# Patient Record
Sex: FEMALE | Race: WHITE | NOT HISPANIC OR LATINO | Employment: OTHER | URBAN - METROPOLITAN AREA
[De-identification: names, ages, dates, MRNs, and addresses within clinical notes are randomized per-mention and may not be internally consistent; named-entity substitution may affect disease eponyms.]

---

## 2017-02-28 DIAGNOSIS — Z12.31 ENCOUNTER FOR SCREENING MAMMOGRAM FOR MALIGNANT NEOPLASM OF BREAST: ICD-10-CM

## 2017-05-02 ENCOUNTER — APPOINTMENT (EMERGENCY)
Dept: CT IMAGING | Facility: HOSPITAL | Age: 57
End: 2017-05-02
Payer: COMMERCIAL

## 2017-05-02 ENCOUNTER — HOSPITAL ENCOUNTER (EMERGENCY)
Facility: HOSPITAL | Age: 57
Discharge: HOME | End: 2017-05-02
Attending: EMERGENCY MEDICINE | Admitting: EMERGENCY MEDICINE
Payer: COMMERCIAL

## 2017-05-02 VITALS
HEART RATE: 70 BPM | HEIGHT: 67 IN | BODY MASS INDEX: 27.79 KG/M2 | SYSTOLIC BLOOD PRESSURE: 188 MMHG | OXYGEN SATURATION: 100 % | RESPIRATION RATE: 17 BRPM | TEMPERATURE: 97.8 F | WEIGHT: 177.03 LBS | DIASTOLIC BLOOD PRESSURE: 88 MMHG

## 2017-05-02 DIAGNOSIS — F07.81 CONCUSSION SYNDROME: ICD-10-CM

## 2017-05-02 DIAGNOSIS — S16.1XXA NECK MUSCLE STRAIN, INITIAL ENCOUNTER: ICD-10-CM

## 2017-05-02 DIAGNOSIS — S09.90XA HEAD INJURY DUE TO TRAUMA, INITIAL ENCOUNTER: Primary | ICD-10-CM

## 2017-05-02 LAB
ANION GAP BLD CALC-SCNC: 18 MMOL/L (ref 4–13)
BUN BLD-MCNC: 7 MG/DL (ref 5–25)
CA-I BLD-SCNC: 1.23 MMOL/L (ref 1.12–1.32)
CHLORIDE BLD-SCNC: 103 MMOL/L (ref 100–108)
CREAT BLD-MCNC: 0.5 MG/DL (ref 0.6–1.3)
GFR SERPL CREATININE-BSD FRML MDRD: >60 ML/MIN/1.73SQ M
GLUCOSE SERPL-MCNC: 98 MG/DL (ref 65–140)
HCT VFR BLD CALC: 44 % (ref 34.8–46.1)
HGB BLDA-MCNC: 15 G/DL (ref 11.5–15.4)
PCO2 BLD: 23 MMOL/L (ref 21–32)
POTASSIUM BLD-SCNC: 3.8 MMOL/L (ref 3.5–5.3)
SODIUM BLD-SCNC: 139 MMOL/L (ref 136–145)
SPECIMEN SOURCE: ABNORMAL

## 2017-05-02 PROCEDURE — 99284 EMERGENCY DEPT VISIT MOD MDM: CPT

## 2017-05-02 PROCEDURE — 70450 CT HEAD/BRAIN W/O DYE: CPT

## 2017-05-02 PROCEDURE — 80047 BASIC METABLC PNL IONIZED CA: CPT

## 2017-05-02 PROCEDURE — 71260 CT THORAX DX C+: CPT

## 2017-05-02 PROCEDURE — 72125 CT NECK SPINE W/O DYE: CPT

## 2017-05-02 PROCEDURE — 85014 HEMATOCRIT: CPT

## 2017-05-02 PROCEDURE — 74177 CT ABD & PELVIS W/CONTRAST: CPT

## 2017-05-02 RX ORDER — CYCLOBENZAPRINE HCL 5 MG
5 TABLET ORAL 3 TIMES DAILY PRN
Qty: 21 TABLET | Refills: 0 | Status: SHIPPED | OUTPATIENT
Start: 2017-05-02 | End: 2018-05-17

## 2017-05-02 RX ORDER — ONDANSETRON 4 MG/1
4 TABLET, ORALLY DISINTEGRATING ORAL EVERY 6 HOURS PRN
Qty: 20 TABLET | Refills: 0 | Status: SHIPPED | OUTPATIENT
Start: 2017-05-02 | End: 2018-05-09

## 2017-05-02 RX ORDER — ATORVASTATIN CALCIUM 40 MG/1
40 TABLET, FILM COATED ORAL DAILY
COMMUNITY
End: 2020-07-19 | Stop reason: ALTCHOICE

## 2017-05-02 RX ADMIN — IOHEXOL 100 ML: 350 INJECTION, SOLUTION INTRAVENOUS at 19:16

## 2017-05-31 ENCOUNTER — ALLSCRIPTS OFFICE VISIT (OUTPATIENT)
Dept: OTHER | Facility: OTHER | Age: 57
End: 2017-05-31

## 2017-06-19 ENCOUNTER — ALLSCRIPTS OFFICE VISIT (OUTPATIENT)
Dept: OTHER | Facility: OTHER | Age: 57
End: 2017-06-19

## 2017-06-19 DIAGNOSIS — R94.6 ABNORMAL RESULTS OF THYROID FUNCTION STUDIES: ICD-10-CM

## 2017-06-19 DIAGNOSIS — E78.2 MIXED HYPERLIPIDEMIA: ICD-10-CM

## 2017-07-21 ENCOUNTER — ALLSCRIPTS OFFICE VISIT (OUTPATIENT)
Dept: OTHER | Facility: OTHER | Age: 57
End: 2017-07-21

## 2017-07-27 ENCOUNTER — ALLSCRIPTS OFFICE VISIT (OUTPATIENT)
Dept: OTHER | Facility: OTHER | Age: 57
End: 2017-07-27

## 2017-08-08 ENCOUNTER — GENERIC CONVERSION - ENCOUNTER (OUTPATIENT)
Dept: OTHER | Facility: OTHER | Age: 57
End: 2017-08-08

## 2017-08-15 ENCOUNTER — HOSPITAL ENCOUNTER (OUTPATIENT)
Facility: HOSPITAL | Age: 57
Setting detail: OUTPATIENT SURGERY
Discharge: HOME | End: 2017-08-15
Attending: INTERNAL MEDICINE | Admitting: INTERNAL MEDICINE
Payer: COMMERCIAL

## 2017-08-15 ENCOUNTER — GENERIC CONVERSION - ENCOUNTER (OUTPATIENT)
Dept: OTHER | Facility: OTHER | Age: 57
End: 2017-08-15

## 2017-08-15 ENCOUNTER — ANESTHESIA (OUTPATIENT)
Dept: PERIOP | Facility: HOSPITAL | Age: 57
End: 2017-08-15
Payer: COMMERCIAL

## 2017-08-15 ENCOUNTER — ANESTHESIA EVENT (OUTPATIENT)
Dept: PERIOP | Facility: HOSPITAL | Age: 57
End: 2017-08-15
Payer: COMMERCIAL

## 2017-08-15 VITALS
DIASTOLIC BLOOD PRESSURE: 69 MMHG | HEART RATE: 65 BPM | HEIGHT: 69 IN | SYSTOLIC BLOOD PRESSURE: 126 MMHG | BODY MASS INDEX: 24.82 KG/M2 | TEMPERATURE: 97.7 F | OXYGEN SATURATION: 99 % | WEIGHT: 167.55 LBS | RESPIRATION RATE: 14 BRPM

## 2017-08-15 DIAGNOSIS — Z86.010 HISTORY OF COLONIC POLYPS: ICD-10-CM

## 2017-08-15 PROCEDURE — 88305 TISSUE EXAM BY PATHOLOGIST: CPT | Performed by: INTERNAL MEDICINE

## 2017-08-15 RX ORDER — SODIUM CHLORIDE, SODIUM LACTATE, POTASSIUM CHLORIDE, CALCIUM CHLORIDE 600; 310; 30; 20 MG/100ML; MG/100ML; MG/100ML; MG/100ML
INJECTION, SOLUTION INTRAVENOUS CONTINUOUS PRN
Status: DISCONTINUED | OUTPATIENT
Start: 2017-08-15 | End: 2017-08-15 | Stop reason: SURG

## 2017-08-15 RX ORDER — THIAMINE MONONITRATE (VIT B1) 100 MG
100 TABLET ORAL DAILY
Status: ON HOLD | COMMUNITY
End: 2021-08-10 | Stop reason: ALTCHOICE

## 2017-08-15 RX ORDER — PROPOFOL 10 MG/ML
INJECTION, EMULSION INTRAVENOUS AS NEEDED
Status: DISCONTINUED | OUTPATIENT
Start: 2017-08-15 | End: 2017-08-15 | Stop reason: SURG

## 2017-08-15 RX ORDER — LIDOCAINE HYDROCHLORIDE 10 MG/ML
INJECTION, SOLUTION INFILTRATION; PERINEURAL AS NEEDED
Status: DISCONTINUED | OUTPATIENT
Start: 2017-08-15 | End: 2017-08-15 | Stop reason: SURG

## 2017-08-15 RX ADMIN — LIDOCAINE HYDROCHLORIDE 50 MG: 10 INJECTION, SOLUTION INFILTRATION; PERINEURAL at 08:58

## 2017-08-15 RX ADMIN — PROPOFOL 150 MG: 10 INJECTION, EMULSION INTRAVENOUS at 09:08

## 2017-08-15 RX ADMIN — PROPOFOL 30 MG: 10 INJECTION, EMULSION INTRAVENOUS at 09:05

## 2017-08-15 RX ADMIN — PROPOFOL 50 MG: 10 INJECTION, EMULSION INTRAVENOUS at 09:10

## 2017-08-15 RX ADMIN — SODIUM CHLORIDE, SODIUM LACTATE, POTASSIUM CHLORIDE, AND CALCIUM CHLORIDE: .6; .31; .03; .02 INJECTION, SOLUTION INTRAVENOUS at 08:55

## 2017-11-29 ENCOUNTER — ALLSCRIPTS OFFICE VISIT (OUTPATIENT)
Dept: OTHER | Facility: OTHER | Age: 57
End: 2017-11-29

## 2017-11-29 DIAGNOSIS — I89.0 LYMPHEDEMA, NOT ELSEWHERE CLASSIFIED: ICD-10-CM

## 2017-11-30 NOTE — PROGRESS NOTES
Assessment    1  Lymphedema of right arm (457 1) (I89 0)    Plan  Lymphedema of right arm    · *1 - [facility]  *  Status: Active  Requested for: 28TKH2693  Care Summary provided  : Yes    Discussion/Summary    Referral to Lymphedema therapy at [facility]  Possible 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 of new medications were reviewed with the patient/guardian today  The treatment plan was reviewed with the patient/guardian  The patient/guardian understands and agrees with the treatment plan      Chief Complaint  Patient is here for a burn on her back which is getting better but n ow has caused her lymphedema to flare up  History of Present Illness  59 yr old with h/o chronic pain syndrome related to a surgery she had on her R arm  She states she was using a machine at home that she uses for her lymphedema and over the past few days her lymphedema has worsened  She was getting concerned that the swelling would not go down  burned her back from a heating pad  It is healing  Review of Systems   Musculoskeletal: as noted in HPI  Active Problems    1  Back pain, chronic (724 5,338 29) (M54 9,G89 29)   2  Cervical cancer screening (V76 2) (Z12 4)   3  Cervicalgia (723 1) (M54 2)   4  Chronic anemia (285 9) (D64 9)   5  Chronic insomnia (780 52) (F51 04)   6  Chronic pain (338 29) (G89 29)   7  Decreased thyroid stimulating hormone (TSH) level (794 5) (R94 6)   8  Encounter for gynecological examination without abnormal finding (V72 31) (Z01 419)   9  Encounter for screening mammogram for malignant neoplasm of breast (V76 12) (Z12 31)   10  Female dyspareunia (625 0) (N94 10)   11  History of colon polyps (V12 72) (Z86 010)   12  Hyperlipidemia, mixed (272 2) (E78 2)   13  Lymphedema of right arm (457 1) (I89 0)   14  Melanoma (172 9) (C43 9)   15  Peripheral neuropathy (356 9) (G62 9)   16  Screening for breast cancer (V76 10) (Z12 31)   17  Screening for colon cancer (V76 51) (Z12 11)   18   Skin lesion of back (709 9) (L98 9)    Past Medical History  1  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)   2  History of mammogram (V15 89) (Z92 89)    The active problems and past medical history were reviewed and updated today  Surgical History  1  History of Arm Incision   2  History of Biopsy Skin   3  History Of Prior Surgery   4  History of Hysterectomy    Family History  Mother    1  Family history of Diabetes  Father    2  Family history unknown (V49 89) (Z78 9)  Sister    3  Family history of Breast cancer   4  Family history of Lung cancer   5  Family history of Pancreatic cancer    Social History     · Consumes alcohol occasionally (V49 89) (Z78 9)   · Denied: History of Drug use   · Lives with spouse   · Denied: History of Marijuana   ·    · Never a smoker   · Never smoked   · No drug use   · Unemployed (V62 0) (Z56 0)  The social history was reviewed and updated today  Current Meds   1  Atorvastatin Calcium 20 MG Oral Tablet; take one tablet by mouth one time daily; Therapy: 29VXQ9993 to (Evaluate:55Fkd6329)  Requested for: 03Pct2718; Last Rx:15Fpo7354 Ordered   2  Gabapentin 300 MG Oral Capsule; TAKE 2 CAPSULES BY MOUTH 4 TIMES DAILY; Therapy: 68Qty2204 to (Evaluate:02Jan2018)  Requested for: 50QMF1318; Last Rx:04Oct2017 Ordered   3  Melatonin CAPS; Therapy: (Recorded:19Jun2017) to Recorded   4  Misc Natural Products CREA; Therapy: (Recorded:20Jun2017) to Recorded    The medication list was reviewed and updated today  Allergies  1  Codeine Sulfate TABS   2  Phenothiazines   3  Saccharin PACK   4  aspirin   5  Demerol TABS   6  Ibuprofen CAPS   7  neomycin  8   Latex    Vitals  Vital Signs    Recorded: 08FZJ0757 02:36PM   Temperature 98 5 F   Heart Rate 82   Systolic 914   Diastolic 84   Height 5 ft 7 in   Weight 177 lb 12 8 oz   BMI Calculated 27 85   BSA Calculated 1 92   O2 Saturation 98       Physical Exam   Constitutional  General appearance: No acute distress, well appearing and well nourished  Pulmonary  Respiratory effort: No increased work of breathing or signs of respiratory distress  Musculoskeletal  Inspection/palpation of joints, bones, and muscles: Abnormal  -- R arm diffusely swollen, scar upper arm  Skin  Skin and subcutaneous tissue: Abnormal  -- Scar upper arm, 2 cm burn posterior shoulder does not appear infected  Pulse Exam  Radial: right 2+-- and-- left 2+  Health Management  Cervical cancer screening   (1) THIN PREP PAP WITH IMAGING; every 5 years; Last 05QEU4009; Next Due: 52YAA9387; Overdue  Screening for breast cancer   Digital Bilateral Screening Mammogram With CAD; every 1 year; Next Due: 62ELG0159; Overdue  Screening for colon cancer   COLONOSCOPY; every 5 years; Last 80TXO5797; Next Due: 93Fmh7553;  Active    Signatures   Electronically signed by : Shi Avitia MD; Nov 29 2017  2:58PM EST                       (Author)

## 2017-12-21 ENCOUNTER — GENERIC CONVERSION - ENCOUNTER (OUTPATIENT)
Dept: FAMILY MEDICINE CLINIC | Facility: CLINIC | Age: 57
End: 2017-12-21

## 2018-01-03 ENCOUNTER — ALLSCRIPTS OFFICE VISIT (OUTPATIENT)
Dept: OTHER | Facility: OTHER | Age: 58
End: 2018-01-03

## 2018-01-03 DIAGNOSIS — S96.091S: ICD-10-CM

## 2018-01-04 NOTE — PROGRESS NOTES
Assessment   1  Other injury of muscle and tendon of long flexor muscle of toe at ankle and foot level,     right foot, sequela (749 7) (S91 091S)    Plan   Other injury of muscle and tendon of long flexor muscle of toe at ankle and foot level,    right foot, sequela    · 1 - Jaxson BAR, Juan Ramon Horton (Orthopedic Surgery) Co-Management  *eval and treat  Injury to    right foot  Status: Active - Retrospective Authorization  Requested for: [id number]  Care Summary provided  : Yes   · * XR TOE RIGHT GREAT MIN 2 VIEW; Status:Active; Requested OOQ:[id number];    · XR FOOT 2 VIEW RIGHT; Status:Active; Requested CPS:15INA1295; Discussion/Summary      Sprain vs possible fracture of right foot or toes- compression/ice  Keep elevated  Apply ace wrap as instructed  will call you with xray report  if xray is normal, continue current treatment for next 7-10days, if no imprvmnt can follow up with ortho-referral was given so they do not have to return to this office  The patient was counseled regarding  Possible 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 of new medications were reviewed with the patient/guardian today  The treatment plan was reviewed with the patient/guardian  The patient/guardian understands and agrees with the treatment plan       Self Referrals: No      Chief Complaint   1  Ankle Swelling  c/c- right ankle/foot swelling  Pt states she fell getting out of the tub 4days ago  History of Present Illness   HPI: Four days ago, while getting out of the bathtub, pt slipped and her toes curled upward  She has had pain in the ball of her foot and stretches across the top of her foot  She did have some numbness of her foot and definite swelling  She is has limited passive mvmnt of 4/5th toes  She reports ecchymosis from foot to ankle  She took Tylenol and applied ice and rest until today  Review of Systems        Constitutional: No fever, no chills, feels well, no tiredness, no recent weight gain or loss        Musculoskeletal: pain on bottom of foot when stepping, but-- as noted in HPI  Integumentary: ecchymosis, but-- as noted in HPI  Neurological: as noted in HPI  Active Problems   1  Back pain, chronic (724 5,338 29) (M54 9,G89 29)   2  Cervicalgia (723 1) (M54 2)   3  Chronic anemia (285 9) (D64 9)   4  Chronic insomnia (780 52) (F51 04)   5  Chronic pain (338 29) (G89 29)   6  Decreased thyroid stimulating hormone (TSH) level (794 5) (R94 6)   7  Female dyspareunia (625 0) (N94 10)   8  Hyperlipidemia, mixed (272 2) (E78 2)   9  Lymphedema of right arm (457 1) (I89 0)   10  Melanoma (172 9) (C43 9)   11  Peripheral neuropathy (356 9) (G62 9)   12  Skin lesion of back (709 9) (L98 9)    Past Medical History   1  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)   2  History of mammogram (V15 89) (Z92 89)  Active Problems And Past Medical History Reviewed: The active problems and past medical history were reviewed and updated today  Family History   Mother    1  Family history of Diabetes  Father    2  Family history unknown (V49 89) (Z78 9)  Sister    3  Family history of Breast cancer   4  Family history of Lung cancer   5  Family history of Pancreatic cancer  Family History Reviewed: The family history was reviewed and updated today  Social History    · Consumes alcohol occasionally (V49 89) (Z78 9)   · Denied: History of Drug use   · Lives with spouse   · Denied: History of Marijuana   ·    · Never a smoker   · Never smoked   · No drug use   · Unemployed (V62 0) (Z56 0)  The social history was reviewed and updated today  The social history was reviewed and is unchanged  Surgical History   1  History of Arm Incision   2  History of Biopsy Skin   3  History Of Prior Surgery   4  History of Hysterectomy  Surgical History Reviewed: The surgical history was reviewed and updated today  Current Meds    1  Atorvastatin Calcium 20 MG Oral Tablet; take one tablet by mouth one time daily;      Therapy: 60VKX8737 to (Evaluate:29Msq5749)  Requested for: 27Lwu4660; Last     Rx:87Ihc7948 Ordered   2  Bromfed DM 30-2-10 MG/5ML Oral Syrup; TAKE 5 ML EVERY 4 TO 6 HOURS AS     NEEDED; Therapy: 77KZJ2978 to (Evaluate:70Lvk0494)  Requested for: 31CQG5007; Last     Rx:71Ctj4867 Ordered   3  Gabapentin 300 MG Oral Capsule; TAKE 2 CAPSULES BY MOUTH 4 TIMES DAILY; Therapy: 04Xjy4821 to (Evaluate:02Jan2018)  Requested for: 34XYJ2833; Last     Rx:04Oct2017 Ordered   4  Melatonin CAPS; Therapy: (Recorded:19Jun2017) to Recorded   5  Misc Natural Products CREA; Therapy: (Recorded:20Jun2017) to Recorded     The medication list was reviewed and updated today  Allergies   1  Codeine Sulfate TABS   2  Phenothiazines   3  Saccharin PACK   4  aspirin   5  Demerol TABS   6  Ibuprofen CAPS   7  neomycin  8  Latex    Vitals    Recorded: 42JKI1043 03:26PM   Temperature 98 6 F   Heart Rate 75   Respiration 16   Systolic 364   Diastolic 82   Height 5 ft 7 in   Weight 177 lb    BMI Calculated 27 72   BSA Calculated 1 92   O2 Saturation 99     Physical Exam        Constitutional      General appearance: No acute distress, well appearing and well nourished  Eyes      Conjunctiva and lids: No swelling, erythema or discharge  Pupils and irises: Equal, round and reactive to light  Ears, Nose, Mouth, and Throat      External inspection of ears and nose: Normal        Pulmonary      Respiratory effort: No increased work of breathing or signs of respiratory distress  Musculoskeletal      Gait and station: Abnormal  -- disruption of gait due to pain of right foot  Digits and nails: Abnormal  -- Pain with passive mvmnt of 4/5th toe  + ecchymosis of 4/5 toes and right lateral side of foot  + mild edema of right lateral foot  Skin      Skin and subcutaneous tissue: Abnormal  -- eccymosis of right lateral foot and 4/5toes           Signatures    Electronically signed by : David Robertson NP; [place]  3 2018 4:13PM EST                       (Author)     Electronically signed by : Hugo Zhao MD; [place]  3 2018  4:32PM EST                       (Co-author)

## 2018-01-12 VITALS
HEART RATE: 78 BPM | OXYGEN SATURATION: 97 % | WEIGHT: 171.13 LBS | TEMPERATURE: 98.4 F | BODY MASS INDEX: 26.86 KG/M2 | SYSTOLIC BLOOD PRESSURE: 116 MMHG | DIASTOLIC BLOOD PRESSURE: 64 MMHG | HEIGHT: 67 IN

## 2018-01-13 VITALS
SYSTOLIC BLOOD PRESSURE: 136 MMHG | WEIGHT: 173 LBS | DIASTOLIC BLOOD PRESSURE: 74 MMHG | HEIGHT: 67 IN | BODY MASS INDEX: 27.15 KG/M2

## 2018-01-14 VITALS
DIASTOLIC BLOOD PRESSURE: 82 MMHG | SYSTOLIC BLOOD PRESSURE: 134 MMHG | BODY MASS INDEX: 28.72 KG/M2 | HEIGHT: 67 IN | WEIGHT: 183 LBS

## 2018-01-14 VITALS
TEMPERATURE: 98.5 F | HEIGHT: 67 IN | DIASTOLIC BLOOD PRESSURE: 84 MMHG | WEIGHT: 177.8 LBS | HEART RATE: 82 BPM | SYSTOLIC BLOOD PRESSURE: 122 MMHG | BODY MASS INDEX: 27.91 KG/M2 | OXYGEN SATURATION: 98 %

## 2018-01-15 VITALS
WEIGHT: 171.13 LBS | HEART RATE: 72 BPM | DIASTOLIC BLOOD PRESSURE: 90 MMHG | BODY MASS INDEX: 26.86 KG/M2 | HEIGHT: 67 IN | SYSTOLIC BLOOD PRESSURE: 128 MMHG

## 2018-01-16 NOTE — MISCELLANEOUS
Message   Recorded as Task   Date: 08/03/2017 09:20 AM, Created By: Melyssa Kebede   Task Name: Miscellaneous   Assigned To: [facility] es clinical,Team   Regarding Patient: Rebekah Sheets, Status: In Progress   Yong Handler - 03 Aug 2017 9:20 AM     TASK CREATED  [facility] Call [facility] from [facility] called stating that the patient was referred to their office by   Wanted to inform [facility] that they will not be accepting this patient  Stated they will not call the patient   but [facility] would have to since [facility] is referring the patient  hung up before getting a c/b#  Bhakti Marquez - 66 Aug 2017 9:56 AM     TASK REASSIGNED: Previously Assigned To [facility] es clinical,Team    Please advise  *************   Alec Mcarthur - 03 Aug 2017 11:50 AM     TASK REPLIED TO: Previously Assigned To Alec Mcarthur  pt  was seen by me in the office and was requesting marinol rx which I do not prescribe  She is seeking a provider who prescribes marinol of CBD  I had referred her to a physician in [facility] whom I thought prescribes this - apparently her probably does not  I had explained when I saw her 4 weeks ago that prescribing marinol is not within the scope of my practice  She needs to see a physician who will be willing to prescribe this  Please reiterate this to her if you can  thank you   Bhakti Marquez - 03 Aug 2017 1:58 PM     TASK EDITED    I spoke with pt, advised above  Pt asking who she should see? I advised that I will look into it more and CB   **********   Bhakti Marquez - 03 Aug 2017 2:07 PM     TASK EDITED    I discussed with Dr Bang Willard, can send pt list of docs she can contact to see if they would be willing to prescribe marinol  Lists sent to her home  LMOM for pt to CB   ***********        Active Problems    1  Back pain, chronic (724 5,338 29) (M54 9,G89 29)   2  Cervical cancer screening (V76 2) (Z12 4)   3  Cervicalgia (723 1) (M54 2)   4  Chronic anemia (285 9) (D64 9)   5  Chronic insomnia (780 52) (F51 04)   6   Chronic pain (338 29) (G89 29)   7  Decreased thyroid stimulating hormone (TSH) level (794 5) (R94 6)   8  Encounter for gynecological examination without abnormal finding (V72 31) (Z01 419)   9  Encounter for screening mammogram for malignant neoplasm of breast (V76 12)   (Z12 31)   10  Female dyspareunia (625 0) (N94 10)   11  History of colon polyps (V12 72) (Z86 010)   12  Hyperlipidemia, mixed (272 2) (E78 2)   13  Lymphedema of right arm (457 1) (I89 0)   14  Melanoma (172 9) (C43 9)   15  Peripheral neuropathy (356 9) (G62 9)   16  Screening for breast cancer (V76 10) (Z12 31)   17  Screening for colon cancer (V76 51) (Z12 11)   18  Skin lesion of back (709 9) (L98 9)    Current Meds   1  Atorvastatin Calcium 20 MG Oral Tablet; take one tablet by mouth one time daily; Therapy: 44QJU6060 to (Evaluate:18Sxi4638)  Requested for: 27Fpw9071; Last   Rx:99Hgs3990 Ordered   2  Gabapentin 300 MG Oral Capsule; TAKE 2 CAPSULES BY MOUTH 4 TIMES DAILY; Therapy: 70Qcj8541 to (Evaluate:19Oct2017)  Requested for: 52Urb4062; Last   Rx:95Blu0453 Ordered   3  Melatonin CAPS; Therapy: (Recorded:19Jun2017) to Recorded   4  Misc Natural Products CREA; Therapy: (Recorded:20Jun2017) to Recorded    Allergies    1  Codeine Sulfate TABS   2  Phenothiazines   3  Saccharin PACK   4  aspirin   5  Demerol TABS   6  Ibuprofen CAPS   7  neomycin    8   Latex    Signatures   Electronically signed by : Evan Moran, ; Aug  8 2017  9:48AM EST                       (Author)

## 2018-01-18 NOTE — PROGRESS NOTES
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)   3  Chronic pain (338 29) (G89 29)   4  Chronic insomnia (780 52) (F51 04)   5  Hyperlipidemia, mixed (272 2) (E78 2)   6  Lymphedema of right arm (457 1) (I89 0)    Plan  Decreased thyroid stimulating hormone (TSH) level    · (1) TSH WITH FT4 REFLEX; Status:Active; Requested NUE:77ZTS2955;   Hyperlipidemia, mixed    · (1) COMPREHENSIVE METABOLIC PANEL; Status:Active; Requested XSR:23UUF8199;    · (1) LIPID PANEL FASTING W DIRECT LDL REFLEX; Status:Active; Requested  ECI:25GJY6151;   Screening for colon cancer    · 1 - Tomás BAR, Frances Jones  (Gastroenterology) Co-Management  *  Status: Active -  Retrospective By Protocol Authorization  Requested for: 88FEJ3581  Care Summary provided  : Yes    Discussion/Summary  healthy adult female cervical cancer screening is current Breast cancer screening: mammogram is current  Colorectal cancer screening: colorectal cancer screening is current  Screening lab work includes glucose and lipid profile  The immunizations are up to date  Advice and education were given regarding nutrition and alcohol use  Patient discussion: discussed with the patient  Update labs  For her chronic pain, we discussed various options for treatment  She would like to get more of the cream that helps her - she will call w/ the ingredients  Continue Gabapentin  Advised her to consider Cymbalta for chronic pain  Possible 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 of new medications were reviewed with the patient/guardian today  The treatment plan was reviewed with the patient/guardian  The patient/guardian understands and agrees with the treatment plan      Chief Complaint  Patient is here today to become established and have routine well visit      History of Present Illness  HM, Adult Female: The patient is being seen for a health maintenance evaluation  General Health:  The patient's health since the last visit is described as fair  She complains of vision problems  Vision care includes wearing glasses  Lifestyle:  She does not use tobacco  She denies alcohol use  She denies drug use  Reproductive health: the patient is postmenopausal    Screening: cancer screening reviewed and current  metabolic screening reviewed and updated  risk screening reviewed and current  HPI: 60year old F here to establish care  She has a h/o "desmoidtosis" diagnosed in 1999 was treated at [facility]  She required multiple surgeries for this on her R arm including a flap from her back to her arm  She has chronic pain in the R upper arm and lymphedema  She has a compression lymphedema device that she uses at home  She takes Gabapentin  She has seen pain management in the past  She states Dr Marisol Ford gave her a topical compounding cream that really helped her  She states she tried Amitriptyline but it gave her short term memory loss  She is allergic to NSAIDs  She has trouble sleeping due to the pain  She also has a h/o melanoma which was treated with surgery  She has a h/o HLD and is on statin medication  Review of Systems    Constitutional: No fever, no chills, feels well, no tiredness, no recent weight gain or weight loss  Eyes: No complaints of eye pain, no red eyes, no eyesight problems, no discharge, no dry eyes, no itching of eyes  ENT: no complaints of earache, no loss of hearing, no nose bleeds, no nasal discharge, no sore throat, no hoarseness  Cardiovascular: No complaints of slow heart rate, no fast heart rate, no chest pain, no palpitations, no leg claudication, no lower extremity edema  Respiratory: No complaints of shortness of breath, no wheezing, no cough, no SOB on exertion, no orthopnea, no PND  Gastrointestinal: No complaints of abdominal pain, no constipation, no nausea or vomiting, no diarrhea, no bloody stools     Genitourinary: No complaints of dysuria, no incontinence, no pelvic pain, no dysmenorrhea, no vaginal discharge or bleeding  Musculoskeletal: as noted in HPI  Integumentary: No complaints of skin rash or lesions, no itching, no skin wounds, no breast pain or lump  Neurological: No complaints of headache, no confusion, no convulsions, no numbness, no dizziness or fainting, no tingling, no limb weakness, no difficulty walking  Psychiatric: sleep disturbances  Endocrine: No complaints of proptosis, no hot flashes, no muscle weakness, no deepening of the voice, no feelings of weakness  Hematologic/Lymphatic: No complaints of swollen glands, no swollen glands in the neck, does not bleed easily, does not bruise easily  Active Problems    1  Back pain, chronic (724 5,338 29) (M54 9,G89 29)   2  Cervical cancer screening (V76 2) (Z12 4)   3  Cervicalgia (723 1) (M54 2)   4  Chronic anemia (285 9) (D64 9)   5  Chronic pain (338 29) (G89 29)   6  Decreased thyroid stimulating hormone (TSH) level (794 5) (R94 6)   7  Encounter for gynecological examination without abnormal finding (V72 31) (Z01 419)   8  Encounter for screening mammogram for malignant neoplasm of breast (V76 12)   (Z12 31)   9  Female dyspareunia (625 0) (N94 10)   10  Hyperlipidemia, mixed (272 2) (E78 2)   11  Melanoma (172 9) (C43 9)   12  Peripheral neuropathy (356 9) (G62 9)   13  Screening for breast cancer (V76 10) (Z12 39)   14  Screening for colon cancer (V76 51) (Z12 11)   15   Skin lesion of back (709 9) (L98 9)    Past Medical History    · 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)   · History of mammogram (V15 89) (Z92 89)    Surgical History    · History of Arm Incision   · History of Biopsy Skin   · History Of Prior Surgery   · History of Hysterectomy    Family History  Mother    · Family history of Diabetes  Father    · Family history unknown (V49 89) (Z73 8)  Sister    · Family history of Breast cancer   · Family history of Lung cancer   · Family history of Pancreatic cancer    Social History    · Consumes alcohol occasionally (V49 89) (Z78 9)   · Denied: History of Drug use   · Lives with spouse   · Denied: History of Marijuana   ·    · Never a smoker   · Never smoked   · No drug use   · Unemployed (V62 0) (Z56 0)    Current Meds   1  Atorvastatin Calcium 20 MG Oral Tablet; take 1 tablet by mouth once daily; Therapy: 76YKZ7192 to (Yao Valdovinos)  Requested for: 03Apr2017; Last   Rx:03Apr2017 Ordered   2  Gabapentin 300 MG Oral Capsule; TAKE 2 CAPSULES BY MOUTH 4 TIMES DAILY; Therapy: 25Jdn8460 to (Evaluate:16Tib2086)  Requested for: 35GFN2660; Last   Rx:25Bxb5964 Ordered   3  Melatonin CAPS; Therapy: (Recorded:19Jun2017) to Recorded    Allergies    1  Codeine Sulfate TABS   2  Phenothiazines   3  Saccharin PACK   4  aspirin   5  Demerol TABS   6  Ibuprofen CAPS   7  neomycin    8  Latex    Vitals   Recorded: Z4859785 03:53PM   Temperature 98 4 F   Heart Rate 78   Systolic 755   Diastolic 64   Height 5 ft 7 in   Weight 171 lb 2 08 oz   BMI Calculated 26 8   BSA Calculated 1 89   O2 Saturation 97     Physical Exam    Constitutional   General appearance: No acute distress, well appearing and well nourished  Head and Face   Head and face: Normal     Eyes   Conjunctiva and lids: No swelling, erythema or discharge  Pupils and irises: Equal, round, reactive to light  Ears, Nose, Mouth, and Throat   Otoscopic examination: Tympanic membranes translucent with normal light reflex  Canals patent without erythema  Hearing: Normal     Lips, teeth, and gums: Normal, good dentition  Oropharynx: Normal with no erythema, edema, exudate or lesions  Pulmonary   Respiratory effort: No increased work of breathing or signs of respiratory distress  Auscultation of lungs: Clear to auscultation  Cardiovascular   Auscultation of heart: Normal rate and rhythm, normal S1 and S2, no murmurs      Examination of extremities for edema and/or varicosities: Normal     Musculoskeletal   Joints, bones, and muscles: Abnormal   RUE extensive scarring from previous flap surgery  Skin   Skin and subcutaneous tissue: Abnormal   scarring RUE and R side of back  Psychiatric   Judgment and insight: Normal     Orientation to person, place, and time: Normal     Recent and remote memory: Intact  Mood and affect: Normal        Results/Data  PHQ-9 Adult Depression Screening 63KXH5872 04:38PM User, Darens     Test Name Result Flag Reference   PHQ-9 Adult Depression Score 3     Over the last two weeks, how often have you been bothered by any of the following problems? Little interest or pleasure in doing things: Not at all - 0  Feeling down, depressed, or hopeless: Not at all - 0  Trouble falling or staying asleep, or sleeping too much: More than half the days - 2  Feeling tired or having little energy: Not at all - 0  Poor appetite or over eating: Not at all - 0  Feeling bad about yourself - or that you are a failure or have let yourself or your family down: Several days - 1  Trouble concentrating on things, such as reading the newspaper or watching television: Not at all - 0  Moving or speaking so slowly that other people could have noticed  Or the opposite -  being so fidgety or restless that you have been moving around a lot more than usual: Not at all - 0  Thoughts that you would be better off dead, or of hurting yourself in some way: Not at all - 0   PHQ-9 Adult Depression Screening Negative     PHQ-9 Difficulty Level Somewhat difficult     PHQ-9 Severity Minimal Depression         Health Management  Cervical cancer screening   (1) THIN PREP PAP WITH IMAGING; every 5 years; Next Due: 55CKS7631; Overdue  Screening for breast cancer   Digital Bilateral Screening Mammogram With CAD; every 1 year; Next Due: 11ZOO0364; Overdue  Screening for colon cancer   COLONOSCOPY; every 10 years; Next Due: 65AMP3210;  Overdue    Signatures   Electronically signed by : Lucero Ramos MD; Jun 19 2017  4:49PM EST (Author)

## 2018-01-23 ENCOUNTER — GENERIC CONVERSION - ENCOUNTER (OUTPATIENT)
Dept: OTHER | Facility: OTHER | Age: 58
End: 2018-01-23

## 2018-01-23 VITALS
TEMPERATURE: 98.6 F | RESPIRATION RATE: 16 BRPM | OXYGEN SATURATION: 99 % | HEART RATE: 75 BPM | SYSTOLIC BLOOD PRESSURE: 122 MMHG | DIASTOLIC BLOOD PRESSURE: 82 MMHG | BODY MASS INDEX: 27.78 KG/M2 | WEIGHT: 177 LBS | HEIGHT: 67 IN

## 2018-02-19 DIAGNOSIS — I89.0 LYMPHEDEMA OF ARM: Primary | ICD-10-CM

## 2018-02-19 PROBLEM — F51.04 CHRONIC INSOMNIA: Status: ACTIVE | Noted: 2017-06-19

## 2018-02-26 ENCOUNTER — HOSPITAL ENCOUNTER (EMERGENCY)
Facility: HOSPITAL | Age: 58
Discharge: HOME | End: 2018-02-26
Admitting: EMERGENCY MEDICINE
Payer: COMMERCIAL

## 2018-02-26 VITALS
HEART RATE: 75 BPM | DIASTOLIC BLOOD PRESSURE: 81 MMHG | BODY MASS INDEX: 25.48 KG/M2 | RESPIRATION RATE: 18 BRPM | SYSTOLIC BLOOD PRESSURE: 171 MMHG | WEIGHT: 172 LBS | TEMPERATURE: 97.8 F | HEIGHT: 69 IN | OXYGEN SATURATION: 100 %

## 2018-02-26 DIAGNOSIS — I89.0 LYMPHEDEMA: Primary | ICD-10-CM

## 2018-02-26 DIAGNOSIS — L08.9 SKIN INFECTION: ICD-10-CM

## 2018-02-26 PROCEDURE — 99284 EMERGENCY DEPT VISIT MOD MDM: CPT

## 2018-02-26 RX ORDER — CEPHALEXIN 250 MG/1
500 CAPSULE ORAL ONCE
Status: COMPLETED | OUTPATIENT
Start: 2018-02-26 | End: 2018-02-26

## 2018-02-26 RX ORDER — CEPHALEXIN 500 MG/1
500 CAPSULE ORAL 4 TIMES DAILY
Qty: 40 CAPSULE | Refills: 0 | Status: SHIPPED | OUTPATIENT
Start: 2018-02-26 | End: 2018-03-08

## 2018-02-26 RX ADMIN — CEPHALEXIN 500 MG: 250 CAPSULE ORAL at 19:27

## 2018-02-27 NOTE — ED PROVIDER NOTES
History  Chief Complaint   Patient presents with    Edema     right arm, states it began today, states arm is going "stagnant", increased swelling and red itchy blotches      49-year-old female patient presenting here with a chief complaint of her arm going stagnant  She has a history of lymphedema  She had skin cancer removed many many years ago and has a graft over the area and had some proximal lymph nodes removed  She states that when her arm goes stagnant she is usually placed on antibiotics  She reports some redness over the triceps area which you can see in the pictures attached  She reports that she has a compression machine that isn't working as well as he used to perhaps this is somehow related  She reports the area is being itchy denies pain swelling in her forearm is slightly uncomfortable no previous history of DVTs she is relatively active  She denies any systemic symptoms or fever  Prior to Admission Medications   Prescriptions Last Dose Informant Patient Reported? Taking?    GABAPENTIN PO   Yes No   Sig: Take 725 mg by mouth 4 (four) times a day   Omega-3 Fatty Acids (FISH OIL PO)   Yes No   Sig: Take by mouth   atorvastatin (LIPITOR) 40 mg tablet   Yes No   Sig: Take 40 mg by mouth daily   cyclobenzaprine (FLEXERIL) 5 mg tablet   No No   Sig: Take 1 tablet by mouth 3 (three) times a day as needed for muscle spasms (neck pain or stiffness)   ondansetron (ZOFRAN-ODT) 4 mg disintegrating tablet   No No   Sig: Take 1 tablet by mouth every 6 (six) hours as needed for nausea or vomiting   thiamine (VITAMIN B1) 100 mg tablet   Yes No   Sig: Take 100 mg by mouth daily      Facility-Administered Medications: None       Past Medical History:   Diagnosis Date    Anemia     Cancer ([facility] [address] )     desmoitosis    Hypertension        Past Surgical History:   Procedure Laterality Date    HYSTERECTOMY      TN COLONOSCOPY FLX DX W/COLLJ SPEC WHEN PFRMD N/A 8/15/2017    Procedure: COLONOSCOPY; Surgeon: Katharina Bermudez MD;  Location: [facility]; Service: Gastroenterology       History reviewed  No pertinent family history  I have reviewed and agree with the history as documented  Social History   Substance Use Topics    Smoking status: Never Smoker    Smokeless tobacco: Never Used    Alcohol use 0 6 oz/week     1 Shots of liquor per week      Comment: daily HS         Review of Systems   Constitutional: Negative for activity change, fatigue and fever  HENT: Negative for congestion, ear pain, rhinorrhea and sore throat  Eyes: Negative  Respiratory: Negative for cough, shortness of breath and wheezing  Gastrointestinal: Negative for abdominal pain, diarrhea, nausea and vomiting  Endocrine: Negative  Genitourinary: Negative for difficulty urinating, dyspareunia, dysuria, flank pain, frequency, menstrual problem, pelvic pain, urgency, vaginal bleeding, vaginal discharge and vaginal pain  Musculoskeletal: Negative for arthralgias and myalgias  Skin: Positive for rash  Negative for color change and pallor  Neurological: Negative for dizziness, speech difficulty, weakness and headaches  Hematological: Negative for adenopathy  Psychiatric/Behavioral: Negative for confusion  Physical Exam  ED Triage Vitals [02/26/18 1753]   Temperature Pulse Respirations Blood Pressure SpO2   97 8 °F (36 6 °C) 75 18 (!) 171/81 100 %      Temp Source Heart Rate Source Patient Position - Orthostatic VS BP Location FiO2 (%)   Oral Monitor Sitting Left arm --      Pain Score       4           Orthostatic Vital Signs  Vitals:    02/26/18 1753   BP: (!) 171/81   Pulse: 75   Patient Position - Orthostatic VS: Sitting       Physical Exam   Constitutional: She is oriented to person, place, and time  She appears well-developed and well-nourished  She is cooperative  Non-toxic appearance  She does not have a sickly appearance  She does not appear ill  No distress     HENT:   Head: Normocephalic and atraumatic  Right Ear: Tympanic membrane and external ear normal    Left Ear: Tympanic membrane and external ear normal    Nose: No rhinorrhea, sinus tenderness or nasal deformity  No epistaxis  Right sinus exhibits no maxillary sinus tenderness and no frontal sinus tenderness  Left sinus exhibits no maxillary sinus tenderness and no frontal sinus tenderness  Mouth/Throat: Oropharynx is clear and moist and mucous membranes are normal  Normal dentition  Eyes: EOM are normal  Pupils are equal, round, and reactive to light  Neck: Normal range of motion  Neck supple  Cardiovascular: Normal rate, regular rhythm and normal heart sounds  No murmur heard  Pulmonary/Chest: Effort normal and breath sounds normal  No accessory muscle usage  No respiratory distress  She has no wheezes  She has no rales  She exhibits no tenderness  Abdominal: Soft  She exhibits no distension  There is no guarding  Musculoskeletal: Normal range of motion  She exhibits no edema or tenderness  Lymphadenopathy:     She has no cervical adenopathy  Neurological: She is alert and oriented to person, place, and time  She exhibits normal muscle tone  Skin: Skin is warm and dry  No rash noted  No erythema  Psychiatric: She has a normal mood and affect  Nursing note and vitals reviewed  ED Medications  Medications   cephalexin (KEFLEX) capsule 500 mg (500 mg Oral Given 2/26/18 1927)       Diagnostic Studies  Results Reviewed     None                 No orders to display              Procedures  Procedures       Phone Contacts  ED Phone Contact    ED Course  ED Course                                MDM  Number of Diagnoses or Management Options  Lymphedema: new and requires workup  Skin infection: new and requires workup  Diagnosis management comments: Will begin broad-spectrum antibiotic therapy based on what the patient is telling me has been her previous course of infection process    She believes that this is what this represents and I will take her work for it  The other differentials that this is a localized dermatitis  Patient Progress  Patient progress: stable    CritCare Time    Disposition  Final diagnoses:   Lymphedema   Skin infection     Time reflects when diagnosis was documented in both MDM as applicable and the Disposition within this note     Time User Action Codes Description Comment    2/26/2018  7:24 PM Cleve Edil Add [I89 0] Lymphedema     2/26/2018  7:24 PM Cleve Edil Add [L08 9] Skin infection       ED Disposition     ED Disposition Condition Comment    Discharge  [facility] discharge to home/self care  Condition at discharge: Good        Follow-up Information     Follow up With Specialties Details Why Ramon Roberts MD Family Medicine Schedule an appointment as soon as possible for a visit in 3 days For Recheck [address] [place] [address]  [phone]          Patient's Medications   Discharge Prescriptions    CEPHALEXIN (KEFLEX) 500 MG CAPSULE    Take 1 capsule (500 mg total) by mouth 4 (four) times a day for 10 days       Start Date: 2/26/2018 End Date: 3/8/2018       Order Dose: 500 mg       Quantity: 40 capsule    Refills: 0     No discharge procedures on file      ED Provider  Electronically Signed by           RAMIREZ Overton  02/26/18 2083

## 2018-02-27 NOTE — DISCHARGE INSTRUCTIONS
Lymphedema   WHAT YOU NEED TO KNOW:   There is no cure for lymphedema  Treatment can relieve symptoms and prevent lymphedema from getting worse  DISCHARGE INSTRUCTIONS:   Contact your healthcare provider or lymphedema specialist if:   · You have a fever or chills  · You have an open area of skin that looks red or swollen, or drains pus  · Your symptoms, such as swelling or pain, get worse  · Your arms or legs feel heavy, or you cannot move them  · Your skin becomes hard, thick, or rough  · You have a skin wound that will not heal      · Your shoes, clothes, or jewelry feel tighter  · You have questions or concerns about your condition or care  Self-care:   · Elevate  your arm or leg above the level of your heart as often as you can  This will help decrease swelling and pain  Prop your arm or leg on pillows or blankets to keep it elevated comfortably  · Wear compression socks, sleeves, or bandages  as directed  Compression devices must be fitted by a healthcare provider  Compression devices may need to be replaced every 3 to 6 months  · Exercise  can help you maintain or regain function of your arm or leg  Ask your healthcare provider what type of exercise to do and how often to do it  Start slow, take breaks, and gradually do more each day  Do not do vigorous, repeated exercises  Watch for changes in your arm or leg during exercise  Stop and rest if you have swelling, increased pain, or heaviness  Elevate your arm or leg above the level of your heart  · Change your position often  to help move lymphatic fluid through your body  Do not sit or  one position for more than 30 minutes  Do not cross your legs when you sit  These actions can cause lymphatic fluid to buildup  · Maintain a healthy weight  Ask your healthcare provider what you should weigh  Weight loss may improve your symptoms   If you need to lose weight, your healthcare provider can help you create a weight loss program   Prevent infection with proper skin care:  A skin infection can make lymphedema worse  Do the following to decrease your risk for a skin infection in your arm or leg with lymphedema:  · Wash your skin gently and dry it well  Use a mild soap to wash your skin  Gently pat your skin dry after you bathe  Apply a mild cream or lotion to moisturize your skin and prevent dryness or cracking  Keep your feet clean and dry  · Protect your skin from injury  Wear gloves when you garden and wash dishes  Cut your nails straight across to prevent injury to your fingers and toes  Use sunscreen and insect repellant to avoid burns and punctures  Wear slippers in the house  Wear shoes when you go outside  · Check your skin every day for signs of infection  Signs of infection include redness, swelling, increased heat, or pus  You may also have a fever or chills  · Care for cuts, scratches or burns  Apply antibiotic ointment to cuts and other small breaks in the skin  Apply a cold pack or cold water to a burn for 15 minutes  Then wash it with soap and water  Cover scratches, cuts, or burns with a clean, dry gauze or bandage as directed  Keep the area clean and dry  · Tell healthcare providers that you have lymphedema  Tell them not to do, IVs, blood draws, and blood pressure readings in the arm or leg with lymphedema  If there is lymphedema in both arms, ask them to take your blood pressure on your leg  Do not allow flu shots or vaccinations in your arm with lymphedema  Follow up with your healthcare provider or lymphedema specialist as directed: You will need regular visits so healthcare providers can examine the affected areas  You may also be referred to a clinic that specializes in lymphedema treatment  Write down your questions so you remember to ask them during your visits    © 2017 [employer] Information is for End User's use only and may not be sold, redistributed or otherwise used for commercial purposes  All illustrations and images included in CareNotes® are the copyrighted property of [employer]  or Juan Diego Leon  The above information is an  only  It is not intended as medical advice for individual conditions or treatments  Talk to your doctor, nurse or pharmacist before following any medical regimen to see if it is safe and effective for you

## 2018-03-01 ENCOUNTER — VBI (OUTPATIENT)
Dept: ADMINISTRATIVE | Facility: OTHER | Age: 58
End: 2018-03-01

## 2018-03-01 NOTE — TELEPHONE ENCOUNTER
Caro Quintanilla    ED Visit Information     Ed visit date: 2/26/18   Diagnosis Description: Lymphedema; Skin infection  In Network? Yes [facility]  Discharge status: Home  Discharged with meds ? yes -   Number of ED visits to date: 1  ED Severity:3     Outreach Information    Outreach successful: Yes 1  Date letter mailed:n/a  Date Finalized:3/1/18    Care Coordination    Follow up appointment with pcp: no none  Transportation issues ? no    [employer] type:  3 Day Outreach  Patient refsued the answer questions:  No  Emergent necessity warranted by diagnosis:  Yes  [facility] PCP:  Yes  Transportation:  Friend/Family Transport  Called PCP first?:  No  Feels able to call PCP for urgent problems ?:  Yes  Understands what emergencies can be handled by PCP ?:  Yes  Ever any problems getting appointment with PCP for minor emergency/urgency problems?:  No  Practice Contacted Patient ?:  No  Pt had ED follow up with pcp/staff ?:  No    Reason Patient went to ED instead of Urgent Care or PCP?:  Perceived Severity of Illness  Urgent care Education?:  KAY  3/1/18 spoke with [facility], she stated that with her Lymphedema; she was told to always go to the hospital for care  That is why she chose the ED

## 2018-03-19 ENCOUNTER — TELEPHONE (OUTPATIENT)
Dept: FAMILY MEDICINE CLINIC | Facility: CLINIC | Age: 58
End: 2018-03-19

## 2018-03-19 NOTE — TELEPHONE ENCOUNTER
Please call pt re gabapentin/pt wishes to wind down on rx  other rx prescribed by other dr seems to be working well for pt pt will explain when you call her   [phone]

## 2018-03-19 NOTE — TELEPHONE ENCOUNTER
Spoke to patient and she is currently on 600 mg QID  Advised her to go down to 600 mg TID x 2 weeks, then BID for 2 wks, then go down to 300 mg BID for 2 weeks, then 300 mg once daily for 2 wks then stop  Call if any problems tolerating the weaning doses  She states she is on Marinol now and doing better

## 2018-03-23 ENCOUNTER — APPOINTMENT (EMERGENCY)
Dept: ULTRASOUND IMAGING | Facility: HOSPITAL | Age: 58
End: 2018-03-23
Payer: COMMERCIAL

## 2018-03-23 ENCOUNTER — HOSPITAL ENCOUNTER (EMERGENCY)
Facility: HOSPITAL | Age: 58
Discharge: HOME | End: 2018-03-23
Attending: EMERGENCY MEDICINE
Payer: COMMERCIAL

## 2018-03-23 VITALS
OXYGEN SATURATION: 97 % | WEIGHT: 168 LBS | DIASTOLIC BLOOD PRESSURE: 63 MMHG | SYSTOLIC BLOOD PRESSURE: 140 MMHG | HEART RATE: 68 BPM | RESPIRATION RATE: 18 BRPM | TEMPERATURE: 98.2 F | BODY MASS INDEX: 24.81 KG/M2

## 2018-03-23 DIAGNOSIS — L03.113 CELLULITIS OF ARM, RIGHT: ICD-10-CM

## 2018-03-23 DIAGNOSIS — I89.0 LYMPHEDEMA OF RIGHT UPPER EXTREMITY: Primary | ICD-10-CM

## 2018-03-23 LAB
ALBUMIN SERPL BCP-MCNC: 3.5 G/DL (ref 3.5–5)
ALP SERPL-CCNC: 121 U/L (ref 46–116)
ALT SERPL W P-5'-P-CCNC: 28 U/L (ref 12–78)
ANION GAP SERPL CALCULATED.3IONS-SCNC: 10 MMOL/L (ref 4–13)
APTT PPP: 31 SECONDS (ref 23–35)
AST SERPL W P-5'-P-CCNC: 17 U/L (ref 5–45)
BASOPHILS # BLD AUTO: 0.06 THOUSANDS/ΜL (ref 0–0.1)
BASOPHILS NFR BLD AUTO: 1 % (ref 0–1)
BILIRUB DIRECT SERPL-MCNC: 0.21 MG/DL (ref 0–0.2)
BILIRUB SERPL-MCNC: 1.2 MG/DL (ref 0.2–1)
BUN SERPL-MCNC: 9 MG/DL (ref 5–25)
CALCIUM SERPL-MCNC: 8.8 MG/DL (ref 8.3–10.1)
CHLORIDE SERPL-SCNC: 101 MMOL/L (ref 100–108)
CO2 SERPL-SCNC: 26 MMOL/L (ref 21–32)
CREAT SERPL-MCNC: 0.56 MG/DL (ref 0.6–1.3)
EOSINOPHIL # BLD AUTO: 0.05 THOUSAND/ΜL (ref 0–0.61)
EOSINOPHIL NFR BLD AUTO: 1 % (ref 0–6)
ERYTHROCYTE [DISTWIDTH] IN BLOOD BY AUTOMATED COUNT: 12.8 % (ref 11.6–15.1)
GFR SERPL CREATININE-BSD FRML MDRD: 102 ML/MIN/1.73SQ M
GLUCOSE SERPL-MCNC: 132 MG/DL (ref 65–140)
HCT VFR BLD AUTO: 45.3 % (ref 34.8–46.1)
HGB BLD-MCNC: 14.8 G/DL (ref 11.5–15.4)
INR PPP: 1.01 (ref 0.86–1.16)
LYMPHOCYTES # BLD AUTO: 1.67 THOUSANDS/ΜL (ref 0.6–4.47)
LYMPHOCYTES NFR BLD AUTO: 18 % (ref 14–44)
MCH RBC QN AUTO: 29.6 PG (ref 26.8–34.3)
MCHC RBC AUTO-ENTMCNC: 32.7 G/DL (ref 31.4–37.4)
MCV RBC AUTO: 91 FL (ref 82–98)
MONOCYTES # BLD AUTO: 0.96 THOUSAND/ΜL (ref 0.17–1.22)
MONOCYTES NFR BLD AUTO: 11 % (ref 4–12)
NEUTROPHILS # BLD AUTO: 6.3 THOUSANDS/ΜL (ref 1.85–7.62)
NEUTS SEG NFR BLD AUTO: 69 % (ref 43–75)
NRBC BLD AUTO-RTO: 0 /100 WBCS
PLATELET # BLD AUTO: 266 THOUSANDS/UL (ref 149–390)
PMV BLD AUTO: 9.2 FL (ref 8.9–12.7)
POTASSIUM SERPL-SCNC: 4.2 MMOL/L (ref 3.5–5.3)
PROT SERPL-MCNC: 7.6 G/DL (ref 6.4–8.2)
PROTHROMBIN TIME: 13.5 SECONDS (ref 12.1–14.4)
RBC # BLD AUTO: 5 MILLION/UL (ref 3.81–5.12)
SODIUM SERPL-SCNC: 137 MMOL/L (ref 136–145)
TROPONIN I SERPL-MCNC: <0.02 NG/ML
WBC # BLD AUTO: 9.07 THOUSAND/UL (ref 4.31–10.16)

## 2018-03-23 PROCEDURE — 85025 COMPLETE CBC W/AUTO DIFF WBC: CPT | Performed by: PHYSICIAN ASSISTANT

## 2018-03-23 PROCEDURE — 93971 EXTREMITY STUDY: CPT | Performed by: SURGERY

## 2018-03-23 PROCEDURE — 85610 PROTHROMBIN TIME: CPT | Performed by: PHYSICIAN ASSISTANT

## 2018-03-23 PROCEDURE — 87040 BLOOD CULTURE FOR BACTERIA: CPT | Performed by: PHYSICIAN ASSISTANT

## 2018-03-23 PROCEDURE — 93971 EXTREMITY STUDY: CPT

## 2018-03-23 PROCEDURE — 80048 BASIC METABOLIC PNL TOTAL CA: CPT | Performed by: PHYSICIAN ASSISTANT

## 2018-03-23 PROCEDURE — 84484 ASSAY OF TROPONIN QUANT: CPT | Performed by: PHYSICIAN ASSISTANT

## 2018-03-23 PROCEDURE — 96365 THER/PROPH/DIAG IV INF INIT: CPT

## 2018-03-23 PROCEDURE — 96375 TX/PRO/DX INJ NEW DRUG ADDON: CPT

## 2018-03-23 PROCEDURE — 85730 THROMBOPLASTIN TIME PARTIAL: CPT | Performed by: PHYSICIAN ASSISTANT

## 2018-03-23 PROCEDURE — 99284 EMERGENCY DEPT VISIT MOD MDM: CPT

## 2018-03-23 PROCEDURE — 80076 HEPATIC FUNCTION PANEL: CPT | Performed by: PHYSICIAN ASSISTANT

## 2018-03-23 PROCEDURE — 36415 COLL VENOUS BLD VENIPUNCTURE: CPT | Performed by: PHYSICIAN ASSISTANT

## 2018-03-23 RX ORDER — KETOROLAC TROMETHAMINE 30 MG/ML
30 INJECTION, SOLUTION INTRAMUSCULAR; INTRAVENOUS ONCE
Status: COMPLETED | OUTPATIENT
Start: 2018-03-23 | End: 2018-03-23

## 2018-03-23 RX ORDER — CLINDAMYCIN PHOSPHATE 600 MG/50ML
600 INJECTION INTRAVENOUS ONCE
Status: COMPLETED | OUTPATIENT
Start: 2018-03-23 | End: 2018-03-23

## 2018-03-23 RX ORDER — CLINDAMYCIN HYDROCHLORIDE 300 MG/1
300 CAPSULE ORAL 3 TIMES DAILY
Qty: 30 CAPSULE | Refills: 0 | Status: SHIPPED | OUTPATIENT
Start: 2018-03-23 | End: 2018-03-26 | Stop reason: ALTCHOICE

## 2018-03-23 RX ADMIN — CLINDAMYCIN PHOSPHATE 600 MG: 12 INJECTION, SOLUTION INTRAMUSCULAR; INTRAVENOUS at 10:39

## 2018-03-23 RX ADMIN — SODIUM CHLORIDE 1000 ML: 0.9 INJECTION, SOLUTION INTRAVENOUS at 10:40

## 2018-03-23 RX ADMIN — KETOROLAC TROMETHAMINE 30 MG: 30 INJECTION, SOLUTION INTRAMUSCULAR at 11:11

## 2018-03-23 NOTE — ED NOTES
Discharge instructions and medications reviewed with pt  Pt verbalized understanding with no further questions at this time  Pt ambulatory out of department, waiting for her  for ride home       Delano Dubin, RN  03/23/18 9355

## 2018-03-23 NOTE — ED NOTES
Patient refusing IV in wrist or hand; RN attempted 2x in [facility]   Provider notified           Javid Keller RN  03/23/18 5028

## 2018-03-23 NOTE — ED PROVIDER NOTES
History  Chief Complaint   Patient presents with    Arm Swelling     patient is c/o right arm swelling for "a while" c/o redness and pain that began yesterday  hx of lymphedema     75-year-old female with past medical history significant for remote diagnosis of dermatosis of the right upper extremity requiring surgical resection and subsequent skin grafting from [id number] with subsequent development of lymphedema presents to the emergency department with chief complaint of redness and swelling to the right upper extremity  Onset of symptoms reported as 1 day ago  Location of symptoms reported as the right upper extremity  Quality is reported as redness and swelling  Severity reported as moderate  Associated symptoms:  Denies paralysis paresthesias or weaknesses to the right upper extremity  Positive for rash  Denies fevers  Denies nausea or vomiting  Denies dizziness or syncope  Denies chest pain  Denies shortness of breath  Denies hemoptysis  Modifying factors:  Nothing has been tried for treatment of symptoms  Context:  Patient reports she has had a prior similar episode in the past for which she was seen at [facility]  She reports she was diagnosed with lymphedema and cellulitis, treated with IV antibiotics and symptoms subsequently improved  She reports they symptoms are exactly similar to this previous episode  She reports she routinely sees lymphedema nurse at [facility]  She reports they recommended being seen in ED after development of redness to right upper extremity concerning for cellulitis  Medical summary: review of past visits via [facility] demonstrates patient was last seen in ED on 2/26/2018 for evaluation and treatment of cellulitis of right upper extremity  Patient reports she is right hand dominant           History provided by:  Patient and significant other   used: No        Prior to Admission Medications   Prescriptions Last Dose Informant Patient Reported? Taking? GABAPENTIN PO   Yes No   Sig: Take 725 mg by mouth 4 (four) times a day   Omega-3 Fatty Acids (FISH OIL PO)   Yes No   Sig: Take by mouth   atorvastatin (LIPITOR) 40 mg tablet   Yes No   Sig: Take 40 mg by mouth daily   cyclobenzaprine (FLEXERIL) 5 mg tablet   No No   Sig: Take 1 tablet by mouth 3 (three) times a day as needed for muscle spasms (neck pain or stiffness)   ondansetron (ZOFRAN-ODT) 4 mg disintegrating tablet   No No   Sig: Take 1 tablet by mouth every 6 (six) hours as needed for nausea or vomiting   thiamine (VITAMIN B1) 100 mg tablet   Yes No   Sig: Take 100 mg by mouth daily      Facility-Administered Medications: None       Past Medical History:   Diagnosis Date    Anemia     Cancer ([facility] [address] )     desmoitosis    Hypertension        Past Surgical History:   Procedure Laterality Date    HYSTERECTOMY      WI COLONOSCOPY FLX DX W/COLLJ SPEC WHEN PFRMD N/A 8/15/2017    Procedure: COLONOSCOPY;  Surgeon: Tiana Dia MD;  Location: [facility]; Service: Gastroenterology       History reviewed  No pertinent family history  I have reviewed and agree with the history as documented  Social History   Substance Use Topics    Smoking status: Never Smoker    Smokeless tobacco: Never Used    Alcohol use 0 6 oz/week     1 Shots of liquor per week      Comment: daily HS         Review of Systems   Constitutional: Negative for activity change, appetite change, chills, diaphoresis, fatigue and fever  HENT: Negative for congestion, dental problem, drooling, ear discharge, ear pain, facial swelling, hearing loss, mouth sores, nosebleeds, postnasal drip, rhinorrhea, sinus pain, sinus pressure, sneezing, sore throat, tinnitus, trouble swallowing and voice change  Eyes: Negative for photophobia, pain, discharge, redness and itching  Respiratory: Negative for apnea, cough, choking, chest tightness, shortness of breath, wheezing and stridor      Cardiovascular: Negative for chest pain, palpitations and leg swelling  Gastrointestinal: Negative for abdominal distention, abdominal pain, anal bleeding, blood in stool, constipation, diarrhea, nausea, rectal pain and vomiting  Endocrine: Negative for cold intolerance, heat intolerance, polydipsia, polyphagia and polyuria  Genitourinary: Negative for decreased urine volume, difficulty urinating, dysuria, flank pain, frequency, hematuria and urgency  Musculoskeletal: Positive for arthralgias and joint swelling  Negative for back pain, gait problem, myalgias, neck pain and neck stiffness  Skin: Positive for rash  Negative for color change, pallor and wound  Allergic/Immunologic: Negative for environmental allergies, food allergies and immunocompromised state  Neurological: Negative for dizziness, tremors, seizures, syncope, facial asymmetry, speech difficulty, weakness, light-headedness, numbness and headaches  Hematological: Negative for adenopathy  Does not bruise/bleed easily  Psychiatric/Behavioral: Negative for agitation, confusion, decreased concentration and hallucinations  The patient is not nervous/anxious  All other systems reviewed and are negative  Physical Exam  ED Triage Vitals [03/23/18 0834]   Temperature Pulse Respirations Blood Pressure SpO2   98 2 °F (36 8 °C) 87 18 146/80 96 %      Temp Source Heart Rate Source Patient Position - Orthostatic VS BP Location FiO2 (%)   Oral Monitor Sitting Right arm --      Pain Score       Worst Possible Pain           Orthostatic Vital Signs  Vitals:    03/23/18 0834 03/23/18 1121   BP: 146/80 140/63   Pulse: 87 68   Patient Position - Orthostatic VS: Sitting Lying       Physical Exam   Constitutional: She is oriented to person, place, and time  She appears well-developed and well-nourished  No distress     /63 (BP Location: Left arm)   Pulse 68   Temp 98 2 °F (36 8 °C) (Oral)   Resp 18   Wt 76 2 kg (168 lb)   SpO2 97%   BMI 24 81 kg/m²     Vital signs reviewed  Pulse normal   Oxygen saturation 97% on room air, normal, no intervention  HENT:   Head: Normocephalic and atraumatic  Right Ear: External ear normal    Left Ear: External ear normal    Nose: Nose normal    Mouth/Throat: Oropharynx is clear and moist  No oropharyngeal exudate  Eyes: Conjunctivae and EOM are normal  Pupils are equal, round, and reactive to light  Right eye exhibits no discharge  Left eye exhibits no discharge  No scleral icterus  Neck: Normal range of motion  Neck supple  No JVD present  No tracheal deviation present  Cardiovascular: Normal rate, regular rhythm and intact distal pulses  Pulmonary/Chest: Effort normal and breath sounds normal  No respiratory distress  She has no wheezes  She exhibits no tenderness  Abdominal: Soft  Bowel sounds are normal  She exhibits no distension and no mass  There is no tenderness  There is no rebound and no guarding  No hernia  Musculoskeletal: Normal range of motion  She exhibits edema and tenderness  She exhibits no deformity  Right upper extremity:  No gross deformity  Distal neurovascular tendon intact  Capillary refills less than 3 seconds to all fingers  Strength is 5/5 and normal   Radial pulse 2/4 normal  Full range of motion at the right wrist, and to all the IP joints of the right hand  There is edema noted to the midshaft forearm extending into the elbow into the distal upper arm  There is associated erythema in this area  All compartments are still soft and compressible and appear well perfused  There is surgical scar from prior graft to the right upper outer arm  There is no dehiscence, no drainage or open wounds  Right shoulder is normal to inspection, nontender to palpation with full range of motion  Lymphadenopathy:     She has no cervical adenopathy  Neurological: She is alert and oriented to person, place, and time  She displays normal reflexes  No cranial nerve deficit or sensory deficit   She exhibits normal muscle tone  Coordination normal    Skin: Skin is warm and dry  Capillary refill takes less than 2 seconds  No rash noted  She is not diaphoretic  There is erythema  No pallor  Psychiatric: She has a normal mood and affect  Her behavior is normal  Judgment and thought content normal    Nursing note and vitals reviewed  ED Medications  Medications   sodium chloride 0 9 % bolus 1,000 mL (0 mL Intravenous Stopped 3/23/18 1118)   clindamycin (CLEOCIN) IVPB (premix) 600 mg (0 mg Intravenous Stopped 3/23/18 1118)   ketorolac (TORADOL) injection 30 mg (30 mg Intravenous Given 3/23/18 1111)       Diagnostic Studies  Results Reviewed     Procedure Component Value Units Date/Time    Troponin I [[id number]]  (Normal) Collected:  03/23/18 1003    Lab Status:  Final result Specimen:  Blood from Arm, Left Updated:  03/23/18 1041     Troponin I <0 02 ng/mL     Narrative:         [employer] Chemistry analyzer 99% cutoff is > 0 04 ng/mL in network labs    o cTnI 99% cutoff is useful only when applied to patients in the clinical setting of myocardial ischemia  o cTnI 99% cutoff should be interpreted in the context of clinical history, ECG findings and possibly cardiac imaging to establish correct diagnosis  o cTnI 99% cutoff may be suggestive but clearly not indicative of a coronary event without the clinical setting of myocardial ischemia  Blood culture #1 [[id number]] Collected:  03/23/18 1034    Lab Status:   In process Specimen:  Blood from Arm, Left Updated:  03/23/18 1368    Basic metabolic panel [[id number]]  (Abnormal) Collected:  03/23/18 1003    Lab Status:  Final result Specimen:  Blood from Arm, Left Updated:  03/23/18 1029     Sodium 137 mmol/L      Potassium 4 2 mmol/L      Chloride 101 mmol/L      CO2 26 mmol/L      Anion Gap 10 mmol/L      BUN 9 mg/dL      Creatinine 0 56 (L) mg/dL      Glucose 132 mg/dL      Calcium 8 8 mg/dL      eGFR 102 ml/min/1 73sq m     Narrative:         National Kidney Disease Education Program recommendations are as follows:  GFR calculation is accurate only with a steady state creatinine  Chronic Kidney disease less than 60 ml/min/1 73 sq  meters  Kidney failure less than 15 ml/min/1 73 sq  meters  Hepatic function panel [[id number]]  (Abnormal) Collected:  03/23/18 1003    Lab Status:  Final result Specimen:  Blood from Arm, Left Updated:  03/23/18 1029     Total Bilirubin 1 20 (H) mg/dL      Bilirubin, Direct 0 21 (H) mg/dL      Alkaline Phosphatase 121 (H) U/L      AST 17 U/L      ALT 28 U/L      Total Protein 7 6 g/dL      Albumin 3 5 g/dL     Protime-INR [[id number]]  (Normal) Collected:  03/23/18 1003    Lab Status:  Final result Specimen:  Blood from Arm, Left Updated:  03/23/18 1023     Protime 13 5 seconds      INR 1 01    APTT [[id number]]  (Normal) Collected:  03/23/18 1003    Lab Status:  Final result Specimen:  Blood from Arm, Left Updated:  03/23/18 1023     PTT 31 seconds     Narrative: Therapeutic Heparin Range = 60-90 seconds    CBC and differential [[id number]]  (Normal) Collected:  03/23/18 1003    Lab Status:  Final result Specimen:  Blood from Arm, Left Updated:  03/23/18 1016     WBC 9 07 Thousand/uL      RBC 5 00 Million/uL      Hemoglobin 14 8 g/dL      Hematocrit 45 3 %      MCV 91 fL      MCH 29 6 pg      MCHC 32 7 g/dL      RDW 12 8 %      MPV 9 2 fL      Platelets 667 Thousands/uL      nRBC 0 /100 WBCs      Neutrophils Relative 69 %      Lymphocytes Relative 18 %      Monocytes Relative 11 %      Eosinophils Relative 1 %      Basophils Relative 1 %      Neutrophils Absolute 6 30 Thousands/µL      Lymphocytes Absolute 1 67 Thousands/µL      Monocytes Absolute 0 96 Thousand/µL      Eosinophils Absolute 0 05 Thousand/µL      Basophils Absolute 0 06 Thousands/µL     Blood culture #2 [[id number]] Collected:  03/23/18 1003    Lab Status:   In process Specimen:  Blood from Arm, Left Updated:  03/23/18 1008                 VAS upper limb venous duplex scan, unilateral/limited    (Results Pending)              Procedures  Procedures       Phone Contacts  ED Phone Contact    ED Course  ED Course as of Mar 23 1143   Fri Mar 23, 2018   1043 Patient requesting toradol for pain - listed allergy to ibuprofen and aspirin - patient reports she has taken toradol without incident or adverse reaction in the past                                   MDM  Number of Diagnoses or Management Options  Cellulitis of arm, right: new and requires workup  Lymphedema of right upper extremity: new and requires workup  Diagnosis management comments: ddx includes but is not limited to insect bite, allergic reaction, gout, pseudogout, phytodermatitis, erythema nodosum, DVT, superficial thrombophlebitis, contusion, abrasion, angioedema, PVD, PAD, lymphangitis, consider but doubt necrotizing fasciitis    Lab results reviewed  CBC demonstrates normal white blood cell count of 9 0, normal hemoglobin and normal hematocrit  INR normal at 1 0  Basic metabolic panel demonstrates a creatinine low at 0 56 otherwise unremarkable  Troponin normal at less than 0 02  Upper extremity ultrasound images reviewed  Radiology report reviewed demonstrates no acute DVT  Discussed all results with patient at bedside  Discussed diagnosis of cellulitis and lymphedema to right upper extremity  No clinical signs of compartment syndrome  Discussed IV clindamycin administered in the emergency department  Will discharge with similar prescription  Follow up with primary care physician for recheck in 1-2 days  Extensively discussed reasons to return to the emergency department including advancing redness or rash, increasing pain, fevers, repetitive nausea vomiting or development of any other worsening or worrisome symptoms  Reviewed reasons to return to ed  Patient verbalized understanding of diagnosis and agreement with discharge plan of care as well as understanding of reasons to return to ed  Amount and/or Complexity of Data Reviewed  Clinical lab tests: ordered and reviewed  Tests in the radiology section of CPT®: ordered and reviewed  Discussion of test results with the performing providers: yes  Obtain history from someone other than the patient: yes (Significant other at bedside)  Review and summarize past medical records: yes  Independent visualization of images, tracings, or specimens: yes    Patient Progress  Patient progress: stable    CritCare Time    Disposition  Final diagnoses:   Lymphedema of right upper extremity   Cellulitis of arm, right     Time reflects when diagnosis was documented in both MDM as applicable and the Disposition within this note     Time User Action Codes Description Comment    3/23/2018 11:09 AM Foreign Manus Add [I89 0] Lymphedema of right upper extremity     3/23/2018 11:09 AM Foreign Manus Add [L03 113] Cellulitis of arm, right       ED Disposition     ED Disposition Condition Comment    Discharge  [address] discharge to home/self care  Condition at discharge: Stable        Follow-up Information     Follow up With Specialties Details Why Contact Info Additional Information    Machelle Frias MD Family Medicine Call in 2 days for further evaluation of symptoms [address] [facility],[address]  [address] [facility] Emergency Department Emergency Medicine Go to If symptoms worsen [address] [facility] [address] [place] [facility], [address], [place], [facility]        Patient's Medications   Discharge Prescriptions    CLINDAMYCIN (CLEOCIN) 300 MG CAPSULE    Take 1 capsule (300 mg total) by mouth 3 (three) times a day for 10 days       Start Date: 3/23/2018 End Date: 4/2/2018       Order Dose: 300 mg       Quantity: 30 capsule    Refills: 0     No discharge procedures on file      ED Provider  Electronically Signed by           Jesus Almazan PA-C  03/23/18 0344

## 2018-03-23 NOTE — DISCHARGE INSTRUCTIONS
Cellulitis   WHAT YOU NEED TO KNOW:   Cellulitis is a skin infection caused by bacteria  Cellulitis may go away on its own or you may need treatment  Your healthcare provider may draw a [place] around the outside edges of your cellulitis  If your cellulitis spreads, your healthcare provider will see it outside of the [place]  DISCHARGE INSTRUCTIONS:   Call 911 if:   · You have sudden trouble breathing or chest pain  Return to the emergency department if:   · Your wound gets larger and more painful  · You feel a crackling under your skin when you touch it  · You have purple dots or bumps on your skin, or you see bleeding under your skin  · You have new swelling and pain in your legs  · The red, warm, swollen area gets larger  · You see red streaks coming from the infected area  Contact your healthcare provider if:   · You have a fever  · Your fever or pain does not go away or gets worse  · The area does not get smaller after 2 days of antibiotics  · Your skin is flaking or peeling off  · You have questions or concerns about your condition or care  Medicines:   · Antibiotics  help treat the bacterial infection  · NSAIDs , such as ibuprofen, help decrease swelling, pain, and fever  NSAIDs can cause stomach bleeding or kidney problems in certain people  If you take blood thinner medicine, always ask if NSAIDs are safe for you  Always read the medicine label and follow directions  Do not give these medicines to children under 10months of age without direction from your child's healthcare provider  · Acetaminophen  decreases pain and fever  It is available without a doctor's order  Ask how much to take and how often to take it  Follow directions  Read the labels of all other medicines you are using to see if they also contain acetaminophen, or ask your doctor or pharmacist  Acetaminophen can cause liver damage if not taken correctly   Do not use more than 4 grams (4,000 milligrams) total of acetaminophen in one day  · Take your medicine as directed  Contact your healthcare provider if you think your medicine is not helping or if you have 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  Tell him or her if you are allergic to any medicine  Keep a list of the medicines, vitamins, and herbs you take  Include the amounts, and when and why you take them  Bring the list or the pill bottles to follow-up visits  Carry your medicine list with you in case of an emergency  Self-care:   · Elevate the area above the level of your heart  as often as you can  This will help decrease swelling and pain  Prop the area on pillows or blankets to keep it elevated comfortably  · Clean the area daily until the wound scabs over  Gently wash the area with soap and water  Pat dry  Use dressings as directed  · Place cool or warm, wet cloths on the area as directed  Use clean cloths and clean water  Leave it on the area until the cloth is room temperature  Pat the area dry with a clean, dry cloth  The cloths may help decrease pain  Prevent cellulitis:   · Do not scratch bug bites or areas of injury  You increase your risk for cellulitis by scratching these areas  · Do not share personal items, such as towels, clothing, and razors  · Clean exercise equipment  with germ-killing  before and after you use it  · Wash your hands often  Use soap and water  Wash your hands after you use the bathroom, change a child's diapers, or sneeze  Wash your hands before you prepare or eat food  Use lotion to prevent dry, cracked skin  · Wear pressure stockings as directed  You may be told to wear the stockings if you have peripheral edema  The stockings improve blood flow and decrease swelling  · Treat athlete's foot  This can help prevent the spread of a bacterial skin infection  Follow up with your healthcare provider within 3 days, or as directed:   Your healthcare provider will check if your cellulitis is getting better  You may need different medicine  Write down your questions so you remember to ask them during your visits  © 2017 [address] [employer] [employer] Information is for End User's use only and may not be sold, redistributed or otherwise used for commercial purposes  All illustrations and images included in CareNotes® are the copyrighted property of [employer]  or Juan Diego Leon  The above information is an  only  It is not intended as medical advice for individual conditions or treatments  Talk to your doctor, nurse or pharmacist before following any medical regimen to see if it is safe and effective for you  Lymphedema   WHAT YOU NEED TO KNOW:   There is no cure for lymphedema  Treatment can relieve symptoms and prevent lymphedema from getting worse  DISCHARGE INSTRUCTIONS:   Contact your healthcare provider or lymphedema specialist if:   · You have a fever or chills  · You have an open area of skin that looks red or swollen, or drains pus  · Your symptoms, such as swelling or pain, get worse  · Your arms or legs feel heavy, or you cannot move them  · Your skin becomes hard, thick, or rough  · You have a skin wound that will not heal      · Your shoes, clothes, or jewelry feel tighter  · You have questions or concerns about your condition or care  Self-care:   · Elevate  your arm or leg above the level of your heart as often as you can  This will help decrease swelling and pain  Prop your arm or leg on pillows or blankets to keep it elevated comfortably  · Wear compression socks, sleeves, or bandages  as directed  Compression devices must be fitted by a healthcare provider  Compression devices may need to be replaced every 3 to 6 months  · Exercise  can help you maintain or regain function of your arm or leg  Ask your healthcare provider what type of exercise to do and how often to do it   Start slow, take breaks, and gradually do more each day  Do not do vigorous, repeated exercises  Watch for changes in your arm or leg during exercise  Stop and rest if you have swelling, increased pain, or heaviness  Elevate your arm or leg above the level of your heart  · Change your position often  to help move lymphatic fluid through your body  Do not sit or  one position for more than 30 minutes  Do not cross your legs when you sit  These actions can cause lymphatic fluid to buildup  · Maintain a healthy weight  Ask your healthcare provider what you should weigh  Weight loss may improve your symptoms  If you need to lose weight, your healthcare provider can help you create a weight loss program   Prevent infection with proper skin care:  A skin infection can make lymphedema worse  Do the following to decrease your risk for a skin infection in your arm or leg with lymphedema:  · Wash your skin gently and dry it well  Use a mild soap to wash your skin  Gently pat your skin dry after you bathe  Apply a mild cream or lotion to moisturize your skin and prevent dryness or cracking  Keep your feet clean and dry  · Protect your skin from injury  Wear gloves when you garden and wash dishes  Cut your nails straight across to prevent injury to your fingers and toes  Use sunscreen and insect repellant to avoid burns and punctures  Wear slippers in the house  Wear shoes when you go outside  · Check your skin every day for signs of infection  Signs of infection include redness, swelling, increased heat, or pus  You may also have a fever or chills  · Care for cuts, scratches or burns  Apply antibiotic ointment to cuts and other small breaks in the skin  Apply a cold pack or cold water to a burn for 15 minutes  Then wash it with soap and water  Cover scratches, cuts, or burns with a clean, dry gauze or bandage as directed  Keep the area clean and dry  · Tell healthcare providers that you have lymphedema    Tell them not to do, IVs, blood draws, and blood pressure readings in the arm or leg with lymphedema  If there is lymphedema in both arms, ask them to take your blood pressure on your leg  Do not allow flu shots or vaccinations in your arm with lymphedema  Follow up with your healthcare provider or lymphedema specialist as directed: You will need regular visits so healthcare providers can examine the affected areas  You may also be referred to a clinic that specializes in lymphedema treatment  Write down your questions so you remember to ask them during your visits  © 2017 [facility] [facility] Information is for End User's use only and may not be sold, redistributed or otherwise used for commercial purposes  All illustrations and images included in CareNotes® are the copyrighted property of [employer] M [employer]  or Juan Diego Leon  The above information is an  only  It is not intended as medical advice for individual conditions or treatments  Talk to your doctor, nurse or pharmacist before following any medical regimen to see if it is safe and effective for you

## 2018-03-26 ENCOUNTER — OFFICE VISIT (OUTPATIENT)
Dept: FAMILY MEDICINE CLINIC | Facility: CLINIC | Age: 58
End: 2018-03-26
Payer: COMMERCIAL

## 2018-03-26 VITALS
TEMPERATURE: 97.7 F | RESPIRATION RATE: 16 BRPM | DIASTOLIC BLOOD PRESSURE: 78 MMHG | OXYGEN SATURATION: 98 % | BODY MASS INDEX: 25.86 KG/M2 | WEIGHT: 174.6 LBS | SYSTOLIC BLOOD PRESSURE: 128 MMHG | HEART RATE: 64 BPM | HEIGHT: 69 IN

## 2018-03-26 DIAGNOSIS — Z13.1 DIABETES MELLITUS SCREENING: ICD-10-CM

## 2018-03-26 DIAGNOSIS — L03.113 RIGHT ARM CELLULITIS: Primary | ICD-10-CM

## 2018-03-26 DIAGNOSIS — Z13.220 NEED FOR LIPID SCREENING: ICD-10-CM

## 2018-03-26 DIAGNOSIS — I89.0 LYMPHEDEMA: ICD-10-CM

## 2018-03-26 DIAGNOSIS — M79.601 RIGHT ARM PAIN: ICD-10-CM

## 2018-03-26 DIAGNOSIS — Z11.59 NEED FOR HEPATITIS C SCREENING TEST: ICD-10-CM

## 2018-03-26 PROCEDURE — 99214 OFFICE O/P EST MOD 30 MIN: CPT | Performed by: FAMILY MEDICINE

## 2018-03-26 RX ORDER — DOXYCYCLINE HYCLATE 100 MG/1
100 CAPSULE ORAL EVERY 12 HOURS SCHEDULED
Qty: 20 CAPSULE | Refills: 0 | Status: SHIPPED | OUTPATIENT
Start: 2018-03-26 | End: 2018-04-05

## 2018-03-26 RX ORDER — TRAMADOL HYDROCHLORIDE 50 MG/1
50 TABLET ORAL EVERY 8 HOURS PRN
Qty: 30 TABLET | Refills: 0 | Status: SHIPPED | OUTPATIENT
Start: 2018-03-26 | End: 2018-05-09

## 2018-03-26 RX ORDER — HYDROCODONE BITARTRATE AND ACETAMINOPHEN 5; 325 MG/1; MG/1
1 TABLET ORAL EVERY 6 HOURS PRN
Refills: 0 | Status: CANCELLED | OUTPATIENT
Start: 2018-03-26

## 2018-03-26 NOTE — PROGRESS NOTES
Assessment/Plan:    No problem-specific Assessment & Plan notes found for this encounter  Diagnoses and all orders for this visit:    Right arm cellulitis  -     doxycycline hyclate (VIBRAMYCIN) 100 mg capsule; Take 1 capsule (100 mg total) by mouth every 12 (twelve) hours for 10 days    Right arm pain  after discussing risks and benefit along with 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 of medication as well as abuse and addiction potential will start tramadol at this time  I have discussed allergies with her and she states that she is not allergic to tramadol that she knows  -     traMADol (ULTRAM) 50 mg tablet; Take 1 tablet (50 mg total) by mouth every 8 (eight) hours as needed for moderate pain  -     doxycycline hyclate (VIBRAMYCIN) 100 mg capsule; Take 1 capsule (100 mg total) by mouth every 12 (twelve) hours for 10 days    Diabetes mellitus screening  -     Comprehensive metabolic panel; Future    Need for lipid screening  -     Lipid panel; Future    Need for hepatitis C screening test  -     Hepatitis C antibody; Future    Lymphedema  She was advised to stop clindamycin and started on doxycycline 100 mg po bid X 10 days  Advised to follow up as needed    Other orders  -     Cancel: HYDROcodone-acetaminophen (NORCO) 5-325 mg per tablet; Take 1 tablet by mouth every 6 (six) hours as needed for pain Max Daily Amount: 4 tablets          Subjective:      Patient ID: Florin Leon is a 59 y o  female  Patient is here due to a chronic history of Lymphedema  She was evaluated 3 days ago at [address] [place] [address] for this and was given clindamycin for infection  Per patient she stated that the swelling and redness is better  She does have excrutiating pain related to this and has been taking some Norco's that she had at home which helped her pain  She has a history of cancer and hence the nature of her right arm edema          The following portions of the patient's history were reviewed and updated as appropriate:   She  has a past medical history of Anemia; Cancer ([facility] [address] ); and Hypertension  She   Patient Active Problem List    Diagnosis Date Noted    Right arm pain 03/26/2018    Diabetes mellitus screening 03/26/2018    Need for lipid screening 03/26/2018    Need for hepatitis C screening test 03/26/2018    Right arm cellulitis 03/26/2018    Lymphedema 03/26/2018    Chronic insomnia 06/19/2017    Lymphedema of right arm 06/19/2017    Peripheral neuropathy 06/29/2015    Chronic anemia 06/08/2015    Back pain, chronic 12/22/2014    Hyperlipidemia, mixed 12/18/2014     She  has a past surgical history that includes Hysterectomy and pr colonoscopy flx dx w/collj spec when pfrmd (N/A, 8/15/2017)  Her family history is not on file  She  reports that she has never smoked  She has never used smokeless tobacco  She reports that she drinks about 0 6 oz of alcohol per week   She reports that she does not use drugs  Current Outpatient Prescriptions   Medication Sig Dispense Refill    atorvastatin (LIPITOR) 40 mg tablet Take 40 mg by mouth daily      GABAPENTIN PO Take 725 mg by mouth 4 (four) times a day      Omega-3 Fatty Acids (FISH OIL PO) Take by mouth      ondansetron (ZOFRAN-ODT) 4 mg disintegrating tablet Take 1 tablet by mouth every 6 (six) hours as needed for nausea or vomiting 20 tablet 0    cyclobenzaprine (FLEXERIL) 5 mg tablet Take 1 tablet by mouth 3 (three) times a day as needed for muscle spasms (neck pain or stiffness) 21 tablet 0    doxycycline hyclate (VIBRAMYCIN) 100 mg capsule Take 1 capsule (100 mg total) by mouth every 12 (twelve) hours for 10 days 20 capsule 0    thiamine (VITAMIN B1) 100 mg tablet Take 100 mg by mouth daily      traMADol (ULTRAM) 50 mg tablet Take 1 tablet (50 mg total) by mouth every 8 (eight) hours as needed for moderate pain 30 tablet 0     No current facility-administered medications for this visit        Current Outpatient Prescriptions on File Prior to Visit   Medication Sig    atorvastatin (LIPITOR) 40 mg tablet Take 40 mg by mouth daily    GABAPENTIN PO Take 725 mg by mouth 4 (four) times a day    Omega-3 Fatty Acids (FISH OIL PO) Take by mouth    ondansetron (ZOFRAN-ODT) 4 mg disintegrating tablet Take 1 tablet by mouth every 6 (six) hours as needed for nausea or vomiting    [DISCONTINUED] clindamycin (CLEOCIN) 300 MG capsule Take 1 capsule (300 mg total) by mouth 3 (three) times a day for 10 days    cyclobenzaprine (FLEXERIL) 5 mg tablet Take 1 tablet by mouth 3 (three) times a day as needed for muscle spasms (neck pain or stiffness)    thiamine (VITAMIN B1) 100 mg tablet Take 100 mg by mouth daily     No current facility-administered medications on file prior to visit  She is allergic to aspirin; codeine; meperidine; phenothiazines; saccharin; ibuprofen; latex; neomycin; and citrus       Review of Systems   Constitutional: Negative for activity change, appetite change, fatigue and fever  HENT: Negative for congestion and ear discharge  Respiratory: Negative for cough and shortness of breath  Cardiovascular: Negative for chest pain and palpitations  Gastrointestinal: Negative for diarrhea and nausea  Musculoskeletal: Positive for joint swelling and myalgias  Negative for arthralgias and back pain  Skin: Positive for rash  Negative for color change  Neurological: Negative for dizziness and headaches  Psychiatric/Behavioral: Negative for agitation and behavioral problems  Objective:      /78 (BP Location: Left arm, Patient Position: Sitting, Cuff Size: Standard)   Pulse 64   Temp 97 7 °F (36 5 °C) (Tympanic)   Resp 16   Ht 5' 9" (1 753 m)   Wt 79 2 kg (174 lb 9 6 oz)   SpO2 98%   BMI 25 78 kg/m²          Physical Exam   Constitutional: She is oriented to person, place, and time  She appears well-developed and well-nourished  No distress  HENT:   Head: Normocephalic and atraumatic     Nose: Nose normal    Mouth/Throat: Oropharynx is clear and moist    Eyes: Conjunctivae are normal  Pupils are equal, round, and reactive to light  Cardiovascular: Normal rate, regular rhythm and normal heart sounds  No murmur heard  Pulmonary/Chest: Effort normal and breath sounds normal  No respiratory distress  She has no wheezes  Abdominal: Soft  Bowel sounds are normal  She exhibits no distension  There is no tenderness  Neurological: She is alert and oriented to person, place, and time  Skin: Skin is warm and dry  Rash noted  She is not diaphoretic  There is erythema  Psychiatric: She has a normal mood and affect

## 2018-03-27 ENCOUNTER — APPOINTMENT (OUTPATIENT)
Dept: LAB | Facility: HOSPITAL | Age: 58
End: 2018-03-27
Payer: COMMERCIAL

## 2018-03-27 ENCOUNTER — TRANSCRIBE ORDERS (OUTPATIENT)
Dept: LAB | Facility: CLINIC | Age: 58
End: 2018-03-27

## 2018-03-27 ENCOUNTER — TELEPHONE (OUTPATIENT)
Dept: FAMILY MEDICINE CLINIC | Facility: CLINIC | Age: 58
End: 2018-03-27

## 2018-03-27 ENCOUNTER — APPOINTMENT (OUTPATIENT)
Dept: LAB | Facility: CLINIC | Age: 58
End: 2018-03-27
Payer: COMMERCIAL

## 2018-03-27 DIAGNOSIS — Z13.220 NEED FOR LIPID SCREENING: ICD-10-CM

## 2018-03-27 DIAGNOSIS — E78.2 MIXED HYPERLIPIDEMIA: ICD-10-CM

## 2018-03-27 DIAGNOSIS — D64.9 CHRONIC ANEMIA: Primary | ICD-10-CM

## 2018-03-27 DIAGNOSIS — Z13.1 DIABETES MELLITUS SCREENING: ICD-10-CM

## 2018-03-27 DIAGNOSIS — Z11.59 NEED FOR HEPATITIS C SCREENING TEST: ICD-10-CM

## 2018-03-27 DIAGNOSIS — R94.6 ABNORMAL RESULTS OF THYROID FUNCTION STUDIES: ICD-10-CM

## 2018-03-27 LAB
ALBUMIN SERPL BCP-MCNC: 3.9 G/DL (ref 3.5–5)
ALP SERPL-CCNC: 137 U/L (ref 46–116)
ALT SERPL W P-5'-P-CCNC: 43 U/L (ref 12–78)
ANION GAP SERPL CALCULATED.3IONS-SCNC: 9 MMOL/L (ref 4–13)
AST SERPL W P-5'-P-CCNC: 23 U/L (ref 5–45)
BILIRUB SERPL-MCNC: 0.6 MG/DL (ref 0.2–1)
BUN SERPL-MCNC: 5 MG/DL (ref 5–25)
CALCIUM SERPL-MCNC: 9.7 MG/DL (ref 8.3–10.1)
CHLORIDE SERPL-SCNC: 101 MMOL/L (ref 100–108)
CHOLEST SERPL-MCNC: 209 MG/DL (ref 50–200)
CO2 SERPL-SCNC: 29 MMOL/L (ref 21–32)
CREAT SERPL-MCNC: 0.59 MG/DL (ref 0.6–1.3)
GFR SERPL CREATININE-BSD FRML MDRD: 101 ML/MIN/1.73SQ M
GLUCOSE P FAST SERPL-MCNC: 110 MG/DL (ref 65–99)
HCV AB SER QL: NORMAL
HDLC SERPL-MCNC: 53 MG/DL (ref 40–60)
LDLC SERPL CALC-MCNC: 131 MG/DL (ref 0–100)
POTASSIUM SERPL-SCNC: 4.7 MMOL/L (ref 3.5–5.3)
PROT SERPL-MCNC: 8.4 G/DL (ref 6.4–8.2)
SODIUM SERPL-SCNC: 139 MMOL/L (ref 136–145)
TRIGL SERPL-MCNC: 125 MG/DL
TSH SERPL DL<=0.05 MIU/L-ACNC: 1.55 UIU/ML (ref 0.36–3.74)

## 2018-03-27 PROCEDURE — 80061 LIPID PANEL: CPT

## 2018-03-27 PROCEDURE — 86803 HEPATITIS C AB TEST: CPT

## 2018-03-27 PROCEDURE — 80053 COMPREHEN METABOLIC PANEL: CPT

## 2018-03-27 PROCEDURE — 84443 ASSAY THYROID STIM HORMONE: CPT

## 2018-03-27 PROCEDURE — 36415 COLL VENOUS BLD VENIPUNCTURE: CPT

## 2018-03-27 NOTE — TELEPHONE ENCOUNTER
Patient called this morning after she took Doxy and Tramadol  She threw up for a while and then got hives  What to do?

## 2018-03-27 NOTE — TELEPHONE ENCOUNTER
Patient notified    ----- Message from David Lynne MD sent at 3/27/2018  2:25 PM EDT -----  Hep c negative advise pt thanks

## 2018-03-28 LAB
BACTERIA BLD CULT: NORMAL
BACTERIA BLD CULT: NORMAL

## 2018-04-23 DIAGNOSIS — M79.2 NEUROPATHIC PAIN: Primary | ICD-10-CM

## 2018-04-23 RX ORDER — GABAPENTIN 300 MG/1
600 CAPSULE ORAL 4 TIMES DAILY
Qty: 720 CAPSULE | Refills: 0 | Status: SHIPPED | OUTPATIENT
Start: 2018-04-23 | End: 2018-05-09

## 2018-05-09 ENCOUNTER — TELEPHONE (OUTPATIENT)
Dept: FAMILY MEDICINE CLINIC | Facility: CLINIC | Age: 58
End: 2018-05-09

## 2018-05-09 ENCOUNTER — HOSPITAL ENCOUNTER (INPATIENT)
Facility: HOSPITAL | Age: 58
LOS: 2 days | Discharge: HOME | DRG: 603 | End: 2018-05-11
Attending: EMERGENCY MEDICINE | Admitting: INTERNAL MEDICINE
Payer: COMMERCIAL

## 2018-05-09 ENCOUNTER — OFFICE VISIT (OUTPATIENT)
Dept: FAMILY MEDICINE CLINIC | Facility: CLINIC | Age: 58
End: 2018-05-09
Payer: COMMERCIAL

## 2018-05-09 VITALS
RESPIRATION RATE: 18 BRPM | SYSTOLIC BLOOD PRESSURE: 122 MMHG | DIASTOLIC BLOOD PRESSURE: 84 MMHG | TEMPERATURE: 100.4 F | HEIGHT: 69 IN | OXYGEN SATURATION: 98 % | BODY MASS INDEX: 25.62 KG/M2 | WEIGHT: 173 LBS | HEART RATE: 84 BPM

## 2018-05-09 DIAGNOSIS — L03.113 CELLULITIS OF RIGHT UPPER EXTREMITY: Primary | ICD-10-CM

## 2018-05-09 PROBLEM — R21 RASH: Status: ACTIVE | Noted: 2018-05-09

## 2018-05-09 PROBLEM — D72.829 LEUKOCYTOSIS: Status: ACTIVE | Noted: 2018-05-09

## 2018-05-09 LAB
ALBUMIN SERPL BCP-MCNC: 3.3 G/DL (ref 3.5–5)
ALP SERPL-CCNC: 92 U/L (ref 46–116)
ALT SERPL W P-5'-P-CCNC: 25 U/L (ref 12–78)
ANION GAP SERPL CALCULATED.3IONS-SCNC: 9 MMOL/L (ref 4–13)
APTT PPP: 33 SECONDS (ref 23–35)
AST SERPL W P-5'-P-CCNC: 14 U/L (ref 5–45)
BASOPHILS # BLD AUTO: 0.05 THOUSANDS/ΜL (ref 0–0.1)
BASOPHILS NFR BLD AUTO: 0 % (ref 0–1)
BILIRUB SERPL-MCNC: 0.9 MG/DL (ref 0.2–1)
BUN SERPL-MCNC: 8 MG/DL (ref 5–25)
CALCIUM SERPL-MCNC: 9 MG/DL (ref 8.3–10.1)
CHLORIDE SERPL-SCNC: 102 MMOL/L (ref 100–108)
CO2 SERPL-SCNC: 23 MMOL/L (ref 21–32)
CREAT SERPL-MCNC: 0.81 MG/DL (ref 0.6–1.3)
EOSINOPHIL # BLD AUTO: 0 THOUSAND/ΜL (ref 0–0.61)
EOSINOPHIL NFR BLD AUTO: 0 % (ref 0–6)
ERYTHROCYTE [DISTWIDTH] IN BLOOD BY AUTOMATED COUNT: 13.2 % (ref 11.6–15.1)
GFR SERPL CREATININE-BSD FRML MDRD: 80 ML/MIN/1.73SQ M
GLUCOSE SERPL-MCNC: 133 MG/DL (ref 65–140)
HCT VFR BLD AUTO: 41 % (ref 34.8–46.1)
HGB BLD-MCNC: 13.9 G/DL (ref 11.5–15.4)
INR PPP: 1.02 (ref 0.86–1.16)
LACTATE SERPL-SCNC: 1.9 MMOL/L (ref 0.5–2)
LYMPHOCYTES # BLD AUTO: 1.17 THOUSANDS/ΜL (ref 0.6–4.47)
LYMPHOCYTES NFR BLD AUTO: 9 % (ref 14–44)
MAGNESIUM SERPL-MCNC: 1.9 MG/DL (ref 1.6–2.6)
MCH RBC QN AUTO: 30 PG (ref 26.8–34.3)
MCHC RBC AUTO-ENTMCNC: 33.9 G/DL (ref 31.4–37.4)
MCV RBC AUTO: 88 FL (ref 82–98)
MONOCYTES # BLD AUTO: 1.12 THOUSAND/ΜL (ref 0.17–1.22)
MONOCYTES NFR BLD AUTO: 9 % (ref 4–12)
NEUTROPHILS # BLD AUTO: 10.81 THOUSANDS/ΜL (ref 1.85–7.62)
NEUTS SEG NFR BLD AUTO: 82 % (ref 43–75)
NRBC BLD AUTO-RTO: 0 /100 WBCS
PLATELET # BLD AUTO: 259 THOUSANDS/UL (ref 149–390)
PMV BLD AUTO: 9.1 FL (ref 8.9–12.7)
POTASSIUM SERPL-SCNC: 3.5 MMOL/L (ref 3.5–5.3)
PROT SERPL-MCNC: 6.8 G/DL (ref 6.4–8.2)
PROTHROMBIN TIME: 13.6 SECONDS (ref 12.1–14.4)
RBC # BLD AUTO: 4.64 MILLION/UL (ref 3.81–5.12)
SODIUM SERPL-SCNC: 134 MMOL/L (ref 136–145)
WBC # BLD AUTO: 13.19 THOUSAND/UL (ref 4.31–10.16)

## 2018-05-09 PROCEDURE — 83735 ASSAY OF MAGNESIUM: CPT | Performed by: EMERGENCY MEDICINE

## 2018-05-09 PROCEDURE — 85730 THROMBOPLASTIN TIME PARTIAL: CPT | Performed by: EMERGENCY MEDICINE

## 2018-05-09 PROCEDURE — 99222 1ST HOSP IP/OBS MODERATE 55: CPT | Performed by: NURSE PRACTITIONER

## 2018-05-09 PROCEDURE — 36415 COLL VENOUS BLD VENIPUNCTURE: CPT | Performed by: EMERGENCY MEDICINE

## 2018-05-09 PROCEDURE — 96375 TX/PRO/DX INJ NEW DRUG ADDON: CPT

## 2018-05-09 PROCEDURE — 87040 BLOOD CULTURE FOR BACTERIA: CPT | Performed by: EMERGENCY MEDICINE

## 2018-05-09 PROCEDURE — 93005 ELECTROCARDIOGRAM TRACING: CPT

## 2018-05-09 PROCEDURE — 85610 PROTHROMBIN TIME: CPT | Performed by: EMERGENCY MEDICINE

## 2018-05-09 PROCEDURE — 99214 OFFICE O/P EST MOD 30 MIN: CPT | Performed by: NURSE PRACTITIONER

## 2018-05-09 PROCEDURE — 80053 COMPREHEN METABOLIC PANEL: CPT | Performed by: EMERGENCY MEDICINE

## 2018-05-09 PROCEDURE — 99284 EMERGENCY DEPT VISIT MOD MDM: CPT

## 2018-05-09 PROCEDURE — 96365 THER/PROPH/DIAG IV INF INIT: CPT

## 2018-05-09 PROCEDURE — 85025 COMPLETE CBC W/AUTO DIFF WBC: CPT | Performed by: EMERGENCY MEDICINE

## 2018-05-09 PROCEDURE — 83605 ASSAY OF LACTIC ACID: CPT | Performed by: EMERGENCY MEDICINE

## 2018-05-09 RX ORDER — HEPARIN SODIUM 5000 [USP'U]/ML
5000 INJECTION, SOLUTION INTRAVENOUS; SUBCUTANEOUS EVERY 8 HOURS SCHEDULED
Status: DISCONTINUED | OUTPATIENT
Start: 2018-05-09 | End: 2018-05-11 | Stop reason: HOSPADM

## 2018-05-09 RX ORDER — DRONABINOL 5 MG/1
15 CAPSULE ORAL
COMMUNITY

## 2018-05-09 RX ORDER — ONDANSETRON 2 MG/ML
4 INJECTION INTRAMUSCULAR; INTRAVENOUS EVERY 6 HOURS PRN
Status: DISCONTINUED | OUTPATIENT
Start: 2018-05-09 | End: 2018-05-11 | Stop reason: HOSPADM

## 2018-05-09 RX ORDER — SODIUM CHLORIDE 9 MG/ML
75 INJECTION, SOLUTION INTRAVENOUS CONTINUOUS
Status: DISCONTINUED | OUTPATIENT
Start: 2018-05-09 | End: 2018-05-11 | Stop reason: HOSPADM

## 2018-05-09 RX ORDER — THIAMINE MONONITRATE (VIT B1) 100 MG
100 TABLET ORAL DAILY
Status: DISCONTINUED | OUTPATIENT
Start: 2018-05-10 | End: 2018-05-11 | Stop reason: HOSPADM

## 2018-05-09 RX ORDER — ACETAMINOPHEN 325 MG/1
650 TABLET ORAL EVERY 6 HOURS PRN
Status: DISCONTINUED | OUTPATIENT
Start: 2018-05-09 | End: 2018-05-11 | Stop reason: HOSPADM

## 2018-05-09 RX ORDER — CHLORAL HYDRATE 500 MG
1000 CAPSULE ORAL 3 TIMES DAILY
Status: DISCONTINUED | OUTPATIENT
Start: 2018-05-09 | End: 2018-05-11 | Stop reason: HOSPADM

## 2018-05-09 RX ORDER — KETOROLAC TROMETHAMINE 30 MG/ML
15 INJECTION, SOLUTION INTRAMUSCULAR; INTRAVENOUS ONCE
Status: COMPLETED | OUTPATIENT
Start: 2018-05-09 | End: 2018-05-09

## 2018-05-09 RX ORDER — VANCOMYCIN HYDROCHLORIDE 1 G/200ML
15 INJECTION, SOLUTION INTRAVENOUS ONCE
Status: COMPLETED | OUTPATIENT
Start: 2018-05-09 | End: 2018-05-09

## 2018-05-09 RX ORDER — ATORVASTATIN CALCIUM 40 MG/1
40 TABLET, FILM COATED ORAL DAILY
Status: DISCONTINUED | OUTPATIENT
Start: 2018-05-10 | End: 2018-05-11 | Stop reason: HOSPADM

## 2018-05-09 RX ORDER — DRONABINOL 2.5 MG/1
15 CAPSULE ORAL 4 TIMES DAILY
Status: DISCONTINUED | OUTPATIENT
Start: 2018-05-09 | End: 2018-05-11 | Stop reason: HOSPADM

## 2018-05-09 RX ORDER — ONDANSETRON 2 MG/ML
4 INJECTION INTRAMUSCULAR; INTRAVENOUS ONCE
Status: COMPLETED | OUTPATIENT
Start: 2018-05-09 | End: 2018-05-09

## 2018-05-09 RX ADMIN — SODIUM CHLORIDE 75 ML/HR: 0.9 INJECTION, SOLUTION INTRAVENOUS at 23:51

## 2018-05-09 RX ADMIN — HEPARIN SODIUM 5000 UNITS: 5000 INJECTION, SOLUTION INTRAVENOUS; SUBCUTANEOUS at 23:51

## 2018-05-09 RX ADMIN — SODIUM CHLORIDE 1000 ML: 0.9 INJECTION, SOLUTION INTRAVENOUS at 19:50

## 2018-05-09 RX ADMIN — VANCOMYCIN HYDROCHLORIDE 1000 MG: 1 INJECTION, SOLUTION INTRAVENOUS at 21:25

## 2018-05-09 RX ADMIN — CEFEPIME HYDROCHLORIDE 2000 MG: 2 INJECTION, SOLUTION INTRAVENOUS at 20:08

## 2018-05-09 RX ADMIN — DRONABINOL 15 MG: 2.5 CAPSULE ORAL at 22:00

## 2018-05-09 RX ADMIN — Medication 1000 MG: at 23:51

## 2018-05-09 RX ADMIN — KETOROLAC TROMETHAMINE 15 MG: 30 INJECTION, SOLUTION INTRAMUSCULAR at 19:56

## 2018-05-09 RX ADMIN — ONDANSETRON 4 MG: 2 INJECTION INTRAMUSCULAR; INTRAVENOUS at 19:56

## 2018-05-09 NOTE — PATIENT INSTRUCTIONS
Cellulitis of right arm- Patient must go to the ER  She will need admission for IV antibiotics  This is her third infection in one month

## 2018-05-09 NOTE — TELEPHONE ENCOUNTER
Pt called again and stated she is out of town till Sunday, She stated she would like to speak to a  directly

## 2018-05-09 NOTE — PROGRESS NOTES
Assessment/Plan:    No problem-specific Assessment & Plan notes found for this encounter  Diagnoses and all orders for this visit:    Cellulitis of right upper extremity          Subjective:      Patient ID: Karis Butler is a 59 y o  female  Pt noticed her right arm was red and swollen this am when she awakened  Swelling as gotten worse throughout the day  She has chronic lymphedema of the right arm  She has had frequent cellulitis over the past month  The following portions of the patient's history were reviewed and updated as appropriate:   She  has a past medical history of Anemia; Cancer ([facility] [address] ); and Hypertension  She   Patient Active Problem List    Diagnosis Date Noted    Cellulitis of right upper extremity 05/09/2018    Right arm pain 03/26/2018    Diabetes mellitus screening 03/26/2018    Need for lipid screening 03/26/2018    Need for hepatitis C screening test 03/26/2018    Right arm cellulitis 03/26/2018    Lymphedema 03/26/2018    Chronic insomnia 06/19/2017    Lymphedema of right arm 06/19/2017    Peripheral neuropathy 06/29/2015    Chronic anemia 06/08/2015    Back pain, chronic 12/22/2014    Hyperlipidemia, mixed 12/18/2014     She  has a past surgical history that includes Hysterectomy and pr colonoscopy flx dx w/collj spec when pfrmd (N/A, 8/15/2017)  Her family history is not on file  She  reports that she has never smoked  She has never used smokeless tobacco  She reports that she drinks about 0 6 oz of alcohol per week   She reports that she does not use drugs    Current Outpatient Prescriptions   Medication Sig Dispense Refill    atorvastatin (LIPITOR) 40 mg tablet Take 40 mg by mouth daily      cyclobenzaprine (FLEXERIL) 5 mg tablet Take 1 tablet by mouth 3 (three) times a day as needed for muscle spasms (neck pain or stiffness) 21 tablet 0    Omega-3 Fatty Acids (FISH OIL PO) Take by mouth      thiamine (VITAMIN B1) 100 mg tablet Take 100 mg by mouth daily No current facility-administered medications for this visit  Current Outpatient Prescriptions on File Prior to Visit   Medication Sig    atorvastatin (LIPITOR) 40 mg tablet Take 40 mg by mouth daily    cyclobenzaprine (FLEXERIL) 5 mg tablet Take 1 tablet by mouth 3 (three) times a day as needed for muscle spasms (neck pain or stiffness)    Omega-3 Fatty Acids (FISH OIL PO) Take by mouth    thiamine (VITAMIN B1) 100 mg tablet Take 100 mg by mouth daily    [DISCONTINUED] gabapentin (NEURONTIN) 300 mg capsule Take 2 capsules (600 mg total) by mouth 4 (four) times a day    [DISCONTINUED] ondansetron (ZOFRAN-ODT) 4 mg disintegrating tablet Take 1 tablet by mouth every 6 (six) hours as needed for nausea or vomiting    [DISCONTINUED] traMADol (ULTRAM) 50 mg tablet Take 1 tablet (50 mg total) by mouth every 8 (eight) hours as needed for moderate pain     No current facility-administered medications on file prior to visit  She is allergic to aspirin; chlorpromazine; codeine; meperidine; phenothiazines; saccharin; ibuprofen; ampicillin-sulbactam sodium; levofloxacin; neomycin; other; citrus; and latex       Review of Systems   Constitutional: Positive for fever  Negative for fatigue  Eyes: Negative  Respiratory: Negative  Cardiovascular: Negative  Skin: Positive for rash  Allergic/Immunologic: Negative for immunocompromised state  Hematological: Negative for adenopathy  All other systems reviewed and are negative  Objective:      /84 (BP Location: Left arm, Patient Position: Sitting)   Pulse 84   Temp 100 4 °F (38 °C)   Resp 18   Ht 5' 9" (1 753 m)   Wt 78 5 kg (173 lb)   SpO2 98%   BMI 25 55 kg/m²          Physical Exam   Constitutional: She is oriented to person, place, and time  She appears well-developed and well-nourished  No distress  HENT:   Head: Normocephalic and atraumatic  Nose: Nose normal    Mouth/Throat: No oropharyngeal exudate     Eyes: Conjunctivae and EOM are normal  Pupils are equal, round, and reactive to light  Neck: Normal range of motion  Neck supple  Cardiovascular: Normal rate, regular rhythm and normal heart sounds  Exam reveals no gallop and no friction rub  No murmur heard  Pulmonary/Chest: Effort normal and breath sounds normal  No respiratory distress  She has no wheezes  She has no rales  Abdominal: Soft  Musculoskeletal: Normal range of motion  Lymphadenopathy:     She has no cervical adenopathy  Neurological: She is alert and oriented to person, place, and time  Skin: Skin is warm and dry  No rash noted  She is not diaphoretic  There is erythema  Right arm severe erythema and edema  Psychiatric: She has a normal mood and affect  Her behavior is normal  Judgment and thought content normal    Nursing note and vitals reviewed

## 2018-05-09 NOTE — TELEPHONE ENCOUNTER
Does she have an infection? Antibiotics will not do anything for lymphedema    Probably needs to be seen for evaluation

## 2018-05-09 NOTE — ED PROVIDER NOTES
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 as well as multiple radiation treatments, chronic lymphedema and recurrent cellulitis of the right upper extremity, presents to the ED at the instruction of her PCP for RUE cellulitis  Patient reports starting at [address] [address] [address] she noticed redness and warmth of the right forearm as well as pain and swelling  Throughout the day it has significantly worsened and now the redness/warmth is circumferential around the upper and lower right arm  She saw her PCP who advised her to go immediately to ED for IV Abx and admission  Patient reports she took a left-over antibiotic from prior cellulitis she had at home (had 2 doses today; unsure what Abx was called)  She reports since December, she has been having recurrent infections in this arm  She reports she has baseline paresthesias from prior surgery but no worsening  Denies weakness in the arm  She was told she had a fever in the office but denies feeling feverish or having chills  She has had poor appetite for 2 days and nausea/dry-heaves today  Denies headache, dizziness, URI symptoms, cough, visual disturbance, chest pain, palpitations, dyspnea, abdominal pain, vomiting, diarrhea, constipation, urinary symptoms, lower extremity pain/swelling, extremity weakness or paresthesia worse than baseline, other focal neurologic deficits  History provided by:  Patient and medical records   used: No        Prior to Admission Medications   Prescriptions Last Dose Informant Patient Reported? Taking?    Omega-3 Fatty Acids (FISH OIL PO)   Yes No   Sig: Take by mouth   atorvastatin (LIPITOR) 40 mg tablet   Yes No   Sig: Take 40 mg by mouth daily   cyclobenzaprine (FLEXERIL) 5 mg tablet   No No   Sig: Take 1 tablet by mouth 3 (three) times a day as needed for muscle spasms (neck pain or stiffness)   thiamine (VITAMIN B1) 100 mg tablet   Yes No   Sig: Take 100 mg by mouth daily      Facility-Administered Medications: None       Past Medical History:   Diagnosis Date    Anemia     Cancer ([address] )     desmoitosis    Hypertension        Past Surgical History:   Procedure Laterality Date    HYSTERECTOMY      VT COLONOSCOPY FLX DX W/COLLJ SPEC WHEN PFRMD N/A 8/15/2017    Procedure: COLONOSCOPY;  Surgeon: Cecelia Arzola MD;  Location: [facility]; Service: Gastroenterology       History reviewed  No pertinent family history  I have reviewed and agree with the history as documented  Social History   Substance Use Topics    Smoking status: Never Smoker    Smokeless tobacco: Never Used    Alcohol use 0 6 oz/week     1 Shots of liquor per week      Comment: daily HS         Review of Systems   Constitutional: Positive for appetite change  Negative for chills and fever  HENT: Negative for congestion, ear pain, rhinorrhea and sore throat  Eyes: Negative for pain and visual disturbance  Respiratory: Negative for cough, chest tightness, shortness of breath and wheezing  Cardiovascular: Negative for chest pain and palpitations  Gastrointestinal: Positive for nausea  Negative for abdominal pain, constipation, diarrhea and vomiting  Genitourinary: Negative for dysuria, flank pain, frequency and hematuria  Musculoskeletal: Negative for back pain and neck pain         + Right upper extremity swelling, pain, redness, warmth  Skin: Negative for color change, pallor and rash  Allergic/Immunologic: Negative for immunocompromised state  Neurological: Negative for dizziness, syncope, weakness, light-headedness, numbness and headaches  Hematological: Negative for adenopathy  Psychiatric/Behavioral: Negative for confusion and decreased concentration     All other systems reviewed and are negative  Physical Exam  ED Triage Vitals [05/09/18 1836]   Temperature Pulse Respirations Blood Pressure SpO2   98 4 °F (36 9 °C) 81 18 126/64 98 %      Temp Source Heart Rate Source Patient Position - Orthostatic VS BP Location FiO2 (%)   Oral Monitor Sitting Left arm --      Pain Score       Worst Possible Pain         Vitals:    05/09/18 1836   BP: 126/64   BP Location: Left arm   Pulse: 81   Resp: 18   Temp: 98 4 °F (36 9 °C)   TempSrc: Oral   SpO2: 98%   Weight: 74 8 kg (165 lb)   Height: 5' 9" (1 753 m)     Physical Exam   Constitutional: She is oriented to person, place, and time  She appears well-developed and well-nourished  No distress  HENT:   Head: Normocephalic and atraumatic  Mouth/Throat: Oropharynx is clear and moist  No oropharyngeal exudate  Eyes: Conjunctivae and EOM are normal  Pupils are equal, round, and reactive to light  Neck: Normal range of motion  Neck supple  No JVD present  Cardiovascular: Normal rate, regular rhythm, normal heart sounds and intact distal pulses  Exam reveals no gallop and no friction rub  No murmur heard  Pulmonary/Chest: Effort normal and breath sounds normal  No respiratory distress  She has no wheezes  She has no rales  She exhibits no tenderness  Abdominal: Soft  Bowel sounds are normal  She exhibits no distension  There is no tenderness  There is no rebound and no guarding  Musculoskeletal: Normal range of motion  She exhibits no edema or tenderness  Tenderness and edema of the right forearm and upper arm  No crepitus  Full range of motion of right upper extremity  RUE neurovascularly intact  Lymphadenopathy:     She has no cervical adenopathy  Neurological: She is alert and oriented to person, place, and time  No cranial nerve deficit  No gross motor or sensory deficits  Skin: Skin is warm and dry  No rash noted  She is not diaphoretic  There is erythema  No pallor     Circumferential erythema and warmth over the right forearm and right upper arm to the about 1 in below the axilla  No axillary lymphadenopathy  Psychiatric: She has a normal mood and affect  Her behavior is normal    Nursing note and vitals reviewed  ED Medications  Medications   sodium chloride 0 9 % bolus 1,000 mL (1,000 mL Intravenous New Bag 5/9/18 1950)   vancomycin (VANCOCIN) IVPB (premix) 1,000 mg (not administered)   ketorolac (TORADOL) injection 15 mg (15 mg Intravenous Given 5/9/18 1956)   ondansetron (ZOFRAN) injection 4 mg (4 mg Intravenous Given 5/9/18 1956)   cefepime (MAXIPIME) IVPB (premix) 2,000 mg (2,000 mg Intravenous New Bag 5/9/18 2008)       Diagnostic Studies  Results Reviewed     Procedure Component Value Units Date/Time    Comprehensive metabolic panel [[id number]]  (Abnormal) Collected:  05/09/18 1947    Lab Status:  Final result Specimen:  Blood from Arm, Left Updated:  05/09/18 2028     Sodium 134 (L) mmol/L      Potassium 3 5 mmol/L      Chloride 102 mmol/L      CO2 23 mmol/L      Anion Gap 9 mmol/L      BUN 8 mg/dL      Creatinine 0 81 mg/dL      Glucose 133 mg/dL      Calcium 9 0 mg/dL      AST 14 U/L      ALT 25 U/L      Alkaline Phosphatase 92 U/L      Total Protein 6 8 g/dL      Albumin 3 3 (L) g/dL      Total Bilirubin 0 90 mg/dL      eGFR 80 ml/min/1 73sq m     Narrative:         National Kidney Disease Education Program recommendations are as follows:  GFR calculation is accurate only with a steady state creatinine  Chronic Kidney disease less than 60 ml/min/1 73 sq  meters  Kidney failure less than 15 ml/min/1 73 sq  meters      Magnesium [[id number]]  (Normal) Collected:  05/09/18 1947    Lab Status:  Final result Specimen:  Blood from Arm, Left Updated:  05/09/18 2028     Magnesium 1 9 mg/dL     Lactic acid, plasma [[id number]]  (Normal) Collected:  05/09/18 1947    Lab Status:  Final result Specimen:  Blood from Arm, Left Updated:  05/09/18 2021     LACTIC ACID 1 9 mmol/L     Narrative:         Result may be elevated if tourniquet was used during collection  Protime-INR [[id number]]  (Normal) Collected:  05/09/18 1947    Lab Status:  Final result Specimen:  Blood from Arm, Left Updated:  05/09/18 2014     Protime 13 6 seconds      INR 1 02    APTT [[id number]]  (Normal) Collected:  05/09/18 1947    Lab Status:  Final result Specimen:  Blood from Arm, Left Updated:  05/09/18 2014     PTT 33 seconds     Blood culture #1 [[id number]] Collected:  05/09/18 2000    Lab Status: In process Specimen:  Blood from Arm, Right Updated:  05/09/18 2004    CBC and differential [[id number]]  (Abnormal) Collected:  05/09/18 1947    Lab Status:  Final result Specimen:  Blood from Arm, Left Updated:  05/09/18 1957     WBC 13 19 (H) Thousand/uL      RBC 4 64 Million/uL      Hemoglobin 13 9 g/dL      Hematocrit 41 0 %      MCV 88 fL      MCH 30 0 pg      MCHC 33 9 g/dL      RDW 13 2 %      MPV 9 1 fL      Platelets 531 Thousands/uL      nRBC 0 /100 WBCs      Neutrophils Relative 82 (H) %      Lymphocytes Relative 9 (L) %      Monocytes Relative 9 %      Eosinophils Relative 0 %      Basophils Relative 0 %      Neutrophils Absolute 10 81 (H) Thousands/µL      Lymphocytes Absolute 1 17 Thousands/µL      Monocytes Absolute 1 12 Thousand/µL      Eosinophils Absolute 0 00 Thousand/µL      Basophils Absolute 0 05 Thousands/µL     Blood culture #2 [[id number]] Collected:  05/09/18 1947    Lab Status:   In process Specimen:  Blood from Arm, Left Updated:  05/09/18 1954    UA w Reflex to Microscopic [[id number]]     Lab Status:  No result Specimen:  Urine                  No orders to display              Procedures  ECG 12 Lead Documentation  Date/Time: 5/9/2018 8:43 PM  Performed by: Ghislaine Leung by: Jovanni Blair     ECG reviewed by me, the ED Provider: yes    Patient location:  ED  Previous ECG:     Previous ECG:  Unavailable  Rate:     ECG rate:  86    ECG rate assessment: normal    Rhythm:     Rhythm: sinus rhythm    Ectopy:     Ectopy: PVCs PVCs:  Frequent  QRS:     QRS axis:  Normal    QRS intervals:  Normal  Conduction:     Conduction: normal    ST segments:     ST segments:  Normal  T waves:     T waves: non-specific               Phone Contacts  ED Phone Contact    ED Course  ED Course as of May 09 2046   Wed May 09, 2018   2032 SLIM paged for admission  2033 WBC: (!) 13 19 2033 LACTIC ACID: 1 9                               MDM  Number of Diagnoses or Management Options  Diagnosis management comments: 58-year-old female with history of right upper extremity cancer status post treatment, chronic lymphedema and recurrent cellulitis of the right arm, presents with significant and rapidly progressive cellulitis of the right upper extremity  Low suspicion for necrotizing fasciitis, abscess or DVT to medical records, patient had DVT study in March which was negative  She is prone to getting these infections  Will do full septic workup and start broad-spectrum antibiotics and admit  Will give Toradol for pain control as she has tolerated this in the past and has multiple other allergies to pain medication  Amount and/or Complexity of Data Reviewed  Clinical lab tests: ordered and reviewed  Tests in the medicine section of CPT®: ordered and reviewed  Independent visualization of images, tracings, or specimens: yes      CritCare Time    Disposition  Final diagnoses:   Cellulitis of right upper extremity     Time reflects when diagnosis was documented in both MDM as applicable and the Disposition within this note     Time User Action Codes Description Comment    5/9/2018  8:45 PM Blanca PRECIADO Add [L03 113] Cellulitis of right upper extremity       ED Disposition     ED Disposition Condition Comment    Admit  Case was discussed with BABAK and the patient's admission status was agreed to be Admission Status: inpatient status to the service of Dr Jose Luis Jenkins           Follow-up Information    None       Patient's Medications   Discharge Prescriptions    No medications on file     No discharge procedures on file      ED Provider  Electronically Signed by           Rosalba Ingram DO  05/09/18 7907

## 2018-05-10 LAB
ANION GAP SERPL CALCULATED.3IONS-SCNC: 8 MMOL/L (ref 4–13)
ATRIAL RATE: 86 BPM
BACTERIA UR QL AUTO: ABNORMAL /HPF
BASOPHILS # BLD AUTO: 0.04 THOUSANDS/ΜL (ref 0–0.1)
BASOPHILS NFR BLD AUTO: 0 % (ref 0–1)
BILIRUB UR QL STRIP: NEGATIVE
BUN SERPL-MCNC: 7 MG/DL (ref 5–25)
CALCIUM SERPL-MCNC: 8.3 MG/DL (ref 8.3–10.1)
CHLORIDE SERPL-SCNC: 102 MMOL/L (ref 100–108)
CLARITY UR: CLEAR
CO2 SERPL-SCNC: 23 MMOL/L (ref 21–32)
COLOR UR: YELLOW
CREAT SERPL-MCNC: 0.52 MG/DL (ref 0.6–1.3)
EOSINOPHIL # BLD AUTO: 0.07 THOUSAND/ΜL (ref 0–0.61)
EOSINOPHIL NFR BLD AUTO: 1 % (ref 0–6)
ERYTHROCYTE [DISTWIDTH] IN BLOOD BY AUTOMATED COUNT: 13.2 % (ref 11.6–15.1)
GFR SERPL CREATININE-BSD FRML MDRD: 105 ML/MIN/1.73SQ M
GLUCOSE SERPL-MCNC: 104 MG/DL (ref 65–140)
GLUCOSE UR STRIP-MCNC: NEGATIVE MG/DL
HCT VFR BLD AUTO: 37 % (ref 34.8–46.1)
HGB BLD-MCNC: 12.4 G/DL (ref 11.5–15.4)
HGB UR QL STRIP.AUTO: NEGATIVE
KETONES UR STRIP-MCNC: NEGATIVE MG/DL
LEUKOCYTE ESTERASE UR QL STRIP: ABNORMAL
LYMPHOCYTES # BLD AUTO: 1.99 THOUSANDS/ΜL (ref 0.6–4.47)
LYMPHOCYTES NFR BLD AUTO: 22 % (ref 14–44)
MCH RBC QN AUTO: 30 PG (ref 26.8–34.3)
MCHC RBC AUTO-ENTMCNC: 33.5 G/DL (ref 31.4–37.4)
MCV RBC AUTO: 89 FL (ref 82–98)
MONOCYTES # BLD AUTO: 0.97 THOUSAND/ΜL (ref 0.17–1.22)
MONOCYTES NFR BLD AUTO: 11 % (ref 4–12)
MUCOUS THREADS UR QL AUTO: ABNORMAL
NEUTROPHILS # BLD AUTO: 5.82 THOUSANDS/ΜL (ref 1.85–7.62)
NEUTS SEG NFR BLD AUTO: 65 % (ref 43–75)
NITRITE UR QL STRIP: NEGATIVE
NON-SQ EPI CELLS URNS QL MICRO: ABNORMAL /HPF
NRBC BLD AUTO-RTO: 0 /100 WBCS
P AXIS: 44 DEGREES
PH UR STRIP.AUTO: 6 [PH] (ref 4.5–8)
PLATELET # BLD AUTO: 207 THOUSANDS/UL (ref 149–390)
PMV BLD AUTO: 9.4 FL (ref 8.9–12.7)
POTASSIUM SERPL-SCNC: 3.7 MMOL/L (ref 3.5–5.3)
PR INTERVAL: 170 MS
PROT UR STRIP-MCNC: ABNORMAL MG/DL
QRS AXIS: 38 DEGREES
QRSD INTERVAL: 102 MS
QT INTERVAL: 352 MS
QTC INTERVAL: 421 MS
RBC # BLD AUTO: 4.14 MILLION/UL (ref 3.81–5.12)
RBC #/AREA URNS AUTO: ABNORMAL /HPF
SODIUM SERPL-SCNC: 133 MMOL/L (ref 136–145)
SP GR UR STRIP.AUTO: 1.02 (ref 1–1.03)
T WAVE AXIS: 30 DEGREES
UROBILINOGEN UR QL STRIP.AUTO: 0.2 E.U./DL
VENTRICULAR RATE: 86 BPM
WBC # BLD AUTO: 8.92 THOUSAND/UL (ref 4.31–10.16)
WBC #/AREA URNS AUTO: ABNORMAL /HPF

## 2018-05-10 PROCEDURE — 80048 BASIC METABOLIC PNL TOTAL CA: CPT | Performed by: NURSE PRACTITIONER

## 2018-05-10 PROCEDURE — 99232 SBSQ HOSP IP/OBS MODERATE 35: CPT | Performed by: INTERNAL MEDICINE

## 2018-05-10 PROCEDURE — 85025 COMPLETE CBC W/AUTO DIFF WBC: CPT | Performed by: NURSE PRACTITIONER

## 2018-05-10 PROCEDURE — 93010 ELECTROCARDIOGRAM REPORT: CPT | Performed by: INTERNAL MEDICINE

## 2018-05-10 PROCEDURE — 81001 URINALYSIS AUTO W/SCOPE: CPT | Performed by: EMERGENCY MEDICINE

## 2018-05-10 RX ORDER — SACCHAROMYCES BOULARDII 250 MG
250 CAPSULE ORAL 2 TIMES DAILY
Status: DISCONTINUED | OUTPATIENT
Start: 2018-05-10 | End: 2018-05-11 | Stop reason: HOSPADM

## 2018-05-10 RX ORDER — CLINDAMYCIN PHOSPHATE 600 MG/50ML
600 INJECTION INTRAVENOUS EVERY 8 HOURS
Status: DISCONTINUED | OUTPATIENT
Start: 2018-05-10 | End: 2018-05-11 | Stop reason: HOSPADM

## 2018-05-10 RX ADMIN — Medication 100 MG: at 08:53

## 2018-05-10 RX ADMIN — Medication 1000 MG: at 21:40

## 2018-05-10 RX ADMIN — Medication 1000 MG: at 08:53

## 2018-05-10 RX ADMIN — CLINDAMYCIN PHOSPHATE 600 MG: 12 INJECTION, SOLUTION INTRAMUSCULAR; INTRAVENOUS at 17:47

## 2018-05-10 RX ADMIN — HEPARIN SODIUM 5000 UNITS: 5000 INJECTION, SOLUTION INTRAVENOUS; SUBCUTANEOUS at 05:16

## 2018-05-10 RX ADMIN — Medication 250 MG: at 17:46

## 2018-05-10 RX ADMIN — HEPARIN SODIUM 5000 UNITS: 5000 INJECTION, SOLUTION INTRAVENOUS; SUBCUTANEOUS at 21:40

## 2018-05-10 RX ADMIN — Medication 1000 MG: at 16:03

## 2018-05-10 RX ADMIN — HEPARIN SODIUM 5000 UNITS: 5000 INJECTION, SOLUTION INTRAVENOUS; SUBCUTANEOUS at 13:12

## 2018-05-10 RX ADMIN — SODIUM CHLORIDE 75 ML/HR: 0.9 INJECTION, SOLUTION INTRAVENOUS at 13:16

## 2018-05-10 RX ADMIN — DRONABINOL 15 MG: 2.5 CAPSULE ORAL at 08:53

## 2018-05-10 RX ADMIN — DRONABINOL 15 MG: 2.5 CAPSULE ORAL at 17:46

## 2018-05-10 RX ADMIN — DRONABINOL 15 MG: 2.5 CAPSULE ORAL at 13:12

## 2018-05-10 RX ADMIN — ATORVASTATIN CALCIUM 40 MG: 40 TABLET, FILM COATED ORAL at 08:53

## 2018-05-10 RX ADMIN — DRONABINOL 15 MG: 2.5 CAPSULE ORAL at 21:40

## 2018-05-10 NOTE — SOCIAL WORK
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  CM reviewed discharge planning process including the following: identifying help at home, patient preference for discharge planning needs, pharmacy preference, and availability of treatment team to discuss questions or concerns patient and/or family may have regarding understanding medications and recognizing signs and symptoms once discharged  CM also encouraged patient to follow up with all recommended appointments after discharge  Patient advised of importance for patient and family to participate in managing patients medical well being  CM name and role reviewed and Discharge Checklist provided  Encouraged patient and caregiver to review prior to discharge  Pt has no needs at this time

## 2018-05-10 NOTE — ASSESSMENT & PLAN NOTE
Erythema circumferential right arm, warm to touch mild swelling  Recurrent  All pulses palpable  Does not meet SIRS criteria  Blood Cultures  Ancef IV  Swab for MRSA  Monitor WBC and Temp

## 2018-05-10 NOTE — ASSESSMENT & PLAN NOTE
WBC elevated 13 19 POA  In the setting of cellulitis  Blood Cultures  Ancef IV  Monitor WBC and fever

## 2018-05-10 NOTE — CASE MANAGEMENT
Initial Clinical Review    Admission: Date/Time/Statement: 5/9/18 @ 2046     Orders Placed This Encounter   Procedures    Inpatient Admission (expected length of stay for this patient is greater than two midnights)     Standing Status:   Standing     Number of Occurrences:   1     Order Specific Question:   Admitting Physician     Answer:   Mateus Au [[id number]]     Order Specific Question:   Level of Care     Answer:   Med Surg [[id number]]     Order Specific Question:   Estimated length of stay     Answer:   More than 2 Midnights     Order Specific Question:   Certification     Answer:   I certify that inpatient services are medically necessary for this patient for a duration of greater than two midnights  See H&P and MD Progress Notes for additional information about the patient's course of treatment  ED: Date/Time/Mode of Arrival:   ED Arrival Information     Expected Arrival Acuity Means of Arrival Escorted By Service Admission Type    - 5/9/2018 18:23 Urgent Walk-In Family Member General Medicine Urgent    Arrival Complaint    RASH          Chief Complaint:   Chief Complaint   Patient presents with    Swollen Glands     to right arm, began this AM       History of Illness: Nyla Kc is a 59 y o  female history of malignancy (desmoitosis) status post reconstructive surgery, chronic lymphedema, hypertension, hyperlipidemia who presents with chief complaint of  redness and warmth of right forearm, onset this morning  Rapidly progressed throughout the whole arm  Evaluated by her PCP and was advised to come to ED for IV antibiotics  Patient does have history of right upper extremity desmoitosis recurrent cellulitis of right upper extremity  Patient report fever, nausea, dry heaving, poor appetite x2 days  Positive for paresthesias secondary to surgery, but has not worsened  Pulses palpable      ED Vital Signs:   ED Triage Vitals [05/09/18 1836]   Temperature Pulse Respirations Blood Pressure SpO2 98 4 °F (36 9 °C) 81 18 126/64 98 %      Temp Source Heart Rate Source Patient Position - Orthostatic VS BP Location FiO2 (%)   Oral Monitor Sitting Left arm --      Pain Score       Worst Possible Pain        Wt Readings from Last 1 Encounters:   05/09/18 77 1 kg (169 lb 15 6 oz)       Vital Signs (abnormal): wnl    Abnormal Labs/Diagnostic Test Results: wbc 13 19, na  134, alb  3 3    ED Treatment:   Medication Administration from 05/09/2018 1823 to 05/09/2018 2252       Date/Time Order Dose Route Action Action by Comments     05/09/2018 2050 sodium chloride 0 9 % bolus 1,000 mL 0 mL Intravenous Stopped Omkar Roland RN      05/09/2018 1950 sodium chloride 0 9 % bolus 1,000 mL 1,000 mL Intravenous [address] Omkar Roland RN      05/09/2018 1956 ketorolac (TORADOL) injection 15 mg 15 mg Intravenous Given Omkar Roland RN      05/09/2018 1956 ondansetron (ZOFRAN) injection 4 mg 4 mg Intravenous Given Omkar Roland RN      05/09/2018 2038 cefepime (MAXIPIME) IVPB (premix) 2,000 mg 0 mg Intravenous Stopped Omkar Roland RN      05/09/2018 2008 cefepime (MAXIPIME) IVPB (premix) 2,000 mg 2,000 mg Intravenous [address] Omkar Roland RN      05/09/2018 2225 vancomycin (VANCOCIN) IVPB (premix) 1,000 mg 0 mg/kg Intravenous Stopped Omkar Roland RN      05/09/2018 2125 vancomycin (VANCOCIN) IVPB (premix) 1,000 mg 1,000 mg Intravenous [address] Omkar Roland RN      05/09/2018 2200 dronabinol (MARINOL) capsule 15 mg 15 mg Oral Given Omkar Roland RN           Past Medical/Surgical History:    Active Ambulatory Problems     Diagnosis Date Noted    Back pain, chronic 12/22/2014    Chronic anemia 06/08/2015    Chronic insomnia 06/19/2017    Lymphedema of right arm 06/19/2017    Hyperlipidemia, mixed 12/18/2014    Peripheral neuropathy 06/29/2015    Right arm pain 03/26/2018    Diabetes mellitus screening 03/26/2018    Need for lipid screening 03/26/2018    Need for hepatitis C screening test 03/26/2018    Right arm cellulitis 03/26/2018    Lymphedema 03/26/2018    Cellulitis of right upper extremity 05/09/2018     Resolved Ambulatory Problems     Diagnosis Date Noted    No Resolved Ambulatory Problems     Past Medical History:   Diagnosis Date    Anemia     Cancer ([facility] [address] )     Hyperlipidemia     Hypertension        Admitting Diagnosis: Rash [R21]  Cellulitis of right upper extremity [L03 113]    Age/Sex: 59 y o  female    Assessment/Plan:   Cellulitis of right upper extremity   Assessment & Plan     Erythema circumferential right arm, warm to touch mild swelling  Recurrent  All pulses palpable  Does not meet SIRS criteria  Blood Cultures  Ancef IV  Swab for MRSA  Monitor WBC and Temp         Leukocytosis   Assessment & Plan     WBC elevated 13 19 POA  In the setting of cellulitis  Blood Cultures  Ancef IV  Monitor WBC and fever          Right arm pain   Assessment & Plan     Chronic lymphedema  Positive for cellulitis  See treatment plan for cellulitis         Hyperlipidemia, mixed   Assessment & Plan     Continue Statin and fish oil          * Rash   Assessment & Plan     Erythema, positive to cellulitis  See plan of care above          VTE Prophylaxis: Heparin  / sequential compression device   Code Status:  Full code  POLST: POLST form is not discussed and not completed at this time  Discussion with family:  Family at bedside, plan of care discussed in full detail    Anticipated Length of Stay:  Patient will be admitted on an Inpatient basis with an anticipated length of stay of  greater than 2 midnights     Justification for Hospital Stay:  IV antibiotics, right upper extremity cellulitis    Admission Orders:  Scheduled Meds:   Current Facility-Administered Medications:  acetaminophen 650 mg Oral Q6H PRN RAMIREZ Gerard    atorvastatin 40 mg Oral Daily RAMIREZ Gerard    dronabinol 15 mg Oral 4x Daily RAMIREZ Gerard    fish oil 1,000 mg Oral TID RAMIREZ Gerard heparin (porcine) 5,000 Units Subcutaneous Q8H [address] RAMIREZ Gerard    ondansetron 4 mg Intravenous Q6H PRN RAMIREZ Gerard    sodium chloride 75 mL/hr Intravenous Continuous RAMIREZ Gerard Last Rate: 75 mL/hr (05/09/18 2351)   thiamine 100 mg Oral Daily RAMIREZ Gerard      Continuous Infusions:   sodium chloride 75 mL/hr Last Rate: 75 mL/hr (05/09/18 2351)     PRN Meds:   acetaminophen    ondansetron    Na   134, alb  3 3, wbc  13 19  EKG- NSR w/ PVC s  Tele   EKG   Up and OOB as jani   SCD  Reg diet   5/10  Na   133, BUN creat   7  0 52

## 2018-05-10 NOTE — PLAN OF CARE
Problem: Potential for Falls  Goal: Patient will remain free of falls  INTERVENTIONS:  - Assess patient frequently for physical needs  -  Identify cognitive and physical deficits and behaviors that affect risk of falls    -  [place]ills fall precautions as indicated by assessment   - Educate patient/family on patient safety including physical limitations  - Instruct patient to call for assistance with activity based on assessment  - Modify environment to reduce risk of injury  - Consider OT/PT consult to assist with strengthening/mobility   Outcome: Progressing      Problem: PAIN - ADULT  Goal: Verbalizes/displays adequate comfort level or baseline comfort level  Interventions:  - Encourage patient to monitor pain and request assistance  - Assess pain using appropriate pain scale  - Administer analgesics based on type and severity of pain and evaluate response  - Implement non-pharmacological measures as appropriate and evaluate response  - Consider cultural and social influences on pain and pain management  - Notify physician/advanced practitioner if interventions unsuccessful or patient reports new pain  Outcome: Progressing      Problem: INFECTION - ADULT  Goal: Absence or prevention of progression during hospitalization  INTERVENTIONS:  - Assess and monitor for signs and symptoms of infection  - Monitor lab/diagnostic results  - Monitor all insertion sites, i e  indwelling lines, tubes, and drains  - Monitor endotracheal (as able) and nasal secretions for changes in amount and color  - Hills appropriate cooling/warming therapies per order  - Administer medications as ordered  - Instruct and encourage patient and family to use good hand hygiene technique  - Identify and instruct in appropriate isolation precautions for identified infection/condition  Outcome: Progressing    Goal: Absence of fever/infection during neutropenic period  INTERVENTIONS:  - Monitor WBC  - Implement neutropenic guidelines  Outcome: Progressing      Problem: DISCHARGE PLANNING  Goal: Discharge to home or other facility with appropriate resources  INTERVENTIONS:  - Identify barriers to discharge w/patient and caregiver  - Arrange for needed discharge resources and transportation as appropriate  - Identify discharge learning needs (meds, wound care, etc )  - Arrange for interpretive services to assist at discharge as needed  - Refer to Case Management Department for coordinating discharge planning if the patient needs post-hospital services based on physician/advanced practitioner order or complex needs related to functional status, cognitive ability, or social support system  Outcome: Progressing      Problem: Knowledge Deficit  Goal: Patient/family/caregiver demonstrates understanding of disease process, treatment plan, medications, and discharge instructions  Complete learning assessment and assess knowledge base    Interventions:  - Provide teaching at level of understanding  - Provide teaching via preferred learning methods  Outcome: Progressing

## 2018-05-10 NOTE — H&P
H&P- Nyla Kc 1960, 59 y o  female MRN: [id number]    Unit/Bed#: ED 28 Encounter: [id number]    Primary Care Provider: Reggie Morales MD   Date and time admitted to hospital: 5/9/2018  6:38 PM        Cellulitis of right upper extremity   Assessment & Plan    Erythema circumferential right arm, warm to touch mild swelling  Recurrent  All pulses palpable  Does not meet SIRS criteria  Blood Cultures  Ancef IV  Swab for MRSA  Monitor WBC and Temp  Leukocytosis   Assessment & Plan    WBC elevated 13 19 POA  In the setting of cellulitis  Blood Cultures  Ancef IV  Monitor WBC and fever  Right arm pain   Assessment & Plan    Chronic lymphedema  Positive for cellulitis  See treatment plan for cellulitis  Hyperlipidemia, mixed   Assessment & Plan    Continue Statin and fish oil  * Rash   Assessment & Plan    Erythema, positive to cellulitis  See plan of care above  VTE Prophylaxis: Heparin  / sequential compression device   Code Status:  Full code  POLST: POLST form is not discussed and not completed at this time  Discussion with family:  Family at bedside, plan of care discussed in full detail  Anticipated Length of Stay:  Patient will be admitted on an Inpatient basis with an anticipated length of stay of  greater than 2 midnights  Justification for Hospital Stay:  IV antibiotics, right upper extremity cellulitis    Total Time for Visit, including Counseling / Coordination of Care: 45 minutes  Greater than 50% of this total time spent on direct patient counseling and coordination of care  Chief Complaint:   Swelling glands    History of Present Illness:    Nyla Kc is a 59 y o  female history of malignancy (desmoitosis) status post reconstructive surgery, chronic lymphedema, hypertension, hyperlipidemia who presents with chief complaint of  redness and warmth of right forearm, onset this morning    Rapidly progressed throughout the whole arm   Evaluated by her PCP and was advised to come to ED for IV antibiotics  Patient does have history of right upper extremity desmoitosis recurrent cellulitis of right upper extremity  Patient report fever, nausea, dry heaving, poor appetite x2 days  Positive for paresthesias secondary to surgery, but has not worsened  Pulses palpable  Review of Systems:    Review of Systems   Constitutional: Positive for fever  Gastrointestinal: Nausea: dry Heaving  Musculoskeletal: Positive for back pain  Right upper extremity pain,   Chronic lymphedema     Skin: Positive for rash  Right upper extremity erythema extend close to axilla  All other systems reviewed and are negative  Past Medical and Surgical History:     Past Medical History:   Diagnosis Date    Anemia     Cancer ([facility] [address] )     desmoitosis    Hyperlipidemia     Hypertension        Past Surgical History:   Procedure Laterality Date    HYSTERECTOMY      WI COLONOSCOPY FLX DX W/COLLJ SPEC WHEN PFRMD N/A 8/15/2017    Procedure: COLONOSCOPY;  Surgeon: Stone Devi MD;  Location: [facility]; Service: Gastroenterology       Meds/Allergies:    Prior to Admission medications    Medication Sig Start Date End Date Taking?  Authorizing Provider   atorvastatin (LIPITOR) 40 mg tablet Take 40 mg by mouth daily    Historical Provider, MD   cyclobenzaprine (FLEXERIL) 5 mg tablet Take 1 tablet by mouth 3 (three) times a day as needed for muscle spasms (neck pain or stiffness) 5/2/17   Nery Yu MD   Omega-3 Fatty Acids (FISH OIL PO) Take by mouth    Historical Provider, MD   thiamine (VITAMIN B1) 100 mg tablet Take 100 mg by mouth daily    Historical Provider, MD   gabapentin (NEURONTIN) 300 mg capsule Take 2 capsules (600 mg total) by mouth 4 (four) times a day 4/23/18 5/9/18  Lily Wagoner MD   ondansetron (ZOFRAN-ODT) 4 mg disintegrating tablet Take 1 tablet by mouth every 6 (six) hours as needed for nausea or vomiting 5/2/17 5/9/18  Aung Rodarte MD   traMADol Everlyn Servant) 50 mg tablet Take 1 tablet (50 mg total) by mouth every 8 (eight) hours as needed for moderate pain 3/26/18 5/9/18  Joss Collado MD     I have reviewed home medications with patient personally  Allergies: Allergies   Allergen Reactions    Aspirin GI Intolerance and Abdominal Pain     ABD     Chlorpromazine Anaphylaxis     PHENOTHIAZINE=ANAPHYLAXIS    Codeine Anaphylaxis     Anaphylaxis  abd pain   Meperidine Anaphylaxis, Hives and Other (See Comments)     VOMITS  Category: Allergy;     Phenothiazines Anaphylaxis and Throat Swelling     Category: Allergy;     Saccharin Anaphylaxis    Ibuprofen Hives     H    Ampicillin-Sulbactam Sodium Hives    Levofloxacin Hives    Neomycin Other (See Comments), Edema and Hives     Category: Allergy;   swelling    Other Throat Swelling and Hives     ASA=GERD  Category: Allergy;     Citrus Rash    Latex Hives and Rash     Category: Allergy;        Social History:     Marital Status: /Civil Union   Occupation: Disabled  Patient Pre-hospital Living Situation: Home  Patient Pre-hospital Level of Mobility: Independent  Patient Pre-hospital Diet Restrictions: Celiac  Substance Use History:   History   Alcohol Use    0 6 oz/week    1 Shots of liquor per week     Comment: daily HS      History   Smoking Status    Never Smoker   Smokeless Tobacco    Never Used     History   Drug Use No       Family History:    non-contributory    Physical Exam:     Vitals:   Blood Pressure: 104/59 (05/09/18 2057)  Pulse: 92 (05/09/18 2057)  Temperature: 98 4 °F (36 9 °C) (05/09/18 1836)  Temp Source: Oral (05/09/18 1836)  Respirations: 18 (05/09/18 2057)  Height: 5' 9" (175 3 cm) (05/09/18 1836)  Weight - Scale: 74 8 kg (165 lb) (05/09/18 1836)  SpO2: 96 % (05/09/18 2057)    Physical Exam   Constitutional: She is oriented to person, place, and time  She appears well-developed and well-nourished  No distress  HENT:   Head: Normocephalic and atraumatic  Neck: Normal range of motion  Neck supple  Cardiovascular: Normal rate, regular rhythm, normal heart sounds and intact distal pulses  Normal sinus with occasional PVCs  Pulmonary/Chest: Effort normal and breath sounds normal  No accessory muscle usage  No respiratory distress  Abdominal: Soft  Bowel sounds are normal  She exhibits no distension  There is no tenderness  Musculoskeletal: Normal range of motion  She exhibits edema (Mild right upper arm) and tenderness (Tenderness to right forearm, right upper arm  )  She exhibits no deformity  Arms:  Bilateral upper extremity move freely  Denies joint discomfort  Neurological: She is alert and oriented to person, place, and time  Skin: Skin is warm and dry  She is not diaphoretic  There is erythema (Right upper extremity, extend close to axilla  )  Psychiatric: She has a normal mood and affect  Her behavior is normal    Nursing note and vitals reviewed  Additional Data:     Lab Results: I have personally reviewed pertinent reports  Results from last 7 days  Lab Units 05/09/18 1947   WBC Thousand/uL 13 19*   HEMOGLOBIN g/dL 13 9   HEMATOCRIT % 41 0   PLATELETS Thousands/uL 259   NEUTROS PCT % 82*   LYMPHS PCT % 9*   MONOS PCT % 9   EOS PCT % 0       Results from last 7 days  Lab Units 05/09/18 1947   SODIUM mmol/L 134*   POTASSIUM mmol/L 3 5   CHLORIDE mmol/L 102   CO2 mmol/L 23   BUN mg/dL 8   CREATININE mg/dL 0 81   CALCIUM mg/dL 9 0   TOTAL PROTEIN g/dL 6 8   BILIRUBIN TOTAL mg/dL 0 90   ALK PHOS U/L 92   ALT U/L 25   AST U/L 14   GLUCOSE RANDOM mg/dL 133       Results from last 7 days  Lab Units 05/09/18 1947   INR  1 02               Imaging: I have personally reviewed pertinent reports  and None ordered    No orders to display       EKG, Pathology, and Other Studies Reviewed on Admission:   · EKG:  Normal sinus with occasional PVCs  Allscripts / Epic Records Reviewed:  Yes ** Please Note: This note has been constructed using a voice recognition system   **

## 2018-05-10 NOTE — PLAN OF CARE
Problem: Potential for Falls  Goal: Patient will remain free of falls  INTERVENTIONS:  - Assess patient frequently for physical needs  -  Identify cognitive and physical deficits and behaviors that affect risk of falls    -  Zeigler fall precautions as indicated by assessment   - Educate patient/family on patient safety including physical limitations  - Instruct patient to call for assistance with activity based on assessment  - Modify environment to reduce risk of injury  - Consider OT/PT consult to assist with strengthening/mobility   Outcome: Progressing      Problem: PAIN - ADULT  Goal: Verbalizes/displays adequate comfort level or baseline comfort level  Interventions:  - Encourage patient to monitor pain and request assistance  - Assess pain using appropriate pain scale  - Administer analgesics based on type and severity of pain and evaluate response  - Implement non-pharmacological measures as appropriate and evaluate response  - Consider cultural and social influences on pain and pain management  - Notify physician/advanced practitioner if interventions unsuccessful or patient reports new pain   Outcome: Progressing      Problem: INFECTION - ADULT  Goal: Absence or prevention of progression during hospitalization  INTERVENTIONS:  - Assess and monitor for signs and symptoms of infection  - Monitor lab/diagnostic results  - Monitor all insertion sites, i e  indwelling lines, tubes, and drains  - Monitor endotracheal (as able) and nasal secretions for changes in amount and color  - Zeigler appropriate cooling/warming therapies per order  - Administer medications as ordered  - Instruct and encourage patient and family to use good hand hygiene technique  - Identify and instruct in appropriate isolation precautions for identified infection/condition   Outcome: Progressing    Goal: Absence of fever/infection during neutropenic period  INTERVENTIONS:  - Monitor WBC  - Implement neutropenic guidelines   Outcome: Progressing      Problem: DISCHARGE PLANNING  Goal: Discharge to home or other facility with appropriate resources  INTERVENTIONS:  - Identify barriers to discharge w/patient and caregiver  - Arrange for needed discharge resources and transportation as appropriate  - Identify discharge learning needs (meds, wound care, etc )  - Arrange for interpretive services to assist at discharge as needed  - Refer to Case Management Department for coordinating discharge planning if the patient needs post-hospital services based on physician/advanced practitioner order or complex needs related to functional status, cognitive ability, or social support system   Outcome: Progressing      Problem: Knowledge Deficit  Goal: Patient/family/caregiver demonstrates understanding of disease process, treatment plan, medications, and discharge instructions  Complete learning assessment and assess knowledge base    Interventions:  - Provide teaching at level of understanding  - Provide teaching via preferred learning methods   Outcome: Progressing      Problem: DISCHARGE PLANNING - CARE MANAGEMENT  Goal: Discharge to post-acute care or home with appropriate resources  INTERVENTIONS:  - Conduct assessment to determine patient/family and health care team treatment goals, and need for post-acute services based on payer coverage, community resources, and patient preferences, and barriers to discharge  - Address psychosocial, clinical, and financial barriers to discharge as identified in assessment in conjunction with the patient/family and health care team  - Arrange appropriate level of post-acute services according to patients   needs and preference and payer coverage in collaboration with the physician and health care team  - Communicate with and update the patient/family, physician, and health care team regarding progress on the discharge plan  - Arrange appropriate transportation to post-acute venues   Outcome: Progressing

## 2018-05-10 NOTE — PROGRESS NOTES
[facility] Progress Note  Patient: Eileen Leon 59 y o  female   MRN: [id number]  PCP: Aline Carrera MD  Unit/Bed#: -36 Encounter: [id number]  Date Of Visit: 05/10/18    Assessment:    Principal Problem:    Rash  Active Problems:    Hyperlipidemia, mixed    Right arm pain    Cellulitis of right upper extremity    Leukocytosis      Plan:    # RT arm cellulitis  - prior hx of skin graft though many years ago  - Cont on IV clindamycin  - Advised to keep UE elevated  - F/u blood cx  - cont supportive care  - trend leukocytosis  - did not meet criteria for SIRS    # HL - cont meds    VTE Pharmacologic Prophylaxis:   Pharmacologic: Heparin  Mechanical VTE Prophylaxis in Place: Yes    Patient Centered Rounds: I have performed bedside rounds with nursing staff today  Discussions with Specialists or Other Care Team Provider:     Education and Discussions with Family / Patient:  Patient    Time Spent for Care: 30 minutes  More than 50% of total time spent on counseling and coordination of care as described above  Current Length of Stay: 1 day(s)    Current Patient Status: Inpatient   Certification Statement: The patient will continue to require additional inpatient hospital stay due to Cellulitis    Discharge Plan / Estimated Discharge Date:  DC planning 1-2 days    Code Status: Level 1 - Full Code      Subjective:   Initially had erythema extending from her wrist up all the way to her bicep, now improved  Erythema on her forearm is resolved, now only noted in her bicep region  Positive edema    Objective:     Vitals:   Temp (24hrs), Av 2 °F (36 8 °C), Min:97 8 °F (36 6 °C), Max:98 5 °F (36 9 °C)    HR:  [62-92] 62  Resp:  [18-20] 18  BP: (101-126)/(54-64) 119/55  SpO2:  [95 %-98 %] 96 %  Body mass index is 25 1 kg/m²  Input and Output Summary (last 24 hours):        Intake/Output Summary (Last 24 hours) at 05/10/18 1807  Last data filed at 05/10/18 1409   Gross per 24 hour   Intake 2630 ml   Output             2000 ml   Net              630 ml       Physical Exam:     Physical Exam   Constitutional: She is oriented to person, place, and time  She appears well-developed and well-nourished  Neck: Neck supple  Cardiovascular: Normal rate, regular rhythm, normal heart sounds and intact distal pulses  Exam reveals no gallop and no friction rub  No murmur heard  Pulmonary/Chest: Effort normal and breath sounds normal  No respiratory distress  She has no wheezes  She has no rales  She exhibits no tenderness  Abdominal: Soft  Bowel sounds are normal  She exhibits no distension and no mass  There is no tenderness  There is no rebound and no guarding  Musculoskeletal: Normal range of motion  She exhibits edema  She exhibits no tenderness or deformity  Neurological: She is alert and oriented to person, place, and time  She has normal reflexes  She displays normal reflexes  No cranial nerve deficit  She exhibits normal muscle tone  Coordination normal    Skin: Skin is warm and dry  There is erythema     Positive erythema extending from elbow to bicep region improved from presentation where had extended from the wrist all the way to the biceps; positive edema, no tenderness to palpation, no warmth       Additional Data:     Labs:      Results from last 7 days  Lab Units 05/10/18  0508   WBC Thousand/uL 8 92   HEMOGLOBIN g/dL 12 4   HEMATOCRIT % 37 0   PLATELETS Thousands/uL 207   NEUTROS PCT % 65   LYMPHS PCT % 22   MONOS PCT % 11   EOS PCT % 1       Results from last 7 days  Lab Units 05/10/18  0508 05/09/18  1947   SODIUM mmol/L 133* 134*   POTASSIUM mmol/L 3 7 3 5   CHLORIDE mmol/L 102 102   CO2 mmol/L 23 23   BUN mg/dL 7 8   CREATININE mg/dL 0 52* 0 81   CALCIUM mg/dL 8 3 9 0   TOTAL PROTEIN g/dL  --  6 8   BILIRUBIN TOTAL mg/dL  --  0 90   ALK PHOS U/L  --  92   ALT U/L  --  25   AST U/L  --  14   GLUCOSE RANDOM mg/dL 104 133       Results from last 7 days  Lab Units 05/09/18  1947   INR  1 02       * I Have Reviewed All Lab Data Listed Above  * Additional Pertinent Lab Tests Reviewed: All Labs Within Last 24 Hours Reviewed    Imaging:    Imaging Reports Reviewed Today Include:   Imaging Personally Reviewed by Myself Includes:      Recent Cultures (last 7 days):           Last 24 Hours Medication List:     Current Facility-Administered Medications:  acetaminophen 650 mg Oral Q6H PRN RAMIREZ Gerard    atorvastatin 40 mg Oral Daily RAMIREZ Gerard    clindamycin 600 mg Intravenous Q8H Henrique Guadalupe DO Last Rate: 600 mg (05/10/18 1747)   dronabinol 15 mg Oral 4x Daily RAMIREZ Gerard    fish oil 1,000 mg Oral TID RAMIREZ Gerard    heparin (porcine) 5,000 Units Subcutaneous Q8H [facility] RAMIREZ Gerard    ondansetron 4 mg Intravenous Q6H PRN RAMIREZ Gerard    saccharomyces boulardii 250 mg Oral BID Henrique Guadalupe DO    sodium chloride 75 mL/hr Intravenous Continuous RAMIREZ Gerard Last Rate: 75 mL/hr (05/10/18 1316)   thiamine 100 mg Oral Daily RAMIREZ Gerard         Today, Patient Was Seen By: Henrique Guadalupe DO    ** Please Note: This note has been constructed using a voice recognition system   **

## 2018-05-10 NOTE — PLAN OF CARE
Problem: DISCHARGE PLANNING - CARE MANAGEMENT  Goal: Discharge to post-acute care or home with appropriate resources  INTERVENTIONS:  - Conduct assessment to determine patient/family and health care team treatment goals, and need for post-acute services based on payer coverage, community resources, and patient preferences, and barriers to discharge  - Address psychosocial, clinical, and financial barriers to discharge as identified in assessment in conjunction with the patient/family and health care team  - Arrange appropriate level of post-acute services according to patients   needs and preference and payer coverage in collaboration with the physician and health care team  - Communicate with and update the patient/family, physician, and health care team regarding progress on the discharge plan  - Arrange appropriate transportation to post-acute venues  Outcome: Progressing  LOS: 1 CM met with pt at bedside  Pt reports she lives in [facility] with her  and a tenant  Pt reports it is a 2 story home that has no steps to enter  Pt denies DME and can complete ADL's independently  Pt has no hx of Rehab or HHC  Pt uses [facility]  Pt denies hx of MH or substance abuse  Pt's , Claire Joseph is POA and CM requested copy  Pt does not have AD and declined information  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  CM reviewed discharge planning process including the following: identifying help at home, patient preference for discharge planning needs, pharmacy preference, and availability of treatment team to discuss questions or concerns patient and/or family may have regarding understanding medications and recognizing signs and symptoms once discharged  CM also encouraged patient to follow up with all recommended appointments after discharge  Patient advised of importance for patient and family to participate in managing patients medical well being   CM name and role reviewed and Discharge Checklist provided  Encouraged patient and caregiver to review prior to discharge  Pt has no needs at this time

## 2018-05-11 VITALS
HEART RATE: 56 BPM | RESPIRATION RATE: 18 BRPM | SYSTOLIC BLOOD PRESSURE: 164 MMHG | TEMPERATURE: 97.5 F | DIASTOLIC BLOOD PRESSURE: 72 MMHG | HEIGHT: 69 IN | OXYGEN SATURATION: 98 % | WEIGHT: 169.97 LBS | BODY MASS INDEX: 25.18 KG/M2

## 2018-05-11 PROBLEM — D72.829 LEUKOCYTOSIS: Status: RESOLVED | Noted: 2018-05-09 | Resolved: 2018-05-11

## 2018-05-11 PROCEDURE — 99239 HOSP IP/OBS DSCHRG MGMT >30: CPT | Performed by: INTERNAL MEDICINE

## 2018-05-11 RX ORDER — CLINDAMYCIN HYDROCHLORIDE 300 MG/1
300 CAPSULE ORAL 4 TIMES DAILY
Qty: 32 CAPSULE | Refills: 0 | Status: SHIPPED | OUTPATIENT
Start: 2018-05-11 | End: 2018-05-19

## 2018-05-11 RX ORDER — SACCHAROMYCES BOULARDII 250 MG
250 CAPSULE ORAL 2 TIMES DAILY
Qty: 20 CAPSULE | Refills: 0 | Status: SHIPPED | OUTPATIENT
Start: 2018-05-11 | End: 2018-05-21

## 2018-05-11 RX ADMIN — Medication 1000 MG: at 08:17

## 2018-05-11 RX ADMIN — HEPARIN SODIUM 5000 UNITS: 5000 INJECTION, SOLUTION INTRAVENOUS; SUBCUTANEOUS at 05:19

## 2018-05-11 RX ADMIN — ATORVASTATIN CALCIUM 40 MG: 40 TABLET, FILM COATED ORAL at 08:16

## 2018-05-11 RX ADMIN — DRONABINOL 15 MG: 2.5 CAPSULE ORAL at 09:27

## 2018-05-11 RX ADMIN — DRONABINOL 15 MG: 2.5 CAPSULE ORAL at 14:51

## 2018-05-11 RX ADMIN — Medication 250 MG: at 08:17

## 2018-05-11 RX ADMIN — Medication 100 MG: at 08:17

## 2018-05-11 RX ADMIN — CLINDAMYCIN PHOSPHATE 600 MG: 12 INJECTION, SOLUTION INTRAMUSCULAR; INTRAVENOUS at 08:18

## 2018-05-11 RX ADMIN — CLINDAMYCIN PHOSPHATE 600 MG: 12 INJECTION, SOLUTION INTRAMUSCULAR; INTRAVENOUS at 00:04

## 2018-05-11 RX ADMIN — HEPARIN SODIUM 5000 UNITS: 5000 INJECTION, SOLUTION INTRAVENOUS; SUBCUTANEOUS at 14:53

## 2018-05-11 NOTE — DISCHARGE INSTRUCTIONS
Cellulitis   WHAT YOU NEED TO KNOW:   Cellulitis is a skin infection caused by bacteria  Cellulitis may go away on its own or you may need treatment  Your healthcare provider may draw a [place] around the outside edges of your cellulitis  If your cellulitis spreads, your healthcare provider will see it outside of the [place]  DISCHARGE INSTRUCTIONS:   Call 911 if:   · You have sudden trouble breathing or chest pain  Seek care immediately if:   · Your wound gets larger and more painful  · You feel a crackling under your skin when you touch it  · You have purple dots or bumps on your skin, or you see bleeding under your skin  · You have new swelling and pain in your legs  · The red, warm, swollen area gets larger  · You see red streaks coming from the infected area  Contact your healthcare provider if:   · You have a fever  · Your fever or pain does not go away or gets worse  · The area does not get smaller after 2 days of antibiotics  · Your skin is flaking or peeling off  · You have questions or concerns about your condition or care  Medicines:   · Antibiotics  help treat the bacterial infection  · NSAIDs , such as ibuprofen, help decrease swelling, pain, and fever  NSAIDs can cause stomach bleeding or kidney problems in certain people  If you take blood thinner medicine, always ask if NSAIDs are safe for you  Always read the medicine label and follow directions  Do not give these medicines to children under 10months of age without direction from your child's healthcare provider  · Acetaminophen  decreases pain and fever  It is available without a doctor's order  Ask how much to take and how often to take it  Follow directions  Read the labels of all other medicines you are using to see if they also contain acetaminophen, or ask your doctor or pharmacist  Acetaminophen can cause liver damage if not taken correctly   Do not use more than 4 grams (4,000 milligrams) total of acetaminophen in one day  · Take your medicine as directed  Contact your healthcare provider if you think your medicine is not helping or if you have 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  Tell him or her if you are allergic to any medicine  Keep a list of the medicines, vitamins, and herbs you take  Include the amounts, and when and why you take them  Bring the list or the pill bottles to follow-up visits  Carry your medicine list with you in case of an emergency  Self-care:   · Elevate the area above the level of your heart  as often as you can  This will help decrease swelling and pain  Prop the area on pillows or blankets to keep it elevated comfortably  · Clean the area daily until the wound scabs over  Gently wash the area with soap and water  Pat dry  Use dressings as directed  · Place cool or warm, wet cloths on the area as directed  Use clean cloths and clean water  Leave it on the area until the cloth is room temperature  Pat the area dry with a clean, dry cloth  The cloths may help decrease pain  Prevent cellulitis:   · Do not scratch bug bites or areas of injury  You increase your risk for cellulitis by scratching these areas  · Do not share personal items, such as towels, clothing, and razors  · Clean exercise equipment  with germ-killing  before and after you use it  · Wash your hands often  Use soap and water  Wash your hands after you use the bathroom, change a child's diapers, or sneeze  Wash your hands before you prepare or eat food  Use lotion to prevent dry, cracked skin  · Wear pressure stockings as directed  You may be told to wear the stockings if you have peripheral edema  The stockings improve blood flow and decrease swelling  · Treat athlete's foot  This can help prevent the spread of a bacterial skin infection  Follow up with your healthcare provider within 3 days, or as directed:   Your healthcare provider will check if your cellulitis is getting better  You may need different medicine  Write down your questions so you remember to ask them during your visits  © 2017 [facility] Information is for End User's use only and may not be sold, redistributed or otherwise used for commercial purposes  All illustrations and images included in CareNotes® are the copyrighted property of A D A M , Inc  or [employer] Leon  The above information is an  only  It is not intended as medical advice for individual conditions or treatments  Talk to your doctor, nurse or pharmacist before following any medical regimen to see if it is safe and effective for you  Clindamycin (By mouth)   Clindamycin ([id number]-an-EBB-sin)  Treats infections  Brand Name(s): Cleocin, Cleocin HCl, Cleocin Pediatric   There may be other brand names for this medicine  When This Medicine Should Not Be Used: This medicine is not right for everyone  Do not use it if you had an allergic reaction to clindamycin or lincomycin  How to Use This Medicine:   Capsule, Liquid  · Your doctor will tell you how much medicine to use  Do not use more than directed  · Capsule: Swallow with a full glass of water  · Oral liquid: Measure the oral liquid medicine with a marked measuring spoon, oral syringe, or medicine cup  · Take all of the medicine in your prescription to clear up your infection, even if you feel better after the first few doses  · Missed dose: Take a dose as soon as you remember  If it is almost time for your next dose, wait until then and take a regular dose  Do not take extra medicine to make up for a missed dose  · Store the medicine in a closed container at room temperature, away from heat, moisture, and direct light  Oral liquid: Do not refrigerate or freeze  Throw away any unused medicine after 14 days    Drugs and Foods to Avoid:   Ask your doctor or pharmacist before using any other medicine, including over-the-counter medicines, vitamins, and herbal products  · Some medicines can affect how clindamycin works  Tell your doctor if you are using erythromycin  Warnings While Using This Medicine:   · Tell your doctor if you are pregnant or breastfeeding, or if you have kidney disease, liver disease, allergies (including an allergy to aspirin), asthma, or stomach or bowel problems (including colitis)  · This medicine may cause severe skin reactions  · This medicine can cause diarrhea  Call your doctor if the diarrhea becomes severe, does not stop, or is bloody  Do not take any medicine to stop diarrhea until you have talked to your doctor  Diarrhea can occur 2 months or more after you stop taking this medicine  · Keep all medicine out of the reach of children  Never share your medicine with anyone  Possible Side Effects While Using This Medicine:   Call your doctor right away if you notice any of these 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:  · Allergic reaction: Itching or hives, swelling in your face or hands, swelling or tingling in your mouth or throat, chest tightness, trouble breathing  · Blistering, peeling, red skin rash  · Fever, chills, cough, sore throat, body aches  · Severe diarrhea that does not go away, stomach cramps  · Unusual bleeding, bruising, or weakness  If you notice these less serious 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, talk with your doctor:   · Mild diarrhea, nausea  If you notice other 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 that you think are caused by this medicine, tell your doctor  Call your doctor for medical advice about 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  You may report 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 to FDA at 7-187-FDA-5873  © 2017 [address] Information is for End User's use only and may not be sold, redistributed or otherwise used for commercial purposes  The above information is an  only  It is not intended as medical advice for individual conditions or treatments  Talk to your doctor, nurse or pharmacist before following any medical regimen to see if it is safe and effective for you

## 2018-05-11 NOTE — DISCHARGE SUMMARY
Discharge Summary - [facility] Internal Medicine    Patient Information: Marguerite Villalobos 59 y o  female MRN: [id number]  Unit/Bed#: -01 Encounter: [id number]    Discharging Physician / Practitioner: Geovanni Haley DO  PCP: Dacia Porter MD  Admission Date: 5/9/2018  Discharge Date: 05/11/18    Reason for Admission: RT arm swelling, erythema and edema    Discharge Diagnoses: Active Problems:    Hyperlipidemia, mixed    Right arm pain    Cellulitis of right upper extremity  Resolved Problems:    Leukocytosis    HPI Marguerite Villalobos is a 59 y o  female history of malignancy (desmoitosis) status post reconstructive surgery, chronic lymphedema, hypertension, hyperlipidemia who presents with chief complaint of  redness and warmth of right forearm, onset this morning  Rapidly progressed throughout the whole arm  Evaluated by her PCP and was advised to come to ED for IV antibiotics  Patient does have history of right upper extremity desmoitosis recurrent cellulitis of right upper extremity  Patient report fever, nausea, dry heaving, poor appetite x2 days  Positive for paresthesias secondary to surgery, but has not worsened  Pulses palpable  Consultations During Hospital Stay:  ·     Procedures Performed:   ·     Significant Findings:     · Right arm cellulitis    Incidental Findings:   ·     Test Results Pending at Discharge (will require follow up):   ·      Outpatient Tests Requested:  ·     Complications:  None    Hospital Course:     # RT arm cellulitis  - prior hx of skin graft though many years ago  - remarked quick clinical improvement will d/c on clindamycin for 8 more days  Educated on possible 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 of c-diff and advised if should develop profuse persistent diarrhea to immediately present to the ED  D/c on probiotics as well  - Advised to keep UE elevated  - net blood cx x 1 day  - Resolved leukocytosis  - did not meet criteria for SIRS     # HL - cont meds    Disp: D/c to home    Plan of care extensively discussed with patient at length  Condition at Discharge: stable     Discharge Day Visit / Exam:     Subjective:  Afebrile, non toxic, resolved RT erythema, still with some mild edema  Overall improved appearance remarkably from presentation  Vitals: Blood Pressure: 164/72 (05/11/18 1500)  Pulse: 56 (05/11/18 1500)  Temperature: 97 5 °F (36 4 °C) (05/11/18 1500)  Temp Source: Oral (05/11/18 1500)  Respirations: 18 (05/11/18 1500)  Height: 5' 9" (175 3 cm) (05/09/18 2259)  Weight - Scale: 77 1 kg (169 lb 15 6 oz) (05/09/18 2259)  SpO2: 98 % (05/11/18 1500)  Exam:   Physical Exam   Constitutional: She is oriented to person, place, and time  She appears well-developed and well-nourished  Neck: Neck supple  Cardiovascular: Normal rate, regular rhythm, normal heart sounds and intact distal pulses  Exam reveals no gallop and no friction rub  No murmur heard  Pulmonary/Chest: Effort normal and breath sounds normal  No respiratory distress  She has no wheezes  She has no rales  She exhibits no tenderness  Abdominal: Soft  Bowel sounds are normal  She exhibits no distension and no mass  There is no tenderness  There is no rebound and no guarding  Musculoskeletal: Normal range of motion  She exhibits no tenderness or deformity  RT arm resolved erythema, + edema (improved); well healed scar    Neurological: She is alert and oriented to person, place, and time  She displays normal reflexes  No cranial nerve deficit  She exhibits normal muscle tone  Coordination normal        Discharge instructions/Information to patient and family:   See after visit summary for information provided to patient and family  Provisions for Follow-Up Care:  See after visit summary for information related to follow-up care and any pertinent home health orders        Disposition:     Home    For Discharges to [facility] SNF:   · Not Applicable to this Patient - Not Applicable to this Patient    Planned Readmission: No     Discharge Statement:  I spent > 30 minutes discharging the patient  This time was spent on the day of discharge  I had direct contact with the patient on the day of discharge  Greater than 50% of the total time was spent examining patient, answering all patient questions, arranging and discussing plan of care with patient as well as directly providing post-discharge instructions  Additional time then spent on discharge activities  Discharge Medications:  See after visit summary for reconciled discharge medications provided to patient and family  ** Please Note: Dragon 360 Dictation voice to text software may have been used in the creation of this document   **

## 2018-05-11 NOTE — PLAN OF CARE
DISCHARGE PLANNING     Discharge to home or other facility with appropriate resources Progressing        DISCHARGE PLANNING - CARE MANAGEMENT     Discharge to post-acute care or home with appropriate resources Progressing        INFECTION - ADULT     Absence or prevention of progression during hospitalization Progressing     Absence of fever/infection during neutropenic period Progressing        Knowledge Deficit     Patient/family/caregiver demonstrates understanding of disease process, treatment plan, medications, and discharge instructions Progressing        PAIN - ADULT     Verbalizes/displays adequate comfort level or baseline comfort level Progressing        Potential for Falls     Patient will remain free of falls Progressing

## 2018-05-14 ENCOUNTER — TRANSITIONAL CARE MANAGEMENT (OUTPATIENT)
Dept: FAMILY MEDICINE CLINIC | Facility: CLINIC | Age: 58
End: 2018-05-14

## 2018-05-14 LAB
BACTERIA BLD CULT: NORMAL
BACTERIA BLD CULT: NORMAL

## 2018-05-17 ENCOUNTER — OFFICE VISIT (OUTPATIENT)
Dept: FAMILY MEDICINE CLINIC | Facility: CLINIC | Age: 58
End: 2018-05-17
Payer: COMMERCIAL

## 2018-05-17 VITALS
HEIGHT: 69 IN | OXYGEN SATURATION: 99 % | HEART RATE: 78 BPM | TEMPERATURE: 98 F | BODY MASS INDEX: 24.73 KG/M2 | DIASTOLIC BLOOD PRESSURE: 92 MMHG | SYSTOLIC BLOOD PRESSURE: 144 MMHG | WEIGHT: 167 LBS

## 2018-05-17 DIAGNOSIS — Z09 HOSPITAL DISCHARGE FOLLOW-UP: ICD-10-CM

## 2018-05-17 DIAGNOSIS — I89.0 LYMPHEDEMA: ICD-10-CM

## 2018-05-17 DIAGNOSIS — L03.113 CELLULITIS OF RIGHT UPPER EXTREMITY: Primary | ICD-10-CM

## 2018-05-17 PROCEDURE — 99496 TRANSJ CARE MGMT HIGH F2F 7D: CPT | Performed by: FAMILY MEDICINE

## 2018-05-17 NOTE — PROGRESS NOTES
Assessment/Plan:      Diagnoses and all orders for this visit:    Cellulitis of right upper extremity    Lymphedema    Hospital discharge follow-up       Complete abx  Form completed for power company  F/U prn    Subjective:     Patient ID: Karis Butler is a 59 y o  female  She is here with her  for hospital follow-up  She has a history of chronic lymphedema of the right upper extremity  She was recently hospitalized for cellulitis  She is currently on oral clindamycin  She has about 2 more days of antibiotics  She is feeling quite well  She has noticed significant improvement in the redness of her right upper extremity  Pain is also improving  Denies any fevers  She is awaiting approval of a lymphedema machine that she uses at home which prevents the swelling and infection from occurring  Would like a form completed for power company for having electric on so she can use her machine for lymphedema  Review of Systems   Constitutional: Negative for activity change, appetite change, chills, fatigue, fever and unexpected weight change  HENT: Negative for congestion, ear discharge, ear pain, postnasal drip, sinus pressure and sore throat  Eyes: Negative for discharge and visual disturbance  Respiratory: Negative for cough, shortness of breath and wheezing  Cardiovascular: Negative for chest pain, palpitations and leg swelling  Gastrointestinal: Negative for abdominal pain, constipation, diarrhea, nausea and vomiting  Endocrine: Negative for cold intolerance, heat intolerance, polydipsia and polyuria  Genitourinary: Negative for difficulty urinating and frequency  Musculoskeletal: Negative for arthralgias, back pain and myalgias  Right upper extremity swelling   Skin: Negative for rash  Neurological: Negative for dizziness, weakness, light-headedness, numbness and headaches  Hematological: Negative for adenopathy     Psychiatric/Behavioral: Negative for behavioral problems, confusion, dysphoric mood, sleep disturbance and suicidal ideas  The patient is not nervous/anxious  Objective:     Physical Exam   Constitutional: She is oriented to person, place, and time  She appears well-developed and well-nourished  No distress  HENT:   Head: Normocephalic and atraumatic  Eyes: Conjunctivae are normal  Pupils are equal, round, and reactive to light  Neck: Neck supple  Cardiovascular: Normal rate, regular rhythm and normal heart sounds  Exam reveals no gallop and no friction rub  No murmur heard  Pulmonary/Chest: Effort normal and breath sounds normal  No respiratory distress  She has no wheezes  She has no rales  She exhibits no tenderness  Musculoskeletal: She exhibits edema (lymphedema of RUE)  Neurological: She is alert and oriented to person, place, and time  Skin: Skin is warm and dry  No rash noted  She is not diaphoretic  There is erythema (mild erythema of R antecubital region)  Psychiatric: She has a normal mood and affect  Her behavior is normal  Judgment and thought content normal    Nursing note and vitals reviewed  Vitals:    05/17/18 1347   BP: 144/92   Pulse: 78   Temp: 98 °F (36 7 °C)   SpO2: 99%   Weight: 75 8 kg (167 lb)   Height: 5' 9" (1 753 m)       Transitional Care Management Review:  Sherine West is a 59 y o  female here for TCM follow up       During the TCM phone call patient stated:    Date and time hospital follow up call was made:  5/14/2018 11:26 AM  Hospital care reviewed:  Records reviewed  Patient was hopsitalized at:  [facility]  Date of admission:  5/9/18  Date of discharge:  5/11/18  Diagnosis:  Cellulitis  Should patient be enrolled in anticoag monitoring?:  No  Scheduled for follow up?:  Yes  I have advised the patient to call PCP with any new or worsening symptoms (please type in name along with any credentials):  tm  Living Arrangements:  Family members             Canelo Escobar MD

## 2018-07-25 ENCOUNTER — HOSPITAL ENCOUNTER (INPATIENT)
Facility: HOSPITAL | Age: 58
LOS: 2 days | Discharge: HOME | DRG: 603 | End: 2018-07-28
Attending: EMERGENCY MEDICINE | Admitting: INTERNAL MEDICINE
Payer: COMMERCIAL

## 2018-07-25 DIAGNOSIS — L03.113 CELLULITIS OF RIGHT ARM: Primary | ICD-10-CM

## 2018-07-25 DIAGNOSIS — L03.113 CELLULITIS OF RIGHT UPPER EXTREMITY: ICD-10-CM

## 2018-07-25 DIAGNOSIS — I89.0 LYMPHEDEMA OF RIGHT ARM: ICD-10-CM

## 2018-07-26 ENCOUNTER — APPOINTMENT (EMERGENCY)
Dept: RADIOLOGY | Facility: HOSPITAL | Age: 58
DRG: 603 | End: 2018-07-26
Payer: COMMERCIAL

## 2018-07-26 LAB
ALBUMIN SERPL BCP-MCNC: 3.6 G/DL (ref 3.5–5)
ALP SERPL-CCNC: 94 U/L (ref 46–116)
ALT SERPL W P-5'-P-CCNC: 28 U/L (ref 12–78)
ANION GAP SERPL CALCULATED.3IONS-SCNC: 10 MMOL/L (ref 4–13)
ANION GAP SERPL CALCULATED.3IONS-SCNC: 5 MMOL/L (ref 4–13)
APTT PPP: 30 SECONDS (ref 24–36)
AST SERPL W P-5'-P-CCNC: 22 U/L (ref 5–45)
BASOPHILS # BLD AUTO: 0.05 THOUSANDS/ΜL (ref 0–0.1)
BASOPHILS # BLD AUTO: 0.06 THOUSANDS/ΜL (ref 0–0.1)
BASOPHILS NFR BLD AUTO: 0 % (ref 0–1)
BASOPHILS NFR BLD AUTO: 1 % (ref 0–1)
BILIRUB SERPL-MCNC: 0.6 MG/DL (ref 0.2–1)
BILIRUB UR QL STRIP: NEGATIVE
BUN SERPL-MCNC: 6 MG/DL (ref 5–25)
BUN SERPL-MCNC: 7 MG/DL (ref 5–25)
CALCIUM SERPL-MCNC: 8.8 MG/DL (ref 8.3–10.1)
CALCIUM SERPL-MCNC: 8.8 MG/DL (ref 8.3–10.1)
CHLORIDE SERPL-SCNC: 103 MMOL/L (ref 100–108)
CHLORIDE SERPL-SCNC: 103 MMOL/L (ref 100–108)
CLARITY UR: CLEAR
CO2 SERPL-SCNC: 23 MMOL/L (ref 21–32)
CO2 SERPL-SCNC: 28 MMOL/L (ref 21–32)
COLOR UR: NORMAL
CREAT SERPL-MCNC: 0.54 MG/DL (ref 0.6–1.3)
CREAT SERPL-MCNC: 0.63 MG/DL (ref 0.6–1.3)
EOSINOPHIL # BLD AUTO: 0.05 THOUSAND/ΜL (ref 0–0.61)
EOSINOPHIL # BLD AUTO: 0.12 THOUSAND/ΜL (ref 0–0.61)
EOSINOPHIL NFR BLD AUTO: 0 % (ref 0–6)
EOSINOPHIL NFR BLD AUTO: 1 % (ref 0–6)
ERYTHROCYTE [DISTWIDTH] IN BLOOD BY AUTOMATED COUNT: 12.4 % (ref 11.6–15.1)
ERYTHROCYTE [DISTWIDTH] IN BLOOD BY AUTOMATED COUNT: 12.4 % (ref 11.6–15.1)
GFR SERPL CREATININE-BSD FRML MDRD: 104 ML/MIN/1.73SQ M
GFR SERPL CREATININE-BSD FRML MDRD: 98 ML/MIN/1.73SQ M
GLUCOSE SERPL-MCNC: 110 MG/DL (ref 65–140)
GLUCOSE SERPL-MCNC: 111 MG/DL (ref 65–140)
GLUCOSE UR STRIP-MCNC: NEGATIVE MG/DL
HCT VFR BLD AUTO: 42.2 % (ref 34.8–46.1)
HCT VFR BLD AUTO: 42.5 % (ref 34.8–46.1)
HGB BLD-MCNC: 14.2 G/DL (ref 11.5–15.4)
HGB BLD-MCNC: 14.3 G/DL (ref 11.5–15.4)
HGB UR QL STRIP.AUTO: NEGATIVE
IMM GRANULOCYTES # BLD AUTO: 0.03 THOUSAND/UL (ref 0–0.2)
IMM GRANULOCYTES # BLD AUTO: 0.04 THOUSAND/UL (ref 0–0.2)
IMM GRANULOCYTES NFR BLD AUTO: 0 % (ref 0–2)
IMM GRANULOCYTES NFR BLD AUTO: 0 % (ref 0–2)
INR PPP: 0.89 (ref 0.86–1.17)
KETONES UR STRIP-MCNC: NEGATIVE MG/DL
LACTATE SERPL-SCNC: 1.1 MMOL/L (ref 0.5–2)
LEUKOCYTE ESTERASE UR QL STRIP: NEGATIVE
LYMPHOCYTES # BLD AUTO: 1.05 THOUSANDS/ΜL (ref 0.6–4.47)
LYMPHOCYTES # BLD AUTO: 2.41 THOUSANDS/ΜL (ref 0.6–4.47)
LYMPHOCYTES NFR BLD AUTO: 21 % (ref 14–44)
LYMPHOCYTES NFR BLD AUTO: 8 % (ref 14–44)
MCH RBC QN AUTO: 29.6 PG (ref 26.8–34.3)
MCH RBC QN AUTO: 30 PG (ref 26.8–34.3)
MCHC RBC AUTO-ENTMCNC: 33.6 G/DL (ref 31.4–37.4)
MCHC RBC AUTO-ENTMCNC: 33.6 G/DL (ref 31.4–37.4)
MCV RBC AUTO: 88 FL (ref 82–98)
MCV RBC AUTO: 89 FL (ref 82–98)
MONOCYTES # BLD AUTO: 0.85 THOUSAND/ΜL (ref 0.17–1.22)
MONOCYTES # BLD AUTO: 0.86 THOUSAND/ΜL (ref 0.17–1.22)
MONOCYTES NFR BLD AUTO: 6 % (ref 4–12)
MONOCYTES NFR BLD AUTO: 8 % (ref 4–12)
NEUTROPHILS # BLD AUTO: 11.31 THOUSANDS/ΜL (ref 1.85–7.62)
NEUTROPHILS # BLD AUTO: 7.91 THOUSANDS/ΜL (ref 1.85–7.62)
NEUTS SEG NFR BLD AUTO: 69 % (ref 43–75)
NEUTS SEG NFR BLD AUTO: 86 % (ref 43–75)
NITRITE UR QL STRIP: NEGATIVE
NRBC BLD AUTO-RTO: 0 /100 WBCS
NRBC BLD AUTO-RTO: 0 /100 WBCS
PH UR STRIP.AUTO: 5.5 [PH] (ref 4.5–8)
PLATELET # BLD AUTO: 272 THOUSANDS/UL (ref 149–390)
PLATELET # BLD AUTO: 288 THOUSANDS/UL (ref 149–390)
PMV BLD AUTO: 8.9 FL (ref 8.9–12.7)
PMV BLD AUTO: 9.1 FL (ref 8.9–12.7)
POTASSIUM SERPL-SCNC: 3.8 MMOL/L (ref 3.5–5.3)
POTASSIUM SERPL-SCNC: 4.1 MMOL/L (ref 3.5–5.3)
PROT SERPL-MCNC: 7 G/DL (ref 6.4–8.2)
PROT UR STRIP-MCNC: NEGATIVE MG/DL
PROTHROMBIN TIME: 12 SECONDS (ref 11.8–14.2)
RBC # BLD AUTO: 4.77 MILLION/UL (ref 3.81–5.12)
RBC # BLD AUTO: 4.79 MILLION/UL (ref 3.81–5.12)
SODIUM SERPL-SCNC: 136 MMOL/L (ref 136–145)
SODIUM SERPL-SCNC: 136 MMOL/L (ref 136–145)
SP GR UR STRIP.AUTO: <=1.005 (ref 1–1.03)
UROBILINOGEN UR QL STRIP.AUTO: 0.2 E.U./DL
WBC # BLD AUTO: 11.38 THOUSAND/UL (ref 4.31–10.16)
WBC # BLD AUTO: 13.36 THOUSAND/UL (ref 4.31–10.16)

## 2018-07-26 PROCEDURE — 87040 BLOOD CULTURE FOR BACTERIA: CPT | Performed by: EMERGENCY MEDICINE

## 2018-07-26 PROCEDURE — 99223 1ST HOSP IP/OBS HIGH 75: CPT | Performed by: FAMILY MEDICINE

## 2018-07-26 PROCEDURE — 85025 COMPLETE CBC W/AUTO DIFF WBC: CPT | Performed by: PHYSICIAN ASSISTANT

## 2018-07-26 PROCEDURE — 83605 ASSAY OF LACTIC ACID: CPT | Performed by: EMERGENCY MEDICINE

## 2018-07-26 PROCEDURE — 96365 THER/PROPH/DIAG IV INF INIT: CPT

## 2018-07-26 PROCEDURE — 81003 URINALYSIS AUTO W/O SCOPE: CPT | Performed by: EMERGENCY MEDICINE

## 2018-07-26 PROCEDURE — 85730 THROMBOPLASTIN TIME PARTIAL: CPT | Performed by: EMERGENCY MEDICINE

## 2018-07-26 PROCEDURE — 96375 TX/PRO/DX INJ NEW DRUG ADDON: CPT

## 2018-07-26 PROCEDURE — 85610 PROTHROMBIN TIME: CPT | Performed by: EMERGENCY MEDICINE

## 2018-07-26 PROCEDURE — 80053 COMPREHEN METABOLIC PANEL: CPT | Performed by: EMERGENCY MEDICINE

## 2018-07-26 PROCEDURE — 80048 BASIC METABOLIC PNL TOTAL CA: CPT | Performed by: PHYSICIAN ASSISTANT

## 2018-07-26 PROCEDURE — 36415 COLL VENOUS BLD VENIPUNCTURE: CPT | Performed by: EMERGENCY MEDICINE

## 2018-07-26 PROCEDURE — 85025 COMPLETE CBC W/AUTO DIFF WBC: CPT | Performed by: EMERGENCY MEDICINE

## 2018-07-26 PROCEDURE — 71046 X-RAY EXAM CHEST 2 VIEWS: CPT

## 2018-07-26 PROCEDURE — 99284 EMERGENCY DEPT VISIT MOD MDM: CPT

## 2018-07-26 PROCEDURE — 87077 CULTURE AEROBIC IDENTIFY: CPT | Performed by: EMERGENCY MEDICINE

## 2018-07-26 RX ORDER — KETOROLAC TROMETHAMINE 30 MG/ML
15 INJECTION, SOLUTION INTRAMUSCULAR; INTRAVENOUS EVERY 6 HOURS PRN
Status: DISPENSED | OUTPATIENT
Start: 2018-07-26 | End: 2018-07-28

## 2018-07-26 RX ORDER — ACETAMINOPHEN 325 MG/1
650 TABLET ORAL EVERY 6 HOURS PRN
Status: DISCONTINUED | OUTPATIENT
Start: 2018-07-26 | End: 2018-07-28 | Stop reason: HOSPADM

## 2018-07-26 RX ORDER — CHLORAL HYDRATE 500 MG
1000 CAPSULE ORAL DAILY
Status: DISCONTINUED | OUTPATIENT
Start: 2018-07-26 | End: 2018-07-28 | Stop reason: HOSPADM

## 2018-07-26 RX ORDER — SODIUM CHLORIDE 9 MG/ML
125 INJECTION, SOLUTION INTRAVENOUS CONTINUOUS
Status: DISCONTINUED | OUTPATIENT
Start: 2018-07-26 | End: 2018-07-27

## 2018-07-26 RX ORDER — CLINDAMYCIN PHOSPHATE 600 MG/50ML
600 INJECTION INTRAVENOUS EVERY 8 HOURS
Status: DISCONTINUED | OUTPATIENT
Start: 2018-07-26 | End: 2018-07-28 | Stop reason: HOSPADM

## 2018-07-26 RX ORDER — KETOROLAC TROMETHAMINE 30 MG/ML
15 INJECTION, SOLUTION INTRAMUSCULAR; INTRAVENOUS ONCE
Status: COMPLETED | OUTPATIENT
Start: 2018-07-26 | End: 2018-07-26

## 2018-07-26 RX ORDER — ATORVASTATIN CALCIUM 40 MG/1
40 TABLET, FILM COATED ORAL DAILY
Status: DISCONTINUED | OUTPATIENT
Start: 2018-07-26 | End: 2018-07-28 | Stop reason: HOSPADM

## 2018-07-26 RX ORDER — THIAMINE MONONITRATE (VIT B1) 100 MG
100 TABLET ORAL DAILY
Status: DISCONTINUED | OUTPATIENT
Start: 2018-07-26 | End: 2018-07-28 | Stop reason: HOSPADM

## 2018-07-26 RX ORDER — CLINDAMYCIN PHOSPHATE 600 MG/50ML
600 INJECTION INTRAVENOUS ONCE
Status: COMPLETED | OUTPATIENT
Start: 2018-07-26 | End: 2018-07-26

## 2018-07-26 RX ORDER — 0.9 % SODIUM CHLORIDE 0.9 %
3 VIAL (ML) INJECTION AS NEEDED
Status: DISCONTINUED | OUTPATIENT
Start: 2018-07-26 | End: 2018-07-28 | Stop reason: HOSPADM

## 2018-07-26 RX ORDER — ONDANSETRON 2 MG/ML
4 INJECTION INTRAMUSCULAR; INTRAVENOUS EVERY 6 HOURS PRN
Status: DISCONTINUED | OUTPATIENT
Start: 2018-07-26 | End: 2018-07-28 | Stop reason: HOSPADM

## 2018-07-26 RX ORDER — DRONABINOL 2.5 MG/1
15 CAPSULE ORAL
Status: DISCONTINUED | OUTPATIENT
Start: 2018-07-26 | End: 2018-07-28 | Stop reason: HOSPADM

## 2018-07-26 RX ORDER — LANOLIN ALCOHOL/MO/W.PET/CERES
9 CREAM (GRAM) TOPICAL
Status: DISCONTINUED | OUTPATIENT
Start: 2018-07-26 | End: 2018-07-28 | Stop reason: HOSPADM

## 2018-07-26 RX ADMIN — KETOROLAC TROMETHAMINE 15 MG: 30 INJECTION, SOLUTION INTRAMUSCULAR at 01:11

## 2018-07-26 RX ADMIN — Medication 100 MG: at 09:22

## 2018-07-26 RX ADMIN — DRONABINOL 15 MG: 2.5 CAPSULE ORAL at 21:52

## 2018-07-26 RX ADMIN — CLINDAMYCIN IN 5 PERCENT DEXTROSE 600 MG: 12 INJECTION, SOLUTION INTRAVENOUS at 11:38

## 2018-07-26 RX ADMIN — CLINDAMYCIN PHOSPHATE 600 MG: 12 INJECTION, SOLUTION INTRAMUSCULAR; INTRAVENOUS at 02:44

## 2018-07-26 RX ADMIN — SODIUM CHLORIDE 125 ML/HR: 0.9 INJECTION, SOLUTION INTRAVENOUS at 14:27

## 2018-07-26 RX ADMIN — KETOROLAC TROMETHAMINE 15 MG: 30 INJECTION, SOLUTION INTRAMUSCULAR at 18:28

## 2018-07-26 RX ADMIN — ATORVASTATIN CALCIUM 40 MG: 40 TABLET, FILM COATED ORAL at 09:22

## 2018-07-26 RX ADMIN — Medication 1000 MG: at 09:22

## 2018-07-26 RX ADMIN — SODIUM CHLORIDE 125 ML/HR: 0.9 INJECTION, SOLUTION INTRAVENOUS at 23:23

## 2018-07-26 RX ADMIN — DRONABINOL 15 MG: 2.5 CAPSULE ORAL at 16:06

## 2018-07-26 RX ADMIN — MELATONIN 9 MG: at 21:18

## 2018-07-26 RX ADMIN — DRONABINOL 15 MG: 2.5 CAPSULE ORAL at 11:38

## 2018-07-26 RX ADMIN — ENOXAPARIN SODIUM 40 MG: 40 INJECTION SUBCUTANEOUS at 09:22

## 2018-07-26 RX ADMIN — DRONABINOL 15 MG: 2.5 CAPSULE ORAL at 08:19

## 2018-07-26 RX ADMIN — CLINDAMYCIN IN 5 PERCENT DEXTROSE 600 MG: 12 INJECTION, SOLUTION INTRAVENOUS at 19:42

## 2018-07-26 RX ADMIN — SODIUM CHLORIDE 125 ML/HR: 0.9 INJECTION, SOLUTION INTRAVENOUS at 05:33

## 2018-07-26 NOTE — H&P
H&P- Bravo Rehman 1960, 59 y o  female MRN: [id number]    Unit/Bed#: -01 Encounter: [id number]    Primary Care Provider: Libra Avina MD   Date and time admitted to hospital: 7/25/2018 10:58 PM        * Cellulitis of right upper extremity   Assessment & Plan    Admit  IV clindamycin              VTE Prophylaxis: Enoxaparin (Lovenox)  / sequential compression device   Code Status:  Full  POLST: There is no POLST form on file for this patient (pre-hospital)  Discussion with family:  There is no family present for discussion  Anticipated Length of Stay:  Patient will be admitted on an Inpatient basis with an anticipated length of stay of  more than 2 midnights  Justification for Hospital Stay:  IV antibiotics    Total Time for Visit, including Counseling / Coordination of Care: 30 minutes  Greater than 50% of this total time spent on direct patient counseling and coordination of care  Chief Complaint:   Right arm pain    History of Present Illness:    Bravo Rehman is a 59 y o  female who presents with complaints of right arm pain and redness that began yesterday  Patient states she is admitted month ago for the same thing and given IV antibiotics with improvement  Patient denies chest pain, shortness of breath, fever, chills, abdominal pain, nausea, vomiting  Patient has history of lymphedema secondary to multiple surgeries related to cancer resection  Review of Systems:    Review of Systems   Skin: Positive for wound  All other systems reviewed and are negative        Past Medical and Surgical History:     Past Medical History:   Diagnosis Date    Abnormal mammogram 05/15/2013    Anemia     Cancer ([facility] [address] )     desmoitosis    Desmoid tumor     Last Assessed: 6/19/2017    Hyperlipidemia     Hypertension        Past Surgical History:   Procedure Laterality Date    HYSTERECTOMY      OTHER SURGICAL HISTORY      Arm Incision: Dermoid tumor, right Arm     PARTIAL HYSTERECTOMY      LA COLONOSCOPY FLX DX W/COLLJ SPEC WHEN PFRMD N/A 8/15/2017    Procedure: COLONOSCOPY;  Surgeon: Cecelia Arzola MD;  Location: [facility]; Service: Gastroenterology    SKIN BIOPSY      SKIN CANCER EXCISION      Melanoma Excision        Meds/Allergies:    Prior to Admission medications    Medication Sig Start Date End Date Taking? Authorizing Provider   atorvastatin (LIPITOR) 40 mg tablet Take 40 mg by mouth daily    Historical Provider, MD   dronabinol (MARINOL) 5 MG capsule Take 15 mg by mouth 4 (four) times a day (before meals and at bedtime)    Historical Provider, MD   Omega-3 Fatty Acids (FISH OIL PO) Take by mouth    Historical Provider, MD   thiamine (VITAMIN B1) 100 mg tablet Take 100 mg by mouth daily    Historical Provider, MD     I have reviewed home medications with patient personally  Allergies: Allergies   Allergen Reactions    Aspirin GI Intolerance and Abdominal Pain     ABD     Chlorpromazine Anaphylaxis     PHENOTHIAZINE=ANAPHYLAXIS    Codeine Anaphylaxis     Anaphylaxis  abd pain   Meperidine Anaphylaxis, Hives and Other (See Comments)     VOMITS  Category: Allergy;     Phenothiazines Anaphylaxis and Throat Swelling     Category: Allergy;     Saccharin Anaphylaxis    Ibuprofen Hives     H    Ampicillin-Sulbactam Sodium Hives    Gluten Meal GI Intolerance    Levofloxacin Hives    Neomycin Other (See Comments), Edema and Hives     Category: Allergy;   swelling    Other Throat Swelling and Hives     ASA=GERD  Category: Allergy;     Citrus Rash    Latex Hives and Rash     Category:  Allergy;        Social History:     Marital Status: /Civil Union   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  Patient Pre-hospital Living Situation:  Lives at home  Patient Pre-hospital Level of Mobility:  Ambulatory  Patient Pre-hospital Diet Restrictions:  None  Substance Use History:   History   Alcohol Use    0 6 oz/week    1 Shots of liquor per week     Comment: daily HS , per allscripts: consumes alcohol occasionally      History   Smoking Status    Never Smoker   Smokeless Tobacco    Never Used     History   Drug Use No       Family History:    Family History   Problem Relation Age of Onset    Diabetes Mother     No Known Problems Father     Breast cancer Sister     Lung cancer Sister     Pancreatic cancer Sister        Physical Exam:     Vitals:   Blood Pressure: 140/65 (07/26/18 0524)  Pulse: 87 (07/26/18 0524)  Temperature: 98 5 °F (36 9 °C) (07/26/18 0524)  Temp Source: Oral (07/26/18 0524)  Respirations: 18 (07/26/18 0524)  Height: 5' 9" (175 3 cm) (07/26/18 0524)  Weight - Scale: 77 3 kg (170 lb 6 7 oz) (07/26/18 0524)  SpO2: 99 % (07/26/18 0524)    Physical Exam   Constitutional: She is oriented to person, place, and time  She appears well-developed and well-nourished  HENT:   Head: Normocephalic and atraumatic  Right Ear: External ear normal    Left Ear: External ear normal    Nose: Nose normal    Mouth/Throat: Oropharynx is clear and moist    Eyes: Conjunctivae and EOM are normal  Pupils are equal, round, and reactive to light  Neck: Normal range of motion  Cardiovascular: Normal rate, regular rhythm and normal heart sounds  Pulmonary/Chest: Effort normal and breath sounds normal    Abdominal: Soft  Bowel sounds are normal    Musculoskeletal: Normal range of motion  Arms:  Neurological: She is alert and oriented to person, place, and time  Skin: Skin is dry  There is erythema  Psychiatric: She has a normal mood and affect  Her behavior is normal  Thought content normal            Additional Data:     Lab Results: I have personally reviewed pertinent reports          Results from last 7 days  Lab Units 07/26/18  0520   WBC Thousand/uL 13 36*   HEMOGLOBIN g/dL 14 2   HEMATOCRIT % 42 2   PLATELETS Thousands/uL 272   NEUTROS PCT % 86*   LYMPHS PCT % 8*   MONOS PCT % 6   EOS PCT % 0       Results from last 7 days  Lab Units 07/26/18  0147   SODIUM mmol/L 136 POTASSIUM mmol/L 4 1   CHLORIDE mmol/L 103   CO2 mmol/L 28   BUN mg/dL 7   CREATININE mg/dL 0 63   CALCIUM mg/dL 8 8   TOTAL PROTEIN g/dL 7 0   BILIRUBIN TOTAL mg/dL 0 60   ALK PHOS U/L 94   ALT U/L 28   AST U/L 22   GLUCOSE RANDOM mg/dL 111       Results from last 7 days  Lab Units 07/26/18  0147   INR  0 89               Imaging: I have personally reviewed pertinent reports  XR chest 2 views    (Results Pending)       EKG, Pathology, and Other Studies Reviewed on Admission:   · EKG:     Allscripts / Epic Records Reviewed: Yes     ** Please Note: This note has been constructed using a voice recognition system   **

## 2018-07-26 NOTE — CASE MANAGEMENT
Initial Clinical Review    Admission: Date/Time/Statement: 7/26/18 @ 0405     Orders Placed This Encounter   Procedures    Inpatient Admission     Standing Status:   Standing     Number of Occurrences:   1     Order Specific Question:   Admitting Physician     Answer:   Alaina Marquez [[id number]]     Order Specific Question:   Level of Care     Answer:   Med Surg [16]     Order Specific Question:   Estimated length of stay     Answer:   More than 2 Midnights     Order Specific Question:   Certification     Answer:   I certify that inpatient services are medically necessary for this patient for a duration of greater than two midnights  See H&P and MD Progress Notes for additional information about the patient's course of treatment  ED: Date/Time/Mode of Arrival:   ED Arrival Information     Expected Arrival Acuity Means of Arrival Escorted By Service Admission Type    - 7/25/2018 22:38 Urgent Walk-In Family Member General Medicine Urgent    Arrival Complaint    ARM PROBLEM          Chief Complaint:   Chief Complaint   Patient presents with    Arm Swelling     Patient c/o right arm lymphedema with redness and rash  History of Illness:    Bravo Rehman is a 59 y o  female who presents with complaints of right arm pain and redness that began yesterday  Patient states she is admitted month ago for the same thing and given IV antibiotics with improvement  She exhibits edema  She exhibits no tenderness or deformity     Diffusely edematous right arm with multiple prior surgeries and skin grafts noted                 ED Vital Signs:   ED Triage Vitals   Temperature Pulse Respirations Blood Pressure SpO2   07/25/18 2248 07/25/18 2247 07/25/18 2247 07/25/18 2247 07/25/18 2247   97 7 °F (36 5 °C) 77 20 170/74 97 %      Temp Source Heart Rate Source Patient Position - Orthostatic VS BP Location FiO2 (%)   07/25/18 2246 07/25/18 2247 07/25/18 2247 07/25/18 2247 --   Oral Monitor Sitting Left arm       Pain Score       07/26/18 0111       Worst Possible Pain        Wt Readings from Last 1 Encounters:   07/26/18 77 3 kg (170 lb 6 7 oz)       Vital Signs (abnormal): wnl     Abnormal Labs/Diagnostic Test Results: wbc 11 38  cxr-  wnl     ED Treatment:   Medication Administration from 07/25/2018 2238 to 07/26/2018 0441       Date/Time Order Dose Route Action Action by Comments     07/26/2018 0111 ketorolac (TORADOL) injection 15 mg 15 mg Intravenous Given Miladys Kramer RN      07/26/2018 0334 clindamycin (CLEOCIN) IVPB (premix) 600 mg 0 mg Intravenous Stopped Miladys Kramer RN      07/26/2018 0244 clindamycin (CLEOCIN) IVPB (premix) 600 mg 600 mg Intravenous New Bag Miladys Kramer RN           Past Medical/Surgical History:    Active Ambulatory Problems     Diagnosis Date Noted    Back pain, chronic 12/22/2014    Chronic anemia 06/08/2015    Chronic insomnia 06/19/2017    Lymphedema of right arm 06/19/2017    Hyperlipidemia, mixed 12/18/2014    Peripheral neuropathy 06/29/2015    Right arm pain 03/26/2018    Diabetes mellitus screening 03/26/2018    Need for lipid screening 03/26/2018    Need for hepatitis C screening test 03/26/2018    Lymphedema 03/26/2018    Cellulitis of right upper extremity 05/09/2018   [facility] discharge follow-up 05/17/2018     Resolved Ambulatory Problems     Diagnosis Date Noted    Right arm cellulitis 03/26/2018    Leukocytosis 05/09/2018     Past Medical History:   Diagnosis Date    Abnormal mammogram 05/15/2013    Anemia     Cancer ([address] )     Desmoid tumor     Hyperlipidemia     Hypertension        Admitting Diagnosis: Arm swelling [M79 89]  Cellulitis of right arm [L03 113]  Lymphedema of right arm [I89 0]    Age/Sex: 59 y o  female    Assessment/Plan:   * Cellulitis of right upper extremity   Assessment & Plan     Admit  IV clindamycin           VTE Prophylaxis: Enoxaparin (Lovenox)  / sequential compression device   Code Status:  Full  POLST: There is no POLST form on file for this patient (pre-hospital)  Discussion with family:  There is no family present for discussion    Anticipated Length of Stay:  Patient will be admitted on an Inpatient basis with an anticipated length of stay of  more than 2 midnights     Justification for Hospital Stay:  IV antibiotics    Admission Orders:  Scheduled Meds:   Current Facility-Administered Medications:  acetaminophen 650 mg Oral Q6H PRN ISABEL Rush-C    atorvastatin 40 mg Oral Daily ISABEL Rush-C    clindamycin 600 mg Intravenous Q8H Abdulkadir Levy, PA-C    dronabinol 15 mg Oral 4x Daily (AC & HS) ISABEL Rush-C    enoxaparin 40 mg Subcutaneous Daily Abdulkadir Levy PA-C    fish oil 1,000 mg Oral Daily Abdulkadir Levy PA-C    ondansetron 4 mg Intravenous Q6H PRN Abdulkadir Levy PA-C    sodium chloride (PF) 3 mL Intravenous PRN Rebecca Cole MD    sodium chloride 125 mL/hr Intravenous Continuous ISABEL Rush-TAM Last Rate: 125 mL/hr (07/26/18 0533)   thiamine 100 mg Oral Daily ISABEL Rush-TAM      Continuous Infusions:   sodium chloride 125 mL/hr Last Rate: 125 mL/hr (07/26/18 0533)     PRN Meds:   acetaminophen    ondansetron    sodium chloride (PF)    Reg diet   SCD  Up w/ assist   Tele   7/26  CBC , BMP   BUN creat   6  0 54, wbc  13 36

## 2018-07-26 NOTE — ED PROVIDER NOTES
History  Chief Complaint   Patient presents with    Arm Swelling     Patient c/o right arm lymphedema with redness and rash  27-year-old female with a history of right arm lymphedema following radical dissection and multiple surgeries due to prior history of desmoid tumors presents with erythematous rash that the patient states is similar to prior episodes when she has had cellulitis  Patient has had multiple prior episodes of cellulitis in the arm due to poor circulation  She has been using a machine at home to assist with the drainage of her lymphedema, which she states she has been compliant  Patient notes chronic paresthesias in the arm since her prior surgery but states they are unchanged today  She notes pain in the arm that she has difficulty characterizing  She states that is prior to similar episodes and has responded to Toradol in the past   Patient has an ibuprofen allergy but has tolerated Toradol, which was verified from the medical record  Impression and plan:  Erythema with a broad differential   Based on patient's history and physical, likely recurrent cellulitis  Patient has extremely poor circulation in this arm and states that the erythema has been spreading rapidly over a few hours  This was confirmed by nursing who states that the erythema has spread since arrival to the emergency room  Considering the rapid spread of infection, will place patient on IV antibiotics and obtain septic evaluation              Arm Pain   Location:  R arm  Quality:  Erythema  Severity:  Mild  Onset quality:  Gradual  Timing:  Constant  Progression:  Unchanged  Chronicity:  New  Context:  Watching TV  Relieved by:  Nothing  Associated symptoms: fatigue    Associated symptoms: no abdominal pain, no chest pain, no congestion, no cough, no diarrhea, no ear pain, no fever, no headaches, no loss of consciousness, no myalgias, no nausea, no rash, no rhinorrhea, no shortness of breath, no sore throat, no vomiting and no wheezing        Prior to Admission Medications   Prescriptions Last Dose Informant Patient Reported? Taking? Omega-3 Fatty Acids (FISH OIL PO)   Yes No   Sig: Take by mouth   atorvastatin (LIPITOR) 40 mg tablet   Yes No   Sig: Take 40 mg by mouth daily   dronabinol (MARINOL) 5 MG capsule  Self Yes No   Sig: Take 15 mg by mouth 4 (four) times a day (before meals and at bedtime)   thiamine (VITAMIN B1) 100 mg tablet   Yes No   Sig: Take 100 mg by mouth daily      Facility-Administered Medications: None       Past Medical History:   Diagnosis Date    Abnormal mammogram 05/15/2013    Anemia     Cancer ([facility] [address]     desmoitosis    Desmoid tumor     Last Assessed: 6/19/2017    Hyperlipidemia     Hypertension        Past Surgical History:   Procedure Laterality Date    HYSTERECTOMY      OTHER SURGICAL HISTORY      Arm Incision: Dermoid tumor, right Arm     PARTIAL HYSTERECTOMY      IN COLONOSCOPY FLX DX W/COLLJ SPEC WHEN PFRMD N/A 8/15/2017    Procedure: COLONOSCOPY;  Surgeon: Amber Euceda MD;  Location: [facility]; Service: Gastroenterology    SKIN BIOPSY      SKIN CANCER EXCISION      Melanoma Excision        Family History   Problem Relation Age of Onset    Diabetes Mother     No Known Problems Father     Breast cancer Sister     Lung cancer Sister     Pancreatic cancer Sister      I have reviewed and agree with the history as documented  Social History   Substance Use Topics    Smoking status: Never Smoker    Smokeless tobacco: Never Used    Alcohol use 0 6 oz/week     1 Shots of liquor per week      Comment: daily HS , per allscripts: consumes alcohol occasionally         Review of Systems   Constitutional: Positive for fatigue  Negative for fever  HENT: Negative for congestion, ear pain, rhinorrhea and sore throat  Respiratory: Negative for cough, shortness of breath and wheezing  Cardiovascular: Negative for chest pain     Gastrointestinal: Negative for abdominal pain, diarrhea, nausea and vomiting  Musculoskeletal: Positive for arthralgias and joint swelling  Negative for myalgias  Skin: Negative for rash  Neurological: Negative for loss of consciousness and headaches  Physical Exam  Physical Exam   Constitutional: She appears well-developed and well-nourished  No distress  HENT:   Head: Normocephalic and atraumatic  Eyes: Pupils are equal, round, and reactive to light  Neck: Neck supple  Cardiovascular: Normal rate and regular rhythm  Pulmonary/Chest: Effort normal    Abdominal: Soft  She exhibits no distension  There is no tenderness  Musculoskeletal: She exhibits edema  She exhibits no tenderness or deformity  Diffusely edematous right arm with multiple prior surgeries and skin grafts noted  There is a rash as noted under skin findings and in the picture  Third is no significant tenderness to palpation in normal range of motion  Patient has sensory in all dermatomes but notes some dullness on the right to the left which she states is chronic in nature  Neurological: She is alert  Skin: Skin is warm and dry  She is not diaphoretic  There is erythema  See picture  Psychiatric: She has a normal mood and affect  Vitals reviewed            Vital Signs  ED Triage Vitals   Temperature Pulse Respirations Blood Pressure SpO2   07/25/18 2248 07/25/18 2247 07/25/18 2247 07/25/18 2247 07/25/18 2247   97 7 °F (36 5 °C) 77 20 170/74 97 %      Temp Source Heart Rate Source Patient Position - Orthostatic VS BP Location FiO2 (%)   07/25/18 2246 07/25/18 2247 07/25/18 2247 07/25/18 2247 --   Oral Monitor Sitting Left arm       Pain Score       07/26/18 0111       Worst Possible Pain           Vitals:    07/25/18 2247 07/26/18 0248 07/26/18 0524   BP: 170/74 139/79 140/65   Pulse: 77 87 87   Patient Position - Orthostatic VS: Sitting Lying Lying       Visual Acuity      ED Medications  Medications   sodium chloride (PF) 0 9 % injection 3 mL (not administered)   atorvastatin (LIPITOR) tablet 40 mg (not administered)   dronabinol (MARINOL) capsule 15 mg (not administered)   thiamine (VITAMIN B1) tablet 100 mg (not administered)   fish oil capsule 1,000 mg (not administered)   sodium chloride 0 9 % infusion (125 mL/hr Intravenous New Bag 7/26/18 0533)   enoxaparin (LOVENOX) subcutaneous injection 40 mg (not administered)   ondansetron (ZOFRAN) injection 4 mg (not administered)   acetaminophen (TYLENOL) tablet 650 mg (not administered)   clindamycin (CLEOCIN) IVPB (premix) 600 mg (not administered)   ketorolac (TORADOL) injection 15 mg (15 mg Intravenous Given 7/26/18 0111)   clindamycin (CLEOCIN) IVPB (premix) 600 mg (0 mg Intravenous Stopped 7/26/18 0334)       Diagnostic Studies  Results Reviewed     Procedure Component Value Units Date/Time    Comprehensive metabolic panel [[id number]] Collected:  07/26/18 0147    Lab Status:  Final result Specimen:  Blood from Arm, Left Updated:  07/26/18 0221     Sodium 136 mmol/L      Potassium 4 1 mmol/L      Chloride 103 mmol/L      CO2 28 mmol/L      Anion Gap 5 mmol/L      BUN 7 mg/dL      Creatinine 0 63 mg/dL      Glucose 111 mg/dL      Calcium 8 8 mg/dL      AST 22 U/L      ALT 28 U/L      Alkaline Phosphatase 94 U/L      Total Protein 7 0 g/dL      Albumin 3 6 g/dL      Total Bilirubin 0 60 mg/dL      eGFR 98 ml/min/1 73sq m     Narrative:         National Kidney Disease Education Program recommendations are as follows:  GFR calculation is accurate only with a steady state creatinine  Chronic Kidney disease less than 60 ml/min/1 73 sq  meters  Kidney failure less than 15 ml/min/1 73 sq  meters  Lactic Acid x2 [[id number]]  (Normal) Collected:  07/26/18 0147    Lab Status:  Final result Specimen:  Blood from Arm, Left Updated:  07/26/18 0214     LACTIC ACID 1 1 mmol/L     Narrative:         Result may be elevated if tourniquet was used during collection      Natanael Epstein [[id number]]  (Normal) Collected:  07/26/18 0147 Lab Status:  Final result Specimen:  Blood from Arm, Left Updated:  07/26/18 0207     Protime 12 0 seconds      INR 0 89    APTT [[id number]]  (Normal) Collected:  07/26/18 0147    Lab Status:  Final result Specimen:  Blood from Arm, Left Updated:  07/26/18 0207     PTT 30 seconds     UA w Reflex to Microscopic w Reflex to Culture [[id number]] Collected:  07/26/18 0148    Lab Status:  Final result Specimen:  Urine from Urine, Clean Catch Updated:  07/26/18 0157     Color, UA Light Yellow     Clarity, UA Clear     Specific Gravity, UA <=1 005     pH, UA 5 5     Leukocytes, UA Negative     Nitrite, UA Negative     Protein, UA Negative mg/dl      Glucose, UA Negative mg/dl      Ketones, UA Negative mg/dl      Urobilinogen, UA 0 2 E U /dl      Bilirubin, UA Negative     Blood, UA Negative    CBC and differential [[id number]]  (Abnormal) Collected:  07/26/18 0146    Lab Status:  Final result Specimen:  Blood from Arm, Left Updated:  07/26/18 0156     WBC 11 38 (H) Thousand/uL      RBC 4 77 Million/uL      Hemoglobin 14 3 g/dL      Hematocrit 42 5 %      MCV 89 fL      MCH 30 0 pg      MCHC 33 6 g/dL      RDW 12 4 %      MPV 9 1 fL      Platelets 306 Thousands/uL      nRBC 0 /100 WBCs      Neutrophils Relative 69 %      Immat GRANS % 0 %      Lymphocytes Relative 21 %      Monocytes Relative 8 %      Eosinophils Relative 1 %      Basophils Relative 1 %      Neutrophils Absolute 7 91 (H) Thousands/µL      Immature Grans Absolute 0 03 Thousand/uL      Lymphocytes Absolute 2 41 Thousands/µL      Monocytes Absolute 0 85 Thousand/µL      Eosinophils Absolute 0 12 Thousand/µL      Basophils Absolute 0 06 Thousands/µL     Blood culture #2 [[id number]] Collected:  07/26/18 0146    Lab Status: In process Specimen:  Blood from Arm, Left Updated:  07/26/18 0153    Blood culture #1 [[id number]] Collected:  07/26/18 0147    Lab Status:   In process Specimen:  Blood from Arm, Left Updated:  07/26/18 0153    Lactic Acid x2 [[id number]]     Lab Status:  No result Specimen:  Blood                  XR chest 2 views    (Results Pending)              Procedures  Procedures       Phone Contacts  ED Phone Contact    ED Course  ED Course as of Jul 26 0628   Thu Jul 26, 2018   [id number] Patient's right arm with rapidly spreading cellulitis, no crepitus or significant pain out of proportion on clinical exam   Considering the rapid spread of the patient's infection and her high risk with lymphedema, will admit for continued IV antibiotics and reassessment prior to discharge considering this is the patient's dominant arm  MDM  CritCare Time    Disposition  Final diagnoses:   Cellulitis of right arm   Lymphedema of right arm     Time reflects when diagnosis was documented in both MDM as applicable and the Disposition within this note     Time User Action Codes Description Comment    7/26/2018  4:14 AM Kennedy Schultz [C37 237] Cellulitis of right arm     7/26/2018  4:14 AM Kennedy Schultz [I89 0] Lymphedema of right arm       ED Disposition     None      Follow-up Information    None         Current Discharge Medication List      CONTINUE these medications which have NOT CHANGED    Details   atorvastatin (LIPITOR) 40 mg tablet Take 40 mg by mouth daily      dronabinol (MARINOL) 5 MG capsule Take 15 mg by mouth 4 (four) times a day (before meals and at bedtime)      Omega-3 Fatty Acids (FISH OIL PO) Take by mouth      thiamine (VITAMIN B1) 100 mg tablet Take 100 mg by mouth daily           No discharge procedures on file      ED Provider  Electronically Signed by           Nixon Adam MD  07/26/18 [facility]  Dolly Mariscal MD  07/26/18 3968

## 2018-07-27 PROCEDURE — 99232 SBSQ HOSP IP/OBS MODERATE 35: CPT | Performed by: FAMILY MEDICINE

## 2018-07-27 RX ADMIN — DRONABINOL 15 MG: 2.5 CAPSULE ORAL at 13:53

## 2018-07-27 RX ADMIN — ATORVASTATIN CALCIUM 40 MG: 40 TABLET, FILM COATED ORAL at 09:07

## 2018-07-27 RX ADMIN — DRONABINOL 15 MG: 2.5 CAPSULE ORAL at 09:12

## 2018-07-27 RX ADMIN — Medication 1000 MG: at 09:07

## 2018-07-27 RX ADMIN — CLINDAMYCIN IN 5 PERCENT DEXTROSE 600 MG: 12 INJECTION, SOLUTION INTRAVENOUS at 02:57

## 2018-07-27 RX ADMIN — Medication 100 MG: at 09:07

## 2018-07-27 RX ADMIN — KETOROLAC TROMETHAMINE 15 MG: 30 INJECTION, SOLUTION INTRAMUSCULAR at 22:29

## 2018-07-27 RX ADMIN — DRONABINOL 15 MG: 2.5 CAPSULE ORAL at 17:52

## 2018-07-27 RX ADMIN — KETOROLAC TROMETHAMINE 15 MG: 30 INJECTION, SOLUTION INTRAMUSCULAR at 09:27

## 2018-07-27 RX ADMIN — ENOXAPARIN SODIUM 40 MG: 40 INJECTION SUBCUTANEOUS at 09:07

## 2018-07-27 RX ADMIN — CLINDAMYCIN IN 5 PERCENT DEXTROSE 600 MG: 12 INJECTION, SOLUTION INTRAVENOUS at 19:22

## 2018-07-27 RX ADMIN — CLINDAMYCIN IN 5 PERCENT DEXTROSE 600 MG: 12 INJECTION, SOLUTION INTRAVENOUS at 12:26

## 2018-07-27 RX ADMIN — MELATONIN 9 MG: at 22:29

## 2018-07-27 RX ADMIN — DRONABINOL 15 MG: 2.5 CAPSULE ORAL at 22:30

## 2018-07-27 NOTE — PROGRESS NOTES
Progress Note - Theone Pore 1960, 59 y o  female MRN: [id number]    Unit/Bed#: -01 Encounter: [id number]    Primary Care Provider: Olinda Bejarano MD   Date and time admitted to hospital: 2018 10:58 PM        * Cellulitis of right upper extremity   Assessment & Plan    Per today evaluation erythema has been decreasing  Continue IV clindamycin#2        Hyperlipidemia, mixed   Assessment & Plan    Continue statin        Chronic anemia   Assessment & Plan    Close monitor as needed          VTE Pharmacologic Prophylaxis:   Pharmacologic: Enoxaparin (Lovenox)  Mechanical VTE Prophylaxis in Place: Yes    Patient Centered Rounds: I have performed bedside rounds with nursing staff today  Discussions with Specialists or Other Care Team Provider:     Education and Discussions with Family / Patient:  Patient    Time Spent for Care: 20 minutes  More than 50% of total time spent on counseling and coordination of care as described above  Current Length of Stay: 1 day(s)    Current Patient Status: Inpatient   Certification Statement: The patient will continue to require additional inpatient hospital stay due to Acute cellulitis on IV antibiotic    Discharge Plan: depend on clinical course    Code Status: Level 1 - Full Code      Subjective:   Patient states he is feeling better, erythema of right upper is decreasing  She denies any fever, nausea vomiting  Objective:      Vitals:   Temp (24hrs), Av 2 °F (36 8 °C), Min:98 2 °F (36 8 °C), Max:98 3 °F (36 8 °C)    HR:  [66-83] 71  Resp:  [18] 18  BP: (151-185)/(72-85) 185/85  SpO2:  [94 %-97 %] 97 %  Body mass index is 25 17 kg/m²  Input and Output Summary (last 24 hours):        Intake/Output Summary (Last 24 hours) at 18 1245  Last data filed at 18 0330   Gross per 24 hour   Intake           2212 5 ml   Output                0 ml   Net           2212 5 ml       Physical Exam:     Physical Exam   Constitutional: She is oriented to person, place, and time  No distress  Cardiovascular: Normal rate, regular rhythm, normal heart sounds and intact distal pulses  Exam reveals no gallop and no friction rub  No murmur heard  Pulmonary/Chest: No respiratory distress  She has no wheezes  She has no rales  She exhibits no tenderness  Abdominal: Soft  Bowel sounds are normal  She exhibits no distension and no mass  There is no tenderness  There is no rebound and no guarding  Neurological: She is alert and oriented to person, place, and time  She displays normal reflexes  No cranial nerve deficit  Coordination normal    Skin: No lesion noted  She is not diaphoretic  There is erythema  Additional Data:     Labs:      Results from last 7 days  Lab Units 07/26/18  0520   WBC Thousand/uL 13 36*   HEMOGLOBIN g/dL 14 2   HEMATOCRIT % 42 2   PLATELETS Thousands/uL 272   NEUTROS PCT % 86*   LYMPHS PCT % 8*   MONOS PCT % 6   EOS PCT % 0       Results from last 7 days  Lab Units 07/26/18  0520 07/26/18  0147   SODIUM mmol/L 136 136   POTASSIUM mmol/L 3 8 4 1   CHLORIDE mmol/L 103 103   CO2 mmol/L 23 28   BUN mg/dL 6 7   CREATININE mg/dL 0 54* 0 63   CALCIUM mg/dL 8 8 8 8   TOTAL PROTEIN g/dL  --  7 0   BILIRUBIN TOTAL mg/dL  --  0 60   ALK PHOS U/L  --  94   ALT U/L  --  28   AST U/L  --  22   GLUCOSE RANDOM mg/dL 110 111       Results from last 7 days  Lab Units 07/26/18  0147   INR  0 89       * I Have Reviewed All Lab Data Listed Above  * Additional Pertinent Lab Tests Reviewed: All Labs Within Last 24 Hours Reviewed    Imaging:    Imaging Reports Reviewed Today Include:   Imaging Personally Reviewed by Myself Includes:      Recent Cultures (last 7 days):       Results from last 7 days  Lab Units 07/26/18  0147 07/26/18  0146   BLOOD CULTURE  No Growth at 24 hrs  No Growth at 24 hrs         Last 24 Hours Medication List:     Current Facility-Administered Medications:  acetaminophen 650 mg Oral Q6H PRN Lucia Chang PA-C    atorvastatin 40 mg Oral Daily ISABEL Rush-C    clindamycin 600 mg Intravenous Q8H Abdulkadir Levy, PA-C Last Rate: 600 mg (07/27/18 1226)   dronabinol 15 mg Oral 4x Daily (AC & HS) Abdulkadir Levy PA-C    enoxaparin 40 mg Subcutaneous Daily Abdulkadir Levy PA-C    fish oil 1,000 mg Oral Daily ISABEL Rush-C    ketorolac 15 mg Intravenous Q6H PRN Renate Chaves MD    melatonin 9 mg Oral HS Erika Lora PA-C    ondansetron 4 mg Intravenous Q6H PRN ISABEL Rush-C    sodium chloride (PF) 3 mL Intravenous PRN Rebecca Cole MD    sodium chloride 125 mL/hr Intravenous Continuous ISABEL Rush-TAM Last Rate: 125 mL/hr (07/26/18 2323)   thiamine 100 mg Oral Daily Abdulkadir Levy PA-C         Today, Patient Was Seen By: Renate Chaves MD    ** Please Note: Dragon 360 Dictation voice to text software may have been used in the creation of this document   **

## 2018-07-28 VITALS
RESPIRATION RATE: 18 BRPM | HEART RATE: 59 BPM | HEIGHT: 69 IN | BODY MASS INDEX: 25.24 KG/M2 | OXYGEN SATURATION: 98 % | TEMPERATURE: 97.7 F | WEIGHT: 170.42 LBS | SYSTOLIC BLOOD PRESSURE: 171 MMHG | DIASTOLIC BLOOD PRESSURE: 78 MMHG

## 2018-07-28 PROCEDURE — 99238 HOSP IP/OBS DSCHRG MGMT 30/<: CPT | Performed by: FAMILY MEDICINE

## 2018-07-28 RX ORDER — CLINDAMYCIN HYDROCHLORIDE 300 MG/1
300 CAPSULE ORAL 4 TIMES DAILY
Qty: 20 CAPSULE | Refills: 0 | Status: SHIPPED | OUTPATIENT
Start: 2018-07-28 | End: 2018-08-02

## 2018-07-28 RX ORDER — KETOROLAC TROMETHAMINE 10 MG/1
10 TABLET, FILM COATED ORAL EVERY 6 HOURS PRN
Qty: 20 TABLET | Refills: 0 | Status: SHIPPED | OUTPATIENT
Start: 2018-07-28 | End: 2018-09-02 | Stop reason: ALTCHOICE

## 2018-07-28 RX ADMIN — ENOXAPARIN SODIUM 40 MG: 40 INJECTION SUBCUTANEOUS at 08:50

## 2018-07-28 RX ADMIN — KETOROLAC TROMETHAMINE 15 MG: 30 INJECTION, SOLUTION INTRAMUSCULAR at 04:47

## 2018-07-28 RX ADMIN — Medication 1000 MG: at 08:48

## 2018-07-28 RX ADMIN — Medication 100 MG: at 08:50

## 2018-07-28 RX ADMIN — ATORVASTATIN CALCIUM 40 MG: 40 TABLET, FILM COATED ORAL at 08:48

## 2018-07-28 RX ADMIN — CLINDAMYCIN IN 5 PERCENT DEXTROSE 600 MG: 12 INJECTION, SOLUTION INTRAVENOUS at 12:51

## 2018-07-28 RX ADMIN — DRONABINOL 15 MG: 2.5 CAPSULE ORAL at 12:56

## 2018-07-28 RX ADMIN — CLINDAMYCIN IN 5 PERCENT DEXTROSE 600 MG: 12 INJECTION, SOLUTION INTRAVENOUS at 03:56

## 2018-07-28 RX ADMIN — DRONABINOL 15 MG: 2.5 CAPSULE ORAL at 06:24

## 2018-07-28 NOTE — DISCHARGE SUMMARY
Discharge- Jihan Mercado 1960, 59 y o  female MRN: [id number]    Unit/Bed#: -01 Encounter: [id number]    Primary Care Provider: Carmen Pal MD   Date and time admitted to hospital: 7/25/2018 10:58 PM        * Cellulitis of right upper extremity   Assessment & Plan    Per today evaluation erythema has been decreasing  D/C IV clindamycin#3  Discharge on clindamycin p o        Hyperlipidemia, mixed   Assessment & Plan    Continue statin        Chronic anemia   Assessment & Plan    Outpatient monitor            Disposition:     Home      Consultations During Hospital Stay:    Procedures Performed:     ·     Significant Findings / Test Results:     ·     Incidental Findings:   ·     Test Results Pending at Discharge (will require follow up):   ·      Outpatient Tests Requested:  ·     Complications:      Hospital Course:     Jihan Mercado is a 59 y o  female patient who originally presented to the hospital on 7/25/2018 due to cry of per lower extremity cellulitis  Patient have past medical history lymphedema P patient was found to have significant finding of acute cellulitis reason why she was started on IV clindamycin, due the fact the patient have different type of antibiotic out allergies  Patient has been clinically improving  Patient will continue antibiotic by mouth  Condition at Discharge: stable     Discharge Day Visit / Exam:     Subjective:  Patient states she feel better, erythema has improved  Vitals: Blood Pressure: (!) 171/78 (07/28/18 0700)  Pulse: 59 (07/28/18 0700)  Temperature: 97 7 °F (36 5 °C) (07/28/18 0700)  Temp Source: Oral (07/28/18 0700)  Respirations: 18 (07/28/18 0700)  Height: 5' 9" (175 3 cm) (07/26/18 0524)  Weight - Scale: 77 3 kg (170 lb 6 7 oz) (07/26/18 0524)  SpO2: 98 % (07/27/18 2300)  Exam:   Physical Exam   Constitutional: She is oriented to person, place, and time  No distress  HENT:   Head: Normocephalic and atraumatic     Right Ear: External ear normal  Left Ear: External ear normal    Neck: No JVD present  No tracheal deviation present  No thyromegaly present  Cardiovascular: Normal rate, regular rhythm, normal heart sounds and intact distal pulses  Exam reveals no gallop and no friction rub  No murmur heard  Pulmonary/Chest: Effort normal and breath sounds normal  No respiratory distress  She has no wheezes  She has no rales  She exhibits no tenderness  Abdominal: Soft  Bowel sounds are normal  She exhibits no distension and no mass  There is no tenderness  There is no rebound and no guarding  Musculoskeletal: She exhibits no edema, tenderness or deformity  Lymphadenopathy:     She has no cervical adenopathy  Neurological: She is alert and oriented to person, place, and time  She has normal reflexes  She displays normal reflexes  No cranial nerve deficit  She exhibits normal muscle tone  Coordination normal    Skin: She is not diaphoretic  No erythema  improved       Discussion with Family:     Discharge instructions/Information to patient and family:   See after visit summary for information provided to patient and family  Provisions for Follow-Up Care:  See after visit summary for information related to follow-up care and any pertinent home health orders  Planned Readmission:      Discharge Statement:  I spent 30  minutes discharging the patient  This time was spent on the day of discharge  I had direct contact with the patient on the day of discharge  Greater than 50% of the total time was spent examining patient, answering all patient questions, arranging and discussing plan of care with patient as well as directly providing post-discharge instructions  Additional time then spent on discharge activities  Discharge Medications:  See after visit summary for reconciled discharge medications provided to patient and family        ** Please Note: This note has been constructed using a voice recognition system **

## 2018-07-28 NOTE — ASSESSMENT & PLAN NOTE
Per today evaluation erythema has been decreasing    D/C IV clindamycin#3  Discharge on clindamycin p o

## 2018-07-30 ENCOUNTER — TRANSITIONAL CARE MANAGEMENT (OUTPATIENT)
Dept: FAMILY MEDICINE CLINIC | Facility: CLINIC | Age: 58
End: 2018-07-30

## 2018-07-31 LAB — BACTERIA BLD CULT: NORMAL

## 2018-08-01 ENCOUNTER — OFFICE VISIT (OUTPATIENT)
Dept: FAMILY MEDICINE CLINIC | Facility: CLINIC | Age: 58
End: 2018-08-01
Payer: COMMERCIAL

## 2018-08-01 VITALS
WEIGHT: 161.6 LBS | HEART RATE: 86 BPM | OXYGEN SATURATION: 98 % | TEMPERATURE: 96.7 F | HEIGHT: 69 IN | BODY MASS INDEX: 23.93 KG/M2 | SYSTOLIC BLOOD PRESSURE: 152 MMHG | DIASTOLIC BLOOD PRESSURE: 98 MMHG

## 2018-08-01 DIAGNOSIS — L03.113 CELLULITIS OF RIGHT UPPER EXTREMITY: ICD-10-CM

## 2018-08-01 DIAGNOSIS — R03.0 ELEVATED BP WITHOUT DIAGNOSIS OF HYPERTENSION: ICD-10-CM

## 2018-08-01 DIAGNOSIS — I89.0 LYMPHEDEMA OF RIGHT ARM: Primary | ICD-10-CM

## 2018-08-01 DIAGNOSIS — Z09 HOSPITAL DISCHARGE FOLLOW-UP: ICD-10-CM

## 2018-08-01 PROBLEM — M79.601 RIGHT ARM PAIN: Status: RESOLVED | Noted: 2018-03-26 | Resolved: 2018-08-01

## 2018-08-01 PROBLEM — Z13.220 NEED FOR LIPID SCREENING: Status: RESOLVED | Noted: 2018-03-26 | Resolved: 2018-08-01

## 2018-08-01 PROBLEM — Z11.59 NEED FOR HEPATITIS C SCREENING TEST: Status: RESOLVED | Noted: 2018-03-26 | Resolved: 2018-08-01

## 2018-08-01 PROBLEM — Z13.1 DIABETES MELLITUS SCREENING: Status: RESOLVED | Noted: 2018-03-26 | Resolved: 2018-08-01

## 2018-08-01 PROCEDURE — 99496 TRANSJ CARE MGMT HIGH F2F 7D: CPT | Performed by: FAMILY MEDICINE

## 2018-08-01 RX ORDER — DIPHENOXYLATE HYDROCHLORIDE AND ATROPINE SULFATE 2.5; .025 MG/1; MG/1
1 TABLET ORAL DAILY
COMMUNITY

## 2018-08-01 NOTE — PROGRESS NOTES
Assessment/Plan:      Diagnoses and all orders for this visit:    Lymphedema of right arm  -     Ambulatory referral to Infectious Disease; Future    Cellulitis of right upper extremity  -     Ambulatory referral to Infectious Disease; Future    Hospital discharge follow-up    Elevated BP without diagnosis of hypertension    Other orders  -     multivitamin (THERAGRAN) TABS; Take 1 tablet by mouth daily  -     Probiotic Product (PROBIOTIC-10 PO); Take by mouth       Finish Clindamycin  Referral to I D  Monitor BP  Call if >140/90    Subjective:     Patient ID: Dylon Stewart is a 59 y o  female  Dylon Stewart is a 59 y o  female patient who presented to the hospital on 7/25/2018 due to cellulitis  Patient have past medical history of chronic lymphedema  She was found to have acute cellulitis -she was started on IV clindamycin  Patient clinically improved and was discharged home on PO abx  She is still on clindamycin  She has been having recurrent cellulitis of the right arm  She was told she may have some underlying infection and should see a specialist   Per patient, she was supposed to be seen by I D  but was never seen by I D  in the hospital     She has a machine at home she uses for lymphedema treatment  She has done lymphedema therapy in the past     Her blood pressure is elevated today  She states she drank coffee this morning and she thinks that is why it is high  No cp, sob  Review of Systems   Constitutional: Negative for activity change and fever  Respiratory: Negative for chest tightness and shortness of breath  Cardiovascular: Negative for chest pain and leg swelling  Musculoskeletal:        Chronic lymphedema R arm   Neurological: Negative for headaches  Psychiatric/Behavioral: Negative for dysphoric mood  The patient is not nervous/anxious  Objective:     Physical Exam   Constitutional: She is oriented to person, place, and time   She appears well-developed and well-nourished  No distress  HENT:   Head: Normocephalic and atraumatic  Eyes: Conjunctivae are normal  Pupils are equal, round, and reactive to light  Neck: Neck supple  Pulmonary/Chest: Effort normal  No respiratory distress  Neurological: She is alert and oriented to person, place, and time  Skin: Skin is warm and dry  No rash noted  She is not diaphoretic  Psychiatric: She has a normal mood and affect  Her behavior is normal  Judgment and thought content normal    Nursing note and vitals reviewed  Vitals:    08/01/18 0854   BP: 152/98   Pulse: 86   Temp: (!) 96 7 °F (35 9 °C)   SpO2: 98%   Weight: 73 3 kg (161 lb 9 6 oz)   Height: 5' 9" (1 753 m)       Transitional Care Management Review:  Darline Schmitt is a 59 y o  female here for TCM follow up  During the TCM phone call patient stated:    Date and time hospital follow up call was made:  7/30/2018 11:42 AM  Hospital care reviewed:  Records reviewed  Patient was hopsitalized at:  [facility]  Date of admission:  7/25/15  Date of discharge:  7/28/18  Diagnosis:  cellulitis/arm ?   Disposition:  Home  Current symptoms:  Arm pain - right side  Right side arm pain severity:  Severe  Arm pain, right side, onset:  Sudden  Post hospital issues:  None  Should patient be enrolled in anticoag monitoring?:  No  Scheduled for follow up?:  Yes  Referrals needed:  suddenpain/pain level 6  I have advised the patient to call PCP with any new or worsening symptoms (please type in name along with any credentials):  uri gross  Living Arrangements:  Family members, Spouse or Significiant other  Support System:  Spouse  Comments:  Fozia Hernandez MD 08/01/2018             Jamie Bellamy MD

## 2018-08-04 LAB
BACTERIA BLD CULT: ABNORMAL
GRAM STN SPEC: ABNORMAL

## 2018-08-14 ENCOUNTER — TELEPHONE (OUTPATIENT)
Dept: FAMILY MEDICINE CLINIC | Facility: CLINIC | Age: 58
End: 2018-08-14

## 2018-08-14 NOTE — TELEPHONE ENCOUNTER
Chelle Bermudez called in very upset because the specialist you referred her to does not return her calls and she has yet to set up an appointment  The hospital is now calling her twice a week to have this resolved  She would like you to call to get her in and it would have to be around when her  can take her because she can not drive and he is taking care of all her medical issues  His number is [phone]  She is very stressed and doesn't know if another specialist would be better since this one does not return calls or if the appointment has to be made by the referring doctor

## 2018-08-14 NOTE — TELEPHONE ENCOUNTER
Called Dr Tomás Abreu and her appt is 9/19/18 at 8:30 am  Spoke with  and told him to call them if needed a different time  Not sure what worked for them   Diana's office answered right away so told him to call now

## 2018-08-14 NOTE — TELEPHONE ENCOUNTER
Lou Sharpe can you please call the I D  Specialist and see when they can get her in and coordinate with her ?   I referred her to Dr Ortega Pae

## 2018-08-16 ENCOUNTER — TRANSCRIBE ORDERS (OUTPATIENT)
Dept: ADMINISTRATIVE | Facility: HOSPITAL | Age: 58
End: 2018-08-16

## 2018-08-16 DIAGNOSIS — E08.00 DIABETES MELLITUS DUE TO UNDERLYING CONDITION WITH HYPEROSMOLARITY WITHOUT COMA, WITHOUT LONG-TERM CURRENT USE OF INSULIN (HCC): Primary | ICD-10-CM

## 2018-08-16 DIAGNOSIS — I51.9 MYXEDEMA HEART DISEASE: ICD-10-CM

## 2018-08-16 DIAGNOSIS — E13.8 DIABETES MELLITUS OF OTHER TYPE WITH COMPLICATION, UNSPECIFIED WHETHER LONG TERM INSULIN USE: ICD-10-CM

## 2018-08-16 DIAGNOSIS — E78.5 HYPERLIPIDEMIA, UNSPECIFIED HYPERLIPIDEMIA TYPE: ICD-10-CM

## 2018-08-16 DIAGNOSIS — E03.9 MYXEDEMA HEART DISEASE: ICD-10-CM

## 2018-08-16 RX ORDER — SOD.CHLORID/POTASSIUM CHLORIDE 287-180-15
1 TABLET ORAL
Refills: 0 | Status: CANCELLED | OUTPATIENT
Start: 2018-08-16

## 2018-08-22 ENCOUNTER — APPOINTMENT (OUTPATIENT)
Dept: LAB | Facility: HOSPITAL | Age: 58
End: 2018-08-22
Payer: COMMERCIAL

## 2018-08-22 DIAGNOSIS — I51.9 MYXEDEMA HEART DISEASE: ICD-10-CM

## 2018-08-22 DIAGNOSIS — E13.8 DIABETES MELLITUS OF OTHER TYPE WITH COMPLICATION, UNSPECIFIED WHETHER LONG TERM INSULIN USE: ICD-10-CM

## 2018-08-22 DIAGNOSIS — E78.5 HYPERLIPIDEMIA, UNSPECIFIED HYPERLIPIDEMIA TYPE: ICD-10-CM

## 2018-08-22 DIAGNOSIS — E08.00 DIABETES MELLITUS DUE TO UNDERLYING CONDITION WITH HYPEROSMOLARITY WITHOUT COMA, WITHOUT LONG-TERM CURRENT USE OF INSULIN (HCC): ICD-10-CM

## 2018-08-22 DIAGNOSIS — E03.9 MYXEDEMA HEART DISEASE: ICD-10-CM

## 2018-08-22 LAB
25(OH)D3 SERPL-MCNC: 20.2 NG/ML (ref 30–100)
ALBUMIN SERPL BCP-MCNC: 3.7 G/DL (ref 3.5–5)
ALP SERPL-CCNC: 107 U/L (ref 46–116)
ALT SERPL W P-5'-P-CCNC: 41 U/L (ref 12–78)
ANION GAP SERPL CALCULATED.3IONS-SCNC: 7 MMOL/L (ref 4–13)
AST SERPL W P-5'-P-CCNC: 22 U/L (ref 5–45)
BILIRUB DIRECT SERPL-MCNC: 0.14 MG/DL (ref 0–0.2)
BILIRUB SERPL-MCNC: 0.7 MG/DL (ref 0.2–1)
BUN SERPL-MCNC: 7 MG/DL (ref 5–25)
CHLORIDE SERPL-SCNC: 104 MMOL/L (ref 100–108)
CHOLEST SERPL-MCNC: 175 MG/DL (ref 50–200)
CO2 SERPL-SCNC: 27 MMOL/L (ref 21–32)
CREAT SERPL-MCNC: 0.6 MG/DL (ref 0.6–1.3)
ERYTHROCYTE [DISTWIDTH] IN BLOOD BY AUTOMATED COUNT: 12.6 % (ref 11.6–15.1)
GFR SERPL CREATININE-BSD FRML MDRD: 100 ML/MIN/1.73SQ M
GLUCOSE P FAST SERPL-MCNC: 104 MG/DL (ref 65–99)
HCT VFR BLD AUTO: 45.9 % (ref 34.8–46.1)
HDLC SERPL-MCNC: 44 MG/DL (ref 40–60)
HGB BLD-MCNC: 15.3 G/DL (ref 11.5–15.4)
LDLC SERPL CALC-MCNC: 107 MG/DL (ref 0–100)
MCH RBC QN AUTO: 29.3 PG (ref 26.8–34.3)
MCHC RBC AUTO-ENTMCNC: 33.3 G/DL (ref 31.4–37.4)
MCV RBC AUTO: 88 FL (ref 82–98)
NONHDLC SERPL-MCNC: 131 MG/DL
PLATELET # BLD AUTO: 308 THOUSANDS/UL (ref 149–390)
PMV BLD AUTO: 9.4 FL (ref 8.9–12.7)
POTASSIUM SERPL-SCNC: 4.2 MMOL/L (ref 3.5–5.3)
PROT SERPL-MCNC: 7.5 G/DL (ref 6.4–8.2)
RBC # BLD AUTO: 5.22 MILLION/UL (ref 3.81–5.12)
SODIUM SERPL-SCNC: 138 MMOL/L (ref 136–145)
TRIGL SERPL-MCNC: 120 MG/DL
TSH SERPL DL<=0.05 MIU/L-ACNC: 0.67 UIU/ML (ref 0.36–3.74)
WBC # BLD AUTO: 5.18 THOUSAND/UL (ref 4.31–10.16)

## 2018-08-22 PROCEDURE — 80076 HEPATIC FUNCTION PANEL: CPT

## 2018-08-22 PROCEDURE — 36415 COLL VENOUS BLD VENIPUNCTURE: CPT

## 2018-08-22 PROCEDURE — 84520 ASSAY OF UREA NITROGEN: CPT

## 2018-08-22 PROCEDURE — 85027 COMPLETE CBC AUTOMATED: CPT

## 2018-08-22 PROCEDURE — 82565 ASSAY OF CREATININE: CPT

## 2018-08-22 PROCEDURE — 80061 LIPID PANEL: CPT

## 2018-08-22 PROCEDURE — 82306 VITAMIN D 25 HYDROXY: CPT

## 2018-08-22 PROCEDURE — 80051 ELECTROLYTE PANEL: CPT

## 2018-08-22 PROCEDURE — 84443 ASSAY THYROID STIM HORMONE: CPT

## 2018-08-22 PROCEDURE — 82947 ASSAY GLUCOSE BLOOD QUANT: CPT

## 2018-08-28 ENCOUNTER — OFFICE VISIT (OUTPATIENT)
Dept: VASCULAR SURGERY | Facility: CLINIC | Age: 58
End: 2018-08-28
Payer: COMMERCIAL

## 2018-08-28 VITALS
BODY MASS INDEX: 26.21 KG/M2 | WEIGHT: 167 LBS | TEMPERATURE: 97.5 F | RESPIRATION RATE: 16 BRPM | HEART RATE: 64 BPM | SYSTOLIC BLOOD PRESSURE: 118 MMHG | DIASTOLIC BLOOD PRESSURE: 64 MMHG | HEIGHT: 67 IN

## 2018-08-28 DIAGNOSIS — I89.0 LYMPHEDEMA OF RIGHT ARM: Primary | ICD-10-CM

## 2018-08-28 DIAGNOSIS — G60.9 IDIOPATHIC PERIPHERAL NEUROPATHY: ICD-10-CM

## 2018-08-28 DIAGNOSIS — L03.113 CELLULITIS OF RIGHT UPPER EXTREMITY: ICD-10-CM

## 2018-08-28 PROCEDURE — 99204 OFFICE O/P NEW MOD 45 MIN: CPT | Performed by: NURSE PRACTITIONER

## 2018-08-28 NOTE — PATIENT INSTRUCTIONS
Lymphedema   AMBULATORY CARE:   The lymphatic system  contains fluid, vessels, tissue, and organs  This system removes and carries fluid throughout the body  It also helps the body fight infection  Lymphedema is the buildup of lymph fluid in fat tissue under your skin  The buildup causes the area to swell  Lymphedema can happen any time that lymphatic vessels are blocked or damaged  Damage to lymphatic vessels may be caused by surgery, infection, injury, cancer, radiation, or scar tissue     Common signs and symptoms include the following:  Signs and symptoms may happen where lymph nodes were removed, or in the arm, leg, chest, or underarm  · Swelling or itching    · Pain, burning, or aching    · Tight, hard, or red skin    · Hair loss    · Heaviness or fullness    · Numbness or tingling    · Stiffness  Contact your healthcare provider or lymphedema specialist if:   · You have a fever or chills  · You have an open area of skin that looks red or swollen, or drains pus  · Your symptoms, such as swelling or pain, get worse  · Your arms or legs feel heavy, or you cannot move them  · Your skin becomes hard, thick, or rough  · You have a skin wound that will not heal      · Your shoes, clothes, or jewelry feel tighter  · You have questions or concerns about your condition or care  Treatment for lymphedema  can relieve symptoms and prevent lymphedema from getting worse  You may need therapeutic massage, physical therapy, or compression devices to help decrease swelling and pain  Surgery may be needed if other treatments do not work  Self-care:   · Elevate  your arm or leg above the level of your heart as often as you can  This will help decrease swelling and pain  Prop your arm or leg on pillows or blankets to keep it elevated comfortably  · Wear compression socks, sleeves, or bandages  as directed  Compression devices must be fitted by a healthcare provider   Compression devices may need to be replaced every 3 to 6 months  · Exercise  can help you maintain or regain function of your arm or leg  Ask your healthcare provider what type of exercise to do and how often to do it  Start slow, take breaks, and gradually do more each day  Do not do vigorous, repeated exercises  Watch for changes in your arm or leg during exercise  Stop and rest if you have swelling, increased pain, or heaviness  Elevate your arm or leg above the level of your heart  · Change your position often  to help move lymphatic fluid through your body  Do not sit or  one position for more than 30 minutes  Do not cross your legs when you sit  These actions can cause lymphatic fluid to buildup  · Maintain a healthy weight  Ask your healthcare provider what you should weigh  Weight loss may improve your symptoms  If you need to lose weight, your healthcare provider can help you create a weight loss program   Prevent infection with proper skin care:  A skin infection can make lymphedema worse  Do the following to decrease your risk for a skin infection in your arm or leg with lymphedema:  · Wash your skin gently and dry it well  Use a mild soap to wash your skin  Gently pat your skin dry after you bathe  Apply a mild cream or lotion to moisturize your skin and prevent dryness or cracking  Keep your feet clean and dry  · Protect your skin from injury  Wear gloves when you garden and wash dishes  Cut your nails straight across to prevent injury to your fingers and toes  Use sunscreen and insect repellant to avoid burns and punctures  Wear slippers in the house  Wear shoes when you go outside  · Check your skin every day for signs of infection  Signs of infection include redness, swelling, increased heat, or pus  You may also have a fever or chills  · Care for cuts, scratches or burns  Apply antibiotic ointment to cuts and other small breaks in the skin   Apply a cold pack or cold water to a burn for 15 minutes  Then wash it with soap and water  Cover scratches, cuts, or burns with a clean, dry gauze or bandage as directed  Keep the area clean and dry  · Tell healthcare providers that you have lymphedema  Tell them not to do, IVs, blood draws, and blood pressure readings in the arm or leg with lymphedema  If there is lymphedema in both arms, ask them to take your blood pressure on your leg  Do not allow flu shots or vaccinations in your arm with lymphedema  Follow up with your healthcare provider or lymphedema specialist as directed: You will need regular visits so healthcare providers can examine the affected areas  You may also be referred to a clinic that specializes in lymphedema treatment  Write down your questions so you remember to ask them during your visits  © 2017 [employer] Information is for End User's use only and may not be sold, redistributed or otherwise used for commercial purposes  All illustrations and images included in CareNotes® are the copyrighted property of [employer]  or [employer]  The above information is an  only  It is not intended as medical advice for individual conditions or treatments  Talk to your doctor, nurse or pharmacist before following any medical regimen to see if it is safe and effective for you

## 2018-08-28 NOTE — PROGRESS NOTES
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  We also reviewed that she is more prone to infections such as cellulitis and considering that she had been on off of the lymphedema pump in between cellulitis bouts it is likely that contributed to the infectious process  I reviewed the case with Dr Roman Brown as well  Unfortunately due to the progression of disease and severity, patient will continue with swelling to the right upper extremity and will need to manage it with conservative therapy as there are no other surgical interventions that would help at this point   -we reviewed the compression sleeve usage as well as pump usage and elevation  She will continue to trial different time periods with each conservative measure to help reduce swelling and increase functionality  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  Peripheral neuropathy  Right arm peripheral neuropathy secondary to surgical intervention  -patient does have scratch marks and small areas of healing wounds, reportedly from her cat  This may contribute to her cellulitis as she is unable to feel any of the cat scratches and generally does not notice if she has any bleeding areas per her and her   She is aware of this and will continue to monitor for any small scratches or ulcerations      Cellulitis of right upper extremity  Recent bouts of right upper extremity cellulitis  -likely secondary to chronic right upper extremity lymphedema following a desmoid resection and multiple surgeries as well as lymph removal to the right upper extremity  Patient has peripheral neuropathy to that arm and therefore states that she does notice when a CAT scratches her  This may be contributing to some of the cellulitis  We also discussed that if her lymphedema is severe and she has a flare up her skin integrity has been compromised and she is more prone to cellulitis  She will monitor for scratches and abrasions, she will continue with lymphedema management as we discussed  She is scheduled to see an ID doc as well  Diagnoses and all orders for this visit:    Lymphedema of right arm    Cellulitis of right upper extremity    Idiopathic peripheral neuropathy          Subjective:      Patient ID: Jaguar Brown is a 59 y o  female  Patient is new to our practice  She has swelling in right arm  She has been to the ER with lymphedema/cellulitis 5 times in the last 6 months in her upper extremities  Patient has a history of recurrent cellulitis and lymphedema in right arm  She does use lymphedema pump 3x daily  Patient takes Lipitor  Patient is a 57-year-old female with a past medical history of does 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 with 3 sections and multiple surgeries including lymph node removal since 1996 ongoing till about 2006  She also has history of hyperlipidemia, hypertension, chronic lymphedema and recurrent right upper extremity cellulitis over the past several weeks  Patient reports that she had a limp edema pump machine for about 10 years when it stopped working a few months ago  She has trialed 3 machine since then and in between the machines she has had several recurrent bouts of cellulitis to the right upper extremity  She was most recently diagnosed 07/26/2018 and discharged from the ER on clindamycin for her cellulitis  Patient wears compression sleeve to the right upper extremity and hand periodically during daytime hours and through the night  She is also using her lymphedema pumps 3 times a day at minimum right now for 30 minutes per session  She continues to have an increase in the right upper extremity swelling since her lymphedema sheen of 10 years has broken  She is at our office today for further evaluation and suggestions on how to manage lymphedema  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  In addition patient has peripheral neuropathy to the right upper extremity and therefore does not feel any cut scrapes or bruises that occur  She has a cat at home and has multiple scratches and abrasions to mainly her left arm but there are few small lesions to the right arm and hand  Patient also reports that she props the arm above her heart level when she is able throughout the day  She is a nonsmoker  She denies any fever, chills, chest pain, shortness of breath at this time  The following portions of the patient's history were reviewed and updated as appropriate: allergies, current medications, past family history, past medical history, past social history, past surgical history and problem list     Review of Systems   Constitutional: Negative  Negative for chills and fever  HENT: Negative  Eyes: Negative  Respiratory: Negative  Negative for shortness of breath  Cardiovascular: Negative  Negative for chest pain  Arm swelling   Gastrointestinal: Negative  Endocrine: Negative  Genitourinary: Negative  Musculoskeletal: Negative  Skin: Negative  Lymphedema to the right upper ext    Allergic/Immunologic: Negative  Neurological: Negative  Hematological: Negative  Psychiatric/Behavioral: Negative            Objective:      /64 (BP Location: Right arm, Patient Position: Sitting, Cuff Size: Standard)   Pulse 64   Temp 97 5 °F (36 4 °C)   Resp 16   Ht 5' 7" (1 702 m)   Wt 75 8 kg (167 lb)   BMI 26 16 kg/m²          Physical Exam   Constitutional: She is oriented to person, place, and time  She appears well-developed and well-nourished  HENT:   Head: Normocephalic and atraumatic  Eyes: EOM are normal  Pupils are equal, round, and reactive to light  No scleral icterus  Neck: Normal range of motion  Cardiovascular: Normal rate, regular rhythm and normal heart sounds  Pulses:       Radial pulses are 2+ on the right side, and 2+ on the left side  Pulmonary/Chest: Effort normal and breath sounds normal  No respiratory distress  Abdominal: Soft  Bowel sounds are normal    Musculoskeletal: Normal range of motion  Lymphadenopathy:   + Lymph node removal with RUE surgical intervention with RUE Lymphedema    +2 edema currently to RUE   Neurological: She is alert and oriented to person, place, and time  She has normal strength  Skin: Skin is warm and dry  Abrasion noted  B/l abrasions and scratches to UEs   Psychiatric: She has a normal mood and affect  Her speech is normal and behavior is normal  Judgment and thought content normal  Cognition and memory are normal    Nursing note and vitals reviewed          Vitals:    08/28/18 1335   BP: 118/64   BP Location: Right arm   Patient Position: Sitting   Cuff Size: Standard   Pulse: 64   Resp: 16   Temp: 97 5 °F (36 4 °C)   Weight: 75 8 kg (167 lb)   Height: 5' 7" (1 702 m)       Patient Active Problem List   Diagnosis    Back pain, chronic    Chronic anemia    Chronic insomnia    Lymphedema of right arm    Hyperlipidemia, mixed    Peripheral neuropathy    Lymphedema    Cellulitis of right upper extremity   [facility] discharge follow[facility]up       Past Surgical History:   Procedure Laterality Date    HYSTERECTOMY      OTHER SURGICAL HISTORY      Arm Incision: Dermoid tumor, right Arm     PARTIAL HYSTERECTOMY      [place] COLONOSCOPY FLX DX W/COLLJ SPEC WHEN PFRMD N/A 8/15/2017 Procedure: COLONOSCOPY;  Surgeon: Alejandro Perera MD;  Location: [facility]; Service: Gastroenterology    SKIN BIOPSY      SKIN CANCER EXCISION      Melanoma Excision        Family History   Problem Relation Age of Onset    Diabetes Mother     No Known Problems Father     Breast cancer Sister     Lung cancer Sister     Pancreatic cancer Sister        Social History     Social History    Marital status: /Civil Union     Spouse name: N/A    Number of children: N/A    Years of education: N/A     Occupational History    unemployed       Social History Main Topics    Smoking status: Never Smoker    Smokeless tobacco: Never Used    Alcohol use 0 6 oz/week     1 Shots of liquor per week      Comment: daily HS , per allscripts: consumes alcohol occasionally     Drug use: No    Sexual activity: Not on file     Other Topics Concern    Not on file     Social History Narrative    Lives with spouse            Allergies   Allergen Reactions    Aspirin GI Intolerance and Abdominal Pain     ABD     Chlorpromazine Anaphylaxis     PHENOTHIAZINE=ANAPHYLAXIS    Codeine Anaphylaxis     Anaphylaxis  abd pain   Meperidine Anaphylaxis, Hives and Other (See Comments)     VOMITS  VOMITS  Category: Allergy;     Phenothiazines Anaphylaxis and Throat Swelling     Category: Allergy;     Saccharin Anaphylaxis    Ibuprofen Hives     H    Ampicillin-Sulbactam Sodium Hives    Gluten Meal GI Intolerance    Levofloxacin Hives    Neomycin Other (See Comments), Edema and Hives     Category: Allergy;   swelling    Other Throat Swelling, Hives and Other (See Comments)     ASA=GERD  ASA=GERD  Category: Allergy;     Citrus Rash    Latex Hives and Rash     Category:  Allergy;          Current Outpatient Prescriptions:     atorvastatin (LIPITOR) 40 mg tablet, Take 40 mg by mouth daily, Disp: , Rfl:     dronabinol (MARINOL) 5 MG capsule, Take 15 mg by mouth 4 (four) times a day (before meals and at bedtime), Disp: , Rfl:     multivitamin (THERAGRAN) TABS, Take 1 tablet by mouth daily, Disp: , Rfl:     Omega-3 Fatty Acids (FISH OIL PO), Take by mouth, Disp: , Rfl:     Probiotic Product (PROBIOTIC-10 PO), Take by mouth, Disp: , Rfl:     thiamine (VITAMIN B1) 100 mg tablet, Take 100 mg by mouth daily, Disp: , Rfl:     ketorolac (TORADOL) 10 mg tablet, Take 1 tablet (10 mg total) by mouth every 6 (six) hours as needed for moderate pain (Patient not taking: Reported on 8/28/2018 ), Disp: 20 tablet, Rfl: 0

## 2018-08-28 NOTE — ASSESSMENT & PLAN NOTE
Right arm peripheral neuropathy secondary to surgical intervention  -patient does have scratch marks and small areas of healing wounds, reportedly from her cat  This may contribute to her cellulitis as she is unable to feel any of the cat scratches and generally does not notice if she has any bleeding areas per her and her   She is aware of this and will continue to monitor for any small scratches or ulcerations

## 2018-08-28 NOTE — ASSESSMENT & PLAN NOTE
Recent bouts of right upper extremity cellulitis  -likely secondary to chronic right upper extremity lymphedema following a desmoid resection and multiple surgeries as well as lymph removal to the right upper extremity  Patient has peripheral neuropathy to that arm and therefore states that she does notice when a CAT scratches her  This may be contributing to some of the cellulitis  We also discussed that if her lymphedema is severe and she has a flare up her skin integrity has been compromised and she is more prone to cellulitis  She will monitor for scratches and abrasions, she will continue with lymphedema management as we discussed  She is scheduled to see an ID doc as well

## 2018-08-28 NOTE — ASSESSMENT & PLAN NOTE
Lymphedema of the right arm for greater than 10 years  -patient reports that she had a lymphedema pump that stopped working, she has had multiple bouts of cellulitis over the past several months has been on and off of antibiotics and has trialed 3 new lymph pneumatic pumps  -patient wears compression sleeve at bedtime and pneumatic pump 3 times a day for 30 minutes each session  -we discussed the pathophysiology of lymphedema considering her desmoid tumor resection and significant arm surgery  We also reviewed that she is more prone to infections such as cellulitis and considering that she had been on off of the lymphedema pump in between cellulitis bouts it is likely that contributed to the infectious process  I reviewed the case with Dr Chris Prader as well  Unfortunately due to the progression of disease and severity, patient will continue with swelling to the right upper extremity and will need to manage it with conservative therapy as there are no other surgical interventions that would help at this point   -we reviewed the compression sleeve usage as well as pump usage and elevation  She will continue to trial different time periods with each conservative measure to help reduce swelling and increase functionality  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

## 2018-09-02 ENCOUNTER — APPOINTMENT (INPATIENT)
Dept: CT IMAGING | Facility: HOSPITAL | Age: 58
DRG: 603 | End: 2018-09-02
Payer: COMMERCIAL

## 2018-09-02 ENCOUNTER — HOSPITAL ENCOUNTER (INPATIENT)
Facility: HOSPITAL | Age: 58
LOS: 2 days | Discharge: HOME | DRG: 603 | End: 2018-09-04
Attending: EMERGENCY MEDICINE | Admitting: INTERNAL MEDICINE
Payer: COMMERCIAL

## 2018-09-02 DIAGNOSIS — L03.113 CELLULITIS OF RIGHT UPPER EXTREMITY: ICD-10-CM

## 2018-09-02 DIAGNOSIS — L03.114 CELLULITIS OF LEFT UPPER EXTREMITY: Primary | ICD-10-CM

## 2018-09-02 PROBLEM — L03.90 CELLULITIS: Status: ACTIVE | Noted: 2018-09-02

## 2018-09-02 LAB
ALBUMIN SERPL BCP-MCNC: 4 G/DL (ref 3.5–5)
ALP SERPL-CCNC: 110 U/L (ref 46–116)
ALT SERPL W P-5'-P-CCNC: 38 U/L (ref 12–78)
ANION GAP SERPL CALCULATED.3IONS-SCNC: 6 MMOL/L (ref 4–13)
AST SERPL W P-5'-P-CCNC: 19 U/L (ref 5–45)
BASOPHILS # BLD AUTO: 0.06 THOUSANDS/ΜL (ref 0–0.1)
BASOPHILS NFR BLD AUTO: 0 % (ref 0–1)
BILIRUB SERPL-MCNC: 0.7 MG/DL (ref 0.2–1)
BUN SERPL-MCNC: 5 MG/DL (ref 5–25)
CALCIUM SERPL-MCNC: 9 MG/DL (ref 8.3–10.1)
CHLORIDE SERPL-SCNC: 100 MMOL/L (ref 100–108)
CO2 SERPL-SCNC: 29 MMOL/L (ref 21–32)
CREAT SERPL-MCNC: 0.67 MG/DL (ref 0.6–1.3)
CRP SERPL QL: <3 MG/L
EOSINOPHIL # BLD AUTO: 0.06 THOUSAND/ΜL (ref 0–0.61)
EOSINOPHIL NFR BLD AUTO: 0 % (ref 0–6)
ERYTHROCYTE [DISTWIDTH] IN BLOOD BY AUTOMATED COUNT: 13 % (ref 11.6–15.1)
ERYTHROCYTE [SEDIMENTATION RATE] IN BLOOD: 4 MM/HOUR (ref 0–20)
GFR SERPL CREATININE-BSD FRML MDRD: 97 ML/MIN/1.73SQ M
GLUCOSE SERPL-MCNC: 108 MG/DL (ref 65–140)
HCT VFR BLD AUTO: 44.3 % (ref 34.8–46.1)
HGB BLD-MCNC: 14.9 G/DL (ref 11.5–15.4)
IMM GRANULOCYTES # BLD AUTO: 0.05 THOUSAND/UL (ref 0–0.2)
IMM GRANULOCYTES NFR BLD AUTO: 0 % (ref 0–2)
LYMPHOCYTES # BLD AUTO: 1.64 THOUSANDS/ΜL (ref 0.6–4.47)
LYMPHOCYTES NFR BLD AUTO: 12 % (ref 14–44)
MCH RBC QN AUTO: 30.2 PG (ref 26.8–34.3)
MCHC RBC AUTO-ENTMCNC: 33.6 G/DL (ref 31.4–37.4)
MCV RBC AUTO: 90 FL (ref 82–98)
MONOCYTES # BLD AUTO: 0.81 THOUSAND/ΜL (ref 0.17–1.22)
MONOCYTES NFR BLD AUTO: 6 % (ref 4–12)
NEUTROPHILS # BLD AUTO: 11.04 THOUSANDS/ΜL (ref 1.85–7.62)
NEUTS SEG NFR BLD AUTO: 82 % (ref 43–75)
NRBC BLD AUTO-RTO: 0 /100 WBCS
PLATELET # BLD AUTO: 162 THOUSANDS/UL (ref 149–390)
PLATELET # BLD AUTO: 287 THOUSANDS/UL (ref 149–390)
PMV BLD AUTO: 10.2 FL (ref 8.9–12.7)
PMV BLD AUTO: 9.3 FL (ref 8.9–12.7)
POTASSIUM SERPL-SCNC: 3.5 MMOL/L (ref 3.5–5.3)
PROCALCITONIN SERPL-MCNC: <0.05 NG/ML
PROT SERPL-MCNC: 7.6 G/DL (ref 6.4–8.2)
RBC # BLD AUTO: 4.93 MILLION/UL (ref 3.81–5.12)
SODIUM SERPL-SCNC: 135 MMOL/L (ref 136–145)
WBC # BLD AUTO: 13.66 THOUSAND/UL (ref 4.31–10.16)

## 2018-09-02 PROCEDURE — 96375 TX/PRO/DX INJ NEW DRUG ADDON: CPT

## 2018-09-02 PROCEDURE — 73200 CT UPPER EXTREMITY W/O DYE: CPT

## 2018-09-02 PROCEDURE — 80053 COMPREHEN METABOLIC PANEL: CPT | Performed by: EMERGENCY MEDICINE

## 2018-09-02 PROCEDURE — 96365 THER/PROPH/DIAG IV INF INIT: CPT

## 2018-09-02 PROCEDURE — 86140 C-REACTIVE PROTEIN: CPT | Performed by: EMERGENCY MEDICINE

## 2018-09-02 PROCEDURE — 85049 AUTOMATED PLATELET COUNT: CPT | Performed by: GENERAL PRACTICE

## 2018-09-02 PROCEDURE — 99284 EMERGENCY DEPT VISIT MOD MDM: CPT

## 2018-09-02 PROCEDURE — 99223 1ST HOSP IP/OBS HIGH 75: CPT | Performed by: GENERAL PRACTICE

## 2018-09-02 PROCEDURE — 87040 BLOOD CULTURE FOR BACTERIA: CPT | Performed by: EMERGENCY MEDICINE

## 2018-09-02 PROCEDURE — 84145 PROCALCITONIN (PCT): CPT | Performed by: GENERAL PRACTICE

## 2018-09-02 PROCEDURE — 85025 COMPLETE CBC W/AUTO DIFF WBC: CPT | Performed by: EMERGENCY MEDICINE

## 2018-09-02 PROCEDURE — 85652 RBC SED RATE AUTOMATED: CPT | Performed by: EMERGENCY MEDICINE

## 2018-09-02 PROCEDURE — 36415 COLL VENOUS BLD VENIPUNCTURE: CPT | Performed by: EMERGENCY MEDICINE

## 2018-09-02 RX ORDER — SACCHAROMYCES BOULARDII 250 MG
250 CAPSULE ORAL 2 TIMES DAILY
Status: DISCONTINUED | OUTPATIENT
Start: 2018-09-02 | End: 2018-09-04 | Stop reason: HOSPADM

## 2018-09-02 RX ORDER — THIAMINE MONONITRATE (VIT B1) 100 MG
100 TABLET ORAL DAILY
Status: DISCONTINUED | OUTPATIENT
Start: 2018-09-03 | End: 2018-09-04 | Stop reason: HOSPADM

## 2018-09-02 RX ORDER — CHLORAL HYDRATE 500 MG
1000 CAPSULE ORAL 2 TIMES DAILY
Status: DISCONTINUED | OUTPATIENT
Start: 2018-09-02 | End: 2018-09-04 | Stop reason: HOSPADM

## 2018-09-02 RX ORDER — DRONABINOL 2.5 MG/1
15 CAPSULE ORAL
Status: DISCONTINUED | OUTPATIENT
Start: 2018-09-02 | End: 2018-09-04 | Stop reason: HOSPADM

## 2018-09-02 RX ORDER — KETOROLAC TROMETHAMINE 30 MG/ML
15 INJECTION, SOLUTION INTRAMUSCULAR; INTRAVENOUS ONCE
Status: COMPLETED | OUTPATIENT
Start: 2018-09-02 | End: 2018-09-02

## 2018-09-02 RX ORDER — ATORVASTATIN CALCIUM 40 MG/1
40 TABLET, FILM COATED ORAL DAILY
Status: DISCONTINUED | OUTPATIENT
Start: 2018-09-03 | End: 2018-09-04 | Stop reason: HOSPADM

## 2018-09-02 RX ORDER — ACETAMINOPHEN 325 MG/1
650 TABLET ORAL EVERY 6 HOURS PRN
Status: DISCONTINUED | OUTPATIENT
Start: 2018-09-02 | End: 2018-09-04 | Stop reason: HOSPADM

## 2018-09-02 RX ORDER — CLINDAMYCIN PHOSPHATE 900 MG/50ML
900 INJECTION INTRAVENOUS ONCE
Status: COMPLETED | OUTPATIENT
Start: 2018-09-02 | End: 2018-09-02

## 2018-09-02 RX ORDER — CLINDAMYCIN PHOSPHATE 600 MG/50ML
600 INJECTION INTRAVENOUS EVERY 8 HOURS
Status: DISCONTINUED | OUTPATIENT
Start: 2018-09-02 | End: 2018-09-03

## 2018-09-02 RX ORDER — HEPARIN SODIUM 5000 [USP'U]/ML
5000 INJECTION, SOLUTION INTRAVENOUS; SUBCUTANEOUS EVERY 8 HOURS SCHEDULED
Status: DISCONTINUED | OUTPATIENT
Start: 2018-09-02 | End: 2018-09-04 | Stop reason: HOSPADM

## 2018-09-02 RX ADMIN — CLINDAMYCIN IN 5 PERCENT DEXTROSE 900 MG: 18 INJECTION, SOLUTION INTRAVENOUS at 14:16

## 2018-09-02 RX ADMIN — DRONABINOL 15 MG: 2.5 CAPSULE ORAL at 17:18

## 2018-09-02 RX ADMIN — DRONABINOL 15 MG: 2.5 CAPSULE ORAL at 22:07

## 2018-09-02 RX ADMIN — HEPARIN SODIUM 5000 UNITS: 5000 INJECTION, SOLUTION INTRAVENOUS; SUBCUTANEOUS at 17:19

## 2018-09-02 RX ADMIN — KETOROLAC TROMETHAMINE 15 MG: 30 INJECTION, SOLUTION INTRAMUSCULAR at 14:13

## 2018-09-02 RX ADMIN — Medication 250 MG: at 17:19

## 2018-09-02 RX ADMIN — HEPARIN SODIUM 5000 UNITS: 5000 INJECTION, SOLUTION INTRAVENOUS; SUBCUTANEOUS at 22:07

## 2018-09-02 RX ADMIN — Medication 1000 MG: at 17:18

## 2018-09-02 RX ADMIN — CLINDAMYCIN PHOSPHATE 600 MG: 600 INJECTION, SOLUTION INTRAVENOUS at 22:14

## 2018-09-02 NOTE — H&P
History and Physical - [facility]    Patient Information: Kp Almazan 59 y o  female MRN: [id number]  Unit/Bed#: -01 Encounter: [id number]  Admitting Physician: Lillie Rivera MD  PCP: Joel Stveenson MD  Date of Admission:  09/02/18    Assessment/Plan:    Hospital Problem List:     Principal Problem:    Cellulitis  Active Problems:    Back pain, chronic    Chronic anemia    Chronic insomnia    Hyperlipidemia, mixed      Plan for the Primary Problem(s):  Principal Problem:    Cellulitis-recurrent rue with lymphedema-h/o recurrent desmoid ca-clindamycin has multiple med allergies-ID consult with recurrences; procalcitonin; on marinol for pain; venous doppler and rue ct ordered  Active Problems:     Chronic anemia- hgb 142 and stable    Hyperlipidemia, mixed-on fish oil    VTE Prophylaxis: Heparin  / sequential compression device   Code Status: full  POLST: POLST is not applicable to this patient    Anticipated Length of Stay:  Patient will be admitted on an Inpatient basis with an anticipated length of stay of  > 2 midnights  Justification for Hospital Stay: cellulitis    Total Time for Visit, including Counseling / Coordination of Care: 30 minutes  Greater than 50% of this total time spent on direct patient counseling and coordination of care  Chief Complaint:   cellulitis    History of Present Illness:    Kp Almazan is a 59 y o  female who presents with cellulitis  Pt with known h/o desmoid tumors-she has had recurrent rue cellulitis due to lymphedema most recently 7/18  She says she just completed course of clindamycin about 2 weeks ago but says she had associated fatigue which didn't resolve but redness improved  Earlier today about 30min pta noted increasing rue redness and came to er for recurrent cellulitis     Review of Systems:    Review of Systems   Constitutional: Positive for fatigue  Negative for fever  HENT: Negative  Eyes: Negative      Respiratory: Negative  Cardiovascular: Negative  Gastrointestinal: Negative  Genitourinary: Negative  Musculoskeletal: Negative  Allergic/Immunologic: Negative  Neurological: Negative  Hematological: Negative  Past Medical and Surgical History:     Past Medical History:   Diagnosis Date    Abnormal mammogram 05/15/2013    Anemia     Cancer ([address] )     desmoitosis    Desmoid tumor     Last Assessed: 6/19/2017    Hyperlipidemia     Hypertension        Past Surgical History:   Procedure Laterality Date    HYSTERECTOMY      OTHER SURGICAL HISTORY      Arm Incision: Dermoid tumor, right Arm     PARTIAL HYSTERECTOMY      [place] COLONOSCOPY FLX DX W/COLLJ SPEC WHEN PFRMD N/A 8/15/2017    Procedure: COLONOSCOPY;  Surgeon: Franchesca Pugh MD;  Location: [facility]; Service: Gastroenterology    SKIN BIOPSY      SKIN CANCER EXCISION      Melanoma Excision        Meds/Allergies:    Prior to Admission medications    Medication Sig Start Date End Date Taking? Authorizing Provider   atorvastatin (LIPITOR) 40 mg tablet Take 40 mg by mouth daily   Yes Historical Provider, MD   dronabinol (MARINOL) 5 MG capsule Take 15 mg by mouth 4 (four) times a day (before meals and at bedtime)   Yes Historical Provider, MD   multivitamin (THERAGRAN) TABS Take 1 tablet by mouth daily   Yes Historical Provider, MD   Omega-3 Fatty Acids (FISH OIL PO) Take by mouth   Yes Historical Provider, MD   Probiotic Product (PROBIOTIC-10 PO) Take by mouth   Yes Historical Provider, MD   thiamine (VITAMIN B1) 100 mg tablet Take 100 mg by mouth daily   Yes Historical Provider, MD   ketorolac (TORADOL) 10 mg tablet Take 1 tablet (10 mg total) by mouth every 6 (six) hours as needed for moderate pain  Patient not taking: Reported on 8/28/2018 7/28/18 9/2/18  Dillon Bennett MD     I have reviewed home medications with patient personally  Allergies:    Allergies   Allergen Reactions    Aspirin GI Intolerance and Abdominal Pain ABD     Chlorpromazine Anaphylaxis     PHENOTHIAZINE=ANAPHYLAXIS    Codeine Anaphylaxis     Anaphylaxis  abd pain   Meperidine Anaphylaxis, Hives and Other (See Comments)     VOMITS  VOMITS  Category: Allergy;     Phenothiazines Anaphylaxis and Throat Swelling     Category: Allergy;     Saccharin Anaphylaxis    Ibuprofen Hives     H    Ampicillin-Sulbactam Sodium Hives    Gluten Meal GI Intolerance    Levofloxacin Hives    Neomycin Other (See Comments), Edema and Hives     Category: Allergy;   swelling    Other Throat Swelling, Hives and Other (See Comments)     ASA=GERD  ASA=GERD  Category: Allergy;     Citrus Rash    Latex Hives and Rash     Category: Allergy;        Social History:     Marital Status: /Civil Union   Occupation:   Patient Pre-hospital Living Situation:   Patient Pre-hospital Level of Mobility:   Patient Pre-hospital Diet Restrictions:   Substance Use History:   History   Alcohol Use    0 6 oz/week    1 Shots of liquor per week     Comment: daily HS , per allscripts: consumes alcohol occasionally      History   Smoking Status    Never Smoker   Smokeless Tobacco    Never Used     History   Drug Use No       Family History:    non-contributory    Physical Exam:     Vitals:   Blood Pressure: 168/74 (09/02/18 1620)  Pulse: 61 (09/02/18 1620)  Temperature: 98 °F (36 7 °C) (09/02/18 1620)  Temp Source: Oral (09/02/18 1620)  Respirations: 17 (09/02/18 1620)  Height: 5' 8" (172 7 cm) (09/02/18 1620)  Weight - Scale: 71 2 kg (157 lb) (09/02/18 1620)  SpO2: 97 % (09/02/18 1620)    Physical Exam   Constitutional: She is oriented to person, place, and time  She appears well-developed and well-nourished  HENT:   Head: Normocephalic and atraumatic  Eyes: Pupils are equal, round, and reactive to light  Cardiovascular: Normal rate and regular rhythm  Pulmonary/Chest: Effort normal and breath sounds normal    Abdominal: Soft   Bowel sounds are normal    Musculoskeletal: She exhibits edema  Rue with erythema and edema demarcated from wrist to mid upper arm  Neurological: She is alert and oriented to person, place, and time  Additional Data:     Lab Results: I have personally reviewed pertinent reports  Results from last 7 days  Lab Units 09/02/18  1408   WBC Thousand/uL 13 66*   HEMOGLOBIN g/dL 14 9   HEMATOCRIT % 44 3   PLATELETS Thousands/uL 287   NEUTROS PCT % 82*   LYMPHS PCT % 12*   MONOS PCT % 6   EOS PCT % 0       Results from last 7 days  Lab Units 09/02/18  1408   SODIUM mmol/L 135*   POTASSIUM mmol/L 3 5   CHLORIDE mmol/L 100   CO2 mmol/L 29   BUN mg/dL 5   CREATININE mg/dL 0 67   CALCIUM mg/dL 9 0   ALK PHOS U/L 110   ALT U/L 38   AST U/L 19           Imaging: I have personally reviewed pertinent reports  No results found  EKG, Pathology, and Other Studies Reviewed on Admission:   · EKG:     Allscripts Records Reviewed: Yes     ** Please Note: Dragon 360 Dictation voice to text software may have been used in the creation of this document   **

## 2018-09-02 NOTE — ED PROVIDER NOTES
History  Chief Complaint   Patient presents with    Cellulitis     pt with redness to R arm, "I have this every 4/6 weeks", - fevers  onset today      HPI   61-year-old female with history of malignancy in 1999 requiring lymph node removal to her right axilla and radiation to her right upper extremity, with subsequent lymphedema, who presents with erythema and pain to her right arm  Of note, patient has had cellulitis to right upper extremity 5 times over the last 6 months  She has been admitted to the hospital twice for this over the last couple of months, requiring IV antibiotics  The underlying cause of this recurrent cellulitis is still unknown  She saw a vascular surgeon a couple of weeks ago, but states that the underlying cause still unknown  She also has an appointment with an infectious disease specialist next week  States that she finished her last course of clindamycin approximately 4 weeks ago, with complete resolution of the rash  Today about an hour ago she states that she began to develop a burning sensation in her right arm accompanied by erythema, and warmth  She denies fevers, chills, nausea, vomiting any systemic symptoms  States that the cellulitis appears to be rapidly progressing, prompting her to come in for evaluation  Prior to Admission Medications   Prescriptions Last Dose Informant Patient Reported? Taking?    Omega-3 Fatty Acids (FISH OIL PO)  Self Yes Yes   Sig: Take by mouth   Probiotic Product (PROBIOTIC-10 PO)  Self Yes Yes   Sig: Take by mouth   atorvastatin (LIPITOR) 40 mg tablet  Self Yes Yes   Sig: Take 40 mg by mouth daily   dronabinol (MARINOL) 5 MG capsule  Self Yes Yes   Sig: Take 15 mg by mouth 4 (four) times a day (before meals and at bedtime)   multivitamin (THERAGRAN) TABS  Self Yes Yes   Sig: Take 1 tablet by mouth daily   thiamine (VITAMIN B1) 100 mg tablet  Self Yes Yes   Sig: Take 100 mg by mouth daily      Facility-Administered Medications: None       Past Medical History:   Diagnosis Date    Abnormal mammogram 05/15/2013    Anemia     Cancer ([address] )     desmoitosis    Desmoid tumor     Last Assessed: 6/19/2017    Hyperlipidemia     Hypertension        Past Surgical History:   Procedure Laterality Date    HYSTERECTOMY      OTHER SURGICAL HISTORY      Arm Incision: Dermoid tumor, right Arm     PARTIAL HYSTERECTOMY      WY COLONOSCOPY FLX DX W/COLLJ SPEC WHEN PFRMD N/A 8/15/2017    Procedure: COLONOSCOPY;  Surgeon: Kacey Montgomery MD;  Location: [facility]; Service: Gastroenterology    SKIN BIOPSY      SKIN CANCER EXCISION      Melanoma Excision        Family History   Problem Relation Age of Onset    Diabetes Mother     No Known Problems Father     Breast cancer Sister     Lung cancer Sister     Pancreatic cancer Sister      I have reviewed and agree with the history as documented  Social History   Substance Use Topics    Smoking status: Never Smoker    Smokeless tobacco: Never Used    Alcohol use 0 6 oz/week     1 Shots of liquor per week      Comment: daily HS , per allscripts: consumes alcohol occasionally         Review of Systems   Constitutional: Negative for chills and fever  HENT: Negative for congestion  Eyes: Negative for visual disturbance  Respiratory: Negative for cough and shortness of breath  Cardiovascular: Negative for chest pain and leg swelling  Gastrointestinal: Negative for abdominal pain, diarrhea, nausea and vomiting  Genitourinary: Negative for dysuria and frequency  Musculoskeletal: Negative for arthralgias, back pain, neck pain and neck stiffness  Skin: Positive for rash  Neurological: Negative for weakness, numbness and headaches  Psychiatric/Behavioral: Negative for agitation, behavioral problems and confusion  Physical Exam  Physical Exam   Constitutional: She is oriented to person, place, and time  She appears well-developed and well-nourished  No distress     HENT:   Head: Normocephalic and atraumatic  Right Ear: External ear normal    Left Ear: External ear normal    Nose: Nose normal    Mouth/Throat: Oropharynx is clear and moist    Eyes: Conjunctivae are normal    Neck: Normal range of motion  Neck supple  Cardiovascular: Normal rate, regular rhythm, normal heart sounds and intact distal pulses  Exam reveals no gallop and no friction rub  No murmur heard  Pulmonary/Chest: Effort normal and breath sounds normal  No respiratory distress  She has no wheezes  She has no rales  Abdominal: Soft  Bowel sounds are normal  She exhibits no distension  There is no tenderness  There is no guarding  Musculoskeletal: Normal range of motion  She exhibits no edema or deformity  Neurological: She is alert and oriented to person, place, and time  She exhibits normal muscle tone  Decreased sensation to light touch in the right hand, chronic and at baseline  Skin: Skin is warm and dry  Capillary refill takes less than 2 seconds  She is not diaphoretic  Warmth and erythema to areas of the right upper arm and right forearm  Patient has swelling associated with her lymphedema, but no fluctuance or specific swelling to the areas of erythema  Compartments are soft  No crepitus  No right axillary lymphadenopathy         Vital Signs  ED Triage Vitals   Temperature Pulse Respirations Blood Pressure SpO2   09/02/18 1214 09/02/18 1213 09/02/18 1213 09/02/18 1213 09/02/18 1213   98 1 °F (36 7 °C) 80 16 158/75 96 %      Temp Source Heart Rate Source Patient Position - Orthostatic VS BP Location FiO2 (%)   09/02/18 1214 09/02/18 1213 09/02/18 1213 09/02/18 1213 --   Oral Monitor Sitting Left arm       Pain Score       09/02/18 1213       5           Vitals:    09/02/18 1213 09/02/18 1436 09/02/18 1620 09/02/18 2300   BP: 158/75 147/76 168/74 140/66   Pulse: 80 75 61 86   Patient Position - Orthostatic VS: Sitting Lying Lying Lying       Visual Acuity      ED Medications  Medications   atorvastatin (LIPITOR) tablet 40 mg (not administered)   dronabinol (MARINOL) capsule 15 mg (15 mg Oral Given 9/2/18 2207)   fish oil capsule 1,000 mg (1,000 mg Oral Given 9/2/18 1718)   saccharomyces boulardii (FLORASTOR) capsule 250 mg (250 mg Oral Given 9/2/18 1719)   thiamine (VITAMIN B1) tablet 100 mg (not administered)   heparin (porcine) subcutaneous injection 5,000 Units (5,000 Units Subcutaneous Given 9/2/18 2207)   acetaminophen (TYLENOL) tablet 650 mg (not administered)   clindamycin (CLEOCIN) IVPB (premix) 600 mg (600 mg Intravenous New Bag 9/2/18 2214)   clindamycin (CLEOCIN) IVPB (premix) 900 mg (0 mg Intravenous Stopped 9/2/18 1449)   ketorolac (TORADOL) injection 15 mg (15 mg Intravenous Given 9/2/18 1413)       Diagnostic Studies  Results Reviewed     Procedure Component Value Units Date/Time    Sedimentation rate, automated [[id number]]  (Normal) Collected:  09/02/18 1408    Lab Status:  Final result Specimen:  Blood from Arm, Left Updated:  09/02/18 1520     Sed Rate 4 mm/hour     C-reactive protein [[id number]]  (Normal) Collected:  09/02/18 1408    Lab Status:  Final result Specimen:  Blood from Arm, Left Updated:  09/02/18 1442     CRP <3 0 mg/L     Comprehensive metabolic panel [[id number]]  (Abnormal) Collected:  09/02/18 1408    Lab Status:  Final result Specimen:  Blood from Arm, Left Updated:  09/02/18 1431     Sodium 135 (L) mmol/L      Potassium 3 5 mmol/L      Chloride 100 mmol/L      CO2 29 mmol/L      ANION GAP 6 mmol/L      BUN 5 mg/dL      Creatinine 0 67 mg/dL      Glucose 108 mg/dL      Calcium 9 0 mg/dL      AST 19 U/L      ALT 38 U/L      Alkaline Phosphatase 110 U/L      Total Protein 7 6 g/dL      Albumin 4 0 g/dL      Total Bilirubin 0 70 mg/dL      eGFR 97 ml/min/1 73sq m     Narrative:         National Kidney Disease Education Program recommendations are as follows:  GFR calculation is accurate only with a steady state creatinine  Chronic Kidney disease less than 60 ml/min/1 73 sq  meters  Kidney failure less than 15 ml/min/1 73 sq  meters  CBC and differential [[id number]]  (Abnormal) Collected:  09/02/18 1408    Lab Status:  Final result Specimen:  Blood from Arm, Left Updated:  09/02/18 1416     WBC 13 66 (H) Thousand/uL      RBC 4 93 Million/uL      Hemoglobin 14 9 g/dL      Hematocrit 44 3 %      MCV 90 fL      MCH 30 2 pg      MCHC 33 6 g/dL      RDW 13 0 %      MPV 9 3 fL      Platelets 132 Thousands/uL      nRBC 0 /100 WBCs      Neutrophils Relative 82 (H) %      Immat GRANS % 0 %      Lymphocytes Relative 12 (L) %      Monocytes Relative 6 %      Eosinophils Relative 0 %      Basophils Relative 0 %      Neutrophils Absolute 11 04 (H) Thousands/µL      Immature Grans Absolute 0 05 Thousand/uL      Lymphocytes Absolute 1 64 Thousands/µL      Monocytes Absolute 0 81 Thousand/µL      Eosinophils Absolute 0 06 Thousand/µL      Basophils Absolute 0 06 Thousands/µL     Blood culture #1 [[id number]] Collected:  09/02/18 1408    Lab Status: In process Specimen:  Blood from Arm, Right Updated:  09/02/18 1413    Blood culture #2 [[id number]] Collected:  09/02/18 1408    Lab Status: In process Specimen:  Blood from Hand, Left Updated:  09/02/18 1413                 VAS upper limb venous duplex scan, unilateral/limited    (Results Pending)   CT humerus right wo contrast    (Results Pending)              Procedures  Procedures       Phone Contacts  ED Phone Contact    ED Course                               MDM  Number of Diagnoses or Management Options  Cellulitis of left upper extremity: established and worsening  Diagnosis management comments: Nontoxic appearing  Afebrile and hemodynamically stable  Patient has areas of cellulitis to the right upper arm and right forearm  States that this just appeared about an hour ago  She does have swelling to the right upper extremity associated with her lymphedema, but denies worsening of this    She has no crepitus, no pain out of proportion, and I doubt necrotizing infection  States that symptoms are exactly the same as what she has had 5 other times over the last few months  No fevers, chills, or systemic symptoms at this time  Good distal pulses  Compartments are soft  Given numerous prior episodes of cellulitis requiring IV antibiotics, patient started on IV clindamycin  Blood cultures drawn  Labs with mild leukocytosis to 13  ESR and CRP are not elevated  Plan to admit to Medicine for further management  Amount and/or Complexity of Data Reviewed  Clinical lab tests: ordered and reviewed  Review and summarize past medical records: yes    Patient Progress  Patient progress: stable    CritCare Time         Disposition  Final diagnoses:   Cellulitis of left upper extremity     Time reflects when diagnosis was documented in both MDM as applicable and the Disposition within this note     Time User Action Codes Description Comment    9/2/2018  3:32 PM Kamran Machado Add [L03 114] Cellulitis of left upper extremity       ED Disposition     ED Disposition Condition Comment    Admit  Case was discussed with BABAK and the patient's admission status was agreed to be Admission Status: inpatient status to the service of Dr Keegan Wagoner   Follow-up Information    None         Current Discharge Medication List      CONTINUE these medications which have NOT CHANGED    Details   atorvastatin (LIPITOR) 40 mg tablet Take 40 mg by mouth daily      dronabinol (MARINOL) 5 MG capsule Take 15 mg by mouth 4 (four) times a day (before meals and at bedtime)      multivitamin (THERAGRAN) TABS Take 1 tablet by mouth daily      Omega-3 Fatty Acids (FISH OIL PO) Take by mouth      Probiotic Product (PROBIOTIC-10 PO) Take by mouth      thiamine (VITAMIN B1) 100 mg tablet Take 100 mg by mouth daily           No discharge procedures on file      ED Provider  Electronically Signed by           Rosalia Turner MD  09/03/18 5676

## 2018-09-03 ENCOUNTER — APPOINTMENT (INPATIENT)
Dept: ULTRASOUND IMAGING | Facility: HOSPITAL | Age: 58
DRG: 603 | End: 2018-09-03
Payer: COMMERCIAL

## 2018-09-03 LAB
ANION GAP SERPL CALCULATED.3IONS-SCNC: 8 MMOL/L (ref 4–13)
BASOPHILS # BLD AUTO: 0.05 THOUSANDS/ΜL (ref 0–0.1)
BASOPHILS NFR BLD AUTO: 1 % (ref 0–1)
BUN SERPL-MCNC: 7 MG/DL (ref 5–25)
CALCIUM SERPL-MCNC: 8.3 MG/DL (ref 8.3–10.1)
CHLORIDE SERPL-SCNC: 102 MMOL/L (ref 100–108)
CO2 SERPL-SCNC: 24 MMOL/L (ref 21–32)
CREAT SERPL-MCNC: 0.54 MG/DL (ref 0.6–1.3)
EOSINOPHIL # BLD AUTO: 0.16 THOUSAND/ΜL (ref 0–0.61)
EOSINOPHIL NFR BLD AUTO: 2 % (ref 0–6)
ERYTHROCYTE [DISTWIDTH] IN BLOOD BY AUTOMATED COUNT: 12.8 % (ref 11.6–15.1)
GFR SERPL CREATININE-BSD FRML MDRD: 104 ML/MIN/1.73SQ M
GLUCOSE SERPL-MCNC: 103 MG/DL (ref 65–140)
HCT VFR BLD AUTO: 39.4 % (ref 34.8–46.1)
HGB BLD-MCNC: 13.1 G/DL (ref 11.5–15.4)
IMM GRANULOCYTES # BLD AUTO: 0.02 THOUSAND/UL (ref 0–0.2)
IMM GRANULOCYTES NFR BLD AUTO: 0 % (ref 0–2)
LYMPHOCYTES # BLD AUTO: 2.06 THOUSANDS/ΜL (ref 0.6–4.47)
LYMPHOCYTES NFR BLD AUTO: 29 % (ref 14–44)
MCH RBC QN AUTO: 29.7 PG (ref 26.8–34.3)
MCHC RBC AUTO-ENTMCNC: 33.2 G/DL (ref 31.4–37.4)
MCV RBC AUTO: 89 FL (ref 82–98)
MONOCYTES # BLD AUTO: 0.71 THOUSAND/ΜL (ref 0.17–1.22)
MONOCYTES NFR BLD AUTO: 10 % (ref 4–12)
NEUTROPHILS # BLD AUTO: 4.12 THOUSANDS/ΜL (ref 1.85–7.62)
NEUTS SEG NFR BLD AUTO: 58 % (ref 43–75)
NRBC BLD AUTO-RTO: 0 /100 WBCS
PLATELET # BLD AUTO: 247 THOUSANDS/UL (ref 149–390)
PMV BLD AUTO: 9.9 FL (ref 8.9–12.7)
POTASSIUM SERPL-SCNC: 4 MMOL/L (ref 3.5–5.3)
RBC # BLD AUTO: 4.41 MILLION/UL (ref 3.81–5.12)
SODIUM SERPL-SCNC: 134 MMOL/L (ref 136–145)
WBC # BLD AUTO: 7.12 THOUSAND/UL (ref 4.31–10.16)

## 2018-09-03 PROCEDURE — 93971 EXTREMITY STUDY: CPT

## 2018-09-03 PROCEDURE — 99254 IP/OBS CNSLTJ NEW/EST MOD 60: CPT | Performed by: INTERNAL MEDICINE

## 2018-09-03 PROCEDURE — 93971 EXTREMITY STUDY: CPT | Performed by: SURGERY

## 2018-09-03 PROCEDURE — 80048 BASIC METABOLIC PNL TOTAL CA: CPT | Performed by: GENERAL PRACTICE

## 2018-09-03 PROCEDURE — 99233 SBSQ HOSP IP/OBS HIGH 50: CPT | Performed by: INTERNAL MEDICINE

## 2018-09-03 PROCEDURE — 85025 COMPLETE CBC W/AUTO DIFF WBC: CPT | Performed by: GENERAL PRACTICE

## 2018-09-03 RX ADMIN — DRONABINOL 15 MG: 2.5 CAPSULE ORAL at 16:41

## 2018-09-03 RX ADMIN — DRONABINOL 15 MG: 2.5 CAPSULE ORAL at 21:02

## 2018-09-03 RX ADMIN — HEPARIN SODIUM 5000 UNITS: 5000 INJECTION, SOLUTION INTRAVENOUS; SUBCUTANEOUS at 15:00

## 2018-09-03 RX ADMIN — Medication 250 MG: at 08:52

## 2018-09-03 RX ADMIN — DRONABINOL 15 MG: 2.5 CAPSULE ORAL at 06:31

## 2018-09-03 RX ADMIN — HEPARIN SODIUM 5000 UNITS: 5000 INJECTION, SOLUTION INTRAVENOUS; SUBCUTANEOUS at 21:02

## 2018-09-03 RX ADMIN — ATORVASTATIN CALCIUM 40 MG: 40 TABLET, FILM COATED ORAL at 08:52

## 2018-09-03 RX ADMIN — Medication 1000 MG: at 08:52

## 2018-09-03 RX ADMIN — CLINDAMYCIN PHOSPHATE 600 MG: 600 INJECTION, SOLUTION INTRAVENOUS at 06:38

## 2018-09-03 RX ADMIN — CEFAZOLIN SODIUM 1000 MG: 1 SOLUTION INTRAVENOUS at 12:18

## 2018-09-03 RX ADMIN — HEPARIN SODIUM 5000 UNITS: 5000 INJECTION, SOLUTION INTRAVENOUS; SUBCUTANEOUS at 06:34

## 2018-09-03 RX ADMIN — THIAMINE HCL TAB 100 MG 100 MG: 100 TAB at 08:53

## 2018-09-03 RX ADMIN — ACETAMINOPHEN 650 MG: 325 TABLET, FILM COATED ORAL at 15:05

## 2018-09-03 RX ADMIN — Medication 250 MG: at 16:41

## 2018-09-03 RX ADMIN — Medication 1000 MG: at 16:41

## 2018-09-03 RX ADMIN — DRONABINOL 15 MG: 2.5 CAPSULE ORAL at 12:18

## 2018-09-03 RX ADMIN — CEFAZOLIN SODIUM 1000 MG: 1 SOLUTION INTRAVENOUS at 20:23

## 2018-09-03 NOTE — CASE MANAGEMENT
Thank you,  [address]  Utilization Review Department  Phone: [phone]; Fax [phone]  ATTENTION: Please call with any questions or concerns to [phone]  and carefully follow the prompts so that you are directed to the right person  Send all requests for admission clinical reviews, approved or denied determinations and any other requests to fax [phone]  All voicemails are confidential      Initial Clinical Review    Admission: Date/Time/Statement: inpatient 9/2/18 @ 1533     Orders Placed This Encounter   Procedures    Inpatient Admission (expected length of stay for this patient is greater than two midnights)     Standing Status:   Standing     Number of Occurrences:   1     Order Specific Question:   Admitting Physician     Answer:   Bradley Wong [[id number]]     Order Specific Question:   Level of Care     Answer:   Med Surg [[id number]]     Order Specific Question:   Estimated length of stay     Answer:   More than 2 Midnights     Order Specific Question:   Certification     Answer:   I certify that inpatient services are medically necessary for this patient for a duration of greater than two midnights  See H&P and MD Progress Notes for additional information about the patient's course of treatment  ED Arrival:   Expected Arrival Acuity Means of Arrival Escorted By Service Admission Type    - 9/2/2018 11:59 Urgent Walk-In Spouse General Medicine Urgent    Arrival Complaint    CELLULITIS        Chief Complaint   Patient presents with    Cellulitis     pt with redness to R arm, "I have this every 4/6 weeks", - fevers  onset today        History of Illness: 60 yo fem  w/hx malignancy in 1999 requiring lymph node removal from R axilla with subsequent lymphedema presented to ED c/o increasing RUE redness with pain since 30 minutes pta  Hx desmoid tumors with recurrent RUE cellulitis due to the lymphedema  Most recent episode 7/18  Just finished clinda 2 weeks ago   Also had unresolved fatigue, but redness had improved with clinda  Lorie Barriga with erythema and edema demarcated from wrist to mid upper arm    ED Vital Signs:   ED Triage Vitals   Temperature Pulse Respirations Blood Pressure SpO2   09/02/18 1214 09/02/18 1213 09/02/18 1213 09/02/18 1213 09/02/18 1213   98 1 °F (36 7 °C) 80 16 158/75 96 %      Temp Source Heart Rate Source Patient Position - Orthostatic VS BP Location FiO2 (%)   09/02/18 1214 09/02/18 1213 09/02/18 1213 09/02/18 1213 --   Oral Monitor Sitting Left arm       Pain Score       09/02/18 1213       5        Wt Readings from Last 1 Encounters:   09/02/18 71 2 kg (157 lb)     Abnormal Labs/Diagnostic Test Results:   9/2: na 135   Wbc 13 66     bld c&s pending  CT R humerus: Distal anteromedial subcutaneous findings may represent cellulitis in the appropriate clinical scenario   No abscess  Diffuse lateral and distal posterolateral subcutaneous findings are nonspecific and more likely represent chronic post therapy changes   Cellulitis is in the differential especially distal posterolateral upper arm and proximal forearm     No abscess  4 mm noncalcified nodule right upper lobe unchanged since May 2, 2017  9/3: na 134   Creat   54      ED Treatment:   Medication Administration from 09/02/2018 1159 to 09/02/2018 1620       Date/Time Order Dose Route Action Action by Comments     09/02/2018 1416 clindamycin (CLEOCIN) IVPB (premix) 900 mg 900 mg Intravenous Trevor 37 Ender Douglas RN      09/02/2018 1413 ketorolac (TORADOL) injection 15 mg 15 mg Intravenous Given Ender Douglas RN           Past Medical/Surgical History:    Active Ambulatory Problems     Diagnosis Date Noted    Back pain, chronic 12/22/2014    Chronic anemia 06/08/2015    Chronic insomnia 06/19/2017    Lymphedema of right arm 06/19/2017    Hyperlipidemia, mixed 12/18/2014    Peripheral neuropathy 06/29/2015    Lymphedema 03/26/2018    Cellulitis of right upper extremity 05/09/2018   [facility] discharge follow-up 05/17/2018     Resolved Ambulatory Problems     Diagnosis Date Noted    Right arm pain 03/26/2018    Diabetes mellitus screening 03/26/2018    Need for lipid screening 03/26/2018    Need for hepatitis C screening test 03/26/2018    Right arm cellulitis 03/26/2018    Leukocytosis 05/09/2018     Past Medical History:   Diagnosis Date    Abnormal mammogram 05/15/2013    Anemia     Cancer ([address] )     Desmoid tumor     Hyperlipidemia     Hypertension        Admitting Diagnosis: Cellulitis [L03 90]  Cellulitis of right upper extremity [L03 113]    Age/Sex: 59 y o  female    Assessment/Plan: 58 yo fem to ED from home diagnosed with recurrent RUE cellulitis with lymphedema  Has multiple med allergies  Just completed course of clindamycin  Consult ID due to recurrence, check procalcitonin, marinol for pain, RUE CT and venous doppler  Redness started at popliteal region and rapidly progressed throughout the whole arm  Has had recurrent cellulitis every month for the past several months  Admission Orders:  Scheduled Meds:   Current Facility-Administered Medications:  acetaminophen 650 mg Oral Q6H PRN   atorvastatin 40 mg Oral Daily   cefazolin 1,000 mg Intravenous Q8H   dronabinol 15 mg Oral 4x Daily (AC & HS)   fish oil 1,000 mg Oral BID   heparin (porcine) 5,000 Units Subcutaneous Q8H [facility]   saccharomyces boulardii 250 mg Oral BID   thiamine 100 mg Oral Daily     hse diet  oob as jani  SCD's    PER ID 9/3: recurrent RUE cellulitis consistent with strep infection  DC IV clinda, start IV cefazolin 1g q8h  Serial arm exams  Frequent arm elevation with edema control  Hx of rash from unasyn  bld c&s pending

## 2018-09-03 NOTE — PROGRESS NOTES
[facility] Progress Note  Patient: Julia Rosenbaum 59 y o  female   MRN: [id number]  PCP: Radha Helton MD  Unit/Bed#: -01 Encounter: [id number]  Date Of Visit: 09/03/18    Assessment/Plan:      · Cellulitis right upper arm-recurrent  Patient gets infection of her right arm almost every 4 weeks and received antibiotics with improvement in her symptoms and then comes back to the hospital again  Patient has history of lot of surgeries on her right arm-patient with history of desmoitosis  She has an appointment with on Infectious Disease consultant on outpatient basis  Seen by Infectious Disease consultant here and antibiotics have been changed  Signs and Symptoms have improved as per patient family  She has chronic lymphedema of her right arm as well-likely risk factor for recurrent cellulitis  ? Need for suppressive antibiotics  · Hyperlipidemia/Dyslipidemia  Continue with statin  · Chronic insomnia  Continue with home medications  · Hyponatremia  Hemodynamic  Continue to monitor  · History of anemia  Patient is not really any make this time  VTE Pharmacologic Prophylaxis:   Pharmacologic: Heparin  Mechanical VTE Prophylaxis in Place: Yes    Patient Centered Rounds: I have performed bedside rounds with nursing staff today  Discussions with Specialists or Other Care Team Provider:  Yes    Education and Discussions with Family / Patient:  Yes    Time Spent for Care: 35+ minutes  More than 50% of total time spent on counseling and coordination of care as described above  Current Length of Stay: 1 day(s)    Current Patient Status: Inpatient   Certification Statement: The patient will continue to require additional inpatient hospital stay due to IV antibiotics    Discharge Plan:  Home when stable    Code Status: Level 1 - Full Code      Subjective:   Feels better with less pain/swelling and redness of her right arm  No fever or chills  No chest pain   No shortness of breath  No abdominal pain  Objective:     Vitals:   Temp (24hrs), Av 3 °F (36 8 °C), Min:98 °F (36 7 °C), Max:98 6 °F (37 °C)    HR:  [61-86] 77  Resp:  [16-18] 16  BP: (140-168)/(66-76) 145/72  SpO2:  [96 %-98 %] 96 %  Body mass index is 23 87 kg/m²  Input and Output Summary (last 24 hours): Intake/Output Summary (Last 24 hours) at 18 1015  Last data filed at 18 1449   Gross per 24 hour   Intake               55 ml   Output                0 ml   Net               55 ml           Physical Exam:     Vital signs are reviewed as above  Constitutional:  Patient lying in bed  Eyes: EOM grossly intact  Conjunctivae appear normal to me  She is wearing glasses   Patient has anicteric sclera  HENT: Oropharynx are moist  Did not notice any significant lesions on the tongue  Head normocephalic  Neck: Neck is supple  I was not able to visualize any JVD at 90°  There is no significant lymphadenopathy  I also did not notice any significant thyromegaly  Cardiac: I did not hear any rubs or gallop  Patient appears to be in sinus rhythm  Respiratory: Patient not in significant respiratory distress  Air entry in general is a  GI: Abdomen is soft  It is grossly nontender  Bowel sounds are adequate  I was not able to appreciate any hepatosplenomegaly  Neurologic:  Patient is awake and alert  Neurological examination is grossly intact  No obvious focal neurological deficit noticed  Skin: Skin is warm and dry  Has scar katy from her previous right upper arm and upper chest/muscular surgery in the past  Psychiatric: Mood and affect are pleasant  Musculoskeletal  Patient moving all extremities while in     Extremities: Patient has edema of her right arm    Additional Data:     Labs:      Results from last 7 days  Lab Units 18  0456   WBC Thousand/uL 7 12   HEMOGLOBIN g/dL 13 1   HEMATOCRIT % 39 4   PLATELETS Thousands/uL 247   NEUTROS PCT % 58   LYMPHS PCT % 29   MONOS PCT % 10   EOS PCT % 2 Results from last 7 days  Lab Units 09/03/18  0456 09/02/18  1408   SODIUM mmol/L 134* 135*   POTASSIUM mmol/L 4 0 3 5   CHLORIDE mmol/L 102 100   CO2 mmol/L 24 29   BUN mg/dL 7 5   CREATININE mg/dL 0 54* 0 67   CALCIUM mg/dL 8 3 9 0   ALK PHOS U/L  --  110   ALT U/L  --  38   AST U/L  --  19           * I Have Reviewed All Lab Data Listed Above  * Additional Pertinent Lab Tests Reviewed: All Labs Within Last 24 Hours Reviewed      Recent Cultures (last 7 days):           Last 24 Hours Medication List:     Current Facility-Administered Medications:  acetaminophen 650 mg Oral Q6H PRN Juliann Frances MD   atorvastatin 40 mg Oral Daily Stefani DELORES Frances MD   cefazolin 1,000 mg Intravenous Q8H Jeimy Santos DO   dronabinol 15 mg Oral 4x Daily (AC & HS) Juliann Frances MD   fish oil 1,000 mg Oral BID Juliann Frances MD   heparin (porcine) 5,000 Units Subcutaneous Q8H [address] MD Juan Daniel   saccharomyces boulardii 250 mg Oral BID Massimo Stark MD   thiamine 100 mg Oral Daily Massimo Stark MD        Today, Patient Was Seen By: Angel Hernandez MD    ** Please Note: Dragon 360 Dictation voice to text software may have been used in the creation of this document   **

## 2018-09-03 NOTE — CONSULTS
Consultation - Infectious Disease   Ashley Hernández 59 y o  female MRN: [id number]  Unit/Bed#: -01 Encounter: [id number]      IMPRESSION & RECOMMENDATIONS:   Impression/Recommendations:  1  Recurrent right upper extremity cellulitis  In the setting of right upper extremity lymphedema  Clinically consistent with streptococcal infection  Currently on IV clindamycin with significant improvement noted  CT humerus with no evidence of deeper infection  Patient with no fevers  Remains systemically well, nontoxic     -discontinue IV clindamycin  -start IV cefazolin 1 g q 8 hours  -serial arm exams  -anticipate transition to oral antibiotics soon if continues to improve    2  Right upper extremity lymphedema  Secondary to history of desmoid tumor resection and reconstructive surgery  -recommend follow up with outpatient lymphedema Clinic  -frequent arm elevation and edema control    3  Penicillin allergy  Patient has a reported allergy to Unasyn which had given her rash  Denies any history of anaphylaxis  Patient is agreeable to trying cephalosporin at monitoring closely for any potential reaction  4   Leukocytosis  WBC count mildly elevated at 13 8 on admission  It has already trended down  No associated fevers  Remains hemodynamically stable, nontoxic  Admission blood cultures are pending     -antibiotic plan as above  -monitor temperatures and WBC count  -follow up pending blood cultures    Antibiotics:  Clindamycin    Discussed above plan with patient in detail  Thank you for this consultation  We will follow along with you      HISTORY OF PRESENT ILLNESS:  Reason for Consult:  Upper extremity cellulitis    HPI: Ashley Hernández is a 59 y o  female with prior history of desmoid tumors of right upper extremity post resection and reconstructive surgery with subsequent development of chronic right upper extremity lymphedema who presents on 09/02 with worsening right upper extremity redness and warmth, as well as pain  The redness started at the popliteal region and rapidly progressed throughout the whole arm  Denies any impairment of motion of any joints or joint swelling  Patient reports she has had recurrent cellulitis every month for the past several months  She was recently hospitalized at the end of July id treated with IV clindamycin for right upper extremity cellulitis and clinically improved with transition to oral clindamycin as an outpatient  Now she re-presented with similar complaints  She was again started on IV clindamycin and notes significant improvement in redness and warmth today  Denies fevers, chills  Does complain of generalized fatigue  REVIEW OF SYSTEMS:  A complete system-based review of systems is otherwise negative  PAST MEDICAL HISTORY:  Past Medical History:   Diagnosis Date    Abnormal mammogram 05/15/2013    Anemia     Cancer ([address] )     desmoitosis    Desmoid tumor     Last Assessed: 6/19/2017    Hyperlipidemia     Hypertension      Past Surgical History:   Procedure Laterality Date    HYSTERECTOMY      OTHER SURGICAL HISTORY      Arm Incision: Dermoid tumor, right Arm     PARTIAL HYSTERECTOMY      AK COLONOSCOPY FLX DX W/COLLJ SPEC WHEN PFRMD N/A 8/15/2017    Procedure: COLONOSCOPY;  Surgeon: Laila Aguilera MD;  Location: [facility]; Service: Gastroenterology    SKIN BIOPSY      SKIN CANCER EXCISION      Melanoma Excision        FAMILY HISTORY:  Non-contributory    SOCIAL HISTORY:  History   Alcohol Use    0 6 oz/week    1 Shots of liquor per week     Comment: daily HS , per allscripts: consumes alcohol occasionally      History   Drug Use No     History   Smoking Status    Never Smoker   Smokeless Tobacco    Never Used       ALLERGIES:  Allergies   Allergen Reactions    Aspirin GI Intolerance and Abdominal Pain     ABD     Chlorpromazine Anaphylaxis     PHENOTHIAZINE=ANAPHYLAXIS    Codeine Anaphylaxis     Anaphylaxis  abd pain      Meperidine Anaphylaxis, Hives and Other (See Comments)     VOMITS  VOMITS  Category: Allergy;     Phenothiazines Anaphylaxis and Throat Swelling     Category: Allergy;     Saccharin Anaphylaxis    Ibuprofen Hives     H    Ampicillin-Sulbactam Sodium Hives    Gluten Meal GI Intolerance    Levofloxacin Hives    Neomycin Other (See Comments), Edema and Hives     Category: Allergy;   swelling    Other Throat Swelling, Hives and Other (See Comments)     ASA=GERD  ASA=GERD  Category: Allergy;     Citrus Rash    Latex Hives and Rash     Category: Allergy;        MEDICATIONS:  All current active medications have been reviewed  PHYSICAL EXAM:  Vitals:  HR:  [61-86] 77  Resp:  [16-18] 16  BP: (140-168)/(66-76) 145/72  SpO2:  [96 %-98 %] 96 %  Temp (24hrs), Av 3 °F (36 8 °C), Min:98 °F (36 7 °C), Max:98 6 °F (37 °C)  Current: Temperature: 98 5 °F (36 9 °C)     Physical Exam:  General:  Well-nourished, well-developed, in no acute distress  Eyes:  Conjunctive clear with no hemorrhages or effusions  Oropharynx:  No ulcers, no lesions  Neck:  Supple, no lymphadenopathy  Lungs:  Clear to auscultation bilaterally, no accessory muscle use  Cardiac:  Regular rate and rhythm, no murmurs  Abdomen:  Soft, non-tender, non-distended  Extremities:  Mild right upper extremity erythema, warmth  Minimally tender to palpation  No fluctuance or drainage  Right upper extremity lymphedema  Skin:  No rashes, no ulcers  Neurological:  Moves all four extremities spontaneously, sensation grossly intact      LABS, IMAGING, & OTHER STUDIES:  Lab Results:  I have personally reviewed pertinent labs      Results from last 7 days  Lab Units 18  0456 18  1408   SODIUM mmol/L 134* 135*   POTASSIUM mmol/L 4 0 3 5   CHLORIDE mmol/L 102 100   CO2 mmol/L 24 29   BUN mg/dL 7 5   CREATININE mg/dL 0 54* 0 67   EGFR ml/min/1 73sq m 104 97   CALCIUM mg/dL 8 3 9 0   AST U/L  --  19   ALT U/L  --  38   ALK PHOS U/L  --  110       Results from last 7 days  Lab Units 09/03/18  0456 09/02/18  1806 09/02/18  1408   WBC Thousand/uL 7 12  --  13 66*   HEMOGLOBIN g/dL 13 1  --  14 9   PLATELETS Thousands/uL 247 162 287         Imaging Studies:   I have personally reviewed pertinent imaging study reports and images in PACS  CT right humerus shows distal subcutaneous findings which likely represent cellulitis  No discrete abscess  EKG, Pathology, and Other Studies:   I have personally reviewed pertinent reports

## 2018-09-04 VITALS
SYSTOLIC BLOOD PRESSURE: 161 MMHG | TEMPERATURE: 97.8 F | RESPIRATION RATE: 18 BRPM | HEART RATE: 74 BPM | HEIGHT: 68 IN | OXYGEN SATURATION: 97 % | WEIGHT: 157 LBS | DIASTOLIC BLOOD PRESSURE: 79 MMHG | BODY MASS INDEX: 23.79 KG/M2

## 2018-09-04 PROBLEM — L03.90 CELLULITIS: Status: RESOLVED | Noted: 2018-09-02 | Resolved: 2018-09-04

## 2018-09-04 LAB
ANION GAP SERPL CALCULATED.3IONS-SCNC: 4 MMOL/L (ref 4–13)
BASOPHILS # BLD AUTO: 0.06 THOUSANDS/ΜL (ref 0–0.1)
BASOPHILS NFR BLD AUTO: 1 % (ref 0–1)
BUN SERPL-MCNC: 8 MG/DL (ref 5–25)
CALCIUM SERPL-MCNC: 8.7 MG/DL (ref 8.3–10.1)
CHLORIDE SERPL-SCNC: 108 MMOL/L (ref 100–108)
CO2 SERPL-SCNC: 27 MMOL/L (ref 21–32)
CREAT SERPL-MCNC: 0.54 MG/DL (ref 0.6–1.3)
EOSINOPHIL # BLD AUTO: 0.18 THOUSAND/ΜL (ref 0–0.61)
EOSINOPHIL NFR BLD AUTO: 4 % (ref 0–6)
ERYTHROCYTE [DISTWIDTH] IN BLOOD BY AUTOMATED COUNT: 13.1 % (ref 11.6–15.1)
GFR SERPL CREATININE-BSD FRML MDRD: 104 ML/MIN/1.73SQ M
GLUCOSE SERPL-MCNC: 103 MG/DL (ref 65–140)
HCT VFR BLD AUTO: 42.2 % (ref 34.8–46.1)
HGB BLD-MCNC: 13.9 G/DL (ref 11.5–15.4)
IMM GRANULOCYTES # BLD AUTO: 0.01 THOUSAND/UL (ref 0–0.2)
IMM GRANULOCYTES NFR BLD AUTO: 0 % (ref 0–2)
LYMPHOCYTES # BLD AUTO: 1.97 THOUSANDS/ΜL (ref 0.6–4.47)
LYMPHOCYTES NFR BLD AUTO: 44 % (ref 14–44)
MCH RBC QN AUTO: 29.8 PG (ref 26.8–34.3)
MCHC RBC AUTO-ENTMCNC: 32.9 G/DL (ref 31.4–37.4)
MCV RBC AUTO: 90 FL (ref 82–98)
MONOCYTES # BLD AUTO: 0.62 THOUSAND/ΜL (ref 0.17–1.22)
MONOCYTES NFR BLD AUTO: 14 % (ref 4–12)
NEUTROPHILS # BLD AUTO: 1.7 THOUSANDS/ΜL (ref 1.85–7.62)
NEUTS SEG NFR BLD AUTO: 37 % (ref 43–75)
NRBC BLD AUTO-RTO: 0 /100 WBCS
PLATELET # BLD AUTO: 247 THOUSANDS/UL (ref 149–390)
PMV BLD AUTO: 10.2 FL (ref 8.9–12.7)
POTASSIUM SERPL-SCNC: 4.3 MMOL/L (ref 3.5–5.3)
RBC # BLD AUTO: 4.67 MILLION/UL (ref 3.81–5.12)
SODIUM SERPL-SCNC: 139 MMOL/L (ref 136–145)
WBC # BLD AUTO: 4.54 THOUSAND/UL (ref 4.31–10.16)

## 2018-09-04 PROCEDURE — 85025 COMPLETE CBC W/AUTO DIFF WBC: CPT | Performed by: INTERNAL MEDICINE

## 2018-09-04 PROCEDURE — 80048 BASIC METABOLIC PNL TOTAL CA: CPT | Performed by: INTERNAL MEDICINE

## 2018-09-04 PROCEDURE — 99255 IP/OBS CONSLTJ NEW/EST HI 80: CPT | Performed by: INTERNAL MEDICINE

## 2018-09-04 PROCEDURE — 99238 HOSP IP/OBS DSCHRG MGMT 30/<: CPT | Performed by: FAMILY MEDICINE

## 2018-09-04 RX ORDER — CEPHALEXIN 500 MG/1
500 CAPSULE ORAL EVERY 8 HOURS SCHEDULED
Qty: 15 CAPSULE | Refills: 0 | Status: SHIPPED | OUTPATIENT
Start: 2018-09-04 | End: 2018-09-09

## 2018-09-04 RX ADMIN — DRONABINOL 15 MG: 2.5 CAPSULE ORAL at 11:39

## 2018-09-04 RX ADMIN — Medication 1000 MG: at 08:48

## 2018-09-04 RX ADMIN — Medication 250 MG: at 08:48

## 2018-09-04 RX ADMIN — HEPARIN SODIUM 5000 UNITS: 5000 INJECTION, SOLUTION INTRAVENOUS; SUBCUTANEOUS at 06:15

## 2018-09-04 RX ADMIN — ATORVASTATIN CALCIUM 40 MG: 40 TABLET, FILM COATED ORAL at 08:48

## 2018-09-04 RX ADMIN — CEFAZOLIN SODIUM 1000 MG: 1 SOLUTION INTRAVENOUS at 06:15

## 2018-09-04 RX ADMIN — CEFAZOLIN SODIUM 1000 MG: 1 SOLUTION INTRAVENOUS at 11:43

## 2018-09-04 RX ADMIN — THIAMINE HCL TAB 100 MG 100 MG: 100 TAB at 08:48

## 2018-09-04 RX ADMIN — DRONABINOL 15 MG: 2.5 CAPSULE ORAL at 06:15

## 2018-09-04 NOTE — DISCHARGE INSTRUCTIONS
Cellulitis   WHAT YOU NEED TO KNOW:   Cellulitis is a skin infection caused by bacteria  Cellulitis may go away on its own or you may need treatment  Your healthcare provider may draw a [place] around the outside edges of your cellulitis  If your cellulitis spreads, your healthcare provider will see it outside of the [place]  DISCHARGE INSTRUCTIONS:   Call 911 if:   · You have sudden trouble breathing or chest pain  Seek care immediately if:   · Your wound gets larger and more painful  · You feel a crackling under your skin when you touch it  · You have purple dots or bumps on your skin, or you see bleeding under your skin  · You have new swelling and pain in your legs  · The red, warm, swollen area gets larger  · You see red streaks coming from the infected area  Contact your healthcare provider if:   · You have a fever  · Your fever or pain does not go away or gets worse  · The area does not get smaller after 2 days of antibiotics  · Your skin is flaking or peeling off  · You have questions or concerns about your condition or care  Medicines:   · Antibiotics  help treat the bacterial infection  · NSAIDs , such as ibuprofen, help decrease swelling, pain, and fever  NSAIDs can cause stomach bleeding or kidney problems in certain people  If you take blood thinner medicine, always ask if NSAIDs are safe for you  Always read the medicine label and follow directions  Do not give these medicines to children under 10months of age without direction from your child's healthcare provider  · Acetaminophen  decreases pain and fever  It is available without a doctor's order  Ask how much to take and how often to take it  Follow directions  Read the labels of all other medicines you are using to see if they also contain acetaminophen, or ask your doctor or pharmacist  Acetaminophen can cause liver damage if not taken correctly   Do not use more than 4 grams (4,000 milligrams) total of acetaminophen in one day  · Take your medicine as directed  Contact your healthcare provider if you think your medicine is not helping or if you have 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  Tell him or her if you are allergic to any medicine  Keep a list of the medicines, vitamins, and herbs you take  Include the amounts, and when and why you take them  Bring the list or the pill bottles to follow-up visits  Carry your medicine list with you in case of an emergency  Self-care:   · Elevate the area above the level of your heart  as often as you can  This will help decrease swelling and pain  Prop the area on pillows or blankets to keep it elevated comfortably  · Clean the area daily until the wound scabs over  Gently wash the area with soap and water  Pat dry  Use dressings as directed  · Place cool or warm, wet cloths on the area as directed  Use clean cloths and clean water  Leave it on the area until the cloth is room temperature  Pat the area dry with a clean, dry cloth  The cloths may help decrease pain  Prevent cellulitis:   · Do not scratch bug bites or areas of injury  You increase your risk for cellulitis by scratching these areas  · Do not share personal items, such as towels, clothing, and razors  · Clean exercise equipment  with germ-killing  before and after you use it  · Wash your hands often  Use soap and water  Wash your hands after you use the bathroom, change a child's diapers, or sneeze  Wash your hands before you prepare or eat food  Use lotion to prevent dry, cracked skin  · Wear pressure stockings as directed  You may be told to wear the stockings if you have peripheral edema  The stockings improve blood flow and decrease swelling  · Treat athlete's foot  This can help prevent the spread of a bacterial skin infection  Follow up with your healthcare provider within 3 days, or as directed:   Your healthcare provider will check if your cellulitis is getting better  You may need different medicine  Write down your questions so you remember to ask them during your visits  © 2017 [address] [employer] Information is for End User's use only and may not be sold, redistributed or otherwise used for commercial purposes  All illustrations and images included in CareNotes® are the copyrighted property of [employer]  or Juan Diego Leon  The above information is an  only  It is not intended as medical advice for individual conditions or treatments  Talk to your doctor, nurse or pharmacist before following any medical regimen to see if it is safe and effective for you

## 2018-09-04 NOTE — ASSESSMENT & PLAN NOTE
Transition to oral Keflex  Discussed with Infectious Disease   Will need to follow up with the lymphedema clinic

## 2018-09-04 NOTE — NURSING NOTE
12:30    Patient discharged to home in the care of her neighbor  All discharge teaching completed  IV and wristbands removed  AVS printed and sent home  All belongings sent with patient  Patient escorted to elevator ambulatory by RN  Observed patient discharged safely in stable condition

## 2018-09-04 NOTE — PROGRESS NOTES
Progress Note - Infectious Disease   Ashley Hernández 59 y o  female MRN: [id number]  Unit/Bed#: -01 Encounter: [id number]      Impression/Recommendations:  1  Recurrent right upper extremity cellulitis  In the setting of right upper extremity lymphedema  Clinically consistent with streptococcal infection  Currently on IV clindamycin with significant improvement noted  CT humerus with no evidence of deeper infection  Patient with no fevers  Remains systemically well, nontoxic  Cellulitis much improved with only residual evidence of cellulitis at this point      -continue IV cefazolin 1 g q 8 hours while in hospital   On discharge, we can transition to oral Keflex 500 mg twice a day to complete 7 day course of antibiotic total through 9/8   -serial arm exams     2  Right upper extremity lymphedema  Secondary to history of desmoid tumor resection and reconstructive surgery      -recommend follow up with outpatient lymphedema Clinic  -frequent arm elevation and edema control     3  Penicillin allergy  Patient has a reported allergy to Unasyn which had given her rash  Denies any history of anaphylaxis  Patient is agreeable to trying cephalosporin at monitoring closely for any potential reaction  Patient is tolerating IV cefazolin without any difficulty      4   Leukocytosis  WBC count mildly elevated at 13 8 on admission  It has already trended down  No associated fevers  Remains hemodynamically stable, nontoxic  Admission blood cultures are negative      -antibiotic plan as above  -monitor temperatures and WBC count     Antibiotics:  Antibiotic D3  Cefazolin D2     Discussed above plan with patient in detail, and with Dr Tucker Boss  Stable from ID standpoint  Subjective:  Patient states the redness, swelling, pain is much improved  Denies fevers, chills, or sweats  Denies nausea, vomiting, or diarrhea        Objective:  Vitals:  HR:  [63-76] 74  Resp:  [17-18] 18  BP: (138-165)/(72-79) 161/79  SpO2:  [96 %-97 %] 97 %  Temp (24hrs), Av °F (36 7 °C), Min:97 8 °F (36 6 °C), Max:98 2 °F (36 8 °C)  Current: Temperature: 97 8 °F (36 6 °C)    Physical Exam:   General:  Well-nourished, well-developed, in no acute distress  Eyes:  Conjunctive clear with no hemorrhages or effusions  Oropharynx:  No ulcers, no lesions  Neck:  Supple, no lymphadenopathy  Lungs:  Clear to auscultation bilaterally, no accessory muscle use  Cardiac:  Regular rate and rhythm, no murmurs  Abdomen:  Soft, non-tender, non-distented  Extremities:  Stable right upper extremity lymphedema  Much improved erythema, warmth, tenderness  No fluctuance or drainage  Skin:  No rashes, no ulcers  Neurological:  Moves all four extremities spontaneously, sensation grossly intact    Lab Results:  I have personally reviewed pertinent labs  Results from last 7 days  Lab Units 18  0620 18  0456 18  1408   SODIUM mmol/L 139 134* 135*   POTASSIUM mmol/L 4 3 4 0 3 5   CHLORIDE mmol/L 108 102 100   CO2 mmol/L 27 24 29   BUN mg/dL 8 7 5   CREATININE mg/dL 0 54* 0 54* 0 67   EGFR ml/min/1 73sq m 104 104 97   CALCIUM mg/dL 8 7 8 3 9 0   AST U/L  --   --  19   ALT U/L  --   --  38   ALK PHOS U/L  --   --  110       Results from last 7 days  Lab Units 18  0621 18  0456 18  1806 18  1408   WBC Thousand/uL 4 54 7 12  --  13 66*   HEMOGLOBIN g/dL 13 9 13 1  --  14 9   PLATELETS Thousands/uL 247 247 162 287       Results from last 7 days  Lab Units 18  1408   BLOOD CULTURE  No Growth at 24 hrs  No Growth at 24 hrs  Imaging Studies:   I have personally reviewed pertinent imaging study reports and images in PACS  EKG, Pathology, and Other Studies:   I have personally reviewed pertinent reports

## 2018-09-04 NOTE — DISCHARGE SUMMARY
Discharge- Florin Leon 1960, 59 y o  female MRN: [id number]    Unit/Bed#: -01 Encounter: [id number]    Primary Care Provider: Milind Solares MD   Date and time admitted to hospital: 9/2/2018 12:04 PM        Hyperlipidemia, mixed   Assessment & Plan    Continue statin on discharge        Chronic anemia   Assessment & Plan    Hemoglobin is stable        Back pain, chronic   Assessment & Plan    Outpatient pain control  Pain management specialist if needed        * Cellulitis   Assessment & Plan    Transition to oral Keflex  Discussed with Infectious Disease  Will need to follow up with the lymphedema clinic            Discharging Physician / Practitioner: Linn Weber MD  PCP: Milind Solares MD  Admission Date:   Admission Orders     Ordered        09/02/18 1533  Inpatient Admission (expected length of stay for this patient is greater than two midnights)  Once             Discharge Date: 09/04/18    Resolved Problems  Date Reviewed: 9/4/2018    None          Consultations During Hospital Stay:  · Infectious Disease    Procedures Performed:     · Upper extremity Dopplers which showed no evidence of DVT    Significant Findings / Test Results:     CT scan:Distal anteromedial subcutaneous findings may represent cellulitis in the appropriate clinical scenario  No abscess      Diffuse lateral and distal posterolateral subcutaneous findings are nonspecific and more likely represent chronic post therapy changes  Cellulitis is in the differential especially distal posterolateral upper arm and proximal forearm     No abscess      4 mm noncalcified nodule right upper lobe unchanged since May 2, 2017  Based on current Fleischner Society 2017 Guidelines on incidental pulmonary nodule, no routine follow-up is needed if the patient is considered low risk for lung cancer    If the   patient is considered high risk for lung cancer, 12 month follow-up non-contrast chest CT is recommended    Incidental Findings:   · See above    Test Results Pending at Discharge (will require follow up): · None     Outpatient Tests Requested:  · None    Complications:  None    Reason for Admission:  Arm swelling and redness    Hospital Course:     Beatris Stone is a 59 y o  female patient who originally presented to the hospital on 9/2/2018 due to arm swelling and redness  History of presenting Ainsley Byers is a 59 y o  female who presents with cellulitis  Pt with known h/o desmoid tumors-she has had recurrent rue cellulitis due to lymphedema most recently 7/18  She says she just completed course of clindamycin about 2 weeks ago but says she had associated fatigue which didn't resolve but redness improved  Earlier today about 30min pta noted increasing rue redness and came to er for recurrent cellulitis     Hospital course:  Patient was admitted for the above reasons  The patient has had recurrent cellulitis  This is likely secondary to lymphedema  Patient should follow up with clinic as an outpatient  The patient was seen by infectious disease here and was put on IV antibiotics and significantly improved rapidly  Patient needs 7 days of antibiotics as an outpatient  Patient also has an follow-up appointment with Infectious Disease as an outpatient  The cultures here are negative  Please see above list of diagnoses and related plan for additional information  Condition at Discharge: good     Discharge Day Visit / Exam:     Subjective:  Patient seen examined  Feels significantly better  Swelling has greatly improved    Vitals: Blood Pressure: 161/79 (09/04/18 0727)  Pulse: 74 (09/04/18 0727)  Temperature: 97 8 °F (36 6 °C) (09/04/18 0727)  Temp Source: Oral (09/04/18 0727)  Respirations: 18 (09/04/18 0727)  Height: 5' 8" (172 7 cm) (09/02/18 1620)  Weight - Scale: 71 2 kg (157 lb) (09/02/18 1620)  SpO2: 97 % (09/04/18 0727)  Exam:   Physical Exam  (   General Appearance:    Alert, cooperative, no distress, appears stated age                               Lungs:     Clear to auscultation bilaterally, respirations unlabored       Heart:    Regular rate and rhythm, S1 and S2 normal, no murmur, rub    or gallop   Abdomen:     Soft, non-tender, bowel sounds active all four quadrants,     no masses, no organomegaly           Extremities:   Extremities normal, atraumatic, no cyanosis or edema     Discussion with Family:  Patient    Discharge instructions/Information to patient and family:   See after visit summary for information provided to patient and family  Provisions for Follow-Up Care:  See after visit summary for information related to follow-up care and any pertinent home health orders  Disposition:     Home    For Discharges to [facility]F:   · Not Applicable to this Patient - Not Applicable to this Patient    Planned Readmission:  None anticipated     Discharge Statement:  I spent 25 minutes discharging the patient  This time was spent on the day of discharge  I had direct contact with the patient on the day of discharge  Greater than 50% of the total time was spent examining patient, answering all patient questions, arranging and discussing plan of care with patient as well as directly providing post-discharge instructions  Additional time then spent on discharge activities  Discharge Medications:  See after visit summary for reconciled discharge medications provided to patient and family        ** Please Note: This note has been constructed using a voice recognition system **

## 2018-09-06 ENCOUNTER — EVALUATION (OUTPATIENT)
Dept: PHYSICAL THERAPY | Age: 58
End: 2018-09-06
Payer: COMMERCIAL

## 2018-09-06 DIAGNOSIS — I89.0 LYMPHEDEMA: Primary | ICD-10-CM

## 2018-09-06 DIAGNOSIS — I89.0 LYMPHEDEMA OF RIGHT ARM: ICD-10-CM

## 2018-09-06 PROCEDURE — G8984 CARRY CURRENT STATUS: HCPCS

## 2018-09-06 PROCEDURE — G8985 CARRY GOAL STATUS: HCPCS

## 2018-09-06 PROCEDURE — 97163 PT EVAL HIGH COMPLEX 45 MIN: CPT

## 2018-09-07 LAB
BACTERIA BLD CULT: NORMAL
BACTERIA BLD CULT: NORMAL

## 2018-09-07 NOTE — PROGRESS NOTES
PT Evaluation     Today's date: 2018  Patient name: Jihan Mercado  : 1960  MRN: [id number]  Referring provider: Concepcion Ibanez MD  Dx:   Encounter Diagnosis     ICD-10-CM    1  Lymphedema I89 0                   Assessment  Impairments: abnormal or restricted ROM, activity intolerance, impaired physical strength, lacks appropriate home exercise program and pain with function  Functional limitations: right ue significant reduction in function noted, decreased right  strength  Assessment details: The pt is a 59year old female referred to outpt PT with a hx of right ue lymphedema , chronic cellulitis and frequent hospitalizations s/p 17 surgeries and 8 radiation phases for treatment of desmoitosis   PT is recommending the pt be placed on maintenance antibiotics in addition to complex decongestive therapy intervention to reduce further cellulitis episodes  PT shall also encourage increased self mld massage and soft tissue mobilization, increased therapeutic exer, and trial kinesiotaping for further lymphatic drainage  The pt presents with decreased right ue rom and strength deficits, +2 lymphedema and c/o pain with activity in addition to hospitalizations every 4 weeks for cellulits  This pt may also benefit from a different home compression pump or reduction in mmHG with possible hypercongestion of the lymph  axilla   PT to contact Prior lymphedema therapist and possible transfer in one session to [place] office due to proximity to home  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)  Understanding of Dx/Px/POC: good   Prognosis: good    Goals  STG 1  Independent in there exer program in two weeks including self mld massage and soft tissue mobilization            2  Reduction of girth by 2 cm in two weeks      LTG 1 fit with new compression pump in 4 weeks           2 The pt shall be independent in donning and doffing compression garments      Plan  Patient would benefit from: PT eval, lymphedema eval and skilled physical therapy  Referral necessary: Yes  Planned therapy interventions: muscle pump exercises, neuromuscular re-education, therapeutic activities, therapeutic exercise, strengthening, stretching, compression and home exercise program  Other planned therapy interventions: reduce compression mmHG on home pump at this time due to overcongestion of axillary region , encourage aquatic exer  Frequency: 2x week  Duration in weeks: 8  Treatment plan discussed with: patient        Subjective Evaluation    History of Present Illness  Onset date: 2018  exacerbation cellulitis right ue  Date of surgery: 12 years ago last surgery onset of right ue lymphedema   Mechanism of injury: The pt is a 59year old female referred to outpt PT with 12 year hx of right ue lymphedema and recurrent cellulitis episodes requiring hospitalization once every 4 weeks per her report with the most recent hospitalization on  approx in the right ue  The pt was diagnosed with desmoid tumor s/p desmoitosis and has undergone 17 surgeries to her right ue and last surgery on her right thorax/rib cage in  with the pt reporting she is cancer free at this time however residual lymphedema and chronic infections  For years with a specific home compression pump the pt did not infect however with the recent Tactile solution Entre pump she continues to infect  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 requiring her to rest before again working for 1/2 - 1hour several times a day  She has had extensive lymphedema intervention in the past but reports no trial of graston technique, or kinesiotaping to date and does not regularly perfor self massage to date    Recurrent probem    Quality of life: good    Pain  Current pain ratin  At best pain ratin  At worst pain ratin  Location: right ue   Quality: sharp, throbbing and radiating  Relieving factors: change in position, heat, ice and rest  Aggravating factors: overhead activity and lifting  Progression: worsening    Social Support  Steps to enter house: no  Stairs in house: yes (15 steps  left railing )   Lives in: multiple-level home  Lives with: spouse    Employment status: working (artist  ( [employer]) self employed)  Hand dominance: right (pt now using left hand due to decreased function of right ue )  Exercise history: 30 min stretching and deep knee bends active rom exer     Treatments  Previous treatment: massage and occupational therapy  Current treatment: physical therapy  Discharged from (in last 30 days): inpatient hospitalization  Patient Goals  Patient goals for therapy: decreased edema, decreased pain, increased motion, independence with ADLs/IADLs, return to work, return to [employer] activities and increased strength  Patient goal: to have less bouts of cellulitis and less lymphedema in the right ue        Objective    Flowsheet Rows      Most Recent Value   PT/OT G-Codes   Current Score  42   Projected Score  0   Assessment Type  Evaluation   G code set  Carrying, Moving & Handling Objects   Carrying, Moving and Handling Objects Current Status ()  CK   Carrying, Moving and Handling Objects Goal Status ()  CJ          Precautions: Allergies: aspirin, chlorpromazine, codein, meperidine, phenothiazines, saccharin, ibuprofen, ampicillin -sulbatam sodium, levofloxacin, neomycin, citrus, LATEX allergy    PMH: HTN, chronic episodes of cellultis  Right ue last hospitalized two days ago for 3 days needing IV antibiotics, pt hospitalize approx every 4 weeks due to infections, peripheral neuropathy, chronic back pain, chornic insomnia and anemia, right ue lymphedema , desmoid tumor removal and desmoitosis, s/p hysterectomy, s/p melanoma excision,  S/p radiation x 8,   17 surgeries right ue and lateral chest wall 1999- 2005, onset of lymphedema 12 years ago after thoracic/rib surgery , HUSEYIN baby, measles x 5, 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, osteoporosis ,     Daily Treatment Diary     Manual  9/6/18            Assess current compression garments I eval            Mld massage and soft tissue mobilization right ue and thorax emphasis teach self massage              graston to thorax and right ue to tolerance             Trial lymph ritht  kinesiotaping pending skin test on I eval              hyperstimulation of rib  And sternal lymph nodes                 Exercise Diary  9/6/18            Corner pect stretch  5 hold 10 sec             scap squeezes 5 hold 5 sec             Add ue theraband sh ext , rows             Foam roll trial self thoracic mobilization             Wall slides flex and abd to tolerance right ue                                                                                                                                                                                                                     Modalities              Change of compression pump may be required

## 2018-09-13 ENCOUNTER — OFFICE VISIT (OUTPATIENT)
Dept: PHYSICAL THERAPY | Age: 58
End: 2018-09-13
Payer: COMMERCIAL

## 2018-09-13 DIAGNOSIS — I89.0 LYMPHEDEMA: Primary | ICD-10-CM

## 2018-09-13 PROCEDURE — 97140 MANUAL THERAPY 1/> REGIONS: CPT

## 2018-09-14 NOTE — PROGRESS NOTES
Daily Note     Today's date: 2018  Patient name: Rut Freitas  : 1960  MRN: [id number]  Referring provider: Nicole Rehman MD  Dx:   Encounter Diagnosis     ICD-10-CM    1  Lymphedema I89 0                   Subjective:  No new c/o's       Objective: See treatment diary below    Manual  18                   Assess current compression garments I eval                     Mld massage and soft tissue mobilization right ue and thorax emphasis      man                   graston to thorax and right ue to tolerance    man                   Trial lymph right  kinesiotaping pending skin test on I eval     man                   hyperstimulation of rib  And sternal lymph nodes    man                         Exercise Diary  18                   Corner pect stretch  5 hold 10 sec                      scap squeezes 5 hold 5 sec                      Add ue theraband sh ext , rows                       Foam roll trial self thoracic mobilization                       Wall slides flex and abd to tolerance right ue                                                                                                                                                                                                                                                                                                                                                                                                      Modalities                        Change of compression pump may be required     pt now has decreased to 30 mmHG                                                                            Assessment: Tolerated treatment well  Patient would benefit from continued PT  Pt now to transfer to [place] office for proximity to home  kinesiotaping trial to right forearm for increased drainage  Plan: Progress treatment as tolerated

## 2018-09-20 ENCOUNTER — EVALUATION (OUTPATIENT)
Dept: OCCUPATIONAL THERAPY | Facility: CLINIC | Age: 58
End: 2018-09-20
Payer: COMMERCIAL

## 2018-09-20 DIAGNOSIS — I89.0 LYMPHEDEMA OF RIGHT UPPER EXTREMITY: Primary | ICD-10-CM

## 2018-09-20 PROCEDURE — G8994 SUB PT/OT GOAL STATUS: HCPCS

## 2018-09-20 PROCEDURE — 97140 MANUAL THERAPY 1/> REGIONS: CPT

## 2018-09-20 PROCEDURE — 97165 OT EVAL LOW COMPLEX 30 MIN: CPT

## 2018-09-20 PROCEDURE — G8993 SUB PT/OT CURRENT STATUS: HCPCS
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
## 2018-09-25 ENCOUNTER — OFFICE VISIT (OUTPATIENT)
Dept: OCCUPATIONAL THERAPY | Facility: CLINIC | Age: 58
End: 2018-09-25
Payer: COMMERCIAL

## 2018-09-25 DIAGNOSIS — I89.0 LYMPHEDEMA OF RIGHT UPPER EXTREMITY: Primary | ICD-10-CM

## 2018-09-25 PROCEDURE — 97140 MANUAL THERAPY 1/> REGIONS: CPT

## 2018-09-25 NOTE — PROGRESS NOTES
Daily Note     Today's date: 2018  Patient name: Sherine West  : 1960  MRN: [id number]  Referring provider: Jenifer Gary MD  Dx:   Encounter Diagnosis     ICD-10-CM    1  Lymphedema of right upper extremity I89 0                   Subjective: Patient reported "I think it's doing okay", when asked about her R arm  Objective: See treatment diary below    Precautions: lymphedema, cellulitis, frequent infections     Specialty Daily Treatment Diary      Manual           RUE and Trunk 30'  30'          IASTM    15'                                                         Exercise Diary                                                                                                                                                                                                                                                                                                              Modalities                                                                  Assessment: Tolerated treatment well  Patient would benefit from continued OT  We initiated the IASTM for fibrosis in the R forearm to improve lymphatic flow in the RUE  Plan: Continue per plan of care  Progress treatment as tolerated

## 2018-09-27 ENCOUNTER — OFFICE VISIT (OUTPATIENT)
Dept: INFECTIOUS DISEASES | Facility: CLINIC | Age: 58
End: 2018-09-27
Payer: COMMERCIAL

## 2018-09-27 VITALS
SYSTOLIC BLOOD PRESSURE: 118 MMHG | HEART RATE: 68 BPM | TEMPERATURE: 97.9 F | HEIGHT: 68 IN | RESPIRATION RATE: 16 BRPM | BODY MASS INDEX: 24.55 KG/M2 | DIASTOLIC BLOOD PRESSURE: 76 MMHG | WEIGHT: 162 LBS

## 2018-09-27 DIAGNOSIS — L03.113 CELLULITIS OF RIGHT UPPER EXTREMITY: Primary | ICD-10-CM

## 2018-09-27 DIAGNOSIS — I89.0 LYMPHEDEMA OF RIGHT ARM: ICD-10-CM

## 2018-09-27 PROCEDURE — 99214 OFFICE O/P EST MOD 30 MIN: CPT | Performed by: INTERNAL MEDICINE

## 2018-09-27 RX ORDER — MELATONIN
3000 DAILY
COMMUNITY

## 2018-09-27 RX ORDER — CEPHALEXIN 500 MG/1
500 CAPSULE ORAL EVERY 6 HOURS SCHEDULED
Qty: 28 CAPSULE | Refills: 0 | Status: SHIPPED | OUTPATIENT
Start: 2018-09-27 | End: 2018-10-04

## 2018-09-27 NOTE — PROGRESS NOTES
Progress Note - Infectious Disease   Prudence Wilfredo 59 y o  female MRN: [id number]  Unit/Bed#:  Encounter: [id number]      Impression/Recommendations:  1  Recurrent right arm cellulitis, secondary to poorly control lymphedema  Patient completed a course of antibiotic recently  At present, with poorly control edema, she is at risk for another episode cellulitis  For this reason, I will go ahead and give her a treatment dose of Keflex keep at home, in the event she develops cellulitis next few days  At this point, there is no indication for prophylactic antibiotic  The emphasis should be on better control of lymphedema  No antibiotic needed at present  Rx for 7 days Keflex in the event patient develops cellulitis in the next few days  2    Right arm lymphedema  This is poorly controlled, mostly due to patient not use in arm compression a regular basis  I emphasized to her the need to control her edema to prevent recurrent infection  Recommend more aggressive use of compression stocking  Hospitalization records reviewed in detail  Discussed with the patient and  in detail regarding the above plan  Follow-up with me p r n       Antibiotics:  None    Subjective:  Patient was admitted earlier this month that [place] [id number], for recurrent right arm cellulitis  Patient did well with IV cefazolin and was transition to p o  Keflex  Patient has underlying lymphedema and recurrent cellulitis, with 5 episodes last 9 months  It appears she is not using arm compression on a regular basis, 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  Patient is concerned that her arm edema has gotten worse over last 2 days  No redness or pain yet  No fever or chills      The following portions of the patient's history were reviewed and updated as appropriate: allergies, current medications, past medical history, past social history, past surgical history and problem list     Objective:  Vitals:  Temperature: 97 9 °F (36 6 °C)    Physical Exam:     General: Awake, alert, cooperative, no distress  Neck:  Supple  No lymphadenopathy  No mass  Lungs: Expansion symmetric, no rales, no wheezing, respirations unlabored  Heart:  Regular rate and rhythm, S1 and S2 normal, no murmur  Abdomen: Soft, nondistended, non-tender, bowel sounds active all four quadrants,        no masses, no organomegaly  Extremities: Right arm 1+ edema  No erythema/warmth  No tenderness  Skin:  No rash  Neuro: Moves all extremities  Invasive Devices          No matching active lines, drains, or airways          Labs studies:   I have personally reviewed pertinent labs  Imaging Studies:   I have personally reviewed pertinent imaging study reports and images in PACS  EKG, Pathology, and Other Studies:   I have personally reviewed pertinent reports

## 2018-10-04 ENCOUNTER — OFFICE VISIT (OUTPATIENT)
Dept: OCCUPATIONAL THERAPY | Facility: CLINIC | Age: 58
End: 2018-10-04
Payer: COMMERCIAL

## 2018-10-04 DIAGNOSIS — I89.0 LYMPHEDEMA OF RIGHT UPPER EXTREMITY: Primary | ICD-10-CM

## 2018-10-04 PROCEDURE — 97760 ORTHOTIC MGMT&TRAING 1ST ENC: CPT

## 2018-10-04 PROCEDURE — 97140 MANUAL THERAPY 1/> REGIONS: CPT

## 2018-10-04 NOTE — PROGRESS NOTES
Daily Note     Today's date: 10/4/2018  Patient name: Laura Champagne  : 1960  MRN: [id number]  Referring provider: Cinthia Fisher MD  Dx:   Encounter Diagnosis     ICD-10-CM    1  Lymphedema of right upper extremity I89 0                   Subjective: Patient reported that her infectious disease physician would like for her to have compression garments on at all times  Objective: See treatment diary below    Precautions: lymphedema, cellulitis, frequent infections     Specialty Daily Treatment Diary      Manual  9/20  9/25  10/4       RUE and Trunk 30'  30'  30'        IASTM    15'  20'                                                       Exercise Diary   9/25     10/4          Orthotic fit/train    Patient instructed in [facility] wear, application, proper fit                                                                                                                                                                                                                                                                                         Modalities                                                           10/4/18:  RUE:  0 cm: 18 5 cm  4 cm: 17 cm  8 cm: 18 5 cm  12 cm: 21 3 cm  16 cm: 24 cm  20 cm: 26 8 cm  24 cm: 27 7 cm  28 cm: 26 cm  32 cm: 26 cm  36 cm: 27 cm  40 cm: 27 cm  44 cm: 27 cm  48 cm: 28 3 cm           Assessment: Tolerated treatment well  Patient demonstrated decreased swelling in her RUE based on measurements and visual/tactile observations  She also presented with a non-raised red katy on the posterolateral aspect of her R upper forearm  Skin is cool to the touch  This is likely petechia from previous episodes of edema, but therapist will continue to monitor the area  Patient would benefit from continued OT      Plan: Continue per plan of care  Progress treatment as tolerated

## 2018-10-09 ENCOUNTER — OFFICE VISIT (OUTPATIENT)
Dept: OCCUPATIONAL THERAPY | Facility: CLINIC | Age: 58
End: 2018-10-09
Payer: COMMERCIAL

## 2018-10-09 DIAGNOSIS — I89.0 LYMPHEDEMA OF RIGHT ARM: Primary | ICD-10-CM

## 2018-10-09 PROCEDURE — 97140 MANUAL THERAPY 1/> REGIONS: CPT

## 2018-10-09 NOTE — PROGRESS NOTES
Daily Note     Today's date: 10/9/2018  Patient name: Belén Barrera  : 1960  MRN: [id number]  Referring provider: Wero Hightower MD  Dx:   Encounter Diagnosis     ICD-10-CM    1  Lymphedema of right arm I89 0                   Subjective: Patient reported that the Graston technique and kinesio taping felt great last session  Her back doesn't hurt as much today  Objective: See treatment diary below       Precautions: lymphedema, cellulitis, frequent infections     Specialty Daily Treatment Diary      Manual  9/20  9/25  10/4  10/9     RUE and Trunk 30'  30'  30'  30'      IASTM    15'  20'  15'                                                     Exercise Diary   9/25     10/4          Orthotic fit/train    Patient instructed in [facility] wear, application, proper fit                                                                                                                                                                                                                                                                                         Modalities                                                           10/4/18:  RUE:  0 cm: 18 5 cm  4 cm: 17 cm  8 cm: 18 5 cm  12 cm: 21 3 cm  16 cm: 24 cm  20 cm: 26 8 cm  24 cm: 27 7 cm  28 cm: 26 cm  32 cm: 26 cm  36 cm: 27 cm  40 cm: 27 cm  44 cm: 27 cm  48 cm: 28 3 cm        Assessment: Tolerated treatment well  Patient would benefit from continued OT      Plan: Continue per plan of care  Progress treatment as tolerated

## 2018-10-16 ENCOUNTER — APPOINTMENT (OUTPATIENT)
Dept: OCCUPATIONAL THERAPY | Facility: CLINIC | Age: 58
End: 2018-10-16
Payer: COMMERCIAL

## 2018-10-18 ENCOUNTER — OFFICE VISIT (OUTPATIENT)
Dept: OCCUPATIONAL THERAPY | Facility: CLINIC | Age: 58
End: 2018-10-18
Payer: COMMERCIAL

## 2018-10-18 DIAGNOSIS — I89.0 LYMPHEDEMA OF RIGHT ARM: Primary | ICD-10-CM

## 2018-10-18 PROCEDURE — 97140 MANUAL THERAPY 1/> REGIONS: CPT

## 2018-10-18 PROCEDURE — G8991 OTHER PT/OT GOAL STATUS: HCPCS

## 2018-10-18 PROCEDURE — G8990 OTHER PT/OT CURRENT STATUS: HCPCS
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
## 2018-10-26 ENCOUNTER — OFFICE VISIT (OUTPATIENT)
Dept: OCCUPATIONAL THERAPY | Facility: CLINIC | Age: 58
End: 2018-10-26
Payer: COMMERCIAL

## 2018-10-26 DIAGNOSIS — I89.0 LYMPHEDEMA OF RIGHT ARM: Primary | ICD-10-CM

## 2018-10-26 PROCEDURE — 97140 MANUAL THERAPY 1/> REGIONS: CPT

## 2018-10-26 NOTE — PROGRESS NOTES
Daily Note     Today's date: 10/26/2018  Patient name: Octaviano Morris  : 1960  MRN: [id number]  Referring provider: Jesus Foy MD  Dx:   Encounter Diagnosis     ICD-10-CM    1  Lymphedema of right arm I89 0                   Subjective: I like the last glove I got  It fits well  I just want the fingers to be a little longer      Objective: See treatment diary below  Precautions: lymphedema, cellulitis, frequent infections     Specialty Daily Treatment Diary      Manual  10/26  9/25  10/4  10/9  10/18   MLD trunk and RUE 40'  30'  30'  30'  30'    IASTM    15'  20'  15'  8                                                   Exercise Diary   9/25     10/4  10/26        Orthotic fit/train    Patient instructed in [facility] wear, application, proper fit  Measured for Elvarex soft compression sleeve                                                                                                                                                                                                                                                                                       Modalities                                                              Assessment: Tolerated treatment well  Patient would benefit from continued OT  Good edema reduction noted this date  Patient measured for Elvarex soft class 1 arm sleeve      Plan: Continue per plan of care

## 2018-10-29 ENCOUNTER — TELEPHONE (OUTPATIENT)
Dept: INFECTIOUS DISEASES | Facility: CLINIC | Age: 58
End: 2018-10-29

## 2018-10-29 NOTE — TELEPHONE ENCOUNTER
Received a call from pt today letting us know that she had a cellulitis flare up  She has started taking the keflex that Dr Cooper Larson prescribed at her last visit  She wanted to know if she needed to schedule a f/u/ or do anything else regarding the cellulitis  As per Dr Jennifer Ayala orders: finish the abx course of keflex, and unless the cellulitis does not clear up, she does not need to schedule a f/u with us  11/5-Pt called today to let us know that the cellulitis flare up is resolving and looking much better  She asked that we call in a keflex Rx to have on hand in case she happens to have another episode  I let her know that I would contact Dr Cooepr Larson to inform him of her progress, and to possibly fill the Rx

## 2018-11-05 DIAGNOSIS — L03.113 CELLULITIS OF RIGHT UPPER EXTREMITY: Primary | ICD-10-CM

## 2018-11-05 RX ORDER — CEPHALEXIN 500 MG/1
500 CAPSULE ORAL EVERY 6 HOURS SCHEDULED
Qty: 28 CAPSULE | Refills: 0 | Status: CANCELLED | OUTPATIENT
Start: 2018-11-05 | End: 2018-11-12

## 2018-11-09 ENCOUNTER — OFFICE VISIT (OUTPATIENT)
Dept: OCCUPATIONAL THERAPY | Facility: CLINIC | Age: 58
End: 2018-11-09
Payer: COMMERCIAL

## 2018-11-09 DIAGNOSIS — I89.0 LYMPHEDEMA OF RIGHT ARM: Primary | ICD-10-CM

## 2018-11-09 PROCEDURE — 97140 MANUAL THERAPY 1/> REGIONS: CPT

## 2018-11-09 NOTE — PROGRESS NOTES
Daily Note     Today's date: 2018  Patient name: Juana Devries  : 1960  MRN: [id number]  Referring provider: Ricarda Wagoner MD  Dx:   Encounter Diagnosis     ICD-10-CM    1  Lymphedema of right arm I89 0                   Subjective:  My infection is cleared up      Objective: See treatment diary below    Precautions: lymphedema, cellulitis, frequent infections     Specialty Daily Treatment Diary      Manual  10/26  11/9  10/4  10/9  10/18   MLD trunk and RUE 40'  30'  30'  30'  30'    IASTM    10'  20'  15'  8    Kinesio tape    trunk 5'                                           Exercise Diary   9/25     10/4  10/26        Orthotic fit/train    Patient instructed in [facility] wear, application, proper fit  Measured for Elvarex soft compression sleeve                                                                                                                                                                                                                                                                                       Modalities                                                                Assessment: Tolerated treatment well  Patient would benefit from continued OT  No signs of cellulitis  Initiated KT tape to trunk to increase lymphatic flow    Patient treated by SHANEL Collado under my supervision  Plan: Continue per plan of care  Progress treatment as tolerated

## 2018-11-12 ENCOUNTER — TELEPHONE (OUTPATIENT)
Dept: INFECTIOUS DISEASES | Facility: CLINIC | Age: 58
End: 2018-11-12

## 2018-11-12 NOTE — TELEPHONE ENCOUNTER
Pt called office asking for us to get in touch with her PCP, Dr Yanely Burdick, regarding antibiotics for cellulitis flare ups  Per Dr Heri Phillip, pt needs to f/u with her PCP for management of her recurrent UE cellulitis because the problem is primarily due to noncompliance with compression sleeve  I left a voicemail for Dr Yanely Burdick -([phone]) that from Dr Liz Patrick perspective patient does not need to have antibiotics on hand  Pt was only given one course of po abx at last visit due to developing cellulitis at that time  Dr Yanely Burdick can page Dr Heri Phillip with any further questions or concerns

## 2018-11-21 ENCOUNTER — OFFICE VISIT (OUTPATIENT)
Dept: OCCUPATIONAL THERAPY | Facility: CLINIC | Age: 58
End: 2018-11-21
Payer: COMMERCIAL

## 2018-11-21 DIAGNOSIS — I89.0 LYMPHEDEMA OF RIGHT ARM: Primary | ICD-10-CM

## 2018-11-21 PROCEDURE — G8991 OTHER PT/OT GOAL STATUS: HCPCS

## 2018-11-21 PROCEDURE — G8990 OTHER PT/OT CURRENT STATUS: HCPCS

## 2018-11-21 PROCEDURE — 97140 MANUAL THERAPY 1/> REGIONS: CPT

## 2018-11-21 NOTE — PROGRESS NOTES
OT Re-Evaluation     Today's date: 2018  Patient name: Juwan Martin  : 1960  MRN: [id number]  Referring provider: Canelo Rucker MD  Dx:   Encounter Diagnosis     ICD-10-CM    1  Lymphedema of right arm I89 0                   Assessment  Assessment details: The pt is a 59year old female with a 10 hx of lymphedema in the RUE and chronic cellulitis infections  Frequent hospitalizations s/p 17 surgeries and 8 radiation phases for treatment of desmoitosis  The pt presents with stage 2 lymphedema and c/o pain with activity in addition to hospitalizations every 4 weeks for cellulits  Patient has been maintaining her arm volume using a 4 chamber compression pump 3-4 times a day  She uses a Tribute compression garment at night and occasionally during the day  This patient is a good candidate for OT services to [facility] and to educate patient in a HEP in order to maintain arm volume and prevent infections  10/18/18:  Patient demonstrates excellent reduction in RUE circumference measurements  Skin color and temp are WNL with no cellulitis infections in over 3 weeks  Patient has been compliant with elevation, compression pump and Tribute garment for home program   Skin color and temp are WNL  Patient will be measured for compression garments next tx session  Discharge to HEP once patient is fitted with new garments  18: Pt demonstrates increased congestion in the R forearm after accidentally burning herself while baking  R upper arm has maintained decongestion  Healing burn on the dorsum of the right hand  Skin color and temp are WNL  She is still compliant with elevation, compression pump and Tribute garment  She also continues to ear Elvarex compression glove and Maeve light arm sleeve prn during the day  Pain levels have remained the same  Discharge to HEP once patient is fitted with new garments  Pt measured for compression glove this date      Impairments: activity intolerance, impaired physical strength and pain with function  Other impairment: lymphedema, infections- cellulitis  Functional limitations: impaired IADLs Barriers to therapy: Financial and transportation   Understanding of Dx/Px/POC: good   Prognosis: good    Goals  STGs (4 weeks):  1  Patient will be independent in performing HEP   ONGOING  2  Patient will demonstrate a decrease of 1 cm circumference in RUE  GOAL MET  LTG (8 weeks):   1  Patient will demonstrate 2/10 pain level during work tasks  ONGOING  2  Patient will not have any further flare-ups of cellulitis in the [facility]  Patient would benefit from: skilled occupational therapy  Planned therapy interventions: manual therapy, patient education, compression, home exercise program, muscle pump exercises, orthotic fitting/training and self care  Frequency: 1x week  Duration in weeks: 6  Plan of Care beginning date: 11/21/2018  Plan of Care expiration date: 1/2/2019  Treatment plan discussed with: patient        Subjective Evaluation    History of Present Illness  Date of onset: 9/4/2018 (exacerbated cellulitis in RUE)  Date of surgery: 12 years ago last surgery onset of right ue lymphedema   Mechanism of injury: The pt is a 59year old female with 12 year hx of lymphedema in RUE and recurrent cellulitis  Since December 2017 she has been hospitalization once every 4 weeks per her report with the most recent hospitalization on 9/4  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 and has undergone 17 surgeries to her RUE including extensive skin grafting on the upper arm  She is cancer free at this time however she has residual lymphedema and chronic infections  Patient had maintained RUE decongestion for many years with a 4 chamber compression pump and class 1 Jobst sleeve  She developed a second degree burn to her right scapula in the fall of 2017 and this caused and increase in her lymphedema  Her compression pump also failed at that time  Patient under went a course of lymphedema theray and received a Tribute night time gamrnet, Jobst Elvarex garment and a new Flexitouch pump  The Flexitouch was ineffective and patient started to develop chronic cellulitis infections requiring multiple hospitalizations  Patient now has a 4 chamber pump set on lowest setting that she is using 3x/day  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 requiring her to rest before again working for 1/2 - 1hour several times a day  Patient had a course of PT lymphedema therapy, but is now transfering to a facility closer to her home  10/18/18:  Patient reports her arm is feeling good and she that she is managing her lymphedema well now  She continues to wear her Tribute arm sleeve at most times and has been using her compression pump daily    18: Pt wears Tribute nighttime garment for all functional activities at this point  She continues to wear her Elvarex glove, and Bellalight arm sleeve  She still uses her compression pump 3x/day  Pain has not increased although she accidentally burnt her hand while baking, and now R forearm has increased swelling  Pt now reports for RE and treatment  Recurrent probem    Quality of life: good    Pain  Current pain ratin  At best pain ratin  At worst pain rating: 10  Location: R UE  Quality: bruising    Relieving factors: medications (compression pump )  Aggravating factors: overhead activity and lifting  Progression: no change    Social Support  Lives with: spouse    Employment status: working (self-employed)  Hand dominance: right  Life stress: frequent hospitalizations     Treatments  Previous treatment: occupational therapy and physical therapy (compression pump, tribute nightime garmet, Jobst daytime garment)  Discharged from (in last 30 days): inpatient hospitalization  Patient Goals  Patient goals for therapy: decreased edema, decreased pain and independence with ADLs/IADLs  Patient goal: no more returning to hospital, no more infections,         Objective     General Comments     Ankle/Foot Comments   Circumference measurements (centimeters)  Date: 9/20/2018  RUE affected  +0cm     R =   18 5cm      L =  19cm  +4 cm    R =  17 5 cm      L = 16 5cm  +8cm:    R =   19 cm        L = 17cm  +12 cm  R =  21 8  cm     L = 19cm  +16 cm  R =  25 cm         L = 22 5cm  +20 cm  R =  27 5 cm      L = 24 5cm  +24 cm  R = 27 7  cm      L = 25 5cm  +28 cm  R = 27 cm          L = 25 cm  +32 cm  R =  26 5  cm     L = 25cm  +36 cm  R =  27 5  cm     L = 26 1cm  +40 cm  R = 27 3   cm     L = 27cm  +44 cm  R = 27 5  cm      L =29cm  +48 cm  R =  28 cm         L =32cm    -Stemmer's sign  Skin color and temp WNL  Well healed skin graft right lateral humerus  AROM digits, wrist, forearm and elbow is WNL  Impaired shoulder flexion and abduction related to old surgeries  10/18/18: Circumference measurements (centimeters)    MCPs     R = 18 5             Wrists    R = 17            +8cm:    R = 18            +12 cm  R = 21            +16 cm  R = 24 3            +20 cm  R = 26 8            +24 cm  R = 27 5           +28 cm  R = 26           +32 cm  R = 25 7            +36 cm  R = 26 3           +40 cm  R = 26            +44 cm  R = 25 5            +48 cm  R = 26 5              Skin color and temp are WNL  Brawny edema in forearm  No pitting in hand    11/21/18:  Circumference measurements:  MCPs     R = 18 8            Wrists    R = 18            +8cm:    R = 19 4            +12 cm  R = 21 5            +16 cm  R = 24 7            +20 cm  R = 27 2            +24 cm  R = 28           +28 cm  R = 26           +32 cm  R = 26            +36 cm  R = 26 5           +40 cm  R = 26         +44 cm  R = 26           +48 cm  R = 27 3      Circumference measurements increased 0 5 cm on average  Healing burn on dorsum of right hand    Brawny edema right forearm         Precautions: lymphedema, cellulitis, frequent infections     Specialty Daily Treatment Diary      Manual  10/26  11/9  11/21  10/9  10/18   MLD trunk and RUE 40'  30'  30'  30'  30'    IASTM    10'  10'  15'  8    Kinesio tape    trunk 5'                                           Exercise Diary   9/25     10/4  10/26  11/21      Orthotic fit/train    Patient instructed in [facility] wear, application, proper fit  Measured for Elvarex soft compression sleeve  Measured for Elvarex compression glove                                                                                                                                                                                                                                                                                     Modalities

## 2018-12-03 ENCOUNTER — OFFICE VISIT (OUTPATIENT)
Dept: OCCUPATIONAL THERAPY | Facility: CLINIC | Age: 58
End: 2018-12-03
Payer: COMMERCIAL

## 2018-12-03 DIAGNOSIS — I89.0 LYMPHEDEMA OF RIGHT ARM: Primary | ICD-10-CM

## 2018-12-03 PROCEDURE — 97140 MANUAL THERAPY 1/> REGIONS: CPT

## 2018-12-03 NOTE — PROGRESS NOTES
Daily Note     Today's date: 12/3/2018  Patient name: Jaime Alarcon  : 1960  MRN: [id number]  Referring provider: Parul De La Torre MD  Dx:   Encounter Diagnosis     ICD-10-CM    1  Lymphedema of right arm I89 0         18:  G codes put in this date  Used scores from previous session/reeval on 18  Subjective: I've been wearing the sleeve all the time      Objective: See treatment diary below   Precautions: lymphedema, cellulitis, frequent infections     Specialty Daily Treatment Diary      Manual  10/26  11/9  11/21  12/3  10/18   MLD trunk and RUE 40'  30'  30'  45'  30'    IASTM    10'  10'    8    Kinesio tape    trunk 5'                                           Exercise Diary   9/25     10/4  10/26  11/21      Orthotic fit/train    Patient instructed in [facility] wear, application, proper fit  Measured for Elvarex soft compression sleeve  Measured for Elvarex compression glove                                                                                                                                                                                                                                                                                     Modalities                                                              Assessment: Tolerated treatment well  Patient would benefit from continued OT  Re measured arm sleeve and measurements are very close to previous measurements  Will send measurements to [facility] to check insurance coverage  Tx on hold until sleeve becomes available      Plan: Potential discharge next visit

## 2018-12-31 PROCEDURE — G8985 CARRY GOAL STATUS: HCPCS

## 2018-12-31 PROCEDURE — G8984 CARRY CURRENT STATUS: HCPCS

## 2019-02-11 ENCOUNTER — OFFICE VISIT (OUTPATIENT)
Dept: OCCUPATIONAL THERAPY | Facility: CLINIC | Age: 59
End: 2019-02-11
Payer: COMMERCIAL

## 2019-02-11 DIAGNOSIS — I89.0 LYMPHEDEMA OF RIGHT ARM: Primary | ICD-10-CM

## 2019-02-11 PROCEDURE — 97140 MANUAL THERAPY 1/> REGIONS: CPT

## 2019-02-11 NOTE — PROGRESS NOTES
OT Re-Evaluation     Today's date: 2019  Patient name: Jatinder Suggs  : 1960  MRN: [id number]  Referring provider: Yaquelin Andersen MD  Dx:   Encounter Diagnosis     ICD-10-CM    1  Lymphedema of right arm I89 0                   Assessment  Assessment details: The pt is a 59year old female with a 10 hx of lymphedema in the RUE and chronic cellulitis infections  Frequent hospitalizations s/p 17 surgeries and 8 radiation phases for treatment of desmoitosis  The pt presents with stage 2 lymphedema and c/o pain with activity in addition to hospitalizations every 4 weeks for cellulits  Patient has been maintaining her arm volume using a 4 chamber compression pump 3-4 times a day  She uses a Tribute compression garment at night and occasionally during the day  This patient is a good candidate for OT services to [facility] and to educate patient in a HEP in order to maintain arm volume and prevent infections  10/18/18:  Patient demonstrates excellent reduction in RUE circumference measurements  Skin color and temp are WNL with no cellulitis infections in over 3 weeks  Patient has been compliant with elevation, compression pump and Tribute garment for home program   Skin color and temp are WNL  Patient will be measured for compression garments next tx session  Discharge to HEP once patient is fitted with new garments  18: Pt demonstrates increased congestion in the R forearm after accidentally burning herself while baking  R upper arm has maintained decongestion  Healing burn on the dorsum of the right hand  Skin color and temp are WNL  She is still compliant with elevation, compression pump and Tribute garment  She also continues to ear Elvarex compression glove and Maeve light arm sleeve prn during the day  Pain levels have remained the same  Discharge to HEP once patient is fitted with new garments  Pt measured for compression glove this date        19:  Patient had delay in getting Elvarex soft compression garments due to poor service from one vendor  The garment was ordered one week ago from a new vendor  Patient is appealing to her insurance company for coverage of the garment  This custom garment is necessary as an off the shelf circular knit garment is not firm enough to contain her lymphedema  Patient also needs a new compression pump with some coverage across the chest and upper back  The pump previously issued has failed and has not contained her lymphedema and has not prevented cellulitis infections in the year she has been using it  Her current pump is insufficient to control Ms Matias's lymphedema  Patient would benefit from OT 1x/wk to reduce lymphedema that has re accumulated in the proximal forearm  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  Impairments: activity intolerance, impaired physical strength and pain with function  Other impairment: lymphedema, infections- cellulitis  Functional limitations: impaired IADLs Barriers to therapy: Financial and transportation   Understanding of Dx/Px/POC: good   Prognosis: good    Goals  STGs (4 weeks):  1  Patient will be independent in performing HEP   ONGOING  2  Patient will demonstrate a decrease of 1 cm circumference in RUE  GOAL MET  LTG (8 weeks):   1  Patient will demonstrate 2/10 pain level during work tasks  ONGOING  2  Patient will not have any further flare-ups of cellulitis in the RUE  GOAL NOT MET    MOST RECENT FLAIR UP 12/2018    Plan  Patient would benefit from: skilled occupational therapy  Planned therapy interventions: manual therapy, patient education, compression, home exercise program, muscle pump exercises, orthotic fitting/training and self care  Frequency: 1x week  Duration in weeks: 4  Plan of Care beginning date: 2/11/2019  Plan of Care expiration date: 3/15/2019  Treatment plan discussed with: patient        Subjective Evaluation    History of Present Illness  Date of onset: 9/4/2018 (exacerbated cellulitis in RUE)  Date of surgery: 12 years ago last surgery onset of right ue lymphedema   Mechanism of injury: The pt is a 59year old female with 12 year hx of lymphedema in RUE and recurrent cellulitis  Since December 2017 she has been hospitalization once every 4 weeks per her report with the most recent hospitalization on 9/4  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 and has undergone 17 surgeries to her RUE including extensive skin grafting on the upper arm  She is cancer free at this time however she has residual lymphedema and chronic infections  Patient had maintained RUE decongestion for many years with a 4 chamber compression pump and class 1 Jobst sleeve  She developed a second degree burn to her right scapula in the fall of 2017 and this caused and increase in her lymphedema  Her compression pump also failed at that time  Patient under went a course of lymphedema theray and received a Tribute night time gamrnet, Jobst Elvarex garment and a new Flexitouch pump  The Flexitouch was ineffective and patient started to develop chronic cellulitis infections requiring multiple hospitalizations  Patient now has a 4 chamber pump set on lowest setting that she is using 3x/day  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 requiring her to rest before again working for 1/2 - 1hour several times a day  Patient had a course of PT lymphedema therapy, but is now transfering to a facility closer to her home  10/18/18:  Patient reports her arm is feeling good and she that she is managing her lymphedema well now  She continues to wear her Tribute arm sleeve at most times and has been using her compression pump daily    11/21/18: Pt wears Tribute nighttime garment for all functional activities at this point  She continues to wear her Elvarex glove, and Bellalight arm sleeve   She still uses her compression pump 3x/day  Pain has not increased although she accidentally burnt her hand while baking, and now R forearm has increased swelling  Pt now reports for RE and treatment  19:  I've been using my pump, but my arm feels tight and swollen  I ordered the Elvarex compression sleeve  Hopefully I'll get in the next two weeks  I just feel like my arm is getting swollen again            Recurrent probem    Quality of life: good    Pain  Current pain ratin  At best pain ratin  At worst pain rating: 10  Location: R UE  Quality: bruising  Relieving factors: medications (compression pump )  Aggravating factors: overhead activity and lifting  Progression: no change    Social Support  Lives with: spouse    Employment status: working (self-employed)  Hand dominance: right  Life stress: frequent hospitalizations     Treatments  Previous treatment: occupational therapy and physical therapy (compression pump, tribute nightime garmet, Jobst daytime garment)  Discharged from (in last 30 days): inpatient hospitalization  Patient Goals  Patient goals for therapy: decreased edema, decreased pain and independence with ADLs/IADLs  Patient goal: no more returning to hospital, no more infections,         Objective     General Comments: Ankle/Foot Comments   Circumference measurements (centimeters)  Date: 2018  RUE affected  +0cm     R =   18 5cm      L =  19cm  +4 cm    R =  17 5 cm      L = 16 5cm  +8cm:    R =   19 cm        L = 17cm  +12 cm  R =  21 8  cm     L = 19cm  +16 cm  R =  25 cm         L = 22 5cm  +20 cm  R =  27 5 cm      L = 24 5cm  +24 cm  R = 27 7  cm      L = 25 5cm  +28 cm  R = 27 cm          L = 25 cm  +32 cm  R =  26 5  cm     L = 25cm  +36 cm  R =  27 5  cm     L = 26 1cm  +40 cm  R = 27 3   cm     L = 27cm  +44 cm  R = 27 5  cm      L =29cm  +48 cm  R =  28 cm         L =32cm    -Stemmer's sign  Skin color and temp WNL  Well healed skin graft right lateral humerus    AROM digits, wrist, forearm and elbow is WNL  Impaired shoulder flexion and abduction related to old surgeries  10/18/18: Circumference measurements (centimeters)    MCPs     R = 18 5             Wrists    R = 17            +8cm:    R = 18            +12 cm  R = 21            +16 cm  R = 24 3            +20 cm  R = 26 8            +24 cm  R = 27 5           +28 cm  R = 26           +32 cm  R = 25 7            +36 cm  R = 26 3           +40 cm  R = 26            +44 cm  R = 25 5            +48 cm  R = 26 5              Skin color and temp are WNL  Brawny edema in forearm  No pitting in hand    11/21/18:  Circumference measurements:  MCPs     R = 18 8            Wrists    R = 18            +8cm:    R = 19 4            +12 cm  R = 21 5            +16 cm  R = 24 7            +20 cm  R = 27 2            +24 cm  R = 28           +28 cm  R = 26           +32 cm  R = 26            +36 cm  R = 26 5           +40 cm  R = 26         +44 cm  R = 26           +48 cm  R = 27 3      Circumference measurements increased 0 5 cm on average  Healing burn on dorsum of right hand  Brawny edema right forearm    2/11/19:  Circumference measurements (centimeters)    MCPs     R =18 3            wrists     R=17            +8cm:    R =18            +12 cm  R =20 3           +16 cm  R =23 3            +20 cm  R =26 7            +24 cm  R =28 3             +28 cm  R =26 5            +32 cm  R =26             +36 cm  R =27             +40 cm  R =26 5             +44 cm  R =26            +48 cm  R =27      -Stemmer's sign  Fibrosis and indurated tissues in proximal forearm                 Precautions: lymphedema, cellulitis, frequent infections     Specialty Daily Treatment Diary      Manual  10/26  11/9  11/21  2/11  10/18   MLD trunk and RUE 40'  30'  30'  30'  30'    IASTM    10'  10'  10'  8    Kinesio tape    trunk 5'                                           Exercise Diary   9/25     10/4  10/26  11/21      Orthotic fit/train    Patient instructed in Tribute garment wear, application, proper fit  Measured for Elvarex soft compression sleeve  Measured for Elvarex compression glove                                                                                                                                                                                                                                                                                     Modalities

## 2019-02-18 ENCOUNTER — OFFICE VISIT (OUTPATIENT)
Dept: OCCUPATIONAL THERAPY | Facility: CLINIC | Age: 59
End: 2019-02-18
Payer: COMMERCIAL

## 2019-02-18 DIAGNOSIS — I89.0 LYMPHEDEMA OF RIGHT ARM: Primary | ICD-10-CM

## 2019-02-18 PROCEDURE — 97140 MANUAL THERAPY 1/> REGIONS: CPT

## 2019-02-18 NOTE — PROGRESS NOTES
Daily Note     Today's date: 2019  Patient name: Linda Kimble  : 1960  MRN: [id number]  Referring provider: Francisca James MD  Dx:   Encounter Diagnosis     ICD-10-CM    1  Lymphedema of right arm I89 0                   Subjective: I want to see if I can get a new Tribute garment  This one is kind of worn      Objective: See treatment diary below   Precautions: lymphedema, cellulitis, frequent infections     Specialty Daily Treatment Diary      Manual  10/26  11/9  11/21  2/11  2/18   MLD trunk and RUE 40'  30'  30'  30'  30'    IASTM    10'  10'  10'  8    Kinesio tape    trunk 5'                                           Exercise Diary   9/25     10/4  10/26  11/21      Orthotic fit/train    Patient instructed in [facility] wear, application, proper fit  Measured for Elvarex soft compression sleeve  Measured for Elvarex compression glove                                                                                                                                                                                                                                                                                     Modalities                                                                                    Assessment: Tolerated treatment well  Patient would benefit from continued OT  Therapist will check coverage for new Tribute arm sleeve      Plan: Continue per plan of care

## 2019-02-26 ENCOUNTER — OFFICE VISIT (OUTPATIENT)
Dept: OCCUPATIONAL THERAPY | Facility: CLINIC | Age: 59
End: 2019-02-26
Payer: COMMERCIAL

## 2019-02-26 DIAGNOSIS — I89.0 LYMPHEDEMA OF RIGHT ARM: Primary | ICD-10-CM

## 2019-02-26 PROCEDURE — 97140 MANUAL THERAPY 1/> REGIONS: CPT

## 2019-02-26 PROCEDURE — 97760 ORTHOTIC MGMT&TRAING 1ST ENC: CPT

## 2019-02-27 NOTE — PROGRESS NOTES
Daily Note     Today's date: 2019  Patient name: Saranya Osullivan  : 1960  MRN: [id number]  Referring provider: Vandana Grove MD  Dx:   Encounter Diagnosis     ICD-10-CM    1  Lymphedema of right arm I89 0                   Subjective: This sleeve pretty good  It's pretty good and not too hard to put on      Objective: See treatment diary below   Precautions: lymphedema, cellulitis, frequent infections     Specialty Daily Treatment Diary      Manual     MLD trunk and RUE 30'  30'  30'  30'  30'    IASTM    10'  10'  10'  8    Kinesio tape    trunk 5'                                           Exercise Diary   9/25     10/4  10/26  11/21  2/16    Orthotic fit/train    Patient instructed in [facility] wear, application, proper fit  Measured for Elvarex soft compression sleeve  Measured for Elvarex compression glove  fit elvarex sleeve and glove                                                                                                                                                                                                                                                                                   Modalities                                                                                 Assessment: Tolerated treatment well  Patient would benefit from continued OT  Elvarex sleeve fits well, and patient able to don and doff independently  Patient voiced understanding of garment wear and care  Compression glove too long in the digits and will be returned for adjustments  Plan: Continue per plan of care

## 2019-03-11 ENCOUNTER — OFFICE VISIT (OUTPATIENT)
Dept: OCCUPATIONAL THERAPY | Facility: CLINIC | Age: 59
End: 2019-03-11
Payer: COMMERCIAL

## 2019-03-11 DIAGNOSIS — I89.0 LYMPHEDEMA OF RIGHT ARM: Primary | ICD-10-CM

## 2019-03-11 PROCEDURE — 97140 MANUAL THERAPY 1/> REGIONS: CPT

## 2019-03-11 PROCEDURE — 97760 ORTHOTIC MGMT&TRAING 1ST ENC: CPT

## 2019-03-11 PROCEDURE — 97110 THERAPEUTIC EXERCISES: CPT

## 2019-03-12 NOTE — PROGRESS NOTES
OT Re-Evaluation     Today's date: 3/11/2019  Patient name: Thomas Whitley  : 1960  MRN: [id number]  Referring provider: Lavern Merchant MD  Dx:   Encounter Diagnosis     ICD-10-CM    1  Lymphedema of right arm I89 0                   Assessment  Assessment details: The pt is a 59year old female with a 10 hx of lymphedema in the RUE and chronic cellulitis infections  Frequent hospitalizations s/p 17 surgeries and 8 radiation phases for treatment of desmoitosis  The pt presents with stage 2 lymphedema and c/o pain with activity in addition to hospitalizations every 4 weeks for cellulits  Patient has been maintaining her arm volume using a 4 chamber compression pump 3-4 times a day  She uses a Tribute compression garment at night and occasionally during the day  This patient is a good candidate for OT services to [facility] and to educate patient in a HEP in order to maintain arm volume and prevent infections  10/18/18:  Patient demonstrates excellent reduction in RUE circumference measurements  Skin color and temp are WNL with no cellulitis infections in over 3 weeks  Patient has been compliant with elevation, compression pump and Tribute garment for home program   Skin color and temp are WNL  Patient will be measured for compression garments next tx session  Discharge to HEP once patient is fitted with new garments  18: Pt demonstrates increased congestion in the R forearm after accidentally burning herself while baking  R upper arm has maintained decongestion  Healing burn on the dorsum of the right hand  Skin color and temp are WNL  She is still compliant with elevation, compression pump and Tribute garment  She also continues to ear Elvarex compression glove and Maeve light arm sleeve prn during the day  Pain levels have remained the same  Discharge to HEP once patient is fitted with new garments  Pt measured for compression glove this date        19:  Patient had delay in getting Elvarex soft compression garments due to poor service from one vendor  The garment was ordered one week ago from a new vendor  Patient is appealing to her insurance company for coverage of the garment  This custom garment is necessary as an off the shelf circular knit garment is not firm enough to contain her lymphedema  Patient also needs a new compression pump with some coverage across the chest and upper back  The pump previously issued has failed and has not contained her lymphedema and has not prevented cellulitis infections in the year she has been using it  Her current pump is insufficient to control Ms Matias's lymphedema  Patient would benefit from OT 1x/wk to reduce lymphedema that has re accumulated in the proximal forearm  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  3/11/19:  Patient has been fit with new Elvarex sleeve and gloves and both of these items fit well  Patient will gradually increase wear time so that she is wearing them during waking hours except to bathe and use compression pump  Patient measured for a new Tribute night time garment as her previous garment is 3years old and worn  Patient would still benefit from new compression pump as current pump is not effective  Some increase in edema noted in the forearm as patient has not been wearing new compression garment full time yet  Patient does have some right lateral epicondyle pain when drawing 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  She was instructed in stretches to be performed 3x/day and issued a pen  so that she does not hold pencil as tightly  Follow up appointment when Rebeca Casey becomes available    Impairments: activity intolerance, impaired physical strength and pain with function  Other impairment: lymphedema, infections- cellulitis  Functional limitations: impaired IADLs Barriers to therapy: Financial and transportation   Understanding of Dx/Px/POC: good   Prognosis: good    Goals  STGs (4 weeks):  1  Patient will be independent in performing HEP   ONGOING  2  Patient will demonstrate a decrease of 1 cm circumference in RUE  GOAL MET  LTG (8 weeks):   1  Patient will demonstrate 2/10 pain level during work tasks  ONGOING  2  Patient will not have any further flare-ups of cellulitis in the RUE  GOAL  MET  MOST RECENT FLAIR UP 12/2018    Plan  Patient would benefit from: skilled occupational therapy  Planned therapy interventions: manual therapy, patient education, compression, home exercise program, muscle pump exercises, orthotic fitting/training, self care and stretching  Frequency: 1x week  Duration in weeks: 4  Plan of Care beginning date: 3/11/2019  Plan of Care expiration date: 4/12/2019  Treatment plan discussed with: patient        Subjective Evaluation    History of Present Illness  Date of onset: 9/4/2018 (exacerbated cellulitis in RUE)  Date of surgery: 12 years ago last surgery onset of right ue lymphedema   Mechanism of injury: The pt is a 59year old female with 12 year hx of lymphedema in RUE and recurrent cellulitis  Since December 2017 she has been hospitalization once every 4 weeks per her report with the most recent hospitalization on 9/4  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 and has undergone 17 surgeries to her RUE including extensive skin grafting on the upper arm  She is cancer free at this time however she has residual lymphedema and chronic infections  Patient had maintained RUE decongestion for many years with a 4 chamber compression pump and class 1 Jobst sleeve  She developed a second degree burn to her right scapula in the fall of 2017 and this caused and increase in her lymphedema  Her compression pump also failed at that time  Patient under went a course of lymphedema theray and received a Tribute night time gamrnet, Jobst Elvarex garment and a new Flexitouch pump    The Flexitouch was ineffective and patient started to develop chronic cellulitis infections requiring multiple hospitalizations  Patient now has a 4 chamber pump set on lowest setting that she is using 3x/day  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 requiring her to rest before again working for 1/2 - 1hour several times a day  Patient had a course of PT lymphedema therapy, but is now transfering to a facility closer to her home  10/18/18:  Patient reports her arm is feeling good and she that she is managing her lymphedema well now  She continues to wear her Tribute arm sleeve at most times and has been using her compression pump daily    18: Pt wears Tribute nighttime garment for all functional activities at this point  She continues to wear her Elvarex glove, and Bellalight arm sleeve  She still uses her compression pump 3x/day  Pain has not increased although she accidentally burnt her hand while baking, and now R forearm has increased swelling  Pt now reports for RE and treatment  19:  I've been using my pump, but my arm feels tight and swollen  I ordered the Elvarex compression sleeve  Hopefully I'll get in the next two weeks  I just feel like my arm is getting swollen again      3/11/19:  Patient states her new Elvarex compression sleeve and glove fit well  She would like a new Tribute garment as the old garment is worn  Patient complains of pain in the forearm to elbow when drawing          Recurrent probem    Quality of life: good    Pain  Current pain ratin  At best pain ratin  At worst pain rating: 10  Location: R UE  Quality: bruising    Relieving factors: medications (compression pump )  Aggravating factors: overhead activity and lifting  Progression: no change    Social Support  Lives with: spouse    Employment status: working (self-employed)  Hand dominance: right  Life stress: frequent hospitalizations     Treatments  Previous treatment: occupational therapy and physical therapy (compression pump, tribute nightime garmet, Jobst daytime garment)  Discharged from (in last 30 days): inpatient hospitalization  Patient Goals  Patient goals for therapy: decreased edema, decreased pain and independence with ADLs/IADLs  Patient goal: no more returning to hospital, no more infections,         Objective     General Comments: Ankle/Foot Comments   Circumference measurements (centimeters)  Date: 9/20/2018  RUE affected  +0cm     R =   18 5cm      L =  19cm  +4 cm    R =  17 5 cm      L = 16 5cm  +8cm:    R =   19 cm        L = 17cm  +12 cm  R =  21 8  cm     L = 19cm  +16 cm  R =  25 cm         L = 22 5cm  +20 cm  R =  27 5 cm      L = 24 5cm  +24 cm  R = 27 7  cm      L = 25 5cm  +28 cm  R = 27 cm          L = 25 cm  +32 cm  R =  26 5  cm     L = 25cm  +36 cm  R =  27 5  cm     L = 26 1cm  +40 cm  R = 27 3   cm     L = 27cm  +44 cm  R = 27 5  cm      L =29cm  +48 cm  R =  28 cm         L =32cm    -Stemmer's sign  Skin color and temp WNL  Well healed skin graft right lateral humerus  AROM digits, wrist, forearm and elbow is WNL  Impaired shoulder flexion and abduction related to old surgeries  10/18/18: Circumference measurements (centimeters)    MCPs     R = 18 5             Wrists    R = 17            +8cm:    R = 18            +12 cm  R = 21            +16 cm  R = 24 3            +20 cm  R = 26 8            +24 cm  R = 27 5           +28 cm  R = 26           +32 cm  R = 25 7            +36 cm  R = 26 3           +40 cm  R = 26            +44 cm  R = 25 5            +48 cm  R = 26 5              Skin color and temp are WNL  Brawny edema in forearm    No pitting in hand    11/21/18:  Circumference measurements:  MCPs     R = 18 8            Wrists    R = 18            +8cm:    R = 19 4            +12 cm  R = 21 5            +16 cm  R = 24 7            +20 cm  R = 27 2            +24 cm  R = 28           +28 cm  R = 26           +32 cm  R = 26            +36 cm  R = 26 5           +40 cm  R = 26 +44 cm  R = 26           +48 cm  R = 27 3      Circumference measurements increased 0 5 cm on average  Healing burn on dorsum of right hand  Brawny edema right forearm    2/11/19:  Circumference measurements (centimeters)    MCPs     R =18 3            wrists     R=17            +8cm:    R =18            +12 cm  R =20 3           +16 cm  R =23 3            +20 cm  R =26 7            +24 cm  R =28 3             +28 cm  R =26 5            +32 cm  R =26             +36 cm  R =27             +40 cm  R =26 5             +44 cm  R =26            +48 cm  R =27      -Stemmer's sign  Fibrosis and indurated tissues in proximal forearm  3/11/19  Circumference measurements (centimeters)    MCPs     R = 18 5            Wrists    R = 17 5           +8cm:    R = 19            +12 cm  R = 21 2           +16 cm  R = 25 2           +20 cm  R = 28            +24 cm  R = 28            +28 cm  R = 27            +32 cm  R = 26 5             +36 cm  R = 26 5           +40 cm  R = 26            +44 cm  R = 26          +48 cm  R = 26 5        Fibrosis and indurated tissue right forearm  Skin color and temp WNL  -Stemmer's sign   + Cozen's and Mill's test right lateral epicondyle                 Precautions: lymphedema, cellulitis, frequent infections     Specialty Daily Treatment Diary      Manual  2/26   3/11  11/21  2/11  2/18   MLD trunk and RUE 30'  30'  30'  30'  30'    IASTM      10'  10'  8   [facility] tape                                               Exercise Diary   3/11     10/4  10/26  11/21  2/16    Orthotic fit/train  Elvarex glove fit    Measured for Tribute garment  Patient instructed in [facility] wear, application, proper fit  Measured for Elvarex soft compression sleeve  Measured for Elvarex compression glove  fit elvarex sleeve and glove    Wrist flexion stretch  5' x 10            Wrist extension stretch  5' x 10                                                                                                                                                                                                                                                               Modalities

## 2019-09-25 NOTE — PROGRESS NOTES
9/25/19:  Patient received her Tribute garment and she reports it fits well  She is able to self manage her lymphedema  Discharge to HEP

## 2020-03-16 ENCOUNTER — EVALUATION (OUTPATIENT)
Dept: OCCUPATIONAL THERAPY | Facility: CLINIC | Age: 60
End: 2020-03-16
Payer: COMMERCIAL

## 2020-03-16 DIAGNOSIS — I89.0 LYMPHEDEMA OF RIGHT ARM: Primary | ICD-10-CM

## 2020-03-16 PROCEDURE — 97165 OT EVAL LOW COMPLEX 30 MIN: CPT

## 2020-03-16 PROCEDURE — 97140 MANUAL THERAPY 1/> REGIONS: CPT

## 2020-03-16 NOTE — PROGRESS NOTES
OT Evaluation     Today's date: 3/16/2020  Patient name: Abigail Bhakta  : 1960  MRN: [id number]  Referring provider: Vianney Mock MD  Dx:   Encounter Diagnosis     ICD-10-CM    1  Lymphedema of right arm I89 0                   Assessment  Assessment details: This is a 62year old, right hand dominant female being seen for a flair up of RUE lymphedema due to a recent cellulitis infection  This is the first cellulitis infection the patient has had in approximately 5 months  Patient presents with brawny edema in the right forearm from wrist to elbow  Skin color and temperature are WNL  Patient has been using compression sleeve and glove during the day,  Night time Jobst Relief garment, compression pump daily and elevating and resting the arm throughout the day  This patient is a good candidate for a short course of OT to decongest the right forearm to reduce RUE lymphedema  Impairments: abnormal or restricted ROM, activity intolerance, impaired physical strength, lacks appropriate home exercise program and pain with function  Other impairment: Stage 2 lymphedema with chronic cellulitis  Functional limitations: Impaired perform of work and home management tasks due to edema and frequent infections  Limited ability to lift due to mm weakness  Symptom irritability: moderateBarriers to therapy: Chronic cellulitis infections  Understanding of Dx/Px/POC: excellent   Prognosis: good    Goals  STG/LTGs ( 4-6 weeks)  1  Patient will be independent in HEP to maintain RUE decongestion at discharge  2    Patient will demonstrate 0 5cm reduction in right forearm circumference measurements to prevent infection    Plan  Patient would benefit from: lymphedema eval and skilled occupational therapy  Planned therapy interventions: manual therapy, muscle pump exercises, patient education, therapeutic exercise, activity modification, compression, graded activity, graded exercise and home exercise program  Other planned therapy interventions: skin care, MLD  Frequency: 1x week  Duration in weeks: 6  Plan of Care beginning date: 3/16/2020  Plan of Care expiration date: 2020  Treatment plan discussed with: patient        Subjective Evaluation    History of Present Illness  Onset date: Original onset ~  Flair up due to cellulitis 3/6/20  Date of surgery: ~  Mechanism of injury: The pt is a 62year old female with 12 year hx of lymphedema in RUE and recurrent cellulitis  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 and has undergone 17 surgeries to her RUE including extensive skin grafting on the upper arm  She is cancer free at this time however she has residual lymphedema and chronic infections  Her most recent infection was 2 weeks ago  Patient had maintained RUE decongestion for many years with a compression pump, skin care, day and night time compression garments, exercise and elevation  She now presents to OT for a flair up of her RUE lymphedema related to her most recent cellulitis infection  Recurrent probem    Quality of life: good    Pain  Current pain ratin  At best pain ratin  At worst pain ratin  Location: RUE  Quality: knife-like and radiating  Exacerbated by: cellulitis or over using the RUE  Progression: no change    Social Support  Lives with: spouse    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)  Hand dominance: right    Treatments  Treatments tried: compression pump, self MLD, compression garments  Patient Goals  Patient goals for therapy: decreased edema, decreased pain, increased motion and independence with ADLs/IADLs  Patient goal: Reduce RUE edema        Objective     General Comments:       Ankle/Foot Comments   3/16/20  Circumference measurements (centimeters)  Index      R = 6              L = 6  MTPs     R = 18 5         L = 18 5  Ankles    R = 16 8         L = 16  +8cm:    R = 19            L = 18 5  +12 cm  R = 23 5         L = 18 7  +16 cm  R = 26 5 L = 21 5  +20 cm  R = 29            L = 23 5  +24 cm  R = 29            L = 24 5  +28 cm  R = 26            L = 24 5  +32 cm  R = 26            L = 24  +36 cm  R = 26 7         L = 25  +40 cm  R = 26            L = 26  +44 cm  R = 26 3         L = 27  +48 cm  R = 28            L = 29  +52 cm  R = 29 5         L = 32   -Stemmer's sing  No pitting edema  Brawny edema in forearm  No edema in hand or proximal to the elbow                 Precautions: Lymphedema; chronic cellulitis infections      Manual  3/16            MLD trunk             MLD RUE 15'                                                       Exercise Diary                                                                                                                                                                                                                                                                                      Modalities

## 2020-03-16 NOTE — LETTER
2020    MD [place] [address]    Patient: Steven Calderon   YOB: 1960   Date of Visit: 3/16/2020     Encounter Diagnosis     ICD-10-CM    1  Lymphedema of right arm I89 0        Dear Dr Mindy Mendoza: Thank you for your recent referral of Steven Calderon  Please review the attached evaluation summary from Eileen's recent visit  Please verify that you agree with the plan of care by signing the attached order  If you have any questions or concerns, please do not hesitate to call  I sincerely appreciate the opportunity to share in the care of one of your patients and hope to have another opportunity to work with you in the near future  Sincerely,    Tristan Rodriguez OT      Referring Provider:     I certify that I have read the below Plan of Care and certify the need for these services furnished under this plan of treatment while under my care  Madyson Alejandra MD  [address] [place] [address]  VIA Facsimile: [phone]        OT Evaluation     Today's date: 3/16/2020  Patient name: Steven Calderon  : 1960  MRN: [id number]  Referring provider: Chance Cason MD  Dx:   Encounter Diagnosis     ICD-10-CM    1  Lymphedema of right arm I89 0                   Assessment  Assessment details: This is a 62year old, right hand dominant female being seen for a flair up of RUE lymphedema due to a recent cellulitis infection  This is the first cellulitis infection the patient has had in approximately 5 months  Patient presents with brawny edema in the right forearm from wrist to elbow  Skin color and temperature are WNL  Patient has been using compression sleeve and glove during the day,  Night time Jobst Relief garment, compression pump daily and elevating and resting the arm throughout the day    This patient is a good candidate for a short course of OT to decongest the right forearm to reduce RUE lymphedema  Impairments: abnormal or restricted ROM, activity intolerance, impaired physical strength, lacks appropriate home exercise program and pain with function  Other impairment: Stage 2 lymphedema with chronic cellulitis  Functional limitations: Impaired perform of work and home management tasks due to edema and frequent infections  Limited ability to lift due to mm weakness  Symptom irritability: moderateBarriers to therapy: Chronic cellulitis infections  Understanding of Dx/Px/POC: excellent   Prognosis: good    Goals  STG/LTGs ( 4-6 weeks)  1  Patient will be independent in HEP to maintain RUE decongestion at discharge  2  Patient will demonstrate 0 5cm reduction in right forearm circumference measurements to prevent infection    Plan  Patient would benefit from: lymphedema eval and skilled occupational therapy  Planned therapy interventions: manual therapy, muscle pump exercises, patient education, therapeutic exercise, activity modification, compression, graded activity, graded exercise and home exercise program  Other planned therapy interventions: skin care, MLD  Frequency: 1x week  Duration in weeks: 6  Plan of Care beginning date: 3/16/2020  Plan of Care expiration date: 5/1/2020  Treatment plan discussed with: patient        Subjective Evaluation    History of Present Illness  Onset date: Original onset ~2008  Flair up due to cellulitis 3/6/20  Date of surgery: ~2008  Mechanism of injury: The pt is a 62year old female with 12 year hx of lymphedema in RUE and recurrent cellulitis  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 and has undergone 17 surgeries to her RUE including extensive skin grafting on the upper arm  She is cancer free at this time however she has residual lymphedema and chronic infections  Her most recent infection was 2 weeks ago   Patient had maintained RUE decongestion for many years with a compression pump, skin care, day and night time compression garments, exercise and elevation  She now presents to OT for a flair up of her RUE lymphedema related to her most recent cellulitis infection  Recurrent probem    Quality of life: good    Pain  Current pain ratin  At best pain ratin  At worst pain ratin  Location: RUE  Quality: knife-like and radiating  Exacerbated by: cellulitis or over using the RUE  Progression: no change    Social Support  Lives with: spouse    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)  Hand dominance: right    Treatments  Treatments tried: compression pump, self MLD, compression garments  Patient Goals  Patient goals for therapy: decreased edema, decreased pain, increased motion and independence with ADLs/IADLs  Patient goal: Reduce RUE edema        Objective     General Comments: Ankle/Foot Comments   3/16/20  Circumference measurements (centimeters)  Index      R = 6              L = 6  MTPs     R = 18 5         L = 18 5  Ankles    R = 16 8         L = 16  +8cm:    R = 19            L = 18 5  +12 cm  R = 23 5         L = 18 7  +16 cm  R = 26 5         L = 21 5  +20 cm  R = 29            L = 23 5  +24 cm  R = 29            L = 24 5  +28 cm  R = 26            L = 24 5  +32 cm  R = 26            L = 24  +36 cm  R = 26 7         L = 25  +40 cm  R = 26            L = 26  +44 cm  R = 26 3         L = 27  +48 cm  R = 28            L = 29  +52 cm  R = 29 5         L = 32   -Stemmer's sing  No pitting edema  Brawny edema in forearm  No edema in hand or proximal to the elbow                 Precautions: Lymphedema; chronic cellulitis infections      Manual  3/16            MLD trunk             MLD RUE 15'                                                       Exercise Diary Modalities

## 2020-03-17 ENCOUNTER — TRANSCRIBE ORDERS (OUTPATIENT)
Dept: PHYSICAL THERAPY | Facility: CLINIC | Age: 60
End: 2020-03-17

## 2020-03-17 ENCOUNTER — TRANSCRIBE ORDERS (OUTPATIENT)
Dept: OCCUPATIONAL THERAPY | Facility: CLINIC | Age: 60
End: 2020-03-17

## 2020-03-17 DIAGNOSIS — I89.0 LYMPHEDEMA OF RIGHT ARM: Primary | ICD-10-CM

## 2020-03-24 ENCOUNTER — OFFICE VISIT (OUTPATIENT)
Dept: OCCUPATIONAL THERAPY | Facility: CLINIC | Age: 60
End: 2020-03-24
Payer: COMMERCIAL

## 2020-04-21 ENCOUNTER — TELEPHONE (OUTPATIENT)
Dept: OCCUPATIONAL THERAPY | Facility: CLINIC | Age: 60
End: 2020-04-21

## 2020-05-10 ENCOUNTER — HOSPITAL ENCOUNTER (EMERGENCY)
Facility: HOSPITAL | Age: 60
Discharge: HOME | End: 2020-05-10
Attending: EMERGENCY MEDICINE | Admitting: EMERGENCY MEDICINE
Payer: COMMERCIAL

## 2020-05-10 ENCOUNTER — APPOINTMENT (EMERGENCY)
Dept: CT IMAGING | Facility: HOSPITAL | Age: 60
End: 2020-05-10
Payer: COMMERCIAL

## 2020-05-10 ENCOUNTER — APPOINTMENT (EMERGENCY)
Dept: RADIOLOGY | Facility: HOSPITAL | Age: 60
End: 2020-05-10
Payer: COMMERCIAL

## 2020-05-10 VITALS
HEART RATE: 90 BPM | TEMPERATURE: 97.5 F | RESPIRATION RATE: 18 BRPM | DIASTOLIC BLOOD PRESSURE: 82 MMHG | HEIGHT: 68 IN | BODY MASS INDEX: 22.99 KG/M2 | WEIGHT: 151.68 LBS | SYSTOLIC BLOOD PRESSURE: 174 MMHG | OXYGEN SATURATION: 98 %

## 2020-05-10 DIAGNOSIS — K52.9 ACUTE GASTROENTERITIS: Primary | ICD-10-CM

## 2020-05-10 DIAGNOSIS — R11.2 NAUSEA, VOMITING, AND DIARRHEA: ICD-10-CM

## 2020-05-10 DIAGNOSIS — E86.0 MILD DEHYDRATION: ICD-10-CM

## 2020-05-10 DIAGNOSIS — R19.7 NAUSEA, VOMITING, AND DIARRHEA: ICD-10-CM

## 2020-05-10 DIAGNOSIS — K76.89 LIVER CYST: ICD-10-CM

## 2020-05-10 LAB
ALBUMIN SERPL BCP-MCNC: 4.4 G/DL (ref 3.5–5)
ALP SERPL-CCNC: 144 U/L (ref 46–116)
ALT SERPL W P-5'-P-CCNC: 26 U/L (ref 12–78)
ANION GAP SERPL CALCULATED.3IONS-SCNC: 15 MMOL/L (ref 4–13)
AST SERPL W P-5'-P-CCNC: 23 U/L (ref 5–45)
BACTERIA UR QL AUTO: ABNORMAL /HPF
BASOPHILS # BLD AUTO: 0.04 THOUSANDS/ΜL (ref 0–0.1)
BASOPHILS NFR BLD AUTO: 0 % (ref 0–1)
BILIRUB DIRECT SERPL-MCNC: 0.25 MG/DL (ref 0–0.2)
BILIRUB SERPL-MCNC: 1.1 MG/DL (ref 0.2–1)
BILIRUB UR QL STRIP: ABNORMAL
BUN SERPL-MCNC: 8 MG/DL (ref 5–25)
CALCIUM SERPL-MCNC: 9.7 MG/DL (ref 8.3–10.1)
CHLORIDE SERPL-SCNC: 98 MMOL/L (ref 100–108)
CLARITY UR: CLEAR
CO2 SERPL-SCNC: 25 MMOL/L (ref 21–32)
COLOR UR: YELLOW
CREAT SERPL-MCNC: 0.76 MG/DL (ref 0.6–1.3)
EOSINOPHIL # BLD AUTO: 0 THOUSAND/ΜL (ref 0–0.61)
EOSINOPHIL NFR BLD AUTO: 0 % (ref 0–6)
ERYTHROCYTE [DISTWIDTH] IN BLOOD BY AUTOMATED COUNT: 13.2 % (ref 11.6–15.1)
GFR SERPL CREATININE-BSD FRML MDRD: 85 ML/MIN/1.73SQ M
GLUCOSE SERPL-MCNC: 139 MG/DL (ref 65–140)
GLUCOSE UR STRIP-MCNC: NEGATIVE MG/DL
HCT VFR BLD AUTO: 52.8 % (ref 34.8–46.1)
HGB BLD-MCNC: 17.8 G/DL (ref 11.5–15.4)
HGB UR QL STRIP.AUTO: ABNORMAL
IMM GRANULOCYTES # BLD AUTO: 0.03 THOUSAND/UL (ref 0–0.2)
IMM GRANULOCYTES NFR BLD AUTO: 0 % (ref 0–2)
KETONES UR STRIP-MCNC: ABNORMAL MG/DL
LACTATE SERPL-SCNC: 1.5 MMOL/L (ref 0.5–2)
LACTATE SERPL-SCNC: 2.3 MMOL/L (ref 0.5–2)
LEUKOCYTE ESTERASE UR QL STRIP: ABNORMAL
LIPASE SERPL-CCNC: 148 U/L (ref 73–393)
LYMPHOCYTES # BLD AUTO: 1.44 THOUSANDS/ΜL (ref 0.6–4.47)
LYMPHOCYTES NFR BLD AUTO: 13 % (ref 14–44)
MAGNESIUM SERPL-MCNC: 1.7 MG/DL (ref 1.6–2.6)
MCH RBC QN AUTO: 30.3 PG (ref 26.8–34.3)
MCHC RBC AUTO-ENTMCNC: 33.7 G/DL (ref 31.4–37.4)
MCV RBC AUTO: 90 FL (ref 82–98)
MONOCYTES # BLD AUTO: 1.04 THOUSAND/ΜL (ref 0.17–1.22)
MONOCYTES NFR BLD AUTO: 9 % (ref 4–12)
MUCOUS THREADS UR QL AUTO: ABNORMAL
NEUTROPHILS # BLD AUTO: 9.01 THOUSANDS/ΜL (ref 1.85–7.62)
NEUTS SEG NFR BLD AUTO: 78 % (ref 43–75)
NITRITE UR QL STRIP: NEGATIVE
NON-SQ EPI CELLS URNS QL MICRO: ABNORMAL /HPF
NRBC BLD AUTO-RTO: 0 /100 WBCS
PH UR STRIP.AUTO: 6.5 [PH]
PLATELET # BLD AUTO: 384 THOUSANDS/UL (ref 149–390)
PMV BLD AUTO: 9.8 FL (ref 8.9–12.7)
POTASSIUM SERPL-SCNC: 3.4 MMOL/L (ref 3.5–5.3)
PROT SERPL-MCNC: 8.5 G/DL (ref 6.4–8.2)
PROT UR STRIP-MCNC: ABNORMAL MG/DL
RBC # BLD AUTO: 5.88 MILLION/UL (ref 3.81–5.12)
RBC #/AREA URNS AUTO: ABNORMAL /HPF
SARS-COV-2 RNA RESP QL NAA+PROBE: NEGATIVE
SODIUM SERPL-SCNC: 138 MMOL/L (ref 136–145)
SP GR UR STRIP.AUTO: 1.02 (ref 1–1.03)
TROPONIN I SERPL-MCNC: <0.02 NG/ML
UROBILINOGEN UR QL STRIP.AUTO: 0.2 E.U./DL
WBC # BLD AUTO: 11.56 THOUSAND/UL (ref 4.31–10.16)
WBC #/AREA URNS AUTO: ABNORMAL /HPF

## 2020-05-10 PROCEDURE — 74177 CT ABD & PELVIS W/CONTRAST: CPT

## 2020-05-10 PROCEDURE — 85025 COMPLETE CBC W/AUTO DIFF WBC: CPT | Performed by: EMERGENCY MEDICINE

## 2020-05-10 PROCEDURE — 96361 HYDRATE IV INFUSION ADD-ON: CPT

## 2020-05-10 PROCEDURE — 99285 EMERGENCY DEPT VISIT HI MDM: CPT

## 2020-05-10 PROCEDURE — 96374 THER/PROPH/DIAG INJ IV PUSH: CPT

## 2020-05-10 PROCEDURE — 80076 HEPATIC FUNCTION PANEL: CPT | Performed by: EMERGENCY MEDICINE

## 2020-05-10 PROCEDURE — 93005 ELECTROCARDIOGRAM TRACING: CPT

## 2020-05-10 PROCEDURE — 83735 ASSAY OF MAGNESIUM: CPT | Performed by: EMERGENCY MEDICINE

## 2020-05-10 PROCEDURE — 36415 COLL VENOUS BLD VENIPUNCTURE: CPT | Performed by: EMERGENCY MEDICINE

## 2020-05-10 PROCEDURE — 84484 ASSAY OF TROPONIN QUANT: CPT | Performed by: EMERGENCY MEDICINE

## 2020-05-10 PROCEDURE — 80048 BASIC METABOLIC PNL TOTAL CA: CPT | Performed by: EMERGENCY MEDICINE

## 2020-05-10 PROCEDURE — 81001 URINALYSIS AUTO W/SCOPE: CPT | Performed by: EMERGENCY MEDICINE

## 2020-05-10 PROCEDURE — 99284 EMERGENCY DEPT VISIT MOD MDM: CPT | Performed by: EMERGENCY MEDICINE

## 2020-05-10 PROCEDURE — 83605 ASSAY OF LACTIC ACID: CPT | Performed by: EMERGENCY MEDICINE

## 2020-05-10 PROCEDURE — 83690 ASSAY OF LIPASE: CPT | Performed by: EMERGENCY MEDICINE

## 2020-05-10 PROCEDURE — 71045 X-RAY EXAM CHEST 1 VIEW: CPT

## 2020-05-10 PROCEDURE — 87635 SARS-COV-2 COVID-19 AMP PRB: CPT | Performed by: EMERGENCY MEDICINE

## 2020-05-10 RX ORDER — DICYCLOMINE HCL 20 MG
20 TABLET ORAL EVERY 8 HOURS PRN
Qty: 12 TABLET | Refills: 0 | Status: ON HOLD | OUTPATIENT
Start: 2020-05-10 | End: 2021-08-10 | Stop reason: ALTCHOICE

## 2020-05-10 RX ORDER — DICYCLOMINE HCL 20 MG
20 TABLET ORAL ONCE
Status: COMPLETED | OUTPATIENT
Start: 2020-05-10 | End: 2020-05-10

## 2020-05-10 RX ORDER — ONDANSETRON 4 MG/1
4 TABLET, ORALLY DISINTEGRATING ORAL EVERY 8 HOURS PRN
Qty: 12 TABLET | Refills: 0 | Status: SHIPPED | OUTPATIENT
Start: 2020-05-10 | End: 2021-04-01

## 2020-05-10 RX ORDER — ONDANSETRON 4 MG/1
4 TABLET, ORALLY DISINTEGRATING ORAL ONCE
Status: COMPLETED | OUTPATIENT
Start: 2020-05-10 | End: 2020-05-10

## 2020-05-10 RX ORDER — POTASSIUM CHLORIDE 20 MEQ/1
40 TABLET, EXTENDED RELEASE ORAL ONCE
Status: COMPLETED | OUTPATIENT
Start: 2020-05-10 | End: 2020-05-10

## 2020-05-10 RX ORDER — ONDANSETRON 2 MG/ML
4 INJECTION INTRAMUSCULAR; INTRAVENOUS ONCE
Status: COMPLETED | OUTPATIENT
Start: 2020-05-10 | End: 2020-05-10

## 2020-05-10 RX ADMIN — SODIUM CHLORIDE 1000 ML: 0.9 INJECTION, SOLUTION INTRAVENOUS at 18:52

## 2020-05-10 RX ADMIN — ONDANSETRON 4 MG: 4 TABLET, ORALLY DISINTEGRATING ORAL at 21:18

## 2020-05-10 RX ADMIN — DICYCLOMINE HYDROCHLORIDE 20 MG: 20 TABLET ORAL at 18:52

## 2020-05-10 RX ADMIN — IOHEXOL 100 ML: 350 INJECTION, SOLUTION INTRAVENOUS at 20:09

## 2020-05-10 RX ADMIN — POTASSIUM CHLORIDE 40 MEQ: 1500 TABLET, EXTENDED RELEASE ORAL at 20:27

## 2020-05-10 RX ADMIN — ONDANSETRON 4 MG: 2 INJECTION INTRAMUSCULAR; INTRAVENOUS at 18:52

## 2020-05-11 LAB
ATRIAL RATE: 88 BPM
P AXIS: 73 DEGREES
PR INTERVAL: 148 MS
QRS AXIS: 60 DEGREES
QRSD INTERVAL: 102 MS
QT INTERVAL: 356 MS
QTC INTERVAL: 430 MS
T WAVE AXIS: 71 DEGREES
VENTRICULAR RATE: 88 BPM

## 2020-05-11 PROCEDURE — 93010 ELECTROCARDIOGRAM REPORT: CPT | Performed by: INTERNAL MEDICINE

## 2020-07-19 ENCOUNTER — HOSPITAL ENCOUNTER (INPATIENT)
Facility: HOSPITAL | Age: 60
LOS: 3 days | Discharge: HOME | DRG: 872 | End: 2020-07-22
Attending: EMERGENCY MEDICINE | Admitting: STUDENT IN AN ORGANIZED HEALTH CARE EDUCATION/TRAINING PROGRAM
Payer: COMMERCIAL

## 2020-07-19 DIAGNOSIS — R50.9 FEVER: Primary | ICD-10-CM

## 2020-07-19 DIAGNOSIS — L03.113 CELLULITIS OF RIGHT UPPER EXTREMITY: ICD-10-CM

## 2020-07-19 DIAGNOSIS — I89.0 LYMPHEDEMA: ICD-10-CM

## 2020-07-19 LAB
ALBUMIN SERPL BCP-MCNC: 3.6 G/DL (ref 3.5–5)
ALP SERPL-CCNC: 97 U/L (ref 46–116)
ALT SERPL W P-5'-P-CCNC: 21 U/L (ref 12–78)
ANION GAP SERPL CALCULATED.3IONS-SCNC: 12 MMOL/L (ref 4–13)
APTT PPP: 27 SECONDS (ref 23–37)
AST SERPL W P-5'-P-CCNC: 18 U/L (ref 5–45)
BASOPHILS # BLD AUTO: 0.03 THOUSANDS/ΜL (ref 0–0.1)
BASOPHILS NFR BLD AUTO: 0 % (ref 0–1)
BILIRUB SERPL-MCNC: 0.6 MG/DL (ref 0.2–1)
BUN SERPL-MCNC: 7 MG/DL (ref 5–25)
CALCIUM SERPL-MCNC: 8.6 MG/DL (ref 8.3–10.1)
CHLORIDE SERPL-SCNC: 102 MMOL/L (ref 100–108)
CO2 SERPL-SCNC: 24 MMOL/L (ref 21–32)
CREAT SERPL-MCNC: 0.81 MG/DL (ref 0.6–1.3)
CRP SERPL QL: 7.1 MG/L
EOSINOPHIL # BLD AUTO: 0.03 THOUSAND/ΜL (ref 0–0.61)
EOSINOPHIL NFR BLD AUTO: 0 % (ref 0–6)
ERYTHROCYTE [DISTWIDTH] IN BLOOD BY AUTOMATED COUNT: 12.8 % (ref 11.6–15.1)
ERYTHROCYTE [SEDIMENTATION RATE] IN BLOOD: 7 MM/HOUR (ref 2–25)
GFR SERPL CREATININE-BSD FRML MDRD: 79 ML/MIN/1.73SQ M
GLUCOSE SERPL-MCNC: 156 MG/DL (ref 65–140)
HCT VFR BLD AUTO: 43.9 % (ref 34.8–46.1)
HGB BLD-MCNC: 14.7 G/DL (ref 11.5–15.4)
IMM GRANULOCYTES # BLD AUTO: 0.08 THOUSAND/UL (ref 0–0.2)
IMM GRANULOCYTES NFR BLD AUTO: 1 % (ref 0–2)
INR PPP: 1.02 (ref 0.84–1.19)
LACTATE SERPL-SCNC: 1.7 MMOL/L (ref 0.5–2)
LYMPHOCYTES # BLD AUTO: 0.93 THOUSANDS/ΜL (ref 0.6–4.47)
LYMPHOCYTES NFR BLD AUTO: 5 % (ref 14–44)
MCH RBC QN AUTO: 30.4 PG (ref 26.8–34.3)
MCHC RBC AUTO-ENTMCNC: 33.5 G/DL (ref 31.4–37.4)
MCV RBC AUTO: 91 FL (ref 82–98)
MONOCYTES # BLD AUTO: 0.86 THOUSAND/ΜL (ref 0.17–1.22)
MONOCYTES NFR BLD AUTO: 5 % (ref 4–12)
NEUTROPHILS # BLD AUTO: 15.25 THOUSANDS/ΜL (ref 1.85–7.62)
NEUTS SEG NFR BLD AUTO: 89 % (ref 43–75)
NRBC BLD AUTO-RTO: 0 /100 WBCS
PLATELET # BLD AUTO: 276 THOUSANDS/UL (ref 149–390)
PMV BLD AUTO: 9.3 FL (ref 8.9–12.7)
POTASSIUM SERPL-SCNC: 3.5 MMOL/L (ref 3.5–5.3)
PROT SERPL-MCNC: 7 G/DL (ref 6.4–8.2)
PROTHROMBIN TIME: 12.9 SECONDS (ref 11.6–14.5)
RBC # BLD AUTO: 4.84 MILLION/UL (ref 3.81–5.12)
SODIUM SERPL-SCNC: 138 MMOL/L (ref 136–145)
WBC # BLD AUTO: 17.18 THOUSAND/UL (ref 4.31–10.16)

## 2020-07-19 PROCEDURE — 85025 COMPLETE CBC W/AUTO DIFF WBC: CPT | Performed by: PHYSICIAN ASSISTANT

## 2020-07-19 PROCEDURE — 36415 COLL VENOUS BLD VENIPUNCTURE: CPT | Performed by: PHYSICIAN ASSISTANT

## 2020-07-19 PROCEDURE — 85730 THROMBOPLASTIN TIME PARTIAL: CPT | Performed by: PHYSICIAN ASSISTANT

## 2020-07-19 PROCEDURE — 85652 RBC SED RATE AUTOMATED: CPT | Performed by: PHYSICIAN ASSISTANT

## 2020-07-19 PROCEDURE — 86140 C-REACTIVE PROTEIN: CPT | Performed by: PHYSICIAN ASSISTANT

## 2020-07-19 PROCEDURE — 96365 THER/PROPH/DIAG IV INF INIT: CPT

## 2020-07-19 PROCEDURE — 85610 PROTHROMBIN TIME: CPT | Performed by: PHYSICIAN ASSISTANT

## 2020-07-19 PROCEDURE — 87040 BLOOD CULTURE FOR BACTERIA: CPT | Performed by: PHYSICIAN ASSISTANT

## 2020-07-19 PROCEDURE — 80053 COMPREHEN METABOLIC PANEL: CPT | Performed by: PHYSICIAN ASSISTANT

## 2020-07-19 PROCEDURE — 84145 PROCALCITONIN (PCT): CPT | Performed by: PHYSICIAN ASSISTANT

## 2020-07-19 PROCEDURE — 99284 EMERGENCY DEPT VISIT MOD MDM: CPT | Performed by: PHYSICIAN ASSISTANT

## 2020-07-19 PROCEDURE — 83605 ASSAY OF LACTIC ACID: CPT | Performed by: PHYSICIAN ASSISTANT

## 2020-07-19 PROCEDURE — 99285 EMERGENCY DEPT VISIT HI MDM: CPT

## 2020-07-19 RX ORDER — ACETAMINOPHEN 325 MG/1
650 TABLET ORAL ONCE
Status: COMPLETED | OUTPATIENT
Start: 2020-07-19 | End: 2020-07-19

## 2020-07-19 RX ADMIN — ACETAMINOPHEN 650 MG: 325 TABLET, FILM COATED ORAL at 23:59

## 2020-07-19 RX ADMIN — CEFEPIME HYDROCHLORIDE 2000 MG: 2 INJECTION, POWDER, FOR SOLUTION INTRAVENOUS at 23:07

## 2020-07-19 RX ADMIN — SODIUM CHLORIDE 1000 ML: 0.9 INJECTION, SOLUTION INTRAVENOUS at 23:07

## 2020-07-20 ENCOUNTER — APPOINTMENT (INPATIENT)
Dept: VASCULAR ULTRASOUND | Facility: HOSPITAL | Age: 60
DRG: 872 | End: 2020-07-20
Payer: COMMERCIAL

## 2020-07-20 PROBLEM — A41.9 SEPSIS (HCC): Status: ACTIVE | Noted: 2020-07-20

## 2020-07-20 LAB
ANION GAP SERPL CALCULATED.3IONS-SCNC: 10 MMOL/L (ref 4–13)
BASOPHILS # BLD AUTO: 0.06 THOUSANDS/ΜL (ref 0–0.1)
BASOPHILS NFR BLD AUTO: 0 % (ref 0–1)
BUN SERPL-MCNC: 9 MG/DL (ref 5–25)
CALCIUM SERPL-MCNC: 8 MG/DL (ref 8.3–10.1)
CHLORIDE SERPL-SCNC: 105 MMOL/L (ref 100–108)
CO2 SERPL-SCNC: 22 MMOL/L (ref 21–32)
CREAT SERPL-MCNC: 0.68 MG/DL (ref 0.6–1.3)
EOSINOPHIL # BLD AUTO: 0.01 THOUSAND/ΜL (ref 0–0.61)
EOSINOPHIL NFR BLD AUTO: 0 % (ref 0–6)
ERYTHROCYTE [DISTWIDTH] IN BLOOD BY AUTOMATED COUNT: 12.9 % (ref 11.6–15.1)
GFR SERPL CREATININE-BSD FRML MDRD: 95 ML/MIN/1.73SQ M
GLUCOSE SERPL-MCNC: 119 MG/DL (ref 65–140)
HCT VFR BLD AUTO: 37.5 % (ref 34.8–46.1)
HGB BLD-MCNC: 12.8 G/DL (ref 11.5–15.4)
IMM GRANULOCYTES # BLD AUTO: 0.09 THOUSAND/UL (ref 0–0.2)
IMM GRANULOCYTES NFR BLD AUTO: 1 % (ref 0–2)
LYMPHOCYTES # BLD AUTO: 1.65 THOUSANDS/ΜL (ref 0.6–4.47)
LYMPHOCYTES NFR BLD AUTO: 9 % (ref 14–44)
MAGNESIUM SERPL-MCNC: 1.6 MG/DL (ref 1.6–2.6)
MCH RBC QN AUTO: 31 PG (ref 26.8–34.3)
MCHC RBC AUTO-ENTMCNC: 34.1 G/DL (ref 31.4–37.4)
MCV RBC AUTO: 91 FL (ref 82–98)
MONOCYTES # BLD AUTO: 1.01 THOUSAND/ΜL (ref 0.17–1.22)
MONOCYTES NFR BLD AUTO: 5 % (ref 4–12)
NEUTROPHILS # BLD AUTO: 16.57 THOUSANDS/ΜL (ref 1.85–7.62)
NEUTS SEG NFR BLD AUTO: 85 % (ref 43–75)
NRBC BLD AUTO-RTO: 0 /100 WBCS
PLATELET # BLD AUTO: 242 THOUSANDS/UL (ref 149–390)
PMV BLD AUTO: 9.5 FL (ref 8.9–12.7)
POTASSIUM SERPL-SCNC: 3.5 MMOL/L (ref 3.5–5.3)
PROCALCITONIN SERPL-MCNC: <0.05 NG/ML
RBC # BLD AUTO: 4.13 MILLION/UL (ref 3.81–5.12)
SODIUM SERPL-SCNC: 137 MMOL/L (ref 136–145)
WBC # BLD AUTO: 19.39 THOUSAND/UL (ref 4.31–10.16)

## 2020-07-20 PROCEDURE — 99223 1ST HOSP IP/OBS HIGH 75: CPT | Performed by: PHYSICIAN ASSISTANT

## 2020-07-20 PROCEDURE — 93971 EXTREMITY STUDY: CPT

## 2020-07-20 PROCEDURE — 80048 BASIC METABOLIC PNL TOTAL CA: CPT | Performed by: PHYSICIAN ASSISTANT

## 2020-07-20 PROCEDURE — 83735 ASSAY OF MAGNESIUM: CPT | Performed by: PHYSICIAN ASSISTANT

## 2020-07-20 PROCEDURE — 85025 COMPLETE CBC W/AUTO DIFF WBC: CPT | Performed by: PHYSICIAN ASSISTANT

## 2020-07-20 PROCEDURE — 93971 EXTREMITY STUDY: CPT | Performed by: SURGERY

## 2020-07-20 PROCEDURE — 36415 COLL VENOUS BLD VENIPUNCTURE: CPT | Performed by: PHYSICIAN ASSISTANT

## 2020-07-20 RX ORDER — LANOLIN ALCOHOL/MO/W.PET/CERES
6 CREAM (GRAM) TOPICAL
Status: DISCONTINUED | OUTPATIENT
Start: 2020-07-20 | End: 2020-07-22 | Stop reason: HOSPADM

## 2020-07-20 RX ORDER — MAGNESIUM SULFATE HEPTAHYDRATE 40 MG/ML
2 INJECTION, SOLUTION INTRAVENOUS ONCE
Status: COMPLETED | OUTPATIENT
Start: 2020-07-20 | End: 2020-07-20

## 2020-07-20 RX ORDER — CEFAZOLIN SODIUM 1 G/50ML
1000 SOLUTION INTRAVENOUS EVERY 8 HOURS
Status: DISCONTINUED | OUTPATIENT
Start: 2020-07-20 | End: 2020-07-22 | Stop reason: HOSPADM

## 2020-07-20 RX ORDER — BISACODYL 10 MG
10 SUPPOSITORY, RECTAL RECTAL DAILY PRN
Status: DISCONTINUED | OUTPATIENT
Start: 2020-07-20 | End: 2020-07-22 | Stop reason: HOSPADM

## 2020-07-20 RX ORDER — DRONABINOL 5 MG/1
15 CAPSULE ORAL
Status: DISCONTINUED | OUTPATIENT
Start: 2020-07-20 | End: 2020-07-20 | Stop reason: CLARIF

## 2020-07-20 RX ORDER — THIAMINE MONONITRATE (VIT B1) 100 MG
100 TABLET ORAL DAILY
Status: DISCONTINUED | OUTPATIENT
Start: 2020-07-20 | End: 2020-07-22 | Stop reason: HOSPADM

## 2020-07-20 RX ORDER — CHLORAL HYDRATE 500 MG
1000 CAPSULE ORAL DAILY
Status: DISCONTINUED | OUTPATIENT
Start: 2020-07-20 | End: 2020-07-22 | Stop reason: HOSPADM

## 2020-07-20 RX ORDER — SACCHAROMYCES BOULARDII 250 MG
250 CAPSULE ORAL 2 TIMES DAILY
Status: DISCONTINUED | OUTPATIENT
Start: 2020-07-20 | End: 2020-07-22 | Stop reason: HOSPADM

## 2020-07-20 RX ORDER — DRONABINOL 5 MG/1
15 CAPSULE ORAL
Status: DISCONTINUED | OUTPATIENT
Start: 2020-07-20 | End: 2020-07-20

## 2020-07-20 RX ORDER — ONDANSETRON 2 MG/ML
4 INJECTION INTRAMUSCULAR; INTRAVENOUS EVERY 6 HOURS PRN
Status: DISCONTINUED | OUTPATIENT
Start: 2020-07-20 | End: 2020-07-22 | Stop reason: HOSPADM

## 2020-07-20 RX ORDER — DRONABINOL 2.5 MG/1
15 CAPSULE ORAL
Status: DISCONTINUED | OUTPATIENT
Start: 2020-07-20 | End: 2020-07-22 | Stop reason: HOSPADM

## 2020-07-20 RX ORDER — MELATONIN
2000 DAILY
Status: DISCONTINUED | OUTPATIENT
Start: 2020-07-20 | End: 2020-07-22 | Stop reason: HOSPADM

## 2020-07-20 RX ADMIN — DRONABINOL 15 MG: 2.5 CAPSULE ORAL at 22:11

## 2020-07-20 RX ADMIN — DRONABINOL 15 MG: 2.5 CAPSULE ORAL at 11:56

## 2020-07-20 RX ADMIN — ENOXAPARIN SODIUM 40 MG: 40 INJECTION SUBCUTANEOUS at 09:01

## 2020-07-20 RX ADMIN — CEFAZOLIN SODIUM 1000 MG: 1 SOLUTION INTRAVENOUS at 13:41

## 2020-07-20 RX ADMIN — Medication 250 MG: at 09:01

## 2020-07-20 RX ADMIN — Medication 250 MG: at 17:02

## 2020-07-20 RX ADMIN — DRONABINOL 15 MG: 2.5 CAPSULE ORAL at 18:30

## 2020-07-20 RX ADMIN — MAGNESIUM SULFATE IN WATER 2 G: 40 INJECTION, SOLUTION INTRAVENOUS at 11:56

## 2020-07-20 RX ADMIN — Medication 1 TABLET: at 09:00

## 2020-07-20 RX ADMIN — DRONABINOL 15 MG: 2.5 CAPSULE ORAL at 06:00

## 2020-07-20 RX ADMIN — VANCOMYCIN HYDROCHLORIDE 1500 MG: 1 INJECTION, POWDER, LYOPHILIZED, FOR SOLUTION INTRAVENOUS at 00:00

## 2020-07-20 RX ADMIN — CEFAZOLIN SODIUM 1000 MG: 1 SOLUTION INTRAVENOUS at 05:30

## 2020-07-20 RX ADMIN — MELATONIN 2000 UNITS: at 09:01

## 2020-07-20 RX ADMIN — OMEGA-3 FATTY ACIDS CAP 1000 MG 1000 MG: 1000 CAP at 09:01

## 2020-07-20 RX ADMIN — CEFAZOLIN SODIUM 1000 MG: 1 SOLUTION INTRAVENOUS at 21:37

## 2020-07-20 RX ADMIN — THIAMINE HCL TAB 100 MG 100 MG: 100 TAB at 09:01

## 2020-07-20 NOTE — UTILIZATION REVIEW
Initial Clinical Review    Admission: Date/Time/Statement: Admission Orders (From admission, onward)     Ordered        07/19/20 2341  Inpatient Admission  Once                   Orders Placed This Encounter   Procedures    Inpatient Admission     Standing Status:   Standing     Number of Occurrences:   1     Order Specific Question:   Admitting Physician     Answer:   Noemy Tse [[id number]]     Order Specific Question:   Level of Care     Answer:   Med Surg [[id number]]     Order Specific Question:   Estimated length of stay     Answer:   More than 2 Midnights     Order Specific Question:   Certification     Answer:   I certify that inpatient services are medically necessary for this patient for a duration of greater than two midnights  See H&P and MD Progress Notes for additional information about the patient's course of treatment  ED Arrival Information     Expected Arrival Acuity Means of Arrival Escorted By Service Admission Type    - 7/19/2020 22:06 Urgent Walk-In Spouse Hospitalist Urgent    Arrival Complaint    Fever, Arm Swelling        Chief Complaint   Patient presents with    Fever - 9 weeks to 74 years     pt states "my lyphedema is acting up again" pt presents with redness and swelling to right arm  pt states to have checked temperature at home and temp was "102"      Assessment/Plan: 62year old female to the ED from home with complaints of right arm swelling, fever which started on the day of her arrival   Temp of 102 at home  Admitted to inpatient for sepsis, cellulitis  H/O cancer with resultant skin graft to right upper extremity and has recurrent lymphedema to that arm  WBCs elevated  She reports achy RUE for a couple days  There is circumferential erythema to RUE  MOnitor CBC, fever         ED Triage Vitals   Temperature Pulse Respirations Blood Pressure SpO2   07/19/20 2215 07/19/20 2215 07/19/20 2215 07/19/20 2215 07/19/20 2215   98 2 °F (36 8 °C) 98 18 102/55 96 %      Temp Source Heart Rate Source Patient Position - Orthostatic VS BP Location FiO2 (%)   07/19/20 2215 07/19/20 2215 07/19/20 2215 07/19/20 2215 --   Oral Monitor Sitting Left arm       Pain Score       07/20/20 0225       No Pain        Wt Readings from Last 1 Encounters:   07/20/20 68 9 kg (151 lb 14 4 oz)     Additional Vital Signs:  Date/Time  Temp  Pulse  Resp  BP  SpO2  O2 Device  Patient Position - Orthostatic VS   07/20/20 0600    83  18  178/84Abnormal   97 %  None (Room air)  Lying   07/19/20 2309  99 4 °F (37 4 °C)               07/19/20 2241  100 °F (37 8 °C)               07/19/20 2215  98 2 °F (36 8 °C)                 Pertinent Labs/Diagnostic Test Results:         Results from last 7 days   Lab Units 07/20/20  0532 07/19/20  2302   WBC Thousand/uL 19 39* 17 18*   HEMOGLOBIN g/dL 12 8 14 7   HEMATOCRIT % 37 5 43 9   PLATELETS Thousands/uL 242 276   NEUTROS ABS Thousands/µL 16 57* 15 25*         Results from last 7 days   Lab Units 07/20/20  0532 07/19/20  2302   SODIUM mmol/L 137 138   POTASSIUM mmol/L 3 5 3 5   CHLORIDE mmol/L 105 102   CO2 mmol/L 22 24   ANION GAP mmol/L 10 12   BUN mg/dL 9 7   CREATININE mg/dL 0 68 0 81   EGFR ml/min/1 73sq m 95 79   CALCIUM mg/dL 8 0* 8 6   MAGNESIUM mg/dL 1 6  --      Results from last 7 days   Lab Units 07/19/20  2302   AST U/L 18   ALT U/L 21   ALK PHOS U/L 97   TOTAL PROTEIN g/dL 7 0   ALBUMIN g/dL 3 6   TOTAL BILIRUBIN mg/dL 0 60         Results from last 7 days   Lab Units 07/20/20  0532 07/19/20  2302   GLUCOSE RANDOM mg/dL 119 156*       Results from last 7 days   Lab Units 07/19/20  2302   PROTIME seconds 12 9   INR  1 02   PTT seconds 27         Results from last 7 days   Lab Units 07/19/20  2302   PROCALCITONIN ng/ml <0 05     Results from last 7 days   Lab Units 07/19/20  2302   LACTIC ACID mmol/L 1 7       Results from last 7 days   Lab Units 07/19/20  2302   CRP mg/L 7 1*   SED RATE mm/hour 7       Results from last 7 days   Lab Units 07/19/20  2302 07/19/20  2259   BLOOD CULTURE  Received in Microbiology Lab  Culture in Progress  Received in Microbiology Lab  Culture in Progress         ED Treatment:   Medication Administration from 07/19/2020 2205 to 07/20/2020 1300       Date/Time Order Dose Route Action     07/19/2020 2307 sodium chloride 0 9 % bolus 1,000 mL 1,000 mL Intravenous New Bag     07/20/2020 0000 vancomycin (VANCOCIN) 1,500 mg in sodium chloride 0 9 % 250 mL IVPB 1,500 mg Intravenous New Bag     07/19/2020 2307 cefepime (MAXIPIME) 2,000 mg in dextrose 5 % 50 mL IVPB 2,000 mg Intravenous New Bag     07/19/2020 2359 acetaminophen (TYLENOL) tablet 650 mg 650 mg Oral Given     07/20/2020 0901 cholecalciferol (VITAMIN D3) tablet 2,000 Units 2,000 Units Oral Given     07/20/2020 0900 multivitamin-minerals (CENTRUM) tablet 1 tablet 1 tablet Oral Given     07/20/2020 0901 fish oil capsule 1,000 mg 1,000 mg Oral Given     07/20/2020 0901 saccharomyces boulardii (FLORASTOR) capsule 250 mg 250 mg Oral Given     07/20/2020 0901 thiamine (VITAMIN B1) tablet 100 mg 100 mg Oral Given     07/20/2020 0901 enoxaparin (LOVENOX) subcutaneous injection 40 mg 40 mg Subcutaneous Given     07/20/2020 0530 ceFAZolin (ANCEF) IVPB (premix) 1,000 mg 50 mL 1,000 mg Intravenous New Bag     07/20/2020 1156 dronabinol (MARINOL) capsule 15 mg 15 mg Oral Given     07/20/2020 0600 dronabinol (MARINOL) capsule 15 mg 15 mg Oral Given     07/20/2020 1156 magnesium sulfate 2 g/50 mL IVPB (premix) 2 g 2 g Intravenous New Bag        Past Medical History:   Diagnosis Date    Abnormal mammogram 05/15/2013    Anemia     Cancer ([facility] [address] )     desmoitosis    Desmoid tumor     Last Assessed: 6/19/2017    Hyperlipidemia     Hypertension        Admitting Diagnosis: Fever [R50 9]  Age/Sex: 62 y o  female  Admission Orders:  UP and OOB  Elevate affected lumb    Scheduled Medications:    Medications:  cefazolin 1,000 mg Intravenous Q8H   cholecalciferol 2,000 Units Oral Daily   dronabinol 15 mg Oral 4x Daily (AC & HS)   enoxaparin 40 mg Subcutaneous Daily   fish oil 1,000 mg Oral Daily   magnesium sulfate 2 g Intravenous Once   multivitamin-minerals 1 tablet Oral Daily   saccharomyces boulardii 250 mg Oral BID   thiamine 100 mg Oral Daily     Continuous IV Infusions:     PRN Meds:    bisacodyl 10 mg Rectal Daily PRN   melatonin 6 mg Oral HS PRN   ondansetron 4 mg Intravenous Q6H PRN       None    Network Utilization Review Department  [email or web address]  ATTENTION: Please call with any questions or concerns to [phone] and carefully listen to the prompts so that you are directed to the right person  All voicemails are confidential   [facility] all requests for admission clinical reviews, approved or denied determinations and any other requests to dedicated fax number below belonging to the campus where the patient is receiving treatment   List of dedicated fax numbers for the Facilities:  [address] DENIALS (Administrative/Medical Necessity) [phone]   [address] (Maternity/NICU/Pediatrics) [phone]   [place] [phone]   Ximena Hager [phone]   [place] Form [phone]   Olinda Regalado [phone]   1205 [facility] [address] [place] [address] [phone]   [facility]  [phone]   2205 [facility] [address]  24[address] [facility] [address] [facility] [phone]

## 2020-07-20 NOTE — ED PROVIDER NOTES
History  Chief Complaint   Patient presents with    Fever - 9 weeks to 74 years     pt states "my lyphedema is acting up again" pt presents with redness and swelling to right arm  pt states to have checked temperature at home and temp was "102"      This is a 69-year-old female patient who get lymphedema in her right upper extremity secondary to a skin graft that was placed several years ago  This patient gets cellulitis of this right arm often  She does have cephalosporins at home to fight infection  Today at 0500 hours she developed 102 fever and a red right upper arm just like her previous lymphedema  Nothing makes it better or worse  She did have a good bout of chills with this  Attempt to contact vascular to rule out a DVT however it is near 1100 hours when the hours of operation  Stop  However this is very similar to her previous bouts of cellulitis  The only concern she has not normally it occurs every 6 months and now she has a reoccurrence in 1 month  No headache blurred vision double vision cough congestion sore throat nausea vomiting diarrhea abdominal pain no chest pain or shortness of breath  No urinary symptoms  Differential diagnosis includes not limited to no edemas with secondary cellulitis versus DVT which less likely          Prior to Admission Medications   Prescriptions Last Dose Informant Patient Reported? Taking?    Omega-3 Fatty Acids (FISH OIL PO)  Self Yes No   Sig: Take by mouth   Probiotic Product (PROBIOTIC-10 PO)  Self Yes No   Sig: Take by mouth   atorvastatin (LIPITOR) 40 mg tablet  Self Yes No   Sig: Take 40 mg by mouth daily   cholecalciferol (VITAMIN D3) 1,000 units tablet   Yes No   Sig: Take 2,000 Units by mouth daily   dicyclomine (BENTYL) 20 mg tablet   No No   Sig: Take 1 tablet (20 mg total) by mouth every 8 (eight) hours as needed (abdominal cramping/pain or diarrhea)   dronabinol (MARINOL) 5 MG capsule  Self Yes No   Sig: Take 15 mg by mouth 4 (four) times a day (before meals and at bedtime)   multivitamin (THERAGRAN) TABS  Self Yes No   Sig: Take 1 tablet by mouth daily   ondansetron (ZOFRAN-ODT) 4 mg disintegrating tablet   No No   Sig: Take 1 tablet (4 mg total) by mouth every 8 (eight) hours as needed for nausea or vomiting   thiamine (VITAMIN B1) 100 mg tablet  Self Yes No   Sig: Take 100 mg by mouth daily      Facility-Administered Medications: None       Past Medical History:   Diagnosis Date    Abnormal mammogram 05/15/2013    Anemia     Cancer ([address] )     desmoitosis    Desmoid tumor     Last Assessed: 6/19/2017    Hyperlipidemia     Hypertension        Past Surgical History:   Procedure Laterality Date    HYSTERECTOMY      OTHER SURGICAL HISTORY      Arm Incision: Dermoid tumor, right Arm     PARTIAL HYSTERECTOMY      [place] COLONOSCOPY FLX DX W/COLLJ SPEC WHEN PFRMD N/A 8/15/2017    Procedure: COLONOSCOPY;  Surgeon: Paulette Horvath MD;  Location: [facility]; Service: Gastroenterology    SKIN BIOPSY      SKIN CANCER EXCISION      Melanoma Excision        Family History   Problem Relation Age of Onset    Diabetes Mother     No Known Problems Father     Breast cancer Sister     Lung cancer Sister     Pancreatic cancer Sister      I have reviewed and agree with the history as documented  E-Cigarette/Vaping    E-Cigarette Use Never User      E-Cigarette/Vaping Substances    Nicotine No     THC No     CBD No     Flavoring No      Social History     Tobacco Use    Smoking status: Never Smoker    Smokeless tobacco: Never Used   Substance Use Topics    Alcohol use: Yes     Alcohol/week: 1 0 standard drinks     Types: 1 Shots of liquor per week     Comment: daily HS , per allscripts: consumes alcohol occasionally     Drug use: No       Review of Systems   Constitutional: Positive for chills and fever  Negative for diaphoresis and fatigue  HENT: Negative for congestion, ear pain, nosebleeds and sore throat      Eyes: Negative for photophobia, pain, discharge and visual disturbance  Respiratory: Negative for cough, choking, chest tightness, shortness of breath and wheezing  Cardiovascular: Negative for chest pain and palpitations  Gastrointestinal: Negative for abdominal distention, abdominal pain, diarrhea and vomiting  Genitourinary: Negative for dysuria, flank pain and frequency  Musculoskeletal: Negative for back pain, gait problem and joint swelling  Skin: Negative for color change and rash  Left arm pain   Neurological: Negative for dizziness, syncope and headaches  Psychiatric/Behavioral: Negative for behavioral problems and confusion  The patient is not nervous/anxious  All other systems reviewed and are negative  Physical Exam  Physical Exam   Constitutional: She appears well-developed and well-nourished  HENT:   Head: Normocephalic and atraumatic  Right Ear: External ear normal    Left Ear: External ear normal    Nose: Nose normal    Mouth/Throat: Oropharynx is clear and moist    Eyes: Pupils are equal, round, and reactive to light  Conjunctivae are normal    Neck: Normal range of motion  Neck supple  Cardiovascular: Normal rate and regular rhythm  Pulmonary/Chest: Effort normal and breath sounds normal    Abdominal: Soft  Bowel sounds are normal  There is no tenderness  Musculoskeletal: Normal range of motion  Arms:  Neurological: She is alert  Skin: Skin is warm  Psychiatric: She has a normal mood and affect  Her behavior is normal    Nursing note and vitals reviewed        Vital Signs  ED Triage Vitals [07/19/20 2215]   Temperature Pulse Respirations Blood Pressure SpO2   98 2 °F (36 8 °C) 98 18 102/55 96 %      Temp Source Heart Rate Source Patient Position - Orthostatic VS BP Location FiO2 (%)   Oral Monitor Sitting Left arm --      Pain Score       --           Vitals:    07/19/20 2215   BP: 102/55   Pulse: 98   Patient Position - Orthostatic VS: Sitting         Visual Acuity      ED Medications  Medications   sodium chloride 0 9 % bolus 1,000 mL (1,000 mL Intravenous New Bag 7/19/20 2307)   vancomycin (VANCOCIN) 1,500 mg in sodium chloride 0 9 % 250 mL IVPB (has no administration in time range)   acetaminophen (TYLENOL) tablet 650 mg (has no administration in time range)   cefepime (MAXIPIME) 2,000 mg in dextrose 5 % 50 mL IVPB (2,000 mg Intravenous New Bag 7/19/20 2307)       Diagnostic Studies  Results Reviewed     Procedure Component Value Units Date/Time    Lactic acid [[id number]]  (Normal) Collected:  07/19/20 2302    Lab Status:  Final result Specimen:  Blood from Arm, Left Updated:  07/19/20 2338     LACTIC ACID 1 7 mmol/L     Narrative:       Result may be elevated if tourniquet was used during collection      Comprehensive metabolic panel [[id number]]  (Abnormal) Collected:  07/19/20 2302    Lab Status:  Final result Specimen:  Blood from Arm, Left Updated:  07/19/20 2335     Sodium 138 mmol/L      Potassium 3 5 mmol/L      Chloride 102 mmol/L      CO2 24 mmol/L      ANION GAP 12 mmol/L      BUN 7 mg/dL      Creatinine 0 81 mg/dL      Glucose 156 mg/dL      Calcium 8 6 mg/dL      AST 18 U/L      ALT 21 U/L      Alkaline Phosphatase 97 U/L      Total Protein 7 0 g/dL      Albumin 3 6 g/dL      Total Bilirubin 0 60 mg/dL      eGFR 79 ml/min/1 73sq m     Narrative:       [place] guidelines for Chronic Kidney Disease (CKD):     Stage 1 with normal or high GFR (GFR > 90 mL/min/1 73 square meters)    Stage 2 Mild CKD (GFR = 60-89 mL/min/1 73 square meters)    Stage 3A Moderate CKD (GFR = 45-59 mL/min/1 73 square meters)    Stage 3B Moderate CKD (GFR = 30-44 mL/min/1 73 square meters)    Stage 4 Severe CKD (GFR = 15-29 mL/min/1 73 square meters)    Stage 5 End Stage CKD (GFR <15 mL/min/1 73 square meters)  Note: GFR calculation is accurate only with a steady state creatinine    Protime-INR [[id number]]  (Normal) Collected:  07/19/20 2302    Lab Status:  Final result Specimen:  Blood from Arm, Left Updated:  07/19/20 2331     Protime 12 9 seconds      INR 1 02    APTT [[id number]]  (Normal) Collected:  07/19/20 2302    Lab Status:  Final result Specimen:  Blood from Arm, Left Updated:  07/19/20 2331     PTT 27 seconds     CBC and differential [[id number]]  (Abnormal) Collected:  07/19/20 2302    Lab Status:  Final result Specimen:  Blood from Arm, Left Updated:  07/19/20 2311     WBC 17 18 Thousand/uL      RBC 4 84 Million/uL      Hemoglobin 14 7 g/dL      Hematocrit 43 9 %      MCV 91 fL      MCH 30 4 pg      MCHC 33 5 g/dL      RDW 12 8 %      MPV 9 3 fL      Platelets 515 Thousands/uL      nRBC 0 /100 WBCs      Neutrophils Relative 89 %      Immat GRANS % 1 %      Lymphocytes Relative 5 %      Monocytes Relative 5 %      Eosinophils Relative 0 %      Basophils Relative 0 %      Neutrophils Absolute 15 25 Thousands/µL      Immature Grans Absolute 0 08 Thousand/uL      Lymphocytes Absolute 0 93 Thousands/µL      Monocytes Absolute 0 86 Thousand/µL      Eosinophils Absolute 0 03 Thousand/µL      Basophils Absolute 0 03 Thousands/µL     Sedimentation rate, automated [[id number]] Collected:  07/19/20 2302    Lab Status: In process Specimen:  Blood from Arm, Left Updated:  07/19/20 2308    C-reactive protein [[id number]] Collected:  07/19/20 2302    Lab Status: In process Specimen:  Blood from Arm, Left Updated:  07/19/20 2308    Procalcitonin [[id number]] Collected:  07/19/20 2302    Lab Status: In process Specimen:  Blood from Arm, Left Updated:  07/19/20 2308    Blood culture #2 [[id number]] Collected:  07/19/20 2302    Lab Status: In process Specimen:  Blood from Arm, Left Updated:  07/19/20 2308    Blood culture #1 [[id number]] Collected:  07/19/20 2259    Lab Status:   In process Specimen:  Blood from Arm, Left Updated:  07/19/20 2302                 VAS upper limb venous duplex scan, unilateral/limited    (Results Pending)              Procedures  Procedures         ED Course  ED Course as of Jul 19 2341   Warren Dobbs Jul 19, 2020   2253 Attempted to contact vascular for upper extremity DVT however they stop coming in 6 this patient came back at 0  I attempted to page throughout  with no response          US AUDIT      Most Recent Value   Initial Alcohol Screen: US AUDIT-C    1  How often do you have a drink containing alcohol? 1 Filed at: 07/19/2020 2216   2  How many drinks containing alcohol do you have on a typical day you are drinking? 1 Filed at: 07/19/2020 2216   3a  Male UNDER 65: How often do you have five or more drinks on one occasion? 0 Filed at: 07/19/2020 2216   3b  FEMALE Any Age, or MALE 65+: How often do you have 4 or more drinks on one occassion? 0 Filed at: 07/19/2020 2216   Audit-C Score  2 Filed at: 07/19/2020 2216                  ODALIS/DAST-10      Most Recent Value   How many times in the past year have you    Used an illegal drug or used a prescription medication for non-medical reasons? Never Filed at: 07/19/2020 2217              Initial Sepsis Screening     Row Name 07/19/20 7879                Is the patient's history suggestive of a new or worsening infection? (!) Yes (Proceed)  -DD        Suspected source of infection  soft tissue  -DD        Are two or more of the following signs & symptoms of infection both present and new to the patient? No  -DD        Indicate SIRS criteria          If the answer is yes to both questions, suspicion of sepsis is present          If severe sepsis is present AND tissue hypoperfusion perists in the hour after fluid resuscitation or lactate > 4, the patient meets criteria for SEPTIC SHOCK          Are any of the following organ dysfunction criteria present within 6 hours of suspected infection and SIRS criteria that are NOT considered to be chronic conditions?           Organ dysfunction          Date of presentation of severe sepsis          Time of presentation of severe sepsis          Tissue hypoperfusion persists in the hour after crystalloid fluid administration, evidenced, by either:          Was hypotension present within one hour of the conclusion of crystalloid fluid administration?         Date of presentation of septic shock          Time of presentation of septic shock            User Key  (r) = Recorded By, (t) = Taken By, (c) = Cosigned By    Initials Name Provider Type    MADIHA Sharpe PA-C Physician Assistant                        MDM      Disposition  Final diagnoses:   Fever   Cellulitis of right upper extremity   Lymphedema     Time reflects when diagnosis was documented in both MDM as applicable and the Disposition within this note     Time User Action Codes Description Comment    7/19/2020 11:41 PM [place] Manual [R50 9] Fever     7/19/2020 11:41 PM Bevely Angles Add [L03 113] Cellulitis of right upper extremity     7/19/2020 11:41 PM [place], [address] [place] Add [I89 0] Lymphedema       ED Disposition     ED Disposition Condition Date/Time Comment    Admit Stable Sun Jul 19, 2020 11:41 PM Case was discussed with Dc Savage and the patient's admission status was agreed to be Admission Status: inpatient status to the service of Dr Lisa Reid   Follow-up Information    None         Patient's Medications   Discharge Prescriptions    No medications on file     No discharge procedures on file      PDMP Review     None          ED Provider  Electronically Signed by           Dahiana Sharpe PA-C  07/19/20 3599

## 2020-07-20 NOTE — ASSESSMENT & PLAN NOTE
· As evidenced by tachycardia and leukocytosis present on admission  · WBC 17 18  Temp 98 2   LA 1 7  · Likely 2/2 #2  · Received 1L NS in ED  · See AP below

## 2020-07-20 NOTE — SEPSIS NOTE
Sepsis Note   Beatris Stone 62 y o  female MRN: [id number]  Unit/Bed#: ED 13 Encounter: [id number]      qSOFA     Row Name 07/19/20 2215                Altered mental status GCS < 15          Respiratory Rate > / =86  0        Systolic BP < / =117  0        Q Sofa Score  0            Initial Sepsis Screening     Row Name 07/19/20 5290                Is the patient's history suggestive of a new or worsening infection? (!) Yes (Proceed)  -DD        Suspected source of infection  soft tissue  -DD        Are two or more of the following signs & symptoms of infection both present and new to the patient? No  -DD        Indicate SIRS criteria          If the answer is yes to both questions, suspicion of sepsis is present          If severe sepsis is present AND tissue hypoperfusion perists in the hour after fluid resuscitation or lactate > 4, the patient meets criteria for SEPTIC SHOCK          Are any of the following organ dysfunction criteria present within 6 hours of suspected infection and SIRS criteria that are NOT considered to be chronic conditions?         Organ dysfunction          Date of presentation of severe sepsis          Time of presentation of severe sepsis          Tissue hypoperfusion persists in the hour after crystalloid fluid administration, evidenced, by either:          Was hypotension present within one hour of the conclusion of crystalloid fluid administration?           Date of presentation of septic shock          Time of presentation of septic shock            User Key  (r) = Recorded By, (t) = Taken By, (c) = Cosigned By    [address] Name Provider Type    MADIHA Mora PA-C Physician Assistant

## 2020-07-20 NOTE — PLAN OF CARE
Problem: PAIN - ADULT  Goal: Verbalizes/displays adequate comfort level or baseline comfort level  Description  Interventions:  - Encourage patient to monitor pain and request assistance  - Assess pain using appropriate pain scale  - Administer analgesics based on type and severity of pain and evaluate response  - Implement non-pharmacological measures as appropriate and evaluate response  - Consider cultural and social influences on pain and pain management  - Notify physician/advanced practitioner if interventions unsuccessful or patient reports new pain  Outcome: Progressing     Problem: INFECTION - ADULT  Goal: Absence or prevention of progression during hospitalization  Description  INTERVENTIONS:  - Assess and monitor for signs and symptoms of infection  - Monitor lab/diagnostic results  - Monitor all insertion sites, i e  indwelling lines, tubes, and drains  - Monitor endotracheal if appropriate and nasal secretions for changes in amount and color  - [place] appropriate cooling/warming therapies per order  - Administer medications as ordered  - Instruct and encourage patient and family to use good hand hygiene technique  - Identify and instruct in appropriate isolation precautions for identified infection/condition  Outcome: Progressing  Goal: Absence of fever/infection during neutropenic period  Description  INTERVENTIONS:  - Monitor WBC    Outcome: Progressing     Problem: SAFETY ADULT  Goal: Patient will remain free of falls  Description  INTERVENTIONS:  - Assess patient frequently for physical needs  -  Identify cognitive and physical deficits and behaviors that affect risk of falls    -  [place] fall precautions as indicated by assessment   - Educate patient/family on patient safety including physical limitations  - Instruct patient to call for assistance with activity based on assessment  - Modify environment to reduce risk of injury  - Consider OT/PT consult to assist with strengthening/mobility  Outcome: Progressing  Goal: Maintain or return to baseline ADL function  Description  INTERVENTIONS:  -  Assess patient's ability to carry out ADLs; assess patient's baseline for ADL function and identify physical deficits which impact ability to perform ADLs (bathing, care of mouth/teeth, toileting, grooming, dressing, etc )  - Assess/evaluate cause of self-care deficits   - Assess range of motion  - Assess patient's mobility; develop plan if impaired  - Assess patient's need for assistive devices and provide as appropriate  - Encourage maximum independence but intervene and supervise when necessary  - Involve family in performance of ADLs  - Assess for home care needs following discharge   - Consider OT consult to assist with ADL evaluation and planning for discharge  - Provide patient education as appropriate  Outcome: Progressing  Goal: Maintain or return mobility status to optimal level  Description  INTERVENTIONS:  - Assess patient's baseline mobility status (ambulation, transfers, stairs, etc )    - Identify cognitive and physical deficits and behaviors that affect mobility  - Identify mobility aids required to assist with transfers and/or ambulation (gait belt, sit-to-stand, lift, walker, cane, etc )  - [place] fall precautions as indicated by assessment  - Record patient progress and toleration of activity level on Mobility SBAR; progress patient to next Phase/Stage  - Instruct patient to call for assistance with activity based on assessment  - Consider rehabilitation consult to assist with strengthening/weightbearing, etc   Outcome: Progressing     Problem: DISCHARGE PLANNING  Goal: Discharge to home or other facility with appropriate resources  Description  INTERVENTIONS:  - Identify barriers to discharge w/patient and caregiver  - Arrange for needed discharge resources and transportation as appropriate  - Identify discharge learning needs (meds, wound care, etc )  - Arrange for interpretive services to assist at discharge as needed  - Refer to Case Management Department for coordinating discharge planning if the patient needs post-hospital services based on physician/advanced practitioner order or complex needs related to functional status, cognitive ability, or social support system  Outcome: Progressing     Problem: Knowledge Deficit  Goal: Patient/family/caregiver demonstrates understanding of disease process, treatment plan, medications, and discharge instructions  Description  Complete learning assessment and assess knowledge base    Interventions:  - Provide teaching at level of understanding  - Provide teaching via preferred learning methods  Outcome: Progressing

## 2020-07-20 NOTE — H&P
H&P- Kasey Falcon 1960, 62 y o  female MRN: [id number]    Unit/Bed#: ED 13 Encounter: [id number]    Primary Care Provider: Nilsa Muir MD   Date and time admitted to hospital: 7/19/2020 10:42 PM        * Sepsis [facility])  Assessment & Plan  · As evidenced by tachycardia and leukocytosis present on admission  · WBC 17 18  Temp 98 2  LA 1 7  · Likely 2/2 #2  · Received 1L NS in ED  · See AP below    Cellulitis of right upper extremity  Assessment & Plan  · RUE erythema and edema x 1 day  (+) fever/chills at home  States RUE has been "achy" for a couple days  Has history of cancer with extensive surgery to RUE and Rt chest wall with resultant lymphedema  · Circumferential erythema noted to RUE  · Leukocytosis as above  · Received Cefepime and Vancomycin in ED  Will downgrade to Ancef  · Elevate RUE  · CBC in am    Chronic insomnia  Assessment & Plan  · Melatonin at HS      VTE Prophylaxis: Enoxaparin (Lovenox)  / sequential compression device   Code Status: Level 1 Full Code  POLST: POLST form is not discussed and not completed at this time  Discussion with family:  at bedside    Anticipated Length of Stay:  Patient will be admitted on an Inpatient basis with an anticipated length of stay of  Greater than 2 midnights  Justification for Hospital Stay: See AP above    Total Time for Visit, including Counseling / Coordination of Care: 45 minutes  Greater than 50% of this total time spent on direct patient counseling and coordination of care  Chief Complaint:   Rt arm redness and swelling    History of Present Illness:    Kasey Falcon is a 62 y o  female who presents with RUE erythema and edema x 1 day  (+) fever/chills at home  States RUE has been "achy" for a couple days  Has history of cancer with extensive surgery to RUE and Rt chest wall with resultant lymphedema  (+) fever/chills    Review of Systems:    Review of Systems   Constitutional: Positive for chills and fever   Negative for activity change and appetite change  HENT: Negative for congestion, ear pain, sinus pressure and sore throat  Eyes: Negative for visual disturbance  Respiratory: Negative for cough, shortness of breath and wheezing  Cardiovascular: Negative for chest pain, palpitations and leg swelling  Gastrointestinal: Positive for nausea  Negative for abdominal pain, constipation, diarrhea and vomiting  Genitourinary: Negative for difficulty urinating, dysuria, frequency and urgency  Musculoskeletal: Positive for myalgias (RUE)  Negative for neck pain and neck stiffness  Skin: Positive for color change (erythema RUE)  Neurological: Negative for dizziness, syncope, light-headedness and headaches  All other systems reviewed and are negative  Past Medical and Surgical History:     Past Medical History:   Diagnosis Date    Abnormal mammogram 05/15/2013    Anemia     Cancer ([facility] [address] )     desmoitosis    Desmoid tumor     Last Assessed: 6/19/2017    Hyperlipidemia     Hypertension        Past Surgical History:   Procedure Laterality Date    HYSTERECTOMY      OTHER SURGICAL HISTORY      Arm Incision: Dermoid tumor, right Arm     PARTIAL HYSTERECTOMY      TN COLONOSCOPY FLX DX W/COLLJ SPEC WHEN PFRMD N/A 8/15/2017    Procedure: COLONOSCOPY;  Surgeon: Terri Evans MD;  Location: [facility]; Service: Gastroenterology    SKIN BIOPSY      SKIN CANCER EXCISION      Melanoma Excision        Meds/Allergies:    Prior to Admission medications    Medication Sig Start Date End Date Taking?  Authorizing Provider   cholecalciferol (VITAMIN D3) 1,000 units tablet Take 2,000 Units by mouth daily   Yes Historical Provider, MD   dronabinol (MARINOL) 5 MG capsule Take 15 mg by mouth 4 (four) times a day (before meals and at bedtime)   Yes Historical Provider, MD   multivitamin (THERAGRAN) TABS Take 1 tablet by mouth daily   Yes Historical Provider, MD   Omega-3 Fatty Acids (FISH OIL PO) Take by mouth   Yes Historical Provider, MD   ondansetron (ZOFRAN-ODT) 4 mg disintegrating tablet Take 1 tablet (4 mg total) by mouth every 8 (eight) hours as needed for nausea or vomiting 5/10/20  Yes Margie Pacsal DO   Probiotic Product (PROBIOTIC-10 PO) Take by mouth   Yes Historical Provider, MD   thiamine (VITAMIN B1) 100 mg tablet Take 100 mg by mouth daily   Yes Historical Provider, MD   dicyclomine (BENTYL) 20 mg tablet Take 1 tablet (20 mg total) by mouth every 8 (eight) hours as needed (abdominal cramping/pain or diarrhea) 5/10/20   Gretel Corral DO     I have reviewed home medications with patient personally  Allergies: Allergies   Allergen Reactions    Aspirin GI Intolerance and Abdominal Pain     ABD     Chlorpromazine Anaphylaxis     PHENOTHIAZINE=ANAPHYLAXIS    Codeine Anaphylaxis     Anaphylaxis  abd pain   Meperidine Anaphylaxis, Hives and Other (See Comments)     VOMITS  VOMITS  Category: Allergy;     Phenothiazines Anaphylaxis and Throat Swelling     Category: Allergy;     Saccharin Anaphylaxis    Ibuprofen Hives     H    Ampicillin-Sulbactam Sodium Hives    Gluten Meal GI Intolerance    Levofloxacin Hives    Neomycin Other (See Comments), Edema and Hives     Category: Allergy;   swelling    Other Throat Swelling, Hives and Other (See Comments)     ASA=GERD  ASA=GERD  Category: Allergy;     Citrus Rash    Latex Hives and Rash     Category:  Allergy;        Social History:     Marital Status: /Civil Union   Patient Pre-hospital Living Situation: Lives w/   Patient Pre-hospital Level of Mobility: Ambulatory  Patient Pre-hospital Diet Restrictions: Gluten free  Substance Use History:   Social History     Substance and Sexual Activity   Alcohol Use Not Currently    Alcohol/week: 1 0 standard drinks    Types: 1 Shots of liquor per week     Social History     Tobacco Use   Smoking Status Never Smoker   Smokeless Tobacco Never Used     Social History     Substance and Sexual Activity   Drug Use No       Family History:    Family History   Problem Relation Age of Onset    Diabetes Mother     No Known Problems Father     Breast cancer Sister     Lung cancer Sister     Pancreatic cancer Sister        Physical Exam:     Vitals:   Blood Pressure: 102/55 (07/19/20 2215)  Pulse: 98 (07/19/20 2215)  Temperature: 99 4 °F (37 4 °C) (07/19/20 2309)  Temp Source: Oral (07/19/20 2309)  Respirations: 18 (07/19/20 2215)  Weight - Scale: 68 9 kg (151 lb 14 oz) (07/20/20 0056)  SpO2: 96 % (07/19/20 2215)    Physical Exam   Constitutional: She is oriented to person, place, and time  She appears well-developed and well-nourished  No distress  HENT:   Head: Normocephalic and atraumatic  Eyes: EOM are normal    Neck: Normal range of motion  Neck supple  Cardiovascular: Normal rate, regular rhythm, normal heart sounds and intact distal pulses  Exam reveals no gallop and no friction rub  No murmur heard  Pulmonary/Chest: Effort normal and breath sounds normal  No respiratory distress  She has no wheezes  She has no rales  Abdominal: Soft  Bowel sounds are normal  There is no tenderness  There is no rebound and no guarding  Musculoskeletal: Normal range of motion  She exhibits edema (RUE)  She exhibits no tenderness  Neurological: She is alert and oriented to person, place, and time  Skin: Skin is warm and dry  Psychiatric: She has a normal mood and affect  Her behavior is normal  Thought content normal        Additional Data:     Lab Results: I have personally reviewed pertinent reports        Results from last 7 days   Lab Units 07/19/20  2302   WBC Thousand/uL 17 18*   HEMOGLOBIN g/dL 14 7   HEMATOCRIT % 43 9   PLATELETS Thousands/uL 276   NEUTROS PCT % 89*   LYMPHS PCT % 5*   MONOS PCT % 5   EOS PCT % 0     Results from last 7 days   Lab Units 07/19/20  2302   SODIUM mmol/L 138   POTASSIUM mmol/L 3 5   CHLORIDE mmol/L 102   CO2 mmol/L 24   BUN mg/dL 7   CREATININE mg/dL 0 81   ANION GAP mmol/L 12 CALCIUM mg/dL 8 6   ALBUMIN g/dL 3 6   TOTAL BILIRUBIN mg/dL 0 60   ALK PHOS U/L 97   ALT U/L 21   AST U/L 18   GLUCOSE RANDOM mg/dL 156*     Results from last 7 days   Lab Units 07/19/20  2302   INR  1 02             Results from last 7 days   Lab Units 07/19/20  2302   LACTIC ACID mmol/L 1 7       Imaging: NA    VAS upper limb venous duplex scan, unilateral/limited    (Results Pending)       EKG, Pathology, and Other Studies Reviewed on Admission:   · EKG: NA    Allscripts / Epic Records Reviewed: Yes     ** Please Note: This note has been constructed using a voice recognition system   **

## 2020-07-20 NOTE — ASSESSMENT & PLAN NOTE
· RUE erythema and edema x 1 day  (+) fever/chills at home  States RUE has been "achy" for a couple days  Has history of cancer with extensive surgery to RUE and Rt chest wall with resultant lymphedema  · Circumferential erythema noted to RUE  · Leukocytosis as above  · Received Cefepime and Vancomycin in ED   Will downgrade to Ancef  · Elevate RUE  · CBC in am

## 2020-07-20 NOTE — UTILIZATION REVIEW
Notification of Inpatient Admission/Inpatient Authorization Request   This is a Notification of Inpatient Admission for [address]  Be advised that this patient was admitted to our facility under Inpatient Status  Contact Amanda Steele at [phone] for additional admission information  [facility] DEPT DEDICATED Tobin Carmona [phone]  Patient Name:   Mary Serrano   YOB: 1960       [address]   [address]:   [address]   Tax ID: [id number]  NPI: [id number] Attending Provider/NPI:  Phone:  Address: Vicente Small [[id number]]  [phone]  Same as TAM/Y[address] of Service Code: 24     Place of Service Name:  [address] [place] [address]   Start Date: 7/19/20 2341 Discharge Date & Time: No discharge date for patient encounter  Type of Admission: Inpatient Status Discharge Disposition   (if discharged): Home/Self Care   Patient Diagnoses: Fever [R50 9]     Orders: Admission Orders (From admission, onward)     Ordered        07/19/20 2341  Inpatient Admission  Once                    Assigned Utilization Review Contact: Amanda Steele  Utilization   Network Utilization Review Department  Phone: [phone]; Fax [phone]  Email: [email or web address]   ATTENTION PAYERS: Please call the assigned Utilization  directly with any questions or concerns ALL voicemails in the department are confidential  Send all requests for admission clinical reviews, approved or denied determinations and any other requests to dedicated fax number belonging to the campus where the patient is receiving treatment

## 2020-07-20 NOTE — ED NOTES
1  CC cellulitis right arm  2  Medications/drip  3  Abnormal labs/vitals/assessment [id number] A+[id number]  4  Medications/drips  5  Last time narcotics given   6  IV/drains/ etc  20G LAC  7  Isolation status  8  Skin RIGHT UPPER ARM REDNESS  9  Ambulation status SELF  10   Phone number [phone]  11 trauma y/n Benji Dawkins RN  07/20/20 1717

## 2020-07-20 NOTE — PLAN OF CARE
Problem: PAIN - ADULT  Goal: Verbalizes/displays adequate comfort level or baseline comfort level  Description  Interventions:  - Encourage patient to monitor pain and request assistance  - Assess pain using appropriate pain scale  - Administer analgesics based on type and severity of pain and evaluate response  - Implement non-pharmacological measures as appropriate and evaluate response  - Consider cultural and social influences on pain and pain management  - Notify physician/advanced practitioner if interventions unsuccessful or patient reports new pain  Outcome: Progressing     Problem: INFECTION - ADULT  Goal: Absence or prevention of progression during hospitalization  Description  INTERVENTIONS:  - Assess and monitor for signs and symptoms of infection  - Monitor lab/diagnostic results  - Monitor all insertion sites, i e  indwelling lines, tubes, and drains  - Monitor endotracheal if appropriate and nasal secretions for changes in amount and color  - [place] appropriate cooling/warming therapies per order  - Administer medications as ordered  - Instruct and encourage patient and family to use good hand hygiene technique  - Identify and instruct in appropriate isolation precautions for identified infection/condition  Outcome: Progressing  Goal: Absence of fever/infection during neutropenic period  Description  INTERVENTIONS:  - Monitor WBC    Outcome: Progressing     Problem: SAFETY ADULT  Goal: Patient will remain free of falls  Description  INTERVENTIONS:  - Assess patient frequently for physical needs  -  Identify cognitive and physical deficits and behaviors that affect risk of falls    -  [place] fall precautions as indicated by assessment   - Educate patient/family on patient safety including physical limitations  - Instruct patient to call for assistance with activity based on assessment  - Modify environment to reduce risk of injury  - Consider OT/PT consult to assist with strengthening/mobility  Outcome: Progressing  Goal: Maintain or return to baseline ADL function  Description  INTERVENTIONS:  -  Assess patient's ability to carry out ADLs; assess patient's baseline for ADL function and identify physical deficits which impact ability to perform ADLs (bathing, care of mouth/teeth, toileting, grooming, dressing, etc )  - Assess/evaluate cause of self-care deficits   - Assess range of motion  - Assess patient's mobility; develop plan if impaired  - Assess patient's need for assistive devices and provide as appropriate  - Encourage maximum independence but intervene and supervise when necessary  - Involve family in performance of ADLs  - Assess for home care needs following discharge   - Consider OT consult to assist with ADL evaluation and planning for discharge  - Provide patient education as appropriate  Outcome: Progressing  Goal: Maintain or return mobility status to optimal level  Description  INTERVENTIONS:  - Assess patient's baseline mobility status (ambulation, transfers, stairs, etc )    - Identify cognitive and physical deficits and behaviors that affect mobility  - Identify mobility aids required to assist with transfers and/or ambulation (gait belt, sit-to-stand, lift, walker, cane, etc )  - [place] fall precautions as indicated by assessment  - Record patient progress and toleration of activity level on Mobility SBAR; progress patient to next Phase/Stage  - Instruct patient to call for assistance with activity based on assessment  - Consider rehabilitation consult to assist with strengthening/weightbearing, etc   Outcome: Progressing     Problem: DISCHARGE PLANNING  Goal: Discharge to home or other facility with appropriate resources  Description  INTERVENTIONS:  - Identify barriers to discharge w/patient and caregiver  - Arrange for needed discharge resources and transportation as appropriate  - Identify discharge learning needs (meds, wound care, etc )  - Arrange for interpretive services to assist at discharge as needed  - Refer to Case Management Department for coordinating discharge planning if the patient needs post-hospital services based on physician/advanced practitioner order or complex needs related to functional status, cognitive ability, or social support system  Outcome: Progressing     Problem: Knowledge Deficit  Goal: Patient/family/caregiver demonstrates understanding of disease process, treatment plan, medications, and discharge instructions  Description  Complete learning assessment and assess knowledge base    Interventions:  - Provide teaching at level of understanding  - Provide teaching via preferred learning methods  Outcome: Progressing

## 2020-07-21 LAB
ALBUMIN SERPL BCP-MCNC: 2.8 G/DL (ref 3.5–5)
ALP SERPL-CCNC: 82 U/L (ref 46–116)
ALT SERPL W P-5'-P-CCNC: 30 U/L (ref 12–78)
ANION GAP SERPL CALCULATED.3IONS-SCNC: 7 MMOL/L (ref 4–13)
AST SERPL W P-5'-P-CCNC: 22 U/L (ref 5–45)
BASOPHILS # BLD AUTO: 0.05 THOUSANDS/ΜL (ref 0–0.1)
BASOPHILS NFR BLD AUTO: 1 % (ref 0–1)
BILIRUB SERPL-MCNC: 0.8 MG/DL (ref 0.2–1)
BUN SERPL-MCNC: 5 MG/DL (ref 5–25)
CALCIUM SERPL-MCNC: 8.3 MG/DL (ref 8.3–10.1)
CHLORIDE SERPL-SCNC: 106 MMOL/L (ref 100–108)
CO2 SERPL-SCNC: 26 MMOL/L (ref 21–32)
CREAT SERPL-MCNC: 0.53 MG/DL (ref 0.6–1.3)
EOSINOPHIL # BLD AUTO: 0.19 THOUSAND/ΜL (ref 0–0.61)
EOSINOPHIL NFR BLD AUTO: 2 % (ref 0–6)
ERYTHROCYTE [DISTWIDTH] IN BLOOD BY AUTOMATED COUNT: 13 % (ref 11.6–15.1)
GFR SERPL CREATININE-BSD FRML MDRD: 103 ML/MIN/1.73SQ M
GLUCOSE SERPL-MCNC: 95 MG/DL (ref 65–140)
HCT VFR BLD AUTO: 36.1 % (ref 34.8–46.1)
HGB BLD-MCNC: 12.1 G/DL (ref 11.5–15.4)
IMM GRANULOCYTES # BLD AUTO: 0.04 THOUSAND/UL (ref 0–0.2)
IMM GRANULOCYTES NFR BLD AUTO: 0 % (ref 0–2)
LYMPHOCYTES # BLD AUTO: 2.2 THOUSANDS/ΜL (ref 0.6–4.47)
LYMPHOCYTES NFR BLD AUTO: 24 % (ref 14–44)
MAGNESIUM SERPL-MCNC: 2.1 MG/DL (ref 1.6–2.6)
MCH RBC QN AUTO: 30.5 PG (ref 26.8–34.3)
MCHC RBC AUTO-ENTMCNC: 33.5 G/DL (ref 31.4–37.4)
MCV RBC AUTO: 91 FL (ref 82–98)
MONOCYTES # BLD AUTO: 0.72 THOUSAND/ΜL (ref 0.17–1.22)
MONOCYTES NFR BLD AUTO: 8 % (ref 4–12)
NEUTROPHILS # BLD AUTO: 6.08 THOUSANDS/ΜL (ref 1.85–7.62)
NEUTS SEG NFR BLD AUTO: 65 % (ref 43–75)
NRBC BLD AUTO-RTO: 0 /100 WBCS
PLATELET # BLD AUTO: 240 THOUSANDS/UL (ref 149–390)
PMV BLD AUTO: 9.4 FL (ref 8.9–12.7)
POTASSIUM SERPL-SCNC: 3.9 MMOL/L (ref 3.5–5.3)
PROT SERPL-MCNC: 6.2 G/DL (ref 6.4–8.2)
RBC # BLD AUTO: 3.97 MILLION/UL (ref 3.81–5.12)
SODIUM SERPL-SCNC: 139 MMOL/L (ref 136–145)
WBC # BLD AUTO: 9.28 THOUSAND/UL (ref 4.31–10.16)

## 2020-07-21 PROCEDURE — 85025 COMPLETE CBC W/AUTO DIFF WBC: CPT | Performed by: STUDENT IN AN ORGANIZED HEALTH CARE EDUCATION/TRAINING PROGRAM

## 2020-07-21 PROCEDURE — 80053 COMPREHEN METABOLIC PANEL: CPT | Performed by: STUDENT IN AN ORGANIZED HEALTH CARE EDUCATION/TRAINING PROGRAM

## 2020-07-21 PROCEDURE — 83735 ASSAY OF MAGNESIUM: CPT | Performed by: STUDENT IN AN ORGANIZED HEALTH CARE EDUCATION/TRAINING PROGRAM

## 2020-07-21 PROCEDURE — 99232 SBSQ HOSP IP/OBS MODERATE 35: CPT | Performed by: FAMILY MEDICINE

## 2020-07-21 RX ORDER — ACETAMINOPHEN 325 MG/1
650 TABLET ORAL EVERY 6 HOURS PRN
Status: DISCONTINUED | OUTPATIENT
Start: 2020-07-21 | End: 2020-07-22 | Stop reason: HOSPADM

## 2020-07-21 RX ADMIN — Medication 250 MG: at 08:47

## 2020-07-21 RX ADMIN — DRONABINOL 15 MG: 2.5 CAPSULE ORAL at 06:05

## 2020-07-21 RX ADMIN — Medication 250 MG: at 17:48

## 2020-07-21 RX ADMIN — DRONABINOL 15 MG: 2.5 CAPSULE ORAL at 11:30

## 2020-07-21 RX ADMIN — CEFAZOLIN SODIUM 1000 MG: 1 SOLUTION INTRAVENOUS at 22:07

## 2020-07-21 RX ADMIN — DRONABINOL 15 MG: 2.5 CAPSULE ORAL at 16:30

## 2020-07-21 RX ADMIN — THIAMINE HCL TAB 100 MG 100 MG: 100 TAB at 08:47

## 2020-07-21 RX ADMIN — OMEGA-3 FATTY ACIDS CAP 1000 MG 1000 MG: 1000 CAP at 08:48

## 2020-07-21 RX ADMIN — CEFAZOLIN SODIUM 1000 MG: 1 SOLUTION INTRAVENOUS at 15:00

## 2020-07-21 RX ADMIN — ACETAMINOPHEN 650 MG: 325 TABLET, FILM COATED ORAL at 08:49

## 2020-07-21 RX ADMIN — CEFAZOLIN SODIUM 1000 MG: 1 SOLUTION INTRAVENOUS at 05:18

## 2020-07-21 RX ADMIN — MELATONIN 2000 UNITS: at 08:47

## 2020-07-21 RX ADMIN — ENOXAPARIN SODIUM 40 MG: 40 INJECTION SUBCUTANEOUS at 08:48

## 2020-07-21 RX ADMIN — DRONABINOL 15 MG: 2.5 CAPSULE ORAL at 22:06

## 2020-07-21 RX ADMIN — Medication 1 TABLET: at 08:48

## 2020-07-21 NOTE — PROGRESS NOTES
Progress Note - Jaime Alarcon 1960, 62 y o  female MRN: [id number]    Unit/Bed#: -01 Encounter: [id number]    Primary Care Provider: Lacie Murphy MD   Date and time admitted to hospital: 7/19/2020 10:42 PM        Cellulitis of right upper extremity  Assessment & Plan  · RUE erythema and edema x 1 day  (+) fever/chills at home  States RUE has been "achy" for a couple days  Has history of cancer with extensive surgery to RUE and Rt chest wall with resultant lymphedema  · Circumferential erythema noted to RUE  · No DVT noted on venous duplex  · Received Cefepime and Vancomycin in ED  Will downgrade to Ancef due to significant clinical improvement  · Elevate RUE  · CBC showed significant reduction in leukocytosis  · Will likely be discharged in 24 hours on oral antibiotics    Chronic insomnia  Assessment & Plan  · Melatonin at HS    * Sepsis ([facility] [address] )  Assessment & Plan  · As evidenced by tachycardia and leukocytosis present on admission  · WBC 17 18  Temp 98 2  LA 1 7  · Likely 2/2 #2  · Received 1L NS in ED  · Currently resolved  · Blood cultures negative for 24 hours      VTE Pharmacologic Prophylaxis:   Pharmacologic: Enoxaparin (Lovenox)  Mechanical VTE Prophylaxis in Place: Yes    Patient Centered Rounds: I have performed bedside rounds with nursing staff today  Discussions with Specialists or Other Care Team Provider:  Nursing    Education and Discussions with Family / Patient:  Patient    Time Spent for Care: 20 minutes  More than 50% of total time spent on counseling and coordination of care as described above  Current Length of Stay: 2 day(s)    Current Patient Status: Inpatient   Certification Statement: The patient will continue to require additional inpatient hospital stay due to Cellulitis with IV antibiotics    Discharge Plan:  24 hours    Code Status: Level 1 - Full Code      Subjective:   Patient was seen and examined    She reports feeling better, but her right forearm still swollen tender  Objective:     Vitals:   Temp (24hrs), Av 3 °F (36 8 °C), Min:97 9 °F (36 6 °C), Max:98 8 °F (37 1 °C)    Temp:  [97 9 °F (36 6 °C)-98 8 °F (37 1 °C)] 98 3 °F (36 8 °C)  HR:  [75-87] 83  Resp:  [16-18] 18  BP: (148-194)/(75-91) 167/83  SpO2:  [95 %-98 %] 98 %  Body mass index is 23 1 kg/m²  Input and Output Summary (last 24 hours):     No intake or output data in the 24 hours ending 20 1449    Physical Exam:     Physical Exam   Constitutional: She appears well-developed and well-nourished  HENT:   Head: Normocephalic and atraumatic  Neck: Normal range of motion  Cardiovascular: Normal rate and regular rhythm  Pulmonary/Chest: Effort normal and breath sounds normal    Abdominal: Soft  She exhibits no distension  Musculoskeletal: She exhibits edema (Right forearm) and tenderness  Neurological: She is alert  She has normal strength  No cranial nerve deficit  Skin: Skin is warm  No erythema  Psychiatric: She has a normal mood and affect  Her behavior is normal        Additional Data:     Labs:    Results from last 7 days   Lab Units 20  0511   WBC Thousand/uL 9 28   HEMOGLOBIN g/dL 12 1   HEMATOCRIT % 36 1   PLATELETS Thousands/uL 240   NEUTROS PCT % 65   LYMPHS PCT % 24   MONOS PCT % 8   EOS PCT % 2     Results from last 7 days   Lab Units 20  0511   SODIUM mmol/L 139   POTASSIUM mmol/L 3 9   CHLORIDE mmol/L 106   CO2 mmol/L 26   BUN mg/dL 5   CREATININE mg/dL 0 53*   ANION GAP mmol/L 7   CALCIUM mg/dL 8 3   ALBUMIN g/dL 2 8*   TOTAL BILIRUBIN mg/dL 0 80   ALK PHOS U/L 82   ALT U/L 30   AST U/L 22   GLUCOSE RANDOM mg/dL 95     Results from last 7 days   Lab Units 20  2302   INR  1 02             Results from last 7 days   Lab Units 20  2302   LACTIC ACID mmol/L 1 7   PROCALCITONIN ng/ml <0 05           * I Have Reviewed All Lab Data Listed Above  * Additional Pertinent Lab Tests Reviewed:  All Labs Within Last 24 Hours Reviewed    Imaging:    Venous duplex of right upper extremity  Recent Cultures (last 7 days):     Results from last 7 days   Lab Units 07/19/20  2302 07/19/20  2259   BLOOD CULTURE  No Growth at 24 hrs  No Growth at 24 hrs  Last 24 Hours Medication List:     Current Facility-Administered Medications:  acetaminophen 650 mg Oral Q6H PRN Uma Buenrostro MD    bisacodyl 10 mg Rectal Daily PRN Sea Trejo PA-C    cefazolin 1,000 mg Intravenous Q8H Sea Trejo, [place] Last Rate: 1,000 mg (07/21/20 0518)   cholecalciferol 2,000 Units Oral Daily Sea Trejo PA-C    dronabinol 15 mg Oral 4x Daily (AC & HS) Mercedes Vásquez MD    enoxaparin 40 mg Subcutaneous Daily Sea Trejo PA-C    fish oil 1,000 mg Oral Daily Sea Trejo PA-C    melatonin 6 mg Oral HS PRN Sea Trejo PA-C    multivitamin-minerals 1 tablet Oral Daily Sea Trejo PA-C    ondansetron 4 mg Intravenous Q6H PRN Sea Trejo PA-C    saccharomyces boulardii 250 mg Oral BID Vesta Cannon PA-C    thiamine 100 mg Oral Daily Sea Trejo PA-C         Today, Patient Was Seen By: Uma Buenrostro MD    ** Please Note: Dictation voice to text software may have been used in the creation of this document   **

## 2020-07-21 NOTE — ASSESSMENT & PLAN NOTE
· As evidenced by tachycardia and leukocytosis present on admission  · WBC 17 18  Temp 98 2   LA 1 7  · Likely 2/2 #2  · Received 1L NS in ED  · Currently resolved  · Blood cultures negative for 24 hours

## 2020-07-21 NOTE — SOCIAL WORK
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  CM reviewed discharge planning process including the following: identifying help at home, patient preference for discharge planning needs, pharmacy preference, and availability of treatment team to discuss questions or concerns patient and/or family may have regarding understanding medications and recognizing signs and symptoms once discharged  CM also encouraged patient to follow up with all recommended appointments after discharge  Patient advised of importance for patient and family to participate in managing patients medical well being  Pt has no needs at this time and was ipta  Pt's  will transport her home  CM Dept to follow pt through dc 
Detail Level: Generalized
Detail Level: Zone
Detail Level: Detailed

## 2020-07-21 NOTE — PROGRESS NOTES
Pt requesting information regarding lymphedema clinic as outpt to follow up with post discharge  Notified [facility] who will place referrals for outpt follow up

## 2020-07-21 NOTE — ASSESSMENT & PLAN NOTE
· RUE erythema and edema x 1 day  (+) fever/chills at home  States RUE has been "achy" for a couple days  Has history of cancer with extensive surgery to RUE and Rt chest wall with resultant lymphedema  · Circumferential erythema noted to RUE  · No DVT noted on venous duplex  · Received Cefepime and Vancomycin in ED   Will downgrade to Ancef due to significant clinical improvement  · Elevate RUE  · CBC showed significant reduction in leukocytosis  · Will likely be discharged in 24 hours on oral antibiotics

## 2020-07-21 NOTE — PLAN OF CARE
Problem: PAIN - ADULT  Goal: Verbalizes/displays adequate comfort level or baseline comfort level  Description  Interventions:  - Encourage patient to monitor pain and request assistance  - Assess pain using appropriate pain scale  - Administer analgesics based on type and severity of pain and evaluate response  - Implement non-pharmacological measures as appropriate and evaluate response  - Consider cultural and social influences on pain and pain management  - Notify physician/advanced practitioner if interventions unsuccessful or patient reports new pain  Outcome: Progressing     Problem: INFECTION - ADULT  Goal: Absence or prevention of progression during hospitalization  Description  INTERVENTIONS:  - Assess and monitor for signs and symptoms of infection  - Monitor lab/diagnostic results  - Monitor all insertion sites, i e  indwelling lines, tubes, and drains  - Monitor endotracheal if appropriate and nasal secretions for changes in amount and color  - [place] appropriate cooling/warming therapies per order  - Administer medications as ordered  - Instruct and encourage patient and family to use good hand hygiene technique  - Identify and instruct in appropriate isolation precautions for identified infection/condition  Outcome: Progressing  Goal: Absence of fever/infection during neutropenic period  Description  INTERVENTIONS:  - Monitor WBC    Outcome: Progressing     Problem: SAFETY ADULT  Goal: Patient will remain free of falls  Description  INTERVENTIONS:  - Assess patient frequently for physical needs  -  Identify cognitive and physical deficits and behaviors that affect risk of falls    -  [place] fall precautions as indicated by assessment   - Educate patient/family on patient safety including physical limitations  - Instruct patient to call for assistance with activity based on assessment  - Modify environment to reduce risk of injury  - Consider OT/PT consult to assist with strengthening/mobility  Outcome: Progressing  Goal: Maintain or return to baseline ADL function  Description  INTERVENTIONS:  -  Assess patient's ability to carry out ADLs; assess patient's baseline for ADL function and identify physical deficits which impact ability to perform ADLs (bathing, care of mouth/teeth, toileting, grooming, dressing, etc )  - Assess/evaluate cause of self-care deficits   - Assess range of motion  - Assess patient's mobility; develop plan if impaired  - Assess patient's need for assistive devices and provide as appropriate  - Encourage maximum independence but intervene and supervise when necessary  - Involve family in performance of ADLs  - Assess for home care needs following discharge   - Consider OT consult to assist with ADL evaluation and planning for discharge  - Provide patient education as appropriate  Outcome: Progressing  Goal: Maintain or return mobility status to optimal level  Description  INTERVENTIONS:  - Assess patient's baseline mobility status (ambulation, transfers, stairs, etc )    - Identify cognitive and physical deficits and behaviors that affect mobility  - Identify mobility aids required to assist with transfers and/or ambulation (gait belt, sit-to-stand, lift, walker, cane, etc )  - [place] fall precautions as indicated by assessment  - Record patient progress and toleration of activity level on Mobility SBAR; progress patient to next Phase/Stage  - Instruct patient to call for assistance with activity based on assessment  - Consider rehabilitation consult to assist with strengthening/weightbearing, etc   Outcome: Progressing     Problem: DISCHARGE PLANNING  Goal: Discharge to home or other facility with appropriate resources  Description  INTERVENTIONS:  - Identify barriers to discharge w/patient and caregiver  - Arrange for needed discharge resources and transportation as appropriate  - Identify discharge learning needs (meds, wound care, etc )  - Arrange for interpretive services to assist at discharge as needed  - Refer to Case Management Department for coordinating discharge planning if the patient needs post-hospital services based on physician/advanced practitioner order or complex needs related to functional status, cognitive ability, or social support system  Outcome: Progressing     Problem: Knowledge Deficit  Goal: Patient/family/caregiver demonstrates understanding of disease process, treatment plan, medications, and discharge instructions  Description  Complete learning assessment and assess knowledge base  Interventions:  - Provide teaching at level of understanding  - Provide teaching via preferred learning methods  Outcome: Progressing     Problem: Potential for Falls  Goal: Patient will remain free of falls  Description  INTERVENTIONS:  - Assess patient frequently for physical needs  -  Identify cognitive and physical deficits and behaviors that affect risk of falls    -  [employer] fall precautions as indicated by assessment   - Educate patient/family on patient safety including physical limitations  - Instruct patient to call for assistance with activity based on assessment  - Modify environment to reduce risk of injury  - Consider OT/PT consult to assist with strengthening/mobility  Outcome: Progressing

## 2020-07-22 ENCOUNTER — APPOINTMENT (INPATIENT)
Dept: CT IMAGING | Facility: HOSPITAL | Age: 60
DRG: 872 | End: 2020-07-22
Payer: COMMERCIAL

## 2020-07-22 VITALS
WEIGHT: 151.9 LBS | SYSTOLIC BLOOD PRESSURE: 149 MMHG | HEIGHT: 68 IN | TEMPERATURE: 97.5 F | HEART RATE: 58 BPM | DIASTOLIC BLOOD PRESSURE: 99 MMHG | OXYGEN SATURATION: 97 % | RESPIRATION RATE: 17 BRPM | BODY MASS INDEX: 23.02 KG/M2

## 2020-07-22 LAB
ALBUMIN SERPL BCP-MCNC: 2.9 G/DL (ref 3.5–5)
ALP SERPL-CCNC: 84 U/L (ref 46–116)
ALT SERPL W P-5'-P-CCNC: 24 U/L (ref 12–78)
ANION GAP SERPL CALCULATED.3IONS-SCNC: 9 MMOL/L (ref 4–13)
AST SERPL W P-5'-P-CCNC: 16 U/L (ref 5–45)
BASOPHILS # BLD AUTO: 0.05 THOUSANDS/ΜL (ref 0–0.1)
BASOPHILS NFR BLD AUTO: 1 % (ref 0–1)
BILIRUB SERPL-MCNC: 0.5 MG/DL (ref 0.2–1)
BUN SERPL-MCNC: 6 MG/DL (ref 5–25)
CALCIUM SERPL-MCNC: 8.8 MG/DL (ref 8.3–10.1)
CHLORIDE SERPL-SCNC: 106 MMOL/L (ref 100–108)
CO2 SERPL-SCNC: 24 MMOL/L (ref 21–32)
CREAT SERPL-MCNC: 0.54 MG/DL (ref 0.6–1.3)
EOSINOPHIL # BLD AUTO: 0.15 THOUSAND/ΜL (ref 0–0.61)
EOSINOPHIL NFR BLD AUTO: 3 % (ref 0–6)
ERYTHROCYTE [DISTWIDTH] IN BLOOD BY AUTOMATED COUNT: 12.9 % (ref 11.6–15.1)
GFR SERPL CREATININE-BSD FRML MDRD: 102 ML/MIN/1.73SQ M
GLUCOSE SERPL-MCNC: 94 MG/DL (ref 65–140)
HCT VFR BLD AUTO: 38.3 % (ref 34.8–46.1)
HGB BLD-MCNC: 13.1 G/DL (ref 11.5–15.4)
IMM GRANULOCYTES # BLD AUTO: 0.01 THOUSAND/UL (ref 0–0.2)
IMM GRANULOCYTES NFR BLD AUTO: 0 % (ref 0–2)
LYMPHOCYTES # BLD AUTO: 1.95 THOUSANDS/ΜL (ref 0.6–4.47)
LYMPHOCYTES NFR BLD AUTO: 36 % (ref 14–44)
MAGNESIUM SERPL-MCNC: 2 MG/DL (ref 1.6–2.6)
MCH RBC QN AUTO: 30.9 PG (ref 26.8–34.3)
MCHC RBC AUTO-ENTMCNC: 34.2 G/DL (ref 31.4–37.4)
MCV RBC AUTO: 90 FL (ref 82–98)
MONOCYTES # BLD AUTO: 0.61 THOUSAND/ΜL (ref 0.17–1.22)
MONOCYTES NFR BLD AUTO: 11 % (ref 4–12)
NEUTROPHILS # BLD AUTO: 2.73 THOUSANDS/ΜL (ref 1.85–7.62)
NEUTS SEG NFR BLD AUTO: 49 % (ref 43–75)
NRBC BLD AUTO-RTO: 0 /100 WBCS
PLATELET # BLD AUTO: 251 THOUSANDS/UL (ref 149–390)
PMV BLD AUTO: 9.6 FL (ref 8.9–12.7)
POTASSIUM SERPL-SCNC: 4.1 MMOL/L (ref 3.5–5.3)
PROT SERPL-MCNC: 6.4 G/DL (ref 6.4–8.2)
RBC # BLD AUTO: 4.24 MILLION/UL (ref 3.81–5.12)
SODIUM SERPL-SCNC: 139 MMOL/L (ref 136–145)
WBC # BLD AUTO: 5.5 THOUSAND/UL (ref 4.31–10.16)

## 2020-07-22 PROCEDURE — 73200 CT UPPER EXTREMITY W/O DYE: CPT

## 2020-07-22 PROCEDURE — 85025 COMPLETE CBC W/AUTO DIFF WBC: CPT | Performed by: STUDENT IN AN ORGANIZED HEALTH CARE EDUCATION/TRAINING PROGRAM

## 2020-07-22 PROCEDURE — 83735 ASSAY OF MAGNESIUM: CPT | Performed by: STUDENT IN AN ORGANIZED HEALTH CARE EDUCATION/TRAINING PROGRAM

## 2020-07-22 PROCEDURE — 80053 COMPREHEN METABOLIC PANEL: CPT | Performed by: STUDENT IN AN ORGANIZED HEALTH CARE EDUCATION/TRAINING PROGRAM

## 2020-07-22 PROCEDURE — 99239 HOSP IP/OBS DSCHRG MGMT >30: CPT | Performed by: FAMILY MEDICINE

## 2020-07-22 RX ORDER — CEPHALEXIN 500 MG/1
500 CAPSULE ORAL EVERY 12 HOURS SCHEDULED
Qty: 12 CAPSULE | Refills: 0 | Status: SHIPPED | OUTPATIENT
Start: 2020-07-22 | End: 2020-07-28

## 2020-07-22 RX ADMIN — DRONABINOL 15 MG: 2.5 CAPSULE ORAL at 12:27

## 2020-07-22 RX ADMIN — Medication 250 MG: at 08:50

## 2020-07-22 RX ADMIN — CEFAZOLIN SODIUM 1000 MG: 1 SOLUTION INTRAVENOUS at 06:23

## 2020-07-22 RX ADMIN — MELATONIN 2000 UNITS: at 08:49

## 2020-07-22 RX ADMIN — Medication 1 TABLET: at 08:50

## 2020-07-22 RX ADMIN — CEFAZOLIN SODIUM 1000 MG: 1 SOLUTION INTRAVENOUS at 14:35

## 2020-07-22 RX ADMIN — OMEGA-3 FATTY ACIDS CAP 1000 MG 1000 MG: 1000 CAP at 08:49

## 2020-07-22 RX ADMIN — THIAMINE HCL TAB 100 MG 100 MG: 100 TAB at 08:50

## 2020-07-22 RX ADMIN — DRONABINOL 15 MG: 2.5 CAPSULE ORAL at 06:23

## 2020-07-22 RX ADMIN — ENOXAPARIN SODIUM 40 MG: 40 INJECTION SUBCUTANEOUS at 08:50

## 2020-07-22 RX ADMIN — ACETAMINOPHEN 650 MG: 325 TABLET, FILM COATED ORAL at 04:22

## 2020-07-22 NOTE — PLAN OF CARE
Problem: PAIN - ADULT  Goal: Verbalizes/displays adequate comfort level or baseline comfort level  Description  Interventions:  - Encourage patient to monitor pain and request assistance  - Assess pain using appropriate pain scale  - Administer analgesics based on type and severity of pain and evaluate response  - Implement non-pharmacological measures as appropriate and evaluate response  - Consider cultural and social influences on pain and pain management  - Notify physician/advanced practitioner if interventions unsuccessful or patient reports new pain  Outcome: Completed     Problem: INFECTION - ADULT  Goal: Absence or prevention of progression during hospitalization  Description  INTERVENTIONS:  - Assess and monitor for signs and symptoms of infection  - Monitor lab/diagnostic results  - Monitor all insertion sites, i e  indwelling lines, tubes, and drains  - Monitor endotracheal if appropriate and nasal secretions for changes in amount and color  - [place] appropriate cooling/warming therapies per order  - Administer medications as ordered  - Instruct and encourage patient and family to use good hand hygiene technique  - Identify and instruct in appropriate isolation precautions for identified infection/condition  Outcome: Completed  Goal: Absence of fever/infection during neutropenic period  Description  INTERVENTIONS:  - Monitor WBC    Outcome: Completed     Problem: SAFETY ADULT  Goal: Patient will remain free of falls  Description  INTERVENTIONS:  - Assess patient frequently for physical needs  -  Identify cognitive and physical deficits and behaviors that affect risk of falls    -  [place] fall precautions as indicated by assessment   - Educate patient/family on patient safety including physical limitations  - Instruct patient to call for assistance with activity based on assessment  - Modify environment to reduce risk of injury  - Consider OT/PT consult to assist with strengthening/mobility  Outcome: Completed  Goal: Maintain or return to baseline ADL function  Description  INTERVENTIONS:  -  Assess patient's ability to carry out ADLs; assess patient's baseline for ADL function and identify physical deficits which impact ability to perform ADLs (bathing, care of mouth/teeth, toileting, grooming, dressing, etc )  - Assess/evaluate cause of self-care deficits   - Assess range of motion  - Assess patient's mobility; develop plan if impaired  - Assess patient's need for assistive devices and provide as appropriate  - Encourage maximum independence but intervene and supervise when necessary  - Involve family in performance of ADLs  - Assess for home care needs following discharge   - Consider OT consult to assist with ADL evaluation and planning for discharge  - Provide patient education as appropriate  Outcome: Completed  Goal: Maintain or return mobility status to optimal level  Description  INTERVENTIONS:  - Assess patient's baseline mobility status (ambulation, transfers, stairs, etc )    - Identify cognitive and physical deficits and behaviors that affect mobility  - Identify mobility aids required to assist with transfers and/or ambulation (gait belt, sit-to-stand, lift, walker, cane, etc )  - [place] fall precautions as indicated by assessment  - Record patient progress and toleration of activity level on Mobility SBAR; progress patient to next Phase/Stage  - Instruct patient to call for assistance with activity based on assessment  - Consider rehabilitation consult to assist with strengthening/weightbearing, etc   Outcome: Completed     Problem: DISCHARGE PLANNING  Goal: Discharge to home or other facility with appropriate resources  Description  INTERVENTIONS:  - Identify barriers to discharge w/patient and caregiver  - Arrange for needed discharge resources and transportation as appropriate  - Identify discharge learning needs (meds, wound care, etc )  - Arrange for interpretive services to assist at discharge as needed  - Refer to Case Management Department for coordinating discharge planning if the patient needs post-hospital services based on physician/advanced practitioner order or complex needs related to functional status, cognitive ability, or social support system  Outcome: Completed     Problem: Knowledge Deficit  Goal: Patient/family/caregiver demonstrates understanding of disease process, treatment plan, medications, and discharge instructions  Description  Complete learning assessment and assess knowledge base  Interventions:  - Provide teaching at level of understanding  - Provide teaching via preferred learning methods  Outcome: Completed     Problem: Potential for Falls  Goal: Patient will remain free of falls  Description  INTERVENTIONS:  - Assess patient frequently for physical needs  -  Identify cognitive and physical deficits and behaviors that affect risk of falls    -  [place] fall precautions as indicated by assessment   - Educate patient/family on patient safety including physical limitations  - Instruct patient to call for assistance with activity based on assessment  - Modify environment to reduce risk of injury  - Consider OT/PT consult to assist with strengthening/mobility  Outcome: Completed

## 2020-07-23 NOTE — ASSESSMENT & PLAN NOTE
Patient with history of neuropathy  Currently complains of burning sensation in her right upper extremity  Continue dronabinol  Patient does not tolerate gabapentin

## 2020-07-23 NOTE — ASSESSMENT & PLAN NOTE
· On presentation, RUE erythema and edema x 1 day  (+) fever/chills at home  States RUE has been "achy" for a couple days  Has history of cancer with extensive surgery to RUE and Rt chest wall with resultant lymphedema  · Circumferential erythema noted to RUE  · No DVT noted on venous duplex  · Today, provided with CT of the right upper extremity due to patient complaining of more swelling and burning in the arm  · CT of the arm was not remarkable of any abscess and redemonstrated swelling  · Received Cefepime and Vancomycin in ED  Later was downgraded to Ancef due to significant clinical improvement  · Elevate RUE  · Provided with a referral for lymphedema therapy on the outpatient basis  · Patient will continue with Keflex upon discharge

## 2020-07-23 NOTE — UTILIZATION REVIEW
Notification of Discharge  This is a Notification of Discharge from our facility [address]  Please be advised that this patient has been discharge from our facility  Below you will find the admission and discharge date and time including the patients disposition  PRESENTATION DATE: 7/19/2020 10:42 PM  OBS ADMISSION DATE:   IP ADMISSION DATE: 7/19/20 2341   DISCHARGE DATE: 7/22/2020  3:51 PM  DISPOSITION: Home/Self Care Home/Self Care   Admission Orders listed below:  Admission Orders (From admission, onward)     Ordered        07/19/20 2341  Inpatient Admission  Once                   Please contact the UR Department if additional information is required to close this patient's authorization/case  [address] Utilization Review Department  Main: [phone] x carefully listen to the prompts  All voicemails are confidential   [email or web address]  Send all requests for admission clinical reviews, approved or denied determinations and any other requests to dedicated fax number below belonging to the campus where the patient is receiving treatment   List of dedicated fax numbers:  [address] DENIALS (Administrative/Medical Necessity) [phone]   [address] (Maternity/NICU/Pediatrics) [phone]   Marissa Kern [phone]   Beck Dai [phone]   Jb Dawn [phone]   [facility] [address] [phone]   [facility]  [phone]   [address] [facility]  [address] [address] [address] [facility] [phone]

## 2020-07-23 NOTE — ASSESSMENT & PLAN NOTE
Chronic condition  Currently complicated with cellulitis  Provided with referral to lymphedema clinic

## 2020-07-23 NOTE — DISCHARGE SUMMARY
Discharge- Merry Farah 1960, 62 y o  female MRN: [id number]    Unit/Bed#: -01 Encounter: [id number]    Primary Care Provider: Gilford Caras, MD   Date and time admitted to hospital: 7/19/2020 10:42 PM        Cellulitis of right upper extremity  Assessment & Plan  · On presentation, RUE erythema and edema x 1 day  (+) fever/chills at home  States RUE has been "achy" for a couple days  Has history of cancer with extensive surgery to RUE and Rt chest wall with resultant lymphedema  · Circumferential erythema noted to RUE  · No DVT noted on venous duplex  · Today, provided with CT of the right upper extremity due to patient complaining of more swelling and burning in the arm  · CT of the arm was not remarkable of any abscess and redemonstrated swelling  · Received Cefepime and Vancomycin in ED  Later was downgraded to Ancef due to significant clinical improvement  · Elevate RUE  · Provided with a referral for lymphedema therapy on the outpatient basis  · Patient will continue with Keflex upon discharge  Peripheral neuropathy  Assessment & Plan  Patient with history of neuropathy  Currently complains of burning sensation in her right upper extremity  Continue dronabinol  Patient does not tolerate gabapentin  Lymphedema of right arm  Assessment & Plan  Chronic condition  Currently complicated with cellulitis  Provided with referral to lymphedema clinic  Chronic insomnia  Assessment & Plan  · Melatonin at HS    * Sepsis ([address] )  Assessment & Plan  · As evidenced by tachycardia and leukocytosis present on admission  · WBC 17 18  Temp 98 2   LA 1 7  · Likely 2/2 #2  · Received 1L NS in ED  · Currently resolved  · Blood cultures negative        Discharge Summary - [facility]    Patient Information: Merry Farah 62 y o  female MRN: [id number]  Unit/Bed#: -01 Encounter: [id number]    Discharging Physician / Practitioner: Krupa Odonnell MD  PCP: Gilford Caras, MD  Admission Date: 7/19/2020  Discharge Date: 07/22/20    Reason for Admission:  Right arm cellulitis    Discharge Diagnoses:     Principal Problem:    Sepsis ([address] )  Active Problems:    Chronic insomnia    Lymphedema of right arm    Peripheral neuropathy    Cellulitis of right upper extremity  Resolved Problems:    * No resolved hospital problems  *      Consultations During Hospital Stay:  · None    Procedures Performed:   · None      Significant Findings / Test Results:     · None    Incidental Findings:   CT right arm:Swelling around the elbow and proximal forearm, greatest at the olecranon  No encapsulated collections or bony destruction        Test Results Pending at Discharge (will require follow up): · None     Outpatient Tests Requested:  · None    Complications:  None    Hospital Course:     Carly Adler is a 62 y o  female patient with a history of right upper extremity skin graft and chronic lymphedema who originally presented to the hospital on 7/19/2020 due to right upper extremity erythema and edema with fever and chills at home  DVT has been ruled out by negative venous duplex of the right arm  CT scan was also obtained to rule out abscess or bone destruction  Patient has received IV antibiotics for cellulitis and improved clinically with the resolution of sepsis and leukocytosis  Patient still has swelling of the right upper extremity and will need to follow up with the lymphedema treatment center for which she has received a referral   Will provide with Keflex at home to complete a course of antibiotics  Condition at Discharge: stable     Discharge Day Visit / Exam:     Subjective:  Patient was seen and examined this morning  She is reporting that it feels to her today like her right forearm  is more swollen and she reports burning sensation on it  Patient does history of neuropathy and is on dronabinol for that  Unfortunately, she did not tolerate Neurontin      Vitals: Blood Pressure: 149/99 (07/22/20 1228)  Pulse: 58 (07/22/20 0716)  Temperature: 97 5 °F (36 4 °C) (07/22/20 0716)  Temp Source: Oral (07/20/20 1500)  Respirations: 17 (07/22/20 0712)  Height: 5' 8" (172 7 cm) (07/20/20 0471)  Weight - Scale: 68 9 kg (151 lb 14 4 oz) (07/20/20 0923)  SpO2: 97 % (07/22/20 0716)  Exam:   Physical Exam   Constitutional: She appears well-developed and well-nourished  Neck: Normal range of motion  Cardiovascular: Normal rate and regular rhythm  Pulmonary/Chest: Effort normal and breath sounds normal    Abdominal: Soft  She exhibits no distension  Musculoskeletal: She exhibits edema  Right forearm edema without erythema   Neurological: She is alert  She has normal strength  No cranial nerve deficit  Skin: Skin is warm  No erythema  Psychiatric: She has a normal mood and affect  Her behavior is normal        Discussion with Family:  Patient's  at the bedside    Discharge instructions/Information to patient and family:   See after visit summary for information provided to patient and family  Provisions for Follow-Up Care:  See after visit summary for information related to follow-up care and any pertinent home health orders  Disposition:     Home    For Discharges to [facility]:   · Not Applicable to this Patient - Not Applicable to this Patient    Planned Readmission: no     Discharge Statement:  I spent 40 minutes discharging the patient  This time was spent on the day of discharge  I had direct contact with the patient on the day of discharge  Greater than 50% of the total time was spent examining patient, answering all patient questions, arranging and discussing plan of care with patient as well as directly providing post-discharge instructions  Additional time then spent on discharge activities  Discharge Medications:  See after visit summary for reconciled discharge medications provided to patient and family        ** Please Note: This note has been constructed using a voice recognition system **

## 2020-07-23 NOTE — ASSESSMENT & PLAN NOTE
· As evidenced by tachycardia and leukocytosis present on admission  · WBC 17 18  Temp 98 2   LA 1 7  · Likely 2/2 #2  · Received 1L NS in ED  · Currently resolved  · Blood cultures negative

## 2020-07-25 LAB
BACTERIA BLD CULT: NORMAL
BACTERIA BLD CULT: NORMAL

## 2020-07-28 NOTE — PROGRESS NOTES
7/28/20:  Patient requested discharge to [facility] after IE due to concerns about Covid virus  She is independent in [facility] and no further treatments indicated    DC to [facility]

## 2021-01-28 ENCOUNTER — HOSPITAL ENCOUNTER (EMERGENCY)
Facility: HOSPITAL | Age: 61
Discharge: HOME | End: 2021-01-28
Attending: EMERGENCY MEDICINE | Admitting: EMERGENCY MEDICINE
Payer: COMMERCIAL

## 2021-01-28 ENCOUNTER — APPOINTMENT (EMERGENCY)
Dept: RADIOLOGY | Facility: HOSPITAL | Age: 61
End: 2021-01-28
Payer: COMMERCIAL

## 2021-01-28 VITALS
DIASTOLIC BLOOD PRESSURE: 75 MMHG | WEIGHT: 151.9 LBS | TEMPERATURE: 98.1 F | BODY MASS INDEX: 23.1 KG/M2 | OXYGEN SATURATION: 98 % | HEART RATE: 87 BPM | RESPIRATION RATE: 18 BRPM | SYSTOLIC BLOOD PRESSURE: 136 MMHG

## 2021-01-28 DIAGNOSIS — S62.109A WRIST FRACTURE: Primary | ICD-10-CM

## 2021-01-28 PROCEDURE — 99283 EMERGENCY DEPT VISIT LOW MDM: CPT

## 2021-01-28 PROCEDURE — 96372 THER/PROPH/DIAG INJ SC/IM: CPT

## 2021-01-28 PROCEDURE — 73110 X-RAY EXAM OF WRIST: CPT

## 2021-01-28 PROCEDURE — 29125 APPL SHORT ARM SPLINT STATIC: CPT | Performed by: EMERGENCY MEDICINE

## 2021-01-28 PROCEDURE — 99284 EMERGENCY DEPT VISIT MOD MDM: CPT | Performed by: EMERGENCY MEDICINE

## 2021-01-28 RX ORDER — HYDROMORPHONE HCL/PF 1 MG/ML
0.5 SYRINGE (ML) INJECTION ONCE
Status: COMPLETED | OUTPATIENT
Start: 2021-01-28 | End: 2021-01-28

## 2021-01-28 RX ORDER — HYDROCODONE BITARTRATE AND ACETAMINOPHEN 5; 325 MG/1; MG/1
1 TABLET ORAL EVERY 6 HOURS PRN
Qty: 12 TABLET | Refills: 0 | Status: SHIPPED | OUTPATIENT
Start: 2021-01-28 | End: 2021-04-01

## 2021-01-28 RX ADMIN — HYDROMORPHONE HYDROCHLORIDE 0.5 MG: 1 INJECTION, SOLUTION INTRAMUSCULAR; INTRAVENOUS; SUBCUTANEOUS at 10:55

## 2021-01-28 NOTE — DISCHARGE INSTRUCTIONS
Wear splint until you can follow up with an orthopedist for recheck   Take norco for severe pain do not drive while taking this

## 2021-02-01 ENCOUNTER — TELEPHONE (OUTPATIENT)
Dept: OBGYN CLINIC | Facility: MEDICAL CENTER | Age: 61
End: 2021-02-01

## 2021-02-04 ENCOUNTER — TELEPHONE (OUTPATIENT)
Dept: OBGYN CLINIC | Facility: MEDICAL CENTER | Age: 61
End: 2021-02-04

## 2021-02-04 ENCOUNTER — APPOINTMENT (OUTPATIENT)
Dept: RADIOLOGY | Facility: CLINIC | Age: 61
End: 2021-02-04
Payer: COMMERCIAL

## 2021-02-04 ENCOUNTER — TELEPHONE (OUTPATIENT)
Dept: OBGYN CLINIC | Facility: HOSPITAL | Age: 61
End: 2021-02-04

## 2021-02-04 ENCOUNTER — OFFICE VISIT (OUTPATIENT)
Dept: OBGYN CLINIC | Facility: CLINIC | Age: 61
End: 2021-02-04
Payer: COMMERCIAL

## 2021-02-04 VITALS
DIASTOLIC BLOOD PRESSURE: 87 MMHG | WEIGHT: 151.8 LBS | SYSTOLIC BLOOD PRESSURE: 149 MMHG | BODY MASS INDEX: 23.01 KG/M2 | HEIGHT: 68 IN | HEART RATE: 66 BPM

## 2021-02-04 DIAGNOSIS — M25.532 LEFT WRIST PAIN: ICD-10-CM

## 2021-02-04 DIAGNOSIS — S52.572A OTHER CLOSED INTRA-ARTICULAR FRACTURE OF DISTAL END OF LEFT RADIUS, INITIAL ENCOUNTER: Primary | ICD-10-CM

## 2021-02-04 PROCEDURE — 99203 OFFICE O/P NEW LOW 30 MIN: CPT | Performed by: ORTHOPAEDIC SURGERY

## 2021-02-04 PROCEDURE — 73110 X-RAY EXAM OF WRIST: CPT

## 2021-02-04 RX ORDER — IBUPROFEN 600 MG/1
TABLET ORAL
Qty: 30 TABLET | Refills: 0 | Status: SHIPPED | OUTPATIENT
Start: 2021-02-04 | End: 2021-04-01

## 2021-02-04 RX ORDER — NALOXONE HYDROCHLORIDE 4 MG/.1ML
SPRAY NASAL
Qty: 1 EACH | Refills: 1 | Status: SHIPPED | OUTPATIENT
Start: 2021-02-04 | End: 2021-04-01

## 2021-02-04 RX ORDER — ACETAMINOPHEN 500 MG
TABLET ORAL
Qty: 30 TABLET | Refills: 0 | Status: SHIPPED | OUTPATIENT
Start: 2021-02-04 | End: 2022-01-22 | Stop reason: ALTCHOICE

## 2021-02-04 RX ORDER — OXYCODONE HYDROCHLORIDE AND ACETAMINOPHEN 5; 325 MG/1; MG/1
1 TABLET ORAL EVERY 4 HOURS PRN
Qty: 30 TABLET | Refills: 0 | Status: SHIPPED | OUTPATIENT
Start: 2021-02-04 | End: 2021-04-01

## 2021-02-04 NOTE — PROGRESS NOTES
CHIEF COMPLAINT:  Chief Complaint   Patient presents with    Left Wrist - Pain       SUBJECTIVE:  Pamela Crowell is a 56y o  year old RHD female who presents for evaluation of a left intra-articular distal radius fracture resulting from fall on outstretched hand which occurred on 1/28/2021  Patient reports that she slipped on ice outside of her home  She was evaluated her local [address] [place] [address] ED where x-rays were taken and read as significant for nondisplaced intra-articular left distal radius fracture  She was placed in a volar splint referred to Orthopedics  On today's presentation she complains of pain in the right wrist, swelling, and bruising  She denies any numbness or tingling  Patient's past medical history includes chronic lymphedema and extensive treatment to the right upper extremity, resulting in significant dependent on left upper extremity  The patient denies any cardiac or pulmonary issues  Denies diabetes  Denies any history of MI, gastric ulcers, kidney or liver issues  Denies blood thinners  Is sensitive to aspirins  Anaphylactic reactions to codeine  PAST MEDICAL HISTORY:  Past Medical History:   Diagnosis Date    Abnormal mammogram 05/15/2013    Anemia     Cancer ([facility] [address] )     desmoitosis    Desmoid tumor     Last Assessed: 6/19/2017    Hyperlipidemia     Hypertension        PAST SURGICAL HISTORY:  Past Surgical History:   Procedure Laterality Date    HYSTERECTOMY      OTHER SURGICAL HISTORY      Arm Incision: Dermoid tumor, right Arm     PARTIAL HYSTERECTOMY      FL COLONOSCOPY FLX DX W/COLLJ SPEC WHEN PFRMD N/A 8/15/2017    Procedure: COLONOSCOPY;  Surgeon: Da Smith MD;  Location: [facility];   Service: Gastroenterology    SKIN BIOPSY      SKIN CANCER EXCISION      Melanoma Excision        FAMILY HISTORY:  Family History   Problem Relation Age of Onset    Diabetes Mother     No Known Problems Father     Breast cancer Sister     Lung cancer Sister    Yue Acharya Pancreatic cancer Sister        SOCIAL HISTORY:  Social History     Tobacco Use    Smoking status: Never Smoker    Smokeless tobacco: Never Used   Substance Use Topics    Alcohol use: Not Currently     Alcohol/week: 1 0 standard drinks     Types: 1 Shots of liquor per week    Drug use: No       MEDICATIONS:    Current Outpatient Medications:     cholecalciferol (VITAMIN D3) 1,000 units tablet, Take 2,000 Units by mouth daily, Disp: , Rfl:     dicyclomine (BENTYL) 20 mg tablet, Take 1 tablet (20 mg total) by mouth every 8 (eight) hours as needed (abdominal cramping/pain or diarrhea), Disp: 12 tablet, Rfl: 0    dronabinol (MARINOL) 5 MG capsule, Take 15 mg by mouth 4 (four) times a day (before meals and at bedtime), Disp: , Rfl:     HYDROcodone-acetaminophen (NORCO) 5-325 mg per tablet, Take 1 tablet by mouth every 6 (six) hours as needed for painMax Daily Amount: 4 tablets, Disp: 12 tablet, Rfl: 0    multivitamin (THERAGRAN) TABS, Take 1 tablet by mouth daily, Disp: , Rfl:     Omega-3 Fatty Acids (FISH OIL PO), Take by mouth, Disp: , Rfl:     ondansetron (ZOFRAN-ODT) 4 mg disintegrating tablet, Take 1 tablet (4 mg total) by mouth every 8 (eight) hours as needed for nausea or vomiting, Disp: 12 tablet, Rfl: 0    Probiotic Product (PROBIOTIC-10 PO), Take by mouth, Disp: , Rfl:     thiamine (VITAMIN B1) 100 mg tablet, Take 100 mg by mouth daily, Disp: , Rfl:     acetaminophen (TYLENOL) 500 mg tablet, Take one tablet the day of surgery, then take one tablet for breakfast lunch and dinner after surgery for 5 days, Disp: 30 tablet, Rfl: 0    ibuprofen (MOTRIN) 600 mg tablet, Take one tablet the day of surgery, then take one tablet for breakfast lunch and dinner after surgery for 5 days, Disp: 30 tablet, Rfl: 0    naloxone (NARCAN) 4 mg/0 1 mL nasal spray, Administer 1 spray into a nostril   If no response after 2-3 minutes, give another dose in the other nostril using a new spray , Disp: 1 each, Rfl: 1   oxyCODONE-acetaminophen (PERCOCET) 5-325 mg per tablet, Take 1 tablet by mouth every 4 (four) hours as needed for moderate pain To be taken after surgeryMax Daily Amount: 6 tablets, Disp: 30 tablet, Rfl: 0    ALLERGIES:  Allergies   Allergen Reactions    Aspirin GI Intolerance and Abdominal Pain     ABD     Chlorpromazine Anaphylaxis     PHENOTHIAZINE=ANAPHYLAXIS    Codeine Anaphylaxis     Anaphylaxis  abd pain   Meperidine Anaphylaxis, Hives and Other (See Comments)     VOMITS  VOMITS  Category: Allergy;     Phenothiazines Anaphylaxis and Throat Swelling     Category: Allergy;     Saccharin Anaphylaxis    Ibuprofen Hives     H    Ampicillin-Sulbactam Sodium Hives    Gluten Meal GI Intolerance    Levofloxacin Hives    Neomycin Other (See Comments), Edema and Hives     Category: Allergy;   swelling    Other Throat Swelling, Hives and Other (See Comments)     ASA=GERD  ASA=GERD  Category: Allergy;     Citrus Rash    Latex Hives and Rash     Category: Allergy;        REVIEW OF SYSTEMS:  Review of Systems   Constitutional: Negative for chills, fever and unexpected weight change  HENT: Negative for hearing loss, nosebleeds and sore throat  Eyes: Negative for pain, redness and visual disturbance  Respiratory: Negative for cough, shortness of breath and wheezing  Cardiovascular: Negative for chest pain, palpitations and leg swelling  Gastrointestinal: Negative for abdominal pain, nausea and vomiting  Endocrine: Negative for polydipsia and polyuria  Genitourinary: Negative for dysuria and hematuria  Musculoskeletal:        As noted in HPI   Skin: Negative for rash and wound  Neurological: Negative for dizziness, numbness and headaches  Psychiatric/Behavioral: Negative for decreased concentration and suicidal ideas  The patient is not nervous/anxious          VITALS:  Vitals:    02/04/21 1252   BP: 149/87   Pulse: 66       LABS:  HgA1c: No results found for: HGBA1C  BMP:   Lab Results Component Value Date    GLUCOSE 98 05/02/2017    CALCIUM 8 8 07/22/2020    K 4 1 07/22/2020    CO2 24 07/22/2020     07/22/2020    BUN 6 07/22/2020    CREATININE 0 54 (L) 07/22/2020       _____________________________________________________  PHYSICAL EXAMINATION:  General: well developed and well nourished, alert, oriented times 3 and appears comfortable  Psychiatric: Normal  HEENT: Trachea Midline, No torticollis  Pulmonary: No audible wheezing or strider  Cardiovascular: No discernable arrhythmia   Skin: No masses, erythema, lacerations, fluctation, ulcerations  Neurovascular: Sensation Intact to the Median, Ulnar, Radial Nerve, Motor Intact to the Median, Ulnar, Radial Nerve and Pulses Intact    MUSCULOSKELETAL EXAMINATION:  Left wrist -   No obvious anatomical deformity  Skin is warm and dry to touch with no signs of erythema, ecchymosis, infection  Exquisite tenderness to palpation over distal radius at radiocarpal joint and at DRUJ  Significant symptom exacerbation with wrist flexion, extension, pronation, and supination  Range of motion deferred due to known presence of fracture  FDS, FDP, extensor mechanisms intact  2+ distal radial pulse with brisk capillary refill to the fingers  Radial, median, and ulnar motor and sensory distributions intact  Sensation light touch intact distally  ___________________________________________________  STUDIES REVIEWED:  Attending Physician has personally reviewed pertinent imaging in PACS, impression is as follows:    Review of radiographic series taken 1/28/2021 of the left wrist shows obliquely oriented nondisplaced intra-articular fracture of the ulnar aspect of the distal radius      PROCEDURES PERFORMED:  Procedures  No Procedures performed today    _____________________________________________________  ASSESSMENT/PLAN:  Diagnoses and all orders for this visit:    Other closed intra-articular fracture of distal end of left radius, initial encounter  -     XR wrist 3+ vw left; Future  -     Ambulatory referral to PT/OT hand therapy; Future  -     Case request operating room: OPEN REDUCTION W/ INTERNAL FIXATION (ORIF) RADIUS / ULNA (WRIST) left; Standing  -     ibuprofen (MOTRIN) 600 mg tablet; Take one tablet the day of surgery, then take one tablet for breakfast lunch and dinner after surgery for 5 days  -     oxyCODONE-acetaminophen (PERCOCET) 5-325 mg per tablet; Take 1 tablet by mouth every 4 (four) hours as needed for moderate pain To be taken after surgeryMax Daily Amount: 6 tablets  -     acetaminophen (TYLENOL) 500 mg tablet; Take one tablet the day of surgery, then take one tablet for breakfast lunch and dinner after surgery for 5 days  -     naloxone (NARCAN) 4 mg/0 1 mL nasal spray; Administer 1 spray into a nostril  If no response after 2-3 minutes, give another dose in the other nostril using a new spray  -     Case request operating room: OPEN REDUCTION W/ INTERNAL FIXATION (ORIF) RADIUS / ULNA (WRIST) left    Other orders  -     Diet NPO; Sips with meds; Standing  -     Height and weight upon arrival; Standing  -     Nursing Communication [address] [place] [address] Interventions Implemented; Standing  -     Nursing Communication [facility] bath, have staff wash entire body (neck down) per pre-op bathing protocol  Routine, evening prior to, and day of surgery ; Standing  -     Nursing Communication Swab both nares with Povidone-Iodine solution, EXCLUDE if patient has shellfish/Iodine allergy  Routine, day of surgery, on call to OR; Standing  -     Void on call to OR; Standing  -     Insert peripheral IV; Standing  -     clindamycin (CLEOCIN) 900 mg in sodium chloride 0 9 % 50 mL IVPB    · Discussed both surgical and non-surgical management with patient and her   · Patient elects to move forward with surgical ORIF of left distal radius    ORIF of left distal radius was discussed at length today including the risks and benefits   Pt understands and wants to proceed  *Surgery- ORIF left distal radius   * detailed consent was signed in the office   * anesthesia- Patient cannot do IV in the uninvolved upper extremity  Regional w/ sedation   * antibiotics- ordered   * OT order was placed   * Post op medication was sent to the office on file    Surgery medication instructions: You will stop eating and drinking at midnight the night before your surgery, but you may continue to take your normal medications with a small sip of water  In the morning on the day of your surgery, we would like you to take the following medication:   Tylenol 500mg one tablet by mouth    After surgery, we would like you to take Tylenol 500 mg one tablet by mouth every 6 hours  (at breakfast, lunch and dinner) for 5-7 days after your surgery  Please take this medication EVERYDAY after surgery for 5-7 days, and not just as needed  Taking this medications after surgery will limit your need for prescription pain medication  We will also prescribe a narcotic pain medication for a limited time after surgery that you can take as needed for moderate or severe pain  The narcotic pain medication may also have Tylenol in it  Please limit Tylenol usage to under 3,000mg a day  Follow Up:  No follow-ups on file  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    To Do Next Visit:  Re-evaluation of current issue    Operative Discussions:  Distal Radius Fracture Fixation: Both operative and non operative management of the injury was explained to the patient  The decision was made to undergo surgical fixation  The surgical procedure was explained with a verbal understanding expressed by the patient  Postoperatively, the patient is to begin with occupational therapy to have a removable splint applied  This splint may be removed for showering, bathing, dining, sedative activities (eg watching tv, reading etc ) and daily therapy exercises   Radiographs are typically taken at intervals throughout the fracture healing ensure maintenance of reduction and alignment  If the fracture loses its alignment, revision surgery may be required  Medical conditions such as diabetes, osteoporosis, vitamin D deficiency, and a history of or exposure to smoking may delay or prevent fracture healing  The risks and benefits of the procedure were explained to the patient, which include, but are not limited to: Bleeding, infection, recurrence, pain, scar, malunion, nonunion, damage to tendons, damage to nerves, and damage to blood vessels, and complications related to anesthesia, failure to give desire result, need for more surgery  These risks, along with alternative conservative treatment options, and postoperative protocols were voiced back and understood by the patient  All questions were answered to the patient's satisfaction  The patient agrees to comply with a standard postoperative protocol, and is willing to proceed  Education was provided via written and auditory forms  There were no barriers to learning  Written handouts regarding wound care, incision and scar care, and general preoperative information was provided to the patient  Prior to surgery, the patient may be requested to stop all anti-inflammatory medications  Prophylactic aspirin, Plavix, and Coumadin may be allowed to be continued  Medications including vitamin E , ginkgo, and fish oil are requested to be stopped approximately one week prior to surgery  Hypertensive medications and beta blockers, if taken, should be continued      Scribe Attestation    I,:  Kaylynn Newton am acting as a scribe while in the presence of the attending physician :       I,:  Tomeka Fuller MD personally performed the services described in this documentation    as scribed in my presence :

## 2021-02-04 NOTE — TELEPHONE ENCOUNTER
Velasquez Ritter , stating that the pain killers ibuprofen and oxycodone were sent to the pharmacy, but she can't take those medications  Please prescribe a pain medication      Angel's callback #[phone]

## 2021-02-04 NOTE — TELEPHONE ENCOUNTER
Patient sees Dr Tiana Alex  Patient's father calling in checking on prescriptions, stated they have been electronically sent to [facility]   He will call [facility] to see if ready

## 2021-02-04 NOTE — H&P (VIEW-ONLY)
CHIEF COMPLAINT:  Chief Complaint   Patient presents with    Left Wrist - Pain       SUBJECTIVE:  Tavon Saleh is a 56y o  year old RHD female who presents for evaluation of a left intra-articular distal radius fracture resulting from fall on outstretched hand which occurred on 1/28/2021  Patient reports that she slipped on ice outside of her home  She was evaluated her local [facility] ED where x-rays were taken and read as significant for nondisplaced intra-articular left distal radius fracture  She was placed in a volar splint referred to Orthopedics  On today's presentation she complains of pain in the right wrist, swelling, and bruising  She denies any numbness or tingling  Patient's past medical history includes chronic lymphedema and extensive treatment to the right upper extremity, resulting in significant dependent on left upper extremity  The patient denies any cardiac or pulmonary issues  Denies diabetes  Denies any history of MI, gastric ulcers, kidney or liver issues  Denies blood thinners  Is sensitive to aspirins  Anaphylactic reactions to codeine  PAST MEDICAL HISTORY:  Past Medical History:   Diagnosis Date    Abnormal mammogram 05/15/2013    Anemia     Cancer ([address] )     desmoitosis    Desmoid tumor     Last Assessed: 6/19/2017    Hyperlipidemia     Hypertension        PAST SURGICAL HISTORY:  Past Surgical History:   Procedure Laterality Date    HYSTERECTOMY      OTHER SURGICAL HISTORY      Arm Incision: Dermoid tumor, right Arm     PARTIAL HYSTERECTOMY      CT COLONOSCOPY FLX DX W/COLLJ SPEC WHEN PFRMD N/A 8/15/2017    Procedure: COLONOSCOPY;  Surgeon: Braden Resendiz MD;  Location: [facility];   Service: Gastroenterology    SKIN BIOPSY      SKIN CANCER EXCISION      Melanoma Excision        FAMILY HISTORY:  Family History   Problem Relation Age of Onset    Diabetes Mother     No Known Problems Father     Breast cancer Sister     Lung cancer Sister    Chanel Boudreaux Pancreatic cancer Sister        SOCIAL HISTORY:  Social History     Tobacco Use    Smoking status: Never Smoker    Smokeless tobacco: Never Used   Substance Use Topics    Alcohol use: Not Currently     Alcohol/week: 1 0 standard drinks     Types: 1 Shots of liquor per week    Drug use: No       MEDICATIONS:    Current Outpatient Medications:     cholecalciferol (VITAMIN D3) 1,000 units tablet, Take 2,000 Units by mouth daily, Disp: , Rfl:     dicyclomine (BENTYL) 20 mg tablet, Take 1 tablet (20 mg total) by mouth every 8 (eight) hours as needed (abdominal cramping/pain or diarrhea), Disp: 12 tablet, Rfl: 0    dronabinol (MARINOL) 5 MG capsule, Take 15 mg by mouth 4 (four) times a day (before meals and at bedtime), Disp: , Rfl:     HYDROcodone-acetaminophen (NORCO) 5-325 mg per tablet, Take 1 tablet by mouth every 6 (six) hours as needed for painMax Daily Amount: 4 tablets, Disp: 12 tablet, Rfl: 0    multivitamin (THERAGRAN) TABS, Take 1 tablet by mouth daily, Disp: , Rfl:     Omega-3 Fatty Acids (FISH OIL PO), Take by mouth, Disp: , Rfl:     ondansetron (ZOFRAN-ODT) 4 mg disintegrating tablet, Take 1 tablet (4 mg total) by mouth every 8 (eight) hours as needed for nausea or vomiting, Disp: 12 tablet, Rfl: 0    Probiotic Product (PROBIOTIC-10 PO), Take by mouth, Disp: , Rfl:     thiamine (VITAMIN B1) 100 mg tablet, Take 100 mg by mouth daily, Disp: , Rfl:     acetaminophen (TYLENOL) 500 mg tablet, Take one tablet the day of surgery, then take one tablet for breakfast lunch and dinner after surgery for 5 days, Disp: 30 tablet, Rfl: 0    ibuprofen (MOTRIN) 600 mg tablet, Take one tablet the day of surgery, then take one tablet for breakfast lunch and dinner after surgery for 5 days, Disp: 30 tablet, Rfl: 0    naloxone (NARCAN) 4 mg/0 1 mL nasal spray, Administer 1 spray into a nostril   If no response after 2-3 minutes, give another dose in the other nostril using a new spray , Disp: 1 each, Rfl: 1   oxyCODONE-acetaminophen (PERCOCET) 5-325 mg per tablet, Take 1 tablet by mouth every 4 (four) hours as needed for moderate pain To be taken after surgeryMax Daily Amount: 6 tablets, Disp: 30 tablet, Rfl: 0    ALLERGIES:  Allergies   Allergen Reactions    Aspirin GI Intolerance and Abdominal Pain     ABD     Chlorpromazine Anaphylaxis     PHENOTHIAZINE=ANAPHYLAXIS    Codeine Anaphylaxis     Anaphylaxis  abd pain   Meperidine Anaphylaxis, Hives and Other (See Comments)     VOMITS  VOMITS  Category: Allergy;     Phenothiazines Anaphylaxis and Throat Swelling     Category: Allergy;     Saccharin Anaphylaxis    Ibuprofen Hives     H    Ampicillin-Sulbactam Sodium Hives    Gluten Meal GI Intolerance    Levofloxacin Hives    Neomycin Other (See Comments), Edema and Hives     Category: Allergy;   swelling    Other Throat Swelling, Hives and Other (See Comments)     ASA=GERD  ASA=GERD  Category: Allergy;     Citrus Rash    Latex Hives and Rash     Category: Allergy;        REVIEW OF SYSTEMS:  Review of Systems   Constitutional: Negative for chills, fever and unexpected weight change  HENT: Negative for hearing loss, nosebleeds and sore throat  Eyes: Negative for pain, redness and visual disturbance  Respiratory: Negative for cough, shortness of breath and wheezing  Cardiovascular: Negative for chest pain, palpitations and leg swelling  Gastrointestinal: Negative for abdominal pain, nausea and vomiting  Endocrine: Negative for polydipsia and polyuria  Genitourinary: Negative for dysuria and hematuria  Musculoskeletal:        As noted in HPI   Skin: Negative for rash and wound  Neurological: Negative for dizziness, numbness and headaches  Psychiatric/Behavioral: Negative for decreased concentration and suicidal ideas  The patient is not nervous/anxious          VITALS:  Vitals:    02/04/21 1252   BP: 149/87   Pulse: 66       LABS:  HgA1c: No results found for: HGBA1C  BMP:   Lab Results Component Value Date    GLUCOSE 98 05/02/2017    CALCIUM 8 8 07/22/2020    K 4 1 07/22/2020    CO2 24 07/22/2020     07/22/2020    BUN 6 07/22/2020    CREATININE 0 54 (L) 07/22/2020       _____________________________________________________  PHYSICAL EXAMINATION:  General: well developed and well nourished, alert, oriented times 3 and appears comfortable  Psychiatric: Normal  HEENT: Trachea Midline, No torticollis  Pulmonary: No audible wheezing or strider  Cardiovascular: No discernable arrhythmia   Skin: No masses, erythema, lacerations, fluctation, ulcerations  Neurovascular: Sensation Intact to the Median, Ulnar, Radial Nerve, Motor Intact to the Median, Ulnar, Radial Nerve and Pulses Intact    MUSCULOSKELETAL EXAMINATION:  Left wrist -   No obvious anatomical deformity  Skin is warm and dry to touch with no signs of erythema, ecchymosis, infection  Exquisite tenderness to palpation over distal radius at radiocarpal joint and at DRUJ  Significant symptom exacerbation with wrist flexion, extension, pronation, and supination  Range of motion deferred due to known presence of fracture  FDS, FDP, extensor mechanisms intact  2+ distal radial pulse with brisk capillary refill to the fingers  Radial, median, and ulnar motor and sensory distributions intact  Sensation light touch intact distally  ___________________________________________________  STUDIES REVIEWED:  Attending Physician has personally reviewed pertinent imaging in PACS, impression is as follows:    Review of radiographic series taken 1/28/2021 of the left wrist shows obliquely oriented nondisplaced intra-articular fracture of the ulnar aspect of the distal radius      PROCEDURES PERFORMED:  Procedures  No Procedures performed today    _____________________________________________________  ASSESSMENT/PLAN:  Diagnoses and all orders for this visit:    Other closed intra-articular fracture of distal end of left radius, initial encounter  -     XR wrist 3+ vw left; Future  -     Ambulatory referral to PT/OT hand therapy; Future  -     Case request operating room: OPEN REDUCTION W/ INTERNAL FIXATION (ORIF) RADIUS / ULNA (WRIST) left; Standing  -     ibuprofen (MOTRIN) 600 mg tablet; Take one tablet the day of surgery, then take one tablet for breakfast lunch and dinner after surgery for 5 days  -     oxyCODONE-acetaminophen (PERCOCET) 5-325 mg per tablet; Take 1 tablet by mouth every 4 (four) hours as needed for moderate pain To be taken after surgeryMax Daily Amount: 6 tablets  -     acetaminophen (TYLENOL) 500 mg tablet; Take one tablet the day of surgery, then take one tablet for breakfast lunch and dinner after surgery for 5 days  -     naloxone (NARCAN) 4 mg/0 1 mL nasal spray; Administer 1 spray into a nostril  If no response after 2-3 minutes, give another dose in the other nostril using a new spray  -     Case request operating room: OPEN REDUCTION W/ INTERNAL FIXATION (ORIF) RADIUS / ULNA (WRIST) left    Other orders  -     Diet NPO; Sips with meds; Standing  -     Height and weight upon arrival; Standing  -     Nursing Communication [address] [place] [address] Interventions Implemented; Standing  -     Nursing Communication [facility] bath, have staff wash entire body (neck down) per pre-op bathing protocol  Routine, evening prior to, and day of surgery ; Standing  -     Nursing Communication Swab both nares with Povidone-Iodine solution, EXCLUDE if patient has shellfish/Iodine allergy  Routine, day of surgery, on call to OR; Standing  -     Void on call to OR; Standing  -     Insert peripheral IV; Standing  -     clindamycin (CLEOCIN) 900 mg in sodium chloride 0 9 % 50 mL IVPB    · Discussed both surgical and non-surgical management with patient and her   · Patient elects to move forward with surgical ORIF of left distal radius    ORIF of left distal radius was discussed at length today including the risks and benefits   Pt understands and wants to proceed  *Surgery- ORIF left distal radius   * detailed consent was signed in the office   * anesthesia- Patient cannot do IV in the uninvolved upper extremity  Regional w/ sedation   * antibiotics- ordered   * OT order was placed   * Post op medication was sent to the office on file    Surgery medication instructions: You will stop eating and drinking at midnight the night before your surgery, but you may continue to take your normal medications with a small sip of water  In the morning on the day of your surgery, we would like you to take the following medication:   Tylenol 500mg one tablet by mouth    After surgery, we would like you to take Tylenol 500 mg one tablet by mouth every 6 hours  (at breakfast, lunch and dinner) for 5-7 days after your surgery  Please take this medication EVERYDAY after surgery for 5-7 days, and not just as needed  Taking this medications after surgery will limit your need for prescription pain medication  We will also prescribe a narcotic pain medication for a limited time after surgery that you can take as needed for moderate or severe pain  The narcotic pain medication may also have Tylenol in it  Please limit Tylenol usage to under 3,000mg a day  Follow Up:  No follow-ups on file  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    To Do Next Visit:  Re-evaluation of current issue    Operative Discussions:  Distal Radius Fracture Fixation: Both operative and non operative management of the injury was explained to the patient  The decision was made to undergo surgical fixation  The surgical procedure was explained with a verbal understanding expressed by the patient  Postoperatively, the patient is to begin with occupational therapy to have a removable splint applied  This splint may be removed for showering, bathing, dining, sedative activities (eg watching tv, reading etc ) and daily therapy exercises   Radiographs are typically taken at intervals throughout the fracture healing ensure maintenance of reduction and alignment  If the fracture loses its alignment, revision surgery may be required  Medical conditions such as diabetes, osteoporosis, vitamin D deficiency, and a history of or exposure to smoking may delay or prevent fracture healing  The risks and benefits of the procedure were explained to the patient, which include, but are not limited to: Bleeding, infection, recurrence, pain, scar, malunion, nonunion, damage to tendons, damage to nerves, and damage to blood vessels, and complications related to anesthesia, failure to give desire result, need for more surgery  These risks, along with alternative conservative treatment options, and postoperative protocols were voiced back and understood by the patient  All questions were answered to the patient's satisfaction  The patient agrees to comply with a standard postoperative protocol, and is willing to proceed  Education was provided via written and auditory forms  There were no barriers to learning  Written handouts regarding wound care, incision and scar care, and general preoperative information was provided to the patient  Prior to surgery, the patient may be requested to stop all anti-inflammatory medications  Prophylactic aspirin, Plavix, and Coumadin may be allowed to be continued  Medications including vitamin E , ginkgo, and fish oil are requested to be stopped approximately one week prior to surgery  Hypertensive medications and beta blockers, if taken, should be continued      Scribe Attestation    I,:  Teresa Crane am acting as a scribe while in the presence of the attending physician :       I,:  Burgess Karson MD personally performed the services described in this documentation    as scribed in my presence :

## 2021-02-05 ENCOUNTER — ANESTHESIA EVENT (OUTPATIENT)
Dept: PERIOP | Facility: HOSPITAL | Age: 61
End: 2021-02-05
Payer: COMMERCIAL

## 2021-02-05 NOTE — TELEPHONE ENCOUNTER
Could we please ask what pain medication she has taken in the past that has helped  She has many allergies to pain medications that we would normally prescribe  Thank you  Skin normal color for race, warm, dry and intact. No evidence of rash.

## 2021-02-05 NOTE — PRE-PROCEDURE INSTRUCTIONS
Pre-Surgery Instructions:   Medication Instructions    acetaminophen (TYLENOL) 500 mg tablet Pre/Post Op    cholecalciferol (VITAMIN D3) 1,000 units tablet Hold for one week prior to surgery    dicyclomine (BENTYL) 20 mg tablet Take as directed    dronabinol (MARINOL) 5 MG capsule Take as needed    HYDROcodone-acetaminophen (NORCO) 5-325 mg per tablet Post Op    multivitamin (THERAGRAN) TABS Hold for one week prior to surgery    naloxone (NARCAN) 4 mg/0 1 mL nasal spray Use as needed    Omega-3 Fatty Acids (FISH OIL PO) Hold for one week prior to surgery    ondansetron (ZOFRAN-ODT) 4 mg disintegrating tablet Take as needed    Probiotic Product (PROBIOTIC-10 PO) Take as directed      My Surgical Experience    The following information was developed to assist you to prepare for your operation  What do I need to do before coming to the hospital?   Arrange for a responsible person to drive you to and from the hospital    Arrange care for your children at home  Children are not allowed in the recovery areas of the hospital   Plan to wear clothing that is easy to put on and take off  If you are having shoulder surgery, wear a shirt that buttons or zippers in the front  Bathing  o Shower the evening before and the morning of your surgery with an antibacterial soap  Please refer to the Pre Op Showering Instructions for Surgery Patients Sheet   o Remove nail polish and all body piercing jewelry  o Do not shave any body part for at least 24 hours before surgery-this includes face, arms, legs and upper body  Food  o Nothing to eat or drink after midnight the night before your surgery   This includes candy and chewing gum  o Exception: If your surgery is after 12:00pm (noon), you may have clear liquids such as 7-Up®, ginger ale, apple or cranberry juice, Jell-O®, water, or clear broth until 8:00 am  o Do not drink milk or juice with pulp on the morning before surgery  o Do not drink alcohol 24 hours before surgery  Medicine  o Follow instructions you received from your surgeon about which medicines you may take on the day of surgery  o If instructed to take medicine on the morning of surgery, take pills with just a small sip of water  Call your prescribing doctor for specific infroamtion on what to do if you take insulin    What should I bring to the hospital?    Bring:  Koleen Quarry or a walker, if you have them, for foot or knee surgery   A list of the daily medicines, vitamins, minerals, herbals and nutritional supplements you take  Include the dosages of medicines and the time you take them each day   Glasses, dentures or hearing aids   Minimal clothing; you will be wearing hospital sleepwear   Photo ID; required to verify your identity   If you have a Living Will or Power of , bring a copy of the documents   If you have an ostomy, bring an extra pouch and any supplies you use    Do not bring   Medicines or inhalers   Money, valuables or jewelry    What other information should I know about the day of surgery?  Notify your surgeons if you develop a cold, sore throat, cough, fever, rash or any other illness   Report to the Ambulatory Surgical/Same Day Surgery Unit   You will be instructed to stop at Registration only if you have not been pre-registered   Inform your  fi they do not stay that they will be asked by the staff to leave a phone number where they can be reached   Be available to be reached before surgery  In the event the operating room schedule changes, you may be asked to come in earlier or later than expected    *It is important to tell your doctor and others involved in your health care if you are taking or have been taking any non-prescription drugs, vitamins, minerals, herbals or other nutritional supplements   Any of these may interact with some food or medicines and cause a reaction

## 2021-02-05 NOTE — TELEPHONE ENCOUNTER
Spoke to patient  She stated she can take the Nolene Gathers like what was ordered for her on 1/28 in the ER  Stated she cannot take oxycodone, codeine of any kind,  or NSAIDs  Advised her to stay away from NSAIDs then and ice 20 mins on 20 mins off and elevate for swelling  Please advise

## 2021-02-05 NOTE — TELEPHONE ENCOUNTER
We will prescribed medication at the time of surgery for her  Thank you  In the mean time, ice, elevation, tylenol

## 2021-02-05 NOTE — TELEPHONE ENCOUNTER
Spoke to patient  Advised above  1000 mg tylenol q8h no more than 3000 mg in 24 hrs  Verbalized understanding

## 2021-02-08 ENCOUNTER — OFFICE VISIT (OUTPATIENT)
Dept: OBGYN CLINIC | Facility: CLINIC | Age: 61
End: 2021-02-08
Payer: COMMERCIAL

## 2021-02-08 ENCOUNTER — APPOINTMENT (OUTPATIENT)
Dept: RADIOLOGY | Facility: CLINIC | Age: 61
End: 2021-02-08
Payer: COMMERCIAL

## 2021-02-08 VITALS — BODY MASS INDEX: 23.34 KG/M2 | WEIGHT: 154 LBS | HEIGHT: 68 IN

## 2021-02-08 DIAGNOSIS — M54.50 LUMBAR PAIN: ICD-10-CM

## 2021-02-08 DIAGNOSIS — M25.559 HIP PAIN: Primary | ICD-10-CM

## 2021-02-08 DIAGNOSIS — M76.31 ILIOTIBIAL BAND SYNDROME OF RIGHT SIDE: ICD-10-CM

## 2021-02-08 DIAGNOSIS — M25.559 HIP PAIN: ICD-10-CM

## 2021-02-08 DIAGNOSIS — G57.01 PIRIFORMIS SYNDROME OF RIGHT SIDE: ICD-10-CM

## 2021-02-08 PROCEDURE — 1036F TOBACCO NON-USER: CPT | Performed by: ORTHOPAEDIC SURGERY

## 2021-02-08 PROCEDURE — 99203 OFFICE O/P NEW LOW 30 MIN: CPT | Performed by: ORTHOPAEDIC SURGERY

## 2021-02-08 PROCEDURE — 73502 X-RAY EXAM HIP UNI 2-3 VIEWS: CPT

## 2021-02-08 PROCEDURE — 72100 X-RAY EXAM L-S SPINE 2/3 VWS: CPT

## 2021-02-08 NOTE — PROGRESS NOTES
Patient Name:  Jovanni Canseco  MRN:  [id number]    [address] [place] [address] [place]     1  Hip pain    2  Lumbar pain  -     XR hip/pelv 2-3 vws right if performed; Future; Expected date: 02/08/2021        55 yo female Right sided IT band syndrome, piriformis syndrome  Reviewed xrays with patient and  present upon exam  Referral to PT for lumbar spine and core strengthening, ROM in addition to Right hip strengthening, ROM and modalities as needed for above diagnoses  Patient currently going to PT for Right upper extremity lymphedema treatment and will incorporate above with treatment plans  Continue OTC tylenol as needed for pain  Will follow up in approximately 6 weeks for follow up  Chief Complaint     Low back pain     History of the Present Illness     Jovanni Canseco is a 56 y o  female with low back pain  Patient reports mechanical fall approximately 1 5 weeks ago onto buttocks and left side  She was able to ambulate after fall  Patient sustained Left intra-articular distal radius fracture and is scheduled for surgical intervention with Dr To Sandhu tomorrow 02/09/2021  She admits to right-sided low back pain with ambulation of stairs and hip flexion  She has been taking tylenol for pain relief  Denies numbness or tingling to Right lower extremity  Review of Systems     Review of Systems   Constitutional: Negative for chills and fever  HENT: Negative for congestion  Respiratory: Negative for cough, chest tightness and shortness of breath  Cardiovascular: Negative for chest pain and palpitations  Gastrointestinal: Negative for abdominal pain  Endocrine: Negative for cold intolerance and heat intolerance  Musculoskeletal: Negative for joint swelling, myalgias and neck pain  Neurological: Negative for syncope  Psychiatric/Behavioral: Negative for confusion         Physical Exam     Ht 5' 8" (1 727 m)   Wt 69 9 kg (154 lb)   BMI 23 42 kg/m²     There is tenderness present over Right SI joint, piriformis; minimal tenderness over ischial tuberosity  There is minimal right-sided paraspinal tenderness  There is no pain with forward flexion to 85 degrees and 20 extension to degrees  Sensation intact to light touch L2 through S1 dermatomes bilaterally  5/5 strength bilateral iliopsoas, quadriceps, tibialis anterior, extensor hallucis longus, and gastrocsoleus  Negative clonus bilaterally  Negative Babinski bilaterally  Straight leg raise test is equivocal   The patient is well perfused distally  Right hip: Negative Log roll and Stinchfield tests  Pain Right-side SI joint/piriformis with FADDIR, no pain with ADIN  Hip flexion 110 with pain noted  Tenderness to palpation over IT band  Eyes:  Anicteric sclerae  Neck:  Supple  Lungs:  Normal respiratory effort  Cardiovascular:  Capillary refill is less than 2 seconds  Skin:  Intact without erythema  Neurologic:  Sensation grossly intact to light touch  Psychiatric:  Mood and affect are appropriate  Data Review     I have personally reviewed pertinent films in PACS, and my interpretation follows:    Xrays taken today 02/08/2021 of Right hip/pelvis demonstrates no acute fracture or dislocation  Xrays of lumbar spine demonstrate mild degenerative changes without acute fracture  Past Medical History:   Diagnosis Date    Abnormal mammogram 05/15/2013    Anemia     Cancer ([facility] [address] )     desmoitosis    Desmoid tumor     Last Assessed: 6/19/2017    Hyperlipidemia     Hypertension        Past Surgical History:   Procedure Laterality Date    HYSTERECTOMY      OTHER SURGICAL HISTORY      Arm Incision: Dermoid tumor, right Arm     PARTIAL HYSTERECTOMY      CT COLONOSCOPY FLX DX W/COLLJ SPEC WHEN PFRMD N/A 8/15/2017    Procedure: COLONOSCOPY;  Surgeon: Sonia Paredes MD;  Location: [facility];   Service: Gastroenterology    SKIN BIOPSY      SKIN CANCER EXCISION      Melanoma Excision        Allergies   Allergen Reactions    Aspirin GI Intolerance and Abdominal Pain     ABD     Chlorpromazine Anaphylaxis     PHENOTHIAZINE=ANAPHYLAXIS    Codeine Anaphylaxis     Anaphylaxis  abd pain   Meperidine Anaphylaxis, Hives and Other (See Comments)     VOMITS  VOMITS  Category: Allergy;     Phenothiazines Anaphylaxis and Throat Swelling     Category: Allergy;     Saccharin Anaphylaxis    Ibuprofen Hives     H    Ampicillin-Sulbactam Sodium Hives    Gluten Meal GI Intolerance    Levofloxacin Hives    Neomycin Other (See Comments), Edema and Hives     Category: Allergy;   swelling    Other Throat Swelling, Hives and Other (See Comments)     ASA=GERD  ASA=GERD  Category: Allergy;     Citrus Rash    Latex Hives and Rash     Category: Allergy;        Current Outpatient Medications on File Prior to Visit   Medication Sig Dispense Refill    acetaminophen (TYLENOL) 500 mg tablet Take one tablet the day of surgery, then take one tablet for breakfast lunch and dinner after surgery for 5 days 30 tablet 0    cholecalciferol (VITAMIN D3) 1,000 units tablet Take 2,000 Units by mouth daily      dicyclomine (BENTYL) 20 mg tablet Take 1 tablet (20 mg total) by mouth every 8 (eight) hours as needed (abdominal cramping/pain or diarrhea) 12 tablet 0    dronabinol (MARINOL) 5 MG capsule Take 15 mg by mouth 4 (four) times a day (before meals and at bedtime)      HYDROcodone-acetaminophen (NORCO) 5-325 mg per tablet Take 1 tablet by mouth every 6 (six) hours as needed for painMax Daily Amount: 4 tablets 12 tablet 0    ibuprofen (MOTRIN) 600 mg tablet Take one tablet the day of surgery, then take one tablet for breakfast lunch and dinner after surgery for 5 days 30 tablet 0    multivitamin (THERAGRAN) TABS Take 1 tablet by mouth daily      naloxone (NARCAN) 4 mg/0 1 mL nasal spray Administer 1 spray into a nostril  If no response after 2-3 minutes, give another dose in the other nostril using a new spray   1 each 1    Omega-3 Fatty Acids (FISH OIL PO) Take by mouth      ondansetron (ZOFRAN-ODT) 4 mg disintegrating tablet Take 1 tablet (4 mg total) by mouth every 8 (eight) hours as needed for nausea or vomiting 12 tablet 0    oxyCODONE-acetaminophen (PERCOCET) 5-325 mg per tablet Take 1 tablet by mouth every 4 (four) hours as needed for moderate pain To be taken after surgeryMax Daily Amount: 6 tablets 30 tablet 0    Probiotic Product (PROBIOTIC-10 PO) Take by mouth      thiamine (VITAMIN B1) 100 mg tablet Take 100 mg by mouth daily       No current facility-administered medications on file prior to visit          Social History     Tobacco Use    Smoking status: Never Smoker    Smokeless tobacco: Never Used   Substance Use Topics    Alcohol use: Not Currently     Alcohol/week: 1 0 standard drinks     Types: 1 Shots of liquor per week    Drug use: No       Family History   Problem Relation Age of Onset    Diabetes Mother     No Known Problems Father     Breast cancer Sister     Lung cancer Sister     Pancreatic cancer Sister              Procedures Performed     Procedures  None      Tiara Sellers PA-C

## 2021-02-09 ENCOUNTER — ANESTHESIA (OUTPATIENT)
Dept: PERIOP | Facility: HOSPITAL | Age: 61
End: 2021-02-09
Payer: COMMERCIAL

## 2021-02-09 ENCOUNTER — HOSPITAL ENCOUNTER (OUTPATIENT)
Facility: HOSPITAL | Age: 61
Setting detail: OUTPATIENT SURGERY
Discharge: HOME | End: 2021-02-09
Attending: ORTHOPAEDIC SURGERY | Admitting: ORTHOPAEDIC SURGERY
Payer: COMMERCIAL

## 2021-02-09 ENCOUNTER — APPOINTMENT (OUTPATIENT)
Dept: RADIOLOGY | Facility: HOSPITAL | Age: 61
End: 2021-02-09
Payer: COMMERCIAL

## 2021-02-09 VITALS
BODY MASS INDEX: 24.22 KG/M2 | OXYGEN SATURATION: 99 % | DIASTOLIC BLOOD PRESSURE: 89 MMHG | WEIGHT: 159.83 LBS | HEIGHT: 68 IN | TEMPERATURE: 97.3 F | HEART RATE: 63 BPM | RESPIRATION RATE: 24 BRPM | SYSTOLIC BLOOD PRESSURE: 169 MMHG

## 2021-02-09 VITALS — HEART RATE: 58 BPM

## 2021-02-09 DIAGNOSIS — S52.572A OTHER CLOSED INTRA-ARTICULAR FRACTURE OF DISTAL END OF LEFT RADIUS, INITIAL ENCOUNTER: Primary | ICD-10-CM

## 2021-02-09 PROCEDURE — 73100 X-RAY EXAM OF WRIST: CPT

## 2021-02-09 PROCEDURE — C1713 ANCHOR/SCREW BN/BN,TIS/BN: HCPCS | Performed by: ORTHOPAEDIC SURGERY

## 2021-02-09 PROCEDURE — 25609 OPTX DST RD XART FX/EP SEP3+: CPT | Performed by: ORTHOPAEDIC SURGERY

## 2021-02-09 PROCEDURE — 25609 OPTX DST RD XART FX/EP SEP3+: CPT | Performed by: PHYSICIAN ASSISTANT

## 2021-02-09 PROCEDURE — 93005 ELECTROCARDIOGRAM TRACING: CPT

## 2021-02-09 DEVICE — 2.3MM X 20MM LOCKING CORTICAL PEG
Type: IMPLANTABLE DEVICE | Site: WRIST | Status: FUNCTIONAL
Brand: ACUMED

## 2021-02-09 DEVICE — 2.7 X 12MM L LOW PROFILE HEXALOBE SCREW
Type: IMPLANTABLE DEVICE | Site: WRIST | Status: FUNCTIONAL
Brand: ACUMED

## 2021-02-09 DEVICE — 2.7 X 12MM NL LOW PROFILE HEXALOBE SCREW
Type: IMPLANTABLE DEVICE | Site: WRIST | Status: FUNCTIONAL
Brand: ACUMED

## 2021-02-09 DEVICE — 2.3MM X 20MM LOCKING CORTICAL SCREW
Type: IMPLANTABLE DEVICE | Site: WRIST | Status: FUNCTIONAL
Brand: ACUMED

## 2021-02-09 DEVICE — ACU-LOC® 2 VDR PLT, NARROW, L
Type: IMPLANTABLE DEVICE | Site: WRIST | Status: FUNCTIONAL
Brand: ACUMED

## 2021-02-09 DEVICE — 2.7 X 14MM L LOW PROFILE HEXALOBE SCREW
Type: IMPLANTABLE DEVICE | Site: WRIST | Status: FUNCTIONAL
Brand: ACUMED

## 2021-02-09 RX ORDER — ACETAMINOPHEN 325 MG/1
650 TABLET ORAL EVERY 6 HOURS PRN
Status: CANCELLED | OUTPATIENT
Start: 2021-02-09

## 2021-02-09 RX ORDER — ROPIVACAINE HYDROCHLORIDE 5 MG/ML
INJECTION, SOLUTION EPIDURAL; INFILTRATION; PERINEURAL
Status: COMPLETED | OUTPATIENT
Start: 2021-02-09 | End: 2021-02-09

## 2021-02-09 RX ORDER — HYDROCODONE BITARTRATE AND ACETAMINOPHEN 5; 325 MG/1; MG/1
1 TABLET ORAL EVERY 6 HOURS PRN
Qty: 20 TABLET | Refills: 0 | Status: SHIPPED | OUTPATIENT
Start: 2021-02-09 | End: 2021-02-19

## 2021-02-09 RX ORDER — FENTANYL CITRATE 50 UG/ML
INJECTION, SOLUTION INTRAMUSCULAR; INTRAVENOUS
Status: COMPLETED | OUTPATIENT
Start: 2021-02-09 | End: 2021-02-09

## 2021-02-09 RX ORDER — LIDOCAINE HYDROCHLORIDE 10 MG/ML
0.5 INJECTION, SOLUTION EPIDURAL; INFILTRATION; INTRACAUDAL; PERINEURAL ONCE AS NEEDED
Status: DISCONTINUED | OUTPATIENT
Start: 2021-02-09 | End: 2021-02-09 | Stop reason: HOSPADM

## 2021-02-09 RX ORDER — PROPOFOL 10 MG/ML
INJECTION, EMULSION INTRAVENOUS AS NEEDED
Status: DISCONTINUED | OUTPATIENT
Start: 2021-02-09 | End: 2021-02-09

## 2021-02-09 RX ORDER — ONDANSETRON 2 MG/ML
4 INJECTION INTRAMUSCULAR; INTRAVENOUS EVERY 6 HOURS PRN
Status: CANCELLED | OUTPATIENT
Start: 2021-02-09

## 2021-02-09 RX ORDER — ONDANSETRON 4 MG/1
4 TABLET, ORALLY DISINTEGRATING ORAL EVERY 6 HOURS PRN
Qty: 20 TABLET | Refills: 0 | Status: ON HOLD | OUTPATIENT
Start: 2021-02-09 | End: 2021-08-10 | Stop reason: ALTCHOICE

## 2021-02-09 RX ORDER — LIDOCAINE HYDROCHLORIDE 10 MG/ML
INJECTION, SOLUTION EPIDURAL; INFILTRATION; INTRACAUDAL; PERINEURAL AS NEEDED
Status: DISCONTINUED | OUTPATIENT
Start: 2021-02-09 | End: 2021-02-09

## 2021-02-09 RX ORDER — CLINDAMYCIN PHOSPHATE 900 MG/50ML
900 INJECTION INTRAVENOUS ONCE
Status: COMPLETED | OUTPATIENT
Start: 2021-02-09 | End: 2021-02-09

## 2021-02-09 RX ORDER — PROPOFOL 10 MG/ML
INJECTION, EMULSION INTRAVENOUS CONTINUOUS PRN
Status: DISCONTINUED | OUTPATIENT
Start: 2021-02-09 | End: 2021-02-09

## 2021-02-09 RX ORDER — SODIUM CHLORIDE, SODIUM LACTATE, POTASSIUM CHLORIDE, CALCIUM CHLORIDE 600; 310; 30; 20 MG/100ML; MG/100ML; MG/100ML; MG/100ML
125 INJECTION, SOLUTION INTRAVENOUS CONTINUOUS
Status: DISCONTINUED | OUTPATIENT
Start: 2021-02-09 | End: 2021-02-09 | Stop reason: HOSPADM

## 2021-02-09 RX ORDER — FENTANYL CITRATE/PF 50 MCG/ML
50 SYRINGE (ML) INJECTION
Status: DISCONTINUED | OUTPATIENT
Start: 2021-02-09 | End: 2021-02-09 | Stop reason: HOSPADM

## 2021-02-09 RX ORDER — MAGNESIUM HYDROXIDE 1200 MG/15ML
LIQUID ORAL AS NEEDED
Status: DISCONTINUED | OUTPATIENT
Start: 2021-02-09 | End: 2021-02-09 | Stop reason: HOSPADM

## 2021-02-09 RX ORDER — MIDAZOLAM HYDROCHLORIDE 2 MG/2ML
INJECTION, SOLUTION INTRAMUSCULAR; INTRAVENOUS
Status: COMPLETED | OUTPATIENT
Start: 2021-02-09 | End: 2021-02-09

## 2021-02-09 RX ORDER — ONDANSETRON 2 MG/ML
4 INJECTION INTRAMUSCULAR; INTRAVENOUS ONCE AS NEEDED
Status: DISCONTINUED | OUTPATIENT
Start: 2021-02-09 | End: 2021-02-09 | Stop reason: HOSPADM

## 2021-02-09 RX ADMIN — CLINDAMYCIN IN 5 PERCENT DEXTROSE 900 MG: 18 INJECTION, SOLUTION INTRAVENOUS at 12:46

## 2021-02-09 RX ADMIN — PROPOFOL 100 MCG/KG/MIN: 10 INJECTION, EMULSION INTRAVENOUS at 12:56

## 2021-02-09 RX ADMIN — LIDOCAINE HYDROCHLORIDE 50 MG: 10 INJECTION, SOLUTION EPIDURAL; INFILTRATION; INTRACAUDAL; PERINEURAL at 12:56

## 2021-02-09 RX ADMIN — FENTANYL CITRATE 100 MCG: 50 INJECTION, SOLUTION INTRAMUSCULAR; INTRAVENOUS at 12:50

## 2021-02-09 RX ADMIN — ROPIVACAINE HYDROCHLORIDE 30 ML: 5 INJECTION, SOLUTION EPIDURAL; INFILTRATION; PERINEURAL at 12:50

## 2021-02-09 RX ADMIN — MIDAZOLAM HYDROCHLORIDE 2 MG: 1 INJECTION, SOLUTION INTRAMUSCULAR; INTRAVENOUS at 12:50

## 2021-02-09 RX ADMIN — SODIUM CHLORIDE, SODIUM LACTATE, POTASSIUM CHLORIDE, AND CALCIUM CHLORIDE 125 ML/HR: .6; .31; .03; .02 INJECTION, SOLUTION INTRAVENOUS at 12:47

## 2021-02-09 RX ADMIN — PROPOFOL 50 MG: 10 INJECTION, EMULSION INTRAVENOUS at 12:56

## 2021-02-09 NOTE — DISCHARGE INSTRUCTIONS
Continue Home Medications Per Routine    Post Operative Instructions    You have had surgery on your arm today, please read and follow the information below:  · Elevate your hand above your elbow during the next 24-48 hours to help with swelling  · Place your hand and arm over your head with motion at your shoulder three times a day  · Do not apply any cream/ointment/oil to your incisions including antibiotics  · Do not soak your hands in standing water (dishwater, tubs, Jacuzzi's, pools, etc ) until given permission (typically 2-3 weeks after injury)    Call the office at [phone]  if you notice any:  · Increased numbness or tingling of your hand or fingers that is not relieved with elevation  · Increasing pain that is not controlled with medication  · Difficulty chewing, breathing, swallowing  · Chest pains or shortness of breath  · Fever over 101 4 degrees  Bandage: Your therapist will remove your bandage at your first therapy appointment  Motion: Move fingers into a fist 5 times a day, DO NOT move any splinted fingers  Weight bearing status: The operated extremity should be non-weight bearing until further notice  Ice: Ice for 10 minutes every hour as needed for swelling x 24 hours  Sling: Sling for comfort for 2-3 days, or until pain block wears off  Pain medication:      After surgery, we would like you to take Ibuprofen 600mg one tablet by mouth every 6 hours with food (at breakfast, lunch and dinner)  AND Tylenol 500 mg one tablet by mouth every 6 hours  (at breakfast, lunch and dinner) for 5-7 days after your surgery  Please take these medication EVERYDAY after surgery for 5-7 days, and not just as needed  You can take these medications at the same time  Taking these medications after surgery will limit your need for prescription pain medication        If the pain becomes severe, and the pain medication is not alleviating symptoms, The greatest source of pain after surgery is usually a tight dressing due to increased swelling after surgery  If the pain becomes severe after surgery, and the patient medication is NOT alleviating the symptoms, the patient should do the following: The patient should  loosen the top dressing (usually coban or an ace bandage) and loosen/cut the rolled gauze beneath  The 4x4 gauze that is directly covering the incision should remain in place  The splint (if the patient has one) should remain in place  The ace bandage/coban can then be replaced on top in a less constrictive manor  If this does not help relieve the pain/numbness in a few hours, the patient should call our office (number listed below)  and we can have them seen in the office for further evaluation  Follow-up Appointment: 7-10 days with Dr Herminia Urias  Occupational Therapy: 2/12/2021  AFTER THE FIRST THERAPY APPOINTMENT, the patient may remove the splint/dressing for showering and clean the incision with soap and water  Keep incision dry after washing  Do not expose the incision to dirty water (oceans, pools, hot tubs, etc)     If you need help scheduling Therapy, you can call [phone]        Please call the office at [phone] if you have any questions or concerns regarding your post-operative care

## 2021-02-09 NOTE — ANESTHESIA PREPROCEDURE EVALUATION
Procedure:  OPEN REDUCTION W/ INTERNAL FIXATION (ORIF) RADIUS / ULNA (WRIST) left (Left Wrist)    Relevant Problems   CARDIO   (+) Hyperlipidemia, mixed      HEMATOLOGY   (+) Chronic anemia (Resolved)   (-) Anemia      MUSCULOSKELETAL   (+) Back pain, chronic      NEURO/PSYCH   (+) Back pain, chronic      Other   (+) Lymphedema of right arm   (+) Peripheral neuropathy      Left wrist fx  history of HUSEYIN-related cancer and desmoid tumor/desmoitosis, s/p hysterectomy, chronic lymphedema  Physical Exam    Airway    Mallampati score: I  TM Distance: >3 FB  Neck ROM: full     Dental   Comment: Denies loose teeth,     Cardiovascular  Cardiovascular exam normal    Pulmonary  Pulmonary exam normal     Other Findings  Portions of exam deferred due to low yield and/or unknown COVID status      Anesthesia Plan  ASA Score- 3     Anesthesia Type- IV sedation with anesthesia and regional with ASA Monitors  Additional Monitors:   Airway Plan:           Plan Factors-Exercise tolerance (METS): >4 METS  Chart reviewed  Existing labs reviewed  Patient summary reviewed  Patient is not a current smoker  Induction- intravenous  Postoperative Plan-     Informed Consent- Anesthetic plan and risks discussed with patient  I personally reviewed this patient with the CRNA  Discussed and agreed on the Anesthesia Plan with the [facility] Console

## 2021-02-09 NOTE — ANESTHESIA POSTPROCEDURE EVALUATION
Post-Op Assessment Note    CV Status:  Stable  Pain Score: 0    Pain management: adequate     Mental Status:  Alert and awake   Hydration Status:  Euvolemic   PONV Controlled:  Controlled   Airway Patency:  Patent      Post Op Vitals Reviewed: Yes      Staff: CRNA         No complications documented      /71 (02/09/21 1342)    Temp (!) 97 3 °F (36 3 °C) (02/09/21 1342)    Pulse 66 (02/09/21 1342)   Resp 20 (02/09/21 1342)    SpO2 94 % (02/09/21 1342)

## 2021-02-09 NOTE — INTERVAL H&P NOTE
H&P reviewed  After examining the patient I find no changes in the patients condition since the H&P had been written      Vitals:    02/09/21 1213   BP: (!) 181/87   Pulse: 77   Resp: 18   Temp: 99 2 °F (37 3 °C)   SpO2: 96%

## 2021-02-09 NOTE — OP NOTE
OPERATIVE REPORT    PATIENT NAME: Dawson Chavez    MEDICAL RECORD NO:  [id number]    PROCEDURE DATE:  21    :  1960    SURGEON:  OSIRIS Santos , Ph D     Ashtyn Danielson:  Melva Oliveira PA-C    No qualified resident was available to assist for this surgery  A Physician Assistant was used to aid in reduction and retraction while the orthopedic hardware was placed  PREOPERATIVE DIAGNOSIS:    left distal radius 3+ part intra-articular fracture    POSTOPERATIVE DIAGNOSIS:   left distal radius 3+ part intra-articular fracture    PROCEDURE PERFORMED:    Open reduction internal fixation of left distal radius    ANESTHESIA:  Regional and conscious sedation    COMPLICATIONS: none    TOURNIQUET TIME:  23 minutes at 250 mmHg     EQUIPMENT USED:  narrow Aculoc plating system     DISPOSITION: Patient was sent to the PACU in stable condition  INDICATIONS: Patient is a 56 y o  female who suffered a left distal radius intra-articular fracture as determined by imaging studies  Surgical intervention was offered and the risks and benefits of open reduction and internal fixation were explained  Consent was obtained for surgical intervention  PROCEDURE:  The patient was identified in the preoperative screening area  Consent was signed and verified after identifying the correct operative site  The patient was taken back to the operating room after receiving axillary block in the pre-op holding area  Regional and conscious sedation was provided  The patients left upper extremity was prepped and draped in normal sterile fashion with chlorhexidine solution  The arm was then elevated and exsanguinated with an Esmarch and tourniquet placed about the brachium was insufflated to 250 mmHg  A volar longitudinal incision was made over the FCR tendon approximately 6-8cm in length ending just proximal to the wrist flexion crease    Subcutaneous tissue was dissected sharply and superficial vessels were cauterized or preserved where possible  The superficial and deep portions of the FCR sheath were incised and the FCR tendon was reflected ulnarly along with the deeper FPL tendon  The pronator quadratus was identified and released off its radial attachment  The fracture site was identified and was irrigated and cleaned of debris  The fracture was then provisionally reduced and held with K-wires  The articular surface was reconstructed first and secured with provisionally placed K-wires  The reconstructed articular surface was then reduced to the proximal shaft and secured with an 0 62 K-wire placed from the radial styloid into the proximal shaft  Once provisional reduction was achieved, the narrow Aculoc plate was placed over the fracture site and provisionally stabilized with 0 054 K-wires  Intra-operative fluoroscopic imaging demonstrated good alignment of the fracture fragments and good positioning of the plate  The distal portion of the plate was then secured first  The distal row was filled with 2 4 mm locking pegs and screws of appropriate length  The two styloid holes of the plate were filled with 2 4 mm locking pegs of appropriate length  The temporary K-wires were then removed  Final manipulation of the fracture was then performed prior to securing the plate to the proximal fragment  The plate was secured to the proximal fragment using one 2 7 mm bicortical non-locking screw placed in compression  Two 2 7 mm bicortical locking screws were then placed to lock the final construct  Screws of appropriate length were placed  Fracture stabilization was assessed and found to be stable and secure  Final intra-operative fluoroscopic images were obtained demonstrating good reduction of the fracture and articular surface and good placement of the hardware  The wound was irrigated with copious amounts of saline solution    The pronator quadratus was then repaired back to the radial margin with 3-0 Ethibond sutures placed in a figure-8 fashion  Good soft tissue interposition between the plate and flexor tendons was achieved  The tendons were riding over soft tissue and not in direct contact with the plate  The Tourniquet was released at approximately 23 minutes with good capillary refill and color in the digits  Hemostasis was achieved  The skin was then closed with 4-0 nylon suture in a horizontal mattress fashion  The wound was dressed in a sterile dressing and the wrist was immobilized in a volar wrist splint  The patient tolerated the procedure well, was awakened in the operating room, and sent to the PACU in stable condition  As no first assistant was provided by the hospital, it was elected to use a physician's assistant to stabilize the extremity and aid in instrumentation       I was present for the entire procedure     Patient Disposition:  PACU      SIGNATURE: Bonifacio Smiley MD/PhD  DATE: 02/09/21  TIME:  12:45 PM

## 2021-02-09 NOTE — ANESTHESIA PROCEDURE NOTES
Peripheral Block    Patient location during procedure: holding area  Start time: 2/9/2021 12:42 PM  Reason for block: at surgeon's request and post-op pain management  Staffing  Anesthesiologist: Uriel Peace MD  Performed: anesthesiologist   Preanesthetic Checklist  Completed: patient identified, surgical consent, pre-op evaluation, timeout performed, IV checked, risks and benefits discussed and monitors and equipment checked  Peripheral Block  Patient position: supine  Prep: ChloraPrep  Patient monitoring: continuous pulse ox and frequent blood pressure checks  Block type: supraclavicular  Laterality: left  Injection technique: single-shot  Procedures: ultrasound guided, Ultrasound guidance required for the procedure to increase accuracy and safety of medication placement and decrease risk of complications    Ultrasound permanent image savedropivacaine (NAROPIN) 0 5 % perineural infiltration, 30 mL  midazolam (VERSED) 2 mg/2 mL IV, 2 mg  fentaNYL 50 mcg/mL IV, 100 mcg  Needle  Needle type: Stimuplex   Needle gauge: 22 G  Needle length: 10 cm  Needle localization: anatomical landmarks and ultrasound guidance  Assessment  Injection assessment: incremental injection and local visualized surrounding nerve on ultrasound  Paresthesia pain: none  Heart rate change: no  Slow fractionated injection: yes  Post-procedure:  site cleaned  patient tolerated the procedure well with no immediate complications

## 2021-02-10 LAB
ATRIAL RATE: 63 BPM
P AXIS: 62 DEGREES
PR INTERVAL: 166 MS
QRS AXIS: 38 DEGREES
QRSD INTERVAL: 98 MS
QT INTERVAL: 398 MS
QTC INTERVAL: 407 MS
T WAVE AXIS: 57 DEGREES
VENTRICULAR RATE: 63 BPM

## 2021-02-10 PROCEDURE — 93010 ELECTROCARDIOGRAM REPORT: CPT | Performed by: INTERNAL MEDICINE

## 2021-02-12 ENCOUNTER — EVALUATION (OUTPATIENT)
Dept: OCCUPATIONAL THERAPY | Facility: CLINIC | Age: 61
End: 2021-02-12
Payer: COMMERCIAL

## 2021-02-12 DIAGNOSIS — S52.572D OTHER CLOSED INTRA-ARTICULAR FRACTURE OF DISTAL END OF LEFT RADIUS WITH ROUTINE HEALING, SUBSEQUENT ENCOUNTER: ICD-10-CM

## 2021-02-12 PROCEDURE — 97165 OT EVAL LOW COMPLEX 30 MIN: CPT

## 2021-02-12 PROCEDURE — 97760 ORTHOTIC MGMT&TRAING 1ST ENC: CPT

## 2021-02-12 PROCEDURE — 97110 THERAPEUTIC EXERCISES: CPT

## 2021-02-12 NOTE — PROGRESS NOTES
OT Evaluation     Today's date: 2021  Patient name: Carly Garner  : 1960  MRN: [id number]  Referring provider: Olinda Noble MD  Dx:   Encounter Diagnosis     ICD-10-CM    1  Other closed intra-articular fracture of distal end of left radius with routine healing, subsequent encounter  S52 572D Ambulatory referral to PT/OT hand therapy                  Assessment  Assessment details: This is a 56year old, right hand dominant female being seen in therapy following an ORIF of a left distal radius fracture on 21  Patient now presents with healing incision, mild edema, impaired AROM in the left digits, wrist, forearm, pain  She is a good candidate for OT services to abolish pain and restore ROM and strength for independence in daily tasks  Impairments: abnormal coordination, abnormal or restricted ROM, activity intolerance, impaired physical strength, lacks appropriate home exercise program, pain with function and weight-bearing intolerance  Other impairment: Healing wound  Functional limitations: Using the left hand as a min assist for self care and light ADLs  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  Symptom irritability: lowUnderstanding of Dx/Px/POC: excellent  Goals  STGs ( 4 weeks)  1  Patient will be independent in HEP of wound care, splint use, ROM of the LUE  2  Patient will demonstrate full wound healing  3  Patient will demonstrate AROM of the digits WNL  4  Patient will demonstrate AROM of forearm supinaiton to 45 degrees  5  Patient will demonstrate AROM wrist e/f to 45/45  6  Patient will report average pain level of 4/10 during ADLs  LTGs ( 12 weeks)  1  Patient will be independent in HEP to maintain LUE ROM and strength at discharge  2  Patient will report a pain level of 2/10 during ADL and IADL tasks  3  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  4    Patient will demonstrate 5/5 left wrist and forearm strength to do home management tasks  5  Patient 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  and 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    Plan  Patient would benefit from: custom splinting, OT eval and skilled occupational therapy  Planned modality interventions: thermotherapy: hydrocollator packs and cryotherapy  Planned therapy interventions: activity modification, compression, dressing changes, home exercise program, fine motor coordination training, therapeutic exercise, therapeutic activities, strengthening, Campos taping, manual therapy, joint mobilization, orthotic fitting/training, patient education and self care  Other planned therapy interventions: Wound care, edema mgt  Frequency: 2x week  Duration in weeks: 12  Plan of Care beginning date: 2021  Plan of Care expiration date: 2021  Treatment plan discussed with: patient        Subjective Evaluation    History of Present Illness  Date of onset: 2021  Date of surgery: 2021  Mechanism of injury: surgery and trauma  Mechanism of injury: Patient reports she fell at home and injured her left wrist  She went to the ER and was diagnosed with a left distal radius fx and put in a wrist splint and referred to Orthopedics  She had an ORIF on 21 and now presents for OT evaluation and treatment  Patient has a history of RUE lymphedema related to cancer surgeries  Quality of life: good    Pain  Current pain ratin  At best pain ratin  At worst pain ratin  Location: Left wrist  Quality: throbbing and sharp  Relieving factors: ice, medications and change in position  Exacerbated by: grasping    Progression: improved    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)  Hand dominance: right    Treatments  No previous or current treatments  Patient Goals  Patient goals for therapy: decreased edema, decreased pain, increased motion, increased strength and independence with ADLs/IADLs  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        Objective     Observations     Left Wrist/Hand   Positive for edema and incision  Additional Observation Details  2/12/21:  Removed post op dressing   Minimal blood drainage from incision  Dressed with adaptic and lynnette    Active Range of Motion     Left Elbow   Flexion: WFL  Extension: WFL  Forearm supination: 0 degrees   Forearm pronation: 60 degrees     Left Wrist   Wrist flexion: 25 degrees   Wrist extension: 40 degrees   Radial deviation: 15 degrees   Ulnar deviation: 25 degrees     Left Thumb   Flexion     CMC: 0 degrees    MP: 50 degrees    DIP: 20 degrees  Palmar Abduction     CMC: 45 degrees  Radial abduction    CMC: 25 degrees  Opposition: 2/12/21:  PAGE = 70    Opposition to base of small finger    Left Digits    Flexion   Index     MCP: 50    PIP: 105    DIP: 65  Middle     MCP: 60    PIP: 90    DIP: 70  Ring     MCP: 70    PIP: 90    DIP: 70  Little     MCP: 75    PIP: 80    DIP: 85    Additional Active Range of Motion Details  2/12/21:  PAGE index = 220; middle = 220; ring = 230; small = 240      Flowsheet Rows      Most Recent Value   PT/OT G-Codes   Current Score  28   Projected Score  59             Precautions:  Wound; ORIF L distal radius fx 2/9/21       Manuals 2/12       Dressing chg Adaptic, lynnette       Edema mass                Ortho fit/train        Neuro Re-Ed                                                                 Ther Ex 2/12       TGE x10       AROM thumb x10       A/AAROM wrist AROM x 10       A/AAROM FA AROM x 10                                       Ther Activity                        HEP 2/12        AROM, wound care, splint               Modalities

## 2021-02-18 ENCOUNTER — APPOINTMENT (OUTPATIENT)
Dept: RADIOLOGY | Facility: CLINIC | Age: 61
End: 2021-02-18
Payer: COMMERCIAL

## 2021-02-18 ENCOUNTER — OFFICE VISIT (OUTPATIENT)
Dept: OBGYN CLINIC | Facility: CLINIC | Age: 61
End: 2021-02-18

## 2021-02-18 VITALS
BODY MASS INDEX: 24.14 KG/M2 | WEIGHT: 159.3 LBS | HEART RATE: 71 BPM | DIASTOLIC BLOOD PRESSURE: 77 MMHG | HEIGHT: 68 IN | SYSTOLIC BLOOD PRESSURE: 131 MMHG

## 2021-02-18 DIAGNOSIS — Z48.89 AFTERCARE FOLLOWING SURGERY: ICD-10-CM

## 2021-02-18 DIAGNOSIS — Z48.89 AFTERCARE FOLLOWING SURGERY: Primary | ICD-10-CM

## 2021-02-18 PROCEDURE — 73110 X-RAY EXAM OF WRIST: CPT

## 2021-02-18 PROCEDURE — 99024 POSTOP FOLLOW-UP VISIT: CPT | Performed by: ORTHOPAEDIC SURGERY

## 2021-02-18 RX ORDER — CEPHALEXIN 500 MG/1
TABLET ORAL
COMMUNITY
Start: 2021-02-16 | End: 2022-01-22 | Stop reason: ALTCHOICE

## 2021-02-18 NOTE — PROGRESS NOTES
SUBJECTIVE:  David Lee is a 56y o  year old female who presents for follow up after surgery,  Left distal radius ORIF performed on  2/9/2021  Today patient has  Minimal pain and discomfort  Patient states that she has been working on her range of motion at home on her own as well as with physical therapy  She denies any numbness or tingling  VITALS:  Vitals:    02/18/21 0819   BP: 131/77   Pulse: 71       PHYSICAL EXAMINATION:  General: well developed and well nourished, alert, oriented times 3 and appears comfortable  Psychiatric: Normal    MUSCULOSKELETAL EXAMINATION:   left wrist  Incision: Clean, dry, with sutures intact  Range of Motion:  Normal postoperative range of motion with decreased wrist flexion and extension  Neurovascular status: Neuro intact, good cap refill      STUDIES REVIEWED:  Images obtained today of the Left wrist 3 views demonstrate Fracture in stable alignment  Hardware in stable alignment no evidence of loosening  PROCEDURES PERFORMED:  Procedures  No Procedures performed today      ASSESSMENT/PLAN:    S/P  Left distal radius ORIF  Done today: Sutures out  -  Patient was advised to continue to work with PT / OT on her range of motion  - she can start to come out of the splint when at home to work on range of motion  She is to wear the splint while out and about in public   - she will follow-up in 6 weeks for re-evaluation     FOLLOW UP:  Return in about 6 weeks (around 4/1/2021)  Work/school status:    No restrictions      TO DO AT NEXT VISIT:  Re-evaluation of current issue      Scribe Attestation    I,:  Anh Amaral PA-C am acting as a scribe while in the presence of the attending physician :       I,:  Bo Roberts MD personally performed the services described in this documentation    as scribed in my presence :           Portions of the record may have been created with voice recognition software    Occasional wrong word or "sound a like" substitutions may have occurred due to the inherent limitations of voice recognition software  Read the chart carefully and recognize, using context, where substitutions have occurred

## 2021-02-22 ENCOUNTER — OFFICE VISIT (OUTPATIENT)
Dept: OCCUPATIONAL THERAPY | Facility: CLINIC | Age: 61
End: 2021-02-22
Payer: COMMERCIAL

## 2021-02-22 DIAGNOSIS — S52.572D OTHER CLOSED INTRA-ARTICULAR FRACTURE OF DISTAL END OF LEFT RADIUS WITH ROUTINE HEALING, SUBSEQUENT ENCOUNTER: Primary | ICD-10-CM

## 2021-02-22 PROCEDURE — 97110 THERAPEUTIC EXERCISES: CPT

## 2021-02-22 PROCEDURE — 97530 THERAPEUTIC ACTIVITIES: CPT

## 2021-02-22 PROCEDURE — 97140 MANUAL THERAPY 1/> REGIONS: CPT

## 2021-02-22 NOTE — PROGRESS NOTES
Daily Note     Today's date: 2021  Patient name: Uriel Tse  : 1960  MRN: [id number]  Referring provider: Ten Song MD  Dx:   Encounter Diagnosis     ICD-10-CM    1  Other closed intra-articular fracture of distal end of left radius with routine healing, subsequent encounter  S53 131G                   Subjective: It hurts a little  My fifteen pound cat flopped on it this morning  Objective: See treatment diary below      Assessment: Tolerated treatment well  Patient would benefit from continued OT  Patient completed all exercises with minimal complaint of pain and good ROM  Patient reported to be completing stretching at home multiple times a day  She has ful digit ROM  Patients ROM increased from IE on 21  Wrist flexion: 45 degrees  (+ 25 degrees)   Wrist extension: 63 degrees (+23 degrees)  Radial deviation: 23 degrees (+ 8 degrees)  Ulnar deviation: 45 degrees ( + 20 degrees)     Plan: Continue per plan of care  Next appt in 2 weeks             Precautions:  Wound; ORIF L distal radius fx 21       Manuals       Dressing chg Adaptic, lynnette       Edema mass  5'      Scar Massage  5'      Ortho fit/train        Neuro Re-Ed                                 Ther Ex       TGE x10 x10      AROM thumb x10 x10      A/AAROM wrist AROM x 10 x10      A/AAROM FA AROM x 10 x10      Wrist Stabilization  YFB x 20                              Ther Activity        Wrist maze  x5      Buttons  x30      Key pegs  1 set      Translation  x30      Cones  P/s x 20              HEP        AROM, wound care, splint Praying hands               Modalities        MHP  5'

## 2021-03-08 ENCOUNTER — APPOINTMENT (OUTPATIENT)
Dept: OCCUPATIONAL THERAPY | Facility: CLINIC | Age: 61
End: 2021-03-08
Payer: COMMERCIAL

## 2021-03-09 ENCOUNTER — OFFICE VISIT (OUTPATIENT)
Dept: OCCUPATIONAL THERAPY | Facility: CLINIC | Age: 61
End: 2021-03-09
Payer: COMMERCIAL

## 2021-03-09 DIAGNOSIS — S52.572D OTHER CLOSED INTRA-ARTICULAR FRACTURE OF DISTAL END OF LEFT RADIUS WITH ROUTINE HEALING, SUBSEQUENT ENCOUNTER: Primary | ICD-10-CM

## 2021-03-09 PROCEDURE — 97110 THERAPEUTIC EXERCISES: CPT

## 2021-03-09 PROCEDURE — 97140 MANUAL THERAPY 1/> REGIONS: CPT

## 2021-03-09 NOTE — PROGRESS NOTES
OT Re-Evaluation     Today's date: 3/9/2021  Patient name: Alexandria Crowell  : 1960  MRN: [id number]  Referring provider: Cal Davila MD  Dx:   Encounter Diagnosis     ICD-10-CM    1  Other closed intra-articular fracture of distal end of left radius with routine healing, subsequent encounter  S52 572D                   Assessment  Assessment details: This is a 56year old, right hand dominant female being seen in therapy following an ORIF of a left distal radius fracture on 21  Patient now presents with healing incision, mild edema, impaired AROM in the left digits, wrist, forearm, pain  She is a good candidate for OT services to abolish pain and restore ROM and strength for independence in daily tasks    3/9/21:  Patient has been seen 3 times in therapy  Good improvement in ROM of the wrist and forearm  Digit ROM is WNL  Pain has decreased, but not resolved  Initial strength measurements taken this date  Patient reports increased use of the left hand for daily tasks  Continue OT 1x/wk to increase LUE ROM and strength and to abolish pain  Impairments: abnormal or restricted ROM, activity intolerance, impaired physical strength, lacks appropriate home exercise program, pain with function and weight-bearing intolerance  Other impairment: Edema; adherent scar  Functional limitations: Using the left hand as a min assist for self care and light ADLs  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  Symptom irritability: lowUnderstanding of Dx/Px/POC: excellent  Goals  STGs ( 4 weeks)  1  Patient will be independent in HEP of wound care, splint use, ROM of the LUE  MET  2  Patient will demonstrate full wound healing  MET  3  Patient will demonstrate AROM of the digits WNL  MET  4  Patient will demonstrate AROM of forearm supinaiton to 45 degrees  MET  5  Patient will demonstrate AROM wrist e/f to 45/45  MET  6  Patient will report average pain level of 4/10 during ADLs  MET  LTGs ( 12 weeks)  1    Patient will be independent in HEP to maintain LUE ROM and strength at discharge  ON GOING  2  Patient will report a pain level of 2/10 during ADL and IADL tasks  NOT MET  3  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   NOT MET  4  Patient will demonstrate 5/5 left wrist and forearm strength to do home management tasks  NOT MET  5  Patient 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  and 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   NOT MET    Plan  Plan details: 3/9/21:  Continue OT 1x/wk to increase wrist and forearm ROM and strenght  Patient would benefit from: OT eval and skilled occupational therapy  Planned modality interventions: thermotherapy: hydrocollator packs and cryotherapy  Planned therapy interventions: activity modification, compression, home exercise program, fine motor coordination training, therapeutic exercise, therapeutic activities, strengthening, Campos taping, manual therapy, joint mobilization, orthotic fitting/training, patient education and self care  Other planned therapy interventions: Scar and edema mgt  Frequency: 1x week  Duration in weeks: 12  Plan of Care beginning date: 2/12/2021  Plan of Care expiration date: 5/14/2021  Treatment plan discussed with: patient        Subjective Evaluation    History of Present Illness  Date of onset: 1/29/2021  Date of surgery: 2/9/2021  Mechanism of injury: surgery and trauma  Mechanism of injury: Patient reports she fell at home and injured her left wrist  She went to the ER and was diagnosed with a left distal radius fx and put in a wrist splint and referred to Orthopedics  She had an ORIF on 2/9/21 and now presents for OT evaluation and treatment  Patient has a history of RUE lymphedema related to cancer surgeries    3/9/21:  Patient reports she is using her left hand for all self care  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    Doing light housework and cooking  Quality of life: good    Pain  Current pain rating: 3  At best pain rating: 3  At worst pain ratin  Location: Left wrist  Quality: throbbing and sharp  Relieving factors: ice, medications, change in position and heat  Exacerbated by: grasping  Progression: improved    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)  Hand dominance: right    Treatments  Current treatment: occupational therapy  Patient Goals  Patient goals for therapy: decreased edema, decreased pain, increased motion, increased strength and independence with ADLs/IADLs  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        Objective     Observations     Left Wrist/Hand   Positive for adhesive scar and edema  Additional Observation Details  21:  Removed post op dressing   Minimal blood drainage from incision  Dressed with adaptic and lynnette    3/9/21:  Scar well healed, but adhered  Some volar wrist edema noted    Active Range of Motion     Left Elbow   Flexion: WFL  Extension: WFL  Forearm supination: 60 degrees   Forearm pronation: 80 degrees     Left Wrist   Wrist flexion: 65 degrees   Wrist extension: 60 degrees   Radial deviation: 25 degrees   Ulnar deviation: 45 degrees     Left Thumb   Opposition: 21:  PAGE = 70    Opposition to base of small finger    3/9/21:  WNL    Additional Active Range of Motion Details  3/9/21:  Supination improved 60 degrees  3/9/21:  Wrist flexion improved 40 degrees; extension improved 20; RD improved 10; UD improved 20  21:  PAGE index = 220; middle = 220; ring = 230; small = 240    3/9/21:  WNL    Strength/Myotome Testing     Left Wrist/Hand   Wrist extension: 4+  Wrist flexion: 4+  Radial deviation: 5  Ulnar deviation: 5     (2nd hand position)     Trial 1: 40    Thumb Strength  Key/Lateral Pinch     Trial 1: 12  Tip/Two-Point Pinch     Trial 1: 7  Palmar/Three-Point Pinch     Trial 1: 8    Right Wrist/Hand      (2nd hand position)     Trial 1: 40    Thumb Strength   Key/Lateral Pinch     Trial 1: 15  Tip/Two-Point Pinch     Trial 1: 11  Palmar/Three-Point Pinch     Trial 1: 14    Swelling     Left Wrist/Hand   Circumference wrist: 17 7 cm    Right Wrist/Hand   Circumference wrist: 16 7 cm             Precautions:  Wound; ORIF L distal radius fx 2/9/21       Manuals 2/12 2/22 3/9     Dressing chg Adaptic, lynnette       Edema mass  5' 5'     Scar Massage  5' 5'     Ortho fit/train        Neuro Re-Ed                                 Ther Ex 2/12 2/22 3/9     TGE x10 x10 dc     AROM thumb x10 x10 dc     A/AAROM wrist AROM x 10 x10 5'     A/AAROM FA AROM x 10 x10 5'     Wrist Stabilization  YFB x 20      Wrist PREs e/f, dt   2# x 20 ea     Forearm curls   2# x 20      HG   35# x 20     Ther Activity  2/22 3/9     Wrist maze  x5 x10     Buttons  x30      Key pegs  1 set      Translation  x30      Cones  P/s x 20 2 sets             HEP 2/12 2/22 3/9      AROM, wound care, splint Praying hands  Wrist curls with soup can             Modalities  2/22 3/9     MHP  5' 5'

## 2021-03-10 DIAGNOSIS — Z23 ENCOUNTER FOR IMMUNIZATION: ICD-10-CM

## 2021-03-16 ENCOUNTER — IMMUNIZATIONS (OUTPATIENT)
Dept: FAMILY MEDICINE CLINIC | Facility: HOSPITAL | Age: 61
End: 2021-03-16

## 2021-03-16 DIAGNOSIS — Z23 ENCOUNTER FOR IMMUNIZATION: Primary | ICD-10-CM

## 2021-03-16 PROCEDURE — 0001A SARS-COV-2 / COVID-19 MRNA VACCINE (PFIZER-BIONTECH) 30 MCG: CPT

## 2021-03-16 PROCEDURE — 91300 SARS-COV-2 / COVID-19 MRNA VACCINE (PFIZER-BIONTECH) 30 MCG: CPT

## 2021-03-23 ENCOUNTER — OFFICE VISIT (OUTPATIENT)
Dept: OCCUPATIONAL THERAPY | Facility: CLINIC | Age: 61
End: 2021-03-23
Payer: COMMERCIAL

## 2021-03-23 DIAGNOSIS — S52.572D OTHER CLOSED INTRA-ARTICULAR FRACTURE OF DISTAL END OF LEFT RADIUS WITH ROUTINE HEALING, SUBSEQUENT ENCOUNTER: Primary | ICD-10-CM

## 2021-03-23 PROCEDURE — 97140 MANUAL THERAPY 1/> REGIONS: CPT

## 2021-03-23 PROCEDURE — 97110 THERAPEUTIC EXERCISES: CPT

## 2021-03-23 PROCEDURE — 97530 THERAPEUTIC ACTIVITIES: CPT

## 2021-03-23 NOTE — PROGRESS NOTES
Daily Note     Today's date: 3/23/2021  Patient name: Miriam Obando  : 1960  MRN: [id number]  Referring provider: Ginny Christy MD  Dx:   Encounter Diagnosis     ICD-10-CM    1  Other closed intra-articular fracture of distal end of left radius with routine healing, subsequent encounter  S55 [id number]                   Subjective: I bought a massager for my hand and I think that's helping me      Objective: See treatment diary below  AROM wrist ext/flex = 70/70 ( improved 10/5 degrees )  Supination = 90 ( improved 30 )   L = 45 lbs    Assessment: Tolerated treatment well  Patient exhibited good technique with therapeutic exercises  AROM is WNL and strength is functional limits    Issued medium theraputty for HEP      Plan: therapy on hold pending MD appt     Precautions:  Wound; ORIF L distal radius fx 21       Manuals 2/12 2/22 3/9 3/23    Dressing chg Adaptic, lynnette       Edema mass  5' 5' 5'    Scar Massage  5' 5' 5'    Ortho fit/train        Neuro Re-Ed                                 Ther Ex 2/12 2/22 3/9 3/23    x10TGE x10 x10 dc     AROM thumb x10 x10 dc     A/AAROM wrist AROM x 10 x10 5' 5'    A/AAROM FA AROM x 10 x10 5' 5'    Wrist Stabilization  YFB x 20      Wrist PREs e/f, dt   2# x 20 ea 3# x 20    Forearm curls   2# x 20  3# x 20    HG   35# x 20 35# x 30    Ther Activity  2/22 3/9 3/23    Wrist maze  x5 x10 x10    Buttons  x30      Key pegs  1 set      Translation  x30      Cones  P/s x 20 2 sets 2 sets            HEP 2/12 2/22 3/9 3/23     AROM, wound care, splint Praying hands  Wrist curls with soup can Theraputty; passive wrist flex stretch            Modalities  2/22 3/9 3/23    MHP  5' 5' 5'

## 2021-04-01 ENCOUNTER — OFFICE VISIT (OUTPATIENT)
Dept: OBGYN CLINIC | Facility: CLINIC | Age: 61
End: 2021-04-01

## 2021-04-01 VITALS
HEIGHT: 68 IN | WEIGHT: 159.8 LBS | HEART RATE: 73 BPM | SYSTOLIC BLOOD PRESSURE: 145 MMHG | DIASTOLIC BLOOD PRESSURE: 84 MMHG | BODY MASS INDEX: 24.22 KG/M2

## 2021-04-01 DIAGNOSIS — S52.572A OTHER CLOSED INTRA-ARTICULAR FRACTURE OF DISTAL END OF LEFT RADIUS, INITIAL ENCOUNTER: Primary | ICD-10-CM

## 2021-04-01 DIAGNOSIS — Z48.89 AFTERCARE FOLLOWING SURGERY: ICD-10-CM

## 2021-04-01 PROCEDURE — 3008F BODY MASS INDEX DOCD: CPT | Performed by: ORTHOPAEDIC SURGERY

## 2021-04-01 PROCEDURE — 99024 POSTOP FOLLOW-UP VISIT: CPT | Performed by: ORTHOPAEDIC SURGERY

## 2021-04-01 NOTE — PROGRESS NOTES
[facility] SUBJECTIVE:  Octaviano Morris is a 56y o  year old female who presents for follow up after surgery, ORIF left radius and ulna performed on  2/9/2021  Today patient stated that she has been doing well since her last visit  Patient stated that she feels intermittent pain and tenderness about the incision site  Patient denied any new traumas about the left wrist        VITALS:  Vitals:    04/01/21 1520   BP: 145/84   Pulse: 73       PHYSICAL EXAMINATION:  General: well developed and well nourished, alert, oriented times 3 and appears comfortable  Psychiatric: Normal    MUSCULOSKELETAL EXAMINATION:  Left Wrist:   Incision: clean, dry and healed  Range of Motion: Full  Neurovascular status: Neuro intact, good cap refill   DPC 0         STUDIES REVIEWED:  No studies reviewed  PROCEDURES PERFORMED:  Procedures  No Procedures performed today      ASSESSMENT/PLAN:    56 y o  female S/P ORIF left radius and ulna performed on 2/09/2021  Done today: re-evaluation of left wrist   -I discussed with the patient that she may perform a scar tissue massage    -I will see her back in office on an as needed basis if symptoms worsen or fail to improve  FOLLOW UP:  Return if symptoms worsen or fail to improve  Work/school status:  No restrictions       TO DO AT NEXT VISIT:   PRN       Scribe Attestation    I,:  Jose F Taylor am acting as a scribe while in the presence of the attending physician :       I,:  Mekhi Morataya MD personally performed the services described in this documentation    as scribed in my presence :           Portions of the record may have been created with voice recognition software  Occasional wrong word or "sound a like" substitutions may have occurred due to the inherent limitations of voice recognition software  Read the chart carefully and recognize, using context, where substitutions have occurred

## 2021-04-06 ENCOUNTER — IMMUNIZATIONS (OUTPATIENT)
Dept: FAMILY MEDICINE CLINIC | Facility: HOSPITAL | Age: 61
End: 2021-04-06

## 2021-04-06 DIAGNOSIS — Z23 ENCOUNTER FOR IMMUNIZATION: Primary | ICD-10-CM

## 2021-04-06 PROCEDURE — 0002A SARS-COV-2 / COVID-19 MRNA VACCINE (PFIZER-BIONTECH) 30 MCG: CPT

## 2021-04-06 PROCEDURE — 91300 SARS-COV-2 / COVID-19 MRNA VACCINE (PFIZER-BIONTECH) 30 MCG: CPT

## 2021-04-12 ENCOUNTER — APPOINTMENT (OUTPATIENT)
Dept: LAB | Facility: HOSPITAL | Age: 61
End: 2021-04-12
Payer: COMMERCIAL

## 2021-04-12 ENCOUNTER — TRANSCRIBE ORDERS (OUTPATIENT)
Dept: ADMINISTRATIVE | Facility: HOSPITAL | Age: 61
End: 2021-04-12

## 2021-04-12 DIAGNOSIS — L03.113 CELLULITIS OF RIGHT UPPER ARM: Primary | ICD-10-CM

## 2021-04-12 DIAGNOSIS — L03.113 CELLULITIS OF RIGHT UPPER ARM: ICD-10-CM

## 2021-04-12 LAB
ALBUMIN SERPL BCP-MCNC: 3.6 G/DL (ref 3.5–5)
ALP SERPL-CCNC: 110 U/L (ref 46–116)
ALT SERPL W P-5'-P-CCNC: 45 U/L (ref 12–78)
ANION GAP SERPL CALCULATED.3IONS-SCNC: 9 MMOL/L (ref 4–13)
AST SERPL W P-5'-P-CCNC: 20 U/L (ref 5–45)
BASOPHILS # BLD AUTO: 0.04 THOUSANDS/ΜL (ref 0–0.1)
BASOPHILS NFR BLD AUTO: 1 % (ref 0–1)
BILIRUB SERPL-MCNC: 0.69 MG/DL (ref 0.2–1)
BUN SERPL-MCNC: 6 MG/DL (ref 5–25)
CALCIUM SERPL-MCNC: 8.8 MG/DL (ref 8.3–10.1)
CHLORIDE SERPL-SCNC: 102 MMOL/L (ref 100–108)
CO2 SERPL-SCNC: 25 MMOL/L (ref 21–32)
CREAT SERPL-MCNC: 0.6 MG/DL (ref 0.6–1.3)
EOSINOPHIL # BLD AUTO: 0.09 THOUSAND/ΜL (ref 0–0.61)
EOSINOPHIL NFR BLD AUTO: 2 % (ref 0–6)
ERYTHROCYTE [DISTWIDTH] IN BLOOD BY AUTOMATED COUNT: 12.7 % (ref 11.6–15.1)
GFR SERPL CREATININE-BSD FRML MDRD: 98 ML/MIN/1.73SQ M
GLUCOSE P FAST SERPL-MCNC: 100 MG/DL (ref 65–99)
HCT VFR BLD AUTO: 43.1 % (ref 34.8–46.1)
HGB BLD-MCNC: 14.4 G/DL (ref 11.5–15.4)
IMM GRANULOCYTES # BLD AUTO: 0.01 THOUSAND/UL (ref 0–0.2)
IMM GRANULOCYTES NFR BLD AUTO: 0 % (ref 0–2)
LYMPHOCYTES # BLD AUTO: 1.89 THOUSANDS/ΜL (ref 0.6–4.47)
LYMPHOCYTES NFR BLD AUTO: 41 % (ref 14–44)
MCH RBC QN AUTO: 30.1 PG (ref 26.8–34.3)
MCHC RBC AUTO-ENTMCNC: 33.4 G/DL (ref 31.4–37.4)
MCV RBC AUTO: 90 FL (ref 82–98)
MONOCYTES # BLD AUTO: 0.55 THOUSAND/ΜL (ref 0.17–1.22)
MONOCYTES NFR BLD AUTO: 12 % (ref 4–12)
NEUTROPHILS # BLD AUTO: 1.99 THOUSANDS/ΜL (ref 1.85–7.62)
NEUTS SEG NFR BLD AUTO: 44 % (ref 43–75)
NRBC BLD AUTO-RTO: 0 /100 WBCS
PLATELET # BLD AUTO: 325 THOUSANDS/UL (ref 149–390)
PMV BLD AUTO: 8.9 FL (ref 8.9–12.7)
POTASSIUM SERPL-SCNC: 4.2 MMOL/L (ref 3.5–5.3)
PROT SERPL-MCNC: 7.3 G/DL (ref 6.4–8.2)
RBC # BLD AUTO: 4.78 MILLION/UL (ref 3.81–5.12)
SODIUM SERPL-SCNC: 136 MMOL/L (ref 136–145)
WBC # BLD AUTO: 4.57 THOUSAND/UL (ref 4.31–10.16)

## 2021-04-12 PROCEDURE — 85025 COMPLETE CBC W/AUTO DIFF WBC: CPT

## 2021-04-12 PROCEDURE — 36415 COLL VENOUS BLD VENIPUNCTURE: CPT

## 2021-04-12 PROCEDURE — 80053 COMPREHEN METABOLIC PANEL: CPT

## 2021-06-07 ENCOUNTER — TELEPHONE (OUTPATIENT)
Dept: OBGYN CLINIC | Facility: HOSPITAL | Age: 61
End: 2021-06-07

## 2021-06-07 NOTE — TELEPHONE ENCOUNTER
Patient states she is still having lack of sensitivity on pointer top, middle and ring all numb   She would like to know if this is normal

## 2021-06-10 NOTE — TELEPHONE ENCOUNTER
Patient's  called back to schedule the patient  Dr Mario Ziegler soonest appointment is July 6th  They'd like to know if they can wait that long without the patient being evaluated      Call back # [phone]

## 2021-06-10 NOTE — TELEPHONE ENCOUNTER
Yes, this would be ok  If they would like to follow up sooner, it would need to be with a different provider  Thank you

## 2021-06-11 NOTE — TELEPHONE ENCOUNTER
I spoke to  and provided above information  They will stay with 7/6 appt and will call with any worsening of symptoms to be seen by another provider

## 2021-07-06 ENCOUNTER — OFFICE VISIT (OUTPATIENT)
Dept: OBGYN CLINIC | Facility: CLINIC | Age: 61
End: 2021-07-06
Payer: COMMERCIAL

## 2021-07-06 VITALS
HEIGHT: 68 IN | SYSTOLIC BLOOD PRESSURE: 154 MMHG | WEIGHT: 155 LBS | HEART RATE: 72 BPM | DIASTOLIC BLOOD PRESSURE: 82 MMHG | BODY MASS INDEX: 23.49 KG/M2

## 2021-07-06 DIAGNOSIS — S52.572D OTHER CLOSED INTRA-ARTICULAR FRACTURE OF DISTAL END OF LEFT RADIUS WITH ROUTINE HEALING, SUBSEQUENT ENCOUNTER: Primary | ICD-10-CM

## 2021-07-06 DIAGNOSIS — R20.0 NUMBNESS AND TINGLING OF LEFT UPPER EXTREMITY: ICD-10-CM

## 2021-07-06 DIAGNOSIS — R20.2 NUMBNESS AND TINGLING OF LEFT UPPER EXTREMITY: ICD-10-CM

## 2021-07-06 PROCEDURE — 3008F BODY MASS INDEX DOCD: CPT | Performed by: ORTHOPAEDIC SURGERY

## 2021-07-06 PROCEDURE — 99213 OFFICE O/P EST LOW 20 MIN: CPT | Performed by: ORTHOPAEDIC SURGERY

## 2021-07-06 PROCEDURE — 1036F TOBACCO NON-USER: CPT | Performed by: ORTHOPAEDIC SURGERY

## 2021-07-06 NOTE — PROGRESS NOTES
CHIEF COMPLAINT:  Chief Complaint   Patient presents with    Left Hand - Follow-up, Pain       SUBJECTIVE:  Jaguar Brown is a 56y o  year old female who presents 5 months s/p ORIF left distal radius performed on 02/09/2021  Patient states she has been doing well since her last visit  She is continues to work on her ROM at home and notes improvement  Patient does state that since the surgery she has had numbness and tingling in the tips of the left small finger and thumb and numbness and tingling in the entire left index, long, ring fingers  She states this is not wake her up at night  Denies any new injury or trauma  Patient offers no additional complaints at this time  PAST MEDICAL HISTORY:  Past Medical History:   Diagnosis Date    Abnormal mammogram 05/15/2013    Anemia     Cancer ([facility] [address] )     desmoitosis    Desmoid tumor     Last Assessed: 6/19/2017    Hyperlipidemia     Hypertension        PAST SURGICAL HISTORY:  Past Surgical History:   Procedure Laterality Date    HYSTERECTOMY      OTHER SURGICAL HISTORY      Arm Incision: Dermoid tumor, right Arm     PARTIAL HYSTERECTOMY      [place] COLONOSCOPY FLX DX W/COLLJ SPEC WHEN PFRMD N/A 8/15/2017    Procedure: COLONOSCOPY;  Surgeon: Ashley Gonzáles MD;  Location: [facility];   Service: Gastroenterology    [place] OPEN RX DISTAL RADIUS FX, INTRA-ARTICULAR, 3+ FRAG Left 2/9/2021    Procedure: OPEN REDUCTION W/ INTERNAL FIXATION (ORIF) RADIUS / Teresa [place] (WRIST) left;  Surgeon: Rip Fulton MD;  Location: [facility];  Service: Orthopedics    SKIN BIOPSY      SKIN CANCER EXCISION      Melanoma Excision        FAMILY HISTORY:  Family History   Problem Relation Age of Onset    Diabetes Mother     No Known Problems Father     Breast cancer Sister     Lung cancer Sister     Pancreatic cancer Sister        SOCIAL HISTORY:  Social History     Tobacco Use    Smoking status: Never Smoker    Smokeless tobacco: Never Used   Vaping Use    Vaping Use: Never used Substance Use Topics    Alcohol use: Not Currently     Alcohol/week: 1 0 standard drinks     Types: 1 Shots of liquor per week    Drug use: Yes     Comment: social       MEDICATIONS:    Current Outpatient Medications:     acetaminophen (TYLENOL) 500 mg tablet, Take one tablet the day of surgery, then take one tablet for breakfast lunch and dinner after surgery for 5 days, Disp: 30 tablet, Rfl: 0    Cephalexin 500 MG tablet, , Disp: , Rfl:     cholecalciferol (VITAMIN D3) 1,000 units tablet, Take 2,000 Units by mouth daily, Disp: , Rfl:     dicyclomine (BENTYL) 20 mg tablet, Take 1 tablet (20 mg total) by mouth every 8 (eight) hours as needed (abdominal cramping/pain or diarrhea), Disp: 12 tablet, Rfl: 0    dronabinol (MARINOL) 5 MG capsule, Take 15 mg by mouth 4 (four) times a day (before meals and at bedtime), Disp: , Rfl:     multivitamin (THERAGRAN) TABS, Take 1 tablet by mouth daily, Disp: , Rfl:     Omega-3 Fatty Acids (FISH OIL PO), Take by mouth, Disp: , Rfl:     ondansetron (ZOFRAN-ODT) 4 mg disintegrating tablet, Take 1 tablet (4 mg total) by mouth every 6 (six) hours as needed for nausea or vomiting, Disp: 20 tablet, Rfl: 0    Probiotic Product (PROBIOTIC-10 PO), Take by mouth, Disp: , Rfl:     thiamine (VITAMIN B1) 100 mg tablet, Take 100 mg by mouth daily, Disp: , Rfl:     ALLERGIES:  Allergies   Allergen Reactions    Aspirin GI Intolerance and Abdominal Pain     ABD     Chlorpromazine Anaphylaxis     PHENOTHIAZINE=ANAPHYLAXIS    Codeine Anaphylaxis     Anaphylaxis  abd pain   Meperidine Anaphylaxis, Hives and Other (See Comments)     VOMITS  VOMITS  Category: Allergy;     Phenothiazines Anaphylaxis and Throat Swelling     Category:  Allergy;     Saccharin - Food Allergy Anaphylaxis    Ibuprofen Hives     H    Ampicillin-Sulbactam Sodium Hives    Gluten Meal - Food Allergy GI Intolerance    Levofloxacin Hives    Neomycin Other (See Comments), Edema and Hives     Category: Allergy;   swelling    Other Throat Swelling, Hives and Other (See Comments)     ASA=GERD  ASA=GERD  Category: Allergy;     Citrus - Food Allergy Rash    Latex Hives and Rash     Category: Allergy;        REVIEW OF SYSTEMS:  Review of Systems   Constitutional: Negative for appetite change and unexpected weight change  HENT: Negative for congestion and trouble swallowing  Eyes: Negative for visual disturbance  Respiratory: Negative for cough and shortness of breath  Cardiovascular: Negative for chest pain and palpitations  Gastrointestinal: Negative for nausea and vomiting  Endocrine: Negative for cold intolerance and heat intolerance  Musculoskeletal: Negative for gait problem and myalgias  Skin: Negative for rash  VITALS:  Vitals:    07/06/21 0804   BP: 154/82   Pulse: 72       LABS:  HgA1c: No results found for: HGBA1C  BMP:   Lab Results   Component Value Date    GLUCOSE 98 05/02/2017    CALCIUM 8 8 04/12/2021    K 4 2 04/12/2021    CO2 25 04/12/2021     04/12/2021    BUN 6 04/12/2021    CREATININE 0 60 04/12/2021       _____________________________________________________  PHYSICAL EXAMINATION:  General: well developed and well nourished, alert, oriented times 3 and appears comfortable  Psychiatric: Normal  HEENT: Trachea Midline, No torticollis  Pulmonary: No audible wheezing or strider  Cardiovascular: No discernable arrhythmia   Skin: No masses, erythema, lacerations, fluctation, ulcerations  Neurovascular: Motor Intact to the Median, Ulnar, Radial Nerve and Pulses Intact    MUSCULOSKELETAL EXAMINATION:  Left wrist  Well-healed incision  No erythema or ecchymosis  Nontender throughout  ROM full and painless in all planes  Strength WNL  Brisk capillary refill    Left Carpal Tunnel Exam:    Negative thenar atrophy  Negative phalen's test  Negative carpal tunnel compression  Negative tinels over median nerve at the wrist   Opposition strength 5/5    Abduction strength 5/5       2 point discrimination is 4 mm throughout       ___________________________________________________  STUDIES REVIEWED:  No studies reviewed  PROCEDURES PERFORMED:  No Procedures performed today    _____________________________________________________  ASSESSMENT/PLAN:    5 months s/p left distal radius ORIF, numbness and tingling LUE  · Keep working on left wrist ROM to get stiffness  · EMG of the LUE to evaluate for carpal tunnel syndrome  · OTC Tylenol/NSAIDs as needed for pain  · Contact the office with any further questions or concerns prior to next follow-up  · Follow-up after EMG to discuss results  Follow Up:  Return for After EMG      To Do Next Visit:  Discuss EMG results    Scribe Attestation    I,:  Martín Mas am acting as a scribe while in the presence of the attending physician :       I,:  Nessa Mercado MD personally performed the services described in this documentation    as scribed in my presence :

## 2021-07-13 ENCOUNTER — APPOINTMENT (OUTPATIENT)
Dept: LAB | Facility: HOSPITAL | Age: 61
End: 2021-07-13
Payer: COMMERCIAL

## 2021-07-13 DIAGNOSIS — E78.5 HYPERLIPIDEMIA, UNSPECIFIED HYPERLIPIDEMIA TYPE: ICD-10-CM

## 2021-07-13 DIAGNOSIS — E03.9 MYXEDEMA HEART DISEASE: ICD-10-CM

## 2021-07-13 DIAGNOSIS — E11.9 DIABETES MELLITUS, STABLE (HCC): ICD-10-CM

## 2021-07-13 DIAGNOSIS — I51.9 MYXEDEMA HEART DISEASE: ICD-10-CM

## 2021-07-13 DIAGNOSIS — R53.83 FATIGUE, UNSPECIFIED TYPE: ICD-10-CM

## 2021-07-13 LAB
ALBUMIN SERPL BCP-MCNC: 3.6 G/DL (ref 3.5–5)
ALP SERPL-CCNC: 101 U/L (ref 46–116)
ALT SERPL W P-5'-P-CCNC: 23 U/L (ref 12–78)
ANION GAP SERPL CALCULATED.3IONS-SCNC: 6 MMOL/L (ref 4–13)
AST SERPL W P-5'-P-CCNC: 17 U/L (ref 5–45)
BILIRUB SERPL-MCNC: 0.61 MG/DL (ref 0.2–1)
BUN SERPL-MCNC: 11 MG/DL (ref 5–25)
CALCIUM SERPL-MCNC: 8.9 MG/DL (ref 8.3–10.1)
CHLORIDE SERPL-SCNC: 103 MMOL/L (ref 100–108)
CHOLEST SERPL-MCNC: 304 MG/DL (ref 50–200)
CO2 SERPL-SCNC: 30 MMOL/L (ref 21–32)
CREAT SERPL-MCNC: 0.64 MG/DL (ref 0.6–1.3)
ERYTHROCYTE [DISTWIDTH] IN BLOOD BY AUTOMATED COUNT: 11.9 % (ref 11.6–15.1)
EST. AVERAGE GLUCOSE BLD GHB EST-MCNC: 105 MG/DL
GFR SERPL CREATININE-BSD FRML MDRD: 96 ML/MIN/1.73SQ M
GLUCOSE P FAST SERPL-MCNC: 82 MG/DL (ref 65–99)
HBA1C MFR BLD: 5.3 %
HCT VFR BLD AUTO: 45.6 % (ref 34.8–46.1)
HDLC SERPL-MCNC: 42 MG/DL
HGB BLD-MCNC: 15.2 G/DL (ref 11.5–15.4)
LDLC SERPL CALC-MCNC: 198 MG/DL (ref 0–100)
MCH RBC QN AUTO: 30.6 PG (ref 26.8–34.3)
MCHC RBC AUTO-ENTMCNC: 33.3 G/DL (ref 31.4–37.4)
MCV RBC AUTO: 92 FL (ref 82–98)
NONHDLC SERPL-MCNC: 262 MG/DL
PLATELET # BLD AUTO: 276 THOUSANDS/UL (ref 149–390)
PMV BLD AUTO: 9.1 FL (ref 8.9–12.7)
POTASSIUM SERPL-SCNC: 4.4 MMOL/L (ref 3.5–5.3)
PROT SERPL-MCNC: 7.3 G/DL (ref 6.4–8.2)
RBC # BLD AUTO: 4.97 MILLION/UL (ref 3.81–5.12)
SODIUM SERPL-SCNC: 139 MMOL/L (ref 136–145)
TRIGL SERPL-MCNC: 322 MG/DL
TSH SERPL DL<=0.05 MIU/L-ACNC: 0.68 UIU/ML (ref 0.36–3.74)
WBC # BLD AUTO: 5.09 THOUSAND/UL (ref 4.31–10.16)

## 2021-07-13 PROCEDURE — 80061 LIPID PANEL: CPT

## 2021-07-13 PROCEDURE — 80053 COMPREHEN METABOLIC PANEL: CPT

## 2021-07-13 PROCEDURE — 83036 HEMOGLOBIN GLYCOSYLATED A1C: CPT

## 2021-07-13 PROCEDURE — 85027 COMPLETE CBC AUTOMATED: CPT

## 2021-07-13 PROCEDURE — 36415 COLL VENOUS BLD VENIPUNCTURE: CPT

## 2021-07-13 PROCEDURE — 84443 ASSAY THYROID STIM HORMONE: CPT

## 2021-07-13 PROCEDURE — 3044F HG A1C LEVEL LT 7.0%: CPT | Performed by: ORTHOPAEDIC SURGERY

## 2021-07-19 ENCOUNTER — PROCEDURE VISIT (OUTPATIENT)
Dept: NEUROLOGY | Facility: CLINIC | Age: 61
End: 2021-07-19
Payer: COMMERCIAL

## 2021-07-19 DIAGNOSIS — S52.572D OTHER CLOSED INTRA-ARTICULAR FRACTURE OF DISTAL END OF LEFT RADIUS WITH ROUTINE HEALING, SUBSEQUENT ENCOUNTER: ICD-10-CM

## 2021-07-19 DIAGNOSIS — R20.2 NUMBNESS AND TINGLING OF LEFT UPPER EXTREMITY: ICD-10-CM

## 2021-07-19 DIAGNOSIS — R20.0 NUMBNESS AND TINGLING OF LEFT UPPER EXTREMITY: ICD-10-CM

## 2021-07-19 PROCEDURE — 95886 MUSC TEST DONE W/N TEST COMP: CPT | Performed by: PHYSICAL MEDICINE & REHABILITATION

## 2021-07-19 PROCEDURE — 95909 NRV CNDJ TST 5-6 STUDIES: CPT | Performed by: PHYSICAL MEDICINE & REHABILITATION

## 2021-07-19 NOTE — PROGRESS NOTES
EMG 1 Limb     Date/Time 7/19/2021 1:49 PM     Performed by  Pat Walden MD     Authorized by Estela Lawson MD      Universal Protocol   Consent: Verbal consent obtained  Risks and benefits: risks, benefits and alternatives were discussed  Consent given by: patient  Patient understanding: patient states understanding of the procedure being performed  Patient consent: the patient's understanding of the procedure matches consent given               EMG LEFT UPPER EXTREMITY  57-year-old right-handed female status post ORIF left distal radius performed on 02/09/21 presents with tingling and numbness in the tips of her small finger, index, long and ring fingers for the last one month  Denies any radicular symptoms  Patient is being evaluated for a focal neuropathy  On physical examination, motor strength is 5/5 throughout  Sensations are diminished to light touch and pinprick in the median nerve distribution  Motor and sensory conduction studies were performed on the left median and ulnar nerves  The  median motor terminal latency was prolonged at 4 4 milliseconds with normal compound motor action potential amplitude and normal conduction velocity across the wrist   The ulnar compound motor action potential was normal     The   median and ulnar F waves were normal     The median   sensory peak latency was prolonged at 5 1 milliseconds with a low sensory action potential amplitude of 9 microvolt  The radial and ulnar sensory action potential was normal       Concentric needle EMG was performed on various distal and proximal muscles of the left upper extremity including APB, FDI, pronator teres, deltoid, biceps, triceps and low cervical paraspinal region  There was no evidence of active denervation any of the muscles tested  Moderate decreased recruitment of giant motor units was noted in the abductor pollicis brevis    The compound motor unit action potentials were of normal configuration with interference patterns being full or full for effort in the remaining muscles tested  IMPRESSION:  There is electrophysiologic evidence of a:    1  Moderate median nerve compression neuropathy at the wrist with demyelinative changes, consistent with a diagnosis of carpal tunnel syndrome  2   There is no evidence of a cervical radiculopathy or ulnar neuropathy  Clinical correlation is recommended      OSIRIS Fisher

## 2021-08-05 ENCOUNTER — OFFICE VISIT (OUTPATIENT)
Dept: OBGYN CLINIC | Facility: CLINIC | Age: 61
End: 2021-08-05
Payer: COMMERCIAL

## 2021-08-05 VITALS
WEIGHT: 159.8 LBS | DIASTOLIC BLOOD PRESSURE: 76 MMHG | HEIGHT: 68 IN | HEART RATE: 66 BPM | BODY MASS INDEX: 24.22 KG/M2 | SYSTOLIC BLOOD PRESSURE: 151 MMHG

## 2021-08-05 DIAGNOSIS — G56.02 CARPAL TUNNEL SYNDROME ON LEFT: ICD-10-CM

## 2021-08-05 DIAGNOSIS — S52.572A OTHER CLOSED INTRA-ARTICULAR FRACTURE OF DISTAL END OF LEFT RADIUS, INITIAL ENCOUNTER: Primary | ICD-10-CM

## 2021-08-05 PROCEDURE — 1036F TOBACCO NON-USER: CPT | Performed by: ORTHOPAEDIC SURGERY

## 2021-08-05 PROCEDURE — 3008F BODY MASS INDEX DOCD: CPT | Performed by: ORTHOPAEDIC SURGERY

## 2021-08-05 PROCEDURE — 99214 OFFICE O/P EST MOD 30 MIN: CPT | Performed by: ORTHOPAEDIC SURGERY

## 2021-08-05 RX ORDER — ROSUVASTATIN CALCIUM 20 MG/1
20 TABLET, COATED ORAL DAILY
COMMUNITY
Start: 2021-07-19 | End: 2022-01-22 | Stop reason: ALTCHOICE

## 2021-08-05 RX ORDER — LIDOCAINE HYDROCHLORIDE AND EPINEPHRINE 10; 10 MG/ML; UG/ML
10 INJECTION, SOLUTION INFILTRATION; PERINEURAL ONCE
Status: CANCELLED | OUTPATIENT
Start: 2021-08-05 | End: 2021-08-05

## 2021-08-05 RX ORDER — ACETAMINOPHEN 500 MG
TABLET ORAL
Qty: 30 TABLET | Refills: 0 | Status: SHIPPED | OUTPATIENT
Start: 2021-08-05 | End: 2022-01-22 | Stop reason: ALTCHOICE

## 2021-08-05 NOTE — PROGRESS NOTES
CHIEF COMPLAINT:  Chief Complaint   Patient presents with    Left Wrist - Follow-up       SUBJECTIVE:  Magen Collado is a 56y o  year old female who presents for follow-up regarding left carpal tunnel  Patient obtained an EMG of the left upper extremity which demonstrates moderate median nerve compression consistent with carpal tunnel  Patient previously had a left distal radius ORIF on 02/09/2021  She states since after the surgery she has had numbness and tingling into the tips of the thumb, index and long finger  The patient denies any cardiac or pulmonary issues  Denies diabetes  Denies any history of MI, gastric ulcers, kidney or liver issues  Denies blood thinners  PAST MEDICAL HISTORY:  Past Medical History:   Diagnosis Date    Abnormal mammogram 05/15/2013    Anemia     Cancer ([facility] [address] )     desmoitosis    Desmoid tumor     Last Assessed: 6/19/2017    Hyperlipidemia     Hypertension        PAST SURGICAL HISTORY:  Past Surgical History:   Procedure Laterality Date    HYSTERECTOMY      OTHER SURGICAL HISTORY      Arm Incision: Dermoid tumor, right Arm     PARTIAL HYSTERECTOMY      [place] COLONOSCOPY FLX DX W/COLLJ SPEC WHEN PFRMD N/A 8/15/2017    Procedure: COLONOSCOPY;  Surgeon: Hadley Lovett MD;  Location: [facility];   Service: Gastroenterology    [place] OPEN RX DISTAL RADIUS FX, INTRA-ARTICULAR, 3+ FRAG Left 2/9/2021    Procedure: OPEN REDUCTION W/ INTERNAL FIXATION (ORIF) RADIUS / Juan Bill (WRIST) left;  Surgeon: Hodan Thorpe MD;  Location: [facility] OR;  Service: Orthopedics    SKIN BIOPSY      SKIN CANCER EXCISION      Melanoma Excision        FAMILY HISTORY:  Family History   Problem Relation Age of Onset    Diabetes Mother     No Known Problems Father     Breast cancer Sister     Lung cancer Sister     Pancreatic cancer Sister        SOCIAL HISTORY:  Social History     Tobacco Use    Smoking status: Never Smoker    Smokeless tobacco: Never Used   Vaping Use    Vaping Use: Never used   Substance Use Topics    Alcohol use: Not Currently     Alcohol/week: 1 0 standard drinks     Types: 1 Shots of liquor per week    Drug use: Yes     Comment: social       MEDICATIONS:    Current Outpatient Medications:     acetaminophen (TYLENOL) 500 mg tablet, Take one tablet the day of surgery, then take one tablet for breakfast lunch and dinner after surgery for 5 days, Disp: 30 tablet, Rfl: 0    Cephalexin 500 MG tablet, , Disp: , Rfl:     cholecalciferol (VITAMIN D3) 1,000 units tablet, Take 2,000 Units by mouth daily, Disp: , Rfl:     dicyclomine (BENTYL) 20 mg tablet, Take 1 tablet (20 mg total) by mouth every 8 (eight) hours as needed (abdominal cramping/pain or diarrhea), Disp: 12 tablet, Rfl: 0    dronabinol (MARINOL) 5 MG capsule, Take 15 mg by mouth 4 (four) times a day (before meals and at bedtime), Disp: , Rfl:     multivitamin (THERAGRAN) TABS, Take 1 tablet by mouth daily, Disp: , Rfl:     Omega-3 Fatty Acids (FISH OIL PO), Take by mouth, Disp: , Rfl:     ondansetron (ZOFRAN-ODT) 4 mg disintegrating tablet, Take 1 tablet (4 mg total) by mouth every 6 (six) hours as needed for nausea or vomiting, Disp: 20 tablet, Rfl: 0    Probiotic Product (PROBIOTIC-10 PO), Take by mouth, Disp: , Rfl:     rosuvastatin (CRESTOR) 20 MG tablet, Take 20 mg by mouth daily, Disp: , Rfl:     thiamine (VITAMIN B1) 100 mg tablet, Take 100 mg by mouth daily, Disp: , Rfl:     acetaminophen (TYLENOL) 500 mg tablet, Take 1 500mg tablet in the morning prior to surgery, then 1 tablet every 6 hours for 5-7 days  , Disp: 30 tablet, Rfl: 0    ALLERGIES:  Allergies   Allergen Reactions    Aspirin GI Intolerance and Abdominal Pain     ABD     Chlorpromazine Anaphylaxis     PHENOTHIAZINE=ANAPHYLAXIS    Codeine Anaphylaxis     Anaphylaxis  abd pain   Meperidine Anaphylaxis, Hives and Other (See Comments)     VOMITS  VOMITS  Category:  Allergy;     Phenothiazines Anaphylaxis and Throat Swelling     Category: Allergy;     Saccharin - Food Allergy Anaphylaxis    Ibuprofen Hives     H    Ampicillin-Sulbactam Sodium Hives    Gluten Meal - Food Allergy GI Intolerance    Levofloxacin Hives    Neomycin Other (See Comments), Edema and Hives     Category: Allergy;   swelling    Other Throat Swelling, Hives and Other (See Comments)     ASA=GERD  ASA=GERD  Category: Allergy;     Citrus - Food Allergy Rash    Latex Hives and Rash     Category:  Allergy;        REVIEW OF SYSTEMS:  Review of Systems  ROS:   General: no fever, no chills  HEENT:  No loss of hearing or eyesight problems  Eyes:  No red eyes  Respiratory:  No coughing, shortness of breath or wheezing  Cardiovascular:  No chest pain, no palpitations  GI:  Abdomen soft nontender, denies nausea  Endocrine:  No muscle weakness, no frequent urination, no excessive thirst  Urinary:  No dysuria, no incontinence  Musculoskeletal: see HPI and PE  SKIN:  No skin rash, no dry skin  Neurological:  No headaches, no confusion  Psychiatric:  No suicide thoughts, no anxiety, no depression  Review of all other systems is negative    VITALS:  Vitals:    08/05/21 1458   BP: 151/76   Pulse: 66       LABS:  HgA1c:   Lab Results   Component Value Date    HGBA1C 5 3 07/13/2021     BMP:   Lab Results   Component Value Date    GLUCOSE 98 05/02/2017    CALCIUM 8 9 07/13/2021    K 4 4 07/13/2021    CO2 30 07/13/2021     07/13/2021    BUN 11 07/13/2021    CREATININE 0 64 07/13/2021       _____________________________________________________  PHYSICAL EXAMINATION:  General: well developed and well nourished, alert, oriented times 3 and appears comfortable  Psychiatric: Normal  HEENT: Trachea Midline, No torticollis  Pulmonary: No audible wheezing or strider  Cardiovascular: No discernable arrhythmia   Skin: No masses, erythema, lacerations, fluctation, ulcerations  Neurovascular: Sensation Intact to the Median, Ulnar, Radial Nerve, Motor Intact to the Median, Ulnar, Radial Nerve and Pulses Intact    MUSCULOSKELETAL EXAMINATION:  Left Carpal Tunnel Exam:     Negative thenar atrophy  Negative phalen's test  Negative carpal tunnel compression  Negative tinels over median nerve at the wrist   Opposition strength 5/5  Abduction strength 5/5        2 point discrimination is 4 mm throughout       ___________________________________________________  STUDIES REVIEWED:  EMG left upper extremity demonstrated moderate carpal tunnel      PROCEDURES PERFORMED:  Procedures  No Procedures performed today    _____________________________________________________  ASSESSMENT/PLAN:    Left carpal tunnel syndrome  - conservative and operative treatment were discussed with the patient  She would like to proceed with surgical intervention  Detail consent was obtained today   Surgery: left endoscopic carpal tunnel release   Anesthesia:  Local   Antibiotics:  None   Medical clearance: not needed   Hand therapy: ordered   Consent: obtained   Post op pain medication sent to pharmacy today      Surgery medication instructions: You will stop eating and drinking at midnight the night before your surgery, but you may continue to take your normal medications with a small sip of water  In the morning on the day of your surgery, we would like you to take the following medication:   Tylenol 500mg one tablet by mouth    After surgery, we would like you to take Tylenol 500 mg one tablet by mouth every 6 hours  (at breakfast, lunch and dinner) for 5-7 days after your surgery  Please take this medication EVERYDAY after surgery for 5-7 days, and not just as needed  Taking this medications after surgery will limit your need for prescription pain medication  We will also prescribe a narcotic pain medication for a limited time after surgery that you can take as needed for moderate or severe pain  The narcotic pain medication may also have Tylenol in it  Please limit Tylenol usage to under 3,000mg a day  Diagnoses and all orders for this visit:    Other closed intra-articular fracture of distal end of left radius, initial encounter    Carpal tunnel syndrome on left  -     Case request operating room: RELEASE LEFT CARPAL TUNNEL ENDOSCOPIC; Standing  -     Case request operating room: RELEASE LEFT CARPAL TUNNEL ENDOSCOPIC  -     Ambulatory referral to PT/OT hand therapy; Future  -     acetaminophen (TYLENOL) 500 mg tablet; Take 1 500mg tablet in the morning prior to surgery, then 1 tablet every 6 hours for 5-7 days  Other orders  -     rosuvastatin (CRESTOR) 20 MG tablet; Take 20 mg by mouth daily  -     Diet NPO; Sips with meds; Standing  -     Void on call to OR; Standing  -     Insert peripheral IV; Standing  -     sodium bicarbonate 8 4 % injection 50 mEq  -     lidocaine-epinephrine (XYLOCAINE/EPINEPHRINE) 1 %-1:100,000 injection 10 mL        Follow Up:  Return for post op  Work/school status:  No restrictions    To Do Next Visit:  Re-evaluation of current issue and Sutures out    General Discussions:  Carpal Tunnel Syndrome: The anatomy and physiology of carpal tunnel syndrome was discussed with the patient today  Increase pressure localized under the transverse carpal ligament can cause pain, numbness, tingling, or dysesthesias within the median nerve distribution as well as feelings of fatigue, clumsiness, or awkwardness  These symptoms typically occur at night and worse in the morning upon waking  Eventually, untreated carpal tunnel syndrome can result in weakness and permanent loss of muscle within the thenar compartment of the hand  Treatment options were discussed with the patient  Conservative treatment includes nocturnal resting splints to keep the nerve in a neutral position, ergonomic changes within the work or home environment, activity modification, and tendon gliding exercises  Vitamin B6 one tablet daily over the counter may helpful to reduce symptoms     Steroid injections within the carpal canal can help a majority of patients, however this is often self-limited in a majority of patients  Surgical intervention to divide the transverse carpal ligament typically results in a long-lasting relief of the patient's complaints, with the recurrence rate of less than 1%  Operative Discussions:  Endoscopic Carpal Tunnel Release: The anatomy and physiology of carpal tunnel syndrome was discussed with the patient today  Increase pressure localized under the transverse carpal ligament can cause pain, numbness, tingling, or dysesthesias within the median nerve distribution as well as feelings of fatigue, clumsiness, or awkwardness  These symptoms typically occur at night and worse in the morning upon waking  Eventually, untreated carpal tunnel syndrome can result in weakness and permanent loss of muscle within the thenar compartment of the hand  Treatment options were discussed with the patient  Conservative treatment includes nocturnal resting splints to keep the nerve in a neutral position, ergonomic changes within the work or home environment, activity modification, and tendon gliding exercises  Vitamin B6 one tablet daily over the counter may helpful to reduce symptoms  Steroid injections within the carpal canal can help a majority of patients, however this is often self-limited in a majority of patients  Surgical intervention to divide the transverse carpal ligament typically results in a long-lasting relief of the patient's complaints, with the recurrence rate of less than 1%  The patient has elected to undergo an endoscopic carpal tunnel release  The single incision technique was discussed with the patient, which results in approximately a two-week recovery time less wound complications    In the postoperative period, light activities are allowed immediately, driving is allowed when narcotic medication has stopped, and the bandages may be removed and incision may get wet after 2 days  Heavy activities (lifting more than approximately 10 pounds) will be allowed after follow up appointment in 1-2 weeks  While night symptoms (waking from sleep, pain, and discomfort in the hands) generally improves rapidly, the numbness and tingling as well as the strength will slowly improve over weeks to months depending on the chronicity and severity of the carpal tunnel syndrome  Pillar pain and scar discomfort were discussed with the patient which are self-limiting conditions  The risks of bleeding and infection from the surgery are less than 1%  Risk of recurrence is approximately 0 5%  The risks of nerve injury or nerve damage or damage to the blood vessels is approximately 1 in 1200  The patient has an understanding of the above mentioned discussion  The risks and benefits of the procedure were explained to the patient, which include, but are not limited to: Bleeding, infection, recurrence, pain, scar, damage to tendons, damage to nerves, and damage to blood vessels, failure to give desired results and complications related to anesthesia  These risks, along with alternative conservative treatment options, and postoperative protocols were voiced back and understood by the patient  All questions were answered to the patient's satisfaction  The patient agrees to comply with a standard postoperative protocol, and is willing to proceed  Education was provided via written and auditory forms  There were no barriers to learning  Written handouts regarding wound care, incision and scar care, and general preoperative information was provided to the patient  Prior to surgery, the patient may be requested to stop all anti-inflammatory medications  Prophylactic aspirin, Plavix, and Coumadin may be allowed to be continued    Medications including vitamin E , ginkgo, &fish oil are requested to be stopped about one week  Scribe Attestation    I,:  Lucia Ladd PA-C am acting as a scribe while in the presence of the attending physician :       I,:  Artist MD Zane personally performed the services described in this documentation    as scribed in my presence :           Portions of the record may have been created with voice recognition software  Occasional wrong word or "sound a like" substitutions may have occurred due to the inherent limitations of voice recognition software  Read the chart carefully and recognize, using context, where substitutions have occurred

## 2021-08-05 NOTE — H&P
CHIEF COMPLAINT:  Chief Complaint   Patient presents with    Left Wrist - Follow-up       SUBJECTIVE:  Ana Dorantes is a 56y o  year old female who presents for follow-up regarding left carpal tunnel  Patient obtained an EMG of the left upper extremity which demonstrates moderate median nerve compression consistent with carpal tunnel  Patient previously had a left distal radius ORIF on 02/09/2021  She states since after the surgery she has had numbness and tingling into the tips of the thumb, index and long finger  The patient denies any cardiac or pulmonary issues  Denies diabetes  Denies any history of MI, gastric ulcers, kidney or liver issues  Denies blood thinners  PAST MEDICAL HISTORY:  Past Medical History:   Diagnosis Date    Abnormal mammogram 05/15/2013    Anemia     Cancer ([address] )     desmoitosis    Desmoid tumor     Last Assessed: 6/19/2017    Hyperlipidemia     Hypertension        PAST SURGICAL HISTORY:  Past Surgical History:   Procedure Laterality Date    HYSTERECTOMY      OTHER SURGICAL HISTORY      Arm Incision: Dermoid tumor, right Arm     PARTIAL HYSTERECTOMY      WI COLONOSCOPY FLX DX W/COLLJ SPEC WHEN PFRMD N/A 8/15/2017    Procedure: COLONOSCOPY;  Surgeon: Marcellus Qureshi MD;  Location: [facility];   Service: Gastroenterology    WI OPEN RX DISTAL RADIUS FX, INTRA-ARTICULAR, 3+ FRAG Left 2/9/2021    Procedure: OPEN REDUCTION W/ INTERNAL FIXATION (ORIF) RADIUS / Milly Philip (WRIST) left;  Surgeon: Cathleen Whitten MD;  Location: [facility];  Service: Orthopedics    SKIN BIOPSY      SKIN CANCER EXCISION      Melanoma Excision        FAMILY HISTORY:  Family History   Problem Relation Age of Onset    Diabetes Mother     No Known Problems Father     Breast cancer Sister     Lung cancer Sister     Pancreatic cancer Sister        SOCIAL HISTORY:  Social History     Tobacco Use    Smoking status: Never Smoker    Smokeless tobacco: Never Used   Vaping Use    Vaping Use: Never used   Substance Use Topics    Alcohol use: Not Currently     Alcohol/week: 1 0 standard drinks     Types: 1 Shots of liquor per week    Drug use: Yes     Comment: social       MEDICATIONS:    Current Outpatient Medications:     acetaminophen (TYLENOL) 500 mg tablet, Take one tablet the day of surgery, then take one tablet for breakfast lunch and dinner after surgery for 5 days, Disp: 30 tablet, Rfl: 0    Cephalexin 500 MG tablet, , Disp: , Rfl:     cholecalciferol (VITAMIN D3) 1,000 units tablet, Take 2,000 Units by mouth daily, Disp: , Rfl:     dicyclomine (BENTYL) 20 mg tablet, Take 1 tablet (20 mg total) by mouth every 8 (eight) hours as needed (abdominal cramping/pain or diarrhea), Disp: 12 tablet, Rfl: 0    dronabinol (MARINOL) 5 MG capsule, Take 15 mg by mouth 4 (four) times a day (before meals and at bedtime), Disp: , Rfl:     multivitamin (THERAGRAN) TABS, Take 1 tablet by mouth daily, Disp: , Rfl:     Omega-3 Fatty Acids (FISH OIL PO), Take by mouth, Disp: , Rfl:     ondansetron (ZOFRAN-ODT) 4 mg disintegrating tablet, Take 1 tablet (4 mg total) by mouth every 6 (six) hours as needed for nausea or vomiting, Disp: 20 tablet, Rfl: 0    Probiotic Product (PROBIOTIC-10 PO), Take by mouth, Disp: , Rfl:     rosuvastatin (CRESTOR) 20 MG tablet, Take 20 mg by mouth daily, Disp: , Rfl:     thiamine (VITAMIN B1) 100 mg tablet, Take 100 mg by mouth daily, Disp: , Rfl:     acetaminophen (TYLENOL) 500 mg tablet, Take 1 500mg tablet in the morning prior to surgery, then 1 tablet every 6 hours for 5-7 days  , Disp: 30 tablet, Rfl: 0    ALLERGIES:  Allergies   Allergen Reactions    Aspirin GI Intolerance and Abdominal Pain     ABD     Chlorpromazine Anaphylaxis     PHENOTHIAZINE=ANAPHYLAXIS    Codeine Anaphylaxis     Anaphylaxis  abd pain   Meperidine Anaphylaxis, Hives and Other (See Comments)     VOMITS  VOMITS  Category:  Allergy;     Phenothiazines Anaphylaxis and Throat Swelling     Category: Allergy;     Saccharin - Food Allergy Anaphylaxis    Ibuprofen Hives     H    Ampicillin-Sulbactam Sodium Hives    Gluten Meal - Food Allergy GI Intolerance    Levofloxacin Hives    Neomycin Other (See Comments), Edema and Hives     Category: Allergy;   swelling    Other Throat Swelling, Hives and Other (See Comments)     ASA=GERD  ASA=GERD  Category: Allergy;     Citrus - Food Allergy Rash    Latex Hives and Rash     Category:  Allergy;        REVIEW OF SYSTEMS:  Review of Systems  ROS:   General: no fever, no chills  HEENT:  No loss of hearing or eyesight problems  Eyes:  No red eyes  Respiratory:  No coughing, shortness of breath or wheezing  Cardiovascular:  No chest pain, no palpitations  GI:  Abdomen soft nontender, denies nausea  Endocrine:  No muscle weakness, no frequent urination, no excessive thirst  Urinary:  No dysuria, no incontinence  Musculoskeletal: see HPI and PE  SKIN:  No skin rash, no dry skin  Neurological:  No headaches, no confusion  Psychiatric:  No suicide thoughts, no anxiety, no depression  Review of all other systems is negative    VITALS:  Vitals:    08/05/21 1458   BP: 151/76   Pulse: 66       LABS:  HgA1c:   Lab Results   Component Value Date    HGBA1C 5 3 07/13/2021     BMP:   Lab Results   Component Value Date    GLUCOSE 98 05/02/2017    CALCIUM 8 9 07/13/2021    K 4 4 07/13/2021    CO2 30 07/13/2021     07/13/2021    BUN 11 07/13/2021    CREATININE 0 64 07/13/2021       _____________________________________________________  PHYSICAL EXAMINATION:  General: well developed and well nourished, alert, oriented times 3 and appears comfortable  Psychiatric: Normal  HEENT: Trachea Midline, No torticollis  Pulmonary: No audible wheezing or strider  Cardiovascular: No discernable arrhythmia   Skin: No masses, erythema, lacerations, fluctation, ulcerations  Neurovascular: Sensation Intact to the Median, Ulnar, Radial Nerve, Motor Intact to the Median, Ulnar, Radial Nerve and Pulses Intact    MUSCULOSKELETAL EXAMINATION:  Left Carpal Tunnel Exam:     Negative thenar atrophy  Negative phalen's test  Negative carpal tunnel compression  Negative tinels over median nerve at the wrist   Opposition strength 5/5  Abduction strength 5/5        2 point discrimination is 4 mm throughout       ___________________________________________________  STUDIES REVIEWED:  EMG left upper extremity demonstrated moderate carpal tunnel      PROCEDURES PERFORMED:  Procedures  No Procedures performed today    _____________________________________________________  ASSESSMENT/PLAN:    Left carpal tunnel syndrome  - conservative and operative treatment were discussed with the patient  She would like to proceed with surgical intervention  Detail consent was obtained today   Surgery: left endoscopic carpal tunnel release   Anesthesia:  Local   Antibiotics:  None   Medical clearance: not needed   Hand therapy: ordered   Consent: obtained   Post op pain medication sent to pharmacy today      Surgery medication instructions: You will stop eating and drinking at midnight the night before your surgery, but you may continue to take your normal medications with a small sip of water  In the morning on the day of your surgery, we would like you to take the following medication:   Tylenol 500mg one tablet by mouth    After surgery, we would like you to take Tylenol 500 mg one tablet by mouth every 6 hours  (at breakfast, lunch and dinner) for 5-7 days after your surgery  Please take this medication EVERYDAY after surgery for 5-7 days, and not just as needed  Taking this medications after surgery will limit your need for prescription pain medication  We will also prescribe a narcotic pain medication for a limited time after surgery that you can take as needed for moderate or severe pain  The narcotic pain medication may also have Tylenol in it  Please limit Tylenol usage to under 3,000mg a day  Diagnoses and all orders for this visit:    Other closed intra-articular fracture of distal end of left radius, initial encounter    Carpal tunnel syndrome on left  -     Case request operating room: RELEASE LEFT CARPAL TUNNEL ENDOSCOPIC; Standing  -     Case request operating room: RELEASE LEFT CARPAL TUNNEL ENDOSCOPIC  -     Ambulatory referral to PT/OT hand therapy; Future  -     acetaminophen (TYLENOL) 500 mg tablet; Take 1 500mg tablet in the morning prior to surgery, then 1 tablet every 6 hours for 5-7 days  Other orders  -     rosuvastatin (CRESTOR) 20 MG tablet; Take 20 mg by mouth daily  -     Diet NPO; Sips with meds; Standing  -     Void on call to OR; Standing  -     Insert peripheral IV; Standing  -     sodium bicarbonate 8 4 % injection 50 mEq  -     lidocaine-epinephrine (XYLOCAINE/EPINEPHRINE) 1 %-1:100,000 injection 10 mL        Follow Up:  Return for post op  Work/school status:  No restrictions    To Do Next Visit:  Re-evaluation of current issue and Sutures out    General Discussions:  Carpal Tunnel Syndrome: The anatomy and physiology of carpal tunnel syndrome was discussed with the patient today  Increase pressure localized under the transverse carpal ligament can cause pain, numbness, tingling, or dysesthesias within the median nerve distribution as well as feelings of fatigue, clumsiness, or awkwardness  These symptoms typically occur at night and worse in the morning upon waking  Eventually, untreated carpal tunnel syndrome can result in weakness and permanent loss of muscle within the thenar compartment of the hand  Treatment options were discussed with the patient  Conservative treatment includes nocturnal resting splints to keep the nerve in a neutral position, ergonomic changes within the work or home environment, activity modification, and tendon gliding exercises  Vitamin B6 one tablet daily over the counter may helpful to reduce symptoms     Steroid injections within the carpal canal can help a majority of patients, however this is often self-limited in a majority of patients  Surgical intervention to divide the transverse carpal ligament typically results in a long-lasting relief of the patient's complaints, with the recurrence rate of less than 1%  Operative Discussions:  Endoscopic Carpal Tunnel Release: The anatomy and physiology of carpal tunnel syndrome was discussed with the patient today  Increase pressure localized under the transverse carpal ligament can cause pain, numbness, tingling, or dysesthesias within the median nerve distribution as well as feelings of fatigue, clumsiness, or awkwardness  These symptoms typically occur at night and worse in the morning upon waking  Eventually, untreated carpal tunnel syndrome can result in weakness and permanent loss of muscle within the thenar compartment of the hand  Treatment options were discussed with the patient  Conservative treatment includes nocturnal resting splints to keep the nerve in a neutral position, ergonomic changes within the work or home environment, activity modification, and tendon gliding exercises  Vitamin B6 one tablet daily over the counter may helpful to reduce symptoms  Steroid injections within the carpal canal can help a majority of patients, however this is often self-limited in a majority of patients  Surgical intervention to divide the transverse carpal ligament typically results in a long-lasting relief of the patient's complaints, with the recurrence rate of less than 1%  The patient has elected to undergo an endoscopic carpal tunnel release  The single incision technique was discussed with the patient, which results in approximately a two-week recovery time less wound complications    In the postoperative period, light activities are allowed immediately, driving is allowed when narcotic medication has stopped, and the bandages may be removed and incision may get wet after 2 days  Heavy activities (lifting more than approximately 10 pounds) will be allowed after follow up appointment in 1-2 weeks  While night symptoms (waking from sleep, pain, and discomfort in the hands) generally improves rapidly, the numbness and tingling as well as the strength will slowly improve over weeks to months depending on the chronicity and severity of the carpal tunnel syndrome  Pillar pain and scar discomfort were discussed with the patient which are self-limiting conditions  The risks of bleeding and infection from the surgery are less than 1%  Risk of recurrence is approximately 0 5%  The risks of nerve injury or nerve damage or damage to the blood vessels is approximately 1 in 1200  The patient has an understanding of the above mentioned discussion  The risks and benefits of the procedure were explained to the patient, which include, but are not limited to: Bleeding, infection, recurrence, pain, scar, damage to tendons, damage to nerves, and damage to blood vessels, failure to give desired results and complications related to anesthesia  These risks, along with alternative conservative treatment options, and postoperative protocols were voiced back and understood by the patient  All questions were answered to the patient's satisfaction  The patient agrees to comply with a standard postoperative protocol, and is willing to proceed  Education was provided via written and auditory forms  There were no barriers to learning  Written handouts regarding wound care, incision and scar care, and general preoperative information was provided to the patient  Prior to surgery, the patient may be requested to stop all anti-inflammatory medications  Prophylactic aspirin, Plavix, and Coumadin may be allowed to be continued    Medications including vitamin E , ginkgo, &fish oil are requested to be stopped about one week  Scribe Attestation    I,:  Shelly Adler PA-C am acting as a scribe while in the presence of the attending physician :       I,:  Henrik Pearson MD personally performed the services described in this documentation    as scribed in my presence :           Portions of the record may have been created with voice recognition software  Occasional wrong word or "sound a like" substitutions may have occurred due to the inherent limitations of voice recognition software  Read the chart carefully and recognize, using context, where substitutions have occurred

## 2021-08-05 NOTE — H&P (VIEW-ONLY)
CHIEF COMPLAINT:  Chief Complaint   Patient presents with    Left Wrist - Follow-up       SUBJECTIVE:  Rut Freitas is a 56y o  year old female who presents for follow-up regarding left carpal tunnel  Patient obtained an EMG of the left upper extremity which demonstrates moderate median nerve compression consistent with carpal tunnel  Patient previously had a left distal radius ORIF on 02/09/2021  She states since after the surgery she has had numbness and tingling into the tips of the thumb, index and long finger  The patient denies any cardiac or pulmonary issues  Denies diabetes  Denies any history of MI, gastric ulcers, kidney or liver issues  Denies blood thinners  PAST MEDICAL HISTORY:  Past Medical History:   Diagnosis Date    Abnormal mammogram 05/15/2013    Anemia     Cancer ([facility] [address] )     desmoitosis    Desmoid tumor     Last Assessed: 6/19/2017    Hyperlipidemia     Hypertension        PAST SURGICAL HISTORY:  Past Surgical History:   Procedure Laterality Date    HYSTERECTOMY      OTHER SURGICAL HISTORY      Arm Incision: Dermoid tumor, right Arm     PARTIAL HYSTERECTOMY      AR COLONOSCOPY FLX DX W/COLLJ SPEC WHEN PFRMD N/A 8/15/2017    Procedure: COLONOSCOPY;  Surgeon: Markos Elias MD;  Location: [facility];   Service: Gastroenterology    AR OPEN RX DISTAL RADIUS FX, INTRA-ARTICULAR, 3+ FRAG Left 2/9/2021    Procedure: OPEN REDUCTION W/ INTERNAL FIXATION (ORIF) RADIUS / Ambika Dill (WRIST) left;  Surgeon: Elijah Nyhan, MD;  Location: [facility] OR;  Service: Orthopedics    SKIN BIOPSY      SKIN CANCER EXCISION      Melanoma Excision        FAMILY HISTORY:  Family History   Problem Relation Age of Onset    Diabetes Mother     No Known Problems Father     Breast cancer Sister     Lung cancer Sister     Pancreatic cancer Sister        SOCIAL HISTORY:  Social History     Tobacco Use    Smoking status: Never Smoker    Smokeless tobacco: Never Used   Vaping Use    Vaping Use: Never used   Substance Use Topics    Alcohol use: Not Currently     Alcohol/week: 1 0 standard drinks     Types: 1 Shots of liquor per week    Drug use: Yes     Comment: social       MEDICATIONS:    Current Outpatient Medications:     acetaminophen (TYLENOL) 500 mg tablet, Take one tablet the day of surgery, then take one tablet for breakfast lunch and dinner after surgery for 5 days, Disp: 30 tablet, Rfl: 0    Cephalexin 500 MG tablet, , Disp: , Rfl:     cholecalciferol (VITAMIN D3) 1,000 units tablet, Take 2,000 Units by mouth daily, Disp: , Rfl:     dicyclomine (BENTYL) 20 mg tablet, Take 1 tablet (20 mg total) by mouth every 8 (eight) hours as needed (abdominal cramping/pain or diarrhea), Disp: 12 tablet, Rfl: 0    dronabinol (MARINOL) 5 MG capsule, Take 15 mg by mouth 4 (four) times a day (before meals and at bedtime), Disp: , Rfl:     multivitamin (THERAGRAN) TABS, Take 1 tablet by mouth daily, Disp: , Rfl:     Omega-3 Fatty Acids (FISH OIL PO), Take by mouth, Disp: , Rfl:     ondansetron (ZOFRAN-ODT) 4 mg disintegrating tablet, Take 1 tablet (4 mg total) by mouth every 6 (six) hours as needed for nausea or vomiting, Disp: 20 tablet, Rfl: 0    Probiotic Product (PROBIOTIC-10 PO), Take by mouth, Disp: , Rfl:     rosuvastatin (CRESTOR) 20 MG tablet, Take 20 mg by mouth daily, Disp: , Rfl:     thiamine (VITAMIN B1) 100 mg tablet, Take 100 mg by mouth daily, Disp: , Rfl:     acetaminophen (TYLENOL) 500 mg tablet, Take 1 500mg tablet in the morning prior to surgery, then 1 tablet every 6 hours for 5-7 days  , Disp: 30 tablet, Rfl: 0    ALLERGIES:  Allergies   Allergen Reactions    Aspirin GI Intolerance and Abdominal Pain     ABD     Chlorpromazine Anaphylaxis     PHENOTHIAZINE=ANAPHYLAXIS    Codeine Anaphylaxis     Anaphylaxis  abd pain   Meperidine Anaphylaxis, Hives and Other (See Comments)     VOMITS  VOMITS  Category:  Allergy;     Phenothiazines Anaphylaxis and Throat Swelling     Category: Allergy;     Saccharin - Food Allergy Anaphylaxis    Ibuprofen Hives     H    Ampicillin-Sulbactam Sodium Hives    Gluten Meal - Food Allergy GI Intolerance    Levofloxacin Hives    Neomycin Other (See Comments), Edema and Hives     Category: Allergy;   swelling    Other Throat Swelling, Hives and Other (See Comments)     ASA=GERD  ASA=GERD  Category: Allergy;     Citrus - Food Allergy Rash    Latex Hives and Rash     Category:  Allergy;        REVIEW OF SYSTEMS:  Review of Systems  ROS:   General: no fever, no chills  HEENT:  No loss of hearing or eyesight problems  Eyes:  No red eyes  Respiratory:  No coughing, shortness of breath or wheezing  Cardiovascular:  No chest pain, no palpitations  GI:  Abdomen soft nontender, denies nausea  Endocrine:  No muscle weakness, no frequent urination, no excessive thirst  Urinary:  No dysuria, no incontinence  Musculoskeletal: see HPI and PE  SKIN:  No skin rash, no dry skin  Neurological:  No headaches, no confusion  Psychiatric:  No suicide thoughts, no anxiety, no depression  Review of all other systems is negative    VITALS:  Vitals:    08/05/21 1458   BP: 151/76   Pulse: 66       LABS:  HgA1c:   Lab Results   Component Value Date    HGBA1C 5 3 07/13/2021     BMP:   Lab Results   Component Value Date    GLUCOSE 98 05/02/2017    CALCIUM 8 9 07/13/2021    K 4 4 07/13/2021    CO2 30 07/13/2021     07/13/2021    BUN 11 07/13/2021    CREATININE 0 64 07/13/2021       _____________________________________________________  PHYSICAL EXAMINATION:  General: well developed and well nourished, alert, oriented times 3 and appears comfortable  Psychiatric: Normal  HEENT: Trachea Midline, No torticollis  Pulmonary: No audible wheezing or strider  Cardiovascular: No discernable arrhythmia   Skin: No masses, erythema, lacerations, fluctation, ulcerations  Neurovascular: Sensation Intact to the Median, Ulnar, Radial Nerve, Motor Intact to the Median, Ulnar, Radial Nerve and Pulses Intact    MUSCULOSKELETAL EXAMINATION:  Left Carpal Tunnel Exam:     Negative thenar atrophy  Negative phalen's test  Negative carpal tunnel compression  Negative tinels over median nerve at the wrist   Opposition strength 5/5  Abduction strength 5/5        2 point discrimination is 4 mm throughout       ___________________________________________________  STUDIES REVIEWED:  EMG left upper extremity demonstrated moderate carpal tunnel      PROCEDURES PERFORMED:  Procedures  No Procedures performed today    _____________________________________________________  ASSESSMENT/PLAN:    Left carpal tunnel syndrome  - conservative and operative treatment were discussed with the patient  She would like to proceed with surgical intervention  Detail consent was obtained today   Surgery: left endoscopic carpal tunnel release   Anesthesia:  Local   Antibiotics:  None   Medical clearance: not needed   Hand therapy: ordered   Consent: obtained   Post op pain medication sent to pharmacy today      Surgery medication instructions: You will stop eating and drinking at midnight the night before your surgery, but you may continue to take your normal medications with a small sip of water  In the morning on the day of your surgery, we would like you to take the following medication:   Tylenol 500mg one tablet by mouth    After surgery, we would like you to take Tylenol 500 mg one tablet by mouth every 6 hours  (at breakfast, lunch and dinner) for 5-7 days after your surgery  Please take this medication EVERYDAY after surgery for 5-7 days, and not just as needed  Taking this medications after surgery will limit your need for prescription pain medication  We will also prescribe a narcotic pain medication for a limited time after surgery that you can take as needed for moderate or severe pain  The narcotic pain medication may also have Tylenol in it  Please limit Tylenol usage to under 3,000mg a day  Diagnoses and all orders for this visit:    Other closed intra-articular fracture of distal end of left radius, initial encounter    Carpal tunnel syndrome on left  -     Case request operating room: RELEASE LEFT CARPAL TUNNEL ENDOSCOPIC; Standing  -     Case request operating room: RELEASE LEFT CARPAL TUNNEL ENDOSCOPIC  -     Ambulatory referral to PT/OT hand therapy; Future  -     acetaminophen (TYLENOL) 500 mg tablet; Take 1 500mg tablet in the morning prior to surgery, then 1 tablet every 6 hours for 5-7 days  Other orders  -     rosuvastatin (CRESTOR) 20 MG tablet; Take 20 mg by mouth daily  -     Diet NPO; Sips with meds; Standing  -     Void on call to OR; Standing  -     Insert peripheral IV; Standing  -     sodium bicarbonate 8 4 % injection 50 mEq  -     lidocaine-epinephrine (XYLOCAINE/EPINEPHRINE) 1 %-1:100,000 injection 10 mL        Follow Up:  Return for post op  Work/school status:  No restrictions    To Do Next Visit:  Re-evaluation of current issue and Sutures out    General Discussions:  Carpal Tunnel Syndrome: The anatomy and physiology of carpal tunnel syndrome was discussed with the patient today  Increase pressure localized under the transverse carpal ligament can cause pain, numbness, tingling, or dysesthesias within the median nerve distribution as well as feelings of fatigue, clumsiness, or awkwardness  These symptoms typically occur at night and worse in the morning upon waking  Eventually, untreated carpal tunnel syndrome can result in weakness and permanent loss of muscle within the thenar compartment of the hand  Treatment options were discussed with the patient  Conservative treatment includes nocturnal resting splints to keep the nerve in a neutral position, ergonomic changes within the work or home environment, activity modification, and tendon gliding exercises  Vitamin B6 one tablet daily over the counter may helpful to reduce symptoms     Steroid injections within the carpal canal can help a majority of patients, however this is often self-limited in a majority of patients  Surgical intervention to divide the transverse carpal ligament typically results in a long-lasting relief of the patient's complaints, with the recurrence rate of less than 1%  Operative Discussions:  Endoscopic Carpal Tunnel Release: The anatomy and physiology of carpal tunnel syndrome was discussed with the patient today  Increase pressure localized under the transverse carpal ligament can cause pain, numbness, tingling, or dysesthesias within the median nerve distribution as well as feelings of fatigue, clumsiness, or awkwardness  These symptoms typically occur at night and worse in the morning upon waking  Eventually, untreated carpal tunnel syndrome can result in weakness and permanent loss of muscle within the thenar compartment of the hand  Treatment options were discussed with the patient  Conservative treatment includes nocturnal resting splints to keep the nerve in a neutral position, ergonomic changes within the work or home environment, activity modification, and tendon gliding exercises  Vitamin B6 one tablet daily over the counter may helpful to reduce symptoms  Steroid injections within the carpal canal can help a majority of patients, however this is often self-limited in a majority of patients  Surgical intervention to divide the transverse carpal ligament typically results in a long-lasting relief of the patient's complaints, with the recurrence rate of less than 1%  The patient has elected to undergo an endoscopic carpal tunnel release  The single incision technique was discussed with the patient, which results in approximately a two-week recovery time less wound complications    In the postoperative period, light activities are allowed immediately, driving is allowed when narcotic medication has stopped, and the bandages may be removed and incision may get wet after 2 days  Heavy activities (lifting more than approximately 10 pounds) will be allowed after follow up appointment in 1-2 weeks  While night symptoms (waking from sleep, pain, and discomfort in the hands) generally improves rapidly, the numbness and tingling as well as the strength will slowly improve over weeks to months depending on the chronicity and severity of the carpal tunnel syndrome  Pillar pain and scar discomfort were discussed with the patient which are self-limiting conditions  The risks of bleeding and infection from the surgery are less than 1%  Risk of recurrence is approximately 0 5%  The risks of nerve injury or nerve damage or damage to the blood vessels is approximately 1 in 1200  The patient has an understanding of the above mentioned discussion  The risks and benefits of the procedure were explained to the patient, which include, but are not limited to: Bleeding, infection, recurrence, pain, scar, damage to tendons, damage to nerves, and damage to blood vessels, failure to give desired results and complications related to anesthesia  These risks, along with alternative conservative treatment options, and postoperative protocols were voiced back and understood by the patient  All questions were answered to the patient's satisfaction  The patient agrees to comply with a standard postoperative protocol, and is willing to proceed  Education was provided via written and auditory forms  There were no barriers to learning  Written handouts regarding wound care, incision and scar care, and general preoperative information was provided to the patient  Prior to surgery, the patient may be requested to stop all anti-inflammatory medications  Prophylactic aspirin, Plavix, and Coumadin may be allowed to be continued    Medications including vitamin E , ginkgo, &fish oil are requested to be stopped about one week  Scribe Attestation    I,:  Dwight Kirby PA-C am acting as a scribe while in the presence of the attending physician :       I,:  Cathleen Whitten MD personally performed the services described in this documentation    as scribed in my presence :           Portions of the record may have been created with voice recognition software  Occasional wrong word or "sound a like" substitutions may have occurred due to the inherent limitations of voice recognition software  Read the chart carefully and recognize, using context, where substitutions have occurred

## 2021-08-10 ENCOUNTER — HOSPITAL ENCOUNTER (OUTPATIENT)
Facility: HOSPITAL | Age: 61
Setting detail: OUTPATIENT SURGERY
Discharge: HOME | End: 2021-08-10
Attending: ORTHOPAEDIC SURGERY | Admitting: ORTHOPAEDIC SURGERY
Payer: COMMERCIAL

## 2021-08-10 VITALS
RESPIRATION RATE: 16 BRPM | HEART RATE: 81 BPM | TEMPERATURE: 98.2 F | OXYGEN SATURATION: 100 % | DIASTOLIC BLOOD PRESSURE: 80 MMHG | BODY MASS INDEX: 24.02 KG/M2 | SYSTOLIC BLOOD PRESSURE: 132 MMHG | WEIGHT: 158.51 LBS | HEIGHT: 68 IN

## 2021-08-10 PROCEDURE — 29848 WRIST ENDOSCOPY/SURGERY: CPT | Performed by: ORTHOPAEDIC SURGERY

## 2021-08-10 RX ORDER — ONDANSETRON 2 MG/ML
4 INJECTION INTRAMUSCULAR; INTRAVENOUS EVERY 6 HOURS PRN
Status: CANCELLED | OUTPATIENT
Start: 2021-08-10

## 2021-08-10 RX ORDER — TRAMADOL HYDROCHLORIDE 50 MG/1
50 TABLET ORAL EVERY 6 HOURS PRN
Status: CANCELLED | OUTPATIENT
Start: 2021-08-10

## 2021-08-10 RX ORDER — MAGNESIUM HYDROXIDE 1200 MG/15ML
LIQUID ORAL AS NEEDED
Status: DISCONTINUED | OUTPATIENT
Start: 2021-08-10 | End: 2021-08-10 | Stop reason: HOSPADM

## 2021-08-10 RX ORDER — ACETAMINOPHEN 325 MG/1
650 TABLET ORAL EVERY 6 HOURS PRN
Status: CANCELLED | OUTPATIENT
Start: 2021-08-10

## 2021-08-10 RX ORDER — LIDOCAINE HYDROCHLORIDE AND EPINEPHRINE 10; 10 MG/ML; UG/ML
10 INJECTION, SOLUTION INFILTRATION; PERINEURAL ONCE
Status: COMPLETED | OUTPATIENT
Start: 2021-08-10 | End: 2021-08-10

## 2021-08-10 RX ADMIN — SODIUM BICARBONATE 50 MEQ: 84 INJECTION, SOLUTION INTRAVENOUS at 11:52

## 2021-08-10 RX ADMIN — LIDOCAINE HYDROCHLORIDE,EPINEPHRINE BITARTRATE 9 ML: 10; .01 INJECTION, SOLUTION INFILTRATION; PERINEURAL at 11:35

## 2021-08-10 NOTE — INTERVAL H&P NOTE
H&P reviewed  After examining the patient I find no changes in the patients condition since the H&P had been written      Vitals:    08/10/21 1028   BP: 136/72   Pulse: 70   Resp: 20   Temp: (!) 97 2 °F (36 2 °C)   SpO2: 98%

## 2021-08-10 NOTE — DISCHARGE INSTRUCTIONS
Post Operative Instructions    You have had surgery on your arm today, please read and follow the information below:  · Elevate your hand above your elbow during the next 24-48 hours to help with swelling  · Place your hand and arm over your head with motion at your shoulder three times a day  · Do not apply any cream/ointment/oil to your incisions including antibiotics  · Do not soak your hands in standing water (dishwater, tubs, Jacuzzi's, pools, etc ) until given permission (typically 2-3 weeks after injury)    Call the office at [phone]  if you notice any:  · Increased numbness or tingling of your hand or fingers that is not relieved with elevation  · Increasing pain that is not controlled with medication  · Difficulty chewing, breathing, swallowing  · Chest pains or shortness of breath  · Fever over 101 4 degrees  Bandage: Your therapist will remove your bandage at your first therapy appointment  Motion: Move fingers into a fist 5 times a day, DO NOT move any splinted fingers  Weight bearing status: Avoid heavy lifting (>5 pounds) with the extremity that was operated on until follow up appointment  Normal activities of daily living are OK  Ice: Ice for 10 minutes every hour as needed for swelling x 24 hours  Sling: No sling necessary  Pain medication:       After surgery, we would like you to take Ibuprofen 600mg one tablet by mouth every 6 hours with food (at breakfast, lunch and dinner)  AND Tylenol 500 mg one tablet by mouth every 6 hours  (at breakfast, lunch and dinner) for 5-7 days after your surgery  Please take these medication EVERYDAY after surgery for 5-7 days, and not just as needed  You can take these medications at the same time  Taking these medications after surgery will limit your need for prescription pain medication        If the pain becomes severe, and the pain medication is not alleviating symptoms, The greatest source of pain after surgery is usually a tight dressing due to increased swelling after surgery  If the pain becomes severe after surgery, and the patient medication is NOT alleviating the symptoms, the patient should do the following: The patient should  loosen the top dressing (usually coban or an ace bandage) and loosen/cut the rolled gauze beneath  The 4x4 gauze that is directly covering the incision should remain in place  The splint (if the patient has one) should remain in place  The ace bandage/coban can then be replaced on top in a less constrictive manor  If this does not help relieve the pain/numbness in a few hours, the patient should call our office (number listed below)  and we can have them seen in the office for further evaluation  Follow-up Appointment: 7-10 days with Dr Haydee Dandy  Occupational Therapy: 8/12/2021  [address] [place] [address], the patient may remove the splint/dressing for showering and clean the incision with soap and water  Keep incision dry after washing  Do not expose the incision to dirty water (oceans, pools, hot tubs, etc)     If you need help scheduling Therapy, you can call [phone]      Please call the office at [phone] if you have any questions or concerns regarding your post-operative care

## 2021-08-10 NOTE — OP NOTE
OPERATIVE REPORT    PATIENT NAME: Rosenda Olmstead    MEDICAL RECORD NO:  [id number]    PROCEDURE DATE:  08/10/21    :  1960    SURGEON:  Georgeana Goodpasture D Sherlon Morgans, M D , Ph D     Vielka Haskins: None    No qualified resident was available to assist for this surgery  A Physician Assistant was used to hold the endoscope during the release of the transverse carpal ligament  PREOPERATIVE DIAGNOSIS:    left carpal tunnel syndrome      POSTOPERATIVE DIAGNOSIS:   left carpal tunnel syndrome     PROCEDURE PERFORMED:   left endoscopic carpal tunnel release     ANESTHESIA:  local    COMPLICATIONS:  None    TOURNIQUET TIME: 3 minutes at 250 mmHg on the left    DISPOSITION: Patient was sent to the PACU in stable condition  INDICATION:  The patient is an 56 y o  female with clinical and/or electrodiagnostic evidence of left carpal tunnel syndrome  The patient had failed non-operative management and opted for carpal tunnel release  After informing the patient of the risks and benefits of endoscopic carpal tunnel release, consent was obtained for surgical intervention  PROCEDURE: The patient was identified in the preoperative screening area  The consent form was signed and verified after identifying the correct operative site  The patient was taken to the operating room and underwent local   The left upper extremity was then prepped and draped in normal sterile fashion with Chlorhexidine solution  The left arm was then elevated, exsanguinated with an Esmarch and the tourniquet placed about the brachium was insufflated to 250 mmHg  The Esmarch was removed  A transverse incision, approximately 1 cm in length, was made just proximal to the distal wrist crease and just ulnar to the median nerve  The subcutaneous tissue was dissected bluntly down to the level of the forearm fascia  A transverse split in the fascia was created by gently piercing the fascia with the tips of tenotomy scissors    The proximal portion of the fascia was released longitudinally into the forearm using tenotomy scissors  The distal fascia was left intact for insertion of dilators  The carpal canal was then dilated using sequentially increasing sized dilators  Once the canal was adequately dilated, the scope and canula tray were then introduced into the carpal canal  The transverse carpal ligament was covered with a thin layer of synovial tissue on its undersurface  The synovial layer was debrided to expose the transverse fibers and the distal edge of the ligament  Once clear visualization of the transverse carpal ligament was obtained, the knife blade was introduced into the canula tray and the ligament was then released from distal to proximal maintaining complete visualization throughout the procedure  Complete release was verified with the endoscopic camera in place clearly visualizing the  cut edges of the transverse carpal ligament with the overlying volar fatty tissue and palmaris brevis muscle fibers protruding through  The transverse carpal ligament was moderately thickended  The scope and cannula were then removed and the wound was irrigated with copious amounts of saline solution  The tourniquet was released at 3 minutes with good capillary refill and color in the digits  The small incision was then repaired using #4-0 nylon sutures placed in an interrupted horizontal mattress fashion  The patient tolerated the procedure well  The incision was dressed in a sterile soft bandage  There were no complications during the case  The patient was sent to the PACU in stable condition        I was present for the entire procedure     Patient Disposition:  PACU      SIGNATURE: Rip Fulton MD/PhD  DATE: 08/10/21  TIME:  11:45 AM

## 2021-08-12 ENCOUNTER — EVALUATION (OUTPATIENT)
Dept: OCCUPATIONAL THERAPY | Facility: CLINIC | Age: 61
End: 2021-08-12
Payer: COMMERCIAL

## 2021-08-12 DIAGNOSIS — G56.02 CARPAL TUNNEL SYNDROME ON LEFT: ICD-10-CM

## 2021-08-12 PROCEDURE — 97165 OT EVAL LOW COMPLEX 30 MIN: CPT

## 2021-08-12 NOTE — PROGRESS NOTES
OT Evaluation     Today's date: 2021  Patient name: Natalee Porras  : 1960  MRN: [id number]  Referring provider: Yanira Kim  Dx:   Encounter Diagnosis     ICD-10-CM    1  Carpal tunnel syndrome on left  G56 02 Ambulatory referral to PT/OT hand therapy       Start Time:   Stop Time: 1730  Total time in clinic (min): 45 minutes    Assessment  Assessment details: Natalee Porras presents in post-op bandages with sutures intact and no s/s of infection  Her L CTR was on 8/10/21  Her post-op dressing was removed and changed with no drainage present  Edema and ecchymosis present  Sensation in L thumb and LIF were intact, but LMF showed diminished sensation as the patient could only feel deep pressure  Wrist ROM is WNL except for extension, which is 55 degrees  Patient is able to make a fist and noted pain on the dorsal aspect of her hand  She was educated on the s/s of infection and post-op precautions and she verbalized understanding to all  Educated on HEP for ROM, sensation, wound care, and dressing changes  Patient was instructed to return in 2 weeks to reassess sensation and ROM  She was instructed to call the office with any questions or concerns  Impairments: abnormal or restricted ROM, activity intolerance, impaired physical strength, lacks appropriate home exercise program and safety issue  Other impairment: Peripheral neuropathy  Functional limitations: Pain when making a fistUnderstanding of Dx/Px/POC: good   Prognosis: good    Goals  STGs/LTGs (2 visits)  1  Patient will verbalize understanding of HEP that focuses on ROM, MNG, dressing changes, and wound care  2  Patient will report a maximum pain level of 2/10 when completing work tasks  3  Patient will demonstrate full wound healing and resolution of edema  4  Patient will demonstrate an increase of LMF sensation at discharge  5  Patient will demonstrate wrist extension WFL to complete ADLs and IADLs      Plan  Plan details: 21: Patient was instructed to return in 2 weeks  Patient would benefit from: OT eval and skilled occupational therapy  Planned modality interventions: cryotherapy and thermotherapy: hydrocollator packs  Planned therapy interventions: dressing changes, functional ROM exercises, fine motor coordination training, graded exercise, graded activity, ADL retraining, home exercise program, therapeutic exercise, therapeutic activities, strengthening, orthotic fitting/training, neuromuscular re-education, manual therapy and patient education  Frequency: follow-up in 2 weeks  Duration in visits: 2  Plan of Care beginning date: 2021  Plan of Care expiration date: 2021  Treatment plan discussed with: patient        Subjective Evaluation    History of Present Illness  Date of surgery: 8/10/2021  Mechanism of injury: surgery  Mechanism of injury: Dylon Stewart is a 56 y o  female who presented s/p L CTR that occurred on 8/10/21  Patient reported that her L thumb, index, and middle finger have been numb since her left distal radius ORIF in 2021  She later had an EMG that showed signs of median nerve compression  She does not wear a brace at night on her L wrist, but she wears one on her R wrist  Since her surgery, she has been taking Tylenol to relieve her pain  Patient now presents for OT eval and treatment     Quality of life: excellent    Pain  Current pain ratin  At best pain ratin  At worst pain rating: 10  Location: L CTR  Quality: pressure, tight and dull ache  Relieving factors: medications (tylenol)  Aggravating factors: lifting (making fist)  Progression: improved    Social Support  Lives with: spouse    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)  Hand dominance: right    Treatments  Current treatment: occupational therapy  Patient Goals  Patient goals for therapy: decreased edema, decreased pain, increased strength and independence with ADLs/IADLs  Patient goal: decrease numbness        Objective     Observations     Left Wrist/Hand   Positive for edema and incision  Negative for drainage  Additional Observation Details  8/12/21: Patient presented in post-op dressing  Edema and ecchymosis present  No drainage or s/s of infection present  Dressing change  Neurological Testing     Sensation     Wrist/Hand   Left   Diminished: static two point discrimination    Comments   Left static two point discrimination: LMF= deep pressure sensation    Active Range of Motion     Left Wrist   Wrist flexion: 60 degrees   Wrist extension: 56 degrees   Radial deviation: 22 degrees   Ulnar deviation: 35 degrees     Left Thumb   Opposition: 8/12/21: WNL  Kapandji Scale = 10/10    Additional Active Range of Motion Details  8/12/21: WNL but patient reported pain when making fist      Swelling     Left Wrist/Hand   Circumference MCP: 18 8 cm  Circumference wrist: 17 3 cm    Right Wrist/Hand   Circumference MCP: 18 1 cm  Circumference wrist: 17 2 cm    Additional Swelling Details  8/12/21: Patient has lymphedema in his arm  Precautions: Peripheral Neuropathy      Date 8/12            Visit 1            Manuals             Dressing Change Gauze, 3'                                                   Neuro Re-Ed             MNG                                                                                           Ther Ex             TGE                                                                                                        Ther Activity                                       HEP TGE, MNG, wrist ROM, dressing change, wound care                                      Modalities             MHP                              This patient was treated by SHANEL Juarez, under the direct supervision of [employer], JUAN JOSER/L, CHT

## 2021-08-19 ENCOUNTER — OFFICE VISIT (OUTPATIENT)
Dept: OBGYN CLINIC | Facility: CLINIC | Age: 61
End: 2021-08-19

## 2021-08-19 VITALS
HEIGHT: 68 IN | WEIGHT: 161.3 LBS | BODY MASS INDEX: 24.44 KG/M2 | HEART RATE: 66 BPM | DIASTOLIC BLOOD PRESSURE: 76 MMHG | SYSTOLIC BLOOD PRESSURE: 125 MMHG

## 2021-08-19 DIAGNOSIS — Z98.890 S/P CARPAL TUNNEL RELEASE: Primary | ICD-10-CM

## 2021-08-19 PROCEDURE — 99024 POSTOP FOLLOW-UP VISIT: CPT | Performed by: ORTHOPAEDIC SURGERY

## 2021-08-19 PROCEDURE — 3008F BODY MASS INDEX DOCD: CPT | Performed by: ORTHOPAEDIC SURGERY

## 2021-08-19 NOTE — PROGRESS NOTES
SUBJECTIVE:  Karis Butler is a 56y o  year old female who presents for follow up after surgery, Yan Marker performed on  8/10/2021  Today patient has improved numbness and tingling into her digits  She still notes some numbness and tingling into the long and ring finger  She states she still has some nighttime tenderness in the palmar aspect of her hand  VITALS:  Vitals:    08/19/21 1542   BP: 125/76   Pulse: 66       PHYSICAL EXAMINATION:  General: well developed and well nourished, alert, oriented times 3 and appears comfortable  Psychiatric: Normal    MUSCULOSKELETAL EXAMINATION:  Left wrist  Incision: Clean, dry, with sutures intact  Range of Motion: left wrist normal postoperative range of motion  Neurovascular status: Neuro intact, good cap refill      STUDIES REVIEWED:  No studies reviewed  PROCEDURES PERFORMED:  Procedures  No Procedures performed today      ASSESSMENT/PLAN:  S/P left ECTR   Sutures were removed today without complications   Pt was advised that they may have increased pain in the palm of the hand due to scar tissue formation and this discomfort will increase before it subsides   Pt was advised to call the office if they have any questions or concerns   Patient was advised to use heat massage over the area        FOLLOW UP:  Return if symptoms worsen or fail to improve        TO DO AT NEXT VISIT:  Re-evaluation of current issue      Scribe Attestation    I,:  Pina Camilo PA-C am acting as a scribe while in the presence of the attending physician :       I,:  Karli Moreno MD personally performed the services described in this documentation    as scribed in my presence :

## 2021-08-20 ENCOUNTER — IMMUNIZATIONS (OUTPATIENT)
Dept: FAMILY MEDICINE CLINIC | Facility: HOSPITAL | Age: 61
End: 2021-08-20

## 2021-08-20 DIAGNOSIS — Z23 ENCOUNTER FOR IMMUNIZATION: Primary | ICD-10-CM

## 2021-08-20 PROCEDURE — 91300 SARS-COV-2 / COVID-19 MRNA VACCINE (PFIZER-BIONTECH) 30 MCG: CPT

## 2021-08-20 PROCEDURE — 0001A SARS-COV-2 / COVID-19 MRNA VACCINE (PFIZER-BIONTECH) 30 MCG: CPT

## 2021-08-30 ENCOUNTER — OFFICE VISIT (OUTPATIENT)
Dept: OCCUPATIONAL THERAPY | Facility: CLINIC | Age: 61
End: 2021-08-30
Payer: COMMERCIAL

## 2021-08-30 DIAGNOSIS — G56.02 CARPAL TUNNEL SYNDROME ON LEFT: Primary | ICD-10-CM

## 2021-08-30 PROCEDURE — 97110 THERAPEUTIC EXERCISES: CPT

## 2021-08-30 PROCEDURE — 97140 MANUAL THERAPY 1/> REGIONS: CPT

## 2021-08-30 PROCEDURE — 97035 APP MDLTY 1+ULTRASOUND EA 15: CPT

## 2021-08-30 NOTE — PROGRESS NOTES
Daily Note     Today's date: 2021  Patient name: Herber Salomon  : 1960  MRN: [id number]  Referring provider: Zac Morrow PA-C  Dx:   Encounter Diagnosis     ICD-10-CM    1  Carpal tunnel syndrome on left  G56 02                   Subjective: The past couple of days, my hand has finally started feeling better  It's about a 6/10 now  Objective: See treatment diary below      Assessment: Tolerated treatment well  Patient exhibited good technique with therapeutic exercises  AROM is WNL  Dense scar tissue at wrist   Issued elastogel to wear over the scar to decrease scar density      Plan: Continue per plan of care        Precautions: Peripheral Neuropathy      Date            Visit 1 2           Manuals             Dressing Change Gauze, 3' elastogel, tg           Scar massage  13'                                     Neuro Re-Ed             MNG  x10                                                                                         Ther Ex             TGE  x10           AROM wrist, forearm  x10 ea                                                                                         Ther Activity                                       HEP TGE, MNG, wrist ROM, dressing change, wound care elastogel for scar mgt; wear during the day                                     Modalities             MHP  5'           US 3 3MHz, 50%, 0 8w/cm2  8'

## 2021-09-20 ENCOUNTER — OFFICE VISIT (OUTPATIENT)
Dept: OCCUPATIONAL THERAPY | Facility: CLINIC | Age: 61
End: 2021-09-20
Payer: COMMERCIAL

## 2021-09-20 DIAGNOSIS — G56.02 CARPAL TUNNEL SYNDROME ON LEFT: Primary | ICD-10-CM

## 2021-09-20 PROCEDURE — 97140 MANUAL THERAPY 1/> REGIONS: CPT

## 2021-09-20 PROCEDURE — 97110 THERAPEUTIC EXERCISES: CPT

## 2021-09-20 NOTE — PROGRESS NOTES
OT Re-Evaluation     Today's date: 2021  Patient name: Jacey Diaz  : 1960  MRN: [id number]  Referring provider: Essie Henao PA-C  Dx:   Encounter Diagnosis     ICD-10-CM    1  Carpal tunnel syndrome on left  G56 02                   Assessment  Assessment details: Jacey Diaz presents in post-op bandages with sutures intact and no s/s of infection  Her L CTR was on 8/10/21  Her post-op dressing was removed and changed with no drainage present  Edema and ecchymosis present  Sensation in L thumb and LIF were intact, but LMF showed diminished sensation as the patient could only feel deep pressure  Wrist ROM is WNL except for extension, which is 55 degrees  Patient is able to make a fist and noted pain on the dorsal aspect of her hand  She was educated on the s/s of infection and post-op precautions and she verbalized understanding to all  Educated on HEP for ROM, sensation, wound care, and dressing changes  Patient was instructed to return in 2 weeks to reassess sensation and ROM  She was instructed to call the office with any questions or concerns  21:  Patient has been seen 3 times in therapy this period  She now demonstrates AROM WNL  Sensation in the median and ulnar nerve has improved, but patient is still reporting decreased sensation as well as a "zinging" pain in the ring and small fingers when stretching into positions that stretch the ulnar nerve  Due to surgery and lymphedema in the RUE, patient relies on the LUE for most tasks  Her left hand  and pinch strength is impaired when compared to age and gender norms and the right hand  Localized swelling noted nears scars on the volar left wrist   Continue OT 1x/wk x 4 weeks to increase left hand strength and decrease pain and edema    Impairments: activity intolerance, impaired physical strength, lacks appropriate home exercise program and safety issue  Other impairment: Peripheral neuropathy; edema in wrist  Functional limitations: Pain when making a fistBarriers to therapy: RUE lymphedema  Understanding of Dx/Px/POC: good   Prognosis: good    Goals  STGs (2 visits)  1  Patient will verbalize understanding of HEP that focuses on ROM, MNG, dressing changes, and wound care  MET  2  Patient will report a maximum pain level of 2/10 when completing work tasks  NOT MET  3  Patient will demonstrate full wound healing and resolution of edema  MET for wound only  4  Patient will demonstrate an increase of LMF sensation  MET  5  Patient will demonstrate wrist extension WFL to complete ADLs and IADLs  MET  LTGs ( 8 weeks)  Patient will demonstrate independence in HEP for LUE strength  NEW GOAL  Patient 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 hand  and pinch strength equal to or greater than the right hand  NEW GOAL    Plan  Plan details: 8/12/21: Patient was instructed to return in 2 weeks  9/20/21:  Continue OT x 4 weeks  Patient would benefit from: OT eval and skilled occupational therapy  Planned modality interventions: cryotherapy and thermotherapy: hydrocollator packs  Planned therapy interventions: dressing changes, functional ROM exercises, fine motor coordination training, graded exercise, graded activity, ADL retraining, home exercise program, therapeutic exercise, therapeutic activities, strengthening, orthotic fitting/training, neuromuscular re-education, manual therapy and patient education  Frequency: 1x week  Plan of Care beginning date: 9/20/2021  Plan of Care expiration date: 10/22/2021  Treatment plan discussed with: patient        Subjective Evaluation    History of Present Illness  Date of surgery: 8/10/2021  Mechanism of injury: surgery  Mechanism of injury: Bravo Rehman is a 56 y o  female who presented s/p L CTR that occurred on 8/10/21  Patient reported that her L thumb, index, and middle finger have been numb since her left distal radius ORIF in February 2021  She later had an EMG that showed signs of median nerve compression  She does not wear a brace at night on her L wrist, but she wears one on her R wrist  Since her surgery, she has been taking Tylenol to relieve her pain  Patient now presents for OT eval and treatment  21:  Patient reports she now has full sensation in the index and thumb, but the middle and ring fingers are still partially numb          Recurrent probem    Quality of life: excellent    Pain  Current pain ratin  At best pain rating: 3  At worst pain ratin  Location: L CTR  Quality: pressure, dull ache and sharp  Relieving factors: medications (tylenol)  Aggravating factors: lifting (making fist)  Progression: improved    Social Support  Lives with: spouse    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)  Hand dominance: right    Treatments  Current treatment: occupational therapy  Patient Goals  Patient goals for therapy: decreased edema, decreased pain, increased strength and independence with ADLs/IADLs  Patient goal: decrease numbness        Objective     Observations     Left Wrist/Hand   Positive for edema  Negative for drainage and incision  Additional Observation Details  21: Patient presented in post-op dressing  Edema and ecchymosis present  No drainage or s/s of infection present  Dressing change  21:  Scar fully healed  Localized edema in mid volar wrist    Neurological Testing     Sensation     Wrist/Hand   Left   Diminished: static two point discrimination    Comments   Left static two point discrimination: Now 8mm for all digits    Active Range of Motion     Left Elbow   Normal active range of motion    Left Wrist   Wrist flexion: 75 degrees WFL  Wrist extension: 75 degrees WFL  Radial deviation: 30 degrees WFL  Ulnar deviation: 50 degrees WFL      Left Thumb   Opposition: 21: WNL   Kapandji Scale = 10/10    Additional Active Range of Motion Details  21: WNL but patient reported pain when making fist      Strength/Myotome Testing     Left Wrist/Hand   Wrist extension: 5  Wrist flexion: 5  Radial deviation: 5  Ulnar deviation: 5     (2nd hand position)     Trial 1: 35    Thumb Strength  Key/Lateral Pinch     Trial 1: 12  Tip/Two-Point Pinch     Trial 1: 8  Palmar/Three-Point Pinch     Trial 1: 9    Right Wrist/Hand      (2nd hand position)     Trial 1: 35    Thumb Strength   Key/Lateral Pinch     Trial 1: 16  Tip/Two-Point Pinch     Trial 1: 11 5  Palmar/Three-Point Pinch     Trial 1: 14 5    Additional Strength Details  9/20/21:  Initial measurements    Tests     Left Elbow   Negative elbow flexion and Tinel's sign (cubital tunnel)  Left Wrist/Hand   Positive Tinel's sign (medial nerve)  Additional Tests Details  9/20/21:  + Tinel's Guyon's canal    Swelling     Left Wrist/Hand   Circumference MCP: 18 5 cm  Circumference wrist: 17 3 cm    Right Wrist/Hand   Circumference MCP: 18 1 cm  Circumference wrist: 17 2 cm    Additional Swelling Details  8/12/21: Patient has lymphedema in this arm               Precautions: Peripheral Neuropathy      Date 8/12 8/30 9/20          Visit 1 2 3          Manuals             Dressing Change Gauze, 3' elastogel, tg           Scar massage  13' 10'          Edema mgt   elastomer mold; mass 5'                       Neuro Re-Ed             MNG  x10 x10          CHIKI   x10                                                                           Ther Ex             TGE  x10 x10          AROM wrist, forearm  x10 ea           CP horizontal bars   R, G 2 sets          PW    R x 20                                                               Ther Activity                                       HEP TGE, MNG, wrist ROM, dressing change, wound care elastogel for scar mgt; wear during the day elastomer volar wrist night; CHIKI;                                     Modalities             MHP  5' 5'          US 3 3MHz, 50%, 0 8w/cm2  8'

## 2021-10-11 ENCOUNTER — OFFICE VISIT (OUTPATIENT)
Dept: OCCUPATIONAL THERAPY | Facility: CLINIC | Age: 61
End: 2021-10-11
Payer: COMMERCIAL

## 2021-10-11 DIAGNOSIS — G56.02 CARPAL TUNNEL SYNDROME ON LEFT: Primary | ICD-10-CM

## 2021-10-11 PROCEDURE — 97110 THERAPEUTIC EXERCISES: CPT

## 2021-10-11 PROCEDURE — 97140 MANUAL THERAPY 1/> REGIONS: CPT

## 2021-11-01 ENCOUNTER — APPOINTMENT (OUTPATIENT)
Dept: OCCUPATIONAL THERAPY | Facility: CLINIC | Age: 61
End: 2021-11-01
Payer: COMMERCIAL

## 2021-11-09 ENCOUNTER — OFFICE VISIT (OUTPATIENT)
Dept: OCCUPATIONAL THERAPY | Facility: CLINIC | Age: 61
End: 2021-11-09
Payer: COMMERCIAL

## 2021-11-09 DIAGNOSIS — G56.02 CARPAL TUNNEL SYNDROME ON LEFT: Primary | ICD-10-CM

## 2021-11-09 PROCEDURE — 97110 THERAPEUTIC EXERCISES: CPT

## 2021-12-22 ENCOUNTER — HOSPITAL ENCOUNTER (OUTPATIENT)
Dept: RADIOLOGY | Facility: HOSPITAL | Age: 61
Discharge: HOME | End: 2021-12-22
Payer: COMMERCIAL

## 2021-12-22 DIAGNOSIS — R52 PAIN: ICD-10-CM

## 2021-12-22 PROCEDURE — 72040 X-RAY EXAM NECK SPINE 2-3 VW: CPT

## 2021-12-22 PROCEDURE — 70360 X-RAY EXAM OF NECK: CPT

## 2021-12-22 PROCEDURE — 73030 X-RAY EXAM OF SHOULDER: CPT

## 2022-01-22 PROBLEM — Z09 HOSPITAL DISCHARGE FOLLOW-UP: Status: RESOLVED | Noted: 2018-05-17 | Resolved: 2022-01-22

## 2022-01-22 PROBLEM — Z48.89 AFTERCARE FOLLOWING SURGERY: Status: RESOLVED | Noted: 2021-02-18 | Resolved: 2022-01-22

## 2022-01-23 NOTE — PATIENT INSTRUCTIONS
Breast Self Exam for Women   AMBULATORY CARE:   A breast self-exam (BSE)  is a way to check your breasts for lumps and other changes  Regular BSEs can help you know how your breasts normally look and feel  Most breast lumps or changes are not cancer, but you should always have them checked by a healthcare provider  Why you should do a BSE:  Breast cancer is the most common type of cancer in women  Even if you have mammograms, you may still want to do a BSE regularly  If you know how your breasts normally feel and look, it may help you know when to contact your healthcare provider  Mammograms can miss some cancers  You may find a lump during a BSE that did not show up on a mammogram   When you should do a BSE:  If you have periods, you may want to do your BSE 1 week after your period ends  This is the time when your breasts may be the least swollen, lumpy, or tender  You can do regular BSEs even if you are breastfeeding or have breast implants  Call your doctor if:   · You find any lumps or changes in your breasts  · You have breast pain or fluid coming from your nipples  · You have questions or concerns about your condition or care  How to do a BSE:       · Look at your breasts in a mirror  Look at the size and shape of each breast and nipple  Check for swelling, lumps, dimpling, scaly skin, or other skin changes  Look for nipple changes, such as a nipple that is painful or beginning to pull inward  Gently squeeze both nipples and check to see if fluid (that is not breast milk) comes out of them  If you find any of these or other breast changes, contact your healthcare provider  Check your breasts while you sit or  the following 3 positions:    ? Hang your arms down at your sides  ? Raise your hands and join them behind your head  ? Put firm pressure with your hands on your hips  Bend slightly forward while you look at your breasts in the mirror  · Lie down and feel your breasts    When you lie down, your breast tissue spreads out evenly over your chest  This makes it easier for you to feel for lumps and anything that may not be normal for your breasts  Do a BSE on one breast at a time  ? Place a small pillow or towel under your left shoulder  Put your left arm behind your head  ? Use the 3 middle fingers of your right hand  Use your fingertip pads, on the top of your fingers  Your fingertip pad is the most sensitive part of your finger  ? Use small circles to feel your breast tissue  Use your fingertip pads to make dime-sized, overlapping circles on your breast and armpits  Use light, medium, and firm pressure  First, press lightly  Second, press with medium pressure to feel a little deeper into the breast  Last, use firm pressure to feel deep within your breast     ? Examine your entire breast area  Examine the breast area from above the breast to below the breast where you feel only ribs  Make small circles with your fingertips, starting in the middle of your armpit  Make circles going up and down the breast area  Continue toward your breast and all the way across it  Examine the area from your armpit all the way over to the middle of your chest (breastbone)  Stop at the middle of your chest     ? Move the pillow or towel to your right shoulder, and put your right arm behind your head  Use the 3 fingertip pads of your left hand, and repeat the above steps to do a BSE on your right breast     What else you can do to check for breast problems or cancer:  Talk to your healthcare provider about mammograms  A mammogram is an x-ray of your breasts to screen for breast cancer or other problems  Your provider can tell you the benefits and risks of mammograms  The first mammogram is usually at age 39 or 48  Your provider may recommend you start at 36 or younger if your risk for breast cancer is high  Mammograms usually continue every 1 to 2 years until age 76         Follow up with your doctor as directed:  Write down your questions so you remember to ask them during your visits  © Copyright [employer] 2021 Information is for End User's use only and may not be sold, redistributed or otherwise used for commercial purposes  All illustrations and images included in CareNotes® are the copyrighted property of [employer]  or Peri Camacho  The above information is an  only  It is not intended as medical advice for individual conditions or treatments  Talk to your doctor, nurse or pharmacist before following any medical regimen to see if it is safe and effective for you  Wellness Visit for Adults   AMBULATORY CARE:   A wellness visit  is when you see your healthcare provider to get screened for health problems  Your healthcare provider will also give you advice on how to stay healthy  Write down your questions so you remember to ask them  Ask your healthcare provider how often you should have a wellness visit  What happens at a wellness visit:  Your healthcare provider will ask about your health, and your family history of health problems  This includes high blood pressure, heart disease, and cancer  He or she will ask if you have symptoms that concern you, if you smoke, and about your mood  You may also be asked about your intake of medicines, supplements, food, and alcohol  Any of the following may be done:  · Your weight  will be checked  Your height may also be checked so your body mass index (BMI) can be calculated  Your BMI shows if you are at a healthy weight  · Your blood pressure  and heart rate will be checked  Your temperature may also be checked  · Blood and urine tests  may be done  Blood tests may be done to check your cholesterol levels  Abnormal cholesterol levels increase your risk for heart disease and stroke  You may also need a blood or urine test to check for diabetes if you are at increased risk   Urine tests may be done to look for signs of an infection or kidney disease  · A physical exam  includes checking your heartbeat and lungs with a stethoscope  Your healthcare provider may also check your skin to look for sun damage  · Screening tests  may be recommended  A screening test is done to check for diseases that may not cause symptoms  The screening tests you may need depend on your age, gender, family history, and lifestyle habits  For example, colorectal screening may be recommended if you are 48years old or older  Screening tests you need if you are a woman:   · A Pap smear  is used to screen for cervical cancer  Pap smears are usually done every 3 to 5 years depending on your age  You may need them more often if you have had abnormal Pap smear test results in the past  Ask your healthcare provider how often you should have a Pap smear  · A mammogram  is an x-ray of your breasts to screen for breast cancer  Experts recommend mammograms every 2 years starting at age 48 years  You may need a mammogram at age 52 years or younger if you have an increased risk for breast cancer  Talk to your healthcare provider about when you should start having mammograms and how often you need them  Vaccines you may need:   · Get an influenza vaccine  every year  The influenza vaccine protects you from the flu  Several types of viruses cause the flu  The viruses change over time, so new vaccines are made each year  · Get a tetanus-diphtheria (Td) booster vaccine  every 10 years  This vaccine protects you against tetanus and diphtheria  Tetanus is a severe infection that may cause painful muscle spasms and lockjaw  Diphtheria is a severe bacterial infection that causes a thick covering in the back of your mouth and throat  · Get a human papillomavirus (HPV) vaccine  if you are female and aged 23 to 32 or male 23 to 24 and never received it  This vaccine protects you from HPV infection  HPV is the most common infection spread by sexual contact   HPV may also cause vaginal, penile, and anal cancers  · Get a pneumococcal vaccine  if you are aged 72 years or older  The pneumococcal vaccine is an injection given to protect you from pneumococcal disease  Pneumococcal disease is an infection caused by pneumococcal bacteria  The infection may cause pneumonia, meningitis, or an ear infection  · Get a shingles vaccine  if you are 60 or older, even if you have had shingles before  The shingles vaccine is an injection to protect you from the varicella-zoster virus  This is the same virus that causes chickenpox  Shingles is a painful rash that develops in people who had chickenpox or have been exposed to the virus  How to eat healthy:  My Plate is a model for planning healthy meals  It shows the types and amounts of foods that should go on your plate  Fruits and vegetables make up about half of your plate, and grains and protein make up the other half  A serving of dairy is included on the side of your plate  The amount of calories and serving sizes you need depends on your age, gender, weight, and height  Examples of healthy foods are listed below:  · Eat a variety of vegetables  such as dark green, red, and orange vegetables  You can also include canned vegetables low in sodium (salt) and frozen vegetables without added butter or sauces  · Eat a variety of fresh fruits , canned fruit in 100% juice, frozen fruit, and dried fruit  · Include whole grains  At least half of the grains you eat should be whole grains  Examples include whole-wheat bread, wheat pasta, brown rice, and whole-grain cereals such as oatmeal     · Eat a variety of protein foods such as seafood (fish and shellfish), lean meat, and poultry without skin (turkey and chicken)  Examples of lean meats include pork leg, shoulder, or tenderloin, and beef round, sirloin, tenderloin, and extra lean ground beef   Other protein foods include eggs and egg substitutes, beans, peas, soy products, nuts, and seeds  · Choose low-fat dairy products such as skim or 1% milk or low-fat yogurt, cheese, and cottage cheese  · Limit unhealthy fats  such as butter, hard margarine, and shortening  Exercise:  Exercise at least 30 minutes per day on most days of the week  Some examples of exercise include walking, biking, dancing, and swimming  You can also fit in more physical activity by taking the stairs instead of the elevator or parking farther away from stores  Include muscle strengthening activities 2 days each week  Regular exercise provides many health benefits  It helps you manage your weight, and decreases your risk for type 2 diabetes, heart disease, stroke, and high blood pressure  Exercise can also help improve your mood  Ask your healthcare provider about the best exercise plan for you  General health and safety guidelines:   · Do not smoke  Nicotine and other chemicals in cigarettes and cigars can cause lung damage  Ask your healthcare provider for information if you currently smoke and need help to quit  E-cigarettes or smokeless tobacco still contain nicotine  Talk to your healthcare provider before you use these products  · Limit alcohol  A drink of alcohol is 12 ounces of beer, 5 ounces of wine, or 1½ ounces of liquor  · Lose weight, if needed  Being overweight increases your risk of certain health conditions  These include heart disease, high blood pressure, type 2 diabetes, and certain types of cancer  · Protect your skin  Do not sunbathe or use tanning beds  Use sunscreen with a SPF 15 or higher  Apply sunscreen at least 15 minutes before you go outside  Reapply sunscreen every 2 hours  Wear protective clothing, hats, and sunglasses when you are outside  · Drive safely  Always wear your seatbelt  Make sure everyone in your car wears a seatbelt  A seatbelt can save your life if you are in an accident  Do not use your cell phone when you are driving   This could distract you and cause an accident  Pull over if you need to make a call or send a text message  · Practice safe sex  Use latex condoms if are sexually active and have more than one partner  Your healthcare provider may recommend screening tests for sexually transmitted infections (STIs)  · Wear helmets, lifejackets, and protective gear  Always wear a helmet when you ride a bike or motorcycle, go skiing, or play sports that could cause a head injury  Wear protective equipment when you play sports  Wear a lifejacket when you are on a boat or doing water sports  © Copyright [employer] 2021 Information is for End User's use only and may not be sold, redistributed or otherwise used for commercial purposes  All illustrations and images included in CareNotes® are the copyrighted property of [employer] M [employer] Inc  or Peri Camacho  The above information is an  only  It is not intended as medical advice for individual conditions or treatments  Talk to your doctor, nurse or pharmacist before following any medical regimen to see if it is safe and effective for you  Kegel Exercises for Women   AMBULATORY CARE:   Kegel exercises  help strengthen your pelvic muscles  Pelvic muscles hold your pelvic organs, such as your bladder and uterus, in place  Kegel exercises help prevent or control problems with urine incontinence (leakage)  Incontinence may be caused by pregnancy, childbirth, or menopause  Contact your healthcare provider if:   · You cannot feel your muscles tighten or relax  · You continue to leak urine  · You have questions or concerns about your condition or care  Use the correct muscles:  Pelvic muscles are the muscles you use to control urine flow  To target these muscles, stop and start the flow of urine several times  This will help you become familiar with how it feels to tighten and relax these muscles  How to do Kegel exercises:   · Empty your bladder   You may lie down, stand up, or sit down to do these exercises  When you first try to do these exercises, it may be easier if you lie down  Tighten or squeeze your pelvic muscles slowly  It may feel like you are trying to hold back urine or gas  Hold this position for 3 seconds  Relax for 3 seconds  Repeat this cycle 10 times  · Do 10 sets of Kegel exercises, at least 3 times a day  Do not hold your breath when you do Kegel exercises  Keep your stomach, back, and leg muscles relaxed  · As your muscles get stronger, you will be able to hold the squeeze longer  Your healthcare provider may ask that you increase your pelvic muscle squeeze to 10 seconds  After you squeeze for 10 seconds, relax for 10 seconds  What else you should know:   · Once you know how to do Kegel exercises, use different positions  You can do these exercises while you lie on the floor, sit at your desk or watch TV, and while you stand  · You may notice improved bladder control within about 6 weeks  · Tighten your pelvic muscles before you sneeze, cough, or lift to prevent urine leakage  Follow up with your doctor as directed:  Write down your questions so you remember to ask them during your visits  © Copyright [employer] 2021 Information is for End User's use only and may not be sold, redistributed or otherwise used for commercial purposes  All illustrations and images included in CareNotes® are the copyrighted property of A [employer] M [employer] Inc  or [employer]   The above information is an  only  It is not intended as medical advice for individual conditions or treatments  Talk to your doctor, nurse or pharmacist before following any medical regimen to see if it is safe and effective for you  For vaginal dryness: You may use:     Coconut oil (organic, pure, unscented) as needed for moisture or lubrication   ( Do not use if allergic)       Replens moisture restore external comfort gel daily ( use as directed on the box) Replens long lasting vaginal moisturizer  ( use as directed on the box)       For Vaginal Lubrication:        You may use:     Coconut oil (organic, pure, unscented) as needed for lubrication during intercourse  (Do not use if allergic)               Replens silky smooth lubricant, premium silicone based lubricant for intercourse  ( use as directed, a small amount will provide an enhanced natural feeling)     Any premium over the counter vaginal lubricant water or silicone based  Silicone based will have more staying power  For Vulvar hygiene:     No soaps or feminine wash to the vulva with the exception of Dove or Dove Sensitive Skin bar soap if necessary  Only perfume-free, dye-free laundry detergent, use a second rinse cycle  Avoid fabric softeners/dryer sheets  No lotion to the area  No Douching   Coconut oil as a lubricant (if not using condoms) or another scent-free premium lubricant  Loose fitting cotton underwear and loose fitting outer clothing   Partner to avoid the same products as well  Over the counter probiotic taken orally may help to restore vaginal cj  Menopause   WHAT YOU NEED TO KNOW:   What is menopause? Menopause is a normal stage in a woman's life when her monthly periods stop  A woman who has not had a period for a full year after the age of 36 is considered to be in menopause  Menopause usually occurs between ages 52 to 48  Perimenopause is a stage before menopause that may cause signs and symptoms similar to menopause  Perimenopause may start about 4 years before menopause  What causes menopause? Menopause starts when the ovaries stop making the female hormones estrogen and progesterone  After menopause, a woman is no longer able to become pregnant   Any of the following may trigger menopause or early menopause:  · Older age    · Surgery, including a hysterectomy or oophorectomy    · Family history of early menopause    · Smoking    · Chemotherapy or pelvic radiation    · Chromosome abnormalities, including Cortes syndrome and Fragile X syndrome    What are the signs and symptoms of menopause? The signs and symptoms of menopause can be different from woman to woman:  · Irregular menstrual cycles with heavy vaginal bleeding followed by decreased bleeding until it stops    · Hot flashes (feeling warm, flushed, and sweaty)     · Vaginal changes such as increased dryness     · Mood changes such as anxiety, depression, or decreased desire to have sex    · Trouble sleeping, joint pain, headaches    · Brittle nails, hair on chin or chest where it is normally absent    · Decrease in breast size and change in skin texture    What do I need to know about menopause? · You can still get pregnant while you have periods  Continue to use birth control if you do not want to get pregnant  You may need to use birth control until it has been 1 year since your periods stopped  · Hormone replacement therapy (HRT) can be used to treat symptoms of menopause  HRT is medicine that replaces your low hormone levels  HRT contains estrogen and sometimes progestin  HRT has benefits and risks  HRT decreases your risk for bone fractures by helping to prevent osteoporosis  HRT also protects you from colon cancer  HRT may increase your risk for breast cancer, blood clots, heart disease, and stroke  Ask your healthcare provider if HRT is right for you  How can I care for myself? · Manage hot flashes  Hot flashes are brief periods of feeling very warm, flushed, and sweaty  Hot flashes can last from a few seconds to several minutes  They may happen many times during the day, and are common at night  Layer your clothing so that you can easily remove some clothing and cool yourself during a hot flash  Cold drinks may also be helpful  · Reduce vaginal dryness  by using over-the-counter vaginal creams  Vaginal dryness may cause you to have pain or discomfort during sex   Only use creams that are made for vaginal use  Do  not  use petroleum jelly  You may need an estrogen cream to put in and around your vagina  Estrogen cream may help decrease vaginal dryness and lower your risk of vaginal infections  · Continue to use birth control  during perimenopause if you do not want to get pregnant  You may need to use birth control until it has been 1 year since your periods stopped  Ask your healthcare provider when you can stop using birth control to prevent pregnancy  How can I live a healthy lifestyle during and after menopause? After menopause, your risk for heart disease and bone loss increases  Ask about these and other ways to stay healthy:  · Exercise regularly  Exercise helps you maintain a healthy weight  Exercise can also help to control your blood pressure and cholesterol levels  Include weight-bearing exercise for strong bones  Weight bearing exercise is recommended for at least 30 minutes, 3 times a week  Ask your healthcare provider about the best exercise plan for you  · Eat a variety of healthy foods  Include fruits, vegetables, whole grains (whole-wheat bread, pasta, and cereals), low-fat dairy, and lean protein foods (beans, poultry, and fish)  Limit foods high in sodium (salt)  Ask your healthcare provider for more information about a meal plan that is right for you  · Do not smoke  If you smoke, it is never too late to quit  You are more likely to have a heart attack, lung disease, blood clots, and cancer if you smoke  Ask your healthcare provider for information if you need help quitting  · Take supplements as directed  You may need extra calcium and vitamin D to help prevent osteoporosis  · Limit alcohol and caffeine  Alcohol and caffeine may worsen your symptoms  When should I contact my healthcare provider? · You have vaginal bleeding after menopause  · You have questions or concerns about your condition or care      CARE AGREEMENT:   You have the right to help plan your care  Learn about your health condition and how it may be treated  Discuss treatment options with your healthcare providers to decide what care you want to receive  You always have the right to refuse treatment  The above information is an  only  It is not intended as medical advice for individual conditions or treatments  Talk to your doctor, nurse or pharmacist before following any medical regimen to see if it is safe and effective for you  © Copyright [employer] 2021 Information is for End User's use only and may not be sold, redistributed or otherwise used for commercial purposes  All illustrations and images included in CareNotes® are the copyrighted property of [employer] M [employer]  or Peri Foss       Vaginal Atrophy   AMBULATORY CARE:   Vaginal atrophy  is a condition that causes thinning, drying, and inflammation of vaginal tissue  This condition is caused by decreased levels of estrogen (a female sex hormone)  Vaginal atrophy can increase your risk for vaginal and urinary tract infections  Vaginal atrophy can worsen over time if not treated  Common signs and symptoms include the following:   · Vaginal dryness, itching, and burning    · Vaginal discharge    · Pain or discomfort during sex    · Light bleeding after sex    · Burning during urination    · Frequent, sudden, strong urges to urinate    · Urinary incontinence (loss of control of your bladder)    Contact your healthcare provider if:   · You have a foul-smelling odor coming from your vagina  · You have a thick, cheese-like discharge from your vagina  · You have itching, swelling, or redness in your vagina  · You have pain or burning when you urinate  · Your urine smells bad  · Your symptoms do not improve, or they get worse  · You have questions or concerns about your condition or care  Treatment:   · Over-the counter vaginal moisturizers  can help reduce dryness   Your healthcare provider may recommend that you use a vaginal moisturizer several times each week and during sex  Only use creams that are made for vaginal use  Do  not  use petroleum jelly  Lubricants can be used during sex to decrease pain and discomfort  · Estrogen  may help decrease dryness  It may also lower your risk of vaginal infections if you are going through menopause  It can also help to relieve urinary symptoms  Estrogen may be prescribed in the form of a cream, tablet, or ring  These medicines can be applied or inserted into the vagina  Estrogen can also be prescribed in the form of a pill  Follow up with your doctor as directed:  Write down your questions so you remember to ask them during your visits  © Copyright [employer] 2021 Information is for End User's use only and may not be sold, redistributed or otherwise used for commercial purposes  All illustrations and images included in CareNotes® are the copyrighted property of [employer]  or Peri Foss   The above information is an  only  It is not intended as medical advice for individual conditions or treatments  Talk to your doctor, nurse or pharmacist before following any medical regimen to see if it is safe and effective for you

## 2022-01-23 NOTE — PROGRESS NOTES
Diagnoses and all orders for this visit:    Encounter for gynecological examination without abnormal finding  -     Liquid-based pap, screening    Encounter for screening mammogram for malignant neoplasm of breast  -     Mammo screening bilateral w cad; Future    Vaginal atrophy  -     estradiol (ESTRACE) 0 1 mg/g vaginal cream; Insert 0 5 g into the vagina 2 (two) times a week Apply a pea sized amount to external vaginal opening and urethra area twice weekly      Health Maintenance:    Last PAP: , neg per patient   Next PAP Due:vaginal Pap collected today , if neg will D/C based on hx of Hyst in [address] [place] [address], pt reports she has gone yearly for Mammos no documentation in care everywhere, pt reports no abnormal's   Next Mammogram:order given     Last Colonoscopy:08/15/2017, pt sts she went last month for Colonoscopy, no records in care everywhere  Reports Neg results     Last DEXA: Not on file, advised 72    Pleasant 59 y o  , female , ,   postmenopausal x 23 years  surgically induced menopause, here for annual exam  GYN hx includes: No personal  hx of breast, cervical, ovarian or colon CA  Sister with BC   Denies history of abnormal pap smears  She reports no new changes in her health  Hx of Dermoid tumor  Denies any breast concerns  Denies postmenopausal bleeding or issues with vasomotor symptoms  Denies vaginal issues  Denies pelvic pain, reports dyspareunia   Denies symptoms of pelvic organ prolapse, urinary, or fecal incontinence  Is  sexually active  Monogamous relationship  Denies STI concerns   declines STD testing  No intimate partner violence        Past Medical History:   Diagnosis Date    Abnormal mammogram 05/15/2013    Anemia     Cancer ([facility] [address] )     desmoitosis    Desmoid tumor     Last Assessed: 2017    Hyperlipidemia     Hypertension      Past Surgical History:   Procedure Laterality Date    FRACTURE SURGERY      HYSTERECTOMY      OTHER SURGICAL HISTORY      Arm Incision: Dermoid tumor, right Arm     PARTIAL HYSTERECTOMY      FL COLONOSCOPY FLX DX W/COLLJ SPEC WHEN PFRMD N/A 8/15/2017    Procedure: COLONOSCOPY;  Surgeon: Laila Aguilera MD;  [facility]; Service: Gastroenterology    FL OPEN RX DISTAL RADIUS FX, INTRA-ARTICULAR, 3+ FRAG Left 2/9/2021    Procedure: OPEN REDUCTION W/ INTERNAL FIXATION (ORIF) RADIUS / ULNA (WRIST) left;  Surgeon: Gumaro Zayas MD;  [facility];  Service: Orthopedics    FL WRIST [place] LIG Left 8/10/2021    Procedure: RELEASE LEFT CARPAL TUNNEL ENDOSCOPIC;  Surgeon: Gumaro Zayas MD;  [facility];  Service: Orthopedics    SKIN BIOPSY      SKIN CANCER EXCISION      Melanoma Excision     TONSILLECTOMY        Family History   Problem Relation Age of Onset    Diabetes Mother     No Known Problems Father     Breast cancer Sister     Lung cancer Sister     Pancreatic cancer Sister      Social History     Tobacco Use    Smoking status: Never Smoker    Smokeless tobacco: Never Used   Vaping Use    Vaping Use: Never used   Substance Use Topics    Alcohol use:  Yes     Alcohol/week: 1 0 standard drink     Types: 1 Shots of liquor per week     Comment: One shot of liquor occassionally at night    Drug use: Yes     Types: Marijuana     Comment: THC Medical marijuana       Current Outpatient Medications:     cholecalciferol (VITAMIN D3) 1,000 units tablet, Take 2,000 Units by mouth daily, Disp: , Rfl:     cyclobenzaprine (FLEXERIL) 5 mg tablet, , Disp: , Rfl:     dronabinol (MARINOL) 5 MG capsule, Take 15 mg by mouth 4 (four) times a day (before meals and at bedtime), Disp: , Rfl:     meloxicam (MOBIC) 7 5 mg tablet, , Disp: , Rfl:     multivitamin (THERAGRAN) TABS, Take 1 tablet by mouth daily, Disp: , Rfl:     Omega-3 Fatty Acids (FISH OIL PO), Take by mouth, Disp: , Rfl:     Probiotic Product (PROBIOTIC-10 PO), Take by mouth, Disp: , Rfl:     [START ON 1/27/2022] estradiol (ESTRACE) 0 1 mg/g vaginal cream, Insert 0 5 g into the vagina 2 (two) times a week Apply a pea sized amount to external vaginal opening and urethra area twice weekly, Disp: 90 g, Rfl: 0  Patient Active Problem List    Diagnosis Date Noted    Desmoid tumor 01/26/2022    Sepsis ([facility] [address] ) 07/20/2020    Cellulitis of right upper extremity 05/09/2018    Lymphedema 03/26/2018    Chronic insomnia 06/19/2017    Lymphedema of right arm 06/19/2017    Peripheral neuropathy 06/29/2015    Back pain, chronic 12/22/2014    Hyperlipidemia, mixed 12/18/2014       Allergies   Allergen Reactions    Aspirin GI Intolerance and Abdominal Pain     ABD     Chlorpromazine Anaphylaxis     PHENOTHIAZINE=ANAPHYLAXIS    Codeine Anaphylaxis     Anaphylaxis  abd pain   Meperidine Anaphylaxis, Hives and Other (See Comments)     VOMITS  VOMITS  Category: Allergy;     Phenothiazines Anaphylaxis and Throat Swelling     Category: Allergy;     Saccharin - Food Allergy Anaphylaxis    Ibuprofen Hives     H    Ampicillin-Sulbactam Sodium Hives    Gluten Meal - Food Allergy GI Intolerance    Levofloxacin Hives    Neomycin Other (See Comments), Edema and Hives     Category: Allergy;   swelling    Other Throat Swelling, Hives and Other (See Comments)     ASA=GERD  ASA=GERD  Category: Allergy;     Citrus - Food Allergy Rash    Latex Hives and Rash     Category: Allergy;        OB History   No obstetric history on file  Vitals:    01/26/22 1447   BP: 120/78   BP Location: Left arm   Patient Position: Sitting   Cuff Size: Standard   Weight: 68 9 kg (152 lb)   Height: 5' 8" (1 727 m)     Body mass index is 23 11 kg/m²  Review of Systems     Constitutional: Negative for chills, fatigue, fever, headaches, visual disturbances, and unexpected weight change  Respiratory: Negative for cough, & shortness of breath  Cardiovascular: Negative for chest pain        Gastrointestinal: Negative for Abd pain, nausea & vomiting, constipation and diarrhea  Genitourinary: Negative for difficulty urinating, dysuria, hematuria, , unusual vaginal bleeding or discharge  Skin: Negative skin changes        Physical Exam     Constitutional: Alert & Oriented x3, well-developed and well-nourished  No distress  HENT: Atraumatic, Normocephalic, Conjunctivae clear  Neck: Normal range of motion  Neck supple  No thyromegaly, mass, nodules or tenderness  Pulmonary: Effort normal  Lungs clear to ascultation bilateral  Cardiac: RRR, no murmur   Abdominal: Soft  No tenderness or masses  Musculoskeletal: Normal ROM  Skin: Warm & Dry  Psychological: Normal mood, thought content, behavior & judgement     Breasts:   Right: tissue soft without masses, tenderness, skin changes or nipple discharge  No areas of erythema or pain  No subclavicular, axillary, pectoral adenopathy  Left:  tissue soft without masses, tenderness, skin changes or nipple discharge  No areas of erythema or pain  No subclavicular, axillary, pectoral adenopathy    Pelvic exam was performed with patient supine, lithotomy position  Exam is consistent with  atrophic changes  Vulva/ Vestibule: Right: Negative rash, tenderness, lesion or injury                               Left: Negative rash, tenderness, lesion or injury   Urethral meatus: Negative for  tenderness, inflammation or discharge  protuberant with small caruncle    Uterus:surgically absent  Cervix:surgically absent   Right adnexa: surgically absent   Left Adnexal: surgically absent   Vagina: Consistent with  atrophic changes  No erythema, tenderness, masses, or foreign body in the vagina  No signs of injury around the vagina  No unusual vaginal discharge   Perineum without lesions, signs of injury, erythema or swelling  Inguinal Canal:        Right: No inguinal adenopathy or hernia present  Left: No inguinal adenopathy or hernia present  Advised to call with any Post Menopausal bleeding     Calcium/ Vit D dietary requirements discussed,   Weight bearing exercises minium of 150 mins/weekly advised  Kegel exercises advised   Reviewed perineal hygiene and vaginal dryness post menopause  SBE and yearly mammography advised  ASCCP guidelines reviewed  Will discontinue PAP's according to recommendations  Condoms encouraged with all sexual activity to prevent STI's  Health maintenance encouraged with PMD, remain current with Colonoscopy, Dexa scan, and recommended vaccines  Advised to call with any issues, all concerns & questions addressed  Discussed vaginal estrogen replacement as tx of vaginal dryness and vaginal atrophy   Pt would like to try tx, order placed, benefits & 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 reviewed with pt as well as instructions for use     F/U annually or Biannual if Medicare

## 2022-01-26 ENCOUNTER — OFFICE VISIT (OUTPATIENT)
Dept: OBGYN CLINIC | Facility: CLINIC | Age: 62
End: 2022-01-26
Payer: COMMERCIAL

## 2022-01-26 VITALS
DIASTOLIC BLOOD PRESSURE: 78 MMHG | WEIGHT: 152 LBS | HEIGHT: 68 IN | BODY MASS INDEX: 23.04 KG/M2 | SYSTOLIC BLOOD PRESSURE: 120 MMHG

## 2022-01-26 DIAGNOSIS — Z12.31 ENCOUNTER FOR SCREENING MAMMOGRAM FOR MALIGNANT NEOPLASM OF BREAST: ICD-10-CM

## 2022-01-26 DIAGNOSIS — Z01.419 ENCOUNTER FOR GYNECOLOGICAL EXAMINATION WITHOUT ABNORMAL FINDING: Primary | ICD-10-CM

## 2022-01-26 DIAGNOSIS — N95.2 VAGINAL ATROPHY: ICD-10-CM

## 2022-01-26 PROBLEM — D48.119 DESMOID TUMOR: Status: ACTIVE | Noted: 2022-01-26

## 2022-01-26 PROBLEM — D48.1 DESMOID TUMOR: Status: ACTIVE | Noted: 2022-01-26

## 2022-01-26 PROCEDURE — G0145 SCR C/V CYTO,THINLAYER,RESCR: HCPCS | Performed by: OBSTETRICS & GYNECOLOGY

## 2022-01-26 PROCEDURE — 99386 PREV VISIT NEW AGE 40-64: CPT | Performed by: OBSTETRICS & GYNECOLOGY

## 2022-01-26 PROCEDURE — G0476 HPV COMBO ASSAY CA SCREEN: HCPCS | Performed by: OBSTETRICS & GYNECOLOGY

## 2022-01-26 RX ORDER — CYCLOBENZAPRINE HCL 5 MG
TABLET ORAL
COMMUNITY
Start: 2021-12-23 | End: 2022-06-15 | Stop reason: ALTCHOICE

## 2022-01-26 RX ORDER — MELOXICAM 7.5 MG/1
TABLET ORAL
COMMUNITY
Start: 2021-12-23 | End: 2022-06-15 | Stop reason: ALTCHOICE

## 2022-01-26 RX ORDER — ESTRADIOL 0.1 MG/G
0.5 CREAM VAGINAL 2 TIMES WEEKLY
Qty: 90 G | Refills: 0 | Status: SHIPPED | OUTPATIENT
Start: 2022-01-27 | End: 2022-03-29 | Stop reason: SDUPTHER

## 2022-01-27 LAB
HPV HR 12 DNA CVX QL NAA+PROBE: NEGATIVE
HPV16 DNA CVX QL NAA+PROBE: NEGATIVE
HPV18 DNA CVX QL NAA+PROBE: NEGATIVE

## 2022-02-01 LAB
LAB AP GYN PRIMARY INTERPRETATION: NORMAL
Lab: NORMAL

## 2022-02-18 ENCOUNTER — TELEPHONE (OUTPATIENT)
Dept: OBGYN CLINIC | Facility: OTHER | Age: 62
End: 2022-02-18

## 2022-02-18 NOTE — TELEPHONE ENCOUNTER
Hello!!    I have a patient that had surgery with Dr Garlan Pallas back in 2021  She said her wrists just are not any better    Patient is  Carly Vitor    : [birth year]-  MRN : [id number]  C/b # [id number]  Reason for Appointment : Follow up  Requested Doctor & Location : She did request to see Dawson Santana if possible    Thank you!     Ginny Wilkerson

## 2022-02-21 ENCOUNTER — APPOINTMENT (EMERGENCY)
Dept: RADIOLOGY | Facility: HOSPITAL | Age: 62
End: 2022-02-21
Payer: COMMERCIAL

## 2022-02-21 ENCOUNTER — HOSPITAL ENCOUNTER (EMERGENCY)
Facility: HOSPITAL | Age: 62
Discharge: HOME | End: 2022-02-21
Attending: EMERGENCY MEDICINE | Admitting: EMERGENCY MEDICINE
Payer: COMMERCIAL

## 2022-02-21 VITALS
HEIGHT: 68 IN | HEART RATE: 93 BPM | SYSTOLIC BLOOD PRESSURE: 136 MMHG | OXYGEN SATURATION: 99 % | DIASTOLIC BLOOD PRESSURE: 99 MMHG | RESPIRATION RATE: 18 BRPM | BODY MASS INDEX: 23.04 KG/M2 | TEMPERATURE: 98.6 F | WEIGHT: 152 LBS

## 2022-02-21 DIAGNOSIS — M25.532 ACUTE PAIN OF LEFT WRIST: ICD-10-CM
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
## 2022-02-21 DIAGNOSIS — S60.512A ABRASION OF PALM OF HAND, LEFT, INITIAL ENCOUNTER: ICD-10-CM

## 2022-02-21 PROCEDURE — 99284 EMERGENCY DEPT VISIT MOD MDM: CPT | Performed by: PHYSICIAN ASSISTANT

## 2022-02-21 PROCEDURE — 73030 X-RAY EXAM OF SHOULDER: CPT

## 2022-02-21 PROCEDURE — 73110 X-RAY EXAM OF WRIST: CPT

## 2022-02-21 PROCEDURE — 73564 X-RAY EXAM KNEE 4 OR MORE: CPT

## 2022-02-21 PROCEDURE — 99283 EMERGENCY DEPT VISIT LOW MDM: CPT

## 2022-02-21 PROCEDURE — 73130 X-RAY EXAM OF HAND: CPT

## 2022-02-21 RX ORDER — ACETAMINOPHEN 325 MG/1
975 TABLET ORAL ONCE
Status: COMPLETED | OUTPATIENT
Start: 2022-02-21 | End: 2022-02-21

## 2022-02-21 RX ADMIN — ACETAMINOPHEN 975 MG: 325 TABLET, FILM COATED ORAL at 15:06

## 2022-02-21 NOTE — ED PROVIDER NOTES
History  Chief Complaint   Patient presents with    Fall     pt states she fell and injured her left shoulder and both hands  59yo female with a history of a prior left carpal tunnel surgery presenting for evaluation after a fall about 30 minutes ago  Patient was walking on the sidewalk and tripped falling her bilateral hands and knees  There was no head strike or loss of consciousness  She currently complains of left wrist pain and left shoulder pain with shooting pains up her arm if she moves the wrist a certain way  Patient is worried because she has a prior history of carpal tunnel surgery  She also underwent ORIF of the left distal radius about a year ago  No new paresthesias  History provided by:  Patient and medical records   used: No    Fall  Mechanism of injury: fall    Injury location:  Hand  Hand injury location:  L hand and R hand  Incident location:  Outdoors  Time since incident:  30 minutes  Arrived directly from scene: yes    Fall:     Fall occurred:  Walking    Height of fall:  Ground level    Impact surface:  [place]    Point of impact:  Hands and knees  Suspicion of alcohol use: no    Suspicion of drug use: no    Tetanus status:  Up to date  Associated symptoms: no abdominal pain, no back pain, no difficulty breathing, no headaches, no loss of consciousness, no nausea, no neck pain, no seizures and no vomiting    Risk factors: no anticoagulation therapy        Prior to Admission Medications   Prescriptions Last Dose Informant Patient Reported? Taking?    Omega-3 Fatty Acids (FISH OIL PO)  Self Yes No   Sig: Take by mouth   Probiotic Product (PROBIOTIC-10 PO)  Self Yes No   Sig: Take by mouth   cholecalciferol (VITAMIN D3) 1,000 units tablet  Self Yes No   Sig: Take 2,000 Units by mouth daily   cyclobenzaprine (FLEXERIL) 5 mg tablet   Yes No   dronabinol (MARINOL) 5 MG capsule  Self Yes No   Sig: Take 15 mg by mouth 4 (four) times a day (before meals and at bedtime) estradiol (ESTRACE) 0 1 mg/g vaginal cream   No No   Sig: Insert 0 5 g into the vagina 2 (two) times a week Apply a pea sized amount to external vaginal opening and urethra area twice weekly   meloxicam (MOBIC) 7 5 mg tablet   Yes No   multivitamin (THERAGRAN) TABS  Self Yes No   Sig: Take 1 tablet by mouth daily      Facility-Administered Medications: None       Past Medical History:   Diagnosis Date    Abnormal mammogram 05/15/2013    Anemia     Cancer ([facility] [address] )     desmoitosis    Desmoid tumor     Last Assessed: 6/19/2017    Hyperlipidemia     Hypertension        Past Surgical History:   Procedure Laterality Date    FRACTURE SURGERY      HYSTERECTOMY      OTHER SURGICAL HISTORY      Arm Incision: Dermoid tumor, right Arm     PARTIAL HYSTERECTOMY      GA COLONOSCOPY FLX DX W/COLLJ SPEC WHEN PFRMD N/A 8/15/2017    Procedure: COLONOSCOPY;  Surgeon: Deysi Pompa MD;  [facility]; Service: Gastroenterology    GA OPEN RX DISTAL RADIUS FX, INTRA-ARTICULAR, 3+ FRAG Left 2/9/2021    Procedure: OPEN REDUCTION W/ INTERNAL FIXATION (ORIF) RADIUS / Everlene Esthela (WRIST) left;  Surgeon: Noemy Maddox MD;  [facility];  Service: Orthopedics    GA WRIST [place] LIG Left 8/10/2021    Procedure: RELEASE LEFT CARPAL TUNNEL ENDOSCOPIC;  Surgeon: Noemy Maddox MD;  [facility];  Service: Orthopedics    SKIN BIOPSY      SKIN CANCER EXCISION      Melanoma Excision     TONSILLECTOMY         Family History   Problem Relation Age of Onset    Diabetes Mother     No Known Problems Father     Breast cancer Sister     Lung cancer Sister     Pancreatic cancer Sister      I have reviewed and agree with the history as documented      E-Cigarette/Vaping    E-Cigarette Use Never User      E-Cigarette/Vaping Substances    Nicotine No     THC No     CBD No     Flavoring No     Other No     Unknown No      Social History     Tobacco Use    Smoking status: Never Smoker    Smokeless tobacco: Never Used   Vaping Use    Vaping Use: Never used   Substance Use Topics    Alcohol use: Yes     Alcohol/week: 1 0 standard drink     Types: 1 Shots of liquor per week     Comment: One shot of liquor occassionally at night    Drug use: Yes     Types: Marijuana     Comment: THC Medical marijuana       Review of Systems   Constitutional: Negative for chills and fever  Eyes: Negative for discharge and redness  Gastrointestinal: Negative for abdominal pain, nausea and vomiting  Musculoskeletal: Positive for arthralgias  Negative for back pain and neck pain  Skin: Positive for wound  Neurological: Negative for seizures, loss of consciousness and headaches  Psychiatric/Behavioral: Negative for confusion  The patient is not nervous/anxious  All other systems reviewed and are negative  Physical Exam  Physical Exam  Vitals and nursing note reviewed  Constitutional:       General: She is not in acute distress  Appearance: Normal appearance  She is not toxic-appearing  HENT:      Head: Normocephalic and atraumatic  Right Ear: External ear normal       Left Ear: External ear normal    Eyes:      General: No scleral icterus  Right eye: No discharge  Left eye: No discharge  Conjunctiva/sclera: Conjunctivae normal    Cardiovascular:      Rate and Rhythm: Normal rate  Pulmonary:      Effort: Pulmonary effort is normal  No respiratory distress  Breath sounds: No stridor  Musculoskeletal:         General: No deformity  Normal range of motion  Left shoulder: Tenderness present  No swelling, deformity or effusion  Right wrist: Tenderness present  No swelling, deformity, lacerations or snuff box tenderness  Normal range of motion  Normal pulse  Left wrist: Tenderness present  No swelling, deformity, lacerations or snuff box tenderness  Normal range of motion  Normal pulse  Right hand: No swelling or deformity        Left hand: No swelling or deformity  Cervical back: Normal range of motion  Skin:     General: Skin is warm and dry  Neurological:      General: No focal deficit present  Mental Status: She is alert  Mental status is at baseline     Psychiatric:         Mood and Affect: Mood normal          Behavior: Behavior normal          Vital Signs  ED Triage Vitals   Temperature Pulse Respirations Blood Pressure SpO2   02/21/22 1440 02/21/22 1440 02/21/22 1507 02/21/22 1440 02/21/22 1440   98 6 °F (37 °C) 93 18 136/99 99 %      Temp Source Heart Rate Source Patient Position - Orthostatic VS BP Location FiO2 (%)   02/21/22 1440 02/21/22 1440 02/21/22 1440 02/21/22 1440 --   Oral Monitor Sitting Left arm       Pain Score       02/21/22 1506       8           Vitals:    02/21/22 1440   BP: 136/99   Pulse: 93   Patient Position - Orthostatic VS: Sitting         Visual Acuity      ED Medications  Medications   acetaminophen (TYLENOL) tablet 975 mg (975 mg Oral Given 2/21/22 1506)       Diagnostic Studies  Results Reviewed     None                 XR hand 3+ views RIGHT   ED Interpretation by Skip Branham PA-C (02/21 1649)   No acute fracture      XR wrist 3+ views RIGHT   ED Interpretation by Skip Branham PA-C (02/21 1649)   No acute fracture      XR hand 3+ views LEFT   ED Interpretation by Skip Branham PA-C (02/21 1539)   No fracture      XR wrist 3+ views LEFT   ED Interpretation by Skip Branham PA-C (02/21 1539)   No fracture, intact hardware      XR shoulder 2+ views LEFT   ED Interpretation by Skip Branham PA-C (02/21 1539)   No fracture      XR knee 4+ views Right injury    (Results Pending)   XR knee 4+ views left injury    (Results Pending)              Procedures  Procedures         ED Course                   MDM  Number of Diagnoses or Management Options  Abrasion of palm of hand, left, initial encounter: new and requires workup  Acute pain of left wrist: new and requires workup  Fall on same level from tripping: new and requires workup  Diagnosis management comments: 59yo female presenting after a mechanical fall 30 minutes ago  Tripped on a sidewalk and fell on her hands and knees  C/o left wrist pain, right wrist pain, left shoulder pain, bilateral knee pain  Main complaint is left wrist pain and she has a prior ORIF of the left radius as well as a prior carpal tunnel surgery  No deformities on exam  Extremities are neurovascularly intact  X-rays of left hand/wrist, left shoulder, right hand/wrist, left knee, and right knee obtained  No fracture seen per my interpretation  Advised RICE and Tylenol for pain  Advised f/u with orthopedics if symptoms persist  Patient discharged in stable condition  Amount and/or Complexity of Data Reviewed  Tests in the radiology section of CPT®: ordered and reviewed  Independent visualization of images, tracings, or specimens: yes    Risk of Complications, Morbidity, and/or Mortality  Presenting problems: low  Diagnostic procedures: low  Management options: low    Patient Progress  Patient progress: stable      Disposition  Final diagnoses:   Fall on same level from tripping   Acute pain of left wrist   Abrasion of palm of hand, left, initial encounter     Time reflects when diagnosis was documented in both MDM as applicable and the Disposition within this note     Time User Action Codes Description Comment    2/21/2022  4:52  Navita, [address] [place] [address] Add [W01  0XXA] Fall on same level from tripping     2/21/2022  4:52  Navita [facility] [M25 532] Acute pain of left wrist     2/21/2022  4:52  Navita Spade Add [S60 512A] Abrasion of palm of hand, left, initial encounter       ED Disposition     ED Disposition Condition Date/Time Comment    Discharge Stable Mon Feb 21, 2022  4:52 PM Florin Leon discharge to home/self care              Follow-up Information     Follow up With Specialties Details Why Contact Info Additional [phone] [facility] [place] [facility]  As needed [address] [facility]  [address] [facility], [address], [place], [place], [address]    [facility] Emergency Medicine  If symptoms worsen [facility] Emergency [facility], [address] [place], [place], [address]          Discharge Medication List as of 2/21/2022  4:53 PM      CONTINUE these medications which have NOT CHANGED    Details   cholecalciferol (VITAMIN D3) 1,000 units tablet Take 2,000 Units by mouth daily, Historical Med      cyclobenzaprine (FLEXERIL) 5 mg tablet Starting Thu 12/23/2021, Historical Med      dronabinol (MARINOL) 5 MG capsule Take 15 mg by mouth 4 (four) times a day (before meals and at bedtime), Historical Med      estradiol (ESTRACE) 0 1 mg/g vaginal cream Insert 0 5 g into the vagina 2 (two) times a week Apply a pea sized amount to external vaginal opening and urethra area twice weekly, Starting Thu 1/27/2022, Until Sat 2/26/2022, Normal      meloxicam (MOBIC) 7 5 mg tablet Starting Thu 12/23/2021, Historical Med      multivitamin (THERAGRAN) TABS Take 1 tablet by mouth daily, Historical Med      Omega-3 Fatty Acids (FISH OIL PO) Take by mouth, Historical Med      Probiotic Product (PROBIOTIC-10 PO) Take by mouth, Historical Med             No discharge procedures on file      PDMP Review       Value Time User    PDMP Reviewed  Yes 2/4/2021  2:31 PM Mike Klein MD          ED Provider  Electronically Signed by           Sourav Salomon PA-C  02/21/22 1392

## 2022-02-21 NOTE — DISCHARGE INSTRUCTIONS
Clayton fernández [place]  Take Tylenol 650mg every 6 hours as needed for pain  Apply ice to affected area      Please follow-up with orthopedics if symptoms persist

## 2022-03-23 ENCOUNTER — OFFICE VISIT (OUTPATIENT)
Dept: OBGYN CLINIC | Facility: CLINIC | Age: 62
End: 2022-03-23
Payer: COMMERCIAL

## 2022-03-23 VITALS
DIASTOLIC BLOOD PRESSURE: 78 MMHG | HEIGHT: 68 IN | HEART RATE: 67 BPM | WEIGHT: 152 LBS | SYSTOLIC BLOOD PRESSURE: 161 MMHG | BODY MASS INDEX: 23.04 KG/M2

## 2022-03-23 DIAGNOSIS — Z98.890 S/P ORIF (OPEN REDUCTION INTERNAL FIXATION) FRACTURE: ICD-10-CM

## 2022-03-23 DIAGNOSIS — M77.12 LATERAL EPICONDYLITIS OF LEFT ELBOW: ICD-10-CM

## 2022-03-23 DIAGNOSIS — M18.12 ARTHRITIS OF CARPOMETACARPAL (CMC) JOINT OF LEFT THUMB: ICD-10-CM

## 2022-03-23 DIAGNOSIS — G56.02 CARPAL TUNNEL SYNDROME ON LEFT: Primary | ICD-10-CM

## 2022-03-23 DIAGNOSIS — R20.0 NUMBNESS AND TINGLING OF LEFT UPPER EXTREMITY: ICD-10-CM

## 2022-03-23 DIAGNOSIS — R20.2 NUMBNESS AND TINGLING OF LEFT UPPER EXTREMITY: ICD-10-CM

## 2022-03-23 DIAGNOSIS — Z98.890 S/P CARPAL TUNNEL RELEASE: ICD-10-CM

## 2022-03-23 DIAGNOSIS — Z87.81 S/P ORIF (OPEN REDUCTION INTERNAL FIXATION) FRACTURE: ICD-10-CM

## 2022-03-23 PROCEDURE — 99214 OFFICE O/P EST MOD 30 MIN: CPT | Performed by: SURGERY

## 2022-03-23 PROCEDURE — 20600 DRAIN/INJ JOINT/BURSA W/O US: CPT | Performed by: SURGERY

## 2022-03-23 RX ORDER — BUPIVACAINE HYDROCHLORIDE 2.5 MG/ML
0.5 INJECTION, SOLUTION INFILTRATION; PERINEURAL
Status: COMPLETED | OUTPATIENT
Start: 2022-03-23 | End: 2022-03-23

## 2022-03-23 RX ORDER — LIDOCAINE HYDROCHLORIDE 10 MG/ML
1 INJECTION, SOLUTION INFILTRATION; PERINEURAL
Status: COMPLETED | OUTPATIENT
Start: 2022-03-23 | End: 2022-03-23

## 2022-03-23 RX ORDER — TRIAMCINOLONE ACETONIDE 40 MG/ML
20 INJECTION, SUSPENSION INTRA-ARTICULAR; INTRAMUSCULAR
Status: COMPLETED | OUTPATIENT
Start: 2022-03-23 | End: 2022-03-23

## 2022-03-23 RX ADMIN — LIDOCAINE HYDROCHLORIDE 1 ML: 10 INJECTION, SOLUTION INFILTRATION; PERINEURAL at 15:17

## 2022-03-23 RX ADMIN — TRIAMCINOLONE ACETONIDE 20 MG: 40 INJECTION, SUSPENSION INTRA-ARTICULAR; INTRAMUSCULAR at 15:17

## 2022-03-23 RX ADMIN — BUPIVACAINE HYDROCHLORIDE 0.5 ML: 2.5 INJECTION, SOLUTION INFILTRATION; PERINEURAL at 15:17

## 2022-03-23 NOTE — PROGRESS NOTES
Arian PETERS  Attending, Orthopaedic Surgery  Hand, Wrist, and Elbow Surgery  [facility]      ORTHOPAEDIC HAND, WRIST, AND ELBOW OFFICE  VISIT       ASSESSMENT/PLAN:    57-year-old LHD female with persistent numbness in median nerve distribution following left carpal tunnel release, left lateral epicondylitis, left thumb CMC joint arthritis    Reviewed today's physical exam findings with patient at time of visit  She is being provided a referral to PT/OT for lateral epicondylitis of the left elbow  She is also being provided a cock-up wrist splint for nocturnal use to help with active rest of the common extensor tendon of the left upper extremity  She is being provided a referral for MSK ultrasound for further evaluation of the median nerve at the level of the carpal tunnel  Her thumb pain is consistent with primary osteoarthritis of the left thumb CMC joint although only mild degenerative findings are seen on x-rays of left hand  She was offered, and excepted, Kenalog-Marcaine-lidocaine injection to the left thumb CMC joint for relief of pain and inflammation  Patient tolerated treatment well  Lastly, she was provided a Comfort Cool splint to be worn on the left upper extremity with activity for comfort  She will be seen for follow-up after her MSK ultrasound is complete, and she has had the opportunity complete a few sessions of physical therapy  As the patient cannot take oral anti-inflammatories, she was suggested to take Voltaren gel OTC and try on a small patch of skin to see if she has reaction  If she does not, she can continue to use it for relief of the thumb and elbow  If she does have a reaction, she can discontinue use  Patient expresses understanding is in agreement with this treatment plan  The patient verbalized understanding of exam findings and treatment plan   We engaged in the shared decision-making process and treatment options were discussed at length with the patient  Surgical and conservative management discussed today along with risks and benefits  Diagnoses and all orders for this visit:    Numbness and tingling of left upper extremity  Comments:  Persistent following CTR 8/10/2021    S/P carpal tunnel release  Comments:  Xi Lana 8/10/2021    S/P ORIF (open reduction internal fixation) fracture  Comments:  Xi Schwartz 2/9/2021    Lateral epicondylitis of left elbow    Arthritis of carpometacarpal (CMC) joint of left thumb  -     Small joint arthrocentesis: L thumb CMC      Follow Up:  Return in 6 weeks (on 5/4/2022) for Re-evaluation, Diagnostic Test Review  To Do Next Visit:  Re-evaluation of current issue      General Discussions:  Lateral Epicondylitis: The anatomy and physiology of lateral epicondylitis was discussed with the patient today  Typically, degenerative changes in the extensor carpi radialis brevis muscle occur over time  These degenerative changes appear as tears of the tendon on MRI  This creates pain over the lateral epicondyle (outside part of elbow)  This pain typically is made worse with palm down lifting activities as well as anything that involves strength and stability of the wrist   The pain may radiate from the wrist up to the elbow  At times, the shoulder may be weak as well which can predispose or cause continuation of the problem  Conservative treatment usually cures a majority of patients; however, this may take up to 6-9 months  Conservative treatment options typically include activity modification, therapy for strengthening of the shoulder and elbow, tennis elbow straps, and possible corticosteroid injections  Corticosteroid injections are very effective at relieving pain but do not alter the natural history of this process  Rather, steroid injections decrease the pain temporarily to allow for therapy to take place without discomfort   It was discussed that therapy to prevent recurrence is a "life long" process and that if patient relies on the steroid injection alone without performing therapeutic exercises the risk of recurrence is likely  Typical home regimen includes heat and stretching and resisted wrist extension exercises discussed in the office  Surgery is required in fewer than 10% of patients     ____________________________________________________________________________________________________________________________________________      CHIEF COMPLAINT:  Chief Complaint   Patient presents with    Left Wrist - Pain    Right Wrist - Pain       SUBJECTIVE:  Caro Quintanilla is a 59y o  year old LHD female who presents for evaluation of persistent numbness and tingling in the median distribution of left hand following carpal tunnel release surgery performed 8/10/2021  Patient states that she never experienced relief of her neurological symptoms in the left upper extremity following her carpal tunnel surgery  Since that time, she also reports having developed lateral left elbow pain that radiates down the ulnar aspect of the forearm and into the hand, and basilar thumb pain that is most prevalent with heavy gripping motions  She states that she did complete a course of physical therapy following ORIF procedure on 2/9/2021  She states that while she is naturally right-handed, cancer and subsequent lymphedema affecting the right upper extremity has compromised functionality of her left arm and hand, making her more dependent on her left  Pain/symptom timing:  Worse during the day when active  Pain/symptom context:  Worse with activites and work  Pain/symptom modifying factors:  Rest makes better, activities make worse  Pain/symptom associated signs/symptoms: none    Prior treatment   · NSAIDsNo, patient has poor reaction to oral NSAIDs    Has not tried topical NSAID medication   · Injections No   · Bracing/Orthotics No    Physical Therapy Yes     I have personally reviewed all the relevant PMH, PSH, SH, FH, Medications and allergies      PAST MEDICAL HISTORY:  Past Medical History:   Diagnosis Date    Abnormal mammogram 05/15/2013    Anemia     Cancer ([facility] [address] )     desmoitosis    Desmoid tumor     Last Assessed: 6/19/2017    Hyperlipidemia     Hypertension        PAST SURGICAL HISTORY:  Past Surgical History:   Procedure Laterality Date    FRACTURE SURGERY      HYSTERECTOMY      OTHER SURGICAL HISTORY      Arm Incision: Dermoid tumor, right Arm     PARTIAL HYSTERECTOMY      OK COLONOSCOPY FLX DX W/COLLJ SPEC WHEN PFRMD N/A 8/15/2017    Procedure: COLONOSCOPY;  Surgeon: Jaziel Harvey MD;  Location: [facility]; Service: Gastroenterology    OK OPEN RX DISTAL RADIUS FX, INTRA-ARTICULAR, 3+ FRAG Left 2/9/2021    Procedure: OPEN REDUCTION W/ INTERNAL FIXATION (ORIF) RADIUS / ULNA (WRIST) left;  Surgeon: Rojas Swan MD;  [facility]: [facility];  Service: Orthopedics    OK WRIST Lily Endow LIG Left 8/10/2021    Procedure: RELEASE LEFT CARPAL TUNNEL ENDOSCOPIC;  Surgeon: Rojas Swan MD;  [facility]: [facility];  Service: Orthopedics    SKIN BIOPSY      SKIN CANCER EXCISION      Melanoma Excision     TONSILLECTOMY         FAMILY HISTORY:  Family History   Problem Relation Age of Onset    Diabetes Mother     No Known Problems Father     Breast cancer Sister     Lung cancer Sister     Pancreatic cancer Sister        SOCIAL HISTORY:  Social History     Tobacco Use    Smoking status: Never Smoker    Smokeless tobacco: Never Used   Vaping Use    Vaping Use: Never used   Substance Use Topics    Alcohol use:  Yes     Alcohol/week: 1 0 standard drink     Types: 1 Shots of liquor per week     Comment: One shot of liquor occassionally at night    Drug use: Yes     Types: Marijuana     Comment: THC Medical marijuana       MEDICATIONS:    Current Outpatient Medications:     cholecalciferol (VITAMIN D3) 1,000 units tablet, Take 2,000 Units by mouth daily, Disp: , Rfl:     cyclobenzaprine (FLEXERIL) 5 mg tablet, , Disp: , Rfl:     dronabinol (MARINOL) 5 MG capsule, Take 15 mg by mouth 4 (four) times a day (before meals and at bedtime), Disp: , Rfl:     meloxicam (MOBIC) 7 5 mg tablet, , Disp: , Rfl:     multivitamin (THERAGRAN) TABS, Take 1 tablet by mouth daily, Disp: , Rfl:     Omega-3 Fatty Acids (FISH OIL PO), Take by mouth, Disp: , Rfl:     Probiotic Product (PROBIOTIC-10 PO), Take by mouth, Disp: , Rfl:     estradiol (ESTRACE) 0 1 mg/g vaginal cream, Insert 0 5 g into the vagina 2 (two) times a week Apply a pea sized amount to external vaginal opening and urethra area twice weekly, Disp: 90 g, Rfl: 0    ALLERGIES:  Allergies   Allergen Reactions    Aspirin GI Intolerance and Abdominal Pain     ABD     Chlorpromazine Anaphylaxis     PHENOTHIAZINE=ANAPHYLAXIS    Codeine Anaphylaxis     Anaphylaxis  abd pain   Meperidine Anaphylaxis, Hives and Other (See Comments)     VOMITS  VOMITS  Category: Allergy;     Phenothiazines Anaphylaxis and Throat Swelling     Category: Allergy;     Saccharin - Food Allergy Anaphylaxis    Ibuprofen Hives     H    Ampicillin-Sulbactam Sodium Hives    Gluten Meal - Food Allergy GI Intolerance    Levofloxacin Hives    Neomycin Other (See Comments), Edema and Hives     Category: Allergy;   swelling    Other Throat Swelling, Hives and Other (See Comments)     ASA=GERD  ASA=GERD  Category: Allergy;     Citrus - Food Allergy Rash    Latex Hives and Rash     Category: Allergy;            REVIEW OF SYSTEMS:  Review of Systems   Constitutional: Negative for chills, fever and unexpected weight change  HENT: Negative for hearing loss, nosebleeds and sore throat  Eyes: Negative for pain, redness and visual disturbance  Respiratory: Negative for cough, shortness of breath and wheezing  Cardiovascular: Negative for chest pain, palpitations and leg swelling  Gastrointestinal: Negative for abdominal pain, nausea and vomiting     Endocrine: Negative for polydipsia and polyuria  Genitourinary: Negative for dysuria and hematuria  Musculoskeletal:        As noted in HPI   Skin: Negative for rash and wound  Neurological: Negative for dizziness, numbness and headaches  Psychiatric/Behavioral: Negative for decreased concentration and suicidal ideas  The patient is not nervous/anxious  VITALS:  Vitals:    03/23/22 1437   BP: 161/78   Pulse: 67       LABS:  HgA1c:   Lab Results   Component Value Date    HGBA1C 5 3 07/13/2021     BMP:   Lab Results   Component Value Date    GLUCOSE 98 05/02/2017    CALCIUM 8 9 07/13/2021    K 4 4 07/13/2021    CO2 30 07/13/2021     07/13/2021    BUN 11 07/13/2021    CREATININE 0 64 07/13/2021       _____________________________________________________  PHYSICAL EXAMINATION:  General: well developed and well nourished, alert, oriented times 3 and appears comfortable  Psychiatric: Normal  HEENT: Normocephalic, Atraumatic Trachea Midline, No torticollis  Pulmonary: No audible wheezing or respiratory distress   Abdomen/GI: Non tender, non distended   Cardiovascular: No pitting edema, 2+ radial pulse   Skin: No masses, erythema, lacerations, fluctation, ulcerations  Neurovascular: Sensation intact to the Ulnar Nerve, Sensation Intact to the Radial Nerve, Decreased Sensation to  the Median Nerve, Motor Intact to the Median, Ulnar, Radial Nerve and Pulses Intact  Musculoskeletal: Normal, except as noted in detailed exam and in HPI        MUSCULOSKELETAL EXAMINATION:  Left upper extremity -   Patient presents with no obvious anatomical deformity   Skin is warm dry to touch with no signs of erythema, ecchymosis, infection   No soft tissue swelling or effusion noted   TTP over thumb CMC joint with positive CMC grind, TTP over common extensor tendon at lateral epicondylar origin  Demonstrates good active wrist flexion, extension, pronation, supination, ulnar deviation, and radial deviation  5/5 wrist extension with elbow flexed and extended, Pain recreation at lateral epicondyle with resisted wrist extension  5/5 finger abduction and adduction  5/5 closed fist   Diminished sensation in the median nerve distribution  2+ distal radial pulse with brisk capillary refill to the fingers  ___________________________________________________  STUDIES REVIEWED:  No new imaging reviewed this visit    PROCEDURES PERFORMED:  Small joint arthrocentesis: L thumb CMC  [place] Protocol:  Consent: Verbal consent obtained  Risks and benefits: risks, benefits and alternatives were discussed  Consent given by: patient  Timeout called at: 3/23/2022 3:15 PM   Patient understanding: patient states understanding of the procedure being performed  Site marked: the operative site was marked  Radiology Images displayed and confirmed   If images not available, report reviewed: imaging studies available  Patient identity confirmed: verbally with patient    Supporting Documentation  Indications: pain and joint swelling   Procedure Details  Location: thumb - L thumb CMC  Needle size: 25 G  Ultrasound guidance: no  Approach: radial  Medications administered: 0 5 mL bupivacaine 0 25 %; 1 mL lidocaine 1 %; 20 mg triamcinolone acetonide 40 mg/mL    Patient tolerance: patient tolerated the procedure well with no immediate complications  Dressing:  Sterile dressing applied          _____________________________________________________      Scribe Attestation    I,:  Marilu Castellano am acting as a scribe while in the presence of the attending physician :       I,:  Gabriela Banerjee MD personally performed the services described in this documentation    as scribed in my presence :

## 2022-03-29 ENCOUNTER — OFFICE VISIT (OUTPATIENT)
Dept: OBGYN CLINIC | Facility: CLINIC | Age: 62
End: 2022-03-29
Payer: COMMERCIAL

## 2022-03-29 ENCOUNTER — HOSPITAL ENCOUNTER (OUTPATIENT)
Dept: MAMMOGRAPHY | Facility: CLINIC | Age: 62
Discharge: HOME | End: 2022-03-29
Payer: COMMERCIAL

## 2022-03-29 VITALS
SYSTOLIC BLOOD PRESSURE: 126 MMHG | HEIGHT: 68 IN | BODY MASS INDEX: 22.43 KG/M2 | WEIGHT: 148 LBS | DIASTOLIC BLOOD PRESSURE: 70 MMHG

## 2022-03-29 VITALS — BODY MASS INDEX: 23.04 KG/M2 | HEIGHT: 68 IN | WEIGHT: 152 LBS

## 2022-03-29 DIAGNOSIS — Z12.31 ENCOUNTER FOR SCREENING MAMMOGRAM FOR MALIGNANT NEOPLASM OF BREAST: ICD-10-CM

## 2022-03-29 DIAGNOSIS — N95.2 VAGINAL ATROPHY: ICD-10-CM

## 2022-03-29 PROCEDURE — 77067 SCR MAMMO BI INCL CAD: CPT

## 2022-03-29 PROCEDURE — 77063 BREAST TOMOSYNTHESIS BI: CPT

## 2022-03-29 PROCEDURE — 99213 OFFICE O/P EST LOW 20 MIN: CPT | Performed by: OBSTETRICS & GYNECOLOGY

## 2022-03-29 RX ORDER — ESTRADIOL 0.1 MG/G
0.5 CREAM VAGINAL 2 TIMES WEEKLY
Qty: 42.5 G | Refills: 3 | Status: SHIPPED | OUTPATIENT
Start: 2022-03-31 | End: 2022-06-15 | Stop reason: ALTCHOICE

## 2022-03-29 NOTE — PATIENT INSTRUCTIONS
Vaginal Atrophy   AMBULATORY CARE:   Vaginal atrophy  is a condition that causes thinning, drying, and inflammation of vaginal tissue  This condition is caused by decreased levels of estrogen (a female sex hormone)  Vaginal atrophy can increase your risk for vaginal and urinary tract infections  Vaginal atrophy can worsen over time if not treated  Common signs and symptoms include the following:   · Vaginal dryness, itching, and burning    · Vaginal discharge    · Pain or discomfort during sex    · Light bleeding after sex    · Burning during urination    · Frequent, sudden, strong urges to urinate    · Urinary incontinence (loss of control of your bladder)    Contact your healthcare provider if:   · You have a foul-smelling odor coming from your vagina  · You have a thick, cheese-like discharge from your vagina  · You have itching, swelling, or redness in your vagina  · You have pain or burning when you urinate  · Your urine smells bad  · Your symptoms do not improve, or they get worse  · You have questions or concerns about your condition or care  Treatment:   · Over-the counter vaginal moisturizers  can help reduce dryness  Your healthcare provider may recommend that you use a vaginal moisturizer several times each week and during sex  Only use creams that are made for vaginal use  Do  not  use petroleum jelly  Lubricants can be used during sex to decrease pain and discomfort  · Estrogen  may help decrease dryness  It may also lower your risk of vaginal infections if you are going through menopause  It can also help to relieve urinary symptoms  Estrogen may be prescribed in the form of a cream, tablet, or ring  These medicines can be applied or inserted into the vagina  Estrogen can also be prescribed in the form of a pill  Follow up with your doctor as directed:  Write down your questions so you remember to ask them during your visits     © Copyright [employer] 2022 Information is for End User's use only and may not be sold, redistributed or otherwise used for commercial purposes  All illustrations and images included in CareNotes® are the copyrighted property of A D A M , Inc  or Peri Camacho  The above information is an  only  It is not intended as medical advice for individual conditions or treatments  Talk to your doctor, nurse or pharmacist before following any medical regimen to see if it is safe and effective for you

## 2022-03-29 NOTE — PROGRESS NOTES
Assessment/Plan:    No problem-specific Assessment & Plan notes found for this encounter  Diagnoses and all orders for this visit:    Vaginal atrophy  -     estradiol (ESTRACE) 0 1 mg/g vaginal cream; Insert 0 5 g into the vagina 2 (two) times a week Apply a pea sized amount to external vaginal opening and urethra area twice weekly          Subjective:      Patient ID: Annie Paul is a 59 y o  female  23-year-old female presents for 2 month follow-up after using Estrace to vulvar tissues for dyspareunia patient reports that she ran out of her medication in approximately 2 weeks and has not used since  Patient reports using daily  Patient reports significant improvement while using  Patient has not had intercourse since starting medication  Patient was instructed at her last exam that Estrace was to be used 2 times a week, and no more than 3 times weekly  Patient would like a refill on the medication and understands of directions more clearly  Patient declines vaginal exam at this time and will follow-up as needed  Patient denies pelvic pain postmenopausal bleeding, vaginal discharge,  vaginal odor   No other complaints      The following portions of the patient's history were reviewed and updated as appropriate: allergies, current medications, past family history, past medical history, past social history, past surgical history and problem list     Review of Systems   Constitutional: Negative for chills, fatigue and fever  Eyes: Negative for visual disturbance  Respiratory: Negative for cough and shortness of breath  Cardiovascular: Negative for chest pain  Gastrointestinal: Negative for abdominal pain  Genitourinary: Negative for vaginal bleeding, vaginal discharge and vaginal pain           Objective:      /70 (BP Location: Left arm, Patient Position: Sitting, Cuff Size: Large)   Ht 5' 8" (1 727 m)   Wt 67 1 kg (148 lb)   BMI 22 50 kg/m²          Physical Exam  Vitals and nursing note reviewed  Constitutional:       Appearance: Normal appearance  She is normal weight  HENT:      Head: Normocephalic and atraumatic  Eyes:      Conjunctiva/sclera: Conjunctivae normal    Cardiovascular:      Rate and Rhythm: Normal rate  Pulmonary:      Effort: Pulmonary effort is normal    Musculoskeletal:         General: Normal range of motion  Cervical back: Normal range of motion  Skin:     General: Skin is warm and dry  Neurological:      Mental Status: She is alert  Psychiatric:         Mood and Affect: Mood normal          Behavior: Behavior normal          Thought Content:  Thought content normal          Judgment: Judgment normal        follow-up p r n  and for annual exam

## 2022-04-04 ENCOUNTER — HOSPITAL ENCOUNTER (OUTPATIENT)
Dept: ULTRASOUND IMAGING | Facility: CLINIC | Age: 62
Discharge: HOME | End: 2022-04-04
Payer: COMMERCIAL

## 2022-04-04 DIAGNOSIS — R92.8 ABNORMAL MAMMOGRAM: ICD-10-CM

## 2022-04-04 PROCEDURE — 76642 ULTRASOUND BREAST LIMITED: CPT

## 2022-04-04 NOTE — PROGRESS NOTES
Met with patient, patients  and Dr Phyllis Cuello regarding recommendation for;    _____ RIGHT ___X___LEFT      __X___Ultrasound guided  ______Stereotactic axilla biopsy  __X___Verbalized understanding        Blood thinners:  No: __X___ Yes: ______ What:                 Biopsy teaching sheet given:  Yes: ___X___ No: ________    Pt given contact information and adv to call with any questions/needs

## 2022-04-07 ENCOUNTER — APPOINTMENT (OUTPATIENT)
Dept: LAB | Facility: HOSPITAL | Age: 62
End: 2022-04-07
Payer: COMMERCIAL

## 2022-04-07 DIAGNOSIS — E78.5 HYPERLIPIDEMIA, UNSPECIFIED HYPERLIPIDEMIA TYPE: ICD-10-CM

## 2022-04-07 DIAGNOSIS — I51.9 MYXEDEMA HEART DISEASE: ICD-10-CM

## 2022-04-07 DIAGNOSIS — R53.83 FATIGUE, UNSPECIFIED TYPE: ICD-10-CM

## 2022-04-07 DIAGNOSIS — E13.9 DIABETES MELLITUS OF OTHER TYPE WITHOUT COMPLICATION, UNSPECIFIED WHETHER LONG TERM INSULIN USE (HCC): ICD-10-CM

## 2022-04-07 DIAGNOSIS — E03.9 MYXEDEMA HEART DISEASE: ICD-10-CM

## 2022-04-07 LAB
ALBUMIN SERPL BCP-MCNC: 4 G/DL (ref 3.5–5)
ALP SERPL-CCNC: 128 U/L (ref 46–116)
ALT SERPL W P-5'-P-CCNC: 24 U/L (ref 12–78)
ANION GAP SERPL CALCULATED.3IONS-SCNC: 7 MMOL/L (ref 4–13)
AST SERPL W P-5'-P-CCNC: 19 U/L (ref 5–45)
BILIRUB SERPL-MCNC: 0.91 MG/DL (ref 0.2–1)
BUN SERPL-MCNC: 9 MG/DL (ref 5–25)
CALCIUM SERPL-MCNC: 9.3 MG/DL (ref 8.3–10.1)
CHLORIDE SERPL-SCNC: 101 MMOL/L (ref 100–108)
CHOLEST SERPL-MCNC: 316 MG/DL
CO2 SERPL-SCNC: 28 MMOL/L (ref 21–32)
CREAT SERPL-MCNC: 0.62 MG/DL (ref 0.6–1.3)
ERYTHROCYTE [DISTWIDTH] IN BLOOD BY AUTOMATED COUNT: 12.9 % (ref 11.6–15.1)
GFR SERPL CREATININE-BSD FRML MDRD: 96 ML/MIN/1.73SQ M
GLUCOSE P FAST SERPL-MCNC: 87 MG/DL (ref 65–99)
HCT VFR BLD AUTO: 50.4 % (ref 34.8–46.1)
HDLC SERPL-MCNC: 59 MG/DL
HGB BLD-MCNC: 17 G/DL (ref 11.5–15.4)
LDLC SERPL CALC-MCNC: 229 MG/DL (ref 0–100)
MCH RBC QN AUTO: 31.3 PG (ref 26.8–34.3)
MCHC RBC AUTO-ENTMCNC: 33.7 G/DL (ref 31.4–37.4)
MCV RBC AUTO: 93 FL (ref 82–98)
NONHDLC SERPL-MCNC: 257 MG/DL
PLATELET # BLD AUTO: 308 THOUSANDS/UL (ref 149–390)
PMV BLD AUTO: 9.1 FL (ref 8.9–12.7)
POTASSIUM SERPL-SCNC: 4.1 MMOL/L (ref 3.5–5.3)
PROT SERPL-MCNC: 7.8 G/DL (ref 6.4–8.2)
RBC # BLD AUTO: 5.43 MILLION/UL (ref 3.81–5.12)
SODIUM SERPL-SCNC: 136 MMOL/L (ref 136–145)
TRIGL SERPL-MCNC: 138 MG/DL
TSH SERPL DL<=0.05 MIU/L-ACNC: 1.03 UIU/ML (ref 0.45–4.5)
WBC # BLD AUTO: 4.14 THOUSAND/UL (ref 4.31–10.16)

## 2022-04-07 PROCEDURE — 85027 COMPLETE CBC AUTOMATED: CPT

## 2022-04-07 PROCEDURE — 36415 COLL VENOUS BLD VENIPUNCTURE: CPT

## 2022-04-07 PROCEDURE — 80053 COMPREHEN METABOLIC PANEL: CPT

## 2022-04-07 PROCEDURE — 84443 ASSAY THYROID STIM HORMONE: CPT

## 2022-04-07 PROCEDURE — 80061 LIPID PANEL: CPT

## 2022-04-14 ENCOUNTER — HOSPITAL ENCOUNTER (OUTPATIENT)
Dept: MAMMOGRAPHY | Facility: CLINIC | Age: 62
Discharge: HOME | End: 2022-04-14

## 2022-04-14 ENCOUNTER — HOSPITAL ENCOUNTER (OUTPATIENT)
Dept: ULTRASOUND IMAGING | Facility: CLINIC | Age: 62
Discharge: HOME | End: 2022-04-14
Payer: COMMERCIAL

## 2022-04-14 VITALS — DIASTOLIC BLOOD PRESSURE: 84 MMHG | SYSTOLIC BLOOD PRESSURE: 140 MMHG | HEART RATE: 62 BPM

## 2022-04-14 DIAGNOSIS — R92.8 ABNORMAL MAMMOGRAM: ICD-10-CM

## 2022-04-14 PROCEDURE — 38505 NEEDLE BIOPSY LYMPH NODES: CPT

## 2022-04-14 PROCEDURE — 88342 IMHCHEM/IMCYTCHM 1ST ANTB: CPT | Performed by: PATHOLOGY

## 2022-04-14 PROCEDURE — 88361 TUMOR IMMUNOHISTOCHEM/COMPUT: CPT | Performed by: PATHOLOGY

## 2022-04-14 PROCEDURE — 88184 FLOWCYTOMETRY/ TC 1 MARKER: CPT | Performed by: OBSTETRICS & GYNECOLOGY

## 2022-04-14 PROCEDURE — 88305 TISSUE EXAM BY PATHOLOGIST: CPT | Performed by: PATHOLOGY

## 2022-04-14 PROCEDURE — 88341 IMHCHEM/IMCYTCHM EA ADD ANTB: CPT | Performed by: PATHOLOGY

## 2022-04-14 PROCEDURE — 88185 FLOWCYTOMETRY/TC ADD-ON: CPT

## 2022-04-14 PROCEDURE — 76942 ECHO GUIDE FOR BIOPSY: CPT

## 2022-04-14 RX ORDER — LIDOCAINE HYDROCHLORIDE 10 MG/ML
5 INJECTION, SOLUTION EPIDURAL; INFILTRATION; INTRACAUDAL; PERINEURAL ONCE
Status: CANCELLED | OUTPATIENT
Start: 2022-04-14

## 2022-04-14 RX ORDER — LIDOCAINE HYDROCHLORIDE 10 MG/ML
5 INJECTION, SOLUTION EPIDURAL; INFILTRATION; INTRACAUDAL; PERINEURAL ONCE
Status: COMPLETED | OUTPATIENT
Start: 2022-04-14 | End: 2022-04-14

## 2022-04-14 RX ADMIN — LIDOCAINE HYDROCHLORIDE 5 ML: 10 INJECTION, SOLUTION EPIDURAL; INFILTRATION; INTRACAUDAL; PERINEURAL at 10:25

## 2022-04-14 NOTE — PROGRESS NOTES
Ice pack given:    __x___yes _____no    Discharge instructions signed by patient:    __x___yes _____no    Discharge instructions given to patient:    __x___yes _____no    Discharged via:    __x___ambulatory    _____wheelchair    _____stretcher    Stable on discharge:    __x___yes ____no  Patient would like results over the phone  Ok to leave a message

## 2022-04-14 NOTE — DISCHARGE INSTR - OTHER ORDERS
POST LARGE CORE BREAST BIOPSY PATIENT INFORMATION      Place an ice pack inside your bra over the top of the dressing every hour for 20 minutes (20 minutes on, 60 minutes off)  Do this until bedtime  Do not shower or bathe until the following morning  After bathing, you may remove the bandaid  You may bathe your breast carefully with the steri-strips in place  Be careful not to loosen them  The steri-strips will fall off in 3-5 days  You may have mild discomfort, and you may have some bruising where the needle entered the skin  This should clear within 5-7 days  If you need medicine for discomfort, take acetaminophen products such as Tylenol  You may also take Advil or Motrin products  DO NOT use Aspirin for the first 24 hours  Do not participate in strenuous activities such as-tennis, aerobics, weight lifting, etc  for 24 hours  Refrain from swimming/soaking for 72 hours  Wearing a bra for sleeping may be more comfortable for the first 24-48 hours after your biopsy  Watch for continued bleeding, pain or fever over 101  If any of these symptoms occur, please contact our breast nurse navigator at the location where your biopsy was performed  During normal business hours (7:30am - 4:00pm) please call the nurse navigator at the site     where your procedure was performed:       [address]: [phone] or      [facility] [address]: [phone] or [phone]     [address]: [address]: [address]: [phone]        After 4pm - please call your physician or go to the nearest Emergency Department location  The final results of your biopsy are usually available within one week

## 2022-04-14 NOTE — PROGRESS NOTES
Ice pack given:    ___x__yes _____no    Discharge instructions signed by patient:    __x___yes _____no    Discharge instructions given to patient:    __x___yes _____no    Discharged via:    __x___ambulatory    _____wheelchair    _____stretcher    Stable on discharge:    __x___yes ____no  Patient would like results over the phone  Ok to leave a message

## 2022-04-14 NOTE — PROGRESS NOTES
Procedure type:    ___x__ultrasound guided _____stereotactic    Breast:    ___x__Left _____Right    Location: Axilla    Needle: 12 gauge scott    # of passes: 4 ( 2 for flow cytometry, 2 for surgical specimen)    Clip: Hydromark butterfly     Performed by: Dr Maia Yoo held for 5 minutes by: Hayes Pinzon Strips:    __x__yes _____no    Band aid:    __x___yes_____no    Tape and guaze:    _____yes __x___no    Tolerated procedure:    __x___yes _____no

## 2022-04-15 LAB — SCAN RESULT: NORMAL

## 2022-04-15 NOTE — PROGRESS NOTES
Post procedure call completed    Bleeding: _____yes __X___no (pt denies)    Pain: _____yes ___X___no (pt denies)    Redness/Swelling: ______yes ___X___no (pt denies)    Band aid removed: _____yes __X___no (discussed removing when she showers)    Steri-Strips intact: ___X___yes _____no (discussed with patient to remove steri strips on Tuesday if they have not come off on their own)    Pt inquired about getting COVID booster, adv to wait until we have results of biopsy before getting any boosters, adv will call when results available, adv to call with any questions or concerns, has name/# for contact

## 2022-04-18 ENCOUNTER — TELEPHONE (OUTPATIENT)
Dept: HEMATOLOGY ONCOLOGY | Facility: CLINIC | Age: 62
End: 2022-04-18

## 2022-04-18 ENCOUNTER — TELEPHONE (OUTPATIENT)
Dept: MAMMOGRAPHY | Facility: CLINIC | Age: 62
End: 2022-04-18

## 2022-04-18 NOTE — TELEPHONE ENCOUNTER
Call placed to patient's  after pt given biopsy results from radiologist, Dr Nilsa Cox, questions answered, adv next step is to set patient up with surgeon, options discussed, pt's  would like to have appt made with Dr Joan Harley, adv patient that  would call him back by tomorrow with appt, states understanding, pt  with no questions at this time, has name/# for any further needs

## 2022-04-18 NOTE — TELEPHONE ENCOUNTER
[facility] Referral    Diagnosis: Metastatic breast cancer    Is this diagnosis cancer (Y/N): Yes    Biopsy Date: 04/14/22    Does the patient have another biopsy pending: No  If so, when:    Preferred provider: Dr Radha Clarke    Preferred location: [place]    Any requests for dates/times:  Wed 4/20 afternoon (per Rossana at Dr Lacie Fabian office, if you can't get on the schedule, please contact her and she will get a time for you to make this happen)    Any additional information: Please call pt  Laci Rico at [phone] to schedule this appt    Please advise when appointment is made

## 2022-04-18 NOTE — TELEPHONE ENCOUNTER
Soft Intake Form   Patient Details   Raven Cost     1960     Reason For Appointment   Who is Calling? Spouse   If not patient, Name? Bao Hackett   DID YOU CONFIRM INSURANCE WITH PATIENT? Yes    Who is the Referring Doctor? [facility]   What is the diagnosis? Metastatic breast cancer   Has this diagnosis been confirmed by a biopsy/surgery? If yes, what is the date it was done? Yes, 4/14/22    Biopsy done at [place] [id number]? If not, where was it done? yes   Was imaging done, and was it done at [facility]? If not, where was it done? Yes, [facility]   Have you been seen by another Oncologist?  If so, who and where (name of facility, city and state) no   For 2nd Opinions Only: Are you currently undergoing treatment, or are you scheduled to start treatment? If yes, name of facility, city and state n/a    For "History Of" only: Have you completed treatment? n/a   Have you had Genetic Testing done in the past?  If so, advise to bring test results to their visit no   Record Gathering Information   Did you advise to have records faxed to [phone]? no   Did you advise to have disks sent to the proper address with imaging? ("History of" Patients)  5 years of imaging for breast patients-Mammos, US etc no   Scheduling Information   Did you send new patient paperwork? Email or mail? Yes, E-Mail   What is the best call back number?    (If the [facility] is calling, please use their number) [phone]   Miscellaneous Information    Schedule appointment 4/20/22 at 2:00 pm Dr Oxana Virgen

## 2022-04-19 ENCOUNTER — DOCUMENTATION (OUTPATIENT)
Dept: HEMATOLOGY ONCOLOGY | Facility: CLINIC | Age: 62
End: 2022-04-19

## 2022-04-19 PROBLEM — C50.912 CARCINOMA OF LEFT BREAST METASTATIC TO AXILLARY LYMPH NODE (HCC): Status: ACTIVE | Noted: 2022-04-19

## 2022-04-19 PROBLEM — C77.3 CARCINOMA OF LEFT BREAST METASTATIC TO AXILLARY LYMPH NODE (HCC): Status: ACTIVE | Noted: 2022-04-19

## 2022-04-20 ENCOUNTER — TELEPHONE (OUTPATIENT)
Dept: GENETICS | Facility: CLINIC | Age: 62
End: 2022-04-20

## 2022-04-20 ENCOUNTER — CONSULT (OUTPATIENT)
Dept: SURGICAL ONCOLOGY | Facility: CLINIC | Age: 62
End: 2022-04-20
Payer: COMMERCIAL

## 2022-04-20 ENCOUNTER — APPOINTMENT (OUTPATIENT)
Dept: LAB | Facility: HOSPITAL | Age: 62
End: 2022-04-20
Payer: COMMERCIAL

## 2022-04-20 VITALS
OXYGEN SATURATION: 97 % | HEART RATE: 66 BPM | TEMPERATURE: 98.2 F | HEIGHT: 68 IN | SYSTOLIC BLOOD PRESSURE: 142 MMHG | RESPIRATION RATE: 12 BRPM | WEIGHT: 149 LBS | DIASTOLIC BLOOD PRESSURE: 90 MMHG | BODY MASS INDEX: 22.58 KG/M2

## 2022-04-20 DIAGNOSIS — C77.3 CARCINOMA OF LEFT BREAST METASTATIC TO AXILLARY LYMPH NODE (HCC): Primary | ICD-10-CM

## 2022-04-20 DIAGNOSIS — C77.3 SECONDARY MALIGNANT NEOPLASM OF BRACHIAL LYMPH NODE (HCC): ICD-10-CM

## 2022-04-20 DIAGNOSIS — C50.912 MALIGNANT NEOPLASM OF LEFT FEMALE BREAST, UNSPECIFIED ESTROGEN RECEPTOR STATUS, UNSPECIFIED SITE OF BREAST (HCC): ICD-10-CM

## 2022-04-20 DIAGNOSIS — Z80.0 FAMILY HISTORY OF MALIGNANT NEOPLASM OF GASTROINTESTINAL TRACT: ICD-10-CM

## 2022-04-20 DIAGNOSIS — C50.912 CARCINOMA OF LEFT BREAST METASTATIC TO AXILLARY LYMPH NODE (HCC): Primary | ICD-10-CM

## 2022-04-20 PROCEDURE — 36415 COLL VENOUS BLD VENIPUNCTURE: CPT

## 2022-04-20 PROCEDURE — 99245 OFF/OP CONSLTJ NEW/EST HI 55: CPT | Performed by: SURGERY

## 2022-04-20 NOTE — TELEPHONE ENCOUNTER
Intake received  Insurance education outreach to follow    05/12/22  Per RTE pt h as an active [employer] plan that has an effective date 12/01/21  plan has in & out of network benefits  Deduct $250 met  Out of pocket $2250 met $349 37

## 2022-04-20 NOTE — PROGRESS NOTES
Surgical Oncology Consult Note       [facility]  [facility] Shea Evans [address] [address]    Viry Bello  1960  [id number]      Chief Complaint   Patient presents with    Consult     metastatic breast cancer-left breast         Assessment/Plan    1  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 ([address] )  -     MRI breast bilateral w and wo contrast w cad; Future; Expected date: 04/20/2022  -     NM bone scan whole body; Future; Expected date: 04/20/2022  -     CT chest abdomen pelvis w wo contrast; Future; Expected date: 04/20/2022         Oncology History   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 ([facility] [address] [facility] )   4/14/2022 Initial Diagnosis    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 ([facility] [address] )     4/14/2022 Biopsy    Left axillary Lymph node ultrasound guided biopsy  Metastatic carcinoma, compatible with breast origin  ER >95  DC 95   HER2 1+    On ultrasound lymph node is 1 8 cm  There is cortical thickening without evident fatty hilum  In review of screening mammogram, demonstrates no suspicious mammographic findings identified in either breast  Bilateral MRI is recommended  Flow cytometry - there is no evidence of a B-cell or T-cell non-Hodgkin lymphoma  This is a 68-year-old female underwent mammogram   This demonstrated enlarged left axillary lymph node this was biopsied and mammogram biopsies consistent with metastatic breast cancer with ERPR positive HER2 negative  Mammogram did not demonstrate primary lesion in the breast   She was recommended to have a bilateral breast MRI however she wanted to see us prior to MRI  She has personal history of right upper extremity distal desmoid tumor surgery plus radiation  She has a right upper extremity lymphedema and she has compression socks and lymphedema pump  She is with her  to discuss further management          Review of Systems   Constitutional: Negative for chills and fever  HENT: Negative for ear pain and sore throat  Eyes: Negative for pain and visual disturbance  Respiratory: Negative for cough and shortness of breath  Cardiovascular: Negative for chest pain and palpitations  Gastrointestinal: Negative for abdominal pain and vomiting  Genitourinary: Negative for dysuria and hematuria  Musculoskeletal: Negative for arthralgias and back pain  Skin: Negative for color change and rash  Neurological: Negative for seizures and syncope  All other systems reviewed and are negative  Past Medical History:      Patient Active Problem List   Diagnosis    Back pain, chronic    Chronic insomnia    Lymphedema of right arm    Hyperlipidemia, mixed    Peripheral neuropathy    Lymphedema    Cellulitis of right upper extremity    Sepsis ([facility] [address] )    Desmoid tumor    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 (HCC)        Past Medical History:   Diagnosis Date    Abnormal mammogram 05/15/2013    Anemia     Cancer ([facility] [address] )     desmoitosis    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 ([facility] [address] ) 4/19/2022    Desmoid tumor     Last Assessed: 6/19/2017    Hyperlipidemia     Hypertension         Past Surgical History:   Procedure Laterality Date    FRACTURE SURGERY      HYSTERECTOMY      OTHER SURGICAL HISTORY      Arm Incision: Dermoid tumor, right Arm     PARTIAL HYSTERECTOMY      UT COLONOSCOPY FLX DX W/COLLJ SPEC WHEN PFRMD N/A 8/15/2017    Procedure: COLONOSCOPY;  Surgeon: Mitali Gayle MD;  Location: [facility];   Service: Gastroenterology    UT OPEN RX DISTAL RADIUS FX, INTRA-ARTICULAR, 3+ FRAG Left 2/9/2021    Procedure: OPEN REDUCTION W/ INTERNAL FIXATION (ORIF) RADIUS / Jonel Scott (WRIST) left;  Surgeon: Dale Reyes MD;  Location: [facility];  Service: Orthopedics    UT WRIST Felizardo Kaycee LIG Left 8/10/2021    Procedure: RELEASE LEFT CARPAL TUNNEL ENDOSCOPIC;  Surgeon: Shelly Bae MD;  Location: [facility];  Service: Orthopedics    SKIN BIOPSY      SKIN CANCER EXCISION      Melanoma Excision     TONSILLECTOMY      US GUIDED BREAST LYMPH NODE BIOPSY LEFT Left 4/14/2022        Family History   Problem Relation Age of Onset    Diabetes Mother     No Known Problems Father     No Known Problems Sister     Lung cancer Sister     Pancreatic cancer Sister     No Known Problems Maternal Grandmother     No Known Problems Maternal Grandfather     No Known Problems Paternal Grandmother     No Known Problems Paternal Grandfather         Social History     Socioeconomic History    Marital status: /Civil Union     Spouse name: Not on file    Number of children: Not on file    Years of education: Not on file    Highest education level: Not on file   Occupational History    Occupation: unemployed    Tobacco Use    Smoking status: Never Smoker    Smokeless tobacco: Never Used   Vaping Use    Vaping Use: Never used   Substance and Sexual Activity    Alcohol use:  Yes     Alcohol/week: 1 0 standard drink     Types: 1 Shots of liquor per week     Comment: One shot of liquor occassionally at night    Drug use: Yes     Types: Marijuana     Comment: THC Medical marijuana    Sexual activity: Yes     Partners: Male   Other Topics Concern    Not on file   Social History Narrative    Lives with spouse      Social Determinants of Health     Financial Resource Strain: Not on file   Food Insecurity: Not on file   Transportation Needs: Not on file   Physical Activity: Not on file   Stress: Not on file   Social Connections: Not on file   Intimate Partner Violence: Not on file   Housing Stability: Not on file        Current Outpatient Medications:     cholecalciferol (VITAMIN D3) 1,000 units tablet, Take 2,000 Units by mouth daily, Disp: , Rfl:     dronabinol (MARINOL) 5 MG capsule, Take 15 mg by mouth 4 (four) times a day (before meals and at bedtime), Disp: , Rfl:     Ergocalciferol (VITAMIN D2 PO), Take by mouth, Disp: , Rfl:     estradiol (ESTRACE) 0 1 mg/g vaginal cream, Insert 0 5 g into the vagina 2 (two) times a week Apply a pea sized amount to external vaginal opening and urethra area twice weekly, Disp: 42 5 g, Rfl: 3    multivitamin (THERAGRAN) TABS, Take 1 tablet by mouth daily, Disp: , Rfl:     Omega-3 Fatty Acids (FISH OIL PO), Take by mouth, Disp: , Rfl:     Probiotic Product (PROBIOTIC-10 PO), Take by mouth, Disp: , Rfl:     cyclobenzaprine (FLEXERIL) 5 mg tablet, , Disp: , Rfl:     meloxicam (MOBIC) 7 5 mg tablet, , Disp: , Rfl:      Allergies   Allergen Reactions    Aspirin GI Intolerance and Abdominal Pain     ABD     Chlorpromazine Anaphylaxis     PHENOTHIAZINE=ANAPHYLAXIS    Codeine Anaphylaxis     Anaphylaxis  abd pain   Meperidine Anaphylaxis, Hives and Other (See Comments)     VOMITS  VOMITS  Category: Allergy;     Phenothiazines Anaphylaxis and Throat Swelling     Category: Allergy;     Saccharin - Food Allergy Anaphylaxis    Ibuprofen Hives     H    Ampicillin-Sulbactam Sodium Hives    Gluten Meal - Food Allergy GI Intolerance    Levofloxacin Hives    Neomycin Other (See Comments), Edema and Hives     Category: Allergy;   swelling    Other Throat Swelling, Hives and Other (See Comments)     ASA=GERD  ASA=GERD  Category: Allergy;     Citrus - Food Allergy Rash    Latex Hives and Rash     Category: Allergy; Physical Exam:     Vitals:    04/20/22 1351   BP: 142/90   Pulse: 66   Resp: 12   Temp: 98 2 °F (36 8 °C)   SpO2: 97%     Physical Exam  Constitutional:       Appearance: Normal appearance  HENT:      Head: Normocephalic and atraumatic  Nose: Nose normal       Mouth/Throat:      Mouth: Mucous membranes are moist    Eyes:      Pupils: Pupils are equal, round, and reactive to light  Cardiovascular:      Rate and Rhythm: Normal rate  Pulses: Normal pulses  Heart sounds: Normal heart sounds  Pulmonary:      Effort: Pulmonary effort is normal       Breath sounds: Normal breath sounds  Chest:          Comments: Bilateral breast examination no palpable mass or masses  Left axillary palpable lymph node persist   She  Abdominal:      General: Bowel sounds are normal       Palpations: Abdomen is soft  Musculoskeletal:         General: Normal range of motion  Cervical back: Normal range of motion and neck supple  Skin:     General: Skin is warm  Neurological:      General: No focal deficit present  Mental Status: She is alert and oriented to person, place, and time  Psychiatric:         Mood and Affect: Mood normal          Behavior: Behavior normal          Thought Content: Thought content normal          Judgment: Judgment normal          Results:     Lymph node, left axillary, ultrasound-guided biopsy (2 passes):  -  Metastatic carcinoma, compatible with breast origin  Estrogen Receptor                      >95%                 positive  Internal control:Not Present                      Staining Intensity:Strong  External control:positive              Latrice Score*: 8     Progesterone Receptor               95%                   positive  Internal control:Not Present                      Staining Intensity: Strong  External control: positive                          Latrice Score*: 8        HER2 by IHC                  1+                      negative    Discussion/Summary:   We did discuss in detail about recent diagnosis of metastatic carcinoma and the left axillary lymph node and need for MRI to delineate any missed breast cancer  She will come to see us after MRI of the breast cancer  We will also order bone scan as well as CT chest abdomen pelvis  Advance Care Planning/Advance Directives: Maryanne Dale MD discussed the disease status, and treatment plans with Liz Rock today 04/20/22 and will follow-up with the patient

## 2022-04-21 ENCOUNTER — PATIENT OUTREACH (OUTPATIENT)
Dept: HEMATOLOGY ONCOLOGY | Facility: CLINIC | Age: 62
End: 2022-04-21

## 2022-04-21 ENCOUNTER — HOSPITAL ENCOUNTER (OUTPATIENT)
Dept: RADIOLOGY | Facility: HOSPITAL | Age: 62
Discharge: HOME | End: 2022-04-21
Attending: SURGERY
Payer: COMMERCIAL

## 2022-04-21 DIAGNOSIS — Z87.81 S/P ORIF (OPEN REDUCTION INTERNAL FIXATION) FRACTURE: ICD-10-CM

## 2022-04-21 DIAGNOSIS — Z98.890 S/P CARPAL TUNNEL RELEASE: ICD-10-CM

## 2022-04-21 DIAGNOSIS — R20.2 NUMBNESS AND TINGLING OF LEFT UPPER EXTREMITY: ICD-10-CM

## 2022-04-21 DIAGNOSIS — Z98.890 S/P ORIF (OPEN REDUCTION INTERNAL FIXATION) FRACTURE: ICD-10-CM

## 2022-04-21 DIAGNOSIS — R20.0 NUMBNESS AND TINGLING OF LEFT UPPER EXTREMITY: ICD-10-CM

## 2022-04-21 PROCEDURE — 76882 US LMTD JT/FCL EVL NVASC XTR: CPT

## 2022-04-21 NOTE — PROGRESS NOTES
Breast Oncology Nurse Navigator    Called patient for initial outreach from nurse navigator  Introduced myself and my role  Going for a second opinion tomorrow at [facility]  Knows of all her diagnostic studies coming up, along with times and locations  Does not drive  States her  drives her, but he is a   She may need help coming up with rides  Did not set up [facility], as she said her  will take her to the appointments in the next week  Said she may need help if she requires chemo or radiation therapy  Patient states has had desmoid tumors for the last 20 years  States she does not have questions about her cancer diagnosis at this time  Patient is pleased with upcoming diagnostics and feels better having these tests performed  Had blood drawn yesterday for genetic testing  Patient states her  is her support  Patient is caring for her neighbor with pancreatic cancer now  Made patient aware of Cancer Support Community and some of the programs offered  Patient has my contact information and knows she can reach out with questions

## 2022-04-22 ENCOUNTER — HOSPITAL ENCOUNTER (OUTPATIENT)
Dept: CT IMAGING | Facility: CLINIC | Age: 62
Discharge: HOME | End: 2022-04-22
Payer: COMMERCIAL

## 2022-04-22 ENCOUNTER — APPOINTMENT (OUTPATIENT)
Dept: LAB | Facility: HOSPITAL | Age: 62
End: 2022-04-22
Payer: COMMERCIAL

## 2022-04-22 DIAGNOSIS — C77.3 CARCINOMA OF LEFT BREAST METASTATIC TO AXILLARY LYMPH NODE (HCC): ICD-10-CM

## 2022-04-22 DIAGNOSIS — D75.1 POLYCYTHEMIA: ICD-10-CM

## 2022-04-22 DIAGNOSIS — C50.912 CARCINOMA OF LEFT BREAST METASTATIC TO AXILLARY LYMPH NODE (HCC): ICD-10-CM

## 2022-04-22 LAB
BASOPHILS # BLD AUTO: 0.06 THOUSANDS/ΜL (ref 0–0.1)
BASOPHILS NFR BLD AUTO: 1 % (ref 0–1)
EOSINOPHIL # BLD AUTO: 0.1 THOUSAND/ΜL (ref 0–0.61)
EOSINOPHIL NFR BLD AUTO: 2 % (ref 0–6)
ERYTHROCYTE [DISTWIDTH] IN BLOOD BY AUTOMATED COUNT: 12.3 % (ref 11.6–15.1)
HCT VFR BLD AUTO: 46.4 % (ref 34.8–46.1)
HGB BLD-MCNC: 15.9 G/DL (ref 11.5–15.4)
IMM GRANULOCYTES # BLD AUTO: 0.02 THOUSAND/UL (ref 0–0.2)
IMM GRANULOCYTES NFR BLD AUTO: 0 % (ref 0–2)
LYMPHOCYTES # BLD AUTO: 2.08 THOUSANDS/ΜL (ref 0.6–4.47)
LYMPHOCYTES NFR BLD AUTO: 32 % (ref 14–44)
MCH RBC QN AUTO: 31.3 PG (ref 26.8–34.3)
MCHC RBC AUTO-ENTMCNC: 34.3 G/DL (ref 31.4–37.4)
MCV RBC AUTO: 91 FL (ref 82–98)
MONOCYTES # BLD AUTO: 0.62 THOUSAND/ΜL (ref 0.17–1.22)
MONOCYTES NFR BLD AUTO: 10 % (ref 4–12)
NEUTROPHILS # BLD AUTO: 3.6 THOUSANDS/ΜL (ref 1.85–7.62)
NEUTS SEG NFR BLD AUTO: 55 % (ref 43–75)
NRBC BLD AUTO-RTO: 0 /100 WBCS
PLATELET # BLD AUTO: 355 THOUSANDS/UL (ref 149–390)
PMV BLD AUTO: 9.5 FL (ref 8.9–12.7)
RBC # BLD AUTO: 5.08 MILLION/UL (ref 3.81–5.12)
WBC # BLD AUTO: 6.48 THOUSAND/UL (ref 4.31–10.16)

## 2022-04-22 PROCEDURE — 85025 COMPLETE CBC W/AUTO DIFF WBC: CPT

## 2022-04-22 PROCEDURE — 36415 COLL VENOUS BLD VENIPUNCTURE: CPT

## 2022-04-22 PROCEDURE — 71260 CT THORAX DX C+: CPT

## 2022-04-22 PROCEDURE — 74177 CT ABD & PELVIS W/CONTRAST: CPT

## 2022-04-22 PROCEDURE — 82668 ASSAY OF ERYTHROPOIETIN: CPT

## 2022-04-22 PROCEDURE — G1004 CDSM NDSC: HCPCS

## 2022-04-22 RX ADMIN — IOHEXOL 100 ML: 350 INJECTION, SOLUTION INTRAVENOUS at 11:36

## 2022-04-23 LAB — EPO SERPL-ACNC: 4.5 MIU/ML (ref 2.6–18.5)

## 2022-04-24 ENCOUNTER — HOSPITAL ENCOUNTER (OUTPATIENT)
Dept: NUCLEAR MEDICINE | Facility: HOSPITAL | Age: 62
Discharge: HOME | End: 2022-04-24
Attending: SURGERY
Payer: COMMERCIAL

## 2022-04-24 DIAGNOSIS — C50.912 CARCINOMA OF LEFT BREAST METASTATIC TO AXILLARY LYMPH NODE (HCC): ICD-10-CM

## 2022-04-24 DIAGNOSIS — C77.3 CARCINOMA OF LEFT BREAST METASTATIC TO AXILLARY LYMPH NODE (HCC): ICD-10-CM

## 2022-04-24 LAB — MISCELLANEOUS LAB TEST RESULT: NORMAL

## 2022-04-24 PROCEDURE — A9503 TC99M MEDRONATE: HCPCS

## 2022-04-24 PROCEDURE — G1004 CDSM NDSC: HCPCS

## 2022-04-24 PROCEDURE — 78306 BONE IMAGING WHOLE BODY: CPT

## 2022-04-27 ENCOUNTER — HOSPITAL ENCOUNTER (OUTPATIENT)
Dept: MRI IMAGING | Facility: HOSPITAL | Age: 62
Discharge: HOME | End: 2022-04-27
Attending: SURGERY
Payer: COMMERCIAL

## 2022-04-27 VITALS — BODY MASS INDEX: 22.73 KG/M2 | WEIGHT: 150 LBS | HEIGHT: 68 IN

## 2022-04-27 DIAGNOSIS — C77.3 CARCINOMA OF LEFT BREAST METASTATIC TO AXILLARY LYMPH NODE (HCC): ICD-10-CM

## 2022-04-27 DIAGNOSIS — C50.912 CARCINOMA OF LEFT BREAST METASTATIC TO AXILLARY LYMPH NODE (HCC): Primary | ICD-10-CM

## 2022-04-27 DIAGNOSIS — C77.3 CARCINOMA OF LEFT BREAST METASTATIC TO AXILLARY LYMPH NODE (HCC): Primary | ICD-10-CM

## 2022-04-27 DIAGNOSIS — C50.912 CARCINOMA OF LEFT BREAST METASTATIC TO AXILLARY LYMPH NODE (HCC): ICD-10-CM

## 2022-04-27 PROCEDURE — A9585 GADOBUTROL INJECTION: HCPCS | Performed by: SURGERY

## 2022-04-27 PROCEDURE — C8908 MRI W/O FOL W/CONT, BREAST,: HCPCS

## 2022-04-27 PROCEDURE — G1004 CDSM NDSC: HCPCS

## 2022-04-27 RX ADMIN — GADOBUTROL 6 ML: 604.72 INJECTION INTRAVENOUS at 08:51

## 2022-04-28 ENCOUNTER — TELEPHONE (OUTPATIENT)
Dept: SURGICAL ONCOLOGY | Facility: CLINIC | Age: 62
End: 2022-04-28

## 2022-04-28 NOTE — PROGRESS NOTES
Spoke to patient via telephone regarding recommendation for;      ___x__ RIGHT __x____LEFT      _____Ultrasound guided  _____Stereotactic  Breast biopsy___x__MRI Guided Biopsy      __x___Verbalized understanding  Blood thinners:  _____yes __x___no    Date stopped: ____n/a_______    Biopsy teaching sheet reviewed with patient verbalizing understanding and questions/concerns addressed at this time:  ____x___yes ______no          Med Hx: HTN; Hyperlipidemia; Chronic Back Pain; Peripheral Neuropathy; RUE Lymphedema;  Hx of Right Arm Desmoid Tumor; Dx w/Metastatic Left Breast Carcinoma 4/14/22

## 2022-04-28 NOTE — TELEPHONE ENCOUNTER
Called patients  at request of patient to reschedule follow up with Dr Riddhi Joy after mri biopsy  Patients  stated he can't schedule it right now due to the fact he was driving down to [place] for a weekend getaway at the moment  He stated he will call me back to reschedule and if he forgets to call him back  Patient appreciative of phone call and if I don't hear back from him by Monday I will call him back to reschedule Eileen's appointment

## 2022-05-02 ENCOUNTER — EVALUATION (OUTPATIENT)
Dept: OCCUPATIONAL THERAPY | Facility: CLINIC | Age: 62
End: 2022-05-02
Payer: COMMERCIAL

## 2022-05-02 DIAGNOSIS — I89.0 LYMPHEDEMA OF RIGHT UPPER EXTREMITY: ICD-10-CM

## 2022-05-02 DIAGNOSIS — M77.12 LATERAL EPICONDYLITIS OF LEFT ELBOW: Primary | ICD-10-CM

## 2022-05-02 PROCEDURE — 97140 MANUAL THERAPY 1/> REGIONS: CPT

## 2022-05-02 PROCEDURE — 97110 THERAPEUTIC EXERCISES: CPT

## 2022-05-02 PROCEDURE — 97165 OT EVAL LOW COMPLEX 30 MIN: CPT

## 2022-05-02 NOTE — LETTER
May 4, 2022    [place] [id number]    Patient: Viry Bello   YOB: 1960   Date of Visit: 2022     Encounter Diagnosis     ICD-10-CM    1  Lateral epicondylitis of left elbow  M77 12 OT Plan of Care Cert/Re-cert   2  Lymphedema of right upper extremity  I89 0 OT Plan of Care Cert/Re-cert       Dear Dr Connie Novak: Thank you for your recent referral of Viry Bello  Please review the attached evaluation summary from Eileen's recent visit  Please verify that you agree with the plan of care by signing the attached order  If you have any questions or concerns, please do not hesitate to call  I sincerely appreciate the opportunity to share in the care of one of your patients and hope to have another opportunity to work with you in the near future  Sincerely,    Gerardo Gómez, OT      Referring Provider:     I certify that I have read the below Plan of Care and certify the need for these services furnished under this plan of treatment while under my care  [place] [id number]  Via Fax: [phone]        OT Evaluation     Today's date: 2022  Patient name: Viry Bello  : 1960  MRN: [id number]  Referring provider: Rodo Arndt  Dx:   Encounter Diagnosis     ICD-10-CM    1  Lateral epicondylitis of left elbow  M77 12    2  Lymphedema of right upper extremity  I89 0                   Assessment  Assessment details: Viry Bello is a 59 y o , Right HD female referred to hand therapy for left lateral epicondylitis  Onset of injury 6 months ago due to fall and repetitive activities  Patient presents 20 with impaired ROM, strength, and sensation of the left hand, wrist, elbow  Deficits also noted in pain and functional use of the left UE  Patient also has RUE stage 2 lymphedema of long duration   Her old compression garments no longer fit and patient needs to be measured and fit for new night time and daytime garments  Patient is a good candidate for OT services to abolish pain and edema and restore ROM, strength, coordination, and sensation for a return to independence in daily tasks      Impairments: abnormal or restricted ROM, activity intolerance, impaired physical strength, lacks appropriate home exercise program, pain with function and weight-bearing intolerance  Other impairment: Stage 2 lymphedema RUE; metastatic carcinoma left breast  Functional limitations: Pain during self care, home meanagement and art activities  Symptom irritability: moderateBarriers to therapy: Metastatic left breast carcinoma; RUE lymphedema secondary to treatment for desmoid tumor right arm   Understanding of Dx/Px/POC: excellent   Prognosis: good    Goals  STGs (4 weeks)  Patient will be independent in HEP of ROM, stretching, edema management  Patient will report an average pain level of 4/10 in the LUE  Patient will demonstrate pain free AROM in the LUE  Patient will be measured for new daytime and night time garments RUE for treatment of secondary lymphedema related to cancer treatments  LTGs (12 weeks)  Patient will demonstrate independence in a HEP to maintain ROM, strength, and function at discharge  Patient will report an average pain level of 1-2/10 to be independent in daily tasks  Patient will demonstrate AROM of the wrist and forearm WFL to be independent in self care tasks  Patient will demonstrate 5/5 muscle strength in the wrist and forearm to be MI for meal prep  Patient 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 hand strength WFL to be MI for IADL tasks  Patient will be fit for RUE night time and daytime compression garments      Plan  Patient would benefit from: skilled occupational therapy and custom splinting  Planned modality interventions: ultrasound, thermotherapy: hydrocollator packs and cryotherapy  Planned therapy interventions: activity modification, compression, fine motor coordination training, graded activity, graded exercise, home exercise program, therapeutic exercise, therapeutic activities, stretching, strengthening, patient education, orthotic fitting/training, neuromuscular re-education and manual therapy  Other planned therapy interventions: IASTM; cupping; kinesio tape; measure and fit for custom compression garments RUE  Frequency: 1x week  Duration in weeks: 12  Plan of Care beginning date: 2022  Plan of Care expiration date: 2022  Treatment plan discussed with: patient        Subjective Evaluation    History of Present Illness  Mechanism of injury: Patient reports onset of left elbow after a fall several months ago  She had a cortisone shot, but with no lasting effect  Wore a wrist splint for several weeks, but it caused pain in the left thumb, so she stopped wearing it one week ago  Has a left trigger thumb  She is waiting for results of MRI and ultrasound on the left elbow  She reports she may be a candidate for elbow and thumb surgery  Patient recently diagnosed with carcinoma of the left breast metastatic to the axillary lymph nodes  She has a script for new compression sleeve for RUE due to lymphedema in the RUE from desmoid tumor over 10 years ago  Patient now presents for OT evaluation and treatment of left lateral epicondylitis and to be measured for a new compression sleeve and glove and night time garment for the right upper extremity          Recurrent probem    Quality of life: good    Pain  Current pain rating: 3 (Left thumb 6-7/10 at all times)  At best pain rating: 3  At worst pain ratin  Location: Left lateral epicondyle and left thumb A1 pulley  Quality: dull ache and sharp  Relieving factors: medications  Exacerbated by: Opening containers; work tasks    Progression: no change    Social Support  Lives in: multiple-level home  Lives with: spouse    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   Hand dominance: ambidextrous      Diagnostic Tests  MRI studies: abnormal (Carcinoma left breast with metastasis to axillary lymph nodes)  Treatments  Previous treatment: occupational therapy (daytime and night time compression sleeves for right arm lymphedema; left wrist brace)  Patient Goals  Patient goals for therapy: decreased edema, decreased pain, increased strength and independence with ADLs/IADLs  Patient goal: Be able to do art work without pain        Objective     Observations     Additional Observation Details  5/2/22: Patient has lost weight and Elvarex arm sleeve and glove measured in 2021, are now too loose  Patient recently diagnosed with metastatic left breast carcinoma and has some weight loss associated with her new cancer diagnosis  Re measured Jobst Elvarex arm sleeve, glove, and Jobst Relax night time garment  Active Range of Motion     Left Elbow   Normal active range of motion    Strength/Myotome Testing     Left Elbow   Flexion: 5  Extension: 5  Forearm supination: 5  Forearm pronation: 5    Left Wrist/Hand   Wrist extension: 4  Wrist flexion: 5  Radial deviation: 4  Ulnar deviation: 5     (2nd hand position)     Trial 1: 45    Right Wrist/Hand      (2nd hand position)     Trial 1: 44    Tests     Left Elbow   Positive handsJayda luu's and Mill's  Negative Cozen's  Left Wrist/Hand   Positive resisted middle finger                Precautions: Metastatic carcinoma left breast; RUE stage 2 lymphedema       Date 5/2       Visit 1       Manuals        IASTM L ext mm mass        Cross tissue massage 5' L ext mm mass       Cupping        Kinesio tape ECRL/B inhibit, w/ ext mm mass correction 3'               Compression garment measure/fit Measured for elvarex soft class 2 sleeve and glove and Jobst relax night time sleeve 15'       Neuro Re-Ed                                                                 Ther Ex        Composite ext stretch        Passive wrist ext stretch 10" x 10       Wrist ext strength Ecc 1# x 10 Supination strength        EDC strength Ecc PRB x 10                               Ther Activity                        HEP Cross tissue massage; Wrist ext stretch; ecc EDC and wrist ext ex                       Modalities

## 2022-05-02 NOTE — PROGRESS NOTES
OT Evaluation     Today's date: 2022  Patient name: David Lee  : 1960  MRN: [id number]  Referring provider: Nicole Hsu  Dx:   Encounter Diagnosis     ICD-10-CM    1  Lateral epicondylitis of left elbow  M77 12    2  Lymphedema of right upper extremity  I89 0                   Assessment  Assessment details: David Lee is a 59 y o , Right HD female referred to hand therapy for left lateral epicondylitis  Onset of injury 6 months ago due to fall and repetitive activities  Patient presents 22 with impaired ROM, strength, and sensation of the left hand, wrist, elbow  Deficits also noted in pain and functional use of the left UE  Patient also has RUE stage 2 lymphedema of long duration  Her old compression garments no longer fit and patient needs to be measured and fit for new night time and daytime garments  Patient is a good candidate for OT services to abolish pain and edema and restore ROM, strength, coordination, and sensation for a return to independence in daily tasks      Impairments: abnormal or restricted ROM, activity intolerance, impaired physical strength, lacks appropriate home exercise program, pain with function and weight-bearing intolerance  Other impairment: Stage 2 lymphedema RUE; metastatic carcinoma left breast  Functional limitations: Pain during self care, home meanagement and art activities  Symptom irritability: moderateBarriers to therapy: Metastatic left breast carcinoma; RUE lymphedema secondary to treatment for desmoid tumor right arm   Understanding of Dx/Px/POC: excellent   Prognosis: good    Goals  STGs (4 weeks)  Patient will be independent in HEP of ROM, stretching, edema management  Patient will report an average pain level of 4/10 in the LUE  Patient will demonstrate pain free AROM in the LUE  Patient will be measured for new daytime and night time garments RUE for treatment of secondary lymphedema related to cancer treatments  LTGs (12 weeks)  Patient will demonstrate independence in a HEP to maintain ROM, strength, and function at discharge  Patient will report an average pain level of 1-2/10 to be independent in daily tasks  Patient will demonstrate AROM of the wrist and forearm WFL to be independent in self care tasks  Patient will demonstrate 5/5 muscle strength in the wrist and forearm to be MI for meal prep  Patient 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 hand strength WFL to be MI for IADL tasks  Patient will be fit for RUE night time and daytime compression garments      Plan  Patient would benefit from: skilled occupational therapy and custom splinting  Planned modality interventions: ultrasound, thermotherapy: hydrocollator packs and cryotherapy  Planned therapy interventions: activity modification, compression, fine motor coordination training, graded activity, graded exercise, home exercise program, therapeutic exercise, therapeutic activities, stretching, strengthening, patient education, orthotic fitting/training, neuromuscular re-education and manual therapy  Other planned therapy interventions: IASTM; cupping; kinesio tape; measure and fit for custom compression garments RUE  Frequency: 1x week  Duration in weeks: 12  Plan of Care beginning date: 5/2/2022  Plan of Care expiration date: 8/5/2022  Treatment plan discussed with: patient        Subjective Evaluation    History of Present Illness  Mechanism of injury: Patient reports onset of left elbow after a fall several months ago  She had a cortisone shot, but with no lasting effect  Wore a wrist splint for several weeks, but it caused pain in the left thumb, so she stopped wearing it one week ago  Has a left trigger thumb  She is waiting for results of MRI and ultrasound on the left elbow  She reports she may be a candidate for elbow and thumb surgery  Patient recently diagnosed with carcinoma of the left breast metastatic to the axillary lymph nodes   She has a script for new compression sleeve for RUE due to lymphedema in the RUE from desmoid tumor over 10 years ago  Patient now presents for OT evaluation and treatment of left lateral epicondylitis and to be measured for a new compression sleeve and glove and night time garment for the right upper extremity          Recurrent probem    Quality of life: good    Pain  Current pain rating: 3 (Left thumb 6-7/10 at all times)  At best pain rating: 3  At worst pain ratin  Location: Left lateral epicondyle and left thumb A1 pulley  Quality: dull ache and sharp  Relieving factors: medications  Exacerbated by: Opening containers; work tasks  Progression: no change    Social Support  Lives in: multiple-level home  Lives with: spouse    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   Hand dominance: ambidextrous      Diagnostic Tests  MRI studies: abnormal (Carcinoma left breast with metastasis to axillary lymph nodes)  Treatments  Previous treatment: occupational therapy (daytime and night time compression sleeves for right arm lymphedema; left wrist brace)  Patient Goals  Patient goals for therapy: decreased edema, decreased pain, increased strength and independence with ADLs/IADLs  Patient goal: Be able to do art work without pain        Objective     Observations     Additional Observation Details  22: Patient has lost weight and Elvarex arm sleeve and glove measured in , are now too loose  Patient recently diagnosed with metastatic left breast carcinoma and has some weight loss associated with her new cancer diagnosis  Re measured Jobst Elvarex arm sleeve, glove, and Jobst Relax night time garment      Active Range of Motion     Left Elbow   Normal active range of motion    Strength/Myotome Testing     Left Elbow   Flexion: 5  Extension: 5  Forearm supination: 5  Forearm pronation: 5    Left Wrist/Hand   Wrist extension: 4  Wrist flexion: 5  Radial deviation: 4  Ulnar deviation: 5     (2nd hand position)     Trial 1: 45    Right Wrist/Hand      (2nd hand position) Trial 1: 44    Tests     Left Elbow   Positive handsJayda luu's and Mill's  Negative Cozen's  Left Wrist/Hand   Positive resisted middle finger                Precautions: Metastatic carcinoma left breast; RUE stage 2 lymphedema       Date 5/2       Visit 1       Manuals        IASTM L ext mm mass        Cross tissue massage 5' L ext mm mass       Cupping        Kinesio tape ECRL/B inhibit, w/ ext mm mass correction 3'               Compression garment measure/fit Measured for elvarex soft class 2 sleeve and glove and Jobst relax night time sleeve 15'       Neuro Re-Ed                                                                 Ther Ex        Composite ext stretch        Passive wrist ext stretch 10" x 10       Wrist ext strength Ecc 1# x 10        Supination strength        EDC strength Ecc PRB x 10                               Ther Activity                        HEP Cross tissue massage; Wrist ext stretch; ecc EDC and wrist ext ex                       Modalities

## 2022-05-04 ENCOUNTER — OFFICE VISIT (OUTPATIENT)
Dept: OBGYN CLINIC | Facility: CLINIC | Age: 62
End: 2022-05-04
Payer: COMMERCIAL

## 2022-05-04 VITALS
WEIGHT: 150 LBS | DIASTOLIC BLOOD PRESSURE: 81 MMHG | HEIGHT: 68 IN | SYSTOLIC BLOOD PRESSURE: 133 MMHG | BODY MASS INDEX: 22.73 KG/M2 | HEART RATE: 65 BPM

## 2022-05-04 DIAGNOSIS — M77.12 LATERAL EPICONDYLITIS OF LEFT ELBOW: ICD-10-CM

## 2022-05-04 DIAGNOSIS — M18.12 ARTHRITIS OF CARPOMETACARPAL (CMC) JOINT OF LEFT THUMB: ICD-10-CM

## 2022-05-04 DIAGNOSIS — R20.2 NUMBNESS AND TINGLING OF LEFT UPPER EXTREMITY: ICD-10-CM

## 2022-05-04 DIAGNOSIS — M65.312 TRIGGER THUMB OF LEFT HAND: Primary | ICD-10-CM

## 2022-05-04 DIAGNOSIS — R20.0 NUMBNESS AND TINGLING OF LEFT UPPER EXTREMITY: ICD-10-CM

## 2022-05-04 DIAGNOSIS — Z98.890 S/P CARPAL TUNNEL RELEASE: ICD-10-CM

## 2022-05-04 PROCEDURE — 20550 NJX 1 TENDON SHEATH/LIGAMENT: CPT | Performed by: SURGERY

## 2022-05-04 PROCEDURE — 99214 OFFICE O/P EST MOD 30 MIN: CPT | Performed by: SURGERY

## 2022-05-04 RX ORDER — DEXAMETHASONE SODIUM PHOSPHATE 100 MG/10ML
40 INJECTION INTRAMUSCULAR; INTRAVENOUS
Status: COMPLETED | OUTPATIENT
Start: 2022-05-04 | End: 2022-05-04

## 2022-05-04 RX ORDER — LIDOCAINE HYDROCHLORIDE 10 MG/ML
1 INJECTION, SOLUTION INFILTRATION; PERINEURAL
Status: COMPLETED | OUTPATIENT
Start: 2022-05-04 | End: 2022-05-04

## 2022-05-04 RX ADMIN — DEXAMETHASONE SODIUM PHOSPHATE 40 MG: 100 INJECTION INTRAMUSCULAR; INTRAVENOUS at 17:01

## 2022-05-04 RX ADMIN — LIDOCAINE HYDROCHLORIDE 1 ML: 10 INJECTION, SOLUTION INFILTRATION; PERINEURAL at 17:01

## 2022-05-04 NOTE — PROGRESS NOTES
ASSESSMENT/PLAN:     77-year-old female with left thumb CMC arthritis, left trigger thumb, left lateral epicondylitis, and left hand numbness tingling status post tunnel release  Patient discussed regards to her thumb CMC arthritis continuing to use her Comfort Cool brace as well as trying Voltaren gel over-the-counter  Patient was advised to do a patch test to make sure she is able take this with her allergies  In regards to her new condition of a left trigger thumb I did offer her corticosteroid injection  Patient elected to proceed injection was tolerated well  We did discuss which she continues have symptoms refractory to injection that surgery may be indicated  I would like her to continue with hand therapy for her lateral epicondylitis as this seems to be improving her symptoms  In regards to her residual numbness and tingling status post carpal tunnel release, I did review her ultrasound  She does have findings consistent with enlargement of the median nerve and increased vascularity this could be residual from her surgery  It may also be due to incomplete release  It is hard to determine what is the exact cause is we do not have been ultrasound from prior to her carpal tunnel release  We did discuss revision surgery may be indicated in the future and this could possibly be done at the same time should she need trigger thumb release in the future  I would like her to continue to wear her wrist cock-up brace at night  She does not need to use the rigid thumb spica brace at night  Patient was in good understanding of this  All od her questions were answered in the office  I will see her back in 6 weeks for re-evaluation  The patient verbalized understanding of exam findings and treatment plan  We engaged in the shared decision-making process and treatment options were discussed at length with the patient   Surgical and conservative management discussed today along with risks and benefits  Diagnoses and all orders for this visit:    Trigger thumb of left hand    Numbness and tingling of left upper extremity    S/P carpal tunnel release    Lateral epicondylitis of left elbow    Arthritis of carpometacarpal (CMC) joint of left thumb    Other orders  -     Hand/upper extremity injection: L thumb A1          Follow Up:  Return in about 6 weeks (around 6/15/2022)  To Do Next Visit:  Re-evaluation of current issue    ____________________________________________________________________________________________________________________________________________      CHIEF COMPLAINT:  Chief Complaint   Patient presents with    Left Wrist - Follow-up       SUBJECTIVE:  Triny Farmer is a 59y o  year old RHD female who presents  The office today for follow-up evaluation of her left thumb CMC arthritis, left lateral epicondylitis, and left hand numbness and tingling status post left carpal tunnel release with Dr Hank Lewis 14 months ago  Patient received corticosteroid injection to the left thumb CMC joint at the last visit  She states this did provide her with some relief for a few weeks  She has worn a Comfort Cool brace but does not think this provided her with significant relief  Today she notes pain at the left thumb at the A1 pulley  As well as clicking  She notes she has had improvement with hand therapy with her lateral epicondylitis  She is status post carpal tunnel release but continues to have numbness and tingling in the median nerve distribution  She was able to obtain ultrasound prior to today's visit  She did not  Voltaren gel but would like to try this  She denies any new injuries  I have personally reviewed all the relevant PMH, PSH, SH, FH, Medications and allergies       PAST MEDICAL HISTORY:  Past Medical History:   Diagnosis Date    Abnormal mammogram 05/15/2013    Anemia     Cancer ([address] )     desmoitosis    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 ([facility] [address] 4/19/2022    Desmoid tumor     Last Assessed: 6/19/2017    Hyperlipidemia     Hypertension        PAST SURGICAL HISTORY:  Past Surgical History:   Procedure Laterality Date    FRACTURE SURGERY      HYSTERECTOMY      OTHER SURGICAL HISTORY      Arm Incision: Dermoid tumor, right Arm     PARTIAL HYSTERECTOMY      SD COLONOSCOPY FLX DX W/COLLJ SPEC WHEN PFRMD N/A 8/15/2017    Procedure: COLONOSCOPY;  Surgeon: Willie Copeland MD;  Location: [facility]; Service: Gastroenterology    SD OPEN RX DISTAL RADIUS FX, INTRA-ARTICULAR, 3+ FRAG Left 2/9/2021    Procedure: OPEN REDUCTION W/ INTERNAL FIXATION (ORIF) RADIUS / ULNA (WRIST) left;  Surgeon: Guillermo Valverde MD;  Location: [facility];  Service: Orthopedics    SD WRIST Gordon Alejandra LIG Left 8/10/2021    Procedure: RELEASE LEFT CARPAL TUNNEL ENDOSCOPIC;  Surgeon: Guillermo Valverde MD;  Location: [facility];  Service: Orthopedics    SKIN BIOPSY      SKIN CANCER EXCISION      Melanoma Excision     TONSILLECTOMY      US GUIDED BREAST LYMPH NODE BIOPSY LEFT Left 4/14/2022       FAMILY HISTORY:  Family History   Problem Relation Age of Onset    Diabetes Mother     No Known Problems Father     No Known Problems Sister     Lung cancer Sister     Pancreatic cancer Sister     No Known Problems Maternal Grandmother     No Known Problems Maternal Grandfather     No Known Problems Paternal Grandmother     No Known Problems Paternal Grandfather        SOCIAL HISTORY:  Social History     Tobacco Use    Smoking status: Never Smoker    Smokeless tobacco: Never Used   Vaping Use    Vaping Use: Never used   Substance Use Topics    Alcohol use:  Yes     Alcohol/week: 1 0 standard drink     Types: 1 Shots of liquor per week     Comment: One shot of liquor occassionally at night    Drug use: Yes     Types: Marijuana     Comment: THC Medical marijuana       MEDICATIONS:    Current Outpatient Medications:     cholecalciferol (VITAMIN D3) 1,000 units tablet, Take 2,000 Units by mouth daily, Disp: , Rfl:     dronabinol (MARINOL) 5 MG capsule, Take 15 mg by mouth 4 (four) times a day (before meals and at bedtime), Disp: , Rfl:     Ergocalciferol (VITAMIN D2 PO), Take by mouth, Disp: , Rfl:     multivitamin (THERAGRAN) TABS, Take 1 tablet by mouth daily, Disp: , Rfl:     Omega-3 Fatty Acids (FISH OIL PO), Take by mouth, Disp: , Rfl:     Probiotic Product (PROBIOTIC-10 PO), Take by mouth, Disp: , Rfl:     cyclobenzaprine (FLEXERIL) 5 mg tablet, , Disp: , Rfl:     estradiol (ESTRACE) 0 1 mg/g vaginal cream, Insert 0 5 g into the vagina 2 (two) times a week Apply a pea sized amount to external vaginal opening and urethra area twice weekly, Disp: 42 5 g, Rfl: 3    meloxicam (MOBIC) 7 5 mg tablet, , Disp: , Rfl:     ALLERGIES:  Allergies   Allergen Reactions    Aspirin GI Intolerance and Abdominal Pain     ABD     Chlorpromazine Anaphylaxis     PHENOTHIAZINE=ANAPHYLAXIS    Codeine Anaphylaxis     Anaphylaxis  abd pain   Meperidine Anaphylaxis, Hives and Other (See Comments)     VOMITS  VOMITS  Category: Allergy;     Phenothiazines Anaphylaxis and Throat Swelling     Category: Allergy;     Saccharin - Food Allergy Anaphylaxis    Ibuprofen Hives     H    Ampicillin-Sulbactam Sodium Hives    Gluten Meal - Food Allergy GI Intolerance    Levofloxacin Hives    Neomycin Other (See Comments), Edema and Hives     Category: Allergy;   swelling    Other Throat Swelling, Hives and Other (See Comments)     ASA=GERD  ASA=GERD  Category: Allergy;     Citrus - Food Allergy Rash    Latex Hives and Rash     Category: Allergy;        REVIEW OF SYSTEMS:  Review of Systems   Constitutional: Negative for chills, fever and unexpected weight change  HENT: Negative for hearing loss, nosebleeds and sore throat  Eyes: Negative for pain, redness and visual disturbance  Respiratory: Negative for cough, shortness of breath and wheezing      Cardiovascular: Negative for chest pain, palpitations and leg swelling  Gastrointestinal: Negative for abdominal pain, nausea and vomiting  Endocrine: Negative for polydipsia and polyuria  Genitourinary: Negative for dyspareunia and hematuria  Musculoskeletal: Positive for arthralgias and myalgias  Negative for joint swelling  Skin: Negative for rash and wound  Neurological: Positive for numbness  Negative for dizziness and headaches  Psychiatric/Behavioral: Negative for decreased concentration and suicidal ideas  The patient is not nervous/anxious  VITALS:  Vitals:    05/04/22 1628   BP: 133/81   Pulse: 65       LABS:  HgA1c:   Lab Results   Component Value Date    HGBA1C 5 3 07/13/2021     BMP:   Lab Results   Component Value Date    GLUCOSE 98 05/02/2017    CALCIUM 9 3 04/07/2022    K 4 1 04/07/2022    CO2 28 04/07/2022     04/07/2022    BUN 9 04/07/2022    CREATININE 0 62 04/07/2022       _____________________________________________________  PHYSICAL EXAMINATION:  General: well developed and well nourished, alert, oriented times 3 and appears comfortable  Psychiatric: Normal  HEENT: Normocephalic, Atraumatic Trachea Midline, No torticollis  Pulmonary: No audible wheezing or respiratory distress   Cardiovascular: No pitting edema, 2+ radial pulse   Abdominal/GI: abdomen non tender, non distended   Skin: No masses, erythema, lacerations, fluctation, ulcerations  Neurovascular: Sensation Intact to the Median, Ulnar, Radial Nerve, Motor Intact to the Median, Ulnar, Radial Nerve and Pulses Intact  Musculoskeletal: Normal, except as noted in detailed exam and in HPI  MUSCULOSKELETAL EXAMINATION:  Left upper extremity: skin intact  No erythema or ecchymosis noted  No swelling noted  Minimally tender at the lateral epicondyle  Mild pain with resisted wrist extension with elbow fully extended  Mildly tender at the thumb CMC joint  Tender to palpate A1 pulley of the thumb    Crepitation noted with IP flexion extension at the A1 pulley  Brisk capillary refill noted    ___________________________________________________  STUDIES REVIEWED:  No new images to review     PROCEDURES PERFORMED:  Hand/upper extremity injection: L thumb A1  Universal Protocol:  Consent: Verbal consent obtained    Consent given by: patient  Patient identity confirmed: verbally with patient    Supporting Documentation  Indications: pain   Procedure Details  Condition:trigger finger Location: thumb - L thumb A1   Preparation: Patient was prepped and draped in the usual sterile fashion  Needle size: 25 G  Ultrasound guidance: no  Medications administered: 1 mL lidocaine 1 %; 40 mg dexamethasone 100 mg/10 mL    Patient tolerance: patient tolerated the procedure well with no immediate complications  Dressing:  Sterile dressing applied              _____________________________________________________      Scribe Attestation    I,:  Fuad Cramer am acting as a scribe while in the presence of the attending physician :       I,:  Deidre Burris MD personally performed the services described in this documentation    as scribed in my presence :

## 2022-05-10 ENCOUNTER — OFFICE VISIT (OUTPATIENT)
Dept: OCCUPATIONAL THERAPY | Facility: CLINIC | Age: 62
End: 2022-05-10
Payer: COMMERCIAL

## 2022-05-10 DIAGNOSIS — M77.12 LATERAL EPICONDYLITIS OF LEFT ELBOW: Primary | ICD-10-CM

## 2022-05-10 PROCEDURE — 97140 MANUAL THERAPY 1/> REGIONS: CPT

## 2022-05-10 PROCEDURE — 97035 APP MDLTY 1+ULTRASOUND EA 15: CPT

## 2022-05-10 PROCEDURE — 97110 THERAPEUTIC EXERCISES: CPT

## 2022-05-10 NOTE — PROGRESS NOTES
Daily Note     Today's date: 5/10/2022  Patient name: Surendra Smallwood  : 1960  MRN: [id number]  Referring provider: Scott Dupree PA-C  Dx:   Encounter Diagnosis     ICD-10-CM    1  Lateral epicondylitis of left elbow  M77 12        Start Time: 1400          Subjective: "The exercises are helping, the elbow doesn't hurt as much"  Objective: See treatment diary below      Assessment: Tolerated treatment well  Pt exhibited G technique during therapeutic exercises this session  Pt reported decrease in pain during stretching, stating it was more like an ache  Patient would benefit from continued OT      Plan: Continue per plan of care        Precautions: Metastatic carcinoma left breast; RUE stage 2 lymphedema       Date 5/2 5/10      Visit 1 2      Manuals        IASTM L ext mm mass  5'      Cross tissue massage 5' L ext mm mass 5' L ext mm mass      Cupping        Kinesio tape ECRL/B inhibit, w/ ext mm mass correction 3' ECRL/B inhibit, w/ext mm mass correction 3'              Compression garment measure/fit Measured for elvarex soft class 2 sleeve and glove and Jobst relax night time sleeve 15'       Neuro Re-Ed                                                                 Ther Ex        Composite ext stretch  10 x 10      Passive wrist ext stretch 10" x 10 10"x10      Wrist ext strength Ecc 1# x 10   Ecc 1# x 10      Supination strength        EDC strength Ecc PRB x 10 Ecc PRB x 10      DT  Ecc 1# x 10                      Ther Activity                        HEP Cross tissue massage; Wrist ext stretch; ecc EDC and wrist ext ex                       Modalities        Heat  5

## 2022-05-18 ENCOUNTER — DOCUMENTATION (OUTPATIENT)
Dept: HEMATOLOGY ONCOLOGY | Facility: CLINIC | Age: 62
End: 2022-05-18

## 2022-05-18 NOTE — PROGRESS NOTES
Contacted patient to f/u pos Surgical Oncology Consult  A Voicemail was left with my contact information for patient to return my call

## 2022-05-20 ENCOUNTER — HOSPITAL ENCOUNTER (OUTPATIENT)
Dept: RADIOLOGY | Facility: HOSPITAL | Age: 62
Discharge: HOME | End: 2022-05-20
Attending: SURGERY
Payer: COMMERCIAL

## 2022-05-20 DIAGNOSIS — C77.3 CARCINOMA OF LEFT BREAST METASTATIC TO AXILLARY LYMPH NODE (HCC): ICD-10-CM

## 2022-05-20 DIAGNOSIS — C50.912 CARCINOMA OF LEFT BREAST METASTATIC TO AXILLARY LYMPH NODE (HCC): ICD-10-CM

## 2022-05-20 PROCEDURE — 88342 IMHCHEM/IMCYTCHM 1ST ANTB: CPT | Performed by: PATHOLOGY

## 2022-05-20 PROCEDURE — 88341 IMHCHEM/IMCYTCHM EA ADD ANTB: CPT | Performed by: PATHOLOGY

## 2022-05-20 PROCEDURE — 19086 BX BREAST ADD LESION MR IMAG: CPT

## 2022-05-20 PROCEDURE — 88305 TISSUE EXAM BY PATHOLOGIST: CPT | Performed by: PATHOLOGY

## 2022-05-20 PROCEDURE — 19085 BX BREAST 1ST LESION MR IMAG: CPT

## 2022-05-20 PROCEDURE — A4648 IMPLANTABLE TISSUE MARKER: HCPCS

## 2022-05-20 PROCEDURE — 88361 TUMOR IMMUNOHISTOCHEM/COMPUT: CPT | Performed by: PATHOLOGY

## 2022-05-20 PROCEDURE — A9585 GADOBUTROL INJECTION: HCPCS | Performed by: SURGERY

## 2022-05-20 RX ORDER — LIDOCAINE HYDROCHLORIDE AND EPINEPHRINE BITARTRATE 20; .01 MG/ML; MG/ML
20 INJECTION, SOLUTION SUBCUTANEOUS ONCE
Status: COMPLETED | OUTPATIENT
Start: 2022-05-20 | End: 2022-05-20

## 2022-05-20 RX ORDER — LIDOCAINE HYDROCHLORIDE 10 MG/ML
5 INJECTION, SOLUTION EPIDURAL; INFILTRATION; INTRACAUDAL; PERINEURAL ONCE
Status: COMPLETED | OUTPATIENT
Start: 2022-05-20 | End: 2022-05-20

## 2022-05-20 RX ADMIN — LIDOCAINE HYDROCHLORIDE,EPINEPHRINE BITARTRATE 20 ML: 20; .01 INJECTION, SOLUTION INFILTRATION; PERINEURAL at 09:14

## 2022-05-20 RX ADMIN — LIDOCAINE HYDROCHLORIDE 5 ML: 10 INJECTION, SOLUTION EPIDURAL; INFILTRATION; INTRACAUDAL; PERINEURAL at 09:13

## 2022-05-20 RX ADMIN — GADOBUTROL 8 ML: 604.72 INJECTION INTRAVENOUS at 08:37

## 2022-05-20 RX ADMIN — LIDOCAINE HYDROCHLORIDE 5 ML: 10 INJECTION, SOLUTION EPIDURAL; INFILTRATION; INTRACAUDAL; PERINEURAL at 09:12

## 2022-05-20 RX ADMIN — LIDOCAINE HYDROCHLORIDE,EPINEPHRINE BITARTRATE 20 ML: 20; .01 INJECTION, SOLUTION INFILTRATION; PERINEURAL at 09:12

## 2022-05-20 NOTE — NURSING NOTE
Patient tolerated bilateral breast biopsy well, left hematoma noted and patient was made aware by Dr Leobardo Donahue  No complaints verbalized by patient  After holding pressure to bilateral sites, steri-strips applied and band aide  IV removed from Left forearm site  Post procedure discharge instructions and ice packs given to patient with verbal communication of understanding  Patient left MRI suite ambulating with no complaints

## 2022-05-23 NOTE — PROGRESS NOTES
Post procedure call completed 5/23/22 at 1500    Bleeding: _____yes __X___no    Pain: _____yes ___X___no    Redness/Swelling: ______yes ___X___no    Pt with no questions at this time, adv Dr Theresa Humphrey team will call when results available, adv to call with any questions or concerns, has name/# for contact

## 2022-05-24 ENCOUNTER — OFFICE VISIT (OUTPATIENT)
Dept: OCCUPATIONAL THERAPY | Facility: CLINIC | Age: 62
End: 2022-05-24
Payer: COMMERCIAL

## 2022-05-24 DIAGNOSIS — M77.12 LATERAL EPICONDYLITIS OF LEFT ELBOW: Primary | ICD-10-CM

## 2022-05-24 DIAGNOSIS — I89.0 LYMPHEDEMA OF RIGHT UPPER EXTREMITY: ICD-10-CM

## 2022-05-24 PROCEDURE — 97140 MANUAL THERAPY 1/> REGIONS: CPT

## 2022-05-24 PROCEDURE — 97110 THERAPEUTIC EXERCISES: CPT

## 2022-05-24 NOTE — PROGRESS NOTES
OT Re-Evaluation     Today's date: 2022  Patient name: Viry Bello  : 1960  MRN: [id number]  Referring provider: Marene Goodell, PA-C  Dx:   Encounter Diagnosis     ICD-10-CM    1  Lateral epicondylitis of left elbow  M77 12    2  Lymphedema of right upper extremity  I89 0                   Assessment  Assessment details: Viry Bello is a 59 y o , Right HD female referred to hand therapy for left lateral epicondylitis  Onset of injury 6 months ago due to fall and repetitive activities  Patient presents 22 with impaired ROM, strength, and sensation of the left hand, wrist, elbow  Deficits also noted in pain and functional use of the left UE  Patient also has RUE stage 2 lymphedema of long duration  Her old compression garments no longer fit and patient needs to be measured and fit for new night time and daytime garments  Patient is a good candidate for OT services to abolish pain and edema and restore ROM, strength, coordination, and sensation for a return to independence in daily tasks  22: Pain in the left elbow is resolving  Left thumb pain has improved and the thumb is triggering less often, but fibrosis still noted at the A1 pulley and the MP and CMC joints of the thumb are still painful during tasks  Compression garments are on order   Continue OT 1x every 1-2 weeks to abolish pain    Impairments: abnormal or restricted ROM, activity intolerance, impaired physical strength, lacks appropriate home exercise program, pain with function and weight-bearing intolerance  Other impairment: Stage 2 lymphedema RUE; metastatic carcinoma left breast  Functional limitations: Pain during self care, home meanagement and art activities  Symptom irritability: moderateBarriers to therapy: Metastatic left breast carcinoma; RUE lymphedema secondary to treatment for desmoid tumor right arm   Understanding of Dx/Px/POC: excellent   Prognosis: good    Goals  STGs (4 weeks)  Patient will be independent in HEP of ROM, stretching, edema management  MET  Patient will report an average pain level of 4/10 in the LUE  MET  Patient will demonstrate pain free AROM in the LUE  NOT MET  Patient will be measured for new daytime and night time garments RUE for treatment of secondary lymphedema related to cancer treatments  MET  LTGs (12 weeks)  Patient will demonstrate independence in a HEP to maintain ROM, strength, and function at discharge  ON GOING  Patient will report an average pain level of 1-2/10 to be independent in daily tasks  NOT MET  Patient will demonstrate AROM of the wrist and forearm WFL to be independent in self care tasks  MET  Patient will demonstrate 5/5 muscle strength in the wrist and forearm to be MI for meal prep  MET  Patient 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 hand strength WFL to be MI for IADL tasks  NOT MET  Patient will be fit for RUE night time and daytime compression garments  NOT MET      Plan  Plan details: 5/24/22: OT 1x every 1-2 weeks to meet OT goals  Patient would benefit from: skilled occupational therapy and custom splinting  Planned modality interventions: ultrasound, thermotherapy: hydrocollator packs and cryotherapy  Planned therapy interventions: activity modification, compression, fine motor coordination training, graded activity, graded exercise, home exercise program, therapeutic exercise, therapeutic activities, stretching, strengthening, patient education, orthotic fitting/training, neuromuscular re-education and manual therapy  Other planned therapy interventions: IASTM; cupping; kinesio tape; measure and fit for custom compression garments RUE  Frequency: 1x week  Duration in weeks: 12  Plan of Care beginning date: 5/2/2022  Plan of Care expiration date: 8/5/2022  Treatment plan discussed with: patient        Subjective Evaluation    History of Present Illness  Mechanism of injury: Patient reports onset of left elbow after a fall several months ago   She had a cortisone shot, but with no lasting effect  Wore a wrist splint for several weeks, but it caused pain in the left thumb, so she stopped wearing it one week ago  Has a left trigger thumb  She is waiting for results of MRI and ultrasound on the left elbow  She reports she may be a candidate for elbow and thumb surgery  Patient recently diagnosed with carcinoma of the left breast metastatic to the axillary lymph nodes  She has a script for new compression sleeve for RUE due to lymphedema in the RUE from desmoid tumor over 10 years ago  Patient now presents for OT evaluation and treatment of left lateral epicondylitis and to be measured for a new compression sleeve and glove and night time garment for the right upper extremity    22: I've been losing more weight and my old sleeves are slipping down my upper arm  My elbow feels better, but my left thumb hurts          Recurrent probem    Quality of life: good    Pain  Current pain ratin (Left thumb 6-7/10 at all times)  At best pain ratin  At worst pain ratin  Location: Left lateral epicondyle and left thumb A1 pulley  Quality: dull ache and sharp  Relieving factors: medications  Exacerbated by: Opening containers; work tasks    Progression: improved    Social Support  Lives in: multiple-level home  Lives with: spouse    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   Hand dominance: ambidextrous      Diagnostic Tests  MRI studies: abnormal (Carcinoma left breast with metastasis to axillary lymph nodes)  Treatments  Previous treatment: occupational therapy (daytime and night time compression sleeves for right arm lymphedema; left wrist brace)  Current treatment: occupational therapy  Patient Goals  Patient goals for therapy: decreased edema, decreased pain, increased strength and independence with ADLs/IADLs  Patient goal: Be able to do art work without pain        Objective     Observations     Additional Observation Details  22: Patient has lost weight and Elvarex arm sleeve and glove measured in , are now too loose  Patient recently diagnosed with metastatic left breast carcinoma and has some weight loss associated with her new cancer diagnosis  Re measured Jobst Elvarex arm sleeve, glove, and Jobst Relax night time garment  Active Range of Motion     Left Elbow   Normal active range of motion    Strength/Myotome Testing     Left Elbow   Flexion: 5  Extension: 5  Forearm supination: 5  Forearm pronation: 5    Left Wrist/Hand   Wrist extension: 5  Wrist flexion: 5  Radial deviation: 5  Ulnar deviation: 5     (2nd hand position)     Trial 1: 50    Right Wrist/Hand      (2nd hand position)     Trial 1: 44    Tests     Left Elbow   Negative Cozen's, handshake, Maudsley's and Mill's  Left Wrist/Hand   Negative resisted middle finger                Precautions: Metastatic carcinoma left breast; RUE stage 2 lymphedema       Date 5/2 5/10 5/24     Visit 1 2 3     Manuals        IASTM L ext mm mass, A1 pulley thumb  5' 10'     Cross tissue massage 5' L ext mm mass 5' L ext mm mass 5'     Cupping        Kinesio tape ECRL/B inhibit, w/ ext mm mass correction 3' ECRL/B inhibit, w/ext mm mass correction 3'              Compression garment measure/fit Measured for elvarex soft class 2 sleeve and glove and Jobst relax night time sleeve 15'       Neuro Re-Ed                                                                 Ther Ex        Composite ext stretch  10 x 10 10" x 10     Passive wrist ext stretch 10" x 10 10"x10 10" x 10     Wrist ext strength Ecc 1# x 10   Ecc 1# x 10 1# x 20 ecc     Supination strength        EDC strength Ecc PRB x 10 Ecc PRB x 10 PRB x 20 ecc     DT  Ecc 1# x 10 1# x 20 ecc     Thumb ext stretch   10" x 10             Ther Activity                        HEP Cross tissue massage; Wrist ext stretch; ecc EDC and wrist ext ex                       Modalities        Heat  5

## 2022-06-01 ENCOUNTER — OFFICE VISIT (OUTPATIENT)
Dept: LAB | Facility: HOSPITAL | Age: 62
End: 2022-06-01
Payer: COMMERCIAL

## 2022-06-01 ENCOUNTER — APPOINTMENT (OUTPATIENT)
Dept: LAB | Facility: HOSPITAL | Age: 62
End: 2022-06-01
Payer: COMMERCIAL

## 2022-06-01 ENCOUNTER — OFFICE VISIT (OUTPATIENT)
Dept: SURGICAL ONCOLOGY | Facility: CLINIC | Age: 62
End: 2022-06-01
Payer: COMMERCIAL

## 2022-06-01 VITALS
RESPIRATION RATE: 16 BRPM | DIASTOLIC BLOOD PRESSURE: 80 MMHG | HEART RATE: 72 BPM | WEIGHT: 145 LBS | SYSTOLIC BLOOD PRESSURE: 122 MMHG | OXYGEN SATURATION: 99 % | HEIGHT: 68 IN | TEMPERATURE: 98.1 F | BODY MASS INDEX: 21.98 KG/M2

## 2022-06-01 DIAGNOSIS — C50.312 MALIGNANT NEOPLASM OF LOWER-INNER QUADRANT OF LEFT BREAST IN FEMALE, ESTROGEN RECEPTOR POSITIVE (HCC): ICD-10-CM

## 2022-06-01 DIAGNOSIS — Z01.818 PRE-OP EXAMINATION: ICD-10-CM

## 2022-06-01 DIAGNOSIS — C50.912 CARCINOMA OF LEFT BREAST METASTATIC TO AXILLARY LYMPH NODE (HCC): ICD-10-CM

## 2022-06-01 DIAGNOSIS — Z17.0 MALIGNANT NEOPLASM OF LOWER-INNER QUADRANT OF LEFT BREAST IN FEMALE, ESTROGEN RECEPTOR POSITIVE (HCC): Primary | ICD-10-CM

## 2022-06-01 DIAGNOSIS — C77.3 CARCINOMA OF LEFT BREAST METASTATIC TO AXILLARY LYMPH NODE (HCC): ICD-10-CM

## 2022-06-01 DIAGNOSIS — Z17.0 MALIGNANT NEOPLASM OF LOWER-INNER QUADRANT OF LEFT BREAST IN FEMALE, ESTROGEN RECEPTOR POSITIVE (HCC): ICD-10-CM

## 2022-06-01 DIAGNOSIS — C50.312 MALIGNANT NEOPLASM OF LOWER-INNER QUADRANT OF LEFT BREAST IN FEMALE, ESTROGEN RECEPTOR POSITIVE (HCC): Primary | ICD-10-CM

## 2022-06-01 DIAGNOSIS — Z01.810 PRE-OPERATIVE CARDIOVASCULAR EXAMINATION: ICD-10-CM

## 2022-06-01 LAB
ALBUMIN SERPL BCP-MCNC: 4 G/DL (ref 3.5–5)
ALP SERPL-CCNC: 119 U/L (ref 46–116)
ALT SERPL W P-5'-P-CCNC: 16 U/L (ref 12–78)
ANION GAP SERPL CALCULATED.3IONS-SCNC: 8 MMOL/L (ref 4–13)
AST SERPL W P-5'-P-CCNC: 17 U/L (ref 5–45)
BASOPHILS # BLD AUTO: 0.06 THOUSANDS/ΜL (ref 0–0.1)
BASOPHILS NFR BLD AUTO: 1 % (ref 0–1)
BILIRUB SERPL-MCNC: 0.62 MG/DL (ref 0.2–1)
BUN SERPL-MCNC: 8 MG/DL (ref 5–25)
CALCIUM SERPL-MCNC: 9.8 MG/DL (ref 8.3–10.1)
CHLORIDE SERPL-SCNC: 100 MMOL/L (ref 100–108)
CO2 SERPL-SCNC: 28 MMOL/L (ref 21–32)
CREAT SERPL-MCNC: 0.55 MG/DL (ref 0.6–1.3)
EOSINOPHIL # BLD AUTO: 0.1 THOUSAND/ΜL (ref 0–0.61)
EOSINOPHIL NFR BLD AUTO: 1 % (ref 0–6)
ERYTHROCYTE [DISTWIDTH] IN BLOOD BY AUTOMATED COUNT: 12.3 % (ref 11.6–15.1)
GFR SERPL CREATININE-BSD FRML MDRD: 100 ML/MIN/1.73SQ M
GLUCOSE SERPL-MCNC: 100 MG/DL (ref 65–140)
HCT VFR BLD AUTO: 45.1 % (ref 34.8–46.1)
HGB BLD-MCNC: 15.3 G/DL (ref 11.5–15.4)
IMM GRANULOCYTES # BLD AUTO: 0.02 THOUSAND/UL (ref 0–0.2)
IMM GRANULOCYTES NFR BLD AUTO: 0 % (ref 0–2)
LYMPHOCYTES # BLD AUTO: 1.93 THOUSANDS/ΜL (ref 0.6–4.47)
LYMPHOCYTES NFR BLD AUTO: 28 % (ref 14–44)
MCH RBC QN AUTO: 31.4 PG (ref 26.8–34.3)
MCHC RBC AUTO-ENTMCNC: 33.9 G/DL (ref 31.4–37.4)
MCV RBC AUTO: 92 FL (ref 82–98)
MONOCYTES # BLD AUTO: 0.68 THOUSAND/ΜL (ref 0.17–1.22)
MONOCYTES NFR BLD AUTO: 10 % (ref 4–12)
NEUTROPHILS # BLD AUTO: 4.12 THOUSANDS/ΜL (ref 1.85–7.62)
NEUTS SEG NFR BLD AUTO: 60 % (ref 43–75)
NRBC BLD AUTO-RTO: 0 /100 WBCS
PLATELET # BLD AUTO: 391 THOUSANDS/UL (ref 149–390)
PMV BLD AUTO: 9 FL (ref 8.9–12.7)
POTASSIUM SERPL-SCNC: 3.7 MMOL/L (ref 3.5–5.3)
PROT SERPL-MCNC: 7.6 G/DL (ref 6.4–8.2)
RBC # BLD AUTO: 4.88 MILLION/UL (ref 3.81–5.12)
SODIUM SERPL-SCNC: 136 MMOL/L (ref 136–145)
WBC # BLD AUTO: 6.91 THOUSAND/UL (ref 4.31–10.16)

## 2022-06-01 PROCEDURE — 93005 ELECTROCARDIOGRAM TRACING: CPT

## 2022-06-01 PROCEDURE — 80053 COMPREHEN METABOLIC PANEL: CPT

## 2022-06-01 PROCEDURE — 36415 COLL VENOUS BLD VENIPUNCTURE: CPT

## 2022-06-01 PROCEDURE — 85025 COMPLETE CBC W/AUTO DIFF WBC: CPT

## 2022-06-01 PROCEDURE — 99215 OFFICE O/P EST HI 40 MIN: CPT | Performed by: SURGERY

## 2022-06-01 RX ORDER — ACETAMINOPHEN AND CODEINE PHOSPHATE 300; 30 MG/1; MG/1
1 TABLET ORAL EVERY 6 HOURS PRN
Qty: 30 TABLET | Refills: 0 | Status: CANCELLED | OUTPATIENT
Start: 2022-06-01

## 2022-06-01 RX ORDER — HYDROMORPHONE HYDROCHLORIDE 2 MG/1
2 TABLET ORAL EVERY 6 HOURS PRN
Qty: 20 TABLET | Refills: 0 | Status: SHIPPED | OUTPATIENT
Start: 2022-06-01 | End: 2022-06-15 | Stop reason: ALTCHOICE

## 2022-06-01 NOTE — PROGRESS NOTES
Surgical Oncology Follow Up  [facility]  [facility] [place] [address]    Liz Rock  1960  [id number]      No chief complaint on file  Assessment & Plan:   She is here for follow-up after bilateral breast MRI guided biopsy  Beath known left axillary metastatic lymph node with palpable left axillary lymphadenopathy left breast biopsy at 11:00 a m  consistent with invasive ductal carcinoma ERPR positive HER2 negative  These pathology cul findings were discussed in detail  Surgical consent was obtained for left breast conservation surgery and left axillary dissection given palpable lymphadenopathy  Consent was obtained after explaining the benefit procedure alternatives possible complications such as bleeding infection injury to the surrounding structures particularly long thoracic and thoracodorsal nerves injury and its consequences  We also discussed the risk of lymphedema with axillary dissection  She already has right upper extremity lymphedema  She and her  had several questions and I answered all of them to their satisfaction  Cancer History:     Oncology History   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 ([facility] [address] )   4/14/2022 Initial Diagnosis    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 ([facility] [address] )     4/14/2022 Biopsy    Left axillary Lymph node ultrasound guided biopsy  Metastatic carcinoma, compatible with breast origin  ER >95  [place] 95   HER2 1+    On ultrasound lymph node is 1 8 cm  There is cortical thickening without evident fatty hilum  In review of screening mammogram, demonstrates no suspicious mammographic findings identified in either breast  Bilateral MRI is recommended  Flow cytometry - there is no evidence of a B-cell or T-cell non-Hodgkin lymphoma         4/20/2022 Genomic Testing    A total of 36 genes were evaluated, including: ANGEL LUIS, BRCA1, BRCA2, CDH1, CHEK2, PALB2, PTEN, STK11, TP53  Negative result  No pathogenic sequence variants or deletions/duplications identified     5/20/2022 Biopsy    MRI guided biopsy  A  Left breast   11 o'clock   Invasive mammary carcinoma of no special type (ductal)  Grade 1  ER 95  IA 95  HER2 0    B  Right breast   Retroareolar  Benign fibrocystic changes without atypia (discrete 0 3 cm focus of sclerosing and tubular adenosis with usual ductal hyperplasia, columnar cell change and hyperplasia, apocrine metaplasia, cystic dilatation)  - Microcalcifications: Present associated with non-neoplastic breast tissue    - Negative for malignancy, in-situ carcinoma and atypical hyperplasia  Malignant pathology appears unifocal  Biopsy proven carcinoma measured 1 cm on MRI  Biopsy proved metastatic disease to left axillary lymph node  Malignant neoplasm of lower-inner quadrant of left breast in female, estrogen receptor positive ([facility] [address] )   5/20/2022 Initial Diagnosis    Malignant neoplasm of lower-inner quadrant of left breast in female, estrogen receptor positive ([facility] [address] )     5/20/2022 Biopsy    MRI guided biopsy  A  Left breast   11 o'clock   Invasive mammary carcinoma of no special type (ductal)  Grade 1  ER 95  IA 95  HER2 0    B  Right breast   Retroareolar  Benign fibrocystic changes without atypia (discrete 0 3 cm focus of sclerosing and tubular adenosis with usual ductal hyperplasia, columnar cell change and hyperplasia, apocrine metaplasia, cystic dilatation)  - Microcalcifications: Present associated with non-neoplastic breast tissue    - Negative for malignancy, in-situ carcinoma and atypical hyperplasia  Malignant pathology appears unifocal  Biopsy proven carcinoma measured 1 cm on MRI  Biopsy proved metastatic disease to left axillary lymph node  Interval History:   She is here follow-up after left breast and right breast MRI guided biopsy      Review of Systems:   Review of Systems   Constitutional: Negative for chills and fever  HENT: Negative for ear pain and sore throat  Eyes: Negative for pain and visual disturbance  Respiratory: Negative for cough and shortness of breath  Cardiovascular: Negative for chest pain and palpitations  Gastrointestinal: Negative for abdominal pain and vomiting  Genitourinary: Negative for dysuria and hematuria  Musculoskeletal: Negative for arthralgias and back pain  Skin: Negative for color change and rash  Neurological: Negative for seizures and syncope  All other systems reviewed and are negative  Past Medical History     Patient Active Problem List   Diagnosis    Back pain, chronic    Chronic insomnia    Lymphedema of right arm    Hyperlipidemia, mixed    Peripheral neuropathy    Lymphedema    Cellulitis of right upper extremity    Sepsis ([facility] [address] )    Desmoid tumor    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 (HCC)    Malignant neoplasm of lower-inner quadrant of left breast in female, estrogen receptor positive ([facility] [address] )     Past Medical History:   Diagnosis Date    Abnormal mammogram 05/15/2013    Anemia     Cancer ([facility] [address] )     desmoitosis    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 ([facility] [address] ) 4/19/2022    Desmoid tumor     Last Assessed: 6/19/2017    Hyperlipidemia     Hypertension      Past Surgical History:   Procedure Laterality Date    FRACTURE SURGERY      HYSTERECTOMY      MRI BREAST BIOPSY LEFT (ALL INCLUSIVE) Left 5/20/2022    MRI BREAST BIOPSY RIGHT (ALL INCLUSIVE) Right 5/20/2022    OTHER SURGICAL HISTORY      Arm Incision: Dermoid tumor, right Arm     PARTIAL HYSTERECTOMY      NE COLONOSCOPY FLX DX W/COLLJ SPEC WHEN PFRMD N/A 8/15/2017    Procedure: COLONOSCOPY;  Surgeon: Steve Chilel MD;  Location: [facility];   Service: Gastroenterology    NE OPEN RX DISTAL RADIUS FX, INTRA-ARTICULAR, 3+ FRAG Left 2/9/2021    Procedure: OPEN REDUCTION W/ INTERNAL FIXATION (ORIF) RADIUS / ULNA (WRIST) left; Surgeon: Abdirizak Boogie MD;  Location: [facility];  Service: Orthopedics    NE WRIST Shirley Mckoyk LIG Left 8/10/2021    Procedure: RELEASE LEFT CARPAL TUNNEL ENDOSCOPIC;  Surgeon: Abdirizak Boogie MD;  Location: [facility];  Service: Orthopedics    SKIN BIOPSY      SKIN CANCER EXCISION      Melanoma Excision     TONSILLECTOMY      US GUIDED BREAST LYMPH NODE BIOPSY LEFT Left 4/14/2022     Family History   Problem Relation Age of Onset    Diabetes Mother     No Known Problems Father     No Known Problems Sister     Lung cancer Sister     Pancreatic cancer Sister     No Known Problems Maternal Grandmother     No Known Problems Maternal Grandfather     No Known Problems Paternal Grandmother     No Known Problems Paternal Grandfather      Social History     Socioeconomic History    Marital status: /Civil Union     Spouse name: Not on file    Number of children: Not on file    Years of education: Not on file    Highest education level: Not on file   Occupational History    Occupation: unemployed    Tobacco Use    Smoking status: Never Smoker    Smokeless tobacco: Never Used   Vaping Use    Vaping Use: Never used   Substance and Sexual Activity    Alcohol use:  Yes     Alcohol/week: 1 0 standard drink     Types: 1 Shots of liquor per week     Comment: One shot of liquor occassionally at night    Drug use: Yes     Types: Marijuana     Comment: THC Medical marijuana    Sexual activity: Yes     Partners: Male   Other Topics Concern    Not on file   Social History Narrative    Lives with spouse      Social Determinants of Health     Financial Resource Strain: Not on file   Food Insecurity: Not on file   Transportation Needs: Not on file   Physical Activity: Not on file   Stress: Not on file   Social Connections: Not on file   Intimate Partner Violence: Not on file   Housing Stability: Not on file       Current Outpatient Medications:     cholecalciferol (VITAMIN D3) 1,000 units tablet, Take 2,000 Units by mouth daily, Disp: , Rfl:     cyclobenzaprine (FLEXERIL) 5 mg tablet, , Disp: , Rfl:     dronabinol (MARINOL) 5 MG capsule, Take 15 mg by mouth 4 (four) times a day (before meals and at bedtime), Disp: , Rfl:     Ergocalciferol (VITAMIN D2 PO), Take by mouth, Disp: , Rfl:     estradiol (ESTRACE) 0 1 mg/g vaginal cream, Insert 0 5 g into the vagina 2 (two) times a week Apply a pea sized amount to external vaginal opening and urethra area twice weekly, Disp: 42 5 g, Rfl: 3    meloxicam (MOBIC) 7 5 mg tablet, , Disp: , Rfl:     multivitamin (THERAGRAN) TABS, Take 1 tablet by mouth daily, Disp: , Rfl:     Omega-3 Fatty Acids (FISH OIL PO), Take by mouth, Disp: , Rfl:     Probiotic Product (PROBIOTIC-10 PO), Take by mouth, Disp: , Rfl:   Allergies   Allergen Reactions    Aspirin GI Intolerance and Abdominal Pain     ABD     Chlorpromazine Anaphylaxis     PHENOTHIAZINE=ANAPHYLAXIS    Codeine Anaphylaxis     Anaphylaxis  abd pain   Meperidine Anaphylaxis, Hives and Other (See Comments)     VOMITS  VOMITS  Category: Allergy;     Phenothiazines Anaphylaxis and Throat Swelling     Category: Allergy;     Saccharin - Food Allergy Anaphylaxis    Ibuprofen Hives     H    Ampicillin-Sulbactam Sodium Hives    Gluten Meal - Food Allergy GI Intolerance    Levofloxacin Hives    Neomycin Other (See Comments), Edema and Hives     Category: Allergy;   swelling    Other Throat Swelling, Hives and Other (See Comments)     ASA=GERD  ASA=GERD  Category: Allergy;     Citrus - Food Allergy Rash    Latex Hives and Rash     Category: Allergy; Physical Exam:     Vitals:    06/01/22 1531   BP: 122/80   Pulse: 72   Resp: 16   Temp: 98 1 °F (36 7 °C)   SpO2: 99%     Physical Exam  Constitutional:       Appearance: Normal appearance  HENT:      Head: Normocephalic and atraumatic        Nose: Nose normal       Mouth/Throat:      Mouth: Mucous membranes are moist    Eyes:      Pupils: Pupils are equal, round, and reactive to light  Cardiovascular:      Rate and Rhythm: Normal rate  Pulses: Normal pulses  Heart sounds: Normal heart sounds  Pulmonary:      Effort: Pulmonary effort is normal       Breath sounds: Normal breath sounds  Chest:      Comments: Left breast with difficult bruise from recent biopsy  Left axillary palpable multiple lymph nodes  Right breast examination is unremarkable no palpable mass or masses the  Abdominal:      General: Bowel sounds are normal       Palpations: Abdomen is soft  Musculoskeletal:         General: Normal range of motion  Cervical back: Normal range of motion and neck supple  Skin:     General: Skin is warm  Neurological:      General: No focal deficit present  Mental Status: She is alert and oriented to person, place, and time  Psychiatric:         Mood and Affect: Mood normal          Behavior: Behavior normal          Thought Content: Thought content normal          Judgment: Judgment normal            Results & Discussion:   I did review pathology cul findings  We will plan to place left breast and left axillary ANITA placement followed by left breast conservation surgery and left axillary level 1 and level 2 dissection  she understands and  agrees   All patient questions were answered  Advance Care Planning/Advance Directives: Nayeli Tenorio MD discussed the disease status with Lucinda Metzger  today 06/01/22  treatment plans and follow-up with the patient

## 2022-06-01 NOTE — H&P (VIEW-ONLY)
Surgical Oncology Follow Up  [facility]  [facility]ICAL [facility] [place] [address]    Merry Farah  1960  [id number]      No chief complaint on file  Assessment & Plan:   She is here for follow-up after bilateral breast MRI guided biopsy  Beath known left axillary metastatic lymph node with palpable left axillary lymphadenopathy left breast biopsy at 11:00 a m  consistent with invasive ductal carcinoma ERPR positive HER2 negative  These pathology cul findings were discussed in detail  Surgical consent was obtained for left breast conservation surgery and left axillary dissection given palpable lymphadenopathy  Consent was obtained after explaining the benefit procedure alternatives possible complications such as bleeding infection injury to the surrounding structures particularly long thoracic and thoracodorsal nerves injury and its consequences  We also discussed the risk of lymphedema with axillary dissection  She already has right upper extremity lymphedema  She and her  had several questions and I answered all of them to their satisfaction  Cancer History:     Oncology History   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 ([facility] [address] )   4/14/2022 Initial Diagnosis    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 ([facility] [address] )     4/14/2022 Biopsy    Left axillary Lymph node ultrasound guided biopsy  Metastatic carcinoma, compatible with breast origin  ER >95  NC 95   HER2 1+    On ultrasound lymph node is 1 8 cm  There is cortical thickening without evident fatty hilum  In review of screening mammogram, demonstrates no suspicious mammographic findings identified in either breast  Bilateral MRI is recommended  Flow cytometry - there is no evidence of a B-cell or T-cell non-Hodgkin lymphoma         4/20/2022 Genomic Testing    A total of 36 genes were evaluated, including: ANGEL LUIS, BRCA1, BRCA2, CDH1, CHEK2, PALB2, PTEN, STK11, TP53  Negative result  No pathogenic sequence variants or deletions/duplications identified     5/20/2022 Biopsy    MRI guided biopsy  A  Left breast   11 o'clock   Invasive mammary carcinoma of no special type (ductal)  Grade 1  ER 95  MS 95  HER2 0    B  Right breast   Retroareolar  Benign fibrocystic changes without atypia (discrete 0 3 cm focus of sclerosing and tubular adenosis with usual ductal hyperplasia, columnar cell change and hyperplasia, apocrine metaplasia, cystic dilatation)  - Microcalcifications: Present associated with non-neoplastic breast tissue    - Negative for malignancy, in-situ carcinoma and atypical hyperplasia  Malignant pathology appears unifocal  Biopsy proven carcinoma measured 1 cm on MRI  Biopsy proved metastatic disease to left axillary lymph node  Malignant neoplasm of lower-inner quadrant of left breast in female, estrogen receptor positive ([facility] [address] )   5/20/2022 Initial Diagnosis    Malignant neoplasm of lower-inner quadrant of left breast in female, estrogen receptor positive ([facility] [address] )     5/20/2022 Biopsy    MRI guided biopsy  A  Left breast   11 o'clock   Invasive mammary carcinoma of no special type (ductal)  Grade 1  ER 95  MS 95  HER2 0    B  Right breast   Retroareolar  Benign fibrocystic changes without atypia (discrete 0 3 cm focus of sclerosing and tubular adenosis with usual ductal hyperplasia, columnar cell change and hyperplasia, apocrine metaplasia, cystic dilatation)  - Microcalcifications: Present associated with non-neoplastic breast tissue    - Negative for malignancy, in-situ carcinoma and atypical hyperplasia  Malignant pathology appears unifocal  Biopsy proven carcinoma measured 1 cm on MRI  Biopsy proved metastatic disease to left axillary lymph node  Interval History:   She is here follow-up after left breast and right breast MRI guided biopsy      Review of Systems:   Review of Systems   Constitutional: Negative for chills and fever  HENT: Negative for ear pain and sore throat  Eyes: Negative for pain and visual disturbance  Respiratory: Negative for cough and shortness of breath  Cardiovascular: Negative for chest pain and palpitations  Gastrointestinal: Negative for abdominal pain and vomiting  Genitourinary: Negative for dysuria and hematuria  Musculoskeletal: Negative for arthralgias and back pain  Skin: Negative for color change and rash  Neurological: Negative for seizures and syncope  All other systems reviewed and are negative  Past Medical History     Patient Active Problem List   Diagnosis    Back pain, chronic    Chronic insomnia    Lymphedema of right arm    Hyperlipidemia, mixed    Peripheral neuropathy    Lymphedema    Cellulitis of right upper extremity    Sepsis ([facility] [address] )    Desmoid tumor    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 (HCC)    Malignant neoplasm of lower-inner quadrant of left breast in female, estrogen receptor positive ([facility] [address] )     Past Medical History:   Diagnosis Date    Abnormal mammogram 05/15/2013    Anemia     Cancer ([facility] [address] )     desmoitosis    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 ([facility] [address] ) 4/19/2022    Desmoid tumor     Last Assessed: 6/19/2017    Hyperlipidemia     Hypertension      Past Surgical History:   Procedure Laterality Date    FRACTURE SURGERY      HYSTERECTOMY      MRI BREAST BIOPSY LEFT (ALL INCLUSIVE) Left 5/20/2022    MRI BREAST BIOPSY RIGHT (ALL INCLUSIVE) Right 5/20/2022    OTHER SURGICAL HISTORY      Arm Incision: Dermoid tumor, right Arm     PARTIAL HYSTERECTOMY      OK COLONOSCOPY FLX DX W/COLLJ SPEC WHEN PFRMD N/A 8/15/2017    Procedure: COLONOSCOPY;  Surgeon: Rene Mortensen MD;  Location: [facility];   Service: Gastroenterology    OK OPEN RX DISTAL RADIUS FX, INTRA-ARTICULAR, 3+ FRAG Left 2/9/2021    Procedure: OPEN REDUCTION W/ INTERNAL FIXATION (ORIF) RADIUS / ULNA (WRIST) left; Surgeon: Pedro Luis Viveros MD;  [facility]: [facility];  Service: Orthopedics    TX WRIST Shree Tiffanie LIG Left 8/10/2021    Procedure: RELEASE LEFT CARPAL TUNNEL ENDOSCOPIC;  Surgeon: Pedro Luis Viveros MD;  [facility]: [facility];  Service: Orthopedics    SKIN BIOPSY      SKIN CANCER EXCISION      Melanoma Excision     TONSILLECTOMY      US GUIDED BREAST LYMPH NODE BIOPSY LEFT Left 4/14/2022     Family History   Problem Relation Age of Onset    Diabetes Mother     No Known Problems Father     No Known Problems Sister     Lung cancer Sister     Pancreatic cancer Sister     No Known Problems Maternal Grandmother     No Known Problems Maternal Grandfather     No Known Problems Paternal Grandmother     No Known Problems Paternal Grandfather      Social History     Socioeconomic History    Marital status: /Civil Union     Spouse name: Not on file    Number of children: Not on file    Years of education: Not on file    Highest education level: Not on file   Occupational History    Occupation: unemployed    Tobacco Use    Smoking status: Never Smoker    Smokeless tobacco: Never Used   Vaping Use    Vaping Use: Never used   Substance and Sexual Activity    Alcohol use:  Yes     Alcohol/week: 1 0 standard drink     Types: 1 Shots of liquor per week     Comment: One shot of liquor occassionally at night    Drug use: Yes     Types: Marijuana     Comment: THC Medical marijuana    Sexual activity: Yes     Partners: Male   Other Topics Concern    Not on file   Social History Narrative    Lives with spouse      Social Determinants of Health     Financial Resource Strain: Not on file   Food Insecurity: Not on file   Transportation Needs: Not on file   Physical Activity: Not on file   Stress: Not on file   Social Connections: Not on file   Intimate Partner Violence: Not on file   Housing Stability: Not on file       Current Outpatient Medications:     cholecalciferol (VITAMIN D3) 1,000 units tablet, Take 2,000 Units by mouth daily, Disp: , Rfl:     cyclobenzaprine (FLEXERIL) 5 mg tablet, , Disp: , Rfl:     dronabinol (MARINOL) 5 MG capsule, Take 15 mg by mouth 4 (four) times a day (before meals and at bedtime), Disp: , Rfl:     Ergocalciferol (VITAMIN D2 PO), Take by mouth, Disp: , Rfl:     estradiol (ESTRACE) 0 1 mg/g vaginal cream, Insert 0 5 g into the vagina 2 (two) times a week Apply a pea sized amount to external vaginal opening and urethra area twice weekly, Disp: 42 5 g, Rfl: 3    meloxicam (MOBIC) 7 5 mg tablet, , Disp: , Rfl:     multivitamin (THERAGRAN) TABS, Take 1 tablet by mouth daily, Disp: , Rfl:     Omega-3 Fatty Acids (FISH OIL PO), Take by mouth, Disp: , Rfl:     Probiotic Product (PROBIOTIC-10 PO), Take by mouth, Disp: , Rfl:   Allergies   Allergen Reactions    Aspirin GI Intolerance and Abdominal Pain     ABD     Chlorpromazine Anaphylaxis     PHENOTHIAZINE=ANAPHYLAXIS    Codeine Anaphylaxis     Anaphylaxis  abd pain   Meperidine Anaphylaxis, Hives and Other (See Comments)     VOMITS  VOMITS  Category: Allergy;     Phenothiazines Anaphylaxis and Throat Swelling     Category: Allergy;     Saccharin - Food Allergy Anaphylaxis    Ibuprofen Hives     H    Ampicillin-Sulbactam Sodium Hives    Gluten Meal - Food Allergy GI Intolerance    Levofloxacin Hives    Neomycin Other (See Comments), Edema and Hives     Category: Allergy;   swelling    Other Throat Swelling, Hives and Other (See Comments)     ASA=GERD  ASA=GERD  Category: Allergy;     Citrus - Food Allergy Rash    Latex Hives and Rash     Category: Allergy; Physical Exam:     Vitals:    06/01/22 1531   BP: 122/80   Pulse: 72   Resp: 16   Temp: 98 1 °F (36 7 °C)   SpO2: 99%     Physical Exam  Constitutional:       Appearance: Normal appearance  HENT:      Head: Normocephalic and atraumatic        Nose: Nose normal       Mouth/Throat:      Mouth: Mucous membranes are moist    Eyes:      Pupils: Pupils are equal, round, and reactive to light  Cardiovascular:      Rate and Rhythm: Normal rate  Pulses: Normal pulses  Heart sounds: Normal heart sounds  Pulmonary:      Effort: Pulmonary effort is normal       Breath sounds: Normal breath sounds  Chest:      Comments: Left breast with difficult bruise from recent biopsy  Left axillary palpable multiple lymph nodes  Right breast examination is unremarkable no palpable mass or masses the  Abdominal:      General: Bowel sounds are normal       Palpations: Abdomen is soft  Musculoskeletal:         General: Normal range of motion  Cervical back: Normal range of motion and neck supple  Skin:     General: Skin is warm  Neurological:      General: No focal deficit present  Mental Status: She is alert and oriented to person, place, and time  Psychiatric:         Mood and Affect: Mood normal          Behavior: Behavior normal          Thought Content: Thought content normal          Judgment: Judgment normal            Results & Discussion:   I did review pathology cul findings  We will plan to place left breast and left axillary ANITA placement followed by left breast conservation surgery and left axillary level 1 and level 2 dissection  she understands and  agrees   All patient questions were answered  Advance Care Planning/Advance Directives: Zhang Ramirez MD discussed the disease status with Octaviano Morris  today 06/01/22  treatment plans and follow-up with the patient

## 2022-06-02 NOTE — PROGRESS NOTES
Message rec'd from Dr Harsha Tracy's office regarding recommendation for;    __ RIGHT __x____LEFT      __X___SAVI  placement  Procedure explained to patient by Dr Shannon Wild, additional questions answered at that time    __X___Verbalized understanding        Blood thinners:  _____yes __X___no    Date stopped: ___N/A____    All teaching points discussed during office visit, pt with no questions at that time, pt adv to arrive at [facility] for 0830 insertion    Pt given name/# for any further questions/needs

## 2022-06-03 ENCOUNTER — PATIENT OUTREACH (OUTPATIENT)
Dept: CASE MANAGEMENT | Facility: HOSPITAL | Age: 62
End: 2022-06-03

## 2022-06-03 ENCOUNTER — HOME HEALTH ADMISSION (OUTPATIENT)
Dept: HOME HEALTH SERVICES | Facility: HOME HEALTHCARE | Age: 62
End: 2022-06-03
Payer: COMMERCIAL

## 2022-06-03 DIAGNOSIS — Z17.0 MALIGNANT NEOPLASM OF LOWER-INNER QUADRANT OF LEFT BREAST IN FEMALE, ESTROGEN RECEPTOR POSITIVE (HCC): Primary | ICD-10-CM

## 2022-06-03 DIAGNOSIS — Z78.9 NEED FOR FOLLOW-UP BY SOCIAL WORKER: Primary | ICD-10-CM

## 2022-06-03 DIAGNOSIS — C50.312 MALIGNANT NEOPLASM OF LOWER-INNER QUADRANT OF LEFT BREAST IN FEMALE, ESTROGEN RECEPTOR POSITIVE (HCC): Primary | ICD-10-CM

## 2022-06-03 LAB
ATRIAL RATE: 57 BPM
P AXIS: 54 DEGREES
PR INTERVAL: 160 MS
QRS AXIS: 24 DEGREES
QRSD INTERVAL: 104 MS
QT INTERVAL: 408 MS
QTC INTERVAL: 397 MS
T WAVE AXIS: 38 DEGREES
VENTRICULAR RATE: 57 BPM

## 2022-06-03 PROCEDURE — 93010 ELECTROCARDIOGRAM REPORT: CPT | Performed by: INTERNAL MEDICINE

## 2022-06-03 NOTE — PROGRESS NOTES
Pt will have lumpectomy with Dr Colten Montejo at [facility] on 6/21, [facility] has accepted for [facility] 6/22 pending pt's discharge  Inpt team to forward AVS at discharge, no other needs at this time

## 2022-06-06 ENCOUNTER — OFFICE VISIT (OUTPATIENT)
Dept: OCCUPATIONAL THERAPY | Facility: CLINIC | Age: 62
End: 2022-06-06
Payer: COMMERCIAL

## 2022-06-06 DIAGNOSIS — I89.0 LYMPHEDEMA OF RIGHT UPPER EXTREMITY: Primary | ICD-10-CM

## 2022-06-06 DIAGNOSIS — M77.12 LATERAL EPICONDYLITIS OF LEFT ELBOW: ICD-10-CM

## 2022-06-06 PROCEDURE — 97110 THERAPEUTIC EXERCISES: CPT

## 2022-06-06 PROCEDURE — 97140 MANUAL THERAPY 1/> REGIONS: CPT

## 2022-06-06 NOTE — PROGRESS NOTES
Daily Note     Today's date: 2022  Patient name: David Lee  : 1960  MRN: [id number]  Referring provider: Katarina Farmer PA-C  Dx:   Encounter Diagnosis     ICD-10-CM    1  Lymphedema of right upper extremity  I89 0    2  Lateral epicondylitis of left elbow  M77 12                   Subjective: My left arm feels good  The elbow doesn't hurt  The insurance company turned me down for the Compression sleeve      Objective: See treatment diary below      Assessment: Tolerated treatment well  Patient exhibited good technique with therapeutic exercises  No pain with Cozen's or Mill's test left arm  No pain with palpation L elbow or with resisted strengthening tasks  [employer] is not covering compression garment, so [employer] is appealing for coverage of the sleeve  Plan: Therapy discharged on the LUE as pain has resolved   Tx on hold for RUE until compression garment becomes available     Precautions: Metastatic carcinoma left breast; RUE stage 2 lymphedema       Date 5/2 5/10 5/24 6/6    Visit 1 2 3 4    Manuals        IASTM L ext mm mass, A1 pulley thumb  5' 10' 10'    Cross tissue massage 5' L ext mm mass 5' L ext mm mass 5'     Cupping        Kinesio tape ECRL/B inhibit, w/ ext mm mass correction 3' ECRL/B inhibit, w/ext mm mass correction 3'      MLD RUE    20'    Compression garment measure/fit Measured for elvarex soft class 2 sleeve and glove and Jobst relax night time sleeve 15'       Neuro Re-Ed                                                                 Ther Ex        Composite ext stretch  10 x 10 10" x 10 5" x 10    Passive wrist ext stretch 10" x 10 10"x10 10" x 10 5" x 10    Wrist ext strength Ecc 1# x 10   Ecc 1# x 10 1# x 20 ecc 2# x 30 isotonic    Supination strength    2# x 30 isotonic    EDC strength Ecc PRB x 10 Ecc PRB x 10 PRB x 20 ecc PRB x 30 isotonic    DT  Ecc 1# x 10 1# x 20 ecc 2# x 30 isotonic    Thumb ext stretch   10" x 10 5" x 10 Ther Activity                        HEP Cross tissue massage; Wrist ext stretch; ecc EDC and wrist ext ex   Left wrist strengthening                    Modalities        Heat  5

## 2022-06-08 ENCOUNTER — TELEPHONE (OUTPATIENT)
Dept: OBGYN CLINIC | Facility: HOSPITAL | Age: 62
End: 2022-06-08

## 2022-06-08 NOTE — TELEPHONE ENCOUNTER
Patients  is calling to reschedule an appointment with Dr Nick Bojorquez  Patient was scheduled for 6/23 but his wife is having surgery        Please call [phone] to reschedule

## 2022-06-13 ENCOUNTER — HOSPITAL ENCOUNTER (OUTPATIENT)
Dept: RADIOLOGY | Facility: HOSPITAL | Age: 62
Discharge: HOME | End: 2022-06-13
Payer: COMMERCIAL

## 2022-06-13 DIAGNOSIS — Z17.0 MALIGNANT NEOPLASM OF LOWER-INNER QUADRANT OF LEFT BREAST IN FEMALE, ESTROGEN RECEPTOR POSITIVE (HCC): ICD-10-CM

## 2022-06-13 DIAGNOSIS — C50.312 MALIGNANT NEOPLASM OF LOWER-INNER QUADRANT OF LEFT BREAST IN FEMALE, ESTROGEN RECEPTOR POSITIVE (HCC): ICD-10-CM

## 2022-06-13 PROCEDURE — 71046 X-RAY EXAM CHEST 2 VIEWS: CPT

## 2022-06-15 ENCOUNTER — ANESTHESIA EVENT (OUTPATIENT)
Dept: PERIOP | Facility: HOSPITAL | Age: 62
DRG: 580 | End: 2022-06-15
Payer: COMMERCIAL

## 2022-06-15 NOTE — PRE-PROCEDURE INSTRUCTIONS
Pre-Surgery Instructions:   Medication Instructions    cholecalciferol (VITAMIN D3) 1,000 units tablet Hold day of surgery   diphenhydrAMINE (BENADRYL) 50 MG tablet Take night before surgery    dronabinol (MARINOL) 5 MG capsule Take night before surgery  May take am of sx per SOC    NON FORMULARY delete    Probiotic Product (PROBIOTIC-10 PO) Hold day of surgery  You will receive a phone call from hospital for arrival time  Please call surgeons office if any changes in your condition  Wear easy on/off clothing; consider type of surgery;  Valuables, jewelry, piercing's please keep at home  **COVID-19  education/surgical guidelines  Updated covid    Visitation policy  Please: No contact lenses or eye make up, artificial eyelashes    Please secure transportation     Follow pre surgery showering or cleaning instructions as  Reviewed by nurse or surgeons office      Questions answered and concerns addressed

## 2022-06-16 ENCOUNTER — HOSPITAL ENCOUNTER (OUTPATIENT)
Dept: MAMMOGRAPHY | Facility: CLINIC | Age: 62
Discharge: HOME | End: 2022-06-16
Payer: COMMERCIAL

## 2022-06-16 ENCOUNTER — HOSPITAL ENCOUNTER (OUTPATIENT)
Dept: MAMMOGRAPHY | Facility: CLINIC | Age: 62
Discharge: HOME | End: 2022-06-16

## 2022-06-16 ENCOUNTER — HOSPITAL ENCOUNTER (OUTPATIENT)
Dept: ULTRASOUND IMAGING | Facility: CLINIC | Age: 62
Discharge: HOME | End: 2022-06-16
Admitting: SURGERY
Payer: COMMERCIAL

## 2022-06-16 VITALS — HEART RATE: 52 BPM | SYSTOLIC BLOOD PRESSURE: 137 MMHG | DIASTOLIC BLOOD PRESSURE: 74 MMHG

## 2022-06-16 VITALS — BODY MASS INDEX: 21.98 KG/M2 | HEIGHT: 68 IN | WEIGHT: 145 LBS

## 2022-06-16 DIAGNOSIS — R92.8 ABNORMAL MAMMOGRAM: ICD-10-CM

## 2022-06-16 DIAGNOSIS — R92.8 ABNORMAL FINDINGS ON DIAGNOSTIC IMAGING OF BREAST: ICD-10-CM

## 2022-06-16 PROCEDURE — 19285 PERQ DEV BREAST 1ST US IMAG: CPT

## 2022-06-16 PROCEDURE — 19286 PERQ DEV BREAST ADD US IMAG: CPT

## 2022-06-16 RX ORDER — LIDOCAINE HYDROCHLORIDE 10 MG/ML
5 INJECTION, SOLUTION EPIDURAL; INFILTRATION; INTRACAUDAL; PERINEURAL ONCE
Status: DISCONTINUED | OUTPATIENT
Start: 2022-06-16 | End: 2022-06-20 | Stop reason: HOSPADM

## 2022-06-16 RX ORDER — LIDOCAINE HYDROCHLORIDE 10 MG/ML
5 INJECTION, SOLUTION EPIDURAL; INFILTRATION; INTRACAUDAL; PERINEURAL ONCE
Status: COMPLETED | OUTPATIENT
Start: 2022-06-16 | End: 2022-06-16

## 2022-06-16 RX ADMIN — LIDOCAINE HYDROCHLORIDE 5 ML: 10 INJECTION, SOLUTION EPIDURAL; INFILTRATION; INTRACAUDAL; PERINEURAL at 09:28

## 2022-06-16 RX ADMIN — LIDOCAINE HYDROCHLORIDE 5 ML: 10 INJECTION, SOLUTION EPIDURAL; INFILTRATION; INTRACAUDAL; PERINEURAL at 14:07

## 2022-06-16 NOTE — PROGRESS NOTES
Procedure type:    __x___ultrasound guided _____stereotactic    Breast:    __x___Left _____Right    Location: Axilla     Needle: 16 gauge     # of passes: N/A    Clip: ANITA     Performed by: Dr Eric Galindo held for 5 minutes by: Linnette Pinzon Strips:    _____yes __x___no    Band aid:    __x___yes_____no    Tape and guaze:    _____yes __x___no    Tolerated procedure:    ___x__yes _____no

## 2022-06-16 NOTE — PROGRESS NOTES
Procedure type:    __x___ultrasound guided _____stereotactic    Breast:    __x___Left _____Right    Location: 11 o'clock 4 cmfn     Needle: 16 gauge    # of passes:  N/A    Clip: ANITA      Performed by: Dr Eric Galindo held for 5 minutes by: Linnette Pinzon Strips:    _____yes ___x__no    Band aid:    __x___yes_____no    Tape and guaze:    _____yes __x___no    Tolerated procedure:    ___x__yes _____no

## 2022-06-16 NOTE — PROGRESS NOTES
Ice pack given:    ___x__yes _____no    Discharge instructions signed by patient:    __x___yes _____no    Discharge instructions given to patient:    ___x__yes _____no    Discharged via:    __x___ambulatory    _____wheelchair    _____stretcher    Stable on discharge:    __x___yes ____no  Patient is scheduled for surgery with Dr Severiano Byes June 21/2022  Ok to leave a message

## 2022-06-16 NOTE — DISCHARGE INSTR - OTHER ORDERS
POST ANITA  PATIENT INFORMATION      Place an ice pack inside your bra over the top of the dressing every hour for 20 minutes (20 minutes on, 60 minutes off)  Do this until bedtime  Tonight you may remove the bandaid  If steri-strips were used, tomorrow, you may bathe your breast carefully with the steri-strips in place  Be careful not to loosen them  The steri-strips will fall off in 3-5 days  You may have mild discomfort, and you may have some bruising where the needle entered the skin  This should clear within 5-7 days  If you need medicine for discomfort, take acetaminophen products such as Tylenol  You may also take Advil or Motrin products  DO NOT use Aspirin for the first 24 hours  Watch for continued bleeding, pain or fever over 101  If any of these symptoms occur, please contact our breast nurse navigator at the location where your biopsy was performed  During normal business hours (7:30am - 4:00pm) please call the nurse navigator at the site     where your procedure was performed:       Critical access hospital Road: [phone] or      [address] [place] [address],[facility] Floor: [phone] or [phone]     [address]: [address] [facility]: [address] [facility] [address]: [phone]        After 4pm - please call your physician or go to the nearest Emergency Department location

## 2022-06-20 NOTE — PROGRESS NOTES
Post procedure call completed    Bleeding: _____yes __X___no (pt denies)    Pain: _____yes ___X___no (pt denies, used ice)    Redness/Swelling: ______yes ___X___no (pt denies)    Band aid removed: __X___yes _____no    Pt with no questions at this time, adv to call with any questions or concerns, has name/# for contact

## 2022-06-21 ENCOUNTER — APPOINTMENT (OUTPATIENT)
Dept: RADIOLOGY | Facility: HOSPITAL | Age: 62
DRG: 580 | End: 2022-06-21
Payer: COMMERCIAL

## 2022-06-21 ENCOUNTER — HOSPITAL ENCOUNTER (INPATIENT)
Facility: HOSPITAL | Age: 62
LOS: 1 days | Discharge: HOME HEALTH | DRG: 580 | End: 2022-06-23
Attending: SURGERY | Admitting: SURGERY
Payer: COMMERCIAL

## 2022-06-21 ENCOUNTER — ANESTHESIA (OUTPATIENT)
Dept: PERIOP | Facility: HOSPITAL | Age: 62
DRG: 580 | End: 2022-06-21
Payer: COMMERCIAL

## 2022-06-21 DIAGNOSIS — C50.912 CARCINOMA OF LEFT BREAST METASTATIC TO AXILLARY LYMPH NODE (HCC): ICD-10-CM

## 2022-06-21 DIAGNOSIS — C77.3 CARCINOMA OF LEFT BREAST METASTATIC TO AXILLARY LYMPH NODE (HCC): ICD-10-CM

## 2022-06-21 DIAGNOSIS — Z17.0 MALIGNANT NEOPLASM OF LOWER-INNER QUADRANT OF LEFT BREAST IN FEMALE, ESTROGEN RECEPTOR POSITIVE (HCC): ICD-10-CM

## 2022-06-21 DIAGNOSIS — I10 HTN, GOAL BELOW 140/90: Primary | ICD-10-CM

## 2022-06-21 DIAGNOSIS — N63.0 BREAST LUMP OR MASS: ICD-10-CM

## 2022-06-21 DIAGNOSIS — C50.312 MALIGNANT NEOPLASM OF LOWER-INNER QUADRANT OF LEFT BREAST IN FEMALE, ESTROGEN RECEPTOR POSITIVE (HCC): ICD-10-CM

## 2022-06-21 PROBLEM — C50.919 BREAST CANCER (HCC): Status: ACTIVE | Noted: 2022-04-24

## 2022-06-21 PROBLEM — R11.2 PONV (POSTOPERATIVE NAUSEA AND VOMITING): Status: ACTIVE | Noted: 2022-06-21

## 2022-06-21 PROBLEM — Z98.890 PONV (POSTOPERATIVE NAUSEA AND VOMITING): Status: ACTIVE | Noted: 2022-06-21

## 2022-06-21 PROCEDURE — 88363 XM ARCHIVE TISSUE MOLEC ANAL: CPT | Performed by: SPECIALIST

## 2022-06-21 PROCEDURE — 0HBU0ZZ EXCISION OF LEFT BREAST, OPEN APPROACH: ICD-10-PCS | Performed by: SURGERY

## 2022-06-21 PROCEDURE — 88307 TISSUE EXAM BY PATHOLOGIST: CPT | Performed by: SPECIALIST

## 2022-06-21 PROCEDURE — 88341 IMHCHEM/IMCYTCHM EA ADD ANTB: CPT | Performed by: SPECIALIST

## 2022-06-21 PROCEDURE — 38525 BIOPSY/REMOVAL LYMPH NODES: CPT | Performed by: SURGERY

## 2022-06-21 PROCEDURE — 76098 X-RAY EXAM SURGICAL SPECIMEN: CPT | Performed by: SURGERY

## 2022-06-21 PROCEDURE — 07B60ZX EXCISION OF LEFT AXILLARY LYMPHATIC, OPEN APPROACH, DIAGNOSTIC: ICD-10-PCS | Performed by: SURGERY

## 2022-06-21 PROCEDURE — 19301 PARTIAL MASTECTOMY: CPT | Performed by: PHYSICIAN ASSISTANT

## 2022-06-21 PROCEDURE — 19301 PARTIAL MASTECTOMY: CPT | Performed by: SURGERY

## 2022-06-21 PROCEDURE — 88342 IMHCHEM/IMCYTCHM 1ST ANTB: CPT | Performed by: SPECIALIST

## 2022-06-21 RX ORDER — HYDRALAZINE HYDROCHLORIDE 20 MG/ML
5 INJECTION INTRAMUSCULAR; INTRAVENOUS ONCE
Status: COMPLETED | OUTPATIENT
Start: 2022-06-21 | End: 2022-06-21

## 2022-06-21 RX ORDER — DEXAMETHASONE SODIUM PHOSPHATE 10 MG/ML
INJECTION, SOLUTION INTRAMUSCULAR; INTRAVENOUS AS NEEDED
Status: DISCONTINUED | OUTPATIENT
Start: 2022-06-21 | End: 2022-06-21

## 2022-06-21 RX ORDER — DRONABINOL 2.5 MG/1
15 CAPSULE ORAL 4 TIMES DAILY PRN
Status: DISCONTINUED | OUTPATIENT
Start: 2022-06-21 | End: 2022-06-23 | Stop reason: HOSPADM

## 2022-06-21 RX ORDER — ONDANSETRON 2 MG/ML
4 INJECTION INTRAMUSCULAR; INTRAVENOUS ONCE AS NEEDED
Status: DISCONTINUED | OUTPATIENT
Start: 2022-06-21 | End: 2022-06-21 | Stop reason: HOSPADM

## 2022-06-21 RX ORDER — SODIUM CHLORIDE, SODIUM LACTATE, POTASSIUM CHLORIDE, CALCIUM CHLORIDE 600; 310; 30; 20 MG/100ML; MG/100ML; MG/100ML; MG/100ML
125 INJECTION, SOLUTION INTRAVENOUS CONTINUOUS
Status: DISCONTINUED | OUTPATIENT
Start: 2022-06-21 | End: 2022-06-22

## 2022-06-21 RX ORDER — TRAMADOL HYDROCHLORIDE 50 MG/1
50 TABLET ORAL EVERY 6 HOURS PRN
Status: DISCONTINUED | OUTPATIENT
Start: 2022-06-21 | End: 2022-06-23 | Stop reason: HOSPADM

## 2022-06-21 RX ORDER — DIPHENHYDRAMINE HYDROCHLORIDE 50 MG/ML
25 INJECTION INTRAMUSCULAR; INTRAVENOUS EVERY 6 HOURS PRN
Status: DISCONTINUED | OUTPATIENT
Start: 2022-06-21 | End: 2022-06-23 | Stop reason: HOSPADM

## 2022-06-21 RX ORDER — FENTANYL CITRATE 50 UG/ML
INJECTION, SOLUTION INTRAMUSCULAR; INTRAVENOUS AS NEEDED
Status: DISCONTINUED | OUTPATIENT
Start: 2022-06-21 | End: 2022-06-21

## 2022-06-21 RX ORDER — MIDAZOLAM HYDROCHLORIDE 2 MG/2ML
INJECTION, SOLUTION INTRAMUSCULAR; INTRAVENOUS AS NEEDED
Status: DISCONTINUED | OUTPATIENT
Start: 2022-06-21 | End: 2022-06-21

## 2022-06-21 RX ORDER — DOCUSATE SODIUM 100 MG/1
100 CAPSULE, LIQUID FILLED ORAL 2 TIMES DAILY
Status: DISCONTINUED | OUTPATIENT
Start: 2022-06-21 | End: 2022-06-23 | Stop reason: HOSPADM

## 2022-06-21 RX ORDER — ACETAMINOPHEN 325 MG/1
975 TABLET ORAL EVERY 8 HOURS SCHEDULED
Status: DISCONTINUED | OUTPATIENT
Start: 2022-06-21 | End: 2022-06-23 | Stop reason: HOSPADM

## 2022-06-21 RX ORDER — ONDANSETRON 2 MG/ML
INJECTION INTRAMUSCULAR; INTRAVENOUS AS NEEDED
Status: DISCONTINUED | OUTPATIENT
Start: 2022-06-21 | End: 2022-06-21

## 2022-06-21 RX ORDER — FENTANYL CITRATE/PF 50 MCG/ML
25 SYRINGE (ML) INJECTION
Status: DISCONTINUED | OUTPATIENT
Start: 2022-06-21 | End: 2022-06-21 | Stop reason: HOSPADM

## 2022-06-21 RX ORDER — PROPOFOL 10 MG/ML
INJECTION, EMULSION INTRAVENOUS AS NEEDED
Status: DISCONTINUED | OUTPATIENT
Start: 2022-06-21 | End: 2022-06-21

## 2022-06-21 RX ORDER — CLINDAMYCIN PHOSPHATE 900 MG/50ML
900 INJECTION INTRAVENOUS ONCE
Status: COMPLETED | OUTPATIENT
Start: 2022-06-21 | End: 2022-06-21

## 2022-06-21 RX ORDER — LIDOCAINE HYDROCHLORIDE 10 MG/ML
INJECTION, SOLUTION EPIDURAL; INFILTRATION; INTRACAUDAL; PERINEURAL AS NEEDED
Status: DISCONTINUED | OUTPATIENT
Start: 2022-06-21 | End: 2022-06-21

## 2022-06-21 RX ORDER — ONDANSETRON 2 MG/ML
4 INJECTION INTRAMUSCULAR; INTRAVENOUS EVERY 6 HOURS PRN
Status: DISCONTINUED | OUTPATIENT
Start: 2022-06-21 | End: 2022-06-23 | Stop reason: HOSPADM

## 2022-06-21 RX ORDER — ACETAMINOPHEN 325 MG/1
975 TABLET ORAL EVERY 6 HOURS PRN
Status: DISCONTINUED | OUTPATIENT
Start: 2022-06-21 | End: 2022-06-21

## 2022-06-21 RX ORDER — LABETALOL HYDROCHLORIDE 5 MG/ML
5 INJECTION, SOLUTION INTRAVENOUS
Status: DISCONTINUED | OUTPATIENT
Start: 2022-06-21 | End: 2022-06-21 | Stop reason: HOSPADM

## 2022-06-21 RX ORDER — METOCLOPRAMIDE HYDROCHLORIDE 5 MG/ML
10 INJECTION INTRAMUSCULAR; INTRAVENOUS ONCE AS NEEDED
Status: COMPLETED | OUTPATIENT
Start: 2022-06-21 | End: 2022-06-21

## 2022-06-21 RX ORDER — MAGNESIUM HYDROXIDE 1200 MG/15ML
LIQUID ORAL AS NEEDED
Status: DISCONTINUED | OUTPATIENT
Start: 2022-06-21 | End: 2022-06-21 | Stop reason: HOSPADM

## 2022-06-21 RX ADMIN — SODIUM CHLORIDE, SODIUM LACTATE, POTASSIUM CHLORIDE, AND CALCIUM CHLORIDE: .6; .31; .03; .02 INJECTION, SOLUTION INTRAVENOUS at 07:27

## 2022-06-21 RX ADMIN — CLINDAMYCIN PHOSPHATE 900 MG: 900 INJECTION, SOLUTION INTRAVENOUS at 07:53

## 2022-06-21 RX ADMIN — METOCLOPRAMIDE HYDROCHLORIDE 10 MG: 5 INJECTION INTRAMUSCULAR; INTRAVENOUS at 11:36

## 2022-06-21 RX ADMIN — DRONABINOL 15 MG: 2.5 CAPSULE ORAL at 14:51

## 2022-06-21 RX ADMIN — LABETALOL HYDROCHLORIDE 5 MG: 5 INJECTION, SOLUTION INTRAVENOUS at 10:56

## 2022-06-21 RX ADMIN — FENTANYL CITRATE 50 MCG: 50 INJECTION, SOLUTION INTRAMUSCULAR; INTRAVENOUS at 09:06

## 2022-06-21 RX ADMIN — DRONABINOL 15 MG: 2.5 CAPSULE ORAL at 17:42

## 2022-06-21 RX ADMIN — FENTANYL CITRATE 50 MCG: 50 INJECTION, SOLUTION INTRAMUSCULAR; INTRAVENOUS at 08:17

## 2022-06-21 RX ADMIN — LABETALOL HYDROCHLORIDE 5 MG: 5 INJECTION, SOLUTION INTRAVENOUS at 11:15

## 2022-06-21 RX ADMIN — ONDANSETRON 4 MG: 2 INJECTION INTRAMUSCULAR; INTRAVENOUS at 10:08

## 2022-06-21 RX ADMIN — PROPOFOL 50 MG: 10 INJECTION, EMULSION INTRAVENOUS at 08:23

## 2022-06-21 RX ADMIN — FENTANYL CITRATE 25 MCG: 50 INJECTION INTRAMUSCULAR; INTRAVENOUS at 11:17

## 2022-06-21 RX ADMIN — DEXAMETHASONE SODIUM PHOSPHATE 10 MG: 10 INJECTION, SOLUTION INTRAMUSCULAR; INTRAVENOUS at 08:09

## 2022-06-21 RX ADMIN — PROPOFOL 200 MG: 10 INJECTION, EMULSION INTRAVENOUS at 08:07

## 2022-06-21 RX ADMIN — DOCUSATE SODIUM 100 MG: 100 CAPSULE, LIQUID FILLED ORAL at 17:42

## 2022-06-21 RX ADMIN — DRONABINOL 15 MG: 2.5 CAPSULE ORAL at 21:15

## 2022-06-21 RX ADMIN — FENTANYL CITRATE 25 MCG: 50 INJECTION INTRAMUSCULAR; INTRAVENOUS at 10:43

## 2022-06-21 RX ADMIN — FENTANYL CITRATE 50 MCG: 50 INJECTION, SOLUTION INTRAMUSCULAR; INTRAVENOUS at 10:09

## 2022-06-21 RX ADMIN — MIDAZOLAM HYDROCHLORIDE 2 MG: 1 INJECTION, SOLUTION INTRAMUSCULAR; INTRAVENOUS at 08:02

## 2022-06-21 RX ADMIN — ONDANSETRON 4 MG: 2 INJECTION INTRAMUSCULAR; INTRAVENOUS at 08:09

## 2022-06-21 RX ADMIN — FENTANYL CITRATE 25 MCG: 50 INJECTION INTRAMUSCULAR; INTRAVENOUS at 10:50

## 2022-06-21 RX ADMIN — LABETALOL HYDROCHLORIDE 5 MG: 5 INJECTION, SOLUTION INTRAVENOUS at 10:40

## 2022-06-21 RX ADMIN — FENTANYL CITRATE 25 MCG: 50 INJECTION INTRAMUSCULAR; INTRAVENOUS at 11:33

## 2022-06-21 RX ADMIN — SODIUM CHLORIDE, SODIUM LACTATE, POTASSIUM CHLORIDE, AND CALCIUM CHLORIDE: .6; .31; .03; .02 INJECTION, SOLUTION INTRAVENOUS at 10:27

## 2022-06-21 RX ADMIN — SODIUM CHLORIDE, SODIUM LACTATE, POTASSIUM CHLORIDE, AND CALCIUM CHLORIDE 125 ML/HR: .6; .31; .03; .02 INJECTION, SOLUTION INTRAVENOUS at 22:13

## 2022-06-21 RX ADMIN — HYDRALAZINE HYDROCHLORIDE 5 MG: 20 INJECTION INTRAMUSCULAR; INTRAVENOUS at 11:29

## 2022-06-21 RX ADMIN — LIDOCAINE HYDROCHLORIDE 50 MG: 10 INJECTION, SOLUTION EPIDURAL; INFILTRATION; INTRACAUDAL; PERINEURAL at 08:05

## 2022-06-21 NOTE — CASE MANAGEMENT
Case Management Progress Note    Patient name Belén Barrera  Location OR POOL/OR POOL MRN [id number]  : 1960 Date 2022       LOS (days): 0  Geometric Mean LOS (GMLOS) (days):   Days to GMLOS:        OBJECTIVE:        Current admission status: Outpatient Surgery  Preferred Pharmacy:   [facility]pharmacy [facility][id number] - [address] [facility] [address]  Phone: [phone] Fax: Chace Jhaveri [phone] R R [phone] R [phone])  [facility]  Phone: [phone] Fax: [phone]    Primary Care Provider: Juju Miramontes MD    Primary Insurance: Dave Win  Secondary Insurance:     PROGRESS NOTE:  Patient admitted for Lumpectomy w/ Dr Mary Kay Saleh  [facility] has already been arranged via the OP Oncology SW for SN for SHANELL drain care  [facility] scheduled for   CM placed [facility] contact information on AVS and will continue to follow in the event any needs arise at [facility]

## 2022-06-21 NOTE — OP NOTE
OPERATIVE REPORT  PATIENT NAME: Mamie Bueno    :  1960  MRN: [id number]  Pt Location: [facility] OR ROOM 02    SURGERY DATE: 2022    Surgeon(s) and Role:     * Amanda Nugent MD - Primary     * Alyson Bright - Assisting    Preop Diagnosis:  Malignant neoplasm of lower-inner quadrant of left breast in female, estrogen receptor positive ([facility] [address] ) [C50 312, Z17 0]    Post-Op Diagnosis Codes:     * Malignant neoplasm of lower-inner quadrant of left breast in female, estrogen receptor positive ([facility] [address] ) [C50 312, Z17 0]    Procedure(s) (LRB):  ANITA  DIRECTED LUMPECTOMY (Left)  AXILLARY DISSECTION LEVEL I AND LEVEL II LYMPH NODES (Left)    Specimen(s):  ID Type Source Tests Collected by Time Destination   1 : Left breast ANITA  directed lumpectomy, marked per margin per protocol  Tissue Breast, NOS TISSUE EXAM P O  Moise Ibarra MD 2022 [id number]    2 : Additional anterior margin, inked most distal margin [facility]) Tissue Breast, NOS TISSUE EXAM P O  Moise Ibarra MD 2022 [id number]    3 : Additional inferior margin, inked most distal margin (blue) Tissue Breast, NOS TISSUE EXAM P O  Moise Ibarra MD 2022 0114    4 : Additional lateral margin, inked most distal margin (red) Tissue Breast, NOS TISSUE EXAM P O  Moise Ibarra MD 2022 1269    5 : Additional medial margin, inked most distal margin (yellow) Tissue Breast, NOS TISSUE EXAM P O  Moise Ibarra MD 2022 9848    6 : Additional posterior margin, inked most distal margin (black) Tissue Breast, NOS TISSUE EXAM P O  Moise Ibarra MD 2022 4823    7 :  Additional superior margin, inked most distal margin (orange) Tissue Breast, NOS TISSUE [address] [facility] [address] [facility], MD 2022 7052    8 : Level I and Level II Axillary contents Tissue Axillary TISSUE EXAM P O  Moise Ibarra MD 2022 1361        Estimated Blood Loss:   Minimal    Drains:  * No LDAs found *    Anesthesia Type: General    Operative Indications:  Malignant neoplasm of lower-inner quadrant of left breast in female, estrogen receptor positive ([facility] [address] ) [C50 312, Z17 0]  Metastatic axillary lymph node  Operative Findings:  Biopsy clips and the ANITA clips are in the specimens    Complications:   None    Procedure and Technique:  I previously reviewed the post biopsy images  Lumpectomy  Mary Serrano was brought to the operation room and placed under general anesthesia  Attention was paid to ensure appropriate padding and correct positioning  The ANITA  detector was then used to locate the position of the ANITA deflector and the skin was marked in this area  The  left breast was prepped and draped in a sterile fashion  I initiated a time-out, identifying the patient, the correct side and the above procedure  All parties agreed with the time out  The planned incision was then injected with 0 25% Marcaine for local anesthesia  The incision was sharply incised  The ANITA dectector was used to guide the dissection  Superior, inferior, medial and lateral planes were developed around the ANITA deflector and the specimen truncated with electrocautery beyond the deflector  The specimen was then inked for orientation purposes  A specimen radiograph was taken in two dimensions  6 Additional margins were removed to optimize complete removal of the tumor  The additional margins were inked on the most distal area  All specimens were sent to pathology for permanent analysis  Superficial bleeding controlled with electrocautery and the wound was extensively irrigated  The wound was closed with two layers, an inner layer of 3-0 vicryl and a subcuticular layer of 4-0 monocryl  Steri-strips were applied  The counts were correct x 2  Left  axillary dissection (level I and II)  She was presented with a palpable left axillary lymph node and biopsy-proven axillary lymph node metastatic disease    Initial studies did  Not demonstrate primary breast tumor  However subsequent breast MRI demonstrated breast cancer and this was biopsied and confirmed   Left axillary skin incision was made at the skin crease  electrocautery was used to elevate medial, lateral and superior flaps  The deltopectoral fascia was indentified and divided just inferior the axillary vein  Dissection through the superificial adipose tissue was performed with electrocautery and the superficial vein entering the axilla was ligated and divided  Further dissection at this level identified both the thoracodorsal neurovascular bundle and the long thoracic nerve  These structures were carefully preserved  The axillary contents were withdrawn from level II and dissection continued along the medial axillary wall  The axillary contents (I and II) were retracted inferiorly and  from their lateral attachments and ultimately the inferior attachments  Small vessels and lymphatics were controled with clips, ligated or if small, electrocautery  The axillary contents were sent for permanent analysis  Axillary content was obtained radiograph and consistent with biopsy and ANITA clips in the specimen  Wound was profusely irrigated and aspirated hemostasis was rechecked and achieved  19  Slotted drain was brought in and placed in the axillary fossa and secured to the skin with nylon  Wound was closed in layered fashion the skin was closed with 3-0 Monocryl  Prineo dressing in place     I was present for the entire procedure and A physician assistant was required during the procedure for retraction tissue handling,dissection and suturing    Patient Disposition:  PACU       SIGNATURE: James Pradhan MD  DATE: June 21, 2022  TIME: 9:53 AM

## 2022-06-21 NOTE — ANESTHESIA PREPROCEDURE EVALUATION
Procedure:  ANITA  DIRECTED LUMPECTOMY (Left Breast)  AXILLARY DISSECTION LEVEL I AND LEVEL II LYMPH NODES (Left Axilla)    Relevant Problems   ANESTHESIA   (+) PONV (postoperative nausea and vomiting)      CARDIO   (+) HTN, goal below 140/90   (+) Hyperlipidemia, mixed      GYN   (+) Malignant neoplasm of lower-inner quadrant of left breast in female, estrogen receptor positive (HCC)      Nervous and Auditory   (+) Peripheral neuropathy      Immune and Lymphatic   (+) 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 (HCC)      Chronic lymphedema of right arm    Physical Exam    Airway    Mallampati score: II  TM Distance: >3 FB  Neck ROM: full     Dental   Comment: Denies loose teeth,     Cardiovascular  Cardiovascular exam normal    Pulmonary  Pulmonary exam normal     Other Findings  Portions of exam deferred due to low yield and/or unknown COVID status      Anesthesia Plan  ASA Score- 3     Anesthesia Type- general with ASA Monitors  Additional Monitors:   Airway Plan: LMA  Plan Factors-Exercise tolerance (METS): >4 METS  Chart reviewed  Existing labs reviewed  Patient summary reviewed  Patient is not a current smoker  Induction- intravenous  Postoperative Plan-     Informed Consent- Anesthetic plan and risks discussed with patient  I personally reviewed this patient with the CRNA  Discussed and agreed on the Anesthesia Plan with the CRNA  Kimber Dugan

## 2022-06-21 NOTE — PROGRESS NOTES
Dr John Birmingham contacted about pt not having any pain medications ordered  No response  His office nurse was contacted for help  Cell phone number was given, called Dr John Birmingham with no answer

## 2022-06-21 NOTE — ANESTHESIA POSTPROCEDURE EVALUATION
Post-Op Assessment Note    CV Status:  Stable  Pain Score: 0    Pain management: adequate     Mental Status:  Alert and awake   Hydration Status:  Stable   PONV Controlled:  None   Airway Patency:  Patent and adequate      Post Op Vitals Reviewed: Yes      Staff: Anesthesiologist, CRNA         No complications documented      BP  218/117   Temp (!) 97 2 °F (36 2 °C) (06/21/22 1027)    Pulse  68   Resp   16   SpO2   100%

## 2022-06-21 NOTE — PROGRESS NOTES
When pt asked if she had any medications in room, her and her  both responded that he brought her dronabinol because "we are always late with it"  Pt asked to refrain from taking medication and asked  to take it home with him  Educated pt that staff will provide pain medication for pt  Dr Anil Richard notified that pt is in pain and needs something ordered  Will continue to monitor

## 2022-06-21 NOTE — INTERVAL H&P NOTE
H&P reviewed  After examining the patient I find no changes in the patients condition since the H&P had been written      Vitals:    06/21/22 0712   BP: (!) 199/94   Pulse: 69   Resp: 17   Temp: 97 9 °F (36 6 °C)   SpO2: 99%

## 2022-06-22 PROCEDURE — 99253 IP/OBS CNSLTJ NEW/EST LOW 45: CPT | Performed by: NURSE PRACTITIONER

## 2022-06-22 PROCEDURE — 99024 POSTOP FOLLOW-UP VISIT: CPT | Performed by: PHYSICIAN ASSISTANT

## 2022-06-22 RX ORDER — HYDRALAZINE HYDROCHLORIDE 20 MG/ML
5 INJECTION INTRAMUSCULAR; INTRAVENOUS ONCE
Status: COMPLETED | OUTPATIENT
Start: 2022-06-22 | End: 2022-06-22

## 2022-06-22 RX ORDER — HYDRALAZINE HYDROCHLORIDE 20 MG/ML
10 INJECTION INTRAMUSCULAR; INTRAVENOUS ONCE
Status: COMPLETED | OUTPATIENT
Start: 2022-06-22 | End: 2022-06-22

## 2022-06-22 RX ORDER — CLONIDINE HYDROCHLORIDE 0.1 MG/1
0.1 TABLET ORAL ONCE
Status: COMPLETED | OUTPATIENT
Start: 2022-06-22 | End: 2022-06-22

## 2022-06-22 RX ORDER — AMLODIPINE BESYLATE 5 MG/1
5 TABLET ORAL DAILY
Status: DISCONTINUED | OUTPATIENT
Start: 2022-06-22 | End: 2022-06-23

## 2022-06-22 RX ORDER — HYDRALAZINE HYDROCHLORIDE 20 MG/ML
10 INJECTION INTRAMUSCULAR; INTRAVENOUS EVERY 6 HOURS PRN
Status: DISCONTINUED | OUTPATIENT
Start: 2022-06-22 | End: 2022-06-23 | Stop reason: HOSPADM

## 2022-06-22 RX ADMIN — HYDRALAZINE HYDROCHLORIDE 5 MG: 20 INJECTION INTRAMUSCULAR; INTRAVENOUS at 06:44

## 2022-06-22 RX ADMIN — TRAMADOL HYDROCHLORIDE 50 MG: 50 TABLET, COATED ORAL at 09:06

## 2022-06-22 RX ADMIN — DOCUSATE SODIUM 100 MG: 100 CAPSULE, LIQUID FILLED ORAL at 16:56

## 2022-06-22 RX ADMIN — SODIUM CHLORIDE, SODIUM LACTATE, POTASSIUM CHLORIDE, AND CALCIUM CHLORIDE 125 ML/HR: .6; .31; .03; .02 INJECTION, SOLUTION INTRAVENOUS at 06:09

## 2022-06-22 RX ADMIN — AMLODIPINE BESYLATE 5 MG: 5 TABLET ORAL at 09:06

## 2022-06-22 RX ADMIN — ACETAMINOPHEN 975 MG: 325 TABLET, FILM COATED ORAL at 06:11

## 2022-06-22 RX ADMIN — HYDRALAZINE HYDROCHLORIDE 10 MG: 20 INJECTION INTRAMUSCULAR; INTRAVENOUS at 09:05

## 2022-06-22 RX ADMIN — ACETAMINOPHEN 975 MG: 325 TABLET, FILM COATED ORAL at 13:56

## 2022-06-22 RX ADMIN — DRONABINOL 15 MG: 2.5 CAPSULE ORAL at 13:52

## 2022-06-22 RX ADMIN — CLONIDINE HYDROCHLORIDE 0.1 MG: 0.1 TABLET ORAL at 16:56

## 2022-06-22 RX ADMIN — DOCUSATE SODIUM 100 MG: 100 CAPSULE, LIQUID FILLED ORAL at 09:06

## 2022-06-22 RX ADMIN — DRONABINOL 15 MG: 2.5 CAPSULE ORAL at 19:37

## 2022-06-22 NOTE — ASSESSMENT & PLAN NOTE
· Patient follows with Dr Anil Richard outpatient; was found to have invasive ductal carcinoma ERPR positive HER2 negative  · Patient presented to the hospital for left breast ANITA  directed lumpectomy, left axillary dissection level I and level II lymph nodes  · Surg onc is primary

## 2022-06-22 NOTE — PROGRESS NOTES
Progress Note -Surgery PA  Caro Quintanilla 59 y o  female MRN: [id number]  Unit/Bed#: -01 Encounter: [id number]      Assessment   59y F POD #1, s/p L breast lumpectomy and L axillary dissection with removal level I & II lymph nodes 2/2 malignant neoplasm of L breast  - Incisions c/d/i, no erythema or fluctuance, L axillary SHANELL drain s/s, mild incisional tenderness  - afebrile, elevated BP 170s/90s  SLIM consulted for monitoring and medication recommendations  BP from ankle due to recent L axillary surgery and RUE Lymphedema  Plan   -- Continue to monitor BP  Patient asymptomatic and feels well  Pain controlled  -- Continue with prn Tramadol and patients home medication regimen with Marinol  -- encourage OOB  -- Diet as tolerated  -- Plan for monitoring overnight and tentative discharge tomorrow if remains stable  -- Monitor SHANELL drain output  Patient comfortable with drain care teaching and has HHc ordered per surg Onc office for discharge   ______________________________________________________________________  Subjective:  Patient feeling well  Pain controlled  No nausea or vomiting, tolerating diet  No CP or SPB  No HA, lightheadedness or palpitations    Objective:    Vitals:  BP (!) 171/94   Pulse 81   Temp 97 9 °F (36 6 °C)   Resp 18   Ht 5' 8" (1 727 m)   Wt 65 3 kg (143 lb 15 4 oz)   SpO2 97%   BMI 21 89 kg/m²     I/Os:  I/O last 3 completed shifts:   In: 2140 [P O :540; I V :1600]  Out: 325 [Urine:300; Drains:25]    I/O this shift:  In: 240 [P O :240]  Out: -     Invasive Devices  Report    Peripheral Intravenous Line  Duration           Peripheral IV 06/21/22 Left;Ventral (anterior) Forearm 1 day          Drain  Duration           Closed/Suction Drain Left Breast Bulb 19 Fr  1 day                Medications:  Current Facility-Administered Medications   Medication Dose Route Frequency    acetaminophen (TYLENOL) tablet 975 mg  975 mg Oral Q8H [address]    amLODIPine (NORVASC) tablet 5 mg  5 mg Oral Daily    diphenhydrAMINE (BENADRYL) injection 25 mg  25 mg Intravenous Q6H PRN    docusate sodium (COLACE) capsule 100 mg  100 mg Oral BID    dronabinol (MARINOL) capsule 15 mg  15 mg Oral 4x Daily PRN    ondansetron (ZOFRAN) injection 4 mg  4 mg Intravenous Q6H PRN    traMADol (ULTRAM) tablet 50 mg  50 mg Oral Q6H PRN                 Lab Results and Cultures:   CBC with diff:   Lab Results   Component Value Date    WBC 6 91 06/01/2022    HGB 15 3 06/01/2022    HCT 45 1 06/01/2022    MCV 92 06/01/2022     (H) 06/01/2022    MCH 31 4 06/01/2022    MCHC 33 9 06/01/2022    RDW 12 3 06/01/2022    MPV 9 0 06/01/2022    NRBC 0 06/01/2022       BMP/CMP:  Lab Results   Component Value Date    K 3 7 06/01/2022     06/01/2022    CO2 28 06/01/2022    CO2 23 05/02/2017    BUN 8 06/01/2022    CREATININE 0 55 (L) 06/01/2022    GLUCOSE 98 05/02/2017    CALCIUM 9 8 06/01/2022    AST 17 06/01/2022    ALT 16 06/01/2022    ALKPHOS 119 (H) 06/01/2022    EGFR 100 06/01/2022    EGFR >60 0 05/02/2017       Lipid Panel:   No results found for: CHOL    Coags:   Lab Results   Component Value Date    PTT 27 07/19/2020    INR 1 02 07/19/2020        Urinalysis:   Lab Results   Component Value Date    COLORU Yellow 05/10/2020    CLARITYU Clear 05/10/2020    SPECGRAV 1 020 05/10/2020    PHUR 6 5 05/10/2020    PHUR 5 5 07/26/2018    LEUKOCYTESUR Trace (A) 05/10/2020    NITRITE Negative 05/10/2020    GLUCOSEU Negative 05/10/2020    KETONESU 40 (2+) (A) 05/10/2020    BILIRUBINUR Small (A) 05/10/2020    BLOODU Trace-lysed (A) 05/10/2020        Urine Culture: No results found for: URINECX   Wound Culure: No results found for: WOUNDCULT  Blood Culture:   Lab Results   Component Value Date    BLOODCX No Growth After 5 Days   07/19/2020         Physical Exam:  General Appearance:    Alert and orientated x 3, cooperative, no distress, appears stated age   Lungs:     Clear to auscultation bilaterally, respirations unlabored, no wheezes    Heart: Regular rate and rhythm, S1 and S2 normal, no murmur   Abdomen:    Normoactive BS, soft, non tender, non rigid, no masses, no palpated organomegaly     Extremities:  Extremities normal, no calf tenderness, no cyanosis or edema   Pulses:   2+ and symmetric all extremities   Skin:   Skin color, texture, turgor normal, no rashes*  Left breast lumpectomy site at RUQ c/d/i, no erythema or fluctuance, dressing intact, mild appropriate tenderness; L axilla soft, c/d/i, no fluctuance or erythema, mild tenderness, SHANELL drain site clean with no drainage; SHANELL S/s  Neurologic:   CNII-XII intact, normal strength, affect appropriate       Imaging:  Mammo follow up left (no charge)    Result Date: 6/16/2022  Impression:   1  Technically successful ultrasound-guided TIFFANY  reflector placement for the biopsied left axillary lymph node  2  Technically successful ultrasound-guided TIFFANY  reflector placement for the stoplight clip from the MRI biopsy in the left breast  3  See comments above regarding location of the hematoma, biopsy clip benign mass enhancement  RECOMMENDATION: -Continued surgical management  Workstation ID: [id number]    Mammo post biopsy left    Result Date: 6/16/2022  Impression:   1  Technically successful ultrasound-guided TIFFANY  reflector placement for the biopsied left axillary lymph node  2  Technically successful ultrasound-guided TIFFANY  reflector placement for the stoplight clip from the MRI biopsy in the left breast  3  See comments above regarding location of the hematoma, biopsy clip benign mass enhancement  RECOMMENDATION: -Continued surgical management  Workstation ID: [id number]    US breast tiffany  left    Result Date: 6/16/2022  Impression:   1  Technically successful ultrasound-guided TIFFANY  reflector placement for the biopsied left axillary lymph node   2  Technically successful ultrasound-guided TIFFANY  reflector placement for the stoplight clip from the MRI biopsy in the left breast  3  See comments above regarding location of the hematoma, biopsy clip benign mass enhancement  RECOMMENDATION: -Continued surgical management  Workstation ID: [id number]    US breast tiffany  needle loc left ea add    Result Date: 6/16/2022  Impression:   1  Technically successful ultrasound-guided TIFFANY  reflector placement for the biopsied left axillary lymph node  2  Technically successful ultrasound-guided TIFFANY  reflector placement for the stoplight clip from the MRI biopsy in the left breast  3  See comments above regarding location of the hematoma, biopsy clip benign mass enhancement  RECOMMENDATION: -Continued surgical management  Workstation ID: [id number]    Mammo tiffany  breast specimen (No Charge)    Result Date: 6/21/2022  Impression:   3 specimen radiograph images were obtained  A TIFFANY  reflector, butterfly clip and surgical clips project within 1 of the specimens  A TIFFANY  reflector and stoplight clip project within the other 2 specimens  Specimen radiograph was not discussed with the surgeon while they were in the operating room        Kalani Amato PA-C   6/22/2022

## 2022-06-22 NOTE — NURSING NOTE
Patient blood pressure this morning was 207/103 x2  Dr Owen Stuart notified and he ordered a SLIM consult  SLIM notified

## 2022-06-22 NOTE — CONSULTS
[address] 1960, 59 y o  female MRN: [id number]  Unit/Bed#: -01 Encounter: [id number]  Primary Care Provider: Lacie Murphy MD   Date and time admitted to hospital: 6/21/2022  6:39 AM    Inpatient consult to Internal Medicine  Consult performed by: RAMIREZ Ramos  Consult ordered by: Kevin Martínez MD        HTN, goal below 140/90  Assessment & Plan  · Chronic; patient with hypertensive urgency initially  · Trending down, 171-221/  · Continue home medication regimen, amlodipine 5 mg daily  · 10 mg hydralazine IV every 6 hours as needed for systolic blood pressure greater than 344, diastolic blood pressure greater than 100  · Monitor closely    * 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 ([facility] [address] )  Assessment & Plan  · Patient follows with Dr Roverto Kirk outpatient; was found to have invasive ductal carcinoma ERPR positive HER2 negative  · Patient presented to the hospital for left breast ANITA  directed lumpectomy, left axillary dissection level I and level II lymph nodes  · Surg onc is primary  Malignant neoplasm of lower-inner quadrant of left breast in female, estrogen receptor positive ([facility] [address] )  Assessment & Plan  · See plan as noted above      VTE Prophylaxis: VTE Score: 6 High Risk (Score >/= 5) - Pharmacological DVT Prophylaxis Contraindicated  Sequential Compression Devices Ordered  Recommendations for Discharge:  · Per Primary Team    Counseling / Coordination of Care Time: 60 minutes Greater than 50% of total time spent on patient counseling and coordination of care  Collaboration of Care: Were Recommendations Directly Discussed with Primary Treatment Team? Yes    History of Present Illness:  Jaime Alarcon is a 59 y o  female who is originally admitted to the surg/onc service due to left breast metastatic axillary lymph node  We are consulted for hypertension management      Review of Systems:  Review of Systems   Constitutional: Negative for activity change, chills, diaphoresis and fever  HENT: Negative for congestion, rhinorrhea, sinus pressure and sinus pain  Eyes: Negative for photophobia, pain, discharge and redness  Respiratory: Negative for apnea, shortness of breath, wheezing and stridor  Cardiovascular: Negative for chest pain, palpitations and leg swelling  Gastrointestinal: Negative for abdominal distention, diarrhea, nausea and vomiting  Endocrine: Negative for cold intolerance, heat intolerance, polydipsia and polyphagia  Genitourinary: Negative for difficulty urinating, dysuria, frequency and urgency  Musculoskeletal: Negative for arthralgias, joint swelling, myalgias and neck pain  Skin: Negative for color change, pallor, rash and wound  Allergic/Immunologic: Negative for environmental allergies, food allergies and immunocompromised state  Neurological: Negative for dizziness, weakness, light-headedness and headaches  Hematological: Negative for adenopathy  Does not bruise/bleed easily  Psychiatric/Behavioral: Negative for agitation and hallucinations  The patient is not nervous/anxious and is not hyperactive          Past Medical and Surgical History:   Past Medical History:   Diagnosis Date    Abnormal mammogram 05/15/2013    Anemia     Cancer ([facility] [address] )     desmoitosis    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 ([facility] [address] ) 04/19/2022    Chronic pain disorder     worse right side of body    Desmoid tumor     Last Assessed: 6/19/2017    History of transfusion     no reactions    Hyperlipidemia     Hypertension     PONV (postoperative nausea and vomiting)        Past Surgical History:   Procedure Laterality Date    BREAST LUMPECTOMY Left 6/21/2022    Procedure: ANITA  DIRECTED LUMPECTOMY;  Surgeon: Ashia Bob MD;  [facility];  Service: Surgical Oncology    FRACTURE SURGERY      HYSTERECTOMY      LYMPH NODE DISSECTION Left 6/21/2022    Procedure: AXILLARY DISSECTION LEVEL I AND LEVEL II LYMPH NODES;  Surgeon: Ivy Hollins MD;  [facility];  Service: Surgical Oncology    MRI BREAST BIOPSY LEFT (ALL INCLUSIVE) Left 5/20/2022    MRI BREAST BIOPSY RIGHT (ALL INCLUSIVE) Right 5/20/2022    OTHER SURGICAL HISTORY      Arm Incision: Dermoid tumor, right Arm     PARTIAL HYSTERECTOMY      VT COLONOSCOPY FLX DX W/COLLJ SPEC WHEN PFRMD N/A 8/15/2017    Procedure: COLONOSCOPY;  Surgeon: Kishor Moore MD;  [facility]: [facility]; Service: Gastroenterology    VT OPEN RX DISTAL RADIUS FX, INTRA-ARTICULAR, 3+ FRAG Left 2/9/2021    Procedure: OPEN REDUCTION W/ INTERNAL FIXATION (ORIF) RADIUS / Shelbie Bis (WRIST) left;  Surgeon: Honorio Sullivan MD;  [facility];  Service: Orthopedics    VT WRIST Curtis Narrow LIG Left 8/10/2021    Procedure: RELEASE LEFT CARPAL TUNNEL ENDOSCOPIC;  Surgeon: Honorio Sullivan MD;  [facility];  Service: Orthopedics    SKIN BIOPSY      SKIN CANCER EXCISION      Melanoma Excision     TONSILLECTOMY      US BREAST NEEDLE LOC LEFT Left 6/16/2022    US BREAST NEEDLE LOC RIGHT EACH ADDITIONAL Right 6/16/2022    US GUIDED BREAST LYMPH NODE BIOPSY LEFT Left 4/14/2022       Meds/Allergies:  all medications and allergies reviewed    Allergies: Allergies   Allergen Reactions    Chlorpromazine Anaphylaxis     PHENOTHIAZINE=ANAPHYLAXIS    Codeine Anaphylaxis     Anaphylaxis  abd pain   Meperidine Anaphylaxis, Hives and Vomiting    Phenothiazines Anaphylaxis and Throat Swelling     Category: Allergy;     Saccharin - Food Allergy Anaphylaxis    Ampicillin-Sulbactam Sodium Hives    Aspirin GI Intolerance and Abdominal Pain     Severe GERD    Gluten Meal - Food Allergy GI Intolerance    Ibuprofen Hives     H    Levofloxacin Hives    Neomycin Other (See Comments), Edema and Hives     Category:  Allergy;   swelling    Citrus - Food Allergy Rash    Latex Hives and Rash     Category: Allergy;        Social History:  Marital Status: /Civil Union  Substance Use History:   Social History     Substance and Sexual Activity   Alcohol Use Yes    Alcohol/week: 1 0 standard drink    Types: 1 Shots of liquor per week    Comment: One shot of liquor occassionally at night     Social History     Tobacco Use   Smoking Status Never Smoker   Smokeless Tobacco Never Used     Social History     Substance and Sexual Activity   Drug Use Yes    Frequency: 28 0 times per week    Types: Marijuana    Comment: THC Medical marijuana       Family History:  Family History   Problem Relation Age of Onset    Diabetes Mother     No Known Problems Father     No Known Problems Sister     Lung cancer Sister     Pancreatic cancer Sister     No Known Problems Maternal Grandmother     No Known Problems Maternal Grandfather     No Known Problems Paternal Grandmother     No Known Problems Paternal Grandfather        Physical Exam:   Vitals:   Blood Pressure: 170/94 (06/22/22 1515)  Pulse: 70 (06/22/22 1515)  Temperature: 98 2 °F (36 8 °C) (06/22/22 1515)  Temp Source: Oral (06/22/22 1515)  Respirations: 18 (06/22/22 1515)  Height: 5' 8" (172 7 cm) (06/21/22 9124)  Weight - Scale: 65 3 kg (143 lb 15 4 oz) (06/21/22 0712)  SpO2: 96 % (06/22/22 1515)    Physical Exam  Vitals and nursing note reviewed  Constitutional:       General: She is not in acute distress  Appearance: She is normal weight  She is ill-appearing  Cardiovascular:      Rate and Rhythm: Normal rate  Pulses: Normal pulses  Heart sounds: Normal heart sounds  Pulmonary:      Effort: Pulmonary effort is normal       Breath sounds: Normal breath sounds  Abdominal:      General: Bowel sounds are normal       Palpations: Abdomen is soft  Musculoskeletal:         General: Normal range of motion  Skin:     General: Skin is warm and dry        Capillary Refill: Capillary refill takes less than 2 seconds  Neurological:      Mental Status: She is alert and oriented to person, place, and time  Psychiatric:         Mood and Affect: Mood normal          Additional Data:   Lab Results:                    Lab Results   Component Value Date/Time    HGBA1C 5 3 07/13/2021 08:55 AM               Imaging: No pertinent imaging reviewed  Mammo tiffany  breast specimen (No Charge)   Final Result by Jojo Aldana MD (06/21 2290)     3 specimen radiograph images were obtained  A TIFFANY  reflector, butterfly clip and surgical clips project within 1    of the specimens  A TIFFANY  reflector and stoplight clip project within the other 2    specimens  Specimen radiograph was not discussed with the surgeon while they were in    the operating room  EKG, Pathology, and Other Studies Reviewed on Admission:   · EKG: NSR  HR 60     ** Please Note: This note may have been constructed using a voice recognition system   **

## 2022-06-22 NOTE — ASSESSMENT & PLAN NOTE
· Chronic; patient with hypertensive urgency initially  · Trending down, 171-221/  · Continue home medication regimen, amlodipine 5 mg daily  · 10 mg hydralazine IV every 6 hours as needed for systolic blood pressure greater than 770, diastolic blood pressure greater than 100  · Monitor closely

## 2022-06-23 VITALS
TEMPERATURE: 98.1 F | HEIGHT: 68 IN | BODY MASS INDEX: 21.82 KG/M2 | HEART RATE: 72 BPM | RESPIRATION RATE: 18 BRPM | DIASTOLIC BLOOD PRESSURE: 80 MMHG | SYSTOLIC BLOOD PRESSURE: 140 MMHG | OXYGEN SATURATION: 97 % | WEIGHT: 143.96 LBS

## 2022-06-23 PROBLEM — R03.0 ELEVATED BP WITHOUT DIAGNOSIS OF HYPERTENSION: Status: ACTIVE | Noted: 2022-06-23

## 2022-06-23 PROBLEM — R03.0 ELEVATED BP WITHOUT DIAGNOSIS OF HYPERTENSION: Status: RESOLVED | Noted: 2022-06-23 | Resolved: 2022-06-23

## 2022-06-23 PROCEDURE — 99232 SBSQ HOSP IP/OBS MODERATE 35: CPT | Performed by: INTERNAL MEDICINE

## 2022-06-23 PROCEDURE — NC001 PR NO CHARGE: Performed by: PHYSICIAN ASSISTANT

## 2022-06-23 PROCEDURE — 99024 POSTOP FOLLOW-UP VISIT: CPT | Performed by: PHYSICIAN ASSISTANT

## 2022-06-23 RX ORDER — DOCUSATE SODIUM 100 MG/1
100 CAPSULE, LIQUID FILLED ORAL 2 TIMES DAILY PRN
Qty: 20 CAPSULE | Refills: 0 | Status: SHIPPED | OUTPATIENT
Start: 2022-06-23 | End: 2022-07-03

## 2022-06-23 RX ORDER — TRAMADOL HYDROCHLORIDE 50 MG/1
50 TABLET ORAL EVERY 6 HOURS PRN
Qty: 12 TABLET | Refills: 0 | Status: SHIPPED | OUTPATIENT
Start: 2022-06-23 | End: 2022-06-27

## 2022-06-23 RX ADMIN — DRONABINOL 15 MG: 2.5 CAPSULE ORAL at 06:34

## 2022-06-23 NOTE — ASSESSMENT & PLAN NOTE
Likely reactive due to post op state, pain  Pt w/o prior hx of HTN and not on chronic antihtn meds  BP now well controlled  Advised at home bp monitoring at home and to keep a log of her readings   She is to contact her PCP if she has sbp > 160  No need to d/c on antihtn at this time  Stable from medical standpoint for discharge

## 2022-06-23 NOTE — DISCHARGE INSTRUCTIONS
Follow up with Dr Shelly Ruth at your previously scheduled appointment on 7/6/22  Follow up with you Family doctor regarding elevated blood pressure while in the hospital   Check your blood pressure daily at home

## 2022-06-23 NOTE — PROGRESS NOTES
Progress Note -Surgery PA  Mamie Bueno 59 y o  female MRN: [id number]  Unit/Bed#: -[place] Encounter: [id number]      Assessment   59y F POD #2, s/p L breast lumpectomy and L axillary dissection with removal level I & II lymph nodes 2/2 malignant neoplasm of L breast  Elevated BP, improved  - Incisions c/d/i, no erythema or fluctuance, L axillary SHANELL drain s/s 55cc this morning  - afebrile, VSS  - BP improved, 140/80 this morning  Plan   Patient stable for discharge  Discussed with internal medicine and patient  No need for addition of medication for elevated BP on discharge  Likely situational and now resolved  Patient recommended to check BP at home and follow up in the next week with her PCP  She will follow up at previously scheduled appointment with Dr Monserrat Ruiz  She was instructed to contact the office in the interim with any questions or concerns  Continue with drain care and daily emptying and recording output  Reviewed with patient  ______________________________________________________________________  Subjective:   Patient feeling well  No headache, light headedness, palpitations  No SOB or CP  Expected pain at incision sites, well controlled on medications  Denies fevers or chills  Tolerating diet  Objective:    Vitals:  /80   Pulse 72   Temp 98 1 °F (36 7 °C)   Resp 18   Ht 5' 8" (1 727 m)   Wt 65 3 kg (143 lb 15 4 oz)   SpO2 97%   BMI 21 89 kg/m²     I/Os:  I/O last 3 completed shifts: In: 720 [P O :720]  Out: 25 [Drains:25]    No intake/output data recorded      Invasive Devices  Report    Drain  Duration           Closed/Suction Drain Left Breast Bulb 19 Fr  2 days                Medications:  Current Facility-Administered Medications   Medication Dose Route Frequency    acetaminophen (TYLENOL) tablet 975 mg  975 mg Oral Q8H [address]    diphenhydrAMINE (BENADRYL) injection 25 mg  25 mg Intravenous Q6H PRN    docusate sodium (COLACE) capsule 100 mg  100 mg Oral BID    dronabinol (MARINOL) capsule 15 mg  15 mg Oral 4x Daily PRN    hydrALAZINE (APRESOLINE) injection 10 mg  10 mg Intravenous Q6H PRN    ondansetron (ZOFRAN) injection 4 mg  4 mg Intravenous Q6H PRN    traMADol (ULTRAM) tablet 50 mg  50 mg Oral Q6H PRN                 Lab Results and Cultures:   CBC with diff:   Lab Results   Component Value Date    WBC 6 91 06/01/2022    HGB 15 3 06/01/2022    HCT 45 1 06/01/2022    MCV 92 06/01/2022     (H) 06/01/2022    MCH 31 4 06/01/2022    MCHC 33 9 06/01/2022    RDW 12 3 06/01/2022    MPV 9 0 06/01/2022    NRBC 0 06/01/2022       BMP/CMP:  Lab Results   Component Value Date    K 3 7 06/01/2022     06/01/2022    CO2 28 06/01/2022    CO2 23 05/02/2017    BUN 8 06/01/2022    CREATININE 0 55 (L) 06/01/2022    GLUCOSE 98 05/02/2017    CALCIUM 9 8 06/01/2022    AST 17 06/01/2022    ALT 16 06/01/2022    ALKPHOS 119 (H) 06/01/2022    EGFR 100 06/01/2022    EGFR >60 0 05/02/2017       Lipid Panel:   No results found for: CHOL    Coags:   Lab Results   Component Value Date    PTT 27 07/19/2020    INR 1 02 07/19/2020        Urinalysis:   Lab Results   Component Value Date    COLORU Yellow 05/10/2020    CLARITYU Clear 05/10/2020    SPECGRAV 1 020 05/10/2020    PHUR 6 5 05/10/2020    PHUR 5 5 07/26/2018    LEUKOCYTESUR Trace (A) 05/10/2020    NITRITE Negative 05/10/2020    GLUCOSEU Negative 05/10/2020    KETONESU 40 (2+) (A) 05/10/2020    BILIRUBINUR Small (A) 05/10/2020    BLOODU Trace-lysed (A) 05/10/2020        Urine Culture: No results found for: URINECX   Wound Culure: No results found for: WOUNDCULT  Blood Culture:   Lab Results   Component Value Date    BLOODCX No Growth After 5 Days   07/19/2020         Physical Exam:  General Appearance:    Alert and orientated x 3, cooperative, no distress, appears stated age   Lungs:     Clear to auscultation bilaterally, respirations unlabored, no wheezes    Heart:    Regular rate and rhythm, S1 and S2 normal, no murmur   Abdomen: Normoactive BS, soft, non tender, non rigid, no masses, no palpated organomegaly   Extremities:  Extremities normal, no calf tenderness, no cyanosis or edema   Pulses:   2+ and symmetric all extremities   Skin:   Skin color, texture, turgor normal, no rashes  Wound/Dressing:  L breast RUQ incision C/d/i, no drainage, no erythema, no fluctuance; L axilla incision c/d/i, soft, no fluctuance, no erythema, SHANELL drain s/s   Neurologic:   CNII-XII intact, normal strength, affect appropriate       Imaging:  Mammo follow up left (no charge)    Result Date: 6/16/2022  Impression:   1  Technically successful ultrasound-guided TIFFANY  reflector placement for the biopsied left axillary lymph node  2  Technically successful ultrasound-guided TIFFANY  reflector placement for the stoplight clip from the MRI biopsy in the left breast  3  See comments above regarding location of the hematoma, biopsy clip benign mass enhancement  RECOMMENDATION: -Continued surgical management  Workstation ID: [id number]    Mammo post biopsy left    Result Date: 6/16/2022  Impression:   1  Technically successful ultrasound-guided TIFFANY  reflector placement for the biopsied left axillary lymph node  2  Technically successful ultrasound-guided TIFFANY  reflector placement for the stoplight clip from the MRI biopsy in the left breast  3  See comments above regarding location of the hematoma, biopsy clip benign mass enhancement  RECOMMENDATION: -Continued surgical management  Workstation ID: [id number]    US breast tiffany  left    Result Date: 6/16/2022  Impression:   1  Technically successful ultrasound-guided TIFFANY  reflector placement for the biopsied left axillary lymph node  2  Technically successful ultrasound-guided TIFFANY  reflector placement for the stoplight clip from the MRI biopsy in the left breast  3  See comments above regarding location of the hematoma, biopsy clip benign mass enhancement   RECOMMENDATION: -Continued surgical management  Workstation ID: [id number]    US breast tiffany  needle loc left ea add    Result Date: 6/16/2022  Impression:   1  Technically successful ultrasound-guided TIFFANY  reflector placement for the biopsied left axillary lymph node  2  Technically successful ultrasound-guided TIFFANY  reflector placement for the stoplight clip from the MRI biopsy in the left breast  3  See comments above regarding location of the hematoma, biopsy clip benign mass enhancement  RECOMMENDATION: -Continued surgical management  Workstation ID: [id number]    Mammo tiffany  breast specimen (No Charge)    Result Date: 6/21/2022  Impression:   3 specimen radiograph images were obtained  A TIFFANY  reflector, butterfly clip and surgical clips project within 1 of the specimens  A TIFFANY  reflector and stoplight clip project within the other 2 specimens  Specimen radiograph was not discussed with the surgeon while they were in the operating room        Kalani Amato PA-C   6/23/2022

## 2022-06-23 NOTE — ASSESSMENT & PLAN NOTE
· Hx of invasive ductal carcinoma ERPR positive HER2 negative s/p lumpectomy, left axillary dissection level I and level II lymph nodes pod 1  · Care per surg oncology

## 2022-06-23 NOTE — DISCHARGE SUMMARY
Discharge Summary - Ricarda Rodriguez 59 y o  female MRN: [id number]    Unit/Bed#: -01 Encounter: [id number]    Admission Date: 6/21/2022   Discharge Date: 06/23/22    Admitting Diagnosis:   Malignant neoplasm of lower-inner quadrant of left breast in female, estrogen receptor positive ([facility] ) [C50 [id number], Z17 [id number]]    Principle/ Secondary Diagnosis:  Past Medical History:   Diagnosis Date    Abnormal mammogram 05/15/2013    Anemia     Cancer ([facility] )     desmoitosis    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 ([facility] ) 04/19/2022    Chronic pain disorder     worse right side of body    Desmoid tumor     Last Assessed: 6/19/2017    History of transfusion     no reactions    Hyperlipidemia     Hypertension     PONV (postoperative nausea and vomiting)      Past Surgical History:   Procedure Laterality Date    BREAST LUMPECTOMY Left 6/21/2022    Procedure: ANITA  DIRECTED LUMPECTOMY;  Surgeon: Emily Fontenot MD;  [facility];  Service: Surgical Oncology    FRACTURE SURGERY      HYSTERECTOMY      LYMPH NODE DISSECTION Left 6/21/2022    Procedure: AXILLARY DISSECTION LEVEL I AND LEVEL II LYMPH NODES;  Surgeon: Emily Fontenot MD;  [facility];  Service: Surgical Oncology    MRI BREAST BIOPSY LEFT (ALL INCLUSIVE) Left 5/20/2022    MRI BREAST BIOPSY RIGHT (ALL INCLUSIVE) Right 5/20/2022    OTHER SURGICAL HISTORY      Arm Incision: Dermoid tumor, right Arm     PARTIAL HYSTERECTOMY      GA COLONOSCOPY FLX DX W/COLLJ SPEC WHEN PFRMD N/A 8/15/2017    Procedure: COLONOSCOPY;  Surgeon: Gwyn Mackay MD;  [facility];   Service: Gastroenterology    GA OPEN RX DISTAL RADIUS FX, INTRA-ARTICULAR, 3+ FRAG Left 2/9/2021    Procedure: OPEN REDUCTION W/ INTERNAL FIXATION (ORIF) RADIUS / Yumiko [place] (WRIST) left;  Surgeon: Lorie Massey MD;  [facility];  Service: Orthopedics    GA WRIST Jeri Carls LIG Left 8/10/2021    Procedure: RELEASE LEFT CARPAL TUNNEL ENDOSCOPIC;  Surgeon: Pedro Luis Viveros MD;  [facility];  Service: Orthopedics    SKIN BIOPSY      SKIN CANCER EXCISION      Melanoma Excision     TONSILLECTOMY      US BREAST NEEDLE LOC LEFT Left 6/16/2022    US BREAST NEEDLE LOC RIGHT EACH ADDITIONAL Right 6/16/2022    US GUIDED BREAST LYMPH NODE BIOPSY LEFT Left 4/14/2022     Discharge Diagnoses: Active Problems:    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 ([address] )      Procedures Performed:  ANITA  DIRECTED LUMPECTOMY (Left Breast)  AXILLARY DISSECTION LEVEL I AND LEVEL II LYMPH NODES (Left Axilla)  Procedure(s):  ANITA  DIRECTED LUMPECTOMY  AXILLARY DISSECTION LEVEL I AND LEVEL II LYMPH NODES    Consultants:   Internal Medicine    History of Present Illness:  Per H&P complete by Dr Tyesha Farrell on 6/21/22: "She is here for follow-up after bilateral breast MRI guided biopsy  [place] known left axillary metastatic lymph node with palpable left axillary lymphadenopathy left breast biopsy at 11:00 a m  consistent with invasive ductal carcinoma ERPR positive HER2 negative  These pathology cul findings were discussed in detail  Surgical consent was obtained for left breast conservation surgery and left axillary dissection given palpable lymphadenopathy  Consent was obtained after explaining the benefit procedure alternatives possible complications such as bleeding infection injury to the surrounding structures particularly long thoracic and thoracodorsal nerves injury and its consequences  We also discussed the risk of lymphedema with axillary dissection  She already has right upper extremity lymphedema  She and her  had several questions and I answered all of them to their satisfaction  H&P reviewed   After examining the patient I find no changes in the patients condition since the H&P had been written "    Hospital Course: Ashley Hernández is a 59 y o  female admitted for lumpectomy and axillary lymph node disection on 6/21/22 with Dr Maddie Barker  She tolerated procedure well there were no complications  Please see full dictated operative report by Dr Maddie Barker for further details  SHANELL drain was placed intraoperatively in L axilla  The patient was admitted for pain control and monitoring of vitals  On postop day 1 patient had elevated blood pressures and Internal Medicine was consulted for management  Patient persisted to have elevated blood pressure throughout the day  She was treated with RI and blood pressure medications  She remained asymptomatic  Surgically she was doing well with pain well controlled, afebrile, vital signs otherwise stable, and tolerating diet with no nausea or vomiting  She was kept over night for further monitoring  On postop day 2 patient's blood pressure was well controlled and she was deemed appropriate for discharge  She was discharged home without additional medications for blood pressure  She was provided a prescription for pain medication  She was instructed to follow her blood pressures and follow-up closely with her primary care physician  She will follow up as scheduled in the surgical oncology office  Discharge instructions were discussed with the patient all questions were answered  The patient was seen and examined on the day of discharge:   /80   Pulse 72   Temp 98 1 °F (36 7 °C)   Resp 18   Ht 5' 8" (1 727 m)   Wt 65 3 kg (143 lb 15 4 oz)   SpO2 97%   BMI 21 89 kg/m²   Please see progress note from 6/23/2022    Condition at Discharge: stable     Discharge instructions/Information to patient and family:   See after visit summary for information provided to patient and family  Provisions for Follow-Up Care:  See after visit summary for information related to follow-up care and any pertinent home health orders  Disposition: See After Visit Summary for discharge disposition information      Planned Readmission: No    Discharge Statement   I spent 30 minutes discharging the patient  This time was spent on the day of discharge  I had direct contact with the patient on the day of discharge  Additional documentation is required if more than 30 minutes were spent on discharge  Discharge Medications:  See after visit summary for reconciled discharge medications provided to patient and family        Hugo Martinez PA-C  6/23/2022

## 2022-06-23 NOTE — UTILIZATION REVIEW
Initial Clinical Review    Elective  surgical procedure  OP [id number], [id number], [id number], [id number]  Age/Sex: 59 y o  female  Surgery Date: 6/21  Procedure: ANITA  DIRECTED LUMPECTOMY (Left)  AXILLARY DISSECTION LEVEL I AND LEVEL II LYMPH NODES   Anesthesia: general   Operative Findings: Biopsy clips and the ANITA clips are in the specimens    POD#1 Progress Note: POD #1, s/p L breast lumpectomy and L axillary dissection with removal level I & II lymph nodes 2/2 malignant neoplasm of L breast  elevated BP 170s/90s  SLIM consulted for monitoring and medication   Cont pain control , enc OOB , diet as jani   Monitor SHANELL drain output   6/22 IM Consult   HTN goal below 140/90, cont home amlodipine , add iv hydralazine q6hr and monitor  Admission Orders: Date/Time/Statement:     OP NC 6/21 @ 1030 converted to IP on 6/22 1743 for HTN control requiring IV antihypertensives  Admission Orders (From admission, onward)     Ordered        06/22/22 1743  Inpatient Admission  Once                      Orders Placed This Encounter   Procedures    Inpatient Admission     Standing Status:   Standing     Number of Occurrences:   1     Order Specific Question:   Level of Care     Answer:   Med Surg [16]     Order Specific Question:   Estimated length of stay     Answer:   More than 2 Midnights     Order Specific Question:   Certification     Answer:   I certify that inpatient services are medically necessary for this patient for a duration of greater than two midnights  See H&P and MD Progress Notes for additional information about the patient's course of treatment       Vital Signs: /78   Pulse 72   Temp 98 4 °F (36 9 °C)   Resp 18   Ht 5' 8" (1 727 m)   Wt 65 3 kg (143 lb 15 4 oz)   SpO2 96%   BMI 21 89 kg/m²     06/23/22 07:31:34 98 1 °F (36 7 °C) 72 18 140/80 100 97 % -- --   06/22/22 22:23:44 98 4 °F (36 9 °C) 72 -- 138/78 98 96 % -- --   06/22/22 2030 -- -- -- -- -- -- -- None (Room air)   06/22/22 18:07:34 -- 75 -- 148/83 105 96 % -- --   06/22/22 15:15:11 98 2 °F (36 8 °C) 70 18 170/94 119 96 % -- --   06/22/22 15:14:31 -- 81 -- 170/94 119 96 % -- --   06/22/22 11:52:27 -- 81 18 171/94 Abnormal  120 97 % -- --   06/22/22 0900 -- -- -- -- -- -- -- None (Room air)   06/22/22 0800 -- 79 -- 187/101 Abnormal  130 98 % -- --   06/22/22 07:57:49 -- 75 -- 187/101 Abnormal  130 95 % -- --   06/22/22 07:37:47 97 9 °F (36 6 °C) 100 20 221/165 Abnormal  184 95 % -- --   06/22/22 06:18:45 -- 77 -- 207/103 Abnormal  138 95 % -- --   06/21/22 22:20:53 -- 80 -- 167/90 116 95 % -- --   06/21/22 22:19:44 -- 83 -- 173/95 Abnormal  121 94 % -- --   06/21/22 22:11:28 98 1 °F (36 7 °C) 91 21 181/79 Abnormal  113 96 % -- --   06/21/22 14:37:55 97 3 °F (36 3 °C) Abnormal  76 18 186/91 Abnormal  123 97 % -- --   06/21/22 12:26:09 97 5 °F (36 4 °C) 69 18 186/91 Abnormal  123 96 % -- --   06/21/22 1200 97 1 °F (36 2 °C) Abnormal  65 15 181/83 Abnormal  124 96 % -- None (Room air)   06/21/22 1145 -- 63 18 178/85 Abnormal  122 94 % -- None (Room air)   06/21/22 1137 -- 65 -- 162/89 122 -- -- --   06/21/22 1130 -- 60 17 194/92 Abnormal  132 94 % -- None (Room air)   06/21/22 1115 -- 66 18 208/101 Abnormal  145 97 % -- None (Room air)   06/21/22 1100 -- 65 12 209/109 Abnormal  150 97 % -- None (Room air)   06/21/22 1055 -- 65 -- 212/117 Abnormal  155 -- -- --   06/21/22 1045 -- 63 11 Abnormal  196/113 Abnormal  145 99 % -- None (Room air)   06/21/22 1030 -- 71 11 Abnormal  221/116 Abnormal  160 100 % -- None (Room air)   06/21/22 1027 97 2 °F (36 2 °C) Abnormal  66 12 232/120 Abnormal  167 97 % 6 L/min Simple mask       Pertinent Labs/Diagnostic Test Results:   Mammo tiffany  breast specimen (No Charge)   Final Result by Shiva Holman MD (06/21 1666)     3 specimen radiograph images were obtained  A TIFFANY  reflector, butterfly clip and surgical clips project within 1    of the specimens       A TIFFANY  reflector and stoplight clip project within the other 2    specimens  Specimen radiograph was not discussed with the surgeon while they were in    the operating room  Diet: reg   Mobility: amb   DVT Prophylaxis: SCD    Medications/Pain Control:   Scheduled Medications:  acetaminophen, 975 mg, Oral, Q8H [address]  amLODIPine, 5 mg, Oral, Daily  docusate sodium, 100 mg, Oral, BID      Continuous IV Infusions:     PRN Meds:  Hydralazine 5 mg x1  6/21 and 6/22  Hydralazine 10 mg x1 6/22  diphenhydrAMINE, 25 mg, Intravenous, Q6H PRN  dronabinol, 15 mg, Oral, 4x Daily PRN  hydrALAZINE, 10 mg, Intravenous, Q6H PRN  ondansetron, 4 mg, Intravenous, Q6H PRN  traMADol, 50 mg, Oral, Q6H PRN        Network Utilization Review Department  ATTENTION: Please call with any questions or concerns to [phone] and carefully listen to the prompts so that you are directed to the right person  All voicemails are confidential   [facility] all requests for admission clinical reviews, approved or denied determinations and any other requests to dedicated fax number below belonging to the campus where the patient is receiving treatment   List of dedicated fax numbers for the Facilities:  [address] DENIALS (Administrative/Medical Necessity) [phone]   [address] (Maternity/NICU/Pediatrics) [phone]   [address] [place] [address]  [phone]   [address] [place] [address] [id number] [place] [address] [address] [place] [address] [facility] [facility] [phone]

## 2022-06-23 NOTE — UTILIZATION REVIEW
Inpatient Admission Authorization Request   NOTIFICATION OF INPATIENT ADMISSION/INPATIENT AUTHORIZATION REQUEST   SERVICING FACILITY:   [address] [place] [address]  Tax ID: [id number]  NPI: [id number]  Place of Service: Inpatient [facility]  Place of Service Code: 24     ATTENDING PROVIDER:  Attending Name and NPI#: Romulo Blumma [[id number]]  Address: [address] [place] [address]  Phone: [phone]     UTILIZATION REVIEW CONTACT:  Sotero Jeans, Utilization   Network Utilization Review Department  Phone: [phone]  Fax [phone]  Email: [email or web address]     PHYSICIAN ADVISORY SERVICES:  FOR IOSD-LT-ZGJE REVIEW - MEDICAL NECESSITY DENIAL  Phone: [phone]  Fax: [phone]  Email: [email or web address]     TYPE OF REQUEST:  Inpatient Status     ADMISSION INFORMATION:  ADMISSION DATE/TIME: 6/22/22  5:43 PM  PATIENT DIAGNOSIS CODE/DESCRIPTION:  Malignant neoplasm of lower-inner quadrant of left breast in female, estrogen receptor positive ([facility] ) [C50 [id number], Z17 0]  DISCHARGE DATE/TIME: No discharge date for patient encounter  IMPORTANT INFORMATION:  Please contact Sotero Jeans directly with any questions or concerns regarding this request  Department voicemails are confidential     Send requests for admission clinical reviews, concurrent reviews, approvals, and administrative denials due to lack of clinical to fax [phone]

## 2022-06-23 NOTE — NURSING NOTE
Patient's IV blew on day shift today  Day shift RN attempted IV insertion with no success  Patient is a b/l upper limb alert with difficult veins  Considered ultrasound guided IV insertion, but patient refused further IV insertion attempts for tonight  Patient is on no IV medications and is expected to be d/helen tomorrow  SLIM aware that patient has no IV access

## 2022-06-24 ENCOUNTER — HOME CARE VISIT (OUTPATIENT)
Dept: HOME HEALTH SERVICES | Facility: HOME HEALTHCARE | Age: 62
End: 2022-06-24

## 2022-06-24 ENCOUNTER — HOME CARE VISIT (OUTPATIENT)
Dept: HOME HEALTH SERVICES | Facility: HOME HEALTHCARE | Age: 62
End: 2022-06-24
Payer: COMMERCIAL

## 2022-06-24 VITALS
OXYGEN SATURATION: 98 % | HEART RATE: 78 BPM | RESPIRATION RATE: 14 BRPM | BODY MASS INDEX: 21.67 KG/M2 | SYSTOLIC BLOOD PRESSURE: 152 MMHG | TEMPERATURE: 97.7 F | WEIGHT: 143 LBS | DIASTOLIC BLOOD PRESSURE: 86 MMHG | HEIGHT: 68 IN

## 2022-06-24 PROCEDURE — 400013 VN SOC

## 2022-06-24 PROCEDURE — G0299 HHS/HOSPICE OF RN EA 15 MIN: HCPCS

## 2022-06-24 NOTE — UTILIZATION REVIEW
Notification of Discharge   This is a Notification of Discharge from our facility [address]  Please be advised that this patient has been discharge from our facility  Below you will find the admission and discharge date and time including the patients disposition  UTILIZATION REVIEW CONTACT:  Shelly Perez  Utilization   Network Utilization Review Department  Phone: [phone] x carefully listen to the prompts  All voicemails are confidential   Email: [email or web address]     PHYSICIAN ADVISORY SERVICES:  FOR EVIF-HV-RTYD REVIEW - MEDICAL NECESSITY DENIAL  Phone: [phone]  Fax: [phone]  Email: [email or web address]     PRESENTATION DATE: 6/21/2022  6:39 AM  OBERVATION ADMISSION DATE:   INPATIENT ADMISSION DATE: 6/22/22  5:43 PM   DISCHARGE DATE: 6/23/2022 12:35 PM  DISPOSITION: Home with [place] with [address] INFORMATION:  Send all requests for admission clinical reviews, approved or denied determinations and any other requests to dedicated fax number below belonging to the campus where the patient is receiving treatment   List of dedicated fax numbers:  [address] DENIALS (Administrative/Medical Necessity) [phone]   [address] (Maternity/NICU/Pediatrics) [phone]   [facility] [phone]   [address] [facility] [phone]   [address] [place] [address] [phone]   [address] [place] [address],[address] [phone]   [facility]  [phone]   [address] [place] [address], [facility]  [address] [facility] [address] [facility] [phone]

## 2022-06-27 LAB — SCAN RESULT: NORMAL

## 2022-06-28 ENCOUNTER — HOME CARE VISIT (OUTPATIENT)
Dept: HOME HEALTH SERVICES | Facility: HOME HEALTHCARE | Age: 62
End: 2022-06-28
Payer: COMMERCIAL

## 2022-06-28 VITALS
TEMPERATURE: 97.8 F | RESPIRATION RATE: 16 BRPM | OXYGEN SATURATION: 100 % | SYSTOLIC BLOOD PRESSURE: 128 MMHG | DIASTOLIC BLOOD PRESSURE: 82 MMHG | HEART RATE: 60 BPM

## 2022-06-28 PROCEDURE — G0299 HHS/HOSPICE OF RN EA 15 MIN: HCPCS

## 2022-06-30 ENCOUNTER — TELEPHONE (OUTPATIENT)
Dept: GASTROENTEROLOGY | Facility: CLINIC | Age: 62
End: 2022-06-30

## 2022-07-01 ENCOUNTER — HOME CARE VISIT (OUTPATIENT)
Dept: HOME HEALTH SERVICES | Facility: HOME HEALTHCARE | Age: 62
End: 2022-07-01
Payer: COMMERCIAL

## 2022-07-01 VITALS
SYSTOLIC BLOOD PRESSURE: 138 MMHG | HEART RATE: 76 BPM | OXYGEN SATURATION: 99 % | DIASTOLIC BLOOD PRESSURE: 80 MMHG | RESPIRATION RATE: 16 BRPM | TEMPERATURE: 98 F

## 2022-07-01 PROBLEM — C50.919 BREAST CANCER (HCC): Status: RESOLVED | Noted: 2022-04-24 | Resolved: 2022-07-01

## 2022-07-01 PROCEDURE — G0299 HHS/HOSPICE OF RN EA 15 MIN: HCPCS

## 2022-07-05 ENCOUNTER — HOME CARE VISIT (OUTPATIENT)
Dept: HOME HEALTH SERVICES | Facility: HOME HEALTHCARE | Age: 62
End: 2022-07-05
Payer: COMMERCIAL

## 2022-07-05 VITALS
SYSTOLIC BLOOD PRESSURE: 120 MMHG | TEMPERATURE: 97.8 F | OXYGEN SATURATION: 9 % | HEART RATE: 72 BPM | RESPIRATION RATE: 16 BRPM | DIASTOLIC BLOOD PRESSURE: 72 MMHG

## 2022-07-05 PROCEDURE — G0299 HHS/HOSPICE OF RN EA 15 MIN: HCPCS

## 2022-07-06 ENCOUNTER — OFFICE VISIT (OUTPATIENT)
Dept: SURGICAL ONCOLOGY | Facility: CLINIC | Age: 62
End: 2022-07-06

## 2022-07-06 VITALS
OXYGEN SATURATION: 98 % | TEMPERATURE: 98.3 F | SYSTOLIC BLOOD PRESSURE: 112 MMHG | RESPIRATION RATE: 16 BRPM | HEART RATE: 64 BPM | DIASTOLIC BLOOD PRESSURE: 70 MMHG

## 2022-07-06 DIAGNOSIS — C50.312 MALIGNANT NEOPLASM OF LOWER-INNER QUADRANT OF LEFT BREAST IN FEMALE, ESTROGEN RECEPTOR POSITIVE (HCC): Primary | ICD-10-CM

## 2022-07-06 DIAGNOSIS — Z17.0 MALIGNANT NEOPLASM OF LOWER-INNER QUADRANT OF LEFT BREAST IN FEMALE, ESTROGEN RECEPTOR POSITIVE (HCC): ICD-10-CM

## 2022-07-06 DIAGNOSIS — Z17.0 MALIGNANT NEOPLASM OF LOWER-INNER QUADRANT OF LEFT BREAST IN FEMALE, ESTROGEN RECEPTOR POSITIVE (HCC): Primary | ICD-10-CM

## 2022-07-06 DIAGNOSIS — C50.912 CARCINOMA OF LEFT BREAST METASTATIC TO AXILLARY LYMPH NODE (HCC): ICD-10-CM

## 2022-07-06 DIAGNOSIS — Z71.89 OTHER SPECIFIED COUNSELING: Primary | ICD-10-CM

## 2022-07-06 DIAGNOSIS — C77.3 CARCINOMA OF LEFT BREAST METASTATIC TO AXILLARY LYMPH NODE (HCC): ICD-10-CM

## 2022-07-06 DIAGNOSIS — C50.312 MALIGNANT NEOPLASM OF LOWER-INNER QUADRANT OF LEFT BREAST IN FEMALE, ESTROGEN RECEPTOR POSITIVE (HCC): ICD-10-CM

## 2022-07-06 DIAGNOSIS — Z98.890 STATUS POST LEFT BREAST LUMPECTOMY: ICD-10-CM

## 2022-07-06 PROCEDURE — 99024 POSTOP FOLLOW-UP VISIT: CPT | Performed by: SURGERY

## 2022-07-06 RX ORDER — HYDROMORPHONE HYDROCHLORIDE 2 MG/1
2 TABLET ORAL EVERY 6 HOURS PRN
Qty: 20 TABLET | Refills: 0 | Status: SHIPPED | OUTPATIENT
Start: 2022-07-06

## 2022-07-06 NOTE — PROGRESS NOTES
Surgical Oncology Follow Up  [facility]  [facility] [place] [address]    Mamie Myles  1960  [id number]      No chief complaint on file  Assessment & Plan:   Follow-up with left breast cancer left breast lumpectomy and left axillary dissection  Both incisions are healing well no evidence of surgical site infection  Drain continues to drain at least 60 cc of serous output  We do not have pathology results  We will bring her back in 2-3 weeks time and the pathology is available  She was told to call us with any questions or concern in the interim she understand and agree to  Cancer History:     Oncology History   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 ([facility] )   4/14/2022 Initial Diagnosis    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 ([facility] )     4/14/2022 Biopsy    Left axillary Lymph node ultrasound guided biopsy  Metastatic carcinoma, compatible with breast origin  ER >95  NH 95   HER2 1+    On ultrasound lymph node is 1 8 cm  There is cortical thickening without evident fatty hilum  In review of screening mammogram, demonstrates no suspicious mammographic findings identified in either breast  Bilateral MRI is recommended  Flow cytometry - there is no evidence of a B-cell or T-cell non-Hodgkin lymphoma  4/20/2022 Genomic Testing    A total of 36 genes were evaluated, including: ANGEL LUIS, BRCA1, BRCA2, CDH1, CHEK2, PALB2, PTEN, STK11, TP53  Negative result  No pathogenic sequence variants or deletions/duplications identified     5/20/2022 Biopsy    MRI guided biopsy  A  Left breast   11 o'clock   Invasive mammary carcinoma of no special type (ductal)  Grade 1  ER 95  NH 95  HER2 0    B   Right breast   Retroareolar  Benign fibrocystic changes without atypia (discrete 0 3 cm focus of sclerosing and tubular adenosis with usual ductal hyperplasia, columnar cell change and hyperplasia, apocrine metaplasia, cystic dilatation)  - Microcalcifications: Present associated with non-neoplastic breast tissue    - Negative for malignancy, in-situ carcinoma and atypical hyperplasia  Malignant pathology appears unifocal  Biopsy proven carcinoma measured 1 cm on MRI  Biopsy proved metastatic disease to left axillary lymph node  6/9/2022 Genomic Testing    MammaPrint  Low risk  Luminal type A  MammaPrint index: +0 [phone]     Malignant neoplasm of lower-inner quadrant of left breast in female, estrogen receptor positive ([facility] )   5/20/2022 Initial Diagnosis    Malignant neoplasm of lower-inner quadrant of left breast in female, estrogen receptor positive ([facility] )     5/20/2022 Biopsy    MRI guided biopsy  A  Left breast   11 o'clock   Invasive mammary carcinoma of no special type (ductal)  Grade 1  ER 95  CA 95  HER2 0    B  Right breast   Retroareolar  Benign fibrocystic changes without atypia (discrete 0 3 cm focus of sclerosing and tubular adenosis with usual ductal hyperplasia, columnar cell change and hyperplasia, apocrine metaplasia, cystic dilatation)  - Microcalcifications: Present associated with non-neoplastic breast tissue    - Negative for malignancy, in-situ carcinoma and atypical hyperplasia  Malignant pathology appears unifocal  Biopsy proven carcinoma measured 1 cm on MRI  Biopsy proved metastatic disease to left axillary lymph node  6/9/2022 Genomic Testing    MammaPrint  Low risk  Luminal type A  MammaPrint index: +0 [phone]           Interval History:   Follow-up after left breast surgery  Review of Systems:   Review of Systems   Constitutional: Negative for chills and fever  HENT: Negative for ear pain and sore throat  Eyes: Negative for pain and visual disturbance  Respiratory: Negative for cough and shortness of breath  Cardiovascular: Negative for chest pain and palpitations  Gastrointestinal: Negative for abdominal pain and vomiting     Genitourinary: Negative for dysuria and hematuria  Musculoskeletal: Negative for arthralgias and back pain  Skin: Negative for color change and rash  Neurological: Negative for seizures and syncope  All other systems reviewed and are negative        Past Medical History     Patient Active Problem List   Diagnosis    Back pain, chronic    Chronic insomnia    Lymphedema of right arm    Hyperlipidemia, mixed    Peripheral neuropathy    Lymphedema    Cellulitis of right upper extremity    Sepsis ([facility] )    Desmoid tumor    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 (HCC)    Malignant neoplasm of lower-inner quadrant of left breast in female, estrogen receptor positive ([facility] )    HTN, goal below 140/90    PONV (postoperative nausea and vomiting)     Past Medical History:   Diagnosis Date    Abnormal mammogram 05/15/2013    Anemia     Cancer ([facility] )     desmoitosis    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 04/19/2022    Chronic pain disorder     worse right side of body    Desmoid tumor     Last Assessed: 6/19/2017    History of transfusion     no reactions    Hyperlipidemia     Hypertension     PONV (postoperative nausea and vomiting)      Past Surgical History:   Procedure Laterality Date    BREAST LUMPECTOMY Left 6/21/2022    Procedure: ANITA  DIRECTED LUMPECTOMY;  Surgeon: Ginny Colindres MD;  [facility];  Service: Surgical Oncology    FRACTURE SURGERY      HYSTERECTOMY      LYMPH NODE DISSECTION Left 6/21/2022    Procedure: AXILLARY DISSECTION LEVEL I AND LEVEL II LYMPH NODES;  Surgeon: Ginny Colindres MD;  [facility];  Service: Surgical Oncology    MRI BREAST BIOPSY LEFT (ALL INCLUSIVE) Left 5/20/2022    MRI BREAST BIOPSY RIGHT (ALL INCLUSIVE) Right 5/20/2022    OTHER SURGICAL HISTORY      Arm Incision: Dermoid tumor, right Arm     PARTIAL HYSTERECTOMY      MN COLONOSCOPY FLX DX W/COLLJ SPEC WHEN PFRMD N/A 8/15/2017 Procedure: COLONOSCOPY;  Surgeon: Kristi Sol MD;  Location: [facility]; Service: Gastroenterology    NM OPEN RX DISTAL RADIUS FX, INTRA-ARTICULAR, 3+ FRAG Left 2/9/2021    Procedure: OPEN REDUCTION W/ INTERNAL FIXATION (ORIF) RADIUS / ULNA (WRIST) left;  Surgeon: Thomas Chapa MD;  [facility];  Service: Orthopedics    NM WRIST Deborra Levo LIG Left 8/10/2021    Procedure: RELEASE LEFT CARPAL TUNNEL ENDOSCOPIC;  Surgeon: Thomas Chapa MD;  [facility];  Service: Orthopedics    SKIN BIOPSY      SKIN CANCER EXCISION      Melanoma Excision     TONSILLECTOMY      US BREAST NEEDLE LOC LEFT Left 6/16/2022    US BREAST NEEDLE LOC RIGHT EACH ADDITIONAL Right 6/16/2022    US GUIDED BREAST LYMPH NODE BIOPSY LEFT Left 4/14/2022     Family History   Problem Relation Age of Onset    Diabetes Mother     No Known Problems Father     No Known Problems Sister     Lung cancer Sister     Pancreatic cancer Sister     No Known Problems Maternal Grandmother     No Known Problems Maternal Grandfather     No Known Problems Paternal Grandmother     No Known Problems Paternal Grandfather      Social History     Socioeconomic History    Marital status: /Civil Union     Spouse name: Not on file    Number of children: Not on file    Years of education: Not on file    Highest education level: Not on file   Occupational History    Occupation: unemployed    Tobacco Use    Smoking status: Never Smoker    Smokeless tobacco: Never Used   Vaping Use    Vaping Use: Never used   Substance and Sexual Activity    Alcohol use:  Yes     Alcohol/week: 1 0 standard drink     Types: 1 Shots of liquor per week     Comment: One shot of liquor occassionally at night    Drug use: Yes     Frequency: 28 0 times per week     Types: Marijuana     Comment: THC Medical marijuana    Sexual activity: Yes     Partners: Male   Other Topics Concern    Not on file   Social History Narrative    Lives with spouse      Social Determinants of Health     Financial Resource Strain: Not on file   Food Insecurity: Not on file   Transportation Needs: Not on file   Physical Activity: Not on file   Stress: Not on file   Social Connections: Not on file   Intimate Partner Violence: Not on file   Housing Stability: Not on file       Current Outpatient Medications:     cholecalciferol (VITAMIN D3) 1,000 units tablet, Take 3,000 Units by mouth daily, Disp: , Rfl:     diphenhydrAMINE (BENADRYL) 50 MG tablet, Take 50 mg by mouth daily at bedtime as needed for itching, Disp: , Rfl:     docusate sodium (COLACE) 100 mg capsule, Take 1 capsule (100 mg total) by mouth 2 (two) times a day as needed for constipation for up to 10 days, Disp: 20 capsule, Rfl: 0    dronabinol (MARINOL) 5 MG capsule, Take 15 mg by mouth 4 (four) times a day (before meals and at bedtime), Disp: , Rfl:     HYDROmorphone (DILAUDID) 2 mg tablet, Take 1 tablet (2 mg total) by mouth every 6 (six) hours as needed for severe pain Not on Discharge list   Patient is taking for severe pain PRN every 6 hours Max Daily Amount: 8 mg, Disp: 20 tablet, Rfl: 0    multivitamin (THERAGRAN) TABS, Take 1 tablet by mouth daily, Disp: , Rfl:     Omega-3 Fatty Acids (FISH OIL PO), Take by mouth, Disp: , Rfl:     Probiotic Product (PROBIOTIC-10 PO), Take by mouth, Disp: , Rfl:   Allergies   Allergen Reactions    Chlorpromazine Anaphylaxis     PHENOTHIAZINE=ANAPHYLAXIS    Codeine Anaphylaxis     Anaphylaxis  abd pain   Meperidine Anaphylaxis, Hives and Vomiting    Phenothiazines Anaphylaxis and Throat Swelling     Category: Allergy;     Saccharin - Food Allergy Anaphylaxis    Ampicillin-Sulbactam Sodium Hives    Aspirin GI Intolerance and Abdominal Pain     Severe GERD    Gluten Meal - Food Allergy GI Intolerance    Ibuprofen Hives     H    Levofloxacin Hives    Neomycin Other (See Comments), Edema and Hives     Category:  Allergy;   swelling    Citrus - Food Allergy Rash    Latex Hives and Rash     Category: Allergy; Physical Exam:     Vitals:    07/06/22 1546   BP: 112/70   Pulse: 64   Resp: 16   Temp: 98 3 °F (36 8 °C)   SpO2: 98%     Physical Exam  Constitutional:       Appearance: Normal appearance  HENT:      Head: Normocephalic and atraumatic  Nose: Nose normal       Mouth/Throat:      Mouth: Mucous membranes are moist    Eyes:      Pupils: Pupils are equal, round, and reactive to light  Cardiovascular:      Rate and Rhythm: Normal rate  Pulses: Normal pulses  Heart sounds: Normal heart sounds  Pulmonary:      Effort: Pulmonary effort is normal       Breath sounds: Normal breath sounds  Chest:       Abdominal:      General: Bowel sounds are normal       Palpations: Abdomen is soft  Musculoskeletal:         General: Normal range of motion  Cervical back: Normal range of motion and neck supple  Skin:     General: Skin is warm  Neurological:      General: No focal deficit present  Mental Status: She is alert and oriented to person, place, and time  Psychiatric:         Mood and Affect: Mood normal          Behavior: Behavior normal          Thought Content: Thought content normal          Judgment: Judgment normal            Results & Discussion:   I did review  drain output and further care  The drain is too early to come out  She was told to call us with any questions or concern in the interim she understand agree to  she understands and  agrees   All patient questions were answered  Advance Care Planning/Advance Directives: Jaylen Dominguez MD discussed the disease status with Carly erickson 07/06/22  treatment plans and follow-up with the patient

## 2022-07-08 ENCOUNTER — TELEPHONE (OUTPATIENT)
Dept: HEMATOLOGY ONCOLOGY | Facility: CLINIC | Age: 62
End: 2022-07-08

## 2022-07-08 ENCOUNTER — HOME CARE VISIT (OUTPATIENT)
Dept: HOME HEALTH SERVICES | Facility: HOME HEALTHCARE | Age: 62
End: 2022-07-08
Payer: COMMERCIAL

## 2022-07-08 ENCOUNTER — OFFICE VISIT (OUTPATIENT)
Dept: SURGICAL ONCOLOGY | Facility: CLINIC | Age: 62
End: 2022-07-08

## 2022-07-08 VITALS
SYSTOLIC BLOOD PRESSURE: 128 MMHG | WEIGHT: 147 LBS | OXYGEN SATURATION: 97 % | HEIGHT: 68 IN | RESPIRATION RATE: 16 BRPM | HEART RATE: 71 BPM | DIASTOLIC BLOOD PRESSURE: 88 MMHG | TEMPERATURE: 98.5 F | BODY MASS INDEX: 22.28 KG/M2

## 2022-07-08 VITALS
RESPIRATION RATE: 16 BRPM | SYSTOLIC BLOOD PRESSURE: 128 MMHG | TEMPERATURE: 98 F | HEART RATE: 68 BPM | OXYGEN SATURATION: 97 % | DIASTOLIC BLOOD PRESSURE: 82 MMHG

## 2022-07-08 DIAGNOSIS — Z48.03 CHANGE OR REMOVAL OF DRAINS: ICD-10-CM

## 2022-07-08 DIAGNOSIS — Z17.0 MALIGNANT NEOPLASM OF LOWER-INNER QUADRANT OF LEFT BREAST IN FEMALE, ESTROGEN RECEPTOR POSITIVE (HCC): Primary | ICD-10-CM

## 2022-07-08 DIAGNOSIS — Z98.890 STATUS POST LEFT BREAST LUMPECTOMY: ICD-10-CM

## 2022-07-08 DIAGNOSIS — C50.312 MALIGNANT NEOPLASM OF LOWER-INNER QUADRANT OF LEFT BREAST IN FEMALE, ESTROGEN RECEPTOR POSITIVE (HCC): Primary | ICD-10-CM

## 2022-07-08 PROCEDURE — 99024 POSTOP FOLLOW-UP VISIT: CPT | Performed by: SURGERY

## 2022-07-08 PROCEDURE — G0299 HHS/HOSPICE OF RN EA 15 MIN: HCPCS

## 2022-07-08 NOTE — TELEPHONE ENCOUNTER
CALL TRANSFER   Reason for patient call? Patient is returning call from Dr Ellis Davila   Patient's primary physician? Ellis Davila    RN call was transferred to and time it was transferred? [place] 8:32AM 7/8   Informed patient that the message will be forwarded to the team and someone will get back to them as soon as possible    Did you relay this information to the patient?   yes

## 2022-07-08 NOTE — PROGRESS NOTES
Surgical Oncology Follow Up  [facility] [place]  [facility] SURGICAL [facility] [address] [id number]    Lesia Rodriguez  1960  [id number]      Chief Complaint   Patient presents with    Follow-up        Assessment & Plan:   She was brought in today since the pathology results available as well as to remove the drain  Last 24-48 hours approximately had drain output is 30 cc and clear yellow  Left axillary lymph drain was removed  Pathology was discussed in detail  Copy of the report will be given to the patient  We will refer her to Medical and Radiation oncologist     Cancer History:     Oncology History   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 ([facility] [address] )   4/14/2022 Initial Diagnosis    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 ([facility] [address] )     4/14/2022 Biopsy    Left axillary Lymph node ultrasound guided biopsy  Metastatic carcinoma, compatible with breast origin  ER >95  [place] 95   HER2 1+    On ultrasound lymph node is 1 8 cm  There is cortical thickening without evident fatty hilum  In review of screening mammogram, demonstrates no suspicious mammographic findings identified in either breast  Bilateral MRI is recommended  Flow cytometry - there is no evidence of a B-cell or T-cell non-Hodgkin lymphoma  4/20/2022 Genomic Testing    A total of 36 genes were evaluated, including: ANGEL LUIS, BRCA1, BRCA2, CDH1, CHEK2, PALB2, PTEN, STK11, TP53  Negative result  No pathogenic sequence variants or deletions/duplications identified     5/20/2022 Biopsy    MRI guided biopsy  A  Left breast   11 o'clock   Invasive mammary carcinoma of no special type (ductal)  Grade 1  ER 95  [place] 95  HER2 0    B  Right breast   Retroareolar  Benign fibrocystic changes without atypia (discrete 0 3 cm focus of sclerosing and tubular adenosis with usual ductal hyperplasia, columnar cell change and hyperplasia, apocrine metaplasia, cystic dilatation)     - Microcalcifications: Present associated with non-neoplastic breast tissue    - Negative for malignancy, in-situ carcinoma and atypical hyperplasia  Malignant pathology appears unifocal  Biopsy proven carcinoma measured 1 cm on MRI  Biopsy proved metastatic disease to left axillary lymph node  6/9/2022 Genomic Testing    MammaPrint  Low risk  Luminal type A  MammaPrint index: [phone]     Malignant neoplasm of lower-inner quadrant of left breast in female, estrogen receptor positive ([facility] [address] )   5/20/2022 Initial Diagnosis    Malignant neoplasm of lower-inner quadrant of left breast in female, estrogen receptor positive ([facility] [address] )     5/20/2022 Biopsy    MRI guided biopsy  A  Left breast   11 o'clock   Invasive mammary carcinoma of no special type (ductal)  Grade 1  ER 95  SD 95  HER2 0    B  Right breast   Retroareolar  Benign fibrocystic changes without atypia (discrete 0 3 cm focus of sclerosing and tubular adenosis with usual ductal hyperplasia, columnar cell change and hyperplasia, apocrine metaplasia, cystic dilatation)  - Microcalcifications: Present associated with non-neoplastic breast tissue    - Negative for malignancy, in-situ carcinoma and atypical hyperplasia  Malignant pathology appears unifocal  Biopsy proven carcinoma measured 1 cm on MRI  Biopsy proved metastatic disease to left axillary lymph node  6/9/2022 Genomic Testing    MammaPrint  Low risk  Luminal type A  MammaPrint index: [phone]           Interval History:   Follow-up with left breast cancer cancer  Review of Systems:   Review of Systems   Constitutional: Negative for chills and fever  HENT: Negative for ear pain and sore throat  Eyes: Negative for pain and visual disturbance  Respiratory: Negative for cough and shortness of breath  Cardiovascular: Negative for chest pain and palpitations  Gastrointestinal: Negative for abdominal pain and vomiting  Genitourinary: Negative for dysuria and hematuria  Musculoskeletal: Negative for arthralgias and back pain  Skin: Negative for color change and rash  Neurological: Negative for seizures and syncope  All other systems reviewed and are negative  Past Medical History     Patient Active Problem List   Diagnosis    Back pain, chronic    Chronic insomnia    Lymphedema of right arm    Hyperlipidemia, mixed    Peripheral neuropathy    Lymphedema    Cellulitis of right upper extremity    Sepsis ([facility] [address] [facility]    Desmoid tumor    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 ([facility] [address] [facility]    Malignant neoplasm of lower-inner quadrant of left breast in female, estrogen receptor positive ([facility] [address] )    HTN, goal below 140/90    PONV (postoperative nausea and vomiting)     Past Medical History:   Diagnosis Date    Abnormal mammogram 05/15/2013    Anemia     Cancer ([facility] [address] )     desmoitosis    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 ([facility] [address] ) 04/19/2022    Chronic pain disorder     worse right side of body    Desmoid tumor     Last Assessed: 6/19/2017    History of transfusion     no reactions    Hyperlipidemia     Hypertension     Ovarian cancer ([facility] [address] ) 1999?   Hysterectomy done    PONV (postoperative nausea and vomiting)      Past Surgical History:   Procedure Laterality Date    BREAST LUMPECTOMY Left 6/21/2022    Procedure: ANITA  DIRECTED LUMPECTOMY;  Surgeon: Karlie Simon MD;  [facility];  Service: Surgical Oncology    FRACTURE SURGERY      HYSTERECTOMY      LYMPH NODE DISSECTION Left 6/21/2022    Procedure: AXILLARY DISSECTION LEVEL I AND LEVEL II LYMPH NODES;  Surgeon: Karlie Simon MD;  [facility];  Service: Surgical Oncology    MRI BREAST BIOPSY LEFT (ALL INCLUSIVE) Left 5/20/2022    MRI BREAST BIOPSY RIGHT (ALL INCLUSIVE) Right 5/20/2022    OTHER SURGICAL HISTORY      Arm Incision: Dermoid tumor, right Arm     PARTIAL HYSTERECTOMY      CA COLONOSCOPY FLX DX W/COLLJ SPEC WHEN PFRMD N/A 8/15/2017    Procedure: COLONOSCOPY;  Surgeon: Gwyn Mackay MD;  [facility];   Service: Gastroenterology    CT OPEN RX DISTAL RADIUS FX, INTRA-ARTICULAR, 3+ FRAG Left 2/9/2021    Procedure: OPEN REDUCTION W/ INTERNAL FIXATION (ORIF) RADIUS / ULNA (WRIST) left;  Surgeon: Lorie Massey MD;  [facility];  Service: Orthopedics    CT WRIST Jeri Carls LIG Left 8/10/2021    Procedure: RELEASE LEFT CARPAL TUNNEL ENDOSCOPIC;  Surgeon: Lorie Massey MD;  [facility];  Service: Orthopedics    SKIN BIOPSY      SKIN CANCER EXCISION      Melanoma Excision     TONSILLECTOMY      US BREAST NEEDLE LOC LEFT Left 6/16/2022    US BREAST NEEDLE LOC RIGHT EACH ADDITIONAL Right 6/16/2022    US GUIDED BREAST LYMPH NODE BIOPSY LEFT Left 4/14/2022     Family History   Problem Relation Age of Onset    Diabetes Mother     No Known Problems Father     Breast cancer Sister     Cancer Sister     Lung cancer Sister     Pancreatic cancer Sister     No Known Problems Maternal Grandmother     No Known Problems Maternal Grandfather     No Known Problems Paternal Grandmother     No Known Problems Paternal Grandfather      Social History     Socioeconomic History    Marital status: /Civil Union     Spouse name: Not on file    Number of children: Not on file    Years of education: Not on file    Highest education level: Not on file   Occupational History    Occupation: unemployed    Tobacco Use    Smoking status: Never Smoker    Smokeless tobacco: Never Used   Vaping Use    Vaping Use: Never used   Substance and Sexual Activity    Alcohol use: Not Currently     Alcohol/week: 5 0 standard drinks     Types: 5 Shots of liquor per week     Comment: Used mostly as a painkiller when needed before bedtime    Drug use: Yes     Frequency: 28 0 times per week     Types: Marijuana     Comment: THC Medical marijuana    Sexual activity: Not Currently     Partners: Male Comment: Hysterectomy years ago   Other Topics Concern    Not on file   Social History Narrative    Lives with spouse      Social Determinants of Health     Financial Resource Strain: Not on file   Food Insecurity: Not on file   Transportation Needs: Not on file   Physical Activity: Not on file   Stress: Not on file   Social Connections: Not on file   Intimate Partner Violence: Not on file   Housing Stability: Not on file       Current Outpatient Medications:     cholecalciferol (VITAMIN D3) 1,000 units tablet, Take 3,000 Units by mouth daily, Disp: , Rfl:     diphenhydrAMINE (BENADRYL) 50 MG tablet, Take 50 mg by mouth daily at bedtime as needed for itching, Disp: , Rfl:     dronabinol (MARINOL) 5 MG capsule, Take 15 mg by mouth 4 (four) times a day (before meals and at bedtime), Disp: , Rfl:     HYDROmorphone (DILAUDID) 2 mg tablet, Take 1 tablet (2 mg total) by mouth every 6 (six) hours as needed for severe pain Not on Discharge list   Patient is taking for severe pain PRN every 6 hours Max Daily Amount: 8 mg, Disp: 20 tablet, Rfl: 0    multivitamin (THERAGRAN) TABS, Take 1 tablet by mouth daily, Disp: , Rfl:     Omega-3 Fatty Acids (FISH OIL PO), Take by mouth, Disp: , Rfl:     Probiotic Product (PROBIOTIC-10 PO), Take by mouth, Disp: , Rfl:     docusate sodium (COLACE) 100 mg capsule, Take 1 capsule (100 mg total) by mouth 2 (two) times a day as needed for constipation for up to 10 days, Disp: 20 capsule, Rfl: 0  Allergies   Allergen Reactions    Chlorpromazine Anaphylaxis     PHENOTHIAZINE=ANAPHYLAXIS    Codeine Anaphylaxis     Anaphylaxis  abd pain   Meperidine Anaphylaxis, Hives and Vomiting    Phenothiazines Anaphylaxis and Throat Swelling     Category:  Allergy;     Saccharin - Food Allergy Anaphylaxis    Ampicillin-Sulbactam Sodium Hives    Aspirin GI Intolerance and Abdominal Pain     Severe GERD    Gluten Meal - Food Allergy GI Intolerance    Ibuprofen Hives     H    Levofloxacin Hives    Neomycin Other (See Comments), Edema and Hives     Category: Allergy;   swelling    Citrus - Food Allergy Rash    Latex Hives and Rash     Category: Allergy; Physical Exam:     Vitals:    07/08/22 1307   BP: 128/88   Pulse: 71   Resp: 16   Temp: 98 5 °F (36 9 °C)   SpO2: 97%     Physical Exam  Constitutional:       Appearance: Normal appearance  HENT:      Head: Normocephalic and atraumatic  Nose: Nose normal       Mouth/Throat:      Mouth: Mucous membranes are moist    Eyes:      Pupils: Pupils are equal, round, and reactive to light  Cardiovascular:      Rate and Rhythm: Normal rate  Pulses: Normal pulses  Heart sounds: Normal heart sounds  Pulmonary:      Effort: Pulmonary effort is normal       Breath sounds: Normal breath sounds  Chest:          Comments: Left axillary and left breast incisions are healing well  Abdominal:      General: Bowel sounds are normal       Palpations: Abdomen is soft  Musculoskeletal:         General: Normal range of motion  Cervical back: Normal range of motion and neck supple  Skin:     General: Skin is warm  Neurological:      General: No focal deficit present  Mental Status: She is alert and oriented to person, place, and time  Psychiatric:         Mood and Affect: Mood normal          Behavior: Behavior normal          Thought Content: Thought content normal          Judgment: Judgment normal            Results & Discussion:   I did review pathology results and copy of the report was given to the patient  We will follow her as previously scheduled  We refer her to Medical and Radiation Oncology she understands and  agrees   All patient questions were answered  Advance Care Planning/Advance Directives: Kiley Irizarry MD discussed the disease status with Cliff erickson 07/08/22  treatment plans and follow-up with the patient

## 2022-07-11 ENCOUNTER — TELEPHONE (OUTPATIENT)
Dept: HEMATOLOGY ONCOLOGY | Facility: CLINIC | Age: 62
End: 2022-07-11

## 2022-07-11 ENCOUNTER — PATIENT OUTREACH (OUTPATIENT)
Dept: HEMATOLOGY ONCOLOGY | Facility: CLINIC | Age: 62
End: 2022-07-11

## 2022-07-11 NOTE — PROGRESS NOTES
Patient was contacted to schedule Medical Oncology Consult  Patient is scheduled for 8- @ 3:20pm with Dr Gilda Kessler

## 2022-07-11 NOTE — TELEPHONE ENCOUNTER
Patients spouse called looking to schedule for Radiation Oncology  Gave the phone number for that department at his preferred location and he states he will call to schedule

## 2022-07-19 NOTE — PROGRESS NOTES
7/19/22: DC OT services  Elbow pain has resolved and patient paid out  Of pocket for compression garments

## 2022-07-23 ENCOUNTER — HOSPITAL ENCOUNTER (EMERGENCY)
Facility: HOSPITAL | Age: 62
Discharge: HOME | End: 2022-07-23
Payer: COMMERCIAL

## 2022-07-23 VITALS
RESPIRATION RATE: 18 BRPM | OXYGEN SATURATION: 100 % | DIASTOLIC BLOOD PRESSURE: 77 MMHG | SYSTOLIC BLOOD PRESSURE: 131 MMHG | TEMPERATURE: 98.2 F | HEART RATE: 77 BPM

## 2022-07-23 DIAGNOSIS — U07.1 COVID-19: Primary | ICD-10-CM

## 2022-07-23 LAB
FLUAV RNA RESP QL NAA+PROBE: NEGATIVE
FLUBV RNA RESP QL NAA+PROBE: NEGATIVE
RSV RNA RESP QL NAA+PROBE: NEGATIVE
SARS-COV-2 RNA RESP QL NAA+PROBE: POSITIVE

## 2022-07-23 PROCEDURE — 99284 EMERGENCY DEPT VISIT MOD MDM: CPT

## 2022-07-23 PROCEDURE — 99283 EMERGENCY DEPT VISIT LOW MDM: CPT

## 2022-07-23 PROCEDURE — 0241U HB NFCT DS VIR RESP RNA 4 TRGT: CPT | Performed by: EMERGENCY MEDICINE

## 2022-07-23 NOTE — ED PROVIDER NOTES
History  Chief Complaint   Patient presents with    Flu Symptoms     Pt reports she tested positive for covid yesterday and "I need the anti-virus infusion"  Pt reports  fever, chills, diarrhea, and cough  Patient is a 59year old female presenting to the ED with a complaint of COVID positive test yesterday  Reports yesterday having a sore throat and cough  Reports taking an at home test yesterday that was positive  Reports she has had diarrhea since last night along with hot and cold flashes  Reports she is here for the monoclonal antibody infusion  Denies fevers, rash, headache, weakness, dizziness, visual changes, abdominal pain, vomiting, constipation, chest pain, shortness of breath or difficulty breathing  Denies difficulty swallowing, speaking or change in voice  Does not offer any other concerns or complaints  Reports she had a recent lymph node biopsy and was told she has cancer and is to start chemo in about 3 weeks  History provided by:  Patient   used: No    Flu Symptoms  Presenting symptoms: cough, diarrhea and sore throat    Presenting symptoms: no fatigue, no fever, no headaches, no myalgias, no nausea, no rhinorrhea, no shortness of breath and no vomiting    Severity:  Mild  Duration:  1 day  Progression:  Unchanged  Relieved by:  None tried  Worsened by:  Nothing  Ineffective treatments:  None tried  Associated symptoms: no chills, no decreased appetite, no decrease in physical activity, no ear pain, no mental status change, no congestion, no neck stiffness and no syncope        Prior to Admission Medications   Prescriptions Last Dose Informant Patient Reported? Taking?    HYDROmorphone (DILAUDID) 2 mg tablet  Self No No   Sig: Take 1 tablet (2 mg total) by mouth every 6 (six) hours as needed for severe pain Not on Discharge list   Patient is taking for severe pain PRN every 6 hours Max Daily Amount: 8 mg   Omega-3 Fatty Acids (FISH OIL PO)  Self Yes No   Sig: Take by mouth   Probiotic Product (PROBIOTIC-10 PO)  Self Yes No   Sig: Take by mouth   cholecalciferol (VITAMIN D3) 1,000 units tablet  Self Yes No   Sig: Take 3,000 Units by mouth daily   diphenhydrAMINE (BENADRYL) 50 MG tablet  Self Yes No   Sig: Take 50 mg by mouth daily at bedtime as needed for itching   docusate sodium (COLACE) 100 mg capsule   No No   Sig: Take 1 capsule (100 mg total) by mouth 2 (two) times a day as needed for constipation for up to 10 days   dronabinol (MARINOL) 5 MG capsule  Self Yes No   Sig: Take 15 mg by mouth 4 (four) times a day (before meals and at bedtime)   multivitamin (THERAGRAN) TABS  Self Yes No   Sig: Take 1 tablet by mouth daily      Facility-Administered Medications: None       Past Medical History:   Diagnosis Date    Abnormal mammogram 05/15/2013    Anemia     Cancer ([facility] [address] )     desmoitosis    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 ([facility] [address] ) 04/19/2022    Chronic pain disorder     worse right side of body    Desmoid tumor     Last Assessed: 6/19/2017    History of transfusion     no reactions    Hyperlipidemia     Hypertension     Ovarian cancer ([facility] [address] ) 1999?   Hysterectomy done    PONV (postoperative nausea and vomiting)        Past Surgical History:   Procedure Laterality Date    BREAST LUMPECTOMY Left 6/21/2022    Procedure: ANITA  DIRECTED LUMPECTOMY;  Surgeon: Linette Joe MD;  [facility];  Service: Surgical Oncology    FRACTURE SURGERY      HYSTERECTOMY      LYMPH NODE DISSECTION Left 6/21/2022    Procedure: AXILLARY DISSECTION LEVEL I AND LEVEL II LYMPH NODES;  Surgeon: Linette Joe MD;  [facility];  Service: Surgical Oncology    MRI BREAST BIOPSY LEFT (ALL INCLUSIVE) Left 5/20/2022    MRI BREAST BIOPSY RIGHT (ALL INCLUSIVE) Right 5/20/2022    OTHER SURGICAL HISTORY      Arm Incision: Dermoid tumor, right Arm     PARTIAL HYSTERECTOMY      NC COLONOSCOPY FLX DX W/COLLJ SPEC WHEN PFRMD N/A 8/15/2017    Procedure: COLONOSCOPY;  Surgeon: Angel Davidson MD;  [facility]; Service: Gastroenterology    MN OPEN RX DISTAL RADIUS FX, INTRA-ARTICULAR, 3+ FRAG Left 2/9/2021    Procedure: OPEN REDUCTION W/ INTERNAL FIXATION (ORIF) RADIUS / ULNA (WRIST) left;  Surgeon: Samantha Torrez MD;  [facility];  Service: Orthopedics    MN WRIST Sherif Soles LIG Left 8/10/2021    Procedure: RELEASE LEFT CARPAL TUNNEL ENDOSCOPIC;  Surgeon: Samantha Torrez MD;  [facility];  Service: Orthopedics    SKIN BIOPSY      SKIN CANCER EXCISION      Melanoma Excision     TONSILLECTOMY      US BREAST NEEDLE LOC LEFT Left 6/16/2022    US BREAST NEEDLE LOC RIGHT EACH ADDITIONAL Right 6/16/2022    US GUIDED BREAST LYMPH NODE BIOPSY LEFT Left 4/14/2022       Family History   Problem Relation Age of Onset    Diabetes Mother     No Known Problems Father     Breast cancer Sister     Cancer Sister     Lung cancer Sister     Pancreatic cancer Sister     No Known Problems Maternal Grandmother     No Known Problems Maternal Grandfather     No Known Problems Paternal Grandmother     No Known Problems Paternal Grandfather      I have reviewed and agree with the history as documented      E-Cigarette/Vaping    E-Cigarette Use Never User      E-Cigarette/Vaping Substances    Nicotine No     THC No     CBD No     Flavoring No     Other No     Unknown No      Social History     Tobacco Use    Smoking status: Never Smoker    Smokeless tobacco: Never Used   Vaping Use    Vaping Use: Never used   Substance Use Topics    Alcohol use: Not Currently     Alcohol/week: 5 0 standard drinks     Types: 5 Shots of liquor per week     Comment: Used mostly as a painkiller when needed before bedtime    Drug use: Yes     Frequency: 28 0 times per week     Types: Marijuana     Comment: THC Medical marijuana       Review of Systems   Constitutional: Negative for chills, decreased appetite, fatigue and fever    HENT: Positive for sore throat  Negative for congestion, drooling, ear pain, mouth sores, rhinorrhea, trouble swallowing and voice change  Eyes: Negative for pain and visual disturbance  Respiratory: Positive for cough  Negative for chest tightness and shortness of breath  Cardiovascular: Negative for chest pain and palpitations  Gastrointestinal: Positive for diarrhea  Negative for abdominal pain, constipation, nausea and vomiting  Genitourinary: Negative for dysuria, flank pain, hematuria and urgency  Musculoskeletal: Negative for arthralgias, back pain, myalgias, neck pain and neck stiffness  Skin: Negative for color change and rash  Neurological: Negative for dizziness, seizures, syncope, weakness, light-headedness and headaches  All other systems reviewed and are negative  Physical Exam  Physical Exam  Vitals and nursing note reviewed  Constitutional:       Appearance: Normal appearance  HENT:      Head: Normocephalic and atraumatic  Right Ear: External ear normal       Left Ear: External ear normal       Nose: Nose normal       Mouth/Throat:      Mouth: Mucous membranes are moist    Eyes:      General: No scleral icterus  Right eye: No discharge  Left eye: No discharge  Conjunctiva/sclera: Conjunctivae normal    Cardiovascular:      Rate and Rhythm: Normal rate  Pulmonary:      Effort: Pulmonary effort is normal  No respiratory distress  Breath sounds: Normal breath sounds  No stridor  No decreased breath sounds, wheezing, rhonchi or rales  Abdominal:      General: Bowel sounds are normal       Tenderness: There is no abdominal tenderness  Musculoskeletal:         General: No swelling, deformity or signs of injury  Normal range of motion  Cervical back: Normal range of motion and neck supple  No rigidity  Skin:     General: Skin is warm and dry  Capillary Refill: Capillary refill takes less than 2 seconds        Coloration: Skin is not jaundiced  Findings: No erythema or rash  Neurological:      General: No focal deficit present  Mental Status: She is alert and oriented to person, place, and time  Mental status is at baseline  Cranial Nerves: No cranial nerve deficit  Gait: Gait normal    Psychiatric:         Mood and Affect: Mood normal          Behavior: Behavior normal          Thought Content: Thought content normal          Judgment: Judgment normal          Vital Signs  ED Triage Vitals [07/23/22 1027]   Temperature Pulse Respirations Blood Pressure SpO2   98 2 °F (36 8 °C) 76 18 169/81 98 %      Temp Source Heart Rate Source Patient Position - Orthostatic VS BP Location FiO2 (%)   Temporal Monitor Sitting Left arm --      Pain Score       --           Vitals:    07/23/22 1027   BP: 169/81   Pulse: 76   Patient Position - Orthostatic VS: Sitting         Visual Acuity      ED Medications  Medications - No data to display    Diagnostic Studies  Results Reviewed     Procedure Component Value Units Date/Time    FLU/RSV/COVID - if FLU/RSV clinically relevant [[id number]]     Lab Status: No result Specimen: Nares from Nose                  No orders to display              Procedures  Procedures         ED Course                 MDM  Number of Diagnoses or Management Options  COVID-19: new and requires workup  Diagnosis management comments: This is a 59year old female with a complaint of COVID 19 infection  Reports having a sore throat and cough yesterday  Reports she did an at home test that was positive yesterday  Reports she has been having diarrhea, hot and cold sweats since last night  Reports she was recently diagnosed with cancer and would like the monoclonal antibody infusion  Denies fevers, rash, headache, weakness, dizziness, visual changes, abdominal pain, vomiting, constipation, chest pain, shortness of breath or difficulty breathing      Differential diagnosis to include but is not limited to: COVID/FLU/RSV, pneumonia    Initial ED Plan: COVID/FLU/RSV swab  -patient defers a work up including labs and CXR  Reports she would only like a referral for the monoclonal antibody infusion  ED results: COVID positive    Final ED assessment: Patient is stable and well appearing  Discussed follow up with PCP  Referral for monoclonal antibodies placed  Strict return precautions were discussed including but not limited to worsening cough, shortness of breath, chest pain, abdominal pain, fevers uncontrolled by tylenol/motrin  Patient verbalized understanding and is agreeable with the plan for discharge  Amount and/or Complexity of Data Reviewed  Clinical lab tests: ordered and reviewed        Disposition  Final diagnoses:   None     ED Disposition     None      Follow-up Information    None         Patient's Medications   Discharge Prescriptions    No medications on file       No discharge procedures on file      PDMP Review       Value Time User    PDMP Reviewed  Yes 6/1/2022  4:08 Les Villagomez MD          ED Provider  Electronically Signed by           Jolanta Wray PA-C  07/23/22 0224

## 2022-07-23 NOTE — DISCHARGE INSTRUCTIONS
Follow up with PCP  Follow up at [facility] for COVID monoclonal antibodies  Tylenol/motrin as needed for pain and fevers  Return to the ED with new or worsening symptoms including but not limited to worsening cough, shortness of breath, chest pain, fevers uncontrolled by tylenol/motrin

## 2022-07-25 ENCOUNTER — TELEPHONE (OUTPATIENT)
Dept: SURGICAL ONCOLOGY | Facility: CLINIC | Age: 62
End: 2022-07-25

## 2022-07-25 NOTE — TELEPHONE ENCOUNTER
called to inform that patient has covid and was in the hospital  He was calling to reschedule appt for today   Rescheduled appt and marked appt for today as no show since its less then 24 hours

## 2022-07-26 ENCOUNTER — TELEPHONE (OUTPATIENT)
Dept: FAMILY MEDICINE CLINIC | Facility: CLINIC | Age: 62
End: 2022-07-26

## 2022-07-26 NOTE — TELEPHONE ENCOUNTER
----- Message from Martínez Bennett LPN sent at 4/69/7324  7:36 PM EDT -----    ----- Message -----  From: Massimo Lindsay PA-C  Sent: 7/23/2022  12:32 PM EDT  To: Covid Follow-Up Team    Monoclonal antibody infusion

## 2022-08-10 ENCOUNTER — CONSULT (OUTPATIENT)
Dept: HEMATOLOGY ONCOLOGY | Facility: CLINIC | Age: 62
End: 2022-08-10
Payer: COMMERCIAL

## 2022-08-10 VITALS
BODY MASS INDEX: 21.37 KG/M2 | WEIGHT: 141 LBS | HEART RATE: 82 BPM | TEMPERATURE: 97.8 F | RESPIRATION RATE: 18 BRPM | HEIGHT: 68 IN | DIASTOLIC BLOOD PRESSURE: 78 MMHG | OXYGEN SATURATION: 98 % | SYSTOLIC BLOOD PRESSURE: 120 MMHG

## 2022-08-10 DIAGNOSIS — C50.312 MALIGNANT NEOPLASM OF LOWER-INNER QUADRANT OF LEFT BREAST IN FEMALE, ESTROGEN RECEPTOR POSITIVE (HCC): ICD-10-CM

## 2022-08-10 DIAGNOSIS — Z79.811 AROMATASE INHIBITOR USE: ICD-10-CM

## 2022-08-10 DIAGNOSIS — C50.912 CARCINOMA OF LEFT BREAST METASTATIC TO AXILLARY LYMPH NODE (HCC): Primary | ICD-10-CM

## 2022-08-10 DIAGNOSIS — C77.3 CARCINOMA OF LEFT BREAST METASTATIC TO AXILLARY LYMPH NODE (HCC): Primary | ICD-10-CM

## 2022-08-10 DIAGNOSIS — Z17.0 MALIGNANT NEOPLASM OF LOWER-INNER QUADRANT OF LEFT BREAST IN FEMALE, ESTROGEN RECEPTOR POSITIVE (HCC): ICD-10-CM

## 2022-08-10 PROCEDURE — 99244 OFF/OP CNSLTJ NEW/EST MOD 40: CPT | Performed by: INTERNAL MEDICINE

## 2022-08-10 RX ORDER — LETROZOLE 2.5 MG/1
2.5 TABLET, FILM COATED ORAL DAILY
Qty: 90 TABLET | Refills: 3 | Status: SHIPPED | OUTPATIENT
Start: 2022-08-10

## 2022-08-11 PROBLEM — Z79.811 USE OF LETROZOLE (FEMARA): Status: ACTIVE | Noted: 2022-08-11

## 2022-08-12 ENCOUNTER — OFFICE VISIT (OUTPATIENT)
Dept: SURGICAL ONCOLOGY | Facility: CLINIC | Age: 62
End: 2022-08-12

## 2022-08-12 ENCOUNTER — TELEPHONE (OUTPATIENT)
Dept: NUTRITION | Facility: CLINIC | Age: 62
End: 2022-08-12

## 2022-08-12 ENCOUNTER — CLINICAL SUPPORT (OUTPATIENT)
Dept: RADIATION ONCOLOGY | Facility: CLINIC | Age: 62
End: 2022-08-12
Attending: RADIOLOGY
Payer: COMMERCIAL

## 2022-08-12 VITALS
HEART RATE: 61 BPM | SYSTOLIC BLOOD PRESSURE: 136 MMHG | TEMPERATURE: 97.6 F | WEIGHT: 139 LBS | OXYGEN SATURATION: 99 % | DIASTOLIC BLOOD PRESSURE: 82 MMHG | RESPIRATION RATE: 16 BRPM | BODY MASS INDEX: 21.13 KG/M2

## 2022-08-12 VITALS
HEART RATE: 59 BPM | SYSTOLIC BLOOD PRESSURE: 120 MMHG | RESPIRATION RATE: 16 BRPM | OXYGEN SATURATION: 98 % | HEIGHT: 68 IN | TEMPERATURE: 97.6 F | DIASTOLIC BLOOD PRESSURE: 76 MMHG | WEIGHT: 141 LBS | BODY MASS INDEX: 21.37 KG/M2

## 2022-08-12 DIAGNOSIS — Z17.0 MALIGNANT NEOPLASM OF LOWER-INNER QUADRANT OF LEFT BREAST IN FEMALE, ESTROGEN RECEPTOR POSITIVE (HCC): ICD-10-CM

## 2022-08-12 DIAGNOSIS — C50.312 MALIGNANT NEOPLASM OF LOWER-INNER QUADRANT OF LEFT BREAST IN FEMALE, ESTROGEN RECEPTOR POSITIVE (HCC): Primary | ICD-10-CM

## 2022-08-12 DIAGNOSIS — C77.3 CARCINOMA OF LEFT BREAST METASTATIC TO AXILLARY LYMPH NODE (HCC): Primary | ICD-10-CM

## 2022-08-12 DIAGNOSIS — Z79.811 USE OF LETROZOLE (FEMARA): ICD-10-CM

## 2022-08-12 DIAGNOSIS — Z17.0 MALIGNANT NEOPLASM OF LOWER-INNER QUADRANT OF LEFT BREAST IN FEMALE, ESTROGEN RECEPTOR POSITIVE (HCC): Primary | ICD-10-CM

## 2022-08-12 DIAGNOSIS — C50.912 CARCINOMA OF LEFT BREAST METASTATIC TO AXILLARY LYMPH NODE (HCC): Primary | ICD-10-CM

## 2022-08-12 DIAGNOSIS — C50.912 CARCINOMA OF LEFT BREAST METASTATIC TO AXILLARY LYMPH NODE (HCC): ICD-10-CM

## 2022-08-12 DIAGNOSIS — C50.312 MALIGNANT NEOPLASM OF LOWER-INNER QUADRANT OF LEFT BREAST IN FEMALE, ESTROGEN RECEPTOR POSITIVE (HCC): ICD-10-CM

## 2022-08-12 DIAGNOSIS — C77.3 CARCINOMA OF LEFT BREAST METASTATIC TO AXILLARY LYMPH NODE (HCC): ICD-10-CM

## 2022-08-12 PROCEDURE — 99244 OFF/OP CNSLTJ NEW/EST MOD 40: CPT | Performed by: RADIOLOGY

## 2022-08-12 PROCEDURE — 99211 OFF/OP EST MAY X REQ PHY/QHP: CPT | Performed by: RADIOLOGY

## 2022-08-12 PROCEDURE — 99024 POSTOP FOLLOW-UP VISIT: CPT | Performed by: SURGERY

## 2022-08-12 NOTE — PROGRESS NOTES
Consultation - Radiation Oncology      AHR:[id number] : 1960  Encounter: [id number]  Patient Information: Mary Serrano      CHIEF COMPLAINT  Chief Complaint   Patient presents with    Breast Cancer    Consult     Cancer Staging  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 [facility])  Staging form: Breast, AJCC 8th Edition  - Pathologic stage from 2022: Stage IA (pT1c, pN1, cM0, G1, ER+, LA+, HER2-) - Signed by Avani Bruce MD on 2022  Stage prefix: Initial diagnosis  Nuclear grade: G1  Multigene prognostic tests performed: None  Mitotic count score: Score 1  Histologic grading system: 3 grade system           History of Present Illness   Mary Serrano is a 59y o  year old female with past medical history significant for in utero HUSYEIN exposure status post hysterectomy and 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 and once at [facility]  She suffers from chronic right upper extremity lymphedema  She was recently diagnosed  with stage IIA, grade 1 invasive ductal carcinoma of the left breast status post lumpectomy and axillary lymph node dissection  She presents today for consideration for adjuvant radiation  Briefly, the patient was found with a new mildly prominent ymph node seen in the left axilla on routine bilateral screening mammogram from 22  Mammogram was otherwise unremarkable  The patient noted no breast related symptoms at that time including palpable nodules, suspicious skin changes, axillary lymphadenopathy, or nipple discharge      22 US left breast confirmed a 1 8 cm lymph node in the left axilla in the posterior depth with cortical thickening and no evident hepatic hilum  Biopsy was recommended  FINDINGS:      22 US guided biopsy left breast lymph node  Pathology demonstrated metastatic carcinoma, compatible with breast origin    Tumor cells were ER/LA positive and HER2/zehra negative      4/20/22 Dr Maia Zhang consultation performed  She underwent staging imaging and genetic testing      4/22/22 CT C/A/P w contrast  Previously biopsied abnormal left axillary lymph node again identified  No other abnormal soft tissue mass identified   No acute inflammatory process identified      4/24/22 Bone Scan  Probable degenerative change in the lower thoracic spine, however continued follow-up is recommended  Otherwise no scintigraphic evidence of osseous metastasis      4/27/22 MRI B/L breasts showed the biopsy proven metastatic left axillary lymph node measures 1 9cm   The biopsy clip does project within the lymph node   There was a 1 4cm lymph node at the posterior aspect of the metastatic lymph node which was mildly prominent and not definitely seen on review of the prior ultrasound from 04/04/2022  There was a 10 mm non-mass enhancement seen in the upper inner quadrant of the left breast in the middle depth   This demonstrates progressive enhancement and was suspicious   MRI guided core needle biopsy is recommended  There were no abnormally enlarged internal mammary lymph nodes      There was a 4 mm round mass seen in the retroareolar region of the right breast that was suspicious   Given the small size, this is unlikely to be found with ultrasound   Therefore MRI guided core needle biopsy is indicated   There are no abnormally enlarged axillary or internal mammary lymph nodes       5/20/22 MRI biopsy bilateral breasts  Left breast upper inner quadrant, 11 o'clock pathology revealed grade 1 invasive mammary carcinoma no special type  :  Tumor cells were ER/NC positive and HER2/zehra negative  Right breast biopsy was benign  6/9/22 Mammaprint returned low risk, Luminal type A     6/21/22  ANITA  DIRECTED LUMPECTOMY (Left Breast) AXILLARY DISSECTION LEVEL I AND LEVEL II LYMPH NODES (Left Axilla) under the care of Dr Juliet Dixon    Pathology confirmed 1 3cm grade 1 invasive ductal carcinoma associated with grade 2 DCIS  There was no EIC  Lymphovascular invasion was identified  All margins were negative by at least 2 mm  3/17 axillary lymph nodes demonstrated metastatic disease  The largest tumor deposit measured 1 8 cm associated with about 2 mm of extranodal extension  2 axillary lymph nodes demonstrated macro metastasis and 1 axillary lymph node demonstrated micro metastasis  Tumor cells were ER/GA positive and HER2 negative  Pathologic stage IIA (T1cN1a M0)       7/23/22 ED for Covid-19     8/10/22 Dr Davenport [facility]  Aromatase inhibitor therapy for 5 years to reduce the risk of breast cancer recurrence       Currently, the patient offers no new complaints  She has chronic lymphedema and pain in her right arm since 1999, but denies pain otherwise  She denies new palpable nodules, suspicious skin changes, or nipple discharge of her breasts bilaterally  She denies any left breast pain or tenderness  She denies any left upper extremity edema or shoulder restriction she denies any vaginal discharge or bleeding  She states that her lymphedema therapists is aware that she will be undergoing radiation after axillary lymph node dissection        Historical Information   Oncology History   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 ([address] )   4/14/2022 Initial Diagnosis    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 ([address] )     4/14/2022 Biopsy    Left axillary Lymph node ultrasound guided biopsy  Metastatic carcinoma, compatible with breast origin  ER >95  GA 95   HER2 1+    On ultrasound lymph node is 1 8 cm  There is cortical thickening without evident fatty hilum  In review of screening mammogram, demonstrates no suspicious mammographic findings identified in either breast  Bilateral MRI is recommended  Flow cytometry - there is no evidence of a B-cell or T-cell non-Hodgkin lymphoma         4/20/2022 Genomic Testing    A total of 36 genes were evaluated, including: ANGEL LUIS, BRCA1, BRCA2, CDH1, CHEK2, PALB2, PTEN, STK11, TP53  Negative result  No pathogenic sequence variants or deletions/duplications identified     5/20/2022 Biopsy    MRI guided biopsy  A  Left breast   11 o'clock   Invasive mammary carcinoma of no special type (ductal)  Grade 1  ER 95  GA 95  HER2 0    B  Right breast   Retroareolar  Benign fibrocystic changes without atypia (discrete 0 3 cm focus of sclerosing and tubular adenosis with usual ductal hyperplasia, columnar cell change and hyperplasia, apocrine metaplasia, cystic dilatation)  - Microcalcifications: Present associated with non-neoplastic breast tissue    - Negative for malignancy, in-situ carcinoma and atypical hyperplasia  Malignant pathology appears unifocal  Biopsy proven carcinoma measured 1 cm on MRI  Biopsy proved metastatic disease to left axillary lymph node  6/9/2022 Genomic Testing    MammaPrint  Low risk  Luminal type A  MammaPrint index: [phone]     6/21/2022 Surgery    Left breast tiffany  directed lumpectomy with axillary dissection  A  Invasive 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  Grade 1  1 3 cm  Lymphovascular invasion is identified  Margins negative    H   Axillary, Level I and Level II axillary contents  Metastatic carcinoma involving 3/17 lymph nodes  1 8 cm   Extranodal extension is identified 1mm  Foci of carcinoma identified in extranodal lymphatic channels    Anatomic stage: Stage IIA  Prognostic stage: Stage IA      6/21/2022 -  Cancer Staged    Staging form: Breast, AJCC 8th Edition  - Pathologic stage from 6/21/2022: Stage IA (pT1c, pN1, cM0, G1, ER+, GA+, HER2-) - Signed by Gaudencio Bermudez MD on 8/1/2022  Stage prefix: Initial diagnosis  Nuclear grade: G1  Multigene prognostic tests performed: None  Mitotic count score: Score 1  Histologic grading system: 3 grade system       Malignant neoplasm of lower-inner quadrant of left breast in female, estrogen receptor positive ([facility] [address] ) 5/20/2022 Initial Diagnosis    Malignant neoplasm of lower-inner quadrant of left breast in female, estrogen receptor positive ([address] )     5/20/2022 Biopsy    MRI guided biopsy  A  Left breast   11 o'clock   Invasive mammary carcinoma of no special type (ductal)  Grade 1  ER 95  VT 95  HER2 0    B  Right breast   Retroareolar  Benign fibrocystic changes without atypia (discrete 0 3 cm focus of sclerosing and tubular adenosis with usual ductal hyperplasia, columnar cell change and hyperplasia, apocrine metaplasia, cystic dilatation)  - Microcalcifications: Present associated with non-neoplastic breast tissue    - Negative for malignancy, in-situ carcinoma and atypical hyperplasia  Malignant pathology appears unifocal  Biopsy proven carcinoma measured 1 cm on MRI  Biopsy proved metastatic disease to left axillary lymph node  6/9/2022 Genomic Testing    MammaPrint  Low risk  Luminal type A  MammaPrint index: +[phone]     6/21/2022 Surgery    Left breast tiffany  directed lumpectomy with axillary dissection  A  Invasive 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  Grade 1  1 3 cm  Lymphovascular invasion is identified  Margins negative    H  Axillary, Level I and Level II axillary contents  Metastatic carcinoma involving 3/17 lymph nodes  1 8 cm   Extranodal extension is identified 1mm  Foci of carcinoma identified in extranodal lymphatic channels    Anatomic stage: Stage IIA  Prognostic stage: Stage IA            Past Medical History:   Diagnosis Date    Abnormal mammogram 05/15/2013    Anemia     Cancer ([facility] [address] )     desmoitosis    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 ([facility] [address] ) 04/19/2022    Chronic pain disorder     worse right side of body    Desmoid tumor     Last Assessed: 6/19/2017    History of transfusion     no reactions    Hyperlipidemia     Hypertension     Ovarian cancer ([address] ) 1999?   Hysterectomy done    PONV (postoperative nausea and vomiting)      Past Surgical History:   Procedure Laterality Date    BREAST LUMPECTOMY Left 6/21/2022    Procedure: ANITA  DIRECTED LUMPECTOMY;  Surgeon: Lia Reid MD;  Location: [facility];  Service: Surgical Oncology    FRACTURE SURGERY      HYSTERECTOMY      LYMPH NODE DISSECTION Left 6/21/2022    Procedure: AXILLARY DISSECTION LEVEL I AND LEVEL II LYMPH NODES;  Surgeon: Lia Reid MD;  [facility];  Service: Surgical Oncology    MRI BREAST BIOPSY LEFT (ALL INCLUSIVE) Left 5/20/2022    MRI BREAST BIOPSY RIGHT (ALL INCLUSIVE) Right 5/20/2022    OTHER SURGICAL HISTORY      Arm Incision: Dermoid tumor, right Arm     PARTIAL HYSTERECTOMY      SC COLONOSCOPY FLX DX W/COLLJ SPEC WHEN PFRMD N/A 8/15/2017    Procedure: COLONOSCOPY;  Surgeon: Jaziel Harvey MD;  Location: [facility];   Service: Gastroenterology    SC OPEN RX DISTAL RADIUS FX, INTRA-ARTICULAR, 3+ FRAG Left 2/9/2021    Procedure: OPEN REDUCTION W/ INTERNAL FIXATION (ORIF) RADIUS / ULNA (WRIST) left;  Surgeon: Rojas Swan MD;  [facility];  Service: Orthopedics    SC WRIST Lily Endow LIG Left 8/10/2021    Procedure: RELEASE LEFT CARPAL TUNNEL ENDOSCOPIC;  Surgeon: Rojas Swan MD;  [facility];  Service: Orthopedics    SKIN BIOPSY      SKIN CANCER EXCISION      Melanoma Excision     TONSILLECTOMY      US BREAST NEEDLE LOC LEFT Left 6/16/2022    US BREAST NEEDLE LOC RIGHT EACH ADDITIONAL Right 6/16/2022    US GUIDED BREAST LYMPH NODE BIOPSY LEFT Left 4/14/2022       Family History   Problem Relation Age of Onset    Diabetes Mother     No Known Problems Father     Breast cancer Sister     Cancer Sister     Lung cancer Sister     Pancreatic cancer Sister     No Known Problems Maternal Grandmother     No Known Problems Maternal Grandfather     No Known Problems Paternal Grandmother     No Known Problems Paternal Grandfather        Social History   Social History Substance and Sexual Activity   Alcohol Use Not Currently    Alcohol/week: 5 0 standard drinks    Types: 5 Shots of liquor per week    Comment: Used mostly as a painkiller when needed before bedtime     Social History     Substance and Sexual Activity   Drug Use Yes    Frequency: 28 0 times per week    Types: Marijuana    Comment: THC Medical marijuana     Social History     Tobacco Use   Smoking Status Never Smoker   Smokeless Tobacco Never Used       Meds/Allergies     Current Outpatient Medications:     cholecalciferol (VITAMIN D3) 1,000 units tablet, Take 3,000 Units by mouth daily, Disp: , Rfl:     diphenhydrAMINE (BENADRYL) 50 MG tablet, Take 50 mg by mouth daily at bedtime as needed for itching, Disp: , Rfl:     dronabinol (MARINOL) 5 MG capsule, Take 15 mg by mouth 4 (four) times a day (before meals and at bedtime), Disp: , Rfl:     letrozole (FEMARA) 2 5 mg tablet, Take 1 tablet (2 5 mg total) by mouth daily, Disp: 90 tablet, Rfl: 3    multivitamin (THERAGRAN) TABS, Take 1 tablet by mouth daily, Disp: , Rfl:     Omega-3 Fatty Acids (FISH OIL PO), Take by mouth, Disp: , Rfl:     Probiotic Product (PROBIOTIC-10 PO), Take by mouth, Disp: , Rfl:     docusate sodium (COLACE) 100 mg capsule, Take 1 capsule (100 mg total) by mouth 2 (two) times a day as needed for constipation for up to 10 days, Disp: 20 capsule, Rfl: 0    HYDROmorphone (DILAUDID) 2 mg tablet, Take 1 tablet (2 mg total) by mouth every 6 (six) hours as needed for severe pain Not on Discharge list   Patient is taking for severe pain PRN every 6 hours Max Daily Amount: 8 mg (Patient not taking: Reported on 8/12/2022), Disp: 20 tablet, Rfl: 0  Allergies   Allergen Reactions    Chlorpromazine Anaphylaxis     PHENOTHIAZINE=ANAPHYLAXIS    Codeine Anaphylaxis     Anaphylaxis  abd pain   Meperidine Anaphylaxis, Hives and Vomiting    Phenothiazines Anaphylaxis and Throat Swelling     Category:  Allergy;     Saccharin - Food Allergy Anaphylaxis    Ampicillin-Sulbactam Sodium Hives    Aspirin GI Intolerance and Abdominal Pain     Severe GERD    Gluten Meal - Food Allergy GI Intolerance    Ibuprofen Hives     H    Levofloxacin Hives    Neomycin Other (See Comments), Edema and Hives     Category: Allergy;   swelling    Citrus - Food Allergy Rash    Latex Hives and Rash     Category: Allergy; Review of Systems    Constitutional: Positive for fatigue (since covid infection 3 weeks ago)  HENT: Negative  Eyes:        Wears glasses  Macular degeneration  Optic migraines   Respiratory: Negative  Cardiovascular: Negative  Gastrointestinal: Positive for constipation and diarrhea  Genitourinary: Negative  Musculoskeletal: Positive for neck pain  Lymphedema right arm since    Skin: Positive for rash (resolving rash right arm)  Allergic/Immunologic: Positive for environmental allergies and food allergies  Neurological: Negative  Hematological: Negative  Psychiatric/Behavioral: Positive for sleep disturbance          OB/GYN History:  The patient underwent menarche at 3 years  Menopause Status Pre, Terrie, Unknown and No Answer  No LMP recorded  Patient has had a hysterectomy  Menopause at 32 years  Menopause Reason hysterectomy  Hormone replacement therapy: no      0   Para 0    Nursing: not applicable  Birth control pills: yes  Years used 4      OBJECTIVE:   /82   Pulse 61   Temp 97 6 °F (36 4 °C)   Resp 16   Wt 63 kg (139 lb)   SpO2 99%   BMI 21 13 kg/m²   Performance Status: Karnofsky: 80     Physical Exam  Vitals and nursing note reviewed  Constitutional:       General: She is not in acute distress  Appearance: She is well-developed  Eyes:      General: No scleral icterus  Cardiovascular:      Rate and Rhythm: Normal rate and regular rhythm  Heart sounds: No murmur heard  Pulmonary:      Breath sounds: No wheezing, rhonchi or rales     Chest:   Breasts:      Right: No axillary adenopathy or supraclavicular adenopathy  Left: No axillary adenopathy or supraclavicular adenopathy  Comments: Breast examination demonstrates the right breast is slightly larger than the left  There is a well-healed semi circular scar at the 12 o'clock position of the left breast and a 2nd well-healed left axillary scar  There are no palpable nodules or suspicious skin changes of the breasts bilaterally  Abdominal:      General: There is no distension  Palpations: Abdomen is soft  Tenderness: There is no abdominal tenderness  Musculoskeletal:      Right lower leg: No edema  Left lower leg: No edema  Comments: The patient's right arm is in a compression sleeve with a gauntlet  Her fingertips are noted to be well perfused, with swollen appearing  There is no left upper extremity edema  There is full range of motion of the upper extremities bilaterally although the right is difficult to raise fully and maintain  Lymphadenopathy:      Cervical: No cervical adenopathy  Upper Body:      Right upper body: No supraclavicular or axillary adenopathy  Left upper body: No supraclavicular or axillary adenopathy  Neurological:      Mental Status: She is alert and oriented to person, place, and time        Gait: Gait normal          RESULTS  · Pathology:Collected 4/14/2022 10:29    · Status: Edited Result - FINAL    · Visible to patient: Yes (not seen)    · Dx: Abnormal mammogram    · 0 Result Notes    Component    Case Report   Surgical Pathology Report                         Case: [id number]                                    Authorizing Provider:  Robin Mandel,   Collected:           04/14/2022 [address] [facility]                                      CRNP                                                                          Ordering Location:     [address] [place] [address] Breast Received:            04/14/2022 [facility] Pathologist:           Gary Moreau MD                                                          Specimen:    Axillary lymph node, US BX LT AXILLARY LN 2 PASSES 12G [place]                             Addendum   Test Description                        Result               Prognostic Interpretation     Estrogen Receptor                      >95%                 positive  Internal control:Not Present                      Staining Intensity:Strong  External control:positive              Latrice Score*: 8     Progesterone Receptor               95%                   positive  Internal control:Not Present                      Staining Intensity: Strong  External control: positive                          Latrice Score*: 8        HER2 by IHC                  1+                      negative     Appropriate positive and negative controls were reviewed  These immunohistochemical tests were performed at [facility] in [facility] and interpreted by Dr Srini Reyes  An electronic copy of this report will be kept on file in the Medical Records Department at [facility]      * The Latrice score is a semi quantitative system that takes into consideration the pcerroportion of positive cells (scored on a scale of 0-5) and staining intensity (scored on a scale of (0-3)  The proportion and intensity are summed to produce total scores of 0 or 2 through 8  A score of 0-2 is regarded as negative while 3-8 as positive  Addendum electronically signed by Gary Moreau MD on 4/20/2022 at [phone]   Final Diagnosis   A  Lymph node, left axillary, ultrasound-guided biopsy (2 passes):  -  Metastatic carcinoma, compatible with breast origin (see note)        Electronically signed by Gary Moreau MD on 4/18/2022 at 0912        · Collected 4/14/2022 10:29    · Status: Edited Result - FINAL    · Visible to patient: Yes (not seen)    · Dx: Abnormal mammogram    · 0 Result Notes    Component    Case Report   Surgical Pathology Report                         Case: [id number]                                    Authorizing Provider:  Lennie Bird,   Collected:           04/14/2022 [place] Location:     [address] [place] [address] Breast Received:            04/14/2022 [facility]                                                              Pathologist:           Rosalva Oliver MD                                                          Specimen:    Axillary lymph node, US BX LT AXILLARY LN 2 PASSES 12G [place]                             Addendum   Test Description                        Result               Prognostic Interpretation     Estrogen Receptor                      >95%                 positive  Internal control:Not Present                      Staining Intensity:Strong  External control:positive              Latrice Score*: 8     Progesterone Receptor               95%                   positive  Internal control:Not Present                      Staining Intensity: Strong  External control: positive                          Latrice Score*: 8        HER2 by IHC                  1+                      negative     Appropriate positive and negative controls were reviewed  These immunohistochemical tests were performed at [facility] in [place], [place] and interpreted by Dr Bobby Cano  An electronic copy of this report will be kept on file in the Medical Records Department at [facility]      * The Latrice score is a semi quantitative system that takes into consideration the pcerroportion of positive cells (scored on a scale of 0-5) and staining intensity (scored on a scale of (0-3)   The proportion and intensity are summed to produce total scores of 0 or 2 through 8  A score of 0-2 is regarded as negative while 3-8 as positive  Addendum electronically signed by Leeroy Watts MD on 4/20/2022 at [phone]   Final Diagnosis   A  Lymph node, left axillary, ultrasound-guided biopsy (2 passes):  -  Metastatic carcinoma, compatible with breast origin (see note)  Electronically signed by Leeroy Watts MD on 4/18/2022 at 0912        · Collected 5/20/2022 09:27    · Status: Edited Result - FINAL    · Visible to patient: Yes (not seen)    · Dx: Carcinoma of left breast metastatic t      · 0 Result Notes    Component    Case Report   Surgical Pathology Report                         Case: [id number]                                    Authorizing Provider:  Ivy Hollins,  Collected:           05/20/2022 [address]                                                                            Ordering Location:     [address] [place] [address]      Received:            05/20/2022 [facility]                                                                  Pathologist:           Jadon Francis MD                                                          Specimens:   A) - Breast, Left                                                                                   B) - Breast, Right                                                                         Addendum 2   At the request of Dr John Birmingham, unstained slides from paraffin BLOCK A2 containing the patient's cancer cells were sent to [facility]  for Breast Cancer Test Suite testing  Upon completion of testing, the [facility]  Laboratory report will be directly sent to the requesting physician as well as posted in the Media Tab of the patient's [facility] EMR by the [facility] Pathology Department  Please note:  The [employer] analysis and report is performed independently of [employer], and neither the Hospital nor the Pathology Department screen, review or comment upon [address] report  Because the role of testing in cancer diagnosis and management is subject to evolving development, usage and interpretation, we strongly urge that the test limitations described in the [facility]  Lab report be carefully read, appropriately shared with the patient, and critically considered when reaching treatment decisions  This pathology material was removed from archive for purpose of molecular testing  It was reviewed and approved by Dr Burgess Love  Addendum electronically signed by José Miguel Velasco MD on 6/9/2022 at 46   Addendum   Performed on invasive carcinoma block A2:  Test Description                        Result               Prognostic Interpretation     Estrogen Receptor/ER                90-95%                           Positive  Primary Antibody: SP1  Internal control:positive               Staining Intensity:Strong  External control:positive              Latrice Score*: 7     Progesterone Receptor/PgR       90-95%                           Positive  Primary Antibody:1E2  Internal control:positive               Staining Intensity: Strong  External control: positive             Latrice Score*: 7        HER2 by IHC                               0                                     Negative  Primary Antibody:4B5  HER2 by FISH                            Not Indicated     A positive control for each antibody has been reviewed and accepted  Fixative: Formalin  Duration of Fixation (hours): 11 hours Meets specified requirements of latest ASCO/CAP guidelines  Cold Ischemia Time (minutes): Not stated  Sample Adequacy: Adequate     These immunohistochemical tests were performed at [facility] in [facility] and interpreted by Dr Myriam Wiseman  An electronic copy of this report will be kept on file in the Medical Records Department at [facility] and will be scanned into the PMR       * The Latrice score is a semi quantitative system that takes into consideration the proportion of positive cells (scored on a scale of 0-5) and staining intensity (scored on a scale of (0-3)  The proportion and intensity are summed to produce total scores of 0 or 2 through 8  A score of 0-2 is regarded as negative while 3-8 as positive  Addendum electronically signed by Nidia Georges MD on 5/25/2022 at 1616   Final Diagnosis   A  Breast, Left, upper inner quadrant, 11 o'clock, MRI guided needle biopsy:  - Invasive mammary carcinoma of no special type (ductal)  -- [place] histologic grade 1 of 3 (total score: 4 of 9)        * Glandular (acinar) Tubular Differentiation 10-75%, score 2         * Nuclear Pleomorphism 1 of 3, score 1        * Mitotic Rate < 3/mm2, (</= 7 mitoses/10HPF), score 1     -- Confirmed by tumor cell immunophenotype:        * Positive: GATA3, E-cadherin, P120 (membranous), ER  * Negative: p63, SMMHC, S100     -- Invasive carcinoma involves 6 of 7 submitted core biopsies and 2 fragments, max  Dimension= 10 mm     -- Estrogen, Progesterone & HER2 receptor studies pending, to be described in an addendum   - Ductal carcinoma in-situ (DCIS): Present; minor component (approximately 5% of total tumor)  -- Architectural Patterns: Solid  -- Nuclear grade: I- II (low to intermediate)  -- Necrosis: Not identified  - Lymphovascular invasion: Focally cannot exclude  -- D2-40 immunostain performed  - Microcalcifications: Present, few associated with DCIS  - Best representative tumor block: A2     -- Sufficient tumor present for        Agendia Mammaprint/Blueprint (1 cm2 of invasive tumor in aggregate): No         MI Profile/Foundation One (at least 5 x 5 mm of tumor): Yes       B  Breast, Right, retroareolar, MRI guided needle biopsy:  - Benign fibrocystic changes without atypia (discrete 0 3 cm focus of sclerosing and tubular adenosis     with usual ductal hyperplasia, columnar cell change and hyperplasia, apocrine metaplasia,     cystic dilatation)  See Note  - Microcalcifications: Present associated with non-neoplastic breast tissue    - Negative for malignancy, in-situ carcinoma and atypical hyperplasia  -- Confirmed by positive SMMHC, p63, E-cadherin, p120 (membranous) immunostains  Electronically signed by Hima Rogel MD on 5/24/2022 at 0629   Note    Intradepartmental consultation (DD) agrees with the diagnoses for specimens A & B  [facility] message sent to Dr Hayden Pepe on 5/24/22 @ 0935 hours  A copy of the final report is also sent  In specimen B, the histology is favored to explain the radiologic findings  Radiologic correlation is required to ensure adequate representative sampling of the mass  Additional Information    All reported additional testing was performed with appropriately reactive controls  These tests were developed and their performance characteristics determined by [facility] or appropriate performing facility, though some tests may be performed on tissues which have not been validated for performance characteristics (such as staining performed on alcohol exposed cell blocks and decalcified tissues)  Results should be interpreted with caution and in the context of the patients clinical condition  These tests may not be cleared or approved by the [place]  Food and Drug Administration, though the FDA has determined that such clearance or approval is not necessary  These tests are used for clinical purposes and they should not be regarded as investigational or for research  This laboratory has been approved by CLIA 88, designated as a high-complexity laboratory and is qualified to perform these tests  Interpretation performed at [facility], [address] [place] [id number]     Synoptic Checklist     INVASIVE CARCINOMA OF THE BREAST: Biopsy  Protocol posted: 11/10/2021  INVASIVE CARCINOMA OF THE BREAST: BIOPSY - A  SPECIMEN   Procedure  Needle biopsy    Specimen Laterality  Left    TUMOR   Tumor Site  Upper inner quadrant      Clock position      11 o'clock    Histologic Type  Invasive carcinoma of no special type (ductal)    Histologic Grade ([place] Histologic Score)     Glandular (Acinar) / Tubular Differentiation  Score 2    Nuclear Pleomorphism  Score 1    Mitotic Rate  Score 1    Overall Grade  Grade 1 (scores of 3, 4 or 5)    Tumor Size  Greatest dimension of largest invasive focus (Millimeters): 10 mm   Ductal Carcinoma In Situ (DCIS)  Present    Architectural Patterns  Solid    Nuclear Grade  Grade I (low)    Necrosis  Not identified    Lymphovascular Invasion  Cannot be determined: Cannot exclude  Microcalcifications  Present in DCIS    Breast Biomarker Studies (pending)  ER/DE/HER2 pending              · Collected 6/21/2022 08:39    · Status: Edited Result - FINAL    · Visible to patient: Yes (not seen)    · Dx: Malignant neoplasm of lower-inner faye       · 0 Result Notes    Component    Case Report   Surgical Pathology Report                         Case: [id number]                                    Authorizing Provider:  Angela Cuencapool,  Collected:           06/21/2022 Bill BAR                                                                            Ordering Location:     [address] [place] [address] Received:            06/21/2022 [address] [place] [address]                                     Operating Room                                                                Pathologist:           Carol Plata MD                                                      Specimens:   A) - Breast, NOS, Left breast ANITA  directed lumpectomy, marked per margin per                  protocol                                                                                             B) - Breast, NOS, Additional anterior margin, inked most distal margin Georges Nath C) - Breast, NOS, Additional inferior margin, inked most distal margin (blue)                        D) - Breast, NOS, Additional lateral margin, inked most distal margin (red)                          E) - Breast, NOS, Additional medial margin, inked most distal margin (yellow)                        F) - Breast, NOS, Additional posterior margin, inked most distal margin (black)                      G) - Breast, NOS, Additional superior margin, inked most distal margin (orange)                      H) - Axillary, Level I and Level II Axillary contents                                     Addendum   At the request of Dr Shalini Blandon unstained slides from paraffin BLOCK A6 containing the patient's cancer cells were sent to [address] [place] [address] for KI-67 testing  Upon completion of testing, the [address] [place] [address] Laboratory report will be directly sent to the requesting physician as well as posted in the Media Tab of the patient's [employer] EMR by the [facility]  Please note: The [facility]'s analysis and report is performed independently of [facility], and neither the Hospital nor the Pathology Department screen, review or comment upon [facility]'s report  Because the role of testing in cancer diagnosis and management is subject to evolving development, usage and interpretation, we strongly urge that the test limitations described in the [address] [place] [address] Lab report be carefully read, appropriately shared with the patient, and critically considered when reaching treatment decisions  This pathology material was removed from archive for purpose of molecular testing  It was reviewed and approved by Dr Zaheer Roe  Addendum electronically signed by Bharti Gu MD on 8/10/2022 at [phone]   Final Diagnosis   A   Breast, Left breast ANITA  directed lumpectomy, marked per margin per protocol :  - Invasive 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, Grade 1   - Largest measurable focus is 13 mm in maximum length (slide measurement)  - Lymphovascular invasion is identified (D2-40 is performed)  - Ductal carcinoma in situ (DCIS), nuclear grade I-II  - Inked surfaces with no tumor seen  - Healing biopsy site  - Skin, no tumor seen  B  Breast, Additional anterior margin, inked most distal margin Bryce Summers): - Benign breast tissue, negative for carcinoma including at inked designated surgical resection margin  C  Breast, Additional inferior margin, inked most distal margin (blue): - Benign breast tissue, negative for carcinoma including at inked designated surgical resection margin  D  Breast, Additional lateral margin, inked most distal margin (red):  - Benign breast tissue, negative for carcinoma including at inked designated surgical resection margin  E  Breast, Additional medial margin, inked most distal margin (yellow): - Benign breast tissue, negative for carcinoma including at inked designated surgical resection margin  F  Breast, Additional posterior margin, inked most distal margin (black):  - Benign breast tissue, negative for carcinoma including at inked designated surgical resection margin  G  Breast, Additional superior margin, inked most distal margin (orange): - Benign breast tissue, negative for carcinoma including at inked designated surgical resection margin  H  Axillary, Level I and Level II Axillary contents:  -  Metastatic carcinoma involving three of seventeen lymph nodes (3/17)  -  The largest metastatic deposit measures 18 mm in greatest dimension   -  Extranodal extension is identified (1 mm)  -  Foci of carcinoma identified in extranodal lymphatic channels  -  CK-AE1/AE3 immunohistochemical stain with adequate control support the diagnosis  - Intradepartmental consultation concurs with the diagnosis (CK)     Electronically signed by Bharti Gu MD on 7/7/2022 at 1132   Microscopic Description Immunohistochemical stains performed with adequate controls demonstrates that the invasive carcinoma is:  Positive: E- cadherin and P120 Catenin (membranous staining)  Negative: Calponin and P63   Note    1  Ancillary Studies:  Performed on original biopsy material, [id number], and reported to be:     - ER:  90-95% / Strong     - MD:  90-95% / Strong     - HER2 (IHC):  0+ / Negative     - Best representative tumor block:  A6      2  Pathologic Stage Classification (pTNM, AJCC 8th Edition):       8th ed, AJCC Anatomic Stage:  at least Stage IIA - pT1c, pN1a, cM0, G1     3   8th ed  AJCC Pathologic Prognostic Stage (use AJCC update):  IA    Additional Information    All reported additional testing was performed with appropriately reactive controls  These tests were developed and their performance characteristics determined by [facility] or appropriate performing facility, though some tests may be performed on tissues which have not been validated for performance characteristics (such as staining performed on alcohol exposed cell blocks and decalcified tissues)  Results should be interpreted with caution and in the context of the patients clinical condition  These tests may not be cleared or approved by the [place]  Food and Drug Administration, though the FDA has determined that such clearance or approval is not necessary  These tests are used for clinical purposes and they should not be regarded as investigational or for research  This laboratory has been approved by IA 88, designated as a high-complexity laboratory and is qualified to perform these tests    Interpretation performed at [facility], [address] [facility], [facility], [address] [facility]   Synoptic Checklist     INVASIVE CARCINOMA OF THE BREAST: Resection  8th Edition - Protocol posted: 12/17/2021  INVASIVE CARCINOMA OF THE BREAST: COMPLETE EXCISION - A, B, C, D, E, F, G, H  SPECIMEN   Procedure  Excision (less than total mastectomy) Specimen Laterality  Left    TUMOR   Histologic Type  Invasive carcinoma of no special type (ductal)    Histologic Grade ([place] Histologic Score)     Glandular (Acinar) / Tubular Differentiation  Score 2    Nuclear Pleomorphism  Score 1    Mitotic Rate  Score 1    Overall Grade  Grade 1 (scores of 3, 4 or 5)    Tumor Size  Greatest dimension of largest invasive focus (Millimeters): 13 mm   Ductal Carcinoma In Situ (DCIS)  Present      Negative for extensive intraductal component (EIC)    Architectural Patterns  Cribriform      Solid    Nuclear Grade  Grade II (intermediate)    Necrosis  Not identified    Lymphovascular Invasion  Present    Treatment Effect in the Breast  No known presurgical therapy    MARGINS   Margin Status for Invasive Carcinoma  All margins negative for invasive carcinoma    Distance from Invasive Carcinoma to Closest Margin  Greater than: 2 mm   Margin Status for DCIS  All margins negative for DCIS    Distance from DCIS to Closest Margin  Greater than: 3 mm   REGIONAL LYMPH NODES   Regional Lymph Node Status  Tumor present in regional lymph node(s)    Number of Lymph Nodes with Macrometastases  2    Number of Lymph Nodes with Micrometastases  1    Size of Largest [place] Metastatic Deposit  18 mm   Extranodal Extension  Present, 2 mm or less    Total Number of Lymph Nodes Examined (sentinel and non-sentinel)  17    PATHOLOGIC STAGE CLASSIFICATION (pTNM, AJCC 8th Edition)   Reporting of pT, pN, and (when applicable) pM categories is based on information available to the pathologist at the time the report is issued  As per the AJCC (Chapter 1, 8th Ed ) it is the managing physicians responsibility to establish the final pathologic stage based upon all pertinent information, including but potentially not limited to this pathology report     pT Category  pT1c    pN Category  pN1a    SPECIAL STUDIES        Estrogen Receptor (ER) Status  Positive (greater than 10% of cells demonstrate nuclear positivity)    Percentage of Cells with Nuclear Positivity  90-95 %        Progesterone Receptor (PgR) Status  Positive    Percentage of Cells with Nuclear Positivity  90-95 %        HER2 (by immunohistochemistry)  Negative (Score 0)    Testing Performed on Case Number  [id number]      ASSESSMENT  1  Malignant neoplasm of lower-inner quadrant of left breast in female, estrogen receptor positive ([facility] [address] )  Ambulatory referral to Radiation Oncology     Cancer Staging  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 [facility])  Staging form: Breast, AJCC 8th Edition  - Pathologic stage from 6/21/2022: Stage IA (pT1c, pN1, cM0, G1, ER+, MD+, HER2-) - Signed by Elsy Mcclellan MD on 8/1/2022  Stage prefix: Initial diagnosis  Nuclear grade: G1  Multigene prognostic tests performed: None  Mitotic count score: Score 1  Histologic grading system: 3 grade system        PLAN/DISCUSSION  Carly Adler is a 59y o  year old female with past medical history significant for 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 who was recently diagnosed with stage IIA ([id number]) grade 1 invasive ductal carcinoma of the left breast status post lumpectomy and axillary lymph node dissection achieving negative margins  Per pathology was significant for lymphovascular invasion and 3/17 lymph nodes positive for metastatic carcinoma with less 1-2 mm of JACKIE associated with a 1 8 cm macro metastasis  Tumor cells were ER/MD positive and HER2 negative  MammaPrint has returned as low risk and she is scheduled for endocrine therapy  I offered the patient adjuvant radiation to the left breast and draining lymph node to a total dose of 50Gy with boost to lumpectomy cavity to 60Gy  I feel that this would offer the patient benefit in terms of local regional control and potential cure    This is within the guidelines provided by the NCCN    The patient does have chronic lymphedema of the right upper extremity and is already at risk for lymphedema of the left upper extremity since she has undergone axillary lymph node dissection  We discussed other options of treatment including tangent radiation alone or endocrine therapy alone given her significant risk of lymphedema of the left arm  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, but she carries worrisome clinical features and is relatively young  Her KPS is low related to chronic lymphedema of her dominant arm, but overall she is vibrant at today's visit and has good performance status  I discussed this case with my colleagues to discuss options and it was felt that she would benefit from radiation to the breast and draining lymph nodes  The rationale and potential benefits, as well as the risks and acute and late 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 and potential toxicities of radiation were discussed with the patient and her  at length  Side effects discussed included, but were not limited to: Fatigue, skin erythema, desquamation, hyperpigmentation, fibrosis, asymmetry of the breasts, lymphedema, rib fracture, pulmonary fibrosis, radiation pneumonitis, increased risk of cardiac disease, and lymphedema as described above  The patient and her  were given the opportunity to ask questions, which were answered at length  They wish to proceed with the recommended treatment plan  She is well-healed  We will schedule her for CT simulation with deep inspiration breath hold evaluation next week  We will plan to begin her radiation soon thereafter  Of note, we have patient's brachytherapy records from [facility], but could not acquire [facility] records as it was more than 20 years ago and this was past time they retain records  Raeann Nieves MD  8/12/2022,10:55 AM      Portions of the record may have been created with voice recognition software    Occasional wrong word or "sound a like" substitutions may have occurred due to the inherent limitations of voice recognition software  Read the chart carefully and recognize, using context, where substitutions have occurred

## 2022-08-12 NOTE — PROGRESS NOTES
Surgical Oncology Follow Up  [facility]  [facility] [address]  [facility] [id number]    Steven Townsend  1960  [id number]      No chief complaint on file  Assessment & Plan:   Is here for follow-up with left breast cancer status post left breast conservation surgery and axillary dissection  She is doing well well-healed surgical site  She is has an appointment with Radiation oncologist today for adjuvant radiation  She is been seen medical oncologist and letrozole has been recommended  I will see her in 6 months time  She was told to call us with any questions or concern in the interim she understand and agreed to do    Cancer History:     Oncology History Overview Note   Was diagnosed with breast cancer, ER/[place] positive HER2 negative, metastatic to left axillary lymph node  She presented with enlarged left axillary lymph node on screening mammogram  She underwent biopsy then left breast lumpectomy with left axillary lymph node dissection on 6/21/22  She does have a history of right upper extremity distal desmoid tumor surgery plus radiation  She has right upper extremity lymphedema and has compression sleeve and lymphedema pump  3/29/22 B/L screening mammogram  FINDINGS:   LEFT  A) LYMPH NODE: There is a l mildly prominent ymph node seen in the left axilla in the posterior depth on the MLO view  This was not seen on prior mammograms, possibly excluded from the images because of its posterior location  However, I would recommend further evaluation with ultrasound for more definitive evaluation  Right  There are no suspicious masses, grouped microcalcifications or areas of unexplained architectural distortion  The skin and nipple areolar complex are unremarkable      RECOMMENDATION:       - Ultrasound at the current time for the left breast        - Routine screening mammogram in 1 year for the right breast     4/4/22 US left breast  FINDINGS:   LEFT  A) LYMPH NODE: There is an 18 mm x 7 mm x 18 mm lymph node seen in the left axilla in the posterior depth  There is cortical thickening without evident fatty hilum  Ultrasound-guided sampling is recommended  RECOMMENDATION:       - Ultrasound-guided breast biopsy for the left breast     4/14/22 US guided biopsy left breast lymph node    4/20/22 Dr Ellis Davila  MRI breast bilateral w and wo contrast w cad    NM bone scan whole body     CT chest abdomen pelvis w wo contrast  Ordered genetic testing    4/22/22 [facility]  Referred for thrombocytopenia - however, her most recent CBC and all prior CBCs without platelet abnormality  mild polycythemia which is new and was noted on the most recent labs only  WBC at that time was 4 1, flagged as abnormal (no diff reported)   suspect that the elevated hemoglobin is likely secondary and not a primary clonal disorder  recommend obtaining an epo level   discussed with her the recent axillary LN biopsy findings   briefly explained the sequencing of care for non-metastatic breast cancer, This would change if metastatic disease is found on upcoming imaging  4/22/22 CT C/A/P w contrast  Previously biopsied abnormal left axillary lymph node again identified  No other abnormal soft tissue mass identified  No acute inflammatory process identified  4/24/22 Bone Scan  Probable degenerative change in the lower thoracic spine, however continued follow-up is recommended  Otherwise no scintigraphic evidence of osseous metastasis  4/27/22 MRI B/L breasts w wo contrast w cad  FINDINGS:   LEFT  A) LYMPH NODE:  The biopsy proven metastatic left axillary lymph node measures 19 x 8 mm in the axial plane  The biopsy clip does project within the lymph node  There is a 14 x 7 mm lymph node at the posterior aspect of the metastatic lymph node which is mildly prominent  This is not definitely seen on review of the prior ultrasound from 04/04/2022    B) NON-MASS ENHANCEMENT: There is a 10 mm non-mass enhancement seen in the upper inner quadrant of the left breast in the middle depth  (series 14047, image 123, series 501 image 123 and series 02988 images one, 5, 8, 9)  This demonstrates progressive enhancement  This is suspicious  MRI guided core needle biopsy is recommended  There are no abnormally enlarged internal mammary lymph nodes  RIGHT  C) MASS: There is a 4 mm round mass seen in the retroareolar region of the right breast in the middle depth  (series 60972, image 167, series 501, image 167 and series 20,000 image 3, 4, 7)  This is suspicious  Given the small size, this is unlikely to be found with ultrasound  Therefore MRI guided core needle biopsy is indicated  There are no abnormally enlarged axillary or internal mammary lymph nodes  Additional findings: There is a 32 mm bilobed simple cyst in the anterior liver (series 3, image 33)  There are a few additional subcentimeter T2 bright lesions in the liver  No suspicious enhancing lesions were seen on CT abdomen 04/22/2022  RECOMMENDATION:       - MRI-guided breast biopsy for both breasts (1 site in each breast)  5/20/22 MRI biopsy B/L breasts    6/1/22 Dr Leonarda Bobby  left breast conservation surgery and left axillary dissection given palpable lymphadenopathy    6/21/22  ANITA  DIRECTED LUMPECTOMY (Left Breast) AXILLARY DISSECTION LEVEL I AND LEVEL II LYMPH NODES (Left Axilla)    7/6/22 Dr Roverto Kirk  Both incisions are healing well no evidence of surgical site infection  Follow up 2-3 weeks for path results    7/8/22 Dr Roverto Kirk  brought in today since the pathology results available as well as to remove the drain     refer her to Medical and Radiation oncologist    7/23/22 ED for Covid-19      8/10/22 Dr Dallas Naylor      8/12/22 Dr Roverto Kirk      Upcoming:     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 ([facility] [address] )   4/14/2022 Initial Diagnosis    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 ([facility] [address] )     4/14/2022 Biopsy    Left axillary Lymph node ultrasound guided biopsy  Metastatic carcinoma, compatible with breast origin  ER >95  NE 95   HER2 1+    On ultrasound lymph node is 1 8 cm  There is cortical thickening without evident fatty hilum  In review of screening mammogram, demonstrates no suspicious mammographic findings identified in either breast  Bilateral MRI is recommended  Flow cytometry - there is no evidence of a B-cell or T-cell non-Hodgkin lymphoma  4/20/2022 Genomic Testing    A total of 36 genes were evaluated, including: ANGEL LUIS, BRCA1, BRCA2, CDH1, CHEK2, PALB2, PTEN, STK11, TP53  Negative result  No pathogenic sequence variants or deletions/duplications identified     5/20/2022 Biopsy    MRI guided biopsy  A  Left breast   11 o'clock   Invasive mammary carcinoma of no special type (ductal)  Grade 1  ER 95  NE 95  HER2 0    B  Right breast   Retroareolar  Benign fibrocystic changes without atypia (discrete 0 3 cm focus of sclerosing and tubular adenosis with usual ductal hyperplasia, columnar cell change and hyperplasia, apocrine metaplasia, cystic dilatation)  - Microcalcifications: Present associated with non-neoplastic breast tissue    - Negative for malignancy, in-situ carcinoma and atypical hyperplasia  Malignant pathology appears unifocal  Biopsy proven carcinoma measured 1 cm on MRI  Biopsy proved metastatic disease to left axillary lymph node  6/9/2022 Genomic Testing    MammaPrint  Low risk  Luminal type A  MammaPrint index: +0 [phone]     6/21/2022 Surgery    Left breast tiffany  directed lumpectomy with axillary dissection  A  Invasive 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  Grade 1  1 3 cm  Lymphovascular invasion is identified  Margins negative    H   Axillary, Level I and Level II axillary contents  Metastatic carcinoma involving 3/17 lymph nodes  1 8 cm   Extranodal extension is identified 1mm  Foci of carcinoma identified in extranodal lymphatic channels    Anatomic stage: Stage IIA  Prognostic stage: Stage IA      6/21/2022 -  Cancer Staged    Staging form: Breast, AJCC 8th Edition  - Pathologic stage from 6/21/2022: Stage IA (pT1c, pN1, cM0, G1, ER+, GA+, HER2-) - Signed by Radha Bermudez MD on 8/1/2022  Stage prefix: Initial diagnosis  Nuclear grade: G1  Multigene prognostic tests performed: None  Mitotic count score: Score 1  Histologic grading system: 3 grade system       Malignant neoplasm of lower-inner quadrant of left breast in female, estrogen receptor positive ([facility] [address] )   5/20/2022 Initial Diagnosis    Malignant neoplasm of lower-inner quadrant of left breast in female, estrogen receptor positive ([facility] [address] )     5/20/2022 Biopsy    MRI guided biopsy  A  Left breast   11 o'clock   Invasive mammary carcinoma of no special type (ductal)  Grade 1  ER 95  GA 95  HER2 0    B  Right breast   Retroareolar  Benign fibrocystic changes without atypia (discrete 0 3 cm focus of sclerosing and tubular adenosis with usual ductal hyperplasia, columnar cell change and hyperplasia, apocrine metaplasia, cystic dilatation)  - Microcalcifications: Present associated with non-neoplastic breast tissue    - Negative for malignancy, in-situ carcinoma and atypical hyperplasia  Malignant pathology appears unifocal  Biopsy proven carcinoma measured 1 cm on MRI  Biopsy proved metastatic disease to left axillary lymph node  6/9/2022 Genomic Testing    MammaPrint  Low risk  Luminal type A  MammaPrint index: +0 [phone]     6/21/2022 Surgery    Left breast tiffany  directed lumpectomy with axillary dissection  A  Invasive 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  Grade 1  1 3 cm  Lymphovascular invasion is identified  Margins negative    H   Axillary, Level I and Level II axillary contents  Metastatic carcinoma involving 3/17 lymph nodes  1 8 cm   Extranodal extension is identified 1mm  Foci of carcinoma identified in extranodal lymphatic channels    Anatomic stage: Stage IIA  Prognostic stage: Stage IA            Interval History:   Follow-up with left breast cancer  Review of Systems:   Review of Systems   Constitutional: Negative for chills and fever  HENT: Negative for ear pain and sore throat  Eyes: Negative for pain and visual disturbance  Respiratory: Negative for cough and shortness of breath  Cardiovascular: Negative for chest pain and palpitations  Gastrointestinal: Negative for abdominal pain and vomiting  Genitourinary: Negative for dysuria and hematuria  Musculoskeletal: Negative for arthralgias and back pain  Skin: Negative for color change and rash  Neurological: Negative for seizures and syncope  All other systems reviewed and are negative  Past Medical History     Patient Active Problem List   Diagnosis    Back pain, chronic    Chronic insomnia    Lymphedema of right arm    Hyperlipidemia, mixed    Peripheral neuropathy    Lymphedema    Cellulitis of right upper extremity    Sepsis ([address] )    Desmoid tumor    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 ([address]    Malignant neoplasm of lower-inner quadrant of left breast in female, estrogen receptor positive ([address] )    HTN, goal below 140/90    PONV (postoperative nausea and vomiting)    Use of letrozole (Femara)     Past Medical History:   Diagnosis Date    Abnormal mammogram 05/15/2013    Anemia     Cancer ([address] )     desmoitosis    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 ([address] ) 04/19/2022    Chronic pain disorder     worse right side of body    Desmoid tumor     Last Assessed: 6/19/2017    History of transfusion     no reactions    Hyperlipidemia     Hypertension     Ovarian cancer ([address] ) 1999?   Hysterectomy done    PONV (postoperative nausea and vomiting)      Past Surgical History:   Procedure Laterality Date    BREAST LUMPECTOMY Left 6/21/2022 Procedure: ANITA  DIRECTED LUMPECTOMY;  Surgeon: Flores Barragan MD;  Location: [facility];  Service: Surgical Oncology    FRACTURE SURGERY      HYSTERECTOMY      LYMPH NODE DISSECTION Left 6/21/2022    Procedure: AXILLARY DISSECTION LEVEL I AND LEVEL II LYMPH NODES;  Surgeon: Flores Barragan MD;  [facility];  Service: Surgical Oncology    MRI BREAST BIOPSY LEFT (ALL INCLUSIVE) Left 5/20/2022    MRI BREAST BIOPSY RIGHT (ALL INCLUSIVE) Right 5/20/2022    OTHER SURGICAL HISTORY      Arm Incision: Dermoid tumor, right Arm     PARTIAL HYSTERECTOMY      VT COLONOSCOPY FLX DX W/COLLJ SPEC WHEN PFRMD N/A 8/15/2017    Procedure: COLONOSCOPY;  Surgeon: Terri Evans MD;  Location: [facility];   Service: Gastroenterology    VT OPEN RX DISTAL RADIUS FX, INTRA-ARTICULAR, 3+ FRAG Left 2/9/2021    Procedure: OPEN REDUCTION W/ INTERNAL FIXATION (ORIF) RADIUS / ULNA (WRIST) left;  Surgeon: Mitul Archer MD;  [facility]: [facility];  Service: Orthopedics    VT WRIST Rosiland Courtney LIG Left 8/10/2021    Procedure: RELEASE LEFT CARPAL TUNNEL ENDOSCOPIC;  Surgeon: Mitul Archer MD;  [facility];  Service: Orthopedics    SKIN BIOPSY      SKIN CANCER EXCISION      Melanoma Excision     TONSILLECTOMY      US BREAST NEEDLE LOC LEFT Left 6/16/2022    US BREAST NEEDLE LOC RIGHT EACH ADDITIONAL Right 6/16/2022    US GUIDED BREAST LYMPH NODE BIOPSY LEFT Left 4/14/2022     Family History   Problem Relation Age of Onset    Diabetes Mother     No Known Problems Father     Breast cancer Sister     Cancer Sister     Lung cancer Sister     Pancreatic cancer Sister     No Known Problems Maternal Grandmother     No Known Problems Maternal Grandfather     No Known Problems Paternal Grandmother     No Known Problems Paternal Grandfather      Social History     Socioeconomic History    Marital status: /Civil Union     Spouse name: Not on file    Number of children: Not on file    Years of education: Not on file    Highest education level: Not on file   Occupational History    Occupation: unemployed    Tobacco Use    Smoking status: Never Smoker    Smokeless tobacco: Never Used   Vaping Use    Vaping Use: Never used   Substance and Sexual Activity    Alcohol use: Not Currently     Alcohol/week: 5 0 standard drinks     Types: 5 Shots of liquor per week     Comment: Used mostly as a painkiller when needed before bedtime    Drug use: Yes     Frequency: 28 0 times per week     Types: Marijuana     Comment: THC Medical marijuana    Sexual activity: Not Currently     Partners: Male     Comment: Hysterectomy years ago   Other Topics Concern    Not on file   Social History Narrative    Lives with spouse      Social Determinants of Health     Financial Resource Strain: Not on file   Food Insecurity: Not on file   Transportation Needs: Not on file   Physical Activity: Not on file   Stress: Not on file   Social Connections: Not on file   Intimate Partner Violence: Not on file   Housing Stability: Not on file       Current Outpatient Medications:     cholecalciferol (VITAMIN D3) 1,000 units tablet, Take 3,000 Units by mouth daily, Disp: , Rfl:     diphenhydrAMINE (BENADRYL) 50 MG tablet, Take 50 mg by mouth daily at bedtime as needed for itching, Disp: , Rfl:     dronabinol (MARINOL) 5 MG capsule, Take 15 mg by mouth 4 (four) times a day (before meals and at bedtime), Disp: , Rfl:     HYDROmorphone (DILAUDID) 2 mg tablet, Take 1 tablet (2 mg total) by mouth every 6 (six) hours as needed for severe pain Not on Discharge list   Patient is taking for severe pain PRN every 6 hours Max Daily Amount: 8 mg, Disp: 20 tablet, Rfl: 0    letrozole (FEMARA) 2 5 mg tablet, Take 1 tablet (2 5 mg total) by mouth daily, Disp: 90 tablet, Rfl: 3    multivitamin (THERAGRAN) TABS, Take 1 tablet by mouth daily, Disp: , Rfl:     Omega-3 Fatty Acids (FISH OIL PO), Take by mouth, Disp: , Rfl:    Probiotic Product (PROBIOTIC-10 PO), Take by mouth, Disp: , Rfl:     docusate sodium (COLACE) 100 mg capsule, Take 1 capsule (100 mg total) by mouth 2 (two) times a day as needed for constipation for up to 10 days, Disp: 20 capsule, Rfl: 0  Allergies   Allergen Reactions    Chlorpromazine Anaphylaxis     PHENOTHIAZINE=ANAPHYLAXIS    Codeine Anaphylaxis     Anaphylaxis  abd pain   Meperidine Anaphylaxis, Hives and Vomiting    Phenothiazines Anaphylaxis and Throat Swelling     Category: Allergy;     Saccharin - Food Allergy Anaphylaxis    Ampicillin-Sulbactam Sodium Hives    Aspirin GI Intolerance and Abdominal Pain     Severe GERD    Gluten Meal - Food Allergy GI Intolerance    Ibuprofen Hives     H    Levofloxacin Hives    Neomycin Other (See Comments), Edema and Hives     Category: Allergy;   swelling    Citrus - Food Allergy Rash    Latex Hives and Rash     Category: Allergy; Physical Exam:     Vitals:    08/12/22 0831   BP: 120/76   Pulse: 59   Resp: 16   Temp: 97 6 °F (36 4 °C)   SpO2: 98%     Physical Exam  Constitutional:       Appearance: Normal appearance  HENT:      Head: Normocephalic and atraumatic  Nose: Nose normal       Mouth/Throat:      Mouth: Mucous membranes are moist    Eyes:      Pupils: Pupils are equal, round, and reactive to light  Cardiovascular:      Rate and Rhythm: Normal rate  Pulses: Normal pulses  Heart sounds: Normal heart sounds  Pulmonary:      Effort: Pulmonary effort is normal       Breath sounds: Normal breath sounds  Chest:          Comments: Well-healed left breast and left axillary incision rest of the breast examination unremarkable  No suspicious mass or masses  Abdominal:      General: Bowel sounds are normal       Palpations: Abdomen is soft  Musculoskeletal:         General: Normal range of motion  Cervical back: Normal range of motion and neck supple  Skin:     General: Skin is warm     Neurological:      General: No focal deficit present  Mental Status: She is alert and oriented to person, place, and time  Psychiatric:         Mood and Affect: Mood normal          Behavior: Behavior normal          Thought Content: Thought content normal          Judgment: Judgment normal            Results & Discussion:   I did discuss further adjuvant treatment and follow-up  I will see her in 6 months time  she understands and  agrees   All patient questions were answered  Advance Care Planning/Advance Directives: Tu Taylor MD discussed the disease status with Trupti March  today 08/12/22  treatment plans and follow-up with the patient

## 2022-08-12 NOTE — PROGRESS NOTES
Kasey Falcon 1960 is a 59 y o  female was diagnosed with breast cancer, ER/AK positive HER2 negative, metastatic to left axillary lymph node  She presented with enlarged left axillary lymph node on screening mammogram  She underwent biopsy then left breast lumpectomy with left axillary lymph node dissection on 6/21/22  She does have a history of right upper extremity distal desmoid tumor surgery plus radiation  She has right upper extremity lymphedema and has compression sleeve and lymphedema pump  3/29/22 B/L screening mammogram  FINDINGS:   LEFT  A) LYMPH NODE: There is a l mildly prominent ymph node seen in the left axilla in the posterior depth on the MLO view  This was not seen on prior mammograms, possibly excluded from the images because of its posterior location  However, I would recommend further evaluation with ultrasound for more definitive evaluation  Right  There are no suspicious masses, grouped microcalcifications or areas of unexplained architectural distortion  The skin and nipple areolar complex are unremarkable  RECOMMENDATION:       - Ultrasound at the current time for the left breast        - Routine screening mammogram in 1 year for the right breast     4/4/22 US left breast  FINDINGS:   LEFT  A) LYMPH NODE: There is an 18 mm x 7 mm x 18 mm lymph node seen in the left axilla in the posterior depth  There is cortical thickening without evident fatty hilum  Ultrasound-guided sampling is recommended     RECOMMENDATION:       - Ultrasound-guided breast biopsy for the left breast     4/14/22 US guided biopsy left breast lymph node    4/20/22 Dr Jewels Mendez  MRI breast bilateral w and wo contrast w cad    NM bone scan whole body     CT chest abdomen pelvis w wo contrast  Ordered genetic testing    4/22/22 [facility]  Referred for thrombocytopenia - however, her most recent CBC and all prior CBCs without platelet abnormality  mild polycythemia which is new and was noted on the most recent labs only  WBC at that time was 4 1, flagged as abnormal (no diff reported)   suspect that the elevated hemoglobin is likely secondary and not a primary clonal disorder  recommend obtaining an epo level   discussed with her the recent axillary LN biopsy findings   briefly explained the sequencing of care for non-metastatic breast cancer, This would change if metastatic disease is found on upcoming imaging  4/22/22 CT C/A/P w contrast  Previously biopsied abnormal left axillary lymph node again identified  No other abnormal soft tissue mass identified  No acute inflammatory process identified  4/24/22 Bone Scan  Probable degenerative change in the lower thoracic spine, however continued follow-up is recommended  Otherwise no scintigraphic evidence of osseous metastasis  4/27/22 MRI B/L breasts w wo contrast w cad  FINDINGS:   LEFT  A) LYMPH NODE:  The biopsy proven metastatic left axillary lymph node measures 19 x 8 mm in the axial plane  The biopsy clip does project within the lymph node  There is a 14 x 7 mm lymph node at the posterior aspect of the metastatic lymph node which is mildly prominent  This is not definitely seen on review of the prior ultrasound from 04/04/2022  B) NON-MASS ENHANCEMENT: There is a 10 mm non-mass enhancement seen in the upper inner quadrant of the left breast in the middle depth  (series 13643, image 123, series 501 image 123 and series 10553 images one, 5, 8, 9)  This demonstrates progressive enhancement  This is suspicious  MRI guided core needle biopsy is recommended  There are no abnormally enlarged internal mammary lymph nodes  RIGHT  C) MASS: There is a 4 mm round mass seen in the retroareolar region of the right breast in the middle depth  (series 72059, image 167, series 501, image 167 and series 20,000 image 3, 4, 7)  This is suspicious  Given the small size, this is unlikely to be found with ultrasound    Therefore MRI guided core needle biopsy is indicated  There are no abnormally enlarged axillary or internal mammary lymph nodes  Additional findings: There is a 32 mm bilobed simple cyst in the anterior liver (series 3, image 33)  There are a few additional subcentimeter T2 bright lesions in the liver  No suspicious enhancing lesions were seen on CT abdomen 04/22/2022  RECOMMENDATION:       - MRI-guided breast biopsy for both breasts (1 site in each breast)  5/20/22 MRI biopsy B/L breasts    6/1/22 Dr Jeannette Booker  left breast conservation surgery and left axillary dissection given palpable lymphadenopathy    6/21/22  ANITA  DIRECTED LUMPECTOMY (Left Breast) AXILLARY DISSECTION LEVEL I AND LEVEL II LYMPH NODES (Left Axilla)    7/6/22 Dr Ezequiel Henning  Both incisions are healing well no evidence of surgical site infection  Follow up 2-3 weeks for path results    7/8/22 Dr Ezequiel Henning  brought in today since the pathology results available as well as to remove the drain  refer her to Medical and Radiation oncologist    7/23/22 ED for Covid-19      8/10/22 Dr Nelly Pabon  Aromatase inhibitor therapy for 5 years to reduce the risk of breast cancer recurrence  8/12/22 Dr Ezequiel Henning  Follow up 6 months  Well healed    Upcoming:      Oncology History   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 ([address] )   4/14/2022 Initial Diagnosis    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 ([address] )     4/14/2022 Biopsy    Left axillary Lymph node ultrasound guided biopsy  Metastatic carcinoma, compatible with breast origin  ER >95  SD 95   HER2 1+    On ultrasound lymph node is 1 8 cm  There is cortical thickening without evident fatty hilum  In review of screening mammogram, demonstrates no suspicious mammographic findings identified in either breast  Bilateral MRI is recommended  Flow cytometry - there is no evidence of a B-cell or T-cell non-Hodgkin lymphoma         4/20/2022 Genomic Testing    A total of 36 genes were evaluated, including: ANGEL LUIS, BRCA1, BRCA2, CDH1, CHEK2, PALB2, PTEN, STK11, TP53  Negative result  No pathogenic sequence variants or deletions/duplications identified     5/20/2022 Biopsy    MRI guided biopsy  A  Left breast   11 o'clock   Invasive mammary carcinoma of no special type (ductal)  Grade 1  ER 95  PA 95  HER2 0    B  Right breast   Retroareolar  Benign fibrocystic changes without atypia (discrete 0 3 cm focus of sclerosing and tubular adenosis with usual ductal hyperplasia, columnar cell change and hyperplasia, apocrine metaplasia, cystic dilatation)  - Microcalcifications: Present associated with non-neoplastic breast tissue    - Negative for malignancy, in-situ carcinoma and atypical hyperplasia  Malignant pathology appears unifocal  Biopsy proven carcinoma measured 1 cm on MRI  Biopsy proved metastatic disease to left axillary lymph node  6/9/2022 Genomic Testing    MammaPrint  Low risk  Luminal type A  MammaPrint index: [phone]     6/21/2022 Surgery    Left breast tiffany  directed lumpectomy with axillary dissection  A  Invasive 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  Grade 1  1 3 cm  Lymphovascular invasion is identified  Margins negative    H   Axillary, Level I and Level II axillary contents  Metastatic carcinoma involving 3/17 lymph nodes  1 8 cm   Extranodal extension is identified 1mm  Foci of carcinoma identified in extranodal lymphatic channels    Anatomic stage: Stage IIA  Prognostic stage: Stage IA      6/21/2022 -  Cancer Staged    Staging form: Breast, AJCC 8th Edition  - Pathologic stage from 6/21/2022: Stage IA (pT1c, pN1, cM0, G1, ER+, PA+, HER2-) - Signed by Radha Bermudez MD on 8/1/2022  Stage prefix: Initial diagnosis  Nuclear grade: G1  Multigene prognostic tests performed: None  Mitotic count score: Score 1  Histologic grading system: 3 grade system       Malignant neoplasm of lower-inner quadrant of left breast in female, estrogen receptor positive ([facility] [address] )   5/20/2022 Initial Diagnosis    Malignant neoplasm of lower-inner quadrant of left breast in female, estrogen receptor positive ([facility] [address] )     5/20/2022 Biopsy    MRI guided biopsy  A  Left breast   11 o'clock   Invasive mammary carcinoma of no special type (ductal)  Grade 1  ER 95  TX 95  HER2 0    B  Right breast   Retroareolar  Benign fibrocystic changes without atypia (discrete 0 3 cm focus of sclerosing and tubular adenosis with usual ductal hyperplasia, columnar cell change and hyperplasia, apocrine metaplasia, cystic dilatation)  - Microcalcifications: Present associated with non-neoplastic breast tissue    - Negative for malignancy, in-situ carcinoma and atypical hyperplasia  Malignant pathology appears unifocal  Biopsy proven carcinoma measured 1 cm on MRI  Biopsy proved metastatic disease to left axillary lymph node  6/9/2022 Genomic Testing    MammaPrint  Low risk  Luminal type A  MammaPrint index: [phone]     6/21/2022 Surgery    Left breast tiffany  directed lumpectomy with axillary dissection  A  Invasive 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  Grade 1  1 3 cm  Lymphovascular invasion is identified  Margins negative    H  Axillary, Level I and Level II axillary contents  Metastatic carcinoma involving 3/17 lymph nodes  1 8 cm   Extranodal extension is identified 1mm  Foci of carcinoma identified in extranodal lymphatic channels    Anatomic stage: Stage IIA  Prognostic stage: Stage IA          Review of Systems:  Review of Systems   Constitutional: Positive for fatigue (since covid infection 3 weeks ago)  HENT: Negative  Eyes:        Wears glasses  Macular degeneration  Optic migraines   Respiratory: Negative  Cardiovascular: Negative  Gastrointestinal: Positive for constipation and diarrhea  Genitourinary: Negative  Musculoskeletal: Positive for neck pain          Lymphedema right arm since 1999   Skin: Positive for rash (resolving rash right arm)  Allergic/Immunologic: Positive for environmental allergies and food allergies  Neurological: Negative  Hematological: Negative  Psychiatric/Behavioral: Positive for sleep disturbance  Clinical Trial: no    OB/GYN History:  The patient underwent menarche at 3 years  Menopause Status Pre, Terrie, Unknown and No Answer  No LMP recorded  Patient has had a hysterectomy  Menopause at 32} years  Menopause Reason hysterectomy  Hormone replacement therapy: no      0   Para 0     Nursing: not applicable  Birth control pills: yes    Years used 4    Pregnancy test needed:  no    ONCOTYPE/MAMMOPRINT results yes    PFT N/A    Prior Radiation yes [facility]     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, 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, 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    MST yes    Implantable Devices (Port, Pacemaker, pain stimulator) no    Hip Replacement no    Covid Vaccine Status yes + 1 booster     [unfilled]  Health Maintenance   Topic Date Due    Pneumococcal Vaccine: Pediatrics (0 to 5 Years) and At-Risk Patients (6 to 59 Years) (1 - PCV) Never done    HIV Screening  Never done    Osteoporosis Screening  Never done    Depression Screening  2019    COVID-19 Vaccine (4 - Booster for Pfizer series) 2021    DTaP,Tdap,and Td Vaccines (2 - Td or Tdap) 2022    PT PLAN OF CARE  2022    OT PLAN OF CARE  2022    Colorectal Cancer Screening  08/15/2022    Influenza Vaccine (1) 2022    Annual Physical  2023    Breast Cancer Screening: Mammogram  2023    BMI: Adult  2023    Cervical Cancer Screening  2027    Hepatitis C Screening  Completed    HIB Vaccine  Aged Out    Hepatitis B Vaccine  Aged Out    IPV Vaccine  Aged Out    Hepatitis A Vaccine  Aged Out    Meningococcal ACWY Vaccine  Aged Out    HPV Vaccine  Aged Out     Past Medical History:   Diagnosis Date    Abnormal mammogram 05/15/2013    Anemia     Cancer ([facility] [address] )     desmoitosis    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 ([facility] [address] ) 04/19/2022    Chronic pain disorder     worse right side of body    Desmoid tumor     Last Assessed: 6/19/2017    History of transfusion     no reactions    Hyperlipidemia     Hypertension     Ovarian cancer ([facility] [address] ) 1999? Hysterectomy done    PONV (postoperative nausea and vomiting)      Past Surgical History:   Procedure Laterality Date    BREAST LUMPECTOMY Left 6/21/2022    Procedure: ANITA  DIRECTED LUMPECTOMY;  Surgeon: Florencio Ford MD;  [facility];  Service: Surgical Oncology    FRACTURE SURGERY      HYSTERECTOMY      LYMPH NODE DISSECTION Left 6/21/2022    Procedure: AXILLARY DISSECTION LEVEL I AND LEVEL II LYMPH NODES;  Surgeon: Florencio Ford MD;  [facility];  Service: Surgical Oncology    MRI BREAST BIOPSY LEFT (ALL INCLUSIVE) Left 5/20/2022    MRI BREAST BIOPSY RIGHT (ALL INCLUSIVE) Right 5/20/2022    OTHER SURGICAL HISTORY      Arm Incision: Dermoid tumor, right Arm     PARTIAL HYSTERECTOMY      CO COLONOSCOPY FLX DX W/COLLJ SPEC WHEN PFRMD N/A 8/15/2017    Procedure: COLONOSCOPY;  Surgeon: Vidhya Christianson MD;  Location: [facility];   Service: Gastroenterology    CO OPEN RX DISTAL RADIUS FX, INTRA-ARTICULAR, 3+ FRAG Left 2/9/2021    Procedure: OPEN REDUCTION W/ INTERNAL FIXATION (ORIF) RADIUS / Lorren Ship (WRIST) left;  Surgeon: Ariana Zuluaga MD;  [facility];  Service: Orthopedics    CO WRIST Kelsea Vasquez LIG Left 8/10/2021    Procedure: RELEASE LEFT CARPAL TUNNEL ENDOSCOPIC;  Surgeon: Ariana Zuluaga MD;  [facility];  Service: Orthopedics    SKIN BIOPSY      SKIN CANCER EXCISION      Melanoma Excision     TONSILLECTOMY      US BREAST NEEDLE LOC LEFT Left 6/16/2022    US BREAST NEEDLE LOC RIGHT EACH ADDITIONAL Right 6/16/2022    US GUIDED BREAST LYMPH NODE BIOPSY LEFT Left 4/14/2022     Family History   Problem Relation Age of Onset    Diabetes Mother     No Known Problems Father     Breast cancer Sister     Cancer Sister     Lung cancer Sister     Pancreatic cancer Sister     No Known Problems Maternal Grandmother     No Known Problems Maternal Grandfather     No Known Problems Paternal Grandmother     No Known Problems Paternal Grandfather      Social History     Tobacco Use    Smoking status: Never Smoker    Smokeless tobacco: Never Used   Vaping Use    Vaping Use: Never used   Substance Use Topics    Alcohol use: Not Currently     Alcohol/week: 5 0 standard drinks     Types: 5 Shots of liquor per week     Comment: Used mostly as a painkiller when needed before bedtime    Drug use: Yes     Frequency: 28 0 times per week     Types: Marijuana     Comment: THC Medical marijuana        Current Outpatient Medications:     cholecalciferol (VITAMIN D3) 1,000 units tablet, Take 3,000 Units by mouth daily, Disp: , Rfl:     diphenhydrAMINE (BENADRYL) 50 MG tablet, Take 50 mg by mouth daily at bedtime as needed for itching, Disp: , Rfl:     docusate sodium (COLACE) 100 mg capsule, Take 1 capsule (100 mg total) by mouth 2 (two) times a day as needed for constipation for up to 10 days, Disp: 20 capsule, Rfl: 0    dronabinol (MARINOL) 5 MG capsule, Take 15 mg by mouth 4 (four) times a day (before meals and at bedtime), Disp: , Rfl:     HYDROmorphone (DILAUDID) 2 mg tablet, Take 1 tablet (2 mg total) by mouth every 6 (six) hours as needed for severe pain Not on Discharge list   Patient is taking for severe pain PRN every 6 hours Max Daily Amount: 8 mg, Disp: 20 tablet, Rfl: 0    letrozole (FEMARA) 2 5 mg tablet, Take 1 tablet (2 5 mg total) by mouth daily, Disp: 90 tablet, Rfl: 3    multivitamin (THERAGRAN) TABS, Take 1 tablet by mouth daily, Disp: , Rfl:     Omega-3 Fatty Acids (FISH OIL PO), Take by mouth, Disp: , Rfl:     Probiotic Product (PROBIOTIC-10 PO), Take by mouth, Disp: , Rfl:   Allergies   Allergen Reactions    Chlorpromazine Anaphylaxis PHENOTHIAZINE=ANAPHYLAXIS    Codeine Anaphylaxis     Anaphylaxis  abd pain   Meperidine Anaphylaxis, Hives and Vomiting    Phenothiazines Anaphylaxis and Throat Swelling     Category: Allergy;     Saccharin - Food Allergy Anaphylaxis    Ampicillin-Sulbactam Sodium Hives    Aspirin GI Intolerance and Abdominal Pain     Severe GERD    Gluten Meal - Food Allergy GI Intolerance    Ibuprofen Hives     H    Levofloxacin Hives    Neomycin Other (See Comments), Edema and Hives     Category: Allergy;   swelling    Citrus - Food Allergy Rash    Latex Hives and Rash     Category: Allergy; There were no vitals filed for this visit

## 2022-08-12 NOTE — TELEPHONE ENCOUNTER
Ramonita Mort to discuss her nutrition after receiving notification by Fermin RN on 8/12/22 that pt is appropriate for oncology nutrition care (reason for referral: Malnutrition Screening Tool (MST) Triggers: scored a 4 indicating >/=34# (>/=15 4 kg) recent wt loss and is not eating poorly due to a decreased appetite  (Date of MST: 8/12/22))  Discussed oncology nutrition services available (options for in-person and phone consultation) and the benefits of meeting for a consultation  Jessie Burris took RD contact info and will reach out with any nutrition questions/concerns and/or if she decides that she would like to schedule an oncology nutrition consultation

## 2022-08-15 PROCEDURE — 77263 THER RADIOLOGY TX PLNG CPLX: CPT | Performed by: RADIOLOGY

## 2022-08-16 ENCOUNTER — PATIENT OUTREACH (OUTPATIENT)
Dept: HEMATOLOGY ONCOLOGY | Facility: CLINIC | Age: 62
End: 2022-08-16

## 2022-08-16 NOTE — PROGRESS NOTES
I reached out to patient to check in and see how everything is going  Patient stated everythig is going good  Things are going as planned  Patient is going for [employer]  She states she is aware of the process   Patient ha no questions or concerns at this time  She has my contact  Information and encouraged to call with any questions or concerns that may arise

## 2022-08-17 ENCOUNTER — APPOINTMENT (OUTPATIENT)
Dept: RADIATION ONCOLOGY | Facility: CLINIC | Age: 62
End: 2022-08-17
Attending: RADIOLOGY
Payer: COMMERCIAL

## 2022-08-17 PROCEDURE — 77334 RADIATION TREATMENT AID(S): CPT | Performed by: RADIOLOGY

## 2022-08-17 PROCEDURE — 77290 THER RAD SIMULAJ FIELD CPLX: CPT | Performed by: RADIOLOGY

## 2022-08-22 ENCOUNTER — TELEPHONE (OUTPATIENT)
Dept: HEMATOLOGY ONCOLOGY | Facility: CLINIC | Age: 62
End: 2022-08-22

## 2022-08-22 ENCOUNTER — TELEPHONE (OUTPATIENT)
Dept: GYNECOLOGIC ONCOLOGY | Facility: CLINIC | Age: 62
End: 2022-08-22

## 2022-08-22 ENCOUNTER — TELEPHONE (OUTPATIENT)
Dept: NUTRITION | Facility: CLINIC | Age: 62
End: 2022-08-22

## 2022-08-22 NOTE — TELEPHONE ENCOUNTER
Received call back from [facility] and nutrition education provided:    Outpatient Oncology Nutrition Education  Type of Consult:  Nutrition Education  Care Location: Telephone Call    Reason for referral: Fermin RN on 8/12/22 (reason for referral: Malnutrition Screening Tool (MST) Triggers: scored a 4 indicating >/=34# (>/=15 4 kg) recent wt loss and is not eating poorly due to a decreased appetite  (Date of MST: 8/12/22))  Oncology Diagnosis & Treatments: Diagnosed with left breast cancer, ER positive, metastatic to lymph node 4/2022  S/p left breast lumpectomy 6/21/2022  RT planned to begin 8/26/22  Planned to begin Letrozole post RT  Oncology History   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 ([facility] [address] )   4/14/2022 Initial Diagnosis    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 ([facility] [address] )     4/14/2022 Biopsy    Left axillary Lymph node ultrasound guided biopsy  Metastatic carcinoma, compatible with breast origin  ER >95  AK 95   HER2 1+    On ultrasound lymph node is 1 8 cm  There is cortical thickening without evident fatty hilum  In review of screening mammogram, demonstrates no suspicious mammographic findings identified in either breast  Bilateral MRI is recommended  Flow cytometry - there is no evidence of a B-cell or T-cell non-Hodgkin lymphoma  4/20/2022 Genomic Testing    A total of 36 genes were evaluated, including: ANGEL LUIS, BRCA1, BRCA2, CDH1, CHEK2, PALB2, PTEN, STK11, TP53  Negative result  No pathogenic sequence variants or deletions/duplications identified     5/20/2022 Biopsy    MRI guided biopsy  A  Left breast   11 o'clock   Invasive mammary carcinoma of no special type (ductal)  Grade 1  ER 95  AK 95  HER2 0    B  Right breast   Retroareolar  Benign fibrocystic changes without atypia (discrete 0 3 cm focus of sclerosing and tubular adenosis with usual ductal hyperplasia, columnar cell change and hyperplasia, apocrine metaplasia, cystic dilatation)     - Microcalcifications: Present associated with non-neoplastic breast tissue    - Negative for malignancy, in-situ carcinoma and atypical hyperplasia  Malignant pathology appears unifocal  Biopsy proven carcinoma measured 1 cm on MRI  Biopsy proved metastatic disease to left axillary lymph node  6/9/2022 Genomic Testing    MammaPrint  Low risk  Luminal type A  MammaPrint index: +0 [phone]     6/21/2022 Surgery    Left breast tiffany  directed lumpectomy with axillary dissection  A  Invasive 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  Grade 1  1 3 cm  Lymphovascular invasion is identified  Margins negative    H  Axillary, Level I and Level II axillary contents  Metastatic carcinoma involving 3/17 lymph nodes  1 8 cm   Extranodal extension is identified 1mm  Foci of carcinoma identified in extranodal lymphatic channels    Anatomic stage: Stage IIA  Prognostic stage: Stage IA      6/21/2022 -  Cancer Staged    Staging form: Breast, AJCC 8th Edition  - Pathologic stage from 6/21/2022: Stage IA (pT1c, pN1, cM0, G1, ER+, IA+, HER2-) - Signed by Josselyn Maravilla MD on 8/1/2022  Stage prefix: Initial diagnosis  Nuclear grade: G1  Multigene prognostic tests performed: None  Mitotic count score: Score 1  Histologic grading system: 3 grade system       Malignant neoplasm of lower-inner quadrant of left breast in female, estrogen receptor positive ([address] )   5/20/2022 Initial Diagnosis    Malignant neoplasm of lower-inner quadrant of left breast in female, estrogen receptor positive ([facility] [address] )     5/20/2022 Biopsy    MRI guided biopsy  A  Left breast   11 o'clock   Invasive mammary carcinoma of no special type (ductal)  Grade 1  ER 95  IA 95  HER2 0    B  Right breast   Retroareolar  Benign fibrocystic changes without atypia (discrete 0 3 cm focus of sclerosing and tubular adenosis with usual ductal hyperplasia, columnar cell change and hyperplasia, apocrine metaplasia, cystic dilatation)     - Microcalcifications: Present associated with non-neoplastic breast tissue    - Negative for malignancy, in-situ carcinoma and atypical hyperplasia  Malignant pathology appears unifocal  Biopsy proven carcinoma measured 1 cm on MRI  Biopsy proved metastatic disease to left axillary lymph node  6/9/2022 Genomic Testing    MammaPrint  Low risk  Luminal type A  MammaPrint index: +0 [phone]     6/21/2022 Surgery    Left breast tiffany  directed lumpectomy with axillary dissection  A  Invasive 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  Grade 1  1 3 cm  Lymphovascular invasion is identified  Margins negative    H  Axillary, Level I and Level II axillary contents  Metastatic carcinoma involving 3/17 lymph nodes  1 8 cm   Extranodal extension is identified 1mm  Foci of carcinoma identified in extranodal lymphatic channels    Anatomic stage: Stage IIA  Prognostic stage: Stage IA        Past Medical & Surgical Hx:   Patient Active Problem List   Diagnosis    Back pain, chronic    Chronic insomnia    Lymphedema of right arm    Hyperlipidemia, mixed    Peripheral neuropathy    Lymphedema    Cellulitis of right upper extremity    Sepsis (HCC)    Desmoid tumor    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 (HCC)    Malignant neoplasm of lower-inner quadrant of left breast in female, estrogen receptor positive ([address] )    HTN, goal below 140/90    PONV (postoperative nausea and vomiting)    Use of letrozole (Femara)     Past Medical History:   Diagnosis Date    Abnormal mammogram 05/15/2013    Anemia     Cancer ([address] )     desmoitosis    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 ([address] ) 04/19/2022    Chronic pain disorder     worse right side of body    Desmoid tumor     Last Assessed: 6/19/2017    History of transfusion     no reactions    Hyperlipidemia     Hypertension     Ovarian cancer ([address] ) 1999?   Hysterectomy done    PONV (postoperative nausea and vomiting)      Past Surgical History:   Procedure Laterality Date    BREAST LUMPECTOMY Left 6/21/2022    Procedure: ANITA  DIRECTED LUMPECTOMY;  Surgeon: Dunia Francois MD;  [facility];  Service: Surgical Oncology    FRACTURE SURGERY      HYSTERECTOMY      LYMPH NODE DISSECTION Left 6/21/2022    Procedure: AXILLARY DISSECTION LEVEL I AND LEVEL II LYMPH NODES;  Surgeon: Dunia Francois MD;  [facility];  Service: Surgical Oncology    MRI BREAST BIOPSY LEFT (ALL INCLUSIVE) Left 5/20/2022    MRI BREAST BIOPSY RIGHT (ALL INCLUSIVE) Right 5/20/2022    OTHER SURGICAL HISTORY      Arm Incision: Dermoid tumor, right Arm     PARTIAL HYSTERECTOMY      [place] COLONOSCOPY FLX DX W/COLLJ SPEC WHEN PFRMD N/A 8/15/2017    Procedure: COLONOSCOPY;  Surgeon: Greg Talley MD;  Location: [facility];   Service: Gastroenterology    NJ OPEN RX DISTAL RADIUS FX, INTRA-ARTICULAR, 3+ FRAG Left 2/9/2021    Procedure: OPEN REDUCTION W/ INTERNAL FIXATION (ORIF) RADIUS / ULNA (WRIST) left;  Surgeon: Johnathon Ace MD;  [facility];  Service: Orthopedics    NJ WRIST Seferino Archana LIG Left 8/10/2021    Procedure: RELEASE LEFT CARPAL TUNNEL ENDOSCOPIC;  Surgeon: Johnathon Ace MD;  [facility];  Service: Orthopedics    SKIN BIOPSY      SKIN CANCER EXCISION      Melanoma Excision     TONSILLECTOMY      US BREAST NEEDLE LOC LEFT Left 6/16/2022    US BREAST NEEDLE LOC RIGHT EACH ADDITIONAL Right 6/16/2022    US GUIDED BREAST LYMPH NODE BIOPSY LEFT Left 4/14/2022       Review of Medications: current MMJ use     Current Outpatient Medications:     cholecalciferol (VITAMIN D3) 1,000 units tablet, Take 3,000 Units by mouth daily, Disp: , Rfl:     diphenhydrAMINE (BENADRYL) 50 MG tablet, Take 50 mg by mouth daily at bedtime as needed for itching, Disp: , Rfl:     docusate sodium (COLACE) 100 mg capsule, Take 1 capsule (100 mg total) by mouth 2 (two) times a day as needed for constipation for up to 10 days, Disp: 20 capsule, Rfl: 0    dronabinol (MARINOL) 5 MG capsule, Take 15 mg by mouth 4 (four) times a day (before meals and at bedtime), Disp: , Rfl:     HYDROmorphone (DILAUDID) 2 mg tablet, Take 1 tablet (2 mg total) by mouth every 6 (six) hours as needed for severe pain Not on Discharge list   Patient is taking for severe pain PRN every 6 hours Max Daily Amount: 8 mg (Patient not taking: Reported on 8/12/2022), Disp: 20 tablet, Rfl: 0    letrozole (FEMARA) 2 5 mg tablet, Take 1 tablet (2 5 mg total) by mouth daily, Disp: 90 tablet, Rfl: 3    multivitamin (THERAGRAN) TABS, Take 1 tablet by mouth daily, Disp: , Rfl:     Omega-3 Fatty Acids (FISH OIL PO), Take by mouth, Disp: , Rfl:     Probiotic Product (PROBIOTIC-10 PO), Take by mouth, Disp: , Rfl:     Most Recent Lab Results:   Lab Results   Component Value Date    WBC 6 91 06/01/2022    TRIG 138 04/07/2022    HDL 59 04/07/2022    LDLCALC 229 (H) 04/07/2022    ALT 16 06/01/2022    AST 17 06/01/2022    K 3 7 06/01/2022    K 4 1 04/07/2022    BUN 8 06/01/2022    BUN 9 04/07/2022    CREATININE 0 55 (L) 06/01/2022    CREATININE 0 62 04/07/2022    GLUCOSE 98 05/02/2017    HGBA1C 5 3 07/13/2021    CALCIUM 9 8 06/01/2022    MG 2 0 07/22/2020       Anthropometric Measurements:   Ht Readings from Last 1 Encounters:   08/12/22 5' 8" (1 727 m)     -Weight History:    Wt Readings from Last 15 Encounters:   08/12/22 63 kg (139 lb)   08/12/22 64 kg (141 lb)   08/10/22 64 kg (141 lb)   07/08/22 66 7 kg (147 lb)   06/24/22 64 9 kg (143 lb)   06/21/22 65 3 kg (143 lb 15 4 oz)   06/16/22 65 8 kg (145 lb)   06/01/22 65 8 kg (145 lb)   05/04/22 68 kg (150 lb)   04/27/22 68 kg (150 lb)   04/27/22 68 kg (150 lb)   04/20/22 67 6 kg (149 lb)   03/29/22 68 9 kg (152 lb)   03/29/22 67 1 kg (148 lb)   03/23/22 68 9 kg (152 lb)     Oncology Nutrition-Anthropometrics    Flowsheet Row Telephone from 8/22/2022 in [facility] On[facility] Dietitian Britt   Patient age (years): 59 years   Patient (female) height (in): 76 in   Current Weight to be used for anthropometric calculations (lbs) 139 lbs   Current Weight to be used for anthropometric calculations (kg) 63 2 kg   BMI: 21 1   IBW female: 140 lbs   IBW (kg) female: 63 6 kg   IBW % (female) 99 3 %   Adjusted BW (female): 139 8 lbs   Adjusted BW kg (female): 63 5 kg   % weight change after 1 month: -5 4 %   Weight change after 1 month (lbs) -8 lbs   % weight change after 3 months: -7 3 %   Weight change after 3 months (lbs) -11 lbs          Diet restrictions: vegetarian (does eat fish), lactose-free, gluten free   Diet Recall:   Breakfast: 4 peanut butter crackers, coffee  Lunch: mashed potatoes with corn OR salad OR veggie burger  Dinner: fish, vegetable, salad OR V8 soup   Snack: guac and chips   Beverages: water (8oz x2-3), V8 (6oz x1), 1 5oz of liquor (5x/week)     Oncology Nutrition-Estimated Needs    Flowsheet Row Telephone from 2022 in [address] [place] [address] Dietitian Britt   Weight type used Actual weight   Weight in kilograms (kg) used for estimated needs 63 2 kg   Energy needs formula:  35-40 kcal/kg   Energy needs based on 35 kcal/k kcal   Energy needs based on 40 kcal/k kcal   Protein needs formula: 1 5-2 g/kg   Protein needs based on 1 5 g/kg 95 g   Protein needs based on 2 g/kg 126 g   Fluid needs formula: 30-35 mL/kg   Fluid needs based on 30 mL/kg 1896 mL   Fluid needs in ounces 64 oz   Fluid needs based on 35 mL/kg 2212 mL   Fluid needs in ounces 75 oz          Discussion/Summary:  Eileen was assessed today for nutrition education regarding wt loss  Osiel Bermudez explains that she has been losing weight over the past few months and is looking for suggestions to help prevent further weight loss  Reviewed 24hr diet recall with reveals inadequate calorie, protein, and fluid intake   Reviewed the importance of wt management throughout the tx process and the role of a high kcal/ high protein diet in managing wt and overall health  Discussed ways to increase calorie and protein intake, suggestions include: eating smaller/more frequent meals, including high calorie foods in diet ( peanut butter, avocado, olive oil), including high protein foods in diet ( fish, beans/legumes, nuts/nut butters, quinoa), eating when feeling most hungry, and keeping non perishable foods nearby to snack on  Reviewed choosing liquids with calories: Fairlife milk (higher protein/lactose-free milk), 100% fruit juice, diluted juice, bone broth (higher protein broth), creamy soups, sports drinks (Gatorade, Poweraide, Pedialyte, etc ), [place] ice, popsicles, milkshakes, smoothies, oral nutrition supplements (Orgain ), gelatin/Jello, etc     Discussed the potential interactions of high dose antioxidants with cancer tx and recommended exercising caution when taking these supplements  Discussed reading supplement labels for supplements with <100% of the daily value for all nutrients  Eileen verbalized understanding  All questions and concerns addressed during todays visit  Roderick Boyd has RD contact information      Materials Provided: [employer] Eating Hints book, Nutrition for Breast Cancer Survivors, Nutrition During Cancer Treatment, Protein sources handout, Orgain Samples, Recipes for Lactose Free Shakes -all mailed to pts home     Follow Up Plan: follow up 8/29/22 after radiation treatment   Recommend Referral to Other Providers: none at this time

## 2022-08-22 NOTE — TELEPHONE ENCOUNTER
Call out to patient  Reviewed with Dr Wade Lucio that patient to start letrozole after RT  Reviewed with patient that patient would take letrozole after RT  Also that Christina Rodriguez from Nutrition did call an hour ago and left a VM  Patient appreciative of call

## 2022-08-22 NOTE — TELEPHONE ENCOUNTER
Pt called in regarding her medication- pt was sent back over to [facility] for Dr Brenda Epperson office   Pt states that she would also like a call back from the [facility]

## 2022-08-22 NOTE — TELEPHONE ENCOUNTER
Ramonita Arita to discuss her nutrition after receiving notification from [address] that pt is requesting call from RD  No answer, LVM with reason for call and RD contact info

## 2022-08-26 ENCOUNTER — APPOINTMENT (OUTPATIENT)
Dept: RADIATION ONCOLOGY | Facility: CLINIC | Age: 62
End: 2022-08-26
Attending: RADIOLOGY
Payer: COMMERCIAL

## 2022-08-26 DIAGNOSIS — C50.912 CARCINOMA OF LEFT BREAST METASTATIC TO AXILLARY LYMPH NODE (HCC): Primary | ICD-10-CM

## 2022-08-26 DIAGNOSIS — C77.3 CARCINOMA OF LEFT BREAST METASTATIC TO AXILLARY LYMPH NODE (HCC): Primary | ICD-10-CM

## 2022-08-26 PROCEDURE — 77280 THER RAD SIMULAJ FIELD SMPL: CPT | Performed by: RADIOLOGY

## 2022-08-26 PROCEDURE — 77334 RADIATION TREATMENT AID(S): CPT | Performed by: RADIOLOGY

## 2022-08-26 PROCEDURE — 77300 RADIATION THERAPY DOSE PLAN: CPT | Performed by: RADIOLOGY

## 2022-08-26 PROCEDURE — 77295 3-D RADIOTHERAPY PLAN: CPT | Performed by: RADIOLOGY

## 2022-08-29 ENCOUNTER — APPOINTMENT (OUTPATIENT)
Dept: RADIATION ONCOLOGY | Facility: CLINIC | Age: 62
End: 2022-08-29
Attending: RADIOLOGY
Payer: COMMERCIAL

## 2022-08-29 ENCOUNTER — NUTRITION (OUTPATIENT)
Dept: NUTRITION | Facility: CLINIC | Age: 62
End: 2022-08-29

## 2022-08-29 DIAGNOSIS — Z71.3 NUTRITIONAL COUNSELING: Primary | ICD-10-CM

## 2022-08-29 PROCEDURE — G6017 INTRAFRACTION TRACK MOTION: HCPCS | Performed by: RADIOLOGY

## 2022-08-29 PROCEDURE — 77412 RADIATION TX DELIVERY LVL 3: CPT | Performed by: RADIOLOGY

## 2022-08-29 PROCEDURE — 77387 GUIDANCE FOR RADJ TX DLVR: CPT | Performed by: RADIOLOGY

## 2022-08-29 PROCEDURE — 77427 RADIATION TX MANAGEMENT X5: CPT | Performed by: RADIOLOGY

## 2022-08-29 NOTE — PROGRESS NOTES
Outpatient Oncology Nutrition Consultation   Type of Consult: Follow Up  Care Location: Office Visit    Reason for referral: Fermin RN on 8/12/22 (reason for referral: Malnutrition Screening Tool (MST) Triggers: scored a 4 indicating >/=34# (>/=15 4 kg) recent wt loss and is not eating poorly due to a decreased appetite  (Date of MST: 8/12/22))  Nutrition Assessment:   Oncology Diagnosis & Treatments: Diagnosed with left breast cancer, ER positive, metastatic to lymph node 4/2022  · S/p left breast lumpectomy 6/21/2022  · RT start 8/29/22   · Planned to begin Letrozole post RT  Oncology History   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 ([facility] [place] [address] )   4/14/2022 Initial Diagnosis    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 ([facility] [place] [address] )     4/14/2022 Biopsy    Left axillary Lymph node ultrasound guided biopsy  Metastatic carcinoma, compatible with breast origin  ER >95  MT 95   HER2 1+    On ultrasound lymph node is 1 8 cm  There is cortical thickening without evident fatty hilum  In review of screening mammogram, demonstrates no suspicious mammographic findings identified in either breast  Bilateral MRI is recommended  Flow cytometry - there is no evidence of a B-cell or T-cell non-Hodgkin lymphoma  4/20/2022 Genomic Testing    A total of 36 genes were evaluated, including: ANGEL LUIS, BRCA1, BRCA2, CDH1, CHEK2, PALB2, PTEN, STK11, TP53  Negative result  No pathogenic sequence variants or deletions/duplications identified     5/20/2022 Biopsy    MRI guided biopsy  A  Left breast   11 o'clock   Invasive mammary carcinoma of no special type (ductal)  Grade 1  ER 95  MT 95  HER2 0    B  Right breast   Retroareolar  Benign fibrocystic changes without atypia (discrete 0 3 cm focus of sclerosing and tubular adenosis with usual ductal hyperplasia, columnar cell change and hyperplasia, apocrine metaplasia, cystic dilatation)     - Microcalcifications: Present associated with non-neoplastic breast tissue    - Negative for malignancy, in-situ carcinoma and atypical hyperplasia  Malignant pathology appears unifocal  Biopsy proven carcinoma measured 1 cm on MRI  Biopsy proved metastatic disease to left axillary lymph node  6/9/2022 Genomic Testing    MammaPrint  Low risk  Luminal type A  MammaPrint index: +[phone]     6/21/2022 Surgery    Left breast tiffany  directed lumpectomy with axillary dissection  A  Invasive 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  Grade 1  1 3 cm  Lymphovascular invasion is identified  Margins negative    H  Axillary, Level I and Level II axillary contents  Metastatic carcinoma involving 3/17 lymph nodes  1 8 cm   Extranodal extension is identified 1mm  Foci of carcinoma identified in extranodal lymphatic channels    Anatomic stage: Stage IIA  Prognostic stage: Stage IA      6/21/2022 -  Cancer Staged    Staging form: Breast, AJCC 8th Edition  - Pathologic stage from 6/21/2022: Stage IA (pT1c, pN1, cM0, G1, ER+, IL+, HER2-) - Signed by Haim Webb MD on 8/1/2022  Stage prefix: Initial diagnosis  Nuclear grade: G1  Multigene prognostic tests performed: None  Mitotic count score: Score 1  Histologic grading system: 3 grade system       Malignant neoplasm of lower-inner quadrant of left breast in female, estrogen receptor positive ([facility] [address] )   5/20/2022 Initial Diagnosis    Malignant neoplasm of lower-inner quadrant of left breast in female, estrogen receptor positive ([facility] [address] )     5/20/2022 Biopsy    MRI guided biopsy  A  Left breast   11 o'clock   Invasive mammary carcinoma of no special type (ductal)  Grade 1  ER 95  IL 95  HER2 0    B  Right breast   Retroareolar  Benign fibrocystic changes without atypia (discrete 0 3 cm focus of sclerosing and tubular adenosis with usual ductal hyperplasia, columnar cell change and hyperplasia, apocrine metaplasia, cystic dilatation)  - Microcalcifications: Present associated with non-neoplastic breast tissue     - Negative for malignancy, in-situ carcinoma and atypical hyperplasia  Malignant pathology appears unifocal  Biopsy proven carcinoma measured 1 cm on MRI  Biopsy proved metastatic disease to left axillary lymph node  6/9/2022 Genomic Testing    MammaPrint  Low risk  Luminal type A  MammaPrint index: +0 [phone]     6/21/2022 Surgery    Left breast tiffany  directed lumpectomy with axillary dissection  A  Invasive 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  Grade 1  1 3 cm  Lymphovascular invasion is identified  Margins negative    H  Axillary, Level I and Level II axillary contents  Metastatic carcinoma involving 3/17 lymph nodes  1 8 cm   Extranodal extension is identified 1mm  Foci of carcinoma identified in extranodal lymphatic channels    Anatomic stage: Stage IIA  Prognostic stage: Stage IA        Past Medical & Surgical Hx:   Patient Active Problem List   Diagnosis    Back pain, chronic    Chronic insomnia    Lymphedema of right arm    Hyperlipidemia, mixed    Peripheral neuropathy    Lymphedema    Cellulitis of right upper extremity    Sepsis (HCC)    Desmoid tumor    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 (HCC)    Malignant neoplasm of lower-inner quadrant of left breast in female, estrogen receptor positive ([address] )    HTN, goal below 140/90    PONV (postoperative nausea and vomiting)    Use of letrozole (Femara)     Past Medical History:   Diagnosis Date    Abnormal mammogram 05/15/2013    Anemia     Cancer ([address] )     desmoitosis    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 ([address] ) 04/19/2022    Chronic pain disorder     worse right side of body    Desmoid tumor     Last Assessed: 6/19/2017    History of transfusion     no reactions    Hyperlipidemia     Hypertension     Ovarian cancer ([address] ) 1999?   Hysterectomy done    PONV (postoperative nausea and vomiting)      Past Surgical History:   Procedure Laterality Date    BREAST LUMPECTOMY Left 6/21/2022    Procedure: ANITA  DIRECTED LUMPECTOMY;  Surgeon: Elan Lyons MD;  [facility];  Service: Surgical Oncology    FRACTURE SURGERY      HYSTERECTOMY      LYMPH NODE DISSECTION Left 6/21/2022    Procedure: AXILLARY DISSECTION LEVEL I AND LEVEL II LYMPH NODES;  Surgeon: Elan Lyons MD;  [facility];  Service: Surgical Oncology    MRI BREAST BIOPSY LEFT (ALL INCLUSIVE) Left 5/20/2022    MRI BREAST BIOPSY RIGHT (ALL INCLUSIVE) Right 5/20/2022    OTHER SURGICAL HISTORY      Arm Incision: Dermoid tumor, right Arm     PARTIAL HYSTERECTOMY      NE COLONOSCOPY FLX DX W/COLLJ SPEC WHEN PFRMD N/A 8/15/2017    Procedure: COLONOSCOPY;  Surgeon: Paulette Horvath MD;  [facility]: [facility];   Service: Gastroenterology    NE OPEN RX DISTAL RADIUS FX, INTRA-ARTICULAR, 3+ FRAG Left 2/9/2021    Procedure: OPEN REDUCTION W/ INTERNAL FIXATION (ORIF) RADIUS / ULNA (WRIST) left;  Surgeon: Nahid Florian MD;  [facility];  Service: Orthopedics    NE WRIST Hurtis [place]na LIG Left 8/10/2021    Procedure: RELEASE LEFT CARPAL TUNNEL ENDOSCOPIC;  Surgeon: Nahid Florian MD;  [facility];  Service: Orthopedics    SKIN BIOPSY      SKIN CANCER EXCISION      Melanoma Excision     TONSILLECTOMY      US BREAST NEEDLE LOC LEFT Left 6/16/2022    US BREAST NEEDLE LOC RIGHT EACH ADDITIONAL Right 6/16/2022    US GUIDED BREAST LYMPH NODE BIOPSY LEFT Left 4/14/2022       Review of Medications:   Vitamins, Supplements and Herbals: Med List Reviewed & pt is only taking: fish oil, probiotic, vitamin D     Current Outpatient Medications:     cholecalciferol (VITAMIN D3) 1,000 units tablet, Take 3,000 Units by mouth daily, Disp: , Rfl:     diphenhydrAMINE (BENADRYL) 50 MG tablet, Take 50 mg by mouth daily at bedtime as needed for itching, Disp: , Rfl:     docusate sodium (COLACE) 100 mg capsule, Take 1 capsule (100 mg total) by mouth 2 (two) times a day as needed for constipation for up to 10 days, Disp: 20 capsule, Rfl: 0    dronabinol (MARINOL) 5 MG capsule, Take 15 mg by mouth 4 (four) times a day (before meals and at bedtime), Disp: , Rfl:     HYDROmorphone (DILAUDID) 2 mg tablet, Take 1 tablet (2 mg total) by mouth every 6 (six) hours as needed for severe pain Not on Discharge list   Patient is taking for severe pain PRN every 6 hours Max Daily Amount: 8 mg (Patient not taking: Reported on 8/12/2022), Disp: 20 tablet, Rfl: 0    letrozole (FEMARA) 2 5 mg tablet, Take 1 tablet (2 5 mg total) by mouth daily, Disp: 90 tablet, Rfl: 3    multivitamin (THERAGRAN) TABS, Take 1 tablet by mouth daily, Disp: , Rfl:     Omega-3 Fatty Acids (FISH OIL PO), Take by mouth, Disp: , Rfl:     Probiotic Product (PROBIOTIC-10 PO), Take by mouth, Disp: , Rfl:     Most Recent Lab Results:   Lab Results   Component Value Date    WBC 6 91 06/01/2022    NEUTROABS 4 12 06/01/2022    CHOLESTEROL 316 (H) 04/07/2022    TRIG 138 04/07/2022    HDL 59 04/07/2022    LDLCALC 229 (H) 04/07/2022    ALT 16 06/01/2022    AST 17 06/01/2022    ALB 4 0 06/01/2022    SODIUM 136 06/01/2022    SODIUM 136 04/07/2022    K 3 7 06/01/2022    K 4 1 04/07/2022     06/01/2022    CHLORIDE 103 05/02/2017    BUN 8 06/01/2022    BUN 9 04/07/2022    CREATININE 0 55 (L) 06/01/2022    CREATININE 0 62 04/07/2022    EGFR 100 06/01/2022    GLUCOSE 98 05/02/2017    GLUF 87 04/07/2022    GLUF 82 07/13/2021    GLUC 100 06/01/2022    HGBA1C 5 3 07/13/2021    CALCIUM 9 8 06/01/2022    MG 2 0 07/22/2020       Anthropometric Measurements:   Height: 68"  Ht Readings from Last 1 Encounters:   08/12/22 5' 8" (1 727 m)     Wt Readings from Last 20 Encounters:   08/12/22 63 kg (139 lb)   08/12/22 64 kg (141 lb)   08/10/22 64 kg (141 lb)   07/08/22 66 7 kg (147 lb)   06/24/22 64 9 kg (143 lb)   06/21/22 65 3 kg (143 lb 15 4 oz)   06/16/22 65 8 kg (145 lb)   06/01/22 65 8 kg (145 lb)   05/04/22 68 kg (150 lb) 04/27/22 68 kg (150 lb)   04/27/22 68 kg (150 lb)   04/20/22 67 6 kg (149 lb)   03/29/22 68 9 kg (152 lb)   03/29/22 67 1 kg (148 lb)   03/23/22 68 9 kg (152 lb)   02/21/22 68 9 kg (152 lb)   01/26/22 68 9 kg (152 lb)   08/19/21 73 2 kg (161 lb 4 8 oz)   08/10/21 71 9 kg (158 lb 8 2 oz)   08/05/21 72 5 kg (159 lb 12 8 oz)       Weight History:    Usual Weight: 150#   Varian: (8/29/22) 138 5#   Home Scale: (8/29/22) 135#    Oncology Nutrition-Anthropometrics    Flowsheet Row Nutrition from 8/29/2022 in [address] [place] [address] Telephone from 8/22/2022 in [place] [address] Oncology Dietitian [place]   Patient age (years): 59 years 59 years   Patient (female) height (in): 76 in 76 in   Current Weight to be used for anthropometric calculations (lbs) 138 5 lbs 139 lbs   Current Weight to be used for anthropometric calculations (kg) 63 kg 63 2 kg   BMI: 21 1 21 1   IBW female: 140 lbs 140 lbs   IBW (kg) female: 63 6 kg 63 6 kg   IBW % (female) 98 9 % 99 3 %   Adjusted BW (female): 139 6 lbs 139 8 lbs   Adjusted BW kg (female): 63 5 kg 63 5 kg   % weight change after 1 month: -5 8 % -5 4 %   Weight change after 1 month (lbs) -8 5 lbs -8 lbs   % weight change after 3 months: -7 7 % -7 3 %   Weight change after 3 months (lbs) -11 5 lbs -11 lbs   % weight change after 6 months: -8 9 % --   Weight change after 6 months (lbs) -13 5 lbs --   % weight change after 1 year: -14 1 % --          Nutrition-Focused Physical Findings: none observed    Food/Nutrition-Related History & Client/Social History:    Current Nutrition Impact Symptoms:  [] Nausea  [] Reduced Appetite  [] Acid Reflux    [] Vomiting  [x] Unintended Wt Loss  [] Malabsorption    [] Diarrhea  [] Unintended Wt Gain  [] Dumping Syndrome    [] Constipation  [] Thick Mucous/Secretions  [] Abdominal Pain    [] Dysgeusia (Altered Taste)  [] Xerostomia (Dry Mouth)  [] Gas    [] Dysosmia (Altered Smell)  [] Thrush  [] Difficulty Chewing    [] Oral Mucositis (Sore Mouth)  [] Fatigue  [] Hyperglycemia    [] Odynophagia  [] Esophagitis  [] Other:    [] Dysphagia  [] Early Satiety  [] No Problems Eating      Food Allergies & Intolerances: Pt reports Celiac disease     Current Diet: Lactose-Free, Gluten free, Vegetarian (does eat fish)  Current Nutrition Intake: Unchanged from usual  Appetite: Fair   Nutrition Route: PO  Oral Care: brushes BID  Activity level: ADL, ambulates independently  25 Hr Diet Recall:   Breakfast: peanut butter crackers   Lunch: veggie burger OR salad OR tomatoes with hummus   Dinner: fish and veggies     Beverages: 1/2 gatorade + 1/2 water (16 oz x8)  Supplements:    Has Orgain protein powder samples       Oncology Nutrition-Estimated Needs    Flowsheet Row Nutrition from 2022 in [address] [place] [address] Telephone from 2022 in [facility] Oncology Dietitian [place]   Weight type used Actual weight Actual weight   Weight in kilograms (kg) used for estimated needs 63 kg 63 2 kg   Energy needs formula:  35-40 kcal/kg 35-40 kcal/kg   Energy needs based on 35 kcal/k kcal 2211 kcal   Energy needs based on 40 kcal/k kcal 2527 kcal   Protein needs formula: 1 5-2 g/kg 1 5-2 g/kg   Protein needs based on 1 5 g/kg 94 g 95 g   Protein needs based on 2 g/kg 126 g 126 g   Fluid needs formula: 30-35 mL/kg 30-35 mL/kg   Fluid needs based on 30 mL/kg 27 mL 1896 mL   Fluid needs in ounces 1 oz 64 oz   Fluid needs based on 35 mL/kg 32 mL 2212 mL   Fluid needs in ounces 1 oz 75 oz           Discussion & Intervention:   Nicholas Dorsey was evaluated today for an RD follow up regarding wt loss  Nicholas Dorsey is currently undergoing tx for breast cancer  She explains today that she is doing okay, it is her first day of RT today  Her appetite remains unchanged over the past week  She did receive Orgain protein powder samples that were sent in the mail by this RD, but she has not tried them yet   Reviewed making homemade smoothie using this protein powder  Reviewed 24 hour recall, which revealed an suboptimal po intake, and discussed ways to increase kcal, protein, and fluid intakes and optimize nutrient intake  Also reviewed the importance of wt management throughout the tx process and the role of a high kcal/ high protein diet in managing wt and overall health  Based on today's assessment, discussion included: how to modify foods for anticipated nutrition impact symptoms pt may experience during CA tx, a high calorie, high protein diet & food choices to include at all meals & snacks, adequate hydration & fluid choices, sipping on calorie containing beverages (examples include: adding whole milk or cream to coffee, oral nutrition supplements, juice, electrolyte replacement beverages, milk, etc ), eating smaller more frequent meals every 2-3 hours (5-6 small meals/day), utilizing oral nutrition supplements and mindful eating concepts  Moving forward, Allison Camacho was encouraged to increase kcal, protein, and fluid intakes  Materials Provided: not applicable  All questions and concerns addressed during todays visit  Allison Camacho has RD contact information  Nutrition Diagnosis:    Inadequate Energy Intake related to physiological causes, disease state and treatment related issues as evidenced by food recall, wt loss and discussion with pt and/or family   Increased Nutrient Needs (kcal & pro) related to increased demand for nutrients and disease state as evidenced by cancer dx and pt undergoing tx for cancer    Monitoring & Evaluation:   Goals:  · weight maintenance/stabilization  · adequate nutrition impact symptom management  · pt to meet >/=75% estimated nutrition needs daily    · Progress Towards Goals: Progressing    Nutrition Rx & Recommendations:  · Diet: High Calorie, High Protein (for high calorie foods see pages 52-53, and for high protein foods see pages 49-51 in your Eating Hints book)  · Include a variety of foods (as tolerated/allowed by diet)  · Incorporate physical activity as able/allowed  · Stay hydrated by sipping fluids of choice/tolerance throughout the day  · Alter food choices and eating patterns to accommodate changing needs  · Refer to Eating Hints book for other meal/snack ideas and symptom management  · Weigh yourself regularly  If you notice weight loss, make an effort to increase your daily food/calorie intake  If you continue to notice loss after these efforts, reach out to your dietitian to establish a plan to stabilize weight  Ways to increase calorie and protein intake:   Eat when feeling most hungry  Choose foods that are easy to eat: oatmeal, soup, cereal  Keep non perishable snacks nearby  5-6 small meals/snacks daily  Protein at all meals/snacks: beans/legumes, fish, nuts/nut butters, quinoa, oatmeal, seeds   Add high calorie foods to meals: olive oil, avocado, butter, peanut butter   Choose liquids with calories: Fairlife milk (higher protein/lactose-free milk), 100% fruit juice, diluted juice, bone broth (higher protein broth), sports drinks (Gatorade, Poweraide, Pedialyte, etc ), [place] ice, popsicles, milkshakes, smoothies, oral nutrition supplements (Orgain ), gelatin/Jello, etc   Use oral nutrition supplements to make homemade smoothies or milkshakes  Choose oral nutrition supplements with >300 calories per serving          Follow Up Plan: 9/12/22 after radiation treatment    Recommend Referral to Other Providers: none at this time

## 2022-08-29 NOTE — PATIENT INSTRUCTIONS
Nutrition Rx & Recommendations:  Diet: High Calorie, High Protein (for high calorie foods see pages 52-53, and for high protein foods see pages 49-51 in your Eating Hints book)  Include a variety of foods (as tolerated/allowed by diet)  Incorporate physical activity as able/allowed  Stay hydrated by sipping fluids of choice/tolerance throughout the day  Alter food choices and eating patterns to accommodate changing needs  Refer to Eating Hints book for other meal/snack ideas and symptom management  Weigh yourself regularly  If you notice weight loss, make an effort to increase your daily food/calorie intake  If you continue to notice loss after these efforts, reach out to your dietitian to establish a plan to stabilize weight  Ways to increase calorie and protein intake:   Eat when feeling most hungry  Choose foods that are easy to eat: oatmeal, soup, cereal  Keep non perishable snacks nearby  5-6 small meals/snacks daily  Protein at all meals/snacks: beans/legumes, fish, nuts/nut butters, quinoa, oatmeal, seeds   Add high calorie foods to meals: olive oil, avocado, butter, peanut butter   Choose liquids with calories: Fairlife milk (higher protein/lactose-free milk), 100% fruit juice, diluted juice, bone broth (higher protein broth), sports drinks (Gatorade, Poweraide, Pedialyte, etc ), [place] ice, popsicles, milkshakes, smoothies, oral nutrition supplements (Orgain ), gelatin/Jello, etc   Use oral nutrition supplements to make homemade smoothies or milkshakes  Choose oral nutrition supplements with >300 calories per serving          Follow Up Plan: 9/12/22 after radiation treatment    Recommend Referral to Other Providers: none at this time

## 2022-08-30 ENCOUNTER — APPOINTMENT (OUTPATIENT)
Dept: RADIATION ONCOLOGY | Facility: CLINIC | Age: 62
End: 2022-08-30
Attending: RADIOLOGY
Payer: COMMERCIAL

## 2022-08-30 PROCEDURE — 77412 RADIATION TX DELIVERY LVL 3: CPT | Performed by: RADIOLOGY

## 2022-08-30 PROCEDURE — 77387 GUIDANCE FOR RADJ TX DLVR: CPT | Performed by: RADIOLOGY

## 2022-08-31 ENCOUNTER — APPOINTMENT (OUTPATIENT)
Dept: RADIATION ONCOLOGY | Facility: CLINIC | Age: 62
End: 2022-08-31
Attending: RADIOLOGY
Payer: COMMERCIAL

## 2022-08-31 PROCEDURE — 77331 SPECIAL RADIATION DOSIMETRY: CPT | Performed by: RADIOLOGY

## 2022-08-31 PROCEDURE — 77387 GUIDANCE FOR RADJ TX DLVR: CPT | Performed by: RADIOLOGY

## 2022-08-31 PROCEDURE — 77412 RADIATION TX DELIVERY LVL 3: CPT | Performed by: RADIOLOGY

## 2022-09-01 ENCOUNTER — APPOINTMENT (OUTPATIENT)
Dept: LAB | Facility: HOSPITAL | Age: 62
End: 2022-09-01
Payer: COMMERCIAL

## 2022-09-01 ENCOUNTER — APPOINTMENT (OUTPATIENT)
Dept: RADIATION ONCOLOGY | Facility: CLINIC | Age: 62
End: 2022-09-01
Attending: RADIOLOGY
Payer: COMMERCIAL

## 2022-09-01 LAB
BASOPHILS # BLD AUTO: 0.03 THOUSANDS/ΜL (ref 0–0.1)
BASOPHILS NFR BLD AUTO: 0 % (ref 0–1)
EOSINOPHIL # BLD AUTO: 0.06 THOUSAND/ΜL (ref 0–0.61)
EOSINOPHIL NFR BLD AUTO: 1 % (ref 0–6)
ERYTHROCYTE [DISTWIDTH] IN BLOOD BY AUTOMATED COUNT: 13.1 % (ref 11.6–15.1)
HCT VFR BLD AUTO: 44.1 % (ref 34.8–46.1)
HGB BLD-MCNC: 14.9 G/DL (ref 11.5–15.4)
IMM GRANULOCYTES # BLD AUTO: 0.02 THOUSAND/UL (ref 0–0.2)
IMM GRANULOCYTES NFR BLD AUTO: 0 % (ref 0–2)
LYMPHOCYTES # BLD AUTO: 1.39 THOUSANDS/ΜL (ref 0.6–4.47)
LYMPHOCYTES NFR BLD AUTO: 19 % (ref 14–44)
MCH RBC QN AUTO: 31.1 PG (ref 26.8–34.3)
MCHC RBC AUTO-ENTMCNC: 33.8 G/DL (ref 31.4–37.4)
MCV RBC AUTO: 92 FL (ref 82–98)
MONOCYTES # BLD AUTO: 0.71 THOUSAND/ΜL (ref 0.17–1.22)
MONOCYTES NFR BLD AUTO: 10 % (ref 4–12)
NEUTROPHILS # BLD AUTO: 4.94 THOUSANDS/ΜL (ref 1.85–7.62)
NEUTS SEG NFR BLD AUTO: 70 % (ref 43–75)
NRBC BLD AUTO-RTO: 0 /100 WBCS
PLATELET # BLD AUTO: 337 THOUSANDS/UL (ref 149–390)
PMV BLD AUTO: 8.9 FL (ref 8.9–12.7)
RBC # BLD AUTO: 4.79 MILLION/UL (ref 3.81–5.12)
WBC # BLD AUTO: 7.15 THOUSAND/UL (ref 4.31–10.16)

## 2022-09-01 PROCEDURE — 85025 COMPLETE CBC W/AUTO DIFF WBC: CPT

## 2022-09-01 PROCEDURE — 77387 GUIDANCE FOR RADJ TX DLVR: CPT | Performed by: STUDENT IN AN ORGANIZED HEALTH CARE EDUCATION/TRAINING PROGRAM

## 2022-09-01 PROCEDURE — 36415 COLL VENOUS BLD VENIPUNCTURE: CPT

## 2022-09-01 PROCEDURE — 77412 RADIATION TX DELIVERY LVL 3: CPT | Performed by: STUDENT IN AN ORGANIZED HEALTH CARE EDUCATION/TRAINING PROGRAM

## 2022-09-02 ENCOUNTER — APPOINTMENT (OUTPATIENT)
Dept: RADIATION ONCOLOGY | Facility: CLINIC | Age: 62
End: 2022-09-02
Attending: RADIOLOGY
Payer: COMMERCIAL

## 2022-09-02 PROCEDURE — 77336 RADIATION PHYSICS CONSULT: CPT | Performed by: RADIOLOGY

## 2022-09-02 PROCEDURE — 77412 RADIATION TX DELIVERY LVL 3: CPT | Performed by: RADIOLOGY

## 2022-09-02 PROCEDURE — 77387 GUIDANCE FOR RADJ TX DLVR: CPT | Performed by: RADIOLOGY

## 2022-09-06 ENCOUNTER — APPOINTMENT (OUTPATIENT)
Dept: RADIATION ONCOLOGY | Facility: CLINIC | Age: 62
End: 2022-09-06
Attending: RADIOLOGY
Payer: COMMERCIAL

## 2022-09-06 PROCEDURE — 77387 GUIDANCE FOR RADJ TX DLVR: CPT | Performed by: RADIOLOGY

## 2022-09-06 PROCEDURE — 77412 RADIATION TX DELIVERY LVL 3: CPT | Performed by: RADIOLOGY

## 2022-09-06 PROCEDURE — G6017 INTRAFRACTION TRACK MOTION: HCPCS | Performed by: RADIOLOGY

## 2022-09-06 PROCEDURE — 77427 RADIATION TX MANAGEMENT X5: CPT | Performed by: RADIOLOGY

## 2022-09-07 ENCOUNTER — APPOINTMENT (OUTPATIENT)
Dept: RADIATION ONCOLOGY | Facility: CLINIC | Age: 62
End: 2022-09-07
Attending: RADIOLOGY
Payer: COMMERCIAL

## 2022-09-07 PROCEDURE — 77412 RADIATION TX DELIVERY LVL 3: CPT | Performed by: RADIOLOGY

## 2022-09-07 PROCEDURE — 77387 GUIDANCE FOR RADJ TX DLVR: CPT | Performed by: RADIOLOGY

## 2022-09-08 ENCOUNTER — APPOINTMENT (OUTPATIENT)
Dept: RADIATION ONCOLOGY | Facility: CLINIC | Age: 62
End: 2022-09-08
Attending: RADIOLOGY
Payer: COMMERCIAL

## 2022-09-08 PROCEDURE — 77387 GUIDANCE FOR RADJ TX DLVR: CPT | Performed by: RADIOLOGY

## 2022-09-08 PROCEDURE — 77412 RADIATION TX DELIVERY LVL 3: CPT | Performed by: RADIOLOGY

## 2022-09-09 ENCOUNTER — APPOINTMENT (OUTPATIENT)
Dept: RADIATION ONCOLOGY | Facility: CLINIC | Age: 62
End: 2022-09-09
Attending: RADIOLOGY
Payer: COMMERCIAL

## 2022-09-09 PROCEDURE — 77412 RADIATION TX DELIVERY LVL 3: CPT | Performed by: RADIOLOGY

## 2022-09-09 PROCEDURE — 77387 GUIDANCE FOR RADJ TX DLVR: CPT | Performed by: RADIOLOGY

## 2022-09-12 ENCOUNTER — NUTRITION (OUTPATIENT)
Dept: NUTRITION | Facility: CLINIC | Age: 62
End: 2022-09-12

## 2022-09-12 ENCOUNTER — APPOINTMENT (OUTPATIENT)
Dept: RADIATION ONCOLOGY | Facility: CLINIC | Age: 62
End: 2022-09-12
Attending: RADIOLOGY
Payer: COMMERCIAL

## 2022-09-12 VITALS — WEIGHT: 140 LBS | BODY MASS INDEX: 21.29 KG/M2

## 2022-09-12 DIAGNOSIS — Z71.3 NUTRITIONAL COUNSELING: Primary | ICD-10-CM

## 2022-09-12 PROCEDURE — 77412 RADIATION TX DELIVERY LVL 3: CPT | Performed by: RADIOLOGY

## 2022-09-12 PROCEDURE — G6017 INTRAFRACTION TRACK MOTION: HCPCS | Performed by: RADIOLOGY

## 2022-09-12 PROCEDURE — 77387 GUIDANCE FOR RADJ TX DLVR: CPT | Performed by: RADIOLOGY

## 2022-09-12 PROCEDURE — 77336 RADIATION PHYSICS CONSULT: CPT | Performed by: RADIOLOGY

## 2022-09-12 NOTE — PROGRESS NOTES
Outpatient Oncology Nutrition Consultation   Type of Consult: Follow Up  Care Location: Office Visit    Reason for referral: Fermin RN on 8/12/22 (reason for referral: Malnutrition Screening Tool (MST) Triggers: scored a 4 indicating >/=34# (>/=15 4 kg) recent wt loss and is not eating poorly due to a decreased appetite  (Date of MST: 8/12/22))  Nutrition Assessment:   Oncology Diagnosis & Treatments: Diagnosed with left breast cancer, ER positive, metastatic to lymph node 4/2022  · S/p left breast lumpectomy 6/21/2022  · RT start 8/29/22   · Planned to begin Letrozole post RT  Oncology History   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 ([facility] [address] )   4/14/2022 Initial Diagnosis    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 ([facility] [address] )     4/14/2022 Biopsy    Left axillary Lymph node ultrasound guided biopsy  Metastatic carcinoma, compatible with breast origin  ER >95  MA 95   HER2 1+    On ultrasound lymph node is 1 8 cm  There is cortical thickening without evident fatty hilum  In review of screening mammogram, demonstrates no suspicious mammographic findings identified in either breast  Bilateral MRI is recommended  Flow cytometry - there is no evidence of a B-cell or T-cell non-Hodgkin lymphoma  4/20/2022 Genomic Testing    A total of 36 genes were evaluated, including: ANGEL LUIS, BRCA1, BRCA2, CDH1, CHEK2, PALB2, PTEN, STK11, TP53  Negative result  No pathogenic sequence variants or deletions/duplications identified     5/20/2022 Biopsy    MRI guided biopsy  A  Left breast   11 o'clock   Invasive mammary carcinoma of no special type (ductal)  Grade 1  ER 95  MA 95  HER2 0    B  Right breast   Retroareolar  Benign fibrocystic changes without atypia (discrete 0 3 cm focus of sclerosing and tubular adenosis with usual ductal hyperplasia, columnar cell change and hyperplasia, apocrine metaplasia, cystic dilatation)     - Microcalcifications: Present associated with non-neoplastic breast tissue    - Negative for malignancy, in-situ carcinoma and atypical hyperplasia  Malignant pathology appears unifocal  Biopsy proven carcinoma measured 1 cm on MRI  Biopsy proved metastatic disease to left axillary lymph node  6/9/2022 Genomic Testing    MammaPrint  Low risk  Luminal type A  MammaPrint index: +[phone]     6/21/2022 Surgery    Left breast tiffany  directed lumpectomy with axillary dissection  A  Invasive 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  Grade 1  1 3 cm  Lymphovascular invasion is identified  Margins negative    H  Axillary, Level I and Level II axillary contents  Metastatic carcinoma involving 3/17 lymph nodes  1 8 cm   Extranodal extension is identified 1mm  Foci of carcinoma identified in extranodal lymphatic channels    Anatomic stage: Stage IIA  Prognostic stage: Stage IA      6/21/2022 -  Cancer Staged    Staging form: Breast, AJCC 8th Edition  - Pathologic stage from 6/21/2022: Stage IA (pT1c, pN1, cM0, G1, ER+, LA+, HER2-) - Signed by Amanda Gilliland MD on 8/1/2022  Stage prefix: Initial diagnosis  Nuclear grade: G1  Multigene prognostic tests performed: None  Mitotic count score: Score 1  Histologic grading system: 3 grade system       Malignant neoplasm of lower-inner quadrant of left breast in female, estrogen receptor positive ([facility] [address] )   5/20/2022 Initial Diagnosis    Malignant neoplasm of lower-inner quadrant of left breast in female, estrogen receptor positive ([facility] [address] )     5/20/2022 Biopsy    MRI guided biopsy  A  Left breast   11 o'clock   Invasive mammary carcinoma of no special type (ductal)  Grade 1  ER 95  LA 95  HER2 0    B  Right breast   Retroareolar  Benign fibrocystic changes without atypia (discrete 0 3 cm focus of sclerosing and tubular adenosis with usual ductal hyperplasia, columnar cell change and hyperplasia, apocrine metaplasia, cystic dilatation)  - Microcalcifications: Present associated with non-neoplastic breast tissue     - Negative for malignancy, in-situ carcinoma and atypical hyperplasia  Malignant pathology appears unifocal  Biopsy proven carcinoma measured 1 cm on MRI  Biopsy proved metastatic disease to left axillary lymph node  6/9/2022 Genomic Testing    MammaPrint  Low risk  Luminal type A  MammaPrint index: +0 [phone]     6/21/2022 Surgery    Left breast tiffany  directed lumpectomy with axillary dissection  A  Invasive 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  Grade 1  1 3 cm  Lymphovascular invasion is identified  Margins negative    H  Axillary, Level I and Level II axillary contents  Metastatic carcinoma involving 3/17 lymph nodes  1 8 cm   Extranodal extension is identified 1mm  Foci of carcinoma identified in extranodal lymphatic channels    Anatomic stage: Stage IIA  Prognostic stage: Stage IA        Past Medical & Surgical Hx:   Patient Active Problem List   Diagnosis    Back pain, chronic    Chronic insomnia    Lymphedema of right arm    Hyperlipidemia, mixed    Peripheral neuropathy    Lymphedema    Cellulitis of right upper extremity    Sepsis (HCC)    Desmoid tumor    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 (HCC)    Malignant neoplasm of lower-inner quadrant of left breast in female, estrogen receptor positive ([address] )    HTN, goal below 140/90    PONV (postoperative nausea and vomiting)    Use of letrozole (Femara)     Past Medical History:   Diagnosis Date    Abnormal mammogram 05/15/2013    Anemia     Cancer ([address] )     desmoitosis    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 ([address] ) 04/19/2022    Chronic pain disorder     worse right side of body    Desmoid tumor     Last Assessed: 6/19/2017    History of transfusion     no reactions    Hyperlipidemia     Hypertension     Ovarian cancer ([address] ) 1999?   Hysterectomy done    PONV (postoperative nausea and vomiting)      Past Surgical History:   Procedure Laterality Date    BREAST LUMPECTOMY Left 6/21/2022    Procedure: ANITA  DIRECTED LUMPECTOMY;  Surgeon: Arcelia Arroyo MD;  [facility];  Service: Surgical Oncology    FRACTURE SURGERY      HYSTERECTOMY      LYMPH NODE DISSECTION Left 6/21/2022    Procedure: AXILLARY DISSECTION LEVEL I AND LEVEL II LYMPH NODES;  Surgeon: Arcelia Arroyo MD;  [facility];  Service: Surgical Oncology    MRI BREAST BIOPSY LEFT (ALL INCLUSIVE) Left 5/20/2022    MRI BREAST BIOPSY RIGHT (ALL INCLUSIVE) Right 5/20/2022    OTHER SURGICAL HISTORY      Arm Incision: Dermoid tumor, right Arm     PARTIAL HYSTERECTOMY      TX COLONOSCOPY FLX DX W/COLLJ SPEC WHEN PFRMD N/A 8/15/2017    Procedure: COLONOSCOPY;  Surgeon: Caitlin Pandey MD;  Location: [facility];   Service: Gastroenterology    TX OPEN RX DISTAL RADIUS FX, INTRA-ARTICULAR, 3+ FRAG Left 2/9/2021    Procedure: OPEN REDUCTION W/ INTERNAL FIXATION (ORIF) RADIUS / ULNA (WRIST) left;  Surgeon: Haley Lyons MD;  [facility];  Service: Orthopedics    TX WRIST Sheryn Busman LIG Left 8/10/2021    Procedure: RELEASE LEFT CARPAL TUNNEL ENDOSCOPIC;  Surgeon: Haley Lyons MD;  [facility];  Service: Orthopedics    SKIN BIOPSY      SKIN CANCER EXCISION      Melanoma Excision     TONSILLECTOMY      US BREAST NEEDLE LOC LEFT Left 6/16/2022    US BREAST NEEDLE LOC RIGHT EACH ADDITIONAL Right 6/16/2022    US GUIDED BREAST LYMPH NODE BIOPSY LEFT Left 4/14/2022       Review of Medications:   Vitamins, Supplements and Herbals: Med List Reviewed & pt is only taking: fish oil, probiotic, vitamin D     Current Outpatient Medications:     cholecalciferol (VITAMIN D3) 1,000 units tablet, Take 3,000 Units by mouth daily, Disp: , Rfl:     diphenhydrAMINE (BENADRYL) 50 MG tablet, Take 50 mg by mouth daily at bedtime as needed for itching, Disp: , Rfl:     docusate sodium (COLACE) 100 mg capsule, Take 1 capsule (100 mg total) by mouth 2 (two) times a day as needed for constipation for up to 10 days, Disp: 20 capsule, Rfl: 0    dronabinol (MARINOL) 5 MG capsule, Take 15 mg by mouth 4 (four) times a day (before meals and at bedtime), Disp: , Rfl:     HYDROmorphone (DILAUDID) 2 mg tablet, Take 1 tablet (2 mg total) by mouth every 6 (six) hours as needed for severe pain Not on Discharge list   Patient is taking for severe pain PRN every 6 hours Max Daily Amount: 8 mg (Patient not taking: Reported on 8/12/2022), Disp: 20 tablet, Rfl: 0    letrozole (FEMARA) 2 5 mg tablet, Take 1 tablet (2 5 mg total) by mouth daily, Disp: 90 tablet, Rfl: 3    multivitamin (THERAGRAN) TABS, Take 1 tablet by mouth daily, Disp: , Rfl:     Omega-3 Fatty Acids (FISH OIL PO), Take by mouth, Disp: , Rfl:     Probiotic Product (PROBIOTIC-10 PO), Take by mouth, Disp: , Rfl:     Most Recent Lab Results:   Lab Results   Component Value Date    WBC 7 15 09/01/2022    NEUTROABS 4 94 09/01/2022    CHOLESTEROL 316 (H) 04/07/2022    TRIG 138 04/07/2022    HDL 59 04/07/2022    LDLCALC 229 (H) 04/07/2022    ALT 16 06/01/2022    AST 17 06/01/2022    ALB 4 0 06/01/2022    SODIUM 136 06/01/2022    SODIUM 136 04/07/2022    K 3 7 06/01/2022    K 4 1 04/07/2022     06/01/2022    CHLORIDE 103 05/02/2017    BUN 8 06/01/2022    BUN 9 04/07/2022    CREATININE 0 55 (L) 06/01/2022    CREATININE 0 62 04/07/2022    EGFR 100 06/01/2022    GLUCOSE 98 05/02/2017    GLUF 87 04/07/2022    GLUF 82 07/13/2021    GLUC 100 06/01/2022    HGBA1C 5 3 07/13/2021    CALCIUM 9 8 06/01/2022    MG 2 0 07/22/2020       Anthropometric Measurements:   Height: 68"  Ht Readings from Last 1 Encounters:   08/12/22 5' 8" (1 727 m)     Wt Readings from Last 20 Encounters:   09/12/22 63 5 kg (140 lb)   08/12/22 63 kg (139 lb)   08/12/22 64 kg (141 lb)   08/10/22 64 kg (141 lb)   07/08/22 66 7 kg (147 lb)   06/24/22 64 9 kg (143 lb)   06/21/22 65 3 kg (143 lb 15 4 oz)   06/16/22 65 8 kg (145 lb)   06/01/22 65 8 kg (145 lb) 05/04/22 68 kg (150 lb)   04/27/22 68 kg (150 lb)   04/27/22 68 kg (150 lb)   04/20/22 67 6 kg (149 lb)   03/29/22 68 9 kg (152 lb)   03/29/22 67 1 kg (148 lb)   03/23/22 68 9 kg (152 lb)   02/21/22 68 9 kg (152 lb)   01/26/22 68 9 kg (152 lb)   08/19/21 73 2 kg (161 lb 4 8 oz)   08/10/21 71 9 kg (158 lb 8 2 oz)       Weight History:    Usual Weight: 150#   Varian: (8/29/22) 138 5#, (9/6/22) 138#   Home Scale: (8/29/22) 135#    Oncology Nutrition-Anthropometrics    Flowsheet [facility] Nutrition from 9/12/2022 in [address] [place] [address] Nutrition from 8/29/2022 in [place] [address] Oncology Dietitian Britt   Patient [age] (years): [age] years   Patient (female) height (in): 76 in 76 in   Current Weight to be used for anthropometric calculations (lbs) 140 lbs 138 5 lbs   Current Weight to be used for anthropometric calculations (kg) 63 6 kg 63 kg   BMI: 21 3 21 1   IBW female: 140 lbs 140 lbs   IBW (kg) female: 63 6 kg 63 6 kg   IBW % (female) 100 % 98 9 %   Adjusted BW (female): 140 lbs 139 6 lbs   Adjusted BW kg (female): 63 6 kg 63 5 kg   % weight change after 1 week: 1 4 % --   Weight change after 1 week (lbs) 2 lbs --   % weight change after 1 month: 0 7 % -5 8 %   Weight change after 1 month (lbs) 1 lbs -8 5 lbs   % weight change after 3 months: -3 4 % -7 7 %   Weight change after 3 months (lbs) -5 lbs -11 5 lbs   % weight change after 6 months: -7 9 % -8 9 %   Weight change after 6 months (lbs) -12 lbs -13 5 lbs   % weight change after 1 year: -- -14 1 %          Nutrition-Focused Physical Findings: none observed    Food/Nutrition-Related History & Client/Social History:    Current Nutrition Impact Symptoms:  [] Nausea  [x] Reduced Appetite -fluctuating  [] Acid Reflux    [] Vomiting  [x] Unintended Wt Loss  [] Malabsorption    [] Diarrhea  [] Unintended Wt Gain  [] Dumping Syndrome    [] Constipation  [] Thick Mucous/Secretions  [] Abdominal Pain    [] Dysgeusia (Altered Taste)  [x] Xerostomia (Dry Mouth)  [] Gas    [] Dysosmia (Altered Smell)  [] Thrush  [] Difficulty Chewing    [] Oral Mucositis (Sore Mouth)  [] Fatigue  [] Hyperglycemia    [] Odynophagia  [] Esophagitis  [] Other:    [] Dysphagia  [] Early Satiety  [] No Problems Eating      Food Allergies & Intolerances: Pt reports Celiac disease     Current Diet: Lactose-Free, Gluten free, Vegetarian (does eat fish)  Current Nutrition Intake: Increased since last visit  Appetite: Fair   Nutrition Route: PO  Oral Care: brushes BID  Activity level: ADL, ambulates independently       25 Hr Diet Recall:   Breakfast: peanut butter crackers   Lunch: veggie burger OR salad OR tomatoes with hummus   Snack: fruit   Dinner: fish and veggies     Beverages: 1/2 gatorade + 1/2 water (16 oz x8)  Supplements:    Has Orgain protein powder samples ; has not tried yet       Oncology Nutrition-Estimated Needs    Flowsheet [facility] Nutrition from 2022 in [address] [place] [address] Nutrition from 2022 in [place] [address] Oncology Dietitian Britt   Weight type used Actual weight Actual weight   Weight in kilograms (kg) used for estimated needs 63 6 kg --   Weight in kilograms (kg) used for estimated needs -- 63 kg   Energy needs formula:  30-35 kcal/kg 35-40 kcal/kg   Energy needs based on 30 kcal/k kcal --   Energy needs based on 35 kcal/k kcal --   Energy needs based on 35 kcal/kg: -- 2203 kcal   Energy needs based on 40 kcal/kg: -- 2518 kcal   Protein needs formula: 1 2-1 5 g/kg 1 5-2 g/kg   Protein needs based on 1 2 g/k g --   Protein needs based on 1 5 g/kg 95 g --   Protein needs based on 1 5 g/kg -- 94 g   Protein needs based on 2 g/kg -- 126 g   Fluid needs formula: 30-35 mL/kg 30-35 mL/kg   Fluid needs based on 30 mL/kg 1908 mL 27 mL   Fluid needs in ounces 64 oz 1 oz   Fluid needs based on 35 mL/kg 2226 mL 32 mL   Fluid needs in ounces 75 oz 1 oz           Discussion & Intervention:   Roderick Boyd was evaluated today for an RD follow up regarding wt loss  Irma Crews is currently undergoing tx for breast cancer  She explains today that she is doing okay, she reports some improvement in her appetite this week  Her weight is stable x1 month  Reviewed 24 hour recall, which revealed an suboptimal po intake, and discussed ways to increase kcal, protein, and fluid intakes and optimize nutrient intake  Based on today's assessment, discussion included: how to modify foods for anticipated nutrition impact symptoms pt may experience during CA tx, a high kcal/protein diet & food choices to include at all meals & snacks (Examples of high kcal foods: cheese, full-fat dairy products, nut butter, plant-based fats, coconut oil/milk, avocado, butter, cream soup, etc  Examples of high protein foods: eggs, chicken, fish, beans/legumes, nuts/nut butters, bone broth, etc ) , utilizing oral nutrition supplements and mindful eating concepts  Moving forward, Irma Crews will continue current nutrition plan of care  Materials Provided: not applicable  All questions and concerns addressed during todays visit  Irma Crews has RD contact information  Nutrition Diagnosis:    Inadequate Energy Intake related to physiological causes, disease state and treatment related issues as evidenced by food recall, wt loss and discussion with pt and/or family   Increased Nutrient Needs (kcal & pro) related to increased demand for nutrients and disease state as evidenced by cancer dx and pt undergoing tx for cancer    Monitoring & Evaluation:   Goals:  · weight maintenance/stabilization  · adequate nutrition impact symptom management  · pt to meet >/=75% estimated nutrition needs daily    · Progress Towards Goals: Progressing    Nutrition Rx & Recommendations:  · Diet: High Calorie, High Protein (for high calorie foods see pages 52-53, and for high protein foods see pages 49-51 in your Eating Hints book)  · Include a variety of foods (as tolerated/allowed by diet)   · Incorporate physical activity as able/allowed  · Stay hydrated by sipping fluids of choice/tolerance throughout the day  · Alter food choices and eating patterns to accommodate changing needs  · Refer to Eating Hints book for other meal/snack ideas and symptom management  · Weigh yourself regularly  If you notice weight loss, make an effort to increase your daily food/calorie intake  If you continue to notice loss after these efforts, reach out to your dietitian to establish a plan to stabilize weight  Ways to increase calorie and protein intake:   Eat when feeling most hungry  Choose foods that are easy to eat: oatmeal, soup, cereal  Keep non perishable snacks nearby  5-6 small meals/snacks daily  Protein at all meals/snacks: beans/legumes, fish, nuts/nut butters, quinoa, oatmeal, seeds   Add high calorie foods to meals: olive oil, avocado, butter, peanut butter   Choose liquids with calories: Fairlife milk (higher protein/lactose-free milk), 100% fruit juice, diluted juice, bone broth (higher protein broth), sports drinks (Gatorade, Poweraide, Pedialyte, etc ), [place] ice, popsicles, milkshakes, smoothies, oral nutrition supplements (Orgain ), gelatin/Jello, etc   Use oral nutrition supplements to make homemade smoothies or milkshakes  Choose oral nutrition supplements with >300 calories per serving          Follow Up Plan: 9/27/22 after radiation treatment   Recommend Referral to Other Providers: none at this time

## 2022-09-12 NOTE — PATIENT INSTRUCTIONS
Nutrition Rx & Recommendations:  Diet: High Calorie, High Protein (for high calorie foods see pages 52-53, and for high protein foods see pages 49-51 in your Eating Hints book)  Include a variety of foods (as tolerated/allowed by diet)  Incorporate physical activity as able/allowed  Stay hydrated by sipping fluids of choice/tolerance throughout the day  Alter food choices and eating patterns to accommodate changing needs  Refer to Eating Hints book for other meal/snack ideas and symptom management  Weigh yourself regularly  If you notice weight loss, make an effort to increase your daily food/calorie intake  If you continue to notice loss after these efforts, reach out to your dietitian to establish a plan to stabilize weight  Ways to increase calorie and protein intake:   Eat when feeling most hungry  Choose foods that are easy to eat: oatmeal, soup, cereal  Keep non perishable snacks nearby  5-6 small meals/snacks daily  Protein at all meals/snacks: beans/legumes, fish, nuts/nut butters, quinoa, oatmeal, seeds   Add high calorie foods to meals: olive oil, avocado, butter, peanut butter   Choose liquids with calories: Fairlife milk (higher protein/lactose-free milk), 100% fruit juice, diluted juice, bone broth (higher protein broth), sports drinks (Gatorade, Poweraide, Pedialyte, etc ), [place] ice, popsicles, milkshakes, smoothies, oral nutrition supplements (Orgain ), gelatin/Jello, etc   Use oral nutrition supplements to make homemade smoothies or milkshakes  Choose oral nutrition supplements with >300 calories per serving          Follow Up Plan: 9/27/22 after radiation treatment   Recommend Referral to Other Providers: none at this time

## 2022-09-13 ENCOUNTER — APPOINTMENT (OUTPATIENT)
Dept: RADIATION ONCOLOGY | Facility: CLINIC | Age: 62
End: 2022-09-13
Attending: RADIOLOGY
Payer: COMMERCIAL

## 2022-09-13 PROCEDURE — G6017 INTRAFRACTION TRACK MOTION: HCPCS | Performed by: RADIOLOGY

## 2022-09-13 PROCEDURE — 77412 RADIATION TX DELIVERY LVL 3: CPT | Performed by: RADIOLOGY

## 2022-09-13 PROCEDURE — 77427 RADIATION TX MANAGEMENT X5: CPT | Performed by: RADIOLOGY

## 2022-09-13 PROCEDURE — 77387 GUIDANCE FOR RADJ TX DLVR: CPT | Performed by: RADIOLOGY

## 2022-09-14 ENCOUNTER — APPOINTMENT (OUTPATIENT)
Dept: RADIATION ONCOLOGY | Facility: CLINIC | Age: 62
End: 2022-09-14
Attending: RADIOLOGY
Payer: COMMERCIAL

## 2022-09-14 PROCEDURE — 77412 RADIATION TX DELIVERY LVL 3: CPT | Performed by: RADIOLOGY

## 2022-09-14 PROCEDURE — 77387 GUIDANCE FOR RADJ TX DLVR: CPT | Performed by: RADIOLOGY

## 2022-09-15 ENCOUNTER — APPOINTMENT (OUTPATIENT)
Dept: RADIATION ONCOLOGY | Facility: CLINIC | Age: 62
End: 2022-09-15
Attending: RADIOLOGY
Payer: COMMERCIAL

## 2022-09-15 PROCEDURE — 77387 GUIDANCE FOR RADJ TX DLVR: CPT | Performed by: RADIOLOGY

## 2022-09-15 PROCEDURE — 77412 RADIATION TX DELIVERY LVL 3: CPT | Performed by: RADIOLOGY

## 2022-09-16 ENCOUNTER — APPOINTMENT (OUTPATIENT)
Dept: RADIATION ONCOLOGY | Facility: CLINIC | Age: 62
End: 2022-09-16
Attending: RADIOLOGY
Payer: COMMERCIAL

## 2022-09-16 PROCEDURE — 77387 GUIDANCE FOR RADJ TX DLVR: CPT | Performed by: RADIOLOGY

## 2022-09-16 PROCEDURE — 77412 RADIATION TX DELIVERY LVL 3: CPT | Performed by: RADIOLOGY

## 2022-09-19 ENCOUNTER — APPOINTMENT (OUTPATIENT)
Dept: RADIATION ONCOLOGY | Facility: CLINIC | Age: 62
End: 2022-09-19
Attending: RADIOLOGY
Payer: COMMERCIAL

## 2022-09-19 PROCEDURE — 77412 RADIATION TX DELIVERY LVL 3: CPT | Performed by: RADIOLOGY

## 2022-09-19 PROCEDURE — 77387 GUIDANCE FOR RADJ TX DLVR: CPT | Performed by: RADIOLOGY

## 2022-09-19 PROCEDURE — 77336 RADIATION PHYSICS CONSULT: CPT | Performed by: RADIOLOGY

## 2022-09-20 ENCOUNTER — APPOINTMENT (OUTPATIENT)
Dept: RADIATION ONCOLOGY | Facility: CLINIC | Age: 62
End: 2022-09-20
Attending: RADIOLOGY
Payer: COMMERCIAL

## 2022-09-20 PROCEDURE — G6017 INTRAFRACTION TRACK MOTION: HCPCS | Performed by: RADIOLOGY

## 2022-09-20 PROCEDURE — 77387 GUIDANCE FOR RADJ TX DLVR: CPT | Performed by: RADIOLOGY

## 2022-09-20 PROCEDURE — 77412 RADIATION TX DELIVERY LVL 3: CPT | Performed by: RADIOLOGY

## 2022-09-20 PROCEDURE — 77427 RADIATION TX MANAGEMENT X5: CPT | Performed by: RADIOLOGY

## 2022-09-21 ENCOUNTER — APPOINTMENT (OUTPATIENT)
Dept: RADIATION ONCOLOGY | Facility: CLINIC | Age: 62
End: 2022-09-21
Attending: RADIOLOGY
Payer: COMMERCIAL

## 2022-09-21 DIAGNOSIS — C50.312 MALIGNANT NEOPLASM OF LOWER-INNER QUADRANT OF LEFT BREAST IN FEMALE, ESTROGEN RECEPTOR POSITIVE (HCC): Primary | ICD-10-CM

## 2022-09-21 DIAGNOSIS — Z17.0 MALIGNANT NEOPLASM OF LOWER-INNER QUADRANT OF LEFT BREAST IN FEMALE, ESTROGEN RECEPTOR POSITIVE (HCC): Primary | ICD-10-CM

## 2022-09-21 PROCEDURE — 77387 GUIDANCE FOR RADJ TX DLVR: CPT | Performed by: RADIOLOGY

## 2022-09-21 PROCEDURE — G6017 INTRAFRACTION TRACK MOTION: HCPCS | Performed by: RADIOLOGY

## 2022-09-21 PROCEDURE — 77412 RADIATION TX DELIVERY LVL 3: CPT | Performed by: RADIOLOGY

## 2022-09-21 RX ORDER — TRIAMCINOLONE ACETONIDE 1 MG/G
CREAM TOPICAL 2 TIMES DAILY PRN
Qty: 80 G | Refills: 0 | Status: SHIPPED | OUTPATIENT
Start: 2022-09-21 | End: 2022-10-10 | Stop reason: SDUPTHER

## 2022-09-22 ENCOUNTER — APPOINTMENT (OUTPATIENT)
Dept: RADIATION ONCOLOGY | Facility: CLINIC | Age: 62
End: 2022-09-22
Attending: RADIOLOGY
Payer: COMMERCIAL

## 2022-09-22 PROCEDURE — 77387 GUIDANCE FOR RADJ TX DLVR: CPT | Performed by: RADIOLOGY

## 2022-09-22 PROCEDURE — 77412 RADIATION TX DELIVERY LVL 3: CPT | Performed by: RADIOLOGY

## 2022-09-23 ENCOUNTER — APPOINTMENT (OUTPATIENT)
Dept: RADIATION ONCOLOGY | Facility: CLINIC | Age: 62
End: 2022-09-23
Attending: RADIOLOGY
Payer: COMMERCIAL

## 2022-09-23 PROCEDURE — 77387 GUIDANCE FOR RADJ TX DLVR: CPT | Performed by: RADIOLOGY

## 2022-09-23 PROCEDURE — 77412 RADIATION TX DELIVERY LVL 3: CPT | Performed by: RADIOLOGY

## 2022-09-26 ENCOUNTER — APPOINTMENT (OUTPATIENT)
Dept: RADIATION ONCOLOGY | Facility: CLINIC | Age: 62
End: 2022-09-26
Attending: RADIOLOGY
Payer: COMMERCIAL

## 2022-09-26 PROCEDURE — 77387 GUIDANCE FOR RADJ TX DLVR: CPT | Performed by: RADIOLOGY

## 2022-09-26 PROCEDURE — 77412 RADIATION TX DELIVERY LVL 3: CPT | Performed by: RADIOLOGY

## 2022-09-26 PROCEDURE — 77336 RADIATION PHYSICS CONSULT: CPT | Performed by: RADIOLOGY

## 2022-09-26 PROCEDURE — G6017 INTRAFRACTION TRACK MOTION: HCPCS | Performed by: RADIOLOGY

## 2022-09-27 ENCOUNTER — NUTRITION (OUTPATIENT)
Dept: NUTRITION | Facility: CLINIC | Age: 62
End: 2022-09-27

## 2022-09-27 ENCOUNTER — APPOINTMENT (OUTPATIENT)
Dept: RADIATION ONCOLOGY | Facility: CLINIC | Age: 62
End: 2022-09-27
Attending: RADIOLOGY
Payer: COMMERCIAL

## 2022-09-27 DIAGNOSIS — Z71.3 NUTRITIONAL COUNSELING: Primary | ICD-10-CM

## 2022-09-27 PROCEDURE — 77387 GUIDANCE FOR RADJ TX DLVR: CPT | Performed by: RADIOLOGY

## 2022-09-27 PROCEDURE — 77412 RADIATION TX DELIVERY LVL 3: CPT | Performed by: RADIOLOGY

## 2022-09-27 PROCEDURE — 77427 RADIATION TX MANAGEMENT X5: CPT | Performed by: RADIOLOGY

## 2022-09-27 NOTE — PROGRESS NOTES
Outpatient Oncology Nutrition Consultation   Type of Consult: Follow Up  Care Location: Office Visit    Reason for referral: Fermin RN on 8/12/22 (reason for referral: Malnutrition Screening Tool (MST) Triggers: scored a 4 indicating >/=34# (>/=15 4 kg) recent wt loss and is not eating poorly due to a decreased appetite  (Date of MST: 8/12/22))  Nutrition Assessment:   Oncology Diagnosis & Treatments: Diagnosed with left breast cancer, ER positive, metastatic to lymph node 4/2022  · S/p left breast lumpectomy 6/21/2022  · RT start 8/29/22, EOT planned for 10/10/22  · Planned to begin Letrozole post RT  Oncology History   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 ([facility] [address] )   4/14/2022 Initial Diagnosis    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 ([facility] [address] )     4/14/2022 Biopsy    Left axillary Lymph node ultrasound guided biopsy  Metastatic carcinoma, compatible with breast origin  ER >95  MN 95   HER2 1+    On ultrasound lymph node is 1 8 cm  There is cortical thickening without evident fatty hilum  In review of screening mammogram, demonstrates no suspicious mammographic findings identified in either breast  Bilateral MRI is recommended  Flow cytometry - there is no evidence of a B-cell or T-cell non-Hodgkin lymphoma  4/20/2022 Genomic Testing    A total of 36 genes were evaluated, including: ANGEL LUIS, BRCA1, BRCA2, CDH1, CHEK2, PALB2, PTEN, STK11, TP53  Negative result  No pathogenic sequence variants or deletions/duplications identified     5/20/2022 Biopsy    MRI guided biopsy  A  Left breast   11 o'clock   Invasive mammary carcinoma of no special type (ductal)  Grade 1  ER 95  MN 95  HER2 0    B  Right breast   Retroareolar  Benign fibrocystic changes without atypia (discrete 0 3 cm focus of sclerosing and tubular adenosis with usual ductal hyperplasia, columnar cell change and hyperplasia, apocrine metaplasia, cystic dilatation)     - Microcalcifications: Present associated with non-neoplastic breast tissue    - Negative for malignancy, in-situ carcinoma and atypical hyperplasia  Malignant pathology appears unifocal  Biopsy proven carcinoma measured 1 cm on MRI  Biopsy proved metastatic disease to left axillary lymph node  6/9/2022 Genomic Testing    MammaPrint  Low risk  Luminal type A  MammaPrint index: +0 [phone]     6/21/2022 Surgery    Left breast tiffany  directed lumpectomy with axillary dissection  A  Invasive 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  Grade 1  1 3 cm  Lymphovascular invasion is identified  Margins negative    H  Axillary, Level I and Level II axillary contents  Metastatic carcinoma involving 3/17 lymph nodes  1 8 cm   Extranodal extension is identified 1mm  Foci of carcinoma identified in extranodal lymphatic channels    Anatomic stage: Stage IIA  Prognostic stage: Stage IA      6/21/2022 -  Cancer Staged    Staging form: Breast, AJCC 8th Edition  - Pathologic stage from 6/21/2022: Stage IA (pT1c, pN1, cM0, G1, ER+, MO+, HER2-) - Signed by Fabricio Francis MD on 8/1/2022  Stage prefix: Initial diagnosis  Nuclear grade: G1  Multigene prognostic tests performed: None  Mitotic count score: Score 1  Histologic grading system: 3 grade system       Malignant neoplasm of lower-inner quadrant of left breast in female, estrogen receptor positive ([facility] [address] )   5/20/2022 Initial Diagnosis    Malignant neoplasm of lower-inner quadrant of left breast in female, estrogen receptor positive ([facility] [address] )     5/20/2022 Biopsy    MRI guided biopsy  A  Left breast   11 o'clock   Invasive mammary carcinoma of no special type (ductal)  Grade 1  ER 95  MO 95  HER2 0    B  Right breast   Retroareolar  Benign fibrocystic changes without atypia (discrete 0 3 cm focus of sclerosing and tubular adenosis with usual ductal hyperplasia, columnar cell change and hyperplasia, apocrine metaplasia, cystic dilatation)     - Microcalcifications: Present associated with non-neoplastic breast tissue    - Negative for malignancy, in-situ carcinoma and atypical hyperplasia  Malignant pathology appears unifocal  Biopsy proven carcinoma measured 1 cm on MRI  Biopsy proved metastatic disease to left axillary lymph node  6/9/2022 Genomic Testing    MammaPrint  Low risk  Luminal type A  MammaPrint index: +0 [phone]     6/21/2022 Surgery    Left breast tiffany  directed lumpectomy with axillary dissection  A  Invasive 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  Grade 1  1 3 cm  Lymphovascular invasion is identified  Margins negative    H  Axillary, Level I and Level II axillary contents  Metastatic carcinoma involving 3/17 lymph nodes  1 8 cm   Extranodal extension is identified 1mm  Foci of carcinoma identified in extranodal lymphatic channels    Anatomic stage: Stage IIA  Prognostic stage: Stage IA        Past Medical & Surgical Hx:   Patient Active Problem List   Diagnosis    Back pain, chronic    Chronic insomnia    Lymphedema of right arm    Hyperlipidemia, mixed    Peripheral neuropathy    Lymphedema    Cellulitis of right upper extremity    Sepsis (HCC)    Desmoid tumor    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 (HCC)    Malignant neoplasm of lower-inner quadrant of left breast in female, estrogen receptor positive ([address] )    HTN, goal below 140/90    PONV (postoperative nausea and vomiting)    Use of letrozole (Femara)     Past Medical History:   Diagnosis Date    Abnormal mammogram 05/15/2013    Anemia     Cancer ([address] )     desmoitosis    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 ([address] ) 04/19/2022    Chronic pain disorder     worse right side of body    Desmoid tumor     Last Assessed: 6/19/2017    History of transfusion     no reactions    Hyperlipidemia     Hypertension     Ovarian cancer ([address] ) 1999?   Hysterectomy done    PONV (postoperative nausea and vomiting)      Past Surgical History: Procedure Laterality Date    BREAST LUMPECTOMY Left 6/21/2022    Procedure: ANITA  DIRECTED LUMPECTOMY;  Surgeon: Jignesh Payne MD;  [facility];  Service: Surgical Oncology    FRACTURE SURGERY      HYSTERECTOMY      LYMPH NODE DISSECTION Left 6/21/2022    Procedure: AXILLARY DISSECTION LEVEL I AND LEVEL II LYMPH NODES;  Surgeon: Jignesh Payne MD;  [facility];  Service: Surgical Oncology    MRI BREAST BIOPSY LEFT (ALL INCLUSIVE) Left 5/20/2022    MRI BREAST BIOPSY RIGHT (ALL INCLUSIVE) Right 5/20/2022    OTHER SURGICAL HISTORY      Arm Incision: Dermoid tumor, right Arm     PARTIAL HYSTERECTOMY      MO COLONOSCOPY FLX DX W/COLLJ SPEC WHEN PFRMD N/A 8/15/2017    Procedure: COLONOSCOPY;  Surgeon: Christiano Cabral MD;  Location: [facility];   Service: Gastroenterology    MO OPEN RX DISTAL RADIUS FX, INTRA-ARTICULAR, 3+ FRAG Left 2/9/2021    Procedure: OPEN REDUCTION W/ INTERNAL FIXATION (ORIF) RADIUS / ULNA (WRIST) left;  Surgeon: Charmaine Ortega MD;  [facility];  Service: Orthopedics    MO WRIST Lisa Flair LIG Left 8/10/2021    Procedure: RELEASE LEFT CARPAL TUNNEL ENDOSCOPIC;  Surgeon: Charmaine Ortega MD;  [facility];  Service: Orthopedics    SKIN BIOPSY      SKIN CANCER EXCISION      Melanoma Excision     TONSILLECTOMY      US BREAST NEEDLE LOC LEFT Left 6/16/2022    US BREAST NEEDLE LOC RIGHT EACH ADDITIONAL Right 6/16/2022    US GUIDED BREAST LYMPH NODE BIOPSY LEFT Left 4/14/2022       Review of Medications:   Vitamins, Supplements and Herbals: Med List Reviewed & pt is only taking: fish oil, probiotic, vitamin D     Current Outpatient Medications:     cholecalciferol (VITAMIN D3) 1,000 units tablet, Take 3,000 Units by mouth daily, Disp: , Rfl:     diphenhydrAMINE (BENADRYL) 50 MG tablet, Take 50 mg by mouth daily at bedtime as needed for itching, Disp: , Rfl:     docusate sodium (COLACE) 100 mg capsule, Take 1 capsule (100 mg total) by mouth 2 (two) times a day as needed for constipation for up to 10 days, Disp: 20 capsule, Rfl: 0    dronabinol (MARINOL) 5 MG capsule, Take 15 mg by mouth 4 (four) times a day (before meals and at bedtime), Disp: , Rfl:     HYDROmorphone (DILAUDID) 2 mg tablet, Take 1 tablet (2 mg total) by mouth every 6 (six) hours as needed for severe pain Not on Discharge list   Patient is taking for severe pain PRN every 6 hours Max Daily Amount: 8 mg (Patient not taking: Reported on 8/12/2022), Disp: 20 tablet, Rfl: 0    letrozole (FEMARA) 2 5 mg tablet, Take 1 tablet (2 5 mg total) by mouth daily, Disp: 90 tablet, Rfl: 3    multivitamin (THERAGRAN) TABS, Take 1 tablet by mouth daily, Disp: , Rfl:     Omega-3 Fatty Acids (FISH OIL PO), Take by mouth, Disp: , Rfl:     Probiotic Product (PROBIOTIC-10 PO), Take by mouth, Disp: , Rfl:     triamcinolone (KENALOG) 0 1 % cream, Apply topically 2 (two) times a day as needed for irritation or rash, Disp: 80 g, Rfl: 0    Most Recent Lab Results:   Lab Results   Component Value Date    WBC 7 15 09/01/2022    NEUTROABS 4 94 09/01/2022    CHOLESTEROL 316 (H) 04/07/2022    TRIG 138 04/07/2022    HDL 59 04/07/2022    LDLCALC 229 (H) 04/07/2022    ALT 16 06/01/2022    AST 17 06/01/2022    ALB 4 0 06/01/2022    SODIUM 136 06/01/2022    SODIUM 136 04/07/2022    K 3 7 06/01/2022    K 4 1 04/07/2022     06/01/2022    CHLORIDE 103 05/02/2017    BUN 8 06/01/2022    BUN 9 04/07/2022    CREATININE 0 55 (L) 06/01/2022    CREATININE 0 62 04/07/2022    EGFR 100 06/01/2022    GLUCOSE 98 05/02/2017    GLUF 87 04/07/2022    GLUF 82 07/13/2021    GLUC 100 06/01/2022    HGBA1C 5 3 07/13/2021    CALCIUM 9 8 06/01/2022    MG 2 0 07/22/2020       Anthropometric Measurements:   Height: 68"  Ht Readings from Last 1 Encounters:   08/12/22 5' 8" (1 727 m)     Wt Readings from Last 20 Encounters:   09/12/22 63 5 kg (140 lb)   08/12/22 63 kg (139 lb)   08/12/22 64 kg (141 lb) 08/10/22 64 kg (141 lb)   07/08/22 66 7 kg (147 lb)   06/24/22 64 9 kg (143 lb)   06/21/22 65 3 kg (143 lb 15 4 oz)   06/16/22 65 8 kg (145 lb)   06/01/22 65 8 kg (145 lb)   05/04/22 68 kg (150 lb)   04/27/22 68 kg (150 lb)   04/27/22 68 kg (150 lb)   04/20/22 67 6 kg (149 lb)   03/29/22 68 9 kg (152 lb)   03/29/22 67 1 kg (148 lb)   03/23/22 68 9 kg (152 lb)   02/21/22 68 9 kg (152 lb)   01/26/22 68 9 kg (152 lb)   08/19/21 73 2 kg (161 lb 4 8 oz)   08/10/21 71 9 kg (158 lb 8 2 oz)       Weight History:    Usual Weight: 150#   Varian: (8/29/22) 138 5#, (9/6/22) 138#, (9/12/22) 138#, (9/19/22) 138 5#, (9/26/22) 138 5#    Home Scale: (8/29/22) 135#    Oncology Nutrition-Anthropometrics    Flowsheet [facility] Nutrition from 9/27/2022 in [address] [place] [address] Nutrition from 9/12/2022 in [place] [address] Oncology Dietitian [place]   Patient age (years): [age]   Patient (female) height (in): 76 in 76 in   Current Weight to be used for anthropometric calculations (lbs) 138 5 lbs 140 lbs   Current Weight to be used for anthropometric calculations (kg) 63 kg 63 6 kg   BMI: 21 1 21 3   IBW female: 140 lbs 140 lbs   IBW (kg) female: 63 6 kg 63 6 kg   IBW % (female) 98 9 % 100 %   Adjusted BW (female): 139 6 lbs 140 lbs   Adjusted BW kg (female): 63 5 kg 63 6 kg   % weight change after 1 week: 0 % 1 4 %   Weight change after 1 week (lbs) 0 lbs 2 lbs   % weight change after 1 month: 0 % 0 7 %   Weight change after 1 month (lbs) 0 lbs 1 lbs   % weight change after 3 months: -3 1 % -3 4 %   Weight change after 3 months (lbs) -4 5 lbs -5 lbs   % weight change after 6 months: -8 9 % -7 9 %   Weight change after 6 months (lbs) -13 5 lbs -12 lbs          Nutrition-Focused Physical Findings: none observed    Food/Nutrition-Related History & Client/Social History:    Current Nutrition Impact Symptoms:  [] Nausea  [x] Reduced Appetite -improving  [] Acid Reflux    [] Vomiting  [x] Unintended Wt Loss  [] Malabsorption    [] Diarrhea  [] Unintended Wt Gain  [] Dumping Syndrome    [] Constipation  [] Thick Mucous/Secretions  [] Abdominal Pain    [] Dysgeusia (Altered Taste)  [x] Xerostomia (Dry Mouth)  [] Gas    [] Dysosmia (Altered Smell)  [] Thrush  [] Difficulty Chewing    [] Oral Mucositis (Sore Mouth)  [] Fatigue  [] Hyperglycemia    [] Odynophagia  [] Esophagitis  [] Other:    [] Dysphagia  [] Early Satiety  [] No Problems Eating      Food Allergies & Intolerances: Pt reports Celiac disease     Current Diet: Lactose-Free, Gluten free, Vegetarian (does eat fish)  Current Nutrition Intake: Increased since last visit  Appetite: Fair   Nutrition Route: PO  Oral Care: brushes BID  Activity level: ADL, ambulates independently  25 Hr Diet Recall:   Breakfast: peanut butter crackers   Lunch: veggie burger OR veggies with humus   Snack: fruit   Dinner: fish and sauteed or steamed vegetables    Beverages: 1/2 gatorade + 1/2 water (16 oz x6-8)  Supplements:    None      Oncology Nutrition-Estimated Needs    Flowsheet Row Nutrition from 2022 in [facility] Dietitian [place] Nutrition from 2022 in [facility] Oncology Dietitian [place]   Weight type used Actual weight Actual weight   Weight in kilograms (kg) used for estimated needs 63 kg 63 6 kg   Energy needs formula:  30-35 kcal/kg 30-35 kcal/kg   Energy needs based on 30 kcal/k kcal 1909 kcal   Energy needs based on 35 kcal/k kcal 2227 kcal   Protein needs formula: 1 2-1 5 g/kg 1 2-1 5 g/kg   Protein needs based on 1 2 g/k g 76 g   Protein needs based on 1 5 g/kg 94 g 95 g   Fluid needs formula: 30-35 mL/kg 30-35 mL/kg   Fluid needs based on 30 mL/kg 1896 mL 1908 mL   Fluid needs in ounces 64 oz 64 oz   Fluid needs based on 35 mL/kg 2212 mL 2226 mL   Fluid needs in ounces 75 oz 75 oz           Discussion & Intervention:   Ann Coon was evaluated today for an RD follow up regarding wt loss    Ann Coon is currently undergoing tx for breast cancer  She explains today that she is doing okay at this time  She explains that her appetite and weight have stabilized  Encouraged her to continue adequate intakes and to monitor weight  Based on today's assessment, discussion included: a high kcal/protein diet & food choices to include at all meals & snacks (Examples of high kcal foods: cheese, full-fat dairy products, nut butter, plant-based fats, coconut oil/milk, avocado, butter, cream soup, etc  Examples of high protein foods: eggs, chicken, fish, beans/legumes, nuts/nut butters, bone broth, etc ) , adequate hydration & fluid choices and mindful eating concepts  Moving forward, José Antonio Hay will continue current nutrition plan of care  She wishes to follow up prn moving forward  Materials Provided: not applicable  All questions and concerns addressed during todays visit  José Antonio Hay has RD contact information  Nutrition Diagnosis:    Increased Nutrient Needs (kcal & pro) related to increased demand for nutrients and disease state as evidenced by cancer dx and pt undergoing tx for cancer  Monitoring & Evaluation:   Goals:  · weight maintenance/stabilization  · adequate nutrition impact symptom management  · pt to meet >/=75% estimated nutrition needs daily    · Progress Towards Goals: Progressing    Nutrition Rx & Recommendations:  · Diet: High Calorie, High Protein (for high calorie foods see pages 52-53, and for high protein foods see pages 49-51 in your Eating Hints book)  · Include a variety of foods (as tolerated/allowed by diet)  · Incorporate physical activity as able/allowed  · Stay hydrated by sipping fluids of choice/tolerance throughout the day  · Alter food choices and eating patterns to accommodate changing needs  · Refer to Eating Hints book for other meal/snack ideas and symptom management  · Weigh yourself regularly  If you notice weight loss, make an effort to increase your daily food/calorie intake   If you continue to notice loss after these efforts, reach out to your dietitian to establish a plan to stabilize weight  Ways to increase calorie and protein intake:   Eat when feeling most hungry     Choose foods that are easy to eat: oatmeal, soup, cereal  Keep non perishable snacks nearby  5-6 small meals/snacks daily  Protein at all meals/snacks: beans/legumes, fish, nuts/nut butters, quinoa, oatmeal, seeds   Add high calorie foods to meals: olive oil, avocado, butter, peanut butter   Choose liquids with calories: Fairlife milk (higher protein/lactose-free milk), 100% fruit juice, diluted juice, bone broth (higher protein broth), sports drinks (Gatorade, Poweraide, Pedialyte, etc ), [place] ice, popsicles, milkshakes, smoothies, oral nutrition supplements (Orgain ), gelatin/Jello, etc         Follow Up Plan: PRN per patient request   Recommend Referral to Other Providers: none at this time

## 2022-09-27 NOTE — PATIENT INSTRUCTIONS
Nutrition Rx & Recommendations:  Diet: High Calorie, High Protein (for high calorie foods see pages 52-53, and for high protein foods see pages 49-51 in your Eating Hints book)  Include a variety of foods (as tolerated/allowed by diet)  Incorporate physical activity as able/allowed  Stay hydrated by sipping fluids of choice/tolerance throughout the day  Alter food choices and eating patterns to accommodate changing needs  Refer to Eating Hints book for other meal/snack ideas and symptom management  Weigh yourself regularly  If you notice weight loss, make an effort to increase your daily food/calorie intake  If you continue to notice loss after these efforts, reach out to your dietitian to establish a plan to stabilize weight  Ways to increase calorie and protein intake:   Eat when feeling most hungry     Choose foods that are easy to eat: oatmeal, soup, cereal  Keep non perishable snacks nearby  5-6 small meals/snacks daily  Protein at all meals/snacks: beans/legumes, fish, nuts/nut butters, quinoa, oatmeal, seeds   Add high calorie foods to meals: olive oil, avocado, butter, peanut butter   Choose liquids with calories: Fairlife milk (higher protein/lactose-free milk), 100% fruit juice, diluted juice, bone broth (higher protein broth), sports drinks (Gatorade, Poweraide, Pedialyte, etc ), [place] ice, popsicles, milkshakes, smoothies, oral nutrition supplements (Orgain ), gelatin/Jello, etc         Follow Up Plan: PRN per patient request   Recommend Referral to Other Providers: none at this time

## 2022-09-28 ENCOUNTER — APPOINTMENT (OUTPATIENT)
Dept: RADIATION ONCOLOGY | Facility: CLINIC | Age: 62
End: 2022-09-28
Attending: RADIOLOGY
Payer: COMMERCIAL

## 2022-09-28 PROCEDURE — 77387 GUIDANCE FOR RADJ TX DLVR: CPT | Performed by: RADIOLOGY

## 2022-09-28 PROCEDURE — 77412 RADIATION TX DELIVERY LVL 3: CPT | Performed by: RADIOLOGY

## 2022-09-29 ENCOUNTER — APPOINTMENT (OUTPATIENT)
Dept: RADIATION ONCOLOGY | Facility: CLINIC | Age: 62
End: 2022-09-29
Attending: RADIOLOGY
Payer: COMMERCIAL

## 2022-09-29 PROCEDURE — G6017 INTRAFRACTION TRACK MOTION: HCPCS | Performed by: RADIOLOGY

## 2022-09-29 PROCEDURE — 77412 RADIATION TX DELIVERY LVL 3: CPT | Performed by: RADIOLOGY

## 2022-09-29 PROCEDURE — 77387 GUIDANCE FOR RADJ TX DLVR: CPT | Performed by: RADIOLOGY

## 2022-09-30 ENCOUNTER — APPOINTMENT (OUTPATIENT)
Dept: RADIATION ONCOLOGY | Facility: CLINIC | Age: 62
End: 2022-09-30
Attending: RADIOLOGY
Payer: COMMERCIAL

## 2022-09-30 PROCEDURE — G6017 INTRAFRACTION TRACK MOTION: HCPCS | Performed by: RADIOLOGY

## 2022-09-30 PROCEDURE — 77387 GUIDANCE FOR RADJ TX DLVR: CPT | Performed by: RADIOLOGY

## 2022-09-30 PROCEDURE — 77412 RADIATION TX DELIVERY LVL 3: CPT | Performed by: RADIOLOGY

## 2022-10-03 ENCOUNTER — APPOINTMENT (OUTPATIENT)
Dept: RADIATION ONCOLOGY | Facility: CLINIC | Age: 62
End: 2022-10-03
Attending: RADIOLOGY

## 2022-10-04 ENCOUNTER — APPOINTMENT (OUTPATIENT)
Dept: RADIATION ONCOLOGY | Facility: CLINIC | Age: 62
End: 2022-10-04
Attending: RADIOLOGY

## 2022-10-05 ENCOUNTER — APPOINTMENT (OUTPATIENT)
Dept: RADIATION ONCOLOGY | Facility: CLINIC | Age: 62
End: 2022-10-05

## 2022-10-05 ENCOUNTER — APPOINTMENT (OUTPATIENT)
Dept: RADIATION ONCOLOGY | Facility: CLINIC | Age: 62
End: 2022-10-05
Attending: RADIOLOGY

## 2022-10-06 ENCOUNTER — APPOINTMENT (OUTPATIENT)
Dept: RADIATION ONCOLOGY | Facility: CLINIC | Age: 62
End: 2022-10-06
Attending: RADIOLOGY

## 2022-10-06 ENCOUNTER — APPOINTMENT (OUTPATIENT)
Dept: RADIATION ONCOLOGY | Facility: CLINIC | Age: 62
End: 2022-10-06

## 2022-10-07 ENCOUNTER — APPOINTMENT (OUTPATIENT)
Dept: RADIATION ONCOLOGY | Facility: CLINIC | Age: 62
End: 2022-10-07
Attending: RADIOLOGY

## 2022-10-10 ENCOUNTER — APPOINTMENT (OUTPATIENT)
Dept: RADIATION ONCOLOGY | Facility: CLINIC | Age: 62
End: 2022-10-10
Attending: RADIOLOGY

## 2022-10-10 DIAGNOSIS — C50.312 MALIGNANT NEOPLASM OF LOWER-INNER QUADRANT OF LEFT BREAST IN FEMALE, ESTROGEN RECEPTOR POSITIVE (HCC): ICD-10-CM

## 2022-10-10 DIAGNOSIS — Z17.0 MALIGNANT NEOPLASM OF LOWER-INNER QUADRANT OF LEFT BREAST IN FEMALE, ESTROGEN RECEPTOR POSITIVE (HCC): ICD-10-CM

## 2022-10-10 RX ORDER — TRIAMCINOLONE ACETONIDE 1 MG/G
CREAM TOPICAL 2 TIMES DAILY PRN
Qty: 80 G | Refills: 0 | Status: SHIPPED | OUTPATIENT
Start: 2022-10-10

## 2022-10-11 ENCOUNTER — TELEPHONE (OUTPATIENT)
Dept: HEMATOLOGY ONCOLOGY | Facility: CLINIC | Age: 62
End: 2022-10-11

## 2022-10-11 NOTE — TELEPHONE ENCOUNTER
APPOINTMENT REQUEST REVIEW   Patient Details    NAME Merry LAND 1960   Reason For Appointment      Who is Calling to schedule? Spouse- Caridad Wallis     Who is the referring doctor? hanh     Which doctor would they like to schedule with? Dr Tabitha Vera     Which location are you requesting? [place]     Reason they are requesting appointment? Patient just completed radiation treatments 10/10/22   I will send this information to  ______’s team to have them review  Someone from his/her team will return a call to you within 24 hours to discuss next steps with you and if you are in need of an appointment, they will schedule this for you at that time  Did you relay this information to the patient?  Yes Cell [phone]

## 2022-10-13 ENCOUNTER — OFFICE VISIT (OUTPATIENT)
Dept: HEMATOLOGY ONCOLOGY | Facility: CLINIC | Age: 62
End: 2022-10-13
Payer: COMMERCIAL

## 2022-10-13 VITALS
BODY MASS INDEX: 21.22 KG/M2 | DIASTOLIC BLOOD PRESSURE: 68 MMHG | TEMPERATURE: 98 F | HEIGHT: 68 IN | HEART RATE: 68 BPM | WEIGHT: 140 LBS | SYSTOLIC BLOOD PRESSURE: 120 MMHG | RESPIRATION RATE: 16 BRPM | OXYGEN SATURATION: 98 %

## 2022-10-13 DIAGNOSIS — C50.912 CARCINOMA OF LEFT BREAST METASTATIC TO AXILLARY LYMPH NODE (HCC): Primary | ICD-10-CM

## 2022-10-13 DIAGNOSIS — C77.3 CARCINOMA OF LEFT BREAST METASTATIC TO AXILLARY LYMPH NODE (HCC): Primary | ICD-10-CM

## 2022-10-13 PROCEDURE — 99214 OFFICE O/P EST MOD 30 MIN: CPT | Performed by: INTERNAL MEDICINE

## 2022-10-13 NOTE — PROGRESS NOTES
Hematology/Oncology Outpatient Office Note    Date of Service: 10/13/2022    [facility] HEMATOLOGY ONCOLOGY SPECIALISTS       Reason for Consultation:   No chief complaint on file  Cancer Stage at diagnosis: 1A    Referral Physician: No ref  provider found    Primary Care Physician:  Mohsen Corral MD     Nickname: Rowena Sotelo    Spouse: Robert Thompson ECO    Today's ECO    Goals and Barriers:  Current Goal: Minimize effects of disease burden, extend life  Barriers to accomplishing this: None    Patient's Capacity to Self Care:  Patient is able to self care    Code Status: not addressed today    Advanced directives: not addressed today    ASSESSMENT & PLAN      Diagnosis ICD-10-CM Associated Orders   1  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 [facility])  C50 912     C77 3          This is a 59 y o  c PMHx notable for Desmoid tumors, RUE grade 3 lymphedema, HLP, HTN, ovarian cancer s/p hysterectomy, being seen in consultation for early localized hormone positive breast cancer s/p resection    L breast cancer, ER+, MS+, HER2-  Clinical stage 1A, pathologic (prognostic) Stage 1A s/p lumpectomy  As for adjuvant endocrine therapy, after baseline DEXA is assessed, we are offering the patient Aromatase inhibitor therapy for 5 years to reduce the risk of breast cancer recurrence  Side effects of AI therapy were reviewed in detail, which include but are not limited to: peripheral edema, night sweats, hot flashes, Osteoporosis, risk of VTE or portal vein thrombus, approximately 1% chance of endometrial cancer, mood disturbances, weight loss, and joint discomfort   We also discussed the possible risk of QTc prolongation when combined with certain antidepressants   4 4-9% risk osteoporotic fractures, 33-57% arthralgia, 0 8% VTE, hot flashes 56%, vaginal dryness (13%), CV events (6%) (OR went below 1), cataracts 5% (OR went below 1)        Updates from the    [place] consensus statement redefined greater than 20% as the new threshold for substratifying Luminal subtypes [66] based on the work of Melvin menjivar , which highlighted the relevance of this cut-off to predict survival outcomes within the ER+ cohort  Although this is somewhat of an unrealistic conventional cut-off, the authors also highlighted a 20% cut off to be clinically relevant for recurrence and survival (hazard ratio (HR): 7 14, 95% confidence interval (CI): 3 87-13 16)  Furthermore, Maximo et al  outlined the prognostic significance of Ki-67 expression levels greater than or equal to 25% for predicting mortality in their review of over 64,000 breast cancer patients (HR: 2 05, 95% CI: 1 66-2 53)   Ki-67 as a Prognostic Biomarker in Invasive Breast Cancer (nih gov)   Her Ki-67 is 3-5%  Discussion of decision making  • Oncology history updated, accordingly, during this visit  • Goals of care/patient communication  o I discussed with the patient the clinical course leading up to their cancer diagnosis  I reviewed relevant office notes, imaging reports and pathology result as well   o I told the patient that this is a case of curable disease and what this means  We discussed that the goal of anti-cancer therapy is to provide best quality of life, extend overall survival, and progression free survival as shown in clinical trials     o I explained the risks/benefits of the proposed cancer therapy: Letrozole and after discussion including understanding risks of possible life-threatening complications and therapy-related malignancy development, the pt has agreed to this plan  • TNM/Staging At Diagnosis  Cancer Staging  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 [facility])  Staging form: Breast, AJCC 8th Edition  - Pathologic stage from 6/21/2022: Stage IA (pT1c, pN1, cM0, G1, ER+, MN+, HER2-) - Signed by Radha Bermudez MD on 8/1/2022  Stage prefix: Initial diagnosis  Nuclear grade: G1  Multigene prognostic tests performed: None  Mitotic count score: Score 1  Histologic grading system: 3 grade system  • Disease Features/Tumor Markers/Genetics  o Tumor Marker: n/a  o Notable Path Features: 4/14/2022 L axillary LN: >95% ER, 95% ID, HER-2 +1  5/20/2022: L breast core need bx showin gIDC grade 1, present DCIS, ER/ID 90-95%+, HER2 negative (0)  R breast biopsy benign  6/21/2022: grade 1, L lumpectomy, DCIS nuclear grade 2 present, +LVI, all margins negative, 3/17 LN +, [id number]  i-67 3-5%  • Treatment: Letrozole following RT  • Other Supportive care:   • Treatment Team Members  o Surgeon: Dr Arreola Rounds: Dr Méndez Her  4/22/2022 CT CAP w/c: Previously biopsied abnormal left axillary lymph node again identified  No other abnormal soft tissue mass identified  No acute inflammatory process identified  4/24/2022 NM Bone Scan: Probable degenerative change in the lower thoracic spine    MammaPrint  Low risk  No role for chemotherapy    Discussion of decision making    I personally reviewed the following lab results, the image studies, pathology, other specialty/physicians consult notes and recommendations, and outside medical records from [facility]  I had a lengthy discussion with the patient and shared the work-up findings  We discussed the diagnosis and management plan as below  I spent 31 minutes reviewing the records (labs, clinician notes, outside records, medical history, ordering medicine/tests/procedures, interpreting the imaging/labs previously done) and coordination of care as well as direct time with the patient today, of which greater than 50% of the time was spent in counseling and coordination of care with the patient/family  · Plan/Labs  · F/u Rad Onc, just completed RT  · Baseline DEXA scan prior to AI initiation and f/u with PCP if osteoporosis found  · Plan to initiate AI (Letrozole) now   Plan for minimum 5 years of therapy   · Recommended she cont Vitamin D and Calcium 1000mg daily  · F/u Surg-Onc, screening mammograms as per below    Survivorship planning  · Every 6 month H&P for first 5 years, then annual thereafter  · B/l Mammograms yearly  · Distress counseling on an ongoing basis as needed        Follow Up: 4 months with [facility]IOLETTA [facility]    All questions were answered to the patient's satisfaction during this encounter  The patient knows the contact information for our office and knows to reach out for any relevant concerns related to this encounter  They are to call for any temperature 100 4 or higher, new symptoms including but not restricted to shaking chills, decreased appetite, nausea, vomiting, diarrhea, increased fatigue, shortness of breath or chest pain, confusion, and not feeling the strength to come to the clinic  For all other listed problems and medical diagnosis in their chart - they are managed by PCP and/or other specialists, which the patient acknowledges  Thank you very much for your consultation and making us a part of this patient's care  We are continuing to follow closely with you  Please do not hesitate to reach out to me with any additional questions or concerns  Alfredo Butts MD  Hematology & Medical Oncology Staff Physician             Disclaimer: This document was prepared using [employer] Fluency Direct technology  If a word or phrase is confusing, or does not make sense, this is likely due to recognition error which was not discovered during this clinician's review  If you believe an error has occurred, please contact me through [address] [facility] line for lucía? cation  ONCOLOGY HISTORY OF PRESENT ILLNESS        Oncology History   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 ([facility] [address] )   4/14/2022 Initial Diagnosis    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 ([facility] [address] )     4/14/2022 Biopsy    Left axillary Lymph node ultrasound guided biopsy  Metastatic carcinoma, compatible with breast origin     ER >95  MI 95   HER2 1+    On ultrasound lymph node is 1 8 cm  There is cortical thickening without evident fatty hilum  In review of screening mammogram, demonstrates no suspicious mammographic findings identified in either breast  Bilateral MRI is recommended  Flow cytometry - there is no evidence of a B-cell or T-cell non-Hodgkin lymphoma  4/20/2022 Genomic Testing    A total of 36 genes were evaluated, including: ANGEL LUIS, BRCA1, BRCA2, CDH1, CHEK2, PALB2, PTEN, STK11, TP53  Negative result  No pathogenic sequence variants or deletions/duplications identified     5/20/2022 Biopsy    MRI guided biopsy  A  Left breast   11 o'clock   Invasive mammary carcinoma of no special type (ductal)  Grade 1  ER 95  WA 95  HER2 0    B  Right breast   Retroareolar  Benign fibrocystic changes without atypia (discrete 0 3 cm focus of sclerosing and tubular adenosis with usual ductal hyperplasia, columnar cell change and hyperplasia, apocrine metaplasia, cystic dilatation)  - Microcalcifications: Present associated with non-neoplastic breast tissue    - Negative for malignancy, in-situ carcinoma and atypical hyperplasia  Malignant pathology appears unifocal  Biopsy proven carcinoma measured 1 cm on MRI  Biopsy proved metastatic disease to left axillary lymph node  6/9/2022 Genomic Testing    MammaPrint  Low risk  Luminal type A  MammaPrint index: +0 [phone]     6/21/2022 Surgery    Left breast tiffany  directed lumpectomy with axillary dissection  A  Invasive 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  Grade 1  1 3 cm  Lymphovascular invasion is identified  Margins negative    H   Axillary, Level I and Level II axillary contents  Metastatic carcinoma involving 3/17 lymph nodes  1 8 cm   Extranodal extension is identified 1mm  Foci of carcinoma identified in extranodal lymphatic channels    Anatomic stage: Stage IIA  Prognostic stage: Stage IA      6/21/2022 -  Cancer Staged    Staging form: Breast, AJCC 8th Edition  - Pathologic stage from 6/21/2022: Stage IA (pT1c, pN1, cM0, G1, ER+, NC+, HER2-) - Signed by Cherry Navarro MD on 8/1/2022  Stage prefix: Initial diagnosis  Nuclear grade: G1  Multigene prognostic tests performed: None  Mitotic count score: Score 1  Histologic grading system: 3 grade system       Malignant neoplasm of lower-inner quadrant of left breast in female, estrogen receptor positive ([facility] [address] )   5/20/2022 Initial Diagnosis    Malignant neoplasm of lower-inner quadrant of left breast in female, estrogen receptor positive ([facility] [address] )     5/20/2022 Biopsy    MRI guided biopsy  A  Left breast   11 o'clock   Invasive mammary carcinoma of no special type (ductal)  Grade 1  ER 95  NC 95  HER2 0    B  Right breast   Retroareolar  Benign fibrocystic changes without atypia (discrete 0 3 cm focus of sclerosing and tubular adenosis with usual ductal hyperplasia, columnar cell change and hyperplasia, apocrine metaplasia, cystic dilatation)  - Microcalcifications: Present associated with non-neoplastic breast tissue    - Negative for malignancy, in-situ carcinoma and atypical hyperplasia  Malignant pathology appears unifocal  Biopsy proven carcinoma measured 1 cm on MRI  Biopsy proved metastatic disease to left axillary lymph node  6/9/2022 Genomic Testing    MammaPrint  Low risk  Luminal type A  MammaPrint index: +0 [phone]     6/21/2022 Surgery    Left breast tiffany  directed lumpectomy with axillary dissection  A  Invasive 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  Grade 1  1 3 cm  Lymphovascular invasion is identified  Margins negative    H   Axillary, Level I and Level II axillary contents  Metastatic carcinoma involving 3/17 lymph nodes  1 8 cm   Extranodal extension is identified 1mm  Foci of carcinoma identified in extranodal lymphatic channels    Anatomic stage: Stage IIA  Prognostic stage: Stage IA        10/13/2022: Letrozole initiated    SUBJECTIVE  (INTERVAL HISTORY)      Clotting History None   Bleeding History None   Cancer History Desmoidosis, ovarian cancer s/p hysterectomy   Family Cancer History Sister (pancreatic), Sister (lung, smoker)   H/O Blood/Plt Transfusion None   Tobacco/etoh/drug abuse Denies nicotine use, rec drug use or etoh abuse   Hx COVID19 Infection and Vaccine Status COVID -19 7/2022   Cancer Screening history C-scope 2022   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         I have reviewed the relevant past medical, surgical, social and family history  I have also reviewed allergies and medications for this patient  No acute issues, doing well, she finished her RT recently    Review of Systems   [employer]    Baseline weight: 135-140 lbs    She denies loss in weight or appetite, F/C, N/V, SOB, CP, LH, HA, falls, gen weakness, unilateral weakness, diplopia falls, hematuria, dysuria, hematochezia, melena  She uses THC pills for diffuse pain in her body as well as nausea  A 10-point review of system was performed, pertinent positive and negative were detailed as above  Otherwise, the 10-point review of system was negative  Past Medical History:   Diagnosis Date   • Abnormal mammogram 05/15/2013   • Anemia    • Cancer [facility])     desmoitosis   • 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 ([facility] [address] ) 04/19/2022   • Chronic pain disorder     worse right side of body   • Desmoid tumor     Last Assessed: 6/19/2017   • History of transfusion     no reactions   • Hyperlipidemia    • Hypertension    • Ovarian cancer ([facility] [address] ) 1999?   Hysterectomy done   • PONV (postoperative nausea and vomiting)        Past Surgical History:   Procedure Laterality Date   • BREAST LUMPECTOMY Left 6/21/2022    Procedure: ANITA  DIRECTED LUMPECTOMY;  Surgeon: Ashwini Dominguez MD;  Location: [facility] OR;  Service: Surgical Oncology   • FRACTURE SURGERY     • HYSTERECTOMY     • LYMPH NODE DISSECTION Left 6/21/2022    Procedure: AXILLARY DISSECTION LEVEL I AND LEVEL II LYMPH NODES; Surgeon: Louis Lambert MD;  Location: [facility];  Service: Surgical Oncology   • MRI BREAST BIOPSY LEFT (ALL INCLUSIVE) Left 5/20/2022   • MRI BREAST BIOPSY RIGHT (ALL INCLUSIVE) Right 5/20/2022   • OTHER SURGICAL HISTORY      Arm Incision: Dermoid tumor, right Arm    • PARTIAL HYSTERECTOMY     • ND COLONOSCOPY FLX DX W/COLLJ SPEC WHEN PFRMD N/A 8/15/2017    Procedure: COLONOSCOPY;  Surgeon: Angel Davidson MD;  Location: [facility];   Service: Gastroenterology   • ND OPEN RX DISTAL RADIUS FX, INTRA-ARTICULAR, 3+ FRAG Left 2/9/2021    Procedure: OPEN REDUCTION W/ INTERNAL FIXATION (ORIF) RADIUS / ULNA (WRIST) left;  Surgeon: Samantha Torrez MD;  Location: [facility];  Service: Orthopedics   • ND WRIST Sherif Soles LIG Left 8/10/2021    Procedure: RELEASE LEFT CARPAL TUNNEL ENDOSCOPIC;  Surgeon: Samantha Torrez MD;  Location: [facility];  Service: Orthopedics   • SKIN BIOPSY     • SKIN CANCER EXCISION      Melanoma Excision    • TONSILLECTOMY     • US BREAST NEEDLE LOC LEFT Left 6/16/2022   • US BREAST NEEDLE LOC RIGHT EACH ADDITIONAL Right 6/16/2022   • US GUIDED BREAST LYMPH NODE BIOPSY LEFT Left 4/14/2022       Family History   Problem Relation Age of Onset   • Diabetes Mother    • No Known Problems Father    • Breast cancer Sister    • Cancer Sister    • Lung cancer Sister    • Pancreatic cancer Sister    • No Known Problems Maternal Grandmother    • No Known Problems Maternal Grandfather    • No Known Problems Paternal Grandmother    • No Known Problems Paternal Grandfather        Social History     Socioeconomic History   • Marital status: /Civil Union     Spouse name: Not on file   • Number of children: Not on file   • Years of education: Not on file   • Highest education level: Not on file   Occupational History   • Occupation: unemployed    Tobacco Use   • Smoking status: Never Smoker   • Smokeless tobacco: Never Used   Vaping Use   • Vaping Use: Never used   Substance and Sexual Activity   • Alcohol use: Not Currently     Alcohol/week: 5 0 standard drinks     Types: 5 Shots of liquor per week     Comment: Used mostly as a painkiller when needed before bedtime   • Drug use: Yes     Frequency: 28 0 times per week     Types: Marijuana     Comment: THC Medical marijuana   • Sexual activity: Not Currently     Partners: Male     Comment: Hysterectomy years ago   Other Topics Concern   • Not on file   Social History Narrative    Lives with spouse      Social Determinants of Health     Financial Resource Strain: Not on file   Food Insecurity: Not on file   Transportation Needs: Not on file   Physical Activity: Not on file   Stress: Not on file   Social Connections: Not on file   Intimate Partner Violence: Not on file   Housing Stability: Not on file       Allergies   Allergen Reactions   • Chlorpromazine Anaphylaxis     PHENOTHIAZINE=ANAPHYLAXIS   • Codeine Anaphylaxis     Anaphylaxis  abd pain  • Meperidine Anaphylaxis, Hives and Vomiting   • Phenothiazines Anaphylaxis and Throat Swelling     Category: Allergy;    • Saccharin - Food Allergy Anaphylaxis   • Ampicillin-Sulbactam Sodium Hives   • Aspirin GI Intolerance and Abdominal Pain     Severe GERD   • Gluten Meal - Food Allergy GI Intolerance   • Ibuprofen Hives     H   • Levofloxacin Hives   • Neomycin Other (See Comments), Edema and Hives     Category: Allergy;   swelling   • Citrus - Food Allergy Rash   • Latex Hives and Rash     Category:  Allergy;        Current Outpatient Medications   Medication Sig Dispense Refill   • cholecalciferol (VITAMIN D3) 1,000 units tablet Take 3,000 Units by mouth daily     • diphenhydrAMINE (BENADRYL) 50 MG tablet Take 50 mg by mouth daily at bedtime as needed for itching     • docusate sodium (COLACE) 100 mg capsule Take 1 capsule (100 mg total) by mouth 2 (two) times a day as needed for constipation for up to 10 days 20 capsule 0   • dronabinol (MARINOL) 5 MG capsule Take 15 mg by mouth 4 (four) times a day (before meals and at bedtime)     • HYDROmorphone (DILAUDID) 2 mg tablet Take 1 tablet (2 mg total) by mouth every 6 (six) hours as needed for severe pain Not on Discharge list   Patient is taking for severe pain PRN every 6 hours Max Daily Amount: 8 mg (Patient not taking: Reported on 8/12/2022) 20 tablet 0   • letrozole (FEMARA) 2 5 mg tablet Take 1 tablet (2 5 mg total) by mouth daily 90 tablet 3   • multivitamin (THERAGRAN) TABS Take 1 tablet by mouth daily     • Omega-3 Fatty Acids (FISH OIL PO) Take by mouth     • Probiotic Product (PROBIOTIC-10 PO) Take by mouth     • triamcinolone (KENALOG) 0 1 % cream Apply topically 2 (two) times a day as needed for irritation or rash 80 g 0     No current facility-administered medications for this visit  (Not in a hospital admission)      Objective:     24 Hour Vitals Assessment:     Vitals:    10/13/22 1050   BP: 120/68   Pulse: 68   Resp: 16   Temp: 98 °F (36 7 °C)   SpO2: 98%       PHYSICIAN EXAM:    General: Appearance: alert, cooperative, no distress  HEENT: Normocephalic, atraumatic  No scleral icterus  conjunctivae clear  EOMI  Chest: No tenderness to palpation  No open wound noted  Lungs: Clear to auscultation bilaterally, Respirations unlabored  Cardiac: Regular rate and rhythm, +S1and S2  Abdomen: Soft, non-tender, non-distended  Bowel sounds are normal    Extremities:  No edema, cyanosis, clubbing  Skin: Skin color, turgor are normal  RUE wrapping noted  Lymphatics: no palpable supra-cervical, axillary, or inguinal adenopathy  Neurologic: Awake, Alert, and oriented, no gross focal deficits noted b/l  DATA REVIEW:    Pathology Result:    Final Diagnosis   Date Value Ref Range Status   06/21/2022   Final    A   Breast, Left breast ANITA  directed lumpectomy, marked per margin per protocol :  - Invasive 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, Grade 1   - Largest measurable focus is 13 mm in maximum length (slide measurement)  - Lymphovascular invasion is identified (D2-40 is performed)  - Ductal carcinoma in situ (DCIS), nuclear grade I-II  - Inked surfaces with no tumor seen  - Healing biopsy site  - Skin, no tumor seen  B  Breast, Additional anterior margin, inked most distal margin Maureen Fernando): - Benign breast tissue, negative for carcinoma including at inked designated surgical resection margin  C  Breast, Additional inferior margin, inked most distal margin (blue): - Benign breast tissue, negative for carcinoma including at inked designated surgical resection margin  D  Breast, Additional lateral margin, inked most distal margin (red):  - Benign breast tissue, negative for carcinoma including at inked designated surgical resection margin  E  Breast, Additional medial margin, inked most distal margin (yellow): - Benign breast tissue, negative for carcinoma including at inked designated surgical resection margin  F  Breast, Additional posterior margin, inked most distal margin (black):  - Benign breast tissue, negative for carcinoma including at inked designated surgical resection margin  G  Breast, Additional superior margin, inked most distal margin (orange): - Benign breast tissue, negative for carcinoma including at inked designated surgical resection margin  H  Axillary, Level I and Level II Axillary contents:  -  Metastatic carcinoma involving three of seventeen lymph nodes (3/17)  -  The largest metastatic deposit measures 18 mm in greatest dimension   -  Extranodal extension is identified (1 mm)  -  Foci of carcinoma identified in extranodal lymphatic channels  -  CK-AE1/AE3 immunohistochemical stain with adequate control support the diagnosis  - Intradepartmental consultation concurs with the diagnosis (CK)      05/20/2022   Final    A  Breast, Left, upper inner quadrant, 11 o'clock, MRI guided needle biopsy:  - Invasive mammary carcinoma of no special type (ductal)      -- [place] histologic grade 1 of 3 (total score: 4 of 9)        * Glandular (acinar) Tubular Differentiation 10-75%, score 2         * Nuclear Pleomorphism 1 of 3, score 1        * Mitotic Rate < 3/mm2, (</= 7 mitoses/10HPF), score 1     -- Confirmed by tumor cell immunophenotype:        * Positive: GATA3, E-cadherin, P120 (membranous), ER  * Negative: p63, SMMHC, S100     -- Invasive carcinoma involves 6 of 7 submitted core biopsies and 2 fragments, max  Dimension= 10 mm     -- Estrogen, Progesterone & HER2 receptor studies pending, to be described in an addendum   - Ductal carcinoma in-situ (DCIS): Present; minor component (approximately 5% of total tumor)  -- Architectural Patterns: Solid  -- Nuclear grade: I- II (low to intermediate)  -- Necrosis: Not identified  - Lymphovascular invasion: Focally cannot exclude  -- D2-40 immunostain performed  - Microcalcifications: Present, few associated with DCIS  - Best representative tumor block: A2     -- Sufficient tumor present for        Agendia Mammaprint/Blueprint (1 cm2 of invasive tumor in aggregate): No         MI Profile/Foundation One (at least 5 x 5 mm of tumor): Yes  B  Breast, Right, retroareolar, MRI guided needle biopsy:  - Benign fibrocystic changes without atypia (discrete 0 3 cm focus of sclerosing and tubular adenosis     with usual ductal hyperplasia, columnar cell change and hyperplasia, apocrine metaplasia,     cystic dilatation)  See Note  - Microcalcifications: Present associated with non-neoplastic breast tissue    - Negative for malignancy, in-situ carcinoma and atypical hyperplasia  -- Confirmed by positive SMMHC, p63, E-cadherin, p120 (membranous) immunostains  05/20/2022   Final    A  Breast, Left, upper inner quadrant, 11 o'clock, MRI guided needle biopsy:  - Invasive mammary carcinoma of no special type (ductal)      -- [place] histologic grade 1 of 3 (total score: 4 of 9)        * Glandular (acinar) Tubular Differentiation 10-75%, score 2         * Nuclear Pleomorphism 1 of 3, score 1        * Mitotic Rate < 3/mm2, (</= 7 mitoses/10HPF), score 1     -- Confirmed by tumor cell immunophenotype:        * Positive: GATA3, E-cadherin, P120 (membranous), ER  * Negative: p63, SMMHC, S100     -- Invasive carcinoma involves 6 of 7 submitted core biopsies and 2 fragments, max  Dimension= 10 mm     -- Estrogen, Progesterone & HER2 receptor studies pending, to be described in an addendum   - Ductal carcinoma in-situ (DCIS): Present; minor component (approximately 5% of total tumor)  -- Architectural Patterns: Solid  -- Nuclear grade: I- II (low to intermediate)  -- Necrosis: Not identified  - Lymphovascular invasion: Focally cannot exclude  -- D2-40 immunostain performed  - Microcalcifications: Present, few associated with DCIS  - Best representative tumor block: A2     -- Sufficient tumor present for        Agendia Mammaprint/Blueprint (1 cm2 of invasive tumor in aggregate): No         MI Profile/Foundation One (at least 5 x 5 mm of tumor): Yes  B  Breast, Right, retroareolar, MRI guided needle biopsy:  - Benign fibrocystic changes without atypia (discrete 0 3 cm focus of sclerosing and tubular adenosis     with usual ductal hyperplasia, columnar cell change and hyperplasia, apocrine metaplasia,     cystic dilatation)  See Note  - Microcalcifications: Present associated with non-neoplastic breast tissue    - Negative for malignancy, in-situ carcinoma and atypical hyperplasia  -- Confirmed by positive SMMHC, p63, E-cadherin, p120 (membranous) immunostains  04/14/2022   Final    A  Lymph node, left axillary, ultrasound-guided biopsy (2 passes):  -  Metastatic carcinoma, compatible with breast origin (see note)  08/15/2017   Final    A  Colon, sigmoid polyp at 30 cm, biopsy:  -  Tubular adenoma, negative for high-grade dysplasia      B   Colon, rectosigmoid polyp, biopsy:  -  Hyperplastic polyp  Image Results:   Image result are reviewed and documented in Hematology/Oncology history    Mammo tiffany  breast specimen (No Charge)  Narrative: SPECIMEN RADIOGRAPH  Impression:   3 specimen radiograph images were obtained  A TIFFANY  reflector, butterfly clip and surgical clips project within 1   of the specimens  A TIFFANY  reflector and stoplight clip project within the other 2   specimens  Specimen radiograph was not discussed with the surgeon while they were in   the operating room  LABS:  Lab data are reviewed and documented in HemOnc history  Lab Results   Component Value Date    HGB 14 9 09/01/2022    HCT 44 1 09/01/2022    MCV 92 09/01/2022     09/01/2022    WBC 7 15 09/01/2022    NRBC 0 09/01/2022     Lab Results   Component Value Date    K 3 7 06/01/2022     06/01/2022    CO2 28 06/01/2022    BUN 8 06/01/2022    CREATININE 0 55 (L) 06/01/2022    GLUCOSE 98 05/02/2017    GLUF 87 04/07/2022    CALCIUM 9 8 06/01/2022    AST 17 06/01/2022    ALT 16 06/01/2022    ALKPHOS 119 (H) 06/01/2022    EGFR 100 06/01/2022       No results found for: IRON, TIBC, FERRITIN    No results found for: [id number]    No results for input(s): WBC, CREAT in the last 72 hours      Invalid input(s):  PLT    By:  Ruel Crigler, 10/13/2022, 11:12 AM

## 2022-10-18 ENCOUNTER — HOSPITAL ENCOUNTER (OUTPATIENT)
Dept: BONE DENSITY | Facility: CLINIC | Age: 62
Discharge: HOME | End: 2022-10-18

## 2022-10-18 DIAGNOSIS — Z79.811 AROMATASE INHIBITOR USE: ICD-10-CM

## 2022-10-18 DIAGNOSIS — C50.912 CARCINOMA OF LEFT BREAST METASTATIC TO AXILLARY LYMPH NODE (HCC): ICD-10-CM

## 2022-10-18 DIAGNOSIS — C77.3 CARCINOMA OF LEFT BREAST METASTATIC TO AXILLARY LYMPH NODE (HCC): ICD-10-CM

## 2022-10-19 ENCOUNTER — HOSPITAL ENCOUNTER (OUTPATIENT)
Dept: BONE DENSITY | Facility: CLINIC | Age: 62
Discharge: HOME | End: 2022-10-19
Payer: COMMERCIAL

## 2022-10-19 PROCEDURE — 77080 DXA BONE DENSITY AXIAL: CPT

## 2022-10-21 ENCOUNTER — PATIENT OUTREACH (OUTPATIENT)
Dept: HEMATOLOGY ONCOLOGY | Facility: CLINIC | Age: 62
End: 2022-10-21

## 2022-10-21 NOTE — PROGRESS NOTES
Called to check in with pt post radiation  Pt was taking Cefalexin and requested Dr Meenakshi Hart to re order as she has taken her last pill  Message routed to radiation team for refill  Pt requested [facility] to take her to her f/u appt with radiation and surg onc  Message sent to [facility] to set her up for transportation  My contact number was given to pt ans encouraged to call with any questions

## 2022-11-01 ENCOUNTER — EVALUATION (OUTPATIENT)
Dept: OCCUPATIONAL THERAPY | Facility: CLINIC | Age: 62
End: 2022-11-01

## 2022-11-01 DIAGNOSIS — I89.0 LYMPHEDEMA OF RIGHT UPPER EXTREMITY: Primary | ICD-10-CM

## 2022-11-01 NOTE — PROGRESS NOTES
OT Evaluation     Today's date: 2022  Patient name: Zoey Almonte  : 1960  MRN: [id number]  Referring provider: Ted Garcia  Dx:   Encounter Diagnosis     ICD-10-CM    1  Lymphedema of right upper extremity  I89 0                   Assessment  Assessment details: This is a 57year old female with long standing stage 2 lymphedema in the RUE  In the past year she has been treated for left breast cancer and she hs lost a large amount of weight  Her old compression garments for daytime and night time use are now too large  New measurements for daytime and night time garments were taken this date and be sent to [employer] vendor to be ordered  Patient will be seen 1-2 more times for fitting and then DC to [facility] once garments fit well  Other impairment: Stage 2 lymphedema  Barriers to therapy: None  Understanding of Dx/Px/POC: excellent  Goals  STG/LTGs ( 1-3 visits)  1  Patient will be measured and fit with new daytime and night time compression sleeves and gloves  2  Patient will demonstrate independence in garment donning, doffing, care    Plan  Patient would benefit from: skilled occupational therapy  Planned therapy interventions: compression  Other planned therapy interventions: Measure and fit Jobst Elvarex sleeve and glove for daytime and Jobst relax garment for night time  Duration in visits: 3  Plan of Care beginning date: 2022  Plan of Care expiration date: 2022  Treatment plan discussed with: patient        Subjective Evaluation    History of Present Illness  Mechanism of injury: Patient has a long history of RUE lymphedema secondary to desmoid cancer treatments  She recently completed treatments for left brest cancer  Patient has lost a significant amount of weight in the past year and as a result her old compression garments no longer fit well   She now presents to be re measured for garments          Recurrent probem    Quality of life: good    Pain  No pain reported    Treatments  Previous treatment: occupational therapy  Patient Goals  Patient goal: Get new compression garments        Objective     Swelling   Right shoulder swelling details: 11/1/22: Refer to Jobst garment measurement forms in media section of EPIC             Precautions: Lymphedema       Date 11/1/22       Visit 1       Manuals        Measure Jobst Elvarex and Relax compression garments 30' RUE                               Neuro Re-Ed                                                                 Ther Ex                                                                        Ther Activity                        Gait Training                        Modalities

## 2022-11-07 DIAGNOSIS — I89.0 LYMPHEDEMA OF RIGHT ARM: Primary | ICD-10-CM

## 2022-11-07 RX ORDER — CEPHALEXIN 500 MG/1
500 CAPSULE ORAL EVERY 12 HOURS SCHEDULED
Qty: 20 CAPSULE | Refills: 0 | Status: SHIPPED | OUTPATIENT
Start: 2022-11-07 | End: 2022-11-17

## 2022-11-07 NOTE — TELEPHONE ENCOUNTER
Eileen need refill of cephalexin 500 mg BID x 10 days that she keeps on hand for lymphedema flare ups  Dr Isabela Cornelius aware and will refill   Eileen aware Rx sent to [facility]

## 2022-11-16 ENCOUNTER — CLINICAL SUPPORT (OUTPATIENT)
Dept: RADIATION ONCOLOGY | Facility: CLINIC | Age: 62
End: 2022-11-16
Attending: RADIOLOGY

## 2022-11-16 ENCOUNTER — RADIATION ONCOLOGY FOLLOW-UP (OUTPATIENT)
Dept: RADIATION ONCOLOGY | Facility: CLINIC | Age: 62
End: 2022-11-16
Attending: RADIOLOGY

## 2022-11-16 VITALS
HEART RATE: 72 BPM | WEIGHT: 140 LBS | BODY MASS INDEX: 21.29 KG/M2 | OXYGEN SATURATION: 97 % | RESPIRATION RATE: 16 BRPM | TEMPERATURE: 98.3 F | DIASTOLIC BLOOD PRESSURE: 84 MMHG | SYSTOLIC BLOOD PRESSURE: 140 MMHG

## 2022-11-16 DIAGNOSIS — C77.3 CARCINOMA OF LEFT BREAST METASTATIC TO AXILLARY LYMPH NODE (HCC): Primary | ICD-10-CM

## 2022-11-16 DIAGNOSIS — C50.912 CARCINOMA OF LEFT BREAST METASTATIC TO AXILLARY LYMPH NODE (HCC): Primary | ICD-10-CM

## 2022-11-16 NOTE — PROGRESS NOTES
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 [facility])  Staging form: Breast, AJCC 8th Edition  - Pathologic stage from 6/21/2022: Stage IA (pT1c, pN1, cM0, G1, ER+, NC+, HER2-) - Signed by Bethany Denton MD on 8/1/2022  Stage prefix: Initial diagnosis  Nuclear grade: G1  Multigene prognostic tests performed: None  Mitotic count score: Score 1  Histologic grading system: 3 grade system      Florin Leon is a 57 y o   female with past medical history significant for 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 more recently diagnosed with stage IIA ([id number]) grade 1 invasive ductal carcinoma of the left breast status post lumpectomy and axillary lymph node dissection achieving negative margins  Pathology was significant for lymphovascular invasion and 3/17 lymph nodes positive for metastatic carcinoma with less 1-2 mm of JACKIE associated with a 1 8 cm macro metastasis  Tumor cells were ER/NC positive and HER2 negative  She completed adjuvant radiation on 10/10/22  The patient is currently maintained on letrozole  She presents today for follow up   Kelsi Yoo  10/13/22 Dr Bharathi Marquez  offering the patient Aromatase inhibitor therapy for 5 years to reduce the risk of breast cancer recurrence  Letrozole following RT     The patient experienced cellulitis and lymphedema flare of her right arm post treatment  She is completing a course of Keflex tomorrow with significant reduction of her symptoms  She is following lymphedema clinic on 11/1/22  She denies pain and erythema has resolved  She denies pain or tenderness of the breasts bilaterally  The patient denies palpable nodules, suspicious skin changes, or nipple discharge the breasts bilaterally  She denies left upper extremity edema    She tolerates the letrozole well       Upcomin/10/23 Dr Fouzia Garcia  2/10/23 Medical Oncology     Historical Information   Oncology History   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 ([facility] [address] )   2022 Initial Diagnosis    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 ([facility] [address] )     2022 Biopsy    Left axillary Lymph node ultrasound guided biopsy  Metastatic carcinoma, compatible with breast origin  ER >95  WA 95   HER2 1+    On ultrasound lymph node is 1 8 cm  There is cortical thickening without evident fatty hilum  In review of screening mammogram, demonstrates no suspicious mammographic findings identified in either breast  Bilateral MRI is recommended  Flow cytometry - there is no evidence of a B-cell or T-cell non-Hodgkin lymphoma  2022 Genomic Testing    A total of 36 genes were evaluated, including: ANGEL LUIS, BRCA1, BRCA2, CDH1, CHEK2, PALB2, PTEN, STK11, TP53  Negative result  No pathogenic sequence variants or deletions/duplications identified     2022 Biopsy    MRI guided biopsy  A  Left breast   11 o'clock   Invasive mammary carcinoma of no special type (ductal)  Grade 1  ER 95  WA 95  HER2 0    B  Right breast   Retroareolar  Benign fibrocystic changes without atypia (discrete 0 3 cm focus of sclerosing and tubular adenosis with usual ductal hyperplasia, columnar cell change and hyperplasia, apocrine metaplasia, cystic dilatation)  - Microcalcifications: Present associated with non-neoplastic breast tissue    - Negative for malignancy, in-situ carcinoma and atypical hyperplasia  Malignant pathology appears unifocal  Biopsy proven carcinoma measured 1 cm on MRI  Biopsy proved metastatic disease to left axillary lymph node  2022 Genomic Testing    MammaPrint  Low risk  Luminal type A  MammaPrint index: [phone]     2022 Surgery    Left breast tiffany  directed lumpectomy with axillary dissection  A   Invasive 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  Grade 1  1 3 cm  Lymphovascular invasion is identified  Margins negative    H  Axillary, Level I and Level II axillary contents  Metastatic carcinoma involving 3/17 lymph nodes  1 8 cm   Extranodal extension is identified 1mm  Foci of carcinoma identified in extranodal lymphatic channels    Anatomic stage: Stage IIA  Prognostic stage: Stage IA      6/21/2022 -  Cancer Staged    Staging form: Breast, AJCC 8th Edition  - Pathologic stage from 6/21/2022: Stage IA (pT1c, pN1, cM0, G1, ER+, VT+, HER2-) - Signed by Ryan Winchester MD on 8/1/2022  Stage prefix: Initial diagnosis  Nuclear grade: G1  Multigene prognostic tests performed: None  Mitotic count score: Score 1  Histologic grading system: 3 grade system       8/29/2022 - 10/10/2022 Radiation    Treatments:  Course: C1  Plan ID Energy Fractions Dose per Fraction (cGy) Total Dose Delivered (cGy) Elapsed Days   BH L Breast 6X 25 / 25 200 5,000 35   BH L [id number] 12E 5 / 5 200 1,000 6   BH L SClav 10X/6X 24 / 24 200 4,800 32    Treatment Dates:  8/29/2022 - 10/10/2022  Malignant neoplasm of lower-inner quadrant of left breast in female, estrogen receptor positive ([facility] [address] )   5/20/2022 Initial Diagnosis    Malignant neoplasm of lower-inner quadrant of left breast in female, estrogen receptor positive ([facility] [address] )     5/20/2022 Biopsy    MRI guided biopsy  A  Left breast   11 o'clock   Invasive mammary carcinoma of no special type (ductal)  Grade 1  ER 95  VT 95  HER2 0    B  Right breast   Retroareolar  Benign fibrocystic changes without atypia (discrete 0 3 cm focus of sclerosing and tubular adenosis with usual ductal hyperplasia, columnar cell change and hyperplasia, apocrine metaplasia, cystic dilatation)  - Microcalcifications: Present associated with non-neoplastic breast tissue    - Negative for malignancy, in-situ carcinoma and atypical hyperplasia  Malignant pathology appears unifocal  Biopsy proven carcinoma measured 1 cm on MRI   Biopsy proved metastatic disease to left axillary lymph node  6/9/2022 Genomic Testing    MammaPrint  Low risk  Luminal type A  MammaPrint index: [phone]     6/21/2022 Surgery    Left breast tiffany  directed lumpectomy with axillary dissection  A  Invasive 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  Grade 1  1 3 cm  Lymphovascular invasion is identified  Margins negative    H  Axillary, Level I and Level II axillary contents  Metastatic carcinoma involving 3/17 lymph nodes  1 8 cm   Extranodal extension is identified 1mm  Foci of carcinoma identified in extranodal lymphatic channels    Anatomic stage: Stage IIA  Prognostic stage: Stage IA      8/29/2022 - 10/10/2022 Radiation    Treatments:  Course: C1  Plan ID Energy Fractions Dose per Fraction (cGy) Total Dose Delivered (cGy) Elapsed Days   BH L Breast 6X 25 / 25 200 5,000 35   BH L [id number] 12E 5 / 5 200 1,000 6   BH L SClav 10X/6X 24 / 24 200 4,800 32    Treatment Dates:  8/29/2022 - 10/10/2022  Past Medical History:   Diagnosis Date   • Abnormal mammogram 05/15/2013   • Anemia    • Cancer [facility])     desmoitosis   • 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 ([facility] [address] ) 04/19/2022   • Chronic pain disorder     worse right side of body   • Desmoid tumor     Last Assessed: 6/19/2017   • History of transfusion     no reactions   • Hyperlipidemia    • Hypertension    • Ovarian cancer ([facility] [address] ) 1999?   Hysterectomy done   • PONV (postoperative nausea and vomiting)      Past Surgical History:   Procedure Laterality Date   • BREAST LUMPECTOMY Left 6/21/2022    Procedure: TIFFANY  DIRECTED LUMPECTOMY;  Surgeon: Nehemias Cool MD;  Location: [facility];  Service: Surgical Oncology   • FRACTURE SURGERY     • HYSTERECTOMY     • LYMPH NODE DISSECTION Left 6/21/2022    Procedure: AXILLARY DISSECTION LEVEL I AND LEVEL II LYMPH NODES;  Surgeon: Nehemias Cool MD;  [facility]: [facility];  Service: Surgical Oncology   • MRI BREAST BIOPSY LEFT (ALL INCLUSIVE) Left 5/20/2022   • MRI BREAST BIOPSY RIGHT (ALL INCLUSIVE) Right 5/20/2022   • OTHER SURGICAL HISTORY      Arm Incision: Dermoid tumor, right Arm    • PARTIAL HYSTERECTOMY     • KS COLONOSCOPY FLX DX W/COLLJ SPEC WHEN PFRMD N/A 8/15/2017    Procedure: COLONOSCOPY;  Surgeon: Terri Evans MD;  Location: [facility];   Service: Gastroenterology   • KS OPEN RX DISTAL RADIUS FX, INTRA-ARTICULAR, 3+ FRAG Left 2/9/2021    Procedure: OPEN REDUCTION W/ INTERNAL FIXATION (ORIF) RADIUS / Ilana Salvia (WRIST) left;  Surgeon: Mitul Archer MD;  Location: [facility];  Service: Orthopedics   • KS WRIST Rosiland Courtney LIG Left 8/10/2021    Procedure: RELEASE LEFT CARPAL TUNNEL ENDOSCOPIC;  Surgeon: Mitul Archer MD;  Location: [facility];  Service: Orthopedics   • SKIN BIOPSY     • SKIN CANCER EXCISION      Melanoma Excision    • TONSILLECTOMY     • US BREAST NEEDLE LOC LEFT Left 6/16/2022   • US BREAST NEEDLE LOC RIGHT EACH ADDITIONAL Right 6/16/2022   • US GUIDED BREAST LYMPH NODE BIOPSY LEFT Left 4/14/2022       Social History   Social History     Substance and Sexual Activity   Alcohol Use Not Currently   • Alcohol/week: 5 0 standard drinks   • Types: 5 Shots of liquor per week    Comment: Used mostly as a painkiller when needed before bedtime     Social History     Substance and Sexual Activity   Drug Use Yes   • Frequency: 28 0 times per week   • Types: Marijuana    Comment: THC Medical marijuana     Social History     Tobacco Use   Smoking Status Never   Smokeless Tobacco Never         Meds/Allergies     Current Outpatient Medications:   •  cephalexin (KEFLEX) 500 mg capsule, Take 1 capsule (500 mg total) by mouth every 12 (twelve) hours for 10 days, Disp: 20 capsule, Rfl: 0  •  cholecalciferol (VITAMIN D3) 1,000 units tablet, Take 3,000 Units by mouth daily, Disp: , Rfl:   •  diphenhydrAMINE (BENADRYL) 50 MG tablet, Take 50 mg by mouth daily at bedtime as needed for itching, Disp: , Rfl:   •  dronabinol (MARINOL) 5 MG capsule, Take 15 mg by mouth 4 (four) times a day (before meals and at bedtime), Disp: , Rfl:   •  letrozole (FEMARA) 2 5 mg tablet, Take 1 tablet (2 5 mg total) by mouth daily, Disp: 90 tablet, Rfl: 3  •  multivitamin (THERAGRAN) TABS, Take 1 tablet by mouth daily, Disp: , Rfl:   •  Omega-3 Fatty Acids (FISH OIL PO), Take by mouth, Disp: , Rfl:   •  Probiotic Product (PROBIOTIC-10 PO), Take by mouth, Disp: , Rfl:   •  triamcinolone (KENALOG) 0 1 % cream, Apply topically 2 (two) times a day as needed for irritation or rash, Disp: 80 g, Rfl: 0  •  docusate sodium (COLACE) 100 mg capsule, Take 1 capsule (100 mg total) by mouth 2 (two) times a day as needed for constipation for up to 10 days, Disp: 20 capsule, Rfl: 0  •  HYDROmorphone (DILAUDID) 2 mg tablet, Take 1 tablet (2 mg total) by mouth every 6 (six) hours as needed for severe pain Not on Discharge list   Patient is taking for severe pain PRN every 6 hours Max Daily Amount: 8 mg (Patient not taking: Reported on 8/12/2022), Disp: 20 tablet, Rfl: 0  Allergies   Allergen Reactions   • Chlorpromazine Anaphylaxis     PHENOTHIAZINE=ANAPHYLAXIS   • Codeine Anaphylaxis     Anaphylaxis  abd pain  • Meperidine Anaphylaxis, Hives and Vomiting   • Phenothiazines Anaphylaxis and Throat Swelling     Category: Allergy;    • Saccharin - Food Allergy Anaphylaxis   • Ampicillin-Sulbactam Sodium Hives   • Aspirin GI Intolerance and Abdominal Pain     Severe GERD   • Gluten Meal - Food Allergy GI Intolerance   • Ibuprofen Hives     H   • Levofloxacin Hives   • Neomycin Other (See Comments), Edema and Hives     Category: Allergy;   swelling   • Citrus - Food Allergy Rash   • Latex Hives and Rash     Category: Allergy; Review of Systems   Constitutional: Positive for fatigue  HENT: Negative  Eyes:        Wears glasses  Macular degeneration  Optic migraines   Respiratory: Negative  Cardiovascular: Negative      Gastrointestinal: Negative  Genitourinary: Negative  Musculoskeletal: Positive for neck pain  Lymphedema right arm since 1999   Skin: Negative  No skin changes left breast   Allergic/Immunologic: Positive for environmental allergies and food allergies  Neurological: Negative  Hematological: Negative  Psychiatric/Behavioral: Positive for sleep disturbance       OBJECTIVE:   /84   Pulse 72   Temp 98 3 °F (36 8 °C)   Resp 16   Wt 63 5 kg (140 lb)   SpO2 97%   BMI 21 29 kg/m²   Karnofsky: 80 - Normal activity with effort; some signs or symptoms of disease    Physical Exam  Vitals and nursing note reviewed  Constitutional:       General: She is not in acute distress  Appearance: She is well-developed and well-nourished  Eyes:      General: No scleral icterus  Cardiovascular:      Rate and Rhythm: Normal rate and regular rhythm  Pulmonary:      Breath sounds: No wheezing, rhonchi or rales  Chest:      Comments: Breast examination demonstrates breasts are symmetric in size and contour  There is a well-healed 12:00 lumpectomy scar in right breast with a 2nd well healed right axillary scar  There is diffuse mild to moderate hyperpigmentation and mild diffuse fibrosis of the left breast   There are no palpable nodules or suspicious skin changes  Abdominal:      General: There is no distension  Palpations: Abdomen is soft  Tenderness: There is no abdominal tenderness  Musculoskeletal:         General: No edema  Right lower leg: No edema  Left lower leg: No edema  Comments: 1+ non-pitting lymphedema right arm in compression sleeve and gauntlet  No erythema or pain  No upper extremity edema left arm  Full range of motion upper extremities bilaterally  Lymphadenopathy:      Cervical: No cervical adenopathy  Upper Body:      Right upper body: No supraclavicular or axillary adenopathy  Left upper body: No supraclavicular or axillary adenopathy  Neurological:      Mental Status: She is alert and oriented to person, place, and time  Gait: Gait normal          RESULTS    Imaging Studies:DXA bone density spine hip and pelvis    Result Date: 10/20/2022  Narrative: DXA SCAN CLINICAL HISTORY: 59 years postmenopausal female   OTHER RISK FACTORS:  Femara therapy, Prior fracture as a result of minor injury  PHARMACOLOGIC THERAPY FOR OSTEOPOROSIS:  None  TECHNIQUE: Bone densitometry was performed using a [employer] W  bone densitometer  Regions of interest appear properly placed  COMPARISON: There are no prior DXA studies performed on this unit for comparison  RESULTS: LUMBAR SPINE Level: L1-L4 : BMD:  0 839  gm/cm2 T-score: -1 9 LEFT  TOTAL HIP: BMD:  0 786  gm/cm2 T-score:  -1 3 LEFT  FEMORAL NECK: BMD:  0 724  gm/cm2 T score: -1 1     Impression: 1  Low bone mass (osteopenia)  2   The 10 year risk of hip fracture is 0 9% with the 10 year risk of major osteoporotic fracture being 12% as calculated by the [facility]/WHO fracture risk assessment tool (FRAX)  3   The current NOF guidelines recommend treating patients with a T-score of -2 5 or less in the lumbar spine or hips, or in post-menopausal women and men over the age of 48 with low bone mass (osteopenia) and a FRAX 10 year risk score of >3% for hip fracture and/or >20% for major osteoporotic fracture  4   The NOF recommends follow-up DXA in 1-2 years after initiating therapy for osteoporosis and every 2 years thereafter  More frequent evaluation is appropriate for patients with conditions associated with rapid bone loss, such as glucocorticoid therapy  The interval between DXA screenings may be longer for individuals without major risk factors and initial T-score in the normal or upper low bone mass range  The FRAX algorithm has certain limitations: -FRAX has not been validated in patients currently or previously treated with pharmacotherapy for osteoporosis    In such patients, clinical judgment must be exercised in interpreting FRAX scores  -Prior hip, vertebral and humeral fragility fractures appear to confer greater risk of subsequent fracture than fractures at other sites (this is especially true for individuals with severe vertebral fractures), but quantification of this incremental risk is not possible with FRAX  -FRAX underestimates fracture risk in patients with history of multiple fragility fractures  -FRAX may underestimate fracture risk in patients with history of frequent falls  -It is not appropriate to use FRAX to monitor treatment response  WHO CLASSIFICATION: Normal (a T-score of -1 0 or higher) Low bone mineral density (a T-score of less than -1 0 but higher than -2 5) Osteoporosis (a T-score of -2 5 or less) Severe osteoporosis (a T-score of -2 5 or less with a fragility fracture) Workstation performed: [id number]           Assessment/Plan:  Mamie Bueno is a 57 y o  female with past medical history significant for 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 diagnosed with stage IIA ([id number]) grade 1 invasive ductal carcinoma of the left breast status post lumpectomy and axillary lymph node dissection achieving negative margins  Pathology was significant for lymphovascular invasion and 3/17 lymph nodes positive for metastatic carcinoma with less 1-2 mm of JACKIE associated with a 1 8 cm macro metastasis  Tumor cells were ER/IA positive and HER2 negative  She completed adjuvant radiation on 10/10/22  The patient is currently maintained on letrozole  The patient is recovering well from radiation  I recommended sun protection of the irradiated skin  I recommended daily emollient use and light to moderate breast massage to promote continued tissue healing  She will return for follow-up in 6 months  We will see her sooner should the need arise    She will see Dr Monserrat Ruiz and Dr Herbie Garcia in the interim as well  Clinton Trent MD  11/16/2022,4:35 PM    Portions of the record may have been created with voice recognition software   Occasional wrong word or "sound a like" substitutions may have occurred due to the inherent limitations of voice recognition software   Read the chart carefully and recognize, using context, where substitutions have occurred

## 2022-11-16 NOTE — PROGRESS NOTES
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 in  resulting in chronic right upper extremity edema more recently diagnosed with stage IIA ([id number]) grade 1 invasive ductal carcinoma of the left breast status post lumpectomy and axillary lymph node dissection achieving negative margins  Pathology was significant for lymphovascular invasion and 3/17 lymph nodes positive for metastatic carcinoma with less 1-2 mm of JACKIE  associated with a 1 8 cm macro metastasis  Tumor cells were ER/WA positive and HER2 negative  She recently completed adjuvant radiation on 10/10/22  She presents today for follow up  She experienced G2+ skin toxicity with brisk erythema, but no desquamation managed with emollients and topical steroids  She suffered cellulitis treated with antibiotics with improvement  She suffered no other significant 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 due to radiation  She will follow-up with lymphedema clinic      10/13/22 Dr Gilda Kessler  offering the patient Aromatase inhibitor therapy for 5 years to reduce the risk of breast cancer recurrence  Letrozole following RT    22 OT- lymphedema clinic    Upcomin/10/23 Dr Ellis Davila  2/10/23 Medical Oncology      Follow up visit     Oncology History   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 ([address] )   2022 Initial Diagnosis    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 ([address] )     2022 Biopsy    Left axillary Lymph node ultrasound guided biopsy  Metastatic carcinoma, compatible with breast origin  ER >95  WA 95   HER2 1+    On ultrasound lymph node is 1 8 cm  There is cortical thickening without evident fatty hilum  In review of screening mammogram, demonstrates no suspicious mammographic findings identified in either breast  Bilateral MRI is recommended   Flow cytometry - there is no evidence of a B-cell or T-cell non-Hodgkin lymphoma  4/20/2022 Genomic Testing    A total of 36 genes were evaluated, including: ANGEL LUIS, BRCA1, BRCA2, CDH1, CHEK2, PALB2, PTEN, STK11, TP53  Negative result  No pathogenic sequence variants or deletions/duplications identified     5/20/2022 Biopsy    MRI guided biopsy  A  Left breast   11 o'clock   Invasive mammary carcinoma of no special type (ductal)  Grade 1  ER 95  VA 95  HER2 0    B  Right breast   Retroareolar  Benign fibrocystic changes without atypia (discrete 0 3 cm focus of sclerosing and tubular adenosis with usual ductal hyperplasia, columnar cell change and hyperplasia, apocrine metaplasia, cystic dilatation)  - Microcalcifications: Present associated with non-neoplastic breast tissue    - Negative for malignancy, in-situ carcinoma and atypical hyperplasia  Malignant pathology appears unifocal  Biopsy proven carcinoma measured 1 cm on MRI  Biopsy proved metastatic disease to left axillary lymph node  6/9/2022 Genomic Testing    MammaPrint  Low risk  Luminal type A  MammaPrint index: [phone]     6/21/2022 Surgery    Left breast tiffany  directed lumpectomy with axillary dissection  A  Invasive 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  Grade 1  1 3 cm  Lymphovascular invasion is identified  Margins negative    H   Axillary, Level I and Level II axillary contents  Metastatic carcinoma involving 3/17 lymph nodes  1 8 cm   Extranodal extension is identified 1mm  Foci of carcinoma identified in extranodal lymphatic channels    Anatomic stage: Stage IIA  Prognostic stage: Stage IA      6/21/2022 -  Cancer Staged    Staging form: Breast, AJCC 8th Edition  - Pathologic stage from 6/21/2022: Stage IA (pT1c, pN1, cM0, G1, ER+, VA+, HER2-) - Signed by Jose Christianson MD on 8/1/2022  Stage prefix: Initial diagnosis  Nuclear grade: G1  Multigene prognostic tests performed: None  Mitotic count score: Score 1  Histologic grading system: 3 grade system       8/29/2022 - 10/10/2022 Radiation    Treatments:  Course: C1  Plan ID Energy Fractions Dose per Fraction (cGy) Total Dose Delivered (cGy) Elapsed Days   BH L Breast 6X 25 / 25 200 5,000 35   BH L [id number] 12E 5 / 5 200 1,000 6   BH L SClav 10X/6X 24 / 24 200 4,800 32    Treatment Dates:  8/29/2022 - 10/10/2022  Malignant neoplasm of lower-inner quadrant of left breast in female, estrogen receptor positive ([facility] )   5/20/2022 Initial Diagnosis    Malignant neoplasm of lower-inner quadrant of left breast in female, estrogen receptor positive ([facility] [address] )     5/20/2022 Biopsy    MRI guided biopsy  A  Left breast   11 o'clock   Invasive mammary carcinoma of no special type (ductal)  Grade 1  ER 95  MO 95  HER2 0    B  Right breast   Retroareolar  Benign fibrocystic changes without atypia (discrete 0 3 cm focus of sclerosing and tubular adenosis with usual ductal hyperplasia, columnar cell change and hyperplasia, apocrine metaplasia, cystic dilatation)  - Microcalcifications: Present associated with non-neoplastic breast tissue    - Negative for malignancy, in-situ carcinoma and atypical hyperplasia  Malignant pathology appears unifocal  Biopsy proven carcinoma measured 1 cm on MRI  Biopsy proved metastatic disease to left axillary lymph node  6/9/2022 Genomic Testing    MammaPrint  Low risk  Luminal type A  MammaPrint index: +[phone]     6/21/2022 Surgery    Left breast tiffany  directed lumpectomy with axillary dissection  A  Invasive 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  Grade 1  1 3 cm  Lymphovascular invasion is identified  Margins negative    H   Axillary, Level I and Level II axillary contents  Metastatic carcinoma involving 3/17 lymph nodes  1 8 cm   Extranodal extension is identified 1mm  Foci of carcinoma identified in extranodal lymphatic channels    Anatomic stage: Stage IIA  Prognostic stage: Stage IA      8/29/2022 - 10/10/2022 Radiation    Treatments:  Course: C1  Plan ID Energy Fractions Dose per Fraction (cGy) Total Dose Delivered (cGy) Elapsed Days   BH L Breast 6X 25 / 25 200 5,000 35   BH L [id number] 12E 5 / 5 200 1,000 6   BH L SClav 10X/6X 24 / 24 200 4,800 32    Treatment Dates:  8/29/2022 - 10/10/2022  Review of Systems:  Review of Systems   Constitutional: Positive for fatigue  HENT: Negative  Eyes:        Wears glasses  Macular degeneration  Optic migraines   Respiratory: Negative  Cardiovascular: Negative  Gastrointestinal: Negative  Genitourinary: Negative  Musculoskeletal: Positive for neck pain  Lymphedema right arm since 1999   Skin: Negative  No skin changes left breast   Allergic/Immunologic: Positive for environmental allergies and food allergies  Neurological: Negative  Hematological: Negative  Psychiatric/Behavioral: Positive for sleep disturbance         Clinical Trial: no    Covid Vaccine Status vaccinated + 1 booster    Health Maintenance   Topic Date Due   • Hepatitis B Vaccine (1 of 3 - 3-dose series) Never done   • Pneumococcal Vaccine: Pediatrics (0 to 5 Years) and At-Risk Patients (6 to 59 Years) (1 - PCV) Never done   • HIV Screening  Never done   • COVID-19 Vaccine (4 - Booster for Pfizer series) 11/12/2021   • DTaP,Tdap,and Td Vaccines (2 - Td or Tdap) 03/12/2022   • PT PLAN OF CARE  06/03/2022   • Colorectal Cancer Screening  08/15/2022   • Influenza Vaccine (1) 09/01/2022   • Annual Physical  01/26/2023   • Breast Cancer Screening: Mammogram  03/29/2023   • Depression Screening  08/12/2023   • BMI: Adult  10/13/2023   • Cervical Cancer Screening  01/26/2027   • Hepatitis C Screening  Completed   • Osteoporosis Screening  Completed   • HIB Vaccine  Aged Out   • IPV Vaccine  Aged Out   • Hepatitis A Vaccine  Aged Out   • Meningococcal ACWY Vaccine  Aged Out   • HPV Vaccine  Aged Out     Patient Active Problem List   Diagnosis   • Back pain, chronic   • Chronic insomnia   • Lymphedema of right arm   • Hyperlipidemia, mixed • Peripheral neuropathy   • Lymphedema   • Cellulitis of right upper extremity   • Sepsis ([facility] )   • Desmoid tumor   • 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 (HCC)   • Malignant neoplasm of lower-inner quadrant of left breast in female, estrogen receptor positive ([facility] [address] )   • HTN, goal below 140/90   • PONV (postoperative nausea and vomiting)   • Use of letrozole (Femara)     Past Medical History:   Diagnosis Date   • Abnormal mammogram 05/15/2013   • Anemia    • Cancer ([facility] )     desmoitosis   • 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 ([facility] ) 04/19/2022   • Chronic pain disorder     worse right side of body   • Desmoid tumor     Last Assessed: 6/19/2017   • History of transfusion     no reactions   • Hyperlipidemia    • Hypertension    • Ovarian cancer ([facility] ) 1999? Hysterectomy done   • PONV (postoperative nausea and vomiting)      Past Surgical History:   Procedure Laterality Date   • BREAST LUMPECTOMY Left 6/21/2022    Procedure: ANITA  DIRECTED LUMPECTOMY;  Surgeon: Nick Dunham MD;  Location: [facility];  Service: Surgical Oncology   • FRACTURE SURGERY     • HYSTERECTOMY     • LYMPH NODE DISSECTION Left 6/21/2022    Procedure: AXILLARY DISSECTION LEVEL I AND LEVEL II LYMPH NODES;  Surgeon: Nick Dunham MD;  Location: [facility];  Service: Surgical Oncology   • MRI BREAST BIOPSY LEFT (ALL INCLUSIVE) Left 5/20/2022   • MRI BREAST BIOPSY RIGHT (ALL INCLUSIVE) Right 5/20/2022   • OTHER SURGICAL HISTORY      Arm Incision: Dermoid tumor, right Arm    • PARTIAL HYSTERECTOMY     • WY COLONOSCOPY FLX DX W/COLLJ SPEC WHEN PFRMD N/A 8/15/2017    Procedure: COLONOSCOPY;  Surgeon: Franchesca Pugh MD;  Location: [facility];   Service: Gastroenterology   • WY OPEN RX DISTAL RADIUS FX, INTRA-ARTICULAR, 3+ FRAG Left 2/9/2021    Procedure: OPEN REDUCTION W/ INTERNAL FIXATION (ORIF) RADIUS / Rosmery Louise (WRIST) left;  Surgeon: Nedra Harry MD;  Location: [facility]; Service: Orthopedics   • SC WRIST Lisette Small LIG Left 8/10/2021    Procedure: RELEASE LEFT CARPAL TUNNEL ENDOSCOPIC;  Surgeon: Carley Tyson MD;  Location: [facility];  Service: Orthopedics   • SKIN BIOPSY     • SKIN CANCER EXCISION      Melanoma Excision    • TONSILLECTOMY     • US BREAST NEEDLE LOC LEFT Left 6/16/2022   • US BREAST NEEDLE LOC RIGHT EACH ADDITIONAL Right 6/16/2022   • US GUIDED BREAST LYMPH NODE BIOPSY LEFT Left 4/14/2022     Family History   Problem Relation Age of Onset   • Diabetes Mother    • No Known Problems Father    • Breast cancer Sister    • Cancer Sister    • Lung cancer Sister    • Pancreatic cancer Sister    • No Known Problems Maternal Grandmother    • No Known Problems Maternal Grandfather    • No Known Problems Paternal Grandmother    • No Known Problems Paternal Grandfather      Social History     Socioeconomic History   • Marital status: /Civil Union     Spouse name: Not on file   • Number of children: Not on file   • Years of education: Not on file   • Highest education level: Not on file   Occupational History   • Occupation: unemployed    Tobacco Use   • Smoking status: Never   • Smokeless tobacco: Never   Vaping Use   • Vaping Use: Never used   Substance and Sexual Activity   • Alcohol use: Not Currently     Alcohol/week: 5 0 standard drinks     Types: 5 Shots of liquor per week     Comment: Used mostly as a painkiller when needed before bedtime   • Drug use: Yes     Frequency: 28 0 times per week     Types: Marijuana     Comment: THC Medical marijuana   • Sexual activity: Not Currently     Partners: Male     Comment: Hysterectomy years ago   Other Topics Concern   • Not on file   Social History Narrative    Lives with spouse      Social Determinants of Health     Financial Resource Strain: Not on file   Food Insecurity: Not on file   Transportation Needs: Not on file   Physical Activity: Not on file   Stress: Not on file   Social Connections: Not on file Intimate Partner Violence: Not on file   Housing Stability: Not on file       Current Outpatient Medications:   •  cephalexin (KEFLEX) 500 mg capsule, Take 1 capsule (500 mg total) by mouth every 12 (twelve) hours for 10 days, Disp: 20 capsule, Rfl: 0  •  cholecalciferol (VITAMIN D3) 1,000 units tablet, Take 3,000 Units by mouth daily, Disp: , Rfl:   •  diphenhydrAMINE (BENADRYL) 50 MG tablet, Take 50 mg by mouth daily at bedtime as needed for itching, Disp: , Rfl:   •  docusate sodium (COLACE) 100 mg capsule, Take 1 capsule (100 mg total) by mouth 2 (two) times a day as needed for constipation for up to 10 days, Disp: 20 capsule, Rfl: 0  •  dronabinol (MARINOL) 5 MG capsule, Take 15 mg by mouth 4 (four) times a day (before meals and at bedtime), Disp: , Rfl:   •  HYDROmorphone (DILAUDID) 2 mg tablet, Take 1 tablet (2 mg total) by mouth every 6 (six) hours as needed for severe pain Not on Discharge list   Patient is taking for severe pain PRN every 6 hours Max Daily Amount: 8 mg (Patient not taking: Reported on 8/12/2022), Disp: 20 tablet, Rfl: 0  •  letrozole (FEMARA) 2 5 mg tablet, Take 1 tablet (2 5 mg total) by mouth daily, Disp: 90 tablet, Rfl: 3  •  multivitamin (THERAGRAN) TABS, Take 1 tablet by mouth daily, Disp: , Rfl:   •  Omega-3 Fatty Acids (FISH OIL PO), Take by mouth, Disp: , Rfl:   •  Probiotic Product (PROBIOTIC-10 PO), Take by mouth, Disp: , Rfl:   •  triamcinolone (KENALOG) 0 1 % cream, Apply topically 2 (two) times a day as needed for irritation or rash, Disp: 80 g, Rfl: 0  Allergies   Allergen Reactions   • Chlorpromazine Anaphylaxis     PHENOTHIAZINE=ANAPHYLAXIS   • Codeine Anaphylaxis     Anaphylaxis  abd pain  • Meperidine Anaphylaxis, Hives and Vomiting   • Phenothiazines Anaphylaxis and Throat Swelling     Category:  Allergy;    • Saccharin - Food Allergy Anaphylaxis   • Ampicillin-Sulbactam Sodium Hives   • Aspirin GI Intolerance and Abdominal Pain     Severe GERD   • Gluten Meal - Food Allergy GI Intolerance   • Ibuprofen Hives     H   • Levofloxacin Hives   • Neomycin Other (See Comments), Edema and Hives     Category: Allergy;   swelling   • Citrus - Food Allergy Rash   • Latex Hives and Rash     Category: Allergy; There were no vitals filed for this visit

## 2023-02-08 NOTE — PROGRESS NOTES
Hematology/Oncology Outpatient Office Note    Date of Service: 2/10/2023    [facility]       Reason for Consultation:   Chief Complaint   Patient presents with   • Follow-up     6 month follow-up     Cancer Stage at diagnosis: 1A    Referral Physician: No ref  provider found    Primary Care Physician:  Meli Vital MD     Nickname: Roxanna Zaidi    Spouse: Sotero Mckinney ECO    Today's ECO     Goals and Barriers:  Current Goal: Minimize effects of disease burden, extend life  Barriers to accomplishing this: None    Patient's Capacity to Self Care:  Patient is able to self care    Code Status: not addressed today    Advanced directives: not addressed today    ASSESSMENT & PLAN      Diagnosis ICD-10-CM Associated Orders   1  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 (HCC)  C50 912 CBC and differential    C77 3 Comprehensive metabolic panel     CBC and differential     Comprehensive metabolic panel      2  Malignant neoplasm of lower-inner quadrant of left breast in female, estrogen receptor positive (HCC)  C50 312 CBC and differential    Z17 0 Comprehensive metabolic panel     CBC and differential     Comprehensive metabolic panel      3  Aromatase inhibitor use  Z79 811 CBC and differential     Comprehensive metabolic panel     CBC and differential     Comprehensive metabolic panel        This is a 57 y o  c PMHx notable for Desmoid tumors, RUE grade 3 lymphedema, HLP, HTN, ovarian cancer s/p hysterectomy, being seen in consultation for early localized hormone positive breast cancer s/p resection    L breast cancer, ER+, SC+, HER2-  Clinical stage 1A, pathologic (prognostic) Stage 1A s/p lumpectomy  As for adjuvant endocrine therapy, after baseline DEXA is assessed, we are offering the patient Aromatase inhibitor therapy for 5 years to reduce the risk of breast cancer recurrence       Side effects of AI therapy were reviewed in detail, which include but are not limited to: peripheral edema, night sweats, hot flashes, Osteoporosis, risk of VTE or portal vein thrombus, approximately 1% chance of endometrial cancer, mood disturbances, weight loss, and joint discomfort  We also discussed the possible risk of QTc prolongation when combined with certain antidepressants   4 4-9% risk osteoporotic fractures, 33-57% arthralgia, 0 8% VTE, hot flashes 56%, vaginal dryness (13%), CV events (6%) (OR went below 1), cataracts 5% (OR went below 1)    Updates from the 2013 [address] [place] [address] consensus statement redefined greater than 20% as the new threshold for substratifying Luminal subtypes [66] based on the work of Melvin menjivar , which highlighted the relevance of this cut-off to predict survival outcomes within the ER+ cohort  Although this is somewhat of an unrealistic conventional cut-off, the authors also highlighted a 20% cut off to be clinically relevant for recurrence and survival (hazard ratio (HR): 7 14, 95% confidence interval (CI): 3 87-13 16)  Furthermore, Maximo et al  outlined the prognostic significance of Ki-67 expression levels greater than or equal to 25% for predicting mortality in their review of over 64,000 breast cancer patients (HR: 2 05, 95% CI: 1 66-2 53)   Ki-67 as a Prognostic Biomarker in Invasive Breast Cancer (nih gov)   Her Ki-67 is 3-5%  Discussion of decision making  • Oncology history updated, accordingly, during this visit  • Goals of care/patient communication  o I discussed with the patient the clinical course leading up to their cancer diagnosis  I reviewed relevant office notes, imaging reports and pathology result as well   o I told the patient that this is a case of curable disease and what this means  We discussed that the goal of anti-cancer therapy is to provide best quality of life, extend overall survival, and progression free survival as shown in clinical trials     o I explained the risks/benefits of the proposed cancer therapy: Letrozole and after discussion including understanding risks of possible life-threatening complications and therapy-related malignancy development, the pt has agreed to this plan  • TNM/Staging At Diagnosis  Cancer Staging  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 [facility])  Staging form: Breast, AJCC 8th Edition  - Pathologic stage from 6/21/2022: Stage IA (pT1c, pN1, cM0, G1, ER+, MI+, HER2-) - Signed by Madyson Johnson MD on 8/1/2022  Stage prefix: Initial diagnosis  Nuclear grade: G1  Multigene prognostic tests performed: None  Mitotic count score: Score 1  Histologic grading system: 3 grade system  • Disease Features/Tumor Markers/Genetics  o Tumor Marker: n/a  o Notable Path Features: 4/14/2022 L axillary LN: >95% ER, 95% MI, HER-2 +1  5/20/2022: L breast core need bx showin gIDC grade 1, present DCIS, ER/MI 90-95%+, HER2 negative (0)  R breast biopsy benign  6/21/2022: grade 1, L lumpectomy, DCIS nuclear grade 2 present, +LVI, all margins negative, 3/17 LN +, [id number]  i-67 3-5%  • Treatment: Letrozole following RT  • Other Supportive care:   • Treatment Team Members  o Surgeon: Dr Indra Manning: Dr Chase Lomeli  4/22/2022 CT CAP w/c: Previously biopsied abnormal left axillary lymph node again identified  No other abnormal soft tissue mass identified  No acute inflammatory process identified    4/24/2022 NM Bone Scan: Probable degenerative change in the lower thoracic spine    MammaPrint  Low risk  No role for chemotherapy  10/19/2022 DEXA Scan: Lumbar spine T score -1 9  Left total hip T score -1 3  Left femoral neck T score -1 1  Low bone mass (osteopenia)    Discussion of decision making    I personally reviewed the following lab results, the image studies, pathology, other specialty/physicians consult notes and recommendations, and outside medical records from [facility]   I had a lengthy discussion with the patient and shared the work-up findings  We discussed the diagnosis and management plan as below  I spent 20 minutes reviewing the records (labs, clinician notes, outside records, medical history, ordering medicine/tests/procedures, interpreting the imaging/labs previously done) and coordination of care as well as direct time with the patient today, of which greater than 50% of the time was spent in counseling and coordination of care with the patient/family  · Plan/Labs  · Patient began Letrozole 2 5mg once daily 10/20/2022  Goal is for 5 years - end date 10/20/2027  Tolerating well without severe side effect  · CBC-D, CMP Q6m  She will have labs done soon for updated labs  · Osteopenia seen --> discussed in detail with patient  She takes Calcium 600mg BID with Vitamin D 3000 units  Patient to continue this along with weight bearing exercises as tolerated  Next DXA scan 10/2024  · Continue F/U with rad onc  · Continue F/U with surg onc - was seen today  · F/u Surg-Onc, screening mammograms as per below  Next mammogram is 4/2023  · Encouraged to cut back alcohol use as regular alcohol use can increase risk for recurrence of breast cancer  Encouraged her to continue melatonin for sleep  Survivorship planning  · Every 6 month H&P for first 5 years, then annual thereafter  · B/l Mammograms yearly  · Distress counseling on an ongoing basis as needed    Follow Up: 6 months with labs prior    All questions were answered to the patient's satisfaction during this encounter  The patient knows the contact information for our office and knows to reach out for any relevant concerns related to this encounter  They are to call for any temperature 100 4 or higher, new symptoms including but not restricted to shaking chills, decreased appetite, nausea, vomiting, diarrhea, increased fatigue, shortness of breath or chest pain, confusion, and not feeling the strength to come to the clinic   For all other listed problems and medical diagnosis in their chart - they are managed by PCP and/or other specialists, which the patient acknowledges  Thank you very much for your consultation and making us a part of this patient's care  We are continuing to follow closely with you  Please do not hesitate to reach out to me with any additional questions or concerns  Meryle Bran, PA-C   Hematology & Medical Oncology Staff          Disclaimer: This document was prepared using [employer] Direct technology  If a word or phrase is confusing, or does not make sense, this is likely due to recognition error which was not discovered during this clinician's review  If you believe an error has occurred, please contact me through [address] [place] line for lucía? cation  ONCOLOGY HISTORY OF PRESENT ILLNESS        Oncology History   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 ([facility] [address] )   4/14/2022 Initial Diagnosis    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 ([facility] [address] )     4/14/2022 Biopsy    Left axillary Lymph node ultrasound guided biopsy  Metastatic carcinoma, compatible with breast origin  ER >95  CO 95   HER2 1+    On ultrasound lymph node is 1 8 cm  There is cortical thickening without evident fatty hilum  In review of screening mammogram, demonstrates no suspicious mammographic findings identified in either breast  Bilateral MRI is recommended  Flow cytometry - there is no evidence of a B-cell or T-cell non-Hodgkin lymphoma  4/20/2022 Genomic Testing    A total of 36 genes were evaluated, including: ANGEL LUIS, BRCA1, BRCA2, CDH1, CHEK2, PALB2, PTEN, STK11, TP53  Negative result  No pathogenic sequence variants or deletions/duplications identified     5/20/2022 Biopsy    MRI guided biopsy  A  Left breast   11 o'clock   Invasive mammary carcinoma of no special type (ductal)  Grade 1  ER 95  CO 95  HER2 0    B   Right breast   Retroareolar  Benign fibrocystic changes without atypia (discrete 0 3 cm focus of sclerosing and tubular adenosis with usual ductal hyperplasia, columnar cell change and hyperplasia, apocrine metaplasia, cystic dilatation)  - Microcalcifications: Present associated with non-neoplastic breast tissue    - Negative for malignancy, in-situ carcinoma and atypical hyperplasia  Malignant pathology appears unifocal  Biopsy proven carcinoma measured 1 cm on MRI  Biopsy proved metastatic disease to left axillary lymph node  6/9/2022 Genomic Testing    MammaPrint  Low risk  Luminal type A  MammaPrint index: +[phone]     6/21/2022 Surgery    Left breast tiffany  directed lumpectomy with axillary dissection  A  Invasive 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  Grade 1  1 3 cm  Lymphovascular invasion is identified  Margins negative    H  Axillary, Level I and Level II axillary contents  Metastatic carcinoma involving 3/17 lymph nodes  1 8 cm   Extranodal extension is identified 1mm  Foci of carcinoma identified in extranodal lymphatic channels    Anatomic stage: Stage IIA  Prognostic stage: Stage IA      6/21/2022 -  Cancer Staged    Staging form: Breast, AJCC 8th Edition  - Pathologic stage from 6/21/2022: Stage IA (pT1c, pN1, cM0, G1, ER+, NM+, HER2-) - Signed by Garth Cam MD on 8/1/2022  Stage prefix: Initial diagnosis  Nuclear grade: G1  Multigene prognostic tests performed: None  Mitotic count score: Score 1  Histologic grading system: 3 grade system       8/29/2022 - 10/10/2022 Radiation    Treatments:  Course: C1  Plan ID Energy Fractions Dose per Fraction (cGy) Total Dose Delivered (cGy) Elapsed Days   BH L Breast 6X 25 / 25 200 5,000 35   BH L [id number] 12E 5 / 5 200 1,000 6   BH L SClav 10X/6X 24 / 24 200 4,800 32    Treatment Dates:  8/29/2022 - 10/10/2022        Malignant neoplasm of lower-inner quadrant of left breast in female, estrogen receptor positive ([facility] [address] )   5/20/2022 Initial Diagnosis    Malignant neoplasm of lower-inner quadrant of left breast in female, estrogen receptor positive ([facility] [address] )     5/20/2022 Biopsy    MRI guided biopsy  A  Left breast   11 o'clock   Invasive mammary carcinoma of no special type (ductal)  Grade 1  ER 95  AL 95  HER2 0    B  Right breast   Retroareolar  Benign fibrocystic changes without atypia (discrete 0 3 cm focus of sclerosing and tubular adenosis with usual ductal hyperplasia, columnar cell change and hyperplasia, apocrine metaplasia, cystic dilatation)  - Microcalcifications: Present associated with non-neoplastic breast tissue    - Negative for malignancy, in-situ carcinoma and atypical hyperplasia  Malignant pathology appears unifocal  Biopsy proven carcinoma measured 1 cm on MRI  Biopsy proved metastatic disease to left axillary lymph node  6/9/2022 Genomic Testing    MammaPrint  Low risk  Luminal type A  MammaPrint index: +0 [phone]     6/21/2022 Surgery    Left breast tiffany  directed lumpectomy with axillary dissection  A  Invasive 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  Grade 1  1 3 cm  Lymphovascular invasion is identified  Margins negative    H  Axillary, Level I and Level II axillary contents  Metastatic carcinoma involving 3/17 lymph nodes  1 8 cm   Extranodal extension is identified 1mm  Foci of carcinoma identified in extranodal lymphatic channels    Anatomic stage: Stage IIA  Prognostic stage: Stage IA      8/29/2022 - 10/10/2022 Radiation    Treatments:  Course: C1  Plan ID Energy Fractions Dose per Fraction (cGy) Total Dose Delivered (cGy) Elapsed Days   BH L Breast 6X 25 / 25 200 5,000 35   BH L [id number] 12E 5 / 5 200 1,000 6   BH L SClav 10X/6X 24 / 24 200 4,800 32    Treatment Dates:  8/29/2022 - 10/10/2022         10/13/2022: Letrozole initiated (ordered, but started after DXA scan 10/19/2022)      SUBJECTIVE  (INTERVAL HISTORY)      Clotting History None   Bleeding History None   Cancer History Desmoidosis, ovarian cancer s/p hysterectomy   Family Cancer History Sister (pancreatic), Sister (lung, smoker) H/O Blood/Plt Transfusion None   Tobacco/etoh/drug abuse Denies nicotine use, rec drug use or etoh abuse   Hx COVID19 Infection and Vaccine Status COVID -19 7/2022   Cancer Screening history C-scope 2022   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     I have reviewed the relevant past medical, surgical, social and family history  I have also reviewed allergies and medications for this patient  No acute issues, doing well, she finished her RT recently    Review of Systems   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    Baseline weight: 135-140 lbs    Interval History 2/10/2023:  Patient offers no complaints  She is doing well on letrozole other than mild hot flashes that are tolerable  She has no new joint pain, bone pain  No new headache, chest pain, shortness of breath, abdominal pain, nausea/vomit/diarrhea  She does drink alcohol nightly to help with sleeping  She drinks 3 ounces of rum  Using melatonin as well  She uses THC pills for diffuse pain in her body as well as nausea  A 10-point review of system was performed, pertinent positive and negative were detailed as above  Otherwise, the 10-point review of system was negative  Past Medical History:   Diagnosis Date   • Abnormal mammogram 05/15/2013   • Anemia    • Cancer [facility])     desmoitosis   • 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 ([facility] [address] ) 04/19/2022   • Chronic pain disorder     worse right side of body   • Desmoid tumor     Last Assessed: 6/19/2017   • History of transfusion     no reactions   • Hyperlipidemia    • Hypertension    • Ovarian cancer ([facility] [address] ) 1999?   Hysterectomy done   • PONV (postoperative nausea and vomiting)        Past Surgical History:   Procedure Laterality Date   • BREAST LUMPECTOMY Left 6/21/2022    Procedure: ANITA  DIRECTED LUMPECTOMY;  Surgeon: Vasu Cuba MD;  Location: [facility];  Service: Surgical Oncology   • FRACTURE SURGERY     • HYSTERECTOMY     • LYMPH NODE DISSECTION Left 6/21/2022    Procedure: AXILLARY DISSECTION LEVEL I AND LEVEL II LYMPH NODES;  Surgeon: Sandy Bailey MD;  Location: [facility];  Service: Surgical Oncology   • MRI BREAST BIOPSY LEFT (ALL INCLUSIVE) Left 5/20/2022   • MRI BREAST BIOPSY RIGHT (ALL INCLUSIVE) Right 5/20/2022   • OTHER SURGICAL HISTORY      Arm Incision: Dermoid tumor, right Arm    • PARTIAL HYSTERECTOMY     • ID COLONOSCOPY FLX DX W/COLLJ SPEC WHEN PFRMD N/A 8/15/2017    Procedure: COLONOSCOPY;  Surgeon: Alberto Marie MD;  Location: [facility];   Service: Gastroenterology   • ID [address] WRST SURG W/RLS TRANSVRS CARPL LIGM Left 8/10/2021    Procedure: RELEASE LEFT CARPAL TUNNEL ENDOSCOPIC;  Surgeon: Elaine Roblero MD;  Location: [facility];  Service: Orthopedics   • ID OPTX DSTL RADL I-ARTIC FX/EPIPHYSL SEP 3 FRAG Left 2/9/2021    Procedure: OPEN REDUCTION W/ INTERNAL FIXATION (ORIF) RADIUS / ULNA (WRIST) left;  Surgeon: Elaine Roblero MD;  Location: [facility];  Service: Orthopedics   • SKIN BIOPSY     • SKIN CANCER EXCISION      Melanoma Excision    • TONSILLECTOMY     • US BREAST NEEDLE LOC LEFT Left 6/16/2022   • US BREAST NEEDLE LOC RIGHT EACH ADDITIONAL Right 6/16/2022   • US GUIDED BREAST LYMPH NODE BIOPSY LEFT Left 4/14/2022       Family History   Problem Relation Age of Onset   • Diabetes Mother    • No Known Problems Father    • Breast cancer Sister    • Cancer Sister    • Lung cancer Sister    • Pancreatic cancer Sister    • No Known Problems Maternal Grandmother    • No Known Problems Maternal Grandfather    • No Known Problems Paternal Grandmother    • No Known Problems Paternal Grandfather        Social History     Socioeconomic History   • Marital status: /Civil Union     Spouse name: Not on file   • Number of children: Not on file   • Years of education: Not on file   • Highest education level: Not on file   Occupational History   • Occupation: unemployed    Tobacco Use   • Smoking status: Never   • Smokeless tobacco: Never   Vaping Use   • Vaping Use: Never used   Substance and Sexual Activity   • Alcohol use: Not Currently     Alcohol/week: 5 0 standard drinks     Types: 5 Shots of liquor per week     Comment: Used mostly as a painkiller when needed before bedtime   • Drug use: Yes     Frequency: 28 0 times per week     Types: Marijuana     Comment: THC Medical marijuana   • Sexual activity: Not Currently     Partners: Male     Comment: Hysterectomy years ago   Other Topics Concern   • Not on file   Social History Narrative    Lives with spouse      Social Determinants of Health     Financial Resource Strain: Not on file   Food Insecurity: Not on file   Transportation Needs: Not on file   Physical Activity: Not on file   Stress: Not on file   Social Connections: Not on file   Intimate Partner Violence: Not on file   Housing Stability: Not on file       Allergies   Allergen Reactions   • Chlorpromazine Anaphylaxis     PHENOTHIAZINE=ANAPHYLAXIS   • Codeine Anaphylaxis     Anaphylaxis  abd pain  • Meperidine Anaphylaxis, Hives and Vomiting   • Phenothiazines Anaphylaxis and Throat Swelling     Category: Allergy;    • Saccharin - Food Allergy Anaphylaxis   • Ampicillin-Sulbactam Sodium Hives   • Aspirin GI Intolerance and Abdominal Pain     Severe GERD   • Gluten Meal - Food Allergy GI Intolerance   • Ibuprofen Hives     H   • Levofloxacin Hives   • Chocolate - Food Allergy GI Intolerance   • Lactose - Food Allergy GI Intolerance   • Neomycin Other (See Comments), Edema and Hives     Category: Allergy;   swelling   • Citrus - Food Allergy Rash   • Latex Hives and Rash     Category:  Allergy;        Current Outpatient Medications   Medication Sig Dispense Refill   • cholecalciferol (VITAMIN D3) 1,000 units tablet Take 3,000 Units by mouth daily     • diphenhydrAMINE (BENADRYL) 50 MG tablet Take 50 mg by mouth daily at bedtime as needed for itching     • dronabinol (MARINOL) 5 MG capsule Take 15 mg by mouth 4 (four) times a day (before meals and at bedtime)     • letrozole (FEMARA) 2 5 mg tablet Take 1 tablet (2 5 mg total) by mouth daily 90 tablet 3   • multivitamin (THERAGRAN) TABS Take 1 tablet by mouth daily     • Omega-3 Fatty Acids (FISH OIL PO) Take by mouth     • Probiotic Product (PROBIOTIC-10 PO) Take by mouth     • triamcinolone (KENALOG) 0 1 % cream Apply topically 2 (two) times a day as needed for irritation or rash 80 g 0   • docusate sodium (COLACE) 100 mg capsule Take 1 capsule (100 mg total) by mouth 2 (two) times a day as needed for constipation for up to 10 days 20 capsule 0   • HYDROmorphone (DILAUDID) 2 mg tablet Take 1 tablet (2 mg total) by mouth every 6 (six) hours as needed for severe pain Not on Discharge list   Patient is taking for severe pain PRN every 6 hours Max Daily Amount: 8 mg (Patient not taking: Reported on 8/12/2022) 20 tablet 0     No current facility-administered medications for this visit  (Not in a hospital admission)      Objective:     24 Hour Vitals Assessment:     Vitals:    02/10/23 1402   BP: 148/84   Pulse: 89   Resp: 18   Temp: 98 °F (36 7 °C)   SpO2: 98%       PHYSICIAN EXAM:    General: Appearance: alert, cooperative, no distress  HEENT: Normocephalic, atraumatic  No scleral icterus  conjunctivae clear  EOMI  Chest: No tenderness to palpation  No open wound noted  Lungs: Clear to auscultation bilaterally, Respirations unlabored  Cardiac: Regular rate and rhythm, +S1and S2  Abdomen: Soft, non-tender, non-distended  Bowel sounds are normal    Extremities:  No edema, cyanosis, clubbing  Skin: Skin color, turgor are normal  RUE wrapping noted  Lymphatics: no palpable supra-cervical, axillary, or inguinal adenopathy  Neurologic: Awake, Alert, and oriented, no gross focal deficits noted b/l  DATA REVIEW:    Pathology Result:    Final Diagnosis   Date Value Ref Range Status   06/21/2022   Final    A   Breast, Left breast ANITA  directed lumpectomy, marked per margin per protocol :  - Invasive 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, Grade 1   - Largest measurable focus is 13 mm in maximum length (slide measurement)  - Lymphovascular invasion is identified (D2-40 is performed)  - Ductal carcinoma in situ (DCIS), nuclear grade I-II  - Inked surfaces with no tumor seen  - Healing biopsy site  - Skin, no tumor seen  B  Breast, Additional anterior margin, inked most distal margin [place]): - Benign breast tissue, negative for carcinoma including at inked designated surgical resection margin  C  Breast, Additional inferior margin, inked most distal margin (blue): - Benign breast tissue, negative for carcinoma including at inked designated surgical resection margin  D  Breast, Additional lateral margin, inked most distal margin (red):  - Benign breast tissue, negative for carcinoma including at inked designated surgical resection margin  E  Breast, Additional medial margin, inked most distal margin (yellow): - Benign breast tissue, negative for carcinoma including at inked designated surgical resection margin  F  Breast, Additional posterior margin, inked most distal margin (black):  - Benign breast tissue, negative for carcinoma including at inked designated surgical resection margin  G  Breast, Additional superior margin, inked most distal margin (orange): - Benign breast tissue, negative for carcinoma including at inked designated surgical resection margin  H  Axillary, Level I and Level II Axillary contents:  -  Metastatic carcinoma involving three of seventeen lymph nodes (3/17)  -  The largest metastatic deposit measures 18 mm in greatest dimension   -  Extranodal extension is identified (1 mm)  -  Foci of carcinoma identified in extranodal lymphatic channels  -  CK-AE1/AE3 immunohistochemical stain with adequate control support the diagnosis  - Intradepartmental consultation concurs with the diagnosis (CK)      05/20/2022   Final    A   Breast, Left, upper inner quadrant, 11 o'clock, MRI guided needle biopsy:  - Invasive mammary carcinoma of no special type (ductal)  -- Penelope histologic grade 1 of 3 (total score: 4 of 9)        * Glandular (acinar) Tubular Differentiation 10-75%, score 2         * Nuclear Pleomorphism 1 of 3, score 1        * Mitotic Rate < 3/mm2, (</= 7 mitoses/10HPF), score 1     -- Confirmed by tumor cell immunophenotype:        * Positive: GATA3, E-cadherin, P120 (membranous), ER  * Negative: p63, SMMHC, S100     -- Invasive carcinoma involves 6 of 7 submitted core biopsies and 2 fragments, max  Dimension= 10 mm     -- Estrogen, Progesterone & HER2 receptor studies pending, to be described in an addendum   - Ductal carcinoma in-situ (DCIS): Present; minor component (approximately 5% of total tumor)  -- Architectural Patterns: Solid  -- Nuclear grade: I- II (low to intermediate)  -- Necrosis: Not identified  - Lymphovascular invasion: Focally cannot exclude  -- D2-40 immunostain performed  - Microcalcifications: Present, few associated with DCIS  - Best representative tumor block: A2     -- Sufficient tumor present for        Agendia Mammaprint/Blueprint (1 cm2 of invasive tumor in aggregate): No         MI Profile/Foundation One (at least 5 x 5 mm of tumor): Yes  B  Breast, Right, retroareolar, MRI guided needle biopsy:  - Benign fibrocystic changes without atypia (discrete 0 3 cm focus of sclerosing and tubular adenosis     with usual ductal hyperplasia, columnar cell change and hyperplasia, apocrine metaplasia,     cystic dilatation)  See Note  - Microcalcifications: Present associated with non-neoplastic breast tissue    - Negative for malignancy, in-situ carcinoma and atypical hyperplasia  -- Confirmed by positive SMMHC, p63, E-cadherin, p120 (membranous) immunostains  05/20/2022   Final    A   Breast, Left, upper inner quadrant, 11 o'clock, MRI guided needle biopsy:  - Invasive mammary carcinoma of no special type (ductal)  -- Penelope histologic grade 1 of 3 (total score: 4 of 9)        * Glandular (acinar) Tubular Differentiation 10-75%, score 2         * Nuclear Pleomorphism 1 of 3, score 1        * Mitotic Rate < 3/mm2, (</= 7 mitoses/10HPF), score 1     -- Confirmed by tumor cell immunophenotype:        * Positive: GATA3, E-cadherin, P120 (membranous), ER  * Negative: p63, SMMHC, S100     -- Invasive carcinoma involves 6 of 7 submitted core biopsies and 2 fragments, max  Dimension= 10 mm     -- Estrogen, Progesterone & HER2 receptor studies pending, to be described in an addendum   - Ductal carcinoma in-situ (DCIS): Present; minor component (approximately 5% of total tumor)  -- Architectural Patterns: Solid  -- Nuclear grade: I- II (low to intermediate)  -- Necrosis: Not identified  - Lymphovascular invasion: Focally cannot exclude  -- D2-40 immunostain performed  - Microcalcifications: Present, few associated with DCIS  - Best representative tumor block: A2     -- Sufficient tumor present for        Agendia Mammaprint/Blueprint (1 cm2 of invasive tumor in aggregate): No         MI Profile/Foundation One (at least 5 x 5 mm of tumor): Yes  B  Breast, Right, retroareolar, MRI guided needle biopsy:  - Benign fibrocystic changes without atypia (discrete 0 3 cm focus of sclerosing and tubular adenosis     with usual ductal hyperplasia, columnar cell change and hyperplasia, apocrine metaplasia,     cystic dilatation)  See Note  - Microcalcifications: Present associated with non-neoplastic breast tissue    - Negative for malignancy, in-situ carcinoma and atypical hyperplasia  -- Confirmed by positive SMMHC, p63, E-cadherin, p120 (membranous) immunostains  04/14/2022   Final    A  Lymph node, left axillary, ultrasound-guided biopsy (2 passes):  -  Metastatic carcinoma, compatible with breast origin (see note)  08/15/2017   Final    A  Colon, sigmoid polyp at 30 cm, biopsy:  -  Tubular adenoma, negative for high-grade dysplasia  B   Colon, rectosigmoid polyp, biopsy:  -  Hyperplastic polyp  Image Results:   Image result are reviewed and documented in Hematology/Oncology history    DXA bone density spine hip and pelvis  Narrative: DXA SCAN    CLINICAL HISTORY: 59 years postmenopausal female   OTHER RISK FACTORS:  Femara therapy, Prior fracture as a result of minor injury  PHARMACOLOGIC THERAPY FOR OSTEOPOROSIS:  None  TECHNIQUE: Bone densitometry was performed using a [employer] W  bone densitometer  Regions of interest appear properly placed  COMPARISON: There are no prior DXA studies performed on this unit for comparison  RESULTS:     LUMBAR SPINE  Level: L1-L4 :   BMD:  0 839  gm/cm2   T-score: -1 9     LEFT  TOTAL HIP:   BMD:  0 786  gm/cm2   T-score:  -1 3    LEFT  FEMORAL NECK:   BMD:  0 724  gm/cm2   T score: -1 1   Impression: 1  Low bone mass (osteopenia)  2   The 10 year risk of hip fracture is 0 9% with the 10 year risk of major osteoporotic fracture being 12% as calculated by the [facility]/WHO fracture risk assessment tool (FRAX)  3   The current NOF guidelines recommend treating patients with a T-score of -2 5 or less in the lumbar spine or hips, or in post-menopausal women and men over the age of 48 with low bone mass (osteopenia) and a FRAX 10 year risk score of >3% for hip   fracture and/or >20% for major osteoporotic fracture  4   The NOF recommends follow-up DXA in 1-2 years after initiating therapy for osteoporosis and every 2 years thereafter  More frequent evaluation is appropriate for patients with conditions associated with rapid bone loss, such as glucocorticoid   therapy  The interval between DXA screenings may be longer for individuals without major risk factors and initial T-score in the normal or upper low bone mass range      The FRAX algorithm has certain limitations:  -FRAX has not been validated in patients currently or previously treated with pharmacotherapy for osteoporosis  In such patients, clinical judgment must be exercised in interpreting FRAX scores  -Prior hip, vertebral and humeral fragility fractures appear to confer greater risk of subsequent fracture than fractures at other sites (this is especially true for individuals with severe vertebral fractures), but quantification of this incremental   risk is not possible with FRAX  -FRAX underestimates fracture risk in patients with history of multiple fragility fractures  -FRAX may underestimate fracture risk in patients with history of frequent falls   -It is not appropriate to use FRAX to monitor treatment response  WHO CLASSIFICATION:  Normal (a T-score of -1 0 or higher)  Low bone mineral density (a T-score of less than -1 0 but higher than -2 5)  Osteoporosis (a T-score of -2 5 or less)  Severe osteoporosis (a T-score of -2 5 or less with a fragility fracture)    Workstation performed: [id number]      LABS:  Lab data are reviewed and documented in HemOn history  Lab Results   Component Value Date    HGB 14 9 09/01/2022    HCT 44 1 09/01/2022    MCV 92 09/01/2022     09/01/2022    WBC 7 15 09/01/2022    NRBC 0 09/01/2022     Lab Results   Component Value Date    K 3 7 06/01/2022     06/01/2022    CO2 28 06/01/2022    BUN 8 06/01/2022    CREATININE 0 55 (L) 06/01/2022    GLUCOSE 98 05/02/2017    GLUF 87 04/07/2022    CALCIUM 9 8 06/01/2022    AST 17 06/01/2022    ALT 16 06/01/2022    ALKPHOS 119 (H) 06/01/2022    EGFR 100 06/01/2022       No results found for: IRON, TIBC, FERRITIN    No results found for: [id number]    No results for input(s): WBC, CREAT in the last 72 hours      Invalid input(s):  PLT    By:  Jo Ann Estrada, 2/10/2023, 2:21 PM

## 2023-02-09 PROBLEM — Z98.890 HISTORY OF LUMPECTOMY OF LEFT BREAST: Status: ACTIVE | Noted: 2023-02-09

## 2023-02-10 ENCOUNTER — OFFICE VISIT (OUTPATIENT)
Dept: HEMATOLOGY ONCOLOGY | Facility: CLINIC | Age: 63
End: 2023-02-10

## 2023-02-10 ENCOUNTER — OFFICE VISIT (OUTPATIENT)
Dept: SURGICAL ONCOLOGY | Facility: CLINIC | Age: 63
End: 2023-02-10

## 2023-02-10 VITALS
RESPIRATION RATE: 18 BRPM | HEIGHT: 68 IN | WEIGHT: 144 LBS | TEMPERATURE: 98 F | BODY MASS INDEX: 21.82 KG/M2 | SYSTOLIC BLOOD PRESSURE: 148 MMHG | DIASTOLIC BLOOD PRESSURE: 84 MMHG | OXYGEN SATURATION: 98 % | HEART RATE: 89 BPM

## 2023-02-10 VITALS
SYSTOLIC BLOOD PRESSURE: 148 MMHG | RESPIRATION RATE: 18 BRPM | TEMPERATURE: 98 F | OXYGEN SATURATION: 98 % | HEIGHT: 68 IN | WEIGHT: 144 LBS | BODY MASS INDEX: 21.82 KG/M2 | HEART RATE: 89 BPM | DIASTOLIC BLOOD PRESSURE: 84 MMHG

## 2023-02-10 DIAGNOSIS — C50.912 CARCINOMA OF LEFT BREAST METASTATIC TO AXILLARY LYMPH NODE (HCC): Primary | ICD-10-CM

## 2023-02-10 DIAGNOSIS — Z79.811 USE OF LETROZOLE (FEMARA): ICD-10-CM

## 2023-02-10 DIAGNOSIS — Z17.0 MALIGNANT NEOPLASM OF LOWER-INNER QUADRANT OF LEFT BREAST IN FEMALE, ESTROGEN RECEPTOR POSITIVE (HCC): Primary | ICD-10-CM

## 2023-02-10 DIAGNOSIS — Z17.0 MALIGNANT NEOPLASM OF LOWER-INNER QUADRANT OF LEFT BREAST IN FEMALE, ESTROGEN RECEPTOR POSITIVE (HCC): ICD-10-CM

## 2023-02-10 DIAGNOSIS — C50.912 CARCINOMA OF LEFT BREAST METASTATIC TO AXILLARY LYMPH NODE (HCC): ICD-10-CM

## 2023-02-10 DIAGNOSIS — Z98.890 HISTORY OF LUMPECTOMY OF LEFT BREAST: ICD-10-CM

## 2023-02-10 DIAGNOSIS — C77.3 CARCINOMA OF LEFT BREAST METASTATIC TO AXILLARY LYMPH NODE (HCC): ICD-10-CM

## 2023-02-10 DIAGNOSIS — C50.312 MALIGNANT NEOPLASM OF LOWER-INNER QUADRANT OF LEFT BREAST IN FEMALE, ESTROGEN RECEPTOR POSITIVE (HCC): Primary | ICD-10-CM

## 2023-02-10 DIAGNOSIS — Z79.811 AROMATASE INHIBITOR USE: ICD-10-CM

## 2023-02-10 DIAGNOSIS — C77.3 CARCINOMA OF LEFT BREAST METASTATIC TO AXILLARY LYMPH NODE (HCC): Primary | ICD-10-CM

## 2023-02-10 DIAGNOSIS — C50.312 MALIGNANT NEOPLASM OF LOWER-INNER QUADRANT OF LEFT BREAST IN FEMALE, ESTROGEN RECEPTOR POSITIVE (HCC): ICD-10-CM

## 2023-02-10 NOTE — PROGRESS NOTES
Surgical Oncology Follow Up  [facility], [place]  [facility] [address]  1960  [id number]      Chief Complaint   Patient presents with   • Follow-up     6 mo f/u rad onc finished 10/10/22  6/21/22 Left lumpectomy ax dissection - Left IDC G 1  1 3 cm LVI identified  3/17 LN  largest metastatic deposit measures 1 8 cm  extranodal extension identified 1mm  foci of carcinoma identified in extranodal lymphatic channels  anatomic stage: IIA prognostic stage IA   on bx ER 95 MI 95 HER2 0 letrozole w/ albandar rad onc fan (8/12)  fam hx of pancreatic ca in sis/lung ca in other sis mammaprint low risk          Assessment & Plan:   She is here for follow-up with a left breast cancer she is overall doing well she has her right known upper extremity lymphedema  She is due for her mammogram we will order for end of March May early April  Today's visit no evidence of local recurrence or regional adenopathy  However I am concerned she may be developing left upper extremity lymphedema given her 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 and left upper extremity had multiple surgeries including carpal tunnel and orthopedic surgeries with hardware we will refer her to PT OT for lymphedema prevention and drain teaching  We will follow her after her next mammogram    Cancer History:     Oncology History   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 ([address] )   4/14/2022 Initial Diagnosis    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 ([address] )     4/14/2022 Biopsy    Left axillary Lymph node ultrasound guided biopsy  Metastatic carcinoma, compatible with breast origin  ER >95  MI 95   HER2 1+    On ultrasound lymph node is 1 8 cm  There is cortical thickening without evident fatty hilum       In review of screening mammogram, demonstrates no suspicious mammographic findings identified in either breast  Bilateral MRI is recommended  Flow cytometry - there is no evidence of a B-cell or T-cell non-Hodgkin lymphoma  4/20/2022 Genomic Testing    A total of 36 genes were evaluated, including: ANGEL LUIS, BRCA1, BRCA2, CDH1, CHEK2, PALB2, PTEN, STK11, TP53  Negative result  No pathogenic sequence variants or deletions/duplications identified     5/20/2022 Biopsy    MRI guided biopsy  A  Left breast   11 o'clock   Invasive mammary carcinoma of no special type (ductal)  Grade 1  ER 95  MT 95  HER2 0    B  Right breast   Retroareolar  Benign fibrocystic changes without atypia (discrete 0 3 cm focus of sclerosing and tubular adenosis with usual ductal hyperplasia, columnar cell change and hyperplasia, apocrine metaplasia, cystic dilatation)  - Microcalcifications: Present associated with non-neoplastic breast tissue    - Negative for malignancy, in-situ carcinoma and atypical hyperplasia  Malignant pathology appears unifocal  Biopsy proven carcinoma measured 1 cm on MRI  Biopsy proved metastatic disease to left axillary lymph node  6/9/2022 Genomic Testing    MammaPrint  Low risk  Luminal type A  MammaPrint index: [phone]     6/21/2022 Surgery    Left breast tiffany  directed lumpectomy with axillary dissection  A  Invasive 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  Grade 1  1 3 cm  Lymphovascular invasion is identified  Margins negative    H   Axillary, Level I and Level II axillary contents  Metastatic carcinoma involving 3/17 lymph nodes  1 8 cm   Extranodal extension is identified 1mm  Foci of carcinoma identified in extranodal lymphatic channels    Anatomic stage: Stage IIA  Prognostic stage: Stage IA      6/21/2022 -  Cancer Staged    Staging form: Breast, AJCC 8th Edition  - Pathologic stage from 6/21/2022: Stage IA (pT1c, pN1, cM0, G1, ER+, MT+, HER2-) - Signed by Yaneth Sherwood MD on 8/1/2022  Stage prefix: Initial diagnosis  Nuclear grade: G1  Multigene prognostic tests performed: None  Mitotic count score: Score 1  Histologic grading system: 3 grade system       8/29/2022 - 10/10/2022 Radiation    Treatments:  Course: C1  Plan ID Energy Fractions Dose per Fraction (cGy) Total Dose Delivered (cGy) Elapsed Days   BH L Breast 6X 25 / 25 200 5,000 35   BH L [id number] 12E 5 / 5 200 1,000 6   BH L SClav 10X/6X 24 / 24 200 4,800 32    Treatment Dates:  8/29/2022 - 10/10/2022  Malignant neoplasm of lower-inner quadrant of left breast in female, estrogen receptor positive ([address] )   5/20/2022 Initial Diagnosis    Malignant neoplasm of lower-inner quadrant of left breast in female, estrogen receptor positive ([address] )     5/20/2022 Biopsy    MRI guided biopsy  A  Left breast   11 o'clock   Invasive mammary carcinoma of no special type (ductal)  Grade 1  ER 95  IN 95  HER2 0    B  Right breast   Retroareolar  Benign fibrocystic changes without atypia (discrete 0 3 cm focus of sclerosing and tubular adenosis with usual ductal hyperplasia, columnar cell change and hyperplasia, apocrine metaplasia, cystic dilatation)  - Microcalcifications: Present associated with non-neoplastic breast tissue    - Negative for malignancy, in-situ carcinoma and atypical hyperplasia  Malignant pathology appears unifocal  Biopsy proven carcinoma measured 1 cm on MRI  Biopsy proved metastatic disease to left axillary lymph node  6/9/2022 Genomic Testing    MammaPrint  Low risk  Luminal type A  MammaPrint index: [phone]     6/21/2022 Surgery    Left breast tiffany  directed lumpectomy with axillary dissection  A  Invasive 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  Grade 1  1 3 cm  Lymphovascular invasion is identified  Margins negative    H   Axillary, Level I and Level II axillary contents  Metastatic carcinoma involving 3/17 lymph nodes  1 8 cm   Extranodal extension is identified 1mm  Foci of carcinoma identified in extranodal lymphatic channels    Anatomic stage: Stage IIA  Prognostic stage: Stage IA      8/29/2022 - 10/10/2022 Radiation    Treatments:  Course: C1  Plan ID Energy Fractions Dose per Fraction (cGy) Total Dose Delivered (cGy) Elapsed Days   BH L Breast 6X 25 / 25 200 5,000 35   BH L [id number] 12E 5 / 5 200 1,000 6   BH L SClav 10X/6X 24 / 24 200 4,800 32    Treatment Dates:  8/29/2022 - 10/10/2022  Interval History:   Follow-up with left breast cancer    Review of Systems:   Review of Systems   Constitutional: Negative for chills and fever  HENT: Negative for ear pain and sore throat  Eyes: Negative for pain and visual disturbance  Respiratory: Negative for cough and shortness of breath  Cardiovascular: Negative for chest pain and palpitations  Gastrointestinal: Negative for abdominal pain and vomiting  Genitourinary: Negative for dysuria and hematuria  Musculoskeletal: Negative for arthralgias and back pain  Skin: Negative for color change and rash  Neurological: Negative for seizures and syncope  All other systems reviewed and are negative        Past Medical History     Patient Active Problem List   Diagnosis   • Back pain, chronic   • Chronic insomnia   • Lymphedema of right arm   • Hyperlipidemia, mixed   • Peripheral neuropathy   • Lymphedema   • Cellulitis of right upper extremity   • Sepsis (HCC)   • Desmoid tumor   • 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 (HCC)   • Malignant neoplasm of lower-inner quadrant of left breast in female, estrogen receptor positive ([address] )   • HTN, goal below 140/90   • PONV (postoperative nausea and vomiting)   • Use of letrozole (Femara)   • History of lumpectomy of left breast     Past Medical History:   Diagnosis Date   • Abnormal mammogram 05/15/2013   • Anemia    • Cancer ([address] )     desmoitosis   • 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 ([address] ) 04/19/2022   • Chronic pain disorder     worse right side of body   • Desmoid tumor     Last Assessed: 6/19/2017   • History of transfusion     no reactions   • Hyperlipidemia    • Hypertension    • Ovarian cancer ([facility] ) 1999? Hysterectomy done   • PONV (postoperative nausea and vomiting)      Past Surgical History:   Procedure Laterality Date   • BREAST LUMPECTOMY Left 6/21/2022    Procedure: ANITA  DIRECTED LUMPECTOMY;  Surgeon: Vasu Cuba MD;  Location: [facility];  Service: Surgical Oncology   • FRACTURE SURGERY     • HYSTERECTOMY     • LYMPH NODE DISSECTION Left 6/21/2022    Procedure: AXILLARY DISSECTION LEVEL I AND LEVEL II LYMPH NODES;  Surgeon: Vasu Cuba MD;  [facility];  Service: Surgical Oncology   • MRI BREAST BIOPSY LEFT (ALL INCLUSIVE) Left 5/20/2022   • MRI BREAST BIOPSY RIGHT (ALL INCLUSIVE) Right 5/20/2022   • OTHER SURGICAL HISTORY      Arm Incision: Dermoid tumor, right Arm    • PARTIAL HYSTERECTOMY     • GA COLONOSCOPY FLX DX W/COLLJ SPEC WHEN PFRMD N/A 8/15/2017    Procedure: COLONOSCOPY;  Surgeon: Jaun Maravilla MD;  Location: [facility];   Service: Gastroenterology   • GA [address] WRST SURG W/RLS TRANSVRS CARPL LIGM Left 8/10/2021    Procedure: RELEASE LEFT CARPAL TUNNEL ENDOSCOPIC;  Surgeon: Moshe Wong MD;  Location: [facility];  Service: Orthopedics   • GA OPTX DSTL RADL I-ARTIC FX/EPIPHYSL SEP 3 FRAG Left 2/9/2021    Procedure: OPEN REDUCTION W/ INTERNAL FIXATION (ORIF) RADIUS / ULNA (WRIST) left;  Surgeon: Moshe Wong MD;  [facility]: [facility];  Service: Orthopedics   • SKIN BIOPSY     • SKIN CANCER EXCISION      Melanoma Excision    • TONSILLECTOMY     • US BREAST NEEDLE LOC LEFT Left 6/16/2022   • US BREAST NEEDLE LOC RIGHT EACH ADDITIONAL Right 6/16/2022   • US GUIDED BREAST LYMPH NODE BIOPSY LEFT Left 4/14/2022     Family History   Problem Relation Age of Onset   • Diabetes Mother    • No Known Problems Father    • Breast cancer Sister    • Cancer Sister    • Lung cancer Sister    • Pancreatic cancer Sister    • No Known Problems Maternal Grandmother • No Known Problems Maternal Grandfather    • No Known Problems Paternal Grandmother    • No Known Problems Paternal Grandfather      Social History     Socioeconomic History   • Marital status: /Civil Union     Spouse name: Not on file   • Number of children: Not on file   • Years of education: Not on file   • Highest education level: Not on file   Occupational History   • Occupation: unemployed    Tobacco Use   • Smoking status: Never   • Smokeless tobacco: Never   Vaping Use   • Vaping Use: Never used   Substance and Sexual Activity   • Alcohol use: Not Currently     Alcohol/week: 5 0 standard drinks     Types: 5 Shots of liquor per week     Comment: Used mostly as a painkiller when needed before bedtime   • Drug use: Yes     Frequency: 28 0 times per week     Types: Marijuana     Comment: THC Medical marijuana   • Sexual activity: Not Currently     Partners: Male     Comment: Hysterectomy years ago   Other Topics Concern   • Not on file   Social History Narrative    Lives with spouse      Social Determinants of Health     Financial Resource Strain: Not on file   Food Insecurity: Not on file   Transportation Needs: Not on file   Physical Activity: Not on file   Stress: Not on file   Social Connections: Not on file   Intimate Partner Violence: Not on file   Housing Stability: Not on file       Current Outpatient Medications:   •  cholecalciferol (VITAMIN D3) 1,000 units tablet, Take 3,000 Units by mouth daily, Disp: , Rfl:   •  diphenhydrAMINE (BENADRYL) 50 MG tablet, Take 50 mg by mouth daily at bedtime as needed for itching, Disp: , Rfl:   •  dronabinol (MARINOL) 5 MG capsule, Take 15 mg by mouth 4 (four) times a day (before meals and at bedtime), Disp: , Rfl:   •  letrozole (FEMARA) 2 5 mg tablet, Take 1 tablet (2 5 mg total) by mouth daily, Disp: 90 tablet, Rfl: 3  •  multivitamin (THERAGRAN) TABS, Take 1 tablet by mouth daily, Disp: , Rfl:   •  Omega-3 Fatty Acids (FISH OIL PO), Take by mouth, Disp: , Rfl:   •  Probiotic Product (PROBIOTIC-10 PO), Take by mouth, Disp: , Rfl:   •  triamcinolone (KENALOG) 0 1 % cream, Apply topically 2 (two) times a day as needed for irritation or rash, Disp: 80 g, Rfl: 0  •  docusate sodium (COLACE) 100 mg capsule, Take 1 capsule (100 mg total) by mouth 2 (two) times a day as needed for constipation for up to 10 days, Disp: 20 capsule, Rfl: 0  •  HYDROmorphone (DILAUDID) 2 mg tablet, Take 1 tablet (2 mg total) by mouth every 6 (six) hours as needed for severe pain Not on Discharge list   Patient is taking for severe pain PRN every 6 hours Max Daily Amount: 8 mg (Patient not taking: Reported on 8/12/2022), Disp: 20 tablet, Rfl: 0  Allergies   Allergen Reactions   • Chlorpromazine Anaphylaxis     PHENOTHIAZINE=ANAPHYLAXIS   • Codeine Anaphylaxis     Anaphylaxis  abd pain  • Meperidine Anaphylaxis, Hives and Vomiting   • Phenothiazines Anaphylaxis and Throat Swelling     Category: Allergy;    • Saccharin - Food Allergy Anaphylaxis   • Ampicillin-Sulbactam Sodium Hives   • Aspirin GI Intolerance and Abdominal Pain     Severe GERD   • Gluten Meal - Food Allergy GI Intolerance   • Ibuprofen Hives     H   • Levofloxacin Hives   • Chocolate - Food Allergy GI Intolerance   • Lactose - Food Allergy GI Intolerance   • Neomycin Other (See Comments), Edema and Hives     Category: Allergy;   swelling   • Citrus - Food Allergy Rash   • Latex Hives and Rash     Category: Allergy; Physical Exam:     Vitals:    02/10/23 1305   BP: 148/84   Pulse: 89   Resp: 18   Temp: 98 °F (36 7 °C)   SpO2: 98%     Physical Exam  Constitutional:       Appearance: Normal appearance  HENT:      Head: Normocephalic and atraumatic  Nose: Nose normal       Mouth/Throat:      Mouth: Mucous membranes are moist    Eyes:      Pupils: Pupils are equal, round, and reactive to light  Cardiovascular:      Rate and Rhythm: Normal rate  Pulses: Normal pulses  Heart sounds: Normal heart sounds  Pulmonary:      Effort: Pulmonary effort is normal       Breath sounds: Normal breath sounds  Chest:      Comments: Bilateral breast examination no palpable mass masses nipple discharge nipple retraction or skin changes  Bilateral axillary and supraclavicular examination no palpable adenopathy  Left breast and left axillary well-healed surgical site  Abdominal:      General: Bowel sounds are normal       Palpations: Abdomen is soft  Musculoskeletal:         General: Normal range of motion  Cervical back: Normal range of motion and neck supple  Skin:     General: Skin is warm  Neurological:      General: No focal deficit present  Mental Status: She is alert and oriented to person, place, and time  Psychiatric:         Mood and Affect: Mood normal          Behavior: Behavior normal          Thought Content: Thought content normal          Judgment: Judgment normal            Results & Discussion:   I did review her medications including her endocrine therapy  She denies of any major shortness of breath leg swelling or vaginal spotting  She will continue follow-up with medical oncologist as well I did discussed in detail nature of breast cancer and recurrence and prevention and need for diagnostic mammogram at least for next 5 years  she understands and  agrees   All patient questions were answered  Advance Care Planning/Advance Directives: Elizabeth Ventura MD discussed the disease status with Heber Butts  today 02/10/23  treatment plans and follow-up with the patient

## 2023-02-20 ENCOUNTER — EVALUATION (OUTPATIENT)
Dept: OCCUPATIONAL THERAPY | Facility: CLINIC | Age: 63
End: 2023-02-20

## 2023-02-20 DIAGNOSIS — C77.3 CARCINOMA OF LEFT BREAST METASTATIC TO AXILLARY LYMPH NODE (HCC): ICD-10-CM

## 2023-02-20 DIAGNOSIS — Z17.0 MALIGNANT NEOPLASM OF LOWER-INNER QUADRANT OF LEFT BREAST IN FEMALE, ESTROGEN RECEPTOR POSITIVE (HCC): ICD-10-CM

## 2023-02-20 DIAGNOSIS — C50.912 CARCINOMA OF LEFT BREAST METASTATIC TO AXILLARY LYMPH NODE (HCC): ICD-10-CM

## 2023-02-20 DIAGNOSIS — C50.312 MALIGNANT NEOPLASM OF LOWER-INNER QUADRANT OF LEFT BREAST IN FEMALE, ESTROGEN RECEPTOR POSITIVE (HCC): ICD-10-CM

## 2023-02-20 NOTE — PROGRESS NOTES
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 [facility])  C50 912 Ambulatory Referral to PT/OT Lymphedema Therapy    C77 3       2  Malignant neoplasm of lower-inner quadrant of left breast in female, estrogen receptor positive ([facility] [address] )  C50 312 Ambulatory Referral to PT/OT Lymphedema Therapy    Z17 0                      Assessment  Assessment details: Linda Kimble is a 57 y o  female being seen for LUE stage 2 lymphedema  The onset of the lymphedema was approximately 3 weeks ago  Patient presents this date with negative Stemmer's sign, +2 pitting edema and the following trophic changes: rash from latex tape in left antecubital fossa  The patient has previously treated this condition with elevation, old 20-30mm Hg compression sleeve compression garments, compression pump, and MLD from a massage therapist   Linda Kimble would benefit from a course of OT lymphedema therapy to decongest the LUE and be educated in a HEP to maintain decongestion at discharge  Impairments: activity intolerance, lacks appropriate home exercise program and pain with function  Other impairment: Stage 2 lymphedema RUE and LUE  Functional limitations: 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 and is affecting CT region of the left hand  Symptom irritability: moderateBarriers to therapy: Lymphedema RUE  Hx breast and desmoid cancer  Hx cellulitis RUE   High copay  Understanding of Dx/Px/POC: excellent  Goals  STGs ( 4 weeks)  Patient will be independent in HEP of self MLD, elevation, exercise, skin care, and compression bandaging  Patient will demonstrate 0 5 cm reduction in LUE circumference measurements  Patient will report an average pain level of 3/10  LTGs ( 12 weeks)  Patient will be independent in HEP to maintain LUE decongestion at discharge   Patient will demonstrate maximum decongestion of the LUE to fit into clothing and prevent infection  Patient will report an average pain level of 1-2/10 to be independent in daily tasks    Plan  Patient would benefit from: skilled occupational therapy  Planned therapy interventions: activity modification, compression, graded activity, graded exercise, home exercise program, therapeutic exercise, stretching, strengthening, patient education, muscle pump exercises, massage and manual therapy  Other planned therapy interventions: Kinesio tape; measure and fit for compression garments  Frequency: 2x week  Duration in weeks: 12  Plan of Care beginning date: 2023  Plan of Care expiration date: 2023  Treatment plan discussed with: patient        Subjective Evaluation    History of Present Illness  Date of onset: 2023  Mechanism of injury: surgery  Mechanism of injury: Patient has a history of left breast cancer stage l-llA  22 Left lumpectomy ax dissection  Radiation completed 10/10/22  Patient began to notice edema in the LUE 23  She had a massage therapist perform a massage one week ago and that did reduce the edema  She has a history of lymphedema in the RUE due to treatments from desmoid cancer over 20 years ago  She also has a compression pump for the RUE that she has used on the left recently for edema  Patient has been wearing an old, 20-30 mmHg arm sleeve on the LUE to control the lymphedema  Patient now presents for OT evaluation and treatment  Recurrent probem    Quality of life: good    Pain  Current pain rating: 3  At best pain rating: 3  At worst pain ratin  Location: left forearm, wrist, hand  Quality: tight and throbbing  Alleviating factors: elevation, MLD, compression  Exacerbated by: grasping    Progression: improved    Social Support  Lives in: multiple-level home  Lives with: spouse    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)  Hand dominance: ambidextrous    Treatments  Previous treatment: massage (compression pump and arm sleeve)  Patient Goals  Patient goals for therapy: decreased edema, decreased pain and independence with ADLs/IADLs          Objective     Postural Observations  Seated posture: good  Standing posture: good        Observations   Left Shoulder   Positive for edema  Right Shoulder  Positive for atrophy and edema  Additional Observation Details  2/20/23:   Circumference measurements (centimeters)  P1 Index  R = 5 8           L = 6  MCPs      R = 18             L = 18 5  Wrists     R = 16             L = 17  +8cm:     R = 18 8          L = 17  +12 cm   R = 22 7          L = 19  +16 cm   R = 25 3          L = 22 3  +20 cm   R = 28             L = 25 7  +24 cm   R = 27 5          L = 27  +28 cm   R = 25 5          L = 26  +32 cm   R = 24 5          L = 25 8  +36 cm   R = 24             L = 26 5  +40 cm   R = 25             L = 26 5  +44 cm   R = 25             L = 28 5  +48 cm   R = 26             L = 31  Left arm +2 pitting edema forearm  Negative Stemmer's sign  No wounds  Rash in antecubital fossa from using tape with latex  RUE atrophy in upper arm, but edema in hand and forearm    Neurological Testing     Sensation     Shoulder   Left Shoulder   Intact: light touch    Right Shoulder   Intact: light touch    Active Range of Motion   Left Shoulder   Normal active range of motion    Strength/Myotome Testing     Left Shoulder   Normal muscle strength             Precautions: Right desmoid cancer with RUE lymphedema and frequent cellulitis infections; L breast cancer with lumpectomy and axillary node dissection         Date 2/20/23       Visit 1       Manuals        MLD trunk 5'       MLD LUE 10'       Kinesio tape for lymphatic flow 5' LUE               Neuro Re-Ed                                                                 Ther Ex Ther Activity                        Gait Training                        Modalities

## 2023-02-23 ENCOUNTER — OFFICE VISIT (OUTPATIENT)
Dept: OCCUPATIONAL THERAPY | Facility: CLINIC | Age: 63
End: 2023-02-23

## 2023-02-23 DIAGNOSIS — C77.3 CARCINOMA OF LEFT BREAST METASTATIC TO AXILLARY LYMPH NODE (HCC): Primary | ICD-10-CM

## 2023-02-23 DIAGNOSIS — C50.312 MALIGNANT NEOPLASM OF LOWER-INNER QUADRANT OF LEFT BREAST IN FEMALE, ESTROGEN RECEPTOR POSITIVE (HCC): ICD-10-CM

## 2023-02-23 DIAGNOSIS — C50.912 CARCINOMA OF LEFT BREAST METASTATIC TO AXILLARY LYMPH NODE (HCC): Primary | ICD-10-CM

## 2023-02-23 DIAGNOSIS — Z17.0 MALIGNANT NEOPLASM OF LOWER-INNER QUADRANT OF LEFT BREAST IN FEMALE, ESTROGEN RECEPTOR POSITIVE (HCC): ICD-10-CM

## 2023-02-23 NOTE — PROGRESS NOTES
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 (HCC)  C50 912     C77 3       2  Malignant neoplasm of lower-inner quadrant of left breast in female, [facility]  C50 312     Z17 0                      Subjective: My left arm doesn't feel as tight      Objective: See treatment diary below      Assessment: Tolerated treatment well  Patient demonstrated good reduction iin LUE volume  Some pitting edema still noted in left forearm      Plan: Continue per plan of care  Precautions: Right desmoid cancer with RUE lymphedema and frequent cellulitis infections; L breast cancer with lumpectomy and axillary node dissection         Date 23      Visit 1 2      Manuals        MLD trunk 5' 15      MLD LUE 10' 35      Kinesio tape for lymphatic flow 5' LUE 5' LUE              Neuro Re-Ed                                                                 Ther Ex                                                                        Ther Activity                        Gait Training                        Modalities

## 2023-02-28 ENCOUNTER — APPOINTMENT (OUTPATIENT)
Dept: OCCUPATIONAL THERAPY | Facility: CLINIC | Age: 63
End: 2023-02-28

## 2023-02-28 ENCOUNTER — OFFICE VISIT (OUTPATIENT)
Dept: OCCUPATIONAL THERAPY | Facility: CLINIC | Age: 63
End: 2023-02-28

## 2023-02-28 DIAGNOSIS — Z17.0 MALIGNANT NEOPLASM OF LOWER-INNER QUADRANT OF LEFT BREAST IN FEMALE, ESTROGEN RECEPTOR POSITIVE (HCC): ICD-10-CM

## 2023-02-28 DIAGNOSIS — C77.3 CARCINOMA OF LEFT BREAST METASTATIC TO AXILLARY LYMPH NODE (HCC): Primary | ICD-10-CM

## 2023-02-28 DIAGNOSIS — C50.312 MALIGNANT NEOPLASM OF LOWER-INNER QUADRANT OF LEFT BREAST IN FEMALE, ESTROGEN RECEPTOR POSITIVE (HCC): ICD-10-CM

## 2023-02-28 DIAGNOSIS — C50.912 CARCINOMA OF LEFT BREAST METASTATIC TO AXILLARY LYMPH NODE (HCC): Primary | ICD-10-CM

## 2023-02-28 NOTE — PROGRESS NOTES
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 [address] )  C50 312     Z17 0                      Subjective: I can tell my swelling is gong down in the left arm      Objective: See treatment diary below      Assessment: Tolerated treatment well  Patient demonstrates resolution of pitting edema in left hand  Tense edema still noted in leeft forearm      Plan: Continue per plan of care  Precautions: Right desmoid cancer with RUE lymphedema and frequent cellulitis infections; L breast cancer with lumpectomy and axillary node dissection         Date 23     Visit 1 2 3     Manuals        MLD trunk 5' 15 20     MLD LUE 10' 35 30     Kinesio tape for lymphatic flow 5' LUE 5' LUE 5'             Neuro Re-Ed                                                                 Ther Ex                                                                        Ther Activity                        Gait Training                        Modalities

## 2023-03-06 ENCOUNTER — OFFICE VISIT (OUTPATIENT)
Dept: OCCUPATIONAL THERAPY | Facility: CLINIC | Age: 63
End: 2023-03-06

## 2023-03-06 DIAGNOSIS — C77.3 CARCINOMA OF LEFT BREAST METASTATIC TO AXILLARY LYMPH NODE (HCC): Primary | ICD-10-CM

## 2023-03-06 DIAGNOSIS — C50.312 MALIGNANT NEOPLASM OF LOWER-INNER QUADRANT OF LEFT BREAST IN FEMALE, ESTROGEN RECEPTOR POSITIVE (HCC): ICD-10-CM

## 2023-03-06 DIAGNOSIS — Z17.0 MALIGNANT NEOPLASM OF LOWER-INNER QUADRANT OF LEFT BREAST IN FEMALE, ESTROGEN RECEPTOR POSITIVE (HCC): ICD-10-CM

## 2023-03-06 DIAGNOSIS — C50.912 CARCINOMA OF LEFT BREAST METASTATIC TO AXILLARY LYMPH NODE (HCC): Primary | ICD-10-CM

## 2023-03-06 NOTE — PROGRESS NOTES
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  C50 312     Z17 0                      Subjective: My left hand swelled over the weekend  I started wearing a compression glove I got previously for the right hand and that brought it down  Objective: See treatment diary below      Assessment: Tolerated treatment well  Patient exhibited good technique with therapeutic exercises  Redness noted on right forearm that appears to indicate cellulitis  No redness in the left arm      Plan: Continue per plan of care  Precautions: Right desmoid cancer with RUE lymphedema and frequent cellulitis infections; L breast cancer with lumpectomy and axillary node dissection         Date 2/20/23 2/23/23 2/28/23 3/6/23    Visit 1 2 3 4    Manuals        MLD trunk 5' 15 20 20'    MLD LUE 10' 35 30 35'    Kinesio tape for lymphatic flow 5' LUE 5' LUE 5'             Neuro Re-Ed                                                                 Ther Ex                                                                        Ther Activity                        Gait Training                        Modalities

## 2023-03-07 DIAGNOSIS — C77.3 CARCINOMA OF LEFT BREAST METASTATIC TO AXILLARY LYMPH NODE (HCC): Primary | ICD-10-CM

## 2023-03-07 DIAGNOSIS — Z17.0 MALIGNANT NEOPLASM OF LOWER-INNER QUADRANT OF LEFT BREAST IN FEMALE, ESTROGEN RECEPTOR POSITIVE (HCC): ICD-10-CM

## 2023-03-07 DIAGNOSIS — D48.1 DESMOID TUMOR: ICD-10-CM

## 2023-03-07 DIAGNOSIS — C50.312 MALIGNANT NEOPLASM OF LOWER-INNER QUADRANT OF LEFT BREAST IN FEMALE, ESTROGEN RECEPTOR POSITIVE (HCC): ICD-10-CM

## 2023-03-07 DIAGNOSIS — C50.912 CARCINOMA OF LEFT BREAST METASTATIC TO AXILLARY LYMPH NODE (HCC): Primary | ICD-10-CM

## 2023-03-07 DIAGNOSIS — I89.0 LYMPHEDEMA: ICD-10-CM

## 2023-03-07 DIAGNOSIS — Z98.890 HISTORY OF LUMPECTOMY OF LEFT BREAST: ICD-10-CM

## 2023-03-09 ENCOUNTER — OFFICE VISIT (OUTPATIENT)
Dept: OCCUPATIONAL THERAPY | Facility: CLINIC | Age: 63
End: 2023-03-09

## 2023-03-09 DIAGNOSIS — C77.3 CARCINOMA OF LEFT BREAST METASTATIC TO AXILLARY LYMPH NODE (HCC): Primary | ICD-10-CM

## 2023-03-09 DIAGNOSIS — C50.912 CARCINOMA OF LEFT BREAST METASTATIC TO AXILLARY LYMPH NODE (HCC): Primary | ICD-10-CM

## 2023-03-09 DIAGNOSIS — Z17.0 MALIGNANT NEOPLASM OF LOWER-INNER QUADRANT OF LEFT BREAST IN FEMALE, ESTROGEN RECEPTOR POSITIVE (HCC): ICD-10-CM

## 2023-03-09 DIAGNOSIS — C50.312 MALIGNANT NEOPLASM OF LOWER-INNER QUADRANT OF LEFT BREAST IN FEMALE, ESTROGEN RECEPTOR POSITIVE (HCC): ICD-10-CM

## 2023-03-09 NOTE — PROGRESS NOTES
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 (HCC)  C50 912     C77 3       2  Malignant neoplasm of lower-inner quadrant of left breast in female, [facility]  C50 312     Z17 0                      Subjective: My arm isn't hurting so much now      Objective: See treatment diary below      Assessment: Tolerated treatment well  Patient would benefit from continued OT  Patient given prescription for BUE arm sleeves  She will make an appointment at [facility] to be measured and fit for sleeves  RUE skin redness from previous session resolved without incidence on 3/6/23  Plan: Continue per plan of care  Precautions: Right desmoid cancer with RUE lymphedema and frequent cellulitis infections; L breast cancer with lumpectomy and axillary node dissection         Date 2/20/23 2/23/23 2/28/23 3/6/23 3/9/23   Visit 1 2 3 4 5   Manuals        MLD trunk 5' 15 20 20' 15'   MLD LUE 10' 35 30 35' 25'   Kinesio tape for lymphatic flow 5' LUE 5' LUE 5'             Neuro Re-Ed                                                                 Ther Ex                                                                        Ther Activity                        Gait Training                        Modalities

## 2023-03-13 ENCOUNTER — OFFICE VISIT (OUTPATIENT)
Dept: OCCUPATIONAL THERAPY | Facility: CLINIC | Age: 63
End: 2023-03-13

## 2023-03-13 DIAGNOSIS — Z17.0 MALIGNANT NEOPLASM OF LOWER-INNER QUADRANT OF LEFT BREAST IN FEMALE, ESTROGEN RECEPTOR POSITIVE (HCC): ICD-10-CM

## 2023-03-13 DIAGNOSIS — C77.3 CARCINOMA OF LEFT BREAST METASTATIC TO AXILLARY LYMPH NODE (HCC): Primary | ICD-10-CM

## 2023-03-13 DIAGNOSIS — C50.312 MALIGNANT NEOPLASM OF LOWER-INNER QUADRANT OF LEFT BREAST IN FEMALE, ESTROGEN RECEPTOR POSITIVE (HCC): ICD-10-CM

## 2023-03-13 DIAGNOSIS — C50.912 CARCINOMA OF LEFT BREAST METASTATIC TO AXILLARY LYMPH NODE (HCC): Primary | ICD-10-CM

## 2023-03-13 NOTE — PROGRESS NOTES
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 (HCC)  C50 912     C77 3       2  Malignant neoplasm of lower-inner quadrant of left breast in female, [facility]  C50 312     Z17 0                      Subjective: I've been using the pump on the left arm 2x/day for an hour per session  I think that helps      Objective: See treatment diary below      Assessment: Tolerated treatment well  Patient would benefit from continued OT  Decreased edema LUE this date      Plan: Progress note during next visit  Precautions: Right desmoid cancer with RUE lymphedema and frequent cellulitis infections; L breast cancer with lumpectomy and axillary node dissection         Date 3/13/23 2/23/23 2/28/23 3/6/23 3/9/23   Visit 6 2 3 4 5   Manuals        MLD trunk 15' 15 20 20' 15'   MLD LUE 30' 35 30 35' 25'   Kinesio tape for lymphatic flow  5' LUE 5'             Neuro Re-Ed                                                                 Ther Ex                                                                        Ther Activity                        Gait Training                        Modalities

## 2023-03-21 ENCOUNTER — APPOINTMENT (OUTPATIENT)
Dept: OCCUPATIONAL THERAPY | Facility: CLINIC | Age: 63
End: 2023-03-21

## 2023-03-23 ENCOUNTER — APPOINTMENT (OUTPATIENT)
Dept: OCCUPATIONAL THERAPY | Facility: CLINIC | Age: 63
End: 2023-03-23

## 2023-04-25 ENCOUNTER — HOSPITAL ENCOUNTER (OUTPATIENT)
Dept: MAMMOGRAPHY | Facility: CLINIC | Age: 63
Discharge: HOME | End: 2023-04-25

## 2023-04-25 VITALS — BODY MASS INDEX: 21.22 KG/M2 | WEIGHT: 140 LBS | HEIGHT: 68 IN

## 2023-04-25 DIAGNOSIS — C50.912 CARCINOMA OF LEFT BREAST METASTATIC TO AXILLARY LYMPH NODE (HCC): ICD-10-CM

## 2023-04-25 DIAGNOSIS — C50.312 MALIGNANT NEOPLASM OF LOWER-INNER QUADRANT OF LEFT BREAST IN FEMALE, ESTROGEN RECEPTOR POSITIVE (HCC): ICD-10-CM

## 2023-04-25 DIAGNOSIS — C77.3 CARCINOMA OF LEFT BREAST METASTATIC TO AXILLARY LYMPH NODE (HCC): ICD-10-CM

## 2023-04-25 DIAGNOSIS — Z17.0 MALIGNANT NEOPLASM OF LOWER-INNER QUADRANT OF LEFT BREAST IN FEMALE, ESTROGEN RECEPTOR POSITIVE (HCC): ICD-10-CM

## 2023-05-01 ENCOUNTER — OFFICE VISIT (OUTPATIENT)
Dept: SURGICAL ONCOLOGY | Facility: CLINIC | Age: 63
End: 2023-05-01

## 2023-05-01 VITALS
RESPIRATION RATE: 15 BRPM | WEIGHT: 147 LBS | SYSTOLIC BLOOD PRESSURE: 140 MMHG | OXYGEN SATURATION: 98 % | DIASTOLIC BLOOD PRESSURE: 80 MMHG | HEIGHT: 68 IN | HEART RATE: 75 BPM | BODY MASS INDEX: 22.28 KG/M2 | TEMPERATURE: 97.7 F

## 2023-05-01 DIAGNOSIS — Z98.890 HISTORY OF LUMPECTOMY OF LEFT BREAST: ICD-10-CM

## 2023-05-01 DIAGNOSIS — Z17.0 MALIGNANT NEOPLASM OF LOWER-INNER QUADRANT OF LEFT BREAST IN FEMALE, ESTROGEN RECEPTOR POSITIVE (HCC): Primary | ICD-10-CM

## 2023-05-01 DIAGNOSIS — C50.312 MALIGNANT NEOPLASM OF LOWER-INNER QUADRANT OF LEFT BREAST IN FEMALE, ESTROGEN RECEPTOR POSITIVE (HCC): Primary | ICD-10-CM

## 2023-05-01 DIAGNOSIS — C50.912 CARCINOMA OF LEFT BREAST METASTATIC TO AXILLARY LYMPH NODE (HCC): ICD-10-CM

## 2023-05-01 DIAGNOSIS — Z79.811 USE OF LETROZOLE (FEMARA): ICD-10-CM

## 2023-05-01 DIAGNOSIS — C77.3 CARCINOMA OF LEFT BREAST METASTATIC TO AXILLARY LYMPH NODE (HCC): ICD-10-CM

## 2023-05-01 NOTE — PROGRESS NOTES
Surgical Oncology Follow Up  [facility]  [facility] [place]  [address]  [facility] [id number]    Kenyatta Garcia  1960  [id number]      Chief Complaint   Patient presents with    Follow-up        Assessment & Plan:   She is here for follow-up with left breast cancer s/p breast left breast lumpectomy with axillary dissection followed by radiation  She had mammogram and films were reviewed no worrisome features  I will see her in 6 months time  Cancer History:     Oncology History   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 ([facility] [address] )   4/14/2022 Initial Diagnosis    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 ([facility] [address] )     4/14/2022 Biopsy    Left axillary Lymph node ultrasound guided biopsy  Metastatic carcinoma, compatible with breast origin  ER >95  IA 95   HER2 1+    On ultrasound lymph node is 1 8 cm  There is cortical thickening without evident fatty hilum  In review of screening mammogram, demonstrates no suspicious mammographic findings identified in either breast  Bilateral MRI is recommended  Flow cytometry - there is no evidence of a B-cell or T-cell non-Hodgkin lymphoma  4/20/2022 Genomic Testing    A total of 36 genes were evaluated, including: ANGEL LUIS, BRCA1, BRCA2, CDH1, CHEK2, PALB2, PTEN, STK11, TP53  Negative result  No pathogenic sequence variants or deletions/duplications identified     5/20/2022 Biopsy    MRI guided biopsy  A  Left breast   11 o'clock   Invasive mammary carcinoma of no special type (ductal)  Grade 1  ER 95  IA 95  HER2 0    B  Right breast   Retroareolar  Benign fibrocystic changes without atypia (discrete 0 3 cm focus of sclerosing and tubular adenosis with usual ductal hyperplasia, columnar cell change and hyperplasia, apocrine metaplasia, cystic dilatation)     - Microcalcifications: Present associated with non-neoplastic breast tissue    - Negative for malignancy, in-situ carcinoma and atypical hyperplasia  Malignant pathology appears unifocal  Biopsy proven carcinoma measured 1 cm on MRI  Biopsy proved metastatic disease to left axillary lymph node  6/9/2022 Genomic Testing    MammaPrint  Low risk  Luminal type A  MammaPrint index: [phone]     6/21/2022 Surgery    Left breast tiffany  directed lumpectomy with axillary dissection  A  Invasive 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  Grade 1  1 3 cm  Lymphovascular invasion is identified  Margins negative    H  Axillary, Level I and Level II axillary contents  Metastatic carcinoma involving 3/17 lymph nodes  1 8 cm   Extranodal extension is identified 1mm  Foci of carcinoma identified in extranodal lymphatic channels    Anatomic stage: Stage IIA  Prognostic stage: Stage IA      6/21/2022 -  Cancer Staged    Staging form: Breast, AJCC 8th Edition  - Pathologic stage from 6/21/2022: Stage IA (pT1c, pN1, cM0, G1, ER+, ND+, HER2-) - Signed by Parish Delarosa MD on 8/1/2022  Stage prefix: Initial diagnosis  Nuclear grade: G1  Multigene prognostic tests performed: None  Mitotic count score: Score 1  Histologic grading system: 3 grade system       8/29/2022 - 10/10/2022 Radiation    Treatments:  Course: C1  Plan ID Energy Fractions Dose per Fraction (cGy) Total Dose Delivered (cGy) Elapsed Days   BH L Breast 6X 25 / 25 200 5,000 35   BH L [id number] 12E 5 / 5 200 1,000 6   BH L SClav 10X/6X 24 / 24 200 4,800 32    Treatment Dates:  8/29/2022 - 10/10/2022  Malignant neoplasm of lower-inner quadrant of left breast in female, estrogen receptor positive ([facility] [address] )   5/20/2022 Initial Diagnosis    Malignant neoplasm of lower-inner quadrant of left breast in female, estrogen receptor positive ([address] )     5/20/2022 Biopsy    MRI guided biopsy  A  Left breast   11 o'clock   Invasive mammary carcinoma of no special type (ductal)  Grade 1  ER 95  ND 95  HER2 0    B   Right breast   Retroareolar  Benign fibrocystic changes without atypia (discrete 0 3 cm focus of sclerosing and tubular adenosis with usual ductal hyperplasia, columnar cell change and hyperplasia, apocrine metaplasia, cystic dilatation)  - Microcalcifications: Present associated with non-neoplastic breast tissue    - Negative for malignancy, in-situ carcinoma and atypical hyperplasia  Malignant pathology appears unifocal  Biopsy proven carcinoma measured 1 cm on MRI  Biopsy proved metastatic disease to left axillary lymph node  6/9/2022 Genomic Testing    MammaPrint  Low risk  Luminal type A  MammaPrint index: +0 [phone]     6/21/2022 Surgery    Left breast tiffany  directed lumpectomy with axillary dissection  A  Invasive 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  Grade 1  1 3 cm  Lymphovascular invasion is identified  Margins negative    H  Axillary, Level I and Level II axillary contents  Metastatic carcinoma involving 3/17 lymph nodes  1 8 cm   Extranodal extension is identified 1mm  Foci of carcinoma identified in extranodal lymphatic channels    Anatomic stage: Stage IIA  Prognostic stage: Stage IA      8/29/2022 - 10/10/2022 Radiation    Treatments:  Course: C1  Plan ID Energy Fractions Dose per Fraction (cGy) Total Dose Delivered (cGy) Elapsed Days   BH L Breast 6X 25 / 25 200 5,000 35   BH L [id number] 12E 5 / 5 200 1,000 6   BH L SClav 10X/6X 24 / 24 200 4,800 32    Treatment Dates:  8/29/2022 - 10/10/2022  Interval History:   Follow-up with left breast cancer    Review of Systems:   Review of Systems   Constitutional: Negative for chills and fever  HENT: Negative for ear pain and sore throat  Eyes: Negative for pain and visual disturbance  Respiratory: Negative for cough and shortness of breath  Cardiovascular: Negative for chest pain and palpitations  Gastrointestinal: Negative for abdominal pain and vomiting  Endocrine: Negative for cold intolerance, heat intolerance, polydipsia, polyphagia and polyuria     Genitourinary: Negative for dysuria and hematuria  Musculoskeletal: Negative for arthralgias and back pain  Skin: Negative for color change and rash  Neurological: Negative for dizziness, tremors, seizures, syncope, facial asymmetry, weakness, light-headedness, numbness and headaches  Hematological: Negative for adenopathy  Does not bruise/bleed easily  All other systems reviewed and are negative  Past Medical History     Patient Active Problem List   Diagnosis    Back pain, chronic    Chronic insomnia    Lymphedema of right arm    Hyperlipidemia, mixed    Peripheral neuropathy    Lymphedema    Cellulitis of right upper extremity    Sepsis ([facility] [address] )    Desmoid tumor    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 (HCC)    Malignant neoplasm of lower-inner quadrant of left breast in female, estrogen receptor positive ([facility] [address] )    HTN, goal below 140/90    PONV (postoperative nausea and vomiting)    Use of letrozole (Femara)    History of lumpectomy of left breast     Past Medical History:   Diagnosis Date    Abnormal mammogram 05/15/2013    Anemia     Cancer ([facility] [address] )     desmoitosis    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 ([facility] [address] ) 04/19/2022    Chronic pain disorder     worse right side of body    Desmoid tumor     Last Assessed: 6/19/2017    History of transfusion     no reactions    Hyperlipidemia     Hypertension     Ovarian cancer ([facility] [address] ) 1999?   Hysterectomy done    PONV (postoperative nausea and vomiting)      Past Surgical History:   Procedure Laterality Date    BREAST LUMPECTOMY Left 6/21/2022    Procedure: ANITA  DIRECTED LUMPECTOMY;  Surgeon: Lily Weaver MD;  [facility];  Service: Surgical Oncology    FRACTURE SURGERY      HYSTERECTOMY      LYMPH NODE DISSECTION Left 6/21/2022    Procedure: AXILLARY DISSECTION LEVEL I AND LEVEL II LYMPH NODES;  Surgeon: Lily Weaver MD;  [facility];  Service: Surgical Oncology    MRI BREAST BIOPSY LEFT (ALL INCLUSIVE) Left 5/20/2022    MRI BREAST BIOPSY RIGHT (ALL INCLUSIVE) Right 5/20/2022    OTHER SURGICAL HISTORY      Arm Incision: Dermoid tumor, right Arm     PARTIAL HYSTERECTOMY      IN COLONOSCOPY FLX DX W/COLLJ SPEC WHEN PFRMD N/A 8/15/2017    Procedure: COLONOSCOPY;  Surgeon: Federico Quezada MD;  Location: [facility];   Service: Gastroenterology    IN [address] WRST SURG W/RLS TRANSVRS CARPL LIGM Left 8/10/2021    Procedure: RELEASE LEFT CARPAL TUNNEL ENDOSCOPIC;  Surgeon: Noble Ramires MD;  [facility];  Service: Orthopedics    IN OPTX DSTL RADL I-ARTIC FX/EPIPHYSL SEP 3 FRAG Left 2/9/2021    Procedure: OPEN REDUCTION W/ INTERNAL FIXATION (ORIF) RADIUS / ULNA (WRIST) left;  Surgeon: Noble Ramires MD;  [facility];  Service: Orthopedics    SKIN BIOPSY      SKIN CANCER EXCISION      Melanoma Excision     TONSILLECTOMY      US BREAST NEEDLE LOC LEFT Left 6/16/2022    US BREAST NEEDLE LOC RIGHT EACH ADDITIONAL Right 6/16/2022    US GUIDED BREAST LYMPH NODE BIOPSY LEFT Left 4/14/2022     Family History   Problem Relation Age of Onset    Diabetes Mother     No Known Problems Father     Cancer Sister     Lung cancer Sister     Pancreatic cancer Sister     No Known Problems Maternal Grandmother     No Known Problems Maternal Grandfather     No Known Problems Paternal Grandmother     No Known Problems Paternal Grandfather     Breast cancer Neg Hx      Social History     Socioeconomic History    Marital status: /Civil Union     Spouse name: Not on file    Number of children: Not on file    Years of education: Not on file    Highest education level: Not on file   Occupational History    Occupation: unemployed    Tobacco Use    Smoking status: Never    Smokeless tobacco: Never   Vaping Use    Vaping Use: Never used   Substance and Sexual Activity    Alcohol use: Not Currently     Alcohol/week: 5 0 standard drinks     Types: 5 Shots of liquor per week     Comment: Used mostly as a painkiller when needed before bedtime    Drug use: Yes     Frequency: 28 0 times per week     Types: Marijuana     Comment: THC Medical marijuana    Sexual activity: Not Currently     Partners: Male     Comment: Hysterectomy years ago   Other Topics Concern    Not on file   Social History Narrative    Lives with spouse      Social Determinants of Health     Financial Resource Strain: Not on file   Food Insecurity: Not on file   Transportation Needs: Not on file   Physical Activity: Not on file   Stress: Not on file   Social Connections: Not on file   Intimate Partner Violence: Not on file   Housing Stability: Not on file       Current Outpatient Medications:     cholecalciferol (VITAMIN D3) 1,000 units tablet, Take 3,000 Units by mouth daily, Disp: , Rfl:     diphenhydrAMINE (BENADRYL) 50 MG tablet, Take 50 mg by mouth daily at bedtime as needed for itching, Disp: , Rfl:     dronabinol (MARINOL) 5 MG capsule, Take 15 mg by mouth 4 (four) times a day (before meals and at bedtime), Disp: , Rfl:     letrozole (FEMARA) 2 5 mg tablet, Take 1 tablet (2 5 mg total) by mouth daily, Disp: 90 tablet, Rfl: 3    multivitamin (THERAGRAN) TABS, Take 1 tablet by mouth daily, Disp: , Rfl:     Omega-3 Fatty Acids (FISH OIL PO), Take by mouth, Disp: , Rfl:     Probiotic Product (PROBIOTIC-10 PO), Take by mouth, Disp: , Rfl:     triamcinolone (KENALOG) 0 1 % cream, Apply topically 2 (two) times a day as needed for irritation or rash, Disp: 80 g, Rfl: 0    docusate sodium (COLACE) 100 mg capsule, Take 1 capsule (100 mg total) by mouth 2 (two) times a day as needed for constipation for up to 10 days, Disp: 20 capsule, Rfl: 0    HYDROmorphone (DILAUDID) 2 mg tablet, Take 1 tablet (2 mg total) by mouth every 6 (six) hours as needed for severe pain Not on Discharge list   Patient is taking for severe pain PRN every 6 hours Max Daily Amount: 8 mg (Patient not taking: Reported on 8/12/2022), Disp: 20 tablet, Rfl: 0  Allergies   Allergen Reactions    Chlorpromazine Anaphylaxis     PHENOTHIAZINE=ANAPHYLAXIS    Codeine Anaphylaxis     Anaphylaxis  abd pain   Meperidine Anaphylaxis, Hives and Vomiting    Phenothiazines Anaphylaxis and Throat Swelling     Category: Allergy;     Saccharin - Food Allergy Anaphylaxis    Ampicillin-Sulbactam Sodium Hives    Aspirin GI Intolerance and Abdominal Pain     Severe GERD    Gluten Meal - Food Allergy GI Intolerance    Ibuprofen Hives     H    Levofloxacin Hives    Chocolate - Food Allergy GI Intolerance    Lactose - Food Allergy GI Intolerance    Neomycin Other (See Comments), Edema and Hives     Category: Allergy;   swelling    Citrus - Food Allergy Rash    Latex Hives and Rash     Category: Allergy; Physical Exam:     Vitals:    05/01/23 1009   BP: 140/80   Pulse: 75   Resp: 15   Temp: 97 7 °F (36 5 °C)   SpO2: 98%     Physical Exam  Constitutional:       Appearance: Normal appearance  HENT:      Head: Normocephalic and atraumatic  Nose: Nose normal       Mouth/Throat:      Mouth: Mucous membranes are moist    Eyes:      Pupils: Pupils are equal, round, and reactive to light  Cardiovascular:      Rate and Rhythm: Normal rate and regular rhythm  Pulses: Normal pulses  Heart sounds: Normal heart sounds  Pulmonary:      Effort: Pulmonary effort is normal       Breath sounds: Normal breath sounds  Chest:          Comments: Left breast and left axillary well-healed incisions  Left breast no palpable mass masses nipple discharge nipple retraction or skin changes on the surgical scars  Left axillary and supraclavicular examination no palpable adenopathy  Left breast edema from radiation is resolving  Right breast no palpable mass masses no nipple discharge no nipple retraction right axillary and supraclavicular examination no palpable adenopathy    Right upper extremity known lymphedema persist   Abdominal:      General: Bowel sounds are normal       Palpations: Abdomen is soft  Musculoskeletal:         General: Swelling present  No tenderness or deformity  Normal range of motion  Cervical back: Normal range of motion and neck supple  Right lower leg: No edema  Left lower leg: No edema  Skin:     General: Skin is warm  Coloration: Skin is not jaundiced  Neurological:      General: No focal deficit present  Mental Status: She is alert and oriented to person, place, and time  Psychiatric:         Mood and Affect: Mood normal          Behavior: Behavior normal          Thought Content: Thought content normal          Judgment: Judgment normal            Results & Discussion:    mammogram:  Narrative & Impression   DIAGNOSIS: 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 ([facility] [address] ); Malignant neoplasm of lower-inner quadrant of left breast in female, estrogen receptor positive ([facility] [address] )      TECHNIQUE:  Digital diagnostic mammography was performed  Computer Aided Detection (CAD) analyzed all applicable images      COMPARISONS: Prior breast imaging dated: 06/16/2022, 03/29/2022, 03/25/2016, 05/15/2013, 05/15/2013, 05/14/2012, 05/16/2011, 05/14/2010, 01/08/2009, and 01/10/2008     RELEVANT HISTORY:   Family Breast Cancer History: History of breast cancer in Neg Hx  Family Medical History: No known relevant family medical history  Personal History: Hormone history includes estrogen replacement therapy  Surgical history includes lumpectomy and hysterectomy   Medical history includes breast cancer and ovarian cancer      RISK ASSESSMENT:   [facility] risk assessment reporting was suppressed due to the patient's history and/or demographic factors      TISSUE DENSITY:   The breasts are heterogeneously dense, which may obscure small masses       INDICATION: Starla Dasilva is a 57 y o  female presenting for history of left breast cancer      FINDINGS:   Bilateral  There are no suspicious masses, grouped microcalcifications or areas of unexplained architectural distortion  The skin and nipple areolar complex are unremarkable  There are postsurgical/posttreatment changes of the left breast and left axilla  There are scattered calcifications present  There is a biopsy marker clip in the retroareolar right breast         IMPRESSION:   Postsurgical/posttreatment changes of the left breast   No evidence of malignancy  I did review the mammogram personally films with the patient as well  I agree with the report I did discussed in detail nature of breast cancer risk of recurrence local as well as distant disease  She today's visit no palpable mass or masses in either on either breast   Well-healed left breast and left axillary incisions no palpable lymphadenopathy  See her in 6 months  She was told to call us with any questions or concerns in the interim she understand and agree to she understands and  agrees   All patient questions were answered  Advance Care Planning/Advance Directives: Sally Beard MD discussed the disease status with Harris Jacobo  today 05/01/23  treatment plans and follow-up with the patient

## 2023-05-26 ENCOUNTER — CLINICAL SUPPORT (OUTPATIENT)
Dept: RADIATION ONCOLOGY | Facility: CLINIC | Age: 63
End: 2023-05-26
Attending: RADIOLOGY

## 2023-05-26 ENCOUNTER — RADIATION ONCOLOGY FOLLOW-UP (OUTPATIENT)
Dept: RADIATION ONCOLOGY | Facility: CLINIC | Age: 63
End: 2023-05-26
Attending: RADIOLOGY

## 2023-05-26 VITALS
HEART RATE: 87 BPM | SYSTOLIC BLOOD PRESSURE: 150 MMHG | OXYGEN SATURATION: 98 % | WEIGHT: 147 LBS | TEMPERATURE: 97.5 F | BODY MASS INDEX: 22.35 KG/M2 | RESPIRATION RATE: 16 BRPM | DIASTOLIC BLOOD PRESSURE: 80 MMHG

## 2023-05-26 DIAGNOSIS — C77.3 CARCINOMA OF LEFT BREAST METASTATIC TO AXILLARY LYMPH NODE (HCC): Primary | ICD-10-CM

## 2023-05-26 DIAGNOSIS — C50.912 CARCINOMA OF LEFT BREAST METASTATIC TO AXILLARY LYMPH NODE (HCC): Primary | ICD-10-CM

## 2023-05-26 NOTE — PROGRESS NOTES
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 more recently diagnosed with stage IIA ([id number]) grade 1 invasive ductal carcinoma of the left breast status post lumpectomy and axillary lymph node dissection achieving negative margins  Pathology was significant for lymphovascular invasion and 3/17 lymph nodes positive for metastatic carcinoma with less 1-2 mm of JACKIE associated with a 1 8 cm macro metastasis  Tumor cells were ER/VT positive and HER2 negative  She completed adjuvant radiation on 10/10/22  The patient is currently maintained on letrozole  She was last seen 11/16/22 and presents today for follow up       2/10/23 Med Fredo Veloz  Patient began Letrozole 2 5mg once daily 10/20/2022  Goal is for 5 years - end date 10/20/2027  Tolerating well without severe side effect   - CBC-D, CMP Q6m  She will have labs done soon for updated labs  Osteopenia seen --> discussed in detail with patient  She takes Calcium 600mg BID with Vitamin D 3000 units  Patient to continue this along with weight bearing exercises as tolerated  Next DXA scan 10/2024  Continue F/U with rad onc  Continue F/U with surg onc - was seen today  F/u Surg-Onc, screening mammograms as per below  Next mammogram is 4/2023  Encouraged to cut back alcohol use as regular alcohol use can increase risk for recurrence of breast cancer  Encouraged her to continue melatonin for sleep  4/18/23 ID, Dr Curtis Jimenez    Recurrent right forearm cellulitis, secondary to underlying lymphedema  she has approximately 3 episodes of recurrences per year  it does not appear the patient has active cellulitis   I am not convinced that the 3 episodes of flareups this calendar year were true cellulitis, versus post cellulitic phlebitic syndrome  Plan:  Complete current Keflex course    Patient to follow-up with me with next cellulitis flare  Consider PCN suppression if patient truly has increasing cellulitis recurrences  23 OT   Patient developed cellulitis infection in the RUE  She has prescriptions to be fit for LUE compression garments and is independent in managing LUE edema  DC to HEP at this time    23 B/L diagnostic mammogram  Bilateral  There are no suspicious masses, grouped microcalcifications or areas of unexplained architectural distortion  The skin and nipple areolar complex are unremarkable  There are postsurgical/posttreatment changes of the left breast and left axilla  There are scattered calcifications present  There is a biopsy marker clip in the retroareolar right breast   RECOMMENDATION:       - Diagnostic mammogram in 1 year for both breasts  23 Dr Rochelle Toledo  mammogram and films were reviewed no worrisome features  Follow up 6 months      Upcomin23 Med Zelphia Sacks  23 Dr Rochelle Toledo  24 Mammogram      Follow up visit     Oncology History   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 ([address] )   2022 Initial Diagnosis    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 ([address] )     2022 Biopsy    Left axillary Lymph node ultrasound guided biopsy  Metastatic carcinoma, compatible with breast origin  ER >95  VA 95   HER2 1+    On ultrasound lymph node is 1 8 cm  There is cortical thickening without evident fatty hilum  In review of screening mammogram, demonstrates no suspicious mammographic findings identified in either breast  Bilateral MRI is recommended  Flow cytometry - there is no evidence of a B-cell or T-cell non-Hodgkin lymphoma  2022 Genomic Testing    A total of 36 genes were evaluated, including: ANGEL LUIS, BRCA1, BRCA2, CDH1, CHEK2, PALB2, PTEN, STK11, TP53  Negative result  No pathogenic sequence variants or deletions/duplications identified     2022 Biopsy    MRI guided biopsy  A   Left breast   11 o'clock Invasive mammary carcinoma of no special type (ductal)  Grade 1  ER 95  ME 95  HER2 0    B  Right breast   Retroareolar  Benign fibrocystic changes without atypia (discrete 0 3 cm focus of sclerosing and tubular adenosis with usual ductal hyperplasia, columnar cell change and hyperplasia, apocrine metaplasia, cystic dilatation)  - Microcalcifications: Present associated with non-neoplastic breast tissue    - Negative for malignancy, in-situ carcinoma and atypical hyperplasia  Malignant pathology appears unifocal  Biopsy proven carcinoma measured 1 cm on MRI  Biopsy proved metastatic disease to left axillary lymph node  6/9/2022 Genomic Testing    MammaPrint  Low risk  Luminal type A  MammaPrint index: [phone]     6/21/2022 Surgery    Left breast tiffany  directed lumpectomy with axillary dissection  A  Invasive 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  Grade 1  1 3 cm  Lymphovascular invasion is identified  Margins negative    H  Axillary, Level I and Level II axillary contents  Metastatic carcinoma involving 3/17 lymph nodes  1 8 cm   Extranodal extension is identified 1mm  Foci of carcinoma identified in extranodal lymphatic channels    Anatomic stage: Stage IIA  Prognostic stage: Stage IA      6/21/2022 -  Cancer Staged    Staging form: Breast, AJCC 8th Edition  - Pathologic stage from 6/21/2022: Stage IA (pT1c, pN1, cM0, G1, ER+, ME+, HER2-) - Signed by Una Christy MD on 8/1/2022  Stage prefix: Initial diagnosis  Nuclear grade: G1  Multigene prognostic tests performed: None  Mitotic count score: Score 1  Histologic grading system: 3 grade system       8/29/2022 - 10/10/2022 Radiation    Treatments:  Course: C1  Plan ID Energy Fractions Dose per Fraction (cGy) Total Dose Delivered (cGy) Elapsed Days   BH L Breast 6X 25 / 25 200 5,000 35   BH L [id number] 12E 5 / 5 200 1,000 6   BH L SClav 10X/6X 24 / 24 200 4,800 32    Treatment Dates:  8/29/2022 - 10/10/2022        Malignant neoplasm of lower-inner quadrant of left breast in female, estrogen receptor positive ([facility] [facility] )   5/20/2022 Initial Diagnosis    Malignant neoplasm of lower-inner quadrant of left breast in female, estrogen receptor positive ([facility] )     5/20/2022 Biopsy    MRI guided biopsy  A  Left breast   11 o'clock   Invasive mammary carcinoma of no special type (ductal)  Grade 1  ER 95  NV 95  HER2 0    B  Right breast   Retroareolar  Benign fibrocystic changes without atypia (discrete 0 3 cm focus of sclerosing and tubular adenosis with usual ductal hyperplasia, columnar cell change and hyperplasia, apocrine metaplasia, cystic dilatation)  - Microcalcifications: Present associated with non-neoplastic breast tissue    - Negative for malignancy, in-situ carcinoma and atypical hyperplasia  Malignant pathology appears unifocal  Biopsy proven carcinoma measured 1 cm on MRI  Biopsy proved metastatic disease to left axillary lymph node  6/9/2022 Genomic Testing    MammaPrint  Low risk  Luminal type A  MammaPrint index: +0 [phone]     6/21/2022 Surgery    Left breast tiffany  directed lumpectomy with axillary dissection  A  Invasive 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  Grade 1  1 3 cm  Lymphovascular invasion is identified  Margins negative    H  Axillary, Level I and Level II axillary contents  Metastatic carcinoma involving 3/17 lymph nodes  1 8 cm   Extranodal extension is identified 1mm  Foci of carcinoma identified in extranodal lymphatic channels    Anatomic stage: Stage IIA  Prognostic stage: Stage IA      8/29/2022 - 10/10/2022 Radiation    Treatments:  Course: C1  Plan ID Energy Fractions Dose per Fraction (cGy) Total Dose Delivered (cGy) Elapsed Days   BH L Breast 6X 25 / 25 200 5,000 35   BH L [id number] 12E 5 / 5 200 1,000 6   BH L SClav 10X/6X 24 / 24 200 4,800 32    Treatment Dates:  8/29/2022 - 10/10/2022  Review of Systems:  Review of Systems   Constitutional: Negative      HENT: Negative  Eyes:        Wears glasses  Macular degeneration  Optic migraines   Respiratory: Negative  Cardiovascular: Negative  Gastrointestinal: Negative  Genitourinary: Negative  Musculoskeletal: Positive for back pain and neck pain  Lymphedema right arm since 1999  Intermittent pain left breast   Skin: Negative  No skin changes left breast   Allergic/Immunologic: Positive for environmental allergies and food allergies  Neurological: Negative  Hematological: Negative  Psychiatric/Behavioral: Negative          Clinical Trial: no    Health Maintenance   Topic Date Due   • Pneumococcal Vaccine: Pediatrics (0 to 5 Years) and At-Risk Patients (6 to 59 Years) (1 - PCV) Never done   • HIV Screening  Never done   • PT PLAN OF CARE  06/03/2022   • Colorectal Cancer Screening  08/15/2022   • Annual Physical  01/26/2023   • COVID-19 Vaccine (5 - Booster for Pfizer series) 01/26/2023   • OT PLAN OF CARE  03/22/2023   • Depression Screening  11/16/2023   • Influenza Vaccine (Season Ended) 09/01/2023   • Breast Cancer Screening: Mammogram  04/25/2024   • BMI: Adult  05/01/2024   • Cervical Cancer Screening  01/26/2027   • DTaP,Tdap,and Td Vaccines (3 - Td or Tdap) 05/02/2033   • Hepatitis C Screening  Completed   • Osteoporosis Screening  Completed   • HIB Vaccine  Aged Out   • IPV Vaccine  Aged Out   • Hepatitis A Vaccine  Aged Out   • Meningococcal ACWY Vaccine  Aged Out   • HPV Vaccine  Aged Out     Patient Active Problem List   Diagnosis   • Back pain, chronic   • Chronic insomnia   • Lymphedema of right arm   • Hyperlipidemia, mixed   • Peripheral neuropathy   • Lymphedema   • Cellulitis of right upper extremity   • Sepsis ([address] )   • Desmoid tumor   • 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 ([address] )   • Malignant neoplasm of lower-inner quadrant of left breast in female, estrogen receptor positive ([address] )   • HTN, goal below 140/90   • PONV (postoperative nausea and vomiting)   • Use of letrozole (Femara)   • History of lumpectomy of left breast     Past Medical History:   Diagnosis Date   • Abnormal mammogram 05/15/2013   • Anemia    • Cancer ([facility] )     desmoitosis   • 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 ([facility] ) 04/19/2022   • Chronic pain disorder     worse right side of body   • Desmoid tumor     Last Assessed: 6/19/2017   • History of transfusion     no reactions   • Hyperlipidemia    • Hypertension    • Ovarian cancer ([facility] ) 1999? Hysterectomy done   • PONV (postoperative nausea and vomiting)      Past Surgical History:   Procedure Laterality Date   • BREAST LUMPECTOMY Left 6/21/2022    Procedure: ANITA  DIRECTED LUMPECTOMY;  Surgeon: Para Osgood, MD;  Location: [facility];  Service: Surgical Oncology   • FRACTURE SURGERY     • HYSTERECTOMY     • LYMPH NODE DISSECTION Left 6/21/2022    Procedure: AXILLARY DISSECTION LEVEL I AND LEVEL II LYMPH NODES;  Surgeon: Para Osgood, MD;  [facility]: [facility];  Service: Surgical Oncology   • MRI BREAST BIOPSY LEFT (ALL INCLUSIVE) Left 5/20/2022   • MRI BREAST BIOPSY RIGHT (ALL INCLUSIVE) Right 5/20/2022   • OTHER SURGICAL HISTORY      Arm Incision: Dermoid tumor, right Arm    • PARTIAL HYSTERECTOMY     • [place] COLONOSCOPY FLX DX W/COLLJ SPEC WHEN PFRMD N/A 8/15/2017    Procedure: COLONOSCOPY;  Surgeon: Uma Wei MD;  Location: [facility];   Service: Gastroenterology   • [place] [address] WRST SURG W/RLS TRANSVRS CARPL LIGM Left 8/10/2021    Procedure: RELEASE LEFT CARPAL TUNNEL ENDOSCOPIC;  Surgeon: Wolf Herrera MD;  Location: [facility];  Service: Orthopedics   • [place] OPTX DSTL RADL I-ARTIC FX/EPIPHYSL SEP 3 FRAG Left 2/9/2021    Procedure: OPEN REDUCTION W/ INTERNAL FIXATION (ORIF) RADIUS / [place] (WRIST) left;  Surgeon: Wolf Herrera MD;  Location: [facility];  Service: Orthopedics   • SKIN BIOPSY     • SKIN CANCER EXCISION      Melanoma Excision    • TONSILLECTOMY     • US BREAST NEEDLE LOC LEFT Left 6/16/2022   • US BREAST NEEDLE LOC RIGHT EACH ADDITIONAL Right 6/16/2022   • US GUIDED BREAST LYMPH NODE BIOPSY LEFT Left 4/14/2022     Family History   Problem Relation Age of Onset   • Diabetes Mother    • No Known Problems Father    • Cancer Sister    • Lung cancer Sister    • Pancreatic cancer Sister    • No Known Problems Maternal Grandmother    • No Known Problems Maternal Grandfather    • No Known Problems Paternal Grandmother    • No Known Problems Paternal Grandfather    • Breast cancer Neg Hx      Social History     Socioeconomic History   • Marital status: /Civil Union     Spouse name: Not on file   • Number of children: Not on file   • Years of education: Not on file   • Highest education level: Not on file   Occupational History   • Occupation: unemployed    Tobacco Use   • Smoking status: Never   • Smokeless tobacco: Never   Vaping Use   • Vaping Use: Never used   Substance and Sexual Activity   • Alcohol use: Not Currently     Alcohol/week: 5 0 standard drinks of alcohol     Types: 5 Shots of liquor per week     Comment: Used mostly as a painkiller when needed before bedtime   • Drug use: Yes     Frequency: 28 0 times per week     Types: Marijuana     Comment: THC Medical marijuana   • Sexual activity: Not Currently     Partners: Male     Comment: Hysterectomy years ago   Other Topics Concern   • Not on file   Social History Narrative    Lives with spouse      Social Determinants of Health     Financial Resource Strain: Not on file   Food Insecurity: Not on file   Transportation Needs: Not on file   Physical Activity: Not on file   Stress: Not on file   Social Connections: Not on file   Intimate Partner Violence: Not on file   Housing Stability: Not on file       Current Outpatient Medications:   •  cholecalciferol (VITAMIN D3) 1,000 units tablet, Take 3,000 Units by mouth daily, Disp: , Rfl:   •  diphenhydrAMINE (BENADRYL) 50 MG tablet, Take 50 mg by mouth daily at bedtime as needed for itching, Disp: , Rfl:   •  docusate sodium (COLACE) 100 mg capsule, Take 1 capsule (100 mg total) by mouth 2 (two) times a day as needed for constipation for up to 10 days, Disp: 20 capsule, Rfl: 0  •  dronabinol (MARINOL) 5 MG capsule, Take 15 mg by mouth 4 (four) times a day (before meals and at bedtime), Disp: , Rfl:   •  HYDROmorphone (DILAUDID) 2 mg tablet, Take 1 tablet (2 mg total) by mouth every 6 (six) hours as needed for severe pain Not on Discharge list   Patient is taking for severe pain PRN every 6 hours Max Daily Amount: 8 mg (Patient not taking: Reported on 8/12/2022), Disp: 20 tablet, Rfl: 0  •  letrozole (FEMARA) 2 5 mg tablet, Take 1 tablet (2 5 mg total) by mouth daily, Disp: 90 tablet, Rfl: 3  •  multivitamin (THERAGRAN) TABS, Take 1 tablet by mouth daily, Disp: , Rfl:   •  Omega-3 Fatty Acids (FISH OIL PO), Take by mouth, Disp: , Rfl:   •  Probiotic Product (PROBIOTIC-10 PO), Take by mouth, Disp: , Rfl:   •  triamcinolone (KENALOG) 0 1 % cream, Apply topically 2 (two) times a day as needed for irritation or rash, Disp: 80 g, Rfl: 0  Allergies   Allergen Reactions   • Chlorpromazine Anaphylaxis     PHENOTHIAZINE=ANAPHYLAXIS   • Codeine Anaphylaxis     Anaphylaxis  abd pain  • Meperidine Anaphylaxis, Hives and Vomiting   • Phenothiazines Anaphylaxis and Throat Swelling     Category: Allergy;    • Saccharin - Food Allergy Anaphylaxis   • Ampicillin-Sulbactam Sodium Hives   • Aspirin GI Intolerance and Abdominal Pain     Severe GERD   • Gluten Meal - Food Allergy GI Intolerance   • Ibuprofen Hives     H   • Levofloxacin Hives   • Chocolate - Food Allergy GI Intolerance   • Lactose - Food Allergy GI Intolerance   • Neomycin Other (See Comments), Edema and Hives     Category: Allergy;   swelling   • Citrus - Food Allergy Rash   • Latex Hives and Rash     Category: Allergy; There were no vitals filed for this visit

## 2023-05-30 NOTE — PROGRESS NOTES
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)  Staging form: Breast, AJCC 8th Edition  - Pathologic stage from 6/21/2022: Stage IA (pT1c, pN1, cM0, G1, ER+, WA+, HER2-) - Signed by Suleman Bowers MD on 8/1/2022  Stage prefix: Initial diagnosis  Nuclear grade: G1  Multigene prognostic tests performed: None  Mitotic count score: Score 1  Histologic grading system: 3 grade system      Harvey Davis is a 57 y o  woman with past medical history significant for 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 more recently diagnosed with stage IIA ([id number]) grade 1 invasive ductal carcinoma of the left breast status post lumpectomy and axillary lymph node dissection achieving negative margins  Pathology was significant for lymphovascular invasion and 3/17 lymph nodes positive for metastatic carcinoma with less 1-2 mm of JACKIE associated with a 1 8 cm macro metastasis  Tumor cells were ER/WA positive and HER2 negative   She completed adjuvant radiation on 10/10/22  The patient is currently maintained on letrozole   She was last seen 11/16/22 and presents today for follow up         2/10/23 Med Onc, Yoo follow-up  She was recommended to continue AI for 5 years with end date October 2027  Osteopenia seen --> discussed in detail with patient  She takes Calcium 600mg BID with Vitamin D 3000 units  Patient to continue this along with weight bearing exercises as tolerated   Next DXA scan 10/2024       4/18/23 ID, Dr Bobby Cassidy  Recurrent right forearm cellulitis, secondary to underlying lymphedema  she has approximately 3 episodes of recurrences per year  it does not appear the patient has active cellulitis   I am not convinced that the 3 episodes of flareups this calendar year were true cellulitis, versus post cellulitic phlebitic syndrome    Plan:  Complete current Keflex course  Patient to follow-up with me with next cellulitis flare  Consider PCN suppression if patient truly has increasing cellulitis recurrences  23 OT  Patient developed cellulitis infection in the RUE  She has prescriptions to be fit for LUE compression garments and is independent in managing LUE edema  DC to HEP at this time     23 B/L diagnostic mammogram was benign (BIRADS-2)  Diagnostic mammogram in 1 year for both breasts      23 Dr Jeff Llanos  mammogram and films were reviewed no worrisome features  Follow up 6 months    The patient has occasional, sharp, short pains of the left breast   She denies significant tenderness or pain  She denies new upper extremity edema  She suffers chronic lymphedema of the right upper extremity and follows with lymphedema specialist   She denies left upper extremity edema  She denies new palpable nodules, suspicious skin changes, or nipple discharge of the breasts bilaterally            Upcomin23 Med Edman Burkitt  23 Dr Jeff Llanos  24 Mammogram        Historical Information   Oncology History   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 ([address] )   2022 Initial Diagnosis    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 ([address] )     2022 Biopsy    Left axillary Lymph node ultrasound guided biopsy  Metastatic carcinoma, compatible with breast origin  ER >95  KS 95   HER2 1+    On ultrasound lymph node is 1 8 cm  There is cortical thickening without evident fatty hilum  In review of screening mammogram, demonstrates no suspicious mammographic findings identified in either breast  Bilateral MRI is recommended  Flow cytometry - there is no evidence of a B-cell or T-cell non-Hodgkin lymphoma  2022 Genomic Testing    A total of 36 genes were evaluated, including: ANGEL LUIS, BRCA1, BRCA2, CDH1, CHEK2, PALB2, PTEN, STK11, TP53  Negative result  No pathogenic sequence variants or deletions/duplications identified     5/20/2022 Biopsy    MRI guided biopsy  A  Left breast   11 o'clock   Invasive mammary carcinoma of no special type (ductal)  Grade 1  ER 95  MD 95  HER2 0    B  Right breast   Retroareolar  Benign fibrocystic changes without atypia (discrete 0 3 cm focus of sclerosing and tubular adenosis with usual ductal hyperplasia, columnar cell change and hyperplasia, apocrine metaplasia, cystic dilatation)  - Microcalcifications: Present associated with non-neoplastic breast tissue    - Negative for malignancy, in-situ carcinoma and atypical hyperplasia  Malignant pathology appears unifocal  Biopsy proven carcinoma measured 1 cm on MRI  Biopsy proved metastatic disease to left axillary lymph node  6/9/2022 Genomic Testing    MammaPrint  Low risk  Luminal type A  MammaPrint index: [phone]     6/21/2022 Surgery    Left breast tiffany  directed lumpectomy with axillary dissection  A  Invasive 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  Grade 1  1 3 cm  Lymphovascular invasion is identified  Margins negative    H   Axillary, Level I and Level II axillary contents  Metastatic carcinoma involving 3/17 lymph nodes  1 8 cm   Extranodal extension is identified 1mm  Foci of carcinoma identified in extranodal lymphatic channels    Anatomic stage: Stage IIA  Prognostic stage: Stage IA      6/21/2022 -  Cancer Staged    Staging form: Breast, AJCC 8th Edition  - Pathologic stage from 6/21/2022: Stage IA (pT1c, pN1, cM0, G1, ER+, MD+, HER2-) - Signed by Maribel Monet MD on 8/1/2022  Stage prefix: Initial diagnosis  Nuclear grade: G1  Multigene prognostic tests performed: None  Mitotic count score: Score 1  Histologic grading system: 3 grade system       8/29/2022 - 10/10/2022 Radiation    Treatments:  Course: C1  Plan ID Energy Fractions Dose per Fraction (cGy) Total Dose Delivered (cGy) Elapsed Days   BH L Breast 6X 25 / 25 200 5,000 35 BH L [id number] 12E 5 / 5 200 1,000 6   BH L SClav 10X/6X 24 / 24 200 4,800 32    Treatment Dates:  8/29/2022 - 10/10/2022  Malignant neoplasm of lower-inner quadrant of left breast in female, estrogen receptor positive ([facility] [address] )   5/20/2022 Initial Diagnosis    Malignant neoplasm of lower-inner quadrant of left breast in female, estrogen receptor positive ([facility] [address] )     5/20/2022 Biopsy    MRI guided biopsy  A  Left breast   11 o'clock   Invasive mammary carcinoma of no special type (ductal)  Grade 1  ER 95  AZ 95  HER2 0    B  Right breast   Retroareolar  Benign fibrocystic changes without atypia (discrete 0 3 cm focus of sclerosing and tubular adenosis with usual ductal hyperplasia, columnar cell change and hyperplasia, apocrine metaplasia, cystic dilatation)  - Microcalcifications: Present associated with non-neoplastic breast tissue    - Negative for malignancy, in-situ carcinoma and atypical hyperplasia  Malignant pathology appears unifocal  Biopsy proven carcinoma measured 1 cm on MRI  Biopsy proved metastatic disease to left axillary lymph node  6/9/2022 Genomic Testing    MammaPrint  Low risk  Luminal type A  MammaPrint index: [phone]     6/21/2022 Surgery    Left breast tiffany  directed lumpectomy with axillary dissection  A  Invasive 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  Grade 1  1 3 cm  Lymphovascular invasion is identified  Margins negative    H   Axillary, Level I and Level II axillary contents  Metastatic carcinoma involving 3/17 lymph nodes  1 8 cm   Extranodal extension is identified 1mm  Foci of carcinoma identified in extranodal lymphatic channels    Anatomic stage: Stage IIA  Prognostic stage: Stage IA      8/29/2022 - 10/10/2022 Radiation    Treatments:  Course: C1  Plan ID Energy Fractions Dose per Fraction (cGy) Total Dose Delivered (cGy) Elapsed Days   BH L Breast 6X 25 / 25 200 5,000 35   BH L [id number] 12E 5 / 5 200 1,000 6   BH L SClav 10X/6X 24 / 24 200 4,800 32    Treatment Dates:  8/29/2022 - 10/10/2022  Past Medical History:   Diagnosis Date   • Abnormal mammogram 05/15/2013   • Anemia    • Cancer [facility])     desmoitosis   • 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 Hysterectomy done   • PONV (postoperative nausea and vomiting)      Past Surgical History:   Procedure Laterality Date   • BREAST LUMPECTOMY Left 6/21/2022    Procedure: ANITA  DIRECTED LUMPECTOMY;  Surgeon: Natasha Mary MD;  Location: [facility];  Service: Surgical Oncology   • FRACTURE SURGERY     • HYSTERECTOMY     • LYMPH NODE DISSECTION Left 6/21/2022    Procedure: AXILLARY DISSECTION LEVEL I AND LEVEL II LYMPH NODES;  Surgeon: Natasha Mary MD;  Location: [facility];  Service: Surgical Oncology   • MRI BREAST BIOPSY LEFT (ALL INCLUSIVE) Left 5/20/2022   • MRI BREAST BIOPSY RIGHT (ALL INCLUSIVE) Right 5/20/2022   • OTHER SURGICAL HISTORY      Arm Incision: Dermoid tumor, right Arm    • PARTIAL HYSTERECTOMY     • MT COLONOSCOPY FLX DX W/COLLJ SPEC WHEN PFRMD N/A 8/15/2017    Procedure: COLONOSCOPY;  Surgeon: Isabela Gillis MD;  Location: [facility];   Service: Gastroenterology   • MT [address] [facility] Fl[address] WRST SURG W/RLS TRANSVRS CARPL LIGM Left 8/10/2021    Procedure: RELEASE LEFT CARPAL TUNNEL ENDOSCOPIC;  Surgeon: Taran Oscar MD;  Location: [facility];  Service: Orthopedics   • MT OPTX DSTL RADL I-ARTIC FX/EPIPHYSL SEP 3 FRAG Left 2/9/2021    Procedure: OPEN REDUCTION W/ INTERNAL FIXATION (ORIF) RADIUS / Bridget Do (WRIST) left;  Surgeon: Taran Oscar MD;  Location: [facility];  Service: Orthopedics   • SKIN BIOPSY     • SKIN CANCER EXCISION      Melanoma Excision    • TONSILLECTOMY     • US BREAST NEEDLE LOC LEFT Left 6/16/2022   • US BREAST NEEDLE LOC RIGHT EACH ADDITIONAL Right 6/16/2022   • US GUIDED BREAST LYMPH NODE BIOPSY LEFT Left 4/14/2022       Social History   Social History     Substance and Sexual Activity   Alcohol Use Not Currently   • Alcohol/week: 5 0 standard drinks of alcohol   • Types: 5 Shots of liquor per week    Comment: Used mostly as a painkiller when needed before bedtime     Social History     Substance and Sexual Activity   Drug Use Yes   • Frequency: 28 0 times per week   • Types: Marijuana    Comment: THC Medical marijuana     Social History     Tobacco Use   Smoking Status Never   Smokeless Tobacco Never         Meds/Allergies     Current Outpatient Medications:   •  cholecalciferol (VITAMIN D3) 1,000 units tablet, Take 3,000 Units by mouth daily, Disp: , Rfl:   •  diphenhydrAMINE (BENADRYL) 50 MG tablet, Take 50 mg by mouth daily at bedtime as needed for itching, Disp: , Rfl:   •  dronabinol (MARINOL) 5 MG capsule, Take 15 mg by mouth 4 (four) times a day (before meals and at bedtime), Disp: , Rfl:   •  letrozole (FEMARA) 2 5 mg tablet, Take 1 tablet (2 5 mg total) by mouth daily, Disp: 90 tablet, Rfl: 3  •  multivitamin (THERAGRAN) TABS, Take 1 tablet by mouth daily, Disp: , Rfl:   •  Omega-3 Fatty Acids (FISH OIL PO), Take by mouth, Disp: , Rfl:   •  Probiotic Product (PROBIOTIC-10 PO), Take by mouth, Disp: , Rfl:   •  triamcinolone (KENALOG) 0 1 % cream, Apply topically 2 (two) times a day as needed for irritation or rash, Disp: 80 g, Rfl: 0  •  docusate sodium (COLACE) 100 mg capsule, Take 1 capsule (100 mg total) by mouth 2 (two) times a day as needed for constipation for up to 10 days, Disp: 20 capsule, Rfl: 0  •  HYDROmorphone (DILAUDID) 2 mg tablet, Take 1 tablet (2 mg total) by mouth every 6 (six) hours as needed for severe pain Not on Discharge list   Patient is taking for severe pain PRN every 6 hours Max Daily Amount: 8 mg (Patient not taking: Reported on 8/12/2022), Disp: 20 tablet, Rfl: 0  Allergies Allergen Reactions   • Chlorpromazine Anaphylaxis     PHENOTHIAZINE=ANAPHYLAXIS   • Codeine Anaphylaxis     Anaphylaxis  abd pain  • Meperidine Anaphylaxis, Hives and Vomiting   • Phenothiazines Anaphylaxis and Throat Swelling     Category: Allergy;    • Saccharin - Food Allergy Anaphylaxis   • Ampicillin-Sulbactam Sodium Hives   • Aspirin GI Intolerance and Abdominal Pain     Severe GERD   • Gluten Meal - Food Allergy GI Intolerance   • Ibuprofen Hives     H   • Levofloxacin Hives   • Chocolate - Food Allergy GI Intolerance   • Lactose - Food Allergy GI Intolerance   • Neomycin Other (See Comments), Edema and Hives     Category: Allergy;   swelling   • Citrus - Food Allergy Rash   • Latex Hives and Rash     Category: Allergy; Review of Systems   Constitutional: Negative  HENT: Negative  Eyes:        Wears glasses  Macular degeneration  Optic migraines   Respiratory: Negative  Cardiovascular: Negative  Gastrointestinal: Negative  Genitourinary: Negative  Musculoskeletal: Positive for back pain and neck pain  Lymphedema right arm since 1999  Intermittent pain left breast   Skin: Negative  No skin changes left breast   Allergic/Immunologic: Positive for environmental allergies and food allergies  Neurological: Negative  Hematological: Negative  Psychiatric/Behavioral: Negative  OBJECTIVE:   /80   Pulse 87   Temp 97 5 °F (36 4 °C)   Resp 16   Wt 66 7 kg (147 lb)   SpO2 98%   BMI 22 35 kg/m²   Karnofsky: 80 - Normal activity with effort; some signs or symptoms of disease    Physical Exam  Vitals and nursing note reviewed  Constitutional:       General: She is not in acute distress  Appearance: She is well-developed  Cardiovascular:      Rate and Rhythm: Normal rate and regular rhythm  Pulmonary:      Breath sounds: No wheezing, rhonchi or rales  Chest:      Comments: Breast examination demonstrates well-healed lower right breast scar  The left breast is slightly smaller than the right  There is a well-healed upper outer scar  There is diffuse hyperpigmentation and mild fibrosis  Some lateral trace edema  There was no tenderness, palpable nodule or suspicious skin changes of the breasts bilaterally  Abdominal:      Palpations: Abdomen is soft  Tenderness: There is no abdominal tenderness  Musculoskeletal:      Comments: Trace lymphedema right upper extremity in compression sleeve  No lymphedema of left upper extremity also in partial compression sleeve  Lymphadenopathy:      Cervical: No cervical adenopathy  Upper Body:      Right upper body: No supraclavicular or axillary adenopathy  Left upper body: No supraclavicular or axillary adenopathy  Neurological:      Mental Status: She is alert and oriented to person, place, and time  Gait: Gait normal             Assessment/Plan:  Adriana Greene is a 57 y o  woman with past medical history significant for 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 diagnosed with stage IIA ([id number]) grade 1 invasive ductal carcinoma of the left breast status post lumpectomy and axillary lymph node dissection achieving negative margins  Tumor cells were ER/KY positive and HER2 negative   She completed adjuvant radiation on 10/10/22  The patient is currently maintained on letrozole, which she tolerates well      She continues to struggle with chronic lymphedema  She is recovering well from her radiation and is carefully managing her left breast and monitoring her left arm  I recommended discussion for possible lymphedema management for left breast if indicated as well  I instructed her to practice sun protection of the irradiated skin and use emollients as needed  She will return for follow-up in 6 months  We will see her sooner should the need arise    She will see Dr Deep aMrtinez and Dr Rosalino Hall in the interim as "well     Sharyn Cleary MD  6/65/7921,5:46 PM    Portions of the record may have been created with voice recognition software   Occasional wrong word or \"sound a like\" substitutions may have occurred due to the inherent limitations of voice recognition software   Read the chart carefully and recognize, using context, where substitutions have occurred        "

## 2023-07-06 ENCOUNTER — TELEPHONE (OUTPATIENT)
Dept: HEMATOLOGY ONCOLOGY | Facility: CLINIC | Age: 63
End: 2023-07-06

## 2023-07-06 NOTE — TELEPHONE ENCOUNTER
Appointment Change  Cancel, Reschedule, Change to Virtual      Who are you speaking with? Patient   If it is not the patient, are they listed on an active communication consent form? Yes   Which provider is the appointment scheduled with? Ignacia Yoo PA-C   When is the appointment scheduled? Please list date and time 8/11/23 10:30am   At which location is the appointment scheduled to take place? [facility]   Was the appointment rescheduled or changed from an in person visit to a virtual visit? If so, please list the details of the change. 8/23/23  10:30am   What is the reason for the appointment change? Provider out on rounds   Was [facility] transport scheduled for this visit? Yes   Does [facility] transport need to be scheduled for the new visit (if applicable) Yes   Does the patient need an infusion appointment rescheduled? No   Does the patient have an infusion appointment scheduled? If so, when? No   Is the patient undergoing chemotherapy? No   Was the no-show policy reviewed for appointments being changed with less then 24 hours of notice?  Yes

## 2023-07-16 DIAGNOSIS — C77.3 CARCINOMA OF LEFT BREAST METASTATIC TO AXILLARY LYMPH NODE (HCC): ICD-10-CM

## 2023-07-16 DIAGNOSIS — C50.912 CARCINOMA OF LEFT BREAST METASTATIC TO AXILLARY LYMPH NODE (HCC): ICD-10-CM

## 2023-07-17 RX ORDER — LETROZOLE 2.5 MG/1
TABLET, FILM COATED ORAL
Qty: 90 TABLET | Refills: 3 | Status: SHIPPED | OUTPATIENT
Start: 2023-07-17

## 2023-07-31 ENCOUNTER — TELEPHONE (OUTPATIENT)
Dept: INFECTIOUS DISEASES | Facility: CLINIC | Age: 63
End: 2023-07-31

## 2023-07-31 NOTE — TELEPHONE ENCOUNTER
pts  Lucinda May calls office and states he would like to make an appt for his wife. Per  pt is to follow up with next flare of cellulitis. Pt now scheduled for 8/15.

## 2023-08-01 ENCOUNTER — APPOINTMENT (OUTPATIENT)
Dept: LAB | Facility: HOSPITAL | Age: 63
End: 2023-08-01
Payer: COMMERCIAL

## 2023-08-01 LAB
ALBUMIN SERPL BCP-MCNC: 4.3 G/DL (ref 3.5–5)
ALP SERPL-CCNC: 90 U/L (ref 34–104)
ALT SERPL W P-5'-P-CCNC: 11 U/L (ref 7–52)
ANION GAP SERPL CALCULATED.3IONS-SCNC: 4 MMOL/L
AST SERPL W P-5'-P-CCNC: 13 U/L (ref 13–39)
BILIRUB SERPL-MCNC: 0.64 MG/DL (ref 0.2–1)
BUN SERPL-MCNC: 11 MG/DL (ref 5–25)
CALCIUM SERPL-MCNC: 9.4 MG/DL (ref 8.4–10.2)
CHLORIDE SERPL-SCNC: 101 MMOL/L (ref 96–108)
CO2 SERPL-SCNC: 29 MMOL/L (ref 21–32)
CREAT SERPL-MCNC: 0.66 MG/DL (ref 0.6–1.3)
GFR SERPL CREATININE-BSD FRML MDRD: 93 ML/MIN/1.73SQ M
GLUCOSE SERPL-MCNC: 79 MG/DL (ref 65–140)
POTASSIUM SERPL-SCNC: 4.5 MMOL/L (ref 3.5–5.3)
PROT SERPL-MCNC: 7.4 G/DL (ref 6.4–8.4)
SODIUM SERPL-SCNC: 134 MMOL/L (ref 135–147)

## 2023-08-15 ENCOUNTER — OFFICE VISIT (OUTPATIENT)
Dept: INFECTIOUS DISEASES | Facility: CLINIC | Age: 63
End: 2023-08-15
Payer: COMMERCIAL

## 2023-08-15 VITALS
OXYGEN SATURATION: 98 % | WEIGHT: 147 LBS | TEMPERATURE: 97.3 F | HEART RATE: 70 BPM | HEIGHT: 68 IN | RESPIRATION RATE: 17 BRPM | BODY MASS INDEX: 22.28 KG/M2 | SYSTOLIC BLOOD PRESSURE: 124 MMHG | DIASTOLIC BLOOD PRESSURE: 74 MMHG

## 2023-08-15 DIAGNOSIS — L03.113 CELLULITIS OF RIGHT UPPER EXTREMITY: Primary | ICD-10-CM

## 2023-08-15 DIAGNOSIS — C77.3 CARCINOMA OF LEFT BREAST METASTATIC TO AXILLARY LYMPH NODE (HCC): ICD-10-CM

## 2023-08-15 DIAGNOSIS — I89.0 LYMPHEDEMA OF RIGHT ARM: ICD-10-CM

## 2023-08-15 DIAGNOSIS — C50.912 CARCINOMA OF LEFT BREAST METASTATIC TO AXILLARY LYMPH NODE (HCC): ICD-10-CM

## 2023-08-15 PROCEDURE — 99214 OFFICE O/P EST MOD 30 MIN: CPT | Performed by: INTERNAL MEDICINE

## 2023-08-15 RX ORDER — CEPHALEXIN 500 MG/1
500 CAPSULE ORAL EVERY 6 HOURS SCHEDULED
Qty: 28 CAPSULE | Refills: 3 | Status: SHIPPED | OUTPATIENT
Start: 2023-08-15 | End: 2023-08-22

## 2023-08-15 RX ORDER — PENICILLIN V POTASSIUM 500 MG/1
500 TABLET ORAL 2 TIMES DAILY
Qty: 180 TABLET | Refills: 3 | Status: SHIPPED | OUTPATIENT
Start: 2023-08-15 | End: 2023-11-13

## 2023-08-15 NOTE — PROGRESS NOTES
Progress Note - Infectious Disease   Ashley Augustin 57 y.o. female MRN: [id number]  Unit/Bed#:  Encounter: [id number]      Impression/Recommendations:  1. Recurrent right forearm cellulitis, secondary to underlying lymphedema. Per discussion with patient, at baseline, she has approximately 3 episodes of recurrences per year. Since her last visit in April, patient has had 2 recurrences. Per review of photos on her phone, it appears that these were true cellulitis episodes. With patient having 3 episodes of recurrences in 6 months, she is a candidate for suppression. Trial of PCN G suppression. Continue with Keflex treatment course for recurrences.     2. Chronic right arm lymphedema. Patient is using compression sleeve at home. Continue compression sleeve use at home.     3. Chronic mild left arm lymphedema, without prior cellulitis.     4.  Breast cancer, status postlumpectomy with axillary lymph node dissection, status post chemotherapy and XRT. Patient is currently only on hormonal therapy. 5.  Right thumb locking up at interphalangeal joint. Recommend hand surgery evaluation. Avoid surgery if possible. Hand surgery evaluation.     Discussed with patient and her  in detail regarding the above plan. Follow-up with me as needed.     Antibiotics:  None. Subjective:  Since last visit in April, patient has had 2 flareups, 1 in May and 1 in July, each resolved with Keflex. At present, she feels well. Patient complains of right thumb locking up. The following portions of the patient's history were reviewed and updated as appropriate: allergies, current medications, past medical history, past social history, past surgical history and problem list    ROS:  A complete review of systems was done. Except for what is noted above, the rest of the review of systems was negative.        Objective:  Vitals:  Temperature: (!) 97.3 °F (36.3 °C)    Physical Exam:     General: Awake, alert, cooperative, no distress. Neck:  Supple. No lymphadenopathy. No mass. Lungs: Expansion symmetric, no rales, no wheezing, respirations unlabored. Heart:  Regular rate and rhythm, S1 and S2 normal, no murmur. Abdomen: Soft, nondistended, non-tender, bowel sounds active all four quadrants,        no masses, no organomegaly. Extremities: Stable right arm edema. No erythema/warmth. No open wound. Nontender to palpation. Skin:  No rash. Neuro: Moves all extremities. Invasive Devices     Drain  Duration           Closed/Suction Drain Left Breast Bulb 19 Fr. 419 days                Labs studies:   I have personally reviewed pertinent labs. Imaging Studies:   I have personally reviewed pertinent imaging study reports and images in PACS. EKG, Pathology, and Other Studies:   I have personally reviewed pertinent reports.

## 2023-08-22 NOTE — PROGRESS NOTES
Hematology/Oncology Outpatient Office Note    Date of Service: 2023    [facility] HEMATOLOGY ONCOLOGY SPECIALISTS       Reason for Consultation:   Chief Complaint   Patient presents with   • Follow-up     Cancer Stage at diagnosis: 1A    Referral Physician: No ref. provider found    Primary Care Physician:  Allan Higgins MD     Nickname: Kirt Llanes    Spouse: Rivera York [employer]CO    Today's ECO     Goals and Barriers:  Current Goal: Minimize effects of disease burden, extend life. Barriers to accomplishing this: None    Patient's Capacity to Self Care:  Patient is able to self care    Code Status: not addressed today    Advanced directives: not addressed today    ASSESSMENT & PLAN      Diagnosis ICD-10-CM Associated Orders   1. 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 (HCC)  C50.912 CBC and differential    C77.3 Comprehensive metabolic panel     CBC and differential     Comprehensive metabolic panel      2. Malignant neoplasm of lower-inner quadrant of left breast in female, estrogen receptor positive (HCC)  C50.312 CBC and differential    Z17.0 Comprehensive metabolic panel     CBC and differential     Comprehensive metabolic panel      3. Aromatase inhibitor use  Z79.811 CBC and differential     Comprehensive metabolic panel     CBC and differential     Comprehensive metabolic panel      4. Osteopenia of multiple sites  M85.89 CBC and differential     Comprehensive metabolic panel     CBC and differential     Comprehensive metabolic panel           This is a 57 y.o. c PMHx notable for Desmoid tumors, RUE grade 3 lymphedema, HLP, HTN, ovarian cancer s/p hysterectomy, being seen in consultation for early localized hormone positive breast cancer s/p resection    L breast cancer, ER+, NC+, HER2-. Clinical stage 1A, pathologic (prognostic) Stage 1A s/p lumpectomy.  As for adjuvant endocrine therapy, after baseline DEXA is assessed, we are offering the patient Aromatase inhibitor therapy for 5 years to reduce the risk of breast cancer recurrence. Side effects of AI therapy were reviewed in detail, which include but are not limited to: peripheral edema, night sweats, hot flashes, Osteoporosis, risk of VTE or portal vein thrombus, approximately 1% chance of endometrial cancer, mood disturbances, weight loss, and joint discomfort. We also discussed the possible risk of QTc prolongation when combined with certain antidepressants   4.4-9% risk osteoporotic fractures, 33-57% arthralgia, 0.8% VTE, hot flashes 56%, vaginal dryness (13%), CV events (6%) (OR went below 1), cataracts 5% (OR went below 1)    Updates from the 2013 [address] [place] [address] consensus statement redefined greater than 20% as the new threshold for substratifying Luminal subtypes [66] based on the work of Melvin menjivar., which highlighted the relevance of this cut-off to predict survival outcomes within the ER+ cohort. Although this is somewhat of an unrealistic conventional cut-off, the authors also highlighted a 20% cut off to be clinically relevant for recurrence and survival (hazard ratio (HR): 7.14, 95% confidence interval (CI): 3.87-13.16). Furthermore, Maximo et al. outlined the prognostic significance of Ki-67 expression levels greater than or equal to 25% for predicting mortality in their review of over 64,000 breast cancer patients (HR: 2.05, 95% CI: 1.66-2.53)   Ki-67 as a Prognostic Biomarker in Invasive Breast Cancer (nih.gov)   Her Ki-67 is 3-5%. Discussion of decision making  • Oncology history updated, accordingly, during this visit  • Goals of care/patient communication  o I discussed with the patient the clinical course leading up to their cancer diagnosis. I reviewed relevant office notes, imaging reports and pathology result as well.  o I told the patient that this is a case of curable disease and what this means.  We discussed that the goal of anti-cancer therapy is to provide best quality of life, extend overall survival, and progression free survival as shown in clinical trials. o I explained the risks/benefits of the proposed cancer therapy: Letrozole and after discussion including understanding risks of possible life-threatening complications and therapy-related malignancy development, the pt has agreed to this plan  • TNM/Staging At Diagnosis  Cancer Staging  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 [facility])  Staging form: Breast, AJCC 8th Edition  - Pathologic stage from 6/21/2022: Stage IA (pT1c, pN1, cM0, G1, ER+, WI+, HER2-) - Signed by Bonnie Cordero MD on 8/1/2022  Stage prefix: Initial diagnosis  Nuclear grade: G1  Multigene prognostic tests performed: None  Mitotic count score: Score 1  Histologic grading system: 3 grade system  • Disease Features/Tumor Markers/Genetics  o Tumor Marker: n/a  o Notable Path Features: 4/14/2022 L axillary LN: >95% ER, 95% WI, HER-2 +1  5/20/2022: L breast core need bx showin gIDC grade 1, present DCIS, ER/WI 90-95%+, HER2 negative (0). R breast biopsy benign  6/21/2022: grade 1, L lumpectomy, DCIS nuclear grade 2 present, +LVI, all margins negative, 3/17 LN +, [id number]. i-67 3-5%  • Treatment: Letrozole following RT  • Other Supportive care:   • Treatment Team Members  o Surgeon: Dr. Ballard Fret: Dr. Rodriguez Heads  4/22/2022 CT CAP w/c: Previously biopsied abnormal left axillary lymph node again identified. No other abnormal soft tissue mass identified. No acute inflammatory process identified    4/24/2022 NM Bone Scan: Probable degenerative change in the lower thoracic spine    MammaPrint. Low risk. No role for chemotherapy. 10/19/2022 DEXA Scan: Lumbar spine T score -1.9. Left total hip T score -1.3. Left femoral neck T score -1.1. Low bone mass (osteopenia)    4/2023: Mammogram --> unrevealing.     Discussion of decision making    I personally reviewed the following lab results, the image studies, pathology, other specialty/physicians consult notes and recommendations, and outside medical records from [facility]. I had a lengthy discussion with the patient and shared the work-up findings. We discussed the diagnosis and management plan as below. I spent 40 minutes reviewing the records (labs, clinician notes, outside records, medical history, ordering medicine/tests/procedures, interpreting the imaging/labs previously done) and coordination of care as well as direct time with the patient today, of which greater than 50% of the time was spent in counseling and coordination of care with the patient/family. · Plan/Labs  · Patient began Letrozole 2.5mg once daily 10/20/2022. Goal is for 5 years - end date 10/20/2027. Tolerating well without severe side effect. Tolerable hot flashes. · CBC-D, CMP Q6m. · Osteopenia seen --> discussed in detail with patient. She takes Calcium 600mg BID with Vitamin D 3000 units. Patient to continue this along with weight bearing exercises as tolerated. Next DXA scan 10/2024. · We did discuss Prolia use. I discussed side effect profile with her which includes but is not limited to: site injection reactions, myalgias, arthralgias, flu-like symptoms, osteonecrosis of the jaw, atypical fracture when stopping use. She has several dental conditions and has required jaw surgery in the past. She would like to discuss with dentist soon and we will obtain clearance prior to start of this medication. · Continue F/U with rad onc  · Continue F/U with surg onc - was seen today  · F/u Surg-Onc, screening mammograms as per below. Survivorship planning  · Every 6 month H&P for first 5 years, then annual thereafter  · B/l Mammograms yearly  · Distress counseling on an ongoing basis as needed    Follow Up: 1 year     All questions were answered to the patient's satisfaction during this encounter.  The patient knows the contact information for our office and knows to reach out for any relevant concerns related to this encounter. They are to call for any temperature 100.4 or higher, new symptoms including but not restricted to shaking chills, decreased appetite, nausea, vomiting, diarrhea, increased fatigue, shortness of breath or chest pain, confusion, and not feeling the strength to come to the clinic. For all other listed problems and medical diagnosis in their chart - they are managed by PCP and/or other specialists, which the patient acknowledges. Thank you very much for your consultation and making us a part of this patient's care. We are continuing to follow closely with you. Please do not hesitate to reach out to me with any additional questions or concerns. Williams Barber PA-C   Hematology & Medical Oncology Staff          Disclaimer: This document was prepared using [employer] technology. If a word or phrase is confusing, or does not make sense, this is likely due to recognition error which was not discovered during this clinician's review. If you believe an error has occurred, please contact me through [facility] [facility] line for lucía? cation. ONCOLOGY HISTORY OF PRESENT ILLNESS        Oncology History   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 ([address])   4/14/2022 Initial Diagnosis    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 ([address])     4/14/2022 Biopsy    Left axillary Lymph node ultrasound guided biopsy  Metastatic carcinoma, compatible with breast origin. ER >95  TN 95   HER2 1+    On ultrasound lymph node is 1.8 cm. There is cortical thickening without evident fatty hilum. In review of screening mammogram, demonstrates no suspicious mammographic findings identified in either breast. Bilateral MRI is recommended. Flow cytometry - there is no evidence of a B-cell or T-cell non-Hodgkin lymphoma.        4/20/2022 Genomic Testing    A total of 36 genes were evaluated, including: ANGEL LUIS, BRCA1, BRCA2, CDH1, CHEK2, PALB2, PTEN, STK11, TP53  Negative result. No pathogenic sequence variants or deletions/duplications identified     5/20/2022 Biopsy    MRI guided biopsy  A. Left breast   11 o'clock   Invasive mammary carcinoma of no special type (ductal)  Grade 1  ER 95  DE 95  HER2 0    B. Right breast   Retroareolar  Benign fibrocystic changes without atypia (discrete 0.3 cm focus of sclerosing and tubular adenosis with usual ductal hyperplasia, columnar cell change and hyperplasia, apocrine metaplasia, cystic dilatation). - Microcalcifications: Present associated with non-neoplastic breast tissue.   - Negative for malignancy, in-situ carcinoma and atypical hyperplasia. Malignant pathology appears unifocal. Biopsy proven carcinoma measured 1 cm on MRI. Biopsy proved metastatic disease to left axillary lymph node. 6/9/2022 Genomic Testing    MammaPrint  Low risk  Luminal type A  MammaPrint index: +0.578     6/21/2022 Surgery    Left breast tiffany  directed lumpectomy with axillary dissection  A. Invasive 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  Grade 1  1.3 cm  Lymphovascular invasion is identified  Margins negative    H.  Axillary, Level I and Level II axillary contents  Metastatic carcinoma involving 3/17 lymph nodes  1.8 cm   Extranodal extension is identified 1mm  Foci of carcinoma identified in extranodal lymphatic channels    Anatomic stage: Stage IIA  Prognostic stage: Stage IA      6/21/2022 -  Cancer Staged    Staging form: Breast, AJCC 8th Edition  - Pathologic stage from 6/21/2022: Stage IA (pT1c, pN1, cM0, G1, ER+, DE+, HER2-) - Signed by Kendra Hutchinson MD on 8/1/2022  Stage prefix: Initial diagnosis  Nuclear grade: G1  Multigene prognostic tests performed: None  Mitotic count score: Score 1  Histologic grading system: 3 grade system       8/29/2022 - 10/10/2022 Radiation    Treatments:  Course: C1  Plan ID Energy Fractions Dose per Fraction (cGy) Total Dose Delivered (cGy) Elapsed Days BH L Breast 6X 25 / 25 200 5,000 35   BH L [id number] 12E 5 / 5 200 1,000 6   BH L SClav 10X/6X 24 / 24 200 4,800 32    Treatment Dates:  8/29/2022 - 10/10/2022. Malignant neoplasm of lower-inner quadrant of left breast in female, estrogen receptor positive ([address])   5/20/2022 Initial Diagnosis    Malignant neoplasm of lower-inner quadrant of left breast in female, estrogen receptor positive ([address])     5/20/2022 Biopsy    MRI guided biopsy  A. Left breast   11 o'clock   Invasive mammary carcinoma of no special type (ductal)  Grade 1  ER 95  MD 95  HER2 0    B. Right breast   Retroareolar  Benign fibrocystic changes without atypia (discrete 0.3 cm focus of sclerosing and tubular adenosis with usual ductal hyperplasia, columnar cell change and hyperplasia, apocrine metaplasia, cystic dilatation). - Microcalcifications: Present associated with non-neoplastic breast tissue.   - Negative for malignancy, in-situ carcinoma and atypical hyperplasia. Malignant pathology appears unifocal. Biopsy proven carcinoma measured 1 cm on MRI. Biopsy proved metastatic disease to left axillary lymph node. 6/9/2022 Genomic Testing    MammaPrint  Low risk  Luminal type A  MammaPrint index: +0.578     6/21/2022 Surgery    Left breast tiffany  directed lumpectomy with axillary dissection  A. Invasive 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  Grade 1  1.3 cm  Lymphovascular invasion is identified  Margins negative    H.  Axillary, Level I and Level II axillary contents  Metastatic carcinoma involving 3/17 lymph nodes  1.8 cm   Extranodal extension is identified 1mm  Foci of carcinoma identified in extranodal lymphatic channels    Anatomic stage: Stage IIA  Prognostic stage: Stage IA      8/29/2022 - 10/10/2022 Radiation    Treatments:  Course: C1  Plan ID Energy Fractions Dose per Fraction (cGy) Total Dose Delivered (cGy) Elapsed Days   BH L Breast 6X 25 / 25 200 5,000 35   BH L [id number] 12E 5 / 5 200 1,000 6 BH L SClav 10X/6X 24 / 24 200 4,800 32    Treatment Dates:  8/29/2022 - 10/10/2022.        10/13/2022: Letrozole initiated (ordered, but started after DXA scan 10/19/2022). 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 I have also reviewed allergies and medications for this patient. 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 She is doing well on letrozole other than tolerable hot flashes that are tolerable. She continues to have no new bone pains. Slight L shoulder joint pain. No new headache, chest pain, shortness of breath, abdominal pain, nausea/vomit/diarrhea. She cut back alcohol use and no longer drinks daily. She uses THC pills for diffuse pain in her body as well as nausea. A 10-point review of system was performed, pertinent positive and negative were detailed as above. Otherwise, the 10-point review of system was negative. Past Medical History:   Diagnosis Date   • Abnormal mammogram 05/15/2013   • Anemia    • Cancer [facility])     desmoitosis   • 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 ([address]) 04/19/2022   • Chronic pain disorder     worse right side of body   • Desmoid tumor     Last Assessed: 6/19/2017   • History of transfusion     no reactions   • Hyperlipidemia    • Hypertension    • Ovarian cancer ([address]) 1999?   Hysterectomy done   • PONV (postoperative nausea and vomiting)        Past Surgical History:   Procedure Laterality Date   • BREAST LUMPECTOMY Left 6/21/2022    Procedure: ANITA  DIRECTED LUMPECTOMY;  Surgeon: Vesna Sommers MD;  Location: [facility];  Service: Surgical Oncology   • FRACTURE SURGERY     • HYSTERECTOMY     • LYMPH NODE DISSECTION Left 6/21/2022    Procedure: AXILLARY DISSECTION LEVEL I AND LEVEL II LYMPH NODES;  Surgeon: Vesna Sommers MD;  Location: [facility];  Service: Surgical Oncology   • MRI BREAST BIOPSY LEFT (ALL INCLUSIVE) Left 5/20/2022   • MRI BREAST BIOPSY RIGHT (ALL INCLUSIVE) Right 5/20/2022   • OTHER SURGICAL HISTORY      Arm Incision: Dermoid tumor, right Arm    • PARTIAL HYSTERECTOMY     • OK COLONOSCOPY FLX DX W/COLLJ SPEC WHEN PFRMD N/A 8/15/2017    Procedure: COLONOSCOPY;  Surgeon: Servando Dewey MD;  Location: [facility];   Service: Gastroenterology   • [place] [address] WRST SURG W/RLS TRANSVRS CARPL LIGM Left 8/10/2021    Procedure: RELEASE LEFT CARPAL TUNNEL ENDOSCOPIC;  Surgeon: Yao Dodge MD;  Location: [facility];  Service: Orthopedics   • OK OPTX DSTL RADL I-ARTIC FX/EPIPHYSL SEP 3 FRAG Left 2/9/2021    Procedure: OPEN REDUCTION W/ INTERNAL FIXATION (ORIF) RADIUS / ULNA (WRIST) left;  Surgeon: Yao Dodge MD;  Location: [facility];  Service: Orthopedics   • SKIN BIOPSY     • SKIN CANCER EXCISION      Melanoma Excision    • TONSILLECTOMY     • US BREAST NEEDLE LOC LEFT Left 6/16/2022   • US BREAST NEEDLE LOC RIGHT EACH ADDITIONAL Right 6/16/2022   • US GUIDED BREAST LYMPH NODE BIOPSY LEFT Left 4/14/2022       Family History   Problem Relation Age of Onset   • Diabetes Mother    • No Known Problems Father    • Cancer Sister    • Lung cancer Sister    • Pancreatic cancer Sister    • No Known Problems Maternal Grandmother    • No Known Problems Maternal Grandfather    • No Known Problems Paternal Grandmother    • No Known Problems Paternal Grandfather    • Breast cancer Neg Hx Social History     Socioeconomic History   • Marital status: /Civil Union     Spouse name: Not on file   • Number of children: Not on file   • Years of education: Not on file   • Highest education level: Not on file   Occupational History   • Occupation: unemployed    Tobacco Use   • Smoking status: Never   • Smokeless tobacco: Never   Vaping Use   • Vaping Use: Never used   Substance and Sexual Activity   • Alcohol use: Not Currently     Alcohol/week: 5.0 standard drinks of alcohol     Types: 5 Shots of liquor per week     Comment: Used mostly as a painkiller when needed before bedtime   • Drug use: Yes     Frequency: 28.0 times per week     Types: Marijuana     Comment: THC Medical marijuana   • Sexual activity: Not Currently     Partners: Male     Comment: Hysterectomy years ago   Other Topics Concern   • Not on file   Social History Narrative    Lives with spouse      Social Determinants of Health     Financial Resource Strain: Not on file   Food Insecurity: Not on file   Transportation Needs: Not on file   Physical Activity: Not on file   Stress: Not on file   Social Connections: Not on file   Intimate Partner Violence: Not on file   Housing Stability: Not on file       Allergies   Allergen Reactions   • Chlorpromazine Anaphylaxis     PHENOTHIAZINE=ANAPHYLAXIS   • Codeine Anaphylaxis     Anaphylaxis  abd pain. • Meperidine Anaphylaxis, Hives and Vomiting   • Phenothiazines Anaphylaxis and Throat Swelling     Category: Allergy;    • Saccharin - Food Allergy Anaphylaxis   • Ampicillin-Sulbactam Sodium Hives   • Aspirin GI Intolerance and Abdominal Pain     Severe GERD   • Gluten Meal - Food Allergy GI Intolerance   • Ibuprofen Hives     H   • Levofloxacin Hives   • Chocolate - Food Allergy GI Intolerance   • Lactose - Food Allergy GI Intolerance   • Neomycin Other (See Comments), Edema and Hives     Category:  Allergy;   swelling   • Citrus - Food Allergy Rash   • Latex Hives and Rash     Category: Allergy;        Current Outpatient Medications   Medication Sig Dispense Refill   • cholecalciferol (VITAMIN D3) 1,000 units tablet Take 3,000 Units by mouth daily     • diphenhydrAMINE (BENADRYL) 50 MG tablet Take 50 mg by mouth daily at bedtime as needed for itching     • dronabinol (MARINOL) 5 MG capsule Take 15 mg by mouth 4 (four) times a day (before meals and at bedtime)     • letrozole (FEMARA) 2.5 mg tablet TAKE 1 TABLET BY MOUTH EVERY DAY 90 tablet 3   • multivitamin (THERAGRAN) TABS Take 1 tablet by mouth daily     • Omega-3 Fatty Acids (FISH OIL PO) Take by mouth     • penicillin V potassium (VEETID) 500 mg tablet Take 1 tablet (500 mg total) by mouth 2 (two) times a day 180 tablet 3   • Probiotic Product (PROBIOTIC-10 PO) Take by mouth     • triamcinolone (KENALOG) 0.1 % cream Apply topically 2 (two) times a day as needed for irritation or rash 80 g 0   • docusate sodium (COLACE) 100 mg capsule Take 1 capsule (100 mg total) by mouth 2 (two) times a day as needed for constipation for up to 10 days 20 capsule 0   • HYDROmorphone (DILAUDID) 2 mg tablet Take 1 tablet (2 mg total) by mouth every 6 (six) hours as needed for severe pain Not on Discharge list.  Patient is taking for severe pain PRN every 6 hours Max Daily Amount: 8 mg (Patient not taking: Reported on 8/12/2022) 20 tablet 0     No current facility-administered medications for this visit. (Not in a hospital admission)      Objective:     24 Hour Vitals Assessment:     Vitals:    08/23/23 1025   BP: 116/78   Pulse: 68   Resp: 16   Temp: 98.2 °F (36.8 °C)   SpO2: 99%       PHYSICIAN EXAM:    General: Appearance: alert, cooperative, no distress. HEENT: Normocephalic, atraumatic. No scleral icterus. conjunctivae clear. EOMI. Chest: No tenderness to palpation. No open wound noted. Lungs: Clear to auscultation bilaterally, Respirations unlabored. Cardiac: Regular rate and rhythm, +S1and S2  Abdomen: Soft, non-tender, non-distended. Extremities:  No edema, cyanosis, clubbing. Skin: Skin color, turgor are normal. RUE wrapping noted  Lymphatics: no palpable supra-cervical, axillary, or inguinal adenopathy  Neurologic: Awake, Alert, and oriented, no gross focal deficits noted b/l. DATA REVIEW:    Pathology Result:    Final Diagnosis   Date Value Ref Range Status   06/21/2022   Final    A. Breast, Left breast ANITA  directed lumpectomy, marked per margin per protocol :  - Invasive 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, Grade 1.  - Largest measurable focus is 13 mm in maximum length (slide measurement)  - Lymphovascular invasion is identified (D2-40 is performed). - Ductal carcinoma in situ (DCIS), nuclear grade I-II  - Inked surfaces with no tumor seen. - Healing biopsy site. - Skin, no tumor seen. B. Breast, Additional anterior margin, inked most distal margin Leim Sal): - Benign breast tissue, negative for carcinoma including at inked designated surgical resection margin. C. Breast, Additional inferior margin, inked most distal margin (blue): - Benign breast tissue, negative for carcinoma including at inked designated surgical resection margin. D. Breast, Additional lateral margin, inked most distal margin (red):  - Benign breast tissue, negative for carcinoma including at inked designated surgical resection margin. E. Breast, Additional medial margin, inked most distal margin (yellow): - Benign breast tissue, negative for carcinoma including at inked designated surgical resection margin. F. Breast, Additional posterior margin, inked most distal margin (black):  - Benign breast tissue, negative for carcinoma including at inked designated surgical resection margin. G. Breast, Additional superior margin, inked most distal margin (orange): - Benign breast tissue, negative for carcinoma including at inked designated surgical resection margin. H.  Axillary, Level I and Level II Axillary contents:  -  Metastatic carcinoma involving three of seventeen lymph nodes (3/17). -  The largest metastatic deposit measures 18 mm in greatest dimension.  -  Extranodal extension is identified (1 mm). -  Foci of carcinoma identified in extranodal lymphatic channels. -  CK-AE1/AE3 immunohistochemical stain with adequate control support the diagnosis. - Intradepartmental consultation concurs with the diagnosis (CK).     05/20/2022   Final    A. Breast, Left, upper inner quadrant, 11 o'clock, MRI guided needle biopsy:  - Invasive mammary carcinoma of no special type (ductal). -- Penelope histologic grade 1 of 3 (total score: 4 of 9)        * Glandular (acinar) Tubular Differentiation 10-75%, score 2.        * Nuclear Pleomorphism 1 of 3, score 1        * Mitotic Rate < 3/mm2, (</= 7 mitoses/10HPF), score 1.    -- Confirmed by tumor cell immunophenotype:        * Positive: GATA3, E-cadherin, P120 (membranous), ER. * Negative: p63, SMMHC, S100.    -- Invasive carcinoma involves 6 of 7 submitted core biopsies and 2 fragments, max. Dimension= 10 mm.    -- Estrogen, Progesterone & HER2 receptor studies pending, to be described in an addendum.  - Ductal carcinoma in-situ (DCIS): Present; minor component (approximately 5% of total tumor). -- Architectural Patterns: Solid. -- Nuclear grade: I- II (low to intermediate). -- Necrosis: Not identified. - Lymphovascular invasion: Focally cannot exclude. -- D2-40 immunostain performed. - Microcalcifications: Present, few associated with DCIS. - Best representative tumor block: A2.    -- Sufficient tumor present for        Agendia Mammaprint/Blueprint (1 cm2 of invasive tumor in aggregate): No.        MI Profile/Foundation One (at least 5 x 5 mm of tumor): Yes.     B. Breast, Right, retroareolar, MRI guided needle biopsy:  - Benign fibrocystic changes without atypia (discrete 0.3 cm focus of sclerosing and tubular adenosis     with usual ductal hyperplasia, columnar cell change and hyperplasia, apocrine metaplasia,     cystic dilatation). See Note. - Microcalcifications: Present associated with non-neoplastic breast tissue.   - Negative for malignancy, in-situ carcinoma and atypical hyperplasia. -- Confirmed by positive SMMHC, p63, E-cadherin, p120 (membranous) immunostains. 05/20/2022   Final    A. Breast, Left, upper inner quadrant, 11 o'clock, MRI guided needle biopsy:  - Invasive mammary carcinoma of no special type (ductal). -- [place] histologic grade 1 of 3 (total score: 4 of 9)        * Glandular (acinar) Tubular Differentiation 10-75%, score 2.        * Nuclear Pleomorphism 1 of 3, score 1        * Mitotic Rate < 3/mm2, (</= 7 mitoses/10HPF), score 1.    -- Confirmed by tumor cell immunophenotype:        * Positive: GATA3, E-cadherin, P120 (membranous), ER. * Negative: p63, SMMHC, S100.    -- Invasive carcinoma involves 6 of 7 submitted core biopsies and 2 fragments, max. Dimension= 10 mm.    -- Estrogen, Progesterone & HER2 receptor studies pending, to be described in an addendum.  - Ductal carcinoma in-situ (DCIS): Present; minor component (approximately 5% of total tumor). -- Architectural Patterns: Solid. -- Nuclear grade: I- II (low to intermediate). -- Necrosis: Not identified. - Lymphovascular invasion: Focally cannot exclude. -- D2-40 immunostain performed. - Microcalcifications: Present, few associated with DCIS. - Best representative tumor block: A2.    -- Sufficient tumor present for        Agendia Mammaprint/Blueprint (1 cm2 of invasive tumor in aggregate): No.        MI Profile/Foundation One (at least 5 x 5 mm of tumor): Yes.     B. Breast, Right, retroareolar, MRI guided needle biopsy:  - Benign fibrocystic changes without atypia (discrete 0.3 cm focus of sclerosing and tubular adenosis     with usual ductal hyperplasia, columnar cell change and hyperplasia, apocrine metaplasia,     cystic dilatation). See Note. - Microcalcifications: Present associated with non-neoplastic breast tissue.   - Negative for malignancy, in-situ carcinoma and atypical hyperplasia. -- Confirmed by positive SMMHC, p63, E-cadherin, p120 (membranous) immunostains. 04/14/2022   Final    A. Lymph node, left axillary, ultrasound-guided biopsy (2 passes):  -  Metastatic carcinoma, compatible with breast origin (see note). 08/15/2017   Final    A. Colon, sigmoid polyp at 30 cm, biopsy:  -  Tubular adenoma, negative for high-grade dysplasia. B.  Colon, rectosigmoid polyp, biopsy:  -  Hyperplastic polyp. Image Results:   Image result are reviewed and documented in Hematology/Oncology history    Mammo diagnostic bilateral w 3d & cad  Narrative: DIAGNOSIS: 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   ([address]); Malignant neoplasm of lower-inner quadrant of left breast in   female, estrogen receptor positive ([address])     TECHNIQUE:  Digital diagnostic mammography was performed. Computer Aided Detection   (CAD) analyzed all applicable images. COMPARISONS: Prior breast imaging dated: 06/16/2022, 03/29/2022,   03/25/2016, 05/15/2013, 05/15/2013, 05/14/2012, 05/16/2011, 05/14/2010,   01/08/2009, and 01/10/2008    RELEVANT HISTORY:   Family Breast Cancer History: History of breast cancer in Neg Hx. Family Medical History: No known relevant family medical history. Personal History: Hormone history includes estrogen replacement therapy. Surgical history includes lumpectomy and hysterectomy. Medical history   includes breast cancer and ovarian cancer. RISK ASSESSMENT:   Tyrer-Cuzick risk assessment reporting was suppressed due to the patient's   history and/or demographic factors. TISSUE DENSITY:   The breasts are heterogeneously dense, which may obscure small masses.      INDICATION: Ayad Sutherland is a 57 y.o. female presenting for history of   left breast cancer. FINDINGS:   Bilateral  There are no suspicious masses, grouped microcalcifications or areas of   unexplained architectural distortion. The skin and nipple areolar complex   are unremarkable. There are postsurgical/posttreatment changes of the   left breast and left axilla. There are scattered calcifications present. There is a biopsy marker clip in the retroareolar right breast.  Impression:    Postsurgical/posttreatment changes of the left breast.  No evidence of   malignancy. ASSESSMENT/BI-RADS CATEGORY:  Left: 2 - Benign  Right: 2 - Benign  Overall: 2 - Benign    RECOMMENDATION:       - Diagnostic mammogram in 1 year for both breasts. Workstation ID: [id number]      LABS:  Lab data are reviewed and documented in HemOnc history. Lab Results   Component Value Date    HGB 15.5 (H) 08/01/2023    HCT 46.1 08/01/2023    MCV 92 08/01/2023     08/01/2023    WBC 5.25 08/01/2023    NRBC 0 08/01/2023     Lab Results   Component Value Date    K 4.5 08/01/2023     08/01/2023    CO2 29 08/01/2023    BUN 11 08/01/2023    CREATININE 0.66 08/01/2023    GLUCOSE 98 05/02/2017    GLUF 87 04/07/2022    CALCIUM 9.4 08/01/2023    AST 13 08/01/2023    ALT 11 08/01/2023    ALKPHOS 90 08/01/2023    EGFR 93 08/01/2023       No results found for: "IRON", "TIBC", "FERRITIN"    No results found for: "Ellie Courser"    No results for input(s): "WBC", "CREAT", "PLT" in the last 72 hours.     By:  Jorge L Pickett, 8/23/2023, 11:06 AM

## 2023-08-23 ENCOUNTER — OFFICE VISIT (OUTPATIENT)
Dept: HEMATOLOGY ONCOLOGY | Facility: CLINIC | Age: 63
End: 2023-08-23
Payer: COMMERCIAL

## 2023-08-23 ENCOUNTER — OFFICE VISIT (OUTPATIENT)
Dept: OBGYN CLINIC | Facility: CLINIC | Age: 63
End: 2023-08-23
Payer: COMMERCIAL

## 2023-08-23 ENCOUNTER — TELEPHONE (OUTPATIENT)
Dept: HEMATOLOGY ONCOLOGY | Facility: CLINIC | Age: 63
End: 2023-08-23

## 2023-08-23 VITALS
OXYGEN SATURATION: 99 % | WEIGHT: 149 LBS | TEMPERATURE: 98.2 F | HEART RATE: 68 BPM | RESPIRATION RATE: 16 BRPM | HEIGHT: 68 IN | DIASTOLIC BLOOD PRESSURE: 78 MMHG | SYSTOLIC BLOOD PRESSURE: 116 MMHG | BODY MASS INDEX: 22.58 KG/M2

## 2023-08-23 VITALS
HEART RATE: 75 BPM | BODY MASS INDEX: 22.85 KG/M2 | DIASTOLIC BLOOD PRESSURE: 75 MMHG | OXYGEN SATURATION: 98 % | SYSTOLIC BLOOD PRESSURE: 129 MMHG | WEIGHT: 150.8 LBS | HEIGHT: 68 IN

## 2023-08-23 DIAGNOSIS — M85.89 OSTEOPENIA OF MULTIPLE SITES: ICD-10-CM

## 2023-08-23 DIAGNOSIS — M65.311 TRIGGER THUMB OF RIGHT HAND: Primary | ICD-10-CM

## 2023-08-23 DIAGNOSIS — Z79.811 AROMATASE INHIBITOR USE: ICD-10-CM

## 2023-08-23 DIAGNOSIS — C50.312 MALIGNANT NEOPLASM OF LOWER-INNER QUADRANT OF LEFT BREAST IN FEMALE, ESTROGEN RECEPTOR POSITIVE (HCC): ICD-10-CM

## 2023-08-23 DIAGNOSIS — C77.3 CARCINOMA OF LEFT BREAST METASTATIC TO AXILLARY LYMPH NODE (HCC): Primary | ICD-10-CM

## 2023-08-23 DIAGNOSIS — C50.912 CARCINOMA OF LEFT BREAST METASTATIC TO AXILLARY LYMPH NODE (HCC): Primary | ICD-10-CM

## 2023-08-23 DIAGNOSIS — Z17.0 MALIGNANT NEOPLASM OF LOWER-INNER QUADRANT OF LEFT BREAST IN FEMALE, ESTROGEN RECEPTOR POSITIVE (HCC): ICD-10-CM

## 2023-08-23 PROCEDURE — 99215 OFFICE O/P EST HI 40 MIN: CPT | Performed by: INTERNAL MEDICINE

## 2023-08-23 PROCEDURE — 99203 OFFICE O/P NEW LOW 30 MIN: CPT | Performed by: PHYSICIAN ASSISTANT

## 2023-08-23 PROCEDURE — 20550 NJX 1 TENDON SHEATH/LIGAMENT: CPT | Performed by: PHYSICIAN ASSISTANT

## 2023-08-23 RX ORDER — LIDOCAINE HYDROCHLORIDE 10 MG/ML
0.5 INJECTION, SOLUTION INFILTRATION; PERINEURAL
Status: COMPLETED | OUTPATIENT
Start: 2023-08-23 | End: 2023-08-23

## 2023-08-23 RX ORDER — BETAMETHASONE SODIUM PHOSPHATE AND BETAMETHASONE ACETATE 3; 3 MG/ML; MG/ML
3 INJECTION, SUSPENSION INTRA-ARTICULAR; INTRALESIONAL; INTRAMUSCULAR; SOFT TISSUE
Status: COMPLETED | OUTPATIENT
Start: 2023-08-23 | End: 2023-08-23

## 2023-08-23 RX ADMIN — BETAMETHASONE SODIUM PHOSPHATE AND BETAMETHASONE ACETATE 3 MG: 3; 3 INJECTION, SUSPENSION INTRA-ARTICULAR; INTRALESIONAL; INTRAMUSCULAR; SOFT TISSUE at 16:00

## 2023-08-23 RX ADMIN — LIDOCAINE HYDROCHLORIDE 0.5 ML: 10 INJECTION, SOLUTION INFILTRATION; PERINEURAL at 16:00

## 2023-08-23 NOTE — TELEPHONE ENCOUNTER
Reviewed Dianna Shirley PA-C note, reviews that it states is going to be reviewed with a dentist and patient is going to discuss with dentist prior to scheduling purposes needed.

## 2023-08-23 NOTE — PROGRESS NOTES
ASSESSMENT/PLAN:    Assessment:   Trigger Finger  right  thumb    Plan:   steroid injection     Follow Up:  Next available with Dr. Vyas Mail (pt request)    To Do Next Visit:       General Discussions:     Trigger FInger: The anatomy and physiology of trigger finger was discussed with the patient today in the office. Edema and increased contact pressure within the flexor tendons at the A1 pulley can cause pain, crepitation, and limitation of function. Treatment options include resting MP blocking splints to decrease edema, oral anti-inflammatory medications, home or formal therapy exercises, up to 2 steroid injections within the tendon sheath, or surgical release. While majority of patients do respond to conservative treatment, up to 20% may require surgical release.         _____________________________________________________  CHIEF COMPLAINT:  Chief Complaint   Patient presents with   • Right Thumb - Triggering     3 months         SUBJECTIVE:  Merline Luz is a 57 y.o. female who presents with Catching of the right thumb. This started  3 month(s) ago: Insidious. Because of the lymphedema in her right arm, she wants to avoid surgery if at all possible. Radiation: None  Previous Treatments: None   Associated symptoms: None  Handedness: right      PAST MEDICAL HISTORY:  Past Medical History:   Diagnosis Date   • Abnormal mammogram 05/15/2013   • Anemia    • Cancer ([address])     desmoitosis   • 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 ([address]) 04/19/2022   • Chronic pain disorder     worse right side of body   • Desmoid tumor     Last Assessed: 6/19/2017   • History of transfusion     no reactions   • Hyperlipidemia    • Hypertension    • Ovarian cancer ([address]) 1999?   Hysterectomy done   • PONV (postoperative nausea and vomiting)        PAST SURGICAL HISTORY:  Past Surgical History:   Procedure Laterality Date   • BREAST LUMPECTOMY Left 6/21/2022    Procedure: ANITA  DIRECTED LUMPECTOMY;  Surgeon: Tuan Solmian MD;  Location: [facility];  Service: Surgical Oncology   • FRACTURE SURGERY     • HYSTERECTOMY     • LYMPH NODE DISSECTION Left 6/21/2022    Procedure: AXILLARY DISSECTION LEVEL I AND LEVEL II LYMPH NODES;  Surgeon: Tuan Soliman MD;  Location: [facility];  Service: Surgical Oncology   • MRI BREAST BIOPSY LEFT (ALL INCLUSIVE) Left 5/20/2022   • MRI BREAST BIOPSY RIGHT (ALL INCLUSIVE) Right 5/20/2022   • OTHER SURGICAL HISTORY      Arm Incision: Dermoid tumor, right Arm    • PARTIAL HYSTERECTOMY     • UT COLONOSCOPY FLX DX W/COLLJ SPEC WHEN PFRMD N/A 8/15/2017    Procedure: COLONOSCOPY;  Surgeon: Elda Starr MD;  Location: [facility];   Service: Gastroenterology   • [place] [address] WRST SURG W/RLS TRANSVRS CARPL LIGM Left 8/10/2021    Procedure: RELEASE LEFT CARPAL TUNNEL ENDOSCOPIC;  Surgeon: Maricruz Canseco MD;  Location: [facility];  Service: Orthopedics   • UT OPTX DSTL RADL I-ARTIC FX/EPIPHYSL SEP 3 FRAG Left 2/9/2021    Procedure: OPEN REDUCTION W/ INTERNAL FIXATION (ORIF) RADIUS / ULNA (WRIST) left;  Surgeon: Maricruz Canseco MD;  Location: [facility];  Service: Orthopedics   • SKIN BIOPSY     • SKIN CANCER EXCISION      Melanoma Excision    • TONSILLECTOMY     • US BREAST NEEDLE LOC LEFT Left 6/16/2022   • US BREAST NEEDLE LOC RIGHT EACH ADDITIONAL Right 6/16/2022   • US GUIDED BREAST LYMPH NODE BIOPSY LEFT Left 4/14/2022       FAMILY HISTORY:  Family History   Problem Relation Age of Onset   • Diabetes Mother    • No Known Problems Father    • Cancer Sister    • Lung cancer Sister    • Pancreatic cancer Sister    • No Known Problems Maternal Grandmother    • No Known Problems Maternal Grandfather    • No Known Problems Paternal Grandmother    • No Known Problems Paternal Grandfather    • Breast cancer Neg Hx        SOCIAL HISTORY:  Social History     Tobacco Use   • Smoking status: Never   • Smokeless tobacco: Never   Vaping Use   • Vaping Use: Never used   Substance Use Topics   • Alcohol use: Not Currently     Alcohol/week: 5.0 standard drinks of alcohol     Types: 5 Shots of liquor per week     Comment: Used mostly as a painkiller when needed before bedtime   • Drug use: Yes     Frequency: 28.0 times per week     Types: Marijuana     Comment: THC Medical marijuana       MEDICATIONS:    Current Outpatient Medications:   •  cholecalciferol (VITAMIN D3) 1,000 units tablet, Take 3,000 Units by mouth daily, Disp: , Rfl:   •  diphenhydrAMINE (BENADRYL) 50 MG tablet, Take 50 mg by mouth daily at bedtime as needed for itching, Disp: , Rfl:   •  docusate sodium (COLACE) 100 mg capsule, Take 1 capsule (100 mg total) by mouth 2 (two) times a day as needed for constipation for up to 10 days, Disp: 20 capsule, Rfl: 0  •  dronabinol (MARINOL) 5 MG capsule, Take 15 mg by mouth 4 (four) times a day (before meals and at bedtime), Disp: , Rfl:   •  HYDROmorphone (DILAUDID) 2 mg tablet, Take 1 tablet (2 mg total) by mouth every 6 (six) hours as needed for severe pain Not on Discharge list.  Patient is taking for severe pain PRN every 6 hours Max Daily Amount: 8 mg (Patient not taking: Reported on 8/12/2022), Disp: 20 tablet, Rfl: 0  •  letrozole (FEMARA) 2.5 mg tablet, TAKE 1 TABLET BY MOUTH EVERY DAY, Disp: 90 tablet, Rfl: 3  •  multivitamin (THERAGRAN) TABS, Take 1 tablet by mouth daily, Disp: , Rfl:   •  Omega-3 Fatty Acids (FISH OIL PO), Take by mouth, Disp: , Rfl:   •  penicillin V potassium (VEETID) 500 mg tablet, Take 1 tablet (500 mg total) by mouth 2 (two) times a day, Disp: 180 tablet, Rfl: 3  •  Probiotic Product (PROBIOTIC-10 PO), Take by mouth, Disp: , Rfl:   •  triamcinolone (KENALOG) 0.1 % cream, Apply topically 2 (two) times a day as needed for irritation or rash, Disp: 80 g, Rfl: 0    ALLERGIES:  Allergies   Allergen Reactions   • Chlorpromazine Anaphylaxis     PHENOTHIAZINE=ANAPHYLAXIS   • Codeine Anaphylaxis     Anaphylaxis  abd pain.    • Meperidine Anaphylaxis, Hives and Vomiting   • Phenothiazines Anaphylaxis and Throat Swelling     Category: Allergy;    • Saccharin - Food Allergy Anaphylaxis   • Ampicillin-Sulbactam Sodium Hives   • Aspirin GI Intolerance and Abdominal Pain     Severe GERD   • Gluten Meal - Food Allergy GI Intolerance   • Ibuprofen Hives     H   • Levofloxacin Hives   • Chocolate - Food Allergy GI Intolerance   • Lactose - Food Allergy GI Intolerance   • Neomycin Other (See Comments), Edema and Hives     Category: Allergy;   swelling   • Citrus - Food Allergy Rash   • Latex Hives and Rash     Category: Allergy;        REVIEW OF SYSTEMS:  Pertinent items are noted in HPI. A comprehensive review of systems was negative. LABS:  HgA1c:   Lab Results   Component Value Date    HGBA1C 5.3 07/13/2021     BMP:   Lab Results   Component Value Date    GLUCOSE 98 05/02/2017    CALCIUM 9.4 08/01/2023    K 4.5 08/01/2023    CO2 29 08/01/2023     08/01/2023    BUN 11 08/01/2023    CREATININE 0.66 08/01/2023         _____________________________________________________  PHYSICAL EXAMINATION:  Vital signs: /75   Pulse 75   Ht 5' 8" (1.727 m)   Wt 68.4 kg (150 lb 12.8 oz)   SpO2 98%   BMI 22.93 kg/m²   General: well developed and well nourished, alert, oriented times 3 and appears comfortable  Psychiatric: Normal  HEENT: Trachea Midline, No torticollis  Cardiovascular: Regular rate and rhythm  Pulmonary: No audible wheezing   Abdomen: No guarding  Extremities: + ymphedema  Skin: No masses, erythema, lacerations, fluctation, ulcerations  Neurovascular: Pulses Intact    MUSCULOSKELETAL EXAMINATION:  RIGHT SIDE:  right thumb: No erythema, ecchymosis, or swelling.  + triggering.  + nodule. NVI distally. _____________________________________________________  STUDIES REVIEWED:  No Studies to review      PROCEDURES PERFORMED:  Hand/upper extremity injection  Universal Protocol:  Consent: Verbal consent obtained.   Risks and benefits: risks, benefits and alternatives were discussed  Consent given by: patient  Time out: Immediately prior to procedure a "time out" was called to verify the correct patient, procedure, equipment, support staff and site/side marked as required.   Patient understanding: patient states understanding of the procedure being performed  Site marked: the operative site was marked  Patient identity confirmed: verbally with patient    Supporting Documentation  Indications: tendon swelling   Procedure Details  Condition:trigger finger Needle size: 27 G  Ultrasound guidance: no  Approach: volar  Medications administered: 0.5 mL lidocaine 1 %; 3 mg betamethasone acetate-betamethasone sodium phosphate 6 (3-3) mg/mL    Patient tolerance: patient tolerated the procedure well with no immediate complications  Dressing:  Sterile dressing applied         No Procedures performed today

## 2023-10-09 ENCOUNTER — OFFICE VISIT (OUTPATIENT)
Dept: OCCUPATIONAL THERAPY | Facility: CLINIC | Age: 63
End: 2023-10-09

## 2023-10-09 DIAGNOSIS — I89.0 LYMPHEDEMA OF RIGHT UPPER EXTREMITY: Primary | ICD-10-CM

## 2023-10-09 NOTE — PROGRESS NOTES
Daily Note/ Compression Garment Measurements     Today's date: 10/9/2023  Patient name: Corita Najjar  : 1960  MRN: [id number]  Referring provider: Kirill Hansen Has*  Dx:   Encounter Diagnosis     ICD-10-CM    1. Lymphedema of right upper extremity  I89.0                      Subjective: I need to get a new arm sleeve and I want to make sure my previous measurements are still good. Objective: See treatment diary below      Assessment: Tolerated treatment well. Patient measured for new compression sleeve and glove. Plan: Measurements for compression garments sent to [facility] will order garments and mail them to the patient. Adjustmentsto garments will be made as needed. Precautions: Right desmoid cancer with RUE lymphedema and frequent cellulitis infections; L breast cancer with lumpectomy and axillary node dissection.        Date 10/9/23       Visit 1       Manuals        Compression garment measurements RUE 20'                               Neuro Re-Ed                                                                 Ther Ex                                                                        Ther Activity                        HEP                        Modalities

## 2023-10-23 ENCOUNTER — TELEPHONE (OUTPATIENT)
Dept: INFECTIOUS DISEASES | Facility: CLINIC | Age: 63
End: 2023-10-23

## 2023-10-23 NOTE — TELEPHONE ENCOUNTER
Patient calls the office today. Patient states about a month ago she was see by Dr. Paulette Webber who informed her that her calcium was low. At that time patient states she was taking OTC calcium supplements x2 a day. Patient states at that time there was talk of possible receiving calcium injections. Patient states also at this time she began to take 3 calcium pills a day. Patient states now starting about 2 weeks ago she began having trouble lifting her right arm. Patient describes it like it is sprained.      [address] [facility]

## 2023-11-01 ENCOUNTER — OFFICE VISIT (OUTPATIENT)
Dept: SURGICAL ONCOLOGY | Facility: CLINIC | Age: 63
End: 2023-11-01
Payer: MEDICARE

## 2023-11-01 VITALS
TEMPERATURE: 97.9 F | HEART RATE: 88 BPM | WEIGHT: 149 LBS | RESPIRATION RATE: 16 BRPM | BODY MASS INDEX: 22.58 KG/M2 | DIASTOLIC BLOOD PRESSURE: 80 MMHG | HEIGHT: 68 IN | SYSTOLIC BLOOD PRESSURE: 124 MMHG | OXYGEN SATURATION: 99 %

## 2023-11-01 DIAGNOSIS — C50.912 CARCINOMA OF LEFT BREAST METASTATIC TO AXILLARY LYMPH NODE (HCC): ICD-10-CM

## 2023-11-01 DIAGNOSIS — Z79.811 USE OF LETROZOLE (FEMARA): ICD-10-CM

## 2023-11-01 DIAGNOSIS — C50.312 MALIGNANT NEOPLASM OF LOWER-INNER QUADRANT OF LEFT BREAST IN FEMALE, ESTROGEN RECEPTOR POSITIVE: Primary | ICD-10-CM

## 2023-11-01 DIAGNOSIS — D48.119 DESMOID TUMOR: ICD-10-CM

## 2023-11-01 DIAGNOSIS — Z17.0 MALIGNANT NEOPLASM OF LOWER-INNER QUADRANT OF LEFT BREAST IN FEMALE, ESTROGEN RECEPTOR POSITIVE: Primary | ICD-10-CM

## 2023-11-01 DIAGNOSIS — C77.3 CARCINOMA OF LEFT BREAST METASTATIC TO AXILLARY LYMPH NODE (HCC): ICD-10-CM

## 2023-11-01 DIAGNOSIS — Z98.890 HISTORY OF LUMPECTOMY OF LEFT BREAST: ICD-10-CM

## 2023-11-01 PROCEDURE — 99204 OFFICE O/P NEW MOD 45 MIN: CPT | Performed by: SURGERY

## 2023-11-01 RX ORDER — EZETIMIBE 10 MG/1
10 TABLET ORAL DAILY
COMMUNITY
Start: 2023-10-03

## 2023-11-01 NOTE — PROGRESS NOTES
Surgical Oncology Follow Up  [facility]  [facility] [place] [address]    Lynn Segura  1960  [id number]      No chief complaint on file. 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 She takes penicillin twice a day for lymphedema lymphangitis prevention as per Dr. Tu Britton. She is overall doing well. She is on endocrine therapy and tolerating well she states no leg swelling shortness of breath or leg swelling. She also had recently history of right upper extremity and recommended MRI to rule out any local recurrence clinically low index of suspicion. However given her complicated history of sarcoma I recommended her to get an MRI of the humerus. We did discuss in detail the benefits of letrozole alternative and possible complications. We discussed in detail benefits of endocrine therapy in the local recurrence risk as well as new cancer development also. All patient's questions answered to her satisfaction we will order MRI of the right humerus. We will also follow her in 6 months. She was told that we will follow the MRI remotely. Her next mammogram is scheduled for April 2024. She understand to call us with any questions or concerns in the interim    Cancer History:     Oncology History   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 ([address])   4/14/2022 Initial Diagnosis    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 ([address])     4/14/2022 Biopsy    Left axillary Lymph node ultrasound guided biopsy  Metastatic carcinoma, compatible with breast origin. ER >95  WA 95   HER2 1+    On ultrasound lymph node is 1.8 cm. There is cortical thickening without evident fatty hilum. In review of screening mammogram, demonstrates no suspicious mammographic findings identified in either breast. Bilateral MRI is recommended. Flow cytometry - there is no evidence of a B-cell or T-cell non-Hodgkin lymphoma. 4/20/2022 Genomic Testing    A total of 36 genes were evaluated, including: ANGEL LUIS, BRCA1, BRCA2, CDH1, CHEK2, PALB2, PTEN, STK11, TP53  Negative result. No pathogenic sequence variants or deletions/duplications identified     5/20/2022 Biopsy    MRI guided biopsy  A. Left breast   11 o'clock   Invasive mammary carcinoma of no special type (ductal)  Grade 1  ER 95  CT 95  HER2 0    B. Right breast   Retroareolar  Benign fibrocystic changes without atypia (discrete 0.3 cm focus of sclerosing and tubular adenosis with usual ductal hyperplasia, columnar cell change and hyperplasia, apocrine metaplasia, cystic dilatation). - Microcalcifications: Present associated with non-neoplastic breast tissue.   - Negative for malignancy, in-situ carcinoma and atypical hyperplasia. Malignant pathology appears unifocal. Biopsy proven carcinoma measured 1 cm on MRI. Biopsy proved metastatic disease to left axillary lymph node. 6/9/2022 Genomic Testing    MammaPrint  Low risk  Luminal type A  MammaPrint index: +0.578     6/21/2022 Surgery    Left breast tiffany  directed lumpectomy with axillary dissection  A. Invasive 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  Grade 1  1.3 cm  Lymphovascular invasion is identified  Margins negative    H.  Axillary, Level I and Level II axillary contents  Metastatic carcinoma involving 3/17 lymph nodes  1.8 cm   Extranodal extension is identified 1mm  Foci of carcinoma identified in extranodal lymphatic channels    Anatomic stage: Stage IIA  Prognostic stage: Stage IA      6/21/2022 -  Cancer Staged    Staging form: Breast, AJCC 8th Edition  - Pathologic stage from 6/21/2022: Stage IA (pT1c, pN1, cM0, G1, ER+, CT+, HER2-) - Signed by Sabrina Slider María Easton MD on 8/1/2022  Stage prefix: Initial diagnosis  Nuclear grade: G1  Multigene prognostic tests performed: None  Mitotic count score: Score 1  Histologic grading system: 3 grade system       8/29/2022 - 10/10/2022 Radiation    Treatments:  Course: C1  Plan ID Energy Fractions Dose per Fraction (cGy) Total Dose Delivered (cGy) Elapsed Days   BH L Breast 6X 25 / 25 200 5,000 35   BH L [id number] 12E 5 / 5 200 1,000 6   BH L SClav 10X/6X 24 / 24 200 4,800 32    Treatment Dates:  8/29/2022 - 10/10/2022. Malignant neoplasm of lower-inner quadrant of left breast in female, estrogen receptor positive    5/20/2022 Initial Diagnosis    Malignant neoplasm of lower-inner quadrant of left breast in female, estrogen receptor positive ([address])     5/20/2022 Biopsy    MRI guided biopsy  A. Left breast   11 o'clock   Invasive mammary carcinoma of no special type (ductal)  Grade 1  ER 95  SC 95  HER2 0    B. Right breast   Retroareolar  Benign fibrocystic changes without atypia (discrete 0.3 cm focus of sclerosing and tubular adenosis with usual ductal hyperplasia, columnar cell change and hyperplasia, apocrine metaplasia, cystic dilatation). - Microcalcifications: Present associated with non-neoplastic breast tissue.   - Negative for malignancy, in-situ carcinoma and atypical hyperplasia. Malignant pathology appears unifocal. Biopsy proven carcinoma measured 1 cm on MRI. Biopsy proved metastatic disease to left axillary lymph node. 6/9/2022 Genomic Testing    MammaPrint  Low risk  Luminal type A  MammaPrint index: +0.578     6/21/2022 Surgery    Left breast tiffany  directed lumpectomy with axillary dissection  A. Invasive 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  Grade 1  1.3 cm  Lymphovascular invasion is identified  Margins negative    H.  Axillary, Level I and Level II axillary contents  Metastatic carcinoma involving 3/17 lymph nodes  1.8 cm   Extranodal extension is identified 1mm  Foci of carcinoma identified in extranodal lymphatic channels    Anatomic stage: Stage IIA  Prognostic stage: Stage IA      8/29/2022 - 10/10/2022 Radiation    Treatments:  Course: C1  Plan ID Energy Fractions Dose per Fraction (cGy) Total Dose Delivered (cGy) Elapsed Days   BH L Breast 6X 25 / 25 200 5,000 35   BH L [id number] 12E 5 / 5 200 1,000 6   BH L SClav 10X/6X 24 / 24 200 4,800 32    Treatment Dates:  8/29/2022 - 10/10/2022. Interval History:   Follow-up with left breast cancer    Review of Systems:   Review of Systems   Constitutional:  Negative for chills and fever. HENT:  Negative for ear pain and sore throat. Eyes:  Negative for pain and visual disturbance. Respiratory:  Negative for cough and shortness of breath. Cardiovascular:  Negative for chest pain and palpitations. Gastrointestinal:  Negative for abdominal pain and vomiting. Genitourinary:  Negative for dysuria and hematuria. Musculoskeletal:  Negative for arthralgias and back pain. Skin:  Negative for color change and rash. Neurological:  Negative for seizures and syncope. All other systems reviewed and are negative.     Past Medical History     Patient Active Problem List   Diagnosis   • Back pain, chronic   • Chronic insomnia   • Lymphedema of right arm   • Hyperlipidemia, mixed   • Peripheral neuropathy   • Lymphedema   • Cellulitis of right upper extremity   • Sepsis (HCC)   • Desmoid tumor   • 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 (HCC)   • Malignant neoplasm of lower-inner quadrant of left breast in female, estrogen receptor positive    • HTN, goal below 140/90   • PONV (postoperative nausea and vomiting)   • Use of letrozole (Femara)   • History of lumpectomy of left breast     Past Medical History:   Diagnosis Date   • Abnormal mammogram 05/15/2013   • Anemia    • Cancer ([address])     desmoitosis   • 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 ([address]) 04/19/2022   • Chronic pain disorder     worse right side of body   • Desmoid tumor     Last Assessed: 6/19/2017   • History of transfusion     no reactions   • Hyperlipidemia    • Hypertension    • Ovarian cancer ([address]) 1999? Hysterectomy done   • PONV (postoperative nausea and vomiting)      Past Surgical History:   Procedure Laterality Date   • BREAST LUMPECTOMY Left 6/21/2022    Procedure: ANITA  DIRECTED LUMPECTOMY;  Surgeon: Eddie Atwood MD;  Location: [facility];  Service: Surgical Oncology   • FRACTURE SURGERY     • HYSTERECTOMY     • LYMPH NODE DISSECTION Left 6/21/2022    Procedure: AXILLARY DISSECTION LEVEL I AND LEVEL II LYMPH NODES;  Surgeon: Eddie Atwood MD;  Location: [facility];  Service: Surgical Oncology   • MRI BREAST BIOPSY LEFT (ALL INCLUSIVE) Left 5/20/2022   • MRI BREAST BIOPSY RIGHT (ALL INCLUSIVE) Right 5/20/2022   • OTHER SURGICAL HISTORY      Arm Incision: Dermoid tumor, right Arm    • PARTIAL HYSTERECTOMY     • MA COLONOSCOPY FLX DX W/COLLJ SPEC WHEN PFRMD N/A 8/15/2017    Procedure: COLONOSCOPY;  Surgeon: Radha Gilbert MD;  Location: [facility];   Service: Gastroenterology   • MA [address] WRST SURG W/RLS TRANSVRS CARPL LIGM Left 8/10/2021    Procedure: RELEASE LEFT CARPAL TUNNEL ENDOSCOPIC;  Surgeon: Robert Gibson MD;  Location: [facility];  Service: Orthopedics   • MA OPTX DSTL RADL I-ARTIC FX/EPIPHYSL SEP 3 FRAG Left 2/9/2021    Procedure: OPEN REDUCTION W/ INTERNAL FIXATION (ORIF) RADIUS / ULNA (WRIST) left;  Surgeon: Robert Gibson MD;  Location: [facility];  Service: Orthopedics   • SKIN BIOPSY     • SKIN CANCER EXCISION      Melanoma Excision    • TONSILLECTOMY     • US BREAST NEEDLE LOC LEFT Left 6/16/2022   • US BREAST NEEDLE LOC RIGHT EACH ADDITIONAL Right 6/16/2022   • US GUIDED BREAST LYMPH NODE BIOPSY LEFT Left 4/14/2022     Family History   Problem Relation Age of Onset   • Diabetes Mother    • No Known Problems Father    • Cancer Sister    • Lung cancer Sister    • Pancreatic cancer Sister    • No Known Problems Maternal Grandmother    • No Known Problems Maternal Grandfather    • No Known Problems Paternal Grandmother    • No Known Problems Paternal Grandfather    • Breast cancer Neg Hx      Social History     Socioeconomic History   • Marital status: /Civil Union     Spouse name: Not on file   • Number of children: Not on file   • Years of education: Not on file   • Highest education level: Not on file   Occupational History   • Occupation: unemployed    Tobacco Use   • Smoking status: Never   • Smokeless tobacco: Never   Vaping Use   • Vaping Use: Never used   Substance and Sexual Activity   • Alcohol use: Not Currently     Alcohol/week: 5.0 standard drinks of alcohol     Types: 5 Shots of liquor per week     Comment: Used mostly as a painkiller when needed before bedtime   • Drug use: Yes     Frequency: 28.0 times per week     Types: Marijuana     Comment: THC Medical marijuana   • Sexual activity: Not Currently     Partners: Male     Comment: Hysterectomy years ago   Other Topics Concern   • Not on file   Social History Narrative    Lives with spouse      Social Determinants of Health     Financial Resource Strain: Not on file   Food Insecurity: Not on file   Transportation Needs: Not on file   Physical Activity: Not on file   Stress: Not on file   Social Connections: Not on file   Intimate Partner Violence: Not on file   Housing Stability: Not on file       Current Outpatient Medications:   •  cholecalciferol (VITAMIN D3) 1,000 units tablet, Take 3,000 Units by mouth daily, Disp: , Rfl:   •  diphenhydrAMINE (BENADRYL) 50 MG tablet, Take 50 mg by mouth daily at bedtime as needed for itching, Disp: , Rfl:   •  dronabinol (MARINOL) 5 MG capsule, Take 15 mg by mouth 4 (four) times a day (before meals and at bedtime), Disp: , Rfl:   •  ezetimibe (ZETIA) 10 mg tablet, Take 10 mg by mouth daily, Disp: , Rfl:   •  letrozole (FEMARA) 2.5 mg tablet, TAKE 1 TABLET BY MOUTH EVERY DAY, Disp: 90 tablet, Rfl: 3  •  multivitamin (THERAGRAN) TABS, Take 1 tablet by mouth daily, Disp: , Rfl:   •  Omega-3 Fatty Acids (FISH OIL PO), Take by mouth, Disp: , Rfl:   •  penicillin V potassium (VEETID) 500 mg tablet, Take 1 tablet (500 mg total) by mouth 2 (two) times a day, Disp: 180 tablet, Rfl: 3  •  Probiotic Product (PROBIOTIC-10 PO), Take by mouth, Disp: , Rfl:   •  docusate sodium (COLACE) 100 mg capsule, Take 1 capsule (100 mg total) by mouth 2 (two) times a day as needed for constipation for up to 10 days, Disp: 20 capsule, Rfl: 0  •  HYDROmorphone (DILAUDID) 2 mg tablet, Take 1 tablet (2 mg total) by mouth every 6 (six) hours as needed for severe pain Not on Discharge list.  Patient is taking for severe pain PRN every 6 hours Max Daily Amount: 8 mg (Patient not taking: Reported on 8/12/2022), Disp: 20 tablet, Rfl: 0  •  triamcinolone (KENALOG) 0.1 % cream, Apply topically 2 (two) times a day as needed for irritation or rash (Patient not taking: Reported on 11/1/2023), Disp: 80 g, Rfl: 0  Allergies   Allergen Reactions   • Chlorpromazine Anaphylaxis     PHENOTHIAZINE=ANAPHYLAXIS   • Codeine Anaphylaxis     Anaphylaxis  abd pain. • Meperidine Anaphylaxis, Hives and Vomiting   • Phenothiazines Anaphylaxis and Throat Swelling     Category: Allergy;    • Saccharin - Food Allergy Anaphylaxis   • Ampicillin-Sulbactam Sodium Hives   • Aspirin GI Intolerance and Abdominal Pain     Severe GERD   • Gluten Meal - Food Allergy GI Intolerance   • Ibuprofen Hives     H   • Levofloxacin Hives   • Chocolate - Food Allergy GI Intolerance   • Lactose - Food Allergy GI Intolerance   • Neomycin Other (See Comments), Edema and Hives     Category: Allergy;   swelling   • Citrus - Food Allergy Rash   • Latex Hives and Rash     Category: Allergy;         Physical Exam:     Vitals:    11/01/23 0917   BP: 124/80   Pulse: 88   Resp: 16   Temp: 97.9 °F (36.6 °C)   SpO2: 99%     Physical Exam  Chest:          Comments: Left breast well-healed surgical scar and left axillary dissection surgical scar no evidence of local recurrence or regional adenopathy. Right breast no palpable mass masses nipple discharge nipple retraction or skin changes. Right axillary and supraclavicular examination no palpable adenopathy. Patient was examined seated as well as supine position  Skin:     Comments: Right upper extremity lymphedema       Results & Discussion:   I did review outside films of her right humerus x-ray reports recommended MRI of the right humerus. I also discussed in detail the benefits of endocrine therapy breast cancer recurrence as well as contralateral breast cancer risk reduction I did discussed in detail nature of breast cancer and self breast examination and need for diet diagnostic mammograms she understands and  agrees . All patient questions were answered. Advance Care Planning/Advance Directives: Viviana Pichardo MD discussed the disease status with Kisha Muse  today 11/01/23  treatment plans and follow-up with the patient.

## 2023-11-12 ENCOUNTER — APPOINTMENT (INPATIENT)
Dept: RADIOLOGY | Facility: HOSPITAL | Age: 63
DRG: 563 | End: 2023-11-12
Payer: MEDICARE

## 2023-11-12 ENCOUNTER — TELEPHONE (OUTPATIENT)
Dept: OTHER | Facility: HOSPITAL | Age: 63
End: 2023-11-12

## 2023-11-12 ENCOUNTER — APPOINTMENT (EMERGENCY)
Dept: RADIOLOGY | Facility: HOSPITAL | Age: 63
DRG: 563 | End: 2023-11-12
Payer: MEDICARE

## 2023-11-12 ENCOUNTER — HOSPITAL ENCOUNTER (INPATIENT)
Facility: HOSPITAL | Age: 63
LOS: 2 days | Discharge: HOME HEALTH | DRG: 563 | End: 2023-11-14
Attending: EMERGENCY MEDICINE | Admitting: STUDENT IN AN ORGANIZED HEALTH CARE EDUCATION/TRAINING PROGRAM
Payer: MEDICARE

## 2023-11-12 ENCOUNTER — TELEPHONE (OUTPATIENT)
Dept: OTHER | Facility: OTHER | Age: 63
End: 2023-11-12

## 2023-11-12 DIAGNOSIS — S42.344A: Primary | ICD-10-CM

## 2023-11-12 DIAGNOSIS — I89.0 LYMPHEDEMA OF RIGHT ARM: ICD-10-CM

## 2023-11-12 PROBLEM — S42.309A HUMERUS FRACTURE: Status: ACTIVE | Noted: 2023-11-12

## 2023-11-12 LAB
ANION GAP SERPL CALCULATED.3IONS-SCNC: 6 MMOL/L
APTT PPP: 27 SECONDS (ref 23–37)
ATRIAL RATE: 57 BPM
BASOPHILS # BLD AUTO: 0.05 THOUSANDS/ÂΜL (ref 0–0.1)
BASOPHILS NFR BLD AUTO: 0 % (ref 0–1)
BUN SERPL-MCNC: 8 MG/DL (ref 5–25)
CALCIUM SERPL-MCNC: 8.5 MG/DL (ref 8.4–10.2)
CHLORIDE SERPL-SCNC: 105 MMOL/L (ref 96–108)
CO2 SERPL-SCNC: 23 MMOL/L (ref 21–32)
CREAT SERPL-MCNC: 0.47 MG/DL (ref 0.6–1.3)
EOSINOPHIL # BLD AUTO: 0.03 THOUSAND/ÂΜL (ref 0–0.61)
EOSINOPHIL NFR BLD AUTO: 0 % (ref 0–6)
ERYTHROCYTE [DISTWIDTH] IN BLOOD BY AUTOMATED COUNT: 12.2 % (ref 11.6–15.1)
GFR SERPL CREATININE-BSD FRML MDRD: 103 ML/MIN/1.73SQ M
GLUCOSE SERPL-MCNC: 126 MG/DL (ref 65–140)
HCT VFR BLD AUTO: 39.9 % (ref 34.8–46.1)
HGB BLD-MCNC: 13.7 G/DL (ref 11.5–15.4)
IMM GRANULOCYTES # BLD AUTO: 0.04 THOUSAND/UL (ref 0–0.2)
IMM GRANULOCYTES NFR BLD AUTO: 0 % (ref 0–2)
INR PPP: 0.93 (ref 0.84–1.19)
LYMPHOCYTES # BLD AUTO: 0.69 THOUSANDS/ÂΜL (ref 0.6–4.47)
LYMPHOCYTES NFR BLD AUTO: 6 % (ref 14–44)
MCH RBC QN AUTO: 30.5 PG (ref 26.8–34.3)
MCHC RBC AUTO-ENTMCNC: 34.3 G/DL (ref 31.4–37.4)
MCV RBC AUTO: 89 FL (ref 82–98)
MONOCYTES # BLD AUTO: 0.7 THOUSAND/ÂΜL (ref 0.17–1.22)
MONOCYTES NFR BLD AUTO: 6 % (ref 4–12)
NEUTROPHILS # BLD AUTO: 10.37 THOUSANDS/ÂΜL (ref 1.85–7.62)
NEUTS SEG NFR BLD AUTO: 88 % (ref 43–75)
NRBC BLD AUTO-RTO: 0 /100 WBCS
P AXIS: 67 DEGREES
PLATELET # BLD AUTO: 240 THOUSANDS/UL (ref 149–390)
PMV BLD AUTO: 8.9 FL (ref 8.9–12.7)
POTASSIUM SERPL-SCNC: 4.1 MMOL/L (ref 3.5–5.3)
PR INTERVAL: 182 MS
PROTHROMBIN TIME: 13 SECONDS (ref 11.6–14.5)
QRS AXIS: 40 DEGREES
QRSD INTERVAL: 112 MS
QT INTERVAL: 438 MS
QTC INTERVAL: 426 MS
RBC # BLD AUTO: 4.49 MILLION/UL (ref 3.81–5.12)
SODIUM SERPL-SCNC: 134 MMOL/L (ref 135–147)
T WAVE AXIS: 58 DEGREES
VENTRICULAR RATE: 57 BPM
WBC # BLD AUTO: 11.88 THOUSAND/UL (ref 4.31–10.16)

## 2023-11-12 PROCEDURE — 93010 ELECTROCARDIOGRAM REPORT: CPT | Performed by: INTERNAL MEDICINE

## 2023-11-12 PROCEDURE — 71045 X-RAY EXAM CHEST 1 VIEW: CPT

## 2023-11-12 PROCEDURE — 73060 X-RAY EXAM OF HUMERUS: CPT

## 2023-11-12 PROCEDURE — 96361 HYDRATE IV INFUSION ADD-ON: CPT

## 2023-11-12 PROCEDURE — 85025 COMPLETE CBC W/AUTO DIFF WBC: CPT | Performed by: PHYSICIAN ASSISTANT

## 2023-11-12 PROCEDURE — 96374 THER/PROPH/DIAG INJ IV PUSH: CPT

## 2023-11-12 PROCEDURE — 80048 BASIC METABOLIC PNL TOTAL CA: CPT | Performed by: PHYSICIAN ASSISTANT

## 2023-11-12 PROCEDURE — 85730 THROMBOPLASTIN TIME PARTIAL: CPT | Performed by: PHYSICIAN ASSISTANT

## 2023-11-12 PROCEDURE — 24505 CLTX HUMRL SHFT FX W/MNPJ: CPT | Performed by: PHYSICIAN ASSISTANT

## 2023-11-12 PROCEDURE — 99222 1ST HOSP IP/OBS MODERATE 55: CPT | Performed by: ORTHOPAEDIC SURGERY

## 2023-11-12 PROCEDURE — 85610 PROTHROMBIN TIME: CPT | Performed by: PHYSICIAN ASSISTANT

## 2023-11-12 PROCEDURE — 99285 EMERGENCY DEPT VISIT HI MDM: CPT

## 2023-11-12 PROCEDURE — 73090 X-RAY EXAM OF FOREARM: CPT

## 2023-11-12 PROCEDURE — 73070 X-RAY EXAM OF ELBOW: CPT

## 2023-11-12 PROCEDURE — 99223 1ST HOSP IP/OBS HIGH 75: CPT | Performed by: INTERNAL MEDICINE

## 2023-11-12 PROCEDURE — 36415 COLL VENOUS BLD VENIPUNCTURE: CPT | Performed by: PHYSICIAN ASSISTANT

## 2023-11-12 PROCEDURE — 93005 ELECTROCARDIOGRAM TRACING: CPT

## 2023-11-12 PROCEDURE — 73020 X-RAY EXAM OF SHOULDER: CPT

## 2023-11-12 PROCEDURE — 96375 TX/PRO/DX INJ NEW DRUG ADDON: CPT

## 2023-11-12 RX ORDER — MORPHINE SULFATE 10 MG/ML
6 INJECTION, SOLUTION INTRAMUSCULAR; INTRAVENOUS ONCE
Status: COMPLETED | OUTPATIENT
Start: 2023-11-12 | End: 2023-11-12

## 2023-11-12 RX ORDER — LETROZOLE 2.5 MG/1
2.5 TABLET, FILM COATED ORAL DAILY
Status: DISCONTINUED | OUTPATIENT
Start: 2023-11-12 | End: 2023-11-12

## 2023-11-12 RX ORDER — SACCHAROMYCES BOULARDII 250 MG
250 CAPSULE ORAL 2 TIMES DAILY
Status: DISCONTINUED | OUTPATIENT
Start: 2023-11-12 | End: 2023-11-14 | Stop reason: HOSPADM

## 2023-11-12 RX ORDER — SENNOSIDES 8.6 MG
2 TABLET ORAL ONCE
Status: COMPLETED | OUTPATIENT
Start: 2023-11-12 | End: 2023-11-12

## 2023-11-12 RX ORDER — MELATONIN
3000 DAILY
Status: DISCONTINUED | OUTPATIENT
Start: 2023-11-12 | End: 2023-11-14 | Stop reason: HOSPADM

## 2023-11-12 RX ORDER — ONDANSETRON 2 MG/ML
4 INJECTION INTRAMUSCULAR; INTRAVENOUS EVERY 6 HOURS PRN
Status: DISCONTINUED | OUTPATIENT
Start: 2023-11-12 | End: 2023-11-14 | Stop reason: HOSPADM

## 2023-11-12 RX ORDER — MORPHINE SULFATE 10 MG/ML
8 INJECTION, SOLUTION INTRAMUSCULAR; INTRAVENOUS ONCE
Status: DISCONTINUED | OUTPATIENT
Start: 2023-11-12 | End: 2023-11-14 | Stop reason: HOSPADM

## 2023-11-12 RX ORDER — DOCUSATE SODIUM 100 MG/1
100 CAPSULE, LIQUID FILLED ORAL 2 TIMES DAILY PRN
Status: DISCONTINUED | OUTPATIENT
Start: 2023-11-12 | End: 2023-11-14 | Stop reason: HOSPADM

## 2023-11-12 RX ORDER — PENICILLIN V POTASSIUM 250 MG/1
500 TABLET ORAL 2 TIMES DAILY
Status: DISCONTINUED | OUTPATIENT
Start: 2023-11-12 | End: 2023-11-12

## 2023-11-12 RX ORDER — KETAMINE HCL IN NACL, ISO-OSM 100MG/10ML
1 SYRINGE (ML) INJECTION ONCE
Status: COMPLETED | OUTPATIENT
Start: 2023-11-12 | End: 2023-11-12

## 2023-11-12 RX ORDER — PENICILLIN V POTASSIUM 250 MG/1
500 TABLET ORAL 2 TIMES DAILY
Status: DISCONTINUED | OUTPATIENT
Start: 2023-11-12 | End: 2023-11-14 | Stop reason: HOSPADM

## 2023-11-12 RX ORDER — DIPHENHYDRAMINE HCL 25 MG
50 TABLET ORAL
Status: DISCONTINUED | OUTPATIENT
Start: 2023-11-12 | End: 2023-11-14 | Stop reason: HOSPADM

## 2023-11-12 RX ORDER — EZETIMIBE 10 MG/1
10 TABLET ORAL DAILY
Status: DISCONTINUED | OUTPATIENT
Start: 2023-11-12 | End: 2023-11-14 | Stop reason: HOSPADM

## 2023-11-12 RX ORDER — HEPARIN SODIUM 5000 [USP'U]/ML
5000 INJECTION, SOLUTION INTRAVENOUS; SUBCUTANEOUS EVERY 8 HOURS SCHEDULED
Status: DISCONTINUED | OUTPATIENT
Start: 2023-11-12 | End: 2023-11-14 | Stop reason: HOSPADM

## 2023-11-12 RX ORDER — MORPHINE SULFATE 4 MG/ML
3 INJECTION, SOLUTION INTRAMUSCULAR; INTRAVENOUS ONCE
Status: COMPLETED | OUTPATIENT
Start: 2023-11-12 | End: 2023-11-12

## 2023-11-12 RX ORDER — LETROZOLE 2.5 MG/1
2.5 TABLET, FILM COATED ORAL
Status: DISCONTINUED | OUTPATIENT
Start: 2023-11-12 | End: 2023-11-14 | Stop reason: HOSPADM

## 2023-11-12 RX ORDER — MORPHINE SULFATE 4 MG/ML
4 INJECTION, SOLUTION INTRAMUSCULAR; INTRAVENOUS EVERY 4 HOURS PRN
Status: DISCONTINUED | OUTPATIENT
Start: 2023-11-12 | End: 2023-11-13

## 2023-11-12 RX ORDER — ACETAMINOPHEN 325 MG/1
650 TABLET ORAL EVERY 6 HOURS PRN
Status: DISCONTINUED | OUTPATIENT
Start: 2023-11-12 | End: 2023-11-13

## 2023-11-12 RX ORDER — ONDANSETRON 2 MG/ML
4 INJECTION INTRAMUSCULAR; INTRAVENOUS ONCE
Status: COMPLETED | OUTPATIENT
Start: 2023-11-12 | End: 2023-11-12

## 2023-11-12 RX ORDER — DRONABINOL 2.5 MG/1
15 CAPSULE ORAL 4 TIMES DAILY PRN
Status: DISCONTINUED | OUTPATIENT
Start: 2023-11-12 | End: 2023-11-14 | Stop reason: HOSPADM

## 2023-11-12 RX ADMIN — ACETAMINOPHEN 650 MG: 325 TABLET, FILM COATED ORAL at 22:17

## 2023-11-12 RX ADMIN — SODIUM CHLORIDE 1000 ML: 0.9 INJECTION, SOLUTION INTRAVENOUS at 13:01

## 2023-11-12 RX ADMIN — PENICILLIN V POTASSIUM 500 MG: 250 TABLET, FILM COATED ORAL at 22:18

## 2023-11-12 RX ADMIN — SENNOSIDES 17.2 MG: 8.6 TABLET, FILM COATED ORAL at 15:49

## 2023-11-12 RX ADMIN — Medication 68 MG: at 13:07

## 2023-11-12 RX ADMIN — DRONABINOL 15 MG: 2.5 CAPSULE ORAL at 16:49

## 2023-11-12 RX ADMIN — HEPARIN SODIUM 5000 UNITS: 5000 INJECTION INTRAVENOUS; SUBCUTANEOUS at 15:49

## 2023-11-12 RX ADMIN — MORPHINE SULFATE 4 MG: 4 INJECTION INTRAVENOUS at 18:38

## 2023-11-12 RX ADMIN — HEPARIN SODIUM 5000 UNITS: 5000 INJECTION INTRAVENOUS; SUBCUTANEOUS at 22:18

## 2023-11-12 RX ADMIN — MORPHINE SULFATE 6 MG: 10 INJECTION INTRAVENOUS at 13:56

## 2023-11-12 RX ADMIN — MORPHINE SULFATE 2 MG: 2 INJECTION, SOLUTION INTRAMUSCULAR; INTRAVENOUS at 19:28

## 2023-11-12 RX ADMIN — LETROZOLE 2.5 MG: 2.5 TABLET, FILM COATED ORAL at 22:18

## 2023-11-12 RX ADMIN — Medication 250 MG: at 16:49

## 2023-11-12 RX ADMIN — EZETIMIBE 10 MG: 10 TABLET ORAL at 15:49

## 2023-11-12 RX ADMIN — ONDANSETRON 4 MG: 2 INJECTION INTRAMUSCULAR; INTRAVENOUS at 13:05

## 2023-11-12 RX ADMIN — Medication 3000 UNITS: at 15:49

## 2023-11-12 NOTE — ASSESSMENT & PLAN NOTE
Chronic problem due to previous breast cancer treatment  She has had episodes of cellulitis, follows with ID, is on chronic penicillin therapy twice daily for suppression, continue

## 2023-11-12 NOTE — ED PROVIDER NOTES
History  Chief Complaint   Patient presents with    Arm Pain     Pt came in by EMS with c/o R arm pain. The pt fell on her R arm in the parking lot while assisting her mother in law into her nursing home. HPI patient is a 68-year-old female who was apparently staying her mother in a parking lot at a nursing home apparently she fell against a chair and apparently caught her right arm she fell down with her right arm extended. She complains of severe pain midshaft of her right humerus. She complains of pain going down her arm somewhat in the forearm. She denies any other injury. She denies  any head neck chest or abdominal injury. Patient presents with her right arm completely extended up her head over her head. Has reported that her right shoulder appears dislocated as long as she has a midshaft fracture of her right humerus. Arrives screaming in discomfort. Required 12 mg of morphine prehospital.  Required sedation conscious sedation here to move her arm down. He has a history of  lymphedema recurrent cellulitis of her right arm on twice daily penicillin . Past medical history of lymphedema of the right arm with recurrent cellulitis followed by infectious disease, carcinoma of the left breast breast.  Family is noncontributory no social history, non-smoker no history of drug abuse  Social history non-smoker no history of drug abuse    Prior to Admission Medications   Prescriptions Last Dose Informant Patient Reported? Taking?    HYDROmorphone (DILAUDID) 2 mg tablet  Self No No   Sig: Take 1 tablet (2 mg total) by mouth every 6 (six) hours as needed for severe pain Not on Discharge list.  Patient is taking for severe pain PRN every 6 hours Max Daily Amount: 8 mg   Patient not taking: Reported on 8/12/2022   Omega-3 Fatty Acids (FISH OIL PO) 11/12/2023 Self Yes Yes   Sig: Take by mouth   Probiotic Product (PROBIOTIC-10 PO) 11/11/2023 Self Yes Yes   Sig: Take by mouth   cholecalciferol (VITAMIN D3) 1,000 units tablet 11/12/2023 Self Yes Yes   Sig: Take 3,000 Units by mouth daily   diphenhydrAMINE (BENADRYL) 50 MG tablet 11/11/2023 Self Yes Yes   Sig: Take 50 mg by mouth daily at bedtime as needed for itching   docusate sodium (COLACE) 100 mg capsule   No No   Sig: Take 1 capsule (100 mg total) by mouth 2 (two) times a day as needed for constipation for up to 10 days   dronabinol (MARINOL) 5 MG capsule 11/12/2023 Self Yes Yes   Sig: Take 15 mg by mouth 4 (four) times a day (before meals and at bedtime)   ezetimibe (ZETIA) 10 mg tablet 11/12/2023 Self Yes Yes   Sig: Take 10 mg by mouth daily   letrozole (FEMARA) 2.5 mg tablet 11/11/2023 Self No Yes   Sig: TAKE 1 TABLET BY MOUTH EVERY DAY   multivitamin (THERAGRAN) TABS 11/12/2023 Self Yes Yes   Sig: Take 1 tablet by mouth daily   penicillin V potassium (VEETID) 500 mg tablet 11/12/2023 Self No Yes   Sig: Take 1 tablet (500 mg total) by mouth 2 (two) times a day   triamcinolone (KENALOG) 0.1 % cream  Self No No   Sig: Apply topically 2 (two) times a day as needed for irritation or rash   Patient not taking: Reported on 11/1/2023      Facility-Administered Medications: None       Past Medical History:   Diagnosis Date    Abnormal mammogram 05/15/2013    Anemia     Cancer ([address])     desmoitosis    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 ([address]) 04/19/2022    Chronic pain disorder     worse right side of body    Desmoid tumor     Last Assessed: 6/19/2017    History of transfusion     no reactions    Hyperlipidemia     Hypertension     Ovarian cancer ([address]) 1999?   Hysterectomy done    PONV (postoperative nausea and vomiting)        Past Surgical History:   Procedure Laterality Date    BREAST LUMPECTOMY Left 6/21/2022    Procedure: ANITA  DIRECTED LUMPECTOMY;  Surgeon: Stefany Mooney MD;  Location: [facility];  Service: Surgical Oncology    FRACTURE SURGERY      HYSTERECTOMY      LYMPH NODE DISSECTION Left 6/21/2022    Procedure: Filippo Atkins DISSECTION LEVEL I AND LEVEL II LYMPH NODES;  Surgeon: Avni Driver MD;  Location: [facility];  Service: Surgical Oncology    MRI BREAST BIOPSY LEFT (ALL INCLUSIVE) Left 5/20/2022    MRI BREAST BIOPSY RIGHT (ALL INCLUSIVE) Right 5/20/2022    OTHER SURGICAL HISTORY      Arm Incision: Dermoid tumor, right Arm     PARTIAL HYSTERECTOMY      MS COLONOSCOPY FLX DX W/COLLJ SPEC WHEN PFRMD N/A 8/15/2017    Procedure: COLONOSCOPY;  Surgeon: Renae Izaguirre MD;  Location: [facility]; Service: Gastroenterology    MS [address] WRST SURG W/RLS TRANSVRS CARPL LIGM Left 8/10/2021    Procedure: RELEASE LEFT CARPAL TUNNEL ENDOSCOPIC;  Surgeon: Clide Merlin, MD;  Location: [facility];  Service: Orthopedics    MS OPTX DSTL RADL I-ARTIC FX/EPIPHYSL SEP 3 FRAG Left 2/9/2021    Procedure: OPEN REDUCTION W/ INTERNAL FIXATION (ORIF) RADIUS / ULNA (WRIST) left;  Surgeon: Clide Merlin, MD;  Location[facility];  Service: Orthopedics    SKIN BIOPSY      SKIN CANCER EXCISION      Melanoma Excision     TONSILLECTOMY      US BREAST NEEDLE LOC LEFT Left 6/16/2022    US BREAST NEEDLE LOC RIGHT EACH ADDITIONAL Right 6/16/2022    US GUIDED BREAST LYMPH NODE BIOPSY LEFT Left 4/14/2022       Family History   Problem Relation Age of Onset    Diabetes Mother     No Known Problems Father     Cancer Sister     Lung cancer Sister     Pancreatic cancer Sister     No Known Problems Maternal Grandmother     No Known Problems Maternal Grandfather     No Known Problems Paternal Grandmother     No Known Problems Paternal Grandfather     Breast cancer Neg Hx      I have reviewed and agree with the history as documented.     E-Cigarette/Vaping    E-Cigarette Use Never User      E-Cigarette/Vaping Substances    Nicotine No     THC No     CBD No     Flavoring No     Other No     Unknown No      Social History     Tobacco Use    Smoking status: Never    Smokeless tobacco: Never   Vaping Use    Vaping Use: Never used   Substance Use Topics    Alcohol use: Not Currently     Alcohol/week: 5.0 standard drinks of alcohol     Types: 5 Shots of liquor per week     Comment: Used mostly as a painkiller when needed before bedtime    Drug use: Yes     Frequency: 28.0 times per week     Types: Marijuana     Comment: THC Medical marijuana       Review of Systems   Constitutional:  Negative for fever. HENT:  Negative for congestion. Eyes:  Negative for pain and redness. Respiratory:  Negative for cough and shortness of breath. Cardiovascular:  Negative for chest pain. Gastrointestinal:  Negative for abdominal pain and vomiting. Musculoskeletal:  Positive for joint swelling. Severe right upper extremity pain    Physical Exam  Physical Exam  Vitals and nursing note reviewed. Constitutional:       Appearance: She is well-developed. HENT:      Head: Normocephalic. Right Ear: External ear normal.      Left Ear: External ear normal.      Nose: Nose normal.      Mouth/Throat:      Mouth: Mucous membranes are moist.      Pharynx: Oropharynx is clear. Eyes:      General: Lids are normal.      Extraocular Movements: Extraocular movements intact. Pupils: Pupils are equal, round, and reactive to light. Neck:      Comments: Cleared by Nexus criteria  Cardiovascular:      Rate and Rhythm: Normal rate and regular rhythm. Pulmonary:      Effort: Pulmonary effort is normal. No respiratory distress. Abdominal:      General: Abdomen is flat. Bowel sounds are normal.   Musculoskeletal:         General: Deformity present. Cervical back: Normal range of motion and neck supple. Comments: Obvious deformity of the right shoulder and tenderness of the right upper arm. Obvious midshaft humeral fracture displaces with any movement. Still neurovascular tendon intact good distal pulses and sensation. Skin:     General: Skin is warm and dry. Neurological:      Mental Status: She is alert and oriented to person, place, and time.    Psychiatric:         Mood and Affect: Mood normal.         Vital Signs  ED Triage Vitals   Temperature Pulse Respirations Blood Pressure SpO2   11/12/23 1622 11/12/23 1303 11/12/23 1305 11/12/23 1305 11/12/23 1303   (!) 97.3 °F (36.3 °C) 73 18 168/77 100 %      Temp src Heart Rate Source Patient Position - Orthostatic VS BP Location FiO2 (%)   -- 11/12/23 1305 11/12/23 1315 11/12/23 1310 --    Monitor Lying Left arm       Pain Score       11/12/23 1356       8           Vitals:    11/12/23 1345 11/12/23 1430 11/12/23 1515 11/12/23 1622   BP: 159/73 156/72 153/67 155/94   Pulse: 73 66 64 70   Patient Position - Orthostatic VS:  Lying           Visual Acuity      ED Medications  Medications   morphine injection 8 mg (8 mg Intravenous Not Given 11/12/23 1524)   cholecalciferol (VITAMIN D3) tablet 3,000 Units (3,000 Units Oral Given 11/12/23 1549)   diphenhydrAMINE (BENADRYL) tablet 50 mg (has no administration in time range)   docusate sodium (COLACE) capsule 100 mg (has no administration in time range)   dronabinol (MARINOL) capsule 15 mg (has no administration in time range)   ezetimibe (ZETIA) tablet 10 mg (10 mg Oral Given 11/12/23 1549)   letrozole [facility]) tablet 2.5 mg (has no administration in time range)   saccharomyces boulardii (FLORASTOR) capsule 250 mg (has no administration in time range)   acetaminophen (TYLENOL) tablet 650 mg (has no administration in time range)   ondansetron (ZOFRAN) injection 4 mg (has no administration in time range)   heparin (porcine) subcutaneous injection 5,000 Units (5,000 Units Subcutaneous Given 11/12/23 1549)   morphine injection 2 mg (has no administration in time range)   morphine injection 4 mg (has no administration in time range)   penicillin V potassium (VEETID) tablet 500 mg (has no administration in time range)   morphine (PF) (FOR EMS ONLY) 4 mg/mL injection 12 mg (0 mg Does not apply Given to EMS 11/12/23 1317)   morphine injection 6 mg (6 mg Intravenous Given 11/12/23 6341)   Ketamine HCl 68 mg (68 mg Intravenous Given 11/12/23 1307)   sodium chloride 0.9 % bolus 1,000 mL (0 mL Intravenous Stopped 11/12/23 1401)   ondansetron (ZOFRAN) injection 4 mg (4 mg Intravenous Given 11/12/23 1305)   senna (SENOKOT) tablet 17.2 mg (17.2 mg Oral Given 11/12/23 1549)       Diagnostic Studies  Results Reviewed       Procedure Component Value Units Date/Time    Basic metabolic panel [[id number]]  (Abnormal) Collected: 11/12/23 1419    Lab Status: Final result Specimen: Blood from Arm, Left Updated: 11/12/23 1447     Sodium 134 mmol/L      Potassium 4.1 mmol/L      Chloride 105 mmol/L      CO2 23 mmol/L      ANION GAP 6 mmol/L      BUN 8 mg/dL      Creatinine 0.47 mg/dL      Glucose 126 mg/dL      Calcium 8.5 mg/dL      eGFR 103 ml/min/1.73sq m     Narrative:      [facility] ([facility]D):     Stage 1 with normal or high GFR (GFR > 90 mL/min/1.73 square meters)    Stage 2 Mild CKD (GFR = 60-89 mL/min/1.73 square meters)    Stage 3A Moderate CKD (GFR = 45-59 mL/min/1.73 square meters)    Stage 3B Moderate CKD (GFR = 30-44 mL/min/1.73 square meters)    Stage 4 Severe CKD (GFR = 15-29 mL/min/1.73 square meters)    Stage 5 End Stage CKD (GFR <15 mL/min/1.73 square meters)  Note: GFR calculation is accurate only with a steady state creatinine    Protime-INR [[id number]]  (Normal) Collected: 11/12/23 1419    Lab Status: Final result Specimen: Blood from Arm, Left Updated: 11/12/23 1444     Protime 13.0 seconds      INR 0.93    APTT [[id number]]  (Normal) Collected: 11/12/23 1419    Lab Status: Final result Specimen: Blood from Arm, Left Updated: 11/12/23 1444     PTT 27 seconds     CBC and differential [[id number]]  (Abnormal) Collected: 11/12/23 1419    Lab Status: Final result Specimen: Blood from Arm, Left Updated: 11/12/23 1424     WBC 11.88 Thousand/uL      RBC 4.49 Million/uL      Hemoglobin 13.7 g/dL      Hematocrit 39.9 %      MCV 89 fL      MCH 30.5 pg      MCHC 34.3 g/dL      RDW 12.2 %      MPV 8.9 fL      Platelets 123 Thousands/uL      nRBC 0 /100 WBCs      Neutrophils Relative 88 %      Immat GRANS % 0 %      Lymphocytes Relative 6 %      Monocytes Relative 6 %      Eosinophils Relative 0 %      Basophils Relative 0 %      Neutrophils Absolute 10.37 Thousands/µL      Immature Grans Absolute 0.04 Thousand/uL      Lymphocytes Absolute 0.69 Thousands/µL      Monocytes Absolute 0.70 Thousand/µL      Eosinophils Absolute 0.03 Thousand/µL      Basophils Absolute 0.05 Thousands/µL                    XR humerus RIGHT    (Results Pending)   XR forearm 2 views RIGHT    (Results Pending)   XR chest 1 view portable    (Results Pending)   XR shoulder 1 vw right    (Results Pending)   XR elbow 2 vw right    (Results Pending)              Procedures  Pre-Procedural Sedation    Performed by: Wendy Floyd MD  Authorized by: Wendy Floyd MD    Consent:     Consent obtained:  Verbal  Universal protocol:     Patient identity confirmation method:  Verbally with patient  Indications:     Sedation purpose:  Dislocation reduction    Procedure necessitating sedation performed by:  Physician performing sedation    Intended level of sedation:  Moderate (conscious sedation)  Pre-sedation assessment:     ASA classification: class 1 - normal, healthy patient      Neck mobility: normal      Mouth opening:  3 or more finger widths    Mallampati score:  I - soft palate, uvula, fauces, pillars visible    Pre-sedation assessments completed and reviewed: airway patency and mental status      History of difficult intubation: no    Orthopedic injury treatment    Date/Time: 11/12/2023 1:58 PM    Performed by: Wendy Floyd MD  Authorized by: Wendy Floyd MD  Universal Protocol:  Procedure performed by:  Patient identity confirmed: verbally with patient    Injury location:  Upper arm  Location details:  Right upper arm  Injury type:  Fracture  Fracture type: humeral shaft    Neurovascular status: Neurovascularly intact    Distal perfusion: normal    Neurological function: normal    Range of motion: normal    Immobilization:  Sling and splint  Supplies used:  Cotton padding and Ortho-Glass  Neurovascular status: Neurovascularly intact    Distal perfusion: normal    Neurological function: normal    Range of motion: normal    Procedural Sedation    Date/Time: 11/12/2023 1:14 PM    Performed by: Anne Wing MD  Authorized by: Anne Wing MD    Immediate pre-procedure details:     Reassessment: Patient reassessed immediately prior to procedure      Reviewed: vital signs      Verified: bag valve mask available, emergency equipment available, intubation equipment available and IV patency confirmed    Procedure details (see MAR for exact dosages):     Sedation start time:  11/12/2023 1:14 PM    Preoxygenation:  Room air, nasal cannula and nonrebreather mask    Sedation:  Ketamine    Intra-procedure monitoring:  Blood pressure monitoring, continuous capnometry, cardiac monitor, continuous pulse oximetry, frequent vital sign checks and frequent LOC assessments    Intra-procedure events: none      Intra-procedure management:  Airway repositioning    Sedation end time:  11/12/2023 2:00 PM    Total sedation time (minutes):  50  Post-procedure details:     Post-sedation assessment completed:  11/12/2023 2:17 PM    Recovery: Patient returned to pre-procedure baseline      Post-sedation assessments completed and reviewed: airway patency, cardiovascular function, hydration status, mental status and pain level      Patient tolerance: Tolerated well, no immediate complications  Reduction of the right shoulder essentially by direct traction by me on arrival.  We will look reposition in the arm x-rays and splinting with procedural sedation. ED Course           With infectious disease at the family's request, they advised continue the penicillin twice daily.   I spoke with orthopedics Dr. Fredo Spivey orthopedics will see in consultation. Will require admission for pain control. Initial x-rays showed a obvious midshaft fracture spiral of the right humerus, right shoulder appeared to be reduced. Electrolytes showed a serum sodium 134, creatinine of 0.47, normal electrolytes. White count was minimally elevated 11.8 nonspecific finding, hemoglobin was normal at 13 no sign of anemia. Splint application: Splint was applied, Applied by  myself advanced practitioner and the tech good position, neurovascular tendon intact, good capillary refill. Evaluated by me prior to discharge. SBIRT 20yo+      Flowsheet Row Most Recent Value   Initial Alcohol Screen: US AUDIT-C     1. How often do you have a drink containing alcohol? 0 Filed at: 11/12/2023 1420   2. How many drinks containing alcohol do you have on a typical day you are drinking? 0 Filed at: 11/12/2023 1420   3b. FEMALE Any Age, or MALE 65+: How often do you have 4 or more drinks on one occassion? 0 Filed at: 11/12/2023 1420   Audit-C Score 0 Filed at: 11/12/2023 1420   ODALIS: How many times in the past year have you. .. Used an illegal drug or used a prescription medication for non-medical reasons? Never Filed at: 11/12/2023 1420                      Medical Decision Making  Medical decision making two 9year-old female history of lymphedema of the right arm with recurrent cellulitis sees an infectious disease doctor and on prophylactic penicillin. Presents emergency department after a fall with her arm over her head in the position of Luxatio erecta. I did give medical command and the patient she was extremely comfortable in the field and was given 12 mg of morphine prior to arrival.  We were able to bring the arm down with me placing pressure in her armpit and her shoulder. To reduce. X-rays subsequently showed a reduced right shoulder. Patient did have a midshaft of her right humerus easily seen on x-ray. Neurovascular tendon seems intact.   Her skin was intact there was no sign of skin breakdown. Patient required ketamine sedation for positioning of her arm x-rays and splinting. Splint was applied with the technician advanced practitioner myself. Neurovascular tendon intact post splinting. Patient tolerated the procedure and procedural sedation well. Discussed with infectious disease advised patient stay on penicillin prophylaxis. Discussed with orthopedics will see the patient in consultation plan nonoperative management. Discussed with the patient agrees to hospitalization. Risk  Prescription drug management. Decision regarding hospitalization. Disposition  Final diagnoses:   Nondisplaced spiral fracture of shaft of right humerus   Lymphedema of right arm     Time reflects when diagnosis was documented in both MDM as applicable and the Disposition within this note       Time User Action Codes Description Comment    11/12/2023  2:25 PM Sylwia Winston Add [C96.180O] Nondisplaced spiral fracture of shaft of right humerus     11/12/2023  2:25 PM Sylwia Schultz [I89.0] Lymphedema of right arm           ED Disposition       ED Disposition   Admit    Condition   Stable    Date/Time   Sun Nov 12, 2023 1432    Comment   Case was discussed with the hospitalist service and the patient's admission status was agreed to be Admission Status: inpatient status to the service of Dr. Bo Thakur .                Follow-up Information    None         Current Discharge Medication List        CONTINUE these medications which have NOT CHANGED    Details   cholecalciferol (VITAMIN D3) 1,000 units tablet Take 3,000 Units by mouth daily      diphenhydrAMINE (BENADRYL) 50 MG tablet Take 50 mg by mouth daily at bedtime as needed for itching      dronabinol (MARINOL) 5 MG capsule Take 15 mg by mouth 4 (four) times a day (before meals and at bedtime)      ezetimibe (ZETIA) 10 mg tablet Take 10 mg by mouth daily      letrozole (FEMARA) 2.5 mg tablet TAKE 1 TABLET BY MOUTH EVERY DAY  Qty: 90 tablet, Refills: 3    Associated Diagnoses: 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 (HCC)      multivitamin (THERAGRAN) TABS Take 1 tablet by mouth daily      Omega-3 Fatty Acids (FISH OIL PO) Take by mouth      penicillin V potassium (VEETID) 500 mg tablet Take 1 tablet (500 mg total) by mouth 2 (two) times a day  Qty: 180 tablet, Refills: 3    Associated Diagnoses: Cellulitis of right upper extremity      Probiotic Product (PROBIOTIC-10 PO) Take by mouth      docusate sodium (COLACE) 100 mg capsule Take 1 capsule (100 mg total) by mouth 2 (two) times a day as needed for constipation for up to 10 days  Qty: 20 capsule, Refills: 0    Associated Diagnoses: 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 (HCC)      HYDROmorphone (DILAUDID) 2 mg tablet Take 1 tablet (2 mg total) by mouth every 6 (six) hours as needed for severe pain Not on Discharge list.  Patient is taking for severe pain PRN every 6 hours Max Daily Amount: 8 mg  Qty: 20 tablet, Refills: 0    Associated Diagnoses: Malignant neoplasm of lower-inner quadrant of left breast in female, estrogen receptor positive       triamcinolone (KENALOG) 0.1 % cream Apply topically 2 (two) times a day as needed for irritation or rash  Qty: 80 g, Refills: 0    Associated Diagnoses: Malignant neoplasm of lower-inner quadrant of left breast in female, estrogen receptor positive              No discharge procedures on file.     PDMP Review         Value Time User    PDMP Reviewed  Yes 6/1/2022  4:08 PM Ashly Adams MD            ED Provider  Electronically Signed by             Sade Wilks MD  11/12/23 [phone]

## 2023-11-12 NOTE — ASSESSMENT & PLAN NOTE
Patient had a mechanical fall  R humerus fracture noted on X ray  Patient did have some immobilization in a sling in the ED. Pain control  Neurovascular checks  ED discussed with orthopedics, no plan for intervention today - I will keep NPO after midnight, in case intervention needed tomorrow in regards to preoperative evaluation patient has exercise capacity >4 METS, denies chest pain or SOB, palpitations or syncope. EKG ordered but do not foresee need for any further workup.  Revised cardiac risk index 0 points Class I Risk  PT/OT

## 2023-11-12 NOTE — TELEPHONE ENCOUNTER
Contacted by patient's . Unfortunately patient is heading to the hospital as there is concern that her right arm may be broken. The patient is known to our office for lymphedema in the right arm for which she is on suppressive penicillin along with lymphedema pump and is intermittently treated with Keflex for recurrent episodes. Currently they are just checking into the ER and we do not have any further imaging or information. Patient's  expresses concern about patient not being able to use her lymphedema pump. Ultimately I discussed with him that they would have to be evaluated to determine what options are available from an orthopedic standpoint. Of course if possible we would favor nonoperative management and patient's  understands given the recurrent episodes of infection in that arm alone. He is concerned though if she has a cast put on that she cannot use her pump. We discussed ultimately then that we would have to then focus on her suppressive oral antibiotic and potential massage of any exposed areas of the arm and maintaining elevation as much as possible. The fracture if confirmed does need to be treated appropriately though regardless. He expressed understanding. I reminded him to let providers know of her chronic suppressive penicillin and that should be restarted if she is admitted. I let him know that if there are further questions provider seeing them can reach out to our service via [facility] text.   I will forward this phone note to our office staff and to Dr. Starr Brunner.

## 2023-11-12 NOTE — TELEPHONE ENCOUNTER
Patient's  Mike Raymundo called today regarding Yang Courts broke her Right Arm, she is on her way by Ambulance to the ER at [facility] in [facility]. She is being treaded for Lymphoedema by Dr. Millie Iverson  and wants to talk to on call Provider.     Paged the on call Provider Via TT.

## 2023-11-12 NOTE — H&P
[address]  H&P  Name: Joshua Jones 70 y.o. female I MRN: [id number]  Unit/Bed#: ED 22 I Date of Admission: 11/12/2023   Date of Service: 11/12/2023 I Hospital Day: 0      Assessment/Plan   * Humerus fracture  Assessment & Plan  Patient had a mechanical fall  R humerus fracture noted on X ray  Patient did have some immobilization in a sling in the ED. Pain control  Neurovascular checks  ED discussed with orthopedics, no plan for intervention today - I will keep NPO after midnight, in case intervention needed tomorrow in regards to preoperative evaluation patient has exercise capacity >4 METS, denies chest pain or SOB, palpitations or syncope. EKG ordered but do not foresee need for any further workup. Revised cardiac risk index 0 points Class I Risk  PT/OT    Lymphedema of right arm  Assessment & Plan  Chronic problem due to previous breast cancer treatment  She has had episodes of cellulitis, follows with ID, is on chronic penicillin therapy twice daily for suppression, continue    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 (HCC)  Assessment & Plan  Reported history of breast cancer  Continue letrozole         Hypertension  Elevated on arrival but likely due to pain  Monitor, if persistently elevated despite pain control will consider oral antihypertensives    VTE Prophylaxis: Heparin  / sequential compression device   Code Status: FC  POLST: POLST form is not discussed and not completed at this time. Discussion with family: Patient    Anticipated Length of Stay:  Patient will be admitted on an Inpatient basis with an anticipated length of stay of  at least 2 midnights. Justification for Hospital Stay: R humerus fracture    Total Time for Visit, including Counseling / Coordination of Care: 70 minutes. Greater than 50% of this total time spent on direct patient counseling and coordination of care.     Chief Complaint:   R arm pain    History of Present Illness:    Magdalena Paris Meera Theodore is a 70 y.o. female who presents with right arm pain. The patient presented with severe right arm pain after having a mechanical fall from her own height. X-ray does reveal fracture in the humerus. Other than the patient does have a past medical history of breast cancer status postmastectomy, chronic lymphedema in the right upper extremity, chronic recurrent cellulitis on chronic  antibiotic suppression with penicillin twice daily. She denies any chest pain or cardiac issues. Review of Systems:    Review of Systems   Musculoskeletal:         R arm pain   All other systems reviewed and are negative. Past Medical and Surgical History:     Past Medical History:   Diagnosis Date    Abnormal mammogram 05/15/2013    Anemia     Cancer ([address])     desmoitosis    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 ([address]) 04/19/2022    Chronic pain disorder     worse right side of body    Desmoid tumor     Last Assessed: 6/19/2017    History of transfusion     no reactions    Hyperlipidemia     Hypertension     Ovarian cancer ([address]) 1999?   Hysterectomy done    PONV (postoperative nausea and vomiting)        Past Surgical History:   Procedure Laterality Date    BREAST LUMPECTOMY Left 6/21/2022    Procedure: ANITA  DIRECTED LUMPECTOMY;  Surgeon: Vesna Sommers MD;  [facility];  Service: Surgical Oncology    FRACTURE SURGERY      HYSTERECTOMY      LYMPH NODE DISSECTION Left 6/21/2022    Procedure: AXILLARY DISSECTION LEVEL I AND LEVEL II LYMPH NODES;  Surgeon: Vesna Sommers MD;  [facility];  Service: Surgical Oncology    MRI BREAST BIOPSY LEFT (ALL INCLUSIVE) Left 5/20/2022    MRI BREAST BIOPSY RIGHT (ALL INCLUSIVE) Right 5/20/2022    OTHER SURGICAL HISTORY      Arm Incision: Dermoid tumor, right Arm     PARTIAL HYSTERECTOMY      ND COLONOSCOPY FLX DX W/COLLJ SPEC WHEN PFRMD N/A 8/15/2017    Procedure: COLONOSCOPY;  Surgeon: Servando Dewey MD;  Location: [facility]; Service: Gastroenterology    [place] [address] WRST SURG W/RLS TRANSVRS CARPL LIGM Left 8/10/2021    Procedure: RELEASE LEFT CARPAL TUNNEL ENDOSCOPIC;  Surgeon: Davie Junior MD;  [facility];  Service: Orthopedics    VT OPTX DSTL RADL I-ARTIC FX/EPIPHYSL SEP 3 FRAG Left 2/9/2021    Procedure: OPEN REDUCTION W/ INTERNAL FIXATION (ORIF) RADIUS / ULNA (WRIST) left;  Surgeon: Davie Junior MD;  [facility];  Service: Orthopedics    SKIN BIOPSY      SKIN CANCER EXCISION      Melanoma Excision     TONSILLECTOMY      US BREAST NEEDLE LOC LEFT Left 6/16/2022    US BREAST NEEDLE LOC RIGHT EACH ADDITIONAL Right 6/16/2022    US GUIDED BREAST LYMPH NODE BIOPSY LEFT Left 4/14/2022       Meds/Allergies:    Prior to Admission medications    Medication Sig Start Date End Date Taking?  Authorizing Provider   cholecalciferol (VITAMIN D3) 1,000 units tablet Take 3,000 Units by mouth daily    Historical Provider, MD   diphenhydrAMINE (BENADRYL) 50 MG tablet Take 50 mg by mouth daily at bedtime as needed for itching    Historical Provider, MD   docusate sodium (COLACE) 100 mg capsule Take 1 capsule (100 mg total) by mouth 2 (two) times a day as needed for constipation for up to 10 days 6/23/22 7/3/22  Mercy Garcias PA-C   dronabinol (MARINOL) 5 MG capsule Take 15 mg by mouth 4 (four) times a day (before meals and at bedtime)    Historical Provider, MD   ezetimibe (ZETIA) 10 mg tablet Take 10 mg by mouth daily 10/3/23   Historical Provider, MD   HYDROmorphone (DILAUDID) 2 mg tablet Take 1 tablet (2 mg total) by mouth every 6 (six) hours as needed for severe pain Not on Discharge list.  Patient is taking for severe pain PRN every 6 hours Max Daily Amount: 8 mg  Patient not taking: Reported on 8/12/2022 7/6/22   Evelyn Lezama MD   letrozole [facility]) 2.5 mg tablet TAKE 1 TABLET BY MOUTH EVERY DAY 7/17/23   Destini Noriega MD   multivitamin (THERAGRAN) TABS Take 1 tablet by mouth daily    Historical Provider, MD   Omega-3 Fatty Acids (FISH OIL PO) Take by mouth    Historical Provider, MD   penicillin V potassium (VEETID) 500 mg tablet Take 1 tablet (500 mg total) by mouth 2 (two) times a day 8/15/23 11/13/23  Sun Herrera MD   Probiotic Product (PROBIOTIC-10 PO) Take by mouth    Historical Provider, MD   triamcinolone (KENALOG) 0.1 % cream Apply topically 2 (two) times a day as needed for irritation or rash  Patient not taking: Reported on 2023 10/10/22   Hilliard Apgar, MD     I have reviewed home medications with patient personally. Allergies: Allergies   Allergen Reactions    Chlorpromazine Anaphylaxis     PHENOTHIAZINE=ANAPHYLAXIS    Codeine Anaphylaxis     Anaphylaxis  abd pain. Meperidine Anaphylaxis, Hives and Vomiting    Phenothiazines Anaphylaxis and Throat Swelling     Category: Allergy; Saccharin - Food Allergy Anaphylaxis    Ampicillin-Sulbactam Sodium Hives    Aspirin GI Intolerance and Abdominal Pain     Severe GERD    Gluten Meal - Food Allergy GI Intolerance    Ibuprofen Hives     H    Levofloxacin Hives    Chocolate - Food Allergy GI Intolerance    Lactose - Food Allergy GI Intolerance    Neomycin Other (See Comments), Edema and Hives     Category: Allergy;   swelling    Clive - Food Allergy Rash    Latex Hives and Rash     Category:  Allergy;        Social History:     Marital Status: /Civil Union   Substance Use History:   Social History     Substance and Sexual Activity   Alcohol Use Not Currently    Alcohol/week: 5.0 standard drinks of alcohol    Types: 5 Shots of liquor per week    Comment: Used mostly as a painkiller when needed before bedtime     Social History     Tobacco Use   Smoking Status Never   Smokeless Tobacco Never     Social History     Substance and Sexual Activity   Drug Use Yes    Frequency: 28.0 times per week    Types: Marijuana    Comment: THC Medical marijuana       Family History:    non-contributory    Physical Exam:     Vitals:   Blood Pressure: 156/72 (11/12/23 1430)  Pulse: 66 (11/12/23 1430)  Respirations: 16 (11/12/23 1430)  SpO2: 99 % (11/12/23 1430)    Physical Exam  Vitals and nursing note reviewed. Constitutional:       General: She is not in acute distress. Appearance: Normal appearance. She is normal weight. She is not ill-appearing, toxic-appearing or diaphoretic. HENT:      Head: Normocephalic and atraumatic. Right Ear: External ear normal.      Left Ear: External ear normal.      Nose: Nose normal. No congestion. Mouth/Throat:      Mouth: Mucous membranes are moist.      Pharynx: Oropharynx is clear. No oropharyngeal exudate or posterior oropharyngeal erythema. Eyes:      General: No scleral icterus. Right eye: No discharge. Left eye: No discharge. Extraocular Movements: Extraocular movements intact. Conjunctiva/sclera: Conjunctivae normal.      Pupils: Pupils are equal, round, and reactive to light. Cardiovascular:      Rate and Rhythm: Normal rate and regular rhythm. Pulses: Normal pulses. Heart sounds: Normal heart sounds. No murmur heard. No friction rub. No gallop. Pulmonary:      Effort: Pulmonary effort is normal. No respiratory distress. Breath sounds: Normal breath sounds. No stridor. No wheezing, rhonchi or rales. Chest:      Chest wall: No tenderness. Abdominal:      General: Abdomen is flat. Bowel sounds are normal. There is no distension. Palpations: Abdomen is soft. There is no mass. Tenderness: There is no abdominal tenderness. There is no guarding or rebound. Musculoskeletal:         General: No swelling, tenderness, deformity or signs of injury. Normal range of motion. Cervical back: Normal range of motion and neck supple. No rigidity. No muscular tenderness. Comments: The right upper extremity is currently immobilized, capillary refill time is preserved distally   Skin:     General: Skin is warm and dry.       Capillary Refill: Capillary refill takes less than 2 seconds. Coloration: Skin is not jaundiced or pale. Findings: No bruising, erythema, lesion or rash. Neurological:      General: No focal deficit present. Mental Status: She is alert and oriented to person, place, and time. Mental status is at baseline. Cranial Nerves: No cranial nerve deficit. Sensory: No sensory deficit. Motor: No weakness. Coordination: Coordination normal.   Psychiatric:         Mood and Affect: Mood normal.         Behavior: Behavior normal.         Thought Content: Thought content normal.         Judgment: Judgment normal.             Additional Data:     Lab Results: I have personally reviewed pertinent reports. Results from last 7 days   Lab Units 11/12/23  1419   WBC Thousand/uL 11.88*   HEMOGLOBIN g/dL 13.7   HEMATOCRIT % 39.9   PLATELETS Thousands/uL 240   NEUTROS PCT % 88*   LYMPHS PCT % 6*   MONOS PCT % 6   EOS PCT % 0     Results from last 7 days   Lab Units 11/12/23  1419   SODIUM mmol/L 134*   POTASSIUM mmol/L 4.1   CHLORIDE mmol/L 105   CO2 mmol/L 23   BUN mg/dL 8   CREATININE mg/dL 0.47*   ANION GAP mmol/L 6   CALCIUM mg/dL 8.5   GLUCOSE RANDOM mg/dL 126     Results from last 7 days   Lab Units 11/12/23  1419   INR  0.93                   Imaging: I have personally reviewed pertinent reports. XR humerus RIGHT    (Results Pending)   XR forearm 2 views RIGHT    (Results Pending)   XR chest 1 view portable    (Results Pending)   XR shoulder 1 vw right    (Results Pending)   XR elbow 2 vw right    (Results Pending)         Allscripts / Epic Records Reviewed: Yes     ** Please Note: This note has been constructed using a voice recognition system.  **

## 2023-11-13 LAB
ANION GAP SERPL CALCULATED.3IONS-SCNC: 10 MMOL/L
BASOPHILS # BLD AUTO: 0.03 THOUSANDS/ÂΜL (ref 0–0.1)
BASOPHILS NFR BLD AUTO: 0 % (ref 0–1)
BUN SERPL-MCNC: 8 MG/DL (ref 5–25)
CALCIUM SERPL-MCNC: 9.1 MG/DL (ref 8.4–10.2)
CHLORIDE SERPL-SCNC: 100 MMOL/L (ref 96–108)
CO2 SERPL-SCNC: 23 MMOL/L (ref 21–32)
CREAT SERPL-MCNC: 0.5 MG/DL (ref 0.6–1.3)
EOSINOPHIL # BLD AUTO: 0.01 THOUSAND/ÂΜL (ref 0–0.61)
EOSINOPHIL NFR BLD AUTO: 0 % (ref 0–6)
ERYTHROCYTE [DISTWIDTH] IN BLOOD BY AUTOMATED COUNT: 12.3 % (ref 11.6–15.1)
GFR SERPL CREATININE-BSD FRML MDRD: 101 ML/MIN/1.73SQ M
GLUCOSE SERPL-MCNC: 120 MG/DL (ref 65–140)
HCT VFR BLD AUTO: 41.4 % (ref 34.8–46.1)
HGB BLD-MCNC: 14.5 G/DL (ref 11.5–15.4)
IMM GRANULOCYTES # BLD AUTO: 0.01 THOUSAND/UL (ref 0–0.2)
IMM GRANULOCYTES NFR BLD AUTO: 0 % (ref 0–2)
LYMPHOCYTES # BLD AUTO: 1.32 THOUSANDS/ÂΜL (ref 0.6–4.47)
LYMPHOCYTES NFR BLD AUTO: 20 % (ref 14–44)
MCH RBC QN AUTO: 30.9 PG (ref 26.8–34.3)
MCHC RBC AUTO-ENTMCNC: 35 G/DL (ref 31.4–37.4)
MCV RBC AUTO: 88 FL (ref 82–98)
MONOCYTES # BLD AUTO: 0.77 THOUSAND/ÂΜL (ref 0.17–1.22)
MONOCYTES NFR BLD AUTO: 12 % (ref 4–12)
NEUTROPHILS # BLD AUTO: 4.53 THOUSANDS/ÂΜL (ref 1.85–7.62)
NEUTS SEG NFR BLD AUTO: 68 % (ref 43–75)
NRBC BLD AUTO-RTO: 0 /100 WBCS
PLATELET # BLD AUTO: 284 THOUSANDS/UL (ref 149–390)
PMV BLD AUTO: 8.8 FL (ref 8.9–12.7)
POTASSIUM SERPL-SCNC: 3.6 MMOL/L (ref 3.5–5.3)
RBC # BLD AUTO: 4.69 MILLION/UL (ref 3.81–5.12)
SODIUM SERPL-SCNC: 133 MMOL/L (ref 135–147)
WBC # BLD AUTO: 6.67 THOUSAND/UL (ref 4.31–10.16)

## 2023-11-13 PROCEDURE — 85025 COMPLETE CBC W/AUTO DIFF WBC: CPT | Performed by: INTERNAL MEDICINE

## 2023-11-13 PROCEDURE — 80048 BASIC METABOLIC PNL TOTAL CA: CPT | Performed by: INTERNAL MEDICINE

## 2023-11-13 PROCEDURE — 99233 SBSQ HOSP IP/OBS HIGH 50: CPT | Performed by: INTERNAL MEDICINE

## 2023-11-13 PROCEDURE — 97162 PT EVAL MOD COMPLEX 30 MIN: CPT

## 2023-11-13 PROCEDURE — 97166 OT EVAL MOD COMPLEX 45 MIN: CPT

## 2023-11-13 RX ORDER — CALCIUM CARBONATE 500 MG/1
500 TABLET, CHEWABLE ORAL 2 TIMES DAILY
Status: DISCONTINUED | OUTPATIENT
Start: 2023-11-13 | End: 2023-11-14 | Stop reason: HOSPADM

## 2023-11-13 RX ORDER — ACETAMINOPHEN 325 MG/1
650 TABLET ORAL EVERY 6 HOURS PRN
Status: DISCONTINUED | OUTPATIENT
Start: 2023-11-13 | End: 2023-11-14 | Stop reason: HOSPADM

## 2023-11-13 RX ORDER — OXYCODONE HYDROCHLORIDE 5 MG/1
5 TABLET ORAL EVERY 4 HOURS PRN
Status: DISCONTINUED | OUTPATIENT
Start: 2023-11-13 | End: 2023-11-13

## 2023-11-13 RX ORDER — METOCLOPRAMIDE HYDROCHLORIDE 5 MG/ML
5 INJECTION INTRAMUSCULAR; INTRAVENOUS EVERY 6 HOURS PRN
Status: DISCONTINUED | OUTPATIENT
Start: 2023-11-13 | End: 2023-11-14 | Stop reason: HOSPADM

## 2023-11-13 RX ORDER — MORPHINE SULFATE 15 MG/1
15 TABLET ORAL EVERY 6 HOURS PRN
Status: DISCONTINUED | OUTPATIENT
Start: 2023-11-13 | End: 2023-11-14 | Stop reason: HOSPADM

## 2023-11-13 RX ORDER — ACETAMINOPHEN 10 MG/ML
1000 INJECTION, SOLUTION INTRAVENOUS ONCE
Status: DISCONTINUED | OUTPATIENT
Start: 2023-11-13 | End: 2023-11-14 | Stop reason: HOSPADM

## 2023-11-13 RX ADMIN — ONDANSETRON 4 MG: 2 INJECTION INTRAMUSCULAR; INTRAVENOUS at 11:52

## 2023-11-13 RX ADMIN — CALCIUM CARBONATE (ANTACID) CHEW TAB 500 MG 500 MG: 500 CHEW TAB at 10:30

## 2023-11-13 RX ADMIN — Medication 250 MG: at 09:47

## 2023-11-13 RX ADMIN — HEPARIN SODIUM 5000 UNITS: 5000 INJECTION INTRAVENOUS; SUBCUTANEOUS at 05:12

## 2023-11-13 RX ADMIN — DRONABINOL 15 MG: 2.5 CAPSULE ORAL at 22:56

## 2023-11-13 RX ADMIN — ONDANSETRON 4 MG: 2 INJECTION INTRAMUSCULAR; INTRAVENOUS at 03:36

## 2023-11-13 RX ADMIN — HEPARIN SODIUM 5000 UNITS: 5000 INJECTION INTRAVENOUS; SUBCUTANEOUS at 21:27

## 2023-11-13 RX ADMIN — PENICILLIN V POTASSIUM 500 MG: 250 TABLET, FILM COATED ORAL at 09:47

## 2023-11-13 RX ADMIN — MORPHINE SULFATE 4 MG: 4 INJECTION INTRAVENOUS at 03:36

## 2023-11-13 RX ADMIN — HEPARIN SODIUM 5000 UNITS: 5000 INJECTION INTRAVENOUS; SUBCUTANEOUS at 15:29

## 2023-11-13 RX ADMIN — PENICILLIN V POTASSIUM 500 MG: 250 TABLET, FILM COATED ORAL at 21:27

## 2023-11-13 RX ADMIN — DRONABINOL 15 MG: 2.5 CAPSULE ORAL at 10:30

## 2023-11-13 RX ADMIN — Medication 3000 UNITS: at 09:47

## 2023-11-13 RX ADMIN — Medication 1 TABLET: at 10:30

## 2023-11-13 RX ADMIN — MORPHINE SULFATE 15 MG: 15 TABLET ORAL at 15:29

## 2023-11-13 RX ADMIN — EZETIMIBE 10 MG: 10 TABLET ORAL at 09:47

## 2023-11-13 RX ADMIN — LETROZOLE 2.5 MG: 2.5 TABLET, FILM COATED ORAL at 22:56

## 2023-11-13 RX ADMIN — CALCIUM CARBONATE (ANTACID) CHEW TAB 500 MG 500 MG: 500 CHEW TAB at 22:56

## 2023-11-13 RX ADMIN — Medication 250 MG: at 17:57

## 2023-11-13 RX ADMIN — DRONABINOL 15 MG: 2.5 CAPSULE ORAL at 17:57

## 2023-11-13 NOTE — CASE MANAGEMENT
Case Management Assessment & Discharge Planning Note    Patient name Axel Bass  Location [address] [facility] [address] En[address] [id number] MRN [id number]  : 1960 Date 2023       Current Admission Date: 2023  Current Admission Diagnosis:Humerus fracture   Patient Active Problem List    Diagnosis Date Noted    Humerus fracture 2023    History of lumpectomy of left breast 2023    Use of letrozole (Femara) 2022    PONV (postoperative nausea and vomiting) 2022    Malignant neoplasm of lower-inner quadrant of left breast in female, estrogen receptor positive  2022    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 ([address]) 2022    Desmoid tumor 2022    Sepsis ([address]) 2020    Cellulitis of right upper extremity 2018    Lymphedema 2018    Chronic insomnia 2017    Lymphedema of right arm 2017    HTN, goal below 140/90 2016    Peripheral neuropathy 2015    Back pain, chronic 2014    Hyperlipidemia, mixed 2014      LOS (days): 1  Geometric Mean LOS (GMLOS) (days):   Days to GMLOS:     OBJECTIVE:    Risk of Unplanned Readmission Score: 13.65         Current admission status: Inpatient       Preferred Pharmacy:   [id number] [place] - [address] [facility] [address] [address]  Phone: [phone] Fax: [address] [address]  Phone: [phone] Fax: [phone]    Primary Care Provider: Tra Sellers MD    Primary Insurance: MEDICARE  Secondary Insurance: AARP    ASSESSMENT:  Romelrt Proxies       [address], [address]    Primary Phone: [phone] [facility]  Work Phone: [phone]                 Advance Directives  Does patient have a [address]?: Yes  Does patient have Advance Directives?: Yes  Advance Directives: Living will, Power of  for health care  Primary Contact:  Ruel Rogel         Readmission Root Cause  30 Day Readmission: No    Patient Information  Admitted from[de-identified] Home  Mental Status: Alert  During Assessment patient was accompanied by: Not accompanied during assessment  Assessment information provided by[de-identified] Patient  Primary Caregiver: Self  Support Systems: Spouse/significant other  [place] of Residence: [address] [place] [address] do you live in?: [address] [facility] entry access options.  Select all that apply.: No steps to enter home  Type of Current Residence: 2 story home  Upon entering residence, is there a bedroom on the main floor (no further steps)?: Yes  Upon entering residence, is there a bathroom on the main floor (no further steps)?: Yes  In the last 12 months, was there a time when you were not able to pay the mortgage or rent on time?: No  In the last 12 months, how many places have you lived?: 1  In the last 12 months, was there a time when you did not have a steady place to sleep or slept in a shelter (including now)?: No  Homeless/housing insecurity resource given?: No  Living Arrangements: Lives w/ Spouse/significant other  Is patient a ?: No    Activities of Daily Living Prior to Admission  Functional Status: Independent  Completes ADLs independently?: Yes  Ambulates independently?: Yes  Does patient use assisted devices?: No  Does patient currently own DME?: No  Does patient have a history of Outpatient Therapy (PT/OT)?: Yes  Does the patient have a history of Short-Term Rehab?: No  Does patient have a history of HHC?: Yes  Does patient currently have [facility]?: No         Patient Information Continued  Income Source: SSI/SSD  Does patient have prescription coverage?: Yes  Within the past 12 months, you worried that your food would run out before you got the money to buy more.: Never true  Within the past 12 months, the food you bought just didn't last and you didn't have money to get more.: Never true  Food insecurity resource given?: No  Does patient receive dialysis treatments?: No  Does patient have a history of substance abuse?: No  Does patient have a history of Mental Health Diagnosis?: No    PHQ 2/9 Screening   Reviewed PHQ 2/9 Depression Screening Score?: No    Means of Transportation  Means of Transport to Appts[de-identified] Family transport  In the past 12 months, has lack of transportation kept you from medical appointments or from getting medications?: No  In the past 12 months, has lack of transportation kept you from meetings, work, or from getting things needed for daily living?: No  Was application for public transport provided?: No        DISCHARGE DETAILS:    Discharge planning discussed with[de-identified] patient  Freedom of Choice: Yes  Comments - Freedom of Choice: CM discussed d/c needs and pt is agreeable to VNA services.   Referrals placed  CM contacted family/caregiver?: No- see comments (pt will update her  herself)  Were Treatment Team discharge recommendations reviewed with patient/caregiver?: Yes  Did patient/caregiver verbalize understanding of patient care needs?: Yes  Were patient/caregiver advised of the risks associated with not following Treatment Team discharge recommendations?: Yes    Contacts  Patient Contacts: Adama Medrano  Relationship to Patient[de-identified] [facility] Hospital          Is the patient interested in [facility] at discharge?: Yes  [facility] requested[de-identified] [address], Nursing, Physical Therapy, Yenny Provider[de-identified] PCP  Home Health Services Needed[de-identified] Evaluate Functional Status and Safety, Gait/ADL Training, Strengthening/Theraputic Exercises to Improve Function, Other (comment) (fx humerus)  Homebound Criteria Met[de-identified] Requires the Assistance of Another Person for Safe Ambulation or to Leave the Home  Supporting Clincal Findings[de-identified] Fatigues Easliy in [employer], Limited Endurance    DME Referral Provided  Referral made for DME?: No    Other Referral/Resources/Interventions Provided:  Interventions: [facility]  Referral Comments: VNA pended    Would you like to participate in our [address] service program?  : No - Declined    Treatment Team Recommendation: [facility]   Discharge Destination Plan[de-identified] Home with [address] [facility] at Discharge : Family

## 2023-11-13 NOTE — PLAN OF CARE
Problem: OCCUPATIONAL THERAPY ADULT  Goal: Performs self-care activities at highest level of function for planned discharge setting. See evaluation for individualized goals. Description: Treatment Interventions: ADL retraining, Functional transfer training, UE strengthening/ROM, Endurance training, Patient/family training, Cognitive reorientation, Equipment evaluation/education, Compensatory technique education, Activityengagement, Energy conservation          See flowsheet documentation for full assessment, interventions and recommendations. Note: Limitation: Decreased ADL status, Decreased UE ROM, Decreased UE strength, Decreased self-care trans, Decreased high-level ADLs, Decreased endurance, Decreased fine motor control, Non-func R UE  Prognosis: Good  Assessment: Pt is a 70 y.o. female seen for OT evaluation s/p admit to [facility] on 11/12/2023 w/ fall resulting in Humerus fracture, NWB R UE in coap splint. Comorbidities affecting pt's functional performance at time of assessment include: remote history of desmoid tumor and radial nerve damage as a result of subsequent surgery as well as history of carcinoma of 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 and lymphedema of the right upper extremity, HTN. Personal factors affecting pt at time of IE include:limited home support, difficulty performing ADLS, and difficulty performing IADLS , reports spouse and is to have homecare to assist her. Prior to admission, pt was living w/ spouse and reports: per pt independent w/ aDLs, independent w/ functional transfers and mobility w/ no AD, assist w/ IADLs and transport.  Upon evaluation: Pt requires MIN assist supine>sit bed mobility w/ assist R UE, MIN assist LB ADLs, MOD assist UB ADLS, supervision to contact guard assist functional transfers, contact guard assist functional mobility 2* the following deficits impacting occupational performance: increased pain R UE, NWB R UE and splint and sling in place, impaired balance, impaired activity tolerance, decreased strength and endurance, fall risk, limited hand extension of digits. Pt educated on dressing R UE first and wearing button down tops or oversized shirts; pt receptive and educated on positioning of items in environment. Pt to benefit from continued skilled OT tx while in the hospital to address deficits as defined above and maximize level of functional independence w ADL's and functional mobility. Occupational Performance areas to address include: eating, grooming, bathing/shower, toilet hygiene, dressing, health maintenance, functional mobility, and clothing management. From OT standpoint, recommendation at time of d/c would be level III resource.      Rehab Resource Intensity Level, OT: III (Minimum Resource Intensity)

## 2023-11-13 NOTE — PHYSICAL THERAPY NOTE
Physical Therapy Evaluation     Patient's Name: Angie Rodriguez    Admitting Diagnosis  Arm pain [M79.603]  Nondisplaced spiral fracture of shaft of right humerus [S42.075A]  Lymphedema of right arm [I89.0]    Problem List  Patient Active Problem List   Diagnosis    Back pain, chronic    Chronic insomnia    Lymphedema of right arm    Hyperlipidemia, mixed    Peripheral neuropathy    Lymphedema    Cellulitis of right upper extremity    Sepsis ([address])    Desmoid tumor    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 (HCC)    Malignant neoplasm of lower-inner quadrant of left breast in female, estrogen receptor positive     HTN, goal below 140/90    PONV (postoperative nausea and vomiting)    Use of letrozole (Femara)    History of lumpectomy of left breast    Humerus fracture     Past Medical History  Past Medical History:   Diagnosis Date    Abnormal mammogram 05/15/2013    Anemia     Cancer ([address])     desmoitosis    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 ([address]) 04/19/2022    Chronic pain disorder     worse right side of body    Desmoid tumor     Last Assessed: 6/19/2017    History of transfusion     no reactions    Hyperlipidemia     Hypertension     Ovarian cancer ([address]) 1999?   Hysterectomy done    PONV (postoperative nausea and vomiting)      Past Surgical History  Past Surgical History:   Procedure Laterality Date    BREAST LUMPECTOMY Left 6/21/2022    Procedure: ANITA  DIRECTED LUMPECTOMY;  Surgeon: Orlando New MD;  [facility];  Service: Surgical Oncology    FRACTURE SURGERY      HYSTERECTOMY      LYMPH NODE DISSECTION Left 6/21/2022    Procedure: AXILLARY DISSECTION LEVEL I AND LEVEL II LYMPH NODES;  Surgeon: Orlando New MD;  [facility];  Service: Surgical Oncology    MRI BREAST BIOPSY LEFT (ALL INCLUSIVE) Left 5/20/2022    MRI BREAST BIOPSY RIGHT (ALL INCLUSIVE) Right 5/20/2022    OTHER SURGICAL HISTORY      Arm Incision: Dermoid tumor, right Arm     PARTIAL HYSTERECTOMY      OR COLONOSCOPY FLX DX W/COLLJ SPEC WHEN PFRMD N/A 8/15/2017    Procedure: COLONOSCOPY;  Surgeon: Tiffany Vale MD;  Location: [facility]; Service: Gastroenterology    OR [address] [facility] [address] WRST SURG W/RLS TRANSVRS CARPL LIGM Left 8/10/2021    Procedure: RELEASE LEFT CARPAL TUNNEL ENDOSCOPIC;  Surgeon: Nicky Cabrera MD;  [facility];  Service: Orthopedics    OR OPTX DSTL RADL I-ARTIC FX/EPIPHYSL SEP 3 FRAG Left 2/9/2021    Procedure: OPEN REDUCTION W/ INTERNAL FIXATION (ORIF) RADIUS / Madison Rufino (WRIST) left;  Surgeon: Nicky Cabrera MD;  [facility];  Service: Orthopedics    SKIN BIOPSY      SKIN CANCER EXCISION      Melanoma Excision     TONSILLECTOMY      US BREAST NEEDLE LOC LEFT Left 6/16/2022    US BREAST NEEDLE LOC RIGHT EACH ADDITIONAL Right 6/16/2022    US GUIDED BREAST LYMPH NODE BIOPSY LEFT Left 4/14/2022 11/13/23 0918   PT Last Visit   PT Visit Date 11/13/23   Note Type   Note type Evaluation   Pain Assessment   Pain Assessment Tool 0-10   Pain Score 5   Pain Location/Orientation Orientation: Right;Location: Arm   Pain Onset/Description Onset: Ongoing   Hospital Pain Intervention(s) Repositioned; Ambulation/increased activity; Emotional support  (sling adjusted)   Restrictions/Precautions   Weight Bearing Precautions Per Order Yes   RUE Weight Bearing Per Order (S)  NWB  (per ortho)   Braces or Orthoses Sling  (R UE donned; coapt splint by ortho)   Other Precautions Chair Alarm; Bed Alarm;WBS; Limb alert; Fall Risk;Pain   Home Living   Type of [address] [facility] [address] Two level; Able to live on main level with bedroom/bathroom; Performs ADLs on one level  (No NINA)   Bathroom Shower/Tub Walk-in shower   Bathroom Toilet Standard   Bathroom Equipment Grab bars in shower; [address]; Wheelchair-manual   Additional Comments Pt ambulates without an AD.    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 Subjective   Subjective "I've been up to the bathroom."   RLE Assessment   RLE Assessment WFL  (grossly: 4/5)   LLE Assessment   LLE Assessment WFL  (grossly: 4/5)   Light Touch   RLE Light Touch Grossly intact   LLE Light Touch Grossly intact   Bed Mobility   Supine to Sit 4  Minimal assistance   Additional items Assist x 1;HOB elevated; Increased time required;Verbal cues   Transfers   Sit to Stand   (CG assist)   Additional items Assist x 1; Increased time required;Verbal cues   Stand to Sit   (CG assist)   Additional items Assist x 1; Increased time required;Verbal cues   Ambulation/Elevation   Gait pattern Short stride; Shuffling   Gait Assistance   (CG assist)   Additional items Assist x 1;Verbal cues   Assistive Device None   Distance 80 feet   Balance   Static Sitting Good   Dynamic Sitting Fair +   Static Standing Fair   Dynamic Standing Fair -   Ambulatory Fair -   Endurance Deficit   Endurance Deficit Yes   Endurance Deficit Description decreased activity tolerance; pain   Activity Tolerance   Activity Tolerance Patient tolerated treatment well;Patient limited by pain   Medical Staff Made Aware OT [email or web address]  (Co-evaluation performed with OT secondary to complex medical condition of patient and regression of functional status from baseline.  PT/OT goals were addressed separately.)   Assessment   Prognosis Good   Problem List Decreased endurance; Impaired balance;Decreased mobility;Orthopedic restrictions;Pain   Assessment Pt is 70year old female seen for PT evaluation s/p admit to [address] [facility] on 11/12/2023 with Humerus fracture. PT consulted to assess pt's functional mobility and discharge needs. Order placed for PT evaluation and treatment, with up and out of bed as tolerated order. Comorbidities affecting pt's physical performance at time of assessment include chronic back pain, lymphedema of right arm, carcinoma of 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, and hypertension goal below 140/90. Prior to hospitalization, pt was independent with all functional mobility without an AD. Pt ambulates unrestricted distances on all terrain and elevations. Pt resides with her spouse, in a two level house, but is able to stay on the first floor, with no steps to enter. Personal factors affecting pt at time of initial evaluation include lives in a two story house, difficulty ambulating community distances, difficulty performing dynamic tasks in the community, positive fall history, and difficulty performing ADLs and IADLs. Please find objective findings from PT assessment regarding body systems outlined above with impairments and limitations including impaired balance, decreased endurance, gait deviations, pain, decreased activity tolerance, decreased functional mobility tolerance, fall risk, and orthopedic restrictions. The following objective measures were performed on initial evaluation Barthel Index: 45/100, Modified [place]: 4 (moderate/severe disability), and AM-PAC 6-Clicks: 98/88. Pt's clinical presentation is currently evolving seen in pt's presentation of need for ongoing medical management/monitoring, pt is a fall risk, pt has orthopedic weight bearing restrictions limiting functional mobility, and pt requires cues and assist for safety with functional mobility.  Pt to benefit from continued PT treatment to address deficits as defined above and maximize pt's level of function and independence with mobility. From a PT standpoint, recommendation at time of discharge would be level 3, minimal resource intensity in order to facilitate return to prior level of function. Barriers to Discharge None   Goals   Patient Goals to have less pain   STG Expiration Date 11/23/23   Short Term Goal #1 In 10 days: Perform all bed mobility tasks modified independent to decrease caregiver burden, Perform all transfers modified independent to improve independence, Ambulate > 150 ft. with least restrictive assistive device modified independent w/o LOB and w/ normalized gait pattern 100% of the time, and Increase all balance 1/2 grade to decrease risk for falls   Plan   Treatment/Interventions Functional transfer training;LE strengthening/ROM; Therapeutic exercise; Endurance training;Patient/family training;Bed mobility;Gait training;Spoke to nursing;OT   PT Frequency 2-3x/wk   Discharge Recommendation   Rehab Resource Intensity Level, PT III (Minimum Resource Intensity)   AM-PAC Basic Mobility Inpatient   Turning in Flat Bed Without Bedrails 3   Lying on Back to Sitting on Edge of Flat Bed Without Bedrails 3   Moving Bed to Chair 3   Standing Up From Chair Using Arms 3   Walk in Room 3   Climb 3-5 Stairs With Railing 3   Basic Mobility Inpatient Raw Score 18   Basic Mobility Standardized Score 41.05   Highest Level Of Mobility   JH-HLM Goal 6: Walk 10 steps or more   JH-HLM Achieved 7: Walk 25 feet or more   Modified Nia Scale   Modified [place] Scale 4   Barthel Index   Feeding 5   Bathing 0   Grooming Score 0   Dressing Score 5   Bladder Score 10   Bowels Score 10   Toilet Use Score 5   Transfers (Bed/Chair) Score 10   Mobility (Level Surface) Score 0   Stairs Score 0   Barthel Index Score 45     PT Evaluation Time: 2079-8271  Yuko Lama, PT, DPT

## 2023-11-13 NOTE — ASSESSMENT & PLAN NOTE
Chronic problem due to previous breast cancer treatment  She has had episodes of cellulitis, follows with [facility], is on chronic penicillin therapy twice daily for suppression  Continue Penicillin

## 2023-11-13 NOTE — CONSULTS
Orthopedics   Lorena Barker 70 y.o. female MRN: [id number]  Unit/Bed#: -01      Chief Complaint:   right arm pain    HPI:   70 y.o. right hand dominant female status post chemical fall from standing complaining of right arm pain. Denies Headstrike, denies LOC, denies blood thinners. Pain is sharp in character, Located mid humerus near the elbow, acute in onset, constant in duration, severe in intensity. Exacerbating factors any motion of the arm, remitting factors rest.  Denies radiating, patient does have known history of breast cancer she follows with [address]. She reports that her last surgery on her right arm was in 2012. She reports history of desmoidtosis going back to 2000 that eventually did become cancerous. She reports that this involves the soft tissue of the arm and the bone. She reports that she has not had normal function of the radial nerve since around this time in 2000. She is unable to extend the thumb and has minimal sensation dorsal to hand. There is no change to this radial nerve function since this injury. Was being treated by Dr Patricia Kearns of [facility] for cellulitis, had an MRI of R humerus schedule for next week due to soft tissue mass suspected and pain. Review Of Systems:   Skin: Normalsee gpi  Neuro: See HPI  Musculoskeletal: See HPI  14 point review of systems negative except as stated above     Past Medical History:   Past Medical History:   Diagnosis Date    Abnormal mammogram 05/15/2013    Anemia     Cancer ([address])     desmoitosis    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 ([address]) 04/19/2022    Chronic pain disorder     worse right side of body    Desmoid tumor     Last Assessed: 6/19/2017    History of transfusion     no reactions    Hyperlipidemia     Hypertension     Ovarian cancer ([address]) 1999?   Hysterectomy done    PONV (postoperative nausea and vomiting)        Past Surgical History:   Past Surgical History:   Procedure Laterality Date BREAST LUMPECTOMY Left 6/21/2022    Procedure: ANITA  DIRECTED LUMPECTOMY;  Surgeon: Nirav Albert MD;  [facility];  Service: Surgical Oncology    FRACTURE SURGERY      HYSTERECTOMY      LYMPH NODE DISSECTION Left 6/21/2022    Procedure: AXILLARY DISSECTION LEVEL I AND LEVEL II LYMPH NODES;  Surgeon: Nirav Albert MD;  [facility];  Service: Surgical Oncology    MRI BREAST BIOPSY LEFT (ALL INCLUSIVE) Left 5/20/2022    MRI BREAST BIOPSY RIGHT (ALL INCLUSIVE) Right 5/20/2022    OTHER SURGICAL HISTORY      Arm Incision: Dermoid tumor, right Arm     PARTIAL HYSTERECTOMY      CT COLONOSCOPY FLX DX W/COLLJ SPEC WHEN PFRMD N/A 8/15/2017    Procedure: COLONOSCOPY;  Surgeon: Alexa Iglesias MD;  [facility];   Service: Gastroenterology    CT [address] WRST SURG W/RLS TRANSVRS CARPL LIGM Left 8/10/2021    Procedure: RELEASE LEFT CARPAL TUNNEL ENDOSCOPIC;  Surgeon: Tuan Jefferson MD;  [facility];  Service: Orthopedics    CT OPTX DSTL RADL I-ARTIC FX/EPIPHYSL SEP 3 FRAG Left 2/9/2021    Procedure: OPEN REDUCTION W/ INTERNAL FIXATION (ORIF) RADIUS / ULNA (WRIST) left;  Surgeon: Tuan Jefferson MD;  [facility];  Service: Orthopedics    SKIN BIOPSY      SKIN CANCER EXCISION      Melanoma Excision     TONSILLECTOMY      US BREAST NEEDLE LOC LEFT Left 6/16/2022    US BREAST NEEDLE LOC RIGHT EACH ADDITIONAL Right 6/16/2022    US GUIDED BREAST LYMPH NODE BIOPSY LEFT Left 4/14/2022       Family History:  Family history reviewed and non-contributory  Family History   Problem Relation Age of Onset    Diabetes Mother     No Known Problems Father     Cancer Sister     Lung cancer Sister     Pancreatic cancer Sister     No Known Problems Maternal Grandmother     No Known Problems Maternal Grandfather     No Known Problems Paternal Grandmother     No Known Problems Paternal Grandfather     Breast cancer Neg Hx        Social History:  Social History     Socioeconomic History    Marital status: /Civil Union     Spouse name: Not on file    Number of children: Not on file    Years of education: Not on file    Highest education level: Not on file   Occupational History    Occupation: unemployed    Tobacco Use    Smoking status: Never    Smokeless tobacco: Never   Vaping Use    Vaping Use: Never used   Substance and Sexual Activity    Alcohol use: Not Currently     Alcohol/week: 5.0 standard drinks of alcohol     Types: 5 Shots of liquor per week     Comment: Used mostly as a painkiller when needed before bedtime    Drug use: Yes     Frequency: 28.0 times per week     Types: Marijuana     Comment: THC Medical marijuana    Sexual activity: Not Currently     Partners: Male     Comment: Hysterectomy years ago   Other Topics Concern    Not on file   Social History Narrative    Lives with spouse      Social Determinants of Health     Financial Resource Strain: Not on file   Food Insecurity: Not on file   Transportation Needs: Not on file   Physical Activity: Not on file   Stress: Not on file   Social Connections: Not on file   Intimate Partner Violence: Not on file   Housing Stability: Not on file       Allergies: Allergies   Allergen Reactions    Chlorpromazine Anaphylaxis     PHENOTHIAZINE=ANAPHYLAXIS    Codeine Anaphylaxis     Anaphylaxis  abd pain. Meperidine Anaphylaxis, Hives and Vomiting    Phenothiazines Anaphylaxis and Throat Swelling     Category: Allergy; Saccharin - Food Allergy Anaphylaxis    Ampicillin-Sulbactam Sodium Hives    Aspirin GI Intolerance and Abdominal Pain     Severe GERD    Gluten Meal - Food Allergy GI Intolerance    Ibuprofen Hives     H    Levofloxacin Hives    Chocolate - Food Allergy GI Intolerance    Lactose - Food Allergy GI Intolerance    Neomycin Other (See Comments), Edema and Hives     Category: Allergy;   swelling    McKay - Food Allergy Rash    Latex Hives and Rash     Category:  Allergy;            Labs:  0   Lab Value Date/Time HCT 39.9 11/12/2023 1419    HCT 46.1 08/01/2023 0837    HCT 44.1 09/01/2022 1209    HGB 13.7 11/12/2023 1419    HGB 15.5 (H) 08/01/2023 0837    HGB 14.9 09/01/2022 1209    INR 0.93 11/12/2023 1419    WBC 11.88 (H) 11/12/2023 1419    WBC 5.25 08/01/2023 0837    WBC 7.15 09/01/2022 1209    ESR 7 07/19/2020 2302    CRP 7.1 (H) 07/19/2020 2302       Meds:    Current Facility-Administered Medications:     acetaminophen (TYLENOL) tablet 650 mg, 650 mg, Oral, Q6H PRN, Yudelka Courtney MD    cholecalciferol (VITAMIN D3) tablet 3,000 Units, 3,000 Units, Oral, Daily, Yudelka Courtney MD, 3,000 Units at 11/12/23 1549    diphenhydrAMINE (BENADRYL) tablet 50 mg, 50 mg, Oral, HS PRN, Yudelka Courtney MD    docusate sodium (COLACE) capsule 100 mg, 100 mg, Oral, BID PRN, Yudelka Courtney MD    dronabinol (MARINOL) capsule 15 mg, 15 mg, Oral, 4x Daily PRN, Yudelka Courtney MD, 15 mg at 11/12/23 1649    ezetimibe (ZETIA) tablet 10 mg, 10 mg, Oral, Daily, Yudelka Courtney MD, 10 mg at 11/12/23 1549    heparin (porcine) subcutaneous injection 5,000 Units, 5,000 Units, Subcutaneous, Q8H [address], Yudelka Courtney MD, 5,000 Units at 11/12/23 1549    letrozole [facility]) tablet 2.5 mg, 2.5 mg, Oral, HS, Izabella Mccabe MD    morphine injection 2 mg, 2 mg, Intravenous, Q4H PRN, Yudelka Courtney MD, 2 mg at 11/12/23 1928    morphine injection 4 mg, 4 mg, Intravenous, Q4H PRN, Yudelka Courtney MD, 4 mg at 11/12/23 1838    morphine injection 8 mg, 8 mg, Intravenous, Once, Waldemar White PA-C    ondansetron [facility]) injection 4 mg, 4 mg, Intravenous, Q6H PRN, Yudelka Courtney MD    penicillin V potassium (VEETID) tablet 500 mg, 500 mg, Oral, BID, Yudelka Courtney MD    saccharomyces [facility]) capsule 250 mg, 250 mg, Oral, BID, Yudelka Courtney MD, 250 mg at 11/12/23 0240    Blood Culture:   Lab Results   Component Value Date BLOODCX No Growth After 5 Days. 07/19/2020       Wound Culture:   No results found for: "WOUNDCULT"    Ins and Outs:  No intake/output data recorded. Physical Exam:   /94   Pulse 70   Temp (!) 97.3 °F (36.3 °C)   Resp 20   Ht 5' 8" (1.727 m)   Wt 67.6 kg (149 lb)   SpO2 100%   BMI 22.66 kg/m²   Gen: No acute distress, resting comfortably in bed  HEENT: Eyes clear, moist mucus membranes, hearing intact  Respiratory: No audible wheezing or stridor  Cardiovascular: Well Perfused peripherally, 2+ distal pulse  Abdomen: nondistended, no peritoneal signs  Musculoskeletal: right upper extremity  Skin intact- no significant erythema mild edema distal mid arm with deformity visibile  Tender to palpation over humerus  Sensation not intact in 1st dorsal web space. no thumb retropulsion. Per the patient's baseline  Sensation intact to radial, ulna, median, musculocutaneous, and axillary nerve distributions with the exception of the radial nerve distribution  Motor intact to  radial, ulnar, median, musculocutaneous, and axillary nerve distributions  2+ radial and ulnar pulse   Musculature is soft and compressible, no pain with passive stretch  No significant tenderness around the medial and lateral condyle, no tenderness around the wrist or hand palpation deformity ecchymosis or swelling in the elbow or distal to the elbow    Radiology:   I personally reviewed the films. X-rays AP/Lateral and oblique views of right humerus shows oblique midshaft humeral fracture. There is some cortical thinning and some lucency within the mid humerus that may be consistent with the patient's history of pathology and desmoid tumor in this region but cannot exclude active pathology either. Surgical clips present throughout the soft tissue of the arm consistent with history of lymph node resection.     Procedure:  Reduction and Coaptation splint  Once adequate analgesia was obtained, a gentle closed reduction maneuver was performed to bring the fracture out to length and proper alignment. Patient placed in well padded 3-inch coaptation splint. Patient tolerated the procedure well and was neurovascularly intact pre and post procedure. Post reduction orthogonal x rays showed appropriate reduction.     _*_*_*_*_*_*_*_*_*_*_*_*_*_*_*_*_*_*_*_*_*_*_*_*_*_*_*_*_*_*_*_*_*_*_*_*_*_*_*_*_*    Assessment:  70 y.o. female S/P mechanical fall from standing with remote history of desmoid tumor and radial nerve damage as a result of subsequent surgery as well as history of breast cancer and lymphedema of the right upper extremity with right humeral shaft fracture    Plan:   NWB right extremity in coap splint  Analgesics for pain  Body mass index is 22.66 kg/m².    No plan for surgical intervention at this time  Medical management per primary team,  F/u with Dr Candace Araujo in 1 week for alignment check and given the possibility of this being a pathologic fracture        Chaim Echevarria PA-C

## 2023-11-13 NOTE — PLAN OF CARE
Problem: PHYSICAL THERAPY ADULT  Goal: Performs mobility at highest level of function for planned discharge setting. See evaluation for individualized goals. Description: Treatment/Interventions: Functional transfer training, LE strengthening/ROM, Therapeutic exercise, Endurance training, Patient/family training, Bed mobility, Gait training, Spoke to nursing, OT          See flowsheet documentation for full assessment, interventions and recommendations. Note: Prognosis: Good  Problem List: Decreased endurance, Impaired balance, Decreased mobility, Orthopedic restrictions, Pain  Assessment: Pt is 70year old female seen for PT evaluation s/p admit to [facility] on 11/12/2023 with Humerus fracture. PT consulted to assess pt's functional mobility and discharge needs. Order placed for PT evaluation and treatment, with up and out of bed as tolerated order. Comorbidities affecting pt's physical performance at time of assessment include chronic back pain, lymphedema of right arm, carcinoma of 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, and hypertension goal below 140/90. Prior to hospitalization, pt was independent with all functional mobility without an AD. Pt ambulates unrestricted distances on all terrain and elevations. Pt resides with her spouse, in a two level house, but is able to stay on the first floor, with no steps to enter. Personal factors affecting pt at time of initial evaluation include lives in a two story house, difficulty ambulating community distances, difficulty performing dynamic tasks in the community, positive fall history, and difficulty performing ADLs and IADLs. Please find objective findings from PT assessment regarding body systems outlined above with impairments and limitations including impaired balance, decreased endurance, gait deviations, pain, decreased activity tolerance, decreased functional mobility tolerance, fall risk, and orthopedic restrictions.  The following objective measures were performed on initial evaluation Barthel Index: 45/100, Modified [place]: 4 (moderate/severe disability), and AM-PAC 6-Clicks: 70/68. Pt's clinical presentation is currently evolving seen in pt's presentation of need for ongoing medical management/monitoring, pt is a fall risk, pt has orthopedic weight bearing restrictions limiting functional mobility, and pt requires cues and assist for safety with functional mobility. Pt to benefit from continued PT treatment to address deficits as defined above and maximize pt's level of function and independence with mobility. From a PT standpoint, recommendation at time of discharge would be level 3, minimal resource intensity in order to facilitate return to prior level of function. Barriers to Discharge: None     Rehab Resource Intensity Level, PT: III (Minimum Resource Intensity)    See flowsheet documentation for full assessment.

## 2023-11-13 NOTE — ASSESSMENT & PLAN NOTE
Patient had a mechanical fall  R humerus fracture noted on X ray  Patient did have some immobilization in a sling in the ED, she was seen by orthopedics as well, no plan for intervention. Orthopedics plan noted " No plan for surgical intervention at this time / F/u with Dr Rut Walden in 1 week for alignment check and given the possibility of this being a pathologic fracture "  Continue pain control - the patient still required IV analgesia overnight and she also required IV antiemetics given concern of nausea. Will see how she tolerates diet and try to transition her to a PO analgesic regimen over the next 24h - changed to PO morphine instead of IV for severe pain - monitor response. We will leave IV only for breakthrough.   Neurovascular checks

## 2023-11-13 NOTE — DISCHARGE INSTR - AVS FIRST PAGE
Discharge Instructions - Orthopedics  Merline Luz 70 y.o. female MRN: [id number]  Unit/Bed#: -01    Weight Bearing Status:                                           Non weight bearing right upper extremity in sling do not remove sling/splint      Pain:  Continue analgesics as directed    Dressing Instructions:   Please keep clean, dry and intact until follow up     Appt Instructions: If you do not have your appointment, please call the clinic at [phone]  Otherwise follow up as scheduled. Contact the office sooner if you experience any increased numbness/tingling in the extremities.

## 2023-11-13 NOTE — OCCUPATIONAL THERAPY NOTE
Occupational Therapy Evaluation     Patient Name: Ottoniel Magaña  [id number] Date: 11/13/2023  Problem List  Principal Problem:    Humerus fracture  Active Problems:    Back pain, chronic    Lymphedema of right arm    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 (HCC)    HTN, goal below 140/90    Past Medical History  Past Medical History:   Diagnosis Date    Abnormal mammogram 05/15/2013    Anemia     Cancer ([address])     desmoitosis    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 ([address]) 04/19/2022    Chronic pain disorder     worse right side of body    Desmoid tumor     Last Assessed: 6/19/2017    History of transfusion     no reactions    Hyperlipidemia     Hypertension     Ovarian cancer ([address]) 1999? Hysterectomy done    PONV (postoperative nausea and vomiting)      Past Surgical History  Past Surgical History:   Procedure Laterality Date    BREAST LUMPECTOMY Left 6/21/2022    Procedure: ANITA  DIRECTED LUMPECTOMY;  Surgeon: Dylon Amaya MD;  [facility];  Service: Surgical Oncology    FRACTURE SURGERY      HYSTERECTOMY      LYMPH NODE DISSECTION Left 6/21/2022    Procedure: AXILLARY DISSECTION LEVEL I AND LEVEL II LYMPH NODES;  Surgeon: Dylon Amaya MD;  [facility];  Service: Surgical Oncology    MRI BREAST BIOPSY LEFT (ALL INCLUSIVE) Left 5/20/2022    MRI BREAST BIOPSY RIGHT (ALL INCLUSIVE) Right 5/20/2022    OTHER SURGICAL HISTORY      Arm Incision: Dermoid tumor, right Arm     PARTIAL HYSTERECTOMY      NE COLONOSCOPY FLX DX W/COLLJ SPEC WHEN PFRMD N/A 8/15/2017    Procedure: COLONOSCOPY;  Surgeon: Rodrick Martinez MD;  Location: [facility];   Service: Gastroenterology    NE [id number] [facility] WRST SURG W/RLS TRANSVRS CARPL LIGM Left 8/10/2021    Procedure: RELEASE LEFT CARPAL TUNNEL ENDOSCOPIC;  Surgeon: Karrie Bass MD;  [facility];  Service: Orthopedics    NE OPTX DSTL RADL I-ARTIC FX/EPIPHYSL SEP 3 FRAG Left 2/9/2021    Procedure: OPEN REDUCTION W/ INTERNAL FIXATION (ORIF) RADIUS / ULNA (WRIST) left;  Surgeon: Maricruz Canseco MD;  [facility];  Service: Orthopedics    SKIN BIOPSY      SKIN CANCER EXCISION      Melanoma Excision     TONSILLECTOMY      US BREAST NEEDLE LOC LEFT Left 6/16/2022    US BREAST NEEDLE LOC RIGHT EACH ADDITIONAL Right 6/16/2022    US GUIDED BREAST LYMPH NODE BIOPSY LEFT Left 4/14/2022 11/13/23 0939   OT Last Visit   OT Visit Date 11/13/23   Note Type   Note type Evaluation   Pain Assessment   Pain Assessment Tool 0-10   Pain Score 5   Pain Location/Orientation Orientation: Right;Location: Arm   Hospital Pain Intervention(s) Repositioned; Emotional support  (sling adjusted)   Restrictions/Precautions   Weight Bearing Precautions Per Order Yes   RUE Weight Bearing Per Order (S)  NWB   Braces or Orthoses Sling  (coapt slint R UE)   Other Precautions Bed Alarm; Chair Alarm; Fall Risk;WBS;Pain   Home Living   Type of [address] [place] [address] Two level; Able to live on main level with bedroom/bathroom; Performs ADLs on one level  (no NINA)   Bathroom Shower/Tub Walk-in shower   Bathroom Toilet Standard   Bathroom Equipment Grab bars in shower; [address]; Wheelchair-manual   Additional Comments pt w/ no use of AD in the home   Prior Function   Level of [place] Independent with ADLs; Independent with functional mobility; Independent with IADLS;Needs assistance with IADLS   Lives With Spouse   Receives Help From Family   IADLs Independent with medication management; Family/Friend/Other provides transportation; Family/Friend/Other provides medication management; Family/Friend/Other provides meals  (spouse assists w/ meds and does cooking/cleaning)   Falls in the last 6 months 1 to 4   Vocational On disability   Comments pt reports no AD use baseline and reports due to lymphedema and dropping items, spouse assists w/ tasks, reports goes to outpt lymphedema therapy Lifestyle   Autonomy per pt independent w/ aDLs, independent w/ functional transfers and mobility w/ no AD, assist w/ IADLs and transport   Reciprocal Relationships spouse   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   Subjective   Subjective "I am okay, I had morphine for pain"   ADL   Where Assessed Chair   Eating Assistance 4  Minimal Assistance   Grooming Assistance 4  Minimal Assistance   UB Bathing Assistance 3  Moderate Assistance    [address]  [address] 3  Moderate [place] 4  [address] 4  Minimal Assistance   Bed Mobility   Supine to Sit 4  Minimal assistance   Additional items Assist x 1; Increased time required;Verbal cues;LE management;HOB elevated; Bedrails  (assist w/ R UE positioning)   Additional Comments increased time to complete, sling adjusted when EOB; pt had arm propped up on several pillows when EOB   Transfers   Sit to Stand 4  Minimal assistance  (contact guard)   Additional items Assist x 1; Increased time required;Verbal cues;Armrests   Stand to Sit 5  Supervision   Additional items Assist x 1; Increased time required;Verbal cues;Armrests   Additional Comments cues for positioning and safety   Functional Mobility   Functional Mobility 4  Minimal assistance   Additional Comments assist x1 w/ increased time to complete, contact guard assist, no AD   Balance   Static Sitting Good   Dynamic Sitting Fair +   Static Standing Fair +   Dynamic Standing Fair   Ambulatory Fair   Activity Tolerance   Activity Tolerance Patient limited by pain   Medical Staff Made Aware PT Lakesha Carrillo: Pt seen for co-session with skilled Physical therapist 2* clinically unstable presentation, medical complexity, new precautions, performance deficits/functional limitations, limited activity tolerance and present impairments which are a regression from patient patient's baseline and impacting overall occupational performance   Nurse Made Aware appropriate to see per Janeen SIMONS   RUOZZY Assessment   RUE Assessment X  (NWB w/ coapt splint, limited digit extension)   LUE Assessment   LUE Assessment WFL  (4/5)   Hand Function   Gross Motor Coordination Functional  (impaired R UE)   Fine Motor Coordination Functional  (impaired R Hand)   Hand Function Comments limited extension of R hand and reports at baseline w/ lymphedema dropping items   Sensation   Light Touch No apparent deficits   Proprioception   Proprioception No apparent deficits   Vision-Basic Assessment   Current Vision Wears glasses all the time   Vision - Complex Assessment   Ocular Range of Motion Intact   Acuity Able to read clock/calendar on wall without difficulty   Perception   Inattention/Neglect Appears intact   Cognition   Overall Cognitive Status WFL   Arousal/Participation Responsive; Alert; Cooperative   Attention Within functional limits   Orientation Level Oriented X4   Memory Within functional limits   Following Commands Follows all commands and directions without difficulty   Comments pleasant and cooperative   Assessment   Limitation Decreased ADL status; Decreased UE ROM; Decreased UE strength;Decreased self-care trans;Decreased high-level ADLs; Decreased endurance;Decreased fine motor control;Non-func R UE   Prognosis Good   Assessment Pt is a 70 y.o. female seen for OT evaluation s/p admit to [facility] on 11/12/2023 w/ fall resulting in Humerus fracture, NWB R UE in coap splint. Comorbidities affecting pt's functional performance at time of assessment include: remote history of desmoid tumor and radial nerve damage as a result of subsequent surgery as well as history of carcinoma of 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 and lymphedema of the right upper extremity, HTN.  Personal factors affecting pt at time of IE include:limited home support, difficulty performing ADLS, and difficulty performing IADLS , reports spouse and is to have homecare to assist her. Prior to admission, pt was living w/ spouse and reports: per pt independent w/ aDLs, independent w/ functional transfers and mobility w/ no AD, assist w/ IADLs and transport. Upon evaluation: Pt requires MIN assist supine>sit bed mobility w/ assist R UE, MIN assist LB ADLs, MOD assist UB ADLS, supervision to contact guard assist functional transfers, contact guard assist functional mobility 2* the following deficits impacting occupational performance: increased pain R UE, NWB R UE and splint and sling in place, impaired balance, impaired activity tolerance, decreased strength and endurance, fall risk, limited hand extension of digits. Pt educated on dressing R UE first and wearing button down tops or oversized shirts; pt receptive and educated on positioning of items in environment. Pt to benefit from continued skilled OT tx while in the hospital to address deficits as defined above and maximize level of functional independence w ADL's and functional mobility. Occupational Performance areas to address include: eating, grooming, bathing/shower, toilet hygiene, dressing, health maintenance, functional mobility, and clothing management. From OT standpoint, recommendation at time of d/c would be level III resource. Goals   Patient Goals "have less pain   LTG Time Frame 10-14   Long Term Goal please see below goals   Plan   Treatment Interventions ADL retraining;Functional transfer training;UE strengthening/ROM; Endurance training;Patient/family training;Cognitive reorientation;Equipment evaluation/education; Compensatory technique education; Activityengagement; Energy conservation   Goal Expiration Date 11/27/23   OT Frequency 2-3x/wk   Discharge Recommendation   Rehab Resource Intensity Level, OT III (Minimum Resource Intensity)   Additional Comments  The patient's raw score on the AM-PAC Daily Activity Inpatient Short Form is 16.  A raw score of less than 19 suggests the patient may benefit from discharge to post-acute rehabilitation services. Please refer to the recommendation of the Occupational Therapist for safe discharge planning. AM-PAC Daily Activity Inpatient   Lower Body Dressing 3   Bathing 2   Toileting 3   Upper Body Dressing 2   Grooming 3   Eating 3   Daily Activity Raw Score 16   Daily Activity Standardized Score (Calc for Raw Score >=11) 35.96   AM-PAC Applied Cognition Inpatient   Following a Speech/Presentation 4   Understanding Ordinary Conversation 4   Taking Medications 4   Remembering Where Things Are Placed or Put Away 4   Remembering List of 4-5 Errands 4   Taking Care of Complicated Tasks 4   Applied Cognition Raw Score 24   Applied Cognition Standardized Score 62.21   Modified [place] Scale   Modified [place] Scale 4   End of Consult   Education Provided Yes   Patient Position at End of Consult Bed/Chair alarm activated; All needs within reach; Bedside chair   Nurse Communication Nurse aware of consult     Occupational Therapy Goals to be met in 10-14 days:  1) Pt will improve activity tolerance to G for 30 min txment sessions to enhance ADLs  2) Pt will complete ADLs/self care w/ mod I w/ one UE dressing techniques while maintaining NWB R UE  3) Pt will complete toileting w/ mod I w/ G hygiene/thoroughness using DME PRN  4) Pt will improve functional transfers on/off all surfaces using DME PRN w/ G balance/safety including toileting w/ mod I  5) Pt will improve fx'l mobility during I/ADl/leisure tasks using DME PRN w/ g balance/safety w/ mod I  6) Pt will engage in ongoing cognitive assessment w/ G participation to A w/ safe d/c planning/recommendations  7) Pt will demonstrate G carryover of pt/caregiver education and training as appropriate w/ mod I  w/ G tolerance  8) Pt will engage in depression screen/leisure interest checklist w/ G participation to monitor s/s depression and ID 3 positive coping strategies to A w/ emotional regulation and management  9) Pt will demonstrate improved bed mobility to MOD I to enhance ADLs    Documentation completed by: Mary Bowen MS, OTR/L

## 2023-11-13 NOTE — PROGRESS NOTES
[address]  Progress Note  Name: Brittney Brody  MRN: [id number]  Unit/Bed#: -01 I Date of Admission: 11/12/2023   Date of Service: 11/13/2023 I Hospital Day: 1    Assessment/Plan   * Humerus fracture  Assessment & Plan  Patient had a mechanical fall  R humerus fracture noted on X ray  Patient did have some immobilization in a sling in the ED, she was seen by orthopedics as well, no plan for intervention. Orthopedics plan noted " No plan for surgical intervention at this time / F/u with Dr Fierro Begin in 1 week for alignment check and given the possibility of this being a pathologic fracture "  Continue pain control - the patient still required IV analgesia overnight and she also required IV antiemetics given concern of nausea. Will see how she tolerates diet and try to transition her to a PO analgesic regimen over the next 24h - changed to PO morphine instead of IV for severe pain - monitor response. We will leave IV only for breakthrough. Neurovascular checks       Lymphedema of right arm  Assessment & Plan  Chronic problem due to previous breast cancer treatment  She has had episodes of cellulitis, follows with ID, is on chronic penicillin therapy twice daily for suppression  Continue Penicillin    HTN, goal below 140/90  Assessment & Plan  Elevated on arrival but likely due to pain  Continue to monitor    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 (HCC)  Assessment & Plan  Reported history of breast cancer  Continue letrozole  Monitor    Back pain, chronic  Assessment & Plan  Overall stable  Monitor           VTE Pharmacologic Prophylaxis:   Pharmacologic: Heparin  Mechanical VTE Prophylaxis in Place: Yes    Patient Centered Rounds: I have performed bedside rounds with nursing staff today.     Discussions with Specialists or Other Care Team Provider: Discussed with care management team    Education and Discussions with Family / Patient: Patient herself - she did not request me to talk to anyone    Time Spent for Care: 45 minutes. More than 50% of total time spent on counseling and coordination of care as described above. Current Length of Stay: 1 day(s)    Current Patient Status: Inpatient   Certification Statement: The patient will continue to require additional inpatient hospital stay due to need for IV medicines    Discharge Plan: 24h    Code Status: Level 1 - Full Code      Subjective:     Patient evaluated this morning. She complains of some nausea and overnight is to require pain medicine. Denies any other pain except for the pain in the right arm which is bearable. Objective:     Vitals:   Temp (24hrs), Av.8 °F (36.6 °C), Min:97.3 °F (36.3 °C), Max:98.1 °F (36.7 °C)    Temp:  [97.3 °F (36.3 °C)-98.1 °F (36.7 °C)] 98 °F (36.7 °C)  HR:  [64-87] 68  Resp:  [16-20] 19  BP: (129-222)/() 145/77  SpO2:  [93 %-100 %] 95 %  Body mass index is 22.66 kg/m². Input and Output Summary (last 24 hours): Intake/Output Summary (Last 24 hours) at 2023 1200  Last data filed at 2023 0031  Gross per 24 hour   Intake 100 ml   Output 200 ml   Net -100 ml       Physical Exam:     Physical Exam  Vitals and nursing note reviewed. Constitutional:       Appearance: Normal appearance. She is normal weight. HENT:      Head: Normocephalic and atraumatic. Right Ear: External ear normal.      Left Ear: External ear normal.      Nose: Nose normal. No congestion. Mouth/Throat:      Mouth: Mucous membranes are moist.      Pharynx: Oropharynx is clear. No oropharyngeal exudate or posterior oropharyngeal erythema. Eyes:      General: No scleral icterus. Right eye: No discharge. Left eye: No discharge. Extraocular Movements: Extraocular movements intact. Conjunctiva/sclera: Conjunctivae normal.      Pupils: Pupils are equal, round, and reactive to light. Cardiovascular:      Rate and Rhythm: Normal rate and regular rhythm. Pulses: Normal pulses. Heart sounds: Normal heart sounds. No murmur heard. No friction rub. No gallop. Pulmonary:      Effort: Pulmonary effort is normal. No respiratory distress. Breath sounds: Normal breath sounds. No stridor. No wheezing, rhonchi or rales. Chest:      Chest wall: No tenderness. Abdominal:      General: Abdomen is flat. Bowel sounds are normal. There is no distension. Palpations: Abdomen is soft. There is no mass. Tenderness: There is no abdominal tenderness. There is no guarding or rebound. Musculoskeletal:         General: No swelling, tenderness, deformity or signs of injury. Normal range of motion. Cervical back: Normal range of motion and neck supple. No rigidity. No muscular tenderness. Comments: Right upper extremity in a sling  There is good capillary refill time distally   Skin:     General: Skin is warm and dry. Capillary Refill: Capillary refill takes less than 2 seconds. Coloration: Skin is not jaundiced or pale. Findings: No bruising, erythema, lesion or rash. Neurological:      General: No focal deficit present. Mental Status: She is alert and oriented to person, place, and time. Mental status is at baseline. Cranial Nerves: No cranial nerve deficit. Sensory: No sensory deficit. Motor: No weakness. Coordination: Coordination normal.   Psychiatric:         Mood and Affect: Mood normal.         Behavior: Behavior normal.         Thought Content:  Thought content normal.         Judgment: Judgment normal.           Additional Data:     Labs:    Results from last 7 days   Lab Units 11/13/23  0848   WBC Thousand/uL 6.67   HEMOGLOBIN g/dL 14.5   HEMATOCRIT % 41.4   PLATELETS Thousands/uL 284   NEUTROS PCT % 68   LYMPHS PCT % 20   MONOS PCT % 12   EOS PCT % 0     Results from last 7 days   Lab Units 11/13/23  0848   SODIUM mmol/L 133*   POTASSIUM mmol/L 3.6   CHLORIDE mmol/L 100   CO2 mmol/L 23   BUN mg/dL 8 CREATININE mg/dL 0.50*   ANION GAP mmol/L 10   CALCIUM mg/dL 9.1   GLUCOSE RANDOM mg/dL 120     Results from last 7 days   Lab Units 11/12/23  1419   INR  0.93                       * I Have Reviewed All Lab Data Listed Above. * Additional Pertinent Lab Tests Reviewed: [address] Admission Reviewed      Recent Cultures (last 7 days):           Last 24 Hours Medication List:   Current Facility-Administered Medications   Medication Dose Route Frequency Provider Last Rate    acetaminophen  1,000 mg Intravenous Once Deion Matute PA-C      acetaminophen  650 mg Oral Q6H PRN Deion Matute PA-C      calcium carbonate  500 mg Oral BID Frankey Co, MD      cholecalciferol  3,000 Units Oral Daily Frankey Co, MD      diphenhydrAMINE  50 mg Oral HS PRN Frankey Co, MD      docusate sodium  100 mg Oral BID PRN Frankey Co, MD      dronabinol  15 mg Oral 4x Daily PRN Frankey Co, MD      ezetimibe  10 mg Oral Daily Frankey Co, MD      heparin (porcine)  5,000 Units Subcutaneous [facility] Frankey Co, MD      letrozole  2.5 mg Oral HS Caty Jc MD      metoclopramide  5 mg Intravenous Q6H PRN Deion Matute PA-C      morphine  15 mg Oral Q6H PRN Frankey Co, MD      morphine injection  2 mg Intravenous Q6H PRN Frankey Co, MD      morphine injection  8 mg Intravenous Once Mary Flood PA-C      multivitamin-minerals  1 tablet Oral Daily Frankey Co, MD      ondansetron  4 mg Intravenous Q6H PRN Frankey Co, MD      penicillin V potassium  500 mg Oral BID Frankey Co, MD      saccharomyces boulardii  250 mg Oral BID Frankey Co, MD          Today, Patient Was Seen By: Frankey Co, MD    ** Please Note: Dictation voice to text software may have been used in the creation of this document.  **

## 2023-11-14 ENCOUNTER — HOME HEALTH ADMISSION (OUTPATIENT)
Dept: HOME HEALTH SERVICES | Facility: HOME HEALTHCARE | Age: 63
End: 2023-11-14
Payer: MEDICARE

## 2023-11-14 VITALS
WEIGHT: 149 LBS | DIASTOLIC BLOOD PRESSURE: 80 MMHG | HEART RATE: 71 BPM | HEIGHT: 68 IN | RESPIRATION RATE: 16 BRPM | TEMPERATURE: 98.1 F | SYSTOLIC BLOOD PRESSURE: 137 MMHG | BODY MASS INDEX: 22.58 KG/M2 | OXYGEN SATURATION: 97 %

## 2023-11-14 LAB
ANION GAP SERPL CALCULATED.3IONS-SCNC: 7 MMOL/L
BASOPHILS # BLD AUTO: 0.04 THOUSANDS/ÂΜL (ref 0–0.1)
BASOPHILS NFR BLD AUTO: 1 % (ref 0–1)
BUN SERPL-MCNC: 10 MG/DL (ref 5–25)
CALCIUM SERPL-MCNC: 8.9 MG/DL (ref 8.4–10.2)
CHLORIDE SERPL-SCNC: 101 MMOL/L (ref 96–108)
CO2 SERPL-SCNC: 24 MMOL/L (ref 21–32)
CREAT SERPL-MCNC: 0.52 MG/DL (ref 0.6–1.3)
EOSINOPHIL # BLD AUTO: 0.09 THOUSAND/ÂΜL (ref 0–0.61)
EOSINOPHIL NFR BLD AUTO: 1 % (ref 0–6)
ERYTHROCYTE [DISTWIDTH] IN BLOOD BY AUTOMATED COUNT: 12.4 % (ref 11.6–15.1)
GFR SERPL CREATININE-BSD FRML MDRD: 100 ML/MIN/1.73SQ M
GLUCOSE SERPL-MCNC: 100 MG/DL (ref 65–140)
HCT VFR BLD AUTO: 38.9 % (ref 34.8–46.1)
HGB BLD-MCNC: 12.9 G/DL (ref 11.5–15.4)
IMM GRANULOCYTES # BLD AUTO: 0.02 THOUSAND/UL (ref 0–0.2)
IMM GRANULOCYTES NFR BLD AUTO: 0 % (ref 0–2)
LYMPHOCYTES # BLD AUTO: 1.88 THOUSANDS/ÂΜL (ref 0.6–4.47)
LYMPHOCYTES NFR BLD AUTO: 26 % (ref 14–44)
MCH RBC QN AUTO: 29.9 PG (ref 26.8–34.3)
MCHC RBC AUTO-ENTMCNC: 33.2 G/DL (ref 31.4–37.4)
MCV RBC AUTO: 90 FL (ref 82–98)
MONOCYTES # BLD AUTO: 1.02 THOUSAND/ÂΜL (ref 0.17–1.22)
MONOCYTES NFR BLD AUTO: 14 % (ref 4–12)
NEUTROPHILS # BLD AUTO: 4.18 THOUSANDS/ÂΜL (ref 1.85–7.62)
NEUTS SEG NFR BLD AUTO: 58 % (ref 43–75)
NRBC BLD AUTO-RTO: 0 /100 WBCS
PLATELET # BLD AUTO: 264 THOUSANDS/UL (ref 149–390)
PMV BLD AUTO: 9.4 FL (ref 8.9–12.7)
POTASSIUM SERPL-SCNC: 3.6 MMOL/L (ref 3.5–5.3)
RBC # BLD AUTO: 4.32 MILLION/UL (ref 3.81–5.12)
SODIUM SERPL-SCNC: 132 MMOL/L (ref 135–147)
WBC # BLD AUTO: 7.23 THOUSAND/UL (ref 4.31–10.16)

## 2023-11-14 PROCEDURE — 97535 SELF CARE MNGMENT TRAINING: CPT

## 2023-11-14 PROCEDURE — 80048 BASIC METABOLIC PNL TOTAL CA: CPT | Performed by: INTERNAL MEDICINE

## 2023-11-14 PROCEDURE — 85025 COMPLETE CBC W/AUTO DIFF WBC: CPT | Performed by: INTERNAL MEDICINE

## 2023-11-14 PROCEDURE — 99239 HOSP IP/OBS DSCHRG MGMT >30: CPT | Performed by: STUDENT IN AN ORGANIZED HEALTH CARE EDUCATION/TRAINING PROGRAM

## 2023-11-14 PROCEDURE — 97530 THERAPEUTIC ACTIVITIES: CPT

## 2023-11-14 RX ORDER — SENNA AND DOCUSATE SODIUM 50; 8.6 MG/1; MG/1
1 TABLET, FILM COATED ORAL DAILY
Qty: 7 TABLET | Refills: 0 | Status: SHIPPED | OUTPATIENT
Start: 2023-11-14

## 2023-11-14 RX ORDER — OXYCODONE HYDROCHLORIDE 5 MG/1
5 TABLET ORAL EVERY 6 HOURS PRN
Qty: 30 TABLET | Refills: 0 | Status: SHIPPED | OUTPATIENT
Start: 2023-11-14 | End: 2023-11-24

## 2023-11-14 RX ADMIN — ONDANSETRON 4 MG: 2 INJECTION INTRAMUSCULAR; INTRAVENOUS at 11:11

## 2023-11-14 RX ADMIN — CALCIUM CARBONATE (ANTACID) CHEW TAB 500 MG 500 MG: 500 CHEW TAB at 09:31

## 2023-11-14 RX ADMIN — DRONABINOL 15 MG: 2.5 CAPSULE ORAL at 06:49

## 2023-11-14 RX ADMIN — DRONABINOL 15 MG: 2.5 CAPSULE ORAL at 11:11

## 2023-11-14 RX ADMIN — HEPARIN SODIUM 5000 UNITS: 5000 INJECTION INTRAVENOUS; SUBCUTANEOUS at 06:49

## 2023-11-14 RX ADMIN — Medication 250 MG: at 09:31

## 2023-11-14 RX ADMIN — PENICILLIN V POTASSIUM 500 MG: 250 TABLET, FILM COATED ORAL at 09:30

## 2023-11-14 RX ADMIN — Medication 1 TABLET: at 09:31

## 2023-11-14 RX ADMIN — Medication 3000 UNITS: at 09:31

## 2023-11-14 RX ADMIN — EZETIMIBE 10 MG: 10 TABLET ORAL at 09:31

## 2023-11-14 NOTE — NURSING NOTE
Patient left at this time, no needs at discharge. IV and Masimo removed. Patient assisted into clothes and sling. No belongings left in room.

## 2023-11-14 NOTE — PLAN OF CARE
Problem: Potential for Falls  Goal: Patient will remain free of falls  Description: INTERVENTIONS:  - Educate patient/family on patient safety including physical limitations  - Instruct patient to call for assistance with activity   - Consult OT/PT to assist with strengthening/mobility   - Keep Call bell within reach  - Keep bed low and locked with side rails adjusted as appropriate  - Keep care items and personal belongings within reach  - Initiate and maintain comfort rounds  - Make Fall Risk Sign visible to staff  - Offer Toileting every 2 Hours, in advance of need  - Initiate/Maintain bed alarm  - Obtain necessary fall risk management equipment:   - Apply yellow socks and bracelet for high fall risk patients  - Consider moving patient to room near nurses station  11/14/2023 1721 by Goldie Carney RN  Outcome: Adequate for Discharge  11/14/2023 1721 by Goldie Carney RN  Outcome: Progressing  11/14/2023 1241 by Goldie Carney RN  Outcome: Progressing     Problem: MOBILITY - ADULT  Goal: Maintain or return to baseline ADL function  Description: INTERVENTIONS:  -  Assess patient's ability to carry out ADLs; assess patient's baseline for ADL function and identify physical deficits which impact ability to perform ADLs (bathing, care of mouth/teeth, toileting, grooming, dressing, etc.)  - Assess/evaluate cause of self-care deficits   - Assess range of motion  - Assess patient's mobility; develop plan if impaired  - Assess patient's need for assistive devices and provide as appropriate  - Encourage maximum independence but intervene and supervise when necessary  - Involve family in performance of ADLs  - Assess for home care needs following discharge   - Consider OT consult to assist with ADL evaluation and planning for discharge  - Provide patient education as appropriate  11/14/2023 1721 by Goldie Carney RN  Outcome: Adequate for Discharge  11/14/2023 1721 by Goldie Carney RN  Outcome: Progressing  11/14/2023 1241 by Sara Ocampo RN  Outcome: Progressing  Goal: Maintains/Returns to pre admission functional level  Description: INTERVENTIONS:  - Perform BMAT or MOVE assessment daily.   - Set and communicate daily mobility goal to care team and patient/family/caregiver. - Collaborate with rehabilitation services on mobility goals if consulted  - Perform Range of Motion 2 times a day. - Reposition patient every 2 hours.   - Dangle patient 2 times a day  - Stand patient 2 times a day  - Ambulate patient 2 times a day  - Out of bed to chair 2 times a day   - Out of bed for meals 2 times a day  - Out of bed for toileting  - Record patient progress and toleration of activity level   11/14/2023 1721 by Sara Ocampo RN  Outcome: Adequate for Discharge  11/14/2023 1721 by Sara Ocampo RN  Outcome: Progressing  11/14/2023 1241 by Sara Ocampo RN  Outcome: Progressing     Problem: Prexisting or High Potential for Compromised Skin Integrity  Goal: Skin integrity is maintained or improved  Description: INTERVENTIONS:  - Identify patients at risk for skin breakdown  - Assess and monitor skin integrity  - Assess and monitor nutrition and hydration status  - Monitor labs   - Assess for incontinence   - Turn and reposition patient  - Assist with mobility/ambulation  - Relieve pressure over bony prominences  - Avoid friction and shearing  - Provide appropriate hygiene as needed including keeping skin clean and dry  - Evaluate need for skin moisturizer/barrier cream  - Collaborate with interdisciplinary team   - Patient/family teaching  - Consider wound care consult   11/14/2023 1721 by Sara Ocampo RN  Outcome: Adequate for Discharge  11/14/2023 1721 by Sara Ocampo RN  Outcome: Progressing  11/14/2023 1241 by Sara Ocampo RN  Outcome: Progressing

## 2023-11-14 NOTE — CASE MANAGEMENT
Case Management Discharge Planning Note    Patient name Josee Alvarez  Location [facility] [id number] MRN [id number]  : 1960 Date 2023       Current Admission Date: 2023  Current Admission Diagnosis:Humerus fracture   Patient Active Problem List    Diagnosis Date Noted    Humerus fracture 2023    History of lumpectomy of left breast 2023    Use of letrozole (Femara) 2022    PONV (postoperative nausea and vomiting) 2022    Malignant neoplasm of lower-inner quadrant of left breast in female, estrogen receptor positive  2022    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 ([address]) 2022    Desmoid tumor 2022    Sepsis ([address]) 2020    Cellulitis of right upper extremity 2018    Lymphedema 2018    Chronic insomnia 2017    Lymphedema of right arm 2017    HTN, goal below 140/90 2016    Peripheral neuropathy 2015    Back pain, chronic 2014    Hyperlipidemia, mixed 2014      LOS (days): 2  Geometric Mean LOS (GMLOS) (days): 3.50  Days to GMLOS:1.6     OBJECTIVE:  Risk of Unplanned Readmission Score: 13.31         Current admission status: Inpatient   Preferred Pharmacy:   [id number] [place], [place] - [address] [place] [address]  Phone: [phone] Fax: [address] [address]  Phone: [phone] Fax: [phone]    Primary Care Provider: Stephanie Whitfield MD    Primary Insurance: MEDICARE  Secondary Insurance: AARP    DISCHARGE DETAILS:    Discharge planning discussed with[de-identified] Pt at bedside  [place] of Choice: Yes  Comments - Freedom of Choice: CM met with pt at bedside and provided pt with a list of accepting [facility]NA.  [facility] which was reserved on AIDIN and updated on AVS. Pt is aware to expect a call from VNA to ada [facility]. CM continues to follow. Requested [address]         Is the patient interested in [facility] at discharge?: Yes  [address] requested[de-identified] [address], Nursing, Occupational Therapy, [address] Name[de-identified] [facility] External Referral Reason (only applicable if external HHA name selected): Patient has established relationship with provider  [address] Provider[de-identified] PCP  Home Health Services Needed[de-identified] Evaluate Functional Status and Safety, Gait/ADL Training, Strengthening/Theraputic Exercises to Improve Function, Other (comment) (fx humerus)  Homebound Criteria Met[de-identified] Requires the Assistance of Another Person for Safe Ambulation or to Leave the Home, Uses an Assist Device (i.e. cane, walker, etc)  Supporting Clincal Findings[de-identified] Limited Endurance, Fatigues Easliy in [employer]          Treatment Team Recommendation: Home with [address]  Discharge Destination Plan[de-identified] Home with [facility] at Discharge : Family               IMM Given (Date):: 11/14/23  IMM Given to[de-identified] Patient  Family notified[de-identified] Pt at bedside  Additional Comments: CM reviewed IMM with pt at bedside who expressed full and complete understanding. Copy provided and original placed in MR for scanning.

## 2023-11-14 NOTE — PLAN OF CARE
Problem: OCCUPATIONAL THERAPY ADULT  Goal: Performs self-care activities at highest level of function for planned discharge setting. See evaluation for individualized goals. Description: Treatment Interventions: ADL retraining, Functional transfer training, UE strengthening/ROM, Endurance training, Patient/family training, Cognitive reorientation, Equipment evaluation/education, Compensatory technique education, Activityengagement, Energy conservation          See flowsheet documentation for full assessment, interventions and recommendations. Outcome: Progressing  Note: Limitation: Decreased ADL status, Decreased UE ROM, Decreased UE strength, Decreased self-care trans, Decreased high-level ADLs, Decreased endurance, Decreased fine motor control, Non-func R UE  Prognosis: Good  Assessment: Patient participated in Skilled OT session this date with interventions consisting of ADL re training with the use of correct body mechnaics,  therapeutic activities to: increase activity tolerance, and increase dynamic sit/ stand balance during functional activity . Patient agreeable to OT treatment session, upon arrival patient was found supine in bed and in no apparent distress. Patient completed bed mobility with supervision and functional transfers with supervision. Pt ambulated to/from bathroom with CGA. Pt completed toilet transfer with supervision and required min assist for toilet hygiene. While seated at sink, pt required mod assist for UB ADLs and min assist ofr LB ADLs. In comparison to previous session, patient with improvements in functional transfers and balance. Patient requiring verbal cues for correct technique and frequent rest periods. Patient continues to be functioning below baseline level, occupational performance remains limited secondary to factors listed above and increased risk for falls and injury. From OT standpoint, recommendation at time of d/c would be Level III (minimim resource intensity). Patient to benefit from continued Occupational Therapy treatment while in the hospital to address deficits as defined above and maximize level of functional independence with ADLs and functional mobility.      Rehab Resource Intensity Level, OT: III (Minimum Resource Intensity)     Landon Bourgeois OTD, OTR/L

## 2023-11-14 NOTE — ASSESSMENT & PLAN NOTE
Present on admission  Right humeral fracture- mildly displaced midshaft   Status post closed reduction maneuver by orthopedic surgery  Plan for follow up as an outpatient with orthopedic surgery   Was kept overnight due to worsening pain, but after reduction feels much better  Tolerating PO diet, stable for discharge

## 2023-11-14 NOTE — PROGRESS NOTES
Patient:    MRN:  [id number]    Maria C Request ID:  [id number]    Level of care reserved:  Mikayla5 Chao Chong    Partner Reserved:  [facility], [facility], [address] [facility] [phone]    Clinical needs requested:    Geography searched:  [phone]    Start of Service:    Request sent:  9:18am EST on 11/13/2023 by Hiren Worthington    Partner reserved:  12:07pm EST on 11/14/2023 by Cleopatra Castorena list shared:  12:02pm EST on 11/14/2023 by Rissa Storm

## 2023-11-14 NOTE — DISCHARGE SUMMARY
[address]  Discharge- Ondeep Rodriguez 1960, 70 y.o. female MRN: [id number]  Unit/Bed#: -[facility] Encounter: [id number]  Primary Care Provider: Johan Baeza MD   Date and time admitted to hospital: 11/12/2023 12:47 PM    * Humerus fracture  Assessment & Plan  Present on admission  Right humeral fracture- mildly displaced midshaft   Status post closed reduction maneuver by orthopedic surgery  Plan for follow up as an outpatient with orthopedic surgery   Was kept overnight due to worsening pain, but after reduction feels much better  Tolerating PO diet, stable for discharge     HTN, goal below 140/90  Assessment & Plan  Adequately controlled today  Stable for discharge on home regimen    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 (HCC)  Assessment & Plan  Reported history of breast cancer  Continue letrozole  Follow up with PCP as an outpatient     Lymphedema of right arm  Assessment & Plan  Chronic problem due to previous breast cancer treatment  She has had episodes of cellulitis, follows with ID, is on chronic penicillin therapy twice daily for suppression  Continue Penicillin      Medical Problems       Resolved Problems  Date Reviewed: 11/1/2023   None       Discharging Physician / Practitioner: Jeannette Boyd MD  PCP: Johan Baeza MD  Admission Date:   Admission Orders (From admission, onward)       Ordered        11/12/23 1432  INPATIENT ADMISSION  Once                          Discharge Date: 11/14/23    Consultations During Hospital Stay:  IP CONSULT TO ORTHOPEDIC SURGERY    Procedures Performed:   Imaging, closed reduction     Significant Findings / Test Results:   Principal Problem:    Humerus fracture  Active Problems:    Back pain, chronic    Lymphedema of right arm    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 (HCC)    HTN, goal below 140/90  Resolved Problems:    * No resolved hospital problems. *        Incidental Findings:   See above    Test Results Pending at Discharge (will require follow up):   na     Outpatient Tests Requested: Follow up with PCP and orthopedic surgery     Complications:  see above     Reason for Admission: humeral fracture     Hospital Course:   Awilda Loja is a 70 y.o. female patient who originally presented to the hospital on 11/12/2023 due to humeral fracture after mechanical fall. Orthopedic surgery was consulted, no plan for surgery inpatient. Status post closed reduction with significant improvement in pain. Now stable for discharge. Condition at Discharge: good    Discharge Day Visit / Exam:   * Please refer to separate progress note for these details *    Discussion with Family: Patient declined call to . Discharge instructions/Information to patient and family:   See after visit summary for information provided to patient and family. Provisions for Follow-Up Care:  See after visit summary for information related to follow-up care and any pertinent home health orders. Mobility at time of Discharge:   Basic Mobility Inpatient Raw Score: 23  JH-HLM Goal: 7: Walk 25 feet or more  JH-HLM Achieved: 7: Walk 25 feet or more  HLM Goal achieved. Continue to encourage appropriate mobility. Disposition:   Home    Planned Readmission: none     Discharge Statement:  I spent 30+ minutes discharging the patient. This time was spent on the day of discharge. I had direct contact with the patient on the day of discharge. Greater than 50% of the total time was spent examining patient, answering all patient questions, arranging and discussing plan of care with patient as well as directly providing post-discharge instructions. Additional time then spent on discharge activities. Discharge Medications:  See after visit summary for reconciled discharge medications provided to patient and/or family.       **Please Note: This note may have been constructed using a voice recognition system**

## 2023-11-14 NOTE — OCCUPATIONAL THERAPY NOTE
Occupational Therapy Treatment Note     Patient Name: Libby Sims  IUVNZ'C Date: 11/14/2023  Problem List  Principal Problem:    Humerus fracture  Active Problems:    Back pain, chronic    Lymphedema of right arm    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 (HCC)    HTN, goal below 140/90        11/14/23 0958   OT Last Visit   OT Visit Date 11/14/23   Note Type   Note Type Treatment   Pain Assessment   Pain Assessment Tool 0-10   Pain Score 6   Pain Location/Orientation Orientation: Right;Location: Arm   Pain Onset/Description Onset: Ongoing;Frequency: Constant/Continuous   Hospital Pain Intervention(s) Ambulation/increased activity;Repositioned;Medication (See MAR)   Restrictions/Precautions   Weight Bearing Precautions Per Order Yes   RUE Weight Bearing Per Order NWB   Braces or Orthoses Sling  (donned upon arrival)   Other Precautions Fall Risk;Pain;WBS   Lifestyle   Autonomy At baseline, patient independent with ADLs, ambulatory with no AD, and lives with    Reciprocal Relationships spouse   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   ADL   Where Assessed Sitting at sink    [address] 3  Moderate Assistance   UB Bathing Deficit Setup;Steadying;Verbal cueing;Supervision/safety; Increased time to complete;Right arm;Left arm; Abdomen   LB Bathing Assistance 4  Minimal Assistance   LB Bathing Deficit Setup;Steadying;Verbal cueing;Supervision/safety; Increased time to complete;Right lower leg including foot; Left lower leg including foot   UB Dressing Assistance 3  Moderate Assistance   UB Dressing Deficit Setup;Verbal cueing;Supervision/safety; Increased time to complete; Thread RUE; Thread LUE;Pull around back; Fasteners   UB Dressing Comments Pt donned hospital gown   Toileting Assistance  4  Minimal Assistance   Toileting Deficit Setup;Steadying;Verbal cueing; Increased time to complete;Supervison/safety; Perineal hygiene   Bed Mobility   Supine to Sit 5  Supervision Additional items Assist x 1;HOB elevated; Bedrails; Increased time required   Sit to Supine 5  Supervision   Additional items Assist x 1;HOB elevated; Bedrails; Increased time required   Transfers   Sit to Stand 5  Supervision   Additional items Assist x 1;Bedrails   Stand to Sit 5  Supervision   Additional items Assist x 1;Bedrails   Toilet transfer 5  Supervision   Additional items Assist x 1;Standard toilet; Increased time required   Functional Mobility   Functional Mobility   (CGA)   Additional Comments Pt ambulated to/from bathroom with no overt SOB or LOB. Pt limited by pain. Additional items   (pt ambulated with no AD)   Toilet Transfers   Toilet Transfer From [employer] Transfer Type To and from   Toilet Transfer to Standard toilet   Toilet Transfer Technique Ambulating   Toilet Transfers Contact guard   Cognition   Overall Cognitive Status WFL   Arousal/Participation Alert; Responsive; Cooperative   Attention Within functional limits   Orientation Level Oriented X4   Memory Within functional limits   Following Commands Follows all commands and directions without difficulty   Comments Pt agreeable to OT session   Activity Tolerance   Activity Tolerance Patient limited by pain   Assessment   Assessment Patient participated in Skilled OT session this date with interventions consisting of ADL re training with the use of correct body mechnaics,  therapeutic activities to: increase activity tolerance, and increase dynamic sit/ stand balance during functional activity . Patient agreeable to OT treatment session, upon arrival patient was found supine in bed and in no apparent distress. Patient completed bed mobility with supervision and functional transfers with supervision. Pt ambulated to/from bathroom with CGA. Pt completed toilet transfer with supervision and required min assist for toilet hygiene. While seated at sink, pt required mod assist for UB ADLs and min assist ofr LB ADLs.  In comparison to previous session, patient with improvements in functional transfers and balance. Patient requiring verbal cues for correct technique and frequent rest periods. Patient continues to be functioning below baseline level, occupational performance remains limited secondary to factors listed above and increased risk for falls and injury. From OT standpoint, recommendation at time of d/c would be Level III (minimim resource intensity). Patient to benefit from continued Occupational Therapy treatment while in the hospital to address deficits as defined above and maximize level of functional independence with ADLs and functional mobility. Plan   Treatment Interventions ADL retraining;Functional transfer training; Endurance training;Patient/family training   Goal Expiration Date 11/27/23   OT Treatment Day 1   OT Frequency 2-3x/wk   Discharge Recommendation   Rehab Resource Intensity Level, OT III (Minimum Resource Intensity)   AM-PAC Daily Activity Inpatient   Lower Body Dressing 3   Bathing 2   Toileting 3   Upper Body Dressing 2   Grooming 4   Eating 4   Daily Activity Raw Score 18   Daily Activity Standardized Score (Calc for Raw Score >=11) 38.66   AM-PAC Applied Cognition Inpatient   Following a Speech/Presentation 4   Understanding Ordinary Conversation 4   Taking Medications 4   Remembering Where Things Are Placed or Put Away 4   Remembering List of 4-5 Errands 4   Taking Care of Complicated Tasks 4   Applied Cognition Raw Score 24   Applied Cognition Standardized Score 62.21   Modified Nia Scale   Modified Wallace Scale 4   End of Consult   Education Provided Yes  (Provided education on proper positioning of RUE and sling use; pt verbalized understanding)   Patient Position at End of Consult Supine;Bed/Chair alarm activated; All needs within reach   Nurse Communication Nurse aware of consult     Darian Guadarrama OTD, OTR/L

## 2023-11-15 ENCOUNTER — HOME CARE VISIT (OUTPATIENT)
Dept: HOME HEALTH SERVICES | Facility: HOME HEALTHCARE | Age: 63
End: 2023-11-15
Payer: MEDICARE

## 2023-11-15 VITALS
HEART RATE: 83 BPM | OXYGEN SATURATION: 94 % | HEIGHT: 68 IN | RESPIRATION RATE: 18 BRPM | DIASTOLIC BLOOD PRESSURE: 78 MMHG | BODY MASS INDEX: 22.13 KG/M2 | TEMPERATURE: 97.5 F | WEIGHT: 146 LBS | SYSTOLIC BLOOD PRESSURE: 120 MMHG

## 2023-11-15 PROCEDURE — G0299 HHS/HOSPICE OF RN EA 15 MIN: HCPCS

## 2023-11-15 PROCEDURE — 10330081 VN NO-PAY CLAIM PROCEDURE

## 2023-11-15 PROCEDURE — 400013 VN SOC

## 2023-11-16 ENCOUNTER — HOME CARE VISIT (OUTPATIENT)
Dept: HOME HEALTH SERVICES | Facility: HOME HEALTHCARE | Age: 63
End: 2023-11-16
Payer: MEDICARE

## 2023-11-16 VITALS
OXYGEN SATURATION: 97 % | TEMPERATURE: 97 F | SYSTOLIC BLOOD PRESSURE: 132 MMHG | DIASTOLIC BLOOD PRESSURE: 80 MMHG | HEART RATE: 70 BPM

## 2023-11-16 PROCEDURE — G0151 HHCP-SERV OF PT,EA 15 MIN: HCPCS

## 2023-11-17 ENCOUNTER — TELEPHONE (OUTPATIENT)
Dept: HEMATOLOGY ONCOLOGY | Facility: CLINIC | Age: 63
End: 2023-11-17

## 2023-11-17 ENCOUNTER — HOME CARE VISIT (OUTPATIENT)
Dept: HOME HEALTH SERVICES | Facility: HOME HEALTHCARE | Age: 63
End: 2023-11-17
Payer: MEDICARE

## 2023-11-17 VITALS
SYSTOLIC BLOOD PRESSURE: 150 MMHG | OXYGEN SATURATION: 98 % | DIASTOLIC BLOOD PRESSURE: 80 MMHG | TEMPERATURE: 99.1 F | RESPIRATION RATE: 18 BRPM | HEART RATE: 72 BPM

## 2023-11-17 VITALS
SYSTOLIC BLOOD PRESSURE: 150 MMHG | RESPIRATION RATE: 18 BRPM | HEART RATE: 72 BPM | DIASTOLIC BLOOD PRESSURE: 80 MMHG | OXYGEN SATURATION: 98 %

## 2023-11-17 PROCEDURE — G0152 HHCP-SERV OF OT,EA 15 MIN: HCPCS

## 2023-11-17 PROCEDURE — G0300 HHS/HOSPICE OF LPN EA 15 MIN: HCPCS

## 2023-11-17 NOTE — TELEPHONE ENCOUNTER
Appointment Schedule   Who are you speaking with? Patient   If it is not the patient, are they listed on an active communication consent form? N/A   Which provider is the appointment scheduled with? Dr. Cristóbal Snell   At which location is the appointment scheduled for? [facility]   When is the appointment scheduled? Please list date and time 12/4/23 345   What is the reason for this appointment? F/u   Did patient voice understanding of the details of this appointment? Yes   Was the no show policy reviewed with patient?  Yes

## 2023-11-20 ENCOUNTER — HOME CARE VISIT (OUTPATIENT)
Dept: HOME HEALTH SERVICES | Facility: HOME HEALTHCARE | Age: 63
End: 2023-11-20
Payer: MEDICARE

## 2023-11-20 VITALS
HEART RATE: 61 BPM | OXYGEN SATURATION: 97 % | SYSTOLIC BLOOD PRESSURE: 135 MMHG | RESPIRATION RATE: 18 BRPM | DIASTOLIC BLOOD PRESSURE: 72 MMHG

## 2023-11-20 PROCEDURE — G0152 HHCP-SERV OF OT,EA 15 MIN: HCPCS

## 2023-11-21 ENCOUNTER — HOME CARE VISIT (OUTPATIENT)
Dept: HOME HEALTH SERVICES | Facility: HOME HEALTHCARE | Age: 63
End: 2023-11-21
Payer: MEDICARE

## 2023-11-21 ENCOUNTER — TELEPHONE (OUTPATIENT)
Dept: HOME HEALTH SERVICES | Facility: HOME HEALTHCARE | Age: 63
End: 2023-11-21

## 2023-11-22 ENCOUNTER — HOME CARE VISIT (OUTPATIENT)
Dept: HOME HEALTH SERVICES | Facility: HOME HEALTHCARE | Age: 63
End: 2023-11-22
Payer: MEDICARE

## 2023-11-22 VITALS
RESPIRATION RATE: 18 BRPM | HEART RATE: 60 BPM | SYSTOLIC BLOOD PRESSURE: 122 MMHG | DIASTOLIC BLOOD PRESSURE: 60 MMHG | OXYGEN SATURATION: 91 %

## 2023-11-22 PROCEDURE — G0152 HHCP-SERV OF OT,EA 15 MIN: HCPCS

## 2023-11-24 ENCOUNTER — TELEPHONE (OUTPATIENT)
Dept: HEMATOLOGY ONCOLOGY | Facility: CLINIC | Age: 63
End: 2023-11-24

## 2023-11-24 ENCOUNTER — HOME CARE VISIT (OUTPATIENT)
Dept: HOME HEALTH SERVICES | Facility: HOME HEALTHCARE | Age: 63
End: 2023-11-24
Payer: MEDICARE

## 2023-11-24 ENCOUNTER — TELEPHONE (OUTPATIENT)
Age: 63
End: 2023-11-24

## 2023-11-24 VITALS
DIASTOLIC BLOOD PRESSURE: 60 MMHG | SYSTOLIC BLOOD PRESSURE: 110 MMHG | HEART RATE: 68 BPM | TEMPERATURE: 98.8 F | RESPIRATION RATE: 18 BRPM | OXYGEN SATURATION: 98 %

## 2023-11-24 PROCEDURE — G0300 HHS/HOSPICE OF LPN EA 15 MIN: HCPCS

## 2023-11-24 NOTE — TELEPHONE ENCOUNTER
Returned [facility]'s phone call. She stated that the patient has a right spiral humerus fracture and is not scheduled to see ortho for an outpatient consult until 11/30. She stated that the patient has run out of her narcotic pain medication and requested that. Dr. Kimberly Sheehan order her more as he is the only other physician in her chart. She stated that ortho will not order any pain medication until they see her next week and the patient is in-between PCPs at the moment. Explained that Dr. Kimberly Sheehan is out of the office until next week and that the patient should go to an urgent care or the ER if her pain is uncontrolled by OTC mediation. DREWA verbalized understanding and will discuss with the patient.

## 2023-11-24 NOTE — TELEPHONE ENCOUNTER
Call Transfer   Who are you speaking with? [facility] nurses   If it is not the patient, are they listed on an active communication consent form? N/A   Who is the patients HemOnc/SurgOnc provider? Dr. Jaspal Hoyos   What is the reason for this call? Carmen Osborne is requesting to speak to Dr. Juana George team about a medication problem. Person/Department that the call was transferred to? Time that call was transferred? [place] @9:35AM   Your call will be transferred now. If you receive a voicemail, please leave a detailed message and a member of the team will return your call as soon as possible. Did you relay this information to the caller?   Yes

## 2023-11-24 NOTE — TELEPHONE ENCOUNTER
Caller: [place] - Visiting nurse    Doctor: Blu Calix    Reason for call: Caller stated patient received script for Oxycodone 5 mg in ED 11/12  for right humeral fx.and needs the script refilled for pain. Caller would like to know if Dr. Blu Calix will refill that script prior to patient being seen as a NP 11/30. Please advise.     Call back#: [phone]

## 2023-11-26 NOTE — PROGRESS NOTES
Hematology/Oncology Outpatient Office Note    Date of Service: 8/10/2022    [facility]       Reason for Consultation:   Chief Complaint   Patient presents with    Consult       Cancer Stage at diagnosis: 1A    Referral Physician: Radha Johnson*    Primary Care Physician:  Jennifer Kellogg MD     Nickname: Rosalindaarturo Arenasyonis    Spouse: Jose Elias Monge ECO    Today's ECO    Goals and Barriers:  Current Goal: Minimize effects of disease burden, extend life  Barriers to accomplishing this: None    Patient's Capacity to Self Care:  Patient is able to self care    Code Status: not addressed today    Advanced directives: not addressed today    ASSESSMENT & PLAN      Diagnosis ICD-10-CM Associated Orders   1  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 ([facility] [address] )  C50 912 DXA bone density spine hip and pelvis    C77 3    2  Malignant neoplasm of lower-inner quadrant of left breast in female, estrogen receptor positive ([facility] [address] )  C50 312 Ambulatory referral to Hematology / Oncology    Z17 0    3  Aromatase inhibitor use  Z79 811 DXA bone density spine hip and pelvis         This is a 59 y o  c PMHx notable for Desmoid tumors, RUE grade 3 lymphedema, HLP, HTN, ovarian cancer s/p hysterectomy, being seen in consultation for early localized hormone positive breast cancer s/p resection    L breast cancer, ER+, CO+, HER2-  Clinical stage 1A, pathologic (prognostic) Stage 1A s/p lumpectomy  As for adjuvant endocrine therapy, after baseline DEXA is assessed, we are offering the patient Aromatase inhibitor therapy for 5 years to reduce the risk of breast cancer recurrence       Side effects of AI therapy were reviewed in detail, which include but are not limited to: peripheral edema, night sweats, hot flashes, Osteoporosis, risk of VTE or portal vein thrombus, approximately 1% chance of endometrial cancer, mood disturbances, weight loss, and joint discomfort  We also discussed the possible risk of QTc prolongation when combined with certain antidepressants   4 4-9% risk osteoporotic fractures, 33-57% arthralgia, 0 8% VTE, hot flashes 56%, vaginal dryness (13%), CV events (6%) (OR went below 1), cataracts 5% (OR went below 1)        Updates from the 2013 [address] [place] [address] consensus statement redefined greater than 20% as the new threshold for substratifying Luminal subtypes [66] based on the work of Melvin menjivar , which highlighted the relevance of this cut-off to predict survival outcomes within the ER+ cohort  Although this is somewhat of an unrealistic conventional cut-off, the authors also highlighted a 20% cut off to be clinically relevant for recurrence and survival (hazard ratio (HR): 7 14, 95% confidence interval (CI): 3 87-13 16)  Furthermore, Maximo et al  outlined the prognostic significance of Ki-67 expression levels greater than or equal to 25% for predicting mortality in their review of over 64,000 breast cancer patients (HR: 2 05, 95% CI: 1 66-2 53)   Ki-67 as a Prognostic Biomarker in Invasive Breast Cancer (nih gov)   Her Ki-67 is 3-5%  Discussion of decision making   Oncology history updated, accordingly, during this visit   Goals of care/patient communication  o I discussed with the patient the clinical course leading up to their cancer diagnosis  I reviewed relevant office notes, imaging reports and pathology result as well   o I told the patient that this is a case of curable disease and what this means  We discussed that the goal of anti-cancer therapy is to provide best quality of life, extend overall survival, and progression free survival as shown in clinical trials     o I explained the risks/benefits of the proposed cancer therapy: Letrozole and after discussion including understanding risks of possible life-threatening complications and therapy-related malignancy development, the pt has agreed to this plan   TNM/Staging At Diagnosis  Cancer Staging  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 ([facility] [address] )  Staging form: Breast, AJCC 8th Edition  - Pathologic stage from 6/21/2022: Stage IA (pT1c, pN1, cM0, G1, ER+, MA+, HER2-) - Signed by Lisandro López MD on 8/1/2022  Stage prefix: Initial diagnosis  Nuclear grade: G1  Multigene prognostic tests performed: None  Mitotic count score: Score 1  Histologic grading system: 3 grade system   Disease Features/Tumor Markers/Genetics  o Tumor Marker: n/a  o Notable Path Features: 4/14/2022 L axillary LN: >95% ER, 95% MA, HER-2 +1  5/20/2022: L breast core need bx showin gIDC grade 1, present DCIS, ER/MA 90-95%+, HER2 negative (0)  R breast biopsy benign  6/21/2022: grade 1, L lumpectomy, DCIS nuclear grade 2 present, +LVI, all margins negative, 3/17 LN +, [id number]  i-67 3-5%   Treatment: Letrozole following RT   Other Supportive care:    Treatment Team Members  o Surgeon: Dr Radha Adam: Dr Alejandra Betts  4/22/2022 CT CAP w/c: Previously biopsied abnormal left axillary lymph node again identified  No other abnormal soft tissue mass identified  No acute inflammatory process identified  4/24/2022 NM Bone Scan: Probable degenerative change in the lower thoracic spine      Discussion of decision making    I personally reviewed the following lab results, the image studies, pathology, other specialty/physicians consult notes and recommendations, and outside medical records from [facility]  I had a lengthy discussion with the patient and shared the work-up findings  We discussed the diagnosis and management plan as below   I spent 47 minutes reviewing the records (labs, clinician notes, outside records, medical history, ordering medicine/tests/procedures, interpreting the imaging/labs previously done) and coordination of care as well as direct time with the patient today, of which greater than 50% of the time was spent in counseling and coordination of care with the patient/family  · Plan/Labs  · F/u Rad Onc for potential RT  · Baseline DEXA scan prior to AI initiation and f/u with PCP if osteoporosis found  · Oncotype Dx (21-gene RT PCR) testing to determine risk for cancer recurrence and potential benefit to adding adjuvant chemotherapy to reduce said risk  If recurrence score is<26, no chemotherapy to be offered  · Plan to initiate AI (Letrozole) once chemotherapy is assessed, etc  Plan for minimum 5 years of therapy   · Recommended she cont Vitamin D and Calcium 1000mg daily  · F/u Surg-Onc, screening mammograms as per below    Survivorship planning  · Every 6 month H&P for first 5 years, then annual thereafter  · B/l Mammograms yearly  · Distress counseling on an ongoing basis as needed        Follow Up: 6 months with Ignacia (sooner if Oncotype Dx is high)    All questions were answered to the patient's satisfaction during this encounter  The patient knows the contact information for our office and knows to reach out for any relevant concerns related to this encounter  They are to call for any temperature 100 4 or higher, new symptoms including but not restricted to shaking chills, decreased appetite, nausea, vomiting, diarrhea, increased fatigue, shortness of breath or chest pain, confusion, and not feeling the strength to come to the clinic  For all other listed problems and medical diagnosis in their chart - they are managed by PCP and/or other specialists, which the patient acknowledges  Thank you very much for your consultation and making us a part of this patient's care  We are continuing to follow closely with you  Please do not hesitate to reach out to me with any additional questions or concerns  Alfredo Mclaughlin MD  Hematology & Medical Oncology Staff Physician             Disclaimer: This document was prepared using M Modal Fluency Direct technology   If a word or phrase is confusing, or does not make sense, this is likely due to recognition error which was not discovered during this clinician's review  If you believe an error has occurred, please contact me through [address] [place] line for lucía? cation  ONCOLOGY HISTORY OF PRESENT ILLNESS        Oncology History Overview Note   Was diagnosed with breast cancer, ER/NV positive HER2 negative, metastatic to left axillary lymph node  She presented with enlarged left axillary lymph node on screening mammogram  She underwent biopsy then left breast lumpectomy with left axillary lymph node dissection on 6/21/22  She does have a history of right upper extremity distal desmoid tumor surgery plus radiation  She has right upper extremity lymphedema and has compression sleeve and lymphedema pump  3/29/22 B/L screening mammogram  FINDINGS:   LEFT  A) LYMPH NODE: There is a l mildly prominent ymph node seen in the left axilla in the posterior depth on the MLO view  This was not seen on prior mammograms, possibly excluded from the images because of its posterior location  However, I would recommend further evaluation with ultrasound for more definitive evaluation  Right  There are no suspicious masses, grouped microcalcifications or areas of unexplained architectural distortion  The skin and nipple areolar complex are unremarkable  RECOMMENDATION:       - Ultrasound at the current time for the left breast        - Routine screening mammogram in 1 year for the right breast     4/4/22 US left breast  FINDINGS:   LEFT  A) LYMPH NODE: There is an 18 mm x 7 mm x 18 mm lymph node seen in the left axilla in the posterior depth  There is cortical thickening without evident fatty hilum  Ultrasound-guided sampling is recommended     RECOMMENDATION:       - Ultrasound-guided breast biopsy for the left breast     4/14/22 US guided biopsy left breast lymph node    4/20/22 Dr Shelly Ruth  MRI breast bilateral w and wo contrast w cad    NM bone scan whole body     CT chest abdomen pelvis w wo contrast  Ordered genetic testing    4/22/22 [facility]  Referred for thrombocytopenia - however, her most recent CBC and all prior CBCs without platelet abnormality  mild polycythemia which is new and was noted on the most recent labs only  WBC at that time was 4 1, flagged as abnormal (no diff reported)   suspect that the elevated hemoglobin is likely secondary and not a primary clonal disorder  recommend obtaining an epo level   discussed with her the recent axillary LN biopsy findings   briefly explained the sequencing of care for non-metastatic breast cancer, This would change if metastatic disease is found on upcoming imaging  4/22/22 CT C/A/P w contrast  Previously biopsied abnormal left axillary lymph node again identified  No other abnormal soft tissue mass identified  No acute inflammatory process identified  4/24/22 Bone Scan  Probable degenerative change in the lower thoracic spine, however continued follow-up is recommended  Otherwise no scintigraphic evidence of osseous metastasis  4/27/22 MRI B/L breasts w wo contrast w cad  FINDINGS:   LEFT  A) LYMPH NODE:  The biopsy proven metastatic left axillary lymph node measures 19 x 8 mm in the axial plane  The biopsy clip does project within the lymph node  There is a 14 x 7 mm lymph node at the posterior aspect of the metastatic lymph node which is mildly prominent  This is not definitely seen on review of the prior ultrasound from 04/04/2022  B) NON-MASS ENHANCEMENT: There is a 10 mm non-mass enhancement seen in the upper inner quadrant of the left breast in the middle depth  (series 29323, image 123, series 501 image 123 and series 12614 images one, 5, 8, 9)  This demonstrates progressive enhancement  This is suspicious  MRI guided core needle biopsy is recommended  There are no abnormally enlarged internal mammary lymph nodes  RIGHT  C) MASS: There is a 4 mm round mass seen in the retroareolar region of the right breast in the middle depth  (series 14338, image 167, series 501, image 167 and series 20,000 image 3, 4, 7)  This is suspicious  Given the small size, this is unlikely to be found with ultrasound  Therefore MRI guided core needle biopsy is indicated  There are no abnormally enlarged axillary or internal mammary lymph nodes  Additional findings: There is a 32 mm bilobed simple cyst in the anterior liver (series 3, image 33)  There are a few additional subcentimeter T2 bright lesions in the liver  No suspicious enhancing lesions were seen on CT abdomen 04/22/2022  RECOMMENDATION:       - MRI-guided breast biopsy for both breasts (1 site in each breast)  5/20/22 MRI biopsy B/L breasts    6/1/22 Dr Nelson Smith  left breast conservation surgery and left axillary dissection given palpable lymphadenopathy    6/21/22  ANITA  DIRECTED LUMPECTOMY (Left Breast) AXILLARY DISSECTION LEVEL I AND LEVEL II LYMPH NODES (Left Axilla)    7/6/22 Dr Rick Wylie  Both incisions are healing well no evidence of surgical site infection  Follow up 2-3 weeks for path results    7/8/22 Dr Rick Wylie  brought in today since the pathology results available as well as to remove the drain  refer her to Medical and Radiation oncologist    7/23/22 ED for Covid-19      8/10/22 Dr Valencia Mclain      8/12/22 Dr Rick Wylie      Upcoming:     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 ([address] )   4/14/2022 Initial Diagnosis    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 ([address] )     4/14/2022 Biopsy    Left axillary Lymph node ultrasound guided biopsy  Metastatic carcinoma, compatible with breast origin  ER >95  NM 95   HER2 1+    On ultrasound lymph node is 1 8 cm  There is cortical thickening without evident fatty hilum  In review of screening mammogram, demonstrates no suspicious mammographic findings identified in either breast  Bilateral MRI is recommended   Flow cytometry - there is no evidence of a B-cell or T-cell non-Hodgkin lymphoma  4/20/2022 Genomic Testing    A total of 36 genes were evaluated, including: ANGEL LUIS, BRCA1, BRCA2, CDH1, CHEK2, PALB2, PTEN, STK11, TP53  Negative result  No pathogenic sequence variants or deletions/duplications identified     5/20/2022 Biopsy    MRI guided biopsy  A  Left breast   11 o'clock   Invasive mammary carcinoma of no special type (ductal)  Grade 1  ER 95  NH 95  HER2 0    B  Right breast   Retroareolar  Benign fibrocystic changes without atypia (discrete 0 3 cm focus of sclerosing and tubular adenosis with usual ductal hyperplasia, columnar cell change and hyperplasia, apocrine metaplasia, cystic dilatation)  - Microcalcifications: Present associated with non-neoplastic breast tissue    - Negative for malignancy, in-situ carcinoma and atypical hyperplasia  Malignant pathology appears unifocal  Biopsy proven carcinoma measured 1 cm on MRI  Biopsy proved metastatic disease to left axillary lymph node  6/9/2022 Genomic Testing    MammaPrint  Low risk  Luminal type A  MammaPrint index: [phone]     6/21/2022 Surgery    Left breast tiffany  directed lumpectomy with axillary dissection  A  Invasive 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  Grade 1  1 3 cm  Lymphovascular invasion is identified  Margins negative    H   Axillary, Level I and Level II axillary contents  Metastatic carcinoma involving 3/17 lymph nodes  1 8 cm   Extranodal extension is identified 1mm  Foci of carcinoma identified in extranodal lymphatic channels    Anatomic stage: Stage IIA  Prognostic stage: Stage IA      6/21/2022 -  Cancer Staged    Staging form: Breast, AJCC 8th Edition  - Pathologic stage from 6/21/2022: Stage IA (pT1c, pN1, cM0, G1, ER+, NH+, HER2-) - Signed by Sha Gatica MD on 8/1/2022  Stage prefix: Initial diagnosis  Nuclear grade: G1  Multigene prognostic tests performed: None  Mitotic count score: Score 1  Histologic grading system: 3 grade system       Malignant neoplasm of lower-inner quadrant of left breast in female, estrogen receptor positive ([facility] [address] )   5/20/2022 Initial Diagnosis    Malignant neoplasm of lower-inner quadrant of left breast in female, estrogen receptor positive ([facility] [address] )     5/20/2022 Biopsy    MRI guided biopsy  A  Left breast   11 o'clock   Invasive mammary carcinoma of no special type (ductal)  Grade 1  ER 95  UT 95  HER2 0    B  Right breast   Retroareolar  Benign fibrocystic changes without atypia (discrete 0 3 cm focus of sclerosing and tubular adenosis with usual ductal hyperplasia, columnar cell change and hyperplasia, apocrine metaplasia, cystic dilatation)  - Microcalcifications: Present associated with non-neoplastic breast tissue    - Negative for malignancy, in-situ carcinoma and atypical hyperplasia  Malignant pathology appears unifocal  Biopsy proven carcinoma measured 1 cm on MRI  Biopsy proved metastatic disease to left axillary lymph node  6/9/2022 Genomic Testing    MammaPrint  Low risk  Luminal type A  MammaPrint index: [phone]     6/21/2022 Surgery    Left breast tiffany  directed lumpectomy with axillary dissection  A  Invasive 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  Grade 1  1 3 cm  Lymphovascular invasion is identified  Margins negative    H   Axillary, Level I and Level II axillary contents  Metastatic carcinoma involving 3/17 lymph nodes  1 8 cm   Extranodal extension is identified 1mm  Foci of carcinoma identified in extranodal lymphatic channels    Anatomic stage: Stage IIA  Prognostic stage: Stage IA            SUBJECTIVE  (INTERVAL HISTORY)      Clotting History None   Bleeding History None   Cancer History Desmoidosis, ovarian cancer s/p hysterectomy   Family Cancer History Sister (pancreatic), Sister (lung, smoker)   H/O Blood/Plt Transfusion None   Tobacco/etoh/drug abuse Denies nicotine use, rec drug use or etoh abuse   Hx COVID19 Infection and Vaccine Status COVID -19 7/2022   Cancer Screening history C-scope 2022   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         I have reviewed the relevant past medical, surgical, social and family history  I have also reviewed allergies and medications for this patient  Review of Systems   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    Baseline weight: 135-140 lbs    She denies loss in weight or appetite, F/C, N/V, SOB, CP, LH, HA, falls, gen weakness, unilateral weakness, diplopia falls, hematuria, dysuria, hematochezia, melena  She uses THC pills for diffuse pain in her body as well as nausea  A 10-point review of system was performed, pertinent positive and negative were detailed as above  Otherwise, the 10-point review of system was negative  Past Medical History:   Diagnosis Date    Abnormal mammogram 05/15/2013    Anemia     Cancer ([facility] [address] )     desmoitosis    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 ([facility] [address] ) 04/19/2022    Chronic pain disorder     worse right side of body    Desmoid tumor     Last Assessed: 6/19/2017    History of transfusion     no reactions    Hyperlipidemia     Hypertension     Ovarian cancer ([facility] [address] ) 1999?   Hysterectomy done    PONV (postoperative nausea and vomiting)        Past Surgical History:   Procedure Laterality Date    BREAST LUMPECTOMY Left 6/21/2022    Procedure: ANITA  DIRECTED LUMPECTOMY;  Surgeon: Arcelia Arroyo MD;  Location: [facility];  Service: Surgical Oncology    FRACTURE SURGERY      HYSTERECTOMY      LYMPH NODE DISSECTION Left 6/21/2022    Procedure: AXILLARY DISSECTION LEVEL I AND LEVEL II LYMPH NODES;  Surgeon: Arcelia Arroyo MD;  Location: [facility];  Service: Surgical Oncology    MRI BREAST BIOPSY LEFT (ALL INCLUSIVE) Left 5/20/2022    MRI BREAST BIOPSY RIGHT (ALL INCLUSIVE) Right 5/20/2022    OTHER SURGICAL HISTORY      Arm Incision: Dermoid tumor, right Arm     PARTIAL HYSTERECTOMY      [place] COLONOSCOPY FLX DX W/COLLJ SPEC WHEN PFRMD N/A 8/15/2017    Procedure: COLONOSCOPY;  Surgeon: Franchesca Pugh MD;  Location: [facility];   Service: Gastroenterology    MD OPEN RX DISTAL RADIUS FX, INTRA-ARTICULAR, 3+ FRAG Left 2/9/2021    Procedure: OPEN REDUCTION W/ INTERNAL FIXATION (ORIF) RADIUS / ULNA (WRIST) left;  Surgeon: Nedra Harry MD;  Location: [facility];  Service: Orthopedics    MD WRIST Elaina Chivo LIG Left 8/10/2021    Procedure: RELEASE LEFT CARPAL TUNNEL ENDOSCOPIC;  Surgeon: Nedra Harry MD;  Location: [facility];  Service: Orthopedics    SKIN BIOPSY      SKIN CANCER EXCISION      Melanoma Excision     TONSILLECTOMY      US BREAST NEEDLE LOC LEFT Left 6/16/2022    US BREAST NEEDLE LOC RIGHT EACH ADDITIONAL Right 6/16/2022    US GUIDED BREAST LYMPH NODE BIOPSY LEFT Left 4/14/2022       Family History   Problem Relation Age of Onset    Diabetes Mother     No Known Problems Father     Breast cancer Sister     Cancer Sister     Lung cancer Sister     Pancreatic cancer Sister     No Known Problems Maternal Grandmother     No Known Problems Maternal Grandfather     No Known Problems Paternal Grandmother     No Known Problems Paternal Grandfather        Social History     Socioeconomic History    Marital status: /Civil Union     Spouse name: Not on file    Number of children: Not on file    Years of education: Not on file    Highest education level: Not on file   Occupational History    Occupation: unemployed    Tobacco Use    Smoking status: Never Smoker    Smokeless tobacco: Never Used   Vaping Use    Vaping Use: Never used   Substance and Sexual Activity    Alcohol use: Not Currently     Alcohol/week: 5 0 standard drinks     Types: 5 Shots of liquor per week     Comment: Used mostly as a painkiller when needed before bedtime    Drug use: Yes     Frequency: 28 0 times per week     Types: Marijuana     Comment: THC Medical marijuana    Sexual activity: Not Currently     Partners: Male     Comment: Hysterectomy years ago   Other Topics Concern  Not on file   Social History Narrative    Lives with spouse      Social Determinants of Health     Financial Resource Strain: Not on file   Food Insecurity: Not on file   Transportation Needs: Not on file   Physical Activity: Not on file   Stress: Not on file   Social Connections: Not on file   Intimate Partner Violence: Not on file   Housing Stability: Not on file       Allergies   Allergen Reactions    Chlorpromazine Anaphylaxis     PHENOTHIAZINE=ANAPHYLAXIS    Codeine Anaphylaxis     Anaphylaxis  abd pain   Meperidine Anaphylaxis, Hives and Vomiting    Phenothiazines Anaphylaxis and Throat Swelling     Category: Allergy;     Saccharin - Food Allergy Anaphylaxis    Ampicillin-Sulbactam Sodium Hives    Aspirin GI Intolerance and Abdominal Pain     Severe GERD    Gluten Meal - Food Allergy GI Intolerance    Ibuprofen Hives     H    Levofloxacin Hives    Neomycin Other (See Comments), Edema and Hives     Category: Allergy;   swelling    Citrus - Food Allergy Rash    Latex Hives and Rash     Category:  Allergy;        Current Outpatient Medications   Medication Sig Dispense Refill    cholecalciferol (VITAMIN D3) 1,000 units tablet Take 3,000 Units by mouth daily      diphenhydrAMINE (BENADRYL) 50 MG tablet Take 50 mg by mouth daily at bedtime as needed for itching      dronabinol (MARINOL) 5 MG capsule Take 15 mg by mouth 4 (four) times a day (before meals and at bedtime)      HYDROmorphone (DILAUDID) 2 mg tablet Take 1 tablet (2 mg total) by mouth every 6 (six) hours as needed for severe pain Not on Discharge list   Patient is taking for severe pain PRN every 6 hours Max Daily Amount: 8 mg 20 tablet 0    multivitamin (THERAGRAN) TABS Take 1 tablet by mouth daily      Omega-3 Fatty Acids (FISH OIL PO) Take by mouth      Probiotic Product (PROBIOTIC-10 PO) Take by mouth      docusate sodium (COLACE) 100 mg capsule Take 1 capsule (100 mg total) by mouth 2 (two) times a day as needed for constipation for up to 10 days 20 capsule 0     No current facility-administered medications for this visit  (Not in a hospital admission)      Objective:     24 Hour Vitals Assessment:     Vitals:    08/10/22 1531   BP: 120/78   Pulse: 82   Resp: 18   Temp: 97 8 °F (36 6 °C)   SpO2: 98%       PHYSICIAN EXAM:    General: Appearance: alert, cooperative, no distress  HEENT: Normocephalic, atraumatic  No scleral icterus  conjunctivae clear  EOMI  Chest: No tenderness to palpation  No open wound noted  Lungs: Clear to auscultation bilaterally, Respirations unlabored  Cardiac: Regular rate and rhythm, +S1and S2  Abdomen: Soft, non-tender, non-distended  Bowel sounds are normal    Extremities:  No edema, cyanosis, clubbing  Skin: Skin color, turgor are normal  RUE wrapping noted  Lymphatics: no palpable supra-cervical, axillary, or inguinal adenopathy  Neurologic: Awake, Alert, and oriented, no gross focal deficits noted b/l  DATA REVIEW:    Pathology Result:    Final Diagnosis   Date Value Ref Range Status   06/21/2022   Incomplete    A  Breast, Left breast ANITA  directed lumpectomy, marked per margin per protocol :  - Invasive 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, Grade 1   - Largest measurable focus is 13 mm in maximum length (slide measurement)  - Lymphovascular invasion is identified (D2-40 is performed)  - Ductal carcinoma in situ (DCIS), nuclear grade I-II  - Inked surfaces with no tumor seen  - Healing biopsy site  - Skin, no tumor seen  B  Breast, Additional anterior margin, inked most distal margin Radhated Poon): - Benign breast tissue, negative for carcinoma including at inked designated surgical resection margin  C  Breast, Additional inferior margin, inked most distal margin (blue): - Benign breast tissue, negative for carcinoma including at inked designated surgical resection margin       D  Breast, Additional lateral margin, inked most distal margin (red): - Benign breast tissue, negative for carcinoma including at inked designated surgical resection margin  E  Breast, Additional medial margin, inked most distal margin (yellow): - Benign breast tissue, negative for carcinoma including at inked designated surgical resection margin  F  Breast, Additional posterior margin, inked most distal margin (black):  - Benign breast tissue, negative for carcinoma including at inked designated surgical resection margin  G  Breast, Additional superior margin, inked most distal margin (orange): - Benign breast tissue, negative for carcinoma including at inked designated surgical resection margin  H  Axillary, Level I and Level II Axillary contents:  -  Metastatic carcinoma involving three of seventeen lymph nodes (3/17)  -  The largest metastatic deposit measures 18 mm in greatest dimension   -  Extranodal extension is identified (1 mm)  -  Foci of carcinoma identified in extranodal lymphatic channels  -  CK-AE1/AE3 immunohistochemical stain with adequate control support the diagnosis  - Intradepartmental consultation concurs with the diagnosis (CK)      05/20/2022   Final    A  Breast, Left, upper inner quadrant, 11 o'clock, MRI guided needle biopsy:  - Invasive mammary carcinoma of no special type (ductal)  -- Penelope histologic grade 1 of 3 (total score: 4 of 9)        * Glandular (acinar) Tubular Differentiation 10-75%, score 2         * Nuclear Pleomorphism 1 of 3, score 1        * Mitotic Rate < 3/mm2, (</= 7 mitoses/10HPF), score 1     -- Confirmed by tumor cell immunophenotype:        * Positive: GATA3, E-cadherin, P120 (membranous), ER  * Negative: p63, SMMHC, S100     -- Invasive carcinoma involves 6 of 7 submitted core biopsies and 2 fragments, max   Dimension= 10 mm     -- Estrogen, Progesterone & HER2 receptor studies pending, to be described in an addendum   - Ductal carcinoma in-situ (DCIS): Present; minor component (approximately 5% of total tumor)  -- Architectural Patterns: Solid  -- Nuclear grade: I- II (low to intermediate)  -- Necrosis: Not identified  - Lymphovascular invasion: Focally cannot exclude  -- D2-40 immunostain performed  - Microcalcifications: Present, few associated with DCIS  - Best representative tumor block: A2     -- Sufficient tumor present for        Agendia Mammaprint/Blueprint (1 cm2 of invasive tumor in aggregate): No         MI Profile/Foundation One (at least 5 x 5 mm of tumor): Yes  B  Breast, Right, retroareolar, MRI guided needle biopsy:  - Benign fibrocystic changes without atypia (discrete 0 3 cm focus of sclerosing and tubular adenosis     with usual ductal hyperplasia, columnar cell change and hyperplasia, apocrine metaplasia,     cystic dilatation)  See Note  - Microcalcifications: Present associated with non-neoplastic breast tissue    - Negative for malignancy, in-situ carcinoma and atypical hyperplasia  -- Confirmed by positive SMMHC, p63, E-cadherin, p120 (membranous) immunostains  05/20/2022   Final    A  Breast, Left, upper inner quadrant, 11 o'clock, MRI guided needle biopsy:  - Invasive mammary carcinoma of no special type (ductal)  -- [place] histologic grade 1 of 3 (total score: 4 of 9)        * Glandular (acinar) Tubular Differentiation 10-75%, score 2         * Nuclear Pleomorphism 1 of 3, score 1        * Mitotic Rate < 3/mm2, (</= 7 mitoses/10HPF), score 1     -- Confirmed by tumor cell immunophenotype:        * Positive: GATA3, E-cadherin, P120 (membranous), ER  * Negative: p63, SMMHC, S100     -- Invasive carcinoma involves 6 of 7 submitted core biopsies and 2 fragments, max  Dimension= 10 mm     -- Estrogen, Progesterone & HER2 receptor studies pending, to be described in an addendum   - Ductal carcinoma in-situ (DCIS): Present; minor component (approximately 5% of total tumor)  -- Architectural Patterns: Solid      -- Nuclear grade: I- II (low to intermediate)  -- Necrosis: Not identified  - Lymphovascular invasion: Focally cannot exclude  -- D2-40 immunostain performed  - Microcalcifications: Present, few associated with DCIS  - Best representative tumor block: A2     -- Sufficient tumor present for        Agendia Mammaprint/Blueprint (1 cm2 of invasive tumor in aggregate): No         MI Profile/Foundation One (at least 5 x 5 mm of tumor): Yes  B  Breast, Right, retroareolar, MRI guided needle biopsy:  - Benign fibrocystic changes without atypia (discrete 0 3 cm focus of sclerosing and tubular adenosis     with usual ductal hyperplasia, columnar cell change and hyperplasia, apocrine metaplasia,     cystic dilatation)  See Note  - Microcalcifications: Present associated with non-neoplastic breast tissue    - Negative for malignancy, in-situ carcinoma and atypical hyperplasia  -- Confirmed by positive SMMHC, p63, E-cadherin, p120 (membranous) immunostains  04/14/2022   Final    A  Lymph node, left axillary, ultrasound-guided biopsy (2 passes):  -  Metastatic carcinoma, compatible with breast origin (see note)  08/15/2017   Final    A  Colon, sigmoid polyp at 30 cm, biopsy:  -  Tubular adenoma, negative for high-grade dysplasia  B   Colon, rectosigmoid polyp, biopsy:  -  Hyperplastic polyp  Image Results:   Image result are reviewed and documented in Hematology/Oncology history    Mammo tiffany  breast specimen (No Charge)  Narrative: SPECIMEN RADIOGRAPH  Impression:   3 specimen radiograph images were obtained  A TIFFANY  reflector, butterfly clip and surgical clips project within 1   of the specimens  A TIFFANY  reflector and stoplight clip project within the other 2   specimens  Specimen radiograph was not discussed with the surgeon while they were in   the operating room  LABS:  Lab data are reviewed and documented in [facility] history         Lab Results Component Value Date    HGB 15 3 06/01/2022    HCT 45 1 06/01/2022    MCV 92 06/01/2022     (H) 06/01/2022    WBC 6 91 06/01/2022    NRBC 0 06/01/2022     Lab Results   Component Value Date    K 3 7 06/01/2022     06/01/2022    CO2 28 06/01/2022    BUN 8 06/01/2022    CREATININE 0 55 (L) 06/01/2022    GLUCOSE 98 05/02/2017    GLUF 87 04/07/2022    CALCIUM 9 8 06/01/2022    AST 17 06/01/2022    ALT 16 06/01/2022    ALKPHOS 119 (H) 06/01/2022    EGFR 100 06/01/2022       No results found for: IRON, TIBC, FERRITIN    No results found for: [id number]    No results for input(s): WBC, CREAT in the last 72 hours      Invalid input(s):  PLT    By:  Geo Rolle MD, 8/10/2022, 3:40 PM Vaccine status unknown

## 2023-11-27 ENCOUNTER — HOME CARE VISIT (OUTPATIENT)
Dept: HOME HEALTH SERVICES | Facility: HOME HEALTHCARE | Age: 63
End: 2023-11-27
Payer: MEDICARE

## 2023-11-27 VITALS
SYSTOLIC BLOOD PRESSURE: 162 MMHG | DIASTOLIC BLOOD PRESSURE: 79 MMHG | HEART RATE: 68 BPM | OXYGEN SATURATION: 98 % | RESPIRATION RATE: 16 BRPM

## 2023-11-27 PROCEDURE — G0152 HHCP-SERV OF OT,EA 15 MIN: HCPCS

## 2023-11-28 ENCOUNTER — HOME CARE VISIT (OUTPATIENT)
Dept: HOME HEALTH SERVICES | Facility: HOME HEALTHCARE | Age: 63
End: 2023-11-28
Payer: MEDICARE

## 2023-11-30 ENCOUNTER — HOME CARE VISIT (OUTPATIENT)
Dept: HOME HEALTH SERVICES | Facility: HOME HEALTHCARE | Age: 63
End: 2023-11-30
Payer: MEDICARE

## 2023-11-30 ENCOUNTER — OFFICE VISIT (OUTPATIENT)
Dept: OBGYN CLINIC | Facility: MEDICAL CENTER | Age: 63
End: 2023-11-30
Payer: MEDICARE

## 2023-11-30 ENCOUNTER — APPOINTMENT (OUTPATIENT)
Dept: RADIOLOGY | Facility: MEDICAL CENTER | Age: 63
End: 2023-11-30
Payer: MEDICARE

## 2023-11-30 VITALS
TEMPERATURE: 97.3 F | RESPIRATION RATE: 16 BRPM | OXYGEN SATURATION: 99 % | DIASTOLIC BLOOD PRESSURE: 64 MMHG | HEART RATE: 70 BPM | SYSTOLIC BLOOD PRESSURE: 126 MMHG

## 2023-11-30 VITALS
BODY MASS INDEX: 23.79 KG/M2 | SYSTOLIC BLOOD PRESSURE: 146 MMHG | HEART RATE: 63 BPM | HEIGHT: 68 IN | WEIGHT: 157 LBS | DIASTOLIC BLOOD PRESSURE: 79 MMHG

## 2023-11-30 DIAGNOSIS — S42.344D CLOSED NONDISPLACED SPIRAL FRACTURE OF SHAFT OF RIGHT HUMERUS WITH ROUTINE HEALING, SUBSEQUENT ENCOUNTER: ICD-10-CM

## 2023-11-30 DIAGNOSIS — S42.344D CLOSED NONDISPLACED SPIRAL FRACTURE OF SHAFT OF RIGHT HUMERUS WITH ROUTINE HEALING, SUBSEQUENT ENCOUNTER: Primary | ICD-10-CM

## 2023-11-30 PROCEDURE — G0299 HHS/HOSPICE OF RN EA 15 MIN: HCPCS

## 2023-11-30 PROCEDURE — 24500 CLTX HUMRL SHFT FX W/O MNPJ: CPT | Performed by: STUDENT IN AN ORGANIZED HEALTH CARE EDUCATION/TRAINING PROGRAM

## 2023-11-30 PROCEDURE — 73060 X-RAY EXAM OF HUMERUS: CPT

## 2023-11-30 PROCEDURE — 99215 OFFICE O/P EST HI 40 MIN: CPT | Performed by: STUDENT IN AN ORGANIZED HEALTH CARE EDUCATION/TRAINING PROGRAM

## 2023-11-30 RX ORDER — OXYCODONE HYDROCHLORIDE AND ACETAMINOPHEN 5; 325 MG/1; MG/1
TABLET ORAL
COMMUNITY
Start: 2023-11-25

## 2023-11-30 NOTE — PROGRESS NOTES
Tuesday and Thursday therapist comes for lymphedema massage    Patient states that she was pushing her MIL in a wheelchair when it started to tip and she tried to save it. She thinks that she may have fallen on it . Possible fx in elbow from 10/23/23 from increased pain in right elbow.

## 2023-11-30 NOTE — PROGRESS NOTES
ASSESSMENT/PLAN:    Problem List Items Addressed This Visit          Musculoskeletal and Integument    Humerus fracture - Primary    Relevant Orders    XR humerus right (Completed)    Fracture / Dislocation Treatment         - Discussed with the patient that there are two options for her humerus fracture being surgical vs. Non surgical  - Explained to the patient that surgical treatment would include hardware that could result in failure and/or infection, due to the complexity of her previous R arm surgeries  - Explained to the patient that radiation decreases healing potential either surgical or non-surgical, and that her bone is forever affected  - Discussed with the patient that her non-surgical treatment could also be affected without healing potential due to lymphedema and prior radiation  - Explained to the patient that the healing potential of bone is unknown due to prior radiation and lymphedema  - Discussed with the patient that non-operative treatment is reasonable for treatment of a humerus fracture, in general  - Explained to the patient the the use of hydrostatic pressure is beneficial for lymphedema and healing  - Explained to patient that she will follow-up every 2 weeks until 10 weeks with repeat x-rays before surgical intervention should be discussed  - Explained to the patient that she should keep using her body as a pump within her hand and at the elbow to help with the swelling of the lymphedema  - Explained to the patient that bones 6-8 weeks to heal, and that hers will be longer for complete healing, however there could be possible bridging in the bone  - Humeral fracture over the shoulder brace  - The patient expresses understanding and is in agreement with today's treatment plan. Return in about 2 weeks (around 12/14/2023) for Recheck, Repeat X-ray of right humerus.       _____________________________________________________  CHIEF COMPLAINT:  Chief Complaint   Patient presents with Right Shoulder - Pain         SUBJECTIVE:  Jessie Mariano is a 70y.o. year old female who presents with a right humerus fracture. She was seen at [facility] for right arm pain at the elbow with pronation and supination. X-rays were obtained at time of visit, no images are available for review, radiologist report is available. Patient has since transitioned to [address] [place] [address]. She was seen in the ED on 11/12/23 for right arm pain after a fall. ED note states that a closed reduction was performed in the ED at time of visit. Medication refill note from  states that she is unable to use her lymphedema machine and is experiencing swelling in her right arm. On presentation today, patient states that she was pushing her mother in law in a wheelchair when the wheelchair started to tip forward and she tried to catch it to prevent her MIL from falling out of the wheelchair. She believes that she "lost control" of her right arm, and then fell onto her right shoulder trying to protect the right elbow. She reports that she has been unable to use her lymphedema bag since the injury. She states that she has had a physical therapist come into her home 2/wk since the injury who performs lymphedema massage on her right hand and forearm to increase blood flow. 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 She had a latissimus rotational skin pedicle flap  performed after having multiple recurrences. Within the same year she had a reoccurrence of a desmoid tumor and another resection of her back. Patient states that her last tumor related operation was in 2010. Patient was cancer free for 8 years, until last year when she developed breast cancer on her left side. She states that she had radiation, 16 lymph nodes were removed and none of the lymph nodes were positive. She states that she had no radiation to the lymph nodes.  She states that she developed lymphedema within 1 year after the diagnosis of the desmoid fibromatosis. She was previously using the lymphedema treatment as needed, which progressed to using 3x/daily every day. PAST MEDICAL HISTORY:  Past Medical History:   Diagnosis Date    Abnormal mammogram 05/15/2013    Anemia     Cancer ([address])     desmoitosis    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 ([address]) 04/19/2022    Chronic pain disorder     worse right side of body    Desmoid tumor     Last Assessed: 6/19/2017    History of transfusion     no reactions    Hyperlipidemia     Hypertension     Ovarian cancer ([address]) 1999? Hysterectomy done    PONV (postoperative nausea and vomiting)        PAST SURGICAL HISTORY:  Past Surgical History:   Procedure Laterality Date    BREAST LUMPECTOMY Left 6/21/2022    Procedure: ANITA  DIRECTED LUMPECTOMY;  Surgeon: Stefany Mooney MD;  Location: [facility];  Service: Surgical Oncology    FRACTURE SURGERY      HYSTERECTOMY      LYMPH NODE DISSECTION Left 6/21/2022    Procedure: AXILLARY DISSECTION LEVEL I AND LEVEL II LYMPH NODES;  Surgeon: Stefany Mooney MD;  Location: [facility];  Service: Surgical Oncology    MRI BREAST BIOPSY LEFT (ALL INCLUSIVE) Left 5/20/2022    MRI BREAST BIOPSY RIGHT (ALL INCLUSIVE) Right 5/20/2022    OTHER SURGICAL HISTORY      Arm Incision: Dermoid tumor, right Arm     PARTIAL HYSTERECTOMY      VT COLONOSCOPY FLX DX W/COLLJ SPEC WHEN PFRMD N/A 8/15/2017    Procedure: COLONOSCOPY;  Surgeon: Claudia Saldaña MD;  Location: [facility];   Service: Gastroenterology    [place] [id number] [facility] WRST SURG W/RLS TRANSVRS CARPL LIGM Left 8/10/2021    Procedure: RELEASE LEFT CARPAL TUNNEL ENDOSCOPIC;  Surgeon: Stella Constantino MD;  Location: [facility];  Service: Orthopedics    VT OPTX DSTL RADL I-ARTIC FX/EPIPHYSL SEP 3 FRAG Left 2/9/2021    Procedure: OPEN REDUCTION W/ INTERNAL FIXATION (ORIF) RADIUS / Magdiel Gardner (WRIST) left;  Surgeon: Stella Constantino MD; Location: [facility];  Service: Orthopedics    SKIN BIOPSY      SKIN CANCER EXCISION      Melanoma Excision     TONSILLECTOMY      US BREAST NEEDLE LOC LEFT Left 6/16/2022    US BREAST NEEDLE LOC RIGHT EACH ADDITIONAL Right 6/16/2022    US GUIDED BREAST LYMPH NODE BIOPSY LEFT Left 4/14/2022       FAMILY HISTORY:  Family History   Problem Relation Age of Onset    Diabetes Mother     No Known Problems Father     Cancer Sister     Lung cancer Sister     Pancreatic cancer Sister     No Known Problems Maternal Grandmother     No Known Problems Maternal Grandfather     No Known Problems Paternal Grandmother     No Known Problems Paternal Grandfather     Breast cancer Neg Hx        SOCIAL HISTORY:  Social History     Tobacco Use    Smoking status: Never    Smokeless tobacco: Never   Vaping Use    Vaping Use: Never used   Substance Use Topics    Alcohol use: Not Currently     Alcohol/week: 5.0 standard drinks of alcohol     Types: 5 Shots of liquor per week     Comment: Used mostly as a painkiller when needed before bedtime    Drug use: Yes     Frequency: 28.0 times per week     Types: Marijuana     Comment: THC Medical marijuana       MEDICATIONS:    Current Outpatient Medications:     cholecalciferol (VITAMIN D3) 1,000 units tablet, Take 3,000 Units by mouth daily, Disp: , Rfl:     diphenhydrAMINE (BENADRYL) 50 MG tablet, Take 50 mg by mouth daily at bedtime as needed for itching, Disp: , Rfl:     dronabinol (MARINOL) 5 MG capsule, Take 15 mg by mouth 4 (four) times a day (before meals and at bedtime), Disp: , Rfl:     ezetimibe (ZETIA) 10 mg tablet, Take 10 mg by mouth daily, Disp: , Rfl:     letrozole (FEMARA) 2.5 mg tablet, TAKE 1 TABLET BY MOUTH EVERY DAY, Disp: 90 tablet, Rfl: 3    multivitamin (THERAGRAN) TABS, Take 1 tablet by mouth daily, Disp: , Rfl:     Omega-3 Fatty Acids (FISH OIL PO), Take by mouth, Disp: , Rfl:     oxyCODONE-acetaminophen (PERCOCET) 5-325 mg per tablet, TAKE ONE - TWO TABLETS BY MOUTH EVERY FOUR TO SIX HOURS AS NEEDED, Disp: , Rfl:     Probiotic Product (PROBIOTIC-10 PO), Take by mouth, Disp: , Rfl:     senna-docusate sodium (SENOKOT-S) 8.6-50 mg per tablet, Take 1 tablet by mouth daily, Disp: 7 tablet, Rfl: 0    triamcinolone (KENALOG) 0.1 % cream, Apply topically 2 (two) times a day as needed for irritation or rash (Patient not taking: Reported on 11/1/2023), Disp: 80 g, Rfl: 0    ALLERGIES:  Allergies   Allergen Reactions    Chlorpromazine Anaphylaxis     PHENOTHIAZINE=ANAPHYLAXIS    Codeine Anaphylaxis     Anaphylaxis  abd pain. Meperidine Anaphylaxis, Hives and Vomiting    Phenothiazines Anaphylaxis and Throat Swelling     Category: Allergy; Saccharin - Food Allergy Anaphylaxis    Ampicillin-Sulbactam Sodium Hives    Aspirin GI Intolerance and Abdominal Pain     Severe GERD    Gluten Meal - Food Allergy GI Intolerance    Ibuprofen Hives     H    Levofloxacin Hives    Chocolate - Food Allergy GI Intolerance    Lactose - Food Allergy GI Intolerance    Neomycin Other (See Comments), Edema and Hives     Category: Allergy;   swelling    [place] - Food Allergy Rash    Latex Hives and Rash     Category: Allergy; Review of systems:   Constitutional: Negative for fatigue, fever or loss of apetite. HENT: Negative. Respiratory: Negative for shortness of breath, dyspnea. Cardiovascular: Negative for chest pain/tightness. Gastrointestinal: Negative for abdominal pain, N/V. Endocrine: Negative for cold/heat intolerance, unexplained weight loss/gain. Genitourinary: Negative for flank pain, dysuria, hematuria. Musculoskeletal: As noted in HPI   Skin: Negative for rash. Neurological: Intact  Psychiatric/Behavioral: Negative for agitation. _____________________________________________________  PHYSICAL EXAMINATION:    Blood pressure 146/79, pulse 63, height 5' 8" (1.727 m), weight 71.2 kg (157 lb).     General: well developed and well nourished, alert, oriented times 3, and appears comfortable  Psychiatric: Normal  HEENT: Benign  Cardiovascular: Regular    Pulmonary: No wheezing or stridor  Abdomen: Soft, Nontender  Skin: No masses, erythema, lacerations, fluctation, ulcerations. Lymphedema present in right arm  Neurovascular: Intact    MUSCULOSKELETAL EXAMINATION:  right Shoulder -   No anatomical deformity  Skin is warm and dry to touch with no signs of erythema, ecchymosis, or infection  Moderate soft tissue swelling or effusion noted due to lymphedema  Demonstrates normal elbow, wrist, and finger motion  2+  distal radial pulse with brisk capillary refill to the fingers  Radial, median, ulnar and motor and sensory distribution intact  Sensation to light touch intact distally      _____________________________________________________  STUDIES REVIEWED:  Reviewed physical exam and imaging with patient at time of visit. Radiographic findings demonstrate closed nondisplaced spiral fracture of the right humerus. Study: XR of right humerus  Date: 11/30/23  Impression: No radiologist report available. My impression is as follows:  Closed nondisplaced spiral fracture of the right humerus. Bone looks chronically osteopenic which could be related to radiation therapy. No current evidence of pathologic fracture at this time    Study: XR of right humerus  Date: 11/12/23  Impression: I have read and agree with the radiologist report. My impression is as follows: FINDINGS:  Evaluation of fine bony detail of the right humerus is limited by overlying cast material  There is a mildly displaced, spiral fracture of the midshaft of the right humerus. There is a somewhat mottled appearance of the right humeral diaphysis, which may be related to sequela of prior radiation therapy. There is a small exophytic density adjacent to the midshaft of the humerus which was present in the soft tissues on the prior CT from 2020.   Multiple surgical clips are seen in the right axillary region and throughout the proximal right upper extremity. No shoulder dislocation. No evidence of AC joint separation. The visualized portions of the lung are clear. IMPRESSION:  Mildly displaced, spiral fracture of the midshaft of right humerus. No shoulder dislocation. Somewhat mottled appearance of the right humeral diaphysis, which may be related to prior radiation therapy. Correlation with MRI may be helpful, which is already scheduled for 11/20/2023. Study: XR of right humerus  Date: 10/23/23  Impression: **NO IMAGE AVAILABLE FOR REVIEW, RADIOLOGIST REPORT AVAILABLE FOR REVIEW**  IMPRESSION:   Surgical clips throughout the right axilla and throughout the lateral soft   tissues of the humerus. Increased lateral soft tissue density mid humerus for which MRI is recommended   to exclude a mass. No destructive humeral osseous lesion seen. Focal areas of small cortical solid calcifications or postsurgical changes mid lateral humeral shaft. Demineralization throughout the right arm and forearm. Mild osteoarthritic narrowing acromioclavicular joint. No other significant arthritis. No fracture, malalignment or elbow joint effusion. Study: MRI of right humerus  Date: 11/1/23  Impression: ** IMAGES NOT AVAILABLE FOR REVIEW, REPORT NOT AVAILABLE FOR REVIEW **      PROCEDURES:  Fracture / Dislocation Treatment    Date/Time: 11/30/2023 4:19 PM    Performed by: Missouri Apley, DO  Authorized by: Missouri Apley, DO    Patient Location:  [facility] Protocol:  Consent: Verbal consent obtained. Risks and benefits: risks, benefits and alternatives were discussed  Consent given by: patient  Timeout called at: 11/30/2023 4:19 PM.  Patient understanding: patient states understanding of the procedure being performed  Site marked: the operative site was marked  Radiology Images displayed and confirmed.  If images not available, report reviewed: imaging studies available  Patient identity confirmed: verbally with patient    Injury location:  Upper arm  Location details:  Right upper arm  Injury type:  Fracture  Fracture type: humeral shaft    Neurovascular status: Neurovascularly intact    Distal perfusion: normal    Neurological function: normal    Range of motion: reduced    Local anesthesia used?: No    Manipulation performed?: No    Immobilization:  Sling and other (comment)  Neurovascular status: Neurovascularly intact    Distal perfusion: normal    Neurological function: normal    Range of motion: unchanged    Patient tolerance:  Patient tolerated the procedure well with no immediate complications   Patient placed into a humeral fracture over the shoulder brace          Scribe Attestation      I,:  Eron Wilder am acting as a scribe while in the presence of the attending physician.:       I,:  Abdulkadir Kebede DO personally performed the services described in this documentation    as scribed in my presence.:

## 2023-12-01 ENCOUNTER — HOME CARE VISIT (OUTPATIENT)
Dept: HOME HEALTH SERVICES | Facility: HOME HEALTHCARE | Age: 63
End: 2023-12-01
Payer: MEDICARE

## 2023-12-01 ENCOUNTER — TELEPHONE (OUTPATIENT)
Age: 63
End: 2023-12-01

## 2023-12-01 ENCOUNTER — RADIATION ONCOLOGY FOLLOW-UP (OUTPATIENT)
Dept: RADIATION ONCOLOGY | Facility: CLINIC | Age: 63
End: 2023-12-01
Attending: RADIOLOGY
Payer: MEDICARE

## 2023-12-01 VITALS
TEMPERATURE: 97.6 F | HEIGHT: 68 IN | HEART RATE: 82 BPM | WEIGHT: 153 LBS | BODY MASS INDEX: 23.19 KG/M2 | RESPIRATION RATE: 18 BRPM | OXYGEN SATURATION: 100 % | SYSTOLIC BLOOD PRESSURE: 124 MMHG | DIASTOLIC BLOOD PRESSURE: 72 MMHG

## 2023-12-01 DIAGNOSIS — C50.912 CARCINOMA OF LEFT BREAST METASTATIC TO AXILLARY LYMPH NODE (HCC): Primary | ICD-10-CM

## 2023-12-01 DIAGNOSIS — C77.3 CARCINOMA OF LEFT BREAST METASTATIC TO AXILLARY LYMPH NODE (HCC): Primary | ICD-10-CM

## 2023-12-01 PROCEDURE — 99213 OFFICE O/P EST LOW 20 MIN: CPT | Performed by: RADIOLOGY

## 2023-12-01 PROCEDURE — G0152 HHCP-SERV OF OT,EA 15 MIN: HCPCS

## 2023-12-01 PROCEDURE — 99211 OFF/OP EST MAY X REQ PHY/QHP: CPT | Performed by: RADIOLOGY

## 2023-12-01 NOTE — PROGRESS NOTES
Follow-up - Radiation Oncology   Lakeshia Gleason 1960 70 y.o. 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 woman with past medical history significant for 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 subsequently diagnosed with stage IIA ([id number]) grade 1 invasive ductal carcinoma of the left breast status post lumpectomy and axillary lymph node dissection achieving negative margins. Pathology was significant for lymphovascular invasion and 3/17 lymph nodes positive for metastatic carcinoma with less 1-2 mm of JACKIE associated with a 1.8 cm macro metastasis. Tumor cells were ER/UT positive and HER2 negative. She completed adjuvant radiation to left breast and draining lymph nodes on 10/10/22. The patient is currently maintained on letrozole. She was last seen 5/26/23 and presents today for follow up.       8/23/23 Med [facility]  Patient began Letrozole 2.5mg once daily 10/20/2022. Goal is for 5 years - end date 10/20/2027. Tolerating well without severe side effect. Osteopenia seen --> discussed in detail with patient. She takes Calcium 600mg BID with Vitamin D 3000 units. Patient to continue this along with weight bearing exercises as tolerated. Next DXA scan 10/2024. Discussed Prolia use. 11/1/23 Dr. Luz Moore   overall doing well. She is on endocrine therapy and tolerating well    we will order MRI of the right humerus. We will also follow her in 6 months.    next mammogram is scheduled for 2024.      23 Hospital admission  Right humeral fracture- mildly displaced midshaft   Status post closed reduction maneuver by orthopedic surgery  Plan for follow up as an outpatient with orthopedic surgery   Was kept overnight due to worsening pain, but after reduction feels much better     23 Orthopedic onc, Dr Candace Araujo  Discussed surgical vs non-surgical options for treatment of right humerus fx  RTO 2 weeks with repeat x-ray right humerus    The patient is seen with right arm in sling and clam shell cast.  2+ nonpitting lymphedema to level of hand noted. Arm is wrapped. She has significant pain of her right arm, increased with movement and pressure. She denies significant breast pain or tenderness. She denies palpable nodules, suspicious skin changes, or nipple discharge of the breasts bilaterally. She denies left upper extremity edema. She has full range of motion of left arm. Upcomin23 Dr. Radha Mcdaniel  24 Mammogram  24 Dr. 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 ER >95  [place] 95   HER2 1+    On ultrasound lymph node is 1.8 cm. There is cortical thickening without evident fatty hilum. In review of screening mammogram, demonstrates no suspicious mammographic findings identified in either breast. Bilateral MRI is recommended. Flow cytometry - there is no evidence of a B-cell or T-cell non-Hodgkin lymphoma. 2022 Genomic Testing    A total of 36 genes were evaluated, including: ANGEL LUIS, BRCA1, BRCA2, CDH1, CHEK2, PALB2, PTEN, STK11, TP53  Negative result.  No pathogenic sequence variants or deletions/duplications identified     5/20/2022 Biopsy    MRI guided biopsy  A. Left breast   11 o'clock   Invasive mammary carcinoma of no special type (ductal)  Grade 1  ER 95  PA 95  HER2 0    B. Right breast   Retroareolar  Benign fibrocystic changes without atypia (discrete 0.3 cm focus of sclerosing and tubular adenosis with usual ductal hyperplasia, columnar cell change and hyperplasia, apocrine metaplasia, cystic dilatation). - Microcalcifications: Present associated with non-neoplastic breast tissue.   - Negative for malignancy, in-situ carcinoma and atypical hyperplasia. Malignant pathology appears unifocal. Biopsy proven carcinoma measured 1 cm on MRI. Biopsy proved metastatic disease to left axillary lymph node. 6/9/2022 Genomic Testing    MammaPrint  Low risk  Luminal type A  MammaPrint index: +0.578     6/21/2022 Surgery    Left breast tiffany  directed lumpectomy with axillary dissection  A. Invasive 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  Grade 1  1.3 cm  Lymphovascular invasion is identified  Margins negative    H.  Axillary, Level I and Level II axillary contents  Metastatic carcinoma involving 3/17 lymph nodes  1.8 cm   Extranodal extension is identified 1mm  Foci of carcinoma identified in extranodal lymphatic channels    Anatomic stage: Stage IIA  Prognostic stage: Stage IA      6/21/2022 -  Cancer Staged    Staging form: Breast, AJCC 8th Edition  - Pathologic stage from 6/21/2022: Stage IA (pT1c, pN1, cM0, G1, ER+, PA+, HER2-) - Signed by Destini Noriega MD on 8/1/2022  Stage prefix: Initial diagnosis  Nuclear grade: G1  Multigene prognostic tests performed: None  Mitotic count score: Score 1  Histologic grading system: 3 grade system       8/29/2022 - 10/10/2022 Radiation    Treatments:  Course: C1  Plan ID Energy Fractions Dose per Fraction (cGy) Total Dose Delivered (cGy) Elapsed Days   BH L Breast 6X 25 / 25 200 5,000 35   BH L [id number] 12E 5 / 5 200 1,000 6   BH L SClav 10X/6X 24 / 24 200 4,800 32    Treatment Dates:  8/29/2022 - 10/10/2022. Malignant neoplasm of lower-inner quadrant of left breast in female, estrogen receptor positive    5/20/2022 Initial Diagnosis    Malignant neoplasm of lower-inner quadrant of left breast in female, estrogen receptor positive ([address])     5/20/2022 Biopsy    MRI guided biopsy  A. Left breast   11 o'clock   Invasive mammary carcinoma of no special type (ductal)  Grade 1  ER 95  [place] 95  HER2 0    B. Right breast   Retroareolar  Benign fibrocystic changes without atypia (discrete 0.3 cm focus of sclerosing and tubular adenosis with usual ductal hyperplasia, columnar cell change and hyperplasia, apocrine metaplasia, cystic dilatation). - Microcalcifications: Present associated with non-neoplastic breast tissue.   - Negative for malignancy, in-situ carcinoma and atypical hyperplasia. Malignant pathology appears unifocal. Biopsy proven carcinoma measured 1 cm on MRI. Biopsy proved metastatic disease to left axillary lymph node. 6/9/2022 Genomic Testing    MammaPrint  Low risk  Luminal type A  MammaPrint index: +0.578     6/21/2022 Surgery    Left breast tiffany  directed lumpectomy with axillary dissection  A. Invasive 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  Grade 1  1.3 cm  Lymphovascular invasion is identified  Margins negative    H. Axillary, Level I and Level II axillary contents  Metastatic carcinoma involving 3/17 lymph nodes  1.8 cm   Extranodal extension is identified 1mm  Foci of carcinoma identified in extranodal lymphatic channels    Anatomic stage: Stage IIA  Prognostic stage: Stage IA      8/29/2022 - 10/10/2022 Radiation    Treatments:  Course: C1  Plan ID Energy Fractions Dose per Fraction (cGy) Total Dose Delivered (cGy) Elapsed Days   BH L Breast 6X 25 / 25 200 5,000 35   BH L [id number] 12E 5 / 5 200 1,000 6   BH L SClav 10X/6X 24 / 24 200 4,800 32    Treatment Dates:  8/29/2022 - 10/10/2022.           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 Hysterectomy done    PONV (postoperative nausea and vomiting)      Past Surgical History:   Procedure Laterality Date    BREAST LUMPECTOMY Left 6/21/2022    Procedure: ANITA  DIRECTED LUMPECTOMY;  Surgeon: Cha Frederick MD;  Location: [facility];  Service: Surgical Oncology    FRACTURE SURGERY      HYSTERECTOMY      LYMPH NODE DISSECTION Left 6/21/2022    Procedure: AXILLARY DISSECTION LEVEL I AND LEVEL II LYMPH NODES;  Surgeon: Cha Frederick MD;  Location: [facility];  Service: Surgical Oncology    MRI BREAST BIOPSY LEFT (ALL INCLUSIVE) Left 5/20/2022    MRI BREAST BIOPSY RIGHT (ALL INCLUSIVE) Right 5/20/2022    OTHER SURGICAL HISTORY      Arm Incision: Dermoid tumor, right Arm     PARTIAL HYSTERECTOMY      LA COLONOSCOPY FLX DX W/COLLJ SPEC WHEN PFRMD N/A 8/15/2017    Procedure: COLONOSCOPY;  Surgeon: Cesario Pickard MD;  Location: [facility];   Service: Gastroenterology    LA [address] WRST SURG W/RLS TRANSVRS CARPL LIGM Left 8/10/2021    Procedure: RELEASE LEFT CARPAL TUNNEL ENDOSCOPIC;  Surgeon: Jadiel Hinojosa MD;  Location: [facility];  Service: Orthopedics    LA OPTX DSTL RADL I-ARTIC FX/EPIPHYSL SEP 3 FRAG Left 2/9/2021    Procedure: OPEN REDUCTION W/ INTERNAL FIXATION (ORIF) RADIUS / Adrienne Prijoaquina (WRIST) left;  Surgeon: Jadiel Hinojosa MD;  Location: [facility];  Service: Orthopedics    SKIN BIOPSY      SKIN CANCER EXCISION      Melanoma Excision     TONSILLECTOMY      US BREAST NEEDLE LOC LEFT Left 6/16/2022    US BREAST NEEDLE LOC RIGHT EACH ADDITIONAL Right 6/16/2022    US GUIDED BREAST LYMPH NODE BIOPSY LEFT Left 4/14/2022       Social History   Social History     Substance and Sexual Activity   Alcohol Use Not Currently    Alcohol/week: 5.0 standard drinks of alcohol    Types: 5 Shots of liquor per week    Comment: Used mostly as a painkiller when needed before bedtime     Social History     Substance and Sexual Activity   Drug Use Yes    Frequency: 28.0 times per week    Types: Marijuana    Comment: THC Medical marijuana     Social History     Tobacco Use   Smoking Status Never   Smokeless Tobacco Never         Meds/Allergies     Current Outpatient Medications:     cholecalciferol (VITAMIN D3) 1,000 units tablet, Take 3,000 Units by mouth daily, Disp: , Rfl:     diphenhydrAMINE (BENADRYL) 50 MG tablet, Take 50 mg by mouth daily at bedtime as needed for itching, Disp: , Rfl:     dronabinol (MARINOL) 5 MG capsule, Take 15 mg by mouth 4 (four) times a day (before meals and at bedtime), Disp: , Rfl:     ezetimibe (ZETIA) 10 mg tablet, Take 10 mg by mouth daily, Disp: , Rfl:     letrozole (FEMARA) 2.5 mg tablet, TAKE 1 TABLET BY MOUTH EVERY DAY, Disp: 90 tablet, Rfl: 3    multivitamin (THERAGRAN) TABS, Take 1 tablet by mouth daily, Disp: , Rfl:     Omega-3 Fatty Acids (FISH OIL PO), Take by mouth, Disp: , Rfl:     oxyCODONE-acetaminophen (PERCOCET) 5-325 mg per tablet, TAKE ONE - TWO TABLETS BY MOUTH EVERY FOUR TO SIX HOURS AS NEEDED, Disp: , Rfl:     Probiotic Product (PROBIOTIC-10 PO), Take by mouth, Disp: , Rfl:     senna-docusate sodium (SENOKOT-S) 8.6-50 mg per tablet, Take 1 tablet by mouth daily, Disp: 7 tablet, Rfl: 0    triamcinolone (KENALOG) 0.1 % cream, Apply topically 2 (two) times a day as needed for irritation or rash (Patient not taking: Reported on 11/1/2023), Disp: 80 g, Rfl: 0  Allergies   Allergen Reactions    Chlorpromazine Anaphylaxis     PHENOTHIAZINE=ANAPHYLAXIS    Codeine Anaphylaxis     Anaphylaxis  abd pain.     Meperidine Anaphylaxis, Hives and Vomiting    Phenothiazines Anaphylaxis and Throat Swelling     Category: Allergy; Saccharin - Food Allergy Anaphylaxis    Ampicillin-Sulbactam Sodium Hives    Aspirin GI Intolerance and Abdominal Pain     Severe GERD    Gluten Meal - Food Allergy GI Intolerance    Ibuprofen Hives     H    Levofloxacin Hives    Chocolate - Food Allergy GI Intolerance    Lactose - Food Allergy GI Intolerance    Neomycin Other (See Comments), Edema and Hives     Category: Allergy;   swelling    [place] - Food Allergy Rash    Latex Hives and Rash     Category: Allergy; Review of Systems   Constitutional:  Positive for fatigue. Negative for appetite change. HENT: Negative. Eyes:         Wears glasses  Macular degeneration  Optic migraines   Respiratory: Negative. Negative for shortness of breath. Cardiovascular: Negative. Gastrointestinal:  Positive for constipation (intermittent). Genitourinary: Negative. Negative for vaginal bleeding. Musculoskeletal:  Negative for back pain and neck pain. Lymphedema right arm since 1999  Wearing ace bandage, clam shell brace/sling to right arm   Skin: Negative. Allergic/Immunologic: Positive for environmental allergies and food allergies. Neurological: Negative. Hematological:  Positive for adenopathy (chronic lymphedema R arm/hand). Psychiatric/Behavioral:  Positive for dysphoric mood (mild). Negative for sleep disturbance (Taking pain med prior to bed with good results). OBJECTIVE:   /72 (BP Location: Left arm, Patient Position: Sitting)   Pulse 82   Temp 97.6 °F (36.4 °C) (Temporal)   Resp 18   Ht 5' 8" (1.727 m)   Wt 69.4 kg (153 lb)   SpO2 100%   BMI 23.26 kg/m²   Karn[place]sky: 80 - Normal activity with effort; some signs or symptoms of disease    Physical Exam  Vitals and nursing note reviewed. Constitutional:       General: She is not in acute distress. Appearance: She is well-developed. Cardiovascular:      Rate and Rhythm: Normal rate.    Pulmonary:      Breath sounds: No wheezing, rhonchi or rales. Chest:      Comments: Breast examination is limited due to significant mobility issues form her right arm fracture. There is a well-healed lower right breast scar. The left breast shows a well-healed upper outer scar. There is diffuse mild fibrosis and some lymphedema of the left breast noted. There was no tenderness, palpable nodule or suspicious skin changes of the breasts bilaterally. Abdominal:      Palpations: Abdomen is soft. Tenderness: There is no abdominal tenderness. Musculoskeletal:      Right lower leg: No edema. Left lower leg: No edema. Comments: 2+ non pitting edema right arm to level of hand. Wrapped and in clamshell and sling. Left hand no edema with full range of motion. Lymphadenopathy:      Cervical: No cervical adenopathy. Upper Body:      Right upper body: No supraclavicular or axillary adenopathy. Left upper body: No supraclavicular or axillary adenopathy. Neurological:      Mental Status: She is alert and oriented to person, place, and time. Gait: Gait normal.          Assessment/Plan:  Awilda Loja is a 70 y.o. woman with past medical history significant for 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 resulting in chronic right upper extremity edema diagnosed with stage IIA invasive ductal carcinoma of the left breast status post lumpectomy and axillary lymph node dissection achieving negative margins. Tumor cells were ER/NC positive and HER2 negative. She completed adjuvant radiation on 10/10/22. The patient is currently maintained on letrozole, which she tolerates well. She remains clinically [place]. The patient has some lymphedema of the left breast.  Due to chronic right lymphedema she follows regularly with a lymphedema specialist.  Intervention and massage would be limited due to right arm fracture and associated pain, but can be pursued in the future to manage symptoms.  She expressed understanding. She is not bothered by lymphedema of the left breast given her more acute and painful issues with right arm fracture. She is scheduled for mammogram in April 2024. Hopefully, she will be healed and able to undergo imaging. She will return for follow-up in about 6 months after imaging is completed  We will see her sooner should the need arise. She will see Dr. Adri Chen and Dr. Ana Padilla in the interim as well. Perla Hollis MD  12/1/2023,1:10 PM    Portions of the record may have been created with voice recognition software. Occasional wrong word or "sound a like" substitutions may have occurred due to the inherent limitations of voice recognition software. Read the chart carefully and recognize, using context, where substitutions have occurred.

## 2023-12-01 NOTE — PROGRESS NOTES
Karyle Royalty 1960 is a 70 y.o. 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 Pathology was significant for lymphovascular invasion and 3/17 lymph nodes positive for metastatic carcinoma with less 1-2 mm of JACKIE associated with a 1.8 cm macro metastasis. Tumor cells were ER/ME positive and HER2 negative. She completed adjuvant radiation on 10/10/22. The patient is currently maintained on letrozole. She was last seen 5/26/23 and presents today for follow up. Follow up visit     8/23/23 [facility]  Patient began Letrozole 2.5mg once daily 10/20/2022. Goal is for 5 years - end date 10/20/2027. Tolerating well without severe side effect. Tolerable hot flashes. CBC-D, CMP Q6m. Osteopenia seen --> discussed in detail with patient. She takes Calcium 600mg BID with Vitamin D 3000 units. Patient to continue this along with weight bearing exercises as tolerated. Next DXA scan 10/2024. We did discuss Prolia use. I discussed side effect profile with her which includes but is not limited to: site injection reactions, myalgias, arthralgias, flu-like symptoms, osteonecrosis of the jaw, atypical fracture when stopping use. She has several dental conditions and has required jaw surgery in the past. She would like to discuss with dentist soon and we will obtain clearance prior to start of this medication. Continue F/U with rad onc  Continue F/U with surg onc - was seen today  F/u Surg-Onc, screening mammograms as per below.       Survivorship planning  Every 6 month H&P for first 5 years, then annual thereafter  B/l Mammograms yearly  Distress counseling on an ongoing basis as needed    11/1/23 Dr. Mauro Bethea overall doing well. She is on endocrine therapy and tolerating well    we will order MRI of the right humerus. We will also follow her in 6 months. next mammogram is scheduled for 2024.     23 Hospital admission  Right humeral fracture- mildly displaced midshaft   Status post closed reduction maneuver by orthopedic surgery  Plan for follow up as an outpatient with orthopedic surgery   Was kept overnight due to worsening pain, but after reduction feels much better      23 Orthopedic onc, Dr Nena Linton  Discussed surgical vs non-surgical options for treatment of right humerus fx  RTO 2 weeks with repeat x-ray right humerus      Upcomin23 Dr. Yassine Galvan  24 Mammogram  24 Dr. Yassine Galvan  24 Med Onc, [address]        Oncology History   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 ([address])   2022 Initial Diagnosis    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 ([address])     2022 Biopsy    Left axillary Lymph node ultrasound guided biopsy  Metastatic carcinoma, compatible with breast origin. ER >95  RI 95   HER2 1+    On ultrasound lymph node is 1.8 cm. There is cortical thickening without evident fatty hilum. In review of screening mammogram, demonstrates no suspicious mammographic findings identified in either breast. Bilateral MRI is recommended. Flow cytometry - there is no evidence of a B-cell or T-cell non-Hodgkin lymphoma. 2022 Genomic Testing    A total of 36 genes were evaluated, including: ANGEL LUIS, BRCA1, BRCA2, CDH1, CHEK2, PALB2, PTEN, STK11, TP53  Negative result. No pathogenic sequence variants or deletions/duplications identified     2022 Biopsy    MRI guided biopsy  A. Left breast   11 o'clock   Invasive mammary carcinoma of no special type (ductal)  Grade 1  ER 95  RI 95  HER2 0    B.  Right breast   Retroareolar  Benign fibrocystic changes without atypia (discrete 0.3 cm focus of sclerosing and tubular adenosis with usual ductal hyperplasia, columnar cell change and hyperplasia, apocrine metaplasia, cystic dilatation). - Microcalcifications: Present associated with non-neoplastic breast tissue.   - Negative for malignancy, in-situ carcinoma and atypical hyperplasia. Malignant pathology appears unifocal. Biopsy proven carcinoma measured 1 cm on MRI. Biopsy proved metastatic disease to left axillary lymph node. 6/9/2022 Genomic Testing    MammaPrint  Low risk  Luminal type A  MammaPrint index: +0.578     6/21/2022 Surgery    Left breast tiffany  directed lumpectomy with axillary dissection  A. Invasive 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  Grade 1  1.3 cm  Lymphovascular invasion is identified  Margins negative    H. Axillary, Level I and Level II axillary contents  Metastatic carcinoma involving 3/17 lymph nodes  1.8 cm   Extranodal extension is identified 1mm  Foci of carcinoma identified in extranodal lymphatic channels    Anatomic stage: Stage IIA  Prognostic stage: Stage IA      6/21/2022 -  Cancer Staged    Staging form: Breast, AJCC 8th Edition  - Pathologic stage from 6/21/2022: Stage IA (pT1c, pN1, cM0, G1, ER+, AZ+, HER2-) - Signed by Claudean Holding, MD on 8/1/2022  Stage prefix: Initial diagnosis  Nuclear grade: G1  Multigene prognostic tests performed: None  Mitotic count score: Score 1  Histologic grading system: 3 grade system       8/29/2022 - 10/10/2022 Radiation    Treatments:  Course: C1  Plan ID Energy Fractions Dose per Fraction (cGy) Total Dose Delivered (cGy) Elapsed Days   BH L Breast 6X 25 / 25 200 5,000 35   BH L [id number] 12E 5 / 5 200 1,000 6   BH L SClav 10X/6X 24 / 24 200 4,800 32    Treatment Dates:  8/29/2022 - 10/10/2022.       Malignant neoplasm of lower-inner quadrant of left breast in female, estrogen receptor positive    5/20/2022 Initial Diagnosis    Malignant neoplasm of lower-inner quadrant of left breast in female, estrogen receptor positive ([address]) 5/20/2022 Biopsy    MRI guided biopsy  A. Left breast   11 o'clock   Invasive mammary carcinoma of no special type (ductal)  Grade 1  ER 95  MI 95  HER2 0    B. Right breast   Retroareolar  Benign fibrocystic changes without atypia (discrete 0.3 cm focus of sclerosing and tubular adenosis with usual ductal hyperplasia, columnar cell change and hyperplasia, apocrine metaplasia, cystic dilatation). - Microcalcifications: Present associated with non-neoplastic breast tissue.   - Negative for malignancy, in-situ carcinoma and atypical hyperplasia. Malignant pathology appears unifocal. Biopsy proven carcinoma measured 1 cm on MRI. Biopsy proved metastatic disease to left axillary lymph node. 6/9/2022 Genomic Testing    MammaPrint  Low risk  Luminal type A  MammaPrint index: +0.578     6/21/2022 Surgery    Left breast tiffany  directed lumpectomy with axillary dissection  A. Invasive 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  Grade 1  1.3 cm  Lymphovascular invasion is identified  Margins negative    H. Axillary, Level I and Level II axillary contents  Metastatic carcinoma involving 3/17 lymph nodes  1.8 cm   Extranodal extension is identified 1mm  Foci of carcinoma identified in extranodal lymphatic channels    Anatomic stage: Stage IIA  Prognostic stage: Stage IA      8/29/2022 - 10/10/2022 Radiation    Treatments:  Course: C1  Plan ID Energy Fractions Dose per Fraction (cGy) Total Dose Delivered (cGy) Elapsed Days   BH L Breast 6X 25 / 25 200 5,000 35   BH L [id number] 12E 5 / 5 200 1,000 6   BH L SClav 10X/6X 24 / 24 200 4,800 32    Treatment Dates:  8/29/2022 - 10/10/2022. Review of Systems:  Review of Systems   Constitutional:  Positive for fatigue. Negative for appetite change. HENT: Negative. Eyes:         Wears glasses  Macular degeneration  Optic migraines   Respiratory: Negative. Negative for shortness of breath. Cardiovascular: Negative.     Gastrointestinal: Positive for constipation (intermittent). Genitourinary: Negative. Negative for vaginal bleeding. Musculoskeletal:  Negative for back pain and neck pain. Lymphedema right arm since 1999  Wearing ace bandage, clam shell brace/sling to right arm   Skin: Negative. Allergic/Immunologic: Positive for environmental allergies and food allergies. Neurological: Negative. Hematological:  Positive for adenopathy (chronic lymphedema R arm/hand). Psychiatric/Behavioral:  Positive for dysphoric mood (mild). Negative for sleep disturbance (Taking pain med prior to bed with good results).         Clinical Trial: no    Teaching:     Health Maintenance   Topic Date Due    Medicare Annual Wellness Visit (AWV)  Never done    Pneumococcal Vaccine: 65+ Years (1 - PCV) Never done    HIV Screening  Never done    PT PLAN OF CARE  06/03/2022    Colorectal Cancer Screening  08/15/2022    COVID-19 Vaccine (5 - Booster for Soum series) 01/26/2023    OT PLAN OF CARE  03/22/2023    Influenza Vaccine (1) 09/01/2023    Urinary Incontinence Screening  Never done    Fall Risk  02/20/2024    Depression Screening  05/26/2024    Breast Cancer Screening: Mammogram  04/25/2024    BMI: Adult  11/30/2024    Hepatitis C Screening  Completed    Osteoporosis Screening  Completed    HIB Vaccine  Aged Out    IPV Vaccine  Aged Out    Hepatitis A Vaccine  Aged Out    Meningococcal ACWY Vaccine  Aged Out    HPV Vaccine  Aged Out     Patient Active Problem List   Diagnosis    Back pain, chronic    Chronic insomnia    Lymphedema of right arm    Hyperlipidemia, mixed    Peripheral neuropathy    Lymphedema    Cellulitis of right upper extremity    Sepsis ([address])    Desmoid tumor    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 ([address])    Malignant neoplasm of lower-inner quadrant of left breast in female, estrogen receptor positive     HTN, goal below 140/90    PONV (postoperative nausea and vomiting)    Use of letrozole (Femara) History of lumpectomy of left breast    Humerus fracture     Past Medical History:   Diagnosis Date    Abnormal mammogram 05/15/2013    Anemia     Cancer ([address])     desmoitosis    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 ([address]) 04/19/2022    Chronic pain disorder     worse right side of body    Desmoid tumor     Last Assessed: 6/19/2017    History of transfusion     no reactions    Hyperlipidemia     Hypertension     Ovarian cancer ([address]) 1999? Hysterectomy done    PONV (postoperative nausea and vomiting)      Past Surgical History:   Procedure Laterality Date    BREAST LUMPECTOMY Left 6/21/2022    Procedure: ANITA  DIRECTED LUMPECTOMY;  Surgeon: Debbi Galindo MD;  Location: [facility];  Service: Surgical Oncology    FRACTURE SURGERY      HYSTERECTOMY      LYMPH NODE DISSECTION Left 6/21/2022    Procedure: AXILLARY DISSECTION LEVEL I AND LEVEL II LYMPH NODES;  Surgeon: Debbi Galindo MD;  Location: [facility];  Service: Surgical Oncology    MRI BREAST BIOPSY LEFT (ALL INCLUSIVE) Left 5/20/2022    MRI BREAST BIOPSY RIGHT (ALL INCLUSIVE) Right 5/20/2022    OTHER SURGICAL HISTORY      Arm Incision: Dermoid tumor, right Arm     PARTIAL HYSTERECTOMY      TX COLONOSCOPY FLX DX W/COLLJ SPEC WHEN PFRMD N/A 8/15/2017    Procedure: COLONOSCOPY;  Surgeon: Joselyn Garcia MD;  Location: [facility];   Service: Gastroenterology    [place] [id number] [facility] WRST SURG W/RLS TRANSVRS CARPL LIGM Left 8/10/2021    Procedure: RELEASE LEFT CARPAL TUNNEL ENDOSCOPIC;  Surgeon: Milly Taylor MD;  Location: [facility];  Service: Orthopedics    TX OPTX DSTL RADL I-ARTIC FX/EPIPHYSL SEP 3 FRAG Left 2/9/2021    Procedure: OPEN REDUCTION W/ INTERNAL FIXATION (ORIF) RADIUS / [place] (WRIST) left;  Surgeon: Milly Taylor MD;  Location: [facility];  Service: Orthopedics    SKIN BIOPSY      SKIN CANCER EXCISION      Melanoma Excision     TONSILLECTOMY      US BREAST NEEDLE LOC LEFT Left 6/16/2022    US BREAST NEEDLE LOC RIGHT EACH ADDITIONAL Right 6/16/2022    US GUIDED BREAST LYMPH NODE BIOPSY LEFT Left 4/14/2022     Family History   Problem Relation Age of Onset    Diabetes Mother     No Known Problems Father     Cancer Sister     Lung cancer Sister     Pancreatic cancer Sister     No Known Problems Maternal Grandmother     No Known Problems Maternal Grandfather     No Known Problems Paternal Grandmother     No Known Problems Paternal Grandfather     Breast cancer Neg Hx      Social History     Socioeconomic History    Marital status: /Civil Union     Spouse name: Not on file    Number of children: Not on file    Years of education: Not on file    Highest education level: Not on file   Occupational History    Occupation: unemployed    Tobacco Use    Smoking status: Never    Smokeless tobacco: Never   Vaping Use    Vaping Use: Never used   Substance and Sexual Activity    Alcohol use: Not Currently     Alcohol/week: 5.0 standard drinks of alcohol     Types: 5 Shots of liquor per week     Comment: Used mostly as a painkiller when needed before bedtime    Drug use: Yes     Frequency: 28.0 times per week     Types: Marijuana     Comment: THC Medical marijuana    Sexual activity: Not Currently     Partners: Male     Comment: Hysterectomy years ago   Other Topics Concern    Not on file   Social History Narrative    Lives with spouse      Social Determinants of Health     Financial Resource Strain: Not on file   Food Insecurity: No Food Insecurity (11/13/2023)    Hunger Vital Sign     Worried About Running Out of Food in the Last Year: Never true     Ran Out of Food in the Last Year: Never true   Transportation Needs: No Transportation Needs (11/13/2023)    PRAPARE - Transportation     Lack of Transportation (Medical): No     Lack of Transportation (Non-Medical):  No   Physical Activity: Not on file   Stress: Not on file   Social Connections: Not on file   Intimate Partner Violence: Not on file   Housing Stability: Low Risk (11/13/2023)    Housing Stability Vital Sign     Unable to Pay for Housing in the Last Year: No     Number of Places Lived in the Last Year: 1     Unstable Housing in the Last Year: No       Current Outpatient Medications:     cholecalciferol (VITAMIN D3) 1,000 units tablet, Take 3,000 Units by mouth daily, Disp: , Rfl:     diphenhydrAMINE (BENADRYL) 50 MG tablet, Take 50 mg by mouth daily at bedtime as needed for itching, Disp: , Rfl:     dronabinol (MARINOL) 5 MG capsule, Take 15 mg by mouth 4 (four) times a day (before meals and at bedtime), Disp: , Rfl:     ezetimibe (ZETIA) 10 mg tablet, Take 10 mg by mouth daily, Disp: , Rfl:     letrozole (FEMARA) 2.5 mg tablet, TAKE 1 TABLET BY MOUTH EVERY DAY, Disp: 90 tablet, Rfl: 3    multivitamin (THERAGRAN) TABS, Take 1 tablet by mouth daily, Disp: , Rfl:     Omega-3 Fatty Acids (FISH OIL PO), Take by mouth, Disp: , Rfl:     oxyCODONE-acetaminophen (PERCOCET) 5-325 mg per tablet, TAKE ONE - TWO TABLETS BY MOUTH EVERY FOUR TO SIX HOURS AS NEEDED, Disp: , Rfl:     Probiotic Product (PROBIOTIC-10 PO), Take by mouth, Disp: , Rfl:     senna-docusate sodium (SENOKOT-S) 8.6-50 mg per tablet, Take 1 tablet by mouth daily, Disp: 7 tablet, Rfl: 0    triamcinolone (KENALOG) 0.1 % cream, Apply topically 2 (two) times a day as needed for irritation or rash (Patient not taking: Reported on 11/1/2023), Disp: 80 g, Rfl: 0  Allergies   Allergen Reactions    Chlorpromazine Anaphylaxis     PHENOTHIAZINE=ANAPHYLAXIS    Codeine Anaphylaxis     Anaphylaxis  abd pain. Meperidine Anaphylaxis, Hives and Vomiting    Phenothiazines Anaphylaxis and Throat Swelling     Category: Allergy;      Saccharin - Food Allergy Anaphylaxis    Ampicillin-Sulbactam Sodium Hives    Aspirin GI Intolerance and Abdominal Pain     Severe GERD    Gluten Meal - Food Allergy GI Intolerance    Ibuprofen Hives     H    Levofloxacin Hives    Chocolate - Food Allergy GI Intolerance    Lactose - Food Allergy GI Intolerance    Neomycin Other (See Comments), Edema and Hives     Category: Allergy;   swelling    [place] - Food Allergy Rash    Latex Hives and Rash     Category:  Allergy;      Vitals:    12/01/23 0853   Height: 5' 8" (1.727 m)

## 2023-12-01 NOTE — TELEPHONE ENCOUNTER
Caller: Patient     Doctor: Blu Calix     Reason for call: asked for PT orders and protocol, she is seeing patient now     Call back#: [phone]

## 2023-12-01 NOTE — TELEPHONE ENCOUNTER
Humerus Shaft Fracture Non-Operative Rehabilitation Protocol  PHASE I: IMMOBILIZATION  Week 0-2 (to start immediately after initiation of Kruse bracing)  · No lifting > weight of coffee cup   · Kruse fracture brace at all times  · Upright posture at all times  · Transition from sling to cuff & collar to allow gravity to align the arm. Use sling   only as needed for comfort. · Elbow should be unsupported as much as possible   · Swelling control (with stocking)   · TID HEP: elbow, wrist, finger A/AAROM.  No shoulder ROM  · Biceps and triceps isometrics   PHASE II: FRACTURE HEALING  Week 2-6  · No lifting > weight of coffee cup   · Kruse fracture brace at all times  o Twice daily tightening of fracture brace   · Discontinue sling, continue use of cuff & collar as needed for comfort, continue   upright posture   · Daily hygiene and skin checks in pendulum position   · Continue TID HEP: A/AAROM elbow, wrist and finger motion, biceps and triceps   isometrics   · Start shoulder periscapular isometrics / shoulder posture   · No shoulder ROM other than pendulums for hygiene   PHASE III: EARLY REHABILITATION  Week 6-10 (following clinical fracture healing and no motion at the fracture site)   · No lifting > 5# at the side, or coffee cup overhead   · Part time Kruse bracing (if clinical fracture healing)  o Wear while outside of the house  o Remove while at home or at rest  · Add TID HEP shoulder 4 quadrant AAROM as tolerated with brace on unless   pain free   o Pulleys, table slides, wall climbs, supine wand exercises in all planes   PHASE IV: RETURN TO FUNCTION  Week 11-14 (following clinical fracture healing)   · Add shoulder AROM, PROM as tolerated   · Generalized UE strengthening   · Activities as tolerated (if bony healing complete)   · Independent home exercise program   · Return to high level functional ADLs and simulation of work environment

## 2023-12-02 VITALS
DIASTOLIC BLOOD PRESSURE: 62 MMHG | RESPIRATION RATE: 16 BRPM | OXYGEN SATURATION: 98 % | SYSTOLIC BLOOD PRESSURE: 139 MMHG | HEART RATE: 70 BPM

## 2023-12-04 ENCOUNTER — OFFICE VISIT (OUTPATIENT)
Dept: SURGICAL ONCOLOGY | Facility: CLINIC | Age: 63
End: 2023-12-04
Payer: MEDICARE

## 2023-12-04 ENCOUNTER — HOME CARE VISIT (OUTPATIENT)
Dept: HOME HEALTH SERVICES | Facility: HOME HEALTHCARE | Age: 63
End: 2023-12-04
Payer: MEDICARE

## 2023-12-04 VITALS
DIASTOLIC BLOOD PRESSURE: 80 MMHG | BODY MASS INDEX: 23.95 KG/M2 | SYSTOLIC BLOOD PRESSURE: 132 MMHG | HEIGHT: 68 IN | TEMPERATURE: 98 F | RESPIRATION RATE: 16 BRPM | WEIGHT: 158 LBS | HEART RATE: 80 BPM | OXYGEN SATURATION: 97 %

## 2023-12-04 VITALS
OXYGEN SATURATION: 97 % | DIASTOLIC BLOOD PRESSURE: 82 MMHG | RESPIRATION RATE: 18 BRPM | HEART RATE: 64 BPM | SYSTOLIC BLOOD PRESSURE: 138 MMHG

## 2023-12-04 DIAGNOSIS — Z79.811 USE OF LETROZOLE (FEMARA): ICD-10-CM

## 2023-12-04 DIAGNOSIS — C50.912 CARCINOMA OF LEFT BREAST METASTATIC TO AXILLARY LYMPH NODE (HCC): ICD-10-CM

## 2023-12-04 DIAGNOSIS — I89.0 LYMPHEDEMA OF RIGHT ARM: ICD-10-CM

## 2023-12-04 DIAGNOSIS — C50.312 MALIGNANT NEOPLASM OF LOWER-INNER QUADRANT OF LEFT BREAST IN FEMALE, ESTROGEN RECEPTOR POSITIVE: Primary | ICD-10-CM

## 2023-12-04 DIAGNOSIS — L03.113 CELLULITIS OF RIGHT UPPER EXTREMITY: Primary | ICD-10-CM

## 2023-12-04 DIAGNOSIS — C77.3 CARCINOMA OF LEFT BREAST METASTATIC TO AXILLARY LYMPH NODE (HCC): ICD-10-CM

## 2023-12-04 DIAGNOSIS — S42.344A CLOSED NONDISPLACED SPIRAL FRACTURE OF SHAFT OF RIGHT HUMERUS, INITIAL ENCOUNTER: Primary | ICD-10-CM

## 2023-12-04 DIAGNOSIS — Z17.0 MALIGNANT NEOPLASM OF LOWER-INNER QUADRANT OF LEFT BREAST IN FEMALE, ESTROGEN RECEPTOR POSITIVE: Primary | ICD-10-CM

## 2023-12-04 DIAGNOSIS — Z98.890 HISTORY OF LUMPECTOMY OF LEFT BREAST: ICD-10-CM

## 2023-12-04 PROCEDURE — 99214 OFFICE O/P EST MOD 30 MIN: CPT | Performed by: SURGERY

## 2023-12-04 PROCEDURE — G0152 HHCP-SERV OF OT,EA 15 MIN: HCPCS

## 2023-12-04 PROCEDURE — 99204 OFFICE O/P NEW MOD 45 MIN: CPT | Performed by: SURGERY

## 2023-12-04 RX ORDER — DRONABINOL 10 MG/1
CAPSULE ORAL
COMMUNITY
Start: 2023-11-24

## 2023-12-04 RX ORDER — OXYCODONE HYDROCHLORIDE 5 MG/1
5 TABLET ORAL EVERY 6 HOURS PRN
Qty: 20 TABLET | Refills: 0 | Status: SHIPPED | OUTPATIENT
Start: 2023-12-04

## 2023-12-04 RX ORDER — PENICILLIN V POTASSIUM 500 MG/1
500 TABLET ORAL EVERY 12 HOURS
Qty: 60 TABLET | Refills: 0 | Status: SHIPPED | OUTPATIENT
Start: 2023-12-04 | End: 2024-01-03

## 2023-12-04 RX ORDER — SENNA AND DOCUSATE SODIUM 50; 8.6 MG/1; MG/1
1 TABLET, FILM COATED ORAL DAILY
Qty: 7 TABLET | Refills: 0 | Status: SHIPPED | OUTPATIENT
Start: 2023-12-04

## 2023-12-04 NOTE — TELEPHONE ENCOUNTER
CLM on VM of [facility] OT, that have orders as she requested from Dr Kedric Siemens for pt,  and they are in chart or could she CB w/a fax number if needed. Thank you.

## 2023-12-04 NOTE — TELEPHONE ENCOUNTER
Patient messages office regarding refills of her pen vk. Pended thirty day supply, do you want patient in for follow up?

## 2023-12-04 NOTE — PROGRESS NOTES
Surgical Oncology Follow Up  [facility]  [facility] [place] [id number]    Ann Green  1960  [id number]      No chief complaint on file. Assessment & Plan: This is a 75-year-old female with history of left breast cancer axillary lymph node metastasis with lumpectomy radiation and axillary dissection also with right sarcoma and lymphedema. Recently she has had trauma to her right humerus region and humeral fracture. She is here to rule out any pathological fracture. I did review the x-rays he does not seems to be any bone lesions. We will order a bone scan to delineate any bone metastasis. She is on letrozole and tolerating well no major 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. Cancer History:     Oncology History   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 ([address])   4/14/2022 Initial Diagnosis    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 ([address])     4/14/2022 Biopsy    Left axillary Lymph node ultrasound guided biopsy  Metastatic carcinoma, compatible with breast origin. ER >95  KS 95   HER2 1+    On ultrasound lymph node is 1.8 cm. There is cortical thickening without evident fatty hilum. In review of screening mammogram, demonstrates no suspicious mammographic findings identified in either breast. Bilateral MRI is recommended. Flow cytometry - there is no evidence of a B-cell or T-cell non-Hodgkin lymphoma. 4/20/2022 Genomic Testing    A total of 36 genes were evaluated, including: ANGEL LUIS, BRCA1, BRCA2, CDH1, CHEK2, PALB2, PTEN, STK11, TP53  Negative result. No pathogenic sequence variants or deletions/duplications identified     5/20/2022 Biopsy    MRI guided biopsy  A. Left breast   11 o'clock   Invasive mammary carcinoma of no special type (ductal)  Grade 1  ER 95  KS 95  HER2 0    B.  Right breast   Retroareolar  Benign fibrocystic changes without atypia (discrete 0.3 cm focus of sclerosing and tubular adenosis with usual ductal hyperplasia, columnar cell change and hyperplasia, apocrine metaplasia, cystic dilatation). - Microcalcifications: Present associated with non-neoplastic breast tissue.   - Negative for malignancy, in-situ carcinoma and atypical hyperplasia. Malignant pathology appears unifocal. Biopsy proven carcinoma measured 1 cm on MRI. Biopsy proved metastatic disease to left axillary lymph node. 6/9/2022 Genomic Testing    MammaPrint  Low risk  Luminal type A  MammaPrint index: +0.578     6/21/2022 Surgery    Left breast tiffany  directed lumpectomy with axillary dissection  A. Invasive 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  Grade 1  1.3 cm  Lymphovascular invasion is identified  Margins negative    H. Axillary, Level I and Level II axillary contents  Metastatic carcinoma involving 3/17 lymph nodes  1.8 cm   Extranodal extension is identified 1mm  Foci of carcinoma identified in extranodal lymphatic channels    Anatomic stage: Stage IIA  Prognostic stage: Stage IA      6/21/2022 -  Cancer Staged    Staging form: Breast, AJCC 8th Edition  - Pathologic stage from 6/21/2022: Stage IA (pT1c, pN1, cM0, G1, ER+, SC+, HER2-) - Signed by Jaime Driver MD on 8/1/2022  Stage prefix: Initial diagnosis  Nuclear grade: G1  Multigene prognostic tests performed: None  Mitotic count score: Score 1  Histologic grading system: 3 grade system       8/29/2022 - 10/10/2022 Radiation    Treatments:  Course: C1  Plan ID Energy Fractions Dose per Fraction (cGy) Total Dose Delivered (cGy) Elapsed Days   BH L Breast 6X 25 / 25 200 5,000 35   BH L [id number] 12E 5 / 5 200 1,000 6   BH L SClav 10X/6X 24 / 24 200 4,800 32    Treatment Dates:  8/29/2022 - 10/10/2022.       Malignant neoplasm of lower-inner quadrant of left breast in female, estrogen receptor positive    5/20/2022 Initial Diagnosis    Malignant neoplasm of lower-inner quadrant of left breast in female, estrogen receptor positive ([address])     5/20/2022 Biopsy    MRI guided biopsy  A. Left breast   11 o'clock   Invasive mammary carcinoma of no special type (ductal)  Grade 1  ER 95  ME 95  HER2 0    B. Right breast   Retroareolar  Benign fibrocystic changes without atypia (discrete 0.3 cm focus of sclerosing and tubular adenosis with usual ductal hyperplasia, columnar cell change and hyperplasia, apocrine metaplasia, cystic dilatation). - Microcalcifications: Present associated with non-neoplastic breast tissue.   - Negative for malignancy, in-situ carcinoma and atypical hyperplasia. Malignant pathology appears unifocal. Biopsy proven carcinoma measured 1 cm on MRI. Biopsy proved metastatic disease to left axillary lymph node. 6/9/2022 Genomic Testing    MammaPrint  Low risk  Luminal type A  MammaPrint index: +0.578     6/21/2022 Surgery    Left breast tiffany  directed lumpectomy with axillary dissection  A. Invasive 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  Grade 1  1.3 cm  Lymphovascular invasion is identified  Margins negative    H. Axillary, Level I and Level II axillary contents  Metastatic carcinoma involving 3/17 lymph nodes  1.8 cm   Extranodal extension is identified 1mm  Foci of carcinoma identified in extranodal lymphatic channels    Anatomic stage: Stage IIA  Prognostic stage: Stage IA      8/29/2022 - 10/10/2022 Radiation    Treatments:  Course: C1  Plan ID Energy Fractions Dose per Fraction (cGy) Total Dose Delivered (cGy) Elapsed Days   BH L Breast 6X 25 / 25 200 5,000 35   BH L [id number] 12E 5 / 5 200 1,000 6   BH L SClav 10X/6X 24 / 24 200 4,800 32    Treatment Dates:  8/29/2022 - 10/10/2022. Interval History:   Follow-up with left breast cancer with recent trauma to her right humerus    Review of Systems:   Review of Systems   Constitutional:  Negative for chills and fever. HENT:  Negative for ear pain and sore throat. Eyes:  Negative for pain and visual disturbance. Respiratory:  Negative for cough and shortness of breath. Cardiovascular:  Negative for chest pain and palpitations. Gastrointestinal:  Negative for abdominal pain and vomiting. Genitourinary:  Negative for dysuria and hematuria. Musculoskeletal:  Positive for myalgias. Negative for arthralgias and back pain. Right humerus fracture   Skin:  Negative for color change and rash. Neurological:  Negative for seizures and syncope. All other systems reviewed and are negative. Past Medical History     Patient Active Problem List   Diagnosis   • Back pain, chronic   • Chronic insomnia   • Lymphedema of right arm   • Hyperlipidemia, mixed   • Peripheral neuropathy   • Lymphedema   • Cellulitis of right upper extremity   • Sepsis ([address])   • Desmoid tumor   • 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 (HCC)   • Malignant neoplasm of lower-inner quadrant of left breast in female, estrogen receptor positive    • HTN, goal below 140/90   • PONV (postoperative nausea and vomiting)   • Use of letrozole (Femara)   • History of lumpectomy of left breast   • Humerus fracture     Past Medical History:   Diagnosis Date   • Abnormal mammogram 05/15/2013   • Anemia    • Cancer ([address])     desmoitosis   • 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 ([address]) 04/19/2022   • Chronic pain disorder     worse right side of body   • Desmoid tumor     Last Assessed: 6/19/2017   • History of transfusion     no reactions   • Hyperlipidemia    • Hypertension    • Ovarian cancer ([address]) 1999?   Hysterectomy done   • PONV (postoperative nausea and vomiting)      Past Surgical History:   Procedure Laterality Date   • BREAST LUMPECTOMY Left 6/21/2022    Procedure: ANITA  DIRECTED LUMPECTOMY;  Surgeon: Ana Luisa Reaves MD;  Location: [facility];  Service: Surgical Oncology   • FRACTURE SURGERY     • HYSTERECTOMY     • LYMPH NODE DISSECTION Left 6/21/2022    Procedure: AXILLARY DISSECTION LEVEL I AND LEVEL II LYMPH NODES;  Surgeon: Debbi Galindo MD;  Location: [facility];  Service: Surgical Oncology   • MRI BREAST BIOPSY LEFT (ALL INCLUSIVE) Left 5/20/2022   • MRI BREAST BIOPSY RIGHT (ALL INCLUSIVE) Right 5/20/2022   • OTHER SURGICAL HISTORY      Arm Incision: Dermoid tumor, right Arm    • PARTIAL HYSTERECTOMY     • NH COLONOSCOPY FLX DX W/COLLJ SPEC WHEN PFRMD N/A 8/15/2017    Procedure: COLONOSCOPY;  Surgeon: Joselyn Garcia MD;  Location: [facility];   Service: Gastroenterology   • [place] [address] WRST SURG W/RLS TRANSVRS CARPL LIGM Left 8/10/2021    Procedure: RELEASE LEFT CARPAL TUNNEL ENDOSCOPIC;  Surgeon: Milly Taylor MD;  Location: [facility];  Service: Orthopedics   • NH OPTX DSTL RADL I-ARTIC FX/EPIPHYSL SEP 3 FRAG Left 2/9/2021    Procedure: OPEN REDUCTION W/ INTERNAL FIXATION (ORIF) RADIUS / ULNA (WRIST) left;  Surgeon: Milly Taylor MD;  Location: [facility];  Service: Orthopedics   • SKIN BIOPSY     • SKIN CANCER EXCISION      Melanoma Excision    • TONSILLECTOMY     • US BREAST NEEDLE LOC LEFT Left 6/16/2022   • US BREAST NEEDLE LOC RIGHT EACH ADDITIONAL Right 6/16/2022   • US GUIDED BREAST LYMPH NODE BIOPSY LEFT Left 4/14/2022     Family History   Problem Relation Age of Onset   • Diabetes Mother    • No Known Problems Father    • Cancer Sister    • Lung cancer Sister    • Pancreatic cancer Sister    • No Known Problems Maternal Grandmother    • No Known Problems Maternal Grandfather    • No Known Problems Paternal Grandmother    • No Known Problems Paternal Grandfather    • Breast cancer Neg Hx      Social History     Socioeconomic History   • Marital status: /Civil Union     Spouse name: Not on file   • Number of children: Not on file   • Years of education: Not on file   • Highest education level: Not on file   Occupational History   • Occupation: unemployed    Tobacco Use   • Smoking status: Never   • Smokeless tobacco: Never   Vaping Use   • Vaping Use: Never used Substance and Sexual Activity   • Alcohol use: Not Currently     Alcohol/week: 5.0 standard drinks of alcohol     Types: 5 Shots of liquor per week     Comment: Used mostly as a painkiller when needed before bedtime   • Drug use: Yes     Frequency: 28.0 times per week     Types: Marijuana     Comment: THC Medical marijuana   • Sexual activity: Not Currently     Partners: Male     Comment: Hysterectomy years ago   Other Topics Concern   • Not on file   Social History Narrative    Lives with spouse      Social Determinants of Health     Financial Resource Strain: Not on file   Food Insecurity: No Food Insecurity (11/13/2023)    Hunger Vital Sign    • Worried About Running Out of Food in the Last Year: Never true    • Ran Out of Food in the Last Year: Never true   Transportation Needs: No Transportation Needs (11/13/2023)    PRAPARE - Transportation    • Lack of Transportation (Medical): No    • Lack of Transportation (Non-Medical):  No   Physical Activity: Not on file   Stress: Not on file   Social Connections: Not on file   Intimate Partner Violence: Not on file   Housing Stability: Low Risk  (11/13/2023)    Housing Stability Vital Sign    • Unable to Pay for Housing in the Last Year: No    • Number of Places Lived in the Last Year: 1    • Unstable Housing in the Last Year: No       Current Outpatient Medications:   •  cholecalciferol (VITAMIN D3) 1,000 units tablet, Take 3,000 Units by mouth daily, Disp: , Rfl:   •  diphenhydrAMINE (BENADRYL) 50 MG tablet, Take 50 mg by mouth daily at bedtime as needed for itching, Disp: , Rfl:   •  dronabinol (MARINOL) 10 MG capsule, , Disp: , Rfl:   •  ezetimibe (ZETIA) 10 mg tablet, Take 10 mg by mouth daily, Disp: , Rfl:   •  letrozole (FEMARA) 2.5 mg tablet, TAKE 1 TABLET BY MOUTH EVERY DAY, Disp: 90 tablet, Rfl: 3  •  multivitamin (THERAGRAN) TABS, Take 1 tablet by mouth daily, Disp: , Rfl:   •  Omega-3 Fatty Acids (FISH OIL PO), Take by mouth, Disp: , Rfl:   • oxyCODONE-acetaminophen (PERCOCET) 5-325 mg per tablet, TAKE ONE - TWO TABLETS BY MOUTH EVERY FOUR TO SIX HOURS AS NEEDED, Disp: , Rfl:   •  Probiotic Product (PROBIOTIC-10 PO), Take by mouth, Disp: , Rfl:   •  senna-docusate sodium (SENOKOT-S) 8.6-50 mg per tablet, Take 1 tablet by mouth daily, Disp: 7 tablet, Rfl: 0  •  triamcinolone (KENALOG) 0.1 % cream, Apply topically 2 (two) times a day as needed for irritation or rash (Patient not taking: Reported on 11/1/2023), Disp: 80 g, Rfl: 0  Allergies   Allergen Reactions   • Chlorpromazine Anaphylaxis     PHENOTHIAZINE=ANAPHYLAXIS   • Codeine Anaphylaxis     Anaphylaxis  abd pain. • Meperidine Anaphylaxis, Hives and Vomiting   • Phenothiazines Anaphylaxis and Throat Swelling     Category: Allergy;    • Saccharin - Food Allergy Anaphylaxis   • Ampicillin-Sulbactam Sodium Hives   • Aspirin GI Intolerance and Abdominal Pain     Severe GERD   • Gluten Meal - Food Allergy GI Intolerance   • Ibuprofen Hives     H   • Levofloxacin Hives   • Chocolate - Food Allergy GI Intolerance   • Lactose - Food Allergy GI Intolerance   • Neomycin Other (See Comments), Edema and Hives     Category: Allergy;   swelling   • Citrus - Food Allergy Rash   • Latex Hives and Rash     Category: Allergy; Physical Exam:     Vitals:    12/04/23 1021   BP: 132/80   Pulse: 80   Resp: 16   Temp: 98 °F (36.7 °C)   SpO2: 97%     Physical Exam  Constitutional:       Appearance: Normal appearance. HENT:      Head: Normocephalic and atraumatic. Nose: Nose normal.      Mouth/Throat:      Mouth: Mucous membranes are moist.   Eyes:      Pupils: Pupils are equal, round, and reactive to light. Cardiovascular:      Rate and Rhythm: Normal rate. Pulses: Normal pulses. Heart sounds: Normal heart sounds. Pulmonary:      Effort: Pulmonary effort is normal.      Breath sounds: Normal breath sounds. Chest:      Comments: Left axillary lymph node dissection place is clean dry and intact. No palpable adenopathy. I was unable to examine the breast as she is with slings and cast from recent right humerus fracture  Abdominal:      General: Bowel sounds are normal.      Palpations: Abdomen is soft. Musculoskeletal:         General: Normal range of motion. Cervical back: Normal range of motion and neck supple. Skin:     General: Skin is warm. Neurological:      General: No focal deficit present. Mental Status: She is alert and oriented to person, place, and time. Psychiatric:         Mood and Affect: Mood normal.         Behavior: Behavior normal.         Thought Content: Thought content normal.         Judgment: Judgment normal.         Results & Discussion:   I did reviewx-ray films with her and I do not think there is any pathological fracture. She also states her calcium has been low and she is taking calcium. She is on letrozole tolerating well. We will order bone scan and see her. I did discussed in detail nature of breast cancer as well as,.  I also reviewed her most recent CBC and BMP her calcium is 8.9.  she understands and  agrees . All patient questions were answered. Advance Care Planning/Advance Directives: Eliazar Guerin MD discussed the disease status with Hannah Frye  today 12/04/23  treatment plans and follow-up with the patient.

## 2023-12-05 ENCOUNTER — TELEPHONE (OUTPATIENT)
Dept: HOME HEALTH SERVICES | Facility: HOME HEALTHCARE | Age: 63
End: 2023-12-05

## 2023-12-06 ENCOUNTER — HOME CARE VISIT (OUTPATIENT)
Dept: HOME HEALTH SERVICES | Facility: HOME HEALTHCARE | Age: 63
End: 2023-12-06
Payer: MEDICARE

## 2023-12-06 VITALS
HEART RATE: 82 BPM | DIASTOLIC BLOOD PRESSURE: 80 MMHG | RESPIRATION RATE: 16 BRPM | OXYGEN SATURATION: 90 % | SYSTOLIC BLOOD PRESSURE: 132 MMHG

## 2023-12-06 VITALS
OXYGEN SATURATION: 98 % | TEMPERATURE: 97.3 F | DIASTOLIC BLOOD PRESSURE: 86 MMHG | RESPIRATION RATE: 18 BRPM | SYSTOLIC BLOOD PRESSURE: 154 MMHG | HEART RATE: 78 BPM

## 2023-12-06 PROCEDURE — G0299 HHS/HOSPICE OF RN EA 15 MIN: HCPCS

## 2023-12-06 PROCEDURE — G0152 HHCP-SERV OF OT,EA 15 MIN: HCPCS

## 2023-12-08 ENCOUNTER — HOME CARE VISIT (OUTPATIENT)
Dept: HOME HEALTH SERVICES | Facility: HOME HEALTHCARE | Age: 63
End: 2023-12-08
Payer: MEDICARE

## 2023-12-08 ENCOUNTER — HOSPITAL ENCOUNTER (OUTPATIENT)
Dept: NUCLEAR MEDICINE | Facility: HOSPITAL | Age: 63
End: 2023-12-08
Attending: SURGERY
Payer: MEDICARE

## 2023-12-08 VITALS
SYSTOLIC BLOOD PRESSURE: 140 MMHG | HEART RATE: 79 BPM | RESPIRATION RATE: 18 BRPM | OXYGEN SATURATION: 99 % | DIASTOLIC BLOOD PRESSURE: 62 MMHG

## 2023-12-08 DIAGNOSIS — C77.3 CARCINOMA OF LEFT BREAST METASTATIC TO AXILLARY LYMPH NODE (HCC): ICD-10-CM

## 2023-12-08 DIAGNOSIS — C50.312 MALIGNANT NEOPLASM OF LOWER-INNER QUADRANT OF LEFT BREAST IN FEMALE, ESTROGEN RECEPTOR POSITIVE: ICD-10-CM

## 2023-12-08 DIAGNOSIS — Z17.0 MALIGNANT NEOPLASM OF LOWER-INNER QUADRANT OF LEFT BREAST IN FEMALE, ESTROGEN RECEPTOR POSITIVE: ICD-10-CM

## 2023-12-08 DIAGNOSIS — C50.912 CARCINOMA OF LEFT BREAST METASTATIC TO AXILLARY LYMPH NODE (HCC): ICD-10-CM

## 2023-12-08 PROCEDURE — G0152 HHCP-SERV OF OT,EA 15 MIN: HCPCS

## 2023-12-08 PROCEDURE — G1004 CDSM NDSC: HCPCS

## 2023-12-08 PROCEDURE — A9503 TC99M MEDRONATE: HCPCS

## 2023-12-08 PROCEDURE — 78306 BONE IMAGING WHOLE BODY: CPT

## 2023-12-11 ENCOUNTER — HOME CARE VISIT (OUTPATIENT)
Dept: HOME HEALTH SERVICES | Facility: HOME HEALTHCARE | Age: 63
End: 2023-12-11
Payer: MEDICARE

## 2023-12-11 ENCOUNTER — TELEPHONE (OUTPATIENT)
Dept: OBGYN CLINIC | Facility: HOSPITAL | Age: 63
End: 2023-12-11

## 2023-12-11 VITALS
DIASTOLIC BLOOD PRESSURE: 79 MMHG | OXYGEN SATURATION: 96 % | HEART RATE: 96 BPM | SYSTOLIC BLOOD PRESSURE: 146 MMHG | RESPIRATION RATE: 16 BRPM

## 2023-12-11 DIAGNOSIS — S42.344A CLOSED NONDISPLACED SPIRAL FRACTURE OF SHAFT OF RIGHT HUMERUS, INITIAL ENCOUNTER: Primary | ICD-10-CM

## 2023-12-11 PROCEDURE — G0152 HHCP-SERV OF OT,EA 15 MIN: HCPCS

## 2023-12-11 NOTE — TELEPHONE ENCOUNTER
Caller: Home PT    Doctor: Rick Lazo    Reason for call: Patient needs outpatient script for physical therapy for [facility]. Patient starts next week.     Call back#: [phone]

## 2023-12-13 ENCOUNTER — OFFICE VISIT (OUTPATIENT)
Dept: INTERNAL MEDICINE CLINIC | Facility: CLINIC | Age: 63
End: 2023-12-13
Payer: MEDICARE

## 2023-12-13 ENCOUNTER — HOME CARE VISIT (OUTPATIENT)
Dept: HOME HEALTH SERVICES | Facility: HOME HEALTHCARE | Age: 63
End: 2023-12-13
Payer: MEDICARE

## 2023-12-13 ENCOUNTER — TELEPHONE (OUTPATIENT)
Dept: HEMATOLOGY ONCOLOGY | Facility: CLINIC | Age: 63
End: 2023-12-13

## 2023-12-13 VITALS
BODY MASS INDEX: 22.88 KG/M2 | DIASTOLIC BLOOD PRESSURE: 86 MMHG | HEART RATE: 91 BPM | WEIGHT: 151 LBS | SYSTOLIC BLOOD PRESSURE: 144 MMHG | OXYGEN SATURATION: 97 % | RESPIRATION RATE: 16 BRPM | HEIGHT: 68 IN

## 2023-12-13 VITALS — RESPIRATION RATE: 18 BRPM | OXYGEN SATURATION: 99 % | HEART RATE: 88 BPM

## 2023-12-13 DIAGNOSIS — I89.0 LYMPHEDEMA OF RIGHT ARM: ICD-10-CM

## 2023-12-13 DIAGNOSIS — E55.9 VITAMIN D DEFICIENCY: ICD-10-CM

## 2023-12-13 DIAGNOSIS — Z79.811 USE OF LETROZOLE (FEMARA): ICD-10-CM

## 2023-12-13 DIAGNOSIS — Z85.820 HISTORY OF MELANOMA: ICD-10-CM

## 2023-12-13 DIAGNOSIS — R73.01 IMPAIRED FASTING GLUCOSE: ICD-10-CM

## 2023-12-13 DIAGNOSIS — F11.20 CONTINUOUS OPIOID DEPENDENCE (HCC): ICD-10-CM

## 2023-12-13 DIAGNOSIS — I10 HTN, GOAL BELOW 140/90: ICD-10-CM

## 2023-12-13 DIAGNOSIS — Z00.00 WELCOME TO MEDICARE PREVENTIVE VISIT: Primary | ICD-10-CM

## 2023-12-13 DIAGNOSIS — G89.29 CHRONIC BACK PAIN, UNSPECIFIED BACK LOCATION, UNSPECIFIED BACK PAIN LATERALITY: ICD-10-CM

## 2023-12-13 DIAGNOSIS — Z12.83 SKIN CANCER SCREENING: ICD-10-CM

## 2023-12-13 DIAGNOSIS — G60.9 IDIOPATHIC PERIPHERAL NEUROPATHY: ICD-10-CM

## 2023-12-13 DIAGNOSIS — M54.9 CHRONIC BACK PAIN, UNSPECIFIED BACK LOCATION, UNSPECIFIED BACK PAIN LATERALITY: ICD-10-CM

## 2023-12-13 DIAGNOSIS — E78.2 HYPERLIPIDEMIA, MIXED: ICD-10-CM

## 2023-12-13 DIAGNOSIS — C77.3 CARCINOMA OF LEFT BREAST METASTATIC TO AXILLARY LYMPH NODE (HCC): ICD-10-CM

## 2023-12-13 DIAGNOSIS — C50.912 CARCINOMA OF LEFT BREAST METASTATIC TO AXILLARY LYMPH NODE (HCC): ICD-10-CM

## 2023-12-13 PROBLEM — L03.113 CELLULITIS OF RIGHT UPPER EXTREMITY: Status: RESOLVED | Noted: 2018-05-09 | Resolved: 2023-12-13

## 2023-12-13 PROBLEM — Z98.890 PONV (POSTOPERATIVE NAUSEA AND VOMITING): Status: RESOLVED | Noted: 2022-06-21 | Resolved: 2023-12-13

## 2023-12-13 PROBLEM — R11.2 PONV (POSTOPERATIVE NAUSEA AND VOMITING): Status: RESOLVED | Noted: 2022-06-21 | Resolved: 2023-12-13

## 2023-12-13 PROBLEM — A41.9 SEPSIS (HCC): Status: RESOLVED | Noted: 2020-07-20 | Resolved: 2023-12-13

## 2023-12-13 PROCEDURE — G0152 HHCP-SERV OF OT,EA 15 MIN: HCPCS

## 2023-12-13 PROCEDURE — 99204 OFFICE O/P NEW MOD 45 MIN: CPT | Performed by: INTERNAL MEDICINE

## 2023-12-13 PROCEDURE — G0402 INITIAL PREVENTIVE EXAM: HCPCS | Performed by: INTERNAL MEDICINE

## 2023-12-13 NOTE — PROGRESS NOTES
Assessment and Plan:     Problem List Items Addressed This Visit       Back pain, chronic    Lymphedema of right arm    Hyperlipidemia, mixed    Relevant Orders    Lipid Panel with Direct LDL reflex    Peripheral neuropathy    Relevant Orders    Comprehensive metabolic panel    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 (HCC)    HTN, goal below 140/90    Relevant Orders    CBC and differential    TSH, 3rd generation with Free T4 reflex    Comprehensive metabolic panel    Use of letrozole (Femara)    Continuous opioid dependence ([address])     Other Visit Diagnoses       Welcome to Medicare preventive visit    -  Primary    Vitamin D deficiency        Relevant Orders    Vitamin D 25 hydroxy    Impaired fasting glucose        Relevant Orders    Hemoglobin A1C    Skin cancer screening        Relevant Orders    Ambulatory Referral to Dermatology    History of melanoma        Relevant Orders    Ambulatory Referral to Dermatology            Depression Screening and Follow-up Plan: Patient was screened for depression during today's encounter. They screened negative with a PHQ-2 score of 0. Falls Plan of Care: Recommended assistive device to help with gait and balance. Medications that increase falls were reviewed. Vitamin D supplementation was recommended. Preventive health issues were discussed with patient, and age appropriate screening tests were ordered as noted in patient's After Visit Summary. Personalized health advice and appropriate referrals for health education or preventive services given if needed, as noted in patient's After Visit Summary. History of Present Illness:     Patient presents for a Medicare Wellness Visit    Patient is here to establish care and for welcome to Medicare visit. We reviewed her chronic medical problems. Reviewed recent blood work. Ordered labs for next visit including CBC CMP TSH A1c lipid vitamin D.   She follows with multiple specialties, reviewed all specialty notes. 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 Now on letrozole therapy; recent humerus fx, in a sling, cannot operate 2/2 lymphedema; she is following with ortho, will see them tomorrow; she is on oxycodone and marijuana (drops and PO);     H/o humerus fx and DEXA revealed osteopenia, would benefit from Prolia or some other treatment, prefers to wait to see hem/onc; on letrozole. On vit d; check calcium and vit d. Needs to see derm for h/o melanoma. Patient Care Team:  Kathy Nieto MD as PCP - General (Internal Medicine)  Roc Good MD as Baljinder Cain MD as Surgeon (Surgical Oncology)  Ariel Ochoa MD (Radiation Oncology)  J Carlos Emanuel MD as Medical Oncologist (Hematology and Oncology)  Sulaiman Mosquera RD (Nutrition)  Galilea Doll PA-C as Physician Assistant (Medical Oncology)     Review of Systems:     Review of Systems   Constitutional:  Negative for chills and fever. HENT:  Negative for ear pain and sore throat. Eyes:  Negative for pain and visual disturbance. Respiratory:  Negative for cough and shortness of breath. Cardiovascular:  Negative for chest pain and palpitations. Gastrointestinal:  Negative for abdominal pain and vomiting. Genitourinary:  Negative for dysuria and hematuria. Musculoskeletal:  Positive for arthralgias and gait problem. Negative for back pain. Skin:  Negative for color change and rash. Neurological:  Negative for seizures and syncope. Psychiatric/Behavioral:  Positive for sleep disturbance. All other systems reviewed and are negative.        Problem List:     Patient Active Problem List   Diagnosis    Back pain, chronic    Chronic insomnia    Lymphedema of right arm    Hyperlipidemia, mixed    Peripheral neuropathy    Lymphedema    Desmoid tumor    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 (HCC)    Malignant neoplasm of lower-inner quadrant of left breast in female, estrogen receptor positive     HTN, goal below 140/90    Use of letrozole (Femara)    History of lumpectomy of left breast    Humerus fracture    Chronic back pain    Continuous opioid dependence [facility])      Past Medical and Surgical History:     Past Medical History:   Diagnosis Date    Abnormal mammogram 05/15/2013    Anemia     Cancer ([address])     desmoitosis    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 ([address]) 04/19/2022    Chronic pain disorder     worse right side of body    Desmoid tumor     Last Assessed: 6/19/2017    History of transfusion     no reactions    Hyperlipidemia     Hypertension     Ovarian cancer ([address]) 1999? Hysterectomy done    PONV (postoperative nausea and vomiting)      Past Surgical History:   Procedure Laterality Date    BREAST LUMPECTOMY Left 6/21/2022    Procedure: ANITA  DIRECTED LUMPECTOMY;  Surgeon: Alcira Conte MD;  [facility];  Service: Surgical Oncology    FRACTURE SURGERY      HYSTERECTOMY      LYMPH NODE DISSECTION Left 6/21/2022    Procedure: AXILLARY DISSECTION LEVEL I AND LEVEL II LYMPH NODES;  Surgeon: Alcira Conte MD;  [facility];  Service: Surgical Oncology    MRI BREAST BIOPSY LEFT (ALL INCLUSIVE) Left 5/20/2022    MRI BREAST BIOPSY RIGHT (ALL INCLUSIVE) Right 5/20/2022    OTHER SURGICAL HISTORY      Arm Incision: Dermoid tumor, right Arm     PARTIAL HYSTERECTOMY      TX COLONOSCOPY FLX DX W/COLLJ SPEC WHEN PFRMD N/A 8/15/2017    Procedure: COLONOSCOPY;  Surgeon: Salvdaor Gee MD;  Location: [facility];   Service: Gastroenterology    [place] [id number] [address] [facility] WRST SURG W/RLS TRANSVRS CARPL LIGM Left 8/10/2021    Procedure: RELEASE LEFT CARPAL TUNNEL ENDOSCOPIC;  Surgeon: Gerardo Quinteros MD;  [facility];  Service: Orthopedics    TX OPTX DSTL RADL I-ARTIC FX/EPIPHYSL SEP 3 FRAG Left 2/9/2021    Procedure: OPEN REDUCTION W/ INTERNAL FIXATION (ORIF) RADIUS / ULNA (WRIST) left;  Surgeon: Jj Fiore MD;  [facility];  Service: Orthopedics    SKIN BIOPSY      SKIN CANCER EXCISION      Melanoma Excision     TONSILLECTOMY      US BREAST NEEDLE LOC LEFT Left 6/16/2022    US BREAST NEEDLE LOC RIGHT EACH ADDITIONAL Right 6/16/2022    US GUIDED BREAST LYMPH NODE BIOPSY LEFT Left 4/14/2022      Family History:     Family History   Problem Relation Age of Onset    Diabetes Mother     No Known Problems Father     Cancer Sister     Lung cancer Sister     Pancreatic cancer Sister     No Known Problems Maternal Grandmother     No Known Problems Maternal Grandfather     No Known Problems Paternal Grandmother     No Known Problems Paternal Grandfather     Breast cancer Neg Hx       Social History:     Social History     Socioeconomic History    Marital status: /Civil Union     Spouse name: None    Number of children: None    Years of education: None    Highest education level: None   Occupational History    Occupation: unemployed    Tobacco Use    Smoking status: Never    Smokeless tobacco: Never   Vaping Use    Vaping status: Never Used   Substance and Sexual Activity    Alcohol use: Not Currently     Alcohol/week: 5.0 standard drinks of alcohol     Types: 5 Shots of liquor per week     Comment: Used mostly as a painkiller when needed before bedtime    Drug use: Yes     Frequency: 28.0 times per week     Types: Marijuana     Comment: THC Medical marijuana    Sexual activity: Not Currently     Partners: Male     Comment: Hysterectomy years ago   Other Topics Concern    None   Social History Narrative    Lives with spouse      Social Determinants of Health     Financial Resource Strain: Low Risk  (12/13/2023)    Overall Financial Resource Strain (CARDIA)     Difficulty of Paying Living Expenses: Not hard at all   Food Insecurity: No Food Insecurity (11/13/2023)    Hunger Vital Sign     Worried About Running Out of Food in the Last Year: Never true     [address] in the Last Year: Never true   Transportation Needs: No Transportation Needs (12/13/2023)    PRAPARE - Transportation     Lack of Transportation (Medical): No     Lack of Transportation (Non-Medical):  No   Physical Activity: Insufficiently Active (5/2/2023)    Received from [facility]    Exercise Vital Sign     Days of Exercise per Week: 2 days     Minutes of Exercise per Session: 30 min   Stress: No Stress Concern Present (5/2/2023)    Received from [facility]     Feeling of Stress : Not at all   Social Connections: Unknown (5/2/2023)    Received from [facility] and Isolation Panel [NHANES]     Frequency of Communication with Friends and Family: Once a week     Frequency of Social Gatherings with Friends and Family: Patient refused     Attends [employer] Services: Never     Active Member of Clubs or Organizations: No     Attends Club or Organization Meetings: Patient refused     Marital Status:    Intimate Partner Violence: Not At Risk (5/2/2023)    Received from [address], Afraid, Rape, and Kick questionnaire     Fear of Current or Ex-Partner: No     Emotionally Abused: No     Physically Abused: No     Sexually Abused: No   Housing Stability: [address] [facility]  (11/13/2023)    Housing Stability Vital Sign     Unable to Pay for Housing in the Last Year: No     Number of Places Lived in the Last Year: 1     Unstable Housing in the Last Year: No      Medications and Allergies:     Current Outpatient Medications   Medication Sig Dispense Refill    cholecalciferol (VITAMIN D3) 1,000 units tablet Take 3,000 Units by mouth daily      diphenhydrAMINE (BENADRYL) 50 MG tablet Take 50 mg by mouth daily at bedtime as needed for itching      dronabinol (MARINOL) 10 MG capsule       ezetimibe (ZETIA) 10 mg tablet Take 10 mg by mouth daily      letrozole (FEMARA) 2.5 mg tablet TAKE 1 TABLET BY MOUTH EVERY DAY 90 tablet 3    multivitamin (THERAGRAN) TABS Take 1 tablet by mouth daily      Omega-3 Fatty Acids (FISH OIL PO) Take by mouth      oxyCODONE (Roxicodone) 5 immediate release tablet Take 1 tablet (5 mg total) by mouth every 6 (six) hours as needed for moderate pain Max Daily Amount: 20 mg 20 tablet 0    penicillin V potassium (VEETID) 500 mg tablet Take 1 tablet (500 mg total) by mouth every 12 (twelve) hours 60 tablet 0    Probiotic Product (PROBIOTIC-10 PO) Take by mouth      senna-docusate sodium (SENOKOT-S) 8.6-50 mg per tablet Take 1 tablet by mouth daily 7 tablet 0    oxyCODONE-acetaminophen (PERCOCET) 5-325 mg per tablet TAKE ONE - TWO TABLETS BY MOUTH EVERY FOUR TO SIX HOURS AS NEEDED (Patient not taking: Reported on 12/13/2023)       No current facility-administered medications for this visit. Allergies   Allergen Reactions    Chlorpromazine Anaphylaxis     PHENOTHIAZINE=ANAPHYLAXIS    Codeine Anaphylaxis     Anaphylaxis  abd pain. Meperidine Anaphylaxis, Hives and Vomiting    Phenothiazines Anaphylaxis and Throat Swelling     Category: Allergy; Saccharin - Food Allergy Anaphylaxis    Ampicillin-Sulbactam Sodium Hives    Aspirin GI Intolerance and Abdominal Pain     Severe GERD    Gluten Meal - Food Allergy GI Intolerance    Ibuprofen Hives     H    Levofloxacin Hives    Chocolate - Food Allergy GI Intolerance    Lactose - Food Allergy GI Intolerance    Neomycin Other (See Comments), Edema and Hives     Category: Allergy;   swelling    Zimmerman - Food Allergy Rash    Latex Hives and Rash     Category:  Allergy;       Immunizations:     Immunization History   Administered Date(s) Administered    COVID-19 PFIZER VACCINE 0.3 ML IM 03/16/2021, 04/06/2021, 08/20/2021    INFLUENZA 03/17/2016, 09/15/2016    Influenza Quadrivalent Preservative Free 3 years and older IM 09/15/2016    Tdap 03/12/2012      Health Maintenance:         Topic Date Due    HIV Screening  Never done    Colorectal Cancer Screening  08/15/2022    Breast Cancer Screening: Mammogram  04/25/2024    Hepatitis C Screening  Completed         Topic Date Due    Pneumococcal Vaccine: 65+ Years (1 - PCV) Never done    Influenza Vaccine (1) 09/01/2023    COVID-19 Vaccine (5 - 2023-24 season) 09/01/2023      Medicare Screening Tests and Risk Assessments:     Medina Minaya is here for her Welcome to Medicare visit. Last Medicare Wellness visit information reviewed, patient interviewed and updates made to the record today. Health Risk Assessment:   Patient rates overall health as good. Patient feels that their physical health rating is same. Patient is satisfied with their life. Eyesight was rated as same. Hearing was rated as same. Patient feels that their emotional and mental health rating is same. Patients states they are never, rarely angry. Patient states they are often unusually tired/fatigued. Pain experienced in the last 7 days has been a lot. Patient's pain rating has been 5/10. Patient states that she has experienced no weight loss or gain in last 6 months. Depression Screening:   PHQ-2 Score: 0      Fall Risk Screening: In the past year, patient has experienced: history of falling in past year    Number of falls: 1  Injured during fall?: Yes    Feels unsteady when standing or walking?: No    Worried about falling?: No      Urinary Incontinence Screening:   Patient has leaked urine accidently in the last six months. Home Safety:  Patient does not have trouble with stairs inside or outside of their home. Patient has working smoke alarms and has no working carbon monoxide detector. Home safety hazards include: uneven floors. Nutrition:   Current diet is Other (please comment). Pescetrian    Medications:   Patient is currently taking over-the-counter supplements.  OTC medications include: see medication list. Patient is able to manage medications. Activities of Daily Living (ADLs)/Instrumental Activities of Daily Living (IADLs):   Walk and transfer into and out of bed and chair?: Yes  Dress and groom yourself?: Yes    Bathe or shower yourself?: Yes    Feed yourself? Yes  Do your laundry/housekeeping?: Yes  Manage your money, pay your bills and track your expenses?: Yes  Make your own meals?: Yes    Do your own shopping?: Yes    Previous Hospitalizations:   Any hospitalizations or ED visits within the last 12 months?: Yes    How many hospitalizations have you had in the last year?: 1-2    Advance Care Planning:   Living will: No    Advanced directive: No      Cognitive Screening:   Provider or family/friend/caregiver concerned regarding cognition?: No    PREVENTIVE SCREENINGS      Cardiovascular Screening:    General: History Lipid Disorder and Screening Current      Diabetes Screening:     General: Screening Current      Colorectal Cancer Screening:     General: Screening Current      Breast Cancer Screening:     General: History Breast Cancer and Screening Current      Cervical Cancer Screening:    General: Screening Not Indicated and Screening Current      Osteoporosis Screening:    General: Screening Current      Abdominal Aortic Aneurysm (AAA) Screening:        General: Screening Not Indicated      Lung Cancer Screening:     General: Screening Not Indicated      Hepatitis C Screening:    General: Screening Current    Screening, Brief Intervention, and Referral to Treatment (SBIRT)    Screening  Typical number of drinks in a day: 0  Typical number of drinks in a week: 0  Interpretation: Low risk drinking behavior. AUDIT-C Screenin) How often did you have a drink containing alcohol in the past year? monthly or less  2) How many drinks did you have on a typical day when you were drinking in the past year?  1 to 2  3) How often did you have 6 or more drinks on one occasion in the past year? never    AUDIT-C Score: 1  Interpretation: Score 0-2 (female): Negative screen for alcohol misuse    Single Item Drug Screening:  How often have you used an illegal drug (including marijuana) or a prescription medication for non-medical reasons in the past year? daily or almost daily    Single Item Drug Screen Score: 4  Interpretation: POSITIVE screen for possible drug use disorder    Drug Abuse Screening Test (DAST-10):  1) Have you used drugs other than those required for medical reasons? No  2) Do you abuse more than one drug at a time? No  3) Are you always able to stop using drugs when you want to? No  4) Have you had "blackouts" or "flashbacks" as a result of drug use? No  5) Do you ever feel bad or guilty about your drug use? No  6) Does your spouse (or parents) ever complain about your involvement with drugs? No  7) Have you neglected your family because of your use of drugs? No  8) Have you engaged in illegal activities in order to obtain drugs? No  9) Have you ever experienced withdrawal symptoms (felt sick) when you stopped taking drugs? No  10) Have you had medical problems as a result of your drug use (e.g., memory loss, hepatitis, convulsions, bleeding, etc.)? No    DAST-10 Score: 1  Interpretation: Low level problems related to drug abuse    Brief Intervention  Alcohol & drug use screenings were reviewed. No concerns regarding substance use disorder identified. Review of Current Opioid Use    Opioid Risk Tool (ORT) Interpretation: Complete Opioid Risk Tool (ORT)    [place] PDMP or [place]  reviewed. No red flags were identified    Other Counseling Topics:   Car/seat belt/driving safety, skin self-exam, sunscreen and calcium and vitamin D intake and regular weightbearing exercise.      Vision Screening    Right eye Left eye Both eyes   Without correction      With correction 20/25 20/20 20/20        Physical Exam:     /86 (BP Location: Left arm, Patient Position: Sitting, Cuff Size: Adult)   Pulse 91   Resp 16   Ht 5' 8" (1.727 m)   Wt 68.5 kg (151 lb)   SpO2 97%   BMI 22.96 kg/m²     Physical Exam  Vitals and nursing note reviewed. Constitutional:       General: She is not in acute distress. Appearance: Normal appearance. She is well-developed. HENT:      Head: Normocephalic and atraumatic. Eyes:      Conjunctiva/sclera: Conjunctivae normal.   Cardiovascular:      Rate and Rhythm: Normal rate and regular rhythm. Heart sounds: Normal heart sounds. No murmur heard. Pulmonary:      Effort: Pulmonary effort is normal. No respiratory distress. Breath sounds: Normal breath sounds. Abdominal:      Tenderness: There is no abdominal tenderness. Musculoskeletal:         General: Deformity and signs of injury (right humerus fx) present. No swelling (RUE lymphedema). Cervical back: Normal range of motion and neck supple. Lymphadenopathy:      Cervical: No cervical adenopathy. Skin:     General: Skin is warm and dry. Capillary Refill: Capillary refill takes less than 2 seconds. Neurological:      General: No focal deficit present. Mental Status: She is alert and oriented to person, place, and time. Mental status is at baseline.    Psychiatric:         Mood and Affect: Mood normal.          Ariella Quintanilla MD

## 2023-12-13 NOTE — PATIENT INSTRUCTIONS
Medicare Preventive Visit Patient Instructions  Thank you for completing your Welcome to Medicare Visit or Medicare Annual Wellness Visit today. Your next wellness visit will be due in one year (12/13/2024). The screening/preventive services that you may require over the next 5-10 years are detailed below. Some tests may not apply to you based off risk factors and/or age. Screening tests ordered at today's visit but not completed yet may show as past due. Also, please note that scanned in results may not display below. Preventive Screenings:  Service Recommendations Previous Testing/Comments   Colorectal Cancer Screening  * Colonoscopy    * Fecal Occult Blood Test (FOBT)/Fecal Immunochemical Test (FIT)  * Fecal DNA/Cologuard Test  * Flexible Sigmoidoscopy Age: 43-73 years old   Colonoscopy: every 10 years (may be performed more frequently if at higher risk)  OR  FOBT/FIT: every 1 year  OR  Cologuard: every 3 years  OR  Sigmoidoscopy: every 5 years  Screening may be recommended earlier than age 39 if at higher risk for colorectal cancer. Also, an individualized decision between you and your healthcare provider will decide whether screening between the ages of 77-80 would be appropriate. Colonoscopy: 08/15/2017  FOBT/FIT: Not on file  Cologuard: Not on file  Sigmoidoscopy: Not on file    Screening Current     Breast Cancer Screening Age: 36 years old  Frequency: every 1-2 years  Not required if history of left and right mastectomy Mammogram: 04/25/2023    History Breast Cancer   Cervical Cancer Screening Between the ages of 21-29, pap smear recommended once every 3 years. Between the ages of 32-69, can perform pap smear with HPV co-testing every 5 years.    Recommendations may differ for women with a history of total hysterectomy, cervical cancer, or abnormal pap smears in past. Pap Smear: 01/26/2022    Screening Not Indicated   Hepatitis C Screening Once for adults born between 1945 and 1965  More frequently in patients at high risk for Hepatitis C Hep C Antibody: 03/27/2018    Screening Current   Diabetes Screening 1-2 times per year if you're at risk for diabetes or have pre-diabetes Fasting glucose: 87 mg/dL (4/7/2022)  A1C: 5.3 % (7/13/2021)  Screening Current   Cholesterol Screening Once every 5 years if you don't have a lipid disorder. May order more often based on risk factors. Lipid panel: 05/05/2023    Screening Not Indicated  History Lipid Disorder     Other Preventive Screenings Covered by Medicare:  Abdominal Aortic Aneurysm (AAA) Screening: covered once if your at risk. You're considered to be at risk if you have a family history of AAA. Lung Cancer Screening: covers low dose CT scan once per year if you meet all of the following conditions: (1) Age 48-67; (2) No signs or symptoms of lung cancer; (3) Current smoker or have quit smoking within the last 15 years; (4) You have a tobacco smoking history of at least 20 pack years (packs per day multiplied by number of years you smoked); (5) You get a written order from a healthcare provider. Glaucoma Screening: covered annually if you're considered high risk: (1) You have diabetes OR (2) Family history of glaucoma OR (3)  aged 48 and older OR (3)  American aged 72 and older  Osteoporosis Screening: covered every 2 years if you meet one of the following conditions: (1) You're estrogen deficient and at risk for osteoporosis based off medical history and other findings; (2) Have a vertebral abnormality; (3) On glucocorticoid therapy for more than 3 months; (4) Have primary hyperparathyroidism; (5) On osteoporosis medications and need to assess response to drug therapy. Last bone density test (DXA Scan): 10/19/2022. HIV Screening: covered annually if you're between the age of 14-79. Also covered annually if you are younger than 13 and older than 72 with risk factors for HIV infection.  For pregnant patients, it is covered up to 3 times per pregnancy. Immunizations:  Immunization Recommendations   Influenza Vaccine Annual influenza vaccination during flu season is recommended for all persons aged >= 6 months who do not have contraindications   Pneumococcal Vaccine   * Pneumococcal conjugate vaccine = PCV13 (Prevnar 13), PCV15 (Vaxneuvance), PCV20 (Prevnar 20)  * Pneumococcal polysaccharide vaccine = PPSV23 (Pneumovax) Adults 69-36 yo with certain risk factors or if 69+ yo  If never received any pneumonia vaccine: recommend Prevnar 20 (PCV20)  Give PCV20 if previously received 1 dose of PCV13 or PPSV23   Hepatitis B Vaccine 3 dose series if at intermediate or high risk (ex: diabetes, end stage renal disease, liver disease)   Respiratory syncytial virus (RSV) Vaccine - COVERED BY MEDICARE PART D  * RSVPreF3 (Arexvy) CDC recommends that adults 61years of age and older may receive a single dose of RSV vaccine using shared clinical decision-making (SCDM)   Tetanus (Td) Vaccine - COST NOT COVERED BY MEDICARE PART B Following completion of primary series, a booster dose should be given every 10 years to maintain immunity against tetanus. Td may also be given as tetanus wound prophylaxis. Tdap Vaccine - COST NOT COVERED BY MEDICARE PART B Recommended at least once for all adults. For pregnant patients, recommended with each pregnancy.    Shingles Vaccine (Shingrix) - COST NOT COVERED BY MEDICARE PART B  2 shot series recommended in those 19 years and older who have or will have weakened immune systems or those 50 years and older     Health Maintenance Due:      Topic Date Due   • HIV Screening  Never done   • Colorectal Cancer Screening  08/15/2022   • Breast Cancer Screening: Mammogram  04/25/2024   • Hepatitis C Screening  Completed     Immunizations Due:      Topic Date Due   • Pneumococcal Vaccine: 65+ Years (1 - PCV) Never done   • Influenza Vaccine (1) 09/01/2023   • COVID-19 Vaccine (5 - 2023-24 season) 09/01/2023     Advance Directives   What are advance directives? Advance directives are legal documents that state your wishes and plans for medical care. These plans are made ahead of time in case you lose your ability to make decisions for yourself. Advance directives can apply to any medical decision, such as the treatments you want, and if you want to donate organs. What are the types of advance directives? There are many types of advance directives, and each state has rules about how to use them. You may choose a combination of any of the following:  Living will: This is a written record of the treatment you want. You can also choose which treatments you do not want, which to limit, and which to stop at a certain time. This includes surgery, medicine, IV fluid, and tube feedings. Durable power of  for [facility]): This is a written record that states who you want to make healthcare choices for you when you are unable to make them for yourself. This person, called a proxy, is usually a family member or a friend. You may choose more than 1 proxy. Do not resuscitate (DNR) order:  A DNR order is used in case your heart stops beating or you stop breathing. It is a request not to have certain forms of treatment, such as CPR. A DNR order may be included in other types of advance directives. Medical directive: This covers the care that you want if you are in a coma, near death, or unable to make decisions for yourself. You can list the treatments you want for each condition. Treatment may include pain medicine, surgery, blood transfusions, dialysis, IV or tube feedings, and a ventilator (breathing machine). Values history: This document has questions about your views, beliefs, and how you feel and think about life. This information can help others choose the care that you would choose. Why are advance directives important? An advance directive helps you control your care.  Although spoken wishes may be used, it is better to have your wishes written down. Spoken wishes can be misunderstood, or not followed. Treatments may be given even if you do not want them. An advance directive may make it easier for your family to make difficult choices about your care. Fall Prevention    Fall prevention  includes ways to make your home and other areas safer. It also includes ways you can move more carefully to prevent a fall. Health conditions that cause changes in your blood pressure, vision, or muscle strength and coordination may increase your risk for falls. Medicines may also increase your risk for falls if they make you dizzy, weak, or sleepy. Fall prevention tips:   Stand or sit up slowly. Use assistive devices as directed. Wear shoes that fit well and have soles that . Wear a personal alarm. Stay active. Manage your medical conditions. Home Safety Tips:  Add items to prevent falls in the bathroom. Keep paths clear. Install bright lights in your home. Keep items you use often on shelves within reach. Paint or place reflective tape on the edges of your stairs. Urinary Incontinence   Urinary incontinence (UI)  is when you lose control of your bladder. UI develops because your bladder cannot store or empty urine properly. The 3 most common types of UI are stress incontinence, urge incontinence, or both. Medicines:   May be given to help strengthen your bladder control. Report any 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 of medication to your healthcare provider. Do pelvic muscle exercises often:  Your pelvic muscles help you stop urinating. Squeeze these muscles tight for 5 seconds, then relax for 5 seconds. Gradually work up to squeezing for 10 seconds. Do 3 sets of 15 repetitions a day, or as directed. This will help strengthen your pelvic muscles and improve bladder control. Train your bladder:  Go to the bathroom at set times, such as every 2 hours, even if you do not feel the urge to go.  You can also try to hold your urine when you feel the urge to go. For example, hold your urine for 5 minutes when you feel the urge to go. As that becomes easier, hold your urine for 10 minutes. Self-care:   Keep a UI record. Write down how often you leak urine and how much you leak. Make a note of what you were doing when you leaked urine. Drink liquids as directed. You may need to limit the amount of liquid you drink to help control your urine leakage. Do not drink any liquid right before you go to bed. Limit or do not have drinks that contain caffeine or alcohol. Prevent constipation. Eat a variety of high-fiber foods. Good examples are high-fiber cereals, beans, vegetables, and whole-grain breads. Walking is the best way to trigger your intestines to have a bowel movement. Exercise regularly and maintain a healthy weight. Weight loss and exercise will decrease pressure on your bladder and help you control your leakage. Use a catheter as directed  to help empty your bladder. A catheter is a tiny, plastic tube that is put into your bladder to drain your urine. Go to behavior therapy as directed. Behavior therapy may be used to help you learn to control your urge to urinate. Narcotic (Opioid) Safety    Use narcotics safely:  Take prescribed narcotics exactly as directed  Do not give narcotics to others or take narcotics that belong to someone else  Do not mix narcotics without medicines or alcohol  Do not drive or operate heavy machinery after you take the narcotic  Monitor for 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 and notify your healthcare provider if you experienced 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 such as nausea, sleepiness, itching, or trouble thinking clearly. Manage constipation:    Constipation is the most common side effect of narcotic medicine. Constipation is when you have hard, dry bowel movements, or you go longer than usual between bowel movements. Tell your healthcare provider about all changes in your bowel movements while you are taking narcotics.  He or she may recommend laxative medicine to help you have a bowel movement. He or she may also change the kind of narcotic you are taking, or change when you take it. The following are more ways you can prevent or relieve constipation:    Drink liquids as directed. You may need to drink extra liquids to help soften and move your bowels. Ask how much liquid to drink each day and which liquids are best for you. Eat high-fiber foods. This may help decrease constipation by adding bulk to your bowel movements. High-fiber foods include fruits, vegetables, whole-grain breads and cereals, and beans. Your healthcare provider or dietitian can help you create a high-fiber meal plan. Your provider may also recommend a fiber supplement if you cannot get enough fiber from food. Exercise regularly. Regular physical activity can help stimulate your intestines. Walking is a good exercise to prevent or relieve constipation. Ask which exercises are best for you. Schedule a time each day to have a bowel movement. This may help train your body to have regular bowel movements. Bend forward while you are on the toilet to help move the bowel movement out. Sit on the toilet for at least 10 minutes, even if you do not have a bowel movement. Store narcotics safely:   Store narcotics where others cannot easily get them. Keep them in a locked cabinet or secure area. Do not  keep them in a purse or other bag you carry with you. A person may be looking for something else and find the narcotics. Make sure narcotics are stored out of the reach of children. A child can easily overdose on narcotics. Narcotics may look like candy to a small child. The best way to dispose of narcotics: The laws vary by country and area. In the [facility], the best way is to return the narcotics through a take-back program. This program is offered by the [employer] ([place]).  The following are options for using the program:  Take the narcotics to a [facility] collection site. The site is often a law enforcement center. Call your local law enforcement center for scheduled take-back days in your area. You will be given information on where to go if the collection site is in a different location. Take the narcotics to an approved pharmacy or hospital.  A pharmacy or hospital may be set up as a collection site. You will need to ask if it is a [facility] collection site if you were not directed there. A pharmacy or doctor's office may not be able to take back narcotics unless it is a [facility] site. Use a mail-back system. This means you are given containers to put the narcotics into. You will then mail them in the containers. Use a take-back drop box. This is a place to leave the narcotics at any time. People and animals will not be able to get into the box. Your local law enforcement agency can tell you where to find a drop box in your area. Other ways to manage pain:   Ask your healthcare provider about non-narcotic medicines to control pain. Nonprescription medicines include NSAIDs (such as ibuprofen) and acetaminophen. Prescription medicines include muscle relaxers, antidepressants, and steroids. Pain may be managed without any medicines. Some ways to relieve pain include massage, aromatherapy, or meditation. Physical or occupational therapy may also help. For more information:   Drug Enforcement Administration  [address]  Phone: 8- 914 - [phone]  Web Address: [facility] [employer]. [email or web address]_disposal/    [address] [place] [address]  Phone: 5- 775 - [phone]  Web Address: http://[employer]/     © Copyright [employer] 2018 Information is for End User's use only and may not be sold, redistributed or otherwise used for commercial purposes.  All illustrations and images included in CareNotes® are the copyrighted property of [employer].A[employer]M., Inc. or [facility]

## 2023-12-13 NOTE — TELEPHONE ENCOUNTER
I called Nydia Omarqiankatie regarding an appointment that they have scheduled with [facility]  scheduled on  8/23/24        Appointment Change  Cancel, Reschedule, Change to Virtual      Who are you speaking with? Patient   If it is not the patient, is the caller listed on the communication consent form? N/A   Which provider is the appointment scheduled with? [facility]    When was the original appointment scheduled? Please list date and time 8/23/24 10:00am   At which location is the appointment scheduled to take place? [facility]   Was the appointment rescheduled? Was the appointment changed from an in person visit to a virtual visit? If so, please list the details of the change. 8/23/24 8:30am   What is the reason for the appointment change? Provider schedule, approved accommodations       Was [facility] transport scheduled? No   Does [facility] transport need to be scheduled for the new visit (if applicable) Yes   Does the patient need an infusion appointment rescheduled? No   Does the patient have an upcoming infusion appointment scheduled? If so, when? No   Is the patient undergoing chemotherapy? No   For appointments cancelled with less than 24 hours:  Was the no-show policy reviewed? Yes         Patient will need star transportation scheduled for this appointment.

## 2023-12-14 ENCOUNTER — APPOINTMENT (OUTPATIENT)
Dept: RADIOLOGY | Facility: MEDICAL CENTER | Age: 63
End: 2023-12-14
Payer: MEDICARE

## 2023-12-14 ENCOUNTER — OFFICE VISIT (OUTPATIENT)
Dept: OBGYN CLINIC | Facility: MEDICAL CENTER | Age: 63
End: 2023-12-14

## 2023-12-14 VITALS
HEIGHT: 68 IN | BODY MASS INDEX: 22.85 KG/M2 | SYSTOLIC BLOOD PRESSURE: 145 MMHG | WEIGHT: 150.8 LBS | HEART RATE: 73 BPM | DIASTOLIC BLOOD PRESSURE: 80 MMHG

## 2023-12-14 DIAGNOSIS — S42.344D CLOSED NONDISPLACED SPIRAL FRACTURE OF SHAFT OF RIGHT HUMERUS WITH ROUTINE HEALING, SUBSEQUENT ENCOUNTER: Primary | ICD-10-CM

## 2023-12-14 DIAGNOSIS — S42.344D CLOSED NONDISPLACED SPIRAL FRACTURE OF SHAFT OF RIGHT HUMERUS WITH ROUTINE HEALING, SUBSEQUENT ENCOUNTER: ICD-10-CM

## 2023-12-14 PROCEDURE — 99024 POSTOP FOLLOW-UP VISIT: CPT | Performed by: STUDENT IN AN ORGANIZED HEALTH CARE EDUCATION/TRAINING PROGRAM

## 2023-12-14 PROCEDURE — 73060 X-RAY EXAM OF HUMERUS: CPT

## 2023-12-14 RX ORDER — GABAPENTIN 300 MG/1
300 CAPSULE ORAL 3 TIMES DAILY
Qty: 90 CAPSULE | Refills: 0 | Status: SHIPPED | OUTPATIENT
Start: 2023-12-14 | End: 2024-01-13

## 2023-12-14 NOTE — PROGRESS NOTES
Orthopedic Surgery Nonoperative Management Note  Awilda Loja (64 y.o. female)  : 1960 Encounter Date: 2023  Dr. Miya Plasencia DO, Orthopedic Surgeon  Orthopedic Oncology & Sarcoma Surgery   Assessment/Plan:  70 y.o. female 4 weeks follow up status post right midshaft humerus fracture     Problem List Items Addressed This Visit          Musculoskeletal and Integument    Humerus fracture - Primary    Relevant Orders    XR humerus right     Right Humerus Fracture  Continue babb bracing  Discussed WBBS as no evidence of osseous metastasis, with uptake at known fracture site   Return in 2 weeks for XR of right humerus in brace for alignment check      Prior discussion:   - Discussed with the patient that there are two options for her humerus fracture being surgical vs. Non surgical  - Explained to the patient that surgical treatment would include hardware that could result in failure and/or infection, due to the complexity of her previous R arm surgeries  - Explained to the patient that radiation decreases healing potential either surgical or non-surgical, and that her bone is forever affected  - Discussed with the patient that her non-surgical treatment could also be affected without healing potential due to lymphedema and prior radiation  - Explained to the patient that the healing potential of bone is unknown due to prior radiation and lymphedema  - Discussed with the patient that non-operative treatment is reasonable for treatment of a humerus fracture, in general  - Explained to patient that she will follow-up every 2 weeks until 10 weeks with repeat x-rays before surgical intervention should be discussed  - Explained to the patient that bones 6-8 weeks to heal, and that hers will be longer for complete healing, however there could be possible bridging in the bone    No follow-ups on file.    _____________________________________________________  CHIEF COMPLAINT:  Chief Complaint   Patient presents with    Right Shoulder - Follow-up         SUBJECTIVE:  Ottoniel Magaña is a 70y.o. year old female who presents 4 weeks s/p right humerus fracture. DOI: 11/12/23    Pain more focused on lymphedema in lower arm, with difficulty with different technique, returning to previous care and seeing some improvement. No significant pain about the arm. She has had nursing check the brace 2x per week,       Prior HPI:  She was seen at [facility] for right arm pain at the elbow with pronation and supination. X-rays were obtained at time of visit, no images are available for review, radiologist report is available. Patient has since transitioned to [address] [place] [address]. She was seen in the ED on 11/12/23 for right arm pain after a fall. ED note states that a closed reduction was performed in the ED at time of visit. Medication refill note from  states that she is unable to use her lymphedema machine and is experiencing swelling in her right arm. On presentation today, patient states that she was pushing her mother in law in a wheelchair when the wheelchair started to tip forward and she tried to catch it to prevent her MIL from falling out of the wheelchair. She believes that she "lost control" of her right arm, and then fell onto her right shoulder trying to protect the right elbow. She reports that she has been unable to use her lymphedema bag since the injury. She states that she has had a physical therapist come into her home 2/wk since the injury who performs lymphedema massage on her right hand and forearm to increase blood flow. 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 She had a latissimus rotational skin pedicle flap  performed after having multiple recurrences. Within the same year she had a reoccurrence of a desmoid tumor and another resection of her back.  Patient states that her last tumor related operation was in 2010. Patient was cancer free for 8 years, until last year when she developed breast cancer on her left side. She states that she had radiation, 16 lymph nodes were removed and none of the lymph nodes were positive. She states that she had no radiation to the lymph nodes. She states that she developed lymphedema within 1 year after the diagnosis of the desmoid fibromatosis. She was previously using the lymphedema treatment as needed, which progressed to using 3x/daily every day. PAST MEDICAL HISTORY:  Past Medical History:   Diagnosis Date    Abnormal mammogram 05/15/2013    Anemia     Cancer ([address])     desmoitosis    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 ([address]) 04/19/2022    Chronic pain disorder     worse right side of body    Desmoid tumor     Last Assessed: 6/19/2017    History of transfusion     no reactions    Hyperlipidemia     Hypertension     Ovarian cancer ([address]) 1999? Hysterectomy done    PONV (postoperative nausea and vomiting)        PAST SURGICAL HISTORY:  Past Surgical History:   Procedure Laterality Date    BREAST LUMPECTOMY Left 6/21/2022    Procedure: ANITA  DIRECTED LUMPECTOMY;  Surgeon: Maureen Crane MD;  Location: [facility];  Service: Surgical Oncology    FRACTURE SURGERY      HYSTERECTOMY      LYMPH NODE DISSECTION Left 6/21/2022    Procedure: AXILLARY DISSECTION LEVEL I AND LEVEL II LYMPH NODES;  Surgeon: Maureen Crane MD;  Location: [facility];  Service: Surgical Oncology    MRI BREAST BIOPSY LEFT (ALL INCLUSIVE) Left 5/20/2022    MRI BREAST BIOPSY RIGHT (ALL INCLUSIVE) Right 5/20/2022    OTHER SURGICAL HISTORY      Arm Incision: Dermoid tumor, right Arm     PARTIAL HYSTERECTOMY      OH COLONOSCOPY FLX DX W/COLLJ SPEC WHEN PFRMD N/A 8/15/2017    Procedure: COLONOSCOPY;  Surgeon: Olinda Rose MD;  Location: [facility];   Service: Gastroenterology    [place] [address] WRST SURG W/RLS TRANSVRS CARPL LIGM Left 8/10/2021    Procedure: RELEASE LEFT CARPAL TUNNEL ENDOSCOPIC;  Surgeon: Patito Honeycutt MD;  [facility]: [facility];  Service: Orthopedics    ID OPTX DSTL RADL I-ARTIC FX/EPIPHYSL SEP 3 FRAG Left 2/9/2021    Procedure: OPEN REDUCTION W/ INTERNAL FIXATION (ORIF) RADIUS / ULNA (WRIST) left;  Surgeon: Patito Honeycutt MD;  Location[facility];  Service: Orthopedics    SKIN BIOPSY      SKIN CANCER EXCISION      Melanoma Excision     TONSILLECTOMY      US BREAST NEEDLE LOC LEFT Left 6/16/2022    US BREAST NEEDLE LOC RIGHT EACH ADDITIONAL Right 6/16/2022    US GUIDED BREAST LYMPH NODE BIOPSY LEFT Left 4/14/2022       FAMILY HISTORY:  Family History   Problem Relation Age of Onset    Diabetes Mother     No Known Problems Father     Cancer Sister     Lung cancer Sister     Pancreatic cancer Sister     No Known Problems Maternal Grandmother     No Known Problems Maternal Grandfather     No Known Problems Paternal Grandmother     No Known Problems Paternal Grandfather     Breast cancer Neg Hx        SOCIAL HISTORY:  Social History     Tobacco Use    Smoking status: Never    Smokeless tobacco: Never   Vaping Use    Vaping status: Never Used   Substance Use Topics    Alcohol use: Not Currently     Alcohol/week: 5.0 standard drinks of alcohol     Types: 5 Shots of liquor per week     Comment: Used mostly as a painkiller when needed before bedtime    Drug use: Yes     Frequency: 28.0 times per week     Types: Marijuana     Comment: THC Medical marijuana       MEDICATIONS:    Current Outpatient Medications:     cholecalciferol (VITAMIN D3) 1,000 units tablet, Take 3,000 Units by mouth daily, Disp: , Rfl:     diphenhydrAMINE (BENADRYL) 50 MG tablet, Take 50 mg by mouth daily at bedtime as needed for itching, Disp: , Rfl:     dronabinol (MARINOL) 10 MG capsule, , Disp: , Rfl:     ezetimibe (ZETIA) 10 mg tablet, Take 10 mg by mouth daily, Disp: , Rfl:     letrozole (FEMARA) 2.5 mg tablet, TAKE 1 TABLET BY MOUTH EVERY DAY, Disp: 90 tablet, Rfl: 3 multivitamin (THERAGRAN) TABS, Take 1 tablet by mouth daily, Disp: , Rfl:     Omega-3 Fatty Acids (FISH OIL PO), Take by mouth, Disp: , Rfl:     oxyCODONE (Roxicodone) 5 immediate release tablet, Take 1 tablet (5 mg total) by mouth every 6 (six) hours as needed for moderate pain Max Daily Amount: 20 mg, Disp: 20 tablet, Rfl: 0    penicillin V potassium (VEETID) 500 mg tablet, Take 1 tablet (500 mg total) by mouth every 12 (twelve) hours, Disp: 60 tablet, Rfl: 0    Probiotic Product (PROBIOTIC-10 PO), Take by mouth, Disp: , Rfl:     senna-docusate sodium (SENOKOT-S) 8.6-50 mg per tablet, Take 1 tablet by mouth daily, Disp: 7 tablet, Rfl: 0    oxyCODONE-acetaminophen (PERCOCET) 5-325 mg per tablet, TAKE ONE - TWO TABLETS BY MOUTH EVERY FOUR TO SIX HOURS AS NEEDED (Patient not taking: Reported on 12/13/2023), Disp: , Rfl:     ALLERGIES:  Allergies   Allergen Reactions    Chlorpromazine Anaphylaxis     PHENOTHIAZINE=ANAPHYLAXIS    Codeine Anaphylaxis     Anaphylaxis  abd pain. Meperidine Anaphylaxis, Hives and Vomiting    Phenothiazines Anaphylaxis and Throat Swelling     Category: Allergy; Saccharin - Food Allergy Anaphylaxis    Ampicillin-Sulbactam Sodium Hives    Aspirin GI Intolerance and Abdominal Pain     Severe GERD    Gluten Meal - Food Allergy GI Intolerance    Ibuprofen Hives     H    Levofloxacin Hives    Chocolate - Food Allergy GI Intolerance    Lactose - Food Allergy GI Intolerance    Neomycin Other (See Comments), Edema and Hives     Category: Allergy;   swelling    [place] - Food Allergy Rash    Latex Hives and Rash     Category: Allergy; Review of systems:   Constitutional: Negative for fatigue, fever or loss of apetite. HENT: Negative. Respiratory: Negative for shortness of breath, dyspnea. Cardiovascular: Negative for chest pain/tightness. Gastrointestinal: Negative for abdominal pain, N/V. Endocrine: Negative for cold/heat intolerance, unexplained weight loss/gain. Genitourinary: Negative for flank pain, dysuria, hematuria. Musculoskeletal: As noted in HPI   Skin: Negative for rash. Neurological: Intact  Psychiatric/Behavioral: Negative for agitation. _____________________________________________________  PHYSICAL EXAMINATION:    Blood pressure 145/80, pulse 73, height 5' 8" (1.727 m), weight 68.4 kg (150 lb 12.8 oz). General: well developed and well nourished, alert, oriented times 3, and appears comfortable  Psychiatric: Normal  HEENT: Benign  Cardiovascular: Regular    Pulmonary: No wheezing or stridor  Abdomen: Soft, Nontender  Skin: No masses, erythema, lacerations, fluctation, ulcerations. Lymphedema present in right arm  Neurovascular: Intact    MUSCULOSKELETAL EXAMINATION:  right Shoulder -   No anatomical deformity  Skin is warm and dry to touch with no signs of erythema, ecchymosis, or infection  Moderate soft tissue swelling or effusion noted due to lymphedema  Demonstrates normal elbow, wrist, and finger motion  2+  distal radial pulse with brisk capillary refill to the fingers  Radial, median, ulnar and motor and sensory distribution intact  Sensation to light touch intact distally      _____________________________________________________  STUDIES REVIEWED:  Reviewed physical exam and imaging with patient at time of visit. Radiographic findings demonstrate closed nondisplaced spiral fracture of the right humerus. Study: XR right himerus  Date: 12/14/23  Impression: No radiologist report available. My impression is as follows: well-maintained alignment of midshaft humerus fracture with some minimal evidence of callous and healing. Study: NM WBBS  Date: 12/8/23  Impression: I have read and agree with the radiologist report. My impression is as follows:  1. Linear radiotracer uptake at the right humeral shaft corresponding to known fracture site. Difficult to exclude a pathologic fracture given the fracture activity here on bone scan.   2. Otherwise, no findings suspicious for osseous metastasis. Study: XR of right humerus  Date: 11/30/23  Impression: I have read and agree with the radiologist report. My impression is as follows:  Closed nondisplaced spiral fracture of the right humerus. Bone looks chronically osteopenic which could be related to radiation therapy. No current evidence of pathologic fracture at this time  Status post casting of mid diaphyseal fracture of the humerus with mild angulation as seen on prior exam. There is no evidence of significant interval healing. Study: XR of right humerus  Date: 11/12/23  Impression: I have read and agree with the radiologist report. My impression is as follows: FINDINGS:  Evaluation of fine bony detail of the right humerus is limited by overlying cast material  There is a mildly displaced, spiral fracture of the midshaft of the right humerus. There is a somewhat mottled appearance of the right humeral diaphysis, which may be related to sequela of prior radiation therapy. There is a small exophytic density adjacent to the midshaft of the humerus which was present in the soft tissues on the prior CT from 2020. Multiple surgical clips are seen in the right axillary region and throughout the proximal right upper extremity. No shoulder dislocation. No evidence of AC joint separation. The visualized portions of the lung are clear. IMPRESSION:  Mildly displaced, spiral fracture of the midshaft of right humerus. No shoulder dislocation. Somewhat mottled appearance of the right humeral diaphysis, which may be related to prior radiation therapy. Correlation with MRI may be helpful, which is already scheduled for 11/20/2023. Study: XR of right humerus  Date: 10/23/23  Impression: **NO IMAGE AVAILABLE FOR REVIEW, RADIOLOGIST REPORT AVAILABLE FOR REVIEW**  IMPRESSION:   Surgical clips throughout the right axilla and throughout the lateral soft   tissues of the humerus.    Increased lateral soft tissue density mid humerus for which MRI is recommended   to exclude a mass. No destructive humeral osseous lesion seen. Focal areas of small cortical solid calcifications or postsurgical changes mid lateral humeral shaft. Demineralization throughout the right arm and forearm. Mild osteoarthritic narrowing acromioclavicular joint. No other significant arthritis. No fracture, malalignment or elbow joint effusion. Study: MRI of right humerus  Date: 11/1/23  Impression: ** IMAGES NOT AVAILABLE FOR REVIEW, REPORT NOT AVAILABLE FOR REVIEW **    Christine MCALLISTER PA-C, scribed this note in conjunction with and in the presence of Dr. Ned Blackburn, who performed parts of the services as described in this documentation    Humerus Shaft Fracture Non-Operative Rehabilitation Protocol    PHASE I: IMMOBILIZATION  Week 0-2 (to start immediately after initiation of Kruse bracing)  · No lifting > weight of coffee cup   · Kruse fracture brace at all times  · Upright posture at all times  · Transition from sling to cuff & collar to allow gravity to align the arm. Use sling only as needed for comfort. · Elbow should be unsupported as much as possible   · Swelling control (with stocking)   · TID HEP: elbow, wrist, finger A/AAROM.  No shoulder ROM  · Biceps and triceps isometrics     PHASE II: FRACTURE HEALING  Week 2-6  · No lifting > weight of coffee cup   · Kruse fracture brace at all times  o Twice daily tightening of fracture brace   · Discontinue sling, continue use of cuff & collar as needed for comfort, continue upright posture   · Daily hygiene and skin checks in pendulum position   · Continue TID HEP: A/AAROM elbow, wrist and finger motion, biceps and triceps isometrics   · Start shoulder periscapular isometrics / shoulder posture   · No shoulder ROM other than pendulums for hygiene     PHASE III: EARLY REHABILITATION  Week 6-10 (following clinical fracture healing and no motion at the fracture site)   · No lifting > 5# at the side, or coffee cup overhead   · Part time Kruse bracing (if clinical fracture healing)  o Wear while outside of the house  o Remove while at home or at rest  · Add TID HEP shoulder 4 quadrant AAROM as tolerated with brace on unless pain free   o Pulleys, table slides, wall climbs, supine wand exercises in all planes     PHASE IV: RETURN TO FUNCTION  Week 11-14 (following clinical fracture healing)   · Add shoulder AROM, PROM as tolerated   · Generalized UE strengthening   · Activities as tolerated (if bony healing complete)   · Independent home exercise program   · Return to high level functional ADLs and simulation of work environment

## 2023-12-15 ENCOUNTER — HOME CARE VISIT (OUTPATIENT)
Dept: HOME HEALTH SERVICES | Facility: HOME HEALTHCARE | Age: 63
End: 2023-12-15
Payer: MEDICARE

## 2023-12-15 VITALS
SYSTOLIC BLOOD PRESSURE: 132 MMHG | RESPIRATION RATE: 18 BRPM | DIASTOLIC BLOOD PRESSURE: 60 MMHG | OXYGEN SATURATION: 98 % | HEART RATE: 78 BPM

## 2023-12-15 DIAGNOSIS — R20.2 NUMBNESS AND TINGLING OF LEFT UPPER EXTREMITY: ICD-10-CM

## 2023-12-15 DIAGNOSIS — R20.0 NUMBNESS AND TINGLING OF LEFT UPPER EXTREMITY: ICD-10-CM

## 2023-12-15 DIAGNOSIS — S42.344D CLOSED NONDISPLACED SPIRAL FRACTURE OF SHAFT OF RIGHT HUMERUS WITH ROUTINE HEALING, SUBSEQUENT ENCOUNTER: Primary | ICD-10-CM

## 2023-12-15 PROCEDURE — G0152 HHCP-SERV OF OT,EA 15 MIN: HCPCS

## 2023-12-15 RX ORDER — OXYCODONE HYDROCHLORIDE 5 MG/1
5 TABLET ORAL EVERY 6 HOURS PRN
Qty: 20 TABLET | Refills: 0 | Status: SHIPPED | OUTPATIENT
Start: 2023-12-15 | End: 2024-01-04 | Stop reason: ALTCHOICE

## 2023-12-18 DIAGNOSIS — S42.344A CLOSED NONDISPLACED SPIRAL FRACTURE OF SHAFT OF RIGHT HUMERUS, INITIAL ENCOUNTER: Primary | ICD-10-CM

## 2023-12-18 DIAGNOSIS — I89.0 LYMPHEDEMA OF RIGHT ARM: ICD-10-CM

## 2023-12-18 RX ORDER — OXYCODONE HYDROCHLORIDE 5 MG/1
5 TABLET ORAL EVERY 6 HOURS PRN
Qty: 20 TABLET | Refills: 0 | Status: SHIPPED | OUTPATIENT
Start: 2023-12-18

## 2023-12-19 ENCOUNTER — EVALUATION (OUTPATIENT)
Dept: OCCUPATIONAL THERAPY | Facility: CLINIC | Age: 63
End: 2023-12-19
Payer: MEDICARE

## 2023-12-19 DIAGNOSIS — R60.9 EDEMA, UNSPECIFIED TYPE: ICD-10-CM

## 2023-12-19 DIAGNOSIS — S42.344D CLOSED NONDISPLACED SPIRAL FRACTURE OF SHAFT OF RIGHT HUMERUS WITH ROUTINE HEALING, SUBSEQUENT ENCOUNTER: ICD-10-CM

## 2023-12-19 DIAGNOSIS — I89.0 LYMPHEDEMA OF RIGHT ARM: Primary | ICD-10-CM

## 2023-12-19 PROCEDURE — 97110 THERAPEUTIC EXERCISES: CPT

## 2023-12-19 PROCEDURE — 97140 MANUAL THERAPY 1/> REGIONS: CPT

## 2023-12-19 PROCEDURE — 97165 OT EVAL LOW COMPLEX 30 MIN: CPT

## 2023-12-19 NOTE — PROGRESS NOTES
OT Evaluation     Today's date: 2023  Patient name: Eileen Matias  : 1960  MRN: [id number]  Referring provider: Amanda Motley*  Dx:   Encounter Diagnosis     ICD-10-CM    1. Lymphedema of right arm  I89.0       2. Closed nondisplaced spiral fracture of shaft of right humerus with routine healing, subsequent encounter  S42.344D                      Assessment  Assessment details: Eileen Matias is a 63 y.o., Right HD female referred to hand therapy for a right closed, displaced spiral fracture of the humerus and for RUE lymphedema.  Onset of injury 23 due to fall while pushing a wheelchair.  Patient presents 23 with impaired ROM, strength, and sensation of the right hand, wrist, forearm, and elbow.  Deficits also noted in pain, edema and functional use of the right UE. Patient is wearing a Kruse brace on the humerus and has a compression bandage distal to the elbow. She has a history of stage 3 lymphedema in the RUE for over 10 years due to treatments for desmoid tumor right arm. Surgeon has provided a protocol for therapy for the fracture. Patient will be referred to PT for therapy in 1-2 weeks. Patient is a good candidate for OT services to abolish pain and edema and restore ROM, strength, coordination, and sensation in the right arm distal to the elbow for a return to independence in daily tasks.    Impairments: abnormal coordination, abnormal or restricted ROM, activity intolerance, impaired physical strength, lacks appropriate home exercise program, pain with function and weight-bearing intolerance  Other impairment: Lymphedema RUE  Functional limitations: No use of the RUE for functional tasks, other than holding clothing or papers in right hand. Unable to make a composite fist  Symptom irritability: highBarriers to therapy: Stage 3 RUE lymphedema with history of cellulitis infections  Understanding of Dx/Px/POC: excellent   Prognosis: good    Goals  STGs (4 weeks)  Patient  will be independent in HEP of ROM, compression bandaging, edema management  Patient will report an average pain level of 4/10  Patient will demonstrate 30 degree improvement in PAGE of the digits  Patient will demonstrate active wrist extension/flexion to 10/45  Patient will demonstrate active forearm supination to 30  Patient will demonstrate 1 cm reduction in circumference measurements  LTGs (12 weeks)  Patient will demonstrate independence in a HEP to maintain ROM, strength, and function at discharge  Patient will report an average pain level of 3-4/10 to be independent in daily tasks  Patient will demonstrate PAGE of the digits to WFL to be independent in self care tasks  Patient will demonstrate AROM of the wrist, forearm, elbow WFL to be independent in self care tasks  Patient will demonstrate 5/5 muscle strength in the wrist and forearm to be MI for meal prep  Patient will demonstrate right hand strength within 30% of the left hand to be MI for IADL tasks  Patient will demonstrate maximum decongestion of the RUE to be able to resume wearing compression sleeve and glove      Plan  Plan details: 12/19/23; Begin OT 2x/wk x 12 weeks  Patient would benefit from: skilled occupational therapy and PT eval  Planned modality interventions: thermotherapy: hydrocollator packs and cryotherapy  Planned therapy interventions: activity modification, compression, fine motor coordination training, graded activity, graded exercise, home exercise program, therapeutic exercise, therapeutic activities, stretching, strengthening, patient education, orthotic fitting/training, neuromuscular re-education, manual therapy, IASTM, kinesiology taping, massage, muscle pump exercises and joint mobilization  Other planned therapy interventions: compression bandaging right hand, wrist, forearm to elbow  Frequency: 2x week  Duration in weeks: 12  Plan of Care beginning date: 12/19/2023  Plan of Care expiration date: 3/8/2024  Treatment plan  discussed with: patient        Subjective Evaluation    History of Present Illness  Date of onset: 2023  Mechanism of injury: trauma  Mechanism of injury: Patient has a history of lymphedema in the RUE for over 10 years following radiation therapy and surgery to treat desmoid tumors in the RUE.  She has had frequent cellulitis infections in the RUE due to the lymphedema. She suffered a displaced spiral fracture of the right humerus on 23 after a fall while pushing her mother in law who was seated in a wheelchair.  Patient went to ED that date for xrays and long arm cast. She was referred to orthopedics. Had a closed reduction of fracture. Long arm cast for approximately 4 weeks and is now in a Kruse clam shell brace. Patient received  a course of home health OT for ROM of the digits, wrist and elbow and edema management. She presents this date in her Kruse brace with coban wrap on the right forearm, hand and digits for edema management. Patient reports limited motion in her elbow, forearm, wrist, and hand and pain and edema. She now presents for OT evaluation and treatment.          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      Right Wrist/Hand   Positive for edema.     Neurological Testing     Sensation     Wrist/Hand     Right   Intact: light touch    Additional Neurological Details  12/19/23: Grossly intact for light touch    Active Range of Motion     Right Elbow   Flexion: 90 degrees   Extension: -15 degrees   Forearm supination: 25 degrees   Forearm pronation: 70 degrees     Right Wrist   Wrist flexion: 30 degrees   Wrist extension: -15 degrees   Radial deviation: 0 degrees   Ulnar deviation: 30 degrees     Right Thumb   Flexion     CMC: 0    MP: 45    DIP: 15  Extension     MP: -15  Palmar Abduction    CMC: 25  Radial Abduction    CMC: 0  Kapandji score: 0 degrees    Right Digits   Flexion   Index     MCP: 30    PIP: 45    DIP: 30  Middle     MCP: 25    PIP: 45    DIP: 20  Ring     MCP: 25    PIP: 45    DIP: 20  Little     MCP: 10    PIP: 30    DIP: 15  Extension   Index     MCP: -5  Middle     MCP: -5  Ring     MCP: -5  Little     MCP: -5    Passive Range of Motion     Right Elbow   Flexion: 95 degrees   Extension: -10 degrees   Forearm supination: 30 degrees   Forearm pronation: 80 degrees     Swelling   Left Elbow Girth Measurements   Girth at joint line (cm): 26.7.  Girth 10 cm below joint line (cm): 25.7.    Right Elbow Girth Measurements   Joint line: 27 cm  10 cm below joint line: 29 cm    Right Wrist/Hand   Thumb     Proximal: 6.5 cm  Index     Proximal: 7 cm  Middle     Proximal: 7 cm  Ring     Proximal: 7 cm  Little     Proximal: 5.5 cm  Circumference MCP: 19.3 cm  Circumference wrist: 18.8 cm    Additional Swelling Details  12/19/23: forearm 8 cm proximal to wrist = 22.5 cm             Precautions Follow fx protocol; lymphedema precautions    Humerus Shaft Fracture Non-Operative Rehabilitation Protocol     PHASE I: IMMOBILIZATION  Week 0-2 (to start immediately after initiation of Kruse bracing)  · No lifting > weight of coffee cup   · Kruse fracture brace at all times  · Upright posture at all times  ·  Transition from sling to cuff & collar to allow gravity to align the arm. Use sling only as needed for comfort.  · Elbow should be unsupported as much as possible   · Swelling control (with stocking)   · TID HEP: elbow, wrist, finger A/AAROM. No shoulder ROM  · Biceps and triceps isometrics      PHASE II: FRACTURE HEALING  Week 2-6  · No lifting > weight of coffee cup   · Kruse fracture brace at all times  o Twice daily tightening of fracture brace   · Discontinue sling, continue use of cuff & collar as needed for comfort, continue upright posture   · Daily hygiene and skin checks in pendulum position   · Continue TID HEP: A/AAROM elbow, wrist and finger motion, biceps and triceps isometrics   · Start shoulder periscapular isometrics / shoulder posture   · No shoulder ROM other than pendulums for hygiene      PHASE III: EARLY REHABILITATION  Week 6-10 (following clinical fracture healing and no motion at the fracture site)   · No lifting > 5# at the side, or coffee cup overhead   · Part time Kruse bracing (if clinical fracture healing)  o Wear while outside of the house  o Remove while at home or at rest  · Add TID HEP shoulder 4 quadrant AAROM as tolerated with brace on unless pain free   o Pulleys, table slides, wall climbs, supine wand exercises in all planes      PHASE IV: RETURN TO FUNCTION  Week 11-14 (following clinical fracture healing)   · Add shoulder AROM, PROM as tolerated   · Generalized UE strengthening   · Activities as tolerated (if bony healing complete)   · Independent home exercise program   · Return to high level functional ADLs and simulation of work environment      POC expires Unit limit Auth  expiration date PT/OT + Visit Limit?   3/8/23 NA BOMN BOMN                 Visit/Unit Tracking  AUTH Status:  Date 12/19              RE due 1/20/23 Used 1               Remaining                     Date 12/19/23       Visit  1       Manuals        MLD R hand, wrist, forearm 20'        Compression bandage hand, FA Stockinette, isotoner, comprifaom, molelast digits, 1 rosidal soft 10'                       Neuro Re-Ed                                                                 Ther Ex        A/AAROM digits x10       A/AAROM wrist x10       A/AAROM FA x10       A/AAROM elbow x10                                       Ther Activity                        HEP POC; bandaging;        A/AAROM hand, wrist, FA, elbow               Modalities

## 2023-12-20 ENCOUNTER — OFFICE VISIT (OUTPATIENT)
Dept: SURGICAL ONCOLOGY | Facility: CLINIC | Age: 63
End: 2023-12-20

## 2023-12-20 VITALS
HEART RATE: 87 BPM | SYSTOLIC BLOOD PRESSURE: 140 MMHG | HEIGHT: 68 IN | RESPIRATION RATE: 16 BRPM | DIASTOLIC BLOOD PRESSURE: 80 MMHG | OXYGEN SATURATION: 96 % | TEMPERATURE: 97.6 F | BODY MASS INDEX: 24.02 KG/M2 | WEIGHT: 158.5 LBS

## 2023-12-20 DIAGNOSIS — Z17.0 MALIGNANT NEOPLASM OF LOWER-INNER QUADRANT OF LEFT BREAST IN FEMALE, ESTROGEN RECEPTOR POSITIVE: Primary | ICD-10-CM

## 2023-12-20 DIAGNOSIS — C77.3 CARCINOMA OF LEFT BREAST METASTATIC TO AXILLARY LYMPH NODE (HCC): ICD-10-CM

## 2023-12-20 DIAGNOSIS — C50.912 CARCINOMA OF LEFT BREAST METASTATIC TO AXILLARY LYMPH NODE (HCC): ICD-10-CM

## 2023-12-20 DIAGNOSIS — R60.9 EDEMA, UNSPECIFIED TYPE: Primary | ICD-10-CM

## 2023-12-20 DIAGNOSIS — C50.312 MALIGNANT NEOPLASM OF LOWER-INNER QUADRANT OF LEFT BREAST IN FEMALE, ESTROGEN RECEPTOR POSITIVE: Primary | ICD-10-CM

## 2023-12-20 PROCEDURE — 99024 POSTOP FOLLOW-UP VISIT: CPT | Performed by: SURGERY

## 2023-12-20 NOTE — PROGRESS NOTES
"Assessment/Plan  Diagnoses and all orders for this visit:    Malignant neoplasm of lower-inner quadrant of left breast in female, estrogen receptor positive     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 (HCC)    He is here for follow-up after bone scan with recent traumatic fracture of the right upper arm.  This has been manage by orthopedic surgery.  To have upcoming mammogram is in April and will follow her after.  She was told to call us with any questions or concerns in the interim she understood and agreed to do so.      No problem-specific Assessment & Plan notes found for this encounter.        Subjective:  @Patient ID: Eileen Matias is a 63 y.o. female.    Objective:    /80 (BP Location: Left arm, Patient Position: Sitting, Cuff Size: Standard)   Pulse 87   Temp 97.6 °F (36.4 °C) (Temporal)   Resp 16   Ht 5' 8\" (1.727 m)   Wt 71.9 kg (158 lb 8 oz)   SpO2 96%   BMI 24.10 kg/m²       "

## 2023-12-26 ENCOUNTER — OFFICE VISIT (OUTPATIENT)
Dept: OCCUPATIONAL THERAPY | Facility: CLINIC | Age: 63
End: 2023-12-26
Payer: MEDICARE

## 2023-12-26 DIAGNOSIS — R60.9 EDEMA, UNSPECIFIED TYPE: ICD-10-CM

## 2023-12-26 DIAGNOSIS — S42.344D CLOSED NONDISPLACED SPIRAL FRACTURE OF SHAFT OF RIGHT HUMERUS WITH ROUTINE HEALING, SUBSEQUENT ENCOUNTER: ICD-10-CM

## 2023-12-26 DIAGNOSIS — I89.0 LYMPHEDEMA OF RIGHT ARM: Primary | ICD-10-CM

## 2023-12-26 PROCEDURE — 97110 THERAPEUTIC EXERCISES: CPT

## 2023-12-26 PROCEDURE — 97140 MANUAL THERAPY 1/> REGIONS: CPT

## 2023-12-26 NOTE — PROGRESS NOTES
Daily Note     Today's date: 2023  Patient name: Eileen Matias  : 1960  MRN: [id number]  Referring provider: Amanda Motley*  Dx:   Encounter Diagnosis     ICD-10-CM    1. Lymphedema of right arm  I89.0       2. Closed nondisplaced spiral fracture of shaft of right humerus with routine healing, subsequent encounter  S42.344D       3. Edema, unspecified type  R60.9                      Subjective: I'm trying to exercise my hand and elbow. I was able to cut some vegetables      Objective: See treatment diary below      Assessment: Tolerated treatment well. Patient would benefit from continued OT. Pain and edema limiting full PROM in digits. Patient has gross grasp sufficient to grasp and release cones. Changed compression bandaging to stockinette, isotoner glove, comprifoam, coban. Padding added to radial wrist and ulnar side of 5th MP jt to relieve pressure. Firm, +3 edema in the hand and forearm.      Plan: Continue per plan of care.      Precautions Follow fx protocol; lymphedema precautions    Humerus Shaft Fracture Non-Operative Rehabilitation Protocol     PHASE I: IMMOBILIZATION  Week 0-2 (to start immediately after initiation of Kruse bracing)  · No lifting > weight of coffee cup   · Kruse fracture brace at all times  · Upright posture at all times  · Transition from sling to cuff & collar to allow gravity to align the arm. Use sling only as needed for comfort.  · Elbow should be unsupported as much as possible   · Swelling control (with stocking)   · TID HEP: elbow, wrist, finger A/AAROM. No shoulder ROM  · Biceps and triceps isometrics      PHASE II: FRACTURE HEALING  Week 2-6  · No lifting > weight of coffee cup   · Kruse fracture brace at all times  o Twice daily tightening of fracture brace   · Discontinue sling, continue use of cuff & collar as needed for comfort, continue upright posture   · Daily hygiene and skin checks in pendulum position   · Continue TID HEP: A/AAROM  "elbow, wrist and finger motion, biceps and triceps isometrics   · Start shoulder periscapular isometrics / shoulder posture   · No shoulder ROM other than pendulums for hygiene      PHASE III: EARLY REHABILITATION  Week 6-10 (following clinical fracture healing and no motion at the fracture site)   · No lifting > 5# at the side, or coffee cup overhead   · Part time Kruse bracing (if clinical fracture healing)  o Wear while outside of the house  o Remove while at home or at rest  · Add TID HEP shoulder 4 quadrant AAROM as tolerated with brace on unless pain free   o Pulleys, table slides, wall climbs, supine wand exercises in all planes      PHASE IV: RETURN TO FUNCTION  Week 11-14 (following clinical fracture healing)   · Add shoulder AROM, PROM as tolerated   · Generalized UE strengthening   · Activities as tolerated (if bony healing complete)   · Independent home exercise program   · Return to high level functional ADLs and simulation of work environment      POC expires Unit limit Auth  expiration date PT/OT + Visit Limit?   3/8/23 NA BOMN BOMN                 Visit/Unit Tracking  AUTH Status:  Date 12/19              RE due 1/20/23 Used 1               Remaining                     Date 12/19/23 12/26/23      Visit  1 2      Manuals        MLD R hand, wrist, forearm 20' 40'      Compression bandage hand, FA Stockinette, isotoner, comprifaom, molelast digits, 1 rosidal soft 10' Stockinette, comprifoam, fluffed gauze, coban 10'                      Neuro Re-Ed                                                                 Ther Ex        A/AAROM digits x10 3''      A/AAROM wrist x10 3'      A/AAROM FA x10 3'      A/AAROM elbow x10 3'      Biceps, triceps isometrics  5\" x 10 ea                              Ther Activity        Stacking cones  1 set              HEP POC; bandaging;        A/AAROM hand, wrist, FA, elbow               Modalities                               "

## 2023-12-27 ENCOUNTER — TELEMEDICINE (OUTPATIENT)
Dept: INTERNAL MEDICINE CLINIC | Facility: CLINIC | Age: 63
End: 2023-12-27
Payer: MEDICARE

## 2023-12-27 VITALS — WEIGHT: 158 LBS | BODY MASS INDEX: 23.95 KG/M2 | HEIGHT: 68 IN | TEMPERATURE: 98.7 F

## 2023-12-27 DIAGNOSIS — U07.1 COVID-19: Primary | ICD-10-CM

## 2023-12-27 PROCEDURE — 99213 OFFICE O/P EST LOW 20 MIN: CPT | Performed by: PHYSICIAN ASSISTANT

## 2023-12-27 RX ORDER — NIRMATRELVIR AND RITONAVIR 300-100 MG
3 KIT ORAL 2 TIMES DAILY
Qty: 30 TABLET | Refills: 0 | Status: SHIPPED | OUTPATIENT
Start: 2023-12-27 | End: 2024-01-01

## 2023-12-27 NOTE — PROGRESS NOTES
COVID-19 Outpatient Progress Note    Assessment/Plan:    Problem List Items Addressed This Visit    None     COVID-19 Plan    Encounter provider: Ann Obregon PA-C     Provider located at: INTERNAL MED [facility] INTERNAL MEDICINE [place] [address]   [address] [address]  [facility] [address] [phone]     Recent Visits  No visits were found meeting these conditions.  Showing recent visits within past 7 days and meeting all other requirements  Future Appointments  No visits were found meeting these conditions.  Showing future appointments within next 150 days and meeting all other requirements     COVID-19 HPI  Lab Results   Component Value Date    SARSCOV2 Positive (A) 07/23/2022    SARSCORONAVI Not Detected 11/10/2021       Review of Systems  Current Outpatient Medications on File Prior to Visit   Medication Sig   • cholecalciferol (VITAMIN D3) 1,000 units tablet Take 3,000 Units by mouth daily   • diphenhydrAMINE (BENADRYL) 50 MG tablet Take 50 mg by mouth daily at bedtime as needed for itching   • dronabinol (MARINOL) 10 MG capsule    • ezetimibe (ZETIA) 10 mg tablet Take 10 mg by mouth daily   • gabapentin (Neurontin) 300 mg capsule Take 1 capsule (300 mg total) by mouth 3 (three) times a day   • letrozole (FEMARA) 2.5 mg tablet TAKE 1 TABLET BY MOUTH EVERY DAY   • multivitamin (THERAGRAN) TABS Take 1 tablet by mouth daily   • Omega-3 Fatty Acids (FISH OIL PO) Take by mouth   • oxyCODONE (Roxicodone) 5 immediate release tablet Take 1 tablet (5 mg total) by mouth every 6 (six) hours as needed for moderate pain Max Daily Amount: 20 mg   • oxyCODONE (Roxicodone) 5 immediate release tablet Take 1 tablet (5 mg total) by mouth every 6 (six) hours as needed for moderate pain Max Daily Amount: 20 mg   • oxyCODONE (Roxicodone) 5 immediate release tablet Take 1 tablet (5 mg total) by mouth every 6 (six) hours as needed for moderate pain Max Daily Amount: 20 mg   • oxyCODONE-acetaminophen  (PERCOCET) 5-325 mg per tablet TAKE ONE - TWO TABLETS BY MOUTH EVERY FOUR TO SIX HOURS AS NEEDED (Patient not taking: Reported on 12/13/2023)   • penicillin V potassium (VEETID) 500 mg tablet Take 1 tablet (500 mg total) by mouth every 12 (twelve) hours   • Probiotic Product (PROBIOTIC-10 PO) Take by mouth   • senna-docusate sodium (SENOKOT-S) 8.6-50 mg per tablet Take 1 tablet by mouth daily       Objective:    There were no vitals taken for this visit.       Physical Exam  Ann Obregon PA-C

## 2023-12-27 NOTE — PROGRESS NOTES
COVID-19 Outpatient Progress Note    Assessment/Plan:  COVID-19:  Symptom onset 12/27/2023, rapid home antigen positive on 12/27/2023.  Patient is a candidate for Paxlovid  Discussed to use caution while taking Paxlovid and oxycodone.  Patient will hold Zetia    Problem List Items Addressed This Visit    None     Disposition:     Discussed symptom directed medication options with patient.     Patient meets criteria for Paxlovid and they have been counseled appropriately regarding risks, benefits, 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, and alternative treatment options. After discussion, patient agrees to treatment.    Possible 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 of Paxlovid?    Possible 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 of Paxlovid are:  - Liver Problems. Notify us right away if you start to experience loss of appetite, yellowing of your skin and the whites of eyes (jaundice), dark-colored urine, pale colored stools and itchy skin, stomach area (abdominal) pain.  - Resistance to HIV Medicines. If you have untreated HIV infection, Paxlovid may lead to some HIV medicines not working as well in the future.  - Other possible 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 include: altered sense of taste, diarrhea, high blood pressure, or muscle aches.    I have spent a total time of 15 minutes on the day of the encounter for this patient including       Encounter provider: Ann Obregon PA-C     Provider located at: INTERNAL [facility] INTERNAL MEDICINE [address]  [facility] [address]  [facility] [address] [phone]     Recent Visits  No visits were found meeting these conditions.  Showing recent visits within past 7 days and meeting all other requirements  Today's Visits  Date Type Provider Dept   12/27/23 Telemedicine Ann Obregon PA-C  Internal [facility]   Showing today's visits and meeting all other requirements  Future Appointments  No visits were found meeting these conditions.  Showing future appointments within next 150 days and meeting all other requirements      This virtual check-in was done via [employer] and patient was informed that this is a secure, HIPAA-compliant platform. She agrees to proceed.    Patient agrees to participate in a virtual check in via telephone or video visit instead of presenting to the office to address urgent/immediate medical needs. Patient is aware this is a billable service. She acknowledged consent and understanding of privacy and security of the video platform. The patient has agreed to participate and understands they can discontinue the visit at any time.    After connecting through [email or web address], the patient was identified by name and date of birth. Eileen Matias was informed that this was a telemedicine visit and that the exam was being conducted confidentially over secure lines. Eileen Matias acknowledged consent and understanding of privacy and security of the telemedicine visit. I informed the patient that I have reviewed her record in Epic and presented the opportunity for her to ask any questions regarding the visit today. The patient agreed to participate.     Verification of patient location:  Patient is located in the following state in which I hold an active license: [place]    Subjective:   Eileen Matias is a 63 y.o. female who is concerned about COVID-19. Patient's symptoms include fever, fatigue, nasal congestion, sore throat, cough and headache. Patient denies shortness of breath.     - Date of symptom onset: 12/27/2023      COVID-19 vaccination status: Fully vaccinated with booster    Lab Results   Component Value Date    SARSCOV2 Positive (A) 07/23/2022    SARSCORONAVI Not Detected 11/10/2021       Review of Systems   Constitutional:  Positive for fatigue and fever.   HENT:  Positive for congestion and sore throat.    Respiratory:  Positive for cough. Negative for shortness of breath.    Neurological:  Positive for headaches.     Current Outpatient Medications on File Prior to Visit   Medication Sig    cholecalciferol (VITAMIN  "D3) 1,000 units tablet Take 3,000 Units by mouth daily    diphenhydrAMINE (BENADRYL) 50 MG tablet Take 50 mg by mouth daily at bedtime as needed for itching    dronabinol (MARINOL) 10 MG capsule     ezetimibe (ZETIA) 10 mg tablet Take 10 mg by mouth daily    gabapentin (Neurontin) 300 mg capsule Take 1 capsule (300 mg total) by mouth 3 (three) times a day    letrozole (FEMARA) 2.5 mg tablet TAKE 1 TABLET BY MOUTH EVERY DAY    multivitamin (THERAGRAN) TABS Take 1 tablet by mouth daily    Omega-3 Fatty Acids (FISH OIL PO) Take by mouth    oxyCODONE (Roxicodone) 5 immediate release tablet Take 1 tablet (5 mg total) by mouth every 6 (six) hours as needed for moderate pain Max Daily Amount: 20 mg    penicillin V potassium (VEETID) 500 mg tablet Take 1 tablet (500 mg total) by mouth every 12 (twelve) hours    Probiotic Product (PROBIOTIC-10 PO) Take by mouth    senna-docusate sodium (SENOKOT-S) 8.6-50 mg per tablet Take 1 tablet by mouth daily    oxyCODONE (Roxicodone) 5 immediate release tablet Take 1 tablet (5 mg total) by mouth every 6 (six) hours as needed for moderate pain Max Daily Amount: 20 mg    oxyCODONE (Roxicodone) 5 immediate release tablet Take 1 tablet (5 mg total) by mouth every 6 (six) hours as needed for moderate pain Max Daily Amount: 20 mg    oxyCODONE-acetaminophen (PERCOCET) 5-325 mg per tablet TAKE ONE - TWO TABLETS BY MOUTH EVERY FOUR TO SIX HOURS AS NEEDED (Patient not taking: Reported on 12/13/2023)       Objective:    Temp 98.7 °F (37.1 °C) Comment: patient provided  Ht 5' 8\" (1.727 m)   Wt 71.7 kg (158 lb)   BMI 24.02 kg/m²        Physical Exam  Constitutional:       Appearance: She is ill-appearing.   Pulmonary:      Effort: Pulmonary effort is normal.   Neurological:      Mental Status: She is alert.   Psychiatric:         Mood and Affect: Mood normal.         Behavior: Behavior normal.         Thought Content: Thought content normal.         Judgment: Judgment normal.       Ann CEE" BRIAN Obregon

## 2023-12-28 ENCOUNTER — APPOINTMENT (OUTPATIENT)
Dept: OCCUPATIONAL THERAPY | Facility: CLINIC | Age: 63
End: 2023-12-28
Payer: MEDICARE

## 2023-12-31 DIAGNOSIS — S42.344D CLOSED NONDISPLACED SPIRAL FRACTURE OF SHAFT OF RIGHT HUMERUS WITH ROUTINE HEALING, SUBSEQUENT ENCOUNTER: ICD-10-CM

## 2023-12-31 RX ORDER — GABAPENTIN 300 MG/1
300 CAPSULE ORAL 3 TIMES DAILY
Qty: 90 CAPSULE | Refills: 0 | Status: SHIPPED | OUTPATIENT
Start: 2023-12-31 | End: 2024-01-30

## 2024-01-02 ENCOUNTER — APPOINTMENT (OUTPATIENT)
Dept: OCCUPATIONAL THERAPY | Facility: CLINIC | Age: 64
End: 2024-01-02
Payer: MEDICARE

## 2024-01-04 ENCOUNTER — OFFICE VISIT (OUTPATIENT)
Dept: OCCUPATIONAL THERAPY | Facility: CLINIC | Age: 64
End: 2024-01-04
Payer: MEDICARE

## 2024-01-04 ENCOUNTER — OFFICE VISIT (OUTPATIENT)
Dept: OBGYN CLINIC | Facility: MEDICAL CENTER | Age: 64
End: 2024-01-04

## 2024-01-04 ENCOUNTER — APPOINTMENT (OUTPATIENT)
Dept: RADIOLOGY | Facility: MEDICAL CENTER | Age: 64
End: 2024-01-04
Payer: MEDICARE

## 2024-01-04 VITALS
HEIGHT: 68 IN | WEIGHT: 154 LBS | DIASTOLIC BLOOD PRESSURE: 91 MMHG | HEART RATE: 77 BPM | BODY MASS INDEX: 23.34 KG/M2 | SYSTOLIC BLOOD PRESSURE: 168 MMHG

## 2024-01-04 DIAGNOSIS — Z47.89 AFTERCARE FOLLOWING SURGERY OF THE MUSCULOSKELETAL SYSTEM: Primary | ICD-10-CM

## 2024-01-04 DIAGNOSIS — R60.9 EDEMA, UNSPECIFIED TYPE: ICD-10-CM

## 2024-01-04 DIAGNOSIS — I89.0 LYMPHEDEMA OF RIGHT ARM: Primary | ICD-10-CM

## 2024-01-04 DIAGNOSIS — S42.344D CLOSED NONDISPLACED SPIRAL FRACTURE OF SHAFT OF RIGHT HUMERUS WITH ROUTINE HEALING, SUBSEQUENT ENCOUNTER: ICD-10-CM

## 2024-01-04 DIAGNOSIS — Z47.89 AFTERCARE FOLLOWING SURGERY OF THE MUSCULOSKELETAL SYSTEM: ICD-10-CM

## 2024-01-04 PROCEDURE — 97140 MANUAL THERAPY 1/> REGIONS: CPT

## 2024-01-04 PROCEDURE — 99024 POSTOP FOLLOW-UP VISIT: CPT | Performed by: STUDENT IN AN ORGANIZED HEALTH CARE EDUCATION/TRAINING PROGRAM

## 2024-01-04 PROCEDURE — 73060 X-RAY EXAM OF HUMERUS: CPT

## 2024-01-04 NOTE — PATIENT INSTRUCTIONS
Humerus Shaft Fracture Non-Operative Rehabilitation Protocol    PHASE II: FRACTURE HEALING  Week 2-6  · No lifting > weight of coffee cup   · Kruse fracture brace at all times  o Twice daily tightening of fracture brace   · Discontinue sling, continue use of cuff & collar as needed for comfort, continue upright posture   · Daily hygiene and skin checks in pendulum position   · Continue TID HEP: A/AAROM elbow, wrist and finger motion, biceps and triceps isometrics   · Start shoulder periscapular isometrics / shoulder posture   · No shoulder ROM other than pendulums for hygiene     PHASE III: EARLY REHABILITATION  Week 6-10 (following clinical fracture healing and no motion at the fracture site)   · No lifting > 5# at the side, or coffee cup overhead   · Part time Kruse bracing (if clinical fracture healing)  o Wear while outside of the house  o Remove while at home or at rest  · Add TID HEP shoulder 4 quadrant AAROM as tolerated with brace on unless pain free   o Pulleys, table slides, wall climbs, supine wand exercises in all planes     PHASE IV: RETURN TO FUNCTION  Week 11-14 (following clinical fracture healing)   · Add shoulder AROM, PROM as tolerated   · Generalized UE strengthening   · Activities as tolerated (if bony healing complete)   · Independent home exercise program   · Return to high level functional ADLs and simulation of work environment

## 2024-01-04 NOTE — PROGRESS NOTES
Daily Note/Discharge     Today's date: 2024  Patient name: Eileen Matias  : 1960  MRN: [id number]  Referring provider: Amanda Motley Has*  Dx:   Encounter Diagnosis     ICD-10-CM    1. Lymphedema of right arm  I89.0       2. Closed nondisplaced spiral fracture of shaft of right humerus with routine healing, subsequent encounter  S42.344D       3. Edema, unspecified type  R60.9                      Subjective: My amr has been burning. It still hurts. I'm going to see the doctor today      Objective: See treatment diary below  +3 pitting edema RUE distal to elbow. Continues to wear Kruse brace at all times  Assessment: Tolerated treatment well. Patient  continues to demonstrates significant edema in the RUE. This is affecting active and passive  ROM of the digit, wrist, forearm, elbow. She also has a great deal of pain in the RUE. She has an appointment with her orthopedic surgeon this date to see if fracture is healing well and t determine if surgery is indicated. Patient is transferring therapy care to the [facility] as lymphedema therapy will no longer be available at this clinic. Patient will also start formal PT fr the humeral fracture once she transfers care.      Plan:  DC OT lymphedema therapy. Patient to transfer care to PT in the [facility] for right humeral fracture and RUE lymphedema.     Precautions Follow fx protocol; lymphedema precautions    Humerus Shaft Fracture Non-Operative Rehabilitation Protocol     PHASE I: IMMOBILIZATION  Week 0-2 (to start immediately after initiation of Kruse bracing)  · No lifting > weight of coffee cup   · Kruse fracture brace at all times  · Upright posture at all times  · Transition from sling to cuff & collar to allow gravity to align the arm. Use sling only as needed for comfort.  · Elbow should be unsupported as much as possible   · Swelling control (with stocking)   · TID HEP: elbow, wrist, finger A/AAROM. No shoulder  ROM  · Biceps and triceps isometrics      PHASE II: FRACTURE HEALING  Week 2-6  · No lifting > weight of coffee cup   · Kruse fracture brace at all times  o Twice daily tightening of fracture brace   · Discontinue sling, continue use of cuff & collar as needed for comfort, continue upright posture   · Daily hygiene and skin checks in pendulum position   · Continue TID HEP: A/AAROM elbow, wrist and finger motion, biceps and triceps isometrics   · Start shoulder periscapular isometrics / shoulder posture   · No shoulder ROM other than pendulums for hygiene      PHASE III: EARLY REHABILITATION  Week 6-10 (following clinical fracture healing and no motion at the fracture site)   · No lifting > 5# at the side, or coffee cup overhead   · Part time Kruse bracing (if clinical fracture healing)  o Wear while outside of the house  o Remove while at home or at rest  · Add TID HEP shoulder 4 quadrant AAROM as tolerated with brace on unless pain free   o Pulleys, table slides, wall climbs, supine wand exercises in all planes      PHASE IV: RETURN TO FUNCTION  Week 11-14 (following clinical fracture healing)   · Add shoulder AROM, PROM as tolerated   · Generalized UE strengthening   · Activities as tolerated (if bony healing complete)   · Independent home exercise program   · Return to high level functional ADLs and simulation of work environment      POC expires Unit limit Auth  expiration date PT/OT + Visit Limit?   3/8/23 KAY REYNOLDS BOMN                 Visit/Unit Tracking  AUTH Status:  Date 12/19 12/26 1/4/24            RE due 1/20/23 Used 1 2 3             Remaining                     Date 12/19/23 12/26/23 1/4/24     Visit  1 2 3 DC     Manuals        MLD R hand, wrist, forearm 20' 40' 35'     Compression bandage hand, FA Stockinette, isotoner, comprifaom, molelast digits, 1 rosidal soft 10' Stockinette, comprifoam, fluffed gauze, coban 10' 10' stockinette; grey foam at wrist; comprifoam; isotoner glove; 1 comprilan   "                   Neuro Re-Ed                                                                 Ther Ex        A/AAROM digits x10 3''      A/AAROM wrist x10 3'      A/AAROM FA x10 3'      A/AAROM elbow x10 3'      Biceps, triceps isometrics  5\" x 10 ea                              Ther Activity        Stacking cones  1 set              HEP POC; bandaging;        A/AAROM hand, wrist, FA, elbow               Modalities                                 "

## 2024-01-04 NOTE — PROGRESS NOTES
Orthopedic Surgery Nonoperative Management Note  Eileen Matias (63 y.o. female)  : 1960 Encounter Date: 2024  Dr. Rick Lazo, , Orthopedic Surgeon  Orthopedic Oncology & Sarcoma Surgery   Assessment/Plan:  63 y.o. female 6 weeks follow up status post right midshaft humerus fracture     Problem List Items Addressed This Visit    None  Visit Diagnoses       Aftercare following surgery of the musculoskeletal system    -  Primary    Relevant Orders    XR humerus right          Right Humerus Fracture  Continue babb bracing  Continue tightening and exercising per protocol, attached in AVS today  Reviewed XR with patient   Return in 2 weeks for XR of right humerus in brace for alignment check    Significant Right Arm Lymphedema   Has been continued with lymphedema therapy; will need to find a new provider soon. Discussed to call oncology and see resources outside of [facility] (due to 4-month wait)  Looking forward to increased healing to use lymphedema machine     Return in about 2 weeks (around 2024).    _____________________________________________________  CHIEF COMPLAINT:  Chief Complaint   Patient presents with    Right Arm - Pain     SUBJECTIVE:  Eileen Matias is a 63 y.o. year old female who presents 6 weeks s/p right humerus fracture.   DOI: 23    Pain more focused on lymphedema in lower arm, with difficulty with different technique, returning to previous care and seeing some improvement.     Yaniv cat had jumped     Prior HPI:  She was seen at [facility] for right arm pain at the elbow with pronation and supination. X-rays were obtained at time of visit, no images are available for review, radiologist report is available. Patient has since transitioned to [facility]. She was seen in the ED on 23 for right arm pain after a fall. ED note states that a closed reduction was performed in the ED at time of visit. Medication refill note from  states that she is unable  "to use her lymphedema machine and is experiencing swelling in her right arm. On presentation today, patient states that she was pushing her mother in law in a wheelchair when the wheelchair started to tip forward and she tried to catch it to prevent her MIL from falling out of the wheelchair. She believes that she \"lost control\" of her right arm, and then fell onto her right shoulder trying to protect the right elbow. She reports that she has been unable to use her lymphedema bag since the injury. She states that she has had a physical therapist come into her home 2/wk since the injury who performs lymphedema massage on her right hand and forearm to increase blood flow.    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 She had a latissimus rotational skin pedicle flap  performed after having multiple recurrences. Within the same year she had a reoccurrence of a desmoid tumor and another resection of her back. Patient states that her last tumor related operation was in 2010. Patient was cancer free for 8 years, until last year when she developed breast cancer on her left side. She states that she had radiation, 16 lymph nodes were removed and none of the lymph nodes were positive. She states that she had no radiation to the lymph nodes. She states that she developed lymphedema within 1 year after the diagnosis of the desmoid fibromatosis. She was previously using the lymphedema treatment as needed, which progressed to using 3x/daily every day.     PAST MEDICAL HISTORY:  Past Medical History:   Diagnosis Date    Abnormal mammogram 05/15/2013    Anemia     Cancer (HCC)     desmoitosis    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 (HCC) 04/19/2022    Chronic pain disorder     worse right side of body    Desmoid tumor     Last Assessed: 6/19/2017    History of transfusion     no reactions    Hyperlipidemia     " Hypertension     Ovarian cancer (HCC) 1999?  Hysterectomy done    PONV (postoperative nausea and vomiting)        PAST SURGICAL HISTORY:  Past Surgical History:   Procedure Laterality Date    BREAST LUMPECTOMY Left 6/21/2022    Procedure: ANITA  DIRECTED LUMPECTOMY;  Surgeon: Amanda Motley MD;  Location: [facility];  Service: Surgical Oncology    FRACTURE SURGERY      HYSTERECTOMY      LYMPH NODE DISSECTION Left 6/21/2022    Procedure: AXILLARY DISSECTION LEVEL I AND LEVEL II LYMPH NODES;  Surgeon: Amanda Motley MD;  Location: [facility];  Service: Surgical Oncology    MRI BREAST BIOPSY LEFT (ALL INCLUSIVE) Left 5/20/2022    MRI BREAST BIOPSY RIGHT (ALL INCLUSIVE) Right 5/20/2022    OTHER SURGICAL HISTORY      Arm Incision: Dermoid tumor, right Arm     PARTIAL HYSTERECTOMY      NY COLONOSCOPY FLX DX W/COLLJ SPEC WHEN PFRMD N/A 8/15/2017    Procedure: COLONOSCOPY;  Surgeon: Alec England MD;  Location: [facility];  Service: Gastroenterology    NY NDSC WRST SURG W/RLS TRANSVRS CARPL LIGM Left 8/10/2021    Procedure: RELEASE LEFT CARPAL TUNNEL ENDOSCOPIC;  Surgeon: Chris Camacho MD;  [facility];  Service: Orthopedics    NY OPTX DSTL RADL I-ARTIC FX/EPIPHYSL SEP 3 FRAG Left 2/9/2021    Procedure: OPEN REDUCTION W/ INTERNAL FIXATION (ORIF) RADIUS / ULNA (WRIST) left;  Surgeon: Chris Camacho MD;  [facility];  Service: Orthopedics    SKIN BIOPSY      SKIN CANCER EXCISION      Melanoma Excision     TONSILLECTOMY      US BREAST NEEDLE LOC LEFT Left 6/16/2022    US BREAST NEEDLE LOC RIGHT EACH ADDITIONAL Right 6/16/2022    US GUIDED BREAST LYMPH NODE BIOPSY LEFT Left 4/14/2022       FAMILY HISTORY:  Family History   Problem Relation Age of Onset    Diabetes Mother     No Known Problems Father     Cancer Sister     Lung cancer Sister     Pancreatic cancer Sister     No Known Problems Maternal Grandmother     No Known Problems Maternal Grandfather     No Known Problems  Paternal Grandmother     No Known Problems Paternal Grandfather     Breast cancer Neg Hx        SOCIAL HISTORY:  Social History     Tobacco Use    Smoking status: Never    Smokeless tobacco: Never   Vaping Use    Vaping status: Never Used   Substance Use Topics    Alcohol use: Not Currently     Alcohol/week: 5.0 standard drinks of alcohol     Types: 5 Shots of liquor per week     Comment: Used mostly as a painkiller when needed before bedtime    Drug use: Yes     Frequency: 28.0 times per week     Types: Marijuana     Comment: THC Medical marijuana       MEDICATIONS:    Current Outpatient Medications:     cholecalciferol (VITAMIN D3) 1,000 units tablet, Take 3,000 Units by mouth daily, Disp: , Rfl:     diphenhydrAMINE (BENADRYL) 50 MG tablet, Take 50 mg by mouth daily at bedtime as needed for itching, Disp: , Rfl:     dronabinol (MARINOL) 10 MG capsule, , Disp: , Rfl:     ezetimibe (ZETIA) 10 mg tablet, Take 10 mg by mouth daily, Disp: , Rfl:     gabapentin (Neurontin) 300 mg capsule, Take 1 capsule (300 mg total) by mouth 3 (three) times a day, Disp: 90 capsule, Rfl: 0    letrozole (FEMARA) 2.5 mg tablet, TAKE 1 TABLET BY MOUTH EVERY DAY, Disp: 90 tablet, Rfl: 3    multivitamin (THERAGRAN) TABS, Take 1 tablet by mouth daily, Disp: , Rfl:     Omega-3 Fatty Acids (FISH OIL PO), Take by mouth, Disp: , Rfl:     oxyCODONE (Roxicodone) 5 immediate release tablet, Take 1 tablet (5 mg total) by mouth every 6 (six) hours as needed for moderate pain Max Daily Amount: 20 mg, Disp: 20 tablet, Rfl: 0    oxyCODONE (Roxicodone) 5 immediate release tablet, Take 1 tablet (5 mg total) by mouth every 6 (six) hours as needed for moderate pain Max Daily Amount: 20 mg, Disp: 20 tablet, Rfl: 0    oxyCODONE (Roxicodone) 5 immediate release tablet, Take 1 tablet (5 mg total) by mouth every 6 (six) hours as needed for moderate pain Max Daily Amount: 20 mg, Disp: 20 tablet, Rfl: 0    oxyCODONE-acetaminophen (PERCOCET) 5-325 mg per tablet, ,  "Disp: , Rfl:     Probiotic Product (PROBIOTIC-10 PO), Take by mouth, Disp: , Rfl:     senna-docusate sodium (SENOKOT-S) 8.6-50 mg per tablet, Take 1 tablet by mouth daily, Disp: 7 tablet, Rfl: 0    ALLERGIES:  Allergies   Allergen Reactions    Chlorpromazine Anaphylaxis     PHENOTHIAZINE=ANAPHYLAXIS    Codeine Anaphylaxis     Anaphylaxis  abd pain.    Meperidine Anaphylaxis, Hives and Vomiting    Phenothiazines Anaphylaxis and Throat Swelling     Category: Allergy;     Saccharin - Food Allergy Anaphylaxis    Ampicillin-Sulbactam Sodium Hives    Aspirin GI Intolerance and Abdominal Pain     Severe GERD    Gluten Meal - Food Allergy GI Intolerance    Ibuprofen Hives     H    Levofloxacin Hives    Chocolate - Food Allergy GI Intolerance    Lactose - Food Allergy GI Intolerance    Neomycin Other (See Comments), Edema and Hives     Category: Allergy;   swelling    Jessie - Food Allergy Rash    Latex Hives and Rash     Category: Allergy;        Review of systems:   Constitutional: Negative for fatigue, fever or loss of apetite.   HENT: Negative.    Respiratory: Negative for shortness of breath, dyspnea.    Cardiovascular: Negative for chest pain/tightness.   Gastrointestinal: Negative for abdominal pain, N/V.   Endocrine: Negative for cold/heat intolerance, unexplained weight loss/gain.   Genitourinary: Negative for flank pain, dysuria, hematuria.   Musculoskeletal: As noted in HPI   Skin: Negative for rash.    Neurological: Intact  Psychiatric/Behavioral: Negative for agitation.  _____________________________________________________  PHYSICAL EXAMINATION:    Blood pressure 168/91, pulse 77, height 5' 8\" (1.727 m), weight 69.9 kg (154 lb).    General: well developed and well nourished, alert, oriented times 3, and appears comfortable  Psychiatric: Normal  HEENT: Benign  Cardiovascular: Regular    Pulmonary: No wheezing or stridor  Abdomen: Soft, Nontender  Skin: No masses, erythema, lacerations, fluctation, " ulcerations.  Lymphedema present in right arm  Neurovascular: Intact    MUSCULOSKELETAL EXAMINATION:  right Shoulder -   No anatomical deformity  Skin is warm and dry to touch with no signs of erythema, ecchymosis, or infection  Moderate soft tissue swelling or effusion noted due to lymphedema  Demonstrates normal elbow, wrist, and finger motion  2+  distal radial pulse with brisk capillary refill to the fingers  Radial, median, ulnar and motor and sensory distribution intact  Sensation to light touch intact distally      _____________________________________________________  STUDIES REVIEWED:  Reviewed physical exam and imaging with patient at time of visit. Radiographic findings demonstrate closed nondisplaced spiral fracture of the right humerus.     Study: XR right humerus  Date: 1/4/23  Impression: No radiologist report available.  My impression is as follows: well-maintained alignment of midshaft humerus fracture with some increased evidence of callous and healing, with some bridging. Better alignment     Study: XR right himerus  Date: 12/14/23  Impression: No radiologist report available.  My impression is as follows: well-maintained alignment of midshaft humerus fracture with some minimal evidence of callous and healing.     Study: NM WBBS  Date: 12/8/23  Impression: I have read and agree with the radiologist report.  My impression is as follows:  1. Linear radiotracer uptake at the right humeral shaft corresponding to known fracture site. Difficult to exclude a pathologic fracture given the fracture activity here on bone scan.  2. Otherwise, no findings suspicious for osseous metastasis.      Study: XR of right humerus  Date: 11/30/23  Impression: I have read and agree with the radiologist report.  My impression is as follows:  Closed nondisplaced spiral fracture of the right humerus.  Bone looks chronically osteopenic which could be related to radiation therapy.  No current evidence of pathologic fracture  at this time  Status post casting of mid diaphyseal fracture of the humerus with mild angulation as seen on prior exam. There is no evidence of significant interval healing.     Study: XR of right humerus  Date: 11/12/23  Impression: I have read and agree with the radiologist report.  My impression is as follows:  FINDINGS:  Evaluation of fine bony detail of the right humerus is limited by overlying cast material  There is a mildly displaced, spiral fracture of the midshaft of the right humerus. There is a somewhat mottled appearance of the right humeral diaphysis, which may be related to sequela of prior radiation therapy.  There is a small exophytic density adjacent to the midshaft of the humerus which was present in the soft tissues on the prior CT from 2020.  Multiple surgical clips are seen in the right axillary region and throughout the proximal right upper extremity.  No shoulder dislocation.  No evidence of AC joint separation.  The visualized portions of the lung are clear.  IMPRESSION:  Mildly displaced, spiral fracture of the midshaft of right humerus. No shoulder dislocation.  Somewhat mottled appearance of the right humeral diaphysis, which may be related to prior radiation therapy. Correlation with MRI may be helpful, which is already scheduled for 11/20/2023.    Study: XR of right humerus  Date: 10/23/23  Impression: **NO IMAGE AVAILABLE FOR REVIEW, RADIOLOGIST REPORT AVAILABLE FOR REVIEW**  IMPRESSION:   Surgical clips throughout the right axilla and throughout the lateral soft   tissues of the humerus.   Increased lateral soft tissue density mid humerus for which MRI is recommended   to exclude a mass.   No destructive humeral osseous lesion seen.   Focal areas of small cortical solid calcifications or postsurgical changes mid lateral humeral shaft.   Demineralization throughout the right arm and forearm.   Mild osteoarthritic narrowing acromioclavicular joint.   No other significant arthritis.   No  fracture, malalignment or elbow joint effusion.     Study: MRI of right humerus  Date: 11/1/23  Impression: ** IMAGES NOT AVAILABLE FOR REVIEW, REPORT NOT AVAILABLE FOR REVIEW **    IEnrique PA-C, scribed this note in conjunction with and in the presence of Dr. Rick Lazo DO, who performed parts of the services as described in this documentation    Humerus Shaft Fracture Non-Operative Rehabilitation Protocol    PHASE II: FRACTURE HEALING  Week 2-6  · No lifting > weight of coffee cup   · Kruse fracture brace at all times  o Twice daily tightening of fracture brace   · Discontinue sling, continue use of cuff & collar as needed for comfort, continue upright posture   · Daily hygiene and skin checks in pendulum position   · Continue TID HEP: A/AAROM elbow, wrist and finger motion, biceps and triceps isometrics   · Start shoulder periscapular isometrics / shoulder posture   · No shoulder ROM other than pendulums for hygiene     PHASE III: EARLY REHABILITATION  Week 6-10 (following clinical fracture healing and no motion at the fracture site)   · No lifting > 5# at the side, or coffee cup overhead   · Part time Kruse bracing (if clinical fracture healing)  o Wear while outside of the house  o Remove while at home or at rest  · Add TID HEP shoulder 4 quadrant AAROM as tolerated with brace on unless pain free   o Pulleys, table slides, wall climbs, supine wand exercises in all planes     PHASE IV: RETURN TO FUNCTION  Week 11-14 (following clinical fracture healing)   · Add shoulder AROM, PROM as tolerated   · Generalized UE strengthening   · Activities as tolerated (if bony healing complete)   · Independent home exercise program   · Return to high level functional ADLs and simulation of work environment

## 2024-01-05 RX ORDER — OXYCODONE HYDROCHLORIDE 5 MG/1
5 TABLET ORAL
Qty: 14 TABLET | Refills: 0 | Status: SHIPPED | OUTPATIENT
Start: 2024-01-05 | End: 2024-01-19

## 2024-01-08 ENCOUNTER — TELEPHONE (OUTPATIENT)
Dept: HEMATOLOGY ONCOLOGY | Facility: CLINIC | Age: 64
End: 2024-01-08

## 2024-01-08 ENCOUNTER — TELEPHONE (OUTPATIENT)
Dept: SURGICAL ONCOLOGY | Facility: CLINIC | Age: 64
End: 2024-01-08

## 2024-01-08 DIAGNOSIS — I89.0 LYMPHEDEMA OF RIGHT ARM: Primary | ICD-10-CM

## 2024-01-08 DIAGNOSIS — I89.0 LYMPHEDEMA: ICD-10-CM

## 2024-01-08 DIAGNOSIS — C77.3 CARCINOMA OF LEFT BREAST METASTATIC TO AXILLARY LYMPH NODE (HCC): ICD-10-CM

## 2024-01-08 DIAGNOSIS — Z17.0 MALIGNANT NEOPLASM OF LOWER-INNER QUADRANT OF LEFT BREAST IN FEMALE, ESTROGEN RECEPTOR POSITIVE: ICD-10-CM

## 2024-01-08 DIAGNOSIS — C50.312 MALIGNANT NEOPLASM OF LOWER-INNER QUADRANT OF LEFT BREAST IN FEMALE, ESTROGEN RECEPTOR POSITIVE: ICD-10-CM

## 2024-01-08 DIAGNOSIS — C50.912 CARCINOMA OF LEFT BREAST METASTATIC TO AXILLARY LYMPH NODE (HCC): ICD-10-CM

## 2024-01-08 NOTE — TELEPHONE ENCOUNTER
Tried calling sushila back. No answer. Left direct office number as well as I'm available via teams or in basket. Awaiting return call/message

## 2024-01-08 NOTE — TELEPHONE ENCOUNTER
Called patient to advise according to PT she has an appointment on 1/15/24 at 1230. Advised I set up [facility] for her. Patient very appreciative. All questions answered at this time. Advised patient I will not continue to set up [facility] for her and that her pt office has the information to set her up for [facility] for all her follow up visits. Patient stated understanding. No additional needs.

## 2024-01-08 NOTE — TELEPHONE ENCOUNTER
Patient Call    Who are you speaking with? [facility] in [place]     If it is not the patient, are they listed on an active communication consent form? N/A   What is the reason for this call? [facility] is calling to speak to the office about transportation for the patient.     [facility] would also like to speak to speak to the team about an updated referral for the patient.       Does this require a call back? Yes  [facility] will be on lunch until 2:30PM and she would really like to get in contact with the office to speak to somebody.     If a call back is required, please list [facility] call back number [phone]   If a call back is required, advise that a message will be forwarded to their care team and someone will return their call as soon as possible.   Did you relay this information to the patient? Yes

## 2024-01-12 ENCOUNTER — TELEPHONE (OUTPATIENT)
Age: 64
End: 2024-01-12

## 2024-01-12 DIAGNOSIS — L03.113 CELLULITIS OF RIGHT UPPER EXTREMITY: Primary | ICD-10-CM

## 2024-01-12 NOTE — TELEPHONE ENCOUNTER
Patient called the RX Refill Line. Message is being forwarded to the office.     Patient is requesting  Penicclin VK 500mg every 12 hours    Please contact patient at  [phone]

## 2024-01-15 ENCOUNTER — EVALUATION (OUTPATIENT)
Dept: PHYSICAL THERAPY | Facility: CLINIC | Age: 64
End: 2024-01-15
Payer: MEDICARE

## 2024-01-15 DIAGNOSIS — Z17.0 MALIGNANT NEOPLASM OF LOWER-INNER QUADRANT OF LEFT BREAST IN FEMALE, ESTROGEN RECEPTOR POSITIVE: ICD-10-CM

## 2024-01-15 DIAGNOSIS — C50.912 CARCINOMA OF LEFT BREAST METASTATIC TO AXILLARY LYMPH NODE (HCC): ICD-10-CM

## 2024-01-15 DIAGNOSIS — I89.0 LYMPHEDEMA OF RIGHT ARM: ICD-10-CM

## 2024-01-15 DIAGNOSIS — C50.312 MALIGNANT NEOPLASM OF LOWER-INNER QUADRANT OF LEFT BREAST IN FEMALE, ESTROGEN RECEPTOR POSITIVE: ICD-10-CM

## 2024-01-15 DIAGNOSIS — C77.3 CARCINOMA OF LEFT BREAST METASTATIC TO AXILLARY LYMPH NODE (HCC): ICD-10-CM

## 2024-01-15 PROCEDURE — 97140 MANUAL THERAPY 1/> REGIONS: CPT

## 2024-01-15 PROCEDURE — 97161 PT EVAL LOW COMPLEX 20 MIN: CPT | Performed by: PHYSICAL THERAPIST

## 2024-01-15 RX ORDER — PENICILLIN V POTASSIUM 500 MG/1
500 TABLET ORAL EVERY 12 HOURS
Qty: 60 TABLET | Refills: 2 | Status: SHIPPED | OUTPATIENT
Start: 2024-01-15 | End: 2024-04-14

## 2024-01-15 NOTE — TELEPHONE ENCOUNTER
Patient called again stating that she is now completely out and needs this to be sent in. Please review

## 2024-01-15 NOTE — TELEPHONE ENCOUNTER
Patient's  called in regarding this refill. States his wife is getting really worried because she is completely out and takes 2 a day. They need this filled urgently.

## 2024-01-15 NOTE — PROGRESS NOTES
PT Evaluation     Today's date: 1/15/2024  Patient name: Eileen Matias  : 1960   MRN: [id number]  Referring provider: Rick Lazo DO  Dx:   Encounter Diagnosis     ICD-10-CM    1. 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 (HCC)  C50.912 Ambulatory Referral to PT/OT Lymphedema Therapy    C77.3       2. Malignant neoplasm of lower-inner quadrant of left breast in female, estrogen receptor positive   C50.312 Ambulatory Referral to PT/OT Lymphedema Therapy    Z17.0       3. Lymphedema of right arm  I89.0 Ambulatory Referral to PT/OT Lymphedema Therapy                     Assessment  Assessment details: Eileen Matias is a 63 y.o. female transferring to [facility] PT in [place] w/ dx of RUE lymphedema and R humeral fracture. Pt not yet allowed any motion of shoulder joint. In addition to being limited by fracture, pt presents w/ pain and edema significantly limiting function.   Pt will continue to benefit from skilled PT services in order to max function to allow pt to achieve goals in PT. Thank you.     Impairments: abnormal or restricted ROM, impaired physical strength and pain with function  Other impairment: lymphedema  Understanding of Dx/Px/POC: good   Prognosis: good    Goals  ST.Pain decreased by 25% in 4 weeks.  2. Edema decreased by 2-3 cm in 4 weeks.     LT. Decrease pain to 1-2/10 at worst by d/c.  2. Increase ROM to WFL for all deficient movements by d/c.  3. Strength increased to 5 for all deficient muscle groups by d/c.  4. IADL performance increased to max function by d/c.  5. Recreational performance increased to max function by d/c.  6. RUE edema decreased to that of LUE by d/c.   7. Pt will return to work by d/c.    Plan  Other planned modality interventions: other modalities PRN  Planned therapy interventions: abdominal trunk stabilization, ADL retraining, IADL retraining, flexibility, functional ROM exercises, graded exercise, home exercise program, manual  therapy, joint mobilization, neuromuscular re-education, patient education, postural training, strengthening, therapeutic exercise, therapeutic activities, massage and work reintegration  Other planned therapy interventions: other interventions PRN  Frequency: 1-2x/week.  Duration in weeks: 8  Plan of Care beginning date: 1/15/2024  Plan of Care expiration date: 3/11/2024  Treatment plan discussed with: patient        Subjective Evaluation    History of Present Illness  Mechanism of injury: Pt reports to IE w/ hx of RUE lymphedema which started approx 20 years ago approx 5 years following CA dx of desmoitosis.     Pt had done skilled PT tx for this condition in the past which was helpful control the issue. However, pt sustained humeral fx on 23 as a result of fall on RUE while trying to protect mother in law from a fall out of w/c. She reports that healing has been complicated by severe osteoporosis and lymphedema. Pt still required to wear brace at all times- not yet allowed any ROM in shoulder.     In addition to the above, pt w/ recent hx of L breast CA w/ L lumpectomy and lymph node removal (pt reports 17 lymph nodes) over on 22.        Patient Goals  Patient goals for therapy: decreased pain and decreased edema    Pain  Current pain ratin  At worst pain rating: 10  Location: R shoulder  Alleviating factors: lymphedema massage.  Exacerbated by: increased edema, movement.    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)  Hand dominance: right          Objective     General Comments:      Ankle/Foot Comments   Edema in R vs L w/ circumferential girth measurements as below (L/R):   Elbow Joint: 25 cm/31 cm  20 cm proximal to wrist: 26.5 cm/30.5 cm  10 cm proximal to wrist: 21 cm/24 cm  Wrist:  16.5 cm/19 cm  Palm: 18 cm/20.5 cm  MCP Joints (digits 2-5): 18 cm/20 cm  PIP Joints (digits 2-5): 16 cm/17.5  cm        Daily Treatment Diary    Precautions: HUMERAL FX ON 11/12/23 W/ DELAYED HEALING- NOT YET ALLOWED TO COME OUT OF BRACE; Hx of lymphedema; Hx of L breast CA  Co- Morbidities:   Patient Active Problem List   Diagnosis    Back pain, chronic    Chronic insomnia    Lymphedema of right arm    Hyperlipidemia, mixed    Peripheral neuropathy    Lymphedema    Desmoid tumor    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 (HCC)    Malignant neoplasm of lower-inner quadrant of left breast in female, estrogen receptor positive     HTN, goal below 140/90    Use of letrozole (Femara)    History of lumpectomy of left breast    Humerus fracture    Chronic back pain    Continuous opioid dependence (HCC)         EPOC: 3/4/24 1/15      RA DATE: 2/12/24       Manual           MLD [facility]      Manual Wrapping [facility]                             Exercise Diary 1/15         THEREX       Pt ed       Elbow/Wrist/hand AROM                                                                         NEURO RE-ED           Scapular squeeze                                                                                                                                                           Modalities           CP

## 2024-01-17 ENCOUNTER — TELEPHONE (OUTPATIENT)
Age: 64
End: 2024-01-17

## 2024-01-17 ENCOUNTER — OFFICE VISIT (OUTPATIENT)
Dept: PHYSICAL THERAPY | Facility: CLINIC | Age: 64
End: 2024-01-17
Payer: MEDICARE

## 2024-01-17 DIAGNOSIS — I89.0 LYMPHEDEMA OF RIGHT ARM: Primary | ICD-10-CM

## 2024-01-17 PROCEDURE — 97110 THERAPEUTIC EXERCISES: CPT

## 2024-01-17 PROCEDURE — 97140 MANUAL THERAPY 1/> REGIONS: CPT

## 2024-01-17 NOTE — TELEPHONE ENCOUNTER
Caller: Patient    Doctor/Office: Orthopedics    Call regarding :  Calling to ensure transportation is set up for PT appointment today.     Call was transferred to: PT office [place]

## 2024-01-17 NOTE — PROGRESS NOTES
Daily Note     Today's date: 2024  Patient name: Eileen Matias  : 1960  MRN: [id number]  Referring provider: Rick Lazo DO  Dx:   Encounter Diagnosis     ICD-10-CM    1. Lymphedema of right arm  I89.0                      Subjective: pt reports having some pain and increased swelling due to having to large of glove on.       Objective: See treatment diary below      Assessment: Tolerated treatment well. Patient would benefit from continued PT. Discussed different compression options. Continued to utilize MLD and compression bandaging to help manage sx.       Plan: Continue per plan of care.      Daily Treatment Diary    Precautions: HUMERAL FX ON 23 W/ DELAYED HEALING- NOT YET ALLOWED TO COME OUT OF BRACE; Hx of lymphedema; Hx of L breast CA  Co- Morbidities:   Patient Active Problem List   Diagnosis    Back pain, chronic    Chronic insomnia    Lymphedema of right arm    Hyperlipidemia, mixed    Peripheral neuropathy    Lymphedema    Desmoid tumor    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 (HCC)    Malignant neoplasm of lower-inner quadrant of left breast in female, estrogen receptor positive     HTN, goal below 140/90    Use of letrozole (Femara)    History of lumpectomy of left breast    Humerus fracture    Chronic back pain    Continuous opioid dependence (HCC)         EPOC: 3/4/24 1/15 1/17     RA DATE: 24       Manual           MLD [facility]     Manual Wrapping [facility]                            Exercise Diary 1/15  1/17       THEREX       Pt ed  JH     Elbow/Wrist/hand AROM                                                                         NEURO RE-ED           Scapular squeeze                                                                                                                                                           Modalities           CP

## 2024-01-18 ENCOUNTER — OFFICE VISIT (OUTPATIENT)
Dept: OBGYN CLINIC | Facility: MEDICAL CENTER | Age: 64
End: 2024-01-18

## 2024-01-18 ENCOUNTER — APPOINTMENT (OUTPATIENT)
Dept: RADIOLOGY | Facility: MEDICAL CENTER | Age: 64
End: 2024-01-18
Payer: MEDICARE

## 2024-01-18 VITALS
DIASTOLIC BLOOD PRESSURE: 80 MMHG | HEART RATE: 71 BPM | WEIGHT: 155 LBS | SYSTOLIC BLOOD PRESSURE: 144 MMHG | BODY MASS INDEX: 23.49 KG/M2 | HEIGHT: 68 IN

## 2024-01-18 DIAGNOSIS — Z47.89 AFTERCARE FOLLOWING SURGERY OF THE MUSCULOSKELETAL SYSTEM: Primary | ICD-10-CM

## 2024-01-18 DIAGNOSIS — Z47.89 AFTERCARE FOLLOWING SURGERY OF THE MUSCULOSKELETAL SYSTEM: ICD-10-CM

## 2024-01-18 PROCEDURE — 99024 POSTOP FOLLOW-UP VISIT: CPT | Performed by: STUDENT IN AN ORGANIZED HEALTH CARE EDUCATION/TRAINING PROGRAM

## 2024-01-18 PROCEDURE — 73060 X-RAY EXAM OF HUMERUS: CPT

## 2024-01-18 NOTE — PROGRESS NOTES
Orthopedic Surgery Nonoperative Management Note  Eileen Matias (63 y.o. female)  : 1960 Encounter Date: 2024  Dr. Rick Lazo, , Orthopedic Surgeon  Orthopedic Oncology & Sarcoma Surgery   Assessment/Plan:  63 y.o. female 8 weeks follow up status post right midshaft humerus fracture     Problem List Items Addressed This Visit    None  Visit Diagnoses     Aftercare following surgery of the musculoskeletal system    -  Primary    Relevant Orders    XR humerus right        Right Humerus Fracture  Continue babb bracing when outside of home, may remove when at home and sleeping  Reviewed XR with patient   Return in 4 weeks for XR of right humerus without brace for alignment check  Clinical healing is present with ability to lift her right arm without the babb brace without movement in the humerus  Continue in non-operative humeral shaft fracture protocol within weeks 6-10.    Significant Right Arm Lymphedema   She has found a new provider for lymphedema that she has seen beginning last week      Return in about 4 weeks (around 2/15/2024) for Recheck, Repeat X-ray.    _____________________________________________________  CHIEF COMPLAINT:  Chief Complaint   Patient presents with   • Right Shoulder - Follow-up     SUBJECTIVE:  Eileen Matias is a 63 y.o. year old female who presents 6 weeks s/p right humerus fracture.   DOI: 23    Patient states that she is still having moderate amount of pain, but feels that it relates more to the lymphedema than the humeral fracture. She has seen a new provider for lymphedema beginning last week and feels that the swelling has decreased moderately.     Prior HPI:  She was seen at [facility] for right arm pain at the elbow with pronation and supination. X-rays were obtained at time of visit, no images are available for review, radiologist report is available. Patient has since transitioned to [facility]. She was seen in the ED on 23 for  "right arm pain after a fall. ED note states that a closed reduction was performed in the ED at time of visit. Medication refill note from  states that she is unable to use her lymphedema machine and is experiencing swelling in her right arm. On presentation today, patient states that she was pushing her mother in law in a wheelchair when the wheelchair started to tip forward and she tried to catch it to prevent her MIL from falling out of the wheelchair. She believes that she \"lost control\" of her right arm, and then fell onto her right shoulder trying to protect the right elbow. She reports that she has been unable to use her lymphedema bag since the injury. She states that she has had a physical therapist come into her home 2/wk since the injury who performs lymphedema massage on her right hand and forearm to increase blood flow.    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 She had a latissimus rotational skin pedicle flap  performed after having multiple recurrences. Within the same year she had a reoccurrence of a desmoid tumor and another resection of her back. Patient states that her last tumor related operation was in 2010. Patient was cancer free for 8 years, until last year when she developed breast cancer on her left side. She states that she had radiation, 16 lymph nodes were removed and none of the lymph nodes were positive. She states that she had no radiation to the lymph nodes. She states that she developed lymphedema within 1 year after the diagnosis of the desmoid fibromatosis. She was previously using the lymphedema treatment as needed, which progressed to using 3x/daily every day.     PAST MEDICAL HISTORY:  Past Medical History:   Diagnosis Date   • Abnormal mammogram 05/15/2013   • Anemia    • Cancer (HCC)     desmoitosis   • 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 (HCC) " 04/19/2022   • Chronic pain disorder     worse right side of body   • Desmoid tumor     Last Assessed: 6/19/2017   • fibroidal cyst 1999?  Hysterectomy done   • History of transfusion     no reactions   • Hyperlipidemia    • Hypertension    • Osteoporosis    • PONV (postoperative nausea and vomiting)        PAST SURGICAL HISTORY:  Past Surgical History:   Procedure Laterality Date   • BREAST LUMPECTOMY Left 6/21/2022    Procedure: ANITA  DIRECTED LUMPECTOMY;  Surgeon: Amanda Motley MD;  Location: [facility];  Service: Surgical Oncology   • FRACTURE SURGERY     • HYSTERECTOMY     • LYMPH NODE DISSECTION Left 6/21/2022    Procedure: AXILLARY DISSECTION LEVEL I AND LEVEL II LYMPH NODES;  Surgeon: Amanda Motley MD;  [facility]: [facility];  Service: Surgical Oncology   • MRI BREAST BIOPSY LEFT (ALL INCLUSIVE) Left 5/20/2022   • MRI BREAST BIOPSY RIGHT (ALL INCLUSIVE) Right 5/20/2022   • OTHER SURGICAL HISTORY      Arm Incision: Dermoid tumor, right Arm    • PARTIAL HYSTERECTOMY     • IL COLONOSCOPY FLX DX W/COLLJ SPEC WHEN PFRMD N/A 8/15/2017    Procedure: COLONOSCOPY;  Surgeon: Alec England MD;  Location: [facility];  Service: Gastroenterology   • IL NDSC WRST SURG W/RLS TRANSVRS CARPL LIGM Left 8/10/2021    Procedure: RELEASE LEFT CARPAL TUNNEL ENDOSCOPIC;  Surgeon: Chris Camacho MD;  [facility];  Service: Orthopedics   • IL OPTX DSTL RADL I-ARTIC FX/EPIPHYSL SEP 3 FRAG Left 2/9/2021    Procedure: OPEN REDUCTION W/ INTERNAL FIXATION (ORIF) RADIUS / ULNA (WRIST) left;  Surgeon: Chris Camacho MD;  [facility];  Service: Orthopedics   • SKIN BIOPSY     • SKIN CANCER EXCISION      Melanoma Excision    • TONSILLECTOMY     • US BREAST NEEDLE LOC LEFT Left 6/16/2022   • US BREAST NEEDLE LOC RIGHT EACH ADDITIONAL Right 6/16/2022   • US GUIDED BREAST LYMPH NODE BIOPSY LEFT Left 4/14/2022       FAMILY HISTORY:  Family History   Problem Relation Age of Onset   • Diabetes Mother     • No Known Problems Father    • Cancer Sister    • Lung cancer Sister    • Pancreatic cancer Sister    • No Known Problems Maternal Grandmother    • No Known Problems Maternal Grandfather    • No Known Problems Paternal Grandmother    • No Known Problems Paternal Grandfather    • Breast cancer Neg Hx        SOCIAL HISTORY:  Social History     Tobacco Use   • Smoking status: Never   • Smokeless tobacco: Never   Vaping Use   • Vaping status: Never Used   Substance Use Topics   • Alcohol use: Not Currently     Alcohol/week: 5.0 standard drinks of alcohol     Types: 5 Shots of liquor per week     Comment: Used mostly as a painkiller when needed before bedtime   • Drug use: Yes     Frequency: 28.0 times per week     Types: Marijuana     Comment: THC Medical marijuana       MEDICATIONS:    Current Outpatient Medications:   •  cholecalciferol (VITAMIN D3) 1,000 units tablet, Take 3,000 Units by mouth daily, Disp: , Rfl:   •  diphenhydrAMINE (BENADRYL) 50 MG tablet, Take 50 mg by mouth daily at bedtime as needed for itching, Disp: , Rfl:   •  dronabinol (MARINOL) 10 MG capsule, , Disp: , Rfl:   •  ezetimibe (ZETIA) 10 mg tablet, Take 10 mg by mouth daily, Disp: , Rfl:   •  gabapentin (Neurontin) 300 mg capsule, Take 1 capsule (300 mg total) by mouth 3 (three) times a day, Disp: 90 capsule, Rfl: 0  •  letrozole (FEMARA) 2.5 mg tablet, TAKE 1 TABLET BY MOUTH EVERY DAY, Disp: 90 tablet, Rfl: 3  •  multivitamin (THERAGRAN) TABS, Take 1 tablet by mouth daily, Disp: , Rfl:   •  Omega-3 Fatty Acids (FISH OIL PO), Take by mouth, Disp: , Rfl:   •  oxyCODONE (Roxicodone) 5 immediate release tablet, Take 1 tablet (5 mg total) by mouth daily at bedtime as needed for moderate pain for up to 14 days Max Daily Amount: 5 mg, Disp: 14 tablet, Rfl: 0  •  penicillin V potassium (VEETID) 500 mg tablet, Take 1 tablet (500 mg total) by mouth every 12 (twelve) hours, Disp: 60 tablet, Rfl: 2  •  Probiotic Product (PROBIOTIC-10 PO), Take by  "mouth, Disp: , Rfl:   •  oxyCODONE-acetaminophen (PERCOCET) 5-325 mg per tablet, , Disp: , Rfl:   •  senna-docusate sodium (SENOKOT-S) 8.6-50 mg per tablet, Take 1 tablet by mouth daily (Patient not taking: Reported on 1/18/2024), Disp: 7 tablet, Rfl: 0    ALLERGIES:  Allergies   Allergen Reactions   • Chlorpromazine Anaphylaxis     PHENOTHIAZINE=ANAPHYLAXIS   • Codeine Anaphylaxis     Anaphylaxis  abd pain.   • Meperidine Anaphylaxis, Hives and Vomiting   • Phenothiazines Anaphylaxis and Throat Swelling     Category: Allergy;    • Saccharin - Food Allergy Anaphylaxis   • Ampicillin-Sulbactam Sodium Hives   • Aspirin GI Intolerance and Abdominal Pain     Severe GERD   • Gluten Meal - Food Allergy GI Intolerance   • Ibuprofen Hives     H   • Levofloxacin Hives   • Chocolate - Food Allergy GI Intolerance   • Lactose - Food Allergy GI Intolerance   • Neomycin Other (See Comments), Edema and Hives     Category: Allergy;   swelling   • Citrus - Food Allergy Rash   • Latex Hives and Rash     Category: Allergy;        Review of systems:   Constitutional: Negative for fatigue, fever or loss of apetite.   HENT: Negative.    Respiratory: Negative for shortness of breath, dyspnea.    Cardiovascular: Negative for chest pain/tightness.   Gastrointestinal: Negative for abdominal pain, N/V.   Endocrine: Negative for cold/heat intolerance, unexplained weight loss/gain.   Genitourinary: Negative for flank pain, dysuria, hematuria.   Musculoskeletal: As noted in HPI   Skin: Negative for rash.    Neurological: Intact  Psychiatric/Behavioral: Negative for agitation.  _____________________________________________________  PHYSICAL EXAMINATION:    Blood pressure 144/80, pulse 71, height 5' 8\" (1.727 m), weight 70.3 kg (155 lb).    General: well developed and well nourished, alert, oriented times 3, and appears comfortable  Psychiatric: Normal  HEENT: Benign  Cardiovascular: Regular    Pulmonary: No wheezing or stridor  Abdomen: Soft, " Nontender  Skin: No masses, erythema, lacerations, fluctation, ulcerations.  Lymphedema present in right arm  Neurovascular: Intact    MUSCULOSKELETAL EXAMINATION:  right Shoulder -   No anatomical deformity  Skin is warm and dry to touch with no signs of erythema, ecchymosis, or infection  Moderate soft tissue swelling or effusion noted due to lymphedema   Demonstrates normal elbow, wrist, and finger motion  Able to flex shoulder to approximately 75 degrees of flexion  2+  distal radial pulse with brisk capillary refill to the fingers  Radial, median, ulnar and motor and sensory distribution intact  Sensation to light touch intact distally      _____________________________________________________  STUDIES REVIEWED:  Reviewed physical exam and imaging with patient at time of visit. Radiographic findings demonstrate closed nondisplaced spiral fracture of the right humerus.     Study: XR right humerus  Date: 1/18/2024  No radiologist report available. I have personally reviewed all imaging.  Well-maintained alignment of midshaft humerus fracture with increased evidence bony callous and healing.    Study: XR right humerus  Date: 1/4/23  Impression: No radiologist report available.  My impression is as follows: well-maintained alignment of midshaft humerus fracture with some increased evidence of callous and healing, with some bridging. Better alignment     Study: XR right himerus  Date: 12/14/23  Impression: No radiologist report available.  My impression is as follows: well-maintained alignment of midshaft humerus fracture with some minimal evidence of callous and healing.     Study: NM WBBS  Date: 12/8/23  Impression: I have read and agree with the radiologist report.  My impression is as follows:  1. Linear radiotracer uptake at the right humeral shaft corresponding to known fracture site. Difficult to exclude a pathologic fracture given the fracture activity here on bone scan.  2. Otherwise, no findings suspicious  for osseous metastasis.      Study: XR of right humerus  Date: 11/30/23  Impression: I have read and agree with the radiologist report.  My impression is as follows:  Closed nondisplaced spiral fracture of the right humerus.  Bone looks chronically osteopenic which could be related to radiation therapy.  No current evidence of pathologic fracture at this time  Status post casting of mid diaphyseal fracture of the humerus with mild angulation as seen on prior exam. There is no evidence of significant interval healing.     Study: XR of right humerus  Date: 11/12/23  Impression: I have read and agree with the radiologist report.  My impression is as follows:  FINDINGS:  Evaluation of fine bony detail of the right humerus is limited by overlying cast material  There is a mildly displaced, spiral fracture of the midshaft of the right humerus. There is a somewhat mottled appearance of the right humeral diaphysis, which may be related to sequela of prior radiation therapy.  There is a small exophytic density adjacent to the midshaft of the humerus which was present in the soft tissues on the prior CT from 2020.  Multiple surgical clips are seen in the right axillary region and throughout the proximal right upper extremity.  No shoulder dislocation.  No evidence of AC joint separation.  The visualized portions of the lung are clear.  IMPRESSION:  Mildly displaced, spiral fracture of the midshaft of right humerus. No shoulder dislocation.  Somewhat mottled appearance of the right humeral diaphysis, which may be related to prior radiation therapy. Correlation with MRI may be helpful, which is already scheduled for 11/20/2023.    Study: XR of right humerus  Date: 10/23/23  Impression: **NO IMAGE AVAILABLE FOR REVIEW, RADIOLOGIST REPORT AVAILABLE FOR REVIEW**  IMPRESSION:   Surgical clips throughout the right axilla and throughout the lateral soft   tissues of the humerus.   Increased lateral soft tissue density mid humerus for  which MRI is recommended   to exclude a mass.   No destructive humeral osseous lesion seen.   Focal areas of small cortical solid calcifications or postsurgical changes mid lateral humeral shaft.   Demineralization throughout the right arm and forearm.   Mild osteoarthritic narrowing acromioclavicular joint.   No other significant arthritis.   No fracture, malalignment or elbow joint effusion.     Study: MRI of right humerus  Date: 11/1/23  Impression: ** IMAGES NOT AVAILABLE FOR REVIEW, REPORT NOT AVAILABLE FOR REVIEW **    Scribe Attestation    I,:  Lacy Escobar am acting as a scribe while in the presence of the attending physician.:       I,:  Rick Lazo DO personally performed the services described in this documentation    as scribed in my presence.:             Humerus Shaft Fracture Non-Operative Rehabilitation Protocol    PHASE II: FRACTURE HEALING  Week 2-6  · No lifting > weight of coffee cup   · Kruse fracture brace at all times  o Twice daily tightening of fracture brace   · Discontinue sling, continue use of cuff & collar as needed for comfort, continue upright posture   · Daily hygiene and skin checks in pendulum position   · Continue TID HEP: A/AAROM elbow, wrist and finger motion, biceps and triceps isometrics   · Start shoulder periscapular isometrics / shoulder posture   · No shoulder ROM other than pendulums for hygiene     PHASE III: EARLY REHABILITATION  Week 6-10 (following clinical fracture healing and no motion at the fracture site)   · No lifting > 5# at the side, or coffee cup overhead   · Part time Kruse bracing (if clinical fracture healing)  o Wear while outside of the house  o Remove while at home or at rest  · Add TID HEP shoulder 4 quadrant AAROM as tolerated with brace on unless pain free   o Pulleys, table slides, wall climbs, supine wand exercises in all planes     PHASE IV: RETURN TO FUNCTION  Week 11-14 (following clinical fracture healing)   · Add shoulder AROM,  PROM as tolerated   · Generalized UE strengthening   · Activities as tolerated (if bony healing complete)   · Independent home exercise program   · Return to high level functional ADLs and simulation of work environment

## 2024-01-23 ENCOUNTER — OFFICE VISIT (OUTPATIENT)
Dept: PHYSICAL THERAPY | Facility: CLINIC | Age: 64
End: 2024-01-23
Payer: MEDICARE

## 2024-01-23 DIAGNOSIS — Z17.0 MALIGNANT NEOPLASM OF LOWER-INNER QUADRANT OF LEFT BREAST IN FEMALE, ESTROGEN RECEPTOR POSITIVE: ICD-10-CM

## 2024-01-23 DIAGNOSIS — I89.0 LYMPHEDEMA OF RIGHT ARM: Primary | ICD-10-CM

## 2024-01-23 DIAGNOSIS — C50.312 MALIGNANT NEOPLASM OF LOWER-INNER QUADRANT OF LEFT BREAST IN FEMALE, ESTROGEN RECEPTOR POSITIVE: ICD-10-CM

## 2024-01-23 DIAGNOSIS — C77.3 CARCINOMA OF LEFT BREAST METASTATIC TO AXILLARY LYMPH NODE (HCC): ICD-10-CM

## 2024-01-23 DIAGNOSIS — C50.912 CARCINOMA OF LEFT BREAST METASTATIC TO AXILLARY LYMPH NODE (HCC): ICD-10-CM

## 2024-01-23 PROCEDURE — 97140 MANUAL THERAPY 1/> REGIONS: CPT

## 2024-01-23 PROCEDURE — 97110 THERAPEUTIC EXERCISES: CPT

## 2024-01-23 NOTE — PROGRESS NOTES
Daily Note     Today's date: 2024  Patient name: Eileen Matias  : 1960  MRN: [id number]  Referring provider: Rick Lazo DO  Dx:   Encounter Diagnosis     ICD-10-CM    1. Lymphedema of right arm  I89.0       2. 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 (HCC)  C50.912     C77.3       3. Malignant neoplasm of lower-inner quadrant of left breast in female, estrogen receptor positive   C50.312     Z17.0                      Subjective: pt reports being able to remove brace and now has to wear her nighttime garment instead of the compression bandaging.       Objective: See treatment diary below      Assessment: Tolerated treatment well. Patient would benefit from continued PT. Continued POC as listed below w/ good tolerance. Able to massage entire arm this session due to patient able to take brace off. Pt was told by ortho to not lift anything more than 5 lbs, therefore, should not use compression bandaging. Pt is able to wear her nighttime garment w/ good tolerance and good results.       Plan: Continue per plan of care.      Daily Treatment Diary    Precautions: HUMERAL FX ON 23 W/ DELAYED HEALING- NOT YET ALLOWED TO COME OUT OF BRACE; Hx of lymphedema; Hx of L breast CA  Co- Morbidities:   Patient Active Problem List   Diagnosis    Back pain, chronic    Chronic insomnia    Lymphedema of right arm    Hyperlipidemia, mixed    Peripheral neuropathy    Lymphedema    Desmoid tumor    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 (HCC)    Malignant neoplasm of lower-inner quadrant of left breast in female, estrogen receptor positive     HTN, goal below 140/90    Use of letrozole (Femara)    History of lumpectomy of left breast    Humerus fracture    Chronic back pain    Continuous opioid dependence (HCC)         EPOC: 3/4/24 1/15 1/17 1/23    RA DATE: 24       Manual           MLD [facility]    Manual Wrapping [facility]                           Exercise Diary 1/15   1/17 1/23     THEREX       Pt ed  JH JH    Elbow/Wrist/hand AROM                                                                         NEURO RE-ED           Scapular squeeze                                                                                                                                                           Modalities           CP

## 2024-01-25 ENCOUNTER — OFFICE VISIT (OUTPATIENT)
Dept: PHYSICAL THERAPY | Facility: CLINIC | Age: 64
End: 2024-01-25
Payer: MEDICARE

## 2024-01-25 DIAGNOSIS — C77.3 CARCINOMA OF LEFT BREAST METASTATIC TO AXILLARY LYMPH NODE (HCC): ICD-10-CM

## 2024-01-25 DIAGNOSIS — C50.912 CARCINOMA OF LEFT BREAST METASTATIC TO AXILLARY LYMPH NODE (HCC): ICD-10-CM

## 2024-01-25 DIAGNOSIS — C50.312 MALIGNANT NEOPLASM OF LOWER-INNER QUADRANT OF LEFT BREAST IN FEMALE, ESTROGEN RECEPTOR POSITIVE: ICD-10-CM

## 2024-01-25 DIAGNOSIS — I89.0 LYMPHEDEMA OF RIGHT ARM: Primary | ICD-10-CM

## 2024-01-25 DIAGNOSIS — Z17.0 MALIGNANT NEOPLASM OF LOWER-INNER QUADRANT OF LEFT BREAST IN FEMALE, ESTROGEN RECEPTOR POSITIVE: ICD-10-CM

## 2024-01-25 PROCEDURE — 97140 MANUAL THERAPY 1/> REGIONS: CPT

## 2024-01-25 PROCEDURE — 97110 THERAPEUTIC EXERCISES: CPT

## 2024-01-25 NOTE — PROGRESS NOTES
Daily Note     Today's date: 2024  Patient name: Eileen Matias  : 1960  MRN: [id number]  Referring provider: Rick Lazo DO  Dx:   Encounter Diagnosis     ICD-10-CM    1. Lymphedema of right arm  I89.0       2. 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 (HCC)  C50.912     C77.3       3. Malignant neoplasm of lower-inner quadrant of left breast in female, estrogen receptor positive   C50.312     Z17.0                      Subjective: pt reports the night garment seems to be helping for now, stated she will need a new one eventually.       Objective: See treatment diary below      Assessment: Tolerated treatment well. Patient would benefit from continued PT. Continued w/ MLD to improve lymphatic flow and decrease swelling. Discussed how pt would benefit more from custom compression daytime/nighttime garments due to the size and the swelling of the arm/hand. Custom garments will contain the swelling more efficiently compared to off the shelf garments.       Plan: Continue per plan of care.      Daily Treatment Diary    Precautions: HUMERAL FX ON 23 W/ DELAYED HEALING- NOT YET ALLOWED TO COME OUT OF BRACE; Hx of lymphedema; Hx of L breast CA  Co- Morbidities:   Patient Active Problem List   Diagnosis    Back pain, chronic    Chronic insomnia    Lymphedema of right arm    Hyperlipidemia, mixed    Peripheral neuropathy    Lymphedema    Desmoid tumor    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 (HCC)    Malignant neoplasm of lower-inner quadrant of left breast in female, estrogen receptor positive     HTN, goal below 140/90    Use of letrozole (Femara)    History of lumpectomy of left breast    Humerus fracture    Chronic back pain    Continuous opioid dependence (HCC)         EPOC: 3/4/24 1/15 1/17 1/23 1/25   RA DATE: 24       Manual           MLD [facility]   Manual Wrapping [facility]                           Exercise Diary 1/15  1/17  1/23  1/25   THEREX        Pt ed  JH JH JH   Elbow/Wrist/hand AROM                                                                         NEURO RE-ED           Scapular squeeze                                                                                                                                                           Modalities           CP

## 2024-01-29 ENCOUNTER — OFFICE VISIT (OUTPATIENT)
Dept: PHYSICAL THERAPY | Facility: CLINIC | Age: 64
End: 2024-01-29
Payer: MEDICARE

## 2024-01-29 DIAGNOSIS — Z17.0 MALIGNANT NEOPLASM OF LOWER-INNER QUADRANT OF LEFT BREAST IN FEMALE, ESTROGEN RECEPTOR POSITIVE: ICD-10-CM

## 2024-01-29 DIAGNOSIS — C50.912 CARCINOMA OF LEFT BREAST METASTATIC TO AXILLARY LYMPH NODE (HCC): ICD-10-CM

## 2024-01-29 DIAGNOSIS — I89.0 LYMPHEDEMA OF RIGHT ARM: Primary | ICD-10-CM

## 2024-01-29 DIAGNOSIS — C77.3 CARCINOMA OF LEFT BREAST METASTATIC TO AXILLARY LYMPH NODE (HCC): ICD-10-CM

## 2024-01-29 DIAGNOSIS — C50.312 MALIGNANT NEOPLASM OF LOWER-INNER QUADRANT OF LEFT BREAST IN FEMALE, ESTROGEN RECEPTOR POSITIVE: ICD-10-CM

## 2024-01-29 PROCEDURE — 97140 MANUAL THERAPY 1/> REGIONS: CPT

## 2024-01-29 NOTE — PROGRESS NOTES
Daily Note     Today's date: 2024  Patient name: Eileen Matias  : 1960  MRN: [id number]  Referring provider: Rick Lazo DO  Dx:   Encounter Diagnosis     ICD-10-CM    1. Lymphedema of right arm  I89.0       2. 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 (HCC)  C50.912     C77.3       3. Malignant neoplasm of lower-inner quadrant of left breast in female, estrogen receptor positive   C50.312     Z17.0                      Subjective: pt reports the swelling is maintaining.       Objective: See treatment diary below      Assessment: Tolerated treatment well. Patient would benefit from continued PT.      Plan: Continue per plan of care.      Daily Treatment Diary    Precautions: HUMERAL FX ON 23 W/ DELAYED HEALING- NOT YET ALLOWED TO COME OUT OF BRACE; Hx of lymphedema; Hx of L breast CA  Co- Morbidities:   Patient Active Problem List   Diagnosis    Back pain, chronic    Chronic insomnia    Lymphedema of right arm    Hyperlipidemia, mixed    Peripheral neuropathy    Lymphedema    Desmoid tumor    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 (HCC)    Malignant neoplasm of lower-inner quadrant of left breast in female, estrogen receptor positive     HTN, goal below 140/90    Use of letrozole (Femara)    History of lumpectomy of left breast    Humerus fracture    Chronic back pain    Continuous opioid dependence (HCC)         EPOC: 3/4/24 1/29   1/25   RA DATE: 24       Manual        MLD JH   ZOEY   Manual Wrapping                              Exercise Diary    THEREX       Pt ed    JH   Elbow/Wrist/hand AROM                                                       NEURO RE-ED        Scapular squeeze                                                                                                                    Modalities           CP

## 2024-02-07 ENCOUNTER — OFFICE VISIT (OUTPATIENT)
Dept: PHYSICAL THERAPY | Facility: CLINIC | Age: 64
End: 2024-02-07
Payer: MEDICARE

## 2024-02-07 DIAGNOSIS — C50.312 MALIGNANT NEOPLASM OF LOWER-INNER QUADRANT OF LEFT BREAST IN FEMALE, ESTROGEN RECEPTOR POSITIVE: ICD-10-CM

## 2024-02-07 DIAGNOSIS — Z17.0 MALIGNANT NEOPLASM OF LOWER-INNER QUADRANT OF LEFT BREAST IN FEMALE, ESTROGEN RECEPTOR POSITIVE: ICD-10-CM

## 2024-02-07 DIAGNOSIS — I89.0 LYMPHEDEMA OF RIGHT ARM: Primary | ICD-10-CM

## 2024-02-07 PROCEDURE — 97140 MANUAL THERAPY 1/> REGIONS: CPT

## 2024-02-07 NOTE — PROGRESS NOTES
Daily Note     Today's date: 2024  Patient name: Eileen Matias  : 1960  MRN: [id number]  Referring provider: Rick Lazo DO  Dx:   Encounter Diagnosis     ICD-10-CM    1. Lymphedema of right arm  I89.0       2. Malignant neoplasm of lower-inner quadrant of left breast in female, estrogen receptor positive   C50.312     Z17.0                      Subjective: pt reports she had a couple days this past week that her arm was more bothersome than other days.       Objective: See treatment diary below      Assessment: Tolerated treatment well. Patient would benefit from continued PT. Swelling is starting to be maintained better w/ mld and compression. Pt noted she had less pain with the MLD compared to previous times.       Plan: Continue per plan of care.      Daily Treatment Diary    Precautions: HUMERAL FX ON 23 W/ DELAYED HEALING- NOT YET ALLOWED TO COME OUT OF BRACE; Hx of lymphedema; Hx of L breast CA  Co- Morbidities:   Patient Active Problem List   Diagnosis    Back pain, chronic    Chronic insomnia    Lymphedema of right arm    Hyperlipidemia, mixed    Peripheral neuropathy    Lymphedema    Desmoid tumor    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 (HCC)    Malignant neoplasm of lower-inner quadrant of left breast in female, estrogen receptor positive     HTN, goal below 140/90    Use of letrozole (Femara)    History of lumpectomy of left breast    Humerus fracture    Chronic back pain    Continuous opioid dependence (HCC)         EPOC: 3/4/24 1/29 2/7  1/25   RA DATE: 24       Manual        MLD [facility]   Manual Wrapping                              Exercise Diary    THEREX       Pt ed    JH   Elbow/Wrist/hand AROM                                                       NEURO RE-ED        Scapular squeeze                                                                                                                    Modalities           CP

## 2024-02-12 ENCOUNTER — OFFICE VISIT (OUTPATIENT)
Dept: PHYSICAL THERAPY | Facility: CLINIC | Age: 64
End: 2024-02-12
Payer: MEDICARE

## 2024-02-12 DIAGNOSIS — I89.0 LYMPHEDEMA OF RIGHT ARM: Primary | ICD-10-CM

## 2024-02-12 PROCEDURE — 97140 MANUAL THERAPY 1/> REGIONS: CPT

## 2024-02-12 NOTE — PROGRESS NOTES
Daily Note     Today's date: 2024  Patient name: Eileen Matias  : 1960  MRN: [id number]  Referring provider: Rick Lazo DO  Dx:   Encounter Diagnosis     ICD-10-CM    1. Lymphedema of right arm  I89.0                      Subjective: pt reports the arm is more sore today due to the incoming weather.       Objective: See treatment diary below      Assessment: Tolerated treatment well. Patient would benefit from continued PT.       Plan: Continue per plan of care.      Daily Treatment Diary    Precautions: HUMERAL FX ON 23 W/ DELAYED HEALING- NOT YET ALLOWED TO COME OUT OF BRACE; Hx of lymphedema; Hx of L breast CA  Co- Morbidities:   Patient Active Problem List   Diagnosis    Back pain, chronic    Chronic insomnia    Lymphedema of right arm    Hyperlipidemia, mixed    Peripheral neuropathy    Lymphedema    Desmoid tumor    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 (HCC)    Malignant neoplasm of lower-inner quadrant of left breast in female, estrogen receptor positive     HTN, goal below 140/90    Use of letrozole (Femara)    History of lumpectomy of left breast    Humerus fracture    Chronic back pain    Continuous opioid dependence (HCC)         EPOC: 3/4/24 1/29 2/7 2/12 1/25   RA DATE: 24       Manual        MLD [facility]   Manual Wrapping                              Exercise Diary    THEREX       Pt ed    JH   Elbow/Wrist/hand AROM                                                       NEURO RE-ED        Scapular squeeze                                                                                                                    Modalities           CP

## 2024-02-14 ENCOUNTER — EVALUATION (OUTPATIENT)
Dept: PHYSICAL THERAPY | Facility: CLINIC | Age: 64
End: 2024-02-14
Payer: MEDICARE

## 2024-02-14 DIAGNOSIS — I89.0 LYMPHEDEMA OF RIGHT ARM: Primary | ICD-10-CM

## 2024-02-14 DIAGNOSIS — Z17.0 MALIGNANT NEOPLASM OF LOWER-INNER QUADRANT OF LEFT BREAST IN FEMALE, ESTROGEN RECEPTOR POSITIVE: ICD-10-CM

## 2024-02-14 DIAGNOSIS — C50.312 MALIGNANT NEOPLASM OF LOWER-INNER QUADRANT OF LEFT BREAST IN FEMALE, ESTROGEN RECEPTOR POSITIVE: ICD-10-CM

## 2024-02-14 DIAGNOSIS — C77.3 CARCINOMA OF LEFT BREAST METASTATIC TO AXILLARY LYMPH NODE (HCC): ICD-10-CM

## 2024-02-14 DIAGNOSIS — C50.912 CARCINOMA OF LEFT BREAST METASTATIC TO AXILLARY LYMPH NODE (HCC): ICD-10-CM

## 2024-02-14 PROCEDURE — 97110 THERAPEUTIC EXERCISES: CPT

## 2024-02-14 PROCEDURE — 97140 MANUAL THERAPY 1/> REGIONS: CPT

## 2024-02-14 NOTE — PROGRESS NOTES
PT Re-Evaluation     Collection of Subjective/Objective data performed by Irene Childs PTA. Assessment, POC, and Goal attainment performed by Vlad Malhotra DPT.       Today's date: 2024  Patient name: Eileen Matias  : 1960   MRN: [id number]  Referring provider: Rick Lazo DO  Dx:   Encounter Diagnosis     ICD-10-CM    1. Lymphedema of right arm  I89.0       2. Malignant neoplasm of lower-inner quadrant of left breast in female, estrogen receptor positive   C50.312     Z17.0       3. 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 (HCC)  C50.912     C77.3                      Assessment  Assessment details: Eileen Matias is a 63 y.o. female being treated as an outpatient at [facility] PT w/ dx of RUE lymphedema and R humeral fracture. Pt not yet allowed any motion of shoulder joint. Since IE, pt has made gains in FOTO score and edema reduction, but continues to remain limited in these areas and from max function.   Pt will continue to benefit from skilled PT services in order to max function to allow pt to achieve goals in PT. Thank you.     Impairments: abnormal or restricted ROM, impaired physical strength and pain with function  Other impairment: lymphedema  Understanding of Dx/Px/POC: good   Prognosis: good    Goals  ST.Pain decreased by 25% in 4 weeks.--PROGRESSING  2. Edema decreased by 2-3 cm in 4 weeks. --MET    LT. Decrease pain to 1-2/10 at worst by d/c.-PROGRESSING  2. Increase ROM to WFL for all deficient movements by d/c.-DNT (restrictions)  3. Strength increased to 5 for all deficient muscle groups by d/c.-DNT (restrictions)  4. IADL performance increased to max function by d/c.-PROGRESSING  5. Recreational performance increased to max function by d/c.-PROGRESSING  6. RUE edema decreased to that of LUE by d/c. -PROGRESSING  7. Pt will return to work by d/c.-PROGRESSING    Plan  Other planned modality interventions: other modalities PRN  Planned therapy  "interventions: abdominal trunk stabilization, ADL retraining, IADL retraining, flexibility, functional ROM exercises, graded exercise, home exercise program, manual therapy, joint mobilization, neuromuscular re-education, patient education, postural training, strengthening, therapeutic exercise, therapeutic activities, massage and work reintegration  Other planned therapy interventions: other interventions PRN  Frequency: 1-2x/week.  Duration in weeks: 8  Plan of Care beginning date: 1/15/2024  Plan of Care expiration date: 3/11/2024  Treatment plan discussed with: patient      Subjective Evaluation    History of Present Illness  Mechanism of injury: RE (24):  Pt has been seen for RUE lymphedema for 8 visits to date and notes 50% improvement so far. Pt reports she is still not able to use compression pump but has been wearing the [employer] garment, which seems to be helping. Pt is not longer needing to wear the clamshell for her fx unless if she is out running errands. Pt notes the swelling has improved since being able to take the brace off more often. Pt notes continued \"hardness\" in elbow and axilla region.     EVAL (1/15/24):  Pt reports to IE w/ hx of RUE lymphedema which started approx 20 years ago approx 5 years following CA dx of desmoitosis.     Pt had done skilled PT tx for this condition in the past which was helpful control the issue. However, pt sustained humeral fx on 23 as a result of fall on RUE while trying to protect mother in law from a fall out of w/c. She reports that healing has been complicated by severe osteoporosis and lymphedema. Pt still required to wear brace at all times- not yet allowed any ROM in shoulder.     In addition to the above, pt w/ recent hx of L breast CA w/ L lumpectomy and lymph node removal (pt reports 17 lymph nodes) over on 22.        Patient Goals  Patient goals for therapy: decreased pain and decreased edema    Pain  Current pain ratin  Pain scale at " "highest: \"12\"  Location: R shoulder  Alleviating factors: lymphedema massage.  Exacerbated by: increased edema, movement.    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)  Hand dominance: right        Objective     General Comments:      Ankle/Foot Comments   Edema in R vs L w/ circumferential girth measurements as below (L/R):   Axilla: 37.5  20 cm proximal to elbow: 29.5  10 cm proximal to elbow: 28.8 cm  Elbow Joint: 25 cm/28 cm  20 cm proximal to wrist: 26.5 cm/28 cm  10 cm proximal to wrist: 21 cm/21 cm  Wrist:  16.5 cm/18 cm  Palm: 18 cm/18.5 cm  MCP Joints (digits 2-5): 18 cm/16.5 cm  PIP Joints (digits 2-5): 16 cm/17 cm      Daily Treatment Diary    Precautions: HUMERAL FX ON 11/12/23 W/ DELAYED HEALING- NOT YET ALLOWED TO COME OUT OF BRACE; Hx of lymphedema; Hx of L breast CA  Co- Morbidities:   Patient Active Problem List   Diagnosis   • Back pain, chronic   • Chronic insomnia   • Lymphedema of right arm   • Hyperlipidemia, mixed   • Peripheral neuropathy   • Lymphedema   • Desmoid tumor   • 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 (HCC)   • Malignant neoplasm of lower-inner quadrant of left breast in female, estrogen receptor positive    • HTN, goal below 140/90   • Use of letrozole (Femara)   • History of lumpectomy of left breast   • Humerus fracture   • Chronic back pain   • Continuous opioid dependence (HCC)         EPOC: 3/4/24 1/29 2/7 2/12 2/14   RA DATE: 2/12/24       Manual       MLD [facility]   Manual Wrapping                              Exercise Diary 1/29 2/14 1/25   THEREX       Pt ed  FOTO; updated measurements   JH   Elbow/Wrist/hand AROM                                                       NEURO RE-ED        Scapular squeeze                                                                                                                    Modalities           CP                 "

## 2024-02-15 ENCOUNTER — APPOINTMENT (OUTPATIENT)
Dept: RADIOLOGY | Facility: MEDICAL CENTER | Age: 64
End: 2024-02-15
Payer: MEDICARE

## 2024-02-15 ENCOUNTER — OFFICE VISIT (OUTPATIENT)
Dept: OBGYN CLINIC | Facility: MEDICAL CENTER | Age: 64
End: 2024-02-15

## 2024-02-15 VITALS
HEART RATE: 76 BPM | SYSTOLIC BLOOD PRESSURE: 150 MMHG | HEIGHT: 68 IN | DIASTOLIC BLOOD PRESSURE: 84 MMHG | BODY MASS INDEX: 22.88 KG/M2 | WEIGHT: 151 LBS

## 2024-02-15 DIAGNOSIS — Z47.89 AFTERCARE FOLLOWING SURGERY OF THE MUSCULOSKELETAL SYSTEM: Primary | ICD-10-CM

## 2024-02-15 DIAGNOSIS — Z47.89 AFTERCARE FOLLOWING SURGERY OF THE MUSCULOSKELETAL SYSTEM: ICD-10-CM

## 2024-02-15 PROCEDURE — 99024 POSTOP FOLLOW-UP VISIT: CPT | Performed by: STUDENT IN AN ORGANIZED HEALTH CARE EDUCATION/TRAINING PROGRAM

## 2024-02-15 PROCEDURE — 73060 X-RAY EXAM OF HUMERUS: CPT

## 2024-02-15 RX ORDER — GABAPENTIN 100 MG/1
100 CAPSULE ORAL 3 TIMES DAILY
Qty: 90 CAPSULE | Refills: 1 | Status: SHIPPED | OUTPATIENT
Start: 2024-02-15

## 2024-02-15 NOTE — PROGRESS NOTES
Orthopedic Surgery Nonoperative Management Note  Eileen Matias (63 y.o. female)  : 1960 Encounter Date: 2/15/2024  Dr. Rick Lazo, , Orthopedic Surgeon  Orthopedic Oncology & Sarcoma Surgery   Assessment/Plan:  63 y.o. female 12 weeks follow up status post right midshaft humerus fracture     Problem List Items Addressed This Visit    None  Visit Diagnoses     Aftercare following surgery of the musculoskeletal system    -  Primary    Relevant Orders    XR humerus right        Right Humerus Fracture  Can discontinue the use of the Kruse brace  Recommend that she still uses the long arm compression sleeve for decreased swelling with lymphadenopathy   Reviewed XR with patient, demonstrates the same amount of callus as x-rays from 3 weeks ago which could potentially be the normal for her with her previous cancer and previous surgery history.  Return in 6 weeks for XR of right humerus without brace for alignment check  Clinical healing is present with ability to lift her right arm without the kruse brace without movement in the humerus  Continue in non-operative humeral shaft fracture protocol within weeks 6-10.    Significant Right Arm Lymphedema   She has found a new provider for lymphedema that she has seen beginning approximately 4 weeks ago  She has been progressing well  Continue with physical therapy for ROM and lymphedema of right arm      Return in about 6 weeks (around 3/28/2024) for Recheck, Repeat X-ray.    _____________________________________________________  CHIEF COMPLAINT:  Chief Complaint   Patient presents with   • Right Shoulder - Follow-up     SUBJECTIVE:  Eileen Matias is a 63 y.o. year old female who presents 6 weeks s/p right humerus fracture.   DOI: 23        Prior HPI:  She was seen at [facility] for right arm pain at the elbow with pronation and supination. X-rays were obtained at time of visit, no images are available for review, radiologist report is available. Patient  "has since transitioned to [facility]. She was seen in the ED on 11/12/23 for right arm pain after a fall. ED note states that a closed reduction was performed in the ED at time of visit. Medication refill note from  states that she is unable to use her lymphedema machine and is experiencing swelling in her right arm. On presentation today, patient states that she was pushing her mother in law in a wheelchair when the wheelchair started to tip forward and she tried to catch it to prevent her MIL from falling out of the wheelchair. She believes that she \"lost control\" of her right arm, and then fell onto her right shoulder trying to protect the right elbow. She reports that she has been unable to use her lymphedema bag since the injury. She states that she has had a physical therapist come into her home 2/wk since the injury who performs lymphedema massage on her right hand and forearm to increase blood flow.    Patient states that she is still having moderate amount of pain, but feels that it relates more to the lymphedema than the humeral fracture. She has seen a new provider for lymphedema beginning last week and feels that the swelling has decreased moderately.     On presentation today, patient reports that she has increase in range of motion at the shoulder. She reports that her pain has decreased significantly. She has not been using the Kruse brace while at home and has been using a long arm compression sleeve that she had previously used with for lymphedema. She reports that she is only taking the Gabapentin before bed which allows her to sleep throughout the night.     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 She had a latissimus rotational skin pedicle flap  performed after having multiple recurrences. Within the same year she had a reoccurrence of a desmoid tumor and another resection " of her back. Patient states that her last tumor related operation was in 2010. Patient was cancer free for 8 years, until last year when she developed breast cancer on her left side. She states that she had radiation, 16 lymph nodes were removed and none of the lymph nodes were positive. She states that she had no radiation to the lymph nodes. She states that she developed lymphedema within 1 year after the diagnosis of the desmoid fibromatosis. She was previously using the lymphedema treatment as needed, which progressed to using 3x/daily every day.     PAST MEDICAL HISTORY:  Past Medical History:   Diagnosis Date   • Abnormal mammogram 05/15/2013   • Anemia    • Cancer (HCC)     desmoitosis   • 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 (HCC) 04/19/2022   • Chronic pain disorder     worse right side of body   • Desmoid tumor     Last Assessed: 6/19/2017   • fibroidal cyst 1999?  Hysterectomy done   • History of transfusion     no reactions   • Hyperlipidemia    • Hypertension    • Osteoporosis    • PONV (postoperative nausea and vomiting)        PAST SURGICAL HISTORY:  Past Surgical History:   Procedure Laterality Date   • BREAST LUMPECTOMY Left 6/21/2022    Procedure: ANITA  DIRECTED LUMPECTOMY;  Surgeon: Amanda Motley MD;  Location: [facility];  Service: Surgical Oncology   • FRACTURE SURGERY     • HYSTERECTOMY     • LYMPH NODE DISSECTION Left 6/21/2022    Procedure: AXILLARY DISSECTION LEVEL I AND LEVEL II LYMPH NODES;  Surgeon: Amanda Motley MD;  Location: [facility];  Service: Surgical Oncology   • MRI BREAST BIOPSY LEFT (ALL INCLUSIVE) Left 5/20/2022   • MRI BREAST BIOPSY RIGHT (ALL INCLUSIVE) Right 5/20/2022   • OTHER SURGICAL HISTORY      Arm Incision: Dermoid tumor, right Arm    • PARTIAL HYSTERECTOMY     • MO COLONOSCOPY FLX DX W/COLLJ SPEC WHEN PFRMD N/A 8/15/2017    Procedure: COLONOSCOPY;  Surgeon: Alec England MD;  Location: [facility];  Service:  Gastroenterology   • MD NDSC WRST SURG W/RLS TRANSVRS CARPL LIGM Left 8/10/2021    Procedure: RELEASE LEFT CARPAL TUNNEL ENDOSCOPIC;  Surgeon: Chris Camacho MD;  [facility];  Service: Orthopedics   • MD OPTX DSTL RADL I-ARTIC FX/EPIPHYSL SEP 3 FRAG Left 2/9/2021    Procedure: OPEN REDUCTION W/ INTERNAL FIXATION (ORIF) RADIUS / ULNA (WRIST) left;  Surgeon: Chris Camacho MD;  [facility];  Service: Orthopedics   • SKIN BIOPSY     • SKIN CANCER EXCISION      Melanoma Excision    • TONSILLECTOMY     • US BREAST NEEDLE LOC LEFT Left 6/16/2022   • US BREAST NEEDLE LOC RIGHT EACH ADDITIONAL Right 6/16/2022   • US GUIDED BREAST LYMPH NODE BIOPSY LEFT Left 4/14/2022       FAMILY HISTORY:  Family History   Problem Relation Age of Onset   • Diabetes Mother    • No Known Problems Father    • Cancer Sister    • Lung cancer Sister    • Pancreatic cancer Sister    • No Known Problems Maternal Grandmother    • No Known Problems Maternal Grandfather    • No Known Problems Paternal Grandmother    • No Known Problems Paternal Grandfather    • Breast cancer Neg Hx        SOCIAL HISTORY:  Social History     Tobacco Use   • Smoking status: Never   • Smokeless tobacco: Never   Vaping Use   • Vaping status: Never Used   Substance Use Topics   • Alcohol use: Not Currently     Alcohol/week: 5.0 standard drinks of alcohol     Types: 5 Shots of liquor per week     Comment: Used mostly as a painkiller when needed before bedtime   • Drug use: Yes     Frequency: 28.0 times per week     Types: Marijuana     Comment: THC Medical marijuana       MEDICATIONS:    Current Outpatient Medications:   •  cholecalciferol (VITAMIN D3) 1,000 units tablet, Take 3,000 Units by mouth daily, Disp: , Rfl:   •  diphenhydrAMINE (BENADRYL) 50 MG tablet, Take 50 mg by mouth daily at bedtime as needed for itching, Disp: , Rfl:   •  dronabinol (MARINOL) 10 MG capsule, , Disp: , Rfl:   •  ezetimibe (ZETIA) 10 mg tablet, Take 10 mg by mouth daily,  Disp: , Rfl:   •  letrozole (FEMARA) 2.5 mg tablet, TAKE 1 TABLET BY MOUTH EVERY DAY, Disp: 90 tablet, Rfl: 3  •  multivitamin (THERAGRAN) TABS, Take 1 tablet by mouth daily, Disp: , Rfl:   •  Omega-3 Fatty Acids (FISH OIL PO), Take by mouth, Disp: , Rfl:   •  penicillin V potassium (VEETID) 500 mg tablet, Take 1 tablet (500 mg total) by mouth every 12 (twelve) hours, Disp: 60 tablet, Rfl: 2  •  Probiotic Product (PROBIOTIC-10 PO), Take by mouth, Disp: , Rfl:   •  gabapentin (Neurontin) 300 mg capsule, Take 1 capsule (300 mg total) by mouth 3 (three) times a day, Disp: 90 capsule, Rfl: 0  •  oxyCODONE-acetaminophen (PERCOCET) 5-325 mg per tablet, , Disp: , Rfl:   •  senna-docusate sodium (SENOKOT-S) 8.6-50 mg per tablet, Take 1 tablet by mouth daily (Patient not taking: Reported on 1/18/2024), Disp: 7 tablet, Rfl: 0    ALLERGIES:  Allergies   Allergen Reactions   • Chlorpromazine Anaphylaxis     PHENOTHIAZINE=ANAPHYLAXIS   • Codeine Anaphylaxis     Anaphylaxis  abd pain.   • Meperidine Anaphylaxis, Hives and Vomiting   • Phenothiazines Anaphylaxis and Throat Swelling     Category: Allergy;    • Saccharin - Food Allergy Anaphylaxis   • Ampicillin-Sulbactam Sodium Hives   • Aspirin GI Intolerance and Abdominal Pain     Severe GERD   • Gluten Meal - Food Allergy GI Intolerance   • Ibuprofen Hives     H   • Levofloxacin Hives   • Chocolate - Food Allergy GI Intolerance   • Lactose - Food Allergy GI Intolerance   • Neomycin Other (See Comments), Edema and Hives     Category: Allergy;   swelling   • Citrus - Food Allergy Rash   • Latex Hives and Rash     Category: Allergy;        Review of systems:   Constitutional: Negative for fatigue, fever or loss of apetite.   HENT: Negative.    Respiratory: Negative for shortness of breath, dyspnea.    Cardiovascular: Negative for chest pain/tightness.   Gastrointestinal: Negative for abdominal pain, N/V.   Endocrine: Negative for cold/heat intolerance, unexplained weight loss/gain.  "  Genitourinary: Negative for flank pain, dysuria, hematuria.   Musculoskeletal: As noted in HPI   Skin: Negative for rash.    Neurological: Intact  Psychiatric/Behavioral: Negative for agitation.  _____________________________________________________  PHYSICAL EXAMINATION:    Blood pressure 150/84, pulse 76, height 5' 8\" (1.727 m), weight 68.5 kg (151 lb).    General: well developed and well nourished, alert, oriented times 3, and appears comfortable  Psychiatric: Normal  HEENT: Benign  Cardiovascular: Regular    Pulmonary: No wheezing or stridor  Abdomen: Soft, Nontender  Skin: No masses, erythema, lacerations, fluctation, ulcerations.  Lymphedema present in right arm  Neurovascular: Intact    MUSCULOSKELETAL EXAMINATION:  right Shoulder -   No anatomical deformity  Skin is warm and dry to touch with no signs of erythema, ecchymosis, or infection  Mild soft tissue swelling or effusion noted due to lymphedema compared to last visit on 1/18/24  Demonstrates normal elbow, wrist, and finger motion  Able to flex shoulder to approximately 105-110 degrees of forward flexion and 95 degrees of shoulder abduction  2+  distal radial pulse with brisk capillary refill to the fingers  Radial, median, ulnar and motor and sensory distribution intact  Sensation to light touch intact distally      _____________________________________________________  STUDIES REVIEWED:  Reviewed physical exam and imaging with patient at time of visit. Radiographic findings demonstrate closed nondisplaced spiral fracture of the right humerus.     Study:XR right humerus  Date: 2/15/24  No radiologist report available. I have personally reviewed all imaging. My impression is as follows:  Well-maintained alignment of midshaft humerus fracture with increased evidence bony callous and healing.    Study: XR right humerus  Date: 1/18/2024  No radiologist report available. I have personally reviewed all imaging.  Well-maintained alignment of midshaft " humerus fracture with increased evidence bony callous and healing.    Study: XR right humerus  Date: 1/4/23  Impression: No radiologist report available.  My impression is as follows: well-maintained alignment of midshaft humerus fracture with some increased evidence of callous and healing, with some bridging. Better alignment     Study: XR right himerus  Date: 12/14/23  Impression: No radiologist report available.  My impression is as follows: well-maintained alignment of midshaft humerus fracture with some minimal evidence of callous and healing.     Study: NM WBBS  Date: 12/8/23  Impression: I have read and agree with the radiologist report.  My impression is as follows:  1. Linear radiotracer uptake at the right humeral shaft corresponding to known fracture site. Difficult to exclude a pathologic fracture given the fracture activity here on bone scan.  2. Otherwise, no findings suspicious for osseous metastasis.      Study: XR of right humerus  Date: 11/30/23  Impression: I have read and agree with the radiologist report.  My impression is as follows:  Closed nondisplaced spiral fracture of the right humerus.  Bone looks chronically osteopenic which could be related to radiation therapy.  No current evidence of pathologic fracture at this time  Status post casting of mid diaphyseal fracture of the humerus with mild angulation as seen on prior exam. There is no evidence of significant interval healing.     Study: XR of right humerus  Date: 11/12/23  Impression: I have read and agree with the radiologist report.  My impression is as follows:  FINDINGS:  Evaluation of fine bony detail of the right humerus is limited by overlying cast material  There is a mildly displaced, spiral fracture of the midshaft of the right humerus. There is a somewhat mottled appearance of the right humeral diaphysis, which may be related to sequela of prior radiation therapy.  There is a small exophytic density adjacent to the midshaft  of the humerus which was present in the soft tissues on the prior CT from 2020.  Multiple surgical clips are seen in the right axillary region and throughout the proximal right upper extremity.  No shoulder dislocation.  No evidence of AC joint separation.  The visualized portions of the lung are clear.  IMPRESSION:  Mildly displaced, spiral fracture of the midshaft of right humerus. No shoulder dislocation.  Somewhat mottled appearance of the right humeral diaphysis, which may be related to prior radiation therapy. Correlation with MRI may be helpful, which is already scheduled for 11/20/2023.    Study: XR of right humerus  Date: 10/23/23  Impression: **NO IMAGE AVAILABLE FOR REVIEW, RADIOLOGIST REPORT AVAILABLE FOR REVIEW**  IMPRESSION:   Surgical clips throughout the right axilla and throughout the lateral soft   tissues of the humerus.   Increased lateral soft tissue density mid humerus for which MRI is recommended   to exclude a mass.   No destructive humeral osseous lesion seen.   Focal areas of small cortical solid calcifications or postsurgical changes mid lateral humeral shaft.   Demineralization throughout the right arm and forearm.   Mild osteoarthritic narrowing acromioclavicular joint.   No other significant arthritis.   No fracture, malalignment or elbow joint effusion.     Study: MRI of right humerus  Date: 11/1/23  Impression: ** IMAGES NOT AVAILABLE FOR REVIEW, REPORT NOT AVAILABLE FOR REVIEW **    Scribe Attestation    I,:  Lacy Escobar am acting as a scribe while in the presence of the attending physician.:       I,:  Rick Lazo DO personally performed the services described in this documentation    as scribed in my presence.:             Humerus Shaft Fracture Non-Operative Rehabilitation Protocol    PHASE II: FRACTURE HEALING  Week 2-6  · No lifting > weight of coffee cup   · Kruse fracture brace at all times  o Twice daily tightening of fracture brace   · Discontinue sling,  continue use of cuff & collar as needed for comfort, continue upright posture   · Daily hygiene and skin checks in pendulum position   · Continue TID HEP: A/AAROM elbow, wrist and finger motion, biceps and triceps isometrics   · Start shoulder periscapular isometrics / shoulder posture   · No shoulder ROM other than pendulums for hygiene     PHASE III: EARLY REHABILITATION  Week 6-10 (following clinical fracture healing and no motion at the fracture site)   · No lifting > 5# at the side, or coffee cup overhead   · Part time Kruse bracing (if clinical fracture healing)  o Wear while outside of the house  o Remove while at home or at rest  · Add TID HEP shoulder 4 quadrant AAROM as tolerated with brace on unless pain free   o Pulleys, table slides, wall climbs, supine wand exercises in all planes     PHASE IV: RETURN TO FUNCTION  Week 11-14 (following clinical fracture healing)   · Add shoulder AROM, PROM as tolerated   · Generalized UE strengthening   · Activities as tolerated (if bony healing complete)   · Independent home exercise program   · Return to high level functional ADLs and simulation of work environment

## 2024-02-19 ENCOUNTER — OFFICE VISIT (OUTPATIENT)
Dept: PHYSICAL THERAPY | Facility: CLINIC | Age: 64
End: 2024-02-19
Payer: MEDICARE

## 2024-02-19 DIAGNOSIS — C77.3 CARCINOMA OF LEFT BREAST METASTATIC TO AXILLARY LYMPH NODE (HCC): ICD-10-CM

## 2024-02-19 DIAGNOSIS — C50.912 CARCINOMA OF LEFT BREAST METASTATIC TO AXILLARY LYMPH NODE (HCC): ICD-10-CM

## 2024-02-19 DIAGNOSIS — I89.0 LYMPHEDEMA OF RIGHT ARM: Primary | ICD-10-CM

## 2024-02-19 DIAGNOSIS — Z17.0 MALIGNANT NEOPLASM OF LOWER-INNER QUADRANT OF LEFT BREAST IN FEMALE, ESTROGEN RECEPTOR POSITIVE: ICD-10-CM

## 2024-02-19 DIAGNOSIS — S42.344A CLOSED NONDISPLACED SPIRAL FRACTURE OF SHAFT OF RIGHT HUMERUS, INITIAL ENCOUNTER: Primary | ICD-10-CM

## 2024-02-19 DIAGNOSIS — C50.312 MALIGNANT NEOPLASM OF LOWER-INNER QUADRANT OF LEFT BREAST IN FEMALE, ESTROGEN RECEPTOR POSITIVE: ICD-10-CM

## 2024-02-19 PROCEDURE — 97140 MANUAL THERAPY 1/> REGIONS: CPT

## 2024-02-19 PROCEDURE — 97110 THERAPEUTIC EXERCISES: CPT

## 2024-02-19 NOTE — PROGRESS NOTES
Daily Note     Today's date: 2024  Patient name: Eileen Matias  : 1960  MRN: [id number]  Referring provider: Rick Lazo DO  Dx:   Encounter Diagnosis     ICD-10-CM    1. Lymphedema of right arm  I89.0       2. Malignant neoplasm of lower-inner quadrant of left breast in female, estrogen receptor positive   C50.312     Z17.0       3. 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 (HCC)  C50.912     C77.3                      Subjective: pt reports the glove she got in the mail does not fit. Will exchange for larger size.     Objective: See treatment diary below      Assessment: Tolerated treatment well. Patient would benefit from continued PT. Increased soreness w/ MLD today, especially around elbow and wrist region. Decreased tightness post MLD. Educated pt on the process of exchanging the glove.     Plan: Continue per plan of care.      Daily Treatment Diary    Precautions: HUMERAL FX ON 23 W/ DELAYED HEALING- NOT YET ALLOWED TO COME OUT OF BRACE; Hx of lymphedema; Hx of L breast CA  Co- Morbidities:   Patient Active Problem List   Diagnosis    Back pain, chronic    Chronic insomnia    Lymphedema of right arm    Hyperlipidemia, mixed    Peripheral neuropathy    Lymphedema    Desmoid tumor    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 (HCC)    Malignant neoplasm of lower-inner quadrant of left breast in female, estrogen receptor positive     HTN, goal below 140/90    Use of letrozole (Femara)    History of lumpectomy of left breast    Humerus fracture    Chronic back pain    Continuous opioid dependence (HCC)         EPOC: 3/4/24 1/29 2/7 2/12 2/19   RA DATE: 24       Manual       MLD [facility]   Manual Wrapping                              Exercise Diary    THEREX       Pt ed  [facility]   Elbow/Wrist/hand AROM                                                       NEURO RE-ED        Scapular squeeze                                                                                                                     Modalities           CP

## 2024-02-21 ENCOUNTER — OFFICE VISIT (OUTPATIENT)
Dept: PHYSICAL THERAPY | Facility: CLINIC | Age: 64
End: 2024-02-21
Payer: MEDICARE

## 2024-02-21 DIAGNOSIS — C50.312 MALIGNANT NEOPLASM OF LOWER-INNER QUADRANT OF LEFT BREAST IN FEMALE, ESTROGEN RECEPTOR POSITIVE: ICD-10-CM

## 2024-02-21 DIAGNOSIS — Z17.0 MALIGNANT NEOPLASM OF LOWER-INNER QUADRANT OF LEFT BREAST IN FEMALE, ESTROGEN RECEPTOR POSITIVE: ICD-10-CM

## 2024-02-21 DIAGNOSIS — C50.912 CARCINOMA OF LEFT BREAST METASTATIC TO AXILLARY LYMPH NODE (HCC): ICD-10-CM

## 2024-02-21 DIAGNOSIS — I89.0 LYMPHEDEMA OF RIGHT ARM: Primary | ICD-10-CM

## 2024-02-21 DIAGNOSIS — C77.3 CARCINOMA OF LEFT BREAST METASTATIC TO AXILLARY LYMPH NODE (HCC): ICD-10-CM

## 2024-02-21 PROCEDURE — 97140 MANUAL THERAPY 1/> REGIONS: CPT

## 2024-02-21 PROCEDURE — 97110 THERAPEUTIC EXERCISES: CPT

## 2024-02-21 NOTE — PROGRESS NOTES
Daily Note     Today's date: 2024  Patient name: Eileen Matias  : 1960  MRN: [id number]  Referring provider: Rick Lazo DO  Dx:   Encounter Diagnosis     ICD-10-CM    1. Lymphedema of right arm  I89.0       2. Malignant neoplasm of lower-inner quadrant of left breast in female, estrogen receptor positive   C50.312     Z17.0       3. 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 (HCC)  C50.912     C77.3                      Subjective: pt reports no new major complaints upon arrival.       Objective: See treatment diary below      Assessment: Tolerated treatment well. Increased tightness in axilla region, decreased post MLD. Decreased tightness in elbow region compared to previous session. Patient would benefit from continued PT.      Plan: Continue per plan of care.      Daily Treatment Diary    Precautions: HUMERAL FX ON 23 W/ DELAYED HEALING- NOT YET ALLOWED TO COME OUT OF BRACE; Hx of lymphedema; Hx of L breast CA  Co- Morbidities:   Patient Active Problem List   Diagnosis    Back pain, chronic    Chronic insomnia    Lymphedema of right arm    Hyperlipidemia, mixed    Peripheral neuropathy    Lymphedema    Desmoid tumor    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 (HCC)    Malignant neoplasm of lower-inner quadrant of left breast in female, estrogen receptor positive     HTN, goal below 140/90    Use of letrozole (Femara)    History of lumpectomy of left breast    Humerus fracture    Chronic back pain    Continuous opioid dependence (HCC)         EPOC: 3/4/24 2/21   2/19   RA DATE: 24       Manual       MLD [facility]   Manual Wrapping                              Exercise Diary     THEREX       Pt ed  [facility]    Elbow/Wrist/hand AROM                                                 NEURO RE-ED       Scapular squeeze                                                                                                       Modalities           CP

## 2024-02-26 ENCOUNTER — OFFICE VISIT (OUTPATIENT)
Dept: PHYSICAL THERAPY | Facility: CLINIC | Age: 64
End: 2024-02-26
Payer: MEDICARE

## 2024-02-26 DIAGNOSIS — Z17.0 MALIGNANT NEOPLASM OF LOWER-INNER QUADRANT OF LEFT BREAST IN FEMALE, ESTROGEN RECEPTOR POSITIVE: ICD-10-CM

## 2024-02-26 DIAGNOSIS — C50.312 MALIGNANT NEOPLASM OF LOWER-INNER QUADRANT OF LEFT BREAST IN FEMALE, ESTROGEN RECEPTOR POSITIVE: ICD-10-CM

## 2024-02-26 DIAGNOSIS — C50.912 CARCINOMA OF LEFT BREAST METASTATIC TO AXILLARY LYMPH NODE (HCC): ICD-10-CM

## 2024-02-26 DIAGNOSIS — I89.0 LYMPHEDEMA OF RIGHT ARM: Primary | ICD-10-CM

## 2024-02-26 DIAGNOSIS — C77.3 CARCINOMA OF LEFT BREAST METASTATIC TO AXILLARY LYMPH NODE (HCC): ICD-10-CM

## 2024-02-26 PROCEDURE — 97140 MANUAL THERAPY 1/> REGIONS: CPT

## 2024-02-26 NOTE — PROGRESS NOTES
Daily Note     Today's date: 2024  Patient name: Eileen Matias  : 1960  MRN: [id number]  Referring provider: Rick Lazo DO  Dx:   Encounter Diagnosis     ICD-10-CM    1. Lymphedema of right arm  I89.0       2. Malignant neoplasm of lower-inner quadrant of left breast in female, estrogen receptor positive   C50.312     Z17.0       3. 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 (HCC)  C50.912     C77.3                      Subjective: pt reports no new changes.       Objective: See treatment diary below      Assessment: Tolerated treatment well. Patient would benefit from continued PT.      Plan: Continue per plan of care.      Daily Treatment Diary    Precautions: HUMERAL FX ON 23 W/ DELAYED HEALING- NOT YET ALLOWED TO COME OUT OF BRACE; Hx of lymphedema; Hx of L breast CA  Co- Morbidities:   Patient Active Problem List   Diagnosis    Back pain, chronic    Chronic insomnia    Lymphedema of right arm    Hyperlipidemia, mixed    Peripheral neuropathy    Lymphedema    Desmoid tumor    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 (HCC)    Malignant neoplasm of lower-inner quadrant of left breast in female, estrogen receptor positive     HTN, goal below 140/90    Use of letrozole (Femara)    History of lumpectomy of left breast    Humerus fracture    Chronic back pain    Continuous opioid dependence (HCC)         EPOC: 3/4/24 2/21 2/26  2/19   RA DATE: 24       Manual       MLD [facility]   Manual Wrapping                              Exercise Diary    THEREX       Pt ed  [facility]    Elbow/Wrist/hand AROM                                                 NEURO RE-ED       Scapular squeeze                                                                                                       Modalities           CP

## 2024-02-28 ENCOUNTER — OFFICE VISIT (OUTPATIENT)
Dept: PHYSICAL THERAPY | Facility: CLINIC | Age: 64
End: 2024-02-28
Payer: MEDICARE

## 2024-02-28 DIAGNOSIS — Z17.0 MALIGNANT NEOPLASM OF LOWER-INNER QUADRANT OF LEFT BREAST IN FEMALE, ESTROGEN RECEPTOR POSITIVE: ICD-10-CM

## 2024-02-28 DIAGNOSIS — C50.312 MALIGNANT NEOPLASM OF LOWER-INNER QUADRANT OF LEFT BREAST IN FEMALE, ESTROGEN RECEPTOR POSITIVE: ICD-10-CM

## 2024-02-28 DIAGNOSIS — C77.3 CARCINOMA OF LEFT BREAST METASTATIC TO AXILLARY LYMPH NODE (HCC): ICD-10-CM

## 2024-02-28 DIAGNOSIS — S42.344D CLOSED NONDISPLACED SPIRAL FRACTURE OF SHAFT OF RIGHT HUMERUS WITH ROUTINE HEALING, SUBSEQUENT ENCOUNTER: ICD-10-CM

## 2024-02-28 DIAGNOSIS — I89.0 LYMPHEDEMA OF RIGHT ARM: Primary | ICD-10-CM

## 2024-02-28 DIAGNOSIS — C50.912 CARCINOMA OF LEFT BREAST METASTATIC TO AXILLARY LYMPH NODE (HCC): ICD-10-CM

## 2024-02-28 PROCEDURE — 97140 MANUAL THERAPY 1/> REGIONS: CPT

## 2024-02-28 PROCEDURE — 97162 PT EVAL MOD COMPLEX 30 MIN: CPT

## 2024-02-28 NOTE — PROGRESS NOTES
Daily Note     Today's date: 2024  Patient name: Eileen Matias  : 1960  MRN: [id number]  Referring provider: Rick Lazo DO  Dx:   Encounter Diagnosis     ICD-10-CM    1. Lymphedema of right arm  I89.0       2. Malignant neoplasm of lower-inner quadrant of left breast in female, estrogen receptor positive   C50.312     Z17.0       3. 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 (HCC)  C50.912     C77.3                      Subjective: pt offers no new complaints upon arrival.       Objective: See treatment diary below      Assessment: Tolerated treatment well. Patient would benefit from continued PT.      Plan: Continue per plan of care.      Daily Treatment Diary    Precautions: HUMERAL FX ON 23 W/ DELAYED HEALING- NOT YET ALLOWED TO COME OUT OF BRACE; Hx of lymphedema; Hx of L breast CA  Co- Morbidities:   Patient Active Problem List   Diagnosis    Back pain, chronic    Chronic insomnia    Lymphedema of right arm    Hyperlipidemia, mixed    Peripheral neuropathy    Lymphedema    Desmoid tumor    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 (HCC)    Malignant neoplasm of lower-inner quadrant of left breast in female, estrogen receptor positive     HTN, goal below 140/90    Use of letrozole (Femara)    History of lumpectomy of left breast    Humerus fracture    Chronic back pain    Continuous opioid dependence (HCC)         EPOC: 3/4/24 2/21 2/26 2/28 2/19   RA DATE: 24       Manual       MLD [facility]   Manual Wrapping                              Exercise Diary    THEREX       Pt ed  [facility]    Elbow/Wrist/hand AROM                                                 NEURO RE-ED       Scapular squeeze                                                                                                       Modalities           CP

## 2024-02-28 NOTE — PROGRESS NOTES
PT Evaluation     Today's date: 2024  Patient name: Eileen Matias  : 1960  MRN: [id number]  Referring provider: Enrique Connors PA-C  Dx:   Encounter Diagnosis     ICD-10-CM    1. Lymphedema of right arm  I89.0       2. Malignant neoplasm of lower-inner quadrant of left breast in female, estrogen receptor positive   C50.312     Z17.0       3. 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 Closed nondisplaced spiral fracture of shaft of right humerus with routine healing, subsequent encounter  S42.344D           Start Time: 1015  Stop Time: 1130  Total time in clinic (min): 75 minutes    Assessment  Assessment details: Patient is a 63 y.o. female who presents to physical therapy s/p R humeral fracture  on 23. Patient presents to evaluation with pain, decreased range of motion, decreased strength, and decreased tolerance to activity. HEP held at this time due to poor tolerance. I discussed risks, benefits, and alternatives to treatment, and answered all patient questions to patient satisfaction. Patient presents with baseline FOTO score of 4 indicating limited tolerance/ability to complete ADLs. Patient is an appropriate candidate for skilled PT and would benefit from skilled PT services to address the aforementioned impairments, achieve goals, maximize function, and improve quality of life. Pt is in agreement with this plan.    Patient Education: activity modifications as needed, pacing of activities, importance of HEP compliance, PT prognosis/POC      Lymph assessment:   Decreased overall tightness and swelling in the elbow region. Performed MLD to improve lymphatic flow and decrease swelling.   Impairments: abnormal or restricted ROM, activity intolerance, impaired physical strength and pain with function    Goals  ST weeks  Pt will demonstrate good understanding and compliance with HEP  Pt will decrease pain to 5/10 at worst    Pt will demonstrate  improved postural awareness and ability to self-correct without reliance on external cues     LT weeks  Pt will improve FOTO score to > or = to 52 to indicate improved functional abilities   Pt will decrease pain to 2-3/10 at worst    Pt will increase shoulder strength to 4-/5 for improved tolerance/independence with ADLs  Pt will improve  strength to 25#       Plan  Patient would benefit from: PT eval and skilled physical therapy  Planned therapy interventions: manual therapy, functional ROM exercises, flexibility, therapeutic activities, stretching, strengthening, therapeutic exercise, graded exercise, home exercise program, graded activity and therapeutic training  Frequency: 2x week  Plan of Care beginning date: 2024  Plan of Care expiration date: 2024  Treatment plan discussed with: patient        Subjective Evaluation    History of Present Illness  Mechanism of injury: Pt is a 62 y/o female who presents to therapy s/p R humeral fracture on 23. Pt reports difficult with sleep, lifting, carrying, and other ADLs. She has a history of cancer in that arm and has nerve damage so prior to the injury she couldn't lift it very high. She reports increase intensity of pain since the fall and break.   Pain  Current pain ratin  At best pain ratin  At worst pain rating: 10          Objective     General Comments:      Shoulder Comments   R AROM: flex-80 abd-45 IR-unable ER- unable     R PROM: flex- 95 (pain) abd- 85 (pain) IR-held ER-held    R shoulder strength: deferred                  EPOC: 3/4/24     Shoulder EPOC: 24   RA DATE: 24       Shoulder:  3/27/24       Manual       MLD [facility]   Manual Wrapping                              Exercise Diary    THEREX       Pt ed  [facility]    Elbow/Wrist/hand AROM                                                 NEURO RE-ED       Scapular squeeze                                                                                                        Modalities           CP

## 2024-03-04 ENCOUNTER — OFFICE VISIT (OUTPATIENT)
Dept: PHYSICAL THERAPY | Facility: CLINIC | Age: 64
End: 2024-03-04
Payer: MEDICARE

## 2024-03-04 DIAGNOSIS — C50.912 CARCINOMA OF LEFT BREAST METASTATIC TO AXILLARY LYMPH NODE (HCC): ICD-10-CM

## 2024-03-04 DIAGNOSIS — C50.312 MALIGNANT NEOPLASM OF LOWER-INNER QUADRANT OF LEFT BREAST IN FEMALE, ESTROGEN RECEPTOR POSITIVE: ICD-10-CM

## 2024-03-04 DIAGNOSIS — Z17.0 MALIGNANT NEOPLASM OF LOWER-INNER QUADRANT OF LEFT BREAST IN FEMALE, ESTROGEN RECEPTOR POSITIVE: ICD-10-CM

## 2024-03-04 DIAGNOSIS — I89.0 LYMPHEDEMA OF RIGHT ARM: Primary | ICD-10-CM

## 2024-03-04 DIAGNOSIS — C77.3 CARCINOMA OF LEFT BREAST METASTATIC TO AXILLARY LYMPH NODE (HCC): ICD-10-CM

## 2024-03-04 DIAGNOSIS — S42.344D CLOSED NONDISPLACED SPIRAL FRACTURE OF SHAFT OF RIGHT HUMERUS WITH ROUTINE HEALING, SUBSEQUENT ENCOUNTER: ICD-10-CM

## 2024-03-04 PROCEDURE — 97110 THERAPEUTIC EXERCISES: CPT

## 2024-03-04 PROCEDURE — 97140 MANUAL THERAPY 1/> REGIONS: CPT

## 2024-03-04 PROCEDURE — 97112 NEUROMUSCULAR REEDUCATION: CPT

## 2024-03-04 NOTE — PROGRESS NOTES
"Daily Note     Today's date: 3/4/2024  Patient name: Eileen Matias  : 1960  MRN: [id number]  Referring provider: Rick Lazo DO  Dx:   Encounter Diagnosis     ICD-10-CM    1. Lymphedema of right arm  I89.0       2. Malignant neoplasm of lower-inner quadrant of left breast in female, estrogen receptor positive   C50.312     Z17.0       3. 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 Closed nondisplaced spiral fracture of shaft of right humerus with routine healing, subsequent encounter  S42.892D                      Subjective: pt reports increased pain/soreness today in the arm.       Objective: See treatment diary below      Assessment: Tolerated treatment well. Patient would benefit from continued PT. Continued w/ MLD to improve lymphatic flow and decrease swelling. Pt unable to actively move her fingers very well on her own, this improved post manually stretching each finger. Pt has little control w/ arm movements, required assistance for proper positioning.       Plan: Continue per plan of care.      EPOC: 3/4/24     Shoulder EPOC: 4/24/24 2/21 2/26 3/4 2/19   RA DATE: 24       Shoulder:  3/27/24       Manual       MLD [facility]   Manual Wrapping       PROM    [facility]                   Exercise Diary 3/4  2/21 2/26   THEREX       Pt ed [facility]    Elbow/Wrist/hand AROM AAROM 20x                                                NEURO RE-ED       Scapular squeeze 5\" 20x      Post shoulder rolls 20x                                                                                               Modalities           CP                                          " 34.5

## 2024-03-06 ENCOUNTER — OFFICE VISIT (OUTPATIENT)
Dept: PHYSICAL THERAPY | Facility: CLINIC | Age: 64
End: 2024-03-06
Payer: MEDICARE

## 2024-03-06 DIAGNOSIS — I89.0 LYMPHEDEMA OF RIGHT ARM: Primary | ICD-10-CM

## 2024-03-06 DIAGNOSIS — S42.344D CLOSED NONDISPLACED SPIRAL FRACTURE OF SHAFT OF RIGHT HUMERUS WITH ROUTINE HEALING, SUBSEQUENT ENCOUNTER: ICD-10-CM

## 2024-03-06 DIAGNOSIS — C50.312 MALIGNANT NEOPLASM OF LOWER-INNER QUADRANT OF LEFT BREAST IN FEMALE, ESTROGEN RECEPTOR POSITIVE: ICD-10-CM

## 2024-03-06 DIAGNOSIS — C77.3 CARCINOMA OF LEFT BREAST METASTATIC TO AXILLARY LYMPH NODE (HCC): ICD-10-CM

## 2024-03-06 DIAGNOSIS — Z17.0 MALIGNANT NEOPLASM OF LOWER-INNER QUADRANT OF LEFT BREAST IN FEMALE, ESTROGEN RECEPTOR POSITIVE: ICD-10-CM

## 2024-03-06 DIAGNOSIS — C50.912 CARCINOMA OF LEFT BREAST METASTATIC TO AXILLARY LYMPH NODE (HCC): ICD-10-CM

## 2024-03-06 PROCEDURE — 97140 MANUAL THERAPY 1/> REGIONS: CPT

## 2024-03-06 PROCEDURE — 97110 THERAPEUTIC EXERCISES: CPT

## 2024-03-06 PROCEDURE — 97112 NEUROMUSCULAR REEDUCATION: CPT

## 2024-03-06 NOTE — PROGRESS NOTES
"Daily Note     Today's date: 3/6/2024  Patient name: Eileen Matias  : 1960  MRN: [id number]  Referring provider: Rick Lazo DO  Dx:   Encounter Diagnosis     ICD-10-CM    1. Lymphedema of right arm  I89.0       2. Malignant neoplasm of lower-inner quadrant of left breast in female, estrogen receptor positive   C50.312     Z17.0       3. 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 Closed nondisplaced spiral fracture of shaft of right humerus with routine healing, subsequent encounter  S42.344D                      Subjective: pt reports slight decreased in swelling in posterior upper arm.       Objective: See treatment diary below      Assessment: Tolerated treatment well. Patient would benefit from continued PT. Continued w/ current POC as listed below w/ good tolerance. Utilized finger web to help promote active/isometric strengthening of finger flex/extension. Pt has improved control and decreased pain       Plan: Continue per plan of care.      EPOC: 3/4/24     Shoulder EPOC: 4/24/24 2/21 2/26 3/4 3/6   RA DATE: 24       Shoulder:  3/27/24       Manual       MLD [facility]   Manual Wrapping       PROM                       Exercise Diary 3/4 3/6     THEREX       Pt ed [facility]      Elbow/Wrist/hand AROM AAROM 20x [id number]     Finger web  10x ea dir     Opposition fingers  5x ea      Bicep curls supine  10x     AAROM abd  20x w/ cane                   NEURO RE-ED       Scapular squeeze 5\" 20x 5\" 20x     Post shoulder rolls 20x 20x                                                                                              Modalities           CP                                            "

## 2024-03-11 ENCOUNTER — OFFICE VISIT (OUTPATIENT)
Dept: PHYSICAL THERAPY | Facility: CLINIC | Age: 64
End: 2024-03-11
Payer: MEDICARE

## 2024-03-11 DIAGNOSIS — C77.3 CARCINOMA OF LEFT BREAST METASTATIC TO AXILLARY LYMPH NODE (HCC): ICD-10-CM

## 2024-03-11 DIAGNOSIS — I89.0 LYMPHEDEMA OF RIGHT ARM: Primary | ICD-10-CM

## 2024-03-11 DIAGNOSIS — C50.912 CARCINOMA OF LEFT BREAST METASTATIC TO AXILLARY LYMPH NODE (HCC): ICD-10-CM

## 2024-03-11 DIAGNOSIS — S42.344D CLOSED NONDISPLACED SPIRAL FRACTURE OF SHAFT OF RIGHT HUMERUS WITH ROUTINE HEALING, SUBSEQUENT ENCOUNTER: ICD-10-CM

## 2024-03-11 DIAGNOSIS — Z17.0 MALIGNANT NEOPLASM OF LOWER-INNER QUADRANT OF LEFT BREAST IN FEMALE, ESTROGEN RECEPTOR POSITIVE: ICD-10-CM

## 2024-03-11 DIAGNOSIS — C50.312 MALIGNANT NEOPLASM OF LOWER-INNER QUADRANT OF LEFT BREAST IN FEMALE, ESTROGEN RECEPTOR POSITIVE: ICD-10-CM

## 2024-03-11 PROCEDURE — 97110 THERAPEUTIC EXERCISES: CPT

## 2024-03-11 PROCEDURE — 97140 MANUAL THERAPY 1/> REGIONS: CPT

## 2024-03-11 NOTE — PROGRESS NOTES
"Daily Note     Today's date: 3/11/2024  Patient name: Eileen Matias  : 1960  MRN: [id number]  Referring provider: Rick Lazo DO  Dx:   Encounter Diagnosis     ICD-10-CM    1. Lymphedema of right arm  I89.0       2. Malignant neoplasm of lower-inner quadrant of left breast in female, estrogen receptor positive   C50.312     Z17.0       3. Closed nondisplaced spiral fracture of shaft of right humerus with routine healing, subsequent encounter  S42.344D       4. 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 (HCC)  C50.912     C77.3           Start Time: 1015          Subjective: pt reports she has been able to wear the smaller night garment  this past weekend. Slight improvement in hand mobility.       Objective: See treatment diary below      Assessment: Tolerated treatment well. Patient would benefit from continued PT. Continued w/ current POC as listed below w/ good tolerance. Slight improvement in finger mobility, able to touch thumb to pointer finger and middle finger. Discussed different compression garment options. Discussed that patient would benefit better from custom compression garments for daytime and nighttime garments due to ready made garments not fitting properly and not containing lymphedema as well compared to custom garment       Plan: Continue per plan of care.      EPOC: 3/11/24     Shoulder EPOC: 4/24/24 3/11   3/6   RA DATE: 24       Shoulder:  3/27/24       Manual       MLD [facility]   Manual Wrapping       PROM [facility]                   Exercise Diary 3/4 3/6 3/11    THEREX       Pt ed [facility]      Elbow/Wrist/hand AROM AAROM 20x [id number] AAROM/AROM 20x ea    Finger web  10x ea dir 10x ea dir    Opposition fingers  5x ea  10x ea    Bicep curls supine  10x     AAROM abd  20x w/ cane 20x w/ cane    AAROM flex   20x w/ cane           NEURO RE-ED       Scapular squeeze 5\" 20x 5\" 20x 5\" 20x    Post shoulder rolls 20x 20x 20x                                                     "                                         Modalities           CP

## 2024-03-13 ENCOUNTER — EVALUATION (OUTPATIENT)
Dept: PHYSICAL THERAPY | Facility: CLINIC | Age: 64
End: 2024-03-13
Payer: MEDICARE

## 2024-03-13 ENCOUNTER — OFFICE VISIT (OUTPATIENT)
Dept: OBGYN CLINIC | Facility: HOSPITAL | Age: 64
End: 2024-03-13
Payer: MEDICARE

## 2024-03-13 VITALS
DIASTOLIC BLOOD PRESSURE: 79 MMHG | SYSTOLIC BLOOD PRESSURE: 132 MMHG | BODY MASS INDEX: 23.37 KG/M2 | WEIGHT: 154.2 LBS | HEIGHT: 68 IN | HEART RATE: 77 BPM

## 2024-03-13 DIAGNOSIS — R20.0 NUMBNESS AND TINGLING OF LEFT UPPER EXTREMITY: Primary | ICD-10-CM

## 2024-03-13 DIAGNOSIS — M65.311 TRIGGER THUMB OF RIGHT HAND: ICD-10-CM

## 2024-03-13 DIAGNOSIS — C50.312 MALIGNANT NEOPLASM OF LOWER-INNER QUADRANT OF LEFT BREAST IN FEMALE, ESTROGEN RECEPTOR POSITIVE: ICD-10-CM

## 2024-03-13 DIAGNOSIS — R20.2 NUMBNESS AND TINGLING OF LEFT UPPER EXTREMITY: Primary | ICD-10-CM

## 2024-03-13 DIAGNOSIS — S42.344D CLOSED NONDISPLACED SPIRAL FRACTURE OF SHAFT OF RIGHT HUMERUS WITH ROUTINE HEALING, SUBSEQUENT ENCOUNTER: ICD-10-CM

## 2024-03-13 DIAGNOSIS — Z17.0 MALIGNANT NEOPLASM OF LOWER-INNER QUADRANT OF LEFT BREAST IN FEMALE, ESTROGEN RECEPTOR POSITIVE: ICD-10-CM

## 2024-03-13 DIAGNOSIS — R60.9 EDEMA, UNSPECIFIED TYPE: ICD-10-CM

## 2024-03-13 DIAGNOSIS — I89.0 LYMPHEDEMA OF RIGHT ARM: Primary | ICD-10-CM

## 2024-03-13 PROCEDURE — 99214 OFFICE O/P EST MOD 30 MIN: CPT | Performed by: ORTHOPAEDIC SURGERY

## 2024-03-13 PROCEDURE — 97140 MANUAL THERAPY 1/> REGIONS: CPT

## 2024-03-13 PROCEDURE — 97112 NEUROMUSCULAR REEDUCATION: CPT

## 2024-03-13 PROCEDURE — 97110 THERAPEUTIC EXERCISES: CPT

## 2024-03-13 NOTE — PROGRESS NOTES
ASSESSMENT/PLAN:    Assessment:   Trigger Finger right thumb   Significant nerve dysfunction right upper extremity with involvement of the radial nerve as well as a portion of the median nerve  Lymphedema right upper extremity  Joint contractures metacarpal phalangeal joints, proximal interphalangeal joints, distal interphalangeal joints of the right index through small finger as well as the right thumb CMC joint, metacarpophalangeal joint, and interphalangeal joint.    Plan:   EMG scheduled for right upper extremity, follow-up after EMG  Continue with lymphedema therapy  Begin hand therapy for aggressive ROM with wrist and finger flexion  Custom progressive splints from hand therapy for wrist and digit flexion   We discussed potential surgical intervention for the nerves pending results of the EMG.  We discussed the absolute necessity to achieve range of motion passively of the digits of the right upper extremity.  We also discussed at length potential surgical intervention in the form of tenolysis's as well as capsulotomies which would be inhibited at this point due to the significant swelling and loss of active motion.  She will work with therapy.  Follow-up after the EMG.      Follow Up:  After Testing of EMG to RUE    To Do Next Visit:  Review results of EMG    General Discussions:     Trigger FInger: The anatomy and physiology of trigger finger was discussed with the patient today in the office.  Edema and increased contact pressure within the flexor tendons at the A1 pulley can cause pain, crepitation, and limitation of function.  Treatment options include resting MP blocking splints to decrease edema, oral anti-inflammatory medications, home or formal therapy exercises, up to 2 steroid injections within the tendon sheath, or surgical release.  While majority of patients do respond to conservative treatment, up to 20% may require surgical release.     Due to contractures of the right hand and nerve damage she  will not know if she is getting motor and sensation back to her right hand and all fingers. There is a need to determine the level of damage to the nerves. Discussed the surgical treatment options as a last case scenario to include multiple contracture release and tenolysis. Explained that following the EMG, she will follow-up to determine a plan moving forward. Explained that plan will likely include Dr. Lazo for management of the slow-healing proximal humerus fracture.        Operative Discussions:        _____________________________________________________  CHIEF COMPLAINT:  No chief complaint on file.        SUBJECTIVE:  Eileen Matias is a 63 y.o. female who presents with Numbness and previous triggering to the right thumb.  This started 6 month(s) ago as Insidious.  Patient has a past history of desmoid fibromatosis in 2000 with removal of a desmoid tumor removed from her right upper arm with multiple surgeries until 2010. She has been completing lymphedema therapy for lymphedema to the right upper extremity. Patient had a proximal humerus fracture in 11/2023 which is being managed by Dr. Lazo however the fracture has been slow healing due to prior trauma to her RUE.  Radiation: Yes to the  index finger, long finger, ring finger, small finger, and thumb  Previous Treatments: therapy with only partial relief  Associated symptoms: Numbness  Handedness: right  Work status: Retired    PAST MEDICAL HISTORY:  Past Medical History:   Diagnosis Date    Abnormal mammogram 05/15/2013    Anemia     Cancer (HCC)     desmoitosis    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 (HCC) 04/19/2022    Chronic pain disorder     worse right side of body    Desmoid tumor     Last Assessed: 6/19/2017    fibroidal cyst 1999?  Hysterectomy done    History of transfusion     no reactions    Hyperlipidemia     Hypertension     Osteoporosis     PONV (postoperative nausea and vomiting)        PAST SURGICAL HISTORY:  Past  Surgical History:   Procedure Laterality Date    BREAST LUMPECTOMY Left 6/21/2022    Procedure: ANITA  DIRECTED LUMPECTOMY;  Surgeon: Amanda Motley MD;  Location: [facility];  Service: Surgical Oncology    FRACTURE SURGERY      HYSTERECTOMY      LYMPH NODE DISSECTION Left 6/21/2022    Procedure: AXILLARY DISSECTION LEVEL I AND LEVEL II LYMPH NODES;  Surgeon: Amanda Motley MD;  Location: [facility];  Service: Surgical Oncology    MRI BREAST BIOPSY LEFT (ALL INCLUSIVE) Left 5/20/2022    MRI BREAST BIOPSY RIGHT (ALL INCLUSIVE) Right 5/20/2022    OTHER SURGICAL HISTORY      Arm Incision: Dermoid tumor, right Arm     PARTIAL HYSTERECTOMY      LA COLONOSCOPY FLX DX W/COLLJ SPEC WHEN PFRMD N/A 8/15/2017    Procedure: COLONOSCOPY;  Surgeon: Alec England MD;  Location: [facility];  Service: Gastroenterology    LA NDSC WRST SURG W/RLS TRANSVRS CARPL LIGM Left 8/10/2021    Procedure: RELEASE LEFT CARPAL TUNNEL ENDOSCOPIC;  Surgeon: Chris Camacho MD;  Location: [facility];  Service: Orthopedics    LA OPTX DSTL RADL I-ARTIC FX/EPIPHYSL SEP 3 FRAG Left 2/9/2021    Procedure: OPEN REDUCTION W/ INTERNAL FIXATION (ORIF) RADIUS / ULNA (WRIST) left;  Surgeon: Chris Camacho MD;  Location: [facility];  Service: Orthopedics    SKIN BIOPSY      SKIN CANCER EXCISION      Melanoma Excision     TONSILLECTOMY      US BREAST NEEDLE LOC LEFT Left 6/16/2022    US BREAST NEEDLE LOC RIGHT EACH ADDITIONAL Right 6/16/2022    US GUIDED BREAST LYMPH NODE BIOPSY LEFT Left 4/14/2022       FAMILY HISTORY:  Family History   Problem Relation Age of Onset    Diabetes Mother     No Known Problems Father     Cancer Sister     Lung cancer Sister     Pancreatic cancer Sister     No Known Problems Maternal Grandmother     No Known Problems Maternal Grandfather     No Known Problems Paternal Grandmother     No Known Problems Paternal Grandfather     Breast cancer Neg Hx        SOCIAL HISTORY:  Social History     Tobacco Use     Smoking status: Never    Smokeless tobacco: Never   Vaping Use    Vaping status: Never Used   Substance Use Topics    Alcohol use: Not Currently     Alcohol/week: 5.0 standard drinks of alcohol     Types: 5 Shots of liquor per week     Comment: Used mostly as a painkiller when needed before bedtime    Drug use: Yes     Frequency: 28.0 times per week     Types: Marijuana     Comment: THC Medical marijuana       MEDICATIONS:    Current Outpatient Medications:     cholecalciferol (VITAMIN D3) 1,000 units tablet, Take 3,000 Units by mouth daily, Disp: , Rfl:     diphenhydrAMINE (BENADRYL) 50 MG tablet, Take 50 mg by mouth daily at bedtime as needed for itching, Disp: , Rfl:     dronabinol (MARINOL) 10 MG capsule, , Disp: , Rfl:     ezetimibe (ZETIA) 10 mg tablet, Take 10 mg by mouth daily, Disp: , Rfl:     gabapentin (Neurontin) 100 mg capsule, Take 1 capsule (100 mg total) by mouth 3 (three) times a day, Disp: 90 capsule, Rfl: 1    gabapentin (Neurontin) 300 mg capsule, Take 1 capsule (300 mg total) by mouth 3 (three) times a day, Disp: 90 capsule, Rfl: 0    letrozole (FEMARA) 2.5 mg tablet, TAKE 1 TABLET BY MOUTH EVERY DAY, Disp: 90 tablet, Rfl: 3    multivitamin (THERAGRAN) TABS, Take 1 tablet by mouth daily, Disp: , Rfl:     Omega-3 Fatty Acids (FISH OIL PO), Take by mouth, Disp: , Rfl:     penicillin V potassium (VEETID) 500 mg tablet, Take 1 tablet (500 mg total) by mouth every 12 (twelve) hours, Disp: 60 tablet, Rfl: 2    Probiotic Product (PROBIOTIC-10 PO), Take by mouth, Disp: , Rfl:     oxyCODONE-acetaminophen (PERCOCET) 5-325 mg per tablet, , Disp: , Rfl:     senna-docusate sodium (SENOKOT-S) 8.6-50 mg per tablet, Take 1 tablet by mouth daily (Patient not taking: Reported on 1/18/2024), Disp: 7 tablet, Rfl: 0    ALLERGIES:  Allergies   Allergen Reactions    Chlorpromazine Anaphylaxis     PHENOTHIAZINE=ANAPHYLAXIS    Codeine Anaphylaxis     Anaphylaxis  abd pain.    Meperidine Anaphylaxis, Hives and Vomiting  "   Phenothiazines Anaphylaxis and Throat Swelling     Category: Allergy;     Saccharin - Food Allergy Anaphylaxis    Ampicillin-Sulbactam Sodium Hives    Aspirin GI Intolerance and Abdominal Pain     Severe GERD    Gluten Meal - Food Allergy GI Intolerance    Ibuprofen Hives     H    Levofloxacin Hives    Chocolate - Food Allergy GI Intolerance    Lactose - Food Allergy GI Intolerance    Neomycin Other (See Comments), Edema and Hives     Category: Allergy;   swelling    [place] - Food Allergy Rash    Latex Hives and Rash     Category: Allergy;        REVIEW OF SYSTEMS:  Pertinent items are noted in HPI.  A comprehensive review of systems was negative.    LABS:  HgA1c:   Lab Results   Component Value Date    HGBA1C 5.3 07/13/2021     BMP:   Lab Results   Component Value Date    GLUCOSE 98 05/02/2017    CALCIUM 8.9 11/14/2023    K 3.6 11/14/2023    CO2 24 11/14/2023     11/14/2023    BUN 10 11/14/2023    CREATININE 0.52 (L) 11/14/2023         _____________________________________________________  PHYSICAL EXAMINATION:  Vital signs: /79   Pulse 77   Ht 5' 8\" (1.727 m)   Wt 69.9 kg (154 lb 3.2 oz)   BMI 23.45 kg/m²   General: well developed and well nourished, alert, oriented times 3, and appears comfortable  Psychiatric: Normal  HEENT: Trachea Midline, No torticollis  Cardiovascular: No discernable arrhythmia  Pulmonary: No wheezing or stridor  Abdomen: No rebound or guarding  Extremities: No peripheral edema  Skin: No masses, erythema, lacerations, fluctation, ulcerations  Neurovascular: Decreased Sensation to  the Median Nerve, Decreased Sensation to  the Ulnar Nerve, Decreased Sensation to  the Radial Nerve, Weakness to the Median Nerve see below, Weakness to the Ulnar nerve see below, Weakness to the radial nerve see below, and Pulses Intact    MUSCULOSKELETAL EXAMINATION:  Right Hand/Wrist exam:  Pronation 4+/5 with full motion  Supination to neutral  No AROM with wrist extension  PROM with wrist " extension to 20 degrees  No retropulsion of thumb  Thumb FPL 5/5  ABD thumb 4-/5  FDP 2/5 to all fingers  FCR 5/5  FCU 5/5    MP PROM at index finger to 60 degrees             Long finger to 30 degrees             Ring and small fingers no ROM    PIP PROM at index finger to 70 degrees             Long, ring and small fingers to 45 degrees  DIP PROM at index, long, ring, small fingers to 30 degrees  IP PROM at thumb to 10-30 degrees    Left thumb trigger finger present      _____________________________________________________  STUDIES REVIEWED:  Images were reviewed in PACS by Dr. Black and demonstrate: Delayed/nonunion of the right humerus with some beginning callus formation.  Adequate alignment noted.      PROCEDURES PERFORMED:  Procedures  No Procedures performed today  Scribe Attestation      I,:  Lacy Escobar am acting as a scribe while in the presence of the attending physician.:       I,:  Rogelio Black MD personally performed the services described in this documentation    as scribed in my presence.:

## 2024-03-13 NOTE — PROGRESS NOTES
PT Re-Evaluation     Collection of Subjective/Objective data performed by Irene Childs PTA. Assessment, POC, and Goal attainment performed by Jessica Garcia DPT.       Today's date: 3/13/2024  Patient name: Eileen Matias  : 1960   MRN: [id number]  Referring provider: Rick Lazo DO  Dx:   Encounter Diagnosis     ICD-10-CM    1. Lymphedema of right arm  I89.0       2. Malignant neoplasm of lower-inner quadrant of left breast in female, estrogen receptor positive   C50.312     Z17.0       3. Closed nondisplaced spiral fracture of shaft of right humerus with routine healing, subsequent encounter  S42.495D                        Assessment  Assessment details: Patient is a 64 y/o female who presents to therapy with R upper extremity edema and has completed 16 visits to date with improved FOTO score of 42.. Patient demonstrates subjective/objective improvement since starting PT such as decreased swelling. Patient will benefit from continued skilled PT intervention to address the aforementioned impairments, achieve goals, maximize function, and improve quality of life. Pt is in agreement with this plan.   Impairments: abnormal or restricted ROM, impaired physical strength and pain with function  Other impairment: lymphedema  Understanding of Dx/Px/POC: good   Prognosis: good    Goals  ST.Pain decreased by 25% in 4 weeks.--PROGRESSING  2. Edema decreased by 2-3 cm in 4 weeks. --MET    LT. Decrease pain to 1-2/10 at worst by d/c.-PROGRESSING  2. Increase ROM to WFL for all deficient movements by d/c.-DNT (restrictions)  3. Strength increased to 5 for all deficient muscle groups by d/c.-DNT (restrictions)  4. IADL performance increased to max function by d/c.-PROGRESSING  5. Recreational performance increased to max function by d/c.-PROGRESSING  6. RUE edema decreased to that of LUE by d/c. -PROGRESSING  7. Pt will return to work by d/c.-PROGRESSING    Plan  Other planned modality  "interventions: other modalities PRN  Planned therapy interventions: abdominal trunk stabilization, ADL retraining, IADL retraining, flexibility, functional ROM exercises, graded exercise, home exercise program, manual therapy, joint mobilization, neuromuscular re-education, patient education, postural training, strengthening, therapeutic exercise, therapeutic activities, massage and work reintegration  Other planned therapy interventions: other interventions PRN  Frequency: 1-2x/week.  Duration in weeks: 8  Plan of Care beginning date: 3/13/2024  Plan of Care expiration date: 4/24/2024  Treatment plan discussed with: patient        Subjective Evaluation    History of Present Illness  Mechanism of injury: RE (3/13/24):  Pt reports overall improvements with the tightness and swelling in the arm. Pt notes 10% improvement in the swelling since beginning lymphedema therapy. Pt has since transitioned from the larger night garment to the smaller night garment to wear during the day. Pt notes she has feeling back in her thumb and finger tips. Pt notes slight improvements in strength. Pt is still not using pump at home due to the continued swelling and tightness in posterior upper arm that causes more pain when using pump. Pt plans on restarting the pump when \"the lump\" in the back of the arm improves.       RE (2/14/24):  Pt has been seen for RUE lymphedema for 8 visits to date and notes 50% improvement so far. Pt reports she is still not able to use compression pump but has been wearing the [employer] garment, which seems to be helping. Pt is not longer needing to wear the clamshell for her fx unless if she is out running errands. Pt notes the swelling has improved since being able to take the brace off more often. Pt notes continued \"hardness\" in elbow and axilla region.     EVAL (1/15/24):  Pt reports to IE w/ hx of RUE lymphedema which started approx 20 years ago approx 5 years following CA dx of desmoitosis.     Pt had " done skilled PT tx for this condition in the past which was helpful control the issue. However, pt sustained humeral fx on 23 as a result of fall on RUE while trying to protect mother in law from a fall out of w/c. She reports that healing has been complicated by severe osteoporosis and lymphedema. Pt still required to wear brace at all times- not yet allowed any ROM in shoulder.     In addition to the above, pt w/ recent hx of L breast CA w/ L lumpectomy and lymph node removal (pt reports 17 lymph nodes) over on 22.        Patient Goals  Patient goals for therapy: decreased pain and decreased edema    Pain  Current pain ratin  At best pain rating: 3  At worst pain rating: 10  Location: R shoulder  Alleviating factors: lymphedema massage.  Exacerbated by: increased edema, movement.    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)  Hand dominance: right          Objective     General Comments:      Ankle/Foot Comments   Edema in R vs L w/ circumferential girth measurements as below (L/R):   Axilla: 35  20 cm proximal to elbow: 29.5  10 cm proximal to elbow: 27.5 cm  Elbow Joint: 25 cm/27 cm  20 cm proximal to wrist: 26.5 cm/27.5 cm  10 cm proximal to wrist: 21 cm/22 cm  Wrist:  16.5 cm/17.5 cm  Palm: 18 cm/18.5 cm  MCP Joints (digits 2-5): 18 cm/16.5 cm  PIP Joints (digits 2-5): 16 cm/16 cm      EPOC: 24    Shoulder EPOC: 4/24/24 3/11 3/13  3/6   RA DATE:   3/27/24      Shoulder:  3/27/24       Manual       MLD [facility]   Manual Wrapping       PROM [facility]                   Exercise Diary 3/4 3/6 3/11 3/13   THEREX       Pt ed    Lymph RE; FOTO, updated measurements   Elbow/Wrist/hand AROM AAROM 20x [id number] AAROM/AROM 20x ea AAROM/AROM 20x ea   Finger web  10x ea dir 10x ea dir 20x ea   Opposition fingers  5x ea  10x ea 10x ea   Bicep curls supine  10x     AAROM abd  20x w/ cane 20x w/ cane 20x w/ cane  "  AAROM flex   20x w/ cane 20x w/ cane   Assisted supination    5\" x20   NEURO RE-ED       Scapular squeeze 5\" 20x 5\" 20x 5\" 20x 5\" 20x   Post shoulder rolls 20x 20x 20x 20x                                                                                            Modalities           CP                                       "

## 2024-03-18 ENCOUNTER — OFFICE VISIT (OUTPATIENT)
Dept: PHYSICAL THERAPY | Facility: CLINIC | Age: 64
End: 2024-03-18
Payer: MEDICARE

## 2024-03-18 DIAGNOSIS — S42.344D CLOSED NONDISPLACED SPIRAL FRACTURE OF SHAFT OF RIGHT HUMERUS WITH ROUTINE HEALING, SUBSEQUENT ENCOUNTER: ICD-10-CM

## 2024-03-18 DIAGNOSIS — C50.312 MALIGNANT NEOPLASM OF LOWER-INNER QUADRANT OF LEFT BREAST IN FEMALE, ESTROGEN RECEPTOR POSITIVE (HCC): ICD-10-CM

## 2024-03-18 DIAGNOSIS — I89.0 LYMPHEDEMA OF RIGHT ARM: Primary | ICD-10-CM

## 2024-03-18 DIAGNOSIS — Z17.0 MALIGNANT NEOPLASM OF LOWER-INNER QUADRANT OF LEFT BREAST IN FEMALE, ESTROGEN RECEPTOR POSITIVE (HCC): ICD-10-CM

## 2024-03-18 PROCEDURE — 97140 MANUAL THERAPY 1/> REGIONS: CPT

## 2024-03-18 PROCEDURE — 97112 NEUROMUSCULAR REEDUCATION: CPT

## 2024-03-18 PROCEDURE — 97110 THERAPEUTIC EXERCISES: CPT

## 2024-03-18 NOTE — PROGRESS NOTES
"Daily Note     Today's date: 3/18/2024  Patient name: Eileen Matias  : 1960  MRN: [id number]  Referring provider: Rick Lazo DO  Dx:   Encounter Diagnosis     ICD-10-CM    1. Lymphedema of right arm  I89.0       2. Malignant neoplasm of lower-inner quadrant of left breast in female, estrogen receptor positive   C50.312     Z17.0       3. Closed nondisplaced spiral fracture of shaft of right humerus with routine healing, subsequent encounter  S42.344D                      Subjective: pt reports able to shave under her arm this morning due to improved mobility in the shoulder. Swelling is maintaining. Able to hold a tomato w/ R hand while slicing with the other hand.       Objective: See treatment diary below      Assessment: Tolerated treatment well. Patient would benefit from continued PT. Pt able to touch nose w/ R fingers w/ assisted AROM, which is an improvement since last week. Pt has improved mobility in the fingers. Able to touch fingertips to pointer and middle finger. Improved PROM of fingers as well. Progressed AAROM exercises of shoulder w/ good tolerance, required assistance from L hand to control R UE t/o wall slides. Able to add finger pinching exercises including picking up nuts and bolt into a jar. Overall, decreased swelling and tightness in elbow region and proximal/posterior arm.       Plan: Continue per plan of care.      EPOC: 3/11/24     Shoulder EPOC: 4/24/24 3/11 3/18  3/6   RA DATE: 24       Shoulder:  3/27/24       Manual       MLD [facility]   Manual Wrapping       PROM [facility]                   Exercise Diary 3/4 3/6 3/11 3/18   THEREX       Pt ed JH      Elbow/Wrist/hand AROM AAROM 20x [id number] AAROM/AROM 20x ea AAROM/AROM 20x ea   Finger web  10x ea dir 10x ea dir 10x ea dir   Opposition fingers  5x ea  10x ea 10x ea   Bicep curls supine  10x     AAROM abd  20x w/ cane 20x w/ cane 20x w/ can   AAROM flex   20x w/ cane 20x w/ can   Wall slides     5\" x 10   Nuts/bolt " "    1 round w/ ea finger   NEURO RE-ED       Scapular squeeze 5\" 20x 5\" 20x 5\" 20x 5\" 20x   Post shoulder rolls 20x 20x 20x 20x                                                                                            Modalities           CP                                                "

## 2024-03-20 ENCOUNTER — OFFICE VISIT (OUTPATIENT)
Dept: PHYSICAL THERAPY | Facility: CLINIC | Age: 64
End: 2024-03-20
Payer: MEDICARE

## 2024-03-20 DIAGNOSIS — C50.312 MALIGNANT NEOPLASM OF LOWER-INNER QUADRANT OF LEFT BREAST IN FEMALE, ESTROGEN RECEPTOR POSITIVE (HCC): ICD-10-CM

## 2024-03-20 DIAGNOSIS — Z17.0 MALIGNANT NEOPLASM OF LOWER-INNER QUADRANT OF LEFT BREAST IN FEMALE, ESTROGEN RECEPTOR POSITIVE (HCC): ICD-10-CM

## 2024-03-20 DIAGNOSIS — C50.912 CARCINOMA OF LEFT BREAST METASTATIC TO AXILLARY LYMPH NODE (HCC): ICD-10-CM

## 2024-03-20 DIAGNOSIS — S42.344D CLOSED NONDISPLACED SPIRAL FRACTURE OF SHAFT OF RIGHT HUMERUS WITH ROUTINE HEALING, SUBSEQUENT ENCOUNTER: ICD-10-CM

## 2024-03-20 DIAGNOSIS — C77.3 CARCINOMA OF LEFT BREAST METASTATIC TO AXILLARY LYMPH NODE (HCC): ICD-10-CM

## 2024-03-20 DIAGNOSIS — I89.0 LYMPHEDEMA OF RIGHT ARM: Primary | ICD-10-CM

## 2024-03-20 PROCEDURE — 97140 MANUAL THERAPY 1/> REGIONS: CPT

## 2024-03-20 PROCEDURE — 97110 THERAPEUTIC EXERCISES: CPT

## 2024-03-20 PROCEDURE — 97112 NEUROMUSCULAR REEDUCATION: CPT

## 2024-03-20 NOTE — PROGRESS NOTES
"Daily Note     Today's date: 3/20/2024  Patient name: Eileen Matias  : 1960  MRN: [id number]  Referring provider: Rick Lazo DO  Dx:   Encounter Diagnosis     ICD-10-CM    1. Lymphedema of right arm  I89.0       2. Closed nondisplaced spiral fracture of shaft of right humerus with routine healing, subsequent encounter  S42.344D       3. 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 Malignant neoplasm of lower-inner quadrant of left breast in female, estrogen receptor positive   C50.312     Z17.0                      Subjective: pt reports swelling in arm is improving some, especially in the elbow region.       Objective: See treatment diary below      Assessment: Tolerated treatment well. Patient would benefit from continued PT. Improved endurance w/ holding arm up with  exercises. Slight improvement w/ wrist mobility this session. Progressed shoulder mobility w/ good tolerance.       Plan: Continue per plan of care.      EPOC: 3/11/24     Shoulder EPOC: 4/24/24 3/11 3/18 3/20 3/6   RA DATE: 24       Shoulder:  3/27/24       Manual       MLD [facility]   Manual Wrapping       PROM [facility]                   Exercise Diary 3/20  3/11 3/18   THEREX       Pt ed       Elbow/Wrist/hand AROM AROM x20 ea  AAROM/AROM 20x ea AAROM/AROM 20x ea   Finger web 20x ea   10x ea dir 10x ea dir   Opposition fingers 10x ea  10x ea 10x ea   Bicep curls w/ overpressure x20      AAROM abd 20x w/cane  20x w/ cane 20x w/ can   AAROM flex 20x w/cane   20x w/ cane 20x w/ can   pulleys x20      Wall slides  5\" x 10   5\" x 10   Tricep sidney x20      Bicep sidney x20      Nuts/bolt  1 round w/ ea finger   1 round w/ ea finger   NEURO RE-ED       Scapular squeeze 5\" 20x  5\" 20x 5\" 20x   Post shoulder rolls 20x  20x 20x                                                                                            Modalities           CP                                 "

## 2024-03-25 ENCOUNTER — OFFICE VISIT (OUTPATIENT)
Dept: PHYSICAL THERAPY | Facility: CLINIC | Age: 64
End: 2024-03-25
Payer: MEDICARE

## 2024-03-25 DIAGNOSIS — S42.344D CLOSED NONDISPLACED SPIRAL FRACTURE OF SHAFT OF RIGHT HUMERUS WITH ROUTINE HEALING, SUBSEQUENT ENCOUNTER: ICD-10-CM

## 2024-03-25 DIAGNOSIS — I89.0 LYMPHEDEMA OF RIGHT ARM: Primary | ICD-10-CM

## 2024-03-25 DIAGNOSIS — C50.912 CARCINOMA OF LEFT BREAST METASTATIC TO AXILLARY LYMPH NODE (HCC): ICD-10-CM

## 2024-03-25 DIAGNOSIS — C77.3 CARCINOMA OF LEFT BREAST METASTATIC TO AXILLARY LYMPH NODE (HCC): ICD-10-CM

## 2024-03-25 PROCEDURE — 97110 THERAPEUTIC EXERCISES: CPT

## 2024-03-25 PROCEDURE — 97140 MANUAL THERAPY 1/> REGIONS: CPT

## 2024-03-25 NOTE — PROGRESS NOTES
"Daily Note     Today's date: 3/25/2024  Patient name: Eileen Matias  : 1960  MRN: [id number]  Referring provider: Rick Lazo DO  Dx:   Encounter Diagnosis     ICD-10-CM    1. Lymphedema of right arm  I89.0       2. Closed nondisplaced spiral fracture of shaft of right humerus with routine healing, subsequent encounter  S42.344D       3. 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       Objective: See treatment diary below      Assessment: Tolerated treatment well. Patient would benefit from continued PT.       Plan: Continue per plan of care.      EPOC: 3/11/24     Shoulder EPOC: 4/24/24 3/11 3/18 3/20 3/25   RA DATE: 24       Shoulder:  3/27/24       Manual       MLD [facility]   Manual Wrapping       PROM [facility]                   Exercise Diary 3/20 3/25     THEREX       Pt ed       Elbow/Wrist/hand AROM AROM x20 ea A/AAROM x20     Finger web 20x ea       Opposition fingers 10x ea 10x ea     Bicep curls w/ overpressure x20 x20     AAROM abd 20x w/cane 20x w/cane     AAROM flex 20x w/cane  20x w/cane     pulleys x20 x20     Wall slides  5\" x 10      Wrist sidney  x10     Tricep sidney x20 x20     Bicep sidney x20 x20     SL abd  x10     Supine shoulder flex  2x10     Nuts/bolt  1 round w/ ea finger  1 round w/ ea finger     NEURO RE-ED       Scapular squeeze 5\" 20x 5\" 20x     Post shoulder rolls 20x 20x                                                                                              Modalities           CP                                                    "

## 2024-03-27 ENCOUNTER — OFFICE VISIT (OUTPATIENT)
Dept: OCCUPATIONAL THERAPY | Facility: CLINIC | Age: 64
End: 2024-03-27
Payer: MEDICARE

## 2024-03-27 ENCOUNTER — OFFICE VISIT (OUTPATIENT)
Dept: PHYSICAL THERAPY | Facility: CLINIC | Age: 64
End: 2024-03-27
Payer: MEDICARE

## 2024-03-27 DIAGNOSIS — R20.0 NUMBNESS AND TINGLING OF LEFT UPPER EXTREMITY: ICD-10-CM

## 2024-03-27 DIAGNOSIS — I89.0 LYMPHEDEMA OF RIGHT ARM: Primary | ICD-10-CM

## 2024-03-27 DIAGNOSIS — S42.344D CLOSED NONDISPLACED SPIRAL FRACTURE OF SHAFT OF RIGHT HUMERUS WITH ROUTINE HEALING, SUBSEQUENT ENCOUNTER: ICD-10-CM

## 2024-03-27 DIAGNOSIS — R20.2 NUMBNESS AND TINGLING OF LEFT UPPER EXTREMITY: ICD-10-CM

## 2024-03-27 PROCEDURE — 97110 THERAPEUTIC EXERCISES: CPT

## 2024-03-27 PROCEDURE — 97760 ORTHOTIC MGMT&TRAING 1ST ENC: CPT | Performed by: OCCUPATIONAL THERAPIST

## 2024-03-27 PROCEDURE — 97140 MANUAL THERAPY 1/> REGIONS: CPT

## 2024-03-27 NOTE — PROGRESS NOTES
Orthosis    Diagnosis:   1. Numbness and tingling of left upper extremity  Ambulatory Referral to PT/OT Hand Therapy        Indication: Contracture    Location: Right  thumb  Supplies: Custom Fit Orthotic  Orthosis type: Thumb abduction splint  Wearing Schedule: Night only  Describe Position:  Thumb abduction    Precautions: Universal (skin contact/breakdown)    Patient or Caregiver expresses understanding of wearing Schedule and Precautions? Yes  Patient or Caregiver able to don/doff orthotic independently?Yes    Written orders provided to patient? Yes  Orders Obtained: Written  Orders Obtained from: physician     Return for evaluation and treatment Yes    Patient provided with HEP for wrist extension stretching and has been encouraged to wear a wrist support to provide neutral alignment of the wrist with function.

## 2024-03-27 NOTE — PROGRESS NOTES
"Daily Note     Today's date: 3/27/2024  Patient name: Eileen Matias  : 1960  MRN: [id number]  Referring provider: Rick Lazo DO  Dx:   Encounter Diagnosis     ICD-10-CM    1. Lymphedema of right arm  I89.0       2. Closed nondisplaced spiral fracture of shaft of right humerus with routine healing, subsequent encounter  S42.344D                      Subjective: pt reports no new complaints upon arrival.       Objective: See treatment diary below      Assessment: Tolerated treatment well. Patient would benefit from continued PT. Improved mobility in fingers. Continued to work on mobility and strength of shoulder.       Plan: Continue per plan of care.      EPOC: 3/11/24     Shoulder EPOC: 4/24/24 3/27  3/20 3/25   RA DATE: 24       Shoulder:  3/27/24       Manual       MLD [facility]   Manual Wrapping       PROM [facility]                   Exercise Diary 3/20 3/25 3/27    THEREX       Pt ed       Elbow/Wrist/hand AROM AROM x20 ea A/AAROM x20 A/AAROM x20    Finger web 20x ea       Opposition fingers 10x ea 10x ea 10x ea    Bicep curls w/ overpressure x20 x20 x20    AAROM abd 20x w/cane 20x w/cane     AAROM flex 20x w/cane  20x w/cane Supine 10\" x10    pulleys x20 x20     Wall slides  5\" x 10      Wrist sidney  x10 x10    Tricep sidney x20 x20 x20    Bicep sidney x20 x20 x20    SL abd  x10 2x10    Supine shoulder flex  2x10 2x10    Nuts/bolt  1 round w/ ea finger  1 round w/ ea finger 1 round w/ ea finger    NEURO RE-ED       Scapular squeeze 5\" 20x 5\" 20x 5\" 20x    Post shoulder rolls 20x 20x 20x                                                                                             Modalities           CP                                                      "

## 2024-03-28 ENCOUNTER — APPOINTMENT (OUTPATIENT)
Dept: RADIOLOGY | Facility: MEDICAL CENTER | Age: 64
End: 2024-03-28
Payer: MEDICARE

## 2024-03-28 ENCOUNTER — OFFICE VISIT (OUTPATIENT)
Dept: OBGYN CLINIC | Facility: MEDICAL CENTER | Age: 64
End: 2024-03-28
Payer: MEDICARE

## 2024-03-28 VITALS
DIASTOLIC BLOOD PRESSURE: 79 MMHG | HEIGHT: 68 IN | HEART RATE: 65 BPM | BODY MASS INDEX: 22.58 KG/M2 | SYSTOLIC BLOOD PRESSURE: 138 MMHG | WEIGHT: 149 LBS

## 2024-03-28 DIAGNOSIS — S42.344D CLOSED NONDISPLACED SPIRAL FRACTURE OF SHAFT OF RIGHT HUMERUS WITH ROUTINE HEALING, SUBSEQUENT ENCOUNTER: ICD-10-CM

## 2024-03-28 DIAGNOSIS — Z47.89 AFTERCARE FOLLOWING SURGERY OF THE MUSCULOSKELETAL SYSTEM: ICD-10-CM

## 2024-03-28 DIAGNOSIS — S42.344D CLOSED NONDISPLACED SPIRAL FRACTURE OF SHAFT OF RIGHT HUMERUS WITH ROUTINE HEALING, SUBSEQUENT ENCOUNTER: Primary | ICD-10-CM

## 2024-03-28 PROCEDURE — 99214 OFFICE O/P EST MOD 30 MIN: CPT | Performed by: STUDENT IN AN ORGANIZED HEALTH CARE EDUCATION/TRAINING PROGRAM

## 2024-03-28 PROCEDURE — 73030 X-RAY EXAM OF SHOULDER: CPT

## 2024-03-28 PROCEDURE — 73060 X-RAY EXAM OF HUMERUS: CPT

## 2024-03-28 RX ORDER — GABAPENTIN 100 MG/1
100 CAPSULE ORAL 3 TIMES DAILY
Qty: 90 CAPSULE | Refills: 1 | Status: SHIPPED | OUTPATIENT
Start: 2024-03-28

## 2024-03-28 NOTE — PROGRESS NOTES
Orthopedic Surgery Nonoperative Management Note  Eileen Matias (63 y.o. female)  : 1960 Encounter Date: 3/28/2024  Dr. Rick Lazo DO, Orthopedic Surgeon  Orthopedic Oncology & Sarcoma Surgery   Assessment/Plan:  63 y.o. female 12 weeks follow up status post right midshaft humerus fracture     Problem List Items Addressed This Visit          Musculoskeletal and Integument    Closed nondisplaced spiral fracture of shaft of right humerus with routine healing, subsequent encounter - Primary    Relevant Orders    XR shoulder 2+ vw right    XR humerus right    Cock Up Wrist Splint     Other Visit Diagnoses       Aftercare following surgery of the musculoskeletal system              Right Humerus Fracture  Recommend that she still uses the long arm compression sleeve for decreased swelling with lymphadenopathy   Reviewed XR with patient, demonstrates the same amount of callus as x-rays from 6 weeks ago which could potentially be the normal for her with her previous cancer radiation and previous surgical history.  Return in 4-6 weeks for XR of right humerus  Clinical healing is present with ability to lift her right arm without the babb brace without movement in the humerus  Continue in non-operative humeral shaft fracture protocol within weeks 6-10.    2. Significant Right Arm Lymphedema   She has been progressing well  Continue with physical therapy for ROM and lymphedema of right arm  With the amount of callus that has been demonstrated within the past 12 weeks of x-rays, she may begin lymphedema therapy with massage machine    3. Right hand nerve dysfunction  Continue care with Dr. Black    Return in about 4 weeks (around 2024).    _____________________________________________________  CHIEF COMPLAINT:  Chief Complaint   Patient presents with    Right Shoulder - Follow-up     SUBJECTIVE:  Eileen Matias is a 63 y.o. year old female who presents 4.5 months s/p right humerus fracture.   DOI:  "11/12/23      Prior HPI:  She was seen at [facility] for right arm pain at the elbow with pronation and supination. X-rays were obtained at time of visit, no images are available for review, radiologist report is available. Patient has since transitioned to [facility]. She was seen in the ED on 11/12/23 for right arm pain after a fall. ED note states that a closed reduction was performed in the ED at time of visit. Medication refill note from  states that she is unable to use her lymphedema machine and is experiencing swelling in her right arm. On presentation today, patient states that she was pushing her mother in law in a wheelchair when the wheelchair started to tip forward and she tried to catch it to prevent her MIL from falling out of the wheelchair. She believes that she \"lost control\" of her right arm, and then fell onto her right shoulder trying to protect the right elbow. She reports that she has been unable to use her lymphedema bag since the injury. She states that she has had a physical therapist come into her home 2/wk since the injury who performs lymphedema massage on her right hand and forearm to increase blood flow.    Patient states that she is still having moderate amount of pain, but feels that it relates more to the lymphedema than the humeral fracture. She has seen a new provider for lymphedema beginning last week and feels that the swelling has decreased moderately.     At 3.5 month follow up, patient reports that she has increase in range of motion at the shoulder. She reports that her pain has decreased significantly. She has not been using the Kruse brace while at home and has been using a long arm compression sleeve that she had previously used with for lymphedema. She reports that she is only taking the Gabapentin before bed which allows her to sleep throughout the night.     On presentation today (3/28/24), patient reports that she is doing well overall. She has " increase ROM in her right shoulder with another decrease in size of her lymphedema sleeve. She is still completing lymphedema therapy as well as physical therapy. She states that she feels tension as a muscle strain with forward flexion and abduction. She has decreased her Gabapentin to 1-2 of the 100 mg pills in the evening before bed. She wishes to try the lymphedema again machine to control swelling.    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 She had a latissimus rotational skin pedicle flap  performed after having multiple recurrences. Within the same year she had a reoccurrence of a desmoid tumor and another resection of her back. Patient states that her last tumor related operation was in 2010. Patient was cancer free for 8 years, until last year when she developed breast cancer on her left side. She states that she had radiation, 16 lymph nodes were removed and none of the lymph nodes were positive. She states that she had no radiation to the lymph nodes. She states that she developed lymphedema within 1 year after the diagnosis of the desmoid fibromatosis. She was previously using the lymphedema treatment as needed, which progressed to using 3x/daily every day.     PAST MEDICAL HISTORY:  Past Medical History:   Diagnosis Date    Abnormal mammogram 05/15/2013    Anemia     Cancer (HCC)     desmoitosis    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 (HCC) 04/19/2022    Chronic pain disorder     worse right side of body    Desmoid tumor     Last Assessed: 6/19/2017    fibroidal cyst 1999?  Hysterectomy done    History of transfusion     no reactions    Hyperlipidemia     Hypertension     Osteoporosis     PONV (postoperative nausea and vomiting)        PAST SURGICAL HISTORY:  Past Surgical History:   Procedure Laterality Date    BREAST LUMPECTOMY Left 6/21/2022    Procedure: ANITA  DIRECTED  LUMPECTOMY;  Surgeon: Amanda Motley MD;  Location: [facility];  Service: Surgical Oncology    FRACTURE SURGERY      HYSTERECTOMY      LYMPH NODE DISSECTION Left 6/21/2022    Procedure: AXILLARY DISSECTION LEVEL I AND LEVEL II LYMPH NODES;  Surgeon: Amanda Motley MD;  Location: [facility];  Service: Surgical Oncology    MRI BREAST BIOPSY LEFT (ALL INCLUSIVE) Left 5/20/2022    MRI BREAST BIOPSY RIGHT (ALL INCLUSIVE) Right 5/20/2022    OTHER SURGICAL HISTORY      Arm Incision: Dermoid tumor, right Arm     PARTIAL HYSTERECTOMY      TX COLONOSCOPY FLX DX W/COLLJ SPEC WHEN PFRMD N/A 8/15/2017    Procedure: COLONOSCOPY;  Surgeon: Alec England MD;  Location: [facility];  Service: Gastroenterology    TX NDSC WRST SURG W/RLS TRANSVRS CARPL LIGM Left 8/10/2021    Procedure: RELEASE LEFT CARPAL TUNNEL ENDOSCOPIC;  Surgeon: Chris Camacho MD;  Location: [facility];  Service: Orthopedics    TX OPTX DSTL RADL I-ARTIC FX/EPIPHYSL SEP 3 FRAG Left 2/9/2021    Procedure: OPEN REDUCTION W/ INTERNAL FIXATION (ORIF) RADIUS / ULNA (WRIST) left;  Surgeon: Chris Camacho MD;  [facility];  Service: Orthopedics    SKIN BIOPSY      SKIN CANCER EXCISION      Melanoma Excision     TONSILLECTOMY      US BREAST NEEDLE LOC LEFT Left 6/16/2022    US BREAST NEEDLE LOC RIGHT EACH ADDITIONAL Right 6/16/2022    US GUIDED BREAST LYMPH NODE BIOPSY LEFT Left 4/14/2022       FAMILY HISTORY:  Family History   Problem Relation Age of Onset    Diabetes Mother     No Known Problems Father     Cancer Sister     Lung cancer Sister     Pancreatic cancer Sister     No Known Problems Maternal Grandmother     No Known Problems Maternal Grandfather     No Known Problems Paternal Grandmother     No Known Problems Paternal Grandfather     Breast cancer Neg Hx        SOCIAL HISTORY:  Social History     Tobacco Use    Smoking status: Never    Smokeless tobacco: Never   Vaping Use    Vaping status: Never Used   Substance Use Topics     Alcohol use: Not Currently     Alcohol/week: 5.0 standard drinks of alcohol     Types: 5 Shots of liquor per week     Comment: Used mostly as a painkiller when needed before bedtime    Drug use: Yes     Frequency: 28.0 times per week     Types: Marijuana     Comment: THC Medical marijuana       MEDICATIONS:    Current Outpatient Medications:     cholecalciferol (VITAMIN D3) 1,000 units tablet, Take 3,000 Units by mouth daily, Disp: , Rfl:     diphenhydrAMINE (BENADRYL) 50 MG tablet, Take 50 mg by mouth daily at bedtime as needed for itching, Disp: , Rfl:     dronabinol (MARINOL) 10 MG capsule, , Disp: , Rfl:     ezetimibe (ZETIA) 10 mg tablet, Take 10 mg by mouth daily, Disp: , Rfl:     gabapentin (Neurontin) 100 mg capsule, Take 1 capsule (100 mg total) by mouth 3 (three) times a day, Disp: 90 capsule, Rfl: 1    letrozole (FEMARA) 2.5 mg tablet, TAKE 1 TABLET BY MOUTH EVERY DAY, Disp: 90 tablet, Rfl: 3    multivitamin (THERAGRAN) TABS, Take 1 tablet by mouth daily, Disp: , Rfl:     Omega-3 Fatty Acids (FISH OIL PO), Take by mouth, Disp: , Rfl:     penicillin V potassium (VEETID) 500 mg tablet, Take 1 tablet (500 mg total) by mouth every 12 (twelve) hours, Disp: 60 tablet, Rfl: 2    Probiotic Product (PROBIOTIC-10 PO), Take by mouth, Disp: , Rfl:     gabapentin (Neurontin) 300 mg capsule, Take 1 capsule (300 mg total) by mouth 3 (three) times a day (Patient not taking: Reported on 3/28/2024), Disp: 90 capsule, Rfl: 0    oxyCODONE-acetaminophen (PERCOCET) 5-325 mg per tablet, , Disp: , Rfl:     senna-docusate sodium (SENOKOT-S) 8.6-50 mg per tablet, Take 1 tablet by mouth daily (Patient not taking: Reported on 1/18/2024), Disp: 7 tablet, Rfl: 0    ALLERGIES:  Allergies   Allergen Reactions    Chlorpromazine Anaphylaxis     PHENOTHIAZINE=ANAPHYLAXIS    Codeine Anaphylaxis     Anaphylaxis  abd pain.    Meperidine Anaphylaxis, Hives and Vomiting    Phenothiazines Anaphylaxis and Throat Swelling     Category: Allergy;   "   Saccharin - Food Allergy Anaphylaxis    Ampicillin-Sulbactam Sodium Hives    Aspirin GI Intolerance and Abdominal Pain     Severe GERD    Gluten Meal - Food Allergy GI Intolerance    Ibuprofen Hives     H    Levofloxacin Hives    Chocolate - Food Allergy GI Intolerance    Lactose - Food Allergy GI Intolerance    Neomycin Other (See Comments), Edema and Hives     Category: Allergy;   swelling    Teller - Food Allergy Rash    Latex Hives and Rash     Category: Allergy;        Review of systems:   Constitutional: Negative for fatigue, fever or loss of apetite.   HENT: Negative.    Respiratory: Negative for shortness of breath, dyspnea.    Cardiovascular: Negative for chest pain/tightness.   Gastrointestinal: Negative for abdominal pain, N/V.   Endocrine: Negative for cold/heat intolerance, unexplained weight loss/gain.   Genitourinary: Negative for flank pain, dysuria, hematuria.   Musculoskeletal: As noted in HPI   Skin: Negative for rash.    Neurological: Intact  Psychiatric/Behavioral: Negative for agitation.  _____________________________________________________  PHYSICAL EXAMINATION:    Blood pressure 138/79, pulse 65, height 5' 8\" (1.727 m), weight 67.6 kg (149 lb).    General: well developed and well nourished, alert, oriented times 3, and appears comfortable  Psychiatric: Normal  HEENT: Benign  Cardiovascular: Regular    Pulmonary: No wheezing or stridor  Abdomen: Soft, Nontender  Skin: No masses, erythema, lacerations, fluctation, ulcerations.  Lymphedema present in right arm  Neurovascular: Intact    MUSCULOSKELETAL EXAMINATION:  right Shoulder -   No anatomical deformity  Skin is warm and dry to touch with no signs of erythema, ecchymosis, or infection  Mild soft tissue swelling or effusion noted due to lymphedema compared to last visit on 2/15/24  Demonstrates normal elbow and finger motion  Wrist ROM decreased with wrist flexion and extension  Able to flex shoulder to approximately 105-110 degrees of " forward flexion and 95 degrees of shoulder abduction  2+  distal radial pulse with brisk capillary refill to the fingers  Radial, median, ulnar and motor and sensory distribution intact  Sensation to light touch intact distally      _____________________________________________________  STUDIES REVIEWED:  Study: XR right shoulder  Date: 3/28/24  Report: No radiologist report was available at this time.   I have personally reviewed imaging and my impression is as follows: Well aligned fracture fragments without evidence of any displacement.  No abundant callus but that may be due to her history of radiation and chronic issues there.  Still well aligned without issue    Study: XR right humerus  Date: 2/15/24  No radiologist report available. I have personally reviewed all imaging. My impression is as follows:  Well-maintained alignment of midshaft humerus fracture with increased evidence bony callous and healing.    Study: XR right humerus  Date: 1/18/2024  No radiologist report available. I have personally reviewed all imaging.  Well-maintained alignment of midshaft humerus fracture with increased evidence bony callous and healing.    Study: XR right humerus  Date: 1/4/23  Impression: No radiologist report available.  My impression is as follows: well-maintained alignment of midshaft humerus fracture with some increased evidence of callous and healing, with some bridging. Better alignment     Study: XR right himerus  Date: 12/14/23  Impression: No radiologist report available.  My impression is as follows: well-maintained alignment of midshaft humerus fracture with some minimal evidence of callous and healing.     Study: NM WBBS  Date: 12/8/23  Impression: I have read and agree with the radiologist report.  My impression is as follows:  1. Linear radiotracer uptake at the right humeral shaft corresponding to known fracture site. Difficult to exclude a pathologic fracture given the fracture activity here on bone  scan.  2. Otherwise, no findings suspicious for osseous metastasis.      Study: XR of right humerus  Date: 11/30/23  Impression: I have read and agree with the radiologist report.  My impression is as follows:  Closed nondisplaced spiral fracture of the right humerus.  Bone looks chronically osteopenic which could be related to radiation therapy.  No current evidence of pathologic fracture at this time  Status post casting of mid diaphyseal fracture of the humerus with mild angulation as seen on prior exam. There is no evidence of significant interval healing.     Study: XR of right humerus  Date: 11/12/23  Impression: I have read and agree with the radiologist report.  My impression is as follows:  FINDINGS:  Evaluation of fine bony detail of the right humerus is limited by overlying cast material  There is a mildly displaced, spiral fracture of the midshaft of the right humerus. There is a somewhat mottled appearance of the right humeral diaphysis, which may be related to sequela of prior radiation therapy.  There is a small exophytic density adjacent to the midshaft of the humerus which was present in the soft tissues on the prior CT from 2020.  Multiple surgical clips are seen in the right axillary region and throughout the proximal right upper extremity.  No shoulder dislocation.  No evidence of AC joint separation.  The visualized portions of the lung are clear.  IMPRESSION:  Mildly displaced, spiral fracture of the midshaft of right humerus. No shoulder dislocation.  Somewhat mottled appearance of the right humeral diaphysis, which may be related to prior radiation therapy. Correlation with MRI may be helpful, which is already scheduled for 11/20/2023.    Study: XR of right humerus  Date: 10/23/23  Impression: **NO IMAGE AVAILABLE FOR REVIEW, RADIOLOGIST REPORT AVAILABLE FOR REVIEW**  IMPRESSION:   Surgical clips throughout the right axilla and throughout the lateral soft   tissues of the humerus.   Increased  lateral soft tissue density mid humerus for which MRI is recommended   to exclude a mass.   No destructive humeral osseous lesion seen.   Focal areas of small cortical solid calcifications or postsurgical changes mid lateral humeral shaft.   Demineralization throughout the right arm and forearm.   Mild osteoarthritic narrowing acromioclavicular joint.   No other significant arthritis.   No fracture, malalignment or elbow joint effusion.     Study: MRI of right humerus  Date: 11/1/23  Impression: ** IMAGES NOT AVAILABLE FOR REVIEW, REPORT NOT AVAILABLE FOR REVIEW **    Scribe Attestation      I,:  Lacy Escobar am acting as a scribe while in the presence of the attending physician.:       I,:  Rick Lazo, DO personally performed the services described in this documentation    as scribed in my presence.:               Humerus Shaft Fracture Non-Operative Rehabilitation Protocol    PHASE II: FRACTURE HEALING  Week 2-6  · No lifting > weight of coffee cup   · Kruse fracture brace at all times  o Twice daily tightening of fracture brace   · Discontinue sling, continue use of cuff & collar as needed for comfort, continue upright posture   · Daily hygiene and skin checks in pendulum position   · Continue TID HEP: A/AAROM elbow, wrist and finger motion, biceps and triceps isometrics   · Start shoulder periscapular isometrics / shoulder posture   · No shoulder ROM other than pendulums for hygiene     PHASE III: EARLY REHABILITATION  Week 6-10 (following clinical fracture healing and no motion at the fracture site)   · No lifting > 5# at the side, or coffee cup overhead   · Part time Kruse bracing (if clinical fracture healing)  o Wear while outside of the house  o Remove while at home or at rest  · Add TID HEP shoulder 4 quadrant AAROM as tolerated with brace on unless pain free   o Pulleys, table slides, wall climbs, supine wand exercises in all planes     PHASE IV: RETURN TO FUNCTION  Week 11-14 (following  clinical fracture healing)   · Add shoulder AROM, PROM as tolerated   · Generalized UE strengthening   · Activities as tolerated (if bony healing complete)   · Independent home exercise program   · Return to high level functional ADLs and simulation of work environment

## 2024-04-01 ENCOUNTER — OFFICE VISIT (OUTPATIENT)
Dept: PHYSICAL THERAPY | Facility: CLINIC | Age: 64
End: 2024-04-01
Payer: MEDICARE

## 2024-04-01 DIAGNOSIS — I89.0 LYMPHEDEMA OF RIGHT ARM: Primary | ICD-10-CM

## 2024-04-01 DIAGNOSIS — C50.912 CARCINOMA OF LEFT BREAST METASTATIC TO AXILLARY LYMPH NODE (HCC): ICD-10-CM

## 2024-04-01 DIAGNOSIS — S42.344D CLOSED NONDISPLACED SPIRAL FRACTURE OF SHAFT OF RIGHT HUMERUS WITH ROUTINE HEALING, SUBSEQUENT ENCOUNTER: ICD-10-CM

## 2024-04-01 DIAGNOSIS — Z17.0 MALIGNANT NEOPLASM OF LOWER-INNER QUADRANT OF LEFT BREAST IN FEMALE, ESTROGEN RECEPTOR POSITIVE (HCC): ICD-10-CM

## 2024-04-01 DIAGNOSIS — C50.312 MALIGNANT NEOPLASM OF LOWER-INNER QUADRANT OF LEFT BREAST IN FEMALE, ESTROGEN RECEPTOR POSITIVE (HCC): ICD-10-CM

## 2024-04-01 DIAGNOSIS — C77.3 CARCINOMA OF LEFT BREAST METASTATIC TO AXILLARY LYMPH NODE (HCC): ICD-10-CM

## 2024-04-01 PROCEDURE — 97110 THERAPEUTIC EXERCISES: CPT

## 2024-04-01 PROCEDURE — 97140 MANUAL THERAPY 1/> REGIONS: CPT

## 2024-04-01 NOTE — PROGRESS NOTES
PT Re-Evaluation     Collection of Subjective/Objective data performed by Irene Childs PTA. Assessment, POC, and Goal attainment performed by Jessica Garcia DPT.       Today's date: 2024  Patient name: Eileen Matias  : 1960  MRN: [id number]  Referring provider: Rick Lazo DO  Dx:   Encounter Diagnosis     ICD-10-CM    1. Lymphedema of right arm  I89.0       2. Closed nondisplaced spiral fracture of shaft of right humerus with routine healing, subsequent encounter  S42.344D       3. 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 Malignant neoplasm of lower-inner quadrant of left breast in female, estrogen receptor positive (HCC)  C50.312     Z17.0           Start Time: 1015  Stop Time: 1130  Total time in clinic (min): 75 minutes    Assessment  Assessment details: Patient is a 62 y/o female who presents to therapy s/p R humeral fracture on 23 and has completed 10 visits to date with improved FOTO score of 47 since IE. Patient demonstrates subjective/objective improvement since starting PT such as range of motion actively and passively, strength, and endurance. Patient continues to present with deficits that include range of motion, strength, and activity tolerance. She continues to have deficits with the elbow and wrist too that started after the fracture. Patient will benefit from continued skilled PT intervention to address the aforementioned impairments, achieve goals, maximize function, and improve quality of life. Pt is in agreement with this plan.     Lymph assessment:   Decreased overall tightness and swelling in the elbow region. Performed MLD to improve lymphatic flow and decrease swelling.   Impairments: abnormal or restricted ROM, activity intolerance, impaired physical strength and pain with function    Goals  ST weeks  Pt will demonstrate good understanding and compliance with HEP--PROGRESSING  Pt will decrease pain to 5/10  at worst  --PROGRESSING  Pt will demonstrate improved postural awareness and ability to self-correct without reliance on external cues --PROGRESSING     LT weeks  Pt will improve FOTO score to > or = to 52 to indicate improved functional abilities  --PROGRESSING  Pt will decrease pain to 2-3/10 at worst   --PROGRESSING  Pt will increase shoulder strength to 4-/5 for improved tolerance/independence with ADLs --PROGRESSING  Pt will improve  strength to 25#  --PROGRESSING      Plan  Patient would benefit from: PT eval and skilled physical therapy  Planned therapy interventions: manual therapy, functional ROM exercises, flexibility, therapeutic activities, stretching, strengthening, therapeutic exercise, graded exercise, home exercise program, graded activity and therapeutic training  Frequency: 2x week  Plan of Care beginning date: 2024  Plan of Care expiration date: 2024  Treatment plan discussed with: patient        Subjective Evaluation    History of Present Illness  Mechanism of injury: RE-EVAL (24):  Pt has been seen for 4 weeks s/p R humeral fracture and noted overall progress. Pt stated pain has decreased and her arm feels less sensitive. Pt reports her mobility and finger mobility has improved as well, along w/ some endurance. Continued difficulties increased lifting, carrying, overhead reaching, and other ADL's. Pt just saw MD for f/u and had xrays taken, which looked the same as previous xrays. Pt continues to have the lifting restrictions of not more than 5#. Pt would benefit from continued PT to address current deficits.     EVAL:  Pt is a 64 y/o female who presents to therapy s/p R humeral fracture on 23. Pt reports difficult with sleep, lifting, carrying, and other ADLs. She has a history of cancer in that arm and has nerve damage so prior to the injury she couldn't lift it very high. She reports increase intensity of pain since the fall and break.   Pain  Current pain rating:  "4  At best pain ratin  At worst pain rating: 10          Objective     General Comments:      Shoulder Comments   R AROM: flex-105 abd-95 IR-R buttock ER- unable     R PROM: flex- 115 (pain) abd- 90 (pain) IR-70 ER-8    R shoulder strength: flex: 3+/5; abd: 4-/5; IR/ER: 3-/5          Flowsheet Rows      Flowsheet Row Most Recent Value   PT/OT G-Codes    Current Score 4   Projected Score 52          EPOC: 3/11/24     Shoulder EPOC: 4/24/24 3/27 4/1 3/20 3/25   RA DATE: 24       Shoulder:  3/27/24       Manual       MLD [facility]   Manual Wrapping       PROM [facility]                   Exercise Diary 3/20 3/25 3/27 4/1   THEREX       Pt ed    FOTO; updated shoulder measurements   Elbow/Wrist/hand AROM AROM x20 ea A/AAROM x20 A/AAROM x20 A/AAROM x20   Finger web 20x ea       Opposition fingers 10x ea 10x ea 10x ea 10x ea   Bicep curls w/ overpressure x20 x20 x20 x20   AAROM abd 20x w/cane 20x w/cane     AAROM flex 20x w/cane  20x w/cane Supine 10\" x10    pulleys x20 x20     Wall slides  5\" x 10      Wrist sidney  x10 x10 x10   Tricep sidney x20 x20 x20 20   Bicep sidney x20 x20 x20 20   SL abd  x10 2x10    Supine shoulder flex  2x10 2x10    Nuts/bolt  1 round w/ ea finger  1 round w/ ea finger 1 round w/ ea finger    NEURO RE-ED       Scapular squeeze 5\" 20x 5\" 20x 5\" 20x    Post shoulder rolls 20x 20x 20x                                                                                             Modalities           CP                                          "

## 2024-04-01 NOTE — PROGRESS NOTES
"Daily Note     Today's date: 2024  Patient name: Eileen Matias  : 1960  MRN: [id number]  Referring provider: Rick Lazo DO  Dx:   Encounter Diagnosis     ICD-10-CM    1. Lymphedema of right arm  I89.0       2. Closed nondisplaced spiral fracture of shaft of right humerus with routine healing, subsequent encounter  S42.344D       3. 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 Malignant neoplasm of lower-inner quadrant of left breast in female, estrogen receptor positive (HCC)  C50.312     Z17.0                      Subjective: ***      Objective: See treatment diary below      Assessment: Tolerated treatment {Tolerated treatment :7158872466}. Patient {assessment:[id number]}      Plan: {PLAN:9756830941}     EPOC: 3/11/24     Shoulder EPOC: 4/24/24 3/27  3/20 3/25   RA DATE: 24       Shoulder:  3/27/24       Manual       MLD [facility]   Manual Wrapping       PROM [facility]                   Exercise Diary 3/20 3/25 3/27    THEREX       Pt ed       Elbow/Wrist/hand AROM AROM x20 ea A/AAROM x20 A/AAROM x20    Finger web 20x ea       Opposition fingers 10x ea 10x ea 10x ea    Bicep curls w/ overpressure x20 x20 x20    AAROM abd 20x w/cane 20x w/cane     AAROM flex 20x w/cane  20x w/cane Supine 10\" x10    pulleys x20 x20     Wall slides  5\" x 10      Wrist sidney  x10 x10    Tricep sidney x20 x20 x20    Bicep sidney x20 x20 x20    SL abd  x10 2x10    Supine shoulder flex  2x10 2x10    Nuts/bolt  1 round w/ ea finger  1 round w/ ea finger 1 round w/ ea finger    NEURO RE-ED       Scapular squeeze 5\" 20x 5\" 20x 5\" 20x    Post shoulder rolls 20x 20x 20x                                                                                             Modalities           CP                                                        "

## 2024-04-03 ENCOUNTER — OFFICE VISIT (OUTPATIENT)
Dept: PHYSICAL THERAPY | Facility: CLINIC | Age: 64
End: 2024-04-03
Payer: MEDICARE

## 2024-04-03 DIAGNOSIS — C77.3 CARCINOMA OF LEFT BREAST METASTATIC TO AXILLARY LYMPH NODE (HCC): ICD-10-CM

## 2024-04-03 DIAGNOSIS — C50.312 MALIGNANT NEOPLASM OF LOWER-INNER QUADRANT OF LEFT BREAST IN FEMALE, ESTROGEN RECEPTOR POSITIVE (HCC): ICD-10-CM

## 2024-04-03 DIAGNOSIS — S42.344D CLOSED NONDISPLACED SPIRAL FRACTURE OF SHAFT OF RIGHT HUMERUS WITH ROUTINE HEALING, SUBSEQUENT ENCOUNTER: ICD-10-CM

## 2024-04-03 DIAGNOSIS — C50.912 CARCINOMA OF LEFT BREAST METASTATIC TO AXILLARY LYMPH NODE (HCC): ICD-10-CM

## 2024-04-03 DIAGNOSIS — I89.0 LYMPHEDEMA OF RIGHT ARM: Primary | ICD-10-CM

## 2024-04-03 DIAGNOSIS — Z17.0 MALIGNANT NEOPLASM OF LOWER-INNER QUADRANT OF LEFT BREAST IN FEMALE, ESTROGEN RECEPTOR POSITIVE (HCC): ICD-10-CM

## 2024-04-03 PROCEDURE — 97112 NEUROMUSCULAR REEDUCATION: CPT

## 2024-04-03 PROCEDURE — 97110 THERAPEUTIC EXERCISES: CPT

## 2024-04-03 PROCEDURE — 97140 MANUAL THERAPY 1/> REGIONS: CPT

## 2024-04-03 NOTE — PROGRESS NOTES
"Daily Note     Today's date: 4/3/2024  Patient name: Eileen Matias  : 1960  MRN: [id number]  Referring provider: Rick Lazo DO  Dx:   Encounter Diagnosis     ICD-10-CM    1. Lymphedema of right arm  I89.0       2. Closed nondisplaced spiral fracture of shaft of right humerus with routine healing, subsequent encounter  S42.344D       3. 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 Malignant neoplasm of lower-inner quadrant of left breast in female, estrogen receptor positive (HCC)  C50.312     Z17.0                      Subjective: pt reports she has been using the pump regularly. Stated she has been able to open a drawer with her hand now.       Objective: See treatment diary below      Assessment: Tolerated treatment well. Patient would benefit from continued PT. Improved swelling noted in arm. Decreased tightness. Improved strength with the fingers.       Plan: Continue per plan of care.      EPOC: 3/11/24     Shoulder EPOC: 4/24/24 3/27 4/3 3/20 3/25   RA DATE: 24       Shoulder:  3/27/24       Manual       MLD [facility]   Manual Wrapping       PROM [facility]                   Exercise Diary 3/20 3/25 3/27 4/3   THEREX       Pt ed       Elbow/Wrist/hand AROM AROM x20 ea A/AAROM x20 A/AAROM x20 A/AAROM x20   Finger web 20x ea       Opposition fingers 10x ea 10x ea 10x ea 10x ea   Bicep curls w/ overpressure x20 x20 x20 x20   AAROM abd 20x w/cane 20x w/cane     AAROM flex 20x w/cane  20x w/cane Supine 10\" x10 Supine 10\" x10   pulleys x20 x20     Wall slides  5\" x 10      Wrist sidney  x10 x10    Tricep sidney x20 x20 x20    Bicep sidney x20 x20 x20    SL abd  x10 2x10 20x   Supine shoulder flex  2x10 2x10 20x   Nuts/bolt  1 round w/ ea finger  1 round w/ ea finger 1 round w/ ea finger 1 round w/ ea finger   NEURO RE-ED       Scapular squeeze 5\" 20x 5\" 20x 5\" 20x 5\" 20x   Post shoulder rolls 20x 20x 20x 20x   Cone reaches    3 rounds              "                                                                        Modalities           CP

## 2024-04-08 ENCOUNTER — OFFICE VISIT (OUTPATIENT)
Dept: PHYSICAL THERAPY | Facility: CLINIC | Age: 64
End: 2024-04-08
Payer: MEDICARE

## 2024-04-08 DIAGNOSIS — I89.0 LYMPHEDEMA OF RIGHT ARM: Primary | ICD-10-CM

## 2024-04-08 DIAGNOSIS — S42.344D CLOSED NONDISPLACED SPIRAL FRACTURE OF SHAFT OF RIGHT HUMERUS WITH ROUTINE HEALING, SUBSEQUENT ENCOUNTER: ICD-10-CM

## 2024-04-08 DIAGNOSIS — C77.3 CARCINOMA OF LEFT BREAST METASTATIC TO AXILLARY LYMPH NODE (HCC): ICD-10-CM

## 2024-04-08 DIAGNOSIS — C50.912 CARCINOMA OF LEFT BREAST METASTATIC TO AXILLARY LYMPH NODE (HCC): ICD-10-CM

## 2024-04-08 PROCEDURE — 97110 THERAPEUTIC EXERCISES: CPT

## 2024-04-08 PROCEDURE — 97112 NEUROMUSCULAR REEDUCATION: CPT

## 2024-04-08 PROCEDURE — 97140 MANUAL THERAPY 1/> REGIONS: CPT

## 2024-04-08 NOTE — PROGRESS NOTES
"Daily Note     Today's date: 2024  Patient name: Eileen Matias  : 1960  MRN: [id number]  Referring provider: Rick Lazo DO  Dx:   Encounter Diagnosis     ICD-10-CM    1. Lymphedema of right arm  I89.0       2. Closed nondisplaced spiral fracture of shaft of right humerus with routine healing, subsequent encounter  S42.344D       3. 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 (HCC)  C50.912     C77.3                      Subjective: pt reports some improvement in finger and shoulder mobility. Stated the swelling is slowly improving.       Objective: See treatment diary below      Assessment: Tolerated treatment well. Patient would benefit from continued PT. Progressed resistance and postural strengthening w/ good tolerance. Pt was fatigued at the end of session.       Plan: Continue per plan of care.      EPOC: 3/11/24     Shoulder EPOC: 4/24/24 3/27 4/3 4/8    RA Lymph DATE: 4/10/24       Shoulder:  24       Manual       MLD [facility]    Manual Wrapping       PROM [facility]                    Exercise Diary 4/8   4/3   THEREX       Pt ed       Elbow/Wrist/hand AROM Hand/fingers x20   A/AAROM x20   Finger web       Opposition fingers 10x ea   10x ea   Bicep curls w/ overpressure    x20   AAROM abd       AAROM flex Supine 10\" x10   Supine 10\" x10   pulleys 2'/2'      Wall slides        Supine ER cane 5\" x10      Tricep sidney RTB 2x10      Bicep sidney YTB x10      SL abd 20x   20x   Supine shoulder flex 20x   20x   Nuts/bolt     1 round w/ ea finger   NEURO RE-ED       Scapular squeeze Rows rtb 2x10   5\" 20x   Post shoulder rolls D/c   20x   Cone reaches    3 rounds   Supine punches x20                                                                                 Modalities           CP                                                          "

## 2024-04-09 ENCOUNTER — EVALUATION (OUTPATIENT)
Dept: OCCUPATIONAL THERAPY | Facility: CLINIC | Age: 64
End: 2024-04-09
Payer: MEDICARE

## 2024-04-09 DIAGNOSIS — R20.0 NUMBNESS AND TINGLING OF LEFT UPPER EXTREMITY: Primary | ICD-10-CM

## 2024-04-09 DIAGNOSIS — R20.2 NUMBNESS AND TINGLING OF LEFT UPPER EXTREMITY: Primary | ICD-10-CM

## 2024-04-09 PROCEDURE — 97166 OT EVAL MOD COMPLEX 45 MIN: CPT

## 2024-04-09 PROCEDURE — 97110 THERAPEUTIC EXERCISES: CPT

## 2024-04-09 NOTE — PROGRESS NOTES
OT Evaluation     Today's date: 2024  Patient name: Eileen Matias  : 1960  MRN: [id number]  Referring provider: Rogelio Black MD  Dx:   Encounter Diagnosis     ICD-10-CM    1. Numbness and tingling of left upper extremity  R20.0     R20.2                      Assessment  Assessment details: Eileen Matias is a 63 y.o., Ambidextrous HD female referred to hand therapy for  right hand,wrist and forearm contractures.  Onset of injury 23 due to fall causing spiral fracture of the right humerus.  Patient presents 24 with impaired ROM of the right digits, wrist, forearm,  impaired strength, and impaired sensation of the right peripheral nerves, particularly the radial nerve.  Deficits also noted in pain, edema, and functional use of the right UE. Patient joint contractures of the digits, wrist and forearm as well as significant intrinsic and extrinsic mm tightness. Patient is a good candidate for OT services to abolish pain and edema and restore ROM, strength, coordination, and sensation for a return to independence in daily tasks.    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; Min assist for self care; min to mod assit for home management tasks  Symptom irritability: highUnderstanding of Dx/Px/POC: excellent   Prognosis: good    Goals  STGs (4 weeks)  Patient will be independent in HEP of ROM, splinting to increase motion, edema management  Patient will report an average pain level of 4-5/10  Patient will demonstrate 30 degree improvement in PAGE of the digits  Patient will demonstrate active wrist extension/flexion to 10/70  Patient will demonstrate active  forearm supination to 45  LTGs (12 weeks)  Patient will demonstrate independence in a HEP to maintain ROM, strength, and function at discharge  Patient will report an average pain level of 1-2/10 to be independent in daily tasks  Patient will demonstrate PAGE of the digits to 220 degrees to be independent in self care tasks  Patient will demonstrate AROM of the wrist and forearm WFL to be independent in self care tasks  Patient will demonstrate 5/5 muscle strength in the wrist and forearm to be MI for meal prep  Patient will demonstrate right hand strength within 30% of the left hand to be MI for IADL tasks  Patient will demonstrate resolution of edema      Plan  Patient would benefit from: skilled occupational therapy and custom splinting  Planned modality interventions: thermotherapy: hydrocollator packs and cryotherapy  Planned therapy interventions: activity modification, compression, fine motor coordination training, graded activity, graded exercise, home exercise program, therapeutic exercise, therapeutic activities, stretching, strengthening, patient education, orthotic fitting/training, neuromuscular re-education, manual therapy, IASTM, joint mobilization, kinesiology taping, massage, muscle pump exercises and nerve gliding  Other planned therapy interventions: Static progressive and dynamic splinting to increase digit, wrist, forearm motion  Frequency: 2x week  Duration in weeks: 12  Plan of Care beginning date: 4/9/2024  Plan of Care expiration date: 7/2/2024  Treatment plan discussed with: patient        Subjective Evaluation    History of Present Illness  Date of onset: 11/12/2023  Mechanism of injury: trauma  Mechanism of injury: 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  Hx of lymphedema and frequent cellulitis infections in the RUE. Patient also had left breast cancer and has LUE lymphedema as well.    She reports that she  suffered a displaced spiral fracture of the right humerus on 23 after a fall while pushing her mother in law who was seated in a wheelchair.  Patient went to ED that date for xrays and long arm cast. She was referred to orthopedics. Had a closed reduction of fracture. Long arm cast for approximately 4 weeks and then a Kruse clam shell brace. Patient received  a course of home health OT for ROM of the digits, wrist and elbow and edema management. She has had ongoing PT for lymphedema management and right shoulder ROM.     Patient referred to Dr. Black for radial nerve dysfunction and joint contractures in the forearm, wrist and hand. An EMG has been ordered. Patient now referred to OT for splinting and ROM to address joint contractures.            Recurrent probem    Quality of life: good    Patient Goals  Patient goals for therapy: decreased edema, decreased pain, increased motion, increased strength and independence with ADLs/IADLs  Patient goal: Be able to hold tweezers to do art work  Pain  Current pain ratin  At best pain ratin  At worst pain ratin  Location: Proximal humerus, wrist, hand right  Quality: dull ache  Relieving factors: medications and support  Exacerbated by: Putting on compression sleeve.  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     Neurological Testing     Sensation     Wrist/Hand     Right   Diminished: light touch    Additional Neurological Details  24: Loss of protective sensation radial nerve distribution. Diminished protective sensation median and ulnar nerves    Active Range of Motion     Right Elbow   Flexion: 100 degrees    Extension: -15 degrees   Forearm supination: 30 degrees   Forearm pronation: 65 degrees     Right Wrist   Wrist flexion: 65 degrees   Wrist extension: -30 degrees   Radial deviation: 0 degrees   Ulnar deviation: 25 degrees     Right Thumb   Flexion     CMC: 15    MP: 25    DIP: 50  Extension     CMC: 0    MP: 0    DIP: 0  Palmar Abduction    CMC: 25  Radial Abduction    CMC: 0  Opposition: 24: PAGE = 90  Kapandji score: 2 degrees    Right Digits   Flexion   Index     MCP: 35    PIP: 55    DIP: 35  Middle     MCP: 35    PIP: 50    DIP: 35  Ring     MCP: 20    PIP: 50    DIP: 35  Little     MCP: 10    PIP: 30    DIP: 20  Extension   Index     MCP: -20  Middle     MCP: -20  Ring     MCP: -20  Little     MCP: -20  Index: 105 degrees  Middle: 100 degrees  Rin degrees  Small: 40 degrees    Passive Range of Motion     Right Elbow   Flexion: 115 degrees   Extension: -10 degrees   Forearm supination: 40 degrees   Forearm pronation: 80 degrees     Right Wrist   Wrist flexion: 70 degrees   Wrist extension: 45 degrees   Radial deviation: 10 degrees   Ulnar deviation: 40 degrees     Right Thumb   Flexion     CMC: 15    MP: 30    DIP: 50  Palmar Abduction     CMC: 37  Radial Abduction     CMC: 30    Right Digits   Flexion   Index     MCP: 50    PIP: 50    DIP: 35  Middle     MCP: 45    PIP: 55    DIP: 35  Ring     MCP: 35    PIP: 45    DIP: 35  Little     MCP: 30    PIP: 45    DIP: 35  Index: 135 degrees  Middle: 135 degrees  Rin degrees  Small: 110 degrees    Tests     Right Wrist/Hand   Positive extrinsic extensor tightness, extrinsic flexor tightness and intrinsic muscle tightness.     Swelling     Right Elbow Girth Measurements   Joint line: 26 cm  10 cm below joint line: 26 cm             Precautions Lymphedema; hx desmoid tumors and breast cancer; healing humeral fracture.      POC expires Unit limit Auth  expiration date PT/OT + Visit Limit?   24 NA NA BOMN                 Visit/Unit Tracking  AUTH  Status:  Date 4/9              RE due 5/10/24 Used 1               Remaining                     Date 4/9/24       Manuals        STM        Edema mgt        IASTM        Jt mobs digits, FA 5''       Ortho fit/train        Neuro Re-Ed         RNG        MNG        CHIKI                                        Ther Ex        PROM digits 5'       Blocking        TGE        Digit abd/add        PROM wrist, FA 5'       A/AAROM wrist, FA                        Ther Activity                        HEP POC; formal HEP NV                       Modalities

## 2024-04-10 ENCOUNTER — EVALUATION (OUTPATIENT)
Dept: PHYSICAL THERAPY | Facility: CLINIC | Age: 64
End: 2024-04-10
Payer: MEDICARE

## 2024-04-10 DIAGNOSIS — C50.912 CARCINOMA OF LEFT BREAST METASTATIC TO AXILLARY LYMPH NODE (HCC): ICD-10-CM

## 2024-04-10 DIAGNOSIS — S42.344D CLOSED NONDISPLACED SPIRAL FRACTURE OF SHAFT OF RIGHT HUMERUS WITH ROUTINE HEALING, SUBSEQUENT ENCOUNTER: ICD-10-CM

## 2024-04-10 DIAGNOSIS — C77.3 CARCINOMA OF LEFT BREAST METASTATIC TO AXILLARY LYMPH NODE (HCC): ICD-10-CM

## 2024-04-10 DIAGNOSIS — C50.312 MALIGNANT NEOPLASM OF LOWER-INNER QUADRANT OF LEFT BREAST IN FEMALE, ESTROGEN RECEPTOR POSITIVE (HCC): ICD-10-CM

## 2024-04-10 DIAGNOSIS — I89.0 LYMPHEDEMA OF RIGHT ARM: Primary | ICD-10-CM

## 2024-04-10 DIAGNOSIS — Z17.0 MALIGNANT NEOPLASM OF LOWER-INNER QUADRANT OF LEFT BREAST IN FEMALE, ESTROGEN RECEPTOR POSITIVE (HCC): ICD-10-CM

## 2024-04-10 PROCEDURE — 97110 THERAPEUTIC EXERCISES: CPT

## 2024-04-10 PROCEDURE — 97112 NEUROMUSCULAR REEDUCATION: CPT

## 2024-04-10 PROCEDURE — 97140 MANUAL THERAPY 1/> REGIONS: CPT

## 2024-04-10 NOTE — PROGRESS NOTES
PT Re-Evaluation     Collection of Subjective/Objective data performed by Irene Childs PTA. Assessment, POC, and Goal attainment performed by Jessica Garcia DPT.       Today's date: 4/10/2024  Patient name: Eileen Matias  : 1960   MRN: [id number]  Referring provider: Rick Lazo DO  Dx:   Encounter Diagnosis     ICD-10-CM    1. Lymphedema of right arm  I89.0       2. Closed nondisplaced spiral fracture of shaft of right humerus with routine healing, subsequent encounter  S42.344D       3. 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 Malignant neoplasm of lower-inner quadrant of left breast in female, estrogen receptor positive (HCC)  C50.312     Z17.0               Start Time: 1015          Assessment  Assessment details: Patient is a  62 y/o who presents to therapy with R upper extremity and has completed 24 visits to date with improved FOTO score of 48 since IE. Patient demonstrates subjective/objective improvement since starting PT such as girth measurements and function. Patient will benefit from continued skilled PT intervention to address the aforementioned impairments, achieve goals, maximize function, and improve quality of life. Pt is in agreement with this plan.     Impairments: abnormal or restricted ROM, impaired physical strength and pain with function  Other impairment: lymphedema  Understanding of Dx/Px/POC: good   Prognosis: good    Goals  ST.Pain decreased by 25% in 4 weeks.--PROGRESSING  2. Edema decreased by 2-3 cm in 4 weeks. --MET    LT. Decrease pain to 1-2/10 at worst by d/c.-PROGRESSING  2. Increase ROM to WFL for all deficient movements by d/c.-DNT (restrictions)  3. Strength increased to 5 for all deficient muscle groups by d/c.-DNT (restrictions)  4. IADL performance increased to max function by d/c.-PROGRESSING  5. Recreational performance increased to max function by d/c.-PROGRESSING  6. RUE edema decreased  "to that of SHAKA by d/c. -PROGRESSING  7. Pt will return to work by d/c.-PROGRESSING    Plan  Other planned modality interventions: other modalities PRN  Planned therapy interventions: abdominal trunk stabilization, ADL retraining, IADL retraining, flexibility, functional ROM exercises, graded exercise, home exercise program, manual therapy, joint mobilization, neuromuscular re-education, patient education, postural training, strengthening, therapeutic exercise, therapeutic activities, massage and work reintegration  Other planned therapy interventions: other interventions PRN  Frequency: 1-2x/week.  Duration in weeks: 8  Plan of Care beginning date: 3/13/2024  Plan of Care expiration date: 4/24/2024  Treatment plan discussed with: patient      Subjective Evaluation    History of Present Illness  Mechanism of injury: RE (4/10/24):  Pt reports overall improvements with the tightness and swelling in the arm. Pt notes 50% improvement in the swelling since beginning lymphedema therapy. Pt is still in the smaller sized night sleeve that she wears all day and then switches to the larger sleeve at night.  She has been able to transition back into the compression pump, that she uses about 3x/day and 4x/day on a bad day. Pt notes improvement since being back into the pump.       RE (3/13/24):  Pt reports overall improvements with the tightness and swelling in the arm. Pt notes 10% improvement in the swelling since beginning lymphedema therapy. Pt has since transitioned from the larger night garment to the smaller night garment to wear during the day. Pt notes she has feeling back in her thumb and finger tips. Pt notes slight improvements in strength. Pt is still not using pump at home due to the continued swelling and tightness in posterior upper arm that causes more pain when using pump. Pt plans on restarting the pump when \"the lump\" in the back of the arm improves.       RE (2/14/24):  Pt has been seen for RUE lymphedema " "for 8 visits to date and notes 50% improvement so far. Pt reports she is still not able to use compression pump but has been wearing the Tribute garment, which seems to be helping. Pt is not longer needing to wear the clamshell for her fx unless if she is out running errands. Pt notes the swelling has improved since being able to take the brace off more often. Pt notes continued \"hardness\" in elbow and axilla region.     EVAL (1/15/24):  Pt reports to IE w/ hx of RUE lymphedema which started approx 20 years ago approx 5 years following CA dx of desmoitosis.     Pt had done skilled PT tx for this condition in the past which was helpful control the issue. However, pt sustained humeral fx on 23 as a result of fall on RUE while trying to protect mother in law from a fall out of w/c. She reports that healing has been complicated by severe osteoporosis and lymphedema. Pt still required to wear brace at all times- not yet allowed any ROM in shoulder.     In addition to the above, pt w/ recent hx of L breast CA w/ L lumpectomy and lymph node removal (pt reports 17 lymph nodes) over on 22.        Patient Goals  Patient goals for therapy: decreased pain and decreased edema    Pain  Current pain ratin  At best pain ratin  At worst pain rating: 10  Location: R shoulder  Alleviating factors: lymphedema massage.  Exacerbated by: increased edema, movement.    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)  Hand dominance: right        Objective     General Comments:      Ankle/Foot Comments   Edema in R vs L w/ circumferential girth measurements as below (L/R):   Axilla: 35  20 cm proximal to elbow: 29  10 cm proximal to elbow: 26.5 cm  Elbow Joint: 25 cm/27 cm  20 cm proximal to wrist: 26.5 cm/27.5 cm  10 cm proximal to wrist: 21 cm/20 cm  Wrist:  16.5 cm/16.9 cm  Palm: 18 cm/18 cm  MCP Joints (digits 2-5): 18 cm/16.5 " "cm  PIP Joints (digits 2-5): 16 cm/15 cm      EPOC: 3/11/24     Shoulder EPOC: 4/24/24 3/27 4/3 4/8 4/10   RA Lymph DATE: 4/10/24       Shoulder:  4/24/24       Manual       MLD [facility]   Manual Wrapping       PROM [facility]                    Exercise Diary  4/10     THEREX       Pt ed  FOTO; updated measurements      Standing cane 3-way  Eccentric lowing x10     Is, Ys, ER   10\" x10     Pulleys  2'/2'     Wall Slides  X10 w/ assist     Tricep ext   RTB 2x10     Bicep curls       SL abd  2x10     Supine shoulder flexion  2x10                                               NEURO RE-ED       Rows/pulldowns  Rtb 2x10     Supine punches  2x10     Cone reaches  3 laps                                                                                       Modalities           CP                                           "

## 2024-04-11 ENCOUNTER — OFFICE VISIT (OUTPATIENT)
Dept: OCCUPATIONAL THERAPY | Facility: CLINIC | Age: 64
End: 2024-04-11
Payer: MEDICARE

## 2024-04-11 DIAGNOSIS — R20.0 NUMBNESS AND TINGLING OF LEFT UPPER EXTREMITY: Primary | ICD-10-CM

## 2024-04-11 DIAGNOSIS — R20.2 NUMBNESS AND TINGLING OF LEFT UPPER EXTREMITY: Primary | ICD-10-CM

## 2024-04-11 PROCEDURE — 97140 MANUAL THERAPY 1/> REGIONS: CPT | Performed by: OCCUPATIONAL THERAPIST

## 2024-04-11 PROCEDURE — 97110 THERAPEUTIC EXERCISES: CPT | Performed by: OCCUPATIONAL THERAPIST

## 2024-04-11 NOTE — PROGRESS NOTES
"Daily Note     Today's date: 2024  Patient name: Eileen Matias  : 1960  MRN: [id number]  Referring provider: Rogelio Black MD  Dx:   Encounter Diagnosis     ICD-10-CM    1. Numbness and tingling of left upper extremity  R20.0     R20.2                      Subjective:  \"I am trying to do more' \"Before the fracture, I could move my wrist and use my hand better\"      Objective: See treatment diary below    Composite flexion to palm  PROM  II  3 cm,  III  4 cm,  IV  4 cm,  V  3.5 cm  AROM  II  5.5 cm  III  6 cm,  IV  6 cm,  V  5 cm      Assessment: Tolerated treatment well. Patient would benefit from continued OT;  Absent active wrist extension.  Digital flexion PROM>AROM.       Plan: Continue per plan of care.  Progress treatment as tolerated.    NV, add MP flexion traction and supination strap for HEP PROM.               Precautions Lymphedema; hx desmoid tumors and breast cancer; healing humeral fracture.      POC expires Unit limit Auth  expiration date PT/OT + Visit Limit?   24 NA NA BOMN         Dx R Hum Fx     Dr Jack MILLER @ [facility]         Date 24      Visit 1 2      Manuals        STM        Edema mgt        IASTM        Jt mobs digits, FA 5'' Palm arch, Mps, FA wrist G 1-2      Ortho fit/train        Neuro Re-Ed         RNG        MNG        CHIKI           NV, sup strap, MP flexion     orthotc                        Ther Ex    25      HEP  Cont PROM FA, wrist, digits      PROM 1:1 5' FA wrist, digits      Blocking  DIP x10 each  PIP x10 each  MP x10 each      TGE  Hook 2x10  Full 2x10  W/ wedges      Digit abd/add  2x10      Th opps I 5' II x20  II, III x20      A/AAROM wrist, FA                        Ther Activity    15      Gripping  Gross grasp cylinder      Prehension  Block t/f              MHP  Pre tx ace flexion wrap                      "

## 2024-04-11 NOTE — PROGRESS NOTES
{SL AMB PT/OT NOTE TYPE:69719}    Today's date: 2024  Patient name: Eileen Matias  : 1960  MRN: [id number]  Referring provider: Rogelio Black MD  Dx:   Encounter Diagnosis     ICD-10-CM    1. Numbness and tingling of left upper extremity  R20.0     R20.2                      Assessment/Plan    Subjective    Objective           Precautions: ***      Manuals                                                                 Neuro Re-Ed                                                                                                        Ther Ex                                                                                                                     Ther Activity                                       Gait Training                                       Modalities

## 2024-04-16 ENCOUNTER — OFFICE VISIT (OUTPATIENT)
Dept: PHYSICAL THERAPY | Facility: CLINIC | Age: 64
End: 2024-04-16
Payer: MEDICARE

## 2024-04-16 ENCOUNTER — OFFICE VISIT (OUTPATIENT)
Dept: OCCUPATIONAL THERAPY | Facility: CLINIC | Age: 64
End: 2024-04-16
Payer: MEDICARE

## 2024-04-16 DIAGNOSIS — C50.312 MALIGNANT NEOPLASM OF LOWER-INNER QUADRANT OF LEFT BREAST IN FEMALE, ESTROGEN RECEPTOR POSITIVE (HCC): ICD-10-CM

## 2024-04-16 DIAGNOSIS — C50.912 CARCINOMA OF LEFT BREAST METASTATIC TO AXILLARY LYMPH NODE (HCC): ICD-10-CM

## 2024-04-16 DIAGNOSIS — C77.3 CARCINOMA OF LEFT BREAST METASTATIC TO AXILLARY LYMPH NODE (HCC): ICD-10-CM

## 2024-04-16 DIAGNOSIS — S42.344D CLOSED NONDISPLACED SPIRAL FRACTURE OF SHAFT OF RIGHT HUMERUS WITH ROUTINE HEALING, SUBSEQUENT ENCOUNTER: ICD-10-CM

## 2024-04-16 DIAGNOSIS — R20.2 NUMBNESS AND TINGLING OF LEFT UPPER EXTREMITY: Primary | ICD-10-CM

## 2024-04-16 DIAGNOSIS — Z17.0 MALIGNANT NEOPLASM OF LOWER-INNER QUADRANT OF LEFT BREAST IN FEMALE, ESTROGEN RECEPTOR POSITIVE (HCC): ICD-10-CM

## 2024-04-16 DIAGNOSIS — R20.0 NUMBNESS AND TINGLING OF LEFT UPPER EXTREMITY: Primary | ICD-10-CM

## 2024-04-16 DIAGNOSIS — I89.0 LYMPHEDEMA OF RIGHT ARM: Primary | ICD-10-CM

## 2024-04-16 PROCEDURE — 97112 NEUROMUSCULAR REEDUCATION: CPT

## 2024-04-16 PROCEDURE — 97140 MANUAL THERAPY 1/> REGIONS: CPT

## 2024-04-16 PROCEDURE — 97110 THERAPEUTIC EXERCISES: CPT

## 2024-04-16 PROCEDURE — 97760 ORTHOTIC MGMT&TRAING 1ST ENC: CPT | Performed by: OCCUPATIONAL THERAPIST

## 2024-04-16 PROCEDURE — 97110 THERAPEUTIC EXERCISES: CPT | Performed by: OCCUPATIONAL THERAPIST

## 2024-04-16 PROCEDURE — 97140 MANUAL THERAPY 1/> REGIONS: CPT | Performed by: OCCUPATIONAL THERAPIST

## 2024-04-16 NOTE — PROGRESS NOTES
"Daily Note     Today's date: 2024  Patient name: Eileen Matias  : 1960  MRN: [id number]  Referring provider: Rogelio Black MD  Dx:   Encounter Diagnosis     ICD-10-CM    1. Numbness and tingling of left upper extremity  R20.0     R20.2             Start Time: 1503  Stop Time: 1545  Total time in clinic (min): 42 minutes    Subjective:  \"I am trying to do more' \"Before the fracture, I could move my wrist and use my hand better\"      Objective: See treatment diary below    Composite flexion to palm  PROM  II  3 cm,  III  4 cm,  IV  4 cm,  V  3.5 cm  AROM  II  5.5 cm  III  6 cm,  IV  6 cm,  V  5 cm      Assessment: Tolerated treatment well. Patient would benefit from continued OT;  Absent active wrist extension.  Digital flexion PROM>AROM.       Plan: Continue per plan of care.  Progress treatment as tolerated.    NV, Measure  MP flexion traction and supination.  Focus on function gross grasp and prehension.                Precautions Lymphedema; hx desmoid tumors and breast cancer; healing humeral fracture.      POC expires Unit limit Auth  expiration date PT/OT + Visit Limit?   24 NA NA BOMN         Dx R Hum Fx     Dr Jack MILLER @ [facility]         Date 24     Visit 1 2      Manuals   10     STM        Edema mgt        IASTM        Jt mobs digits, FA 5'' Palm arch, Mps, FA wrist G 1-2 MP's wrist palm arch     Ortho fit/train    Supination strap ed with ; MP fdynamic flexion straps 2-5  18     Neuro Re-Ed         RNG        MNG        CHIKI                orthotc                        Ther Ex    25  10'     HEP  Cont PROM FA, wrist, digits Cont PROM FA wrist digits     PROM 1:1 5' FA wrist, digits FA wrist digits     Blocking  DIP x10 each  PIP x10 each  MP x10 each IP flexion blocking x10 each     TGE  Hook 2x10  Full 2x10  W/ wedges Hook x10  Full x10     Digit abd/add  2x10      Th opps I 5' II x20  II, III x20      A/AAROM wrist, FA                        Ther Activity "    15      Gripping  Gross grasp cylinder      Prehension  Block t/f              MHP  Pre tx ace flexion wrap Pre tx with flexion wrap

## 2024-04-16 NOTE — PROGRESS NOTES
"Daily Note     Today's date: 2024  Patient name: Eileen Matias  : 1960  MRN: [id number]  Referring provider: Rick Lazo DO  Dx:   Encounter Diagnosis     ICD-10-CM    1. Lymphedema of right arm  I89.0       2. Closed nondisplaced spiral fracture of shaft of right humerus with routine healing, subsequent encounter  S42.344D       3. 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 Malignant neoplasm of lower-inner quadrant of left breast in female, estrogen receptor positive (HCC)  C50.312     Z17.0                      Subjective: pt reports her fingers have been painful due to the wedges OT gave her to help spread the fingers out. Stated the swelling is doing fairly good.       Objective: See treatment diary below      Assessment: Tolerated treatment well. Patient would benefit from continued PT. Swelling seems to be maintaining for right now. Pt will try getting her smaller sleeve on in a couple weeks. Progressed mobility exercises w/ good tolerance.       Plan: Continue per plan of care.      EPOC: 3/11/24     Shoulder EPOC: 4/24/24 3/27 4/3 4/8 4/16   RA Lymph DATE: 4/10/24       Shoulder:  24       Manual       MLD [facility]   Manual Wrapping       PROM [facility]                    Exercise Diary 4/8 4/16  4/3   THEREX       Pt ed       Elbow/Wrist/hand AROM Hand/fingers x20 D/c  A/AAROM x20   Finger web  D/c     Opposition fingers 10x ea D/c  10x ea   Bicep curls w/ overpressure    x20   Counter walkouts  10\" x10     AAROM flex Supine 10\" x10 Is Ys 10\" x10 ea  Supine 10\" x10   pulleys 2'/2' 2'/2'     Ball rollouts flex/scap  2 min ea     Supine ER cane 5\" x10 10\" x10     Tricep sidney RTB 2x10      Bicep sidney YTB x10      SL abd 20x 20x  20x   Supine shoulder flex 20x 20x  20x   Nuts/bolt   D/c  1 round w/ ea finger   NEURO RE-ED       Scapular squeeze Rows rtb 2x10 Rows rtb 2x10  5\" 20x   Post shoulder rolls D/c   20x   [facility] reaches    3 " rounds   Supine punches x20 X10 0#; x10 1#                                                                                Modalities           CP

## 2024-04-17 ENCOUNTER — OFFICE VISIT (OUTPATIENT)
Dept: INTERNAL MEDICINE CLINIC | Facility: CLINIC | Age: 64
End: 2024-04-17
Payer: MEDICARE

## 2024-04-17 VITALS
SYSTOLIC BLOOD PRESSURE: 138 MMHG | HEART RATE: 77 BPM | WEIGHT: 147 LBS | DIASTOLIC BLOOD PRESSURE: 88 MMHG | TEMPERATURE: 98.4 F | BODY MASS INDEX: 22.28 KG/M2 | OXYGEN SATURATION: 99 % | HEIGHT: 68 IN

## 2024-04-17 DIAGNOSIS — E78.2 HYPERLIPIDEMIA, MIXED: ICD-10-CM

## 2024-04-17 DIAGNOSIS — I89.0 LYMPHEDEMA OF RIGHT ARM: ICD-10-CM

## 2024-04-17 DIAGNOSIS — C50.312 MALIGNANT NEOPLASM OF LOWER-INNER QUADRANT OF LEFT BREAST IN FEMALE, ESTROGEN RECEPTOR POSITIVE (HCC): ICD-10-CM

## 2024-04-17 DIAGNOSIS — F11.20 CONTINUOUS OPIOID DEPENDENCE (HCC): ICD-10-CM

## 2024-04-17 DIAGNOSIS — S42.344D CLOSED NONDISPLACED SPIRAL FRACTURE OF SHAFT OF RIGHT HUMERUS WITH ROUTINE HEALING, SUBSEQUENT ENCOUNTER: ICD-10-CM

## 2024-04-17 DIAGNOSIS — C50.912 CARCINOMA OF LEFT BREAST METASTATIC TO AXILLARY LYMPH NODE (HCC): ICD-10-CM

## 2024-04-17 DIAGNOSIS — H91.93 DECREASED HEARING OF BOTH EARS: Primary | ICD-10-CM

## 2024-04-17 DIAGNOSIS — D48.119 DESMOID TUMOR: ICD-10-CM

## 2024-04-17 DIAGNOSIS — Z17.0 MALIGNANT NEOPLASM OF LOWER-INNER QUADRANT OF LEFT BREAST IN FEMALE, ESTROGEN RECEPTOR POSITIVE (HCC): ICD-10-CM

## 2024-04-17 DIAGNOSIS — C77.3 CARCINOMA OF LEFT BREAST METASTATIC TO AXILLARY LYMPH NODE (HCC): ICD-10-CM

## 2024-04-17 DIAGNOSIS — Z79.811 USE OF LETROZOLE (FEMARA): ICD-10-CM

## 2024-04-17 DIAGNOSIS — I10 HTN, GOAL BELOW 140/90: ICD-10-CM

## 2024-04-17 PROCEDURE — 99214 OFFICE O/P EST MOD 30 MIN: CPT | Performed by: INTERNAL MEDICINE

## 2024-04-17 PROCEDURE — G2211 COMPLEX E/M VISIT ADD ON: HCPCS | Performed by: INTERNAL MEDICINE

## 2024-04-17 NOTE — PROGRESS NOTES
Assessment/Plan:     Patient is here for routine follow-up. We reviewed her chronic medical problems.  Labs to be done tomorrow. She follows with multiple specialties, reviewed all specialty notes.     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 Now on letrozole therapy; recent humerus fx, in a sling, cannot operate 2/2 lymphedema; she is following with ortho; history of osteoporosis.  Will start treatment after fracture heals.     She is due for colonoscopy.  Last one was done 2017 revealing polyps.  Prefers to hold off for now.       Depression Screening and Follow-up Plan: Patient was screened for depression during today's encounter. They screened negative with a PHQ-2 score of 0.    Falls Plan of Care: Recommended assistive device to help with gait and balance. Vitamin D supplementation was recommended.        Return in about 6 months (around 10/17/2024).    No problem-specific Assessment & Plan notes found for this encounter.       Diagnoses and all orders for this visit:    Decreased hearing of both ears  -     Ambulatory Referral to Otolaryngology; Future    Continuous opioid dependence (HCC)    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 (HCC)    Closed nondisplaced spiral fracture of shaft of right humerus with routine healing, subsequent encounter    HTN, goal below 140/90    Desmoid tumor    Malignant neoplasm of lower-inner quadrant of left breast in female, estrogen receptor positive (HCC)    Lymphedema of right arm    Hyperlipidemia, mixed    Use of letrozole (Femara)          Subjective:      Patient ID: Eileen Matias is a 63 y.o. female.    Patient is here for routine follow up, reviewed chronic medical problems, ordered labs for next visit including CBC CMP TSH A1C LIPID. Reviewed labs for this visit.      ALLERGIES:  Allergies   Allergen Reactions   • Chlorpromazine  Anaphylaxis     PHENOTHIAZINE=ANAPHYLAXIS   • Codeine Anaphylaxis     Anaphylaxis  abd pain.   • Meperidine Anaphylaxis, Hives and Vomiting   • Phenothiazines Anaphylaxis and Throat Swelling     Category: Allergy;    • Saccharin - Food Allergy Anaphylaxis   • Ampicillin-Sulbactam Sodium Hives   • Aspirin GI Intolerance and Abdominal Pain     Severe GERD   • Gluten Meal - Food Allergy GI Intolerance   • Ibuprofen Hives     H   • Levofloxacin Hives   • Chocolate - Food Allergy GI Intolerance   • Lactose - Food Allergy GI Intolerance   • Neomycin Other (See Comments), Edema and Hives     Category: Allergy;   swelling   • Citrus - Food Allergy Rash   • Latex Hives and Rash     Category: Allergy;        CURRENT MEDICATIONS:    Current Outpatient Medications:   •  cholecalciferol (VITAMIN D3) 1,000 units tablet, Take 3,000 Units by mouth daily, Disp: , Rfl:   •  diphenhydrAMINE (BENADRYL) 50 MG tablet, Take 50 mg by mouth daily at bedtime as needed for itching, Disp: , Rfl:   •  dronabinol (MARINOL) 10 MG capsule, , Disp: , Rfl:   •  ezetimibe (ZETIA) 10 mg tablet, Take 10 mg by mouth daily, Disp: , Rfl:   •  gabapentin (Neurontin) 100 mg capsule, Take 1 capsule (100 mg total) by mouth 3 (three) times a day, Disp: 90 capsule, Rfl: 1  •  letrozole (FEMARA) 2.5 mg tablet, TAKE 1 TABLET BY MOUTH EVERY DAY, Disp: 90 tablet, Rfl: 3  •  multivitamin (THERAGRAN) TABS, Take 1 tablet by mouth daily, Disp: , Rfl:   •  Omega-3 Fatty Acids (FISH OIL PO), Take by mouth, Disp: , Rfl:   •  Probiotic Product (PROBIOTIC-10 PO), Take by mouth, Disp: , Rfl:     ACTIVE PROBLEM LIST:  Patient Active Problem List   Diagnosis   • Back pain, chronic   • Chronic insomnia   • Lymphedema of right arm   • Hyperlipidemia, mixed   • Peripheral neuropathy   • Lymphedema   • Desmoid tumor   • 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 (HCC)   • Malignant neoplasm of lower-inner quadrant of left breast in female, estrogen receptor  positive (HCC)   • HTN, goal below 140/90   • Use of letrozole (Femara)   • History of lumpectomy of left breast   • Humerus fracture   • Chronic back pain   • Closed nondisplaced spiral fracture of shaft of right humerus with routine healing, subsequent encounter       PAST MEDICAL HISTORY:  Past Medical History:   Diagnosis Date   • Abnormal mammogram 05/15/2013   • Anemia    • Cancer (HCC)     desmoitosis   • 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 (HCC) 04/19/2022   • Chronic pain disorder     worse right side of body   • Desmoid tumor     Last Assessed: 6/19/2017   • fibroidal cyst 1999?  Hysterectomy done   • History of transfusion     no reactions   • Hyperlipidemia    • Hypertension    • Osteoporosis    • PONV (postoperative nausea and vomiting)        PAST SURGICAL HISTORY:  Past Surgical History:   Procedure Laterality Date   • BREAST LUMPECTOMY Left 6/21/2022    Procedure: ANITA  DIRECTED LUMPECTOMY;  Surgeon: Amanda Motley MD;  [facility];  Service: Surgical Oncology   • FRACTURE SURGERY     • HYSTERECTOMY     • LYMPH NODE DISSECTION Left 6/21/2022    Procedure: AXILLARY DISSECTION LEVEL I AND LEVEL II LYMPH NODES;  Surgeon: Amanda Motley MD;  [facility];  Service: Surgical Oncology   • MRI BREAST BIOPSY LEFT (ALL INCLUSIVE) Left 5/20/2022   • MRI BREAST BIOPSY RIGHT (ALL INCLUSIVE) Right 5/20/2022   • OTHER SURGICAL HISTORY      Arm Incision: Dermoid tumor, right Arm    • PARTIAL HYSTERECTOMY     • VA COLONOSCOPY FLX DX W/COLLJ SPEC WHEN PFRMD N/A 8/15/2017    Procedure: COLONOSCOPY;  Surgeon: Alec England MD;  Location: [facility];  Service: Gastroenterology   • VA NDSC WRST SURG W/RLS TRANSVRS CARPL LIGM Left 8/10/2021    Procedure: RELEASE LEFT CARPAL TUNNEL ENDOSCOPIC;  Surgeon: Chris Camacho MD;  [facility];  Service: Orthopedics   • VA OPTX DSTL RADL I-ARTIC FX/EPIPHYSL SEP 3 FRAG Left 2/9/2021    Procedure: OPEN  REDUCTION W/ INTERNAL FIXATION (ORIF) RADIUS / ULNA (WRIST) left;  Surgeon: Chris Camacho MD;  [facility];  Service: Orthopedics   • SKIN BIOPSY     • SKIN CANCER EXCISION      Melanoma Excision    • TONSILLECTOMY     • US BREAST NEEDLE LOC LEFT Left 6/16/2022   • US BREAST NEEDLE LOC RIGHT EACH ADDITIONAL Right 6/16/2022   • US GUIDED BREAST LYMPH NODE BIOPSY LEFT Left 4/14/2022       FAMILY HISTORY:  Family History   Problem Relation Age of Onset   • Diabetes Mother    • No Known Problems Father    • Cancer Sister    • Lung cancer Sister    • Pancreatic cancer Sister    • No Known Problems Maternal Grandmother    • No Known Problems Maternal Grandfather    • No Known Problems Paternal Grandmother    • No Known Problems Paternal Grandfather    • Breast cancer Neg Hx        SOCIAL HISTORY:  Social History     Socioeconomic History   • Marital status: /Civil Union     Spouse name: Not on file   • Number of children: Not on file   • Years of education: Not on file   • Highest education level: Not on file   Occupational History   • Occupation: unemployed    Tobacco Use   • Smoking status: Never   • Smokeless tobacco: Never   Vaping Use   • Vaping status: Never Used   Substance and Sexual Activity   • Alcohol use: Not Currently     Alcohol/week: 5.0 standard drinks of alcohol     Types: 5 Shots of liquor per week     Comment: Used mostly as a painkiller when needed before bedtime   • Drug use: Yes     Frequency: 28.0 times per week     Types: Marijuana     Comment: THC Medical marijuana   • Sexual activity: Not Currently     Partners: Male     Comment: Hysterectomy years ago   Other Topics Concern   • Not on file   Social History Narrative    Lives with spouse      Social Determinants of Health     Financial Resource Strain: Low Risk  (12/13/2023)    Overall Financial Resource Strain (CARDIA)    • Difficulty of Paying Living Expenses: Not hard at all   Food Insecurity: No Food Insecurity (11/13/2023)     Hunger Vital Sign    • Worried About Running Out of Food in the Last Year: Never true    • Ran Out of Food in the Last Year: Never true   Transportation Needs: No Transportation Needs (12/13/2023)    PRAPARE - Transportation    • Lack of Transportation (Medical): No    • Lack of Transportation (Non-Medical): No   Physical Activity: Insufficiently Active (5/2/2023)    Received from [facility]    Exercise Vital Sign    • Days of Exercise per Week: 2 days    • Minutes of Exercise per Session: 30 min   Stress: No Stress Concern Present (5/2/2023)    Received from [facility]    [employer] - Occupational Stress Questionnaire    • Feeling of Stress : Not at all   Social Connections: Unknown (5/2/2023)    Received from [facility]    Social Connection and Isolation Panel [NHANES]    • Frequency of Communication with Friends and Family: Once a week    • Frequency of Social Gatherings with Friends and Family: Patient declined    • Attends Restorationist Services: Never    • Active Member of Clubs or Organizations: No    • Attends Club or Organization Meetings: Patient declined    • Marital Status:    Intimate Partner Violence: Not At Risk (5/2/2023)    Received from [facility]    Humiliation, Afraid, Rape, and Kick questionnaire    • Fear of Current or Ex-Partner: No    • Emotionally Abused: No    • Physically Abused: No    • Sexually Abused: No   Housing Stability: Low Risk  (11/13/2023)    Housing Stability Vital Sign    • Unable to Pay for Housing in the Last Year: No    • Number of Places Lived in the Last Year: 1    • Unstable Housing in the Last Year: No       Review of Systems   Constitutional:  Negative for chills and fever.   HENT:  Negative for ear pain and sore throat.    Eyes:  Negative for pain and visual disturbance.   Respiratory:  Negative for cough and shortness of breath.    Cardiovascular:  Negative for  "chest pain and palpitations.   Gastrointestinal:  Negative for abdominal pain and vomiting.   Genitourinary:  Negative for dysuria and hematuria.   Musculoskeletal:  Positive for arthralgias. Negative for back pain.   Skin:  Negative for color change and rash.   Neurological:  Negative for seizures and syncope.   All other systems reviewed and are negative.        Objective:  Vitals:    04/17/24 1509   BP: 138/88   BP Location: Left arm   Patient Position: Sitting   Cuff Size: Standard   Pulse: 77   Temp: 98.4 °F (36.9 °C)   TempSrc: Tympanic   SpO2: 99%   Weight: 66.7 kg (147 lb)   Height: 5' 8\" (1.727 m)     Body mass index is 22.35 kg/m².     Physical Exam  Vitals and nursing note reviewed.   Constitutional:       Appearance: Normal appearance.   HENT:      Head: Normocephalic and atraumatic.   Cardiovascular:      Rate and Rhythm: Normal rate and regular rhythm.      Heart sounds: Normal heart sounds.   Pulmonary:      Effort: Pulmonary effort is normal.      Breath sounds: Normal breath sounds.   Musculoskeletal:         General: Signs of injury present. Normal range of motion.      Cervical back: Normal range of motion.      Right lower leg: No edema.      Left lower leg: No edema.   Skin:     General: Skin is warm and dry.   Neurological:      General: No focal deficit present.      Mental Status: She is alert and oriented to person, place, and time. Mental status is at baseline.   Psychiatric:         Mood and Affect: Mood normal.         RESULTS:  Hemoglobin A1C   Date/Time Value Ref Range Status   07/13/2021 08:55 AM 5.3 Normal 3.8-5.6%; PreDiabetic 5.7-6.4%; Diabetic >=6.5%; Glycemic control for adults with diabetes <7.0% % Final     Cholesterol   Date/Time Value Ref Range Status   04/07/2022 07:45  (H) See Comment mg/dL Final     Comment:     Cholesterol:         Pediatric <18 Years        Desirable          <170 mg/dL      Borderline High    170-199 mg/dL      High               >=200 mg/dL        " Adult >=18 Years            Desirable        <200 mg/dL      Borderline High  200-239 mg/dL      High             >239 mg/dL       Triglycerides   Date/Time Value Ref Range Status   04/07/2022 07:45  See Comment mg/dL Final     Comment:     Triglyceride:     0-9Y            <75mg/dL     10Y-17Y         <90 mg/dL       >=18Y     Normal          <150 mg/dL     Borderline High 150-199 mg/dL     High            200-499 mg/dL        Very High       >499 mg/dL    Specimen collection should occur prior to N-Acetylcysteine or Metamizole administration due to the potential for falsely depressed results.     HDL, Direct   Date/Time Value Ref Range Status   04/07/2022 07:45 AM 59 >=50 mg/dL Final     Comment:     Specimen collection should occur prior to Metamizole administration due to the potential for falsley depressed results.     LDL Calculated   Date/Time Value Ref Range Status   04/07/2022 07:45  (H) 0 - 100 mg/dL Final     Comment:     LDL Cholesterol:     Optimal           <100 mg/dl     Near Optimal      100-129 mg/dl     Above Optimal       Borderline High 130-159 mg/dl       High            160-189 mg/dl       Very High       >189 mg/dl         This screening LDL is a calculated result.   It does not have the accuracy of the Direct Measured LDL in the monitoring of patients with hyperlipidemia and/or statin therapy.   Direct Measure LDL (URB019) must be ordered separately in these patients.     Hemoglobin   Date/Time Value Ref Range Status   11/14/2023 05:34 AM 12.9 11.5 - 15.4 g/dL Final     Hematocrit   Date/Time Value Ref Range Status   11/14/2023 05:34 AM 38.9 34.8 - 46.1 % Final     Platelets   Date/Time Value Ref Range Status   11/14/2023 05:34  149 - 390 Thousands/uL Final     TSH 3RD GENERATON   Date/Time Value Ref Range Status   04/07/2022 07:45 AM 1.034 0.450 - 4.500 uIU/mL Final     Comment:     The recommended reference ranges for TSH during pregnancy are as follows:   First trimester  0.1 to 2.5 uIU/mL   Second trimester  0.2 to 3.0 uIU/mL   Third trimester 0.3 to 3.0 uIU/m    Note: Normal ranges may not apply to patients who are transgender, non-binary, or whose legal sex, sex at birth, and gender identity differ.  Adult TSH (3rd generation) reference range follows the recommended guidelines of the American Thyroid Association, January, 2020.     Sodium   Date/Time Value Ref Range Status   11/14/2023 05:34  (L) 135 - 147 mmol/L Final   05/05/2023 11:34  (L) 135 - 145 mmol/L Final     BUN   Date/Time Value Ref Range Status   11/14/2023 05:34 AM 10 5 - 25 mg/dL Final   05/05/2023 11:34 AM 11 7 - 25 mg/dL Final     Creatinine   Date/Time Value Ref Range Status   11/14/2023 05:34 AM 0.52 (L) 0.60 - 1.30 mg/dL Final     Comment:     Standardized to IDMS reference method   05/05/2023 11:34 AM 0.51 0.40 - 1.10 mg/dL Final      In chart    This note was created with voice recognition software.  Phonic, grammatical and spelling errors may be present within the note as a result.

## 2024-04-18 ENCOUNTER — OFFICE VISIT (OUTPATIENT)
Dept: OCCUPATIONAL THERAPY | Facility: CLINIC | Age: 64
End: 2024-04-18
Payer: MEDICARE

## 2024-04-18 ENCOUNTER — OFFICE VISIT (OUTPATIENT)
Dept: PHYSICAL THERAPY | Facility: CLINIC | Age: 64
End: 2024-04-18
Payer: MEDICARE

## 2024-04-18 DIAGNOSIS — R20.2 NUMBNESS AND TINGLING OF LEFT UPPER EXTREMITY: Primary | ICD-10-CM

## 2024-04-18 DIAGNOSIS — I89.0 LYMPHEDEMA OF RIGHT ARM: Primary | ICD-10-CM

## 2024-04-18 DIAGNOSIS — R20.0 NUMBNESS AND TINGLING OF LEFT UPPER EXTREMITY: Primary | ICD-10-CM

## 2024-04-18 DIAGNOSIS — S42.344D CLOSED NONDISPLACED SPIRAL FRACTURE OF SHAFT OF RIGHT HUMERUS WITH ROUTINE HEALING, SUBSEQUENT ENCOUNTER: ICD-10-CM

## 2024-04-18 PROCEDURE — 97110 THERAPEUTIC EXERCISES: CPT | Performed by: OCCUPATIONAL THERAPIST

## 2024-04-18 PROCEDURE — 97110 THERAPEUTIC EXERCISES: CPT

## 2024-04-18 PROCEDURE — 97140 MANUAL THERAPY 1/> REGIONS: CPT

## 2024-04-18 PROCEDURE — 97140 MANUAL THERAPY 1/> REGIONS: CPT | Performed by: OCCUPATIONAL THERAPIST

## 2024-04-18 PROCEDURE — 97112 NEUROMUSCULAR REEDUCATION: CPT

## 2024-04-18 NOTE — PROGRESS NOTES
"Daily Note     Today's date: 2024  Patient name: Eileen Matias  : 1960  MRN: [id number]  Referring provider: Rick Lazo DO  Dx:   Encounter Diagnosis     ICD-10-CM    1. Lymphedema of right arm  I89.0       2. Closed nondisplaced spiral fracture of shaft of right humerus with routine healing, subsequent encounter  S42.344D                      Subjective: pt reports she has been having more swelling in the elbow region that is causing some soreness.       Objective: See treatment diary below      Assessment: Tolerated treatment well. Patient would benefit from continued PT. Increased swelling noted in elbow and upper forearm this session. Improved mobility in shoulder w/ AAROM exercises.       Plan: Continue per plan of care.      EPOC: 3/11/24     Shoulder EPOC: 24   RA Lymph DATE: 4/10/24       Shoulder:  24       Manual       MLD JH   JH   Manual Wrapping       PROM                       Exercise Diary 4/8 4/16 4/18 4/3   THEREX       Pt ed       Elbow/Wrist/hand AROM Hand/fingers x20 D/c  A/AAROM x20   Finger web  D/c     SL ER   x20    Standing cane 3 way   20x    Counter walkouts  10\" x10 10\" x10    AAROM flex Supine 10\" x10 Is Ys 10\" x10 ea Is Ys 10\" x10 ea Supine 10\" x10   pulleys 2'/2' 2'/2' 2'/2'    Ball rollouts flex/scap  2 min ea 2 min ea    Supine ER cane 5\" x10 10\" x10 10\" x10    Tricep sidney RTB 2x10      Bicep sidney YTB x10      SL abd 20x 20x 20x 20x   Supine shoulder flex 20x 20x 20x 20x   Nuts/bolt   D/c  1 round w/ ea finger   NEURO RE-ED       Scapular squeeze Rows rtb 2x10 Rows rtb 2x10 Rows rtb  5\" 20x   Post shoulder rolls D/c   20x   [facility] reaches    3 rounds   Supine punches x20 X10 0#; x10 1# 0# 2x10                                                                               Modalities           CP                                                              "

## 2024-04-18 NOTE — PROGRESS NOTES
"Daily Note     Today's date: 2024  Patient name: Eileen Matias  : 1960  MRN: [id number]  Referring provider: Rogelio Black MD  Dx:   Encounter Diagnosis     ICD-10-CM    1. Numbness and tingling of left upper extremity  R20.0     R20.2                        Subjective:  \"I am trying to do more' \"Before the fracture, I could move my wrist and use my hand better\"      Objective: See treatment diary below    Composite flexion to palm  PROM  II  3 cm,  III  4 cm,  IV  4 cm,  V  3.5 cm  AROM  II  4 cm  III  4.5 cm,  IV  4.5  cm,  V  4 cm  4  4.5  4.5  4    Assessment: Tolerated treatment well. Patient would benefit from continued OT;  Absent active wrist extension.  Digital flexion PROM>AROM.       Plan: Continue per plan of care.  Progress treatment as tolerated.    NV, progress  MP flexion traction and supination as tolerated.  Focus on function gross grasp and prehension.   FOTO NV.                Precautions Lymphedema; hx desmoid tumors and breast cancer; healing humeral fracture.      POC expires Unit limit Auth  expiration date PT/OT + Visit Limit?   24 NA NA BOMN         Dx R Hum Fx     Dr Jack MILLER @ [facility]         Date 24 4 18 F NV   Visit 1 2      Manuals   10 8'                            Jt mobs digits, FA 5'' Palm arch, Mps, FA wrist G 1-2 MP's wrist palm arch MP all  Wrist   Arch    Ortho fit/train    Supination strap ed with ; MP fdynamic flexion straps 2-5  18     Neuro Re-Ed         RNG        MNG        CHIKI                orthotc                        Ther Ex    25  10'   30    HEP  Cont PROM FA, wrist, digits Cont PROM FA wrist digits Cont PROM strap, MP flexion    PROM 1:1 5' FA wrist, digits FA wrist digits FA S/P  Wrist E/F digital E/F    Blocking  DIP x10 each  PIP x10 each  MP x10 each IP flexion blocking x10 each IP flexion all  DIP flexion all      TGE  Hook 2x10  Full 2x10  W/ wedges Hook x10  Full x10 Hook x10  Full x10    Digital P/AROM  " 2x10  P/H fists  10x10    Active wrist E 5' II x20  II, III x20  On table, w/o gravity x10    A/AAROM wrist, FA                        Ther Activity    15      Gripping  Gross grasp cylinder      Prehension  Block t/f              MHP  Pre tx ace flexion wrap Pre tx with flexion wrap Pre tx with ace flexion

## 2024-04-22 ENCOUNTER — OFFICE VISIT (OUTPATIENT)
Dept: PHYSICAL THERAPY | Facility: CLINIC | Age: 64
End: 2024-04-22
Payer: MEDICARE

## 2024-04-22 DIAGNOSIS — S42.344D CLOSED NONDISPLACED SPIRAL FRACTURE OF SHAFT OF RIGHT HUMERUS WITH ROUTINE HEALING, SUBSEQUENT ENCOUNTER: ICD-10-CM

## 2024-04-22 DIAGNOSIS — I89.0 LYMPHEDEMA OF RIGHT ARM: Primary | ICD-10-CM

## 2024-04-22 PROCEDURE — 97110 THERAPEUTIC EXERCISES: CPT

## 2024-04-22 PROCEDURE — 97112 NEUROMUSCULAR REEDUCATION: CPT

## 2024-04-22 PROCEDURE — 97140 MANUAL THERAPY 1/> REGIONS: CPT

## 2024-04-22 NOTE — PROGRESS NOTES
"Daily Note     Today's date: 2024  Patient name: Eileen Matias  : 1960  MRN: [id number]  Referring provider: Rick Lazo DO  Dx:   Encounter Diagnosis     ICD-10-CM    1. Lymphedema of right arm  I89.0       2. Closed nondisplaced spiral fracture of shaft of right humerus with routine healing, subsequent encounter  S42.344D                      Subjective: pt reports no new changes upon arrival.      Objective: See treatment diary below      Assessment: Tolerated treatment well. Patient would benefit from continued PT. Swelling around elbow region as remained the same since last week. Continued w/ current mobility and strengthening w/ shoulder. Improved control w/ standing active flexion.        Plan: Continue per plan of care.      EPOC: 3/11/24     Shoulder EPOC: 24   RA Lymph DATE: 4/10/24       Shoulder:  24       Manual       MLD JH JH  JH   Manual Wrapping       PROM                       Exercise Diary    THEREX       Pt ed       Elbow/Wrist/hand AROM Hand/fingers x20 D/c     Finger web  D/c     SL ER   x20 20x   Standing cane 3 way   20x AROM flex/scap 2x10   Counter walkouts  10\" x10 10\" x10 10\" x10   AAROM flex Supine 10\" x10 Is Ys 10\" x10 ea Is Ys 10\" x10 ea Is Ys 10\" x10 ea   pulleys 2'/2' 2'/2' 2'/2' 2'/2'   Ball rollouts flex/scap  2 min ea 2 min ea 2 min ea   Supine ER cane 5\" x10 10\" x10 10\" x10 10\" x10   Tricep sidney RTB 2x10      Bicep sidney YTB x10      SL abd 20x 20x 20x 20x   Supine shoulder flex 20x 20x 20x 20x   Nuts/bolt   D/c     NEURO RE-ED       Scapular squeeze Rows rtb 2x10 Rows rtb 2x10 Rows rtb  Rows rtb x20   Post shoulder rolls D/c      Cone reaches    3 cones x3   Supine punches x20 X10 0#; x10 1# 0# 2x10 2x10                                                                              Modalities           CP                                                                "

## 2024-04-23 ENCOUNTER — APPOINTMENT (OUTPATIENT)
Dept: OCCUPATIONAL THERAPY | Facility: CLINIC | Age: 64
End: 2024-04-23
Payer: MEDICARE

## 2024-04-24 ENCOUNTER — EVALUATION (OUTPATIENT)
Dept: PHYSICAL THERAPY | Facility: CLINIC | Age: 64
End: 2024-04-24
Payer: MEDICARE

## 2024-04-24 DIAGNOSIS — I89.0 LYMPHEDEMA OF RIGHT ARM: Primary | ICD-10-CM

## 2024-04-24 DIAGNOSIS — S42.344D CLOSED NONDISPLACED SPIRAL FRACTURE OF SHAFT OF RIGHT HUMERUS WITH ROUTINE HEALING, SUBSEQUENT ENCOUNTER: ICD-10-CM

## 2024-04-24 DIAGNOSIS — C77.3 CARCINOMA OF LEFT BREAST METASTATIC TO AXILLARY LYMPH NODE (HCC): ICD-10-CM

## 2024-04-24 DIAGNOSIS — C50.912 CARCINOMA OF LEFT BREAST METASTATIC TO AXILLARY LYMPH NODE (HCC): ICD-10-CM

## 2024-04-24 PROCEDURE — 97140 MANUAL THERAPY 1/> REGIONS: CPT

## 2024-04-24 PROCEDURE — 97112 NEUROMUSCULAR REEDUCATION: CPT

## 2024-04-24 PROCEDURE — 97110 THERAPEUTIC EXERCISES: CPT

## 2024-04-24 NOTE — PROGRESS NOTES
PT Re-Evaluation     Collection of Subjective/Objective data performed by Irene Childs PTA. Assessment, POC, and Goal attainment performed by Jessica Garcia DPT.       Today's date: 2024  Patient name: Eileen Matias  : 1960   MRN: [id number]  Referring provider: Rick Lazo DO  Dx:   Encounter Diagnosis     ICD-10-CM    1. Lymphedema of right arm  I89.0       2. Closed nondisplaced spiral fracture of shaft of right humerus with routine healing, subsequent encounter  S42.344D       3. 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 (HCC)  C50.912     C77.3                 Start Time: 1017  Stop Time: 1145  Total time in clinic (min): 88 minutes    Assessment  Assessment details: Patient is a  64 y/o who presents to therapy with R upper extremity and R humerus fracture decreased FOTO score of 42 since IE. Patient demonstrates subjective/objective improvement since starting PT such as improved mobility and strength but her swelling remains the same. Patient continues to be limited with reaching overhead, lifting overhead, and reaching behind her back. Patient will benefit from continued skilled PT intervention to address the aforementioned impairments, achieve goals, maximize function, and improve quality of life. Pt is in agreement with this plan.     Impairments: abnormal or restricted ROM, impaired physical strength and pain with function  Other impairment: lymphedema  Understanding of Dx/Px/POC: good   Prognosis: good    Goals  LYMPH GOALS:  ST.Pain decreased by 25% in 4 weeks.--PROGRESSING  2. Edema decreased by 2-3 cm in 4 weeks. --MET    LT. Decrease pain to 1-2/10 at worst by d/c.-PROGRESSING  2. Increase ROM to WFL for all deficient movements by d/c.-PROGRESSING  3. Strength increased to 5 for all deficient muscle groups by d/c.-PROGRESSING  4. IADL performance increased to max function by d/c.-PROGRESSING  5. Recreational performance increased to max function  by d/c.-PROGRESSING  6. RUE edema decreased to that of LUE by d/c. -PROGRESSING  7. Pt will return to work by d/c.-PROGRESSING    SHOULDER GOALS:   ST weeks  Pt will demonstrate good understanding and compliance with HEP--PROGRESSING  Pt will decrease pain to 5/10 at worst  --PROGRESSING  Pt will demonstrate improved postural awareness and ability to self-correct without reliance on external cues --PROGRESSING     LT weeks  Pt will improve FOTO score to > or = to 52 to indicate improved functional abilities  --PROGRESSING  Pt will decrease pain to 2-3/10 at worst   --PROGRESSING  Pt will increase shoulder strength to 4-/5 for improved tolerance/independence with ADLs --PROGRESSING  Pt will improve  strength to 25#  --PROGRESSING         Plan  Other planned modality interventions: other modalities PRN  Planned therapy interventions: abdominal trunk stabilization, ADL retraining, IADL retraining, flexibility, functional ROM exercises, graded exercise, home exercise program, manual therapy, joint mobilization, neuromuscular re-education, patient education, postural training, strengthening, therapeutic exercise, therapeutic activities, massage and work reintegration  Other planned therapy interventions: other interventions PRN  Frequency: 1-2x/week.  Duration in weeks: 8  Plan of Care beginning date: 2024  Plan of Care expiration date: 2024  Treatment plan discussed with: patient        Subjective Evaluation    History of Present Illness  Mechanism of injury: RE (24):  Pt has been see w/ c/o RUE lymphedema and s/p R humeral fracture and notes overall improvement. Pt notes 50% improvement in lymphedema since beginning physical therapy. Pt is complaint with using her compression pump 3x/day, 4x/day on bad days, and wearing the compression sleeve during the day/night time. Pt notes majority of swelling remains to be in the elbow region, which is preventing her from downsizing her current daytime  "sleeve she is wearing. In terms of the shoulder, she notes overall improvements in mobility, strength, and endurance. Gripping is still a challenge, but she is seeing OT for treatment for her had mobility/strength. Continued difficulties include lifting, mobility OH and behind to don/doff bra/washback.     RE (4/10/24):  Pt reports overall improvements with the tightness and swelling in the arm. Pt notes 50% improvement in the swelling since beginning lymphedema therapy. Pt is still in the smaller sized night sleeve that she wears all day and then switches to the larger sleeve at night.  She has been able to transition back into the compression pump, that she uses about 3x/day and 4x/day on a bad day. Pt notes improvement since being back into the pump.       RE (3/13/24):  Pt reports overall improvements with the tightness and swelling in the arm. Pt notes 10% improvement in the swelling since beginning lymphedema therapy. Pt has since transitioned from the larger night garment to the smaller night garment to wear during the day. Pt notes she has feeling back in her thumb and finger tips. Pt notes slight improvements in strength. Pt is still not using pump at home due to the continued swelling and tightness in posterior upper arm that causes more pain when using pump. Pt plans on restarting the pump when \"the lump\" in the back of the arm improves.       RE (2/14/24):  Pt has been seen for RUE lymphedema for 8 visits to date and notes 50% improvement so far. Pt reports she is still not able to use compression pump but has been wearing the T[employer] garment, which seems to be helping. Pt is not longer needing to wear the clamshell for her fx unless if she is out running errands. Pt notes the swelling has improved since being able to take the brace off more often. Pt notes continued \"hardness\" in elbow and axilla region.     EVAL (1/15/24):  Pt reports to IE w/ hx of RUE lymphedema which started approx 20 years ago " approx 5 years following CA dx of desmoitosis.     Pt had done skilled PT tx for this condition in the past which was helpful control the issue. However, pt sustained humeral fx on 11/12/23 as a result of fall on RUE while trying to protect mother in law from a fall out of w/c. She reports that healing has been complicated by severe osteoporosis and lymphedema. Pt still required to wear brace at all times- not yet allowed any ROM in shoulder.     In addition to the above, pt w/ recent hx of L breast CA w/ L lumpectomy and lymph node removal (pt reports 17 lymph nodes) over on 6/21/22.        Patient Goals  Patient goals for therapy: decreased pain and decreased edema    Pain  Pain scale: Shoulder -2; Lymph- 4.  Pain scale at lowest: Shoulder -2; Lymph- 4.  Pain scale at highest: Shoulder- 5; Lymph 7.  Location: R shoulder  Alleviating factors: lymphedema massage.  Exacerbated by: increased edema, movement.    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)  Hand dominance: right          Objective     General Comments:      Shoulder Comments   R AROM: flex-125 abd-120 IR-R Sacrum ER- unable      R PROM: flex- 132 (pain) abd- 100 (pain) IR-76 ER-21     R shoulder strength: flex: 4-/5; abd: 4-/5; ER: 4-/5; IR: 3/5      Ankle/Foot Comments   Edema in R vs L w/ circumferential girth measurements as below (L/R):   Axilla: 36  20 cm proximal to elbow: 29  10 cm proximal to elbow: 26.5 cm  Elbow Joint: 25 cm/26.5 cm  20 cm proximal to wrist: 26.5 cm/28 cm  10 cm proximal to wrist: 21 cm/22 cm  Wrist:  16.5 cm/16.8 cm  Palm: 18 cm/18 cm  MCP Joints (digits 2-5): 18 cm/16.5 cm  PIP Joints (digits 2-5): 16 cm/15 cm        EPOC: 7/17/24 4/18 4/22 4/24    RA: 5/22/24       Manual       MLD JH JH     Manual Wrapping       PROM                       Exercise Diary 4/24 4/18 4/22   THEREX       Pt ed FOTO; updated lymph and shoulder  "measurements      Elbow/Wrist/hand AROM       Finger web       SL ER 20x  x20 20x   Standing cane 3 way AROM flex/scap 2x10  20x AROM flex/scap 2x10   Counter walkouts 10\" x10  10\" x10 10\" x10   AAROM flex Is Ys 10\" x10 ea  Is Ys 10\" x10 ea Is Ys 10\" x10 ea   pulleys 2'/2'  2'/2' 2'/2'   Ball rollouts flex/scap 2 min ea  2 min ea 2 min ea   Supine ER cane 10\" x10  10\" x10 10\" x10   Tricep sindey       Bicep sidney       SL abd 20x  20x 20x   Supine shoulder flex 20x  20x 20x   Nuts/bolt        NEURO RE-ED       Scapular squeeze Rows/pulldowns rtb x20  Rows rtb  Rows rtb x20   Post shoulder rolls       Cone reaches 3 cones x3   3 cones x3   Supine punches 2x10  0# 2x10 2x10                                                                              Modalities           CP                                           "

## 2024-04-29 ENCOUNTER — HOSPITAL ENCOUNTER (OUTPATIENT)
Dept: MAMMOGRAPHY | Facility: CLINIC | Age: 64
Discharge: HOME | End: 2024-04-29
Payer: MEDICARE

## 2024-04-29 VITALS — WEIGHT: 147 LBS | HEIGHT: 68 IN | BODY MASS INDEX: 22.28 KG/M2

## 2024-04-29 DIAGNOSIS — L03.90 RECURRENT CELLULITIS: Primary | ICD-10-CM

## 2024-04-29 DIAGNOSIS — R92.8 ABNORMAL MAMMOGRAM: ICD-10-CM

## 2024-04-29 PROCEDURE — G0279 TOMOSYNTHESIS, MAMMO: HCPCS

## 2024-04-29 PROCEDURE — 77066 DX MAMMO INCL CAD BI: CPT

## 2024-04-29 RX ORDER — PENICILLIN V POTASSIUM 500 MG/1
500 TABLET ORAL EVERY 12 HOURS
Qty: 180 TABLET | Refills: 1 | Status: SHIPPED | OUTPATIENT
Start: 2024-04-29 | End: 2024-10-26

## 2024-04-30 ENCOUNTER — OFFICE VISIT (OUTPATIENT)
Dept: OCCUPATIONAL THERAPY | Facility: CLINIC | Age: 64
End: 2024-04-30
Payer: MEDICARE

## 2024-04-30 ENCOUNTER — OFFICE VISIT (OUTPATIENT)
Dept: PHYSICAL THERAPY | Facility: CLINIC | Age: 64
End: 2024-04-30
Payer: MEDICARE

## 2024-04-30 DIAGNOSIS — C50.912 CARCINOMA OF LEFT BREAST METASTATIC TO AXILLARY LYMPH NODE (HCC): ICD-10-CM

## 2024-04-30 DIAGNOSIS — C77.3 CARCINOMA OF LEFT BREAST METASTATIC TO AXILLARY LYMPH NODE (HCC): ICD-10-CM

## 2024-04-30 DIAGNOSIS — C50.312 MALIGNANT NEOPLASM OF LOWER-INNER QUADRANT OF LEFT BREAST IN FEMALE, ESTROGEN RECEPTOR POSITIVE (HCC): ICD-10-CM

## 2024-04-30 DIAGNOSIS — R20.2 NUMBNESS AND TINGLING OF LEFT UPPER EXTREMITY: Primary | ICD-10-CM

## 2024-04-30 DIAGNOSIS — Z17.0 MALIGNANT NEOPLASM OF LOWER-INNER QUADRANT OF LEFT BREAST IN FEMALE, ESTROGEN RECEPTOR POSITIVE (HCC): ICD-10-CM

## 2024-04-30 DIAGNOSIS — S42.344D CLOSED NONDISPLACED SPIRAL FRACTURE OF SHAFT OF RIGHT HUMERUS WITH ROUTINE HEALING, SUBSEQUENT ENCOUNTER: ICD-10-CM

## 2024-04-30 DIAGNOSIS — R20.0 NUMBNESS AND TINGLING OF LEFT UPPER EXTREMITY: Primary | ICD-10-CM

## 2024-04-30 DIAGNOSIS — I89.0 LYMPHEDEMA OF RIGHT ARM: Primary | ICD-10-CM

## 2024-04-30 PROCEDURE — 97110 THERAPEUTIC EXERCISES: CPT | Performed by: OCCUPATIONAL THERAPIST

## 2024-04-30 PROCEDURE — 97110 THERAPEUTIC EXERCISES: CPT

## 2024-04-30 PROCEDURE — 97140 MANUAL THERAPY 1/> REGIONS: CPT

## 2024-04-30 NOTE — PROGRESS NOTES
"Daily Note     Today's date: 2024  Patient name: Eileen Matias  : 1960  MRN: [id number]  Referring provider: Rogelio Black MD  Dx:   Encounter Diagnosis     ICD-10-CM    1. Numbness and tingling of left upper extremity  R20.0     R20.2                        Subjective:  \"I haven't done much exercise.  My shoulder has been in pain because of my mammogram\"      Objective: See treatment diary below    Assessment: Tolerated treatment well. Patient would benefit from continued OT;  Absent active wrist extension and digital extension.  MP's contracted in  extension.  IP motion improved at end of session.    Digital flexion PROM>AROM.       Plan: Continue per plan of care.  Progress treatment as tolerated.    NV, progress  MP flexion traction and supination as tolerated.  Focus on function gross grasp and prehension.   FOTO NV.  Fabricate radial palsy splint for improved ease of function.                 Precautions Lymphedema; hx desmoid tumors and breast cancer; healing humeral fracture.      POC expires Unit limit Auth  expiration date PT/OT + Visit Limit?   24 NA NA BOMN         Dx R Hum Fx     Dr Jack MILLER @ [facility]         Date 24   Visit 1 2      Manuals   10 8' 8'                           Jt mobs digits, FA 5'' Palm arch, Mps, FA wrist G 1-2 MP's wrist palm arch MP all  Wrist   Arch MP all wrist , arch FA   Ortho fit/train    Supination strap ed with ; MP fdynamic flexion straps 2-5  18     Neuro Re-Ed         RNG        MNG        CHIKI                orthotc                        Ther Ex    25  10'   30   23   HEP  Cont PROM FA, wrist, digits Cont PROM FA wrist digits Cont PROM strap, MP flexion PROM MP flexion to focus, Abd digits. Wedges   PROM 1:1 5' FA wrist, digits FA wrist digits FA S/P  Wrist E/F digital E/F FA S/P  Wrist E/F  Digital E/F  Th abd.    Blocking  DIP x10 each  PIP x10 each  MP x10 each IP flexion blocking x10 each IP flexion " all  DIP flexion all   FDP, FDS, hook 1:1 each digit   TGE  Hook 2x10  Full 2x10  W/ wedges Hook x10  Full x10 Hook x10  Full x10 Hook x10  MP only x10   Digital P/AROM  2x10  P/H fists  10x10 Dig ABD  P/A   P/H   Active wrist E 5' II x20  II, III x20  On table, w/o gravity x10    A/AAROM wrist, FA                        Ther Activity    15     10'   Gripping  Gross grasp cylinder   1 RB blocks  Y Dig flex  Lg lid    Prehension  Block t/f              MHP  Pre tx ace flexion wrap Pre tx with flexion wrap Pre tx with ace flexion Pre tx with ace flexion

## 2024-04-30 NOTE — PROGRESS NOTES
"Daily Note     Today's date: 2024  Patient name: Eileen Matias  : 1960  MRN: [id number]  Referring provider: Rick Lazo DO  Dx:   Encounter Diagnosis     ICD-10-CM    1. Lymphedema of right arm  I89.0       2. Closed nondisplaced spiral fracture of shaft of right humerus with routine healing, subsequent encounter  S42.344D       3. 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 Malignant neoplasm of lower-inner quadrant of left breast in female, estrogen receptor positive (HCC)  C50.312     Z17.0                      Subjective: pt reports the shoulder is pretty sore today due to having a mammogram yesterday and having her shoulder placed to far above her head.       Objective: See treatment diary below      Assessment: Tolerated treatment well. Patient would benefit from continued PT. Modified session today due to increased pain in the shoulder from yesterday's events.       Plan: Continue per plan of care.      EPOC: 3/11/24     Shoulder EPOC: 24   RA Lymph DATE: 4/10/24       Shoulder:  24       Manual       MLD JH JH  JH   Manual Wrapping       PROM                       Exercise Diary    THEREX       Pt ed JH      Elbow/Wrist/hand AROM       Finger web       SL ER 20x   20x   Standing cane 3 way nv   AROM flex/scap 2x10   Counter walkouts 10\" x10   10\" x10   AAROM flex Is Ys 10\" x10 ea   Is Ys 10\" x10 ea   pulleys 2'/2'   2'/2'   Ball rollouts flex/scap 2 min ea   2 min ea   Supine ER cane 10\" x10   10\" x10   Tricep sidney       Bicep sidney       SL abd x20   20x   Supine shoulder flex x20   20x   Nuts/bolt        NEURO RE-ED       Scapular squeeze NP   Rows rtb x20   Post shoulder rolls       Cone reaches NP   3 cones x3   Supine punches NP   2x10                                                                              Modalities           CP                                 "

## 2024-05-01 ENCOUNTER — OFFICE VISIT (OUTPATIENT)
Dept: SURGICAL ONCOLOGY | Facility: CLINIC | Age: 64
End: 2024-05-01
Payer: MEDICARE

## 2024-05-01 VITALS
HEIGHT: 68 IN | BODY MASS INDEX: 22.43 KG/M2 | WEIGHT: 148 LBS | HEART RATE: 91 BPM | DIASTOLIC BLOOD PRESSURE: 90 MMHG | OXYGEN SATURATION: 96 % | SYSTOLIC BLOOD PRESSURE: 120 MMHG

## 2024-05-01 DIAGNOSIS — Z79.811 USE OF LETROZOLE (FEMARA): ICD-10-CM

## 2024-05-01 DIAGNOSIS — C50.912 CARCINOMA OF LEFT BREAST METASTATIC TO AXILLARY LYMPH NODE (HCC): ICD-10-CM

## 2024-05-01 DIAGNOSIS — C77.3 CARCINOMA OF LEFT BREAST METASTATIC TO AXILLARY LYMPH NODE (HCC): ICD-10-CM

## 2024-05-01 DIAGNOSIS — Z17.0 MALIGNANT NEOPLASM OF LOWER-INNER QUADRANT OF LEFT BREAST IN FEMALE, ESTROGEN RECEPTOR POSITIVE (HCC): Primary | ICD-10-CM

## 2024-05-01 DIAGNOSIS — Z08 ENCOUNTER FOR FOLLOW-UP SURVEILLANCE OF BREAST CANCER: ICD-10-CM

## 2024-05-01 DIAGNOSIS — Z85.3 ENCOUNTER FOR FOLLOW-UP SURVEILLANCE OF BREAST CANCER: ICD-10-CM

## 2024-05-01 DIAGNOSIS — Z98.890 HISTORY OF LUMPECTOMY OF LEFT BREAST: ICD-10-CM

## 2024-05-01 DIAGNOSIS — C50.312 MALIGNANT NEOPLASM OF LOWER-INNER QUADRANT OF LEFT BREAST IN FEMALE, ESTROGEN RECEPTOR POSITIVE (HCC): Primary | ICD-10-CM

## 2024-05-01 PROCEDURE — 99215 OFFICE O/P EST HI 40 MIN: CPT | Performed by: SURGERY

## 2024-05-01 NOTE — PROGRESS NOTES
Surgical Oncology Follow Up  [facility]  [facility] [place]  [address] [id number]    Eileen Matias  1960  [id number]      No chief complaint on file.       Assessment & Plan:   This is 63-year-old female with left breast cancer s/p breast conservation surgery and axillary dissection ER/HI positive HER2 negative tumor followed by radiation she is here for follow-up.  After her mammogram which I reviewed and no worrisome features.  She denies of any breast pain nipple discharge nipple retraction or skin changes other than surgical scars.  Unfortunately she is still struggling from right upper extremity lymphedema from her surgery for right upper extremity soft tissue malignancy followed by radiation.  She is followed for lymphedema by PT OT and managed.  She is on letrozole and tolerating well.  She denies of any shortness of breath leg swelling or vaginal spotting.  She will continue her PT OT for lymphedema prevention on the left side and right side therapy for lymphedema.  I will see her in 6 months time    Cancer History:     Oncology History   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 (HCC)   4/14/2022 Initial Diagnosis    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 (HCC)     4/14/2022 Biopsy    Left axillary Lymph node ultrasound guided biopsy  Metastatic carcinoma, compatible with breast origin.   ER >95  HI 95   HER2 1+    On ultrasound lymph node is 1.8 cm. There is cortical thickening without evident fatty hilum.     In review of screening mammogram, demonstrates no suspicious mammographic findings identified in either breast. Bilateral MRI is recommended. Flow cytometry - there is no evidence of a B-cell or T-cell non-Hodgkin lymphoma.       4/20/2022 Genomic Testing    A total of 36 genes were evaluated, including: ANGEL LUIS, BRCA1, BRCA2, CDH1, CHEK2, PALB2, PTEN, STK11, TP53  Negative result. No pathogenic  sequence variants or deletions/duplications identified     5/20/2022 Biopsy    MRI guided biopsy  A. Left breast   11 o'clock   Invasive mammary carcinoma of no special type (ductal)  Grade 1  ER 95  OK 95  HER2 0    B. Right breast   Retroareolar  Benign fibrocystic changes without atypia (discrete 0.3 cm focus of sclerosing and tubular adenosis with usual ductal hyperplasia, columnar cell change and hyperplasia, apocrine metaplasia, cystic dilatation).   - Microcalcifications: Present associated with non-neoplastic breast tissue.   - Negative for malignancy, in-situ carcinoma and atypical hyperplasia.     Malignant pathology appears unifocal. Biopsy proven carcinoma measured 1 cm on MRI. Biopsy proved metastatic disease to left axillary lymph node.      6/9/2022 Genomic Testing    MammaPrint  Low risk  Luminal type A  MammaPrint index: +0.578     6/21/2022 Surgery    Left breast tiffany  directed lumpectomy with axillary dissection  A. Invasive 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  Grade 1  1.3 cm  Lymphovascular invasion is identified  Margins negative    H. Axillary, Level I and Level II axillary contents  Metastatic carcinoma involving 3/17 lymph nodes  1.8 cm   Extranodal extension is identified 1mm  Foci of carcinoma identified in extranodal lymphatic channels    Anatomic stage: Stage IIA  Prognostic stage: Stage IA      6/21/2022 -  Cancer Staged    Staging form: Breast, AJCC 8th Edition  - Pathologic stage from 6/21/2022: Stage IA (pT1c, pN1, cM0, G1, ER+, OK+, HER2-) - Signed by Alfredo Hays MD on 8/1/2022  Stage prefix: Initial diagnosis  Nuclear grade: G1  Multigene prognostic tests performed: None  Mitotic count score: Score 1  Histologic grading system: 3 grade system       8/29/2022 - 10/10/2022 Radiation    Treatments:  Course: C1  Plan ID Energy Fractions Dose per Fraction (cGy) Total Dose Delivered (cGy) Elapsed Days   BH L Breast 6X 25 / 25 200 5,000 35   BH L  [id number] 12E 5 / 5 200 1,000 6   BH L SClav 10X/6X 24 / 24 200 4,800 32    Treatment Dates:  8/29/2022 - 10/10/2022.      Malignant neoplasm of lower-inner quadrant of left breast in female, estrogen receptor positive (HCC)   5/20/2022 Initial Diagnosis    Malignant neoplasm of lower-inner quadrant of left breast in female, estrogen receptor positive (HCC)     5/20/2022 Biopsy    MRI guided biopsy  A. Left breast   11 o'clock   Invasive mammary carcinoma of no special type (ductal)  Grade 1  ER 95  DE 95  HER2 0    B. Right breast   Retroareolar  Benign fibrocystic changes without atypia (discrete 0.3 cm focus of sclerosing and tubular adenosis with usual ductal hyperplasia, columnar cell change and hyperplasia, apocrine metaplasia, cystic dilatation).   - Microcalcifications: Present associated with non-neoplastic breast tissue.   - Negative for malignancy, in-situ carcinoma and atypical hyperplasia.     Malignant pathology appears unifocal. Biopsy proven carcinoma measured 1 cm on MRI. Biopsy proved metastatic disease to left axillary lymph node.      6/9/2022 Genomic Testing    MammaPrint  Low risk  Luminal type A  MammaPrint index: +0.578     6/21/2022 Surgery    Left breast tiffany  directed lumpectomy with axillary dissection  A. Invasive 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  Grade 1  1.3 cm  Lymphovascular invasion is identified  Margins negative    H. Axillary, Level I and Level II axillary contents  Metastatic carcinoma involving 3/17 lymph nodes  1.8 cm   Extranodal extension is identified 1mm  Foci of carcinoma identified in extranodal lymphatic channels    Anatomic stage: Stage IIA  Prognostic stage: Stage IA      8/29/2022 - 10/10/2022 Radiation    Treatments:  Course: C1  Plan ID Energy Fractions Dose per Fraction (cGy) Total Dose Delivered (cGy) Elapsed Days   BH L Breast 6X 25 / 25 200 5,000 35   BH L [id number] 12E 5 / 5 200 1,000 6   BH L SClav 10X/6X 24 / 24 200 4,800 32     Treatment Dates:  8/29/2022 - 10/10/2022.            Interval History:   Follow-up with left breast cancer    Review of Systems:   Review of Systems   Constitutional:  Negative for chills and fever.   HENT:  Negative for ear pain and sore throat.    Eyes:  Negative for pain and visual disturbance.   Respiratory:  Negative for cough and shortness of breath.    Cardiovascular:  Negative for chest pain and palpitations.   Gastrointestinal:  Negative for abdominal pain and vomiting.   Genitourinary:  Negative for dysuria and hematuria.   Musculoskeletal:  Negative for arthralgias and back pain.   Skin:  Negative for color change and rash.   Neurological:  Negative for seizures and syncope.   All other systems reviewed and are negative.    Past Medical History     Patient Active Problem List   Diagnosis   • Back pain, chronic   • Chronic insomnia   • Lymphedema of right arm   • Hyperlipidemia, mixed   • Peripheral neuropathy   • Lymphedema   • Desmoid tumor   • 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 (HCC)   • Malignant neoplasm of lower-inner quadrant of left breast in female, estrogen receptor positive (HCC)   • HTN, goal below 140/90   • Use of letrozole (Femara)   • History of lumpectomy of left breast   • Humerus fracture   • Chronic back pain   • Closed nondisplaced spiral fracture of shaft of right humerus with routine healing, subsequent encounter     Past Medical History:   Diagnosis Date   • Abnormal mammogram 05/15/2013   • Anemia    • Cancer (HCC)     desmoitosis   • 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 (HCC) 04/19/2022   • Chronic pain disorder     worse right side of body   • Desmoid tumor     Last Assessed: 6/19/2017   • fibroidal cyst 1999?  Hysterectomy done   • History of transfusion     no reactions   • Hyperlipidemia    • Hypertension    • Osteoporosis    • PONV (postoperative nausea and vomiting)      Past Surgical History:   Procedure Laterality Date   •  BREAST LUMPECTOMY Left 6/21/2022    Procedure: ANITA  DIRECTED LUMPECTOMY;  Surgeon: Amanda Motley MD;  Location: [facility];  Service: Surgical Oncology   • FRACTURE SURGERY     • HYSTERECTOMY     • LYMPH NODE DISSECTION Left 6/21/2022    Procedure: AXILLARY DISSECTION LEVEL I AND LEVEL II LYMPH NODES;  Surgeon: Amanda Motley MD;  [facility];  Service: Surgical Oncology   • MRI BREAST BIOPSY LEFT (ALL INCLUSIVE) Left 5/20/2022   • MRI BREAST BIOPSY RIGHT (ALL INCLUSIVE) Right 5/20/2022   • OTHER SURGICAL HISTORY      Arm Incision: Dermoid tumor, right Arm    • PARTIAL HYSTERECTOMY     • OK COLONOSCOPY FLX DX W/COLLJ SPEC WHEN PFRMD N/A 8/15/2017    Procedure: COLONOSCOPY;  Surgeon: Alec England MD;  Location: [facility];  Service: Gastroenterology   • OK NDSC WRST SURG W/RLS TRANSVRS CARPL LIGM Left 8/10/2021    Procedure: RELEASE LEFT CARPAL TUNNEL ENDOSCOPIC;  Surgeon: Chris Camacho MD;  [facility];  Service: Orthopedics   • OK OPTX DSTL RADL I-ARTIC FX/EPIPHYSL SEP 3 FRAG Left 2/9/2021    Procedure: OPEN REDUCTION W/ INTERNAL FIXATION (ORIF) RADIUS / ULNA (WRIST) left;  Surgeon: Chris Camacho MD;  [facility];  Service: Orthopedics   • SKIN BIOPSY     • SKIN CANCER EXCISION      Melanoma Excision    • TONSILLECTOMY     • US BREAST NEEDLE LOC LEFT Left 6/16/2022   • US BREAST NEEDLE LOC RIGHT EACH ADDITIONAL Right 6/16/2022   • US GUIDED BREAST LYMPH NODE BIOPSY LEFT Left 4/14/2022     Family History   Problem Relation Age of Onset   • Diabetes Mother    • No Known Problems Father    • Cancer Sister    • Lung cancer Sister    • Pancreatic cancer Sister    • No Known Problems Maternal Grandmother    • No Known Problems Maternal Grandfather    • No Known Problems Paternal Grandmother    • No Known Problems Paternal Grandfather    • Breast cancer Neg Hx      Social History     Socioeconomic History   • Marital status: /Civil Union     Spouse  name: Not on file   • Number of children: Not on file   • Years of education: Not on file   • Highest education level: Not on file   Occupational History   • Occupation: unemployed    Tobacco Use   • Smoking status: Never   • Smokeless tobacco: Never   Vaping Use   • Vaping status: Never Used   Substance and Sexual Activity   • Alcohol use: Not Currently     Alcohol/week: 5.0 standard drinks of alcohol     Types: 5 Shots of liquor per week     Comment: Used mostly as a painkiller when needed before bedtime   • Drug use: Yes     Frequency: 28.0 times per week     Types: Marijuana     Comment: THC Medical marijuana   • Sexual activity: Not Currently     Partners: Male     Comment: Hysterectomy years ago   Other Topics Concern   • Not on file   Social History Narrative    Lives with spouse      Social Determinants of Health     Financial Resource Strain: Low Risk  (12/13/2023)    Overall Financial Resource Strain (CARDIA)    • Difficulty of Paying Living Expenses: Not hard at all   Food Insecurity: No Food Insecurity (11/13/2023)    Hunger Vital Sign    • Worried About Running Out of Food in the Last Year: Never true    • Ran Out of Food in the Last Year: Never true   Transportation Needs: No Transportation Needs (12/13/2023)    PRAPARE - Transportation    • Lack of Transportation (Medical): No    • Lack of Transportation (Non-Medical): No   Physical Activity: Insufficiently Active (5/2/2023)    Received from [facility]    Exercise Vital Sign    • Days of Exercise per Week: 2 days    • Minutes of Exercise per Session: 30 min   Stress: No Stress Concern Present (5/2/2023)    Received from [facility]    [employer] - Occupational Stress Questionnaire    • Feeling of Stress : Not at all   Social Connections: Unknown (5/2/2023)    Received from [facility]    Social Connection and Isolation Panel [NHANES]    • Frequency of Communication with  Friends and Family: Once a week    • Frequency of Social Gatherings with Friends and Family: Patient declined    • Attends Protestant Services: Never    • Active Member of Clubs or Organizations: No    • Attends Club or Organization Meetings: Patient declined    • Marital Status:    Intimate Partner Violence: Not At Risk (5/2/2023)    Received from [facility]    Humiliation, Afraid, Rape, and Kick questionnaire    • Fear of Current or Ex-Partner: No    • Emotionally Abused: No    • Physically Abused: No    • Sexually Abused: No   Housing Stability: Low Risk  (11/13/2023)    Housing Stability Vital Sign    • Unable to Pay for Housing in the Last Year: No    • Number of Places Lived in the Last Year: 1    • Unstable Housing in the Last Year: No       Current Outpatient Medications:   •  cholecalciferol (VITAMIN D3) 1,000 units tablet, Take 3,000 Units by mouth daily, Disp: , Rfl:   •  diphenhydrAMINE (BENADRYL) 50 MG tablet, Take 50 mg by mouth daily at bedtime as needed for itching, Disp: , Rfl:   •  dronabinol (MARINOL) 10 MG capsule, , Disp: , Rfl:   •  ezetimibe (ZETIA) 10 mg tablet, Take 10 mg by mouth daily, Disp: , Rfl:   •  gabapentin (Neurontin) 100 mg capsule, Take 1 capsule (100 mg total) by mouth 3 (three) times a day, Disp: 90 capsule, Rfl: 1  •  letrozole (FEMARA) 2.5 mg tablet, TAKE 1 TABLET BY MOUTH EVERY DAY, Disp: 90 tablet, Rfl: 3  •  multivitamin (THERAGRAN) TABS, Take 1 tablet by mouth daily, Disp: , Rfl:   •  Omega-3 Fatty Acids (FISH OIL PO), Take by mouth, Disp: , Rfl:   •  penicillin V potassium (VEETID) 500 mg tablet, Take 1 tablet (500 mg total) by mouth every 12 (twelve) hours, Disp: 180 tablet, Rfl: 1  •  Probiotic Product (PROBIOTIC-10 PO), Take by mouth, Disp: , Rfl:   Allergies   Allergen Reactions   • Chlorpromazine Anaphylaxis     PHENOTHIAZINE=ANAPHYLAXIS   • Codeine Anaphylaxis     Anaphylaxis  abd pain.   • Meperidine Anaphylaxis, Hives and Vomiting   •  Phenothiazines Anaphylaxis and Throat Swelling     Category: Allergy;    • Saccharin - Food Allergy Anaphylaxis   • Ampicillin-Sulbactam Sodium Hives   • Aspirin GI Intolerance and Abdominal Pain     Severe GERD   • Gluten Meal - Food Allergy GI Intolerance   • Ibuprofen Hives     H   • Levofloxacin Hives   • Chocolate - Food Allergy GI Intolerance   • Lactose - Food Allergy GI Intolerance   • Neomycin Other (See Comments), Edema and Hives     Category: Allergy;   swelling   • Citrus - Food Allergy Rash   • Latex Hives and Rash     Category: Allergy;        Physical Exam:   There were no vitals filed for this visit.  Physical Exam  Constitutional:       Appearance: Normal appearance.   HENT:      Head: Normocephalic and atraumatic.      Nose: Nose normal.      Mouth/Throat:      Mouth: Mucous membranes are moist.   Eyes:      Pupils: Pupils are equal, round, and reactive to light.   Cardiovascular:      Rate and Rhythm: Normal rate.      Pulses: Normal pulses.      Heart sounds: Normal heart sounds.   Pulmonary:      Effort: Pulmonary effort is normal.      Breath sounds: Normal breath sounds.   Chest:          Comments: Left breast and left axillary well-healed incisions.  Left axillary and supraclavicular examination no palpable adenopathy.  Left breast examination no palpable mass masses nipple discharge nipple retraction or skin changes other than surgical scar.    Right upper extremity lymphedema.  Right breast no palpable mass masses nipple discharge nipple retraction or skin changes.  Right axillary and supraclavicular examination no palpable adenopathy.    Patient was examined seated as well as supine position  Abdominal:      General: Bowel sounds are normal.      Palpations: Abdomen is soft.   Musculoskeletal:         General: Normal range of motion.      Cervical back: Normal range of motion and neck supple.      Right lower leg: No edema.      Left lower leg: No edema.      Comments: Right upper  extremity lymphedema   Skin:     General: Skin is warm.   Neurological:      General: No focal deficit present.      Mental Status: She is alert and oriented to person, place, and time.   Psychiatric:         Mood and Affect: Mood normal.         Behavior: Behavior normal.         Thought Content: Thought content normal.         Judgment: Judgment normal.       Results & Discussion:   Mammogram:  Narrative & Impression   DIAGNOSIS: Abnormal mammogram      TECHNIQUE:  Digital diagnostic mammography was performed. Computer Aided Detection (CAD) analyzed all applicable images.     COMPARISONS: Prior breast imaging dated: 04/25/2023, 06/21/2022, 06/16/2022, 06/16/2022, 06/16/2022, 06/16/2022, 05/20/2022, 05/20/2022, 04/27/2022, 04/14/2022, 04/14/2022, 04/04/2022, 03/29/2022, 03/25/2016, 05/15/2013, 05/14/2012, 05/16/2011, 05/14/2010, 01/08/2009, and 01/10/2008     RELEVANT HISTORY:   Family Breast Cancer History: History of breast cancer in Neg Hx.  Family Medical History: No known relevant family medical history.   Personal History: Hormone history includes estrogen replacement therapy. Surgical history includes lumpectomy and hysterectomy. Medical history includes breast cancer and ovarian cancer.     RISK ASSESSMENT:   Tyrer-Cuzick risk assessment reporting was suppressed due to the patient's history and/or demographic factors.     TISSUE DENSITY:   There are scattered areas of fibroglandular density.      INDICATION: Eileen Matias is a 63 y.o. female presenting for abnormal breast imaging.     FINDINGS:   LEFT  1) POST-SURGICAL FINDING [D]: There are post-surgical findings from a previous lumpectomy with radiation seen in the left breast.      BILATERAL  There are no suspicious masses, grouped microcalcifications or areas of unexplained architectural distortion. The skin and nipple areolar complex are unremarkable.  Biopsy marker clip is noted in the right breast.  Benign-appearing calcifications are noted in the  right breast.        IMPRESSION:   No evidence of malignancy.           ASSESSMENT/BI-RADS CATEGORY:  Left: 2 - Benign  Right: 2 - Benign  Overall: 2 - Benign     RECOMMENDATION:       - Diagnostic mammogram in 1 year for both breasts.     I did review mammogram films and agree with the report I did discussed in detail nature of breast cancer and need for annual diagnostic mammogram and self breast examination were discussed.  With regards to her letrozole I encouraged her to continue as it has benefits of risk reduction of local recurrence as well as contralateral breast cancer risk.  Unfortunately she is struggling with her right upper extremity lymphedema.   she understands and  agrees . All patient questions were answered.       Advance Care Planning/Advance Directives:  I Amanda Motley MD discussed the disease status with Eileen Matias  today 05/01/24  treatment plans and follow-up with the patient.

## 2024-05-02 ENCOUNTER — OFFICE VISIT (OUTPATIENT)
Dept: PHYSICAL THERAPY | Facility: CLINIC | Age: 64
End: 2024-05-02
Payer: MEDICARE

## 2024-05-02 DIAGNOSIS — S42.344D CLOSED NONDISPLACED SPIRAL FRACTURE OF SHAFT OF RIGHT HUMERUS WITH ROUTINE HEALING, SUBSEQUENT ENCOUNTER: ICD-10-CM

## 2024-05-02 DIAGNOSIS — C77.3 CARCINOMA OF LEFT BREAST METASTATIC TO AXILLARY LYMPH NODE (HCC): ICD-10-CM

## 2024-05-02 DIAGNOSIS — I89.0 LYMPHEDEMA OF RIGHT ARM: Primary | ICD-10-CM

## 2024-05-02 DIAGNOSIS — Z17.0 MALIGNANT NEOPLASM OF LOWER-INNER QUADRANT OF LEFT BREAST IN FEMALE, ESTROGEN RECEPTOR POSITIVE (HCC): ICD-10-CM

## 2024-05-02 DIAGNOSIS — C50.312 MALIGNANT NEOPLASM OF LOWER-INNER QUADRANT OF LEFT BREAST IN FEMALE, ESTROGEN RECEPTOR POSITIVE (HCC): ICD-10-CM

## 2024-05-02 DIAGNOSIS — C50.912 CARCINOMA OF LEFT BREAST METASTATIC TO AXILLARY LYMPH NODE (HCC): ICD-10-CM

## 2024-05-02 PROCEDURE — 97140 MANUAL THERAPY 1/> REGIONS: CPT

## 2024-05-02 PROCEDURE — 97110 THERAPEUTIC EXERCISES: CPT

## 2024-05-02 PROCEDURE — 97112 NEUROMUSCULAR REEDUCATION: CPT

## 2024-05-02 NOTE — PROGRESS NOTES
"Daily Note     Today's date: 2024  Patient name: Eilene Matias  : 1960  MRN: [id number]  Referring provider: Rick Lazo DO  Dx:   Encounter Diagnosis     ICD-10-CM    1. Lymphedema of right arm  I89.0       2. Closed nondisplaced spiral fracture of shaft of right humerus with routine healing, subsequent encounter  S42.344D       3. 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 Malignant neoplasm of lower-inner quadrant of left breast in female, estrogen receptor positive (HCC)  C50.312     Z17.0                      Subjective: pt reports her shoulder is feeling better compared to other day. Stated her doctor wants her to start doing lymphedema therapy on the L arm as well.       Objective: See treatment diary below      Assessment: Tolerated treatment well. Patient would benefit from continued PT. Progressed mobility and strengthening exercises w/ good tolerance. MLD to decrease swelling. Will start working on L arm lymphedema next visit.       Plan: Continue per plan of care.      EPOC: 3/11/24     Shoulder EPOC: 24   RA Lymph DATE: 4/10/24       Shoulder:  24       Manual       MLD [facility]   Manual Wrapping       PROM                       Exercise Diary    THEREX       Pt ed [facility]     Elbow/Wrist/hand AROM       Finger web       SL ER 20x x20  20x   Standing cane 3 way nv AROM flex/scap 2x10  AROM flex/scap 2x10   Counter walkouts 10\" x10 10\" x10  10\" x10   AAROM flex Is Ys 10\" x10 ea Is Ys 10\" x10 1#  Is Ys 10\" x10 ea   pulleys 2'/2' 2'/2'  2'/2'   Ball rollouts flex/scap 2 min ea 2 min ea  2 min ea   Supine ER cane 10\" x10 10\" x10  10\" x10   Tricep sidney       Bicep sidney       SL abd x20 1# x20  20x   Supine shoulder flex x20 1# x20  20x   IR stretch w/ strap  10\" x10     NEURO RE-ED       Scapular squeeze NP   Rows rtb x20   Post shoulder rolls       Cone reaches NP   3 cones x3   Supine punches NP " 2x10  2x10    ball roll up wall  2# x2                                                                        Modalities           CP

## 2024-05-06 ENCOUNTER — EVALUATION (OUTPATIENT)
Dept: PHYSICAL THERAPY | Facility: CLINIC | Age: 64
End: 2024-05-06
Payer: MEDICARE

## 2024-05-06 DIAGNOSIS — I89.0 LYMPHEDEMA OF RIGHT ARM: Primary | ICD-10-CM

## 2024-05-06 DIAGNOSIS — C77.3 CARCINOMA OF LEFT BREAST METASTATIC TO AXILLARY LYMPH NODE (HCC): ICD-10-CM

## 2024-05-06 DIAGNOSIS — C50.912 CARCINOMA OF LEFT BREAST METASTATIC TO AXILLARY LYMPH NODE (HCC): ICD-10-CM

## 2024-05-06 DIAGNOSIS — C50.312 MALIGNANT NEOPLASM OF LOWER-INNER QUADRANT OF LEFT BREAST IN FEMALE, ESTROGEN RECEPTOR POSITIVE (HCC): ICD-10-CM

## 2024-05-06 DIAGNOSIS — Z17.0 MALIGNANT NEOPLASM OF LOWER-INNER QUADRANT OF LEFT BREAST IN FEMALE, ESTROGEN RECEPTOR POSITIVE (HCC): ICD-10-CM

## 2024-05-06 DIAGNOSIS — S42.344D CLOSED NONDISPLACED SPIRAL FRACTURE OF SHAFT OF RIGHT HUMERUS WITH ROUTINE HEALING, SUBSEQUENT ENCOUNTER: ICD-10-CM

## 2024-05-06 PROCEDURE — 97140 MANUAL THERAPY 1/> REGIONS: CPT

## 2024-05-06 PROCEDURE — 97110 THERAPEUTIC EXERCISES: CPT

## 2024-05-06 NOTE — PROGRESS NOTES
PT Re-Evaluation     Collection of Subjective/Objective data performed by Irene Childs PTA. Assessment, POC, and Goal attainment performed by Jessica Garcia DPT.       Today's date: 2024  Patient name: Eileen Matias  : 1960   MRN: [id number]  Referring provider: Rick Lazo DO  Dx:   Encounter Diagnosis     ICD-10-CM    1. Lymphedema of right arm  I89.0       2. Closed nondisplaced spiral fracture of shaft of right humerus with routine healing, subsequent encounter  S42.344D       3. 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 Malignant neoplasm of lower-inner quadrant of left breast in female, estrogen receptor positive (HCC)  C50.312     Z17.0                 Start Time: 1015  Stop Time: 1135  Total time in clinic (min): 80 minutes    Assessment  Assessment details: Patient is a 62 y/o female who presents to therapy with R upper extremity lymphedema and a R humerus fracture. Today she is adding her left upper extremity to lymphedema treatment for lymphedema prevention. Today there is more swelling on there left upper extremity compared tot he right upper extremity. Pt educated on compression, elevation, and exercise for the upper extremities. She has been consistently wearing her compression garments. Patient will benefit from continued skilled PT intervention to address the aforementioned impairments, achieve goals, maximize function, and improve quality of life. Pt is in agreement with this plan.   Impairments: abnormal or restricted ROM, impaired physical strength and pain with function  Other impairment: lymphedema  Understanding of Dx/Px/POC: good   Prognosis: good    Goals  LYMPH GOALS:  ST.Pain decreased by 25% in 4 weeks.--PROGRESSING  2. Edema decreased by 2-3 cm in 4 weeks. --MET    LT. Decrease pain to 1-2/10 at worst by d/c.-PROGRESSING  2. Increase ROM to WFL for all deficient movements by d/c.-PROGRESSING  3.  Strength increased to 5 for all deficient muscle groups by d/c.-PROGRESSING  4. IADL performance increased to max function by d/c.-PROGRESSING  5. Recreational performance increased to max function by d/c.-PROGRESSING  6. RUE edema decreased to that of LUE by d/c. -PROGRESSING  7. Pt will return to work by d/c.-PROGRESSING    SHOULDER GOALS:   ST weeks  Pt will demonstrate good understanding and compliance with HEP--PROGRESSING  Pt will decrease pain to 5/10 at worst  --PROGRESSING  Pt will demonstrate improved postural awareness and ability to self-correct without reliance on external cues --PROGRESSING     LT weeks  Pt will improve FOTO score to > or = to 52 to indicate improved functional abilities  --PROGRESSING  Pt will decrease pain to 2-3/10 at worst   --PROGRESSING  Pt will increase shoulder strength to 4-/5 for improved tolerance/independence with ADLs --PROGRESSING  Pt will improve  strength to 25#  --PROGRESSING         Plan  Other planned modality interventions: other modalities PRN  Planned therapy interventions: abdominal trunk stabilization, ADL retraining, IADL retraining, flexibility, functional ROM exercises, graded exercise, home exercise program, manual therapy, joint mobilization, neuromuscular re-education, patient education, postural training, strengthening, therapeutic exercise, therapeutic activities, massage and work reintegration  Other planned therapy interventions: other interventions PRN  Frequency: 1-2x/week.  Duration in weeks: 8  Plan of Care beginning date: 2024  Plan of Care expiration date: 2024  Treatment plan discussed with: patient        Subjective Evaluation    History of Present Illness  Mechanism of injury: RE (24) adding LUE lymphedema:  Pt is s/p L breast lumpectomy that was performed about two years ago. Pt denies any discomfort or swelling in the LUE. Pt MD would like pt to start lymphedema therapy as a preventative measure. Pt does report some  "tenderness in the L axilla area due to scar tissue. Pt stated her  has been working on the scar tissue every night which seems to help some.       RE (4/24/24):  Pt has been see w/ c/o RUE lymphedema and s/p R humeral fracture and notes overall improvement. Pt notes 50% improvement in lymphedema since beginning physical therapy. Pt is complaint with using her compression pump 3x/day, 4x/day on bad days, and wearing the compression sleeve during the day/night time. Pt notes majority of swelling remains to be in the elbow region, which is preventing her from downsizing her current daytime sleeve she is wearing. In terms of the shoulder, she notes overall improvements in mobility, strength, and endurance. Gripping is still a challenge, but she is seeing OT for treatment for her had mobility/strength. Continued difficulties include lifting, mobility OH and behind to don/doff bra/washback.     RE (4/10/24):  Pt reports overall improvements with the tightness and swelling in the arm. Pt notes 50% improvement in the swelling since beginning lymphedema therapy. Pt is still in the smaller sized night sleeve that she wears all day and then switches to the larger sleeve at night.  She has been able to transition back into the compression pump, that she uses about 3x/day and 4x/day on a bad day. Pt notes improvement since being back into the pump.       RE (3/13/24):  Pt reports overall improvements with the tightness and swelling in the arm. Pt notes 10% improvement in the swelling since beginning lymphedema therapy. Pt has since transitioned from the larger night garment to the smaller night garment to wear during the day. Pt notes she has feeling back in her thumb and finger tips. Pt notes slight improvements in strength. Pt is still not using pump at home due to the continued swelling and tightness in posterior upper arm that causes more pain when using pump. Pt plans on restarting the pump when \"the lump\" in the " "back of the arm improves.       RE (2/14/24):  Pt has been seen for RUE lymphedema for 8 visits to date and notes 50% improvement so far. Pt reports she is still not able to use compression pump but has been wearing the [employer] garment, which seems to be helping. Pt is not longer needing to wear the clamshell for her fx unless if she is out running errands. Pt notes the swelling has improved since being able to take the brace off more often. Pt notes continued \"hardness\" in elbow and axilla region.     EVAL (1/15/24):  Pt reports to IE w/ hx of RUE lymphedema which started approx 20 years ago approx 5 years following CA dx of desmoitosis.     Pt had done skilled PT tx for this condition in the past which was helpful control the issue. However, pt sustained humeral fx on 11/12/23 as a result of fall on RUE while trying to protect mother in law from a fall out of w/c. She reports that healing has been complicated by severe osteoporosis and lymphedema. Pt still required to wear brace at all times- not yet allowed any ROM in shoulder.     In addition to the above, pt w/ recent hx of L breast CA w/ L lumpectomy and lymph node removal (pt reports 17 lymph nodes) over on 6/21/22.        Patient Goals  Patient goals for therapy: decreased pain and decreased edema    Pain  Pain scale: Shoulder -2; Lymph- 4.  Pain scale at lowest: Shoulder -2; Lymph- 4.  Pain scale at highest: Shoulder- 5; Lymph 7.  Location: R shoulder  Alleviating factors: lymphedema massage.  Exacerbated by: increased edema, movement.    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)  Hand dominance: right          Objective     General Comments:      Shoulder Comments   R AROM: flex-125 abd-120 IR-R Sacrum ER- unable      R PROM: flex- 132 (pain) abd- 100 (pain) IR-76 ER-21     R shoulder strength: flex: 4-/5; abd: 4-/5; ER: 4-/5; IR: 3/5      Ankle/Foot Comments " "  Edema in R vs L w/ circumferential girth measurements as below (L/R):   Axilla: 38/36  20 cm proximal to elbow: 33/29  10 cm proximal to elbow: 29/26.5 cm  Elbow Joint: 26.5 cm/26.5 cm  20 cm proximal to wrist: 27.5 cm/28 cm  10 cm proximal to wrist: 19.5 cm/22 cm  Wrist:  16.5 cm/16.8 cm  Palm: 19 cm/18 cm  MCP Joints (digits 2-5): 18 cm/16.5 cm  PIP Joints (digits 2-5): 16 cm/15 cm        EPOC: 3/11/24     Shoulder EPOC: 4/24/24 5/6 4/30 5/2   RA Lymph DATE: 4/10/24       Shoulder:  4/24/24       Manual       MLD [facility]   Manual Wrapping       PROM                       Exercise Diary 4/30 5/2 5/6    THEREX       Pt ed [facility]; measurements on LUE    Elbow/Wrist/hand AROM       Finger web       SL ER 20x x20     Standing cane 3 way nv AROM flex/scap 2x10 AROM flex/scap 2x10    Counter walkouts 10\" x10 10\" x10 10\" x10    AAROM flex Is Ys 10\" x10 ea Is Ys 10\" x10 1# Is Ys 10\" x10 1#    pulleys 2'/2' 2'/2' 2'/2'    Ball rollouts flex/scap 2 min ea 2 min ea D/c    Supine ER cane 10\" x10 10\" x10 10\" x10    Shoulder isometrics   5\" x10    Bicep sidney       SL abd x20 1# x20 1# x20    Supine shoulder flex x20 1# x20 1# x20    IR stretch w/ strap  10\" x10 10\" x10    NEURO RE-ED       Scapular squeeze NP      Post shoulder rolls       [facility] reaches NP      Supine punches NP 2x10 x20     ball roll up wall  2# x2 Non weighted x10                                                                       Modalities           CP                                             "

## 2024-05-07 ENCOUNTER — OFFICE VISIT (OUTPATIENT)
Dept: OCCUPATIONAL THERAPY | Facility: CLINIC | Age: 64
End: 2024-05-07
Payer: MEDICARE

## 2024-05-07 DIAGNOSIS — R20.0 NUMBNESS AND TINGLING OF LEFT UPPER EXTREMITY: Primary | ICD-10-CM

## 2024-05-07 DIAGNOSIS — Z98.890 HISTORY OF LUMPECTOMY OF LEFT BREAST: ICD-10-CM

## 2024-05-07 DIAGNOSIS — C50.312 MALIGNANT NEOPLASM OF LOWER-INNER QUADRANT OF LEFT BREAST IN FEMALE, ESTROGEN RECEPTOR POSITIVE (HCC): ICD-10-CM

## 2024-05-07 DIAGNOSIS — C77.3 CARCINOMA OF LEFT BREAST METASTATIC TO AXILLARY LYMPH NODE (HCC): Primary | ICD-10-CM

## 2024-05-07 DIAGNOSIS — I89.0 LYMPHEDEMA: ICD-10-CM

## 2024-05-07 DIAGNOSIS — C50.912 CARCINOMA OF LEFT BREAST METASTATIC TO AXILLARY LYMPH NODE (HCC): Primary | ICD-10-CM

## 2024-05-07 DIAGNOSIS — R20.2 NUMBNESS AND TINGLING OF LEFT UPPER EXTREMITY: Primary | ICD-10-CM

## 2024-05-07 DIAGNOSIS — Z17.0 MALIGNANT NEOPLASM OF LOWER-INNER QUADRANT OF LEFT BREAST IN FEMALE, ESTROGEN RECEPTOR POSITIVE (HCC): ICD-10-CM

## 2024-05-07 PROCEDURE — 97110 THERAPEUTIC EXERCISES: CPT | Performed by: OCCUPATIONAL THERAPIST

## 2024-05-07 PROCEDURE — L3906 WHO W/O JOINTS CF: HCPCS | Performed by: OCCUPATIONAL THERAPIST

## 2024-05-07 NOTE — PROGRESS NOTES
"Daily Note     Today's date: 2024  Patient name: Eileen Matias  : 1960  MRN: [id number]  Referring provider: Rogelio Black MD  Dx:   Encounter Diagnosis     ICD-10-CM    1. Numbness and tingling of left upper extremity  R20.0     R20.2                        Subjective:  \"I haven't done much exercise.  I have had a lot of shoulder and neck pain\"  \"My hand is moving more now\"  digital E/F      Objective: See treatment diary below    Assessment: Tolerated treatment well. Patient would benefit from continued OT;  In neutral, supported on table, active wrist extension is present moving from full flexion to neutral  Active thumb and  digital extension is present.   MP' and  IP motion improved at end of session.    Digital flexion PROM>AROM.       Plan: Continue per plan of care.  Progress treatment as tolerated.    Record ROM changes NV.   FOTO NV.  Fabricated wrist orthotic allowing full digital motion for improved ease of function.                 Precautions Lymphedema; hx desmoid tumors and breast cancer; healing humeral fracture.      POC expires Unit limit Auth  expiration date PT/OT + Visit Limit?   24 NA NA BOMN         Dx R Hum Fx     Dr Jack MILLER @ [facility]         Date 24   Visit 1 2 3 4 5 6   Manuals   10 8' 8'                               Jt mobs digits, FA 5'' Palm arch, Mps, FA wrist G 1-2 MP's wrist palm arch MP all  Wrist   Arch MP all wrist , arch FA    Ortho fit/train    Supination strap ed with ; MP fdynamic flexion straps 2-5  18      Neuro Re-Ed          RNG         MNG         CHIKI                  orthotc      Wrist orthotic for function                      Ther Ex    25  10'   30   23   23'   HEP  Cont PROM FA, wrist, digits Cont PROM FA wrist digits Cont PROM strap, MP flexion PROM MP flexion to focus, Abd digits. Wedges PROM Sup f/b AROM hold; wrist E/F on table; cont digits.  Pt. Ed for splint wear   PROM 1:1 5' FA wrist, " digits FA wrist digits FA S/P  Wrist E/F digital E/F FA S/P  Wrist E/F  Digital E/F  Th abd.  FA S/P  Wrist E/F  Digital E/F   Blocking  DIP x10 each  PIP x10 each  MP x10 each IP flexion blocking x10 each IP flexion all  DIP flexion all   FDP, FDS, hook 1:1 each digit    TGE  Hook 2x10  Full 2x10  W/ wedges Hook x10  Full x10 Hook x10  Full x10 Hook x10  MP only x10    Digital P/AROM  2x10  P/H fists  10x10 Dig ABD  P/A   P/H    Active wrist E 5' II x20  II, III x20  On table, w/o gravity x10     A/AAROM wrist, FA                           Ther Activity    15     10'    Gripping  Gross grasp cylinder   1 RB blocks  Y Dig flex  Lg lid     Prehension  Block t/f                MHP  Pre tx ace flexion wrap Pre tx with flexion wrap Pre tx with ace flexion Pre tx with ace flexion

## 2024-05-08 ENCOUNTER — TELEPHONE (OUTPATIENT)
Dept: OBGYN CLINIC | Facility: OTHER | Age: 64
End: 2024-05-08

## 2024-05-08 ENCOUNTER — OFFICE VISIT (OUTPATIENT)
Dept: PHYSICAL THERAPY | Facility: CLINIC | Age: 64
End: 2024-05-08
Payer: MEDICARE

## 2024-05-08 DIAGNOSIS — I89.0 LYMPHEDEMA OF RIGHT ARM: Primary | ICD-10-CM

## 2024-05-08 DIAGNOSIS — Z17.0 MALIGNANT NEOPLASM OF LOWER-INNER QUADRANT OF LEFT BREAST IN FEMALE, ESTROGEN RECEPTOR POSITIVE (HCC): ICD-10-CM

## 2024-05-08 DIAGNOSIS — S42.344D CLOSED NONDISPLACED SPIRAL FRACTURE OF SHAFT OF RIGHT HUMERUS WITH ROUTINE HEALING, SUBSEQUENT ENCOUNTER: ICD-10-CM

## 2024-05-08 DIAGNOSIS — C50.912 CARCINOMA OF LEFT BREAST METASTATIC TO AXILLARY LYMPH NODE (HCC): ICD-10-CM

## 2024-05-08 DIAGNOSIS — C77.3 CARCINOMA OF LEFT BREAST METASTATIC TO AXILLARY LYMPH NODE (HCC): ICD-10-CM

## 2024-05-08 DIAGNOSIS — C50.312 MALIGNANT NEOPLASM OF LOWER-INNER QUADRANT OF LEFT BREAST IN FEMALE, ESTROGEN RECEPTOR POSITIVE (HCC): ICD-10-CM

## 2024-05-08 LAB
DME PARACHUTE DELIVERY DATE REQUESTED: NORMAL
DME PARACHUTE ITEM DESCRIPTION: NORMAL
DME PARACHUTE ORDER STATUS: NORMAL
DME PARACHUTE SUPPLIER NAME: NORMAL
DME PARACHUTE SUPPLIER PHONE: NORMAL

## 2024-05-08 PROCEDURE — 97110 THERAPEUTIC EXERCISES: CPT

## 2024-05-08 PROCEDURE — 97112 NEUROMUSCULAR REEDUCATION: CPT

## 2024-05-08 PROCEDURE — 97140 MANUAL THERAPY 1/> REGIONS: CPT

## 2024-05-08 NOTE — PROGRESS NOTES
"Daily Note     Today's date: 2024  Patient name: Eileen Matias  : 1960  MRN: [id number]  Referring provider: Rick Lazo DO  Dx:   Encounter Diagnosis     ICD-10-CM    1. Lymphedema of right arm  I89.0       2. Closed nondisplaced spiral fracture of shaft of right humerus with routine healing, subsequent encounter  S42.344D       3. 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 Malignant neoplasm of lower-inner quadrant of left breast in female, estrogen receptor positive (HCC)  C50.312     Z17.0                      Subjective: pt reports she has increased soreness and swelling in UT region post previous session. Stated her elbow is feeling less achy.       Objective: See treatment diary below      Assessment: Tolerated treatment well. Patient would benefit from continued PT. MLD to improve lymphatic flow, decrease swelling in the elbow noted. Unable to tolerate resistance w/ s/l abduction due to increased soreness in shoulder.       Plan: Continue per plan of care.      EPOC: 3/11/24     Shoulder EPOC: 24   RA Lymph DATE: 4/10/24       Shoulder:  24       Manual       MLD [facility]   Manual Wrapping       PROM                       Exercise Diary    THEREX       Pt ed [facility]; measurements on SHAKA    Elbow/Wrist/hand AROM       Finger web       SL ER 20x x20     Standing cane 3 way nv AROM flex/scap 2x10 AROM flex/scap 2x10    Counter walkouts 10\" x10 10\" x10 10\" x10 10\" x10   AAROM flex Is Ys 10\" x10 ea Is Ys 10\" x10 1# Is Ys 10\" x10 1# Is Ys 10\" x10 1#   pulleys 2'/2' 2'/2' 2'/2' 2'/2'   Ball rollouts flex/scap 2 min ea 2 min ea D/c    Supine ER cane 10\" x10 10\" x10 10\" x10 10\" x10   Shoulder isometrics   5\" x10 NT   Bicep sidney       SL abd x20 1# x20 1# x20 0# x20   Supine shoulder flex x20 1# x20 1# x20 1# x20   IR stretch w/ strap  10\" x10 10\" x10 10\" x10   NEURO RE-ED       Scapular squeeze NP    "   Post shoulder rolls       [facility] reaches NP      Supine punches NP 2x10 x20 0# x20    ball roll up wall  2# x2 Non weighted x10     rows/pulldowns    Gtb 20x                                                              Modalities           CP

## 2024-05-09 ENCOUNTER — OFFICE VISIT (OUTPATIENT)
Dept: OBGYN CLINIC | Facility: MEDICAL CENTER | Age: 64
End: 2024-05-09
Payer: MEDICARE

## 2024-05-09 ENCOUNTER — OFFICE VISIT (OUTPATIENT)
Dept: OCCUPATIONAL THERAPY | Facility: CLINIC | Age: 64
End: 2024-05-09
Payer: MEDICARE

## 2024-05-09 ENCOUNTER — APPOINTMENT (OUTPATIENT)
Dept: RADIOLOGY | Facility: MEDICAL CENTER | Age: 64
End: 2024-05-09
Payer: MEDICARE

## 2024-05-09 VITALS
BODY MASS INDEX: 23.34 KG/M2 | WEIGHT: 154 LBS | HEIGHT: 68 IN | HEART RATE: 72 BPM | SYSTOLIC BLOOD PRESSURE: 147 MMHG | DIASTOLIC BLOOD PRESSURE: 79 MMHG

## 2024-05-09 DIAGNOSIS — S42.344D CLOSED NONDISPLACED SPIRAL FRACTURE OF SHAFT OF RIGHT HUMERUS WITH ROUTINE HEALING, SUBSEQUENT ENCOUNTER: ICD-10-CM

## 2024-05-09 DIAGNOSIS — R20.2 NUMBNESS AND TINGLING OF LEFT UPPER EXTREMITY: Primary | ICD-10-CM

## 2024-05-09 DIAGNOSIS — S42.344D CLOSED NONDISPLACED SPIRAL FRACTURE OF SHAFT OF RIGHT HUMERUS WITH ROUTINE HEALING, SUBSEQUENT ENCOUNTER: Primary | ICD-10-CM

## 2024-05-09 DIAGNOSIS — R20.0 NUMBNESS AND TINGLING OF LEFT UPPER EXTREMITY: Primary | ICD-10-CM

## 2024-05-09 PROCEDURE — 97140 MANUAL THERAPY 1/> REGIONS: CPT | Performed by: OCCUPATIONAL THERAPIST

## 2024-05-09 PROCEDURE — 99214 OFFICE O/P EST MOD 30 MIN: CPT | Performed by: STUDENT IN AN ORGANIZED HEALTH CARE EDUCATION/TRAINING PROGRAM

## 2024-05-09 PROCEDURE — 97530 THERAPEUTIC ACTIVITIES: CPT | Performed by: OCCUPATIONAL THERAPIST

## 2024-05-09 PROCEDURE — 73060 X-RAY EXAM OF HUMERUS: CPT

## 2024-05-09 NOTE — PROGRESS NOTES
"Daily Note     Today's date: 2024  Patient name: Eileen Matias  : 1960  MRN: [id number]  Referring provider: Rogelio Black MD  Dx:   Encounter Diagnosis     ICD-10-CM    1. Numbness and tingling of left upper extremity  R20.0     R20.2                        Subjective:  \"I can move my hand better in the new splint\"  \"My hand is moving more now\"  digital E/F      Objective: See treatment diary below    Assessment: Tolerated treatment well. Patient would benefit from continued OT;  In neutral, supported on table, active wrist extension is present moving from full flexion to neutral  Active thumb and  digital extension is present.   MP and  IP motion improved at end of session.    Digital flexion PROM>AROM.       Plan: Continue per plan of care.  Progress treatment as tolerated.    Record ROM changes NV.   FOTO NV.  Continue with  wrist orthotic allowing full digital motion for improved ease of function.                 Precautions Lymphedema; hx desmoid tumors and breast cancer; healing humeral fracture.      POC expires Unit limit Auth  expiration date PT/OT + Visit Limit?   24 NA NA BOMN         Dx R Hum Fx     Dr Jack MILLER @ [facility]         Date    Visit 7  3 4 5 6   Manuals 8'  10 8' 8'                               Jt mobs digits, FA MP all, wrist, palmar arch  MP's wrist palm arch MP all  Wrist   Arch MP all wrist , arch FA    Ortho fit/train    Supination strap ed with ; MP fdynamic flexion straps 2-5  18      Neuro Re-Ed          RNG         MNG         CHIKI                  orthotc      Wrist orthotic for function                      Ther Ex   15'   10'   30   23   23'   HEP Add P/H supinate to HEP. Encourage prehension.   Cont PROM FA wrist digits Cont PROM strap, MP flexion PROM MP flexion to focus, Abd digits. Wedges PROM Sup f/b AROM hold; wrist E/F on table; cont digits.  Pt. Ed for splint wear   PROM 1:1 Supinate LLPS  F/b P/H " Sup;  Digital PROM f/b P/H fisting  FA wrist digits FA S/P  Wrist E/F digital E/F FA S/P  Wrist E/F  Digital E/F  Th abd.  FA S/P  Wrist E/F  Digital E/F   Blocking   IP flexion blocking x10 each IP flexion all  DIP flexion all   FDP, FDS, hook 1:1 each digit    TGE Hook to full 10x10  Hook x10  Full x10 Hook x10  Full x10 Hook x10  MP only x10    Digital P/AROM    P/H fists  10x10 Dig ABD  P/A   P/H    Active wrist E    On table, w/o gravity x10     A/AAROM wrist, FA                           Ther Activity 15'      10'    Gripping Y dig flex x10 each    1 RB blocks  Y Dig flex  Lg lid     Pinching Y C pins   Web [place]        Prehension Velcro bd  1/2 off                  MHP Pre tx with ace wrap flexion  Pre tx with flexion wrap Pre tx with ace flexion Pre tx with ace flexion

## 2024-05-09 NOTE — PROGRESS NOTES
Orthopedic Surgery Nonoperative Management Note  Eileen Matias (63 y.o. female)  : 1960 Encounter Date: 2024  Dr. Rick Lazo DO, Orthopedic Surgeon  Orthopedic Oncology & Sarcoma Surgery   Assessment/Plan:  63 y.o. female 6 month follow up status post right midshaft humerus fracture     Problem List Items Addressed This Visit       Closed nondisplaced spiral fracture of shaft of right humerus with routine healing, subsequent encounter - Primary    Relevant Orders    XR humerus right     Right Humerus Fracture  The patient's x-rays were reviewed today.   The patient may finish her final doses of gabapentin and then be done with this medication.   Follow-up as needed.     2.   Significant Right Arm Lymphedema   She has been progressing well  Continue with physical therapy for ROM and lymphedema of right arm  Will be seeing a surgeon in [place] for possible lymph node transplant    3.   Right hand nerve dysfunction  Continue care with Dr. Black  Improvement in function       Return if symptoms worsen or fail to improve.    _____________________________________________________  CHIEF COMPLAINT:  Chief Complaint   Patient presents with    Right Shoulder - Follow-up     SUBJECTIVE:  Eileen Matias is a 63 y.o. year old female who presents 6 months s/p right humerus fracture.   DOI: 23  Today (24):   Patient reports pain is rated 4-5/10. Pain is managed with dronabinol, gabapentin THC cream. The patient has begun lymphedema therapy with  improvement in symptoms. She continues to wear her lymphedema sleeve. She continues with care to right wrist with Dr. Black. She is also scheduled to see a doctor in [place] for consultation in regard to possible lymph node transplant.     Prior HPI:  She was seen at [facility] for right arm pain at the elbow with pronation and supination. X-rays were obtained at time of visit, no images are available for review, radiologist report is available. Patient  "has since transitioned to [facility]. She was seen in the ED on 11/12/23 for right arm pain after a fall. ED note states that a closed reduction was performed in the ED at time of visit. Medication refill note from  states that she is unable to use her lymphedema machine and is experiencing swelling in her right arm. On presentation today, patient states that she was pushing her mother in law in a wheelchair when the wheelchair started to tip forward and she tried to catch it to prevent her MIL from falling out of the wheelchair. She believes that she \"lost control\" of her right arm, and then fell onto her right shoulder trying to protect the right elbow. She reports that she has been unable to use her lymphedema bag since the injury. She states that she has had a physical therapist come into her home 2/wk since the injury who performs lymphedema massage on her right hand and forearm to increase blood flow.    Patient states that she is still having moderate amount of pain, but feels that it relates more to the lymphedema than the humeral fracture. She has seen a new provider for lymphedema beginning last week and feels that the swelling has decreased moderately.     At 3.5 month follow up, patient reports that she has increase in range of motion at the shoulder. She reports that her pain has decreased significantly. She has not been using the Kruse brace while at home and has been using a long arm compression sleeve that she had previously used with for lymphedema. She reports that she is only taking the Gabapentin before bed which allows her to sleep throughout the night.     On presentation today (3/28/24), patient reports that she is doing well overall. She has increase ROM in her right shoulder with another decrease in size of her lymphedema sleeve. She is still completing lymphedema therapy as well as physical therapy. She states that she feels tension as a muscle strain with forward " [Time Spent: ___ minutes] : I have spent [unfilled] minutes of time on the encounter. flexion and abduction. She has decreased her Gabapentin to 1-2 of the 100 mg pills in the evening before bed. She wishes to try the lymphedema again machine to control swelling.    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 She had a latissimus rotational skin pedicle flap  performed after having multiple recurrences. Within the same year she had a reoccurrence of a desmoid tumor and another resection of her back. Patient states that her last tumor related operation was in 2010. Patient was cancer free for 8 years, until last year when she developed breast cancer on her left side. She states that she had radiation, 16 lymph nodes were removed and none of the lymph nodes were positive. She states that she had no radiation to the lymph nodes. She states that she developed lymphedema within 1 year after the diagnosis of the desmoid fibromatosis. She was previously using the lymphedema treatment as needed, which progressed to using 3x/daily every day.     PAST MEDICAL HISTORY:  Past Medical History:   Diagnosis Date    Abnormal mammogram 05/15/2013    Anemia     Cancer (HCC)     desmoitosis    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 (HCC) 04/19/2022    Chronic pain disorder     worse right side of body    Desmoid tumor     Last Assessed: 6/19/2017    fibroidal cyst 1999?  Hysterectomy done    History of transfusion     no reactions    Hyperlipidemia     Hypertension     Osteoporosis     PONV (postoperative nausea and vomiting)        PAST SURGICAL HISTORY:  Past Surgical History:   Procedure Laterality Date    BREAST LUMPECTOMY Left 6/21/2022    Procedure: ANITA  DIRECTED LUMPECTOMY;  Surgeon: Amanda Motley MD;  Location: [facility] OR;  Service: Surgical Oncology    FRACTURE SURGERY      HYSTERECTOMY      LYMPH NODE DISSECTION Left 6/21/2022    Procedure: AXILLARY DISSECTION LEVEL I AND LEVEL II  LYMPH NODES;  Surgeon: Amanda Motley MD;  Location: [facility];  Service: Surgical Oncology    MRI BREAST BIOPSY LEFT (ALL INCLUSIVE) Left 5/20/2022    MRI BREAST BIOPSY RIGHT (ALL INCLUSIVE) Right 5/20/2022    OTHER SURGICAL HISTORY      Arm Incision: Dermoid tumor, right Arm     PARTIAL HYSTERECTOMY      WY COLONOSCOPY FLX DX W/COLLJ SPEC WHEN PFRMD N/A 8/15/2017    Procedure: COLONOSCOPY;  Surgeon: Alec England MD;  Location: [facility];  Service: Gastroenterology    WY NDSC WRST SURG W/RLS TRANSVRS CARPL LIGM Left 8/10/2021    Procedure: RELEASE LEFT CARPAL TUNNEL ENDOSCOPIC;  Surgeon: Chris Camacho MD;  Location: [facility];  Service: Orthopedics    WY OPTX DSTL RADL I-ARTIC FX/EPIPHYSL SEP 3 FRAG Left 2/9/2021    Procedure: OPEN REDUCTION W/ INTERNAL FIXATION (ORIF) RADIUS / ULNA (WRIST) left;  Surgeon: Chris Camacho MD;  Location: [facility];  Service: Orthopedics    SKIN BIOPSY      SKIN CANCER EXCISION      Melanoma Excision     TONSILLECTOMY      US BREAST NEEDLE LOC LEFT Left 6/16/2022    US BREAST NEEDLE LOC RIGHT EACH ADDITIONAL Right 6/16/2022    US GUIDED BREAST LYMPH NODE BIOPSY LEFT Left 4/14/2022       FAMILY HISTORY:  Family History   Problem Relation Age of Onset    Diabetes Mother     No Known Problems Father     Cancer Sister     Lung cancer Sister     Pancreatic cancer Sister     No Known Problems Maternal Grandmother     No Known Problems Maternal Grandfather     No Known Problems Paternal Grandmother     No Known Problems Paternal Grandfather     Breast cancer Neg Hx        SOCIAL HISTORY:  Social History     Tobacco Use    Smoking status: Never    Smokeless tobacco: Never   Vaping Use    Vaping status: Never Used   Substance Use Topics    Alcohol use: Not Currently     Alcohol/week: 5.0 standard drinks of alcohol     Types: 5 Shots of liquor per week     Comment: Used mostly as a painkiller when needed before bedtime    Drug use: Yes     Frequency: 28.0 times per week      Types: Marijuana     Comment: THC Medical marijuana       MEDICATIONS:    Current Outpatient Medications:     cholecalciferol (VITAMIN D3) 1,000 units tablet, Take 3,000 Units by mouth daily, Disp: , Rfl:     diphenhydrAMINE (BENADRYL) 50 MG tablet, Take 50 mg by mouth daily at bedtime as needed for itching, Disp: , Rfl:     dronabinol (MARINOL) 10 MG capsule, , Disp: , Rfl:     ezetimibe (ZETIA) 10 mg tablet, Take 10 mg by mouth daily, Disp: , Rfl:     gabapentin (Neurontin) 100 mg capsule, Take 1 capsule (100 mg total) by mouth 3 (three) times a day, Disp: 90 capsule, Rfl: 1    letrozole (FEMARA) 2.5 mg tablet, TAKE 1 TABLET BY MOUTH EVERY DAY, Disp: 90 tablet, Rfl: 3    multivitamin (THERAGRAN) TABS, Take 1 tablet by mouth daily, Disp: , Rfl:     Omega-3 Fatty Acids (FISH OIL PO), Take by mouth, Disp: , Rfl:     penicillin V potassium (VEETID) 500 mg tablet, Take 1 tablet (500 mg total) by mouth every 12 (twelve) hours, Disp: 180 tablet, Rfl: 1    Probiotic Product (PROBIOTIC-10 PO), Take by mouth, Disp: , Rfl:     ALLERGIES:  Allergies   Allergen Reactions    Chlorpromazine Anaphylaxis     PHENOTHIAZINE=ANAPHYLAXIS    Codeine Anaphylaxis     Anaphylaxis  abd pain.    Meperidine Anaphylaxis, Hives and Vomiting    Phenothiazines Anaphylaxis and Throat Swelling     Category: Allergy;     Saccharin - Food Allergy Anaphylaxis    Ampicillin-Sulbactam Sodium Hives    Aspirin GI Intolerance and Abdominal Pain     Severe GERD    Gluten Meal - Food Allergy GI Intolerance    Ibuprofen Hives     H    Levofloxacin Hives    Chocolate - Food Allergy GI Intolerance    Lactose - Food Allergy GI Intolerance    Neomycin Other (See Comments), Edema and Hives     Category: Allergy;   swelling    Putnam - Food Allergy Rash    Latex Hives and Rash     Category: Allergy;        Review of systems:   Constitutional: Negative for fatigue, fever or loss of apetite.   HENT: Negative.    Respiratory: Negative for shortness of breath,  "dyspnea.    Cardiovascular: Negative for chest pain/tightness.   Gastrointestinal: Negative for abdominal pain, N/V.   Endocrine: Negative for cold/heat intolerance, unexplained weight loss/gain.   Genitourinary: Negative for flank pain, dysuria, hematuria.   Musculoskeletal: As noted in HPI   Skin: Negative for rash.    Neurological: Intact  Psychiatric/Behavioral: Negative for agitation.  _____________________________________________________  PHYSICAL EXAMINATION:    Blood pressure 147/79, pulse 72, height 5' 8\" (1.727 m), weight 69.9 kg (154 lb).    General: well developed and well nourished, alert, oriented times 3, and appears comfortable  Psychiatric: Normal  HEENT: Benign  Cardiovascular: Regular    Pulmonary: No wheezing or stridor  Abdomen: Soft, Nontender  Skin: No masses, erythema, lacerations, fluctation, ulcerations.  Lymphedema present in right arm  Neurovascular: Intact    MUSCULOSKELETAL EXAMINATION:  Right Upper Extremity:   No anatomical deformity   Surgical incision is well healed   Mild edema through the edema  There is no tenderness present over the midshaft humerus fracture.   Active range of motion   120 degrees forward flexion of shoulder  5 degrees of elbow extension  80 degrees of elbow flexion  Passive range of motion  100 degrees of elbow flexion   There is 5-/5 strength with supraspinatus testing.  There is 3/5 strength with infraspinatus testing.  There is 5/5 strength with subscapularis testing.  Fingers are pink   Compartments are soft  Brisk capillary refill  Sensation is decreased to light touch distally   The patient is neurovascularly intact distally in the extremity.   2+ distal radial pulse     _____________________________________________________  STUDIES REVIEWED:  Study: XR right shoulder  Date: 5/9/24  Report: No radiologist report was available at this time.   I have personally reviewed imaging and my impression is as follows:  Maintained alignment of midshaft humerus " fracture. Interval healing with bridged callus. Decreased bone density likely secondary to history of radiation. Surgical clips present. No acute fracture.    Study: XR right shoulder  Date: 3/28/24  Report: No radiologist report was available at this time.   I have personally reviewed imaging and my impression is as follows: Well aligned fracture fragments without evidence of any displacement.  No abundant callus but that may be due to her history of radiation and chronic issues there.  Still well aligned without issue    Study: XR right humerus  Date: 2/15/24  No radiologist report available. I have personally reviewed all imaging. My impression is as follows:  Well-maintained alignment of midshaft humerus fracture with increased evidence bony callous and healing.    Study: XR right humerus  Date: 1/18/2024  No radiologist report available. I have personally reviewed all imaging.  Well-maintained alignment of midshaft humerus fracture with increased evidence bony callous and healing.    Study: XR right humerus  Date: 1/4/23  Impression: No radiologist report available.  My impression is as follows: well-maintained alignment of midshaft humerus fracture with some increased evidence of callous and healing, with some bridging. Better alignment     Study: XR right himerus  Date: 12/14/23  Impression: No radiologist report available.  My impression is as follows: well-maintained alignment of midshaft humerus fracture with some minimal evidence of callous and healing.     Study: NM WBBS  Date: 12/8/23  Impression: I have read and agree with the radiologist report.  My impression is as follows:  1. Linear radiotracer uptake at the right humeral shaft corresponding to known fracture site. Difficult to exclude a pathologic fracture given the fracture activity here on bone scan.  2. Otherwise, no findings suspicious for osseous metastasis.      Study: XR of right humerus  Date: 11/30/23  Impression: I have read and agree  with the radiologist report.  My impression is as follows:  Closed nondisplaced spiral fracture of the right humerus.  Bone looks chronically osteopenic which could be related to radiation therapy.  No current evidence of pathologic fracture at this time  Status post casting of mid diaphyseal fracture of the humerus with mild angulation as seen on prior exam. There is no evidence of significant interval healing.     Study: XR of right humerus  Date: 11/12/23  Impression: I have read and agree with the radiologist report.  My impression is as follows:  FINDINGS:  Evaluation of fine bony detail of the right humerus is limited by overlying cast material  There is a mildly displaced, spiral fracture of the midshaft of the right humerus. There is a somewhat mottled appearance of the right humeral diaphysis, which may be related to sequela of prior radiation therapy.  There is a small exophytic density adjacent to the midshaft of the humerus which was present in the soft tissues on the prior CT from 2020.  Multiple surgical clips are seen in the right axillary region and throughout the proximal right upper extremity.  No shoulder dislocation.  No evidence of AC joint separation.  The visualized portions of the lung are clear.  IMPRESSION:  Mildly displaced, spiral fracture of the midshaft of right humerus. No shoulder dislocation.  Somewhat mottled appearance of the right humeral diaphysis, which may be related to prior radiation therapy. Correlation with MRI may be helpful, which is already scheduled for 11/20/2023.    Study: XR of right humerus  Date: 10/23/23  Impression: **NO IMAGE AVAILABLE FOR REVIEW, RADIOLOGIST REPORT AVAILABLE FOR REVIEW**  IMPRESSION:   Surgical clips throughout the right axilla and throughout the lateral soft   tissues of the humerus.   Increased lateral soft tissue density mid humerus for which MRI is recommended   to exclude a mass.   No destructive humeral osseous lesion seen.   Focal areas  of small cortical solid calcifications or postsurgical changes mid lateral humeral shaft.   Demineralization throughout the right arm and forearm.   Mild osteoarthritic narrowing acromioclavicular joint.   No other significant arthritis.   No fracture, malalignment or elbow joint effusion.     Study: MRI of right humerus  Date: 11/1/23  Impression: ** IMAGES NOT AVAILABLE FOR REVIEW, REPORT NOT AVAILABLE FOR REVIEW **    Scribe Attestation      I,:  Luz Elena Guo am acting as a scribe while in the presence of the attending physician.:       I,:  Rick Lazo DO personally performed the services described in this documentation    as scribed in my presence.:               Humerus Shaft Fracture Non-Operative Rehabilitation Protocol    PHASE II: FRACTURE HEALING  Week 2-6  · No lifting > weight of coffee cup   · Kruse fracture brace at all times  o Twice daily tightening of fracture brace   · Discontinue sling, continue use of cuff & collar as needed for comfort, continue upright posture   · Daily hygiene and skin checks in pendulum position   · Continue TID HEP: A/AAROM elbow, wrist and finger motion, biceps and triceps isometrics   · Start shoulder periscapular isometrics / shoulder posture   · No shoulder ROM other than pendulums for hygiene     PHASE III: EARLY REHABILITATION  Week 6-10 (following clinical fracture healing and no motion at the fracture site)   · No lifting > 5# at the side, or coffee cup overhead   · Part time Kruse bracing (if clinical fracture healing)  o Wear while outside of the house  o Remove while at home or at rest  · Add TID HEP shoulder 4 quadrant AAROM as tolerated with brace on unless pain free   o Pulleys, table slides, wall climbs, supine wand exercises in all planes     PHASE IV: RETURN TO FUNCTION  Week 11-14 (following clinical fracture healing)   · Add shoulder AROM, PROM as tolerated   · Generalized UE strengthening   · Activities as tolerated (if bony healing complete)    · Independent home exercise program   · Return to high level functional ADLs and simulation of work environment

## 2024-05-14 ENCOUNTER — OFFICE VISIT (OUTPATIENT)
Dept: OCCUPATIONAL THERAPY | Facility: CLINIC | Age: 64
End: 2024-05-14
Payer: MEDICARE

## 2024-05-14 DIAGNOSIS — R20.2 NUMBNESS AND TINGLING OF LEFT UPPER EXTREMITY: Primary | ICD-10-CM

## 2024-05-14 DIAGNOSIS — R20.0 NUMBNESS AND TINGLING OF LEFT UPPER EXTREMITY: Primary | ICD-10-CM

## 2024-05-14 PROCEDURE — 97530 THERAPEUTIC ACTIVITIES: CPT | Performed by: OCCUPATIONAL THERAPIST

## 2024-05-14 PROCEDURE — 97110 THERAPEUTIC EXERCISES: CPT | Performed by: OCCUPATIONAL THERAPIST

## 2024-05-14 PROCEDURE — 97140 MANUAL THERAPY 1/> REGIONS: CPT | Performed by: OCCUPATIONAL THERAPIST

## 2024-05-14 NOTE — PROGRESS NOTES
"Daily Note     Today's date: 2024  Patient name: Eileen Matias  : 1960  MRN: [id number]  Referring provider: Rogelio Black MD  Dx:   Encounter Diagnosis     ICD-10-CM    1. Numbness and tingling of left upper extremity  R20.0     R20.2                        Subjective:  \"I can move my hand better in the new splint\"  \"the pinky is tough to bend\"  \"I am trying to pull up my pants with the fingers\"      Objective: See treatment diary below    Assessment: Tolerated treatment well. Patient would benefit from continued OT;  In neutral, supported on table, active wrist extension is present moving from full flexion to neutral  Active thumb and  digital extension is present.   MP and  IP motion improved at end of session.    Digital flexion PROM>AROM.       Plan: Continue per plan of care.  Progress treatment as tolerated.     Continue with  wrist orthotic allowing full digital motion for improved ease of function.  Added elastic strap for fifth MP flexion.                 Precautions Lymphedema; hx desmoid tumors and breast cancer; healing humeral fracture.      POC expires Unit limit Auth  expiration date PT/OT + Visit Limit?   24 NA NA BOMN         Dx R Hum Fx     Dr Jack MILLER @ [facility]         Date  FOTO!      Visit 7 8       Manuals 8' 8'                                  Jt mobs digits, FA MP all, wrist, palmar arch MP arch, MP distract G 1-2       Ortho fit/train         Neuro Re-Ed          RNG         MNG         CHIKI                  orthotc                           Ther Ex   15'    20       HEP Add P/H supinate to HEP. Encourage prehension.  Added elastic strap to splint for V MP flexion       PROM 1:1 Supinate LLPS  F/b P/H Sup;  Digital PROM f/b P/H fisting Supinate, wrist E, digital F       Shouder PROM  Supine FF, ER  2x10 each       Shoulder AROM  Prone, scap with F  2x10       TGE Hook to full 10x10 Hook to full   10x10       Digital P/AROM         Active wrist E       "   A/AAROM wrist, FA                           Ther Activity 15'   10'       Gripping Y dig flex x10 each Y dig flex   All 2x10       Pinching Y C pins   Web Ak Chin        Prehension Velcro bd  1/2 off  Velcro bd  Full bd,  off on                MHP Pre tx with ace wrap flexion Pre tx with ace flexion wrap.

## 2024-05-15 ENCOUNTER — PROCEDURE VISIT (OUTPATIENT)
Dept: NEUROLOGY | Facility: CLINIC | Age: 64
End: 2024-05-15
Payer: MEDICARE

## 2024-05-15 ENCOUNTER — OFFICE VISIT (OUTPATIENT)
Dept: PHYSICAL THERAPY | Facility: CLINIC | Age: 64
End: 2024-05-15
Payer: MEDICARE

## 2024-05-15 DIAGNOSIS — S42.344D CLOSED NONDISPLACED SPIRAL FRACTURE OF SHAFT OF RIGHT HUMERUS WITH ROUTINE HEALING, SUBSEQUENT ENCOUNTER: ICD-10-CM

## 2024-05-15 DIAGNOSIS — I89.0 LYMPHEDEMA OF RIGHT ARM: Primary | ICD-10-CM

## 2024-05-15 DIAGNOSIS — R60.9 EDEMA, UNSPECIFIED TYPE: ICD-10-CM

## 2024-05-15 DIAGNOSIS — R20.2 NUMBNESS AND TINGLING OF LEFT UPPER EXTREMITY: ICD-10-CM

## 2024-05-15 DIAGNOSIS — R20.0 NUMBNESS AND TINGLING OF LEFT UPPER EXTREMITY: ICD-10-CM

## 2024-05-15 DIAGNOSIS — M65.311 TRIGGER THUMB OF RIGHT HAND: ICD-10-CM

## 2024-05-15 PROCEDURE — 97112 NEUROMUSCULAR REEDUCATION: CPT

## 2024-05-15 PROCEDURE — 97140 MANUAL THERAPY 1/> REGIONS: CPT

## 2024-05-15 PROCEDURE — 97110 THERAPEUTIC EXERCISES: CPT

## 2024-05-15 PROCEDURE — 95910 NRV CNDJ TEST 7-8 STUDIES: CPT | Performed by: PHYSICAL MEDICINE & REHABILITATION

## 2024-05-15 PROCEDURE — 95886 MUSC TEST DONE W/N TEST COMP: CPT | Performed by: PHYSICAL MEDICINE & REHABILITATION

## 2024-05-15 NOTE — PROGRESS NOTES
"Daily Note     Today's date: 5/15/2024  Patient name: Eileen Matias  : 1960  MRN: [id number]  Referring provider: Rick Lazo DO  Dx:   Encounter Diagnosis     ICD-10-CM    1. Lymphedema of right arm  I89.0       2. Closed nondisplaced spiral fracture of shaft of right humerus with routine healing, subsequent encounter  S42.344D                      Subjective: pt reports the arms are feeling achy today due to having a nerve test done earlier today.       Objective: See treatment diary below      Assessment: Tolerated treatment well. Patient would benefit from continued PT. Modified session w/ shoulder exercises due to increased pain reported today.       Plan: Continue per plan of care.      EPOC: 3/11/24     Shoulder EPOC: 4/24/24 5/6 5/8 5/15    RA Lymph DATE: 4/10/24       Shoulder:  24       Manual       MLD [facility]    Manual Wrapping       PROM                       Exercise Diary 5/15  5/6 5/8   THEREX       Pt ed [facility]; measurements on SHAKA    Elbow/Wrist/hand AROM       Finger web       SL ER       Standing cane 3 way   AROM flex/scap 2x10    Counter walkouts 10\" x10  10\" x10 10\" x10   AAROM flex Is Ys 10\" x10  Is Ys 10\" x10 1# Is Ys 10\" x10 1#   pulleys 2/2  2'/2' 2'/2'   Ball rollouts flex/scap   D/c    Supine ER cane   10\" x10 10\" x10   Shoulder isometrics   5\" x10 NT   Bicep sidney       SL abd 20x  1# x20 0# x20   Supine shoulder flex 20x  1# x20 1# x20   IR stretch w/ strap   10\" x10 10\" x10   NEURO RE-ED       Scapular squeeze       Post shoulder rolls       [facility] reaches       Supine punches 20x  x20 0# x20    ball roll up wall   Non weighted x10     rows/pulldowns    Gtb 20x                                                              Modalities           CP                                                          "

## 2024-05-16 ENCOUNTER — OFFICE VISIT (OUTPATIENT)
Dept: OCCUPATIONAL THERAPY | Facility: CLINIC | Age: 64
End: 2024-05-16
Payer: MEDICARE

## 2024-05-16 DIAGNOSIS — R20.0 NUMBNESS AND TINGLING OF LEFT UPPER EXTREMITY: Primary | ICD-10-CM

## 2024-05-16 DIAGNOSIS — R20.2 NUMBNESS AND TINGLING OF LEFT UPPER EXTREMITY: Primary | ICD-10-CM

## 2024-05-16 PROCEDURE — 97110 THERAPEUTIC EXERCISES: CPT | Performed by: OCCUPATIONAL THERAPIST

## 2024-05-16 NOTE — PROGRESS NOTES
"Daily Note     Today's date: 2024  Patient name: Eileen Matias  : 1960  MRN: [id number]  Referring provider: Rogelio Black MD  Dx:   Encounter Diagnosis     ICD-10-CM    1. Numbness and tingling of left upper extremity  R20.0     R20.2                        Subjective:   \"the pinky is tough to bend\"  \"I am trying to pull up my pants with the fingers\"      Objective: See treatment diary below;  Treatment shorteded due to 15 min late.      AROM- tips to palm  II  5.5 cm,  III 5 cm,  IV  5.5 cm,  V  5 cm    Assessment: Tolerated treatment well. Patient would benefit from continued OT;  In neutral, supported on table, active wrist extension is present moving from full flexion to neutral  Active thumb and  digital extension is present.   MP and  IP motion improved at end of session.    Digital flexion PROM>AROM.   Shoulder ER tightness and weakness restricts function.        Plan: Continue per plan of care.  Progress treatment as tolerated.     Continue with  wrist orthotic allowing full digital motion for improved ease of function.  Added elastic strap for fifth MP flexion.  Progress shoulder care for improved functional mobility.     FOTO NV             Precautions Lymphedema; hx desmoid tumors and breast cancer; healing humeral fracture.      POC expires Unit limit Auth  expiration date PT/OT + Visit Limit?   24 NA NA BOMN         Dx R Hum Fx     Dr Jack MILLER @ [facility]         Date  F NV      Visit 7 8 9      Manuals 8' 8'                                  Jt mobs digits, FA MP all, wrist, palmar arch MP arch, MP distract G 1-2       Ortho fit/train         Neuro Re-Ed          RNG         MNG         CHIKI                  orthotc                           Ther Ex   15'    20   25'      HEP Add P/H supinate to HEP. Encourage prehension.  Added elastic strap to splint for V MP flexion Add passive and active seated ER to HEP      PROM 1:1 Supinate LLPS  F/b P/H Sup;  Digital PROM " "f/b P/H fisting Supinate, wrist E, digital F ER shoulder, supinate, wrist E, digital F      Digital PROM AROM   Resisted flexion f/b P/H fists 10x3\"      Shouder PROM  Supine FF, ER  2x10 each Seated ER PROM f/b AROM hold      Shoulder AROM  Prone, scap with F  2x10 Mirror hands ER active      TGE Hook to full 10x10 Hook to full   10x10                         Ther Activity 15'   10'       Gripping Y dig flex x10 each Y dig flex   All 2x10       Pinching Y C pins   Web Ione        Prehension Velcro bd  1/2 off  Velcro bd  Full bd,  off on                MHP Pre tx with ace wrap flexion Pre tx with ace flexion wrap.  Pre tx with flexion wrap                       "

## 2024-05-17 ENCOUNTER — OFFICE VISIT (OUTPATIENT)
Dept: PHYSICAL THERAPY | Facility: CLINIC | Age: 64
End: 2024-05-17
Payer: MEDICARE

## 2024-05-17 DIAGNOSIS — C50.912 CARCINOMA OF LEFT BREAST METASTATIC TO AXILLARY LYMPH NODE (HCC): ICD-10-CM

## 2024-05-17 DIAGNOSIS — Z17.0 MALIGNANT NEOPLASM OF LOWER-INNER QUADRANT OF LEFT BREAST IN FEMALE, ESTROGEN RECEPTOR POSITIVE (HCC): ICD-10-CM

## 2024-05-17 DIAGNOSIS — I89.0 LYMPHEDEMA OF RIGHT ARM: Primary | ICD-10-CM

## 2024-05-17 DIAGNOSIS — S42.344D CLOSED NONDISPLACED SPIRAL FRACTURE OF SHAFT OF RIGHT HUMERUS WITH ROUTINE HEALING, SUBSEQUENT ENCOUNTER: ICD-10-CM

## 2024-05-17 DIAGNOSIS — C50.312 MALIGNANT NEOPLASM OF LOWER-INNER QUADRANT OF LEFT BREAST IN FEMALE, ESTROGEN RECEPTOR POSITIVE (HCC): ICD-10-CM

## 2024-05-17 DIAGNOSIS — C77.3 CARCINOMA OF LEFT BREAST METASTATIC TO AXILLARY LYMPH NODE (HCC): ICD-10-CM

## 2024-05-17 PROCEDURE — 97140 MANUAL THERAPY 1/> REGIONS: CPT

## 2024-05-17 PROCEDURE — 97110 THERAPEUTIC EXERCISES: CPT

## 2024-05-17 NOTE — PROGRESS NOTES
"Daily Note     Today's date: 2024  Patient name: Eileen Matias  : 1960  MRN: [id number]  Referring provider: Rick Lazo DO  Dx:   Encounter Diagnosis     ICD-10-CM    1. Lymphedema of right arm  I89.0       2. Closed nondisplaced spiral fracture of shaft of right humerus with routine healing, subsequent encounter  S42.344D       3. Malignant neoplasm of lower-inner quadrant of left breast in female, estrogen receptor positive (HCC)  C50.312     Z17.0       4. 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       Objective: See treatment diary below      Assessment: Tolerated treatment well. Patient would benefit from continued PT. Continued performing MLD on BL UE to improve lymphatic flow and decrease overall swelling. Pt opted to hold off on increased resistance t/o session.       Plan: Continue per plan of care.      EPOC: 3/11/24     Shoulder EPOC: 4/24/24 5/6 5/8 5/15 5/17   RA Lymph DATE: 4/10/24       Shoulder:  24       Manual       MLD [facility]   Manual Wrapping       PROM                       Exercise Diary 5/15 5/17 5/6 5/8   THEREX       Pt ed [facility]; measurements on SHAKA    Elbow/Wrist/hand AROM       Finger web       SL ER       Standing cane 3 way Flexion AROM  AROM flex/scap 2x10    Counter walkouts 10\" x10 10\" x10 10\" x10 10\" x10   AAROM flex Is Ys 10\" x10 Is Ys 10\" x10 Is Ys 10\" x10 1# Is Ys 10\" x10 1#   pulleys 2/2 2/2 2'/2' 2'/2'   Ball rollouts flex/scap   D/c    Supine ER cane  10\" x10 10\" x10 10\" x10   Shoulder isometrics   5\" x10 NT   Bicep sidney       SL abd 20x x20 1# x20 0# x20   Supine shoulder flex 20x x20 1# x20 1# x20   IR stretch w/ strap   10\" x10 10\" x10   NEURO RE-ED       Scapular squeeze       Post shoulder rolls       [facility] reaches       Supine punches 20x x20 x20 0# x20    ball roll up wall   Non weighted x10     " rows/pulldowns Gtb/rtb x20   Gtb 20x                                                              Modalities           CP

## 2024-05-20 ENCOUNTER — OFFICE VISIT (OUTPATIENT)
Dept: PHYSICAL THERAPY | Facility: CLINIC | Age: 64
End: 2024-05-20
Payer: MEDICARE

## 2024-05-20 DIAGNOSIS — Z17.0 MALIGNANT NEOPLASM OF LOWER-INNER QUADRANT OF LEFT BREAST IN FEMALE, ESTROGEN RECEPTOR POSITIVE (HCC): ICD-10-CM

## 2024-05-20 DIAGNOSIS — C50.312 MALIGNANT NEOPLASM OF LOWER-INNER QUADRANT OF LEFT BREAST IN FEMALE, ESTROGEN RECEPTOR POSITIVE (HCC): ICD-10-CM

## 2024-05-20 DIAGNOSIS — C77.3 CARCINOMA OF LEFT BREAST METASTATIC TO AXILLARY LYMPH NODE (HCC): ICD-10-CM

## 2024-05-20 DIAGNOSIS — S42.344D CLOSED NONDISPLACED SPIRAL FRACTURE OF SHAFT OF RIGHT HUMERUS WITH ROUTINE HEALING, SUBSEQUENT ENCOUNTER: ICD-10-CM

## 2024-05-20 DIAGNOSIS — C50.912 CARCINOMA OF LEFT BREAST METASTATIC TO AXILLARY LYMPH NODE (HCC): ICD-10-CM

## 2024-05-20 DIAGNOSIS — I89.0 LYMPHEDEMA OF RIGHT ARM: Primary | ICD-10-CM

## 2024-05-20 PROCEDURE — 97112 NEUROMUSCULAR REEDUCATION: CPT

## 2024-05-20 PROCEDURE — 97110 THERAPEUTIC EXERCISES: CPT

## 2024-05-20 PROCEDURE — 97140 MANUAL THERAPY 1/> REGIONS: CPT

## 2024-05-20 NOTE — PROGRESS NOTES
"Daily Note     Today's date: 2024  Patient name: Eileen Matias  : 1960  MRN: [id number]  Referring provider: Rick Lazo DO  Dx:   Encounter Diagnosis     ICD-10-CM    1. Lymphedema of right arm  I89.0       2. Closed nondisplaced spiral fracture of shaft of right humerus with routine healing, subsequent encounter  S42.344D       3. Malignant neoplasm of lower-inner quadrant of left breast in female, estrogen receptor positive (HCC)  C50.312     Z17.0       4. 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 (HCC)  C50.912     C77.3                      Subjective: pt reports BL UE feel achy today due to the rainy weather this past weekend.       Objective: See treatment diary below      Assessment: Tolerated treatment well. Patient would benefit from continued PT. Improved lymphatic flow post MLD to BL UE. Improved control w/ wall slides. Pt is limited with shoulder ER.      Plan: Continue per plan of care.      EPOC: 3/11/24     Shoulder EPOC: 4/24/24 5/20  5/15 5/17   RA Lymph DATE: 4/10/24       Shoulder:  24       Manual       MLD JH  JH JH   Manual Wrapping       PROM                       Exercise Diary 5/15 5/17 5/20    THEREX       Pt ed JH      Elbow/Wrist/hand AROM       Finger web       SL ER       Standing cane 3 way Flexion AROM      Counter walkouts 10\" x10 10\" x10 10\" x10    AAROM flex Is Ys 10\" x10 Is Ys 10\" x10 Is Ys 10\" x10    pulleys 2/2 2/2 2/2    Ball rollouts flex/scap   Table slides w/ foam roll 2 min    Supine ER cane  10\" x10 10\" x10    Shoulder isometrics   IR/ER 5\" 10x    SL flexion   x10    SL abd 20x x20 x20    Supine shoulder flex 20x x20 x20    IR stretch w/ strap       NEURO RE-ED       Scapular squeeze       Post shoulder rolls       [facility] reaches       Supine punches 20x x20 x20     ball roll up wall   5\" 10x     rows/pulldowns Gtb/rtb x20  Gtb/rtb x20                                                               Modalities           CP                "

## 2024-05-21 ENCOUNTER — APPOINTMENT (OUTPATIENT)
Dept: OCCUPATIONAL THERAPY | Facility: CLINIC | Age: 64
End: 2024-05-21
Payer: MEDICARE

## 2024-05-22 ENCOUNTER — OFFICE VISIT (OUTPATIENT)
Dept: OBGYN CLINIC | Facility: HOSPITAL | Age: 64
End: 2024-05-22
Payer: MEDICARE

## 2024-05-22 ENCOUNTER — EVALUATION (OUTPATIENT)
Dept: PHYSICAL THERAPY | Facility: CLINIC | Age: 64
End: 2024-05-22
Payer: MEDICARE

## 2024-05-22 VITALS
BODY MASS INDEX: 23.49 KG/M2 | SYSTOLIC BLOOD PRESSURE: 134 MMHG | WEIGHT: 155 LBS | DIASTOLIC BLOOD PRESSURE: 82 MMHG | HEIGHT: 68 IN

## 2024-05-22 DIAGNOSIS — I89.0 LYMPHEDEMA OF RIGHT ARM: Primary | ICD-10-CM

## 2024-05-22 DIAGNOSIS — G56.30 RADIAL NERVE PALSY: ICD-10-CM

## 2024-05-22 DIAGNOSIS — M24.549 CONTRACTURE OF HAND: ICD-10-CM

## 2024-05-22 DIAGNOSIS — C50.912 CARCINOMA OF LEFT BREAST METASTATIC TO AXILLARY LYMPH NODE (HCC): ICD-10-CM

## 2024-05-22 DIAGNOSIS — R20.2 NUMBNESS AND TINGLING OF LEFT UPPER EXTREMITY: ICD-10-CM

## 2024-05-22 DIAGNOSIS — S42.344D CLOSED NONDISPLACED SPIRAL FRACTURE OF SHAFT OF RIGHT HUMERUS WITH ROUTINE HEALING, SUBSEQUENT ENCOUNTER: ICD-10-CM

## 2024-05-22 DIAGNOSIS — R20.0 NUMBNESS AND TINGLING OF LEFT UPPER EXTREMITY: ICD-10-CM

## 2024-05-22 DIAGNOSIS — C50.312 MALIGNANT NEOPLASM OF LOWER-INNER QUADRANT OF LEFT BREAST IN FEMALE, ESTROGEN RECEPTOR POSITIVE (HCC): ICD-10-CM

## 2024-05-22 DIAGNOSIS — I89.0 LYMPHEDEMA OF LEFT ARM: ICD-10-CM

## 2024-05-22 DIAGNOSIS — Z17.0 MALIGNANT NEOPLASM OF LOWER-INNER QUADRANT OF LEFT BREAST IN FEMALE, ESTROGEN RECEPTOR POSITIVE (HCC): ICD-10-CM

## 2024-05-22 DIAGNOSIS — S42.344D CLOSED NONDISPLACED SPIRAL FRACTURE OF SHAFT OF RIGHT HUMERUS WITH ROUTINE HEALING, SUBSEQUENT ENCOUNTER: Primary | ICD-10-CM

## 2024-05-22 DIAGNOSIS — C77.3 CARCINOMA OF LEFT BREAST METASTATIC TO AXILLARY LYMPH NODE (HCC): ICD-10-CM

## 2024-05-22 PROCEDURE — 97140 MANUAL THERAPY 1/> REGIONS: CPT

## 2024-05-22 PROCEDURE — 99214 OFFICE O/P EST MOD 30 MIN: CPT | Performed by: ORTHOPAEDIC SURGERY

## 2024-05-22 PROCEDURE — 97110 THERAPEUTIC EXERCISES: CPT

## 2024-05-22 NOTE — PROGRESS NOTES
PT Re-Evaluation     Collection of Subjective/Objective data performed by Irene Childs PTA. Assessment, POC, and Goal attainment performed by Jessica Garcia DPT.       Today's date: 2024  Patient name: Eileen Matias  : 1960   MRN: [id number]  Referring provider: Rick Lazo DO  Dx:   Encounter Diagnosis     ICD-10-CM    1. Lymphedema of right arm  I89.0       2. Closed nondisplaced spiral fracture of shaft of right humerus with routine healing, subsequent encounter  S42.344D       3. Malignant neoplasm of lower-inner quadrant of left breast in female, estrogen receptor positive (HCC)  C50.312     Z17.0       4. Carcinoma of left breast metastatic to axillary lymph node (HCC)  C50.912     C77.3       5. Lymphedema of left arm  I89.0                              Assessment  Impairments: abnormal or restricted ROM, impaired physical strength and pain with function  Other impairment: lymphedema    Assessment details: Patient is a 62 y/o female who presents to therapy with  /l upper extremity lymphedema and a R humerus fracture. Patient demonstrates subjective/objective improvement since starting PT such as improved strength and girth measurements. Patient continues to present with deficits that include shoulder mobility and strength. Patient will benefit from continued skilled PT intervention to address the aforementioned impairments, achieve goals, maximize function, and improve quality of life. Pt is in agreement with this plan.     Understanding of Dx/Px/POC: good     Prognosis: good    Goals  LYMPH GOALS:  ST.Pain decreased by 25% in 4 weeks.--PROGRESSING  2. Edema decreased by 2-3 cm in 4 weeks. --MET    LT. Decrease pain to 1-2/10 at worst by d/c.-PROGRESSING  2. Increase ROM to WFL for all deficient movements by d/c.-PROGRESSING  3. Strength increased to 5 for all deficient muscle groups by d/c.-PROGRESSING  4. IADL performance increased to max function by  d/c.-PROGRESSING  5. Recreational performance increased to max function by d/c.-PROGRESSING  6. RUE edema decreased to that of LUE by d/c. -PROGRESSING  7. Pt will return to work by d/c.-PROGRESSING    SHOULDER GOALS:   ST weeks  Pt will demonstrate good understanding and compliance with HEP--PROGRESSING  Pt will decrease pain to 5/10 at worst  --PROGRESSING  Pt will demonstrate improved postural awareness and ability to self-correct without reliance on external cues --PROGRESSING     LT weeks  Pt will improve FOTO score to > or = to 52 to indicate improved functional abilities  --PROGRESSING  Pt will decrease pain to 2-3/10 at worst   --PROGRESSING  Pt will increase shoulder strength to 4-/5 for improved tolerance/independence with ADLs --PROGRESSING  Pt will improve  strength to 25#  --PROGRESSING         Plan  Other planned modality interventions: other modalities PRN    Planned therapy interventions: abdominal trunk stabilization, ADL retraining, IADL retraining, flexibility, functional ROM exercises, graded exercise, home exercise program, manual therapy, joint mobilization, neuromuscular re-education, patient education, postural training, strengthening, therapeutic exercise, therapeutic activities, massage and work reintegration  Other planned therapy interventions: other interventions PRN    Frequency: 1-2x/week.  Duration in weeks: 8  Plan of Care beginning date: 2024  Plan of Care expiration date: 2024  Treatment plan discussed with: patient      Subjective Evaluation    History of Present Illness  Mechanism of injury: RE (24):  Pt reports swelling in BL UE is maintaining overall. Pt notes mobility in the shoulder is about the same but strength is improving. Pt is able to lift arm to touch her head now.     RE (24) adding LUE lymphedema:  Pt is s/p L breast lumpectomy that was performed about two years ago. Pt denies any discomfort or swelling in the LUE. Pt MD would like pt  to start lymphedema therapy as a preventative measure. Pt does report some tenderness in the L axilla area due to scar tissue. Pt stated her  has been working on the scar tissue every night which seems to help some.       RE (4/24/24):  Pt has been see w/ c/o RUE lymphedema and s/p R humeral fracture and notes overall improvement. Pt notes 50% improvement in lymphedema since beginning physical therapy. Pt is complaint with using her compression pump 3x/day, 4x/day on bad days, and wearing the compression sleeve during the day/night time. Pt notes majority of swelling remains to be in the elbow region, which is preventing her from downsizing her current daytime sleeve she is wearing. In terms of the shoulder, she notes overall improvements in mobility, strength, and endurance. Gripping is still a challenge, but she is seeing OT for treatment for her had mobility/strength. Continued difficulties include lifting, mobility OH and behind to don/doff bra/washback.     RE (4/10/24):  Pt reports overall improvements with the tightness and swelling in the arm. Pt notes 50% improvement in the swelling since beginning lymphedema therapy. Pt is still in the smaller sized night sleeve that she wears all day and then switches to the larger sleeve at night.  She has been able to transition back into the compression pump, that she uses about 3x/day and 4x/day on a bad day. Pt notes improvement since being back into the pump.       RE (3/13/24):  Pt reports overall improvements with the tightness and swelling in the arm. Pt notes 10% improvement in the swelling since beginning lymphedema therapy. Pt has since transitioned from the larger night garment to the smaller night garment to wear during the day. Pt notes she has feeling back in her thumb and finger tips. Pt notes slight improvements in strength. Pt is still not using pump at home due to the continued swelling and tightness in posterior upper arm that causes more pain  "when using pump. Pt plans on restarting the pump when \"the lump\" in the back of the arm improves.       RE (2/14/24):  Pt has been seen for RUE lymphedema for 8 visits to date and notes 50% improvement so far. Pt reports she is still not able to use compression pump but has been wearing the Tribute garment, which seems to be helping. Pt is not longer needing to wear the clamshell for her fx unless if she is out running errands. Pt notes the swelling has improved since being able to take the brace off more often. Pt notes continued \"hardness\" in elbow and axilla region.     EVAL (1/15/24):  Pt reports to IE w/ hx of RUE lymphedema which started approx 20 years ago approx 5 years following CA dx of desmoitosis.     Pt had done skilled PT tx for this condition in the past which was helpful control the issue. However, pt sustained humeral fx on 11/12/23 as a result of fall on RUE while trying to protect mother in law from a fall out of w/c. She reports that healing has been complicated by severe osteoporosis and lymphedema. Pt still required to wear brace at all times- not yet allowed any ROM in shoulder.     In addition to the above, pt w/ recent hx of L breast CA w/ L lumpectomy and lymph node removal (pt reports 17 lymph nodes) over on 6/21/22.        Patient Goals  Patient goals for therapy: decreased pain and decreased edema    Pain  Pain scale: Shoulder -5; Lymph- 5.  Pain scale at lowest: Shoulder -5; Lymph- 5.  Pain scale at highest: Shoulder- 8; Lymph-5.  Location: R shoulder  Alleviating factors: lymphedema massage.  Exacerbated by: increased edema, movement.    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)  Hand dominance: right        Objective     General Comments:      Shoulder Comments   R AROM: flex-125 abd-103 IR-R L2 ER- unable      R PROM: flex- 125 (pain) abd- 103 (pain) IR-76 ER-7     R shoulder strength: " "flex: 4/5; abd: 4/5; ER: 3/5; IR: 4/5      Ankle/Foot Comments   Edema in R vs L w/ circumferential girth measurements as below (L/R):   Axilla: 38/36  20 cm proximal to elbow: 32.5/29.5  10 cm proximal to elbow: 28.5/26.5 cm  Elbow Joint: 26.5 cm/26 cm  20 cm proximal to wrist: 27 cm/27 cm  10 cm proximal to wrist: 19.5 cm/21 cm  Wrist:  16.5 cm/16 cm  Palm: 18 cm/17.5 cm  MCP Joints (digits 2-5): 16.5 cm/16.5 cm  PIP Joints (digits 2-5): 15.5 cm/15 cm    FOTO: Goal: Lymph - 57 Shoulder - 49; Eval: Lymph - 5 Shoulder - 4;3/13: Lymph - 42; 4/1: Shoulder - 47;4/10 Lymph - 48; 4/24: shoulder - 42; 5/22: Lymph - 69 Shoulder - 48    EPOC: 5/22/24     Shoulder EPOC: 7/17/24 5/20 5/22     RA Lymph DATE: 5/22/24       Shoulder:  7/17/24       Manual       MLD [facility] ER 60 min     Manual Wrapping       PROM                       Exercise Diary 5/15 5/17 5/20 5/22   THEREX       Pt ed    FOTO; updated [id number] min   Elbow/Wrist/hand AROM       Finger web       SL ER       Standing cane 3 way Flexion AROM      Counter walkouts 10\" x10 10\" x10 10\" x10    AAROM flex Is Ys 10\" x10 Is Ys 10\" x10 Is Ys 10\" x10    pulleys 2/2 2/2 2/2    Ball rollouts flex/scap   Table slides w/ foam roll 2 min    Supine ER cane  10\" x10 10\" x10    Shoulder isometrics   IR/ER 5\" 10x    SL flexion   x10    SL abd 20x x20 x20    Supine shoulder flex 20x x20 x20    IR stretch w/ strap       NEURO RE-ED       Scapular squeeze       Post shoulder rolls       [facility] reaches       Supine punches 20x x20 x20     ball roll up wall   5\" 10x     rows/pulldowns Gtb/rtb x20  Gtb/rtb x20                                                               Modalities           CP                                         "

## 2024-05-22 NOTE — PROGRESS NOTES
ASSESSMENT/PLAN:    Assessment:   Significant nerve dysfunction right upper extremity with involvement of the radial and median nerve    Lymphedema right upper extremity  Joint contractures metacarpal phalangeal joints, proximal interphalangeal joints, distal interphalangeal joints of the right index through small finger as well as the right thumb CMC joint, metacarpophalangeal joint, and interphalangeal joint.    Plan:   Continue with lymphedema therapy  continue hand therapy for aggressive ROM with wrist and finger flexion  Custom with progressive splints from hand therapy for wrist and digit flexion   We discussed potential surgical intervention for the nerves if the radial nerve does not recover and she has obtained full PROM.  We discussed the absolute necessity to achieve range of motion passively of the digits of the right upper extremity. New therapy prescription provided today.      Follow Up:  2 months    To Do Next Visit:  reevaluate    General Discussions:    Due to contractures of the right hand and nerve damage she will not know if she is getting motor and sensation back to her right hand and all fingers. There is a need to determine the level of damage to the nerves. Discussed the surgical treatment options as a last case scenario to include multiple contracture release and tenolysis. Explained that following the EMG, she will follow-up to determine a plan moving forward. Explained that plan will likely include Dr. Lazo for management of the slow-healing proximal humerus fracture.            _____________________________________________________  CHIEF COMPLAINT:  Chief Complaint   Patient presents with    Right Thumb - Pain     EMG - 5/15         SUBJECTIVE:  Eileen Matias is a 63 y.o. female who presents with Numbness and previous triggering to the right thumb.  This started 6 month(s) ago as Insidious.  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 She has been completing lymphedema therapy for lymphedema to the right upper extremity. Patient had a proximal humerus fracture in 11/2023 which is being managed by Dr. Lazo however the fracture has been slow healing due to prior trauma to her RUE.  Radiation: Yes to the  index finger, long finger, ring finger, small finger, and thumb  Previous Treatments: therapy with only partial relief  Associated symptoms: Numbness  Handedness: right  Work status: Retired    PAST MEDICAL HISTORY:  Past Medical History:   Diagnosis Date    Abnormal mammogram 05/15/2013    Anemia     Cancer (HCC)     desmoitosis    Carcinoma of left breast metastatic to axillary lymph node (HCC) 04/19/2022    Chronic pain disorder     worse right side of body    Desmoid tumor     Last Assessed: 6/19/2017    fibroidal cyst 1999?  Hysterectomy done    History of transfusion     no reactions    Hyperlipidemia     Hypertension     Osteoporosis     PONV (postoperative nausea and vomiting)        PAST SURGICAL HISTORY:  Past Surgical History:   Procedure Laterality Date    BREAST LUMPECTOMY Left 6/21/2022    Procedure: ANITA  DIRECTED LUMPECTOMY;  Surgeon: Amanda Motley MD;  Location: [facility];  Service: Surgical Oncology    FRACTURE SURGERY      HYSTERECTOMY      LYMPH NODE DISSECTION Left 6/21/2022    Procedure: AXILLARY DISSECTION LEVEL I AND LEVEL II LYMPH NODES;  Surgeon: Amanda Motley MD;  Location: [facility];  Service: Surgical Oncology    MRI BREAST BIOPSY LEFT (ALL INCLUSIVE) Left 5/20/2022    MRI BREAST BIOPSY RIGHT (ALL INCLUSIVE) Right 5/20/2022    OTHER SURGICAL HISTORY      Arm Incision: Dermoid tumor, right Arm     PARTIAL HYSTERECTOMY      AR COLONOSCOPY FLX DX W/COLLJ SPEC WHEN PFRMD N/A 8/15/2017    Procedure: COLONOSCOPY;  Surgeon: Alec England MD;  Location: [facility];  Service: Gastroenterology    AR NDSC WRST SURG W/RLS  TRANSVRS CARPL LIGM Left 8/10/2021    Procedure: RELEASE LEFT CARPAL TUNNEL ENDOSCOPIC;  Surgeon: Chris Camacho MD;  [facility];  Service: Orthopedics    MT OPTX DSTL RADL I-ARTIC FX/EPIPHYSL SEP 3 FRAG Left 2/9/2021    Procedure: OPEN REDUCTION W/ INTERNAL FIXATION (ORIF) RADIUS / ULNA (WRIST) left;  Surgeon: Chris Camacho MD;  [facility];  Service: Orthopedics    SKIN BIOPSY      SKIN CANCER EXCISION      Melanoma Excision     TONSILLECTOMY      US BREAST NEEDLE LOC LEFT Left 6/16/2022    US BREAST NEEDLE LOC RIGHT EACH ADDITIONAL Right 6/16/2022    US GUIDED BREAST LYMPH NODE BIOPSY LEFT Left 4/14/2022       FAMILY HISTORY:  Family History   Problem Relation Age of Onset    Diabetes Mother     No Known Problems Father     Cancer Sister     Lung cancer Sister     Pancreatic cancer Sister     No Known Problems Maternal Grandmother     No Known Problems Maternal Grandfather     No Known Problems Paternal Grandmother     No Known Problems Paternal Grandfather     Breast cancer Neg Hx        SOCIAL HISTORY:  Social History     Tobacco Use    Smoking status: Never    Smokeless tobacco: Never   Vaping Use    Vaping status: Never Used   Substance Use Topics    Alcohol use: Not Currently     Alcohol/week: 5.0 standard drinks of alcohol     Types: 5 Shots of liquor per week     Comment: Used mostly as a painkiller when needed before bedtime    Drug use: Yes     Frequency: 28.0 times per week     Types: Marijuana     Comment: THC Medical marijuana       MEDICATIONS:    Current Outpatient Medications:     cholecalciferol (VITAMIN D3) 1,000 units tablet, Take 3,000 Units by mouth daily, Disp: , Rfl:     diphenhydrAMINE (BENADRYL) 50 MG tablet, Take 50 mg by mouth daily at bedtime as needed for itching, Disp: , Rfl:     dronabinol (MARINOL) 10 MG capsule, , Disp: , Rfl:     ezetimibe (ZETIA) 10 mg tablet, Take 10 mg by mouth daily, Disp: , Rfl:     gabapentin (Neurontin) 100 mg capsule, Take 1 capsule  "(100 mg total) by mouth 3 (three) times a day, Disp: 90 capsule, Rfl: 1    letrozole (FEMARA) 2.5 mg tablet, TAKE 1 TABLET BY MOUTH EVERY DAY, Disp: 90 tablet, Rfl: 3    multivitamin (THERAGRAN) TABS, Take 1 tablet by mouth daily, Disp: , Rfl:     Omega-3 Fatty Acids (FISH OIL PO), Take by mouth, Disp: , Rfl:     penicillin V potassium (VEETID) 500 mg tablet, Take 1 tablet (500 mg total) by mouth every 12 (twelve) hours, Disp: 180 tablet, Rfl: 1    Probiotic Product (PROBIOTIC-10 PO), Take by mouth, Disp: , Rfl:     ALLERGIES:  Allergies   Allergen Reactions    Chlorpromazine Anaphylaxis     PHENOTHIAZINE=ANAPHYLAXIS    Codeine Anaphylaxis     Anaphylaxis  abd pain.    Meperidine Anaphylaxis, Hives and Vomiting    Phenothiazines Anaphylaxis and Throat Swelling     Category: Allergy;     Saccharin - Food Allergy Anaphylaxis    Ampicillin-Sulbactam Sodium Hives    Aspirin GI Intolerance and Abdominal Pain     Severe GERD    Gluten Meal - Food Allergy GI Intolerance    Ibuprofen Hives     H    Levofloxacin Hives    Chocolate - Food Allergy GI Intolerance    Lactose - Food Allergy GI Intolerance    Neomycin Other (See Comments), Edema and Hives     Category: Allergy;   swelling    [place] - Food Allergy Rash    Latex Hives and Rash     Category: Allergy;        REVIEW OF SYSTEMS:  Pertinent items are noted in HPI.  A comprehensive review of systems was negative.    LABS:  HgA1c:   Lab Results   Component Value Date    HGBA1C 5.3 07/13/2021     BMP:   Lab Results   Component Value Date    GLUCOSE 98 05/02/2017    CALCIUM 8.9 11/14/2023    K 3.6 11/14/2023    CO2 24 11/14/2023     11/14/2023    BUN 10 11/14/2023    CREATININE 0.52 (L) 11/14/2023         _____________________________________________________  PHYSICAL EXAMINATION:  Vital signs: /82   Ht 5' 8\" (1.727 m)   Wt 70.3 kg (155 lb)   BMI 23.57 kg/m²   General: well developed and well nourished, alert, oriented times 3, and appears " comfortable  Psychiatric: Normal  HEENT: Trachea Midline, No torticollis  Cardiovascular: No discernable arrhythmia  Pulmonary: No wheezing or stridor  Abdomen: No rebound or guarding  Extremities: No peripheral edema  Skin: No masses, erythema, lacerations, fluctation, ulcerations  Neurovascular: Decreased Sensation to  the Median Nerve, Decreased Sensation to  the Ulnar Nerve, Decreased Sensation to  the Radial Nerve, Weakness to the Median Nerve see below, Weakness to the Ulnar nerve see below, Weakness to the radial nerve see below, and Pulses Intact    MUSCULOSKELETAL EXAMINATION:  Right Hand/Wrist exam:  Pronation 4+/5 with full motion  Supination to neutral  1/5 AROM with wrist extension  PROM with wrist extension to 30 degrees  No retropulsion of thumb  Thumb FPL 5/5  ABD thumb 4-/5  FDP 2/5 to all fingers  FCR 5/5  FCU 5/5    MP PROM at index finger to 60 degrees             Long finger to 50 degrees             Ring and small fingers 30 degrees    PIP PROM at index finger to 70 degrees             Long, ring and small fingers to 60 degrees  DIP PROM at index, long, ring, small fingers to 30 degrees  IP PROM at thumb to 50 degrees      _____________________________________________________  STUDIES REVIEWED:  EMG demonstrates radial nerve palsy at the level of the radial groove       PROCEDURES PERFORMED:  Procedures  No Procedures performed today  Scribe Attestation      I,:   am acting as a scribe while in the presence of the attending physician.:       I,:   personally performed the services described in this documentation    as scribed in my presence.:

## 2024-05-23 ENCOUNTER — APPOINTMENT (OUTPATIENT)
Dept: OCCUPATIONAL THERAPY | Facility: CLINIC | Age: 64
End: 2024-05-23
Payer: MEDICARE

## 2024-05-28 ENCOUNTER — OFFICE VISIT (OUTPATIENT)
Dept: PHYSICAL THERAPY | Facility: CLINIC | Age: 64
End: 2024-05-28
Payer: MEDICARE

## 2024-05-28 DIAGNOSIS — I89.0 LYMPHEDEMA OF LEFT ARM: ICD-10-CM

## 2024-05-28 DIAGNOSIS — S42.344D CLOSED NONDISPLACED SPIRAL FRACTURE OF SHAFT OF RIGHT HUMERUS WITH ROUTINE HEALING, SUBSEQUENT ENCOUNTER: ICD-10-CM

## 2024-05-28 DIAGNOSIS — I89.0 LYMPHEDEMA OF RIGHT ARM: Primary | ICD-10-CM

## 2024-05-28 PROCEDURE — 97112 NEUROMUSCULAR REEDUCATION: CPT

## 2024-05-28 PROCEDURE — 97110 THERAPEUTIC EXERCISES: CPT

## 2024-05-28 PROCEDURE — 97140 MANUAL THERAPY 1/> REGIONS: CPT

## 2024-05-28 NOTE — PROGRESS NOTES
"Daily Note     Today's date: 2024  Patient name: Eileen Matias  : 1960  MRN: [id number]  Referring provider: Rick Lazo DO  Dx:   Encounter Diagnosis     ICD-10-CM    1. Lymphedema of right arm  I89.0       2. Lymphedema of left arm  I89.0       3. Closed nondisplaced spiral fracture of shaft of right humerus with routine healing, subsequent encounter  S42.259D                      Subjective: pt notes achiness in BL UE and shoulder due to the weather.       Objective: See treatment diary below      Assessment: Tolerated treatment well. Patient would benefit from continued PT. Pt deferred progression of resistance due to increased achiness today.       Plan: Continue per plan of care.      FOTO: Goal: Lymph - 57 Shoulder - 49; Eval: Lymph - 5 Shoulder - 4;3/13: Lymph - 42; : Shoulder - 47;4/10 Lymph - 48; : shoulder - 42; : Lymph - 69 Shoulder - 48    EPOC: 24     Shoulder EPOC: 24    RA Lymph DATE: 24       Shoulder:  24       Manual       MLD JH JH 60 min JH     Manual Wrapping       PROM                       Exercise Diary    THEREX       Pt ed    FOTO; updated [id number] min   Elbow/Wrist/hand AROM       Finger web       SL ER       Standing cane 3 way       Counter walkouts   10\" x10    AAROM flex Is Ys 10\" x10  Is Ys 10\" x10    pulleys 2/2  2/2    Ball rollouts flex/scap   Table slides w/ foam roll 2 min    Supine ER cane 10\" x10  10\" x10    Shoulder isometrics IR YTB x10; ER ISO 10x  IR/ER 5\" 10x    SL flexion x20  x10    SL abd x20  x20    Supine shoulder flex x20  x20    IR stretch w/ strap 10\" x10      NEURO RE-ED       Scapular squeeze       Post shoulder rolls       [facility] reaches       Supine punches x20  x20     ball roll up wall 5\" 10x  5\" 10x     rows/pulldowns Gtb/rtb x20  Gtb/rtb x20                                                               Modalities           CP                                 "

## 2024-05-29 ENCOUNTER — OFFICE VISIT (OUTPATIENT)
Dept: OCCUPATIONAL THERAPY | Facility: CLINIC | Age: 64
End: 2024-05-29
Payer: MEDICARE

## 2024-05-29 DIAGNOSIS — R20.0 NUMBNESS AND TINGLING OF LEFT UPPER EXTREMITY: Primary | ICD-10-CM

## 2024-05-29 DIAGNOSIS — R20.2 NUMBNESS AND TINGLING OF LEFT UPPER EXTREMITY: Primary | ICD-10-CM

## 2024-05-29 PROCEDURE — 97140 MANUAL THERAPY 1/> REGIONS: CPT | Performed by: OCCUPATIONAL THERAPIST

## 2024-05-29 PROCEDURE — 97110 THERAPEUTIC EXERCISES: CPT | Performed by: OCCUPATIONAL THERAPIST

## 2024-05-29 NOTE — PROGRESS NOTES
"Daily Note     Today's date: 2024  Patient name: Eileen Matias  : 1960  MRN: [id number]  Referring provider: Rogelio Black MD  Dx:   Encounter Diagnosis     ICD-10-CM    1. Numbness and tingling of left upper extremity  R20.0     R20.2                        Subjective:   \"I am working on it everyday\"  motion      Objective: See treatment diary below    AROM  Sh ER 45  Sh F 90  Sh IR 60  Elbow F 110  FA S  60  Wrist -10/    AROM- tips to palm  II  5.5 cm,  III 5 cm,  IV  5.5 cm,  V  5 cm    Assessment: Tolerated treatment well. Patient would benefit from continued OT;  In neutral, supported on table, active wrist extension is present moving from full flexion to neutral  Active thumb and  digital extension is present.   MP and  IP motion improved at end of session.    Digital flexion PROM>AROM.   Shoulder ER tightness and weakness restricts function.        Plan: Continue per plan of care.  Progress treatment as tolerated.     Continue with  wrist orthotic allowing full digital motion for improved ease of function.  Added elastic strap for fifth MP flexion.  Progress shoulder care for improved functional mobility.     FOTO NV             Precautions Lymphedema; hx desmoid tumors and breast cancer; healing humeral fracture.      POC expires Unit limit Auth  expiration date PT/OT + Visit Limit?   24 NA NA BOMN         Dx R Hum Fx     Dr Jack MILLER @ [facility]         Date   F NV     Visit 7 8 9 10     Manuals 8' 8'  8                                Jt mobs digits, FA MP all, wrist, palmar arch MP arch, MP distract G 1-2  MP arch, MP wrist G 1-2     Ortho fit/train         Neuro Re-Ed          RNG         MNG         CHIKI                  orthotc                           Ther Ex   15'    20   25'   30'     HEP Add P/H supinate to HEP. Encourage prehension.  Added elastic strap to splint for V MP flexion Add passive and active seated ER to HEP Seated passive Sh ER;   Active " "motion after PROM       PROM 1:1 Supinate LLPS  F/b P/H Sup;  Digital PROM f/b P/H fisting Supinate, wrist E, digital F ER shoulder, supinate, wrist E, digital F ER shoulder, elbow F, FA S; wrist E, digital F     Digital PROM AROM   Resisted flexion f/b P/H fists 10x3\" Blocking IP flexion x8 each     Shouder PROM  Supine FF, ER  2x10 each Seated ER PROM f/b AROM hold Seated ER bend at waist     Shoulder AAROM  Prone, scap with F  2x10 Mirror hands ER active Shoulder ER  10x10  Shoulder F  10x10     Elbow AAROM    Elbow F  10x10     FA Sup AAROM    Sup 10x10     TGE Hook to full 10x10 Hook to full   10x10                         Ther Activity 15'   10'       Gripping Y dig flex x10 each Y dig flex   All 2x10       Pinching Y C pins   Web [place]        Prehension Velcro bd  1/2 off  Velcro bd  Full bd,  off on                MHP Pre tx with ace wrap flexion Pre tx with ace flexion wrap.  Pre tx with flexion wrap Pre tx with flexion wrap                      "

## 2024-05-30 ENCOUNTER — TELEPHONE (OUTPATIENT)
Dept: OBGYN CLINIC | Facility: OTHER | Age: 64
End: 2024-05-30

## 2024-05-30 ENCOUNTER — OFFICE VISIT (OUTPATIENT)
Dept: PHYSICAL THERAPY | Facility: CLINIC | Age: 64
End: 2024-05-30
Payer: MEDICARE

## 2024-05-30 DIAGNOSIS — I89.0 LYMPHEDEMA OF RIGHT ARM: Primary | ICD-10-CM

## 2024-05-30 DIAGNOSIS — I89.0 LYMPHEDEMA OF LEFT ARM: ICD-10-CM

## 2024-05-30 DIAGNOSIS — S42.344D CLOSED NONDISPLACED SPIRAL FRACTURE OF SHAFT OF RIGHT HUMERUS WITH ROUTINE HEALING, SUBSEQUENT ENCOUNTER: ICD-10-CM

## 2024-05-30 PROCEDURE — 97112 NEUROMUSCULAR REEDUCATION: CPT

## 2024-05-30 PROCEDURE — 97110 THERAPEUTIC EXERCISES: CPT

## 2024-05-30 PROCEDURE — 97140 MANUAL THERAPY 1/> REGIONS: CPT

## 2024-05-30 NOTE — PROGRESS NOTES
"Daily Note     Today's date: 2024  Patient name: Eileen Matias  : 1960  MRN: [id number]  Referring provider: Rick Lazo DO  Dx:   Encounter Diagnosis     ICD-10-CM    1. Lymphedema of right arm  I89.0       2. Lymphedema of left arm  I89.0       3. Closed nondisplaced spiral fracture of shaft of right humerus with routine healing, subsequent encounter  S42.344D           Start Time: 1015          Subjective: pt reports no new complaints upon arrival.       Objective: See treatment diary below      Assessment: Tolerated treatment well. Patient would benefit from continued PT. Continued with MLD to BL UE to improve lymphatic flow and decrease swelling. Pt continues to struggle with progression of strengthening activities due to achiness but was able to tolerate increased reps t/o session.       Plan: Continue per plan of care.      FOTO: Goal: Lymph - 57 Shoulder - 49; Eval: Lymph - 5 Shoulder - 4;3/13: Lymph - 42; : Shoulder - 47;4/10 Lymph - 48; : shoulder - 42; : Lymph - 69 Shoulder - 48    EPOC: 24     Shoulder EPOC: 24   RA Lymph DATE: 24       Shoulder:  24       Manual       MLD JH JH 60 min JH  JH   Manual Wrapping       PROM                       Exercise Diary    THEREX       Pt ed    FOTO; updated [id number] min   Elbow/Wrist/hand AROM       Finger web       SL ER       Standing cane 3 way       Counter walkouts       AAROM flex Is Ys 10\" x10 Is Ys 10\" x10     pulleys 2/2 2/2     Ball rollouts flex/scap       Supine ER cane 10\" x10 10\" x10     Shoulder isometrics IR YTB x10; ER ISO 10x IR YTB x10; ER ISO 10x     SL flexion x20 x20     SL abd x20 x25     Supine shoulder flex x20 x25     IR stretch w/ strap 10\" x10 10\" x10     NEURO RE-ED       Scapular squeeze       Post shoulder rolls       [facility] reaches       Supine punches x20 x25      ball roll up wall 5\" 10x 5\" 10x      rows/pulldowns Gtb/rtb x20 GTB/rtb x25/x20       "                                                          Modalities           CP

## 2024-05-30 NOTE — TELEPHONE ENCOUNTER
I called patient and left message to call us back so we can reschedule fitting appt. In [facility]. Call back #: [phone].

## 2024-06-03 ENCOUNTER — OFFICE VISIT (OUTPATIENT)
Dept: PHYSICAL THERAPY | Facility: CLINIC | Age: 64
End: 2024-06-03
Payer: MEDICARE

## 2024-06-03 DIAGNOSIS — I89.0 LYMPHEDEMA OF RIGHT ARM: Primary | ICD-10-CM

## 2024-06-03 DIAGNOSIS — S42.344D CLOSED NONDISPLACED SPIRAL FRACTURE OF SHAFT OF RIGHT HUMERUS WITH ROUTINE HEALING, SUBSEQUENT ENCOUNTER: ICD-10-CM

## 2024-06-03 DIAGNOSIS — C50.912 CARCINOMA OF LEFT BREAST METASTATIC TO AXILLARY LYMPH NODE (HCC): ICD-10-CM

## 2024-06-03 DIAGNOSIS — C77.3 CARCINOMA OF LEFT BREAST METASTATIC TO AXILLARY LYMPH NODE (HCC): ICD-10-CM

## 2024-06-03 DIAGNOSIS — I89.0 LYMPHEDEMA OF LEFT ARM: ICD-10-CM

## 2024-06-03 PROCEDURE — 97140 MANUAL THERAPY 1/> REGIONS: CPT

## 2024-06-03 PROCEDURE — 97112 NEUROMUSCULAR REEDUCATION: CPT

## 2024-06-03 PROCEDURE — 97110 THERAPEUTIC EXERCISES: CPT

## 2024-06-03 NOTE — PROGRESS NOTES
"Daily Note     Today's date: 6/3/2024  Patient name: Eileen Matias  : 1960  MRN: [id number]  Referring provider: Rick Lazo DO  Dx:   Encounter Diagnosis     ICD-10-CM    1. Lymphedema of right arm  I89.0       2. Carcinoma of left breast metastatic to axillary lymph node (HCC)  C50.912     C77.3       3. Lymphedema of left arm  I89.0       4. Closed nondisplaced spiral fracture of shaft of right humerus with routine healing, subsequent encounter  S42.344D                      Subjective: pt reports arms are feeling better with the nicer weather.       Objective: See treatment diary below      Assessment: Tolerated treatment well. Patient would benefit from continued PT. Increased resistance w/ strengthening w/ good tolerance. Cueing on form t/o session.       Plan: Continue per plan of care.      FOTO: Goal: Lymph - 57 Shoulder - 49; Eval: Lymph - 5 Shoulder - 4;3/13: Lymph - 42; : Shoulder - 47;4/10 Lymph - 48; : shoulder - 42; : Lymph - 69 Shoulder - 48    EPOC: 24     Shoulder EPOC: 24 6/3  5/28 5/30   RA Lymph DATE: 24       Shoulder:  24       Manual       MLD [facility]   Manual Wrapping       PROM                       Exercise Diary  6/3    THEREX       Pt ed       Elbow/Wrist/hand AROM       Finger web       SL ER   20x    Standing cane 3 way       Counter walkouts       AAROM flex Is Ys 10\" x10 Is Ys 10\" x10 1# Is Ys 10\" x10    pulleys 2/2 2/2 2/2    Ball rollouts flex/scap       Supine ER cane 10\" x10 10\" x10 10\" x10    Shoulder isometrics IR YTB x10; ER ISO 10x IR YTB x10; ER ISO 10x 5\" 10x    SL flexion x20 x20 20x    SL abd x20 x25 20x    Supine shoulder flex x20 x25 1# 10x    IR stretch w/ strap 10\" x10 10\" x10 10\" 10x    NEURO RE-ED       Scapular squeeze       Post shoulder rolls       [facility] reaches       Supine punches x20 x25 1# 10x     ball roll up wall 5\" 10x 5\" 10x nv     rows/pulldowns Gtb/rtb x20 GTB/rtb x25/x20 GTB/RTB 20x ea               "                                                 Modalities           CP

## 2024-06-04 ENCOUNTER — OFFICE VISIT (OUTPATIENT)
Dept: OCCUPATIONAL THERAPY | Facility: CLINIC | Age: 64
End: 2024-06-04
Payer: MEDICARE

## 2024-06-04 DIAGNOSIS — R20.0 NUMBNESS AND TINGLING OF LEFT UPPER EXTREMITY: Primary | ICD-10-CM

## 2024-06-04 DIAGNOSIS — R20.2 NUMBNESS AND TINGLING OF LEFT UPPER EXTREMITY: Primary | ICD-10-CM

## 2024-06-04 PROCEDURE — 97110 THERAPEUTIC EXERCISES: CPT | Performed by: OCCUPATIONAL THERAPIST

## 2024-06-04 NOTE — PROGRESS NOTES
"Daily Note     Today's date: 2024  Patient name: Eileen Matias  : 1960  MRN: [id number]  Referring provider: Rogelio Black MD  Dx:   Encounter Diagnosis     ICD-10-CM    1. Numbness and tingling of left upper extremity  R20.0     R20.2                        Subjective:   \"I am working on it everyday\"  motion      Objective: See treatment diary below    AROM  Sh ER 45  Sh F 90  Sh IR 60  Elbow F 110  FA S  60  Wrist -10/    AROM- tips to palm  II  5.5 cm,  III 5 cm,  IV  5.5 cm,  V  5 cm    STRENGTH    [place] #2  L  65.6 lbs,  [place] #4  R  15.6 lbs     Assessment: Tolerated treatment well. Patient would benefit from continued OT;  In neutral, supported on table, active wrist extension is present moving from full flexion to neutral  Active thumb and  digital extension is present.   MP and  IP motion improved at end of session.    Digital flexion PROM>AROM.   Shoulder ER tightness and weakness restricts function.        Plan: Continue per plan of care.  Progress treatment as tolerated.     Continue with  wrist orthotic allowing full digital motion for improved ease of function.  Added elastic strap for fifth MP flexion.  Progress shoulder care for improved functional mobility.     FOTO NV             Precautions Lymphedema; hx desmoid tumors and breast cancer; healing humeral fracture.      POC expires Unit limit Auth  expiration date PT/OT + Visit Limit?   24 NA NA BOMN         Dx R Hum Fx     Dr Jack MILLER @ [facility]         Date    6/4  F NV    Visit 7 8 9 10 11    Manuals 8' 8'  8 8'                               Jt mobs digits, FA MP all, wrist, palmar arch MP arch, MP distract G 1-2  MP arch, MP wrist G 1-2 MP arch, wrist G 1-2    Ortho fit/train         Neuro Re-Ed          RNG         MNG         CHIKI                  orthotc                           Ther Ex   15'    20   25'   30'   30'    HEP Add P/H supinate to HEP. Encourage prehension.  Added elastic strap to " "splint for V MP flexion Add passive and active seated ER to HEP Seated passive Sh ER;   Active motion after PROM       PROM 1:1 Supinate LLPS  F/b P/H Sup;  Digital PROM f/b P/H fisting Supinate, wrist E, digital F ER shoulder, supinate, wrist E, digital F ER shoulder, elbow F, FA S; wrist E, digital F ER shoulder, elbow E/F  FA S/P  Wrist E/F   Digital F    Digital PROM AROM   Resisted flexion f/b P/H fists 10x3\" Blocking IP flexion x8 each IP flexion   X1o each    UE warm up     UBE   2F 2R L1    Shouder PROM  Supine FF, ER  2x10 each Seated ER PROM f/b AROM hold Seated ER bend at waist     Shoulder AAROM  Prone, scap with F  2x10 Mirror hands ER active Shoulder ER  10x10  Shoulder F  10x10     Elbow AAROM    Elbow F  10x10 Elbow E F 10x10    FA Sup AAROM    Sup 10x10 Sup 10x10    TGE Hook to full 10x10 Hook to full   10x10   P/H fists   10x10  Stab wrist                      Ther Activity 15'   10'       Gripping Y dig flex x10 each Y dig flex   All 2x10       Pinching Y C pins   Web [place]        Prehension Velcro bd  1/2 off  Velcro bd  Full bd,  off on                MHP Pre tx with ace wrap flexion Pre tx with ace flexion wrap.  Pre tx with flexion wrap Pre tx with flexion wrap Pre tx with flexion wrap.                     "

## 2024-06-05 ENCOUNTER — OFFICE VISIT (OUTPATIENT)
Dept: PHYSICAL THERAPY | Facility: CLINIC | Age: 64
End: 2024-06-05
Payer: MEDICARE

## 2024-06-05 DIAGNOSIS — S42.344D CLOSED NONDISPLACED SPIRAL FRACTURE OF SHAFT OF RIGHT HUMERUS WITH ROUTINE HEALING, SUBSEQUENT ENCOUNTER: ICD-10-CM

## 2024-06-05 DIAGNOSIS — I89.0 LYMPHEDEMA OF LEFT ARM: ICD-10-CM

## 2024-06-05 DIAGNOSIS — I89.0 LYMPHEDEMA OF RIGHT ARM: Primary | ICD-10-CM

## 2024-06-05 PROCEDURE — 97112 NEUROMUSCULAR REEDUCATION: CPT

## 2024-06-05 PROCEDURE — 97140 MANUAL THERAPY 1/> REGIONS: CPT

## 2024-06-05 PROCEDURE — 97110 THERAPEUTIC EXERCISES: CPT

## 2024-06-05 NOTE — PROGRESS NOTES
"Daily Note     Today's date: 2024  Patient name: Eileen Matias  : 1960  MRN: [id number]  Referring provider: Rick Lazo DO  Dx:   Encounter Diagnosis     ICD-10-CM    1. Lymphedema of right arm  I89.0       2. Lymphedema of left arm  I89.0       3. Closed nondisplaced spiral fracture of shaft of right humerus with routine healing, subsequent encounter  S42.774D           Start Time: 1016          Subjective: pt reports no new major complaints upon arrival.       Objective: See treatment diary below      Assessment: Tolerated treatment well. Patient would benefit from continued PT. Did not progress this session to see carry over from progressions made previous session.       Plan: Continue per plan of care.      FOTO: Goal: Lymph - 57 Shoulder - 49; Eval: Lymph - 5 Shoulder - 4;3/13: Lymph - 42; : Shoulder - 47;4/10 Lymph - 48; : shoulder - 42; : Lymph - 69 Shoulder - 48    EPOC: 24     Shoulder EPOC: 24 6/3 6   RA Lymph DATE: 24       Shoulder:  24       Manual       MLD [facility]   Manual Wrapping       PROM                       Exercise Diary 5/28 5/30 6/3 6/5   THEREX       Pt ed       Elbow/Wrist/hand AROM       Finger web       SL ER   20x 20x   Standing cane 3 way       Counter walkouts       AAROM flex Is Ys 10\" x10 Is Ys 10\" x10 1# Is Ys 10\" x10 Is Ys 10\" x10   pulleys 2/2 2/2 2/2 2/2   Ball rollouts flex/scap       Supine ER cane 10\" x10 10\" x10 10\" x10 10\" x10   Shoulder isometrics IR YTB x10; ER ISO 10x IR YTB x10; ER ISO 10x 5\" 10x 5\" 10x   SL flexion x20 x20 20x 20x   SL abd x20 x25 20x 20x   Supine shoulder flex x20 x25 1# 10x 1# 10x   IR stretch w/ strap 10\" x10 10\" x10 10\" 10x 10\" 10x   NEURO RE-ED       Scapular squeeze       Post shoulder rolls       [facility] reaches       Supine punches x20 x25 1# 10x 1# 10x    ball roll up wall 5\" 10x 5\" 10x nv     rows/pulldowns Gtb/rtb x20 GTB/rtb x25/x20 GTB/RTB 20x ea GTB/RTB 20x ea                 "                                              Modalities           CP

## 2024-06-07 ENCOUNTER — APPOINTMENT (OUTPATIENT)
Dept: OCCUPATIONAL THERAPY | Facility: CLINIC | Age: 64
End: 2024-06-07
Payer: MEDICARE

## 2024-06-10 ENCOUNTER — OFFICE VISIT (OUTPATIENT)
Dept: PHYSICAL THERAPY | Facility: CLINIC | Age: 64
End: 2024-06-10
Payer: MEDICARE

## 2024-06-10 DIAGNOSIS — S42.344D CLOSED NONDISPLACED SPIRAL FRACTURE OF SHAFT OF RIGHT HUMERUS WITH ROUTINE HEALING, SUBSEQUENT ENCOUNTER: ICD-10-CM

## 2024-06-10 DIAGNOSIS — I89.0 LYMPHEDEMA OF LEFT ARM: ICD-10-CM

## 2024-06-10 DIAGNOSIS — I89.0 LYMPHEDEMA OF RIGHT ARM: Primary | ICD-10-CM

## 2024-06-10 PROCEDURE — 97110 THERAPEUTIC EXERCISES: CPT

## 2024-06-10 PROCEDURE — 97140 MANUAL THERAPY 1/> REGIONS: CPT

## 2024-06-10 NOTE — PROGRESS NOTES
"Daily Note     Today's date: 6/10/2024  Patient name: Eileen Matias  : 1960  MRN: [id number]  Referring provider: Rick Lazo DO  Dx:   Encounter Diagnosis     ICD-10-CM    1. Lymphedema of right arm  I89.0       2. Lymphedema of left arm  I89.0       3. Closed nondisplaced spiral fracture of shaft of right humerus with routine healing, subsequent encounter  S42.438D                      Subjective: pt reports feeling achy today.       Objective: See treatment diary below      Assessment: Tolerated treatment well. Patient would benefit from continued PT. Able to slowly progress repetitions t/o session. Improved control w/ ball roll ups exercise.        Plan: Continue per plan of care.      FOTO: Goal: Lymph - 57 Shoulder - 49; Eval: Lymph - 5 Shoulder - 4;3/13: Lymph - 42; : Shoulder - 47;4/10 Lymph - 48; : shoulder - 42; : Lymph - 69 Shoulder - 48    EPOC: 24     Shoulder EPOC: 7/17/24 6/3 6/5 6/10    RA Lymph DATE: 24       Shoulder:  24       Manual       MLD JH JH JH    Manual Wrapping       PROM                       Exercise Diary 6/10  6/3 6/5   THEREX       Pt ed       Elbow/Wrist/hand AROM       Finger web       SL ER 20x  20x 20x   Standing cane 3 way       Counter walkouts       AAROM flex Is Ys 10\" x10  1# Is Ys 10\" x10 Is Ys 10\" x10   pulleys 2/2  2/2 2/2   Ball rollouts flex/scap       Supine ER cane 10\" x10  10\" x10 10\" x10   Shoulder isometrics 5\" x15  5\" 10x 5\" 10x   SL flexion 20x  20x 20x   SL abd 20x  20x 20x   Supine shoulder flex 1# x15  1# 10x 1# 10x   IR stretch w/ strap 10\" x10  10\" 10x 10\" 10x   NEURO RE-ED       Scapular squeeze       Post shoulder rolls       [facility] reaches       Supine punches 1# 15x  1# 10x 1# 10x    ball roll up wall 2x10  nv     rows/pulldowns GTB/RTB 20x ea  GTB/RTB 20x ea GTB/RTB 20x ea                                                              Modalities           CP                                                              "

## 2024-06-11 ENCOUNTER — OFFICE VISIT (OUTPATIENT)
Dept: OCCUPATIONAL THERAPY | Facility: CLINIC | Age: 64
End: 2024-06-11
Payer: MEDICARE

## 2024-06-11 DIAGNOSIS — R20.0 NUMBNESS AND TINGLING OF LEFT UPPER EXTREMITY: Primary | ICD-10-CM

## 2024-06-11 DIAGNOSIS — R20.2 NUMBNESS AND TINGLING OF LEFT UPPER EXTREMITY: Primary | ICD-10-CM

## 2024-06-11 PROCEDURE — 97530 THERAPEUTIC ACTIVITIES: CPT | Performed by: OCCUPATIONAL THERAPIST

## 2024-06-11 PROCEDURE — 97110 THERAPEUTIC EXERCISES: CPT | Performed by: OCCUPATIONAL THERAPIST

## 2024-06-11 NOTE — PROGRESS NOTES
"Daily Note     Today's date: 2024  Patient name: Eileen Matias  : 1960  MRN: [id number]  Referring provider: Rogelio Black MD  Dx:   Encounter Diagnosis     ICD-10-CM    1. Numbness and tingling of left upper extremity  R20.0     R20.2                        Subjective:   \"I am working on it everyday\"  motion      Objective: See treatment diary below    AROM  Sh ER 45  Sh F 90  Sh IR 60  Elbow F 110  FA S  60  Wrist -10    AROM- tips to palm  II  5.5 cm,  III 5 cm,  IV  5.5 cm,  V  5 cm    STRENGTH    Lincoln #2  L  65.6 lbs,  Lincoln #4  R  15.6 lbs     Assessment: Tolerated treatment well. Patient would benefit from continued OT;  In neutral, supported on table, active wrist extension is present moving from full flexion to neutral  Active thumb and  digital extension is present.   MP and  IP motion improved at end of session.    Digital flexion PROM>AROM.   Shoulder ER tightness and weakness restricts function.        Plan: Continue per plan of care.  Progress treatment as tolerated.     Continue with  wrist orthotic allowing full digital motion for improved ease of function. Cont  elastic strap for fifth MP flexion.  Progress shoulder care for improved functional mobility focus on ER mobility and scapular strengthening.  Add functional tasks.                  Precautions Lymphedema; hx desmoid tumors and breast cancer; healing humeral fracture.      POC expires Unit limit Auth  expiration date PT/OT + Visit Limit?   24 NA NA BOMN         Dx R Hum Fx     Dr Jack MILLER Not met              Date    6/  F NV    Visit 7 8 9 10 11 12   Manuals 8' 8'  8 8'                               Jt mobs digits, FA MP all, wrist, palmar arch MP arch, MP distract G 1-2  MP arch, MP wrist G 1-2 MP arch, wrist G 1-2    Ortho fit/train         Neuro Re-Ed          RNG         MNG         CHIKI                  orthotc                           Ther Ex   15'    20   25'   30'   30'   30' " "  HEP Add P/H supinate to HEP. Encourage prehension.  Added elastic strap to splint for V MP flexion Add passive and active seated ER to HEP Seated passive Sh ER;   Active motion after PROM    ER HEP stading, laying  4 positions instruction   PROM 1:1 Supinate LLPS  F/b P/H Sup;  Digital PROM f/b P/H fisting Supinate, wrist E, digital F ER shoulder, supinate, wrist E, digital F ER shoulder, elbow F, FA S; wrist E, digital F ER shoulder, elbow E/F  FA S/P  Wrist E/F   Digital F ER S; elbow E/F  Wrist E/F   Digital F   Digital PROM AROM   Resisted flexion f/b P/H fists 10x3\" Blocking IP flexion x8 each IP flexion   X1o each IP flexion   X10 each   UE warm up     UBE   2F 2R L1 UBE  2F 2R L1   Shouder PROM  Supine FF, ER  2x10 each Seated ER PROM f/b AROM hold Seated ER bend at waist     Shoulder AROM  Prone, scap with F  2x10 Mirror hands ER active Shoulder ER  10x10  Shoulder F  10x10  Scaption, sh blades  2x10   Elbow AAROM    Elbow F  10x10 Elbow E F 10x10    FA Sup AROM    Sup 10x10 Sup 10x10 P/H Sup  10x10   TGE Hook to full 10x10 Hook to full   10x10   P/H fists   10x10  Stab wrist P/H fists  10x10 in splint                     Ther Activity 15'   10'     8'   Carry      B F bar  2 laps   Arm swinging      Walk with swing, 3'   Gripping Y dig flex x10 each Y dig flex   All 2x10       Pinching Y C pins   Web [place]        Prehension Velcro bd  1/2 off  Velcro bd  Full bd,  off on                MHP Pre tx with ace wrap flexion Pre tx with ace flexion wrap.  Pre tx with flexion wrap Pre tx with flexion wrap Pre tx with flexion wrap. Pre tx with ace flexion                    "

## 2024-06-12 ENCOUNTER — OFFICE VISIT (OUTPATIENT)
Dept: PHYSICAL THERAPY | Facility: CLINIC | Age: 64
End: 2024-06-12
Payer: MEDICARE

## 2024-06-12 DIAGNOSIS — S42.344D CLOSED NONDISPLACED SPIRAL FRACTURE OF SHAFT OF RIGHT HUMERUS WITH ROUTINE HEALING, SUBSEQUENT ENCOUNTER: ICD-10-CM

## 2024-06-12 DIAGNOSIS — I89.0 LYMPHEDEMA OF RIGHT ARM: Primary | ICD-10-CM

## 2024-06-12 DIAGNOSIS — I89.0 LYMPHEDEMA OF LEFT ARM: ICD-10-CM

## 2024-06-12 PROCEDURE — 97110 THERAPEUTIC EXERCISES: CPT

## 2024-06-12 PROCEDURE — 97112 NEUROMUSCULAR REEDUCATION: CPT

## 2024-06-12 PROCEDURE — 97140 MANUAL THERAPY 1/> REGIONS: CPT

## 2024-06-12 NOTE — PROGRESS NOTES
"Daily Note     Today's date: 2024  Patient name: Eileen Matias  : 1960  MRN: [id number]  Referring provider: Rick Lazo DO  Dx:   Encounter Diagnosis     ICD-10-CM    1. Lymphedema of right arm  I89.0       2. Lymphedema of left arm  I89.0       3. Closed nondisplaced spiral fracture of shaft of right humerus with routine healing, subsequent encounter  S42.771D                      Subjective: pt reports no new complaints upon arrival.       Objective: See treatment diary below      Assessment: Tolerated treatment well. Patient would benefit from continued PT. Progressed resistance w/ strengthening w/ good tolerance. Fatigued easily. Pt experienced some pain in the shoulder w/ supine punches. Improved ability to perform wall slides, able to get to 6 or 7 reps before she has increased difficulty and starts compensating.       Plan: Continue per plan of care.      FOTO: Goal: Lymph - 57 Shoulder - 49; Eval: Lymph - 5 Shoulder - 4;3/13: Lymph - 42; : Shoulder - 47;4/10 Lymph - 48; : shoulder - 42; : Lymph - 69 Shoulder - 48    EPOC: 24     Shoulder EPOC: 24 6/3 6/5 6/10 6/12   RA Lymph DATE: 24       Shoulder:  24       Manual       MLD [facility]   Manual Wrapping       PROM                       Exercise Diary 6/10 6/12     THEREX       Pt ed       Elbow/Wrist/hand AROM       Finger web       SL ER 20x 20x     Standing cane 3 way       Counter walkouts       AAROM flex Is Ys 10\" x10 Is Ys 10\" x10     pulleys 2/2 2/2     Ball rollouts flex/scap       Supine ER cane 10\" x10 10\" x10     Shoulder isometrics 5\" x15 5\" x15     SL flexion 20x 20x      SL abd 20x 1# 10x     Supine shoulder flex 1# x15 1# 15x     IR stretch w/ strap 10\" x10 10\" x10     NEURO RE-ED       Scapular squeeze       Post shoulder rolls       [facility] reaches       Supine punches 1# 15x 1# 15x      ball roll up wall 2x10 Wall slides x10      rows/pulldowns GTB/RTB 20x ea GTB 20x/10x                        "                                         Modalities           CP

## 2024-06-13 ENCOUNTER — OFFICE VISIT (OUTPATIENT)
Dept: OCCUPATIONAL THERAPY | Facility: CLINIC | Age: 64
End: 2024-06-13
Payer: MEDICARE

## 2024-06-13 DIAGNOSIS — R20.2 NUMBNESS AND TINGLING OF LEFT UPPER EXTREMITY: Primary | ICD-10-CM

## 2024-06-13 DIAGNOSIS — R20.0 NUMBNESS AND TINGLING OF LEFT UPPER EXTREMITY: Primary | ICD-10-CM

## 2024-06-13 PROCEDURE — 97110 THERAPEUTIC EXERCISES: CPT | Performed by: OCCUPATIONAL THERAPIST

## 2024-06-13 PROCEDURE — 97140 MANUAL THERAPY 1/> REGIONS: CPT | Performed by: OCCUPATIONAL THERAPIST

## 2024-06-13 NOTE — PROGRESS NOTES
"Daily Note     Today's date: 2024  Patient name: Eileen Matias  : 1960  MRN: [id number]  Referring provider: Rogelio Black MD  Dx:   Encounter Diagnosis     ICD-10-CM    1. Numbness and tingling of left upper extremity  R20.0     R20.2                        Subjective:   \"I am working on it everyday\"  motion  \"I stretch my hand into a fist three times a day\"      Objective: See treatment diary below    AROM  Sh ER 45  Sh F 90  Sh IR 60  Elbow F 110  FA S  70  Wrist -10    AROM- tips to palm  II  5.5 cm,  III 5 cm,  IV  5.5 cm,  V  5 cm    STRENGTH    Lincoln #2  L  65.6 lbs,  Lincoln #4  R  15.6 lbs     Assessment: Tolerated treatment well. Patient would benefit from continued OT;  In neutral, supported on table, active wrist extension is present moving from full flexion to neutral  Active thumb and  digital extension is present.   MP and  IP motion improved at end of session.    Digital flexion PROM>AROM.   Shoulder ER tightness and weakness restricts function.        Plan: Continue per plan of care.  Progress treatment as tolerated.     Continue with  wrist orthotic allowing full digital motion for improved ease of function. Cont  elastic strap for fifth MP flexion.  Progress shoulder care for improved functional mobility focus on ER mobility and scapular strengthening.  Add functional tasks.   NV remeasure.                  Precautions Lymphedema; hx desmoid tumors and breast cancer; healing humeral fracture.      POC expires Unit limit Auth  expiration date PT/OT + Visit Limit?   24 NA NA BOMN         Dx R Hum Fx     Dr Jack MILLER Not met              Date   F NV    Visit 13   10 11 12   Manuals 5'   8 8'                               Jt mobs digits, FA MP arch, MP joints G  1-2   MP arch, MP wrist G 1-2 MP arch, wrist G 1-2    Ortho fit/train         Neuro Re-Ed          RNG         MNG         CHIKI                  orthotc                           Ther Ex 30'     " 30'   30'   30'   HEP    Seated passive Sh ER;   Active motion after PROM    ER HEP stading, laying  4 positions instruction   PROM 1:1 Sh ER; elbow F, FA Sup, wrist E, digital F   ER shoulder, elbow F, FA S; wrist E, digital F ER shoulder, elbow E/F  FA S/P  Wrist E/F   Digital F ER S; elbow E/F  Wrist E/F   Digital F   Digital PROM AROM IP resisted flexion x10  F pulls x10   Blocking IP flexion x8 each IP flexion   X1o each IP flexion   X10 each   UE warm up UBE   2F 2R L1    UBE   2F 2R L1 UBE  2F 2R L1   Shouder PRE Y T tube  Retract 2x10  Sh E 2x10  Sh ER 2x10   Seated ER bend at waist     Shoulder AROM    Shoulder ER  10x10  Shoulder F  10x10  Scaption, sh blades  2x10   Elbow AAROM    Elbow F  10x10 Elbow E F 10x10    FA Sup AROM P/H sup 10x10   Sup 10x10 Sup 10x10 P/H Sup  10x10   TGE P/H fists 10x10    P/H fists   10x10  Stab wrist P/H fists  10x10 in splint                     Ther Activity 5'      8'   Carry #5 carry 1 lap     B F bar  2 laps   Arm swinging Ambulating     Walk with swing, 3'   Gripping         Pinching         Prehension                  MHP Pre tx   Pre tx with flexion wrap Pre tx with flexion wrap. Pre tx with ace flexion

## 2024-06-17 ENCOUNTER — OFFICE VISIT (OUTPATIENT)
Dept: PHYSICAL THERAPY | Facility: CLINIC | Age: 64
End: 2024-06-17
Payer: MEDICARE

## 2024-06-17 ENCOUNTER — TELEPHONE (OUTPATIENT)
Dept: HEMATOLOGY ONCOLOGY | Facility: CLINIC | Age: 64
End: 2024-06-17

## 2024-06-17 DIAGNOSIS — I89.0 LYMPHEDEMA OF LEFT ARM: ICD-10-CM

## 2024-06-17 DIAGNOSIS — I89.0 LYMPHEDEMA OF RIGHT ARM: Primary | ICD-10-CM

## 2024-06-17 DIAGNOSIS — S42.344D CLOSED NONDISPLACED SPIRAL FRACTURE OF SHAFT OF RIGHT HUMERUS WITH ROUTINE HEALING, SUBSEQUENT ENCOUNTER: ICD-10-CM

## 2024-06-17 PROCEDURE — 97140 MANUAL THERAPY 1/> REGIONS: CPT

## 2024-06-17 PROCEDURE — 97112 NEUROMUSCULAR REEDUCATION: CPT

## 2024-06-17 PROCEDURE — 97110 THERAPEUTIC EXERCISES: CPT

## 2024-06-17 NOTE — PROGRESS NOTES
"Daily Note     Today's date: 2024  Patient name: Eileen Matias  : 1960  MRN: [id number]  Referring provider: Rick Lazo DO  Dx:   Encounter Diagnosis     ICD-10-CM    1. Lymphedema of right arm  I89.0       2. Lymphedema of left arm  I89.0       3. Closed nondisplaced spiral fracture of shaft of right humerus with routine healing, subsequent encounter  S42.506D                      Subjective: pt reports feeling about the same.       Objective: See treatment diary below      Assessment: Tolerated treatment well. Patient would benefit from continued PT. Continued to work on BUE to improve lymphatic flow and decrease swelling. Continued working on control w/ OH slides and strengthening.       Plan: Continue per plan of care.      FOTO: Goal: Lymph - 57 Shoulder - 49; Eval: Lymph - 5 Shoulder - 4;3/13: Lymph - 42; : Shoulder - 47;4/10 Lymph - 48; : shoulder - 42; : Lymph - 69 Shoulder - 48    EPOC: 24     Shoulder EPOC: 7/17/24 6/17  6/10 6/12   RA Lymph DATE: 24       Shoulder:  24       Manual       MLD [facility]  [facility]   Manual Wrapping       PROM                       Exercise Diary 6/10 6/12 6/17    THEREX       Pt ed       Elbow/Wrist/hand AROM       Finger web       SL ER 20x 20x     Standing shoulder flexion/scaption   2x10 ea    Counter walkouts       AAROM flex Is Ys 10\" x10 Is Ys 10\" x10 Is Ys 10\" x10    pulleys 2/2 2/2 2/2    Ball rollouts flex/scap   Wall slides x10    Supine ER cane 10\" x10 10\" x10 10\" x10    Shoulder isometrics 5\" x15 5\" x15     SL flexion 20x 20x      SL abd 20x 1# 10x Standing scaption    Supine shoulder flex 1# x15 1# 15x standing    IR stretch w/ strap 10\" x10 10\" x10 10\" x10    NEURO RE-ED                     [facility] reaches       Supine punches 1# 15x 1# 15x 1# 15x     ball roll up wall 2x10 Wall slides x10 TE     rows/pulldowns GTB/RTB 20x ea GTB 20x/10x GTB 20x/20x                                                               Modalities         "   CP

## 2024-06-17 NOTE — TELEPHONE ENCOUNTER
Patient Call    Who are you speaking with? Stefanie - [facility]     If it is not the patient, are they listed on an active communication consent form? N/A   What is the reason for this call? Stefanie calling in regards to patient's care. Stefanie states that Dr Bc Rojas would like to speak with Dr Motley in regards to patient's care.  Stefanie provided Dr Rojas's cell phone number for Dr Motley to call back.  Stefanie also provided her direct phone number.       Does this require a call back? Yes   If a call back is required, please list best call back number [phone] - Dr Rojas's cell  [phone]-Stefanie   If a call back is required, advise that a message will be forwarded to their care team and someone will return their call as soon as possible.   Did you relay this information to the patient? Yes

## 2024-06-18 ENCOUNTER — OFFICE VISIT (OUTPATIENT)
Dept: OCCUPATIONAL THERAPY | Facility: CLINIC | Age: 64
End: 2024-06-18
Payer: MEDICARE

## 2024-06-18 DIAGNOSIS — R20.0 NUMBNESS AND TINGLING OF LEFT UPPER EXTREMITY: Primary | ICD-10-CM

## 2024-06-18 DIAGNOSIS — R20.2 NUMBNESS AND TINGLING OF LEFT UPPER EXTREMITY: Primary | ICD-10-CM

## 2024-06-18 PROCEDURE — 97110 THERAPEUTIC EXERCISES: CPT | Performed by: OCCUPATIONAL THERAPIST

## 2024-06-18 NOTE — TELEPHONE ENCOUNTER
Called sree back and left a message advising dr. Motley was off yesterday and he is in the or today. I left my office number for sree to call back if I can be of any assistance.

## 2024-06-18 NOTE — PROGRESS NOTES
"Daily Note     Today's date: 2024  Patient name: Eileen Matias  : 1960  MRN: [id number]  Referring provider: Rogelio Black MD  Dx:   Encounter Diagnosis     ICD-10-CM    1. Numbness and tingling of left upper extremity  R20.0     R20.2                        Subjective:   \"I am working on it everyday\"  motion  \"I stretch my hand into a fist three times a day\"      Objective: See treatment diary below    AROM  Sh ER 45  Sh F 90  Sh IR 60  Elbow F 110  FA S  70  Wrist -10    AROM- tips to palm  II  5.5 cm,  III 5 cm,  IV  5.5 cm,  V  5 cm    STRENGTH    Lincoln #2  L  65.6 lbs,  Lincoln #4  R  15.6 lbs     Assessment: Tolerated treatment well. Patient would benefit from continued OT;  In neutral, supported on table, active wrist extension is present moving from full flexion to neutral  Active thumb and  digital extension is present.   MP and  IP motion improved at end of session.    Digital flexion PROM>AROM. Shoulder ER tightness and weakness restricts function.        Plan: Continue per plan of care.  Progress treatment as tolerated.     Continue with  wrist orthotic allowing full digital motion for improved ease of function. Cont  elastic strap for fifth MP flexion.  Progress shoulder care for improved functional mobility focus on ER mobility and scapular strengthening.  Add bilateral functional tasks.                   Precautions Lymphedema; hx desmoid tumors and breast cancer; healing humeral fracture.      POC expires Unit limit Auth  expiration date PT/OT + Visit Limit?   24 NA NA BOMN         Dx R Hum Fx     Dr Jack MILLER Not met              Date   F NV    Visit 13   10 11 12   Manuals 5'   8 8'                               Jt mobs digits, FA MP arch, MP joints G  1-2   MP arch, MP wrist G 1-2 MP arch, wrist G 1-2    Ortho fit/train         Neuro Re-Ed          RNG         MNG         CHIKI                  orthotc                           Ther Ex 30'   35'    " 30'   30'   30'   HEP  Cont ace flexion wrap; encourange bilateral function. New foams issued  Seated passive Sh ER;   Active motion after PROM    ER HEP stading, laying  4 positions instruction   PROM 1:1 Sh ER; elbow F, FA Sup, wrist E, digital F Sh ER; elbow F, E; FA S/P  Wrist E  Digital F  ER shoulder, elbow F, FA S; wrist E, digital F ER shoulder, elbow E/F  FA S/P  Wrist E/F   Digital F ER S; elbow E/F  Wrist E/F   Digital F   Digital blocked AROM IP resisted flexion x10  F pulls x10 IP F each   x10   F pull x10  Blocking IP flexion x8 each IP flexion   X1o each IP flexion   X10 each   UE warm up UBE   2F 2R L1 Ube   2F 2R L1   UBE   2F 2R L1 UBE  2F 2R L1   Shouder PRE Y T tube  Retract 2x10  Sh E 2x10  Sh ER 2x10 NV  Seated ER bend at waist     PRE FA wrist  Y F bar  Down 2x10  Shoulder ER  10x10  Shoulder F  10x10  Scaption, sh blades  2x10   PRE FA   Y F bar  S/P x20  Elbow F  10x10 Elbow E F 10x10    FA Sup AROM P/H sup 10x10 P/H fists   10x10  Sup 10x10 Sup 10x10 P/H Sup  10x10   TGE HOOK P/H fists 10x10    P/H fists   10x10  Stab wrist P/H fists  10x10 in splint                     Ther Activity 5'   5'     8'   Carry #5 carry 1 lap     B F bar  2 laps   Arm swinging Ambulating     Walk with swing, 3'   Gripping  Y RB foam squares  5x3       Pinching  NV       Prehension                  MHP Pre tx   Pre tx with flexion wrap Pre tx with flexion wrap. Pre tx with ace flexion

## 2024-06-19 ENCOUNTER — OFFICE VISIT (OUTPATIENT)
Dept: PHYSICAL THERAPY | Facility: CLINIC | Age: 64
End: 2024-06-19
Payer: MEDICARE

## 2024-06-19 DIAGNOSIS — S42.344D CLOSED NONDISPLACED SPIRAL FRACTURE OF SHAFT OF RIGHT HUMERUS WITH ROUTINE HEALING, SUBSEQUENT ENCOUNTER: ICD-10-CM

## 2024-06-19 DIAGNOSIS — I89.0 LYMPHEDEMA OF RIGHT ARM: Primary | ICD-10-CM

## 2024-06-19 DIAGNOSIS — I89.0 LYMPHEDEMA OF LEFT ARM: ICD-10-CM

## 2024-06-19 PROCEDURE — 97140 MANUAL THERAPY 1/> REGIONS: CPT

## 2024-06-19 PROCEDURE — 97110 THERAPEUTIC EXERCISES: CPT

## 2024-06-19 NOTE — PROGRESS NOTES
"Daily Note     Today's date: 2024  Patient name: Eileen Matias  : 1960  MRN: [id number]  Referring provider: Rick Lazo DO  Dx:   Encounter Diagnosis     ICD-10-CM    1. Lymphedema of right arm  I89.0       2. Lymphedema of left arm  I89.0       3. Closed nondisplaced spiral fracture of shaft of right humerus with routine healing, subsequent encounter  S42.556D                      Subjective: pt reports no new complaints upon arrival.       Objective: See treatment diary below      Assessment: Tolerated treatment well. Patient would benefit from continued PT. Pt continues to be compliant w/ wearing her compression day and night. Continues to be progressed in UE strengthening w/ fatigue t/o session. Continued to educate about exercises, elevation, and compression.       Plan: Continue per plan of care.      FOTO: Goal: Lymph - 57 Shoulder - 49; Eval: Lymph - 5 Shoulder - 4;3/13: Lymph - 42; : Shoulder - 47;4/10 Lymph - 48; : shoulder - 42; : Lymph - 69 Shoulder - 48    EPOC: 24     Shoulder EPOC: 24     RA Lymph DATE: 24       Shoulder:  24       Manual       MLD [facility]     Manual Wrapping       PROM                       Exercise Diary 6/10 6/12 6/17 6/19   THEREX       Pt ed       Elbow/Wrist/hand AROM       Finger web       SL ER 20x 20x     Standing shoulder flexion/scaption   2x10 ea 2x10 ea   Counter walkouts       AAROM flex Is Ys 10\" x10 Is Ys 10\" x10 Is Ys 10\" x10 Is Ys 10\" x10   pulleys 2/2 2/2 2/2 2/2   Ball rollouts flex/scap   Wall slides x10 Wall slides x10   Supine ER cane 10\" x10 10\" x10 10\" x10 10\" x10   Shoulder isometrics 5\" x15 5\" x15     SL flexion 20x 20x      SL abd 20x 1# 10x Standing scaption    Supine shoulder flex 1# x15 1# 15x standing    IR stretch w/ strap 10\" x10 10\" x10 10\" x10 x20   NEURO RE-ED                     [facility] reaches       Supine punches 1# 15x 1# 15x 1# 15x 1# 15x    ball roll up wall 2x10 Wall slides x10 TE     " rows/pulldowns GTB/RTB 20x ea GTB 20x/10x GTB 20x/20x GTB x20 ea                                                              Modalities           CP

## 2024-06-20 ENCOUNTER — OFFICE VISIT (OUTPATIENT)
Dept: OCCUPATIONAL THERAPY | Facility: CLINIC | Age: 64
End: 2024-06-20
Payer: MEDICARE

## 2024-06-20 DIAGNOSIS — R20.0 NUMBNESS AND TINGLING OF LEFT UPPER EXTREMITY: Primary | ICD-10-CM

## 2024-06-20 DIAGNOSIS — R20.2 NUMBNESS AND TINGLING OF LEFT UPPER EXTREMITY: Primary | ICD-10-CM

## 2024-06-20 PROCEDURE — 97110 THERAPEUTIC EXERCISES: CPT | Performed by: OCCUPATIONAL THERAPIST

## 2024-06-20 PROCEDURE — 97530 THERAPEUTIC ACTIVITIES: CPT | Performed by: OCCUPATIONAL THERAPIST

## 2024-06-20 NOTE — PROGRESS NOTES
"Daily Note     Today's date: 2024  Patient name: Eileen Matias  : 1960  MRN: [id number]  Referring provider: Rogelio Black MD  Dx:   Encounter Diagnosis     ICD-10-CM    1. Numbness and tingling of left upper extremity  R20.0     R20.2                        Subjective:   \"I am working on it everyday\"  motion  \"I stretch my hand into a fist three times a day\"      Objective: See treatment diary below    AROM  Sh ER 45  Sh F 90  Sh IR 60  Elbow F 110  FA S  70  Wrist -10    AROM- tips to palm  II  5.5 cm,  III 5 cm,  IV  5.5 cm,  V  5 cm    STRENGTH    Lincoln #2  L  65.6 lbs,  Lincoln #4  R  15.6 lbs     Assessment: Tolerated treatment well. Patient would benefit from continued OT;  In neutral, supported on table, active wrist extension is present moving from full flexion to neutral  Active thumb and  digital extension is present.   MP and  IP motion improved at end of session.    Digital flexion PROM>AROM. Shoulder ER tightness and weakness restricts function.        Plan: Continue per plan of care.  Progress treatment as tolerated.     Continue with  wrist orthotic allowing full digital motion for improved ease of function. Cont  elastic strap for fifth MP flexion.  Progress shoulder care for improved functional mobility focus on ER mobility and scapular strengthening.  Add bilateral functional tasks.                   Precautions Lymphedema; hx desmoid tumors and breast cancer; healing humeral fracture.      POC expires Unit limit Auth  expiration date PT/OT + Visit Limit?   24 NA NA BOMN         Dx R Hum Fx     Dr Jack MILLER Not met              Date    Visit 13     12   Manuals 5'                                   Jt mobs digits, FA MP arch, MP joints G  1-2        Ortho fit/train         Neuro Re-Ed          RNG         MNG         CHIKI                  orthotc                           Ther Ex 30'   35'   30     30'   HEP  Cont ace flexion wrap; encourange bilateral " function. New foams issued    ER HEP stading, laying  4 positions instruction   PROM 1:1 Sh ER; elbow F, FA Sup, wrist E, digital F Sh ER; elbow F, E; FA S/P  Wrist E  Digital F Sh Er, elbow F FA S/P wrist E, digital F   ER S; elbow E/F  Wrist E/F   Digital F   Digital blocked AROM IP resisted flexion x10  F pulls x10 IP F each   x10   F pull x10 IP F x10 each  F pulls x10   IP flexion   X10 each   UE warm up UBE   2F 2R L1 Ube   2F 2R L1 UBE   2F 2R L1   UBE  2F 2R L1   Shouder PRE Y T tube  Retract 2x10  Sh E 2x10  Sh ER 2x10 NV       PRE FA wrist  Y F bar  Down 2x10 Y F bar  Down 1x10   Scaption, sh blades  2x10   PRE FA   Y F bar  S/P x20       FA Sup AROM P/H sup 10x10 P/H fists   10x10 P/H fists 10x10   P/H Sup  10x10   TGE HOOK P/H fists 10x10  Y web  2x10   P/H fists  10x10 in splint                     Ther Activity 5'   5'   10'    8'   Carry #5 carry 1 lap     B F bar  2 laps   Arm swinging Ambulating     Walk with swing, 3'   Gripping  Y RB foam squares  5x3 Y RB foam squares full cont      Pinching  NV Velcro bd off on       Prehension                  MHP Pre tx  Pre tx ace flexion   Pre tx with ace flexion

## 2024-06-24 ENCOUNTER — OFFICE VISIT (OUTPATIENT)
Dept: OCCUPATIONAL THERAPY | Facility: CLINIC | Age: 64
End: 2024-06-24
Payer: MEDICARE

## 2024-06-24 DIAGNOSIS — R20.2 NUMBNESS AND TINGLING OF LEFT UPPER EXTREMITY: Primary | ICD-10-CM

## 2024-06-24 DIAGNOSIS — R20.0 NUMBNESS AND TINGLING OF LEFT UPPER EXTREMITY: Primary | ICD-10-CM

## 2024-06-24 PROCEDURE — 97110 THERAPEUTIC EXERCISES: CPT

## 2024-06-24 NOTE — PROGRESS NOTES
Daily Note     Today's date: 2024  Patient name: Eileen Matias  : 1960  MRN: [id number]  Referring provider: Rogelio Black MD  Dx:   Encounter Diagnosis     ICD-10-CM    1. Numbness and tingling of left upper extremity  R20.0     R20.2               Start Time: 1330  Stop Time: 1428  Total time in clinic (min): 58 minutes    Subjective:   Patient offered no change in function      Objective: See treatment diary below    Assessment: Tolerated treatment well. Patient would benefit from continued OT. Increased passive range of motion noted post heat/stretch. Patient reported being compliant with heating hand with digits wrapped in flexion. Increased UBE time this date.      Plan: Continue per plan of care.  Progress treatment as tolerated.     Continue with  wrist orthotic allowing full digital motion for improved ease of function. Cont  elastic strap for fifth MP flexion.  Progress shoulder care for improved functional mobility focus on ER mobility and scapular strengthening.  Add bilateral functional tasks.           Precautions: Lymphedema; hx desmoid tumors and breast cancer; healing humeral fracture     POC expires Unit limit Auth  expiration date PT/OT + Visit Limit?   24 NA NA BOMN         Dx R Hum Fx     Dr Jack MILLER Not met     MC         Date    Visit 13   16  12   Manuals 5'                                   Jt mobs digits, FA MP arch, MP joints G  1-2        Ortho fit/train         Neuro Re-Ed          RNG         MNG         CHIKI                  orthotc                           Ther Ex 30'   35'   30     30'   HEP  Cont ace flexion wrap; encourange bilateral function. New foams issued    ER HEP stading, laying  4 positions instruction   PROM 1:1 Sh ER; elbow F, FA Sup, wrist E, digital F Sh ER; elbow F, E; FA S/P  Wrist E  Digital F Sh Er, elbow F FA S/P wrist E, digital F FA, wrist, digits  ER S; elbow E/F  Wrist E/F   Digital F   Digital blocked AROM IP  "resisted flexion x10  F pulls x10 IP F each   x10   F pull x10 IP F x10 each  F pulls x10 IP F x10 each  F pulls x10  IP flexion   X10 each   UE warm up UBE   2F 2R L1 Ube   2F 2R L1 UBE   2F 2R L1 UBE 3F 3R L1  UBE  2F 2R L1   Shouder PRE Y T tube  Retract 2x10  Sh E 2x10  Sh ER 2x10 NV       PRE FA wrist  Y F bar  Down 2x10 Y F bar  Down 1x10 Y F bar down 2x10  Scaption, sh blades  2x10   PRE FA   Y F bar  S/P x20       FA Sup AROM P/H sup 10x10 P/H fists   10x10 P/H fists 10x10 P/H fists 10x10  P/H Sup  10x10   TGE HOOK P/H fists 10x10  Y web  2x10 Y web 2x10 5\" hold  P/H fists  10x10 in splint                     Ther Activity 5'   5'   10'    8'   Carry #5 carry 1 lap     B F bar  2 laps   Arm swinging Ambulating     Walk with swing, 3'   Gripping  Y RB foam squares  5x3 Y RB foam squares full cont Y RB foam     Pinching  NV Velcro bd off on  Velcro board off/on     Prehension                  MHP Pre tx  Pre tx ace flexion Pre tx  Pre tx with ace flexion                 "

## 2024-06-25 ENCOUNTER — EVALUATION (OUTPATIENT)
Dept: PHYSICAL THERAPY | Facility: CLINIC | Age: 64
End: 2024-06-25
Payer: MEDICARE

## 2024-06-25 DIAGNOSIS — I89.0 LYMPHEDEMA OF LEFT ARM: ICD-10-CM

## 2024-06-25 DIAGNOSIS — S42.344D CLOSED NONDISPLACED SPIRAL FRACTURE OF SHAFT OF RIGHT HUMERUS WITH ROUTINE HEALING, SUBSEQUENT ENCOUNTER: ICD-10-CM

## 2024-06-25 DIAGNOSIS — I89.0 LYMPHEDEMA OF RIGHT ARM: Primary | ICD-10-CM

## 2024-06-25 PROCEDURE — 97140 MANUAL THERAPY 1/> REGIONS: CPT

## 2024-06-25 PROCEDURE — 97110 THERAPEUTIC EXERCISES: CPT

## 2024-06-25 NOTE — PROGRESS NOTES
PT Re-Evaluation     Collection of Subjective/Objective data performed by Irene Childs PTA. Assessment, POC, and Goal attainment performed by Jessica Garcia DPT.       Today's date: 2024  Patient name: Eileen Matias  : 1960   MRN: [id number]  Referring provider: Rick Lazo DO  Dx:   Encounter Diagnosis     ICD-10-CM    1. Lymphedema of right arm  I89.0       2. Lymphedema of left arm  I89.0       3. Closed nondisplaced spiral fracture of shaft of right humerus with routine healing, subsequent encounter  S42.344D                              Assessment  Impairments: abnormal or restricted ROM, impaired physical strength and pain with function  Other impairment: lymphedema    Assessment details: Patient is a 64 y/o female who presents to therapy with bilateral upper extremity lymphedema and s/p R humerus fracture and has completed 43 visits to date. Patient demonstrates subjective/objective improvement since starting PT such as improved girth measurements, improved mobility, and improved strength. Patient continues to present with deficits that include decreased mobility and strength, decreased control, and decreased activity tolerance. Patient will benefit from continued skilled PT intervention to address the aforementioned impairments, achieve goals, maximize function, and improve quality of life. Pt is in agreement with this plan.   Understanding of Dx/Px/POC: good     Prognosis: good    Goals  LYMPH GOALS:  ST.Pain decreased by 25% in 4 weeks.--PROGRESSING  2. Edema decreased by 2-3 cm in 4 weeks. --MET    LT. Decrease pain to 1-2/10 at worst by d/c.-PROGRESSING  2. Increase ROM to WFL for all deficient movements by d/c.-PROGRESSING  3. Strength increased to 5 for all deficient muscle groups by d/c.-PROGRESSING  4. IADL performance increased to max function by d/c.-PROGRESSING  5. Recreational performance increased to max function by d/c.-PROGRESSING  6. RUE edema decreased  to that of LUE by d/c. -PROGRESSING  7. Pt will return to work by d/c.-PROGRESSING    SHOULDER GOALS:   ST weeks  Pt will demonstrate good understanding and compliance with HEP--PROGRESSING  Pt will decrease pain to 5/10 at worst  --PROGRESSING  Pt will demonstrate improved postural awareness and ability to self-correct without reliance on external cues --PROGRESSING     LT weeks  Pt will improve FOTO score to > or = to 52 to indicate improved functional abilities  --PROGRESSING  Pt will decrease pain to 2-3/10 at worst   --PROGRESSING  Pt will increase shoulder strength to 4-/5 for improved tolerance/independence with ADLs --PROGRESSING  Pt will improve  strength to 25#  --PROGRESSING         Plan  Other planned modality interventions: other modalities PRN    Planned therapy interventions: abdominal trunk stabilization, ADL retraining, IADL retraining, flexibility, functional ROM exercises, graded exercise, home exercise program, manual therapy, joint mobilization, neuromuscular re-education, patient education, postural training, strengthening, therapeutic exercise, therapeutic activities, massage and work reintegration  Other planned therapy interventions: other interventions PRN    Frequency: 1-2x/week.  Duration in weeks: 8  Plan of Care beginning date: 2024  Plan of Care expiration date: 2024  Treatment plan discussed with: patient        Subjective Evaluation    History of Present Illness  Mechanism of injury: RE (24):  Pt reports no change with the lymphedema and the shoulder over this past month. Pt reports overall pain in the shoulder as improved. Pt notes slight improvement in strength in the shoulder.     RE (24):  Pt reports swelling in BL UE is maintaining overall. Pt notes mobility in the shoulder is about the same but strength is improving. Pt is able to lift arm to touch her head now.     RE (24) adding LUE lymphedema:  Pt is s/p L breast lumpectomy that was  performed about two years ago. Pt denies any discomfort or swelling in the LUE. Pt MD would like pt to start lymphedema therapy as a preventative measure. Pt does report some tenderness in the L axilla area due to scar tissue. Pt stated her  has been working on the scar tissue every night which seems to help some.       RE (4/24/24):  Pt has been see w/ c/o RUE lymphedema and s/p R humeral fracture and notes overall improvement. Pt notes 50% improvement in lymphedema since beginning physical therapy. Pt is complaint with using her compression pump 3x/day, 4x/day on bad days, and wearing the compression sleeve during the day/night time. Pt notes majority of swelling remains to be in the elbow region, which is preventing her from downsizing her current daytime sleeve she is wearing. In terms of the shoulder, she notes overall improvements in mobility, strength, and endurance. Gripping is still a challenge, but she is seeing OT for treatment for her had mobility/strength. Continued difficulties include lifting, mobility OH and behind to don/doff bra/washback.     RE (4/10/24):  Pt reports overall improvements with the tightness and swelling in the arm. Pt notes 50% improvement in the swelling since beginning lymphedema therapy. Pt is still in the smaller sized night sleeve that she wears all day and then switches to the larger sleeve at night.  She has been able to transition back into the compression pump, that she uses about 3x/day and 4x/day on a bad day. Pt notes improvement since being back into the pump.       RE (3/13/24):  Pt reports overall improvements with the tightness and swelling in the arm. Pt notes 10% improvement in the swelling since beginning lymphedema therapy. Pt has since transitioned from the larger night garment to the smaller night garment to wear during the day. Pt notes she has feeling back in her thumb and finger tips. Pt notes slight improvements in strength. Pt is still not using  "pump at home due to the continued swelling and tightness in posterior upper arm that causes more pain when using pump. Pt plans on restarting the pump when \"the lump\" in the back of the arm improves.       RE (2/14/24):  Pt has been seen for RUE lymphedema for 8 visits to date and notes 50% improvement so far. Pt reports she is still not able to use compression pump but has been wearing the [employer]ribute garment, which seems to be helping. Pt is not longer needing to wear the clamshell for her fx unless if she is out running errands. Pt notes the swelling has improved since being able to take the brace off more often. Pt notes continued \"hardness\" in elbow and axilla region.     EVAL (1/15/24):  Pt reports to IE w/ hx of RUE lymphedema which started approx 20 years ago approx 5 years following CA dx of desmoitosis.     Pt had done skilled PT tx for this condition in the past which was helpful control the issue. However, pt sustained humeral fx on 11/12/23 as a result of fall on RUE while trying to protect mother in law from a fall out of w/c. She reports that healing has been complicated by severe osteoporosis and lymphedema. Pt still required to wear brace at all times- not yet allowed any ROM in shoulder.     In addition to the above, pt w/ recent hx of L breast CA w/ L lumpectomy and lymph node removal (pt reports 17 lymph nodes) over on 6/21/22.        Patient Goals  Patient goals for therapy: decreased pain and decreased edema    Pain  Pain scale: Shoulder -4; Lymph- 4.  Pain scale at lowest: Shoulder -4; Lymph- 4.  Pain scale at highest: Shoulder- 7; Lymph-5.  Location: R shoulder  Alleviating factors: lymphedema massage.  Exacerbated by: increased edema, movement.    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5 cm  MCP Joints (digits 2-5): 16.5 cm/16.5 cm  PIP Joints (digits 2-5): 15.5 cm/15 cm    FOTO: Goal: Lymph - 57 Shoulder - 49; Eval: Lymph - 5 Shoulder - 4;3/13: Lymph - 42; 4/1: Shoulder - 47;4/10 Lymph - 48; 4/24: shoulder - 42; 5/22: Lymph - 69 Shoulder - 48; 6/25: lymph - 85 Shoulder - 56      EPOC:   8/20    Shoulder EPOC:   8/20 6/17 6/19 6/25    RA Lymph DATE:   7/23      Shoulder:  7/23       Manual       MLD [facility]    Manual Wrapping       PROM                       Exercise Diary 6/25 6/12 6/17 6/19   THEREX       Pt ed FOTO; updated measurements      Elbow/Wrist/hand AROM       Finger web       SL ER  20x     Standing shoulder flexion/scaption   2x10 ea 2x10 ea   Counter walkouts       AAROM flex  Is Ys 10\" x10 Is Ys 10\" x10 Is Ys 10\" x10   pulleys  2/2 2/2 2/2   Ball rollouts flex/scap   Wall slides x10 Wall slides x10   Supine ER cane  10\" x10 10\" x10 10\" x10   Shoulder isometrics  5\" x15     SL flexion  20x      SL abd  1# 10x Standing scaption    Supine shoulder flex  1# 15x standing    IR stretch w/ strap  10\" x10 10\" x10 x20   NEURO RE-ED                     [facility] reaches       Supine punches  1# 15x 1# 15x 1# 15x    ball roll up wall  Wall slides x10 TE     rows/pulldowns  GTB 20x/10x GTB 20x/20x GTB x20 ea                                                              Modalities           CP                                           "

## 2024-06-27 ENCOUNTER — OFFICE VISIT (OUTPATIENT)
Dept: PHYSICAL THERAPY | Facility: CLINIC | Age: 64
End: 2024-06-27
Payer: MEDICARE

## 2024-06-27 DIAGNOSIS — I89.0 LYMPHEDEMA OF LEFT ARM: ICD-10-CM

## 2024-06-27 DIAGNOSIS — S42.344D CLOSED NONDISPLACED SPIRAL FRACTURE OF SHAFT OF RIGHT HUMERUS WITH ROUTINE HEALING, SUBSEQUENT ENCOUNTER: ICD-10-CM

## 2024-06-27 DIAGNOSIS — I89.0 LYMPHEDEMA OF RIGHT ARM: Primary | ICD-10-CM

## 2024-06-27 PROCEDURE — 97112 NEUROMUSCULAR REEDUCATION: CPT

## 2024-06-27 PROCEDURE — 97140 MANUAL THERAPY 1/> REGIONS: CPT

## 2024-06-27 PROCEDURE — 97110 THERAPEUTIC EXERCISES: CPT

## 2024-06-27 NOTE — PROGRESS NOTES
"Daily Note     Today's date: 2024  Patient name: Eileen Matias  : 1960  MRN: [id number]  Referring provider: Rick Lazo DO  Dx:   Encounter Diagnosis     ICD-10-CM    1. Lymphedema of right arm  I89.0       2. Lymphedema of left arm  I89.0       3. Closed nondisplaced spiral fracture of shaft of right humerus with routine healing, subsequent encounter  S42.765D                      Subjective: pt reports no new major complaints upon arrival.       Objective: See treatment diary below      Assessment: Tolerated treatment well. Patient would benefit from continued PT. Progressed strengthening w/ good tolerance. Fatigue noted t/o.       Plan: Continue per plan of care.      FOTO: Goal: Lymph - 57 Shoulder - 49; Eval: Lymph - 5 Shoulder - 4;3/13: Lymph - 42; : Shoulder - 47;4/10 Lymph - 48; : shoulder - 42; : Lymph - 69 Shoulder - 48; : lymph - 85 Shoulder - 56      EPOC:       Shoulder EPOC:      RA Lymph DATE:         Shoulder:         Manual       MLD [facility]   Manual Wrapping       PROM                       Exercise Diary    THEREX       Pt ed FOTO; updated measurements             Wall slides  10x     SL ER  2x10     Standing shoulder flexion/scaption  2x10 ea  2x10 ea   Counter walkouts       AAROM flex  D/c  Is Ys 10\" x10   pulleys  2/2  2/2   Ball rollouts flex/scap    Wall slides x10   Supine ER cane  10\" x10  10\" x10   Shoulder isometrics       SL flexion  2x10     SL abd  1# 15x     Supine shoulder flex  1# 15x     IR stretch w/ strap  x20  x20   NEURO RE-ED                     [facility]one reaches  [place] column 0# 5x     Supine punches  1# 15x  1# 15x    ball roll up wall  Cw/ccw x10      rows/pulldowns  GTB x20 ea  GTB x20 ea                                                              Modalities           CP                                                      "

## 2024-07-01 ENCOUNTER — OFFICE VISIT (OUTPATIENT)
Dept: PHYSICAL THERAPY | Facility: CLINIC | Age: 64
End: 2024-07-01
Payer: MEDICARE

## 2024-07-01 DIAGNOSIS — S42.344D CLOSED NONDISPLACED SPIRAL FRACTURE OF SHAFT OF RIGHT HUMERUS WITH ROUTINE HEALING, SUBSEQUENT ENCOUNTER: ICD-10-CM

## 2024-07-01 DIAGNOSIS — I89.0 LYMPHEDEMA OF RIGHT ARM: Primary | ICD-10-CM

## 2024-07-01 DIAGNOSIS — I89.0 LYMPHEDEMA OF LEFT ARM: ICD-10-CM

## 2024-07-01 PROCEDURE — 97110 THERAPEUTIC EXERCISES: CPT

## 2024-07-01 PROCEDURE — 97112 NEUROMUSCULAR REEDUCATION: CPT

## 2024-07-01 PROCEDURE — 97140 MANUAL THERAPY 1/> REGIONS: CPT

## 2024-07-01 NOTE — PROGRESS NOTES
"Daily Note     Today's date: 2024  Patient name: Eileen Matias  : 1960  MRN: [id number]  Referring provider: Rick Lazo DO  Dx:   Encounter Diagnosis     ICD-10-CM    1. Lymphedema of right arm  I89.0       2. Lymphedema of left arm  I89.0       3. Closed nondisplaced spiral fracture of shaft of right humerus with routine healing, subsequent encounter  S42.344D           Start Time: 1015          Subjective: Pt reports no new major complaints.       Objective: See treatment diary below      Assessment: Tolerated treatment well. Patient would benefit from continued PT. Performed MLD  on BL UE to improve lymphatic flow nadine decrease swelling. Minimal swelling noted now in both UE. Will be measuring for custom compression garments within this next month.       Plan: Continue per plan of care.      FOTO: Goal: Lymph - 57 Shoulder - 49; Eval: Lymph - 5 Shoulder - 4;3/13: Lymph - 42; : Shoulder - 47;4/10 Lymph - 48; : shoulder - 42; : Lymph - 69 Shoulder - 48; : lymph - 85 Shoulder - 56      EPOC:       Shoulder EPOC:      RA Lymph DATE:         Shoulder:         Manual       MLD [facility]   Manual Wrapping       PROM                       Exercise Diary     THEREX       Pt ed FOTO; updated measurements             Wall slides  10x 10x    SL ER  2x10 2x10    Standing shoulder flexion/scaption  2x10 ea 2x10 ea    Counter walkouts       AAROM flex  D/c     pulleys  2/2 2/2    Ball rollouts flex/scap       Supine ER cane  10\" x10 10\" x10    Shoulder isometrics       SL flexion  2x10     SL abd  1# 15x 1# 2x10    Supine shoulder flex  1# 15x 1# 2x10    IR stretch w/ strap  x20 x20    NEURO RE-ED                     [facility] reaches  Bryant column 0# 5x [place] Column 0# 5x    Supine punches  1# 15x 1# 2x10     ball roll up wall  Cw/ccw x10 Cw/ccw x10     rows/pulldowns  GTB x20 ea GTB x20 ea                                                             "   Modalities           CP

## 2024-07-02 ENCOUNTER — OFFICE VISIT (OUTPATIENT)
Dept: OCCUPATIONAL THERAPY | Facility: CLINIC | Age: 64
End: 2024-07-02
Payer: MEDICARE

## 2024-07-02 DIAGNOSIS — R20.0 NUMBNESS AND TINGLING OF LEFT UPPER EXTREMITY: Primary | ICD-10-CM

## 2024-07-02 DIAGNOSIS — R20.2 NUMBNESS AND TINGLING OF LEFT UPPER EXTREMITY: Primary | ICD-10-CM

## 2024-07-02 PROCEDURE — 97763 ORTHC/PROSTC MGMT SBSQ ENC: CPT | Performed by: OCCUPATIONAL THERAPIST

## 2024-07-02 PROCEDURE — 97110 THERAPEUTIC EXERCISES: CPT | Performed by: OCCUPATIONAL THERAPIST

## 2024-07-02 NOTE — PROGRESS NOTES
Daily Note     Today's date: 2024  Patient name: Eileen Matias  : 1960  MRN: [id number]  Referring provider: Rogelio Black MD  Dx:   Encounter Diagnosis     ICD-10-CM    1. Numbness and tingling of left upper extremity  R20.0     R20.2                          Subjective:  I have been using a new strap to bend the fingers (velcro strap for digital flexion)      Objective: See treatment diary below      Assessment: Tolerated treatment well. Patient would benefit from continued OT. Increased passive range of motion noted post heat/stretch. Excellent progression in IP flexion.   MP flexion remains restricted with fifth MP greatest restriction. Patient reported being compliant with heating hand with digits wrapped in flexion.       Plan: Continue per plan of care.  Progress treatment as tolerated.     Continue with  wrist orthotic allowing full digital motion for improved ease of function. Added elastic loop for fifth MP flexion.  Progress shoulder care for improved functional mobility focus on ER mobility and scapular strengthening.  Encourage  bilateral functional tasks.           Precautions: Lymphedema; hx desmoid tumors and breast cancer; healing humeral fracture     POC expires Unit limit Auth  expiration date PT/OT + Visit Limit?   24 NA NA BOMN         Dx R Hum Fx     Dr Jack MILLER Not met     MC         Date     Visit 13   16 17    Manuals 5'                                   Jt mobs digits, FA MP arch, MP joints G  1-2        Ortho fit/train       15   DPC revise for fifth MP flexion, added elastic loop fifth MP F    Neuro Re-Ed          RNG         MNG         CHIKI                  orthotc                           Ther Ex 30'   35'   30    23    HEP  Cont ace flexion wrap; encourange bilateral function. New foams issued       PROM 1:1 Sh ER; elbow F, FA Sup, wrist E, digital F Sh ER; elbow F, E; FA S/P  Wrist E  Digital F Sh Er, elbow F FA S/P wrist E, digital F  "FA, wrist, digits FA wrist digits  LLPS    Digital blocked AROM IP resisted flexion x10  F pulls x10 IP F each   x10   F pull x10 IP F x10 each  F pulls x10 IP F x10 each  F pulls x10 IP F, x10   F pulls x10    UE warm up UBE   2F 2R L1 Ube   2F 2R L1 UBE   2F 2R L1 UBE 3F 3R L1 Ube 3F 3R  L1    Shouder PRE Y T tube  Retract 2x10  Sh E 2x10  Sh ER 2x10 NV       PRE FA wrist  Y F bar  Down 2x10 Y F bar  Down 1x10 Y F bar down 2x10     PRE FA   Y F bar  S/P x20       FA Sup AROM P/H sup 10x10 P/H fists   10x10 P/H fists 10x10 P/H fists 10x10 P/H fists 10x10    TGE HOOK P/H fists 10x10  Y web  2x10 Y web 2x10 5\" hold                       Ther Activity 5'   5'   10'      Carry #5 carry 1 lap        Arm swinging Ambulating        Gripping  Y RB foam squares  5x3 Y RB foam squares full cont Y RB foam     Pinching  NV Velcro bd off on  Velcro board off/on     Prehension                  MHP Pre tx  Pre tx ace flexion Pre tx Pre tx with ace flexion                  "

## 2024-07-03 ENCOUNTER — OFFICE VISIT (OUTPATIENT)
Dept: PHYSICAL THERAPY | Facility: CLINIC | Age: 64
End: 2024-07-03
Payer: MEDICARE

## 2024-07-03 ENCOUNTER — OFFICE VISIT (OUTPATIENT)
Dept: OCCUPATIONAL THERAPY | Facility: CLINIC | Age: 64
End: 2024-07-03
Payer: MEDICARE

## 2024-07-03 DIAGNOSIS — I89.0 LYMPHEDEMA OF LEFT ARM: ICD-10-CM

## 2024-07-03 DIAGNOSIS — S42.344D CLOSED NONDISPLACED SPIRAL FRACTURE OF SHAFT OF RIGHT HUMERUS WITH ROUTINE HEALING, SUBSEQUENT ENCOUNTER: ICD-10-CM

## 2024-07-03 DIAGNOSIS — R20.0 NUMBNESS AND TINGLING OF LEFT UPPER EXTREMITY: Primary | ICD-10-CM

## 2024-07-03 DIAGNOSIS — R20.2 NUMBNESS AND TINGLING OF LEFT UPPER EXTREMITY: Primary | ICD-10-CM

## 2024-07-03 DIAGNOSIS — I89.0 LYMPHEDEMA OF RIGHT ARM: Primary | ICD-10-CM

## 2024-07-03 PROCEDURE — 97165 OT EVAL LOW COMPLEX 30 MIN: CPT | Performed by: OCCUPATIONAL THERAPIST

## 2024-07-03 PROCEDURE — 97112 NEUROMUSCULAR REEDUCATION: CPT

## 2024-07-03 PROCEDURE — 97110 THERAPEUTIC EXERCISES: CPT | Performed by: OCCUPATIONAL THERAPIST

## 2024-07-03 PROCEDURE — 97140 MANUAL THERAPY 1/> REGIONS: CPT

## 2024-07-03 NOTE — PROGRESS NOTES
OT Re-Evaluation     Today's date: 7/3/2024  Patient name: Eileen Matias  : 1960  MRN: [id number]  Referring provider: Rogelio Black MD  Dx:   Encounter Diagnosis     ICD-10-CM    1. Numbness and tingling of left upper extremity  R20.0     R20.2                        Assessment  Impairments: abnormal coordination, abnormal or restricted ROM, activity intolerance, impaired physical strength, pain with function and weight-bearing intolerance  Impairments comments: reduced sensation  Other impairment: Lymphedema RUE; joint constractures right digits, wrist, forearm; radial nerve neuropathy  Functional limitations: Able use to use right hand for gross assist during ADL's;  Able to pinch digits in  three point prehension for short periods.  Symptom irritability: moderate    Assessment details: Eileen Matias is a 63 y.o., Ambidextrous HD female referred to hand therapy for  right hand,wrist and forearm contractures.  Onset of injury 23 due to fall causing spiral fracture of the right humerus.  Patient presents 24 with impaired ROM of the right digits, wrist, forearm,  impaired strength, and impaired sensation of the right peripheral nerves, particularly the radial nerve.  Deficits also noted in pain, edema, and functional use of the right UE. Patient joint contractures of the digits, wrist and forearm as well as significant intrinsic and extrinsic mm tightness. Patient is a good candidate for OT services to abolish pain and edema and restore ROM, strength, coordination, and sensation for a return to independence in daily tasks.      Re 7/3/24  Eileen has attended 19 therapy visits over the past 12 weeks.  She has made steady progress in active and passive motion.  Pain has reduced since IE and is not at a moderate and steady level.  She is now reporting sensation along the thumb and index, in an area previously insensate.  She has been wearing a wrist support consistently allowing her to use her  right hand in a gross motor fashion and allowing three point pinch for light tasks, short periods.  She remains in lymphedema sleeves with are restrictive and resist motion during daily tasks.  Therapy may continue to address active and passive motion, improve functional  and prehension to allow increased independence in light home tasks and art work.      Understanding of Dx/Px/POC: excellent     Prognosis: good    Goals  STGs (4 weeks)  Patient will be independent in HEP of ROM, splinting to increase motion, edema management MET  Patient will report an average pain level of 4-5/10 MET  Patient will demonstrate 30 degree improvement in PAGE of the digits MET  Patient will demonstrate active wrist extension/flexion to 10/70  Partially MET  Patient will demonstrate active forearm supination to 45 MET  LTGs (12 weeks)  Patient will demonstrate independence in a HEP to maintain ROM, strength, and function at discharge NOT MET  Patient will report an average pain level of 1-2/10 to be independent in daily tasks NOT MET  Patient will demonstrate PAGE of the digits to 220 degrees to be independent in self care tasks NOT MET  Patient will demonstrate AROM of the wrist and forearm WFL to be independent in self care tasks  MET  Patient will demonstrate 5/5 muscle strength in the wrist and forearm to be MI for meal prep  NOT MET  Patient will demonstrate right hand strength within 30% of the left hand to be MI for IADL tasks NOT MET  Patient will demonstrate control  of edema MET      Plan  Patient would benefit from: skilled occupational therapy and custom splinting  Planned modality interventions: thermotherapy: hydrocollator packs and cryotherapy    Planned therapy interventions: activity modification, compression, fine motor coordination training, graded activity, graded exercise, home exercise program, therapeutic exercise, therapeutic activities, stretching, strengthening, patient education, orthotic fitting/training,  neuromuscular re-education, manual therapy, IASTM, joint mobilization, kinesiology taping, massage, muscle pump exercises and nerve gliding  Other planned therapy interventions: Static progressive and dynamic splinting to increase digit, wrist, forearm motion    Frequency: 2x week  Duration in weeks: 12  Plan of Care beginning date: 7/3/2024  Plan of Care expiration date: 2024  Treatment plan discussed with: patient      Subjective Evaluation    History of Present Illness  Date of onset: 2023  Mechanism of injury: trauma  Mechanism of injury: 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  Hx of lymphedema and frequent cellulitis infections in the RUE. Patient also had left breast cancer and has LUE lymphedema as well.    She reports that she suffered a displaced spiral fracture of the right humerus on 23 after a fall while pushing her mother in law who was seated in a wheelchair.  Patient went to ED that date for xrays and long arm cast. She was referred to orthopedics. Had a closed reduction of fracture. Long arm cast for approximately 4 weeks and then a Kruse clam shell brace. Patient received  a course of home health OT for ROM of the digits, wrist and elbow and edema management. She has had ongoing PT for lymphedema management and right shoulder ROM.     Patient referred to Dr. Black for radial nerve dysfunction and joint contractures in the forearm, wrist and hand. An EMG has been ordered. Patient now referred to OT for splinting and ROM to address joint contractures.            Recurrent probem    Quality of life: good    Patient Goals  Patient goals for therapy: decreased edema, decreased pain, increased motion, increased strength and independence with ADLs/IADLs  Patient goal: Be able to hold tweezers to do art work  Pain  Current pain ratin  At best pain ratin  Location: Proximal humerus, wrist, hand  right  Quality: dull ache and discomfort  Relieving factors: medications, support and heat  Exacerbated by: Putting on compression sleeve.  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     Additional Observation Details  History of RUE lymphedema    Neurological Testing     Sensation     Wrist/Hand     Right   Diminished: light touch    Additional Neurological Details  24: Loss of protective sensation radial nerve distribution (4.56g) . Diminished protective sensation median and ulnar nerves (3.61g)    Active Range of Motion     Right Elbow   Flexion: 130 degrees   Extension: 0 degrees   Forearm supination: 60 degrees   Forearm pronation: 90 degrees     Right Wrist   Wrist flexion: 65 degrees   Wrist extension: 15 degrees   Radial deviation: 0 degrees   Ulnar deviation: 25 degrees     Right Thumb   Palmar Abduction    CMC: 45  Radial Abduction    CMC: 5  Kapandji score: 6 degrees    Right Digits   Flexion   Index     MCP: 55    PIP: 80    DIP: 45  Middle     MCP: 55    PIP: 70    DIP: 55  Ring     MCP: 55    PIP: 70    DIP: 45  Little     MCP: 35    PIP: 50    DIP: 40  Index: 180 degrees  Middle: 180 degrees  Rin degrees  Small: 125 degrees    Additional Active Range of Motion Details  Distance to DPC  II  2.0 cm,  III 3.0 cm,  IV 3.5 cm,  V 3.5 cm    Passive Range of Motion     Right Elbow   Flexion: 135 degrees   Extension: 90 degrees   Forearm supination: 80 degrees   Forearm pronation: 90 degrees     Right Wrist   Wrist flexion: 70 degrees   Wrist extension: 45 degrees   Radial deviation: 10 degrees   Ulnar deviation: 40 degrees     Tests     Right Wrist/Hand   Positive  extrinsic extensor tightness, extrinsic flexor tightness and intrinsic muscle tightness.     Swelling     Right Elbow Girth Measurements   Joint line: 26 cm  10 cm below joint line: 26 cm         Daily Note     Today's date: 7/3/2024  Patient name: Eileen Matias  : 1960  MRN: [id number]  Referring provider: Rogelio Black MD  Dx:   Encounter Diagnosis     ICD-10-CM    1. Numbness and tingling of left upper extremity  R20.0     R20.2                            Subjective:  I have been using a new strap to bend the fingers (velcro strap for digital flexion)      Objective: See treatment diary below      Assessment: Tolerated treatment well. Patient would benefit from continued OT. Increased passive range of motion noted post heat/stretch. Excellent progression in IP flexion.   MP flexion remains restricted with fifth MP greatest restriction. Patient reported being compliant with heating hand with digits wrapped in flexion.       Plan: Continue per plan of care.  Progress treatment as tolerated.     Continue with  wrist orthotic allowing full digital motion for improved ease of function. Added elastic loop for fifth MP flexion.  Progress shoulder care for improved functional mobility focus on ER mobility and scapular strengthening.  Encourage  bilateral functional tasks.           Precautions: Lymphedema; hx desmoid tumors and breast cancer; healing humeral fracture     POC expires Unit limit Auth  expiration date PT/OT + Visit Limit?    NA NA BOMN         Dx R Hum Fx     Dr Jack MILLER Not met              Date 6/13 6/18 6/20 6/24 7/2 7/3  RE   Visit 13   16 17    Manuals 5'                                   Jt mobs digits, FA MP arch, MP joints G  1-2        Ortho fit/train       15   DPC revise for fifth MP flexion, added elastic loop fifth MP F    Neuro Re-Ed          RNG         MNG         CHIKI                  orthotc                           Ther Ex 30'   35'   30    23   15   HEP  Cont ace  "flexion wrap; encourange bilateral function. New foams issued    Review all HEP for progress of care; pt ed for nerve regen.   PROM 1:1 Sh ER; elbow F, FA Sup, wrist E, digital F Sh ER; elbow F, E; FA S/P  Wrist E  Digital F Sh Er, elbow F FA S/P wrist E, digital F FA, wrist, digits FA wrist digits  LLPS FA wrist digits   Digital blocked AROM IP resisted flexion x10  F pulls x10 IP F each   x10   F pull x10 IP F x10 each  F pulls x10 IP F x10 each  F pulls x10 IP F, x10   F pulls x10    UE warm up UBE   2F 2R L1 Ube   2F 2R L1 UBE   2F 2R L1 UBE 3F 3R L1 Ube 3F 3R  L1    Shouder PRE Y T tube  Retract 2x10  Sh E 2x10  Sh ER 2x10 NV       PRE FA wrist  Y F bar  Down 2x10 Y F bar  Down 1x10 Y F bar down 2x10     PRE FA   Y F bar  S/P x20       FA Sup AROM P/H sup 10x10 P/H fists   10x10 P/H fists 10x10 P/H fists 10x10 P/H fists 10x10 P/H fists 10x10   TGE HOOK P/H fists 10x10  Y web  2x10 Y web 2x10 5\" hold                       Ther Activity 5'   5'   10'      Carry #5 carry 1 lap        Arm swinging Ambulating        Gripping  Y RB foam squares  5x3 Y RB foam squares full cont Y RB foam     Pinching  NV Velcro bd off on  Velcro board off/on     Prehension                  MHP Pre tx  Pre tx ace flexion Pre tx Pre tx with ace flexion Pre tx with ace flexion                 "

## 2024-07-03 NOTE — PROGRESS NOTES
"Daily Note     Today's date: 7/3/2024  Patient name: Eileen Matias  : 1960  MRN: [id number]  Referring provider: Rick Lazo DO  Dx:   Encounter Diagnosis     ICD-10-CM    1. Lymphedema of right arm  I89.0       2. Lymphedema of left arm  I89.0       3. Closed nondisplaced spiral fracture of shaft of right humerus with routine healing, subsequent encounter  S42.344D                      Subjective: pt reports no new major complaints upon arrival. Has new pump at home.       Objective: See treatment diary below      Assessment: Tolerated treatment well. Patient would benefit from continued PT. Pt notes bryant reaching is a little easier. Pt continues to want to wait to measure for custom compression garments.       Plan: Continue per plan of care.      FOTO: Goal: Lymph - 57 Shoulder - 49; Eval: Lymph - 5 Shoulder - 4;3/13: Lymph - 42; : Shoulder - 47;4/10 Lymph - 48; : shoulder - 42; : Lymph - 69 Shoulder - 48; : lymph - 85 Shoulder - 56      EPOC:       Shoulder EPOC:   8/20 7/1 7/3 6/25 6/27   RA Lymph DATE:         Shoulder:         Manual       MLD [facility]   Manual Wrapping       PROM                       Exercise Diary 6/25 6/27 7/1 7/3   THEREX       Pt ed FOTO; updated measurements             Wall slides  10x 10x 10x   SL ER  2x10 2x10 1# 10x   Standing shoulder flexion/scaption  2x10 ea 2x10 ea 2x10 ea   Counter walkouts       AAROM flex  D/c     pulleys  2/2 2/2 2/2   Ball rollouts flex/scap       Supine ER cane  10\" x10 10\" x10 5\" x20   Shoulder isometrics       SL flexion  2x10     SL abd  1# 15x 1# 2x10 1# 2x10   Supine shoulder flex  1# 15x 1# 2x10 1# 2x10   IR stretch w/ strap  x20 x20 x20   NEURO RE-ED                     [facility] reaches  [place] column 0# 5x [place] Column 0# 5x Bryant Column 0# 5x   Supine punches  1# 15x 1# 2x10 1# 2x10    ball roll up wall  Cw/ccw x10 Cw/ccw x10     rows/pulldowns  GTB x20 ea GTB x20 ea GTB x20 ea                             "                                  Modalities           CP

## 2024-07-04 DIAGNOSIS — C77.3 CARCINOMA OF LEFT BREAST METASTATIC TO AXILLARY LYMPH NODE (HCC): ICD-10-CM

## 2024-07-04 DIAGNOSIS — C50.912 CARCINOMA OF LEFT BREAST METASTATIC TO AXILLARY LYMPH NODE (HCC): ICD-10-CM

## 2024-07-05 RX ORDER — LETROZOLE 2.5 MG/1
TABLET, FILM COATED ORAL
Qty: 90 TABLET | Refills: 0 | Status: SHIPPED | OUTPATIENT
Start: 2024-07-05

## 2024-07-08 ENCOUNTER — OFFICE VISIT (OUTPATIENT)
Dept: PHYSICAL THERAPY | Facility: CLINIC | Age: 64
End: 2024-07-08
Payer: MEDICARE

## 2024-07-08 DIAGNOSIS — I89.0 LYMPHEDEMA OF LEFT ARM: ICD-10-CM

## 2024-07-08 DIAGNOSIS — S42.344D CLOSED NONDISPLACED SPIRAL FRACTURE OF SHAFT OF RIGHT HUMERUS WITH ROUTINE HEALING, SUBSEQUENT ENCOUNTER: ICD-10-CM

## 2024-07-08 DIAGNOSIS — I89.0 LYMPHEDEMA OF RIGHT ARM: Primary | ICD-10-CM

## 2024-07-08 PROCEDURE — 97140 MANUAL THERAPY 1/> REGIONS: CPT

## 2024-07-08 PROCEDURE — 97112 NEUROMUSCULAR REEDUCATION: CPT

## 2024-07-08 PROCEDURE — 97110 THERAPEUTIC EXERCISES: CPT

## 2024-07-08 NOTE — PROGRESS NOTES
"Daily Note     Today's date: 2024  Patient name: Eileen Matias  : 1960  MRN: [id number]  Referring provider: Rick Lazo DO  Dx:   Encounter Diagnosis     ICD-10-CM    1. Lymphedema of right arm  I89.0       2. Lymphedema of left arm  I89.0       3. Closed nondisplaced spiral fracture of shaft of right humerus with routine healing, subsequent encounter  S42.755D                      Subjective: pt reports no new major complaints upon arrival.       Objective: See treatment diary below      Assessment: Tolerated treatment well. Patient would benefit from continued PT. Continued w/ current POC as listed below w/ good tolerance.       Plan: Continue per plan of care.      FOTO: Goal: Lymph - 57 Shoulder - 49; Eval: Lymph - 5 Shoulder - 4;3/13: Lymph - 42; : Shoulder - 47;4/10 Lymph - 48; : shoulder - 42; : Lymph - 69 Shoulder - 48; : lymph - 85 Shoulder - 56      EPOC:       Shoulder EPOC:   8/20 7/1 7/3 7/8    RA Lymph DATE:         Shoulder:         Manual       MLD [facility]    Manual Wrapping       PROM                       Exercise Diary 7/8  7/1 7/3   THEREX       Pt ed              Wall slides 10x  10x 10x   SL ER 1# 2x10  2x10 1# 10x   Standing shoulder flexion/scaption 2x10 ea  2x10 ea 2x10 ea   Counter walkouts       AAROM flex       pulleys 2/2  2/2 2/2   Ball roll up wall x10      Supine ER cane 5\" x20  10\" x10 5\" x20   Shoulder isometrics       SL flexion       SL abd 1# 2x10  1# 2x10 1# 2x10   Supine shoulder flex 1# 2x10  1# 2x10 1# 2x10   IR stretch w/ strap x20  x20 x20   NEURO RE-ED                     [facility] reaches [place] Column 0# 5x  [place]ant Column 0# 5x [place] Column 0# 5x   Supine punches 1# 2x10  1# 2x10 1# 2x10    ball roll up wall Cw/ccw x10  Cw/ccw x10     rows/pulldowns GTB x20 ea  GTB x20 ea GTB x20 ea                                                              Modalities           CP                                                            "

## 2024-07-09 ENCOUNTER — OFFICE VISIT (OUTPATIENT)
Dept: OCCUPATIONAL THERAPY | Facility: CLINIC | Age: 64
End: 2024-07-09
Payer: MEDICARE

## 2024-07-09 DIAGNOSIS — R20.2 NUMBNESS AND TINGLING OF LEFT UPPER EXTREMITY: Primary | ICD-10-CM

## 2024-07-09 DIAGNOSIS — R20.0 NUMBNESS AND TINGLING OF LEFT UPPER EXTREMITY: Primary | ICD-10-CM

## 2024-07-09 PROCEDURE — 97110 THERAPEUTIC EXERCISES: CPT | Performed by: OCCUPATIONAL THERAPIST

## 2024-07-09 PROCEDURE — L3906 WHO W/O JOINTS CF: HCPCS | Performed by: OCCUPATIONAL THERAPIST

## 2024-07-09 PROCEDURE — 97140 MANUAL THERAPY 1/> REGIONS: CPT | Performed by: OCCUPATIONAL THERAPIST

## 2024-07-09 NOTE — PROGRESS NOTES
Daily Note     Today's date: 2024  Patient name: Eileen Matias  : 1960  MRN: [id number]  Referring provider: Rogelio Black MD  Dx:   Encounter Diagnosis     ICD-10-CM    1. Numbness and tingling of left upper extremity  R20.0     R20.2                            Subjective:  I am trying to use may hand more      Objective: See treatment diary below;  New wrist orthotic fabricated to provide circumferential wrist support for stability during resisted tasks.        Assessment: Tolerated treatment well. Patient would benefit from continued OT. Increased passive range of motion of all digits post heat/stretch. Excellent progression in IP flexion.   MP flexion remains restricted with fifth MP greatest restriction. Patient reported being compliant with heating hand with digits wrapped in flexion.       Plan: Continue per plan of care.  Progress treatment as tolerated.     Continue with new circumferential orthotic allowing progressive digital motion and  ease of function. Continue elastic loop for fifth MP flexion.  Focus on mobility of 4,5 for improved  function.   Encourage bilateral functional tasks.           Precautions: Lymphedema; hx desmoid tumors and breast cancer; healing humeral fracture     POC expires Unit limit Auth  expiration date PT/OT + Visit Limit?    NA NA BOMN         Dx R Hum Fx     Dr Jack MILLER Not met     MC         Date   7/9   6/24 7/2 7/3  RE   Visit 19   16 17 18   Manuals 10                                   Jt mobs digits, FA Jt mobs, all digits, MP arch        Ortho fit/train New       15   DPC revise for fifth MP flexion, added elastic loop fifth MP F    Neuro Re-Ed          RNG         MNG         CHIKI                  orthotc                           Ther Ex 15      23   15   HEP New circumferential  Orthotic fabricated     Review all HEP for progress of care; pt ed for nerve regen.   PROM 1:1 FA, wrist, all digits LLPS   FA, wrist, digits FA wrist  "digits  LLPS FA wrist digits   Digital blocked AROM IP, MP isolated.   IP F x10 each  F pulls x10 IP F, x10   F pulls x10    UE warm up    UBE 3F 3R L1 Ube 3F 3R  L1    Shouder PRE         PRE FA wrist    Y F bar down 2x10     PRE FA          FA Sup AROM    P/H fists 10x10 P/H fists 10x10 P/H fists 10x10   TGE HOOK    Y web 2x10 5\" hold                       Ther Activity         Carry         Arm swinging         Gripping    Y RB foam     Pinching    Velcro board off/on     Prehension                  MHP Pre tx with ace flexion   Pre tx Pre tx with ace flexion Pre tx with ace flexion                 "

## 2024-07-10 ENCOUNTER — OFFICE VISIT (OUTPATIENT)
Dept: PHYSICAL THERAPY | Facility: CLINIC | Age: 64
End: 2024-07-10
Payer: MEDICARE

## 2024-07-10 DIAGNOSIS — I89.0 LYMPHEDEMA OF LEFT ARM: ICD-10-CM

## 2024-07-10 DIAGNOSIS — I89.0 LYMPHEDEMA OF RIGHT ARM: Primary | ICD-10-CM

## 2024-07-10 DIAGNOSIS — S42.344D CLOSED NONDISPLACED SPIRAL FRACTURE OF SHAFT OF RIGHT HUMERUS WITH ROUTINE HEALING, SUBSEQUENT ENCOUNTER: ICD-10-CM

## 2024-07-10 PROCEDURE — 97112 NEUROMUSCULAR REEDUCATION: CPT

## 2024-07-10 PROCEDURE — 97140 MANUAL THERAPY 1/> REGIONS: CPT

## 2024-07-10 PROCEDURE — 97110 THERAPEUTIC EXERCISES: CPT

## 2024-07-10 NOTE — PROGRESS NOTES
"Daily Note     Today's date: 7/10/2024  Patient name: Eileen Matias  : 1960  MRN: [id number]  Referring provider: Rick Lazo DO  Dx:   Encounter Diagnosis     ICD-10-CM    1. Lymphedema of right arm  I89.0       2. Lymphedema of left arm  I89.0       3. Closed nondisplaced spiral fracture of shaft of right humerus with routine healing, subsequent encounter  S42.344D           Start Time: 1015          Subjective: pt reports slow progress.      Objective: See treatment diary below      Assessment: Tolerated treatment well. Patient would benefit from continued PT. Performed MLD to improve lymphatic flow and decrease swelling.       Plan: Continue per plan of care.      FOTO: Goal: Lymph - 57 Shoulder - 49; Eval: Lymph - 5 Shoulder - 4;3/13: Lymph - 42; : Shoulder - 47;4/10 Lymph - 48; : shoulder - 42; : Lymph - 69 Shoulder - 48; : lymph - 85 Shoulder - 56      EPOC:       Shoulder EPOC:   8/20 7/1 7/3 7/8 7/10   RA Lymph DATE:         Shoulder:         Manual       MLD [facility]   Manual Wrapping       PROM                       Exercise Diary 7/8 7/10     THEREX       Pt ed              Wall slides 10x 15x     SL ER 1# 2x10 1# 2x10     Standing shoulder flexion/scaption 2x10 ea 1#/0# x10 ea     Counter walkouts       AAROM flex       pulleys 2/2 2/2     Ball roll up wall x10 x10     Supine ER cane 5\" x20      Shoulder isometrics       SL flexion       SL abd 1# 2x10 1# 2x10     Supine shoulder flex 1# 2x10 1# 2x10     IR stretch w/ strap x20 x20     NEURO RE-ED                     [facility] reaches [place] Column 0# 5x [place] Column 0# 5x     Supine punches 1# 2x10 1# 2x10      ball roll up wall Cw/ccw x10 Cw/ccw x10      rows/pulldowns GTB x20 ea BTB/GTB                                                                Modalities           CP                                                              "

## 2024-07-11 ENCOUNTER — OFFICE VISIT (OUTPATIENT)
Dept: OCCUPATIONAL THERAPY | Facility: CLINIC | Age: 64
End: 2024-07-11
Payer: MEDICARE

## 2024-07-11 DIAGNOSIS — R20.0 NUMBNESS AND TINGLING OF LEFT UPPER EXTREMITY: Primary | ICD-10-CM

## 2024-07-11 DIAGNOSIS — R20.2 NUMBNESS AND TINGLING OF LEFT UPPER EXTREMITY: Primary | ICD-10-CM

## 2024-07-11 PROCEDURE — 97530 THERAPEUTIC ACTIVITIES: CPT | Performed by: OCCUPATIONAL THERAPIST

## 2024-07-11 PROCEDURE — 97110 THERAPEUTIC EXERCISES: CPT | Performed by: OCCUPATIONAL THERAPIST

## 2024-07-11 PROCEDURE — 97140 MANUAL THERAPY 1/> REGIONS: CPT | Performed by: OCCUPATIONAL THERAPIST

## 2024-07-11 NOTE — PROGRESS NOTES
Daily Note     Today's date: 2024  Patient name: Eileen Matias  : 1960  MRN: [id number]  Referring provider: Rogelio Black MD  Dx:   Encounter Diagnosis     ICD-10-CM    1. Numbness and tingling of left upper extremity  R20.0     R20.2                            Subjective:  I am trying to use may hand more      Objective: See treatment diary below;  Adjusted thumb opening of new  wrist  circumferential wrist support for increased ease with function.         Assessment: Tolerated treatment well. Patient would benefit from continued OT. Increased passive range of motion of all digits post heat/stretch. Excellent progression in IP flexion.   Cueing to incorporate fifth digit in flexion today.   MP flexion of IV V restricted with composite flexion.         Plan: Continue per plan of care.  Progress treatment as tolerated.     Continue with new circumferential orthotic allowing progressive digital motion and  ease of function. Continue elastic loop for fifth MP flexion.  Focus on mobility of 4,5 for improved  function.   Encourage bilateral functional tasks.           Precautions: Lymphedema; hx desmoid tumors and breast cancer; healing humeral fracture     POC expires Unit limit Auth  expiration date PT/OT + Visit Limit?    NA NA BOMN         Dx R Hum Fx     Dr Wayne MILLER Not met     MC         Date   7/9 7/11  6/24 7/2 7/3  RE   Visit 19 20  16 17 18   Manuals 10 10                                  Jt mobs digits, FA Jt mobs, all digits, MP arch Jt mobs all digits, MP arch, Fifth distract       Ortho fit/train New       15   DPC revise for fifth MP flexion, added elastic loop fifth MP F    Neuro Re-Ed          RNG         MNG         CHIKI                  orthotc                           Ther Ex 15   15'     23   15   HEP New circumferential  Orthotic fabricated Adjusted thumb opening for improved mobility.     Review all HEP for progress of care; pt ed for nerve regen.   PROM  "1:1 FA, wrist, all digits LLPS FA wrist digits  LLPS  FA, wrist, digits FA wrist digits  LLPS FA wrist digits   Digital blocked AROM IP, MP isolated. P/H fists  10x5\"  IP F x10 each  F pulls x10 IP F, x10   F pulls x10    UE warm up  UBE 2F/2R L1  UBE 3F 3R L1 Ube 3F 3R  L1    PRE FA wrist  NV                    Y F bar down 2x10     Ther Activity 23   20       Gripping Y RB All foam squares.   G Ball    5\" x20 Y RB all foam squares   G Ball   5\" x20  Y RB foam     Pinching II III G C P  5x5 blocks  IV V Y CP  5x5 blocks  II III G C Pin  5x5 blocks  IV V Y C pin  5x5 blocks  Velcro board off/on     Prehension NV groove pegs NV groove pegs                MHP Pre tx with ace flexion Pre tx with ace flexion  Pre tx Pre tx with ace flexion Pre tx with ace flexion                 "

## 2024-07-15 ENCOUNTER — OFFICE VISIT (OUTPATIENT)
Dept: PHYSICAL THERAPY | Facility: CLINIC | Age: 64
End: 2024-07-15
Payer: MEDICARE

## 2024-07-15 DIAGNOSIS — I89.0 LYMPHEDEMA OF LEFT ARM: ICD-10-CM

## 2024-07-15 DIAGNOSIS — I89.0 LYMPHEDEMA OF RIGHT ARM: Primary | ICD-10-CM

## 2024-07-15 DIAGNOSIS — S42.344D CLOSED NONDISPLACED SPIRAL FRACTURE OF SHAFT OF RIGHT HUMERUS WITH ROUTINE HEALING, SUBSEQUENT ENCOUNTER: ICD-10-CM

## 2024-07-15 PROCEDURE — 97112 NEUROMUSCULAR REEDUCATION: CPT

## 2024-07-15 PROCEDURE — 97140 MANUAL THERAPY 1/> REGIONS: CPT

## 2024-07-15 PROCEDURE — 97110 THERAPEUTIC EXERCISES: CPT

## 2024-07-15 NOTE — PROGRESS NOTES
"Daily Note     Today's date: 7/15/2024  Patient name: Eileen Matias  : 1960  MRN: [id number]  Referring provider: Rick Lazo DO  Dx:   Encounter Diagnosis     ICD-10-CM    1. Lymphedema of right arm  I89.0       2. Lymphedema of left arm  I89.0       3. Closed nondisplaced spiral fracture of shaft of right humerus with routine healing, subsequent encounter  S42.257D                      Subjective: pt reports feeling okay today, no major complaints.       Objective: See treatment diary below      Assessment: Tolerated treatment well. Patient would benefit from continued PT. Unable to tolerate supine flexion w/ cane due to increased pain in shoulder. Increased effort noted t/o strengthening exercises. Performed MLD to BL UE to improve lymphatic flow and decrease swelling. Swelling in BL UE seem to be stable from visit to visit.       Plan: Continue per plan of care.      FOTO: Goal: Lymph - 57 Shoulder - 49; Eval: Lymph - 5 Shoulder - 4;3/13: Lymph - 42; : Shoulder - 47;4/10 Lymph - 48; : shoulder - 42; : Lymph - 69 Shoulder - 48; : lymph - 85 Shoulder - 56      EPOC:       Shoulder EPOC:   8/20 7/15  7/8 7/10   RA Lymph DATE:         Shoulder:         Manual       MLD [facility]   Manual Wrapping       PROM                       Exercise Diary 7/8 7/10 7/15    THEREX       Pt ed              Wall slides 10x 15x 15x    SL ER 1# 2x10 1# 2x10 1# 2x10    Standing shoulder flexion/scaption 2x10 ea 1#/0# x10 ea 1#/0# x10 ea    Counter walkouts       AAROM flex       pulleys 2/2 2/2 2/2    Ball roll up wall x10 x10 x15    Supine ER cane 5\" x20  x20    Shoulder isometrics       SL flexion       SL abd 1# 2x10 1# 2x10 1# 2x10    Supine shoulder flex 1# 2x10 1# 2x10 1# 2x10    IR stretch w/ strap x20 x20 x20    NEURO RE-ED                     Cone reaches Bryant Column 0# 5x Bryant Column 0# 5x     Supine punches 1# 2x10 1# 2x10 1# 2x10     ball roll up wall Cw/ccw x10 Cw/ccw x10 Roll up " 10x     rows/pulldowns GTB x20 ea BTB/GTB BTB/GTB x20                                                               Modalities           CP

## 2024-07-16 ENCOUNTER — OFFICE VISIT (OUTPATIENT)
Dept: OCCUPATIONAL THERAPY | Facility: CLINIC | Age: 64
End: 2024-07-16
Payer: MEDICARE

## 2024-07-16 DIAGNOSIS — R20.0 NUMBNESS AND TINGLING OF LEFT UPPER EXTREMITY: Primary | ICD-10-CM

## 2024-07-16 DIAGNOSIS — R29.898 RIGHT HAND WEAKNESS: ICD-10-CM

## 2024-07-16 DIAGNOSIS — R20.2 NUMBNESS AND TINGLING OF LEFT UPPER EXTREMITY: Primary | ICD-10-CM

## 2024-07-16 PROCEDURE — 97140 MANUAL THERAPY 1/> REGIONS: CPT | Performed by: OCCUPATIONAL THERAPIST

## 2024-07-16 PROCEDURE — 97110 THERAPEUTIC EXERCISES: CPT | Performed by: OCCUPATIONAL THERAPIST

## 2024-07-16 NOTE — PROGRESS NOTES
Daily Note     Today's date: 2024  Patient name: Eileen Matias  : 1960  MRN: [id number]  Referring provider: Rogelio Black MD  Dx:   Encounter Diagnosis     ICD-10-CM    1. Numbness and tingling of left upper extremity  R20.0     R20.2       2. Right hand weakness  R29.898                            Subjective:  I am trying to use may hand more.  I stretch wrap my hand to stretch it      Objective: See treatment diary below;  Continue wear of  circumferential  wrist support for increased ease with function.         Assessment: Tolerated treatment well. Patient would benefit from continued OT. Increased passive range of motion of all digits post heat/stretch. Able to gross grasp post tx.  Excellent progression in IP flexion.   Cueing to incorporate fifth digit in flexion today.   MP flexion of IV V restricted with composite flexion.         Plan: Continue per plan of care.  Progress treatment as tolerated.     Continue with new circumferential orthotic allowing progressive digital motion and  ease of function.  Focus on mobility of 4,5 for improved  function.   Encourage bilateral functional tasks.           Precautions: Lymphedema; hx desmoid tumors and breast cancer; healing humeral fracture     POC expires Unit limit Auth  expiration date PT/OT + Visit Limit?    NA NA BOMN         Dx R Hum Fx     Dr Wayne MILLER Not met              Date         Visit 19 20 21      Manuals 10 10 10                                 Jt mobs digits, FA Jt mobs, all digits, MP arch Jt mobs all digits, MP arch, Fifth distract Jt mobs all Mps ;  palmar arch       Ortho fit/train New         Neuro Re-Ed          RNG         MNG         CHIKI                  orthotc                           Ther Ex 15   15'   25      HEP New circumferential  Orthotic fabricated Adjusted thumb opening for improved mobility.  Attention to gripping with all digits in ADL's      PROM 1:1 FA, wrist, all  "digits LLPS FA wrist digits  LLPS Shoulder ER FA wrist digits LLPS      Digital blocked AROM IP, MP isolated. P/H fists  10x5\" P/H fists   10x5\"      UE warm up  UBE 2F/2R L1 UBE 2F 2R L1  R only Rev x7      PRE FA wrist  NV NV      Shoulder ROM   Side ly ER P/H;  Supine Cane ER   Supine SA #1  X10 each      Sh PROM   Pullys 5'      Ther Activity 23   20   10'      Gripping Y RB All foam squares.   G Ball    5\" x20 Y RB all foam squares   G Ball   5\" x20 R RB all foam squares  Y F bar fold  G F bar down x10      Pinching II III G C P  5x5 blocks  IV V Y CP  5x5 blocks  II III G C Pin  5x5 blocks  IV V Y C pin  5x5 blocks       Prehension NV groove pegs NV groove pegs                MHP Pre tx with ace flexion Pre tx with ace flexion Pre tx with ace flexion                    "

## 2024-07-17 ENCOUNTER — OFFICE VISIT (OUTPATIENT)
Dept: OCCUPATIONAL THERAPY | Facility: CLINIC | Age: 64
End: 2024-07-17
Payer: MEDICARE

## 2024-07-17 DIAGNOSIS — R20.0 NUMBNESS AND TINGLING OF LEFT UPPER EXTREMITY: Primary | ICD-10-CM

## 2024-07-17 DIAGNOSIS — R29.898 RIGHT HAND WEAKNESS: ICD-10-CM

## 2024-07-17 DIAGNOSIS — R20.2 NUMBNESS AND TINGLING OF LEFT UPPER EXTREMITY: Primary | ICD-10-CM

## 2024-07-17 PROCEDURE — 97110 THERAPEUTIC EXERCISES: CPT | Performed by: OCCUPATIONAL THERAPIST

## 2024-07-17 PROCEDURE — 97140 MANUAL THERAPY 1/> REGIONS: CPT | Performed by: OCCUPATIONAL THERAPIST

## 2024-07-17 NOTE — PROGRESS NOTES
Daily Note     Today's date: 2024  Patient name: Eileen Matias  : 1960  MRN: [id number]  Referring provider: Rogelio Black MD  Dx:   Encounter Diagnosis     ICD-10-CM    1. Numbness and tingling of left upper extremity  R20.0     R20.2       2. Right hand weakness  R29.898                              Subjective:  I am trying to use may hand more.  I stretch wrap my hand to stretch it      Objective: See treatment diary below;  Continue wear of  circumferential  wrist support for increased ease with function.         Assessment: Tolerated treatment well. Patient would benefit from continued OT. Increased passive range of motion of all digits post heat/stretch. Able to gross grasp post tx.  Excellent progression in IP flexion.   Cueing to incorporate fifth digit in flexion today.   MP flexion of IV V restricted with composite flexion.         Plan: Continue per plan of care.  Progress treatment as tolerated.     Continue with new circumferential orthotic allowing progressive digital motion and  ease of function.  Focus on mobility of 4,5 for improved  function.   Encourage bilateral functional tasks.           Precautions: Lymphedema; hx desmoid tumors and breast cancer; healing humeral fracture     POC expires Unit limit Auth  expiration date PT/OT + Visit Limit?    NA NA BOMN         Dx R Hum Fx     Dr Wayne MILLER Not met              Date        Visit 19 20 21 22     Manuals 10 10 10 10                                Jt mobs digits, FA Jt mobs, all digits, MP arch Jt mobs all digits, MP arch, Fifth distract Jt mobs all Mps ;  palmar arch  Jt mobs all Mps,  palmar arch     Ortho fit/train New         Neuro Re-Ed          RNG         MNG         CHIKI                  orthotc                           Ther Ex 15   15'   25  25'     HEP New circumferential  Orthotic fabricated Adjusted thumb opening for improved mobility.  Attention to gripping with all digits  "in ADL's      PROM 1:1 FA, wrist, all digits LLPS FA wrist digits  LLPS Shoulder ER FA wrist digits LLPS Wrist E, Digital F individual then comp LLPS     Digital blocked AROM IP, MP isolated. P/H fists  10x5\" P/H fists   10x5\" P/H fists 10x5\"     UE warm up  UBE 2F/2R L1 UBE 2F 2R L1  R only Rev x7 UBE 2F 2R L1  Then R only x20     PRE FA   NV NV S/P 0# 10x3\"     PRE Wrist FA    G F bar x10  R F bar P/P x10  S/S x10              Shoulder ROM   Side ly ER P/H;  Supine Cane ER   Supine SA #1  X10 each      Sh PROM   Pullys 5'      Ther Activity 23   20   10'   8'     Gripping Y RB All foam squares.   G Ball    5\" x20 Y RB all foam squares   G Ball   5\" x20 R RB all foam squares  Y F bar fold  G F bar down x10 S , putty push, full top circles     Pinching II III G C P  5x5 blocks  IV V Y CP  5x5 blocks  II III G C Pin  5x5 blocks  IV V Y C pin  5x5 blocks  II, III G C pins  Block t/f     Prehension NV groove pegs NV groove pegs  NV add groove pegs              MHP Pre tx with ace flexion Pre tx with ace flexion Pre tx with ace flexion Pre tx with ace flexion                   "

## 2024-07-23 ENCOUNTER — OFFICE VISIT (OUTPATIENT)
Dept: PHYSICAL THERAPY | Facility: CLINIC | Age: 64
End: 2024-07-23
Payer: MEDICARE

## 2024-07-23 ENCOUNTER — OFFICE VISIT (OUTPATIENT)
Dept: OCCUPATIONAL THERAPY | Facility: CLINIC | Age: 64
End: 2024-07-23
Payer: MEDICARE

## 2024-07-23 DIAGNOSIS — R20.2 NUMBNESS AND TINGLING OF LEFT UPPER EXTREMITY: Primary | ICD-10-CM

## 2024-07-23 DIAGNOSIS — I89.0 LYMPHEDEMA OF LEFT ARM: ICD-10-CM

## 2024-07-23 DIAGNOSIS — S42.344D CLOSED NONDISPLACED SPIRAL FRACTURE OF SHAFT OF RIGHT HUMERUS WITH ROUTINE HEALING, SUBSEQUENT ENCOUNTER: ICD-10-CM

## 2024-07-23 DIAGNOSIS — R29.898 RIGHT HAND WEAKNESS: ICD-10-CM

## 2024-07-23 DIAGNOSIS — I89.0 LYMPHEDEMA OF RIGHT ARM: Primary | ICD-10-CM

## 2024-07-23 DIAGNOSIS — R20.0 NUMBNESS AND TINGLING OF LEFT UPPER EXTREMITY: Primary | ICD-10-CM

## 2024-07-23 PROCEDURE — 97112 NEUROMUSCULAR REEDUCATION: CPT

## 2024-07-23 PROCEDURE — 97140 MANUAL THERAPY 1/> REGIONS: CPT

## 2024-07-23 PROCEDURE — 97110 THERAPEUTIC EXERCISES: CPT

## 2024-07-23 NOTE — PROGRESS NOTES
"Daily Note     Today's date: 2024  Patient name: Eileen Matias  : 1960  MRN: [id number]  Referring provider: Rick Lazo DO  Dx:   Encounter Diagnosis     ICD-10-CM    1. Lymphedema of right arm  I89.0       2. Lymphedema of left arm  I89.0       3. Closed nondisplaced spiral fracture of shaft of right humerus with routine healing, subsequent encounter  S42.074D                      Subjective: pt reports no new complaints upon arrival.       Objective: See treatment diary below      Assessment: Tolerated treatment well. Patient would benefit from continued PT. Progressed resistance w/ ball roll up wall, which was challenging. Able to place cones on higher shelf this visit.       Plan: Continue per plan of care.      FOTO: Goal: Lymph - 57 Shoulder - 49; Eval: Lymph - 5 Shoulder - 4;3/13: Lymph - 42; : Shoulder - 47;4/10 Lymph - 48; : shoulder - 42; : Lymph - 69 Shoulder - 48; : lymph - 85 Shoulder - 56      EPOC:       Shoulder EPOC:   8/20 7/15 7/23 7/8 7/10   RA Lymph DATE:         Shoulder:         Manual       MLD [facility]   Manual Wrapping       PROM                       Exercise Diary 7/8 7/10 7/15 7/23   THEREX       Pt ed              Wall slides 10x 15x 15x 15x   SL ER 1# 2x10 1# 2x10 1# 2x10 2x10   Standing shoulder flexion/scaption 2x10 ea 1#/0# x10 ea 1#/0# x10 ea                  pulleys 2/2 2/2 2/2 2/2   Ball roll up wall x10 x10 x15 2# 5x   Supine ER cane 5\" x20  x20    Shoulder isometrics       SL flexion       SL abd 1# 2x10 1# 2x10 1# 2x10 1# 2x10   Supine shoulder flex 1# 2x10 1# 2x10 1# 2x10 1# 2x10   IR stretch w/ strap x20 x20 x20    NEURO RE-ED       Supine HA    Supine ytb 10x   Supine BL ER       Cone reaches Bryant Column 0# 5x Elliott Column 0# 5x  Cone reaches 1 min   Supine punches 1# 2x10 1# 2x10 1# 2x10 1# 2x10    ball roll up wall Cw/ccw x10 Cw/ccw x10 Roll up 10x TE    rows/pulldowns GTB x20 ea BTB/GTB BTB/GTB x20 BTB/GTB x20            "                                                   Modalities           CP

## 2024-07-23 NOTE — PROGRESS NOTES
"Daily Note     Today's date: 2024  Patient name: Eileen Matias  : 1960  MRN: [id number]  Referring provider: Rogelio Black MD  Dx:   Encounter Diagnosis     ICD-10-CM    1. Numbness and tingling of left upper extremity  R20.0     R20.2       2. Right hand weakness  R29.898                      Subjective: I still can't lift my wrist. I can't sketch, but I am doing some art work      Objective: See treatment diary below      Assessment: Tolerated treatment well. Patient demonstrated fatigue post treatment. Patient has impaired wrist and digit extension. She is compliant with use of wrist brace. Some brawny edema noted in right forearm this date      Plan: Continue per plan of care.      Precautions: Lymphedema; hx desmoid tumors and breast cancer; healing humeral fracture     POC expires Unit limit Auth  expiration date PT/OT + Visit Limit?    NA NA BOMN         Dx R Hum Fx     Dr Wayne MILLER Not met     MC         Date       Visit 19 20 21 22 23    Manuals 10 10 10 10 10                               Jt mobs digits, FA Jt mobs, all digits, MP arch Jt mobs all digits, MP arch, Fifth distract Jt mobs all Mps ;  palmar arch  Jt mobs all Mps,  palmar arch All MPs, IPs    Ortho fit/train New         Neuro Re-Ed          RNG         MNG         CHIKI                  orthotc                           Ther Ex 15   15'   25  25' 20'    HEP New circumferential  Orthotic fabricated Adjusted thumb opening for improved mobility.  Attention to gripping with all digits in ADL's      PROM 1:1 FA, wrist, all digits LLPS FA wrist digits  LLPS Shoulder ER FA wrist digits LLPS Wrist E, Digital F individual then comp LLPS 15' digits, wrist    Digital blocked AROM IP, MP isolated. P/H fists  10x5\" P/H fists   10x5\" P/H fists 10x5\"     UE warm up  UBE 2F/2R L1 UBE 2F 2R L1  R only Rev x7 UBE 2F 2R L1  Then R only x20 2m+, 2m-; RUE only 1m    PRE FA   NV NV S/P 0# 10x3\"     PRE Wrist FA  " "  G F bar x10  R F bar P/P x10  S/S x10 G FB x 10;   R FB x 10 P/P; x 10 S/S             Shoulder ROM   Side ly ER P/H;  Supine Cane ER   Supine SA #1  X10 each      Sh PROM   Pullys 5'      Ther Activity 23   20   10'   8'     Gripping Y RB All foam squares.   G Ball    5\" x20 Y RB all foam squares   G Ball   5\" x20 R RB all foam squares  Y F bar fold  G F bar down x10 S , putty push, full top circles Screw  pushes in Y putty; HG foam blocks 1 set    Pinching II III G C P  5x5 blocks  IV V Y CP  5x5 blocks  II III G C Pin  5x5 blocks  IV V Y C pin  5x5 blocks  II, III G C pins  Block t/f     Prehension NV groove pegs NV groove pegs  NV add groove pegs Grooved peg board 1 set             MHP Pre tx with ace flexion Pre tx with ace flexion Pre tx with ace flexion Pre tx with ace flexion 5' pre tx w/ digits ace wrapped flexion                  "

## 2024-07-24 ENCOUNTER — OFFICE VISIT (OUTPATIENT)
Dept: OBGYN CLINIC | Facility: HOSPITAL | Age: 64
End: 2024-07-24
Payer: MEDICARE

## 2024-07-24 DIAGNOSIS — S42.344D CLOSED NONDISPLACED SPIRAL FRACTURE OF SHAFT OF RIGHT HUMERUS WITH ROUTINE HEALING, SUBSEQUENT ENCOUNTER: Primary | ICD-10-CM

## 2024-07-24 DIAGNOSIS — R60.9 EDEMA, UNSPECIFIED TYPE: ICD-10-CM

## 2024-07-24 DIAGNOSIS — G56.30 RADIAL NERVE PALSY: ICD-10-CM

## 2024-07-24 DIAGNOSIS — M24.549 CONTRACTURE OF HAND: ICD-10-CM

## 2024-07-24 PROCEDURE — 99214 OFFICE O/P EST MOD 30 MIN: CPT | Performed by: ORTHOPAEDIC SURGERY

## 2024-07-24 NOTE — PROGRESS NOTES
ASSESSMENT/PLAN:    Assessment:   Significant nerve dysfunction right upper extremity with involvement of the radial and median nerve    Lymphedema right upper extremity  Joint contractures metacarpal phalangeal joints, proximal interphalangeal joints, distal interphalangeal joints of the right index through small finger as well as the right thumb CMC joint, metacarpophalangeal joint, and interphalangeal joint.    Plan:   Patient is continuing to have significant improvement with her right upper extremity at today's visit.  Based on examination today patient does have pain continue to work on functional ROM.  I advised patient that she should continue to work with lymphedema therapy and continue hand therapy for aggressive range of motion of wrist and finger flexion.  Patient to continue to wear custom progressive splints from hand therapy for wrist and digit flexion.    Follow Up:  2 months    To Do Next Visit:  reevaluate    General Discussions:    Due to contractures of the right hand and nerve damage she will not know if she is getting motor and sensation back to her right hand and all fingers. There is a need to determine the level of damage to the nerves. Discussed the surgical treatment options as a last case scenario to include multiple contracture release and tenolysis. Explained that following the EMG, she will follow-up to determine a plan moving forward. Explained that plan will likely include Dr. Lazo for management of the slow-healing proximal humerus fracture.      _____________________________________________________  CHIEF COMPLAINT:  Chief Complaint   Patient presents with    Right Hand - Follow-up         SUBJECTIVE:  Eileen Matias is a 63 y.o. female who presents with Numbness and previous triggering to the right thumb.  This started 6 month(s) ago as Insidious.  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 She has been completing lymphedema therapy for lymphedema to the right upper extremity. Patient had a proximal humerus fracture in 11/2023 which is being managed by Dr. Lazo however the fracture has been slow healing due to prior trauma to her RUE.    She states that she is seeing improvement of symptoms with PT. And she states when  Radiation: Yes to the  index finger, long finger, ring finger, small finger, and thumb  Previous Treatments: therapy with only partial relief  Associated symptoms: Numbness  Handedness: right  Work status: Retired    PAST MEDICAL HISTORY:  Past Medical History:   Diagnosis Date    Abnormal mammogram 05/15/2013    Anemia     Cancer (HCC)     desmoitosis    Carcinoma of left breast metastatic to axillary lymph node (HCC) 04/19/2022    Chronic pain disorder     worse right side of body    Desmoid tumor     Last Assessed: 6/19/2017    fibroidal cyst 1999?  Hysterectomy done    History of transfusion     no reactions    Hyperlipidemia     Hypertension     Osteoporosis     PONV (postoperative nausea and vomiting)        PAST SURGICAL HISTORY:  Past Surgical History:   Procedure Laterality Date    BREAST LUMPECTOMY Left 6/21/2022    Procedure: ANITA  DIRECTED LUMPECTOMY;  Surgeon: Amanda Motley MD;  Location: [facility];  Service: Surgical Oncology    FRACTURE SURGERY      HYSTERECTOMY      LYMPH NODE DISSECTION Left 6/21/2022    Procedure: AXILLARY DISSECTION LEVEL I AND LEVEL II LYMPH NODES;  Surgeon: Amanda Motley MD;  Location: [facility];  Service: Surgical Oncology    MRI BREAST BIOPSY LEFT (ALL INCLUSIVE) Left 5/20/2022    MRI BREAST BIOPSY RIGHT (ALL INCLUSIVE) Right 5/20/2022    OTHER SURGICAL HISTORY      Arm Incision: Dermoid tumor, right Arm     PARTIAL HYSTERECTOMY      FL COLONOSCOPY FLX DX W/COLLJ SPEC WHEN PFRMD N/A 8/15/2017    Procedure: COLONOSCOPY;  Surgeon: Alec England MD;  Location: [facility];  Service:  Gastroenterology    NV NDSC WRST SURG W/RLS TRANSVRS CARPL LIGM Left 8/10/2021    Procedure: RELEASE LEFT CARPAL TUNNEL ENDOSCOPIC;  Surgeon: Chris Camacho MD;  Location: [facility];  Service: Orthopedics    NV OPTX DSTL RADL I-ARTIC FX/EPIPHYSL SEP 3 FRAG Left 2/9/2021    Procedure: OPEN REDUCTION W/ INTERNAL FIXATION (ORIF) RADIUS / ULNA (WRIST) left;  Surgeon: Chris Camacho MD;  Location: [facility];  Service: Orthopedics    SKIN BIOPSY      SKIN CANCER EXCISION      Melanoma Excision     TONSILLECTOMY      US BREAST NEEDLE LOC LEFT Left 6/16/2022    US BREAST NEEDLE LOC RIGHT EACH ADDITIONAL Right 6/16/2022    US GUIDED BREAST LYMPH NODE BIOPSY LEFT Left 4/14/2022       FAMILY HISTORY:  Family History   Problem Relation Age of Onset    Diabetes Mother     No Known Problems Father     Cancer Sister     Lung cancer Sister     Pancreatic cancer Sister     No Known Problems Maternal Grandmother     No Known Problems Maternal Grandfather     No Known Problems Paternal Grandmother     No Known Problems Paternal Grandfather     Breast cancer Neg Hx        SOCIAL HISTORY:  Social History     Tobacco Use    Smoking status: Never    Smokeless tobacco: Never   Vaping Use    Vaping status: Never Used   Substance Use Topics    Alcohol use: Not Currently     Alcohol/week: 5.0 standard drinks of alcohol     Types: 5 Shots of liquor per week     Comment: Used mostly as a painkiller when needed before bedtime    Drug use: Yes     Frequency: 28.0 times per week     Types: Marijuana     Comment: THC Medical marijuana       MEDICATIONS:    Current Outpatient Medications:     cholecalciferol (VITAMIN D3) 1,000 units tablet, Take 3,000 Units by mouth daily, Disp: , Rfl:     diphenhydrAMINE (BENADRYL) 50 MG tablet, Take 50 mg by mouth daily at bedtime as needed for itching, Disp: , Rfl:     dronabinol (MARINOL) 10 MG capsule, , Disp: , Rfl:     ezetimibe (ZETIA) 10 mg tablet, Take 10 mg by mouth daily, Disp: , Rfl:     gabapentin  (Neurontin) 100 mg capsule, Take 1 capsule (100 mg total) by mouth 3 (three) times a day, Disp: 90 capsule, Rfl: 1    letrozole (FEMARA) 2.5 mg tablet, TAKE 1 TABLET BY MOUTH EVERY DAY, Disp: 90 tablet, Rfl: 0    multivitamin (THERAGRAN) TABS, Take 1 tablet by mouth daily, Disp: , Rfl:     Omega-3 Fatty Acids (FISH OIL PO), Take by mouth, Disp: , Rfl:     penicillin V potassium (VEETID) 500 mg tablet, Take 1 tablet (500 mg total) by mouth every 12 (twelve) hours, Disp: 180 tablet, Rfl: 1    Probiotic Product (PROBIOTIC-10 PO), Take by mouth, Disp: , Rfl:     ALLERGIES:  Allergies   Allergen Reactions    Chlorpromazine Anaphylaxis     PHENOTHIAZINE=ANAPHYLAXIS    Codeine Anaphylaxis     Anaphylaxis  abd pain.    Meperidine Anaphylaxis, Hives and Vomiting    Phenothiazines Anaphylaxis and Throat Swelling     Category: Allergy;     Saccharin - Food Allergy Anaphylaxis    Ampicillin-Sulbactam Sodium Hives    Aspirin GI Intolerance and Abdominal Pain     Severe GERD    Gluten Meal - Food Allergy GI Intolerance    Ibuprofen Hives     H    Levofloxacin Hives    Chocolate - Food Allergy GI Intolerance    Lactose - Food Allergy GI Intolerance    Neomycin Other (See Comments), Edema and Hives     Category: Allergy;   swelling    Padroni - Food Allergy Rash    Latex Hives and Rash     Category: Allergy;        REVIEW OF SYSTEMS:  Pertinent items are noted in HPI.  A comprehensive review of systems was negative.    LABS:  HgA1c:   Lab Results   Component Value Date    HGBA1C 5.3 07/13/2021     BMP:   Lab Results   Component Value Date    GLUCOSE 98 05/02/2017    CALCIUM 8.9 11/14/2023    K 3.6 11/14/2023    CO2 24 11/14/2023     11/14/2023    BUN 10 11/14/2023    CREATININE 0.52 (L) 11/14/2023         _____________________________________________________  PHYSICAL EXAMINATION:  Vital signs: There were no vitals taken for this visit.  General: well developed and well nourished, alert, oriented times 3, and appears  comfortable  Psychiatric: Normal  HEENT: Trachea Midline, No torticollis  Cardiovascular: No discernable arrhythmia  Pulmonary: No wheezing or stridor  Abdomen: No rebound or guarding  Extremities: No peripheral edema  Skin: No masses, erythema, lacerations, fluctation, ulcerations  Neurovascular: Decreased Sensation to  the Median Nerve, Decreased Sensation to  the Ulnar Nerve, Decreased Sensation to  the Radial Nerve, Weakness to the Median Nerve see below, Weakness to the Ulnar nerve see below, Weakness to the radial nerve see below, and Pulses Intact    MUSCULOSKELETAL EXAMINATION:  Right Hand/Wrist exam:  Supination to neutral  No MP joint extension  No retropulsion of thumb  Thumb FPL 5/5  ABD thumb 4-/5  FDP 5/5 to all fingers  FCR 5/5  FCU 5/5  FPL 4+/5  FDB 5/5  FDP 5/5  Palmar abduction to thumb 5/5  Wrist flexion 4+/5  No palmaris     MP PROM at index finger to 70 degrees             Long finger to 60 degrees             Ring and small fingers 45 degrees    PIP PROM at index finger to 70 degrees             Long, ring and small fingers to 60 degrees  DIP PROM at index, long, ring, small fingers to 30 degrees  IP PROM at thumb to 50 degrees    Wrist PROM, extension 40, flexion 60.      _____________________________________________________  STUDIES REVIEWED:  EMG demonstrates radial nerve palsy at the level of the radial groove       PROCEDURES PERFORMED:  Procedures  No Procedures performed today        Scribe Attestation      I,:  Andrea Saldivar am acting as a scribe while in the presence of the attending physician.:       I,:  Rogelio Black MD personally performed the services described in this documentation    as scribed in my presence.:

## 2024-07-25 ENCOUNTER — EVALUATION (OUTPATIENT)
Dept: PHYSICAL THERAPY | Facility: CLINIC | Age: 64
End: 2024-07-25
Payer: MEDICARE

## 2024-07-25 ENCOUNTER — APPOINTMENT (OUTPATIENT)
Dept: OCCUPATIONAL THERAPY | Facility: CLINIC | Age: 64
End: 2024-07-25
Payer: MEDICARE

## 2024-07-25 DIAGNOSIS — S42.344D CLOSED NONDISPLACED SPIRAL FRACTURE OF SHAFT OF RIGHT HUMERUS WITH ROUTINE HEALING, SUBSEQUENT ENCOUNTER: ICD-10-CM

## 2024-07-25 DIAGNOSIS — I89.0 LYMPHEDEMA OF RIGHT ARM: Primary | ICD-10-CM

## 2024-07-25 DIAGNOSIS — I89.0 LYMPHEDEMA OF LEFT ARM: ICD-10-CM

## 2024-07-25 PROCEDURE — 97110 THERAPEUTIC EXERCISES: CPT

## 2024-07-25 PROCEDURE — 97140 MANUAL THERAPY 1/> REGIONS: CPT

## 2024-07-25 NOTE — PROGRESS NOTES
PT Re-Evaluation     Collection of Subjective/Objective data performed by Irene Childs PTA. Assessment, POC, and Goal attainment performed by Jessica Garcia DPT.       Today's date: 2024  Patient name: Eileen Matias  : 1960   MRN: [id number]  Referring provider: Rick Lazo DO  Dx:   Encounter Diagnosis     ICD-10-CM    1. Lymphedema of right arm  I89.0       2. Closed nondisplaced spiral fracture of shaft of right humerus with routine healing, subsequent encounter  S42.344D       3. Lymphedema of left arm  I89.0                              Assessment  Impairments: abnormal or restricted ROM, impaired physical strength and pain with function  Other impairment: lymphedema    Assessment details: Patient is a 64 y/o female who presents to therapy with complaints of bilateral upper extremity lymphedema and s/p R humerus fracture and has completed 51 visits to date. Patient demonstrates subjective/objective improvement since starting PT such as improved shoulder strength. Patient continues to present with deficits that include shoulder mobility and pain. Minimal changes in swelling since last re-evaluation. Patient will benefit from continued skilled PT intervention to address the aforementioned impairments, achieve goals, maximize function, and improve quality of life. Pt is in agreement with this plan.  Understanding of Dx/Px/POC: good     Prognosis: good    Goals  LYMPH GOALS:  ST.Pain decreased by 25% in 4 weeks.--PROGRESSING  2. Edema decreased by 2-3 cm in 4 weeks. --MET    LT. Decrease pain to 1-2/10 at worst by d/c.-PROGRESSING  2. Increase ROM to WFL for all deficient movements by d/c.-PROGRESSING  3. Strength increased to 5 for all deficient muscle groups by d/c.-PROGRESSING  4. IADL performance increased to max function by d/c.-PROGRESSING  5. Recreational performance increased to max function by d/c.-PROGRESSING  6. RUE edema decreased to that of LUE by d/c.  -PROGRESSING  7. Pt will return to work by d/c.-PROGRESSING    SHOULDER GOALS:   ST weeks  Pt will demonstrate good understanding and compliance with HEP--PROGRESSING  Pt will decrease pain to 5/10 at worst  --PROGRESSING  Pt will demonstrate improved postural awareness and ability to self-correct without reliance on external cues --PROGRESSING     LT weeks  Pt will improve FOTO score to > or = to 52 to indicate improved functional abilities  --PROGRESSING  Pt will decrease pain to 2-3/10 at worst   --PROGRESSING  Pt will increase shoulder strength to 4-/5 for improved tolerance/independence with ADLs --PROGRESSING  Pt will improve  strength to 25#  --PROGRESSING         Plan  Other planned modality interventions: other modalities PRN    Planned therapy interventions: abdominal trunk stabilization, ADL retraining, IADL retraining, flexibility, functional ROM exercises, graded exercise, home exercise program, manual therapy, joint mobilization, neuromuscular re-education, patient education, postural training, strengthening, therapeutic exercise, therapeutic activities, massage and work reintegration  Other planned therapy interventions: other interventions PRN    Frequency: 1-2x/week.  Duration in weeks: 8  Plan of Care beginning date: 2024  Plan of Care expiration date: 2024  Treatment plan discussed with: patient      Subjective Evaluation    History of Present Illness  Mechanism of injury: RE (24):  Pt reports no change with the R lymphedema and stated she has some increased strength in the shoulder. Pt notes some improvement with the L lymphedema and feels less achy. Pt notes no change in the mobility with the shoulder. Pt notes she is able to reach into an overhead cabinet but unable to lift anything like a cup or plate. Pt notes compliance with the compression pump on the R side 3-4x/ day.     RE (24):  Pt reports no change with the lymphedema and the shoulder over this past  month. Pt reports overall pain in the shoulder as improved. Pt notes slight improvement in strength in the shoulder.     RE (5/22/24):  Pt reports swelling in BL UE is maintaining overall. Pt notes mobility in the shoulder is about the same but strength is improving. Pt is able to lift arm to touch her head now.     RE (5/6/24) adding LUE lymphedema:  Pt is s/p L breast lumpectomy that was performed about two years ago. Pt denies any discomfort or swelling in the LUE. Pt MD would like pt to start lymphedema therapy as a preventative measure. Pt does report some tenderness in the L axilla area due to scar tissue. Pt stated her  has been working on the scar tissue every night which seems to help some.       RE (4/24/24):  Pt has been see w/ c/o RUE lymphedema and s/p R humeral fracture and notes overall improvement. Pt notes 50% improvement in lymphedema since beginning physical therapy. Pt is complaint with using her compression pump 3x/day, 4x/day on bad days, and wearing the compression sleeve during the day/night time. Pt notes majority of swelling remains to be in the elbow region, which is preventing her from downsizing her current daytime sleeve she is wearing. In terms of the shoulder, she notes overall improvements in mobility, strength, and endurance. Gripping is still a challenge, but she is seeing OT for treatment for her had mobility/strength. Continued difficulties include lifting, mobility OH and behind to don/doff bra/washback.     RE (4/10/24):  Pt reports overall improvements with the tightness and swelling in the arm. Pt notes 50% improvement in the swelling since beginning lymphedema therapy. Pt is still in the smaller sized night sleeve that she wears all day and then switches to the larger sleeve at night.  She has been able to transition back into the compression pump, that she uses about 3x/day and 4x/day on a bad day. Pt notes improvement since being back into the pump.       RE  "(3/13/24):  Pt reports overall improvements with the tightness and swelling in the arm. Pt notes 10% improvement in the swelling since beginning lymphedema therapy. Pt has since transitioned from the larger night garment to the smaller night garment to wear during the day. Pt notes she has feeling back in her thumb and finger tips. Pt notes slight improvements in strength. Pt is still not using pump at home due to the continued swelling and tightness in posterior upper arm that causes more pain when using pump. Pt plans on restarting the pump when \"the lump\" in the back of the arm improves.       RE (2/14/24):  Pt has been seen for RUE lymphedema for 8 visits to date and notes 50% improvement so far. Pt reports she is still not able to use compression pump but has been wearing the [employer] garment, which seems to be helping. Pt is not longer needing to wear the clamshell for her fx unless if she is out running errands. Pt notes the swelling has improved since being able to take the brace off more often. Pt notes continued \"hardness\" in elbow and axilla region.     EVAL (1/15/24):  Pt reports to IE w/ hx of RUE lymphedema which started approx 20 years ago approx 5 years following CA dx of desmoitosis.     Pt had done skilled PT tx for this condition in the past which was helpful control the issue. However, pt sustained humeral fx on 11/12/23 as a result of fall on RUE while trying to protect mother in law from a fall out of w/c. She reports that healing has been complicated by severe osteoporosis and lymphedema. Pt still required to wear brace at all times- not yet allowed any ROM in shoulder.     In addition to the above, pt w/ recent hx of L breast CA w/ L lumpectomy and lymph node removal (pt reports 17 lymph nodes) over on 6/21/22.        Patient Goals  Patient goals for therapy: decreased pain and decreased edema    Pain  Pain scale: Shoulder -4; Lymph- 5.  Pain scale at lowest: Shoulder -4; Lymph- 4.  Pain scale " at highest: Shoulder- 7; Lymph-7.  Location: R shoulder  Alleviating factors: lymphedema massage.  Exacerbated by: increased edema, movement.    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)  Hand dominance: right        Objective     General Comments:      Shoulder Comments   R AROM: flex-110 (pain)  abd-105 (pain) IR-R T10 ER- unable      R PROM: flex- 112 (pain) abd- 106 (pain) IR-90 ER-13 (pain)     R shoulder strength: flex: 5/5; abd: 4+/5; ER: 4-/5; IR: 4+/5      Ankle/Foot Comments   Edema in R vs L w/ circumferential girth measurements as below (L/R):   Axilla: 38/36  20 cm proximal to elbow: 31.5/29  10 cm proximal to elbow: 28/26 cm  Elbow Joint: 26 cm/25.5 cm  20 cm proximal to wrist: 26.5 cm/26 cm  10 cm proximal to wrist: 21 cm/21.5 cm  Wrist:  16.5 cm/16 cm  Palm: 18 cm/17.5 cm  MCP Joints (digits 2-5): 16.5 cm/16.5 cm  PIP Joints (digits 2-5): 15.5 cm/15 cm    FOTO: Goal: Lymph - 57 Shoulder - 49; Eval: Lymph - 5 Shoulder - 4;3/13: Lymph - 42; 4/1: Shoulder - 47;4/10 Lymph - 48; 4/24: shoulder - 42; 5/22: Lymph - 69 Shoulder - 48; 6/25: lymph - 85 Shoulder - 56; 7/25/24 - lymph: 92; shoulder - 50      EPOC:   8/20    Shoulder EPOC:   8/20 7/15 7/23 7/25 7/10   RA Lymph DATE:   7/23      Shoulder:  7/23       Manual       MLD [facility]   Manual Wrapping       PROM                       Exercise Diary 7/25 7/10 7/15 7/23   THEREX       Pt ed FOTO; updated measurements              Wall slides  15x 15x 15x   SL ER  1# 2x10 1# 2x10 2x10   Standing shoulder flexion/scaption  1#/0# x10 ea 1#/0# x10 ea                  pulleys  2/2 2/2 2/2   Ball roll up wall  x10 x15 2# 5x   Supine ER cane   x20    Shoulder isometrics       SL flexion       SL abd  1# 2x10 1# 2x10 1# 2x10   Supine shoulder flex  1# 2x10 1# 2x10 1# 2x10   IR stretch w/ strap  x20 x20    NEURO RE-ED       Supine HA    Supine ytb 10x   Supine BL ER        Cone reaches  [place] 0# 5x  Cone reaches 1 min   Supine punches  1# 2x10 1# 2x10 1# 2x10    ball roll up wall  Cw/ccw x10 Roll up 10x TE    rows/pulldowns  BTB/GTB BTB/GTB x20 BTB/GTB x20                                                              Modalities           CP

## 2024-07-29 ENCOUNTER — APPOINTMENT (OUTPATIENT)
Dept: PHYSICAL THERAPY | Facility: CLINIC | Age: 64
End: 2024-07-29
Payer: MEDICARE

## 2024-07-30 ENCOUNTER — OFFICE VISIT (OUTPATIENT)
Dept: OCCUPATIONAL THERAPY | Facility: CLINIC | Age: 64
End: 2024-07-30
Payer: MEDICARE

## 2024-07-30 DIAGNOSIS — R20.2 NUMBNESS AND TINGLING OF LEFT UPPER EXTREMITY: Primary | ICD-10-CM

## 2024-07-30 DIAGNOSIS — R20.0 NUMBNESS AND TINGLING OF LEFT UPPER EXTREMITY: Primary | ICD-10-CM

## 2024-07-30 DIAGNOSIS — R29.898 RIGHT HAND WEAKNESS: ICD-10-CM

## 2024-07-30 PROCEDURE — 97110 THERAPEUTIC EXERCISES: CPT | Performed by: OCCUPATIONAL THERAPIST

## 2024-07-30 PROCEDURE — 97530 THERAPEUTIC ACTIVITIES: CPT | Performed by: OCCUPATIONAL THERAPIST

## 2024-07-30 NOTE — PROGRESS NOTES
Daily Note     Today's date: 2024  Patient name: Eileen Matias  : 1960  MRN: [id number]  Referring provider: Rogelio Black MD  Dx:   Encounter Diagnosis     ICD-10-CM    1. Numbness and tingling of left upper extremity  R20.0     R20.2       2. Right hand weakness  R29.898                              Subjective:  I am trying to use may hand more.  I stretch wrap my hand to stretch it.  I have pain with using tweezers (muscle belly wrist extensors)      Objective: See treatment diary below;  Continue wear of  circumferential  wrist support for increased ease with function.         Assessment: Tolerated treatment well. Patient would benefit from continued OT. Increased passive range of motion of all digits post heat/stretch. Able to gross grasp post tx.  Excellent progression in IP flexion.   Cueing to incorporate fifth digit in flexion today.   MP flexion of IV V restricted with composite flexion.         Plan: Continue per plan of care.  Progress treatment as tolerated.     Continue with new circumferential orthotic allowing progressive digital motion and ease of function.  Focus on mobility of 4,5 for improved  function and shoulder ER.   Encourage bilateral functional tasks.           Precautions: Lymphedema; hx desmoid tumors and breast cancer; healing humeral fracture     POC expires Unit limit Auth  expiration date PT/OT + Visit Limit?    NA NA BOMN         Dx R Hum Fx     Dr Wayne MILLER Not met              Date       Visit 19 20 21 22 23    Manuals 10 10 10 10                                Jt mobs digits, FA Jt mobs, all digits, MP arch Jt mobs all digits, MP arch, Fifth distract Jt mobs all Mps ;  palmar arch  Jt mobs all Mps,  palmar arch     Ortho fit/train New         Neuro Re-Ed          RNG         MNG         CHIKI                  orthotc                           Ther Ex 15   15'   25  25'   30'    HEP New circumferential  Orthotic  "fabricated Adjusted thumb opening for improved mobility.  Attention to gripping with all digits in ADL's  Focus on MP flexion all digits, Sh ER PROM    PROM 1:1 FA, wrist, all digits LLPS FA wrist digits  LLPS Shoulder ER FA wrist digits LLPS Wrist E, Digital F individual then comp LLPS Wrist E digital F individual then comp   LLPS    Digital blocked AROM IP, MP isolated. P/H fists  10x5\" P/H fists   10x5\" P/H fists 10x5\" P/H fists 10x3\"    UE warm up  UBE 2F/2R L1 UBE 2F 2R L1  R only Rev x7 UBE 2F 2R L1  Then R only x20 UBE 2F 2R then R only  x20    PRE FA   NV NV S/P 0# 10x3\" S/P FA 0#  10x3\"    PRE Wrist FA    G F bar x10  R F bar P/P x10  S/S x10 G F bar up  R F bar down  R F bar fold  X10 each             Shoulder ROM   Side ly ER P/H;  Supine Cane ER   Supine SA #1  X10 each      Sh PROM   Pullys 5'      Ther Activity 23   20   10'   8'   10'    Gripping Y RB All foam squares.   G Ball    5\" x20 Y RB all foam squares   G Ball   5\" x20 R RB all foam squares  Y F bar fold  G F bar down x10 S , putty push, full top circles S , putty  X50  #2 gray x5      Pinching II III G C P  5x5 blocks  IV V Y CP  5x5 blocks  II III G C Pin  5x5 blocks  IV V Y C pin  5x5 blocks  II, III G C pins  Block t/f G C pins  II, III  X30 blocks    Prehension NV groove pegs NV groove pegs  NV add groove pegs              MHP Pre tx with ace flexion Pre tx with ace flexion Pre tx with ace flexion Pre tx with ace flexion Pre tx with ace flexion                  "

## 2024-07-31 ENCOUNTER — OFFICE VISIT (OUTPATIENT)
Dept: PHYSICAL THERAPY | Facility: CLINIC | Age: 64
End: 2024-07-31
Payer: MEDICARE

## 2024-07-31 DIAGNOSIS — S42.344D CLOSED NONDISPLACED SPIRAL FRACTURE OF SHAFT OF RIGHT HUMERUS WITH ROUTINE HEALING, SUBSEQUENT ENCOUNTER: ICD-10-CM

## 2024-07-31 DIAGNOSIS — I89.0 LYMPHEDEMA OF RIGHT ARM: Primary | ICD-10-CM

## 2024-07-31 DIAGNOSIS — I89.0 LYMPHEDEMA OF LEFT ARM: ICD-10-CM

## 2024-07-31 PROCEDURE — 97110 THERAPEUTIC EXERCISES: CPT

## 2024-07-31 PROCEDURE — 97112 NEUROMUSCULAR REEDUCATION: CPT

## 2024-07-31 PROCEDURE — 97140 MANUAL THERAPY 1/> REGIONS: CPT

## 2024-07-31 NOTE — PROGRESS NOTES
"Daily Note     Today's date: 2024  Patient name: Eileen Matias  : 1960  MRN: [id number]  Referring provider: Rick Lazo DO  Dx:   Encounter Diagnosis     ICD-10-CM    1. Lymphedema of right arm  I89.0       2. Closed nondisplaced spiral fracture of shaft of right humerus with routine healing, subsequent encounter  S42.344D       3. Lymphedema of left arm  I89.0                      Subjective: pt reports feeling achy today due to the weather.       Objective: See treatment diary below      Assessment: Tolerated treatment well. Patient would benefit from continued PT.Pt swelling is maintaining and has been maintaining for the last couple months.       Plan: Continue per plan of care.      FOTO: Goal: Lymph - 57 Shoulder - 49; Eval: Lymph - 5 Shoulder - 4;3/13: Lymph - 42; : Shoulder - 47;4/10 Lymph - 48; : shoulder - 42; : Lymph - 69 Shoulder - 48; : lymph - 85 Shoulder - 56      EPOC:       Shoulder EPOC:   8/20 7/15 7/23 7/31 7/10   RA Lymph DATE:         Shoulder:         Manual       MLD [facility]   Manual Wrapping       PROM                       Exercise Diary    THEREX       Pt ed              Wall slides 2x10   15x   SL ER 1# 2x10   2x10   Standing shoulder flexion/scaption 1# 2x10 ea                    pulleys 2/2   2/   Ball roll up wall 2# 5x   2# 5x   Supine ER cane 5\" x20      Shoulder isometrics       SL flexion       SL abd 1# 2x10   1# 2x10   Supine shoulder flex 1# 2x10   1# 2x10   IR stretch w/ strap       NEURO RE-ED       Supine HA Supine ytb 2x10    Supine ytb 10x   Supine BL ER Supine ytb 10x       Cone reaches Cone reaches 1 min   Cone reaches 1 min   Supine punches 1# 2x10   1# 2x10    ball roll up wall TE   TE    rows/pulldowns BTB/GTB x20   BTB/GTB x20                                                              Modalities           CP                                                                    "

## 2024-08-01 ENCOUNTER — APPOINTMENT (OUTPATIENT)
Dept: OCCUPATIONAL THERAPY | Facility: CLINIC | Age: 64
End: 2024-08-01
Payer: MEDICARE

## 2024-08-01 NOTE — PROGRESS NOTES
"Daily Note     Today's date: 2024  Patient name: Eileen Matias  : 1960  MRN: [id number]  Referring provider: Rogelio Black MD  Dx:   Encounter Diagnosis     ICD-10-CM    1. Numbness and tingling of left upper extremity  R20.0     R20.2       2. Right hand weakness  R29.898                      Subjective: ***      Objective: See treatment diary below      Assessment: Tolerated treatment {Tolerated treatment :1061805653}. Patient {assessment:8997847803}      Plan: {PLAN:0590771708}     Precautions: Lymphedema; hx desmoid tumors and breast cancer; healing humeral fracture     POC expires Unit limit Auth  expiration date PT/OT + Visit Limit?    NA NA BOMN         Dx R Hum Fx     Dr Wayne MILLER Not met     MC         Date       Visit 19 20 21 22 23    Manuals 10 10 10 10 10                               Jt mobs digits, FA Jt mobs, all digits, MP arch Jt mobs all digits, MP arch, Fifth distract Jt mobs all Mps ;  palmar arch  Jt mobs all Mps,  palmar arch All MPs, IPs    Ortho fit/train New         Neuro Re-Ed          RNG         MNG         CHIKI                  orthotc                           Ther Ex 15   15'   25  25' 20'    HEP New circumferential  Orthotic fabricated Adjusted thumb opening for improved mobility.  Attention to gripping with all digits in ADL's      PROM 1:1 FA, wrist, all digits LLPS FA wrist digits  LLPS Shoulder ER FA wrist digits LLPS Wrist E, Digital F individual then comp LLPS 15' digits, wrist    Digital blocked AROM IP, MP isolated. P/H fists  10x5\" P/H fists   10x5\" P/H fists 10x5\"     UE warm up  UBE 2F/2R L1 UBE 2F 2R L1  R only Rev x7 UBE 2F 2R L1  Then R only x20 2m+, 2m-; RUE only 1m    PRE FA   NV NV S/P 0# 10x3\"     PRE Wrist FA    G F bar x10  R F bar P/P x10  S/S x10 G FB x 10;   R FB x 10 P/P; x 10 S/S             Shoulder ROM   Side ly ER P/H;  Supine Cane ER   Supine SA #1  X10 each      Sh PROM   Pullys 5'      Ther Activity " "23   20   10'   8'     Gripping Y RB All foam squares.   G Ball    5\" x20 Y RB all foam squares   G Ball   5\" x20 R RB all foam squares  Y F bar fold  G F bar down x10 S , putty push, full top circles Screw  pushes in Y putty; HG foam blocks 1 set    Pinching II III G C P  5x5 blocks  IV V Y CP  5x5 blocks  II III G C Pin  5x5 blocks  IV V Y C pin  5x5 blocks  II, III G C pins  Block t/f     Prehension NV groove pegs NV groove pegs  NV add groove pegs Grooved peg board 1 set             MHP Pre tx with ace flexion Pre tx with ace flexion Pre tx with ace flexion Pre tx with ace flexion 5' pre tx w/ digits ace wrapped flexion                    "

## 2024-08-05 ENCOUNTER — OFFICE VISIT (OUTPATIENT)
Dept: PHYSICAL THERAPY | Facility: CLINIC | Age: 64
End: 2024-08-05
Payer: MEDICARE

## 2024-08-05 DIAGNOSIS — S42.344D CLOSED NONDISPLACED SPIRAL FRACTURE OF SHAFT OF RIGHT HUMERUS WITH ROUTINE HEALING, SUBSEQUENT ENCOUNTER: ICD-10-CM

## 2024-08-05 DIAGNOSIS — I89.0 LYMPHEDEMA OF RIGHT ARM: Primary | ICD-10-CM

## 2024-08-05 DIAGNOSIS — I89.0 LYMPHEDEMA OF LEFT ARM: ICD-10-CM

## 2024-08-05 PROCEDURE — 97110 THERAPEUTIC EXERCISES: CPT

## 2024-08-05 PROCEDURE — 97140 MANUAL THERAPY 1/> REGIONS: CPT

## 2024-08-05 PROCEDURE — 97112 NEUROMUSCULAR REEDUCATION: CPT

## 2024-08-05 NOTE — PROGRESS NOTES
"Daily Note     Today's date: 2024  Patient name: Eileen Matias  : 1960  MRN: [id number]  Referring provider: Rick Lazo DO  Dx:   Encounter Diagnosis     ICD-10-CM    1. Lymphedema of right arm  I89.0       2. Closed nondisplaced spiral fracture of shaft of right humerus with routine healing, subsequent encounter  S42.344D       3. Lymphedema of left arm  I89.0                      Subjective: pt reports no new major complaints upon arrival.       Objective: See treatment diary below      Assessment: Tolerated treatment well. Patient would benefit from continued PT. Swelling is no better but no worse in BL UE. Continued to emphasize OH strength.       Plan: Continue per plan of care.      FOTO: Goal: Lymph - 57 Shoulder - 49; Eval: Lymph - 5 Shoulder - 4;3/13: Lymph - 42; : Shoulder - 47;4/10 Lymph - 48; : shoulder - 42; : Lymph - 69 Shoulder - 48; : lymph - 85 Shoulder - 56      EPOC:       Shoulder EPOC:   8/20 7/15 7/23 7/31 8/5   RA Lymph DATE:         Shoulder:         Manual       MLD [facility]   Manual Wrapping       PROM                       Exercise Diary    THEREX       Pt ed              Wall slides 2x10 2x10  15x   SL ER 1# 2x10 2x10  2x10   Standing shoulder flexion/scaption 1# 2x10 ea 1# 2x10 ea                   pulleys 2/2 2/2  2/2   Ball roll up wall 2# 5x 2# 7x  2# 5x   Supine ER cane 5\" x20 5\" 20x     Shoulder isometrics       SL flexion       SL abd 1# 2x10 1# 2x10  1# 2x10   Supine shoulder flex 1# 2x10 1# 2x10  1# 2x10   IR stretch w/ strap       NEURO RE-ED       Supine HA Supine ytb 2x10    Supine ytb 10x   Supine BL ER Supine ytb 10x       Cone reaches Cone reaches 1 min Cone reaches 1 min flex/scap  Cone reaches 1 min   Supine punches 1# 2x10 1# 25x  1# 2x10    ball roll up wall TE   TE    rows/pulldowns BTB/GTB x20 BTB x20  BTB/GTB x20                                                              Modalities           CP            "

## 2024-08-06 ENCOUNTER — OFFICE VISIT (OUTPATIENT)
Dept: OCCUPATIONAL THERAPY | Facility: CLINIC | Age: 64
End: 2024-08-06
Payer: MEDICARE

## 2024-08-06 DIAGNOSIS — R29.898 RIGHT HAND WEAKNESS: ICD-10-CM

## 2024-08-06 DIAGNOSIS — R20.0 NUMBNESS AND TINGLING OF LEFT UPPER EXTREMITY: Primary | ICD-10-CM

## 2024-08-06 DIAGNOSIS — R20.2 NUMBNESS AND TINGLING OF LEFT UPPER EXTREMITY: Primary | ICD-10-CM

## 2024-08-06 PROCEDURE — 97110 THERAPEUTIC EXERCISES: CPT

## 2024-08-06 PROCEDURE — 97530 THERAPEUTIC ACTIVITIES: CPT

## 2024-08-06 PROCEDURE — 97140 MANUAL THERAPY 1/> REGIONS: CPT

## 2024-08-06 NOTE — PROGRESS NOTES
"Daily Note     Today's date: 2024  Patient name: Eileen Matias  : 1960  MRN: [id number]  Referring provider: Rogelio Black MD  Dx:   Encounter Diagnosis     ICD-10-CM    1. Numbness and tingling of left upper extremity  R20.0     R20.2       2. Right hand weakness  R29.898                      Subjective: I'm managing. The arm is hurting less now that I'm in a tighter sleeve      Objective: See treatment diary below      Assessment: Tolerated treatment well. Patient would benefit from continued OT. Improved active digit flexion noted. Improved gross grasp for light IADLs with patient in circumferential wrist extension splint.       Plan: Continue per plan of care.      Precautions: Lymphedema; hx desmoid tumors and breast cancer; healing humeral fracture     POC expires Unit limit Auth  expiration date PT/OT + Visit Limit?    NA NA BOMN         Dx R Hum Fx     Dr Wayne MILLER Not met     MC         Date      Visit 19 20 21 22 23 24   Manuals 10 10 10 10  10'                              Jt mobs digits, FA Jt mobs, all digits, MP arch Jt mobs all digits, MP arch, Fifth distract Jt mobs all Mps ;  palmar arch  Jt mobs all Mps,  palmar arch  10' MPS   Ortho fit/train New         Neuro Re-Ed          RNG         MNG         CHIKI                  orthotc                           Ther Ex 15   15'   25  25'   30' 30'   HEP New circumferential  Orthotic fabricated Adjusted thumb opening for improved mobility.  Attention to gripping with all digits in ADL's  Focus on MP flexion all digits, Sh ER PROM    PROM 1:1 FA, wrist, all digits LLPS FA wrist digits  LLPS Shoulder ER FA wrist digits LLPS Wrist E, Digital F individual then comp LLPS Wrist E digital F individual then comp   LLPS 15' digits, wrist ext   Digital blocked AROM IP, MP isolated. P/H fists  10x5\" P/H fists   10x5\" P/H fists 10x5\" P/H fists 10x3\" P/H fists 5\" x 10   UE warm up  UBE 2F/2R L1 UBE 2F 2R L1  R " "only Rev x7 UBE 2F 2R L1  Then R only x20 UBE 2F 2R then R only  x20 UBE 2F, 2R, 3.5 RUE only   PRE FA   NV NV S/P 0# 10x3\" S/P FA 0#  10x3\" S/p FA 0# x 30   PRE Wrist FA    G F bar x10  R F bar P/P x10  S/S x10 G F bar up  R F bar down  R F bar fold  X10 each G FB up, R FB down; R FB fold x 10 ea            Shoulder ROM   Side ly ER P/H;  Supine Cane ER   Supine SA #1  X10 each      Sh PROM   Pullys 5'      Ther Activity 23   20   10'   8'   10'    Gripping Y RB All foam squares.   G Ball    5\" x20 Y RB all foam squares   G Ball   5\" x20 R RB all foam squares  Y F bar fold  G F bar down x10 S , putty push, full top circles S , putty  X50  #2 gray x5   Screw  in putty; HG w/ 1 RB foam blocks 1 set   Pinching II III G C P  5x5 blocks  IV V Y CP  5x5 blocks  II III G C Pin  5x5 blocks  IV V Y C pin  5x5 blocks  II, III G C pins  Block t/f G C pins  II, III  X30 blocks    Prehension NV groove pegs NV groove pegs  NV add groove pegs              MHP Pre tx with ace flexion Pre tx with ace flexion Pre tx with ace flexion Pre tx with ace flexion Pre tx with ace flexion 5' pre tx wrapped in flexion                     "

## 2024-08-07 ENCOUNTER — OFFICE VISIT (OUTPATIENT)
Dept: PHYSICAL THERAPY | Facility: CLINIC | Age: 64
End: 2024-08-07
Payer: MEDICARE

## 2024-08-07 DIAGNOSIS — I89.0 LYMPHEDEMA OF RIGHT ARM: Primary | ICD-10-CM

## 2024-08-07 DIAGNOSIS — I89.0 LYMPHEDEMA OF LEFT ARM: ICD-10-CM

## 2024-08-07 DIAGNOSIS — S42.344D CLOSED NONDISPLACED SPIRAL FRACTURE OF SHAFT OF RIGHT HUMERUS WITH ROUTINE HEALING, SUBSEQUENT ENCOUNTER: ICD-10-CM

## 2024-08-07 PROCEDURE — 97112 NEUROMUSCULAR REEDUCATION: CPT

## 2024-08-07 PROCEDURE — 97110 THERAPEUTIC EXERCISES: CPT

## 2024-08-07 PROCEDURE — 97140 MANUAL THERAPY 1/> REGIONS: CPT

## 2024-08-07 NOTE — PROGRESS NOTES
"Daily Note     Today's date: 2024  Patient name: Eileen Matias  : 1960  MRN: [id number]  Referring provider: Rick Lazo DO  Dx:   Encounter Diagnosis     ICD-10-CM    1. Lymphedema of right arm  I89.0       2. Closed nondisplaced spiral fracture of shaft of right humerus with routine healing, subsequent encounter  S42.344D       3. Lymphedema of left arm  I89.0                      Subjective: pt reports feeling achy today due to the rainy weather.       Objective: See treatment diary below      Assessment: Tolerated treatment well. Patient would benefit from continued PT. Started to focus more on OH strength. Tolerated better w/o added resistance. Some c/o clicking/popping in shoulder with supine punches. Swelling seems to be maintaining in BL UE and not getting any worse or better.       Plan: Continue per plan of care.      FOTO: Goal: Lymph - 57 Shoulder - 49; Eval: Lymph - 5 Shoulder - 4;3/13: Lymph - 42; : Shoulder - 47;4/10 Lymph - 48; : shoulder - 42; : Lymph - 69 Shoulder - 48; : lymph - 85 Shoulder - 56      EPOC:       Shoulder EPOC:      RA Lymph DATE:         Shoulder:         Manual       MLD [facility]   Manual Wrapping       PROM                       Exercise Diary     THEREX       Pt ed              Wall slides 2x10 2x10 2x10    SL ER 1# 2x10 2x10 1# 10x, 0# 10x    Standing shoulder flexion/scaption 1# 2x10 ea 1# 2x10 ea Over 90* 2x10 0#                  pulleys 2/2 2/2 2/    Ball roll up wall 2# 5x 2# 7x 2# 11x    Supine ER cane 5\" x20 5\" 20x 5\" 20x    Shoulder isometrics       SL flexion       SL abd 1# 2x10 1# 2x10 1# 2x10    Supine shoulder flex 1# 2x10 1# 2x10 1# 2x10    IR stretch w/ strap       NEURO RE-ED       Supine HA Supine ytb 2x10       Supine BL ER Supine ytb 10x       Cone reaches Cone reaches 1 min Cone reaches 1 min flex/scap Cone reaches 1 min flex/scap    Supine punches 1# 2x10 1# 25x 1# 20x     ball " roll up wall TE       rows/pulldowns BTB/GTB x20 BTB x20 BTB x20                                                               Modalities           CP

## 2024-08-08 ENCOUNTER — OFFICE VISIT (OUTPATIENT)
Dept: OCCUPATIONAL THERAPY | Facility: CLINIC | Age: 64
End: 2024-08-08
Payer: MEDICARE

## 2024-08-08 DIAGNOSIS — R20.2 NUMBNESS AND TINGLING OF LEFT UPPER EXTREMITY: Primary | ICD-10-CM

## 2024-08-08 DIAGNOSIS — R20.0 NUMBNESS AND TINGLING OF LEFT UPPER EXTREMITY: Primary | ICD-10-CM

## 2024-08-08 DIAGNOSIS — R29.898 RIGHT HAND WEAKNESS: ICD-10-CM

## 2024-08-08 PROCEDURE — 97110 THERAPEUTIC EXERCISES: CPT | Performed by: OCCUPATIONAL THERAPIST

## 2024-08-08 PROCEDURE — 97140 MANUAL THERAPY 1/> REGIONS: CPT | Performed by: OCCUPATIONAL THERAPIST

## 2024-08-08 NOTE — PROGRESS NOTES
"Daily Note     Today's date: 2024  Patient name: Eileen Matias  : 1960  MRN: [id number]  Referring provider: Rogelio Black MD  Dx:   Encounter Diagnosis     ICD-10-CM    1. Numbness and tingling of left upper extremity  R20.0     R20.2       2. Right hand weakness  R29.898                      Subjective: I'm managing. The arm is hurting less now that I'm in a tighter sleeve.  I have pressure in my pinky now and try to use my arm as much as I can.      Objective: See treatment diary below      Assessment: Tolerated treatment well. Patient would benefit from continued OT. Improved active digit flexion noted. Improved gross grasp for light IADLs with patient in circumferential wrist extension splint.       Plan: Continue per plan of care.      Precautions: Lymphedema; hx desmoid tumors and breast cancer; healing humeral fracture     POC expires Unit limit Auth  expiration date PT/OT + Visit Limit?    NA NA BOMN         Dx R Hum Fx     Dr Wayne MILLER Not met     MC         Date    Visit 25   22 23 24   Manuals 10   10  10'                              Jt mobs digits, FA MP all, wrist arch   Jt mobs all Mps,  palmar arch  10' MPS   Ortho fit/train         Neuro Re-Ed          RNG         MNG         CHIKI                  orthotc                           Ther Ex 30    25'   30' 30'   HEP     Focus on MP flexion all digits, Sh ER PROM    PROM 1:1 Digits, wrist. LLPS V MP flexion   Wrist E, Digital F individual then comp LLPS Wrist E digital F individual then comp   LLPS 15' digits, wrist ext   Digital blocked AROM P/H fists  10x5\"   P/H fists 10x5\" P/H fists 10x3\" P/H fists 5\" x 10   UE warm up UBE 2F 2R   45 R only   UBE 2F 2R L1  Then R only x20 UBE 2F 2R then R only  x20 UBE 2F, 2R, 3.5 RUE only   PRE FA  S/P FA 0#  x30   S/P 0# 10x3\" S/P FA 0#  10x3\" S/p FA 0# x 30   PRE Wrist FA G F bar up R F bar down  R FB fold x10 each   G F bar x10  R F bar P/P x10  S/S x10 G F bar " up  R F bar down  R F bar fold  X10 each G FB up, R FB down; R FB fold x 10 ea            Shoulder ROM         Sh PROM         Ther Activity      8'   10'    Gripping S  in putty;  HG Y RB, all foams 1 set   S , putty push, full top circles S , putty  X50  #2 gray x5   Screw  in putty; HG w/ 1 RB foam blocks 1 set   Pinching B C pins  All blocks   II, III G C pins  Block t/f G C pins  II, III  X30 blocks    Prehension    NV add groove pegs              MHP 5'  Pre tx, wrapped in flexion   Pre tx with ace flexion Pre tx with ace flexion 5' pre tx wrapped in flexion

## 2024-08-12 ENCOUNTER — OFFICE VISIT (OUTPATIENT)
Dept: PHYSICAL THERAPY | Facility: CLINIC | Age: 64
End: 2024-08-12
Payer: MEDICARE

## 2024-08-12 ENCOUNTER — OFFICE VISIT (OUTPATIENT)
Dept: OCCUPATIONAL THERAPY | Facility: CLINIC | Age: 64
End: 2024-08-12
Payer: MEDICARE

## 2024-08-12 DIAGNOSIS — I89.0 LYMPHEDEMA OF LEFT ARM: ICD-10-CM

## 2024-08-12 DIAGNOSIS — R29.898 RIGHT HAND WEAKNESS: ICD-10-CM

## 2024-08-12 DIAGNOSIS — S42.344D CLOSED NONDISPLACED SPIRAL FRACTURE OF SHAFT OF RIGHT HUMERUS WITH ROUTINE HEALING, SUBSEQUENT ENCOUNTER: ICD-10-CM

## 2024-08-12 DIAGNOSIS — R20.0 NUMBNESS AND TINGLING OF LEFT UPPER EXTREMITY: Primary | ICD-10-CM

## 2024-08-12 DIAGNOSIS — R20.2 NUMBNESS AND TINGLING OF LEFT UPPER EXTREMITY: Primary | ICD-10-CM

## 2024-08-12 DIAGNOSIS — I89.0 LYMPHEDEMA OF RIGHT ARM: Primary | ICD-10-CM

## 2024-08-12 PROCEDURE — 97112 NEUROMUSCULAR REEDUCATION: CPT

## 2024-08-12 PROCEDURE — 97140 MANUAL THERAPY 1/> REGIONS: CPT

## 2024-08-12 PROCEDURE — 97140 MANUAL THERAPY 1/> REGIONS: CPT | Performed by: OCCUPATIONAL THERAPIST

## 2024-08-12 PROCEDURE — 97110 THERAPEUTIC EXERCISES: CPT

## 2024-08-12 PROCEDURE — 97110 THERAPEUTIC EXERCISES: CPT | Performed by: OCCUPATIONAL THERAPIST

## 2024-08-12 NOTE — PROGRESS NOTES
"Daily Note     Today's date: 2024  Patient name: Eileen Matias  : 1960  MRN: [id number]  Referring provider: Rick Lazo DO  Dx:   Encounter Diagnosis     ICD-10-CM    1. Lymphedema of right arm  I89.0       2. Closed nondisplaced spiral fracture of shaft of right humerus with routine healing, subsequent encounter  S42.344D       3. Lymphedema of left arm  I89.0           Start Time: 1015          Subjective: pt reports been feeling more achy due to the rainy weather.       Objective: See treatment diary below      Assessment: Tolerated treatment well. Patient would benefit from continued PT. Pt able to progress reps t/o w/ good tolerance. Improved ability to perform cone reaches w/ less compensations.       Plan: Continue per plan of care.      FOTO: Goal: Lymph - 57 Shoulder - 49; Eval: Lymph - 5 Shoulder - 4;3/13: Lymph - 42; : Shoulder - 47;4/10 Lymph - 48; : shoulder - 42; : Lymph - 69 Shoulder - 48; : lymph - 85 Shoulder - 56      EPOC:       Shoulder EPOC:      RA Lymph DATE:         Shoulder:         Manual       MLD [facility]   Manual Wrapping       PROM                       Exercise Diary    THEREX       Pt ed              Wall slides 2x10 2x10 2x10 2x10   SL ER 1# 2x10 2x10 1# 10x, 0# 10x 0# 25x   Standing shoulder flexion/scaption 1# 2x10 ea 1# 2x10 ea Over 90* 2x10 0# Over 90* 2x10 0#                 pulleys 2/2 2/2 2/2 2/   Ball roll up wall 2# 5x 2# 7x 2# 11x 2# 12x   Supine ER cane 5\" x20 5\" 20x 5\" 20x 5\" 20x   Shoulder isometrics       SL flexion       SL abd 1# 2x10 1# 2x10 1# 2x10 1# 25x   Supine shoulder flex 1# 2x10 1# 2x10 1# 2x10 1# 25x   IR stretch w/ strap       NEURO RE-ED       Supine HA Supine ytb 2x10       Supine BL ER Supine ytb 10x       Cone reaches Cone reaches 1 min Cone reaches 1 min flex/scap Cone reaches 1 min flex/scap Cone reaches 1 min flex/scap   Supine punches 1# 2x10 1# 25x 1# 20x 1# 25x "    ball roll up wall TE       rows/pulldowns BTB/GTB x20 BTB x20 BTB x20 BTB x20                                                              Modalities           CP

## 2024-08-12 NOTE — PROGRESS NOTES
"Daily Note     Today's date: 2024  Patient name: Eileen Matias  : 1960  MRN: [id number]  Referring provider: Rogelio Black MD  Dx:   Encounter Diagnosis     ICD-10-CM    1. Numbness and tingling of left upper extremity  R20.0     R20.2       2. Right hand weakness  R29.898                      Subjective: I'm managing. The arm is hurting less now that I'm in a tighter sleeve.  I have pressure in my pinky now and try to use my arm as much as I can.  I can open the frig, its hard.      Objective: See treatment diary below      Assessment: Tolerated treatment well. Patient would benefit from continued OT. Improved active digit flexion noted. Improved gross grasp for light IADLs with patient in circumferential wrist extension splint.  Improved ease of right use  noted in clinic program.        Plan: Continue per plan of care.   Progress dexterity and gripping exercises as tolerated.       Precautions: Lymphedema; hx desmoid tumors and breast cancer; healing humeral fracture     POC expires Unit limit Auth  expiration date PT/OT + Visit Limit?    NA NA BOMN         Dx R Hum Fx     Dr Wayne MILLER Not met     MC         Date    Visit 25 26  22 23 24   Manuals 10 10  10  10'                              Jt mobs digits, FA MP all, wrist arch MP all G 1-2  Jt mobs all Mps,  palmar arch  10' MPS   Ortho fit/train         Neuro Re-Ed          RNG         MNG         CHIKI                  orthotc                           Ther Ex 30   25'   25'   30' 30'   HEP     Focus on MP flexion all digits, Sh ER PROM    PROM 1:1 Digits, wrist. LLPS V MP flexion Digits wrist LLPS V MP flexion  Wrist E, Digital F individual then comp LLPS Wrist E digital F individual then comp   LLPS 15' digits, wrist ext   Digital blocked AROM P/H fists  10x5\" P/H fists 10x5\"  P/H fists 10x5\" P/H fists 10x3\" P/H fists 5\" x 10   UE warm up UBE 2F 2R   45 R only UBE 2F 2R  25 R only  UBE 2F 2R L1  Then R only " "x20 UBE 2F 2R then R only  x20 UBE 2F, 2R, 3.5 RUE only   PRE FA  S/P FA 0#  x30 S/P FA #1  x20  S/P 0# 10x3\" S/P FA 0#  10x3\" S/p FA 0# x 30   PRE Wrist FA G F bar up R F bar down  R FB fold x10 each R F bar up x20  Down x20  G F bar x10  R F bar P/P x10  S/S x10 G F bar up  R F bar down  R F bar fold  X10 each G FB up, R FB down; R FB fold x 10 ea            Shoulder ROM         Sh PROM         Ther Activity    10'    8'   10'    Gripping S  in putty;  HG Y RB, all foams 1 set S  into putty x50  RG Y/R all foams 1 set  S , putty push, full top circles S , putty  X50  #2 gray x5   Screw  in putty; HG w/ 1 RB foam blocks 1 set   Pinching B C pins  All blocks Blue C pin  All blocks  II, III G C pins  Block t/f G C pins  II, III  X30 blocks    Prehension    NV add groove pegs              MHP 5'  Pre tx, wrapped in flexion Pre tx wrapped in flexion  Pre tx with ace flexion Pre tx with ace flexion 5' pre tx wrapped in flexion                     "

## 2024-08-14 ENCOUNTER — OFFICE VISIT (OUTPATIENT)
Dept: OCCUPATIONAL THERAPY | Facility: CLINIC | Age: 64
End: 2024-08-14
Payer: MEDICARE

## 2024-08-14 ENCOUNTER — OFFICE VISIT (OUTPATIENT)
Dept: PHYSICAL THERAPY | Facility: CLINIC | Age: 64
End: 2024-08-14
Payer: MEDICARE

## 2024-08-14 DIAGNOSIS — S42.344D CLOSED NONDISPLACED SPIRAL FRACTURE OF SHAFT OF RIGHT HUMERUS WITH ROUTINE HEALING, SUBSEQUENT ENCOUNTER: ICD-10-CM

## 2024-08-14 DIAGNOSIS — I89.0 LYMPHEDEMA OF RIGHT ARM: Primary | ICD-10-CM

## 2024-08-14 DIAGNOSIS — R20.0 NUMBNESS AND TINGLING OF LEFT UPPER EXTREMITY: Primary | ICD-10-CM

## 2024-08-14 DIAGNOSIS — I89.0 LYMPHEDEMA OF LEFT ARM: ICD-10-CM

## 2024-08-14 DIAGNOSIS — R29.898 RIGHT HAND WEAKNESS: ICD-10-CM

## 2024-08-14 DIAGNOSIS — R20.2 NUMBNESS AND TINGLING OF LEFT UPPER EXTREMITY: Primary | ICD-10-CM

## 2024-08-14 PROCEDURE — 97110 THERAPEUTIC EXERCISES: CPT | Performed by: OCCUPATIONAL THERAPIST

## 2024-08-14 PROCEDURE — 97112 NEUROMUSCULAR REEDUCATION: CPT

## 2024-08-14 PROCEDURE — 97140 MANUAL THERAPY 1/> REGIONS: CPT

## 2024-08-14 PROCEDURE — 97530 THERAPEUTIC ACTIVITIES: CPT | Performed by: OCCUPATIONAL THERAPIST

## 2024-08-14 PROCEDURE — 97110 THERAPEUTIC EXERCISES: CPT

## 2024-08-14 PROCEDURE — 97140 MANUAL THERAPY 1/> REGIONS: CPT | Performed by: OCCUPATIONAL THERAPIST

## 2024-08-14 NOTE — PROGRESS NOTES
"Daily Note     Today's date: 2024  Patient name: Eileen Matias  : 1960  MRN: [id number]  Referring provider: Rogelio Black MD  Dx:   Encounter Diagnosis     ICD-10-CM    1. Numbness and tingling of left upper extremity  R20.0     R20.2       2. Right hand weakness  R29.898                      Subjective: I'm managing. The arm is hurting less now that I'm in a tighter sleeve.  I have pressure in my pinky now and try to use my arm as much as I can.  I can open the frig, its hard.      Objective: See treatment diary below      Assessment: Tolerated treatment well. Patient would benefit from continued OT. Improved active digit flexion noted. Improved gross grasp for light IADLs with patient in circumferential wrist extension splint.  Improved ease of right use  noted in clinic program.        Plan: Continue per plan of care.   Progress dexterity and gripping exercises as tolerated.       Precautions: Lymphedema; hx desmoid tumors and breast cancer; healing humeral fracture     POC expires Unit limit Auth  expiration date PT/OT + Visit Limit?    NA NA BOMN         Dx R Hum Fx     Dr Wayne MILLER Not met     MC         Date       Visit 25 26 27      Manuals 10 10 8                                 Jt mobs digits, FA MP all, wrist arch MP all G 1-2 MP all G 1-2 NV, focus MP V     Ortho fit/train         Neuro Re-Ed          RNG         MNG         CHIKI                  orthotc                           Ther Ex 30   25'   25        HEP         PROM 1:1 Digits, wrist. LLPS V MP flexion Digits wrist LLPS V MP flexion Digits wrist LLPS V MP  Flexion all      Digital blocked AROM P/H fists  10x5\" P/H fists 10x5\" P/H fists   5\" x  5      UE warm up UBE 2F 2R   45 R only UBE 2F 2R  25 R only UBE 2F 2R  25 R only        PRE FA  S/P FA 0#  x30 S/P FA #1  X20  1/2 shaft S/P FA #1  3/4 shaft      PRE Wrist FA R F bar up   G F bar down  R FB fold x10 each R F bar up   2x10x3\"  R F bar   Down " "2x10x3\" R F bar up   2x10x3\"  R F bar   Down 2x10x3\"               Shoulder ROM         Sh PROM         Ther Activity    10'   13      Gripping S  in putty;  HG Y RB, all foams 1 set S  into putty x50  RG Y/R all foams 1 set S  into putty x50  R RB all foams      Pinching B C pins  All blocks Blue C pin  All blocks Blue C pins all blocks      Prehension                  MHP 5'  Pre tx, wrapped in flexion Pre tx wrapped in flexion Pre tx wrapped in flexion                        "

## 2024-08-14 NOTE — PROGRESS NOTES
"Daily Note     Today's date: 2024  Patient name: Eileen Matias  : 1960  MRN: [id number]  Referring provider: Rick Lazo DO  Dx:   Encounter Diagnosis     ICD-10-CM    1. Lymphedema of right arm  I89.0       2. Closed nondisplaced spiral fracture of shaft of right humerus with routine healing, subsequent encounter  S42.344D       3. Lymphedema of left arm  I89.0                      Subjective: pt reports no new major complaints upon arrival.       Objective: See treatment diary below      Assessment: Tolerated treatment well. Patient would benefit from continued PT.      Plan: Continue per plan of care.      FOTO: Goal: Lymph - 57 Shoulder - 49; Eval: Lymph - 5 Shoulder - 4;3/13: Lymph - 42; : Shoulder - 47;4/10 Lymph - 48; : shoulder - 42; : Lymph - 69 Shoulder - 48; : lymph - 85 Shoulder - 56      EPOC:       Shoulder EPOC:      RA Lymph DATE:         Shoulder:         Manual       MLD [facility]   Manual Wrapping       PROM                       Exercise Diary    THEREX       Pt ed              Wall slides 2x10 2x10 2x10 2x10   SL ER 0# 25x 2x10 1# 10x, 0# 10x 0# 25x   Standing shoulder flexion/scaption Over 90* 2x10 0# 1# 2x10 ea Over 90* 2x10 0# Over 90* 2x10 0#                 pulleys 2/2 2/2 2/2 2/   Ball roll up wall 2# 12x 2# 7x 2# 11x 2# 12x   Supine ER cane 5\" 20x 5\" 20x 5\" 20x 5\" 20x   Shoulder isometrics       SL flexion       SL abd 1# 25x 1# 2x10 1# 2x10 1# 25x   Supine shoulder flex 1# 25x 1# 2x10 1# 2x10 1# 25x   IR stretch w/ strap       NEURO RE-ED       Supine HA       Supine BL ER       Cone reaches Cone reaches 1 min flex/scap Cone reaches 1 min flex/scap Cone reaches 1 min flex/scap Cone reaches 1 min flex/scap   Supine punches 1# 25x 1# 25x 1# 20x 1# 25x    ball roll up wall        rows/pulldowns BTB x20 BTB x20 BTB x20 BTB x20                                                              Modalities         "   CP

## 2024-08-19 ENCOUNTER — TELEPHONE (OUTPATIENT)
Dept: HEMATOLOGY ONCOLOGY | Facility: CLINIC | Age: 64
End: 2024-08-19

## 2024-08-19 ENCOUNTER — APPOINTMENT (OUTPATIENT)
Dept: PHYSICAL THERAPY | Facility: CLINIC | Age: 64
End: 2024-08-19
Payer: MEDICARE

## 2024-08-20 ENCOUNTER — OFFICE VISIT (OUTPATIENT)
Dept: OCCUPATIONAL THERAPY | Facility: CLINIC | Age: 64
End: 2024-08-20
Payer: MEDICARE

## 2024-08-20 DIAGNOSIS — R29.898 RIGHT HAND WEAKNESS: ICD-10-CM

## 2024-08-20 DIAGNOSIS — R20.2 NUMBNESS AND TINGLING OF LEFT UPPER EXTREMITY: Primary | ICD-10-CM

## 2024-08-20 DIAGNOSIS — R20.0 NUMBNESS AND TINGLING OF LEFT UPPER EXTREMITY: Primary | ICD-10-CM

## 2024-08-20 PROCEDURE — 97140 MANUAL THERAPY 1/> REGIONS: CPT | Performed by: OCCUPATIONAL THERAPIST

## 2024-08-20 PROCEDURE — 97760 ORTHOTIC MGMT&TRAING 1ST ENC: CPT | Performed by: OCCUPATIONAL THERAPIST

## 2024-08-20 PROCEDURE — 97110 THERAPEUTIC EXERCISES: CPT | Performed by: OCCUPATIONAL THERAPIST

## 2024-08-20 NOTE — PROGRESS NOTES
"Daily Note     Today's date: 2024  Patient name: Eileen Matias  : 1960  MRN: [id number]  Referring provider: Rogelio Black MD  Dx:   Encounter Diagnosis     ICD-10-CM    1. Numbness and tingling of left upper extremity  R20.0     R20.2       2. Right hand weakness  R29.898                      Subjective:   I can wear my smaller sleeve sometimes now, so the splint is too big.        Objective: See treatment diary below      Assessment: Tolerated treatment well. Patient would benefit from continued OT. Improved active digit flexion noted. Improved gross grasp for light IADLs with patient in circumferential wrist extension splint.  Improving ease of right use noted in clinic program.  Edema reducing, allowing wear of smaller and lighter lymphedema garment.       Plan: Continue per plan of care.   Progress dexterity and gripping exercises as tolerated.  Fabricated new orthotic with lighter material and smaller circumference for wear when using lighter sleeve.       Precautions: Lymphedema; hx desmoid tumors and breast cancer; healing humeral fracture     POC expires Unit limit Auth  expiration date PT/OT + Visit Limit?    NA NA BOMN         Dx R Hum Fx     Dr Wayne MILLER Not met     MC         Date      Visit 25 26 27 28     Manuals 10 10 8 8              Jt mobs digits, FA MP all, wrist arch MP all G 1-2 MP all G 1-2 All MP's, G 1-2     Ortho fit/train         Neuro Re-Ed          RNG         MNG         CHIKI                  orthotc    New orthotic ortho F and T                       Ther Ex 30   25'   25   15'     HEP         PROM 1:1 Digits, wrist. LLPS V MP flexion Digits wrist LLPS V MP flexion Digits wrist LLPS V MP  Flexion all Digits wrist LLPS digital flexion      Digital blocked AROM P/H fists  10x5\" P/H fists 10x5\" P/H fists   5\" x  5 P/H fists   5x5\"     UE warm up UBE 2F 2R   45 R only UBE 2F 2R  25 R only UBE 2F 2R  25 R only        PRE FA  S/P FA 0#  x30 S/P FA " "#1  X20  1/2 shaft S/P FA #1  3/4 shaft      PRE Wrist FA R F bar up   G F bar down  R FB fold x10 each R F bar up   2x10x3\"  R F bar   Down 2x10x3\" R F bar up   2x10x3\"  R F bar   Down 2x10x3\"               Shoulder ROM         Sh PROM         Ther Activity    10'   13      Gripping S  in putty;  HG Y RB, all foams 1 set S  into putty x50  RG Y/R all foams 1 set S  into putty x50  R RB all foams      Pinching B C pins  All blocks Blue C pin  All blocks Blue C pins all blocks      Prehension                  MHP 5'  Pre tx, wrapped in flexion Pre tx wrapped in flexion Pre tx wrapped in flexion Pre tx with ace flexion.                        "

## 2024-08-21 ENCOUNTER — APPOINTMENT (OUTPATIENT)
Dept: LAB | Facility: HOSPITAL | Age: 64
End: 2024-08-21
Payer: MEDICARE

## 2024-08-21 ENCOUNTER — EVALUATION (OUTPATIENT)
Dept: PHYSICAL THERAPY | Facility: CLINIC | Age: 64
End: 2024-08-21
Payer: MEDICARE

## 2024-08-21 DIAGNOSIS — S42.344D CLOSED NONDISPLACED SPIRAL FRACTURE OF SHAFT OF RIGHT HUMERUS WITH ROUTINE HEALING, SUBSEQUENT ENCOUNTER: ICD-10-CM

## 2024-08-21 DIAGNOSIS — E78.2 HYPERLIPIDEMIA, MIXED: ICD-10-CM

## 2024-08-21 DIAGNOSIS — I89.0 LYMPHEDEMA OF RIGHT ARM: Primary | ICD-10-CM

## 2024-08-21 DIAGNOSIS — R73.01 IMPAIRED FASTING GLUCOSE: ICD-10-CM

## 2024-08-21 DIAGNOSIS — I89.0 LYMPHEDEMA OF LEFT ARM: ICD-10-CM

## 2024-08-21 DIAGNOSIS — E55.9 VITAMIN D DEFICIENCY: ICD-10-CM

## 2024-08-21 DIAGNOSIS — I10 HTN, GOAL BELOW 140/90: ICD-10-CM

## 2024-08-21 DIAGNOSIS — G60.9 IDIOPATHIC PERIPHERAL NEUROPATHY: ICD-10-CM

## 2024-08-21 PROCEDURE — 97110 THERAPEUTIC EXERCISES: CPT

## 2024-08-21 PROCEDURE — 97140 MANUAL THERAPY 1/> REGIONS: CPT

## 2024-08-21 NOTE — PROGRESS NOTES
PT Re-Evaluation     Collection of Subjective/Objective data performed by Irene Childs PTA. Assessment, POC, and Goal attainment performed by Jessica Garcia DPT.       Today's date: 2024  Patient name: Eileen Matias  : 1960   MRN: [id number]  Referring provider: Rick Lazo DO  Dx:   Encounter Diagnosis     ICD-10-CM    1. Lymphedema of right arm  I89.0       2. Closed nondisplaced spiral fracture of shaft of right humerus with routine healing, subsequent encounter  S42.344D       3. Lymphedema of left arm  I89.0                     Start Time: 1015  Stop Time: 1140  Total time in clinic (min): 85 minutes    Assessment  Impairments: abnormal or restricted ROM, impaired physical strength and pain with function  Other impairment: lymphedema    Assessment details: Patient is a 62 y/o who presents to therapy with bilateral upper extremity lymphedema and s/p R humerus fracture and has completed 57 visits to date. Patient demonstrates subjective/objective improvement since starting PT such as improved mobility, decreased pain, maintained girth measurements, and improved function with the right arm. Pt to be measured for new compression garments Patient continues to present with deficits that include decreased shoulder strength, mobility, and decreased sree of the arm. Patient will benefit from continued skilled PT intervention to address the aforementioned impairments, achieve goals, maximize function, and improve quality of life. Pt is in agreement with this plan.           Understanding of Dx/Px/POC: good     Prognosis: good    Goals  LYMPH GOALS:  ST.Pain decreased by 25% in 4 weeks.--PROGRESSING  2. Edema decreased by 2-3 cm in 4 weeks. --MET    LT. Decrease pain to 1-2/10 at worst by d/c.-PROGRESSING  2. Increase ROM to WFL for all deficient movements by d/c.-PROGRESSING  3. Strength increased to 5 for all deficient muscle groups by d/c.-PROGRESSING  4. IADL performance increased  to max function by d/c.-PROGRESSING  5. Recreational performance increased to max function by d/c.-PROGRESSING  6. RUE edema decreased to that of LUE by d/c. -PROGRESSING  7. Pt will return to work by d/c.-PROGRESSING    SHOULDER GOALS:   ST weeks  Pt will demonstrate good understanding and compliance with HEP--PROGRESSING  Pt will decrease pain to 5/10 at worst  --PROGRESSING  Pt will demonstrate improved postural awareness and ability to self-correct without reliance on external cues --PROGRESSING     LT weeks  Pt will improve FOTO score to > or = to 52 to indicate improved functional abilities  --PROGRESSING  Pt will decrease pain to 2-3/10 at worst   --PROGRESSING  Pt will increase shoulder strength to 4-/5 for improved tolerance/independence with ADLs --PROGRESSING  Pt will improve  strength to 25#  --PROGRESSING         Plan  Other planned modality interventions: other modalities PRN    Planned therapy interventions: abdominal trunk stabilization, ADL retraining, IADL retraining, flexibility, functional ROM exercises, graded exercise, home exercise program, manual therapy, joint mobilization, neuromuscular re-education, patient education, postural training, strengthening, therapeutic exercise, therapeutic activities, massage and work reintegration  Other planned therapy interventions: other interventions PRN    Frequency: 1-2x/week.  Duration in weeks: 8  Plan of Care beginning date: 2024  Plan of Care expiration date: 10/16/2024  Treatment plan discussed with: patient        Subjective Evaluation    History of Present Illness  Mechanism of injury: RE (24):  Pt reports she is in smaller sleeve as of this past weekend on the R side. Pt notes decreased achiness in BL UE. Pt is compliant with compression pump 3x/day and 4x/day on a rainy day. Pt note overall improvement with pain, function, and mobility. Pt notes difficulties include raising over 90* w/o elbow winging out to the side. Pt  notes she has not tried lifting anything with the R arm. Pt notes she is starting to get some feeling back in the fingers.     RE (7/25/24):  Pt reports no change with the R lymphedema and stated she has some increased strength in the shoulder. Pt notes some improvement with the L lymphedema and feels less achy. Pt notes no change in the mobility with the shoulder. Pt notes she is able to reach into an overhead cabinet but unable to lift anything like a cup or plate. Pt notes compliance with the compression pump on the R side 3-4x/ day.     RE (6/25/24):  Pt reports no change with the lymphedema and the shoulder over this past month. Pt reports overall pain in the shoulder as improved. Pt notes slight improvement in strength in the shoulder.     RE (5/22/24):  Pt reports swelling in BL UE is maintaining overall. Pt notes mobility in the shoulder is about the same but strength is improving. Pt is able to lift arm to touch her head now.     RE (5/6/24) adding LUE lymphedema:  Pt is s/p L breast lumpectomy that was performed about two years ago. Pt denies any discomfort or swelling in the LUE. Pt MD would like pt to start lymphedema therapy as a preventative measure. Pt does report some tenderness in the L axilla area due to scar tissue. Pt stated her  has been working on the scar tissue every night which seems to help some.       RE (4/24/24):  Pt has been see w/ c/o RUE lymphedema and s/p R humeral fracture and notes overall improvement. Pt notes 50% improvement in lymphedema since beginning physical therapy. Pt is complaint with using her compression pump 3x/day, 4x/day on bad days, and wearing the compression sleeve during the day/night time. Pt notes majority of swelling remains to be in the elbow region, which is preventing her from downsizing her current daytime sleeve she is wearing. In terms of the shoulder, she notes overall improvements in mobility, strength, and endurance. Gripping is still a  "challenge, but she is seeing OT for treatment for her had mobility/strength. Continued difficulties include lifting, mobility OH and behind to don/doff bra/washback.     RE (4/10/24):  Pt reports overall improvements with the tightness and swelling in the arm. Pt notes 50% improvement in the swelling since beginning lymphedema therapy. Pt is still in the smaller sized night sleeve that she wears all day and then switches to the larger sleeve at night.  She has been able to transition back into the compression pump, that she uses about 3x/day and 4x/day on a bad day. Pt notes improvement since being back into the pump.       RE (3/13/24):  Pt reports overall improvements with the tightness and swelling in the arm. Pt notes 10% improvement in the swelling since beginning lymphedema therapy. Pt has since transitioned from the larger night garment to the smaller night garment to wear during the day. Pt notes she has feeling back in her thumb and finger tips. Pt notes slight improvements in strength. Pt is still not using pump at home due to the continued swelling and tightness in posterior upper arm that causes more pain when using pump. Pt plans on restarting the pump when \"the lump\" in the back of the arm improves.       RE (2/14/24):  Pt has been seen for RUE lymphedema for 8 visits to date and notes 50% improvement so far. Pt reports she is still not able to use compression pump but has been wearing the Tribute garment, which seems to be helping. Pt is not longer needing to wear the clamshell for her fx unless if she is out running errands. Pt notes the swelling has improved since being able to take the brace off more often. Pt notes continued \"hardness\" in elbow and axilla region.     EVAL (1/15/24):  Pt reports to IE w/ hx of RUE lymphedema which started approx 20 years ago approx 5 years following CA dx of desmoitosis.     Pt had done skilled PT tx for this condition in the past which was helpful control the " issue. However, pt sustained humeral fx on 11/12/23 as a result of fall on RUE while trying to protect mother in law from a fall out of w/c. She reports that healing has been complicated by severe osteoporosis and lymphedema. Pt still required to wear brace at all times- not yet allowed any ROM in shoulder.     In addition to the above, pt w/ recent hx of L breast CA w/ L lumpectomy and lymph node removal (pt reports 17 lymph nodes) over on 6/21/22.        Patient Goals  Patient goals for therapy: decreased pain and decreased edema    Pain  Pain scale: Shoulder -0; Lymph- 3.  Pain scale at lowest: Shoulder -0; Lymph- 3.  Pain scale at highest: Shoulder- 0; Lymph-5.  Location: R shoulder  Alleviating factors: lymphedema massage.  Exacerbated by: increased edema, movement.    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)  Hand dominance: right          Objective     General Comments:      Shoulder Comments   R AROM: flex-115  abd-105  IR-R T9 ER- unable      R PROM: flex- 125 (decreased pain) abd- 120 (decreased pain) IR-90 ER-20      R shoulder strength: flex: 5/5; abd: 5-/5; ER: 4-/5; IR: 5-/5      Ankle/Foot Comments   Edema in R vs L w/ circumferential girth measurements as below (L/R):   Axilla: 38/35  20 cm proximal to elbow: 31.5/29  10 cm proximal to elbow: 28/25 cm  Elbow Joint: 26 cm/25.5 cm  20 cm proximal to wrist: 26.5 cm/25.5 cm  10 cm proximal to wrist: 21 cm/21.5 cm  Wrist:  16 cm/16 cm  Palm: 18 cm/17.5 cm  MCP Joints (digits 2-5): 16.5 cm/16.5 cm  PIP Joints (digits 2-5): 15.5 cm/15 cm      FOTO: Goal: Lymph - 57 Shoulder - 49; Eval: Lymph - 5 Shoulder - 4;3/13: Lymph - 42; 4/1: Shoulder - 47;4/10 Lymph - 48; 4/24: shoulder - 42; 5/22: Lymph - 69 Shoulder - 48; 6/25: lymph - 85 Shoulder - 56; 8/21: Lymph - 98 Shoulder - 56      EPOC:   10/16    Shoulder EPOC: 10/16 8/7 8/12 8/14 8/21   RA Lymph DATE:  "  9/18      Shoulder:  9/18       Manual       MLD [facility]   Manual Wrapping       PROM                       Exercise Diary 8/14 8/21 8/7 8/12   THEREX       Pt ed  FOTO; updated measurements             Wall slides 2x10  2x10 2x10   SL ER 0# 25x  1# 10x, 0# 10x 0# 25x   Standing shoulder flexion/scaption Over 90* 2x10 0#  Over 90* 2x10 0# Over 90* 2x10 0#                 pulleys 2/2  2/2 2/2   Ball roll up wall 2# 12x  2# 11x 2# 12x   Supine ER cane 5\" 20x  5\" 20x 5\" 20x   Shoulder isometrics       SL flexion       SL abd 1# 25x  1# 2x10 1# 25x   Supine shoulder flex 1# 25x  1# 2x10 1# 25x   IR stretch w/ strap       NEURO RE-ED       Supine HA       Supine BL ER       Cone reaches Cone reaches 1 min flex/scap  Cone reaches 1 min flex/scap Cone reaches 1 min flex/scap   Supine punches 1# 25x  1# 20x 1# 25x    ball roll up wall        rows/pulldowns BTB x20  BTB x20 BTB x20                                                              Modalities           CP                                           "

## 2024-08-22 ENCOUNTER — OFFICE VISIT (OUTPATIENT)
Dept: OCCUPATIONAL THERAPY | Facility: CLINIC | Age: 64
End: 2024-08-22
Payer: MEDICARE

## 2024-08-22 DIAGNOSIS — R29.898 RIGHT HAND WEAKNESS: ICD-10-CM

## 2024-08-22 DIAGNOSIS — R20.2 NUMBNESS AND TINGLING OF LEFT UPPER EXTREMITY: Primary | ICD-10-CM

## 2024-08-22 DIAGNOSIS — R20.0 NUMBNESS AND TINGLING OF LEFT UPPER EXTREMITY: Primary | ICD-10-CM

## 2024-08-22 PROCEDURE — 97530 THERAPEUTIC ACTIVITIES: CPT | Performed by: OCCUPATIONAL THERAPIST

## 2024-08-22 PROCEDURE — 97110 THERAPEUTIC EXERCISES: CPT | Performed by: OCCUPATIONAL THERAPIST

## 2024-08-22 NOTE — PROGRESS NOTES
"Daily Note     Today's date: 2024  Patient name: Eileen Matias  : 1960  MRN: [id number]  Referring provider: Rogelio Black MD  Dx:   Encounter Diagnosis     ICD-10-CM    1. Numbness and tingling of left upper extremity  R20.0     R20.2       2. Right hand weakness  R29.898                      Subjective:   I can wear my smaller sleeve sometimes now, so the splint is too big.        Objective: See treatment diary below      Assessment: Tolerated treatment well. Patient would benefit from continued OT. Improved active digit flexion noted. Improved gross grasp for light IADLs with patient in circumferential wrist extension splint.  Improving ease of right use noted in clinic program.  Edema reducing, allowing wear of smaller and lighter lymphedema garment.       Plan: Continue per plan of care.   Progress dexterity and gripping exercises as tolerated.  Fabricated new orthotic with lighter material and smaller circumference for wear when using lighter sleeve.       Precautions: Lymphedema; hx desmoid tumors and breast cancer; healing humeral fracture     POC expires Unit limit Auth  expiration date PT/OT + Visit Limit?    NA NA BOMN         Dx R Hum Fx     Dr Wayne MILLER Not met     MC         Date     Visit 25 26 27 28     Manuals 10 10 8 8              Jt mobs digits, FA MP all, wrist arch MP all G 1-2 MP all G 1-2 All MP's, G 1-2     Ortho fit/train         Neuro Re-Ed          RNG         MNG         CHIKI                  orthotc    New orthotic ortho F and T                       Ther Ex 30   25'   25   15'     HEP         PROM 1:1 Digits, wrist. LLPS V MP flexion Digits wrist LLPS V MP flexion Digits wrist LLPS V MP  Flexion all Digits wrist LLPS digital flexion      Digital blocked AROM P/H fists  10x5\" P/H fists 10x5\" P/H fists   5\" x  5 P/H fists   5x5\"     UE warm up UBE 2F 2R   45 R only UBE 2F 2R  25 R only UBE 2F 2R  25 R only    UBE 2F 2R  30 R only    PRE " "FA  S/P FA 0#  x30 S/P FA #1  X20  1/2 shaft S/P FA #1  3/4 shaft  S/P FA #1  x20    PRE Wrist FA R F bar up   G F bar down  R FB fold x10 each R F bar up   2x10x3\"  R F bar   Down 2x10x3\" R F bar up   2x10x3\"  R F bar   Down 2x10x3\"  G F bar  1x10  R F bar   UP 2x10x3\"             Shoulder ROM         Sh PROM         Ther Activity    10'   13      Gripping S  in putty;  HG Y RB, all foams 1 set S  into putty x50  RG Y/R all foams 1 set S  into putty x50  R RB all foams      Pinching B C pins  All blocks Blue C pin  All blocks Blue C pins all blocks      Prehension                  MHP 5'  Pre tx, wrapped in flexion Pre tx wrapped in flexion Pre tx wrapped in flexion Pre tx with ace flexion.                        "

## 2024-08-23 ENCOUNTER — OFFICE VISIT (OUTPATIENT)
Dept: HEMATOLOGY ONCOLOGY | Facility: CLINIC | Age: 64
End: 2024-08-23
Payer: MEDICARE

## 2024-08-23 VITALS
SYSTOLIC BLOOD PRESSURE: 128 MMHG | TEMPERATURE: 98.2 F | RESPIRATION RATE: 16 BRPM | DIASTOLIC BLOOD PRESSURE: 80 MMHG | OXYGEN SATURATION: 98 % | WEIGHT: 141 LBS | HEART RATE: 77 BPM | HEIGHT: 68 IN | BODY MASS INDEX: 21.37 KG/M2

## 2024-08-23 DIAGNOSIS — M85.88 OSTEOPENIA OF LUMBAR SPINE: ICD-10-CM

## 2024-08-23 DIAGNOSIS — C50.912 CARCINOMA OF LEFT BREAST METASTATIC TO AXILLARY LYMPH NODE (HCC): ICD-10-CM

## 2024-08-23 DIAGNOSIS — Z79.811 AROMATASE INHIBITOR USE: ICD-10-CM

## 2024-08-23 DIAGNOSIS — Z08 ENCOUNTER FOR FOLLOW-UP SURVEILLANCE OF BREAST CANCER: Primary | ICD-10-CM

## 2024-08-23 DIAGNOSIS — C77.3 CARCINOMA OF LEFT BREAST METASTATIC TO AXILLARY LYMPH NODE (HCC): ICD-10-CM

## 2024-08-23 DIAGNOSIS — Z85.3 ENCOUNTER FOR FOLLOW-UP SURVEILLANCE OF BREAST CANCER: Primary | ICD-10-CM

## 2024-08-23 PROCEDURE — 99214 OFFICE O/P EST MOD 30 MIN: CPT | Performed by: PHYSICIAN ASSISTANT

## 2024-08-23 RX ORDER — LETROZOLE 2.5 MG/1
2.5 TABLET, FILM COATED ORAL DAILY
Qty: 90 TABLET | Refills: 3 | Status: SHIPPED | OUTPATIENT
Start: 2024-08-23 | End: 2025-08-18

## 2024-08-23 NOTE — PROGRESS NOTES
[facility] HEMATOLOGY ONCOLOGY SPECIALISTS [place]  [address] [facility] [phone]  Oncology Progress Note  Eileen Matias, 1960, [id number]  8/23/2024        Assessment/Plan:   1. Encounter for follow-up surveillance of breast cancer  2. Aromatase inhibitor use  3. Osteopenia of lumbar spine  4. Carcinoma of left breast metastatic to axillary lymph node (HCC)  - DXA bone density spine hip and pelvis; Future  - letrozole (FEMARA) 2.5 mg tablet; Take 1 tablet (2.5 mg total) by mouth daily  Dispense: 90 tablet; Refill: 3  - CBC and differential; Future  - Comprehensive metabolic panel; Future    This is a 63 y.o. c PMHx notable for Desmoid tumors, RUE grade 3 lymphedema, ovarian cancer s/p hysterectomy, and stage 1A ER+, CO+, HER2- L breast cancer s/p lumpectomy/RT and adjuvant endocrine therapy who presents as follow up. She started letrazole 2.5mg daily in 10/2022.  Mammogram from 04/2024 shows no evidence of disease.  Her recent CBC, CMP were unrevealing.  Prior DEXA scan showed osteopenia of the lumbar spine.  She was considering Prolia however she recently had a fall resulting in right arm fracture and Prolia was deferred.  She is due for repeat DEXA now. She has tolerable hot flashes on AI. She has no breast complaints.     Plan:   - Treatment: Continue Letrazole, Rx renewed   - Other Supportive care: Conintue Calcium and Vitamin D, at least 600mg and 3000 IU respectively. Weight bearing exercises as tolerated   - Annual breast exam. Pt deferred breast exam today, planning for breast exam by Dr. Rea   - Imaging: Continue annual mammogram, DEXA q2y (due now)   - Labs: CBC, CMP q6m   - Follow up with Treatment Team Members  Surgeon: Dr. Motley  Rad Onc: Dr. Sethi  Palliative  - RTC 1 year     The patient is scheduled for follow-up in approximately    Patient voiced agreement and understanding to the above.   Patient knows to call the Hematology/Oncology office with any  questions and concerns regarding the above.    Goals and Barriers:    Current Goal: Prolong Survival from Cancer.     Barriers: None.      Patient's Capacity to Self Care:  Patient is able to self care.  ______________________________________________________________________________________________________________    Subjective     Chief Complaint   Patient presents with    Follow-up       History of present illness/Cancer History: 63 y.o. c PMHx notable for Desmoid tumors, RUE grade 3 lymphedema, ovarian cancer s/p hysterectomy, and early stage L breast cancer s/p lumpectomy/RT and adjuvant endocrine therapy who presents as follow up.      Oncology History   Carcinoma of left breast metastatic to axillary lymph node (HCC)   4/14/2022 Initial Diagnosis    Carcinoma of left breast metastatic to axillary lymph node (HCC)     4/14/2022 Biopsy    Left axillary Lymph node ultrasound guided biopsy  Metastatic carcinoma, compatible with breast origin.   ER >95  RI 95   HER2 1+    On ultrasound lymph node is 1.8 cm. There is cortical thickening without evident fatty hilum.     In review of screening mammogram, demonstrates no suspicious mammographic findings identified in either breast. Bilateral MRI is recommended. Flow cytometry - there is no evidence of a B-cell or T-cell non-Hodgkin lymphoma.       4/20/2022 Genomic Testing    A total of 36 genes were evaluated, including: ANGEL LUIS, BRCA1, BRCA2, CDH1, CHEK2, PALB2, PTEN, STK11, TP53  Negative result. No pathogenic sequence variants or deletions/duplications identified     5/20/2022 Biopsy    MRI guided biopsy  A. Left breast   11 o'clock   Invasive mammary carcinoma of no special type (ductal)  Grade 1  ER 95  RI 95  HER2 0    B. Right breast   Retroareolar  Benign fibrocystic changes without atypia (discrete 0.3 cm focus of sclerosing and tubular adenosis with usual ductal hyperplasia, columnar cell change and hyperplasia, apocrine metaplasia, cystic dilatation).   -  Microcalcifications: Present associated with non-neoplastic breast tissue.   - Negative for malignancy, in-situ carcinoma and atypical hyperplasia.     Malignant pathology appears unifocal. Biopsy proven carcinoma measured 1 cm on MRI. Biopsy proved metastatic disease to left axillary lymph node.      6/9/2022 Genomic Testing    MammaPrint  Low risk  Luminal type A  MammaPrint index: +0.578     6/21/2022 Surgery    Left breast tiffany  directed lumpectomy with axillary dissection  A. Invasive 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  Grade 1  1.3 cm  Lymphovascular invasion is identified  Margins negative    H. Axillary, Level I and Level II axillary contents  Metastatic carcinoma involving 3/17 lymph nodes  1.8 cm   Extranodal extension is identified 1mm  Foci of carcinoma identified in extranodal lymphatic channels    Anatomic stage: Stage IIA  Prognostic stage: Stage IA      6/21/2022 -  Cancer Staged    Staging form: Breast, AJCC 8th Edition  - Pathologic stage from 6/21/2022: Stage IA (pT1c, pN1, cM0, G1, ER+, WA+, HER2-) - Signed by Alfredo Hays MD on 8/1/2022  Stage prefix: Initial diagnosis  Nuclear grade: G1  Multigene prognostic tests performed: None  Mitotic count score: Score 1  Histologic grading system: 3 grade system       8/29/2022 - 10/10/2022 Radiation    Treatments:  Course: C1  Plan ID Energy Fractions Dose per Fraction (cGy) Total Dose Delivered (cGy) Elapsed Days   BH L Breast 6X 25 / 25 200 5,000 35   BH L [id number] 12E 5 / 5 200 1,000 6   BH L SClav 10X/6X 24 / 24 200 4,800 32    Treatment Dates:  8/29/2022 - 10/10/2022.      Malignant neoplasm of lower-inner quadrant of left breast in female, estrogen receptor positive (HCC)   5/20/2022 Initial Diagnosis    Malignant neoplasm of lower-inner quadrant of left breast in female, estrogen receptor positive (HCC)     5/20/2022 Biopsy    MRI guided biopsy  A. Left breast   11 o'clock   Invasive mammary carcinoma of no  special type (ductal)  Grade 1  ER 95  ME 95  HER2 0    B. Right breast   Retroareolar  Benign fibrocystic changes without atypia (discrete 0.3 cm focus of sclerosing and tubular adenosis with usual ductal hyperplasia, columnar cell change and hyperplasia, apocrine metaplasia, cystic dilatation).   - Microcalcifications: Present associated with non-neoplastic breast tissue.   - Negative for malignancy, in-situ carcinoma and atypical hyperplasia.     Malignant pathology appears unifocal. Biopsy proven carcinoma measured 1 cm on MRI. Biopsy proved metastatic disease to left axillary lymph node.      6/9/2022 Genomic Testing    MammaPrint  Low risk  Luminal type A  MammaPrint index: +0.578     6/21/2022 Surgery    Left breast tiffany  directed lumpectomy with axillary dissection  A. Invasive 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  Grade 1  1.3 cm  Lymphovascular invasion is identified  Margins negative    H. Axillary, Level I and Level II axillary contents  Metastatic carcinoma involving 3/17 lymph nodes  1.8 cm   Extranodal extension is identified 1mm  Foci of carcinoma identified in extranodal lymphatic channels    Anatomic stage: Stage IIA  Prognostic stage: Stage IA      8/29/2022 - 10/10/2022 Radiation    Treatments:  Course: C1  Plan ID Energy Fractions Dose per Fraction (cGy) Total Dose Delivered (cGy) Elapsed Days   BH L Breast 6X 25 / 25 200 5,000 35   BH L [id number] 12E 5 / 5 200 1,000 6   BH L SClav 10X/6X 24 / 24 200 4,800 32    Treatment Dates:  8/29/2022 - 10/10/2022.         4/22/2022 CT CAP w/c: Previously biopsied abnormal left axillary lymph node again identified. No other abnormal soft tissue mass identified.  No acute inflammatory process identified     4/24/2022 NM Bone Scan: Probable degenerative change in the lower thoracic spine     MammaPrint. Low risk. No role for chemotherapy.     10/19/2022 DEXA Scan: Lumbar spine T score -1.9. Left total hip T score -1.3. Left femoral  neck T score -1.1. Low bone mass (osteopenia)     2023: Mammogram NAD  2024: Mammogram NAD     Lab Results   Component Value Date    WBC 5.45 2024    HGB 15.0 2024    HCT 45.5 2024    MCV 89 2024     2024     Lab Results   Component Value Date    SODIUM 134 (L) 2024    K 4.5 2024     2024    CO2 26 2024    AGAP 7 2024    BUN 11 2024    CREATININE 0.47 (L) 2024    GLUC 100 2023    GLUF 105 (H) 2024    CALCIUM 9.3 2024    AST 17 2024    ALT 14 2024    ALKPHOS 101 2024    TP 7.0 2024    TBILI 0.85 2024    EGFR 103 2024       Interval history:  she broke her left arm in 2024. She reports they will not due surgery due to her stage 4 lymphedema. She is still wearing in case. She is ambidextrous.   She was supposed to start Prolia however was deferred due to above  She is having hot flashes but now better   No new joint pain, bone pain other than the arm.       ECO - Symptomatic but completely ambulatory    Review of Systems   All other systems reviewed and are negative.      Current Outpatient Medications:     cholecalciferol (VITAMIN D3) 1,000 units tablet, Take 3,000 Units by mouth daily, Disp: , Rfl:     diphenhydrAMINE (BENADRYL) 50 MG tablet, Take 50 mg by mouth daily at bedtime as needed for itching, Disp: , Rfl:     dronabinol (MARINOL) 10 MG capsule, , Disp: , Rfl:     ezetimibe (ZETIA) 10 mg tablet, Take 10 mg by mouth daily, Disp: , Rfl:     gabapentin (Neurontin) 100 mg capsule, Take 1 capsule (100 mg total) by mouth 3 (three) times a day, Disp: 90 capsule, Rfl: 1    letrozole (FEMARA) 2.5 mg tablet, TAKE 1 TABLET BY MOUTH EVERY DAY, Disp: 90 tablet, Rfl: 0    multivitamin (THERAGRAN) TABS, Take 1 tablet by mouth daily, Disp: , Rfl:     Omega-3 Fatty Acids (FISH OIL PO), Take by mouth, Disp: , Rfl:     penicillin V potassium (VEETID) 500 mg tablet, Take 1  "tablet (500 mg total) by mouth every 12 (twelve) hours, Disp: 180 tablet, Rfl: 1    Probiotic Product (PROBIOTIC-10 PO), Take by mouth, Disp: , Rfl:   Allergies   Allergen Reactions    Chlorpromazine Anaphylaxis     PHENOTHIAZINE=ANAPHYLAXIS    Codeine Anaphylaxis     Anaphylaxis  abd pain.    Meperidine Anaphylaxis, Hives and Vomiting    Phenothiazines Anaphylaxis and Throat Swelling     Category: Allergy;     Saccharin - Food Allergy Anaphylaxis    Ampicillin-Sulbactam Sodium Hives    Aspirin GI Intolerance and Abdominal Pain     Severe GERD    Gluten Meal - Food Allergy GI Intolerance    Ibuprofen Hives     H    Levofloxacin Hives    Chocolate - Food Allergy GI Intolerance    Lactose - Food Allergy GI Intolerance    Neomycin Other (See Comments), Edema and Hives     Category: Allergy;   swelling    [place] - Food Allergy Rash    Latex Hives and Rash     Category: Allergy;        Advance Directive and Living Will:            Objective   /80 (BP Location: Left arm, Patient Position: Sitting, Cuff Size: Standard)   Pulse 77   Temp 98.2 °F (36.8 °C) (Temporal)   Resp 16   Ht 5' 8\" (1.727 m)   Wt 64 kg (141 lb)   SpO2 98%   BMI 21.44 kg/m²   Wt Readings from Last 6 Encounters:   08/23/24 64 kg (141 lb)   05/22/24 70.3 kg (155 lb)   05/09/24 69.9 kg (154 lb)   05/01/24 67.1 kg (148 lb)   04/29/24 66.7 kg (147 lb)   04/17/24 66.7 kg (147 lb)       Physical Exam  Vitals reviewed.   HENT:      Head: Normocephalic.   Cardiovascular:      Rate and Rhythm: Normal rate and regular rhythm.      Heart sounds: Normal heart sounds.   Pulmonary:      Effort: Pulmonary effort is normal.      Breath sounds: Normal breath sounds.   Abdominal:      Palpations: Abdomen is soft.      Tenderness: There is no abdominal tenderness.   Musculoskeletal:      Cervical back: Neck supple.      Comments: +skin graft RUE, healed. +muscle atrophy RUE. Wearing compression sleeve. Limited ROM in RUE including hand    Skin:     Findings: " No rash.   Neurological:      Mental Status: She is alert.         Pertinent Laboratory Results and Imaging Review:      The following historical data was reviewed:  Past Medical History:   Diagnosis Date    Abnormal mammogram 05/15/2013    Anemia     Cancer (HCC)     desmoitosis    Carcinoma of left breast metastatic to axillary lymph node (HCC) 04/19/2022    Chronic pain disorder     worse right side of body    Desmoid tumor     Last Assessed: 6/19/2017    fibroidal cyst 1999?  Hysterectomy done    History of transfusion     no reactions    Hyperlipidemia     Hypertension     Osteoporosis     PONV (postoperative nausea and vomiting)      Past Surgical History:   Procedure Laterality Date    BREAST LUMPECTOMY Left 6/21/2022    Procedure: ANITA  DIRECTED LUMPECTOMY;  Surgeon: Amanda Motley MD;  Location: [facility];  Service: Surgical Oncology    FRACTURE SURGERY      HYSTERECTOMY      LYMPH NODE DISSECTION Left 6/21/2022    Procedure: AXILLARY DISSECTION LEVEL I AND LEVEL II LYMPH NODES;  Surgeon: Amanda Motley MD;  Location: [facility];  Service: Surgical Oncology    MRI BREAST BIOPSY LEFT (ALL INCLUSIVE) Left 5/20/2022    MRI BREAST BIOPSY RIGHT (ALL INCLUSIVE) Right 5/20/2022    OTHER SURGICAL HISTORY      Arm Incision: Dermoid tumor, right Arm     PARTIAL HYSTERECTOMY      KY COLONOSCOPY FLX DX W/COLLJ SPEC WHEN PFRMD N/A 8/15/2017    Procedure: COLONOSCOPY;  Surgeon: Alec England MD;  Location: [facility];  Service: Gastroenterology    KY NDSC WRST SURG W/RLS TRANSVRS CARPL LIGM Left 8/10/2021    Procedure: RELEASE LEFT CARPAL TUNNEL ENDOSCOPIC;  Surgeon: Chris Camacho MD;  Location: [facility];  Service: Orthopedics    KY OPTX DSTL RADL I-ARTIC FX/EPIPHYSL SEP 3 FRAG Left 2/9/2021    Procedure: OPEN REDUCTION W/ INTERNAL FIXATION (ORIF) RADIUS / ULNA (WRIST) left;  Surgeon: Chris Camacho MD;  Location: [facility];  Service: Orthopedics    SKIN BIOPSY      SKIN CANCER  EXCISION      Melanoma Excision     TONSILLECTOMY      US BREAST NEEDLE LOC LEFT Left 6/16/2022    US BREAST NEEDLE LOC RIGHT EACH ADDITIONAL Right 6/16/2022    US GUIDED BREAST LYMPH NODE BIOPSY LEFT Left 4/14/2022     Social History     Socioeconomic History    Marital status: /Civil Union     Spouse name: None    Number of children: None    Years of education: None    Highest education level: None   Occupational History    Occupation: unemployed    Tobacco Use    Smoking status: Never    Smokeless tobacco: Never   Vaping Use    Vaping status: Never Used   Substance and Sexual Activity    Alcohol use: Not Currently     Alcohol/week: 5.0 standard drinks of alcohol     Types: 5 Shots of liquor per week     Comment: Used mostly as a painkiller when needed before bedtime    Drug use: Yes     Frequency: 28.0 times per week     Types: Marijuana     Comment: THC Medical marijuana    Sexual activity: Not Currently     Partners: Male     Comment: Hysterectomy years ago   Other Topics Concern    None   Social History Narrative    Lives with spouse      Social Determinants of Health     Financial Resource Strain: Low Risk  (12/13/2023)    Overall Financial Resource Strain (CARDIA)     Difficulty of Paying Living Expenses: Not hard at all   Food Insecurity: No Food Insecurity (11/13/2023)    Hunger Vital Sign     Worried About Running Out of Food in the Last Year: Never true     Ran Out of Food in the Last Year: Never true   Transportation Needs: No Transportation Needs (12/13/2023)    PRAPARE - Transportation     Lack of Transportation (Medical): No     Lack of Transportation (Non-Medical): No   Physical Activity: Insufficiently Active (5/2/2023)    Received from [facility]    Exercise Vital Sign     Days of Exercise per Week: 2 days     Minutes of Exercise per Session: 30 min   Stress: No Stress Concern Present (5/2/2023)    [facility], [facility]     [facility] of Occupational Health - Occupational Stress Questionnaire     Feeling of Stress : Not at all   Social Connections: Unknown (6/18/2024)    Received from [facility]     How often do you feel lonely or isolated from those around you? (Adult - for ages 18 years and over): Not on file   Intimate Partner Violence: Not At Risk (5/2/2023)    [facility]    Humiliation, Afraid, Rape, and Kick questionnaire     Fear of Current or Ex-Partner: No     Emotionally Abused: No     Physically Abused: No     Sexually Abused: No   Housing Stability: Low Risk  (11/13/2023)    Housing Stability Vital Sign     Unable to Pay for Housing in the Last Year: No     Number of Times Moved in the Last Year: 1     Homeless in the Last Year: No     Family History   Problem Relation Age of Onset    Diabetes Mother     No Known Problems Father     Cancer Sister     Lung cancer Sister     Pancreatic cancer Sister     No Known Problems Maternal Grandmother     No Known Problems Maternal Grandfather     No Known Problems Paternal Grandmother     No Known Problems Paternal Grandfather     Breast cancer Neg Hx        Please note:  This report has been generated by a voice recognition software system. Therefore there may be syntax, spelling, and/or grammatical errors. Please call if you have any questions.

## 2024-08-26 ENCOUNTER — OFFICE VISIT (OUTPATIENT)
Dept: PHYSICAL THERAPY | Facility: CLINIC | Age: 64
End: 2024-08-26
Payer: MEDICARE

## 2024-08-26 DIAGNOSIS — I89.0 LYMPHEDEMA OF RIGHT ARM: Primary | ICD-10-CM

## 2024-08-26 DIAGNOSIS — I89.0 LYMPHEDEMA OF LEFT ARM: ICD-10-CM

## 2024-08-26 DIAGNOSIS — S42.344D CLOSED NONDISPLACED SPIRAL FRACTURE OF SHAFT OF RIGHT HUMERUS WITH ROUTINE HEALING, SUBSEQUENT ENCOUNTER: ICD-10-CM

## 2024-08-26 PROCEDURE — 97140 MANUAL THERAPY 1/> REGIONS: CPT

## 2024-08-26 PROCEDURE — 97110 THERAPEUTIC EXERCISES: CPT

## 2024-08-26 PROCEDURE — 97112 NEUROMUSCULAR REEDUCATION: CPT

## 2024-08-26 NOTE — PROGRESS NOTES
"Daily Note     Today's date: 2024  Patient name: Eileen Matias  : 1960  MRN: [id number]  Referring provider: Rick Lazo DO  Dx:   Encounter Diagnosis     ICD-10-CM    1. Lymphedema of right arm  I89.0       2. Closed nondisplaced spiral fracture of shaft of right humerus with routine healing, subsequent encounter  S42.344D       3. Lymphedema of left arm  I89.0                      Subjective: pt reports no new major complaints upon arrival.       Objective: See treatment diary below      Assessment: Tolerated treatment well. Patient would benefit from continued PT. Modified wall slides to incline slides to help reduce outward compensation of elbow.       Plan: Continue per plan of care.      FOTO: Goal: Lymph - 57 Shoulder - 49; Eval: Lymph - 5 Shoulder - 4;3/13: Lymph - 42; : Shoulder - 47;4/10 Lymph - 48; : shoulder - 42; : Lymph - 69 Shoulder - 48; : lymph - 85 Shoulder - 56; : Lymph - 98 Shoulder - 56      EPOC:   10/16    Shoulder EPOC: 10/16 8/26      RA Lymph DATE:         Shoulder:         Manual       MLD JH      Manual Wrapping       PROM                       Exercise Diary     THEREX       Pt ed  FOTO; updated measurements             Wall slides 2x10  Incline 2x10    SL ER 0# 25x  0# 25x    Standing shoulder flexion/scaption Over 90* 2x10 0#  2x10                  pulleys 2/2  2/2    Ball roll up wall 2# 12x      Supine ER cane 5\" 20x  5\" 20x    Shoulder isometrics       SL flexion       SL abd 1# 25x  1# 25x    Supine shoulder flex 1# 25x  1# 25x    IR stretch w/ strap       NEURO RE-ED       Supine HA       Supine BL ER       Cone reaches Cone reaches 1 min flex/scap      Supine punches 1# 25x  1# 25x     ball roll up wall        rows/pulldowns BTB x20  BTB x20                                                               Modalities           CP                                                                      "

## 2024-08-27 ENCOUNTER — OFFICE VISIT (OUTPATIENT)
Dept: OCCUPATIONAL THERAPY | Facility: CLINIC | Age: 64
End: 2024-08-27
Payer: MEDICARE

## 2024-08-27 DIAGNOSIS — R29.898 RIGHT HAND WEAKNESS: ICD-10-CM

## 2024-08-27 DIAGNOSIS — R20.2 NUMBNESS AND TINGLING OF LEFT UPPER EXTREMITY: Primary | ICD-10-CM

## 2024-08-27 DIAGNOSIS — R20.0 NUMBNESS AND TINGLING OF LEFT UPPER EXTREMITY: Primary | ICD-10-CM

## 2024-08-27 PROCEDURE — 97530 THERAPEUTIC ACTIVITIES: CPT | Performed by: OCCUPATIONAL THERAPIST

## 2024-08-27 PROCEDURE — 97110 THERAPEUTIC EXERCISES: CPT | Performed by: OCCUPATIONAL THERAPIST

## 2024-08-27 NOTE — PROGRESS NOTES
Daily Note     Today's date: 2024  Patient name: Eileen Matias  : 1960  MRN: [id number]  Referring provider: Rogelio Black MD  Dx:   Encounter Diagnosis     ICD-10-CM    1. Numbness and tingling of left upper extremity  R20.0     R20.2       2. Right hand weakness  R29.898                      Subjective:   I am bending my fingers more, I can feel the pinky when I . (In flexion)      Objective: See treatment diary below    AROM   Distance to palm  II  3 cm,  III  3 cm,  IV  3 cm,  V  4 cm    PROM   Distance to palm  II  1 cm,  III  12 cm,  IV  1 cm,  V  2 cm      Assessment: Tolerated treatment well. Patient would benefit from continued OT. Improved active digit flexion noted. Improved gross grasp for light IADLs with patient in circumferential wrist extension splint.  Improving ease of right use noted in clinic program.  Edema reducing, allowing wear of smaller and lighter lymphedema garment.       Plan: Continue per plan of care.   Progress dexterity and gripping exercises as tolerated.  Continue wear of  new orthotic with lighter material and smaller circumference for wear when using lighter sleeve.       Precautions: Lymphedema; hx desmoid tumors and breast cancer; healing humeral fracture     POC expires Unit limit Auth  expiration date PT/OT + Visit Limit?    NA NA BOMN         Dx R Hum Fx     Dr Wayne MILLER Not met              Date    Visit 25 26 27 28 29 30   Manuals 10 10 8 8 8 8            Jt mobs digits, FA MP all, wrist arch MP all G 1-2 MP all G 1-2 All MP's, G 1-2 All  MP G 1-2 All MP G 1-2   Ortho fit/train         Neuro Re-Ed          RNG         MNG         CHIKI                  orthotc    New orthotic ortho F and T                       Ther Ex 30   25'   25   15'   23    23    HEP         PROM 1:1 Digits, wrist. LLPS V MP flexion Digits wrist LLPS V MP flexion Digits wrist LLPS V MP  Flexion all Digits wrist LLPS digital flexion  Digits  "wrist LLPS digital flexion Digital flexion, LLPS   Digital blocked AROM P/H fists  10x5\" P/H fists 10x5\" P/H fists   5\" x  5 P/H fists   5x5\" P/H fists  5x5\" P/H fist 5x5\"   UE warm up UBE 2F 2R   45 R only UBE 2F 2R  25 R only UBE 2F 2R  25 R only    UBE 2F 2R  30 R only UBE 2F 2R  R only 25   PRE FA  S/P FA 0#  x30 S/P FA #1  X20  1/2 shaft S/P FA #1  3/4 shaft  S/P FA #1  x20 S/P  #2 mid shaft  x20   PRE Wrist FA R F bar up   G F bar down  R FB fold x10 each R F bar up   2x10x3\"  R F bar   Down 2x10x3\" R F bar up   2x10x3\"  R F bar   Down 2x10x3\"  G F bar  1x10  R F bar   UP 2x10x3\" G F bar   1x10x3\"  R F bar  Up 1x10x3\"            Shoulder ROM         Sh PROM         Ther Activity    10'   13    10'   10'   Gripping S  in putty;  HG Y RB, all foams 1 set S  into putty x50  RG Y/R all foams 1 set S  into putty x50  R RB all foams  S  into putty  R RB all foams   Y Web,  to edge 20x3\"   Pinching B C pins  All blocks Blue C pin  All blocks Blue C pins all blocks  Blue C pins all blocks Black C pins all blocks   Prehension                  MHP 5'  Pre tx, wrapped in flexion Pre tx wrapped in flexion Pre tx wrapped in flexion Pre tx with ace flexion.  Pre tx with ace flexion wrap Pre tx with ace flexion wrap                     "

## 2024-08-28 ENCOUNTER — OFFICE VISIT (OUTPATIENT)
Dept: PHYSICAL THERAPY | Facility: CLINIC | Age: 64
End: 2024-08-28
Payer: MEDICARE

## 2024-08-28 DIAGNOSIS — I89.0 LYMPHEDEMA OF LEFT ARM: ICD-10-CM

## 2024-08-28 DIAGNOSIS — S42.344D CLOSED NONDISPLACED SPIRAL FRACTURE OF SHAFT OF RIGHT HUMERUS WITH ROUTINE HEALING, SUBSEQUENT ENCOUNTER: ICD-10-CM

## 2024-08-28 DIAGNOSIS — I89.0 LYMPHEDEMA OF RIGHT ARM: Primary | ICD-10-CM

## 2024-08-28 PROCEDURE — 97140 MANUAL THERAPY 1/> REGIONS: CPT

## 2024-08-28 PROCEDURE — 97110 THERAPEUTIC EXERCISES: CPT

## 2024-08-29 ENCOUNTER — OFFICE VISIT (OUTPATIENT)
Dept: OCCUPATIONAL THERAPY | Facility: CLINIC | Age: 64
End: 2024-08-29
Payer: MEDICARE

## 2024-08-29 DIAGNOSIS — R29.898 RIGHT HAND WEAKNESS: ICD-10-CM

## 2024-08-29 DIAGNOSIS — R20.2 NUMBNESS AND TINGLING OF LEFT UPPER EXTREMITY: Primary | ICD-10-CM

## 2024-08-29 DIAGNOSIS — R20.0 NUMBNESS AND TINGLING OF LEFT UPPER EXTREMITY: Primary | ICD-10-CM

## 2024-08-29 PROCEDURE — 97530 THERAPEUTIC ACTIVITIES: CPT | Performed by: OCCUPATIONAL THERAPIST

## 2024-08-29 PROCEDURE — 97110 THERAPEUTIC EXERCISES: CPT | Performed by: OCCUPATIONAL THERAPIST

## 2024-08-29 NOTE — PROGRESS NOTES
"Daily Note     Today's date: 2024  Patient name: Eileen Matias  : 1960  MRN: [id number]  Referring provider: Rogelio Black MD  Dx:   Encounter Diagnosis     ICD-10-CM    1. Numbness and tingling of left upper extremity  R20.0     R20.2       2. Right hand weakness  R29.898                      Subjective:   I am bending my fingers more, I can feel the pinky when I . (In flexion)  My wrist is achey today.      Objective: See treatment diary below    AROM   Distance to palm  II  3 cm,  III  3 cm,  IV  3 cm,  V  4 cm    PROM   Distance to palm  II  1 cm,  III  12 cm,  IV  1 cm,  V  2 cm      Assessment: Tolerated treatment well. Patient would benefit from continued OT. Improved active digit flexion noted. Improved gross grasp for light IADLs with patient in circumferential wrist extension splint.  Improving ease of right use noted in clinic program.  Edema reducing, allowing wear of smaller and lighter lymphedema garment.       Plan: Continue per plan of care.   Progress dexterity and gripping exercises as tolerated.  Continue wear of  new orthotic with lighter material and smaller circumference for wear when using lighter sleeve.       Precautions: Lymphedema; hx desmoid tumors and breast cancer; healing humeral fracture     POC expires Unit limit Auth  expiration date PT/OT + Visit Limit?    NA NA BOMN         Dx R Hum Fx     Dr Wayne MILLER Not met              Date    Visit 31   28 29 30   Manuals    8 8 8            Jt mobs digits, FA    All MP's, G 1-2 All  MP G 1-2 All MP G 1-2   Ortho fit/train         Neuro Re-Ed          RNG         MNG         CHIKI                  orthotc Pressure points resolved   New orthotic ortho F and T                       Ther Ex 25     15'   23    23    HEP         PROM 1:1 Digital flexion LLPS   Digits wrist LLPS digital flexion  Digits wrist LLPS digital flexion Digital flexion, LLPS   Digital blocked AROM P/H fists 5x5\"   P/H " "fists   5x5\" P/H fists  5x5\" P/H fist 5x5\"   UE warm up UBE 2F 2R  R only x25    UBE 2F 2R  30 R only UBE 2F 2R  R only 25   PRE FA  S/P #2 mid shaft x20    S/P FA #1  x20 S/P  #2 mid shaft  x20   PRE Wrist FA G F bar    Down x20  R F bar up   x20    G F bar  1x10  R F bar   UP 2x10x3\" G F bar   1x10x3\"  R F bar  Up 1x10x3\"            Shoulder ROM         Sh PROM         Ther Activity 15      10'   10'   Gripping Y Web  to edge x20      S  into putty  R RB all foams   Y Web,  to edge 20x3\"   Pinching Black C pins all blocks    Blue C pins all blocks Black C pins all blocks   Prehension Tie and untie knots                 MHP Pre tx with ace flexion   Pre tx with ace flexion.  Pre tx with ace flexion wrap Pre tx with ace flexion wrap                     "

## 2024-09-03 ENCOUNTER — OFFICE VISIT (OUTPATIENT)
Dept: PHYSICAL THERAPY | Facility: CLINIC | Age: 64
End: 2024-09-03
Payer: MEDICARE

## 2024-09-03 ENCOUNTER — OFFICE VISIT (OUTPATIENT)
Dept: OCCUPATIONAL THERAPY | Facility: CLINIC | Age: 64
End: 2024-09-03
Payer: MEDICARE

## 2024-09-03 DIAGNOSIS — R20.0 NUMBNESS AND TINGLING OF LEFT UPPER EXTREMITY: Primary | ICD-10-CM

## 2024-09-03 DIAGNOSIS — I89.0 LYMPHEDEMA OF RIGHT ARM: Primary | ICD-10-CM

## 2024-09-03 DIAGNOSIS — R20.2 NUMBNESS AND TINGLING OF LEFT UPPER EXTREMITY: Primary | ICD-10-CM

## 2024-09-03 DIAGNOSIS — S42.344D CLOSED NONDISPLACED SPIRAL FRACTURE OF SHAFT OF RIGHT HUMERUS WITH ROUTINE HEALING, SUBSEQUENT ENCOUNTER: ICD-10-CM

## 2024-09-03 DIAGNOSIS — R29.898 RIGHT HAND WEAKNESS: ICD-10-CM

## 2024-09-03 DIAGNOSIS — I89.0 LYMPHEDEMA OF LEFT ARM: ICD-10-CM

## 2024-09-03 PROCEDURE — 97110 THERAPEUTIC EXERCISES: CPT

## 2024-09-03 PROCEDURE — 97140 MANUAL THERAPY 1/> REGIONS: CPT

## 2024-09-03 PROCEDURE — 97112 NEUROMUSCULAR REEDUCATION: CPT

## 2024-09-03 PROCEDURE — 97140 MANUAL THERAPY 1/> REGIONS: CPT | Performed by: OCCUPATIONAL THERAPIST

## 2024-09-03 PROCEDURE — 97110 THERAPEUTIC EXERCISES: CPT | Performed by: OCCUPATIONAL THERAPIST

## 2024-09-03 NOTE — PROGRESS NOTES
Daily Note     Today's date: 9/3/2024  Patient name: Eileen Matias  : 1960  MRN: [id number]  Referring provider: Rogelio Black MD  Dx:   Encounter Diagnosis     ICD-10-CM    1. Numbness and tingling of left upper extremity  R20.0     R20.2       2. Right hand weakness  R29.898                      Subjective:   I am bending my fingers more, I can feel the pinky when I . (In flexion)  My wrist is achey today.      Objective: See treatment diary below    AROM   Distance to palm  II  3 cm,  III  3 cm,  IV  3 cm,  V  4 cm    PROM   Distance to palm  II  1 cm,  III  12 cm,  IV  1 cm,  V  2 cm    [place] #3  21.3 Lbs.   #2  20 lbs.       Assessment: Tolerated treatment well. Patient would benefit from continued OT. Improved active digit flexion noted. Improved gross grasp for light IADLs with patient in circumferential wrist extension splint.  Improving ease of right use noted in clinic program.  Edema reducing, allowing wear of smaller and lighter lymphedema garment.       Plan: Continue per plan of care.   Progress dexterity and gripping exercises as tolerated.  Continue wear of  new orthotic with lighter material and smaller circumference for wear when using lighter sleeve.       Precautions: Lymphedema; hx desmoid tumors and breast cancer; healing humeral fracture     POC expires Unit limit Auth  expiration date PT/OT + Visit Limit?    NA NA BOMN         Dx R Hum Fx     Dr Wayne MILLER Not met     MC         Date 8/29 9/3  8/20 8/22 8/27   Visit 31 32  28 29 30   Manuals    8 8 8            Jt mobs digits, FA    All MP's, G 1-2 All  MP G 1-2 All MP G 1-2   Ortho fit/train         Neuro Re-Ed          RNG         MNG         CHIKI                  orthotic Pressure points resolved   New orthotic ortho F and T                       Ther Ex 25   25    15'   23    23    HEP         PROM 1:1 Digital flexion LLPS Digital Flexion LLPS  Digits wrist LLPS digital flexion  Digits wrist LLPS digital flexion  "Digital flexion, LLPS   Digital blocked AROM P/H fists 5x5\" P/H fists 5x5\"  P/H fists   5x5\" P/H fists  5x5\" P/H fist 5x5\"   UE warm up UBE 2F 2R  R only x25 UBE 2F 2R  R only x30   UBE 2F 2R  30 R only UBE 2F 2R  R only 25   PRE FA  S/P #2 mid shaft x20 S/P #2 mid shaft x20   S/P FA #1  x20 S/P  #2 mid shaft  x20   PRE Wrist FA G F bar    Down x20  R F bar up   x20 G F bar down 20x3\"  G F bar up   x10   G F bar  1x10  R F bar   UP 2x10x3\" G F bar   1x10x3\"  R F bar  Up 1x10x3\"            Shoulder ROM         Sh PROM         Ther Activity 15      10'   10'   Gripping Y Web  to edge x20   Y web  to edge x20   S  into putty  R RB all foams   Y Web,  to edge 20x3\"   Pinching Black C pins all blocks Black C pins all blocks   Blue C pins all blocks Black C pins all blocks   Prehension Tie and untie knots RB over ball x10  Groove pegs                  MHP Pre tx with ace flexion Pre tx with ace flexion  Pre tx with ace flexion.  Pre tx with ace flexion wrap Pre tx with ace flexion wrap                     "

## 2024-09-03 NOTE — PROGRESS NOTES
"Daily Note     Today's date: 9/3/2024  Patient name: Eileen Matias  : 1960  MRN: [id number]  Referring provider: Rick Lazo DO  Dx:   Encounter Diagnosis     ICD-10-CM    1. Lymphedema of right arm  I89.0       2. Closed nondisplaced spiral fracture of shaft of right humerus with routine healing, subsequent encounter  S42.344D       3. Lymphedema of left arm  I89.0                      Subjective: pt reports no new major complaints upon arrival.       Objective: See treatment diary below      Assessment: Tolerated treatment well. Patient would benefit from continued PT. Continued winging of arm w/ table slides. Switched 1# weight around wrist instead of DB which helped to improve control. Added shoulder isometrics w/ good tolerance.       Plan: Continue per plan of care.      FOTO: Goal: Lymph - 57 Shoulder - 49; Eval: Lymph - 5 Shoulder - 4;3/13: Lymph - 42; : Shoulder - 47;4/10 Lymph - 48; : shoulder - 42; : Lymph - 69 Shoulder - 48; : lymph - 85 Shoulder - 56; : Lymph - 98 Shoulder - 56      EPOC:   10/16    Shoulder EPOC: 10/16 8/26 8/28 9/3    RA Lymph DATE:         Shoulder:         Manual       MLD [facility]    Manual Wrapping       PROM                       Exercise Diary 9/3  8/26 8/28   THEREX       Pt ed [facility]             Wall slides Incline 2x10  Incline 2x10 Incline 2x10   SL ER 1# 2x10  0# 25x 0# 30x   Standing shoulder flexion/scaption 2x10  2x10 2x10                 pulleys 2/2  2/2 2/2   Ball roll up wall       Supine ER cane   5\" 20x    Shoulder isometrics 5\" x10      SL flexion  2x10      SL abd 1# 30x  1# 25x 1# 30x   Supine shoulder flex 1# 30x  1# 25x 1# 30x   IR stretch w/ strap       NEURO RE-ED       Supine HA       Supine BL ER       [facility] reaches       Supine punches 1# 30x  1# 25x 1# 30x    ball roll up wall        rows/pulldowns BTB x30  BTB x20 BTB x30                                                              Modalities           CP                 "

## 2024-09-04 ENCOUNTER — APPOINTMENT (OUTPATIENT)
Dept: OCCUPATIONAL THERAPY | Facility: CLINIC | Age: 64
End: 2024-09-04
Payer: MEDICARE

## 2024-09-06 ENCOUNTER — OFFICE VISIT (OUTPATIENT)
Dept: PHYSICAL THERAPY | Facility: CLINIC | Age: 64
End: 2024-09-06
Payer: MEDICARE

## 2024-09-06 DIAGNOSIS — I89.0 LYMPHEDEMA OF RIGHT ARM: Primary | ICD-10-CM

## 2024-09-06 DIAGNOSIS — S42.344D CLOSED NONDISPLACED SPIRAL FRACTURE OF SHAFT OF RIGHT HUMERUS WITH ROUTINE HEALING, SUBSEQUENT ENCOUNTER: ICD-10-CM

## 2024-09-06 DIAGNOSIS — E78.2 HYPERLIPIDEMIA, MIXED: Primary | ICD-10-CM

## 2024-09-06 DIAGNOSIS — I89.0 LYMPHEDEMA OF LEFT ARM: ICD-10-CM

## 2024-09-06 PROCEDURE — 97140 MANUAL THERAPY 1/> REGIONS: CPT

## 2024-09-06 PROCEDURE — 97110 THERAPEUTIC EXERCISES: CPT

## 2024-09-06 NOTE — PROGRESS NOTES
"Daily Note     Today's date: 2024  Patient name: Eileen Matias  : 1960  MRN: [id number]  Referring provider: Rick Lazo DO  Dx:   Encounter Diagnosis     ICD-10-CM    1. Lymphedema of right arm  I89.0       2. Closed nondisplaced spiral fracture of shaft of right humerus with routine healing, subsequent encounter  S42.344D       3. Lymphedema of left arm  I89.0                      Subjective: pt reports she is able to wear one of her older daytime garments now even though its hard to put on.       Objective: See treatment diary below      Assessment: Tolerated treatment well. Patient would benefit from continued PT. Performed MLD to BLUE to improve lymphatic flow and decrease swelling. Continued to focus on shoulder control while avoiding compensations.       Plan: Continue per plan of care.      FOTO: Goal: Lymph - 57 Shoulder - 49; Eval: Lymph - 5 Shoulder - 4;3/13: Lymph - 42; : Shoulder - 47;4/10 Lymph - 48; : shoulder - 42; : Lymph - 69 Shoulder - 48; : lymph - 85 Shoulder - 56; : Lymph - 98 Shoulder - 56      EPOC:   10/16    Shoulder EPOC: 10/16 8/26 8/28 9/3 9/6   RA Lymph DATE:         Shoulder:         Manual       MLD [facility]   Manual Wrapping       PROM                       Exercise Diary 9/3 9/6     THEREX       Pt ed [facility]            Wall slides Incline 2x10 Incline 2x10     SL ER 1# 2x10 1# 2x10     Standing shoulder flexion/scaption 2x10 2x10                   pulleys 2/2 2/2     Ball roll up wall       Supine ER cane       Shoulder isometrics 5\" x10 5\" x10     SL flexion  2x10 3x10     SL abd 1# 30x 1# 30x     Supine shoulder flex 1# 30x 1# 30x     IR stretch w/ strap       NEURO RE-ED       Supine HA       Supine BL ER       [facility] reaches       Supine punches 1# 30x 1# 30x      ball roll up wall        rows/pulldowns BTB x30 BTB 30x                                                                Modalities           CP                                 "

## 2024-09-09 ENCOUNTER — OFFICE VISIT (OUTPATIENT)
Dept: PHYSICAL THERAPY | Facility: CLINIC | Age: 64
End: 2024-09-09
Payer: MEDICARE

## 2024-09-09 ENCOUNTER — PATIENT MESSAGE (OUTPATIENT)
Dept: INFECTIOUS DISEASES | Facility: CLINIC | Age: 64
End: 2024-09-09

## 2024-09-09 DIAGNOSIS — S42.344D CLOSED NONDISPLACED SPIRAL FRACTURE OF SHAFT OF RIGHT HUMERUS WITH ROUTINE HEALING, SUBSEQUENT ENCOUNTER: ICD-10-CM

## 2024-09-09 DIAGNOSIS — I89.0 LYMPHEDEMA OF LEFT ARM: ICD-10-CM

## 2024-09-09 DIAGNOSIS — I89.0 LYMPHEDEMA OF RIGHT ARM: Primary | ICD-10-CM

## 2024-09-09 DIAGNOSIS — L03.90 RECURRENT CELLULITIS: ICD-10-CM

## 2024-09-09 PROCEDURE — 97112 NEUROMUSCULAR REEDUCATION: CPT

## 2024-09-09 PROCEDURE — 97140 MANUAL THERAPY 1/> REGIONS: CPT

## 2024-09-09 PROCEDURE — 97110 THERAPEUTIC EXERCISES: CPT

## 2024-09-09 RX ORDER — EZETIMIBE 10 MG/1
10 TABLET ORAL DAILY
Qty: 90 TABLET | Refills: 0 | Status: SHIPPED | OUTPATIENT
Start: 2024-09-09

## 2024-09-09 NOTE — PROGRESS NOTES
"Daily Note     Today's date: 2024  Patient name: Eileen Matias  : 1960  MRN: [id number]  Referring provider: Rick Lazo DO  Dx:   Encounter Diagnosis     ICD-10-CM    1. Lymphedema of right arm  I89.0       2. Closed nondisplaced spiral fracture of shaft of right humerus with routine healing, subsequent encounter  S42.344D       3. Lymphedema of left arm  I89.0                      Subjective: pt reports no new major complaints upon arrival.       Objective: See treatment diary below      Assessment: Tolerated treatment well. Patient would benefit from continued PT. Able to progress strengthening including supine HA w/ some fatigue reported. Added resistance w/ standing flexion/scaption.       Plan: Continue per plan of care.      FOTO: Goal: Lymph - 57 Shoulder - 49; Eval: Lymph - 5 Shoulder - 4;3/13: Lymph - 42; : Shoulder - 47;4/10 Lymph - 48; : shoulder - 42; : Lymph - 69 Shoulder - 48; : lymph - 85 Shoulder - 56; : Lymph - 98 Shoulder - 56      EPOC:   10/16    Shoulder EPOC: 10/16 9/9 8/28 9/3 9/6   RA Lymph DATE:         Shoulder:         Manual       MLD [facility]   Manual Wrapping       PROM                       Exercise Diary 9/3 9/6 9/9    THEREX       Pt ed [facility]           Wall slides Incline 2x10 Incline 2x10 Incline 2x10; wall slides along door frame x10    SL ER 1# 2x10 1# 2x10 1# 2x10    Standing shoulder flexion/scaption 2x10 2x10 2x10 1#                  pulleys 2/2 2/2 2/2    Ball roll up wall       Supine ER cane       Shoulder isometrics 5\" x10 5\" x10 5\" 2x10    SL flexion  2x10 3x10 3x10    SL abd 1# 30x 1# 30x 1# 30x    Supine shoulder flex 1# 30x 1# 30x 1# 30x    IR stretch w/ strap       NEURO RE-ED       Supine HA   RTB 2x10    Supine BL ER       [facility] reaches       Supine punches 1# 30x 1# 30x 1# 30x     ball roll up wall        rows/pulldowns BTB x30 BTB 30x BTB 30x                                                               Modalities    "        CP

## 2024-09-10 ENCOUNTER — OFFICE VISIT (OUTPATIENT)
Dept: OCCUPATIONAL THERAPY | Facility: CLINIC | Age: 64
End: 2024-09-10
Payer: MEDICARE

## 2024-09-10 ENCOUNTER — TELEPHONE (OUTPATIENT)
Dept: HEMATOLOGY ONCOLOGY | Facility: CLINIC | Age: 64
End: 2024-09-10

## 2024-09-10 DIAGNOSIS — R20.0 NUMBNESS AND TINGLING OF LEFT UPPER EXTREMITY: Primary | ICD-10-CM

## 2024-09-10 DIAGNOSIS — L03.90 RECURRENT CELLULITIS: ICD-10-CM

## 2024-09-10 DIAGNOSIS — R20.2 NUMBNESS AND TINGLING OF LEFT UPPER EXTREMITY: Primary | ICD-10-CM

## 2024-09-10 DIAGNOSIS — R29.898 RIGHT HAND WEAKNESS: ICD-10-CM

## 2024-09-10 PROCEDURE — 97530 THERAPEUTIC ACTIVITIES: CPT | Performed by: OCCUPATIONAL THERAPIST

## 2024-09-10 PROCEDURE — 97763 ORTHC/PROSTC MGMT SBSQ ENC: CPT | Performed by: OCCUPATIONAL THERAPIST

## 2024-09-10 PROCEDURE — 97110 THERAPEUTIC EXERCISES: CPT | Performed by: OCCUPATIONAL THERAPIST

## 2024-09-10 RX ORDER — PENICILLIN V POTASSIUM 500 MG/1
500 TABLET, FILM COATED ORAL EVERY 12 HOURS
Qty: 180 TABLET | Refills: 1 | Status: SHIPPED | OUTPATIENT
Start: 2024-09-10 | End: 2025-03-09

## 2024-09-10 NOTE — PROGRESS NOTES
Daily Note     Today's date: 9/10/2024  Patient name: Eileen Matias  : 1960  MRN: [id number]  Referring provider: Rogelio Black MD  Dx:   Encounter Diagnosis     ICD-10-CM    1. Numbness and tingling of left upper extremity  R20.0     R20.2       2. Right hand weakness  R29.898                        Subjective:   I had a reaction from the covid and flu shot and now my left arm is swollen.        Objective: See treatment diary below;  Exercises for right only due to left edema.  Adjusted splint to allow increased flexion of fifth MP;  adjusted dorsum over distal ulna for comfort.     AROM   Distance to palm  II  3 cm,  III  3 cm,  IV  3 cm,  V  4 cm    PROM   Distance to palm  II  1 cm,  III  12 cm,  IV  1 cm,  V  2 cm    [place] #3  21.3 Lbs.   #2  20 lbs.       Assessment: Tolerated treatment well. Patient would benefit from continued OT. Improved active digit flexion noted. Improved gross grasp for light IADLs with patient in circumferential wrist extension splint.  Improving ease of right use noted in clinic program.  Edema reducing, allowing wear of smaller and lighter lymphedema garment.       Plan: Continue per plan of care.   Progress dexterity and gripping exercises as tolerated.  Continue wear of orthotic with lighter material and smaller circumference for wear when using lighter sleeve.       Precautions: Lymphedema; hx desmoid tumors and breast cancer; healing humeral fracture     POC expires Unit limit Auth  expiration date PT/OT + Visit Limit?    NA NA BOMN         Dx R Hum Fx     Dr Wayne MILLER Not met              Date 8/29 9/3 9/10   8/27   Visit 31 32 33   30   Manuals      8            Jt mobs digits, FA      All MP G 1-2   Ortho fit/train         Neuro Re-Ed          RNG         MNG         CHIKI                  orthotic Pressure points resolved  Revised V MP and distal ulna pressure.  10'                        Ther Ex 25   25   23      23    HEP         PROM 1:1 Digital  "flexion LLPS Digital Flexion LLPS Digital flexion LLPS   Digital flexion, LLPS   Digital blocked AROM P/H fists 5x5\" P/H fists 5x5\" P/H fists 5x5\"   P/H fist 5x5\"   UE warm up UBE 2F 2R  R only x25 UBE 2F 2R  R only x30 HOLD due to LUE edema   UBE 2F 2R  R only 25   PRE FA  S/P #2 mid shaft x20 S/P #2 mid shaft x20    S/P  #2 mid shaft  x20   PRE Wrist FA G F bar    Down x20  R F bar up   x20 G F bar down 20x3\"  G F bar up   x10 R F bar  P/P x20  Fold x20   G F bar   1x10x3\"  R F bar  Up 1x10x3\"            Shoulder ROM         Sh PROM         Ther Activity 15 15   10     10'   Gripping Y Web  to edge x20   Y web  to edge x20 Y web  to edge x20   Y Web,  to edge 20x3\"   Pinching Black C pins all blocks Black C pins all blocks Black C pins all blocks   Black C pins all blocks   Prehension Tie and untie knots RB over ball x10  Groove pegs                  MHP Pre tx with ace flexion Pre tx with ace flexion [Re tx with ace flexion   Pre tx with ace flexion wrap                     "

## 2024-09-11 ENCOUNTER — APPOINTMENT (OUTPATIENT)
Dept: PHYSICAL THERAPY | Facility: CLINIC | Age: 64
End: 2024-09-11
Payer: MEDICARE

## 2024-09-11 ENCOUNTER — TELEPHONE (OUTPATIENT)
Dept: HEMATOLOGY ONCOLOGY | Facility: CLINIC | Age: 64
End: 2024-09-11

## 2024-09-11 ENCOUNTER — OFFICE VISIT (OUTPATIENT)
Dept: SURGICAL ONCOLOGY | Facility: CLINIC | Age: 64
End: 2024-09-11
Payer: MEDICARE

## 2024-09-11 VITALS
SYSTOLIC BLOOD PRESSURE: 159 MMHG | HEART RATE: 91 BPM | OXYGEN SATURATION: 99 % | DIASTOLIC BLOOD PRESSURE: 99 MMHG | HEIGHT: 68 IN | WEIGHT: 138 LBS | BODY MASS INDEX: 20.92 KG/M2

## 2024-09-11 DIAGNOSIS — Z17.0 MALIGNANT NEOPLASM OF LOWER-INNER QUADRANT OF LEFT BREAST IN FEMALE, ESTROGEN RECEPTOR POSITIVE (HCC): ICD-10-CM

## 2024-09-11 DIAGNOSIS — Z98.890 HISTORY OF LUMPECTOMY OF LEFT BREAST: ICD-10-CM

## 2024-09-11 DIAGNOSIS — C77.3 CARCINOMA OF LEFT BREAST METASTATIC TO AXILLARY LYMPH NODE (HCC): Primary | ICD-10-CM

## 2024-09-11 DIAGNOSIS — Z79.811 USE OF LETROZOLE (FEMARA): ICD-10-CM

## 2024-09-11 DIAGNOSIS — C50.312 MALIGNANT NEOPLASM OF LOWER-INNER QUADRANT OF LEFT BREAST IN FEMALE, ESTROGEN RECEPTOR POSITIVE (HCC): ICD-10-CM

## 2024-09-11 DIAGNOSIS — C50.912 CARCINOMA OF LEFT BREAST METASTATIC TO AXILLARY LYMPH NODE (HCC): Primary | ICD-10-CM

## 2024-09-11 DIAGNOSIS — Z85.3 ENCOUNTER FOR FOLLOW-UP SURVEILLANCE OF BREAST CANCER: ICD-10-CM

## 2024-09-11 DIAGNOSIS — Z08 ENCOUNTER FOR FOLLOW-UP SURVEILLANCE OF BREAST CANCER: ICD-10-CM

## 2024-09-11 PROCEDURE — 99215 OFFICE O/P EST HI 40 MIN: CPT | Performed by: SURGERY

## 2024-09-11 PROCEDURE — 99205 OFFICE O/P NEW HI 60 MIN: CPT | Performed by: SURGERY

## 2024-09-11 NOTE — PROGRESS NOTES
Surgical Oncology Follow Up  [facility]  [facility] [place]  [address] [id number]    Eileen Matias  1960  [id number]      Chief Complaint   Patient presents with   • Follow-up     left arm now swollen worried about lymphedema        Assessment & Plan:   This is 63-year-old female with history of left breast cancer s/p lumpectomy with axillary dissection.  She was doing well.  Recently she got her flu shot and COVID shot on the left arm.  This was followed she noticed the left upper extremity swelling.  She is here for evaluation.  Bilateral breast examination there are no worrisome features.  However I feel a soft left axillary lymph node.  I also noticed she is developing lymphedema on the left side.  These findings were reviewed and discussed.  I recommended her to get her left axillary ultrasound if necessary to follow-up with ultrasound-guided lymph node biopsy.  We will also refer her to lymphedema clinic.  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  She understands and willing to follow-up with the studies.    Cancer History:     Oncology History   Carcinoma of left breast metastatic to axillary lymph node (HCC)   4/14/2022 Initial Diagnosis    Carcinoma of left breast metastatic to axillary lymph node (HCC)     4/14/2022 Biopsy    Left axillary Lymph node ultrasound guided biopsy  Metastatic carcinoma, compatible with breast origin.   ER >95  KS 95   HER2 1+    On ultrasound lymph node is 1.8 cm. There is cortical thickening without evident fatty hilum.     In review of screening mammogram, demonstrates no suspicious mammographic findings identified in either breast. Bilateral MRI is recommended. Flow cytometry - there is no evidence of a B-cell or T-cell non-Hodgkin lymphoma.       4/20/2022 Genomic Testing    A total of 36 genes were evaluated, including: ANGEL LUIS,  BRCA1, BRCA2, CDH1, CHEK2, PALB2, PTEN, STK11, TP53  Negative result. No pathogenic sequence variants or deletions/duplications identified     5/20/2022 Biopsy    MRI guided biopsy  A. Left breast   11 o'clock   Invasive mammary carcinoma of no special type (ductal)  Grade 1  ER 95  NY 95  HER2 0    B. Right breast   Retroareolar  Benign fibrocystic changes without atypia (discrete 0.3 cm focus of sclerosing and tubular adenosis with usual ductal hyperplasia, columnar cell change and hyperplasia, apocrine metaplasia, cystic dilatation).   - Microcalcifications: Present associated with non-neoplastic breast tissue.   - Negative for malignancy, in-situ carcinoma and atypical hyperplasia.     Malignant pathology appears unifocal. Biopsy proven carcinoma measured 1 cm on MRI. Biopsy proved metastatic disease to left axillary lymph node.      6/9/2022 Genomic Testing    MammaPrint  Low risk  Luminal type A  MammaPrint index: +0.578     6/21/2022 Surgery    Left breast tiffany  directed lumpectomy with axillary dissection  A. Invasive 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  Grade 1  1.3 cm  Lymphovascular invasion is identified  Margins negative    H. Axillary, Level I and Level II axillary contents  Metastatic carcinoma involving 3/17 lymph nodes  1.8 cm   Extranodal extension is identified 1mm  Foci of carcinoma identified in extranodal lymphatic channels    Anatomic stage: Stage IIA  Prognostic stage: Stage IA      6/21/2022 -  Cancer Staged    Staging form: Breast, AJCC 8th Edition  - Pathologic stage from 6/21/2022: Stage IA (pT1c, pN1, cM0, G1, ER+, NY+, HER2-) - Signed by Alfredo Hays MD on 8/1/2022  Stage prefix: Initial diagnosis  Nuclear grade: G1  Multigene prognostic tests performed: None  Mitotic count score: Score 1  Histologic grading system: 3 grade system       8/29/2022 - 10/10/2022 Radiation    Treatments:  Course: C1  Plan ID Energy Fractions Dose per Fraction (cGy) Total  Dose Delivered (cGy) Elapsed Days    L Breast 6X 25 / 25 200 5,000 35    L [id number] 12E 5 / 5 200 1,000 6   BH L SClav 10X/6X 24 / 24 200 4,800 32    Treatment Dates:  8/29/2022 - 10/10/2022.      Malignant neoplasm of lower-inner quadrant of left breast in female, estrogen receptor positive (HCC)   5/20/2022 Initial Diagnosis    Malignant neoplasm of lower-inner quadrant of left breast in female, estrogen receptor positive (HCC)     5/20/2022 Biopsy    MRI guided biopsy  A. Left breast   11 o'clock   Invasive mammary carcinoma of no special type (ductal)  Grade 1  ER 95  ME 95  HER2 0    B. Right breast   Retroareolar  Benign fibrocystic changes without atypia (discrete 0.3 cm focus of sclerosing and tubular adenosis with usual ductal hyperplasia, columnar cell change and hyperplasia, apocrine metaplasia, cystic dilatation).   - Microcalcifications: Present associated with non-neoplastic breast tissue.   - Negative for malignancy, in-situ carcinoma and atypical hyperplasia.     Malignant pathology appears unifocal. Biopsy proven carcinoma measured 1 cm on MRI. Biopsy proved metastatic disease to left axillary lymph node.      6/9/2022 Genomic Testing    MammaPrint  Low risk  Luminal type A  MammaPrint index: +0.578     6/21/2022 Surgery    Left breast tiffany  directed lumpectomy with axillary dissection  A. Invasive 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  Grade 1  1.3 cm  Lymphovascular invasion is identified  Margins negative    H. Axillary, Level I and Level II axillary contents  Metastatic carcinoma involving 3/17 lymph nodes  1.8 cm   Extranodal extension is identified 1mm  Foci of carcinoma identified in extranodal lymphatic channels    Anatomic stage: Stage IIA  Prognostic stage: Stage IA      8/29/2022 - 10/10/2022 Radiation    Treatments:  Course: C1  Plan ID Energy Fractions Dose per Fraction (cGy) Total Dose Delivered (cGy) Elapsed Days    L Breast 6X 25 / 25 200 5,000 35    NENA CEE [id number] 12E 5 / 5 200 1,000 6    JAYE SClav 10X/6X 24 / 24 200 4,800 32    Treatment Dates:  8/29/2022 - 10/10/2022.            Interval History:   Follow-up with left breast cancer    Review of Systems:   Review of Systems   Constitutional:  Negative for chills and fever.   HENT:  Negative for ear pain and sore throat.    Eyes:  Negative for pain and visual disturbance.   Respiratory:  Negative for cough and shortness of breath.    Cardiovascular:  Negative for chest pain and palpitations.   Gastrointestinal:  Negative for abdominal pain and vomiting.   Genitourinary:  Negative for dysuria and hematuria.   Musculoskeletal:  Negative for arthralgias and back pain.   Skin:  Negative for color change and rash.   Neurological:  Negative for seizures and syncope.   All other systems reviewed and are negative.    Past Medical History     Patient Active Problem List   Diagnosis   • Back pain, chronic   • Chronic insomnia   • Lymphedema of right arm   • Hyperlipidemia, mixed   • Peripheral neuropathy   • Lymphedema   • Desmoid tumor   • Carcinoma of left breast metastatic to axillary lymph node (HCC)   • Malignant neoplasm of lower-inner quadrant of left breast in female, estrogen receptor positive (HCC)   • HTN, goal below 140/90   • Use of letrozole (Femara)   • History of lumpectomy of left breast   • Humerus fracture   • Chronic back pain   • Closed nondisplaced spiral fracture of shaft of right humerus with routine healing, subsequent encounter   • Encounter for follow-up surveillance of breast cancer     Past Medical History:   Diagnosis Date   • Abnormal mammogram 05/15/2013   • Anemia    • Cancer (HCC)     desmoitosis   • Carcinoma of left breast metastatic to axillary lymph node (HCC) 04/19/2022   • Chronic pain disorder     worse right side of body   • Desmoid tumor     Last Assessed: 6/19/2017   • fibroidal cyst 1999?  Hysterectomy done   • History of transfusion     no reactions   • Hyperlipidemia    •  Hypertension    • Osteoporosis    • PONV (postoperative nausea and vomiting)      Past Surgical History:   Procedure Laterality Date   • BREAST LUMPECTOMY Left 6/21/2022    Procedure: ANITA  DIRECTED LUMPECTOMY;  Surgeon: Amanda Motley MD;  Location: [facility];  Service: Surgical Oncology   • FRACTURE SURGERY     • HYSTERECTOMY     • LYMPH NODE DISSECTION Left 6/21/2022    Procedure: AXILLARY DISSECTION LEVEL I AND LEVEL II LYMPH NODES;  Surgeon: Amanda Motley MD;  [facility]: [facility];  Service: Surgical Oncology   • MRI BREAST BIOPSY LEFT (ALL INCLUSIVE) Left 5/20/2022   • MRI BREAST BIOPSY RIGHT (ALL INCLUSIVE) Right 5/20/2022   • OTHER SURGICAL HISTORY      Arm Incision: Dermoid tumor, right Arm    • PARTIAL HYSTERECTOMY     • IN COLONOSCOPY FLX DX W/COLLJ SPEC WHEN PFRMD N/A 8/15/2017    Procedure: COLONOSCOPY;  Surgeon: Alec England MD;  Location: [facility];  Service: Gastroenterology   • IN NDSC WRST SURG W/RLS TRANSVRS CARPL LIGM Left 8/10/2021    Procedure: RELEASE LEFT CARPAL TUNNEL ENDOSCOPIC;  Surgeon: Chris Camacho MD;  [facility];  Service: Orthopedics   • IN OPTX DSTL RADL I-ARTIC FX/EPIPHYSL SEP 3 FRAG Left 2/9/2021    Procedure: OPEN REDUCTION W/ INTERNAL FIXATION (ORIF) RADIUS / ULNA (WRIST) left;  Surgeon: Chris Camacho MD;  [facility];  Service: Orthopedics   • SKIN BIOPSY     • SKIN CANCER EXCISION      Melanoma Excision    • TONSILLECTOMY     • US BREAST NEEDLE LOC LEFT Left 6/16/2022   • US BREAST NEEDLE LOC RIGHT EACH ADDITIONAL Right 6/16/2022   • US GUIDED BREAST LYMPH NODE BIOPSY LEFT Left 4/14/2022     Family History   Problem Relation Age of Onset   • Diabetes Mother    • No Known Problems Father    • Cancer Sister    • Lung cancer Sister    • Pancreatic cancer Sister    • No Known Problems Maternal Grandmother    • No Known Problems Maternal Grandfather    • No Known Problems Paternal Grandmother    • No Known Problems Paternal  Grandfather    • Breast cancer Neg Hx      Social History     Socioeconomic History   • Marital status: /Civil Union     Spouse name: Not on file   • Number of children: Not on file   • Years of education: Not on file   • Highest education level: Not on file   Occupational History   • Occupation: unemployed    Tobacco Use   • Smoking status: Never   • Smokeless tobacco: Never   Vaping Use   • Vaping status: Never Used   Substance and Sexual Activity   • Alcohol use: Not Currently     Alcohol/week: 5.0 standard drinks of alcohol     Types: 5 Shots of liquor per week     Comment: Used mostly as a painkiller when needed before bedtime   • Drug use: Yes     Frequency: 28.0 times per week     Types: Marijuana     Comment: THC Medical marijuana   • Sexual activity: Not Currently     Partners: Male     Comment: Hysterectomy years ago   Other Topics Concern   • Not on file   Social History Narrative    Lives with spouse      Social Determinants of Health     Financial Resource Strain: Low Risk  (12/13/2023)    Overall Financial Resource Strain (CARDIA)    • Difficulty of Paying Living Expenses: Not hard at all   Food Insecurity: No Food Insecurity (11/13/2023)    Hunger Vital Sign    • Worried About Running Out of Food in the Last Year: Never true    • Ran Out of Food in the Last Year: Never true   Transportation Needs: No Transportation Needs (12/13/2023)    PRAPARE - Transportation    • Lack of Transportation (Medical): No    • Lack of Transportation (Non-Medical): No   Physical Activity: Insufficiently Active (5/2/2023)    Received from [facility]    Exercise Vital Sign    • Days of Exercise per Week: 2 days    • Minutes of Exercise per Session: 30 min   Stress: No Stress Concern Present (5/2/2023)    Received from [facility], [facility]    [employer] - Occupational Stress Questionnaire    • Feeling of Stress : Not at all   Social  Connections: Unknown (6/18/2024)    Received from [employer]    • How often do you feel lonely or isolated from those around you? (Adult - for ages 18 years and over): Not on file   Intimate Partner Violence: Not At Risk (5/2/2023)    [facility]    Humiliation, Afraid, Rape, and Kick questionnaire    • Fear of Current or Ex-Partner: No    • Emotionally Abused: No    • Physically Abused: No    • Sexually Abused: No   Housing Stability: Low Risk  (11/13/2023)    Housing Stability Vital Sign    • Unable to Pay for Housing in the Last Year: No    • Number of Times Moved in the Last Year: 1    • Homeless in the Last Year: No       Current Outpatient Medications:   •  cholecalciferol (VITAMIN D3) 1,000 units tablet, Take 3,000 Units by mouth daily, Disp: , Rfl:   •  diphenhydrAMINE (BENADRYL) 50 MG tablet, Take 50 mg by mouth daily at bedtime as needed for itching, Disp: , Rfl:   •  dronabinol (MARINOL) 10 MG capsule, , Disp: , Rfl:   •  ezetimibe (ZETIA) 10 mg tablet, Take 1 tablet (10 mg total) by mouth daily, Disp: 90 tablet, Rfl: 0  •  letrozole (FEMARA) 2.5 mg tablet, Take 1 tablet (2.5 mg total) by mouth daily, Disp: 90 tablet, Rfl: 3  •  multivitamin (THERAGRAN) TABS, Take 1 tablet by mouth daily, Disp: , Rfl:   •  Omega-3 Fatty Acids (FISH OIL PO), Take by mouth, Disp: , Rfl:   •  penicillin V potassium (VEETID) 500 mg tablet, Take 1 tablet (500 mg total) by mouth every 12 (twelve) hours, Disp: 180 tablet, Rfl: 1  •  Probiotic Product (PROBIOTIC-10 PO), Take by mouth, Disp: , Rfl:   •  gabapentin (Neurontin) 100 mg capsule, Take 1 capsule (100 mg total) by mouth 3 (three) times a day, Disp: 90 capsule, Rfl: 1  Allergies   Allergen Reactions   • Chlorpromazine Anaphylaxis     PHENOTHIAZINE=ANAPHYLAXIS   • Codeine Anaphylaxis     Anaphylaxis  abd pain.   • Meperidine Anaphylaxis, Hives and Vomiting   • Phenothiazines Anaphylaxis and  Throat Swelling     Category: Allergy;    • Saccharin - Food Allergy Anaphylaxis   • Ampicillin-Sulbactam Sodium Hives   • Aspirin GI Intolerance and Abdominal Pain     Severe GERD   • Gluten Meal - Food Allergy GI Intolerance   • Ibuprofen Hives     H   • Levofloxacin Hives   • Chocolate - Food Allergy GI Intolerance   • Lactose - Food Allergy GI Intolerance   • Neomycin Other (See Comments), Edema and Hives     Category: Allergy;   swelling   • Citrus - Food Allergy Rash   • Latex Hives and Rash     Category: Allergy;        Physical Exam:     Vitals:    09/11/24 0910   BP: 159/99   Pulse: 91   SpO2: 99%     Physical Exam  Constitutional:       Appearance: Normal appearance.   HENT:      Head: Normocephalic and atraumatic.      Nose: Nose normal.      Mouth/Throat:      Mouth: Mucous membranes are moist.   Eyes:      Pupils: Pupils are equal, round, and reactive to light.   Cardiovascular:      Rate and Rhythm: Normal rate.      Pulses: Normal pulses.      Heart sounds: Normal heart sounds.   Pulmonary:      Effort: Pulmonary effort is normal.      Breath sounds: Normal breath sounds.   Chest:          Comments: Bilateral breast examination no palpable mass masses nipple discharge nipple retraction or skin changes.  Left breast and left axillary well-healed surgical scar.  Patient was examined in seated position.  Abdominal:      General: Bowel sounds are normal.      Palpations: Abdomen is soft.   Musculoskeletal:         General: Swelling present. Normal range of motion.      Cervical back: Normal range of motion and neck supple.      Right lower leg: No edema.      Left lower leg: No edema.   Skin:     General: Skin is warm.   Neurological:      General: No focal deficit present.      Mental Status: She is alert and oriented to person, place, and time.   Psychiatric:         Mood and Affect: Mood normal.         Behavior: Behavior normal.         Thought Content: Thought content normal.         Judgment:  Judgment normal.       Results & Discussion:   I did review my clinical findings concern for left axillary lymph node this could be reactive due to COVID-vaccine versus to make sure there is no axillary node metastasis.  She is also getting lymphedema in the left arm we will refer her to physical therapy for lymphedema clinic I did discussed in detail nature of breast cancer follow-up and close surveillance.  she understands and  agrees . All patient questions were answered.       Advance Care Planning/Advance Directives:  I Amanda Motley MD discussed the disease status with Eileen Matias  today 09/11/24  treatment plans and follow-up with the patient.

## 2024-09-12 ENCOUNTER — APPOINTMENT (OUTPATIENT)
Dept: OCCUPATIONAL THERAPY | Facility: CLINIC | Age: 64
End: 2024-09-12
Payer: MEDICARE

## 2024-09-12 ENCOUNTER — HOSPITAL ENCOUNTER (OUTPATIENT)
Dept: ULTRASOUND IMAGING | Facility: CLINIC | Age: 64
End: 2024-09-12
Payer: MEDICARE

## 2024-09-12 DIAGNOSIS — C50.312 MALIGNANT NEOPLASM OF LOWER-INNER QUADRANT OF LEFT BREAST IN FEMALE, ESTROGEN RECEPTOR POSITIVE (HCC): ICD-10-CM

## 2024-09-12 DIAGNOSIS — C77.3 CARCINOMA OF LEFT BREAST METASTATIC TO AXILLARY LYMPH NODE (HCC): ICD-10-CM

## 2024-09-12 DIAGNOSIS — Z17.0 MALIGNANT NEOPLASM OF LOWER-INNER QUADRANT OF LEFT BREAST IN FEMALE, ESTROGEN RECEPTOR POSITIVE (HCC): ICD-10-CM

## 2024-09-12 DIAGNOSIS — C50.912 CARCINOMA OF LEFT BREAST METASTATIC TO AXILLARY LYMPH NODE (HCC): ICD-10-CM

## 2024-09-12 PROCEDURE — 76642 ULTRASOUND BREAST LIMITED: CPT

## 2024-09-16 ENCOUNTER — OFFICE VISIT (OUTPATIENT)
Dept: PHYSICAL THERAPY | Facility: CLINIC | Age: 64
End: 2024-09-16
Payer: MEDICARE

## 2024-09-16 DIAGNOSIS — S42.344D CLOSED NONDISPLACED SPIRAL FRACTURE OF SHAFT OF RIGHT HUMERUS WITH ROUTINE HEALING, SUBSEQUENT ENCOUNTER: ICD-10-CM

## 2024-09-16 DIAGNOSIS — I89.0 LYMPHEDEMA OF RIGHT ARM: Primary | ICD-10-CM

## 2024-09-16 DIAGNOSIS — I89.0 LYMPHEDEMA OF LEFT ARM: ICD-10-CM

## 2024-09-16 PROCEDURE — 97110 THERAPEUTIC EXERCISES: CPT

## 2024-09-16 PROCEDURE — 97140 MANUAL THERAPY 1/> REGIONS: CPT

## 2024-09-16 PROCEDURE — 97112 NEUROMUSCULAR REEDUCATION: CPT

## 2024-09-16 NOTE — PROGRESS NOTES
Daily Note     Today's date: 2024  Patient name: Eileen Matias  : 1960  MRN: [id number]  Referring provider: Rick Lazo DO  Dx:   Encounter Diagnosis     ICD-10-CM    1. Lymphedema of right arm  I89.0       2. Closed nondisplaced spiral fracture of shaft of right humerus with routine healing, subsequent encounter  S42.344D       3. Lymphedema of left arm  I89.0           Start Time: 1015          Subjective: pt reports her L arm started to get more swollen last week and reached out to Dr. Motley. She got an US on axillary lymph nodes last week which said she does not have cancer and f/u w/ Dr. Motley in two weeks. Stated her shoulder strength is improving slightly.      Objective: See treatment diary below      Assessment: Tolerated treatment well. Patient would benefit from continued PT. No winging noted of elbow w/ incline slides. Increased swelling noted in L arm. Had clearance to do MLD due to non cancer related. Discussed different compression options for L side but pt is limited with being able to don/doff garment due to weakness and lack of control of R hand.       Plan: Continue per plan of care.      FOTO: Goal: Lymph - 57 Shoulder - 49; Eval: Lymph - 5 Shoulder - 4;3/13: Lymph - 42; : Shoulder - 47;4/10 Lymph - 48; : shoulder - 42; : Lymph - 69 Shoulder - 48; : lymph - 85 Shoulder - 56; : Lymph - 98 Shoulder - 56      EPOC:   10/16    Shoulder EPOC: 10/16 9/9 9/16 9/3 9/6   RA Lymph DATE:         Shoulder:         Manual       MLD [facility]   Manual Wrapping       PROM                       Exercise Diary 9/3 9/6 9/9 9/16   THEREX       Pt ed [facility]           Wall slides Incline 2x10 Incline 2x10 Incline 2x10; wall slides along door frame x10 Incline 2x10; wall slides along door frame 2x10   SL ER 1# 2x10 1# 2x10 1# 2x10 1# 2x10   Standing shoulder flexion/scaption 2x10 2x10 2x10 1# 2x10 1#                 pulleys 2/2 2/2 2/2 2/2   Ball roll up wall      "  Supine ER cane       Shoulder isometrics 5\" x10 5\" x10 5\" 2x10 5\" 2x10   SL flexion  2x10 3x10 3x10 1# 2x10   SL abd 1# 30x 1# 30x 1# 30x 1# 30x   Supine shoulder flex 1# 30x 1# 30x 1# 30x 1# 30x   IR stretch w/ strap       NEURO RE-ED       Supine HA   RTB 2x10 RTB 2x10   Supine BL ER       [facility] reaches       Supine punches 1# 30x 1# 30x 1# 30x 1# 30x    ball roll up wall        rows/pulldowns BTB x30 BTB 30x BTB 30x BTB 30x                                                              Modalities           CP                                                                                "

## 2024-09-17 ENCOUNTER — OFFICE VISIT (OUTPATIENT)
Dept: OCCUPATIONAL THERAPY | Facility: CLINIC | Age: 64
End: 2024-09-17
Payer: MEDICARE

## 2024-09-17 DIAGNOSIS — R29.898 RIGHT HAND WEAKNESS: ICD-10-CM

## 2024-09-17 DIAGNOSIS — R20.2 NUMBNESS AND TINGLING OF LEFT UPPER EXTREMITY: Primary | ICD-10-CM

## 2024-09-17 DIAGNOSIS — R20.0 NUMBNESS AND TINGLING OF LEFT UPPER EXTREMITY: Primary | ICD-10-CM

## 2024-09-17 PROCEDURE — 97530 THERAPEUTIC ACTIVITIES: CPT | Performed by: OCCUPATIONAL THERAPIST

## 2024-09-17 PROCEDURE — 97110 THERAPEUTIC EXERCISES: CPT | Performed by: OCCUPATIONAL THERAPIST

## 2024-09-17 NOTE — PROGRESS NOTES
Daily Note     Today's date: 2024  Patient name: Eileen Matias  : 1960  MRN: [id number]  Referring provider: Rogelio Black MD  Dx:   Encounter Diagnosis     ICD-10-CM    1. Numbness and tingling of left upper extremity  R20.0     R20.2       2. Right hand weakness  R29.898                        Subjective:   I am doing more.  My left arm still has some swelling.        Objective: See treatment diary below;  Exericise modified due to absence of splint for stabilization.       AROM   Distance to palm  II  3 cm,  III  3 cm,  IV  3 cm,  V  4 cm    PROM   Distance to palm  II  1 cm,  III  12 cm,  IV  1 cm,  V  2 cm    [place] #3  21.3 Lbs.   #2  20 lbs.       Assessment: Tolerated treatment well. Patient would benefit from continued OT. IP  active digit flexion progressing. Improved gross grasp for light IADLs with patient in circumferential wrist extension splint.  Improving ease of right use noted in clinic program, without splint today.  Edema reducing, allowing wear of smaller and lighter lymphedema garment.       Plan: Continue per plan of care.   Progress dexterity and gripping exercises as tolerated.  Continue wear of orthotic for wrist stability prn.     Precautions: Lymphedema; hx desmoid tumors and breast cancer; healing humeral fracture     POC expires Unit limit Auth  expiration date PT/OT + Visit Limit?    NA NA BOMN         Dx R Hum Fx     Dr Wayne MILLER Not met              Date 8/29 9/3 9/10 9/17  8/27   Visit 31 32 33 34  30   Manuals    5  8            Jt mobs digits, FA    Mps G 1-2  All MP G 1-2   Ortho fit/train         Neuro Re-Ed          RNG         MNG         CHIKI                  orthotic Pressure points resolved  Revised V MP and distal ulna pressure.  10'                        Ther Ex 25   25   23   25'     23    HEP         PROM 1:1 Digital flexion LLPS Digital Flexion LLPS Digital flexion LLPS Digital flexion LLPS  Digital flexion, LLPS   Digital blocked AROM P/H  "fists 5x5\" P/H fists 5x5\" P/H fists 5x5\" P/H fists 5x5\"  P/H fist 5x5\"   UE warm up UBE 2F 2R  R only x25 UBE 2F 2R  R only x30 HOLD due to LUE edema UBE 2F 2 R   R only x20  No brace  UBE 2F 2R  R only 25   PRE FA  S/P #2 mid shaft x20 S/P #2 mid shaft x20  UA w/o brace  S/P  #2 mid shaft  x20   PRE Wrist FA G F bar    Down x20  R F bar up   x20 G F bar down 20x3\"  G F bar up   x10 R F bar  P/P x20  Fold x20 R F bar   Down x20  Y F bar up   X20  Fold x10  No brace  G F bar   1x10x3\"  R F bar  Up 1x10x3\"            Shoulder ROM         Sh PROM         Ther Activity 15 15   10   10'    10'   Gripping Y Web  to edge x20   Y web  to edge x20 Y web  to edge x20 Y web  to edge, no splint  x20  Y Web,  to edge 20x3\"   Pinching Black C pins all blocks Black C pins all blocks Black C pins all blocks   Black C pins all blocks   Scrubbing  Tie and untie knots RB over ball x10  Groove pegs    X20 F/B S/S              MHP Pre tx with ace flexion Pre tx with ace flexion Pre tx with ace flexion Pre tx with ace flexion wrap  Pre tx with ace flexion wrap                     "

## 2024-09-19 ENCOUNTER — APPOINTMENT (OUTPATIENT)
Dept: OCCUPATIONAL THERAPY | Facility: CLINIC | Age: 64
End: 2024-09-19
Payer: MEDICARE

## 2024-09-19 ENCOUNTER — EVALUATION (OUTPATIENT)
Dept: PHYSICAL THERAPY | Facility: CLINIC | Age: 64
End: 2024-09-19
Payer: MEDICARE

## 2024-09-19 DIAGNOSIS — I89.0 LYMPHEDEMA OF RIGHT ARM: Primary | ICD-10-CM

## 2024-09-19 DIAGNOSIS — I89.0 LYMPHEDEMA OF LEFT ARM: ICD-10-CM

## 2024-09-19 DIAGNOSIS — S42.344D CLOSED NONDISPLACED SPIRAL FRACTURE OF SHAFT OF RIGHT HUMERUS WITH ROUTINE HEALING, SUBSEQUENT ENCOUNTER: ICD-10-CM

## 2024-09-19 PROCEDURE — 97110 THERAPEUTIC EXERCISES: CPT

## 2024-09-19 PROCEDURE — 97140 MANUAL THERAPY 1/> REGIONS: CPT

## 2024-09-19 NOTE — PROGRESS NOTES
PT Re-Evaluation     Collection of Subjective/Objective data performed by Irene Childs PTA. Assessment, POC, and Goal attainment performed by Sukhwinder Velasco DPT.       Today's date: 2024  Patient name: Eileen aMtias  : 1960   MRN: [id number]  Referring provider: Rick Lazo DO  Dx:   Encounter Diagnosis     ICD-10-CM    1. Lymphedema of right arm  I89.0       2. Closed nondisplaced spiral fracture of shaft of right humerus with routine healing, subsequent encounter  S42.344D       3. Lymphedema of left arm  I89.0                     Start Time: 1015  Stop Time: 1138  Total time in clinic (min): 83 minutes    Assessment  Impairments: abnormal or restricted ROM, impaired physical strength and pain with function  Other impairment: lymphedema    Assessment details: Patient is a 62 y/o who presents to therapy with bilateral upper extremity lymphedema and s/p R humerus fracture and has completed 64 visits to date. Patient demonstrates subjective/objective improvement since starting PT such as improved mobility, decreased pain, maintained girth measurements, and improved function with the right arm. Pt did experience recent flare up in LUE edema since last week, will be measuring for custom compression garments next week. Patient continues to present with deficits that include decreased shoulder strength, mobility, and decreased functional use of the arm. Patient will benefit from continued skilled PT intervention to address the aforementioned impairments, achieve goals, maximize function, and improve quality of life. Pt is in agreement with this plan.           Understanding of Dx/Px/POC: good     Prognosis: good    Goals  LYMPH GOALS:  ST.Pain decreased by 25% in 4 weeks.--PROGRESSING  2. Edema decreased by 2-3 cm in 4 weeks. --MET    LT. Decrease pain to 1-2/10 at worst by d/c.-PROGRESSING  2. Increase ROM to WFL for all deficient movements by d/c.-PROGRESSING  3. Strength increased  to 5 for all deficient muscle groups by d/c.-PROGRESSING  4. IADL performance increased to max function by d/c.-PROGRESSING  5. Recreational performance increased to max function by d/c.-PROGRESSING  6. RUE edema decreased to that of LUE by d/c. -PROGRESSING  7. Pt will return to work by d/c.-PROGRESSING    SHOULDER GOALS:   ST weeks  Pt will demonstrate good understanding and compliance with HEP--PROGRESSING  Pt will decrease pain to 5/10 at worst  --PROGRESSING  Pt will demonstrate improved postural awareness and ability to self-correct without reliance on external cues --PROGRESSING     LT weeks  Pt will improve FOTO score to > or = to 52 to indicate improved functional abilities  --PROGRESSING  Pt will decrease pain to 2-3/10 at worst   --PROGRESSING  Pt will increase shoulder strength to 4-/5 for improved tolerance/independence with ADLs --PROGRESSING  Pt will improve  strength to 25#  --PROGRESSING         Plan  Other planned modality interventions: other modalities PRN    Planned therapy interventions: abdominal trunk stabilization, ADL retraining, IADL retraining, flexibility, functional ROM exercises, graded exercise, home exercise program, manual therapy, joint mobilization, neuromuscular re-education, patient education, postural training, strengthening, therapeutic exercise, therapeutic activities, massage and work reintegration  Other planned therapy interventions: other interventions PRN    Frequency: 1-2x/week.  Duration in weeks: 8  Plan of Care beginning date: 2024  Plan of Care expiration date: 10/16/2024  Treatment plan discussed with: patient  Plan details: Cont to tx per POC        Subjective Evaluation    History of Present Illness  Mechanism of injury: RE (24):  Pt reports improvement in the R UE lymphedema and is able to fit into a smaller sleeve now. However, pt has had a flare up in the L UE last week. Stated she went to Dr. Motley who did an US of the lymph nodes. The  US ruled out cancer and she f/u w/ him in two weeks. Pt is unable to use pump on that side right now and unable to wear a compression garment due to difficulty donning/doffing garment with R side.     Pt notes overall slow improvement in the shoulder. Stated she is able to zip up a zip lock bag and open cupboards and the fridge door. Stated to is still limited w/ OH mobility/strength, lifting/carrying, and other ADL's which is affecting her overall function. Pt would benefit from continued PT to address current deficits.     RE (8/21/24):  Pt reports she is in smaller sleeve as of this past weekend on the R side. Pt notes decreased achiness in BL UE. Pt is compliant with compression pump 3x/day and 4x/day on a rainy day. Pt note overall improvement with pain, function, and mobility. Pt notes difficulties include raising over 90* w/o elbow winging out to the side. Pt notes she has not tried lifting anything with the R arm. Pt notes she is starting to get some feeling back in the fingers.     RE (7/25/24):  Pt reports no change with the R lymphedema and stated she has some increased strength in the shoulder. Pt notes some improvement with the L lymphedema and feels less achy. Pt notes no change in the mobility with the shoulder. Pt notes she is able to reach into an overhead cabinet but unable to lift anything like a cup or plate. Pt notes compliance with the compression pump on the R side 3-4x/ day.     RE (6/25/24):  Pt reports no change with the lymphedema and the shoulder over this past month. Pt reports overall pain in the shoulder as improved. Pt notes slight improvement in strength in the shoulder.     RE (5/22/24):  Pt reports swelling in BL UE is maintaining overall. Pt notes mobility in the shoulder is about the same but strength is improving. Pt is able to lift arm to touch her head now.     RE (5/6/24) adding LUE lymphedema:  Pt is s/p L breast lumpectomy that was performed about two years ago. Pt denies  any discomfort or swelling in the LUE. Pt MD would like pt to start lymphedema therapy as a preventative measure. Pt does report some tenderness in the L axilla area due to scar tissue. Pt stated her  has been working on the scar tissue every night which seems to help some.       RE (4/24/24):  Pt has been see w/ c/o RUE lymphedema and s/p R humeral fracture and notes overall improvement. Pt notes 50% improvement in lymphedema since beginning physical therapy. Pt is complaint with using her compression pump 3x/day, 4x/day on bad days, and wearing the compression sleeve during the day/night time. Pt notes majority of swelling remains to be in the elbow region, which is preventing her from downsizing her current daytime sleeve she is wearing. In terms of the shoulder, she notes overall improvements in mobility, strength, and endurance. Gripping is still a challenge, but she is seeing OT for treatment for her had mobility/strength. Continued difficulties include lifting, mobility OH and behind to don/doff bra/washback.     RE (4/10/24):  Pt reports overall improvements with the tightness and swelling in the arm. Pt notes 50% improvement in the swelling since beginning lymphedema therapy. Pt is still in the smaller sized night sleeve that she wears all day and then switches to the larger sleeve at night.  She has been able to transition back into the compression pump, that she uses about 3x/day and 4x/day on a bad day. Pt notes improvement since being back into the pump.       RE (3/13/24):  Pt reports overall improvements with the tightness and swelling in the arm. Pt notes 10% improvement in the swelling since beginning lymphedema therapy. Pt has since transitioned from the larger night garment to the smaller night garment to wear during the day. Pt notes she has feeling back in her thumb and finger tips. Pt notes slight improvements in strength. Pt is still not using pump at home due to the continued swelling  "and tightness in posterior upper arm that causes more pain when using pump. Pt plans on restarting the pump when \"the lump\" in the back of the arm improves.       RE (2/14/24):  Pt has been seen for RUE lymphedema for 8 visits to date and notes 50% improvement so far. Pt reports she is still not able to use compression pump but has been wearing the Tribute garment, which seems to be helping. Pt is not longer needing to wear the clamshell for her fx unless if she is out running errands. Pt notes the swelling has improved since being able to take the brace off more often. Pt notes continued \"hardness\" in elbow and axilla region.     EVAL (1/15/24):  Pt reports to IE w/ hx of RUE lymphedema which started approx 20 years ago approx 5 years following CA dx of desmoitosis.     Pt had done skilled PT tx for this condition in the past which was helpful control the issue. However, pt sustained humeral fx on 11/12/23 as a result of fall on RUE while trying to protect mother in law from a fall out of w/c. She reports that healing has been complicated by severe osteoporosis and lymphedema. Pt still required to wear brace at all times- not yet allowed any ROM in shoulder.     In addition to the above, pt w/ recent hx of L breast CA w/ L lumpectomy and lymph node removal (pt reports 17 lymph nodes) over on 6/21/22.        Patient Goals  Patient goals for therapy: decreased pain and decreased edema    Pain  Pain scale: Shoulder -4; Lymph- 5.  Pain scale at lowest: Shoulder -1-2; Lymph- 3.  Pain scale at highest: Shoulder- 5; Lymph-4.  Location: R shoulder  Alleviating factors: lymphedema massage.  Exacerbated by: increased edema, movement.    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)  Hand dominance: right          Objective     General Comments:      Shoulder Comments   R AROM: flex-120  abd-120  IR-R T8 ER- top of head      R PROM: " "flex- 130 (decreased pain) abd- 130 (decreased pain) IR-90 ER-26      R shoulder strength: flex: 5/5; abd: 5/5; ER: 4/5; IR: 5/5      Ankle/Foot Comments   Edema in R vs L w/ circumferential girth measurements as below (L/R):   Axilla: 39/35  20 cm proximal to elbow: 32.5/28  10 cm proximal to elbow: 30/25 cm  Elbow Joint: 28.5 cm/25.5 cm  20 cm proximal to wrist: 28.5 cm/25.5 cm  10 cm proximal to wrist: 24 cm/21.5 cm  Wrist:  16.5 cm/16 cm  Palm: 18 cm/17.5 cm  MCP Joints (digits 2-5): 16.5 cm/16 cm  PIP Joints (digits 2-5): 15.5 cm/14.5 cm      FOTO: Goal: Lymph - 57 Shoulder - 49; Eval: Lymph - 5 Shoulder - 4;3/13: Lymph - 42; 4/1: Shoulder - 47;4/10 Lymph - 48; 4/24: shoulder - 42; 5/22: Lymph - 69 Shoulder - 48; 6/25: lymph - 85 Shoulder - 56; 8/21: Lymph - 98 Shoulder - 56; 9/19: Lymph - 82 Shoulder - 42      EPOC:   10/16    Shoulder EPOC: 10/16 9/9 9/16 9/19 9/6   RA Lymph DATE:   10/16      Shoulder:  10/16       Manual       MLD [facility]   Manual Wrapping       PROM                       Exercise Diary 9/19 9/9 9/16   THEREX       Pt ed FOTO; update measurements              Wall slides   Incline 2x10; wall slides along door frame x10 Incline 2x10; wall slides along door frame 2x10   SL ER   1# 2x10 1# 2x10   Standing shoulder flexion/scaption   2x10 1# 2x10 1#                 pulleys   2/2 2/2   Ball roll up wall       Supine ER cane       Shoulder isometrics   5\" 2x10 5\" 2x10   SL flexion   3x10 1# 2x10   SL abd   1# 30x 1# 30x   Supine shoulder flex   1# 30x 1# 30x   IR stretch w/ strap       NEURO RE-ED       Supine HA   RTB 2x10 RTB 2x10   Supine BL ER       [facility] reaches       Supine punches   1# 30x 1# 30x    ball roll up wall        rows/pulldowns   BTB 30x BTB 30x                                                              Modalities           CP                                               "

## 2024-09-23 ENCOUNTER — OFFICE VISIT (OUTPATIENT)
Dept: PHYSICAL THERAPY | Facility: CLINIC | Age: 64
End: 2024-09-23
Payer: MEDICARE

## 2024-09-23 DIAGNOSIS — S42.344D CLOSED NONDISPLACED SPIRAL FRACTURE OF SHAFT OF RIGHT HUMERUS WITH ROUTINE HEALING, SUBSEQUENT ENCOUNTER: ICD-10-CM

## 2024-09-23 DIAGNOSIS — I89.0 LYMPHEDEMA OF RIGHT ARM: Primary | ICD-10-CM

## 2024-09-23 DIAGNOSIS — I89.0 LYMPHEDEMA OF LEFT ARM: ICD-10-CM

## 2024-09-23 PROCEDURE — 97112 NEUROMUSCULAR REEDUCATION: CPT

## 2024-09-23 PROCEDURE — 97110 THERAPEUTIC EXERCISES: CPT

## 2024-09-23 PROCEDURE — 97140 MANUAL THERAPY 1/> REGIONS: CPT

## 2024-09-23 NOTE — PROGRESS NOTES
"Daily Note     Today's date: 2024  Patient name: Eileen Matias  : 1960  MRN: [id number]  Referring provider: Rick Lazo DO  Dx:   Encounter Diagnosis     ICD-10-CM    1. Lymphedema of right arm  I89.0       2. Closed nondisplaced spiral fracture of shaft of right humerus with routine healing, subsequent encounter  S42.344D       3. Lymphedema of left arm  I89.0                      Subjective: pt reports feeling a little achy today due to the weather.       Objective: See treatment diary below      Assessment: Tolerated treatment well. Patient would benefit from continued PT. Pt is ready to be measured for a custom daytime armsleeve and glove. Pt is in need of custom versus ready made due to the swelling in the hand/arm does not allow pt to fit properly into a ready made compression garment.       Plan: Continue per plan of care.      FOTO: Goal: Lymph - 57 Shoulder - 49; Eval: Lymph - 5 Shoulder - 4;3/13: Lymph - 42; : Shoulder - 47;4/10 Lymph - 48; : shoulder - 42; : Lymph - 69 Shoulder - 48; : lymph - 85 Shoulder - 56; : Lymph - 98 Shoulder - 56      EPOC:   10/16    Shoulder EPOC: 10/16 9/9 9/16 9/23 9/6   RA Lymph DATE:         Shoulder:         Manual       MLD [facility]   Manual Wrapping       PROM                       Exercise Diary    THEREX       Pt ed              Wall slides wall slides along door frame 2x10  Incline 2x10; wall slides along door frame x10 Incline 2x10; wall slides along door frame 2x10   SL ER   1# 2x10 1# 2x10   Standing shoulder flexion/scaption   2x10 1# 2x10 1#   Cane flexion 1# x10             pulleys 2/2  2/2 2/2   Ball roll up wall       Supine ER cane       Shoulder isometrics   5\" 2x10 5\" 2x10   SL flexion 1# 2x10  3x10 1# 2x10   SL abd 1# 30x  1# 30x 1# 30x   Supine shoulder flex 1# 30x  1# 30x 1# 30x   IR stretch w/ strap       NEURO RE-ED       Supine HA RTB 2x10  RTB 2x10 RTB 2x10   Supine BL ER       [facility] " reaches       Supine punches 1# 30x  1# 30x 1# 30x    ball roll up wall Blue ball 20x       rows/pulldowns BTB 30x  BTB 30x BTB 30x                                                              Modalities           CP

## 2024-09-24 ENCOUNTER — EVALUATION (OUTPATIENT)
Dept: OCCUPATIONAL THERAPY | Facility: CLINIC | Age: 64
End: 2024-09-24
Payer: MEDICARE

## 2024-09-24 DIAGNOSIS — R20.2 NUMBNESS AND TINGLING OF LEFT UPPER EXTREMITY: Primary | ICD-10-CM

## 2024-09-24 DIAGNOSIS — R20.0 NUMBNESS AND TINGLING OF LEFT UPPER EXTREMITY: Primary | ICD-10-CM

## 2024-09-24 DIAGNOSIS — R29.898 RIGHT HAND WEAKNESS: ICD-10-CM

## 2024-09-24 PROCEDURE — 97110 THERAPEUTIC EXERCISES: CPT | Performed by: OCCUPATIONAL THERAPIST

## 2024-09-24 PROCEDURE — 97168 OT RE-EVAL EST PLAN CARE: CPT | Performed by: OCCUPATIONAL THERAPIST

## 2024-09-24 PROCEDURE — 97530 THERAPEUTIC ACTIVITIES: CPT | Performed by: OCCUPATIONAL THERAPIST

## 2024-09-24 NOTE — PROGRESS NOTES
OT Re-Evaluation     Today's date: 2024  Patient name: Eileen Matias  : 1960  MRN: [id number]  Referring provider: Rogelio Black MD  Dx:   Encounter Diagnosis     ICD-10-CM    1. Numbness and tingling of left upper extremity  R20.0     R20.2       2. Right hand weakness  R29.898             Start Time: 1715  Stop Time: 1745  Total time in clinic (min): 30 minutes    Assessment  Impairments: abnormal coordination, abnormal or restricted ROM, activity intolerance, impaired physical strength, pain with function and weight-bearing intolerance  Impairments comments: reduced sensation  Other impairment: Lymphedema RUE; joint constractures right digits, wrist, forearm; radial nerve neuropathy  Functional limitations: Able use to use right hand to assist during ADL's;  Able to pinch digits in  three point prehension for short periods.  Symptom irritability: moderate    Assessment details: Eileen Matias is a 63 y.o., Ambidextrous HD female referred to hand therapy for  right hand,wrist and forearm contractures.  Onset of injury 21 due to fall causing spiral fracture of the right humerus.  Patient presents 24 with impaired ROM of the right digits, wrist, forearm,  impaired strength, and impaired sensation of the right peripheral nerves, particularly the radial nerve.  Deficits also noted in pain, edema, and functional use of the right UE. Patient joint contractures of the digits, wrist and forearm as well as significant intrinsic and extrinsic mm tightness. Patient is a good candidate for OT services to abolish pain and edema and restore ROM, strength, coordination, and sensation for a return to independence in daily tasks.      Re 7/3/24  Eileen has attended 19 therapy visits over the past 12 weeks.  She has made steady progress in active and passive motion.  Pain has reduced since IE and is not at a moderate and steady level.  She is now reporting sensation along the thumb and index, in an area  previously insensate.  She has been wearing a wrist support consistently allowing her to use her right hand in a gross motor fashion and allowing three point pinch for light tasks, short periods.  She remains in lymphedema sleeves with are restrictive and resist motion during daily tasks.  Therapy may continue to address active and passive motion, improve functional  and prehension to allow increased independence in light home tasks and art work.          RE 9/24/24  Eileen has attended 37 therapy visits over the past 12 weeks.  She has made steady progress in active and passive motion.  Pain has reduced since IE and is not at a moderate and steady level.  She is now reporting sensation along the thumb and index, in an area previously insensate.  She has been wearing a wrist support consistently allowing her to use her right hand with gross flexion and some dexterity tasks.  This motion also allows three point pinch for light tasks, short periods.  With wearing of wrist support,   strength measures in the low functional level.  She continues to remain in lymphedema sleeves with are restrictive motion during daily tasks.  Therapy may continue to address active and passive motion, improve functional  and prehension to allow increased independence in light home tasks and art work.        Understanding of Dx/Px/POC: excellent     Prognosis: good    Goals  STGs (4 weeks)  Patient will be independent in HEP of ROM, splinting to increase motion, edema management MET  Patient will report an average pain level of 4-5/10 MET  Patient will demonstrate 30 degree improvement in PAGE of the digits MET  Patient will demonstrate active wrist extension/flexion to 10/70  Partially MET  Patient will demonstrate active forearm supination to 45 MET  LTGs (12 weeks)  Patient will demonstrate independence in a HEP to maintain ROM, strength, and function at discharge NOT MET  Patient will report an average pain level of 1-2/10 to  be independent in daily tasks NOT MET  Patient will demonstrate PAGE of the digits to 220 degrees to be independent in self care tasks NOT MET  Patient will demonstrate AROM of the wrist and forearm WFL to be independent in self care tasks  MET  Patient will demonstrate 5/5 muscle strength in the wrist and forearm to be MI for meal prep  NOT MET  Patient will demonstrate right hand strength within 30% of the left hand to be MI for IADL tasks NOT MET  Patient will demonstrate control  of edema MET      Plan  Patient would benefit from: skilled occupational therapy and custom splinting  Planned modality interventions: thermotherapy: hydrocollator packs and cryotherapy    Planned therapy interventions: activity modification, compression, fine motor coordination training, graded activity, graded exercise, home exercise program, therapeutic exercise, therapeutic activities, stretching, strengthening, patient education, orthotic fitting/training, neuromuscular re-education, manual therapy, IASTM, joint mobilization, kinesiology taping, massage, muscle pump exercises and nerve gliding  Other planned therapy interventions: Static progressive and dynamic splinting to increase digit, wrist, forearm motion    Frequency: 2x week  Duration in weeks: 12  Plan of Care beginning date: 9/24/2024  Plan of Care expiration date: 12/17/2024  Treatment plan discussed with: patient      Subjective Evaluation    History of Present Illness  Date of onset: 11/12/2023  Mechanism of injury: trauma  Mechanism of injury: 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  Hx of lymphedema and frequent cellulitis infections in the RUE. Patient also had left breast cancer and has LUE lymphedema as well.    She reports that she suffered a displaced spiral fracture of the right humerus on 11/12/23 after a fall while pushing her mother in law who was seated in a wheelchair.  Patient went  to ED that date for xrays and long arm cast. She was referred to orthopedics. Had a closed reduction of fracture. Long arm cast for approximately 4 weeks and then a Kruse clam shell brace. Patient received  a course of home health OT for ROM of the digits, wrist and elbow and edema management. She has had ongoing PT for lymphedema management and right shoulder ROM.     Patient referred to Dr. Black for radial nerve dysfunction and joint contractures in the forearm, wrist and hand. An EMG has been ordered. Patient now referred to OT for splinting and ROM to address joint contractures.            Recurrent probem    Quality of life: good    Patient Goals  Patient goals for therapy: decreased edema, decreased pain, increased motion, increased strength and independence with ADLs/IADLs  Patient goal: Be able to hold tweezers to do art work  Pain  Current pain ratin  At best pain ratin  Location: Proximal humerus, wrist, hand right  Quality: dull ache and discomfort  Relieving factors: medications, support and heat  Exacerbated by: Putting on compression sleeve.  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     Additional Observation Details  History of RUE lymphedema;  Currently wearing smallest sleeve which is the same worn prior to current injury.     Neurological Testing     Sensation     Wrist/Hand     Right   Diminished: light touch    Additional Neurological Details  24: Loss of protective sensation radial nerve distribution (4.56g) . Diminished protective sensation median and ulnar nerves (3.61g)    Active Range of Motion     Right Elbow   Flexion: 130 degrees    Extension: 0 degrees   Forearm supination: 60 degrees   Forearm pronation: 90 degrees     Right Wrist   Wrist flexion: 65 degrees   Wrist extension: 15 degrees   Radial deviation: 0 degrees   Ulnar deviation: 25 degrees     Right Thumb   Palmar Abduction    CMC: 45  Radial Abduction    CMC: 5  Kapandji score: 6 degrees    Right Digits   Flexion   Index     MCP: 65    PIP: 80    DIP: 50  Middle     MCP: 55    PIP: 80    DIP: 50  Ring     MCP: 55    PIP: 80    DIP: 50  Little     MCP: 40    PIP: 70    DIP: 40  Index: 195 degrees  Middle: 185 degrees  Rin degrees  Small: 150 degrees    Additional Active Range of Motion Details  In gravity eliminated position.   Distance to DPC  II  2.0 cm,  III 3.0 cm,  IV 3.5 cm,  V 3.5 cm    AROM   Distance to palm  II  3 cm,  III  3 cm,  IV  3 cm,  V  4 cm    PROM   Distance to palm  II  0 cm,  III  1 cm,  IV  1 cm,  V  2 cm        Passive Range of Motion     Right Elbow   Flexion: 135 degrees   Extension: 90 degrees   Forearm supination: 80 degrees   Forearm pronation: 90 degrees     Right Wrist   Wrist flexion: 70 degrees   Wrist extension: 45 degrees   Radial deviation: 10 degrees   Ulnar deviation: 40 degrees     Strength/Myotome Testing     Right Wrist/Hand   Wrist extension: 2  Wrist flexion: 5    Additional Strength Details  [place] #3  21.3 Lbs.   #2  20 lbs.       Tests     Right Wrist/Hand   Positive extrinsic extensor tightness, extrinsic flexor tightness and intrinsic muscle tightness.     Swelling     Right Elbow Girth Measurements   Joint line: 26 cm  10 cm below joint line: 26 cm          Daily Note     Today's date: 2024  Patient name: Eileen Matias  : 1960  MRN: [id number]  Referring provider: Rogelio Black MD  Dx:   Encounter Diagnosis     ICD-10-CM    1. Numbness and tingling of left upper extremity  R20.0     R20.2       2. Right hand weakness  R29.898             Start Time: 1715  Stop Time: 1745  Total time in clinic (min): 30  "minutes    Subjective:   I am doing more.  I am wearing my smallest sleeve now.        Objective: See treatment diary below for clinic program.            Assessment: Tolerated treatment well. Patient would benefit from continued OT. IP  active digit flexion progressing. Improved gross grasp for light IADLs with patient in circumferential wrist extension splint.  Improving ease of right use noted in clinic program, without splint today.  Edema reducing, allowing wear of smaller and lighter lymphedema garment.       Plan: Continue per plan of care.   Progress dexterity and gripping exercises as tolerated.  Continue wear of orthotic for wrist stability prn.     Precautions: Lymphedema; hx desmoid tumors and breast cancer; healing humeral fracture     POC expires Unit limit Auth  expiration date PT/OT + Visit Limit?   12/17 NA NA BOMN         Dx R Hum Fx     Dr Wayne MILLER Not met     MC         Date 8/29 9/3 9/10 9/17 9/24    Visit 31 32 33 34 37    Manuals    5 8             Jt mobs digits, FA    Mps G 1-2 MP all G 1-2    Ortho fit/train         Neuro Re-Ed          RNG         MNG         CHIKI                  orthotic Pressure points resolved  Revised V MP and distal ulna pressure.  10'                        Ther Ex 25   25   23   25'   25'    HEP         PROM 1:1 Digital flexion LLPS Digital Flexion LLPS Digital flexion LLPS Digital flexion LLPS Digital flexion LLPS    Digital blocked AROM P/H fists 5x5\" P/H fists 5x5\" P/H fists 5x5\" P/H fists 5x5\" P/H fists 5x5\"    UE warm up UBE 2F 2R  R only x25 UBE 2F 2R  R only x30 HOLD due to LUE edema UBE 2F 2 R   R only x20  No brace UBE 2F 2 R   R only x20    PRE FA  S/P #2 mid shaft x20 S/P #2 mid shaft x20  UA w/o brace     PRE Wrist FA G F bar    Down x20  R F bar up   x20 G F bar down 20x3\"  G F bar up   x10 R F bar  P/P x20  Fold x20 R F bar   Down x20  Y F bar up   X20  Fold x10  No brace G F bar down 20x3\"  G F bar up   X20  R F bar fold   x20           "   Shoulder ROM         Sh PROM         Ther Activity 15 15   10   10'   10    Gripping Y Web  to edge x20   Y web  to edge x20 Y web  to edge x20 Y web  to edge, no splint  x20 Y web  to edge   x20    Pinching Black C pins all blocks Black C pins all blocks Black C pins all blocks  All blocks    Scrubbing  Tie and untie knots RB over ball x10  Groove pegs    X20 F/B S/S X20 RB over TB              MHP Pre tx with ace flexion Pre tx with ace flexion Pre tx with ace flexion Pre tx with ace flexion wrap Pre tx with flexion ace wrap

## 2024-09-25 ENCOUNTER — OFFICE VISIT (OUTPATIENT)
Dept: PHYSICAL THERAPY | Facility: CLINIC | Age: 64
End: 2024-09-25
Payer: MEDICARE

## 2024-09-25 ENCOUNTER — OFFICE VISIT (OUTPATIENT)
Dept: OBGYN CLINIC | Facility: HOSPITAL | Age: 64
End: 2024-09-25
Payer: MEDICARE

## 2024-09-25 VITALS — BODY MASS INDEX: 20.92 KG/M2 | HEIGHT: 68 IN | WEIGHT: 138 LBS

## 2024-09-25 DIAGNOSIS — G56.30 RADIAL NERVE PALSY: Primary | ICD-10-CM

## 2024-09-25 DIAGNOSIS — I89.0 LYMPHEDEMA OF RIGHT ARM: Primary | ICD-10-CM

## 2024-09-25 DIAGNOSIS — I89.0 LYMPHEDEMA: Primary | ICD-10-CM

## 2024-09-25 DIAGNOSIS — I89.0 LYMPHEDEMA OF LEFT ARM: ICD-10-CM

## 2024-09-25 DIAGNOSIS — C50.912 CARCINOMA OF LEFT BREAST METASTATIC TO AXILLARY LYMPH NODE (HCC): ICD-10-CM

## 2024-09-25 DIAGNOSIS — C77.3 CARCINOMA OF LEFT BREAST METASTATIC TO AXILLARY LYMPH NODE (HCC): ICD-10-CM

## 2024-09-25 DIAGNOSIS — S42.344D CLOSED NONDISPLACED SPIRAL FRACTURE OF SHAFT OF RIGHT HUMERUS WITH ROUTINE HEALING, SUBSEQUENT ENCOUNTER: ICD-10-CM

## 2024-09-25 DIAGNOSIS — C50.312 MALIGNANT NEOPLASM OF LOWER-INNER QUADRANT OF LEFT BREAST IN FEMALE, ESTROGEN RECEPTOR POSITIVE (HCC): ICD-10-CM

## 2024-09-25 DIAGNOSIS — Z17.0 MALIGNANT NEOPLASM OF LOWER-INNER QUADRANT OF LEFT BREAST IN FEMALE, ESTROGEN RECEPTOR POSITIVE (HCC): ICD-10-CM

## 2024-09-25 PROCEDURE — 99213 OFFICE O/P EST LOW 20 MIN: CPT | Performed by: ORTHOPAEDIC SURGERY

## 2024-09-25 PROCEDURE — 97140 MANUAL THERAPY 1/> REGIONS: CPT

## 2024-09-25 PROCEDURE — 97110 THERAPEUTIC EXERCISES: CPT

## 2024-09-25 NOTE — PROGRESS NOTES
ASSESSMENT/PLAN:    Assessment:   Significant nerve dysfunction right upper extremity with involvement of the radial and median nerve    Lymphedema right upper extremity  Joint contractures metacarpal phalangeal joints, proximal interphalangeal joints, distal interphalangeal joints of the right index through small finger as well as the right thumb CMC joint, metacarpophalangeal joint, and interphalangeal joint.    Plan:   Patient continues to make slow progress  Wrist may start twitching and the radial nerve recovers then the finger followed by the thumb  Continue to work with hand therapy.   Discussed she can continue to see improvement for up to 18 mos  Patient is considering having a lymph node  transplant by a physician in [place]   From my stand point patient may proceed with surgery.      Follow Up:  8-10 weeks     To Do Next Visit:  Check progress     _____________________________________________________  CHIEF COMPLAINT:  Chief Complaint   Patient presents with    Right Hand - Follow-up         SUBJECTIVE:  Eileen Matias is a 63 y.o. female who presents for follow up regarding radial nerve palsy following proximal humerus fracture. 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   Patient also has RUE lymphedema.     Patient has a past history of desmoid fibromatosis in 2000 with removal of a desmoid tumor removed from her right upper arm with multiple surgeries until 2010. She has been completing lymphedema therapy for lymphedema to the right upper extremity     PAST MEDICAL HISTORY:  Past Medical History:   Diagnosis Date    Abnormal mammogram 05/15/2013    Anemia     Cancer (HCC)     desmoitosis    Carcinoma of left breast metastatic to axillary lymph node (HCC) 04/19/2022    Chronic pain disorder     worse right side of body    Desmoid tumor     Last Assessed: 6/19/2017    fibroidal cyst 1999?  Hysterectomy done    History of transfusion     no reactions    Hyperlipidemia      Hypertension     Osteoporosis     PONV (postoperative nausea and vomiting)        PAST SURGICAL HISTORY:  Past Surgical History:   Procedure Laterality Date    BREAST LUMPECTOMY Left 6/21/2022    Procedure: ANITA  DIRECTED LUMPECTOMY;  Surgeon: Amanda Motley MD;  Location: [facility];  Service: Surgical Oncology    FRACTURE SURGERY      HYSTERECTOMY      LYMPH NODE DISSECTION Left 6/21/2022    Procedure: AXILLARY DISSECTION LEVEL I AND LEVEL II LYMPH NODES;  Surgeon: Amanda Motley MD;  Location: [facility];  Service: Surgical Oncology    MRI BREAST BIOPSY LEFT (ALL INCLUSIVE) Left 5/20/2022    MRI BREAST BIOPSY RIGHT (ALL INCLUSIVE) Right 5/20/2022    OTHER SURGICAL HISTORY      Arm Incision: Dermoid tumor, right Arm     PARTIAL HYSTERECTOMY      DE COLONOSCOPY FLX DX W/COLLJ SPEC WHEN PFRMD N/A 8/15/2017    Procedure: COLONOSCOPY;  Surgeon: Alec England MD;  Location: [facility];  Service: Gastroenterology    DE NDSC WRST SURG W/RLS TRANSVRS CARPL LIGM Left 8/10/2021    Procedure: RELEASE LEFT CARPAL TUNNEL ENDOSCOPIC;  Surgeon: Chris Camacho MD;  Location: [facility];  Service: Orthopedics    DE OPTX DSTL RADL I-ARTIC FX/EPIPHYSL SEP 3 FRAG Left 2/9/2021    Procedure: OPEN REDUCTION W/ INTERNAL FIXATION (ORIF) RADIUS / ULNA (WRIST) left;  Surgeon: Chris aCmacho MD;  Location: [facility];  Service: Orthopedics    SKIN BIOPSY      SKIN CANCER EXCISION      Melanoma Excision     TONSILLECTOMY      US BREAST NEEDLE LOC LEFT Left 6/16/2022    US BREAST NEEDLE LOC RIGHT EACH ADDITIONAL Right 6/16/2022    US GUIDED BREAST LYMPH NODE BIOPSY LEFT Left 4/14/2022       FAMILY HISTORY:  Family History   Problem Relation Age of Onset    Diabetes Mother     No Known Problems Father     Cancer Sister     Lung cancer Sister     Pancreatic cancer Sister     No Known Problems Maternal Grandmother     No Known Problems Maternal Grandfather     No Known Problems Paternal Grandmother     No Known  Problems Paternal Grandfather     Breast cancer Neg Hx        SOCIAL HISTORY:  Social History     Tobacco Use    Smoking status: Never    Smokeless tobacco: Never   Vaping Use    Vaping status: Never Used   Substance Use Topics    Alcohol use: Not Currently     Alcohol/week: 5.0 standard drinks of alcohol     Types: 5 Shots of liquor per week     Comment: Used mostly as a painkiller when needed before bedtime    Drug use: Yes     Frequency: 28.0 times per week     Types: Marijuana     Comment: THC Medical marijuana       MEDICATIONS:    Current Outpatient Medications:     cholecalciferol (VITAMIN D3) 1,000 units tablet, Take 3,000 Units by mouth daily, Disp: , Rfl:     diphenhydrAMINE (BENADRYL) 50 MG tablet, Take 50 mg by mouth daily at bedtime as needed for itching, Disp: , Rfl:     dronabinol (MARINOL) 10 MG capsule, , Disp: , Rfl:     ezetimibe (ZETIA) 10 mg tablet, Take 1 tablet (10 mg total) by mouth daily, Disp: 90 tablet, Rfl: 0    letrozole (FEMARA) 2.5 mg tablet, Take 1 tablet (2.5 mg total) by mouth daily, Disp: 90 tablet, Rfl: 3    multivitamin (THERAGRAN) TABS, Take 1 tablet by mouth daily, Disp: , Rfl:     Omega-3 Fatty Acids (FISH OIL PO), Take by mouth, Disp: , Rfl:     penicillin V potassium (VEETID) 500 mg tablet, Take 1 tablet (500 mg total) by mouth every 12 (twelve) hours, Disp: 180 tablet, Rfl: 1    Probiotic Product (PROBIOTIC-10 PO), Take by mouth, Disp: , Rfl:     gabapentin (Neurontin) 100 mg capsule, Take 1 capsule (100 mg total) by mouth 3 (three) times a day, Disp: 90 capsule, Rfl: 1    ALLERGIES:  Allergies   Allergen Reactions    Chlorpromazine Anaphylaxis     PHENOTHIAZINE=ANAPHYLAXIS    Codeine Anaphylaxis     Anaphylaxis  abd pain.    Meperidine Anaphylaxis, Hives and Vomiting    Phenothiazines Anaphylaxis and Throat Swelling     Category: Allergy;     Saccharin - Food Allergy Anaphylaxis    Ampicillin-Sulbactam Sodium Hives    Aspirin GI Intolerance and Abdominal Pain     Severe  "GERD    Gluten Meal - Food Allergy GI Intolerance    Ibuprofen Hives     H    Levofloxacin Hives    Chocolate - Food Allergy GI Intolerance    Lactose - Food Allergy GI Intolerance    Neomycin Other (See Comments), Edema and Hives     Category: Allergy;   swelling    Onslow - Food Allergy Rash    Latex Hives and Rash     Category: Allergy;        REVIEW OF SYSTEMS:  Pertinent items are noted in HPI.  A comprehensive review of systems was negative.    LABS:  HgA1c:   Lab Results   Component Value Date    HGBA1C 5.3 07/13/2021     BMP:   Lab Results   Component Value Date    GLUCOSE 98 05/02/2017    CALCIUM 9.3 08/21/2024    K 4.5 08/21/2024    CO2 26 08/21/2024     08/21/2024    BUN 11 08/21/2024    CREATININE 0.47 (L) 08/21/2024           _____________________________________________________  PHYSICAL EXAMINATION:  Vital signs: Ht 5' 8\" (1.727 m)   Wt 62.6 kg (138 lb)   BMI 20.98 kg/m²   General: well developed and well nourished, alert, oriented times 3, and appears comfortable  Psychiatric: Normal  HEENT: Trachea Midline, No torticollis  Cardiovascular: No discernable arrhythmia  Pulmonary: No wheezing or stridor  Abdomen: No rebound or guarding  Extremities: No peripheral edema  Skin: No masses, erythema, lacerations, fluctation, ulcerations  Neurovascular: Sensation Intact to the Median, Ulnar, Radial Nerve, Motor Intact to the Median, Ulnar, Radial Nerve, and Pulses Intact    MUSCULOSKELETAL EXAMINATION:  Right Elbow   Flexion: 130 degrees   Extension: 0 degrees   Forearm supination: 60 degrees   Forearm pronation: 90 degrees   Full passive supination and pronation      Right Wrist   Wrist flexion: 65 degrees /full  Wrist extension: 15 degrees /60  Radial deviation: 0 degrees   Ulnar deviation: 25 degrees      Right Thumb   Palmar Abduction    CMC: 45  Radial Abduction    CMC: 5  Kapandji score: 6      Right Digits AROM/PROM  Flexion   Index     MCP: 65/ 70 PIP: 80/90, DIP: 45/50  Middle     MCP: 55/80, " PIP: 80/90, DIP: 50/50  Ring     MCP: 55/80, PIP: 80/90, DIP: 50/60  Little     MCP: 40/45, PIP: 70/80, DIP: 40/60    FPL 5/5  FDP 5/5 all fingers  FDS index 5/5, long 4+/5, ring 3/5  FCR 5/5  FCU 5/5  _____________________________________________________  STUDIES REVIEWED:  No Studies to review      PROCEDURES PERFORMED:  Procedures  No Procedures performed today  Scribe Attestation      I,:   am acting as a scribe while in the presence of the attending physician.:       I,:   personally performed the services described in this documentation    as scribed in my presence.:

## 2024-09-25 NOTE — PROGRESS NOTES
"Daily Note     Today's date: 2024  Patient name: Eileen Matias  : 1960  MRN: [id number]  Referring provider: Rick Lazo DO  Dx:   Encounter Diagnosis     ICD-10-CM    1. Lymphedema of right arm  I89.0       2. Closed nondisplaced spiral fracture of shaft of right humerus with routine healing, subsequent encounter  S42.344D       3. Lymphedema of left arm  I89.0                      Subjective: pt reports no new major complaints upon arrival.       Objective: See treatment diary below      Assessment: Tolerated treatment well. Patient would benefit from continued PT. MLD to BL UE to improve lymphatic flow and decrease swelling. Measured pt for custom [employer] Soft armsleeve due to pt not being about to fit into an ready made garment properly.       Plan: Continue per plan of care.      FOTO: Goal: Lymph - 57 Shoulder - 49; Eval: Lymph - 5 Shoulder - 4;3/13: Lymph - 42; : Shoulder - 47;4/10 Lymph - 48; : shoulder - 42; : Lymph - 69 Shoulder - 48; : lymph - 85 Shoulder - 56; : Lymph - 98 Shoulder - 56      EPOC:   10/16    Shoulder EPOC: 10/16 9/9 9/16 9/23 9/25   RA Lymph DATE:         Shoulder:         Manual       MLD [facility]   Manual Wrapping       PROM                       Exercise Diary    THEREX       Pt ed  [facility]           Wall slides wall slides along door frame 2x10  Incline 2x10; wall slides along door frame x10 Incline 2x10; wall slides along door frame 2x10   SL ER   1# 2x10 1# 2x10   Standing shoulder flexion/scaption   2x10 1# 2x10 1#   Cane flexion 1# x10             pulleys 2/2  2/2 2/2   Ball roll up wall       Supine ER cane       Shoulder isometrics   5\" 2x10 5\" 2x10   SL flexion 1# 2x10  3x10 1# 2x10   SL abd 1# 30x  1# 30x 1# 30x   Supine shoulder flex 1# 30x  1# 30x 1# 30x   IR stretch w/ strap       NEURO RE-ED       Supine HA RTB 2x10  RTB 2x10 RTB 2x10   Supine BL ER       [facility] reaches       Supine punches 1# 30x  1# 30x " 1# 30x    ball roll up wall Blue ball 20x       rows/pulldowns BTB 30x  BTB 30x BTB 30x                                                              Modalities           CP

## 2024-09-26 ENCOUNTER — OFFICE VISIT (OUTPATIENT)
Dept: OCCUPATIONAL THERAPY | Facility: CLINIC | Age: 64
End: 2024-09-26
Payer: MEDICARE

## 2024-09-26 DIAGNOSIS — R29.898 RIGHT HAND WEAKNESS: ICD-10-CM

## 2024-09-26 DIAGNOSIS — R20.2 NUMBNESS AND TINGLING OF LEFT UPPER EXTREMITY: Primary | ICD-10-CM

## 2024-09-26 DIAGNOSIS — R20.0 NUMBNESS AND TINGLING OF LEFT UPPER EXTREMITY: Primary | ICD-10-CM

## 2024-09-26 PROCEDURE — 97530 THERAPEUTIC ACTIVITIES: CPT | Performed by: OCCUPATIONAL THERAPIST

## 2024-09-26 PROCEDURE — 97110 THERAPEUTIC EXERCISES: CPT | Performed by: OCCUPATIONAL THERAPIST

## 2024-09-26 NOTE — PROGRESS NOTES
"        Daily Note     Today's date: 2024  Patient name: Eileen Matias  : 1960  MRN: [id number]  Referring provider: Rogelio Black MD  Dx:   Encounter Diagnosis     ICD-10-CM    1. Numbness and tingling of left upper extremity  R20.0     R20.2       2. Right hand weakness  R29.898                        Subjective:   I am doing more. I stretch all the time.   I am wearing my smallest sleeve now.        Objective: See treatment diary below for clinic program.        Assessment: Tolerated treatment well. Patient would benefit from continued OT. IP active digit flexion progressing. Improved gross grasp for light IADLs with patient in circumferential wrist extension splint.  Improving ease of right use noted in clinic program, without splint today.  Edema reducing, allowing wear of smaller and lighter lymphedema garment.       Plan: Continue per plan of care.   Progress dexterity and gripping exercises as tolerated.  Continue wear of orthotic for wrist stability prn.     Precautions: Lymphedema; hx desmoid tumors and breast cancer; healing humeral fracture     POC expires Unit limit Auth  expiration date PT/OT + Visit Limit?    NA NA BOMN         Dx R Hum Fx     Dr Wayne MILLER Not met     MC         Date 8/29 9/3 9/10 9/17 9/24 9/26   Visit 31 32 33 34 37 38   Manuals    5 8 8            Jt mobs digits, FA    Mps G 1-2 MP all G 1-2 MP all G 1-2   Ortho fit/train         Neuro Re-Ed          RNG         MNG         CHIKI                  orthotic Pressure points resolved  Revised V MP and distal ulna pressure.  10'                        Ther Ex 25   25   23   25'   25'   25'   HEP         PROM 1:1 Digital flexion LLPS Digital Flexion LLPS Digital flexion LLPS Digital flexion LLPS Digital flexion LLPS Digital flexion LLPS   Digital blocked AROM P/H fists 5x5\" P/H fists 5x5\" P/H fists 5x5\" P/H fists 5x5\" P/H fists 5x5\" P/H fists 5x5\"   UE warm up UBE 2F 2R  R only x25 UBE 2F 2R  R only x30 HOLD " "due to LUE edema UBE 2F 2 R   R only x20  No brace UBE 2F 2 R   R only x20 UBE 2F 2 R   R only x20   PRE FA  S/P #2 mid shaft x20 S/P #2 mid shaft x20  UA w/o brace     PRE Wrist FA G F bar    Down x20  R F bar up   x20 G F bar down 20x3\"  G F bar up   x10 R F bar  P/P x20  Fold x20 R F bar   Down x20  Y F bar up   X20  Fold x10  No brace G F bar down 20x3\"  G F bar up   X20  R F bar fold   x20 G F bar down 20x3\"  G F bar up   X20  R F bar fold   x20            Shoulder ROM         Sh PROM         Ther Activity 15 15   10   10'   10   10   Gripping Y Web  to edge x20   Y web  to edge x20 Y web  to edge x20 Y web  to edge, no splint  x20 Y web  to edge   x20 Y web  to edge   X20  Gray #2  10 foams    Pinching Black C pins all blocks Black C pins all blocks Black C pins all blocks  All blocks All blocks   Coordination Tie and untie knots RB over ball x10  Groove pegs    X20 F/B S/S X20 RB over TB  Gather all foams.             MHP Pre tx with ace flexion Pre tx with ace flexion Pre tx with ace flexion Pre tx with ace flexion wrap Pre tx with flexion ace wrap Pre tx with flexion ace wrap.                      "

## 2024-10-01 ENCOUNTER — OFFICE VISIT (OUTPATIENT)
Dept: OCCUPATIONAL THERAPY | Facility: CLINIC | Age: 64
End: 2024-10-01
Payer: MEDICARE

## 2024-10-01 DIAGNOSIS — R29.898 RIGHT HAND WEAKNESS: ICD-10-CM

## 2024-10-01 DIAGNOSIS — R20.0 NUMBNESS AND TINGLING OF LEFT UPPER EXTREMITY: Primary | ICD-10-CM

## 2024-10-01 DIAGNOSIS — R20.2 NUMBNESS AND TINGLING OF LEFT UPPER EXTREMITY: Primary | ICD-10-CM

## 2024-10-01 PROCEDURE — 97110 THERAPEUTIC EXERCISES: CPT | Performed by: OCCUPATIONAL THERAPIST

## 2024-10-01 PROCEDURE — 97530 THERAPEUTIC ACTIVITIES: CPT | Performed by: OCCUPATIONAL THERAPIST

## 2024-10-01 NOTE — PROGRESS NOTES
"        Daily Note     Today's date: 10/1/2024  Patient name: Eileen Matias  : 1960  MRN: [id number]  Referring provider: Rogelio Black MD  Dx:   Encounter Diagnosis     ICD-10-CM    1. Numbness and tingling of left upper extremity  R20.0     R20.2       2. Right hand weakness  R29.898                        Subjective:   I am doing more. I stretch all the time.   I am wearing my smallest sleeve now.  I am getting stronger.       Objective: See treatment diary below for clinic program.  Splint not available today.       Assessment: Tolerated treatment well. Patient would benefit from continued OT. IP active digit flexion progressing. Improved gross grasp for light IADLs with patient in circumferential wrist extension splint.  Improving ease of right use noted in clinic program, without splint today.  Edema reducing, allowing wear of smaller and lighter lymphedema garment.       Plan: Continue per plan of care.   Progress dexterity and gripping exercises as tolerated.  Continue wear of orthotic for wrist stability prn.     Precautions: Lymphedema; hx desmoid tumors and breast cancer; healing humeral fracture     POC expires Unit limit Auth  expiration date PT/OT + Visit Limit?    NA NA BOMN         Dx R Hum Fx     Dr Wayne MILLER Not met     MC         Date 8/29 9/3 9/10 9/17 9/24  RE 9/26 10/1   Visit 31 32 33 34 37 38 39   Manuals    5 8 8 8             Jt mobs digits, FA    Mps G 1-2 MP all G 1-2 MP all G 1-2 MP all G 1-2   Ortho fit/train          Neuro Re-Ed           RNG          MNG          CHIKI                    orthotic Pressure points resolved  Revised V MP and distal ulna pressure.  10'                           Ther Ex 25   25   23   25'   25'   25'   23'   HEP          PROM 1:1 Digital flexion LLPS Digital Flexion LLPS Digital flexion LLPS Digital flexion LLPS Digital flexion LLPS Digital flexion LLPS Digital flexion LLPS   Digital blocked AROM P/H fists 5x5\" P/H fists 5x5\" P/H " "fists 5x5\" P/H fists 5x5\" P/H fists 5x5\" P/H fists 5x5\" P/H fists 5x5\"   UE warm up UBE 2F 2R  R only x25 UBE 2F 2R  R only x30 HOLD due to LUE edema UBE 2F 2 R   R only x20  No brace UBE 2F 2 R   R only x20 UBE 2F 2 R   R only x20 UBE 2F 2R R only x20  brace   PRE FA  S/P #2 mid shaft x20 S/P #2 mid shaft x20  UA w/o brace      PRE Wrist FA G F bar    Down x20  R F bar up   x20 G F bar down 20x3\"  G F bar up   x10 R F bar  P/P x20  Fold x20 R F bar   Down x20  Y F bar up   X20  Fold x10  No brace G F bar down 20x3\"  G F bar up   X20  R F bar fold   x20 G F bar down 20x3\"  G F bar up   X20  R F bar fold   x20 G F bar down x20  R F bar up   X20 3\" each             Shoulder ROM          Sh PROM          Ther Activity 15 15   10   10'   10   10   10   Gripping Y Web  to edge x20   Y web  to edge x20 Y web  to edge x20 Y web  to edge, no splint  x20 Y web  to edge   x20 Y web  to edge   X20  Gray #2  10 foams  Y web  to edge, no splint   Pinching Black C pins all blocks Black C pins all blocks Black C pins all blocks  All blocks All blocks    Coordination Tie and untie knots RB over ball x10  Groove pegs    X20 F/B S/S X20 RB over TB  Gather all foams.  Y F bar  F/B  S/S             MHP Pre tx with ace flexion Pre tx with ace flexion Pre tx with ace flexion Pre tx with ace flexion wrap Pre tx with flexion ace wrap Pre tx with flexion ace wrap.  Pre tx with flexion ace wrap                      "

## 2024-10-02 ENCOUNTER — OFFICE VISIT (OUTPATIENT)
Dept: PHYSICAL THERAPY | Facility: CLINIC | Age: 64
End: 2024-10-02
Payer: MEDICARE

## 2024-10-02 DIAGNOSIS — S42.344D CLOSED NONDISPLACED SPIRAL FRACTURE OF SHAFT OF RIGHT HUMERUS WITH ROUTINE HEALING, SUBSEQUENT ENCOUNTER: ICD-10-CM

## 2024-10-02 DIAGNOSIS — I89.0 LYMPHEDEMA OF LEFT ARM: ICD-10-CM

## 2024-10-02 DIAGNOSIS — I89.0 LYMPHEDEMA OF RIGHT ARM: Primary | ICD-10-CM

## 2024-10-02 PROCEDURE — 97110 THERAPEUTIC EXERCISES: CPT

## 2024-10-02 PROCEDURE — 97140 MANUAL THERAPY 1/> REGIONS: CPT

## 2024-10-02 PROCEDURE — 97112 NEUROMUSCULAR REEDUCATION: CPT

## 2024-10-02 NOTE — PROGRESS NOTES
"Daily Note     Today's date: 10/2/2024  Patient name: Eileen Matias  : 1960  MRN: [id number]  Referring provider: Rick Lazo DO  Dx:   Encounter Diagnosis     ICD-10-CM    1. Lymphedema of right arm  I89.0       2. Closed nondisplaced spiral fracture of shaft of right humerus with routine healing, subsequent encounter  S42.344D       3. Lymphedema of left arm  I89.0                      Subjective: pt reports no new major complaints upon arrival.       Objective: See treatment diary below      Assessment: Tolerated treatment well. Patient would benefit from continued PT. Able to progress shoulder strengthening w/ good tolerance, fatigue t/o.       Plan: Continue per plan of care.      FOTO: Goal: Lymph - 57 Shoulder - 49; Eval: Lymph - 5 Shoulder - 4;3/13: Lymph - 42; : Shoulder - 47;4/10 Lymph - 48; : shoulder - 42; : Lymph - 69 Shoulder - 48; : lymph - 85 Shoulder - 56; : Lymph - 98 Shoulder - 56      EPOC:   10/16    Shoulder EPOC: 10/16 10/2  9/23 9/25   RA Lymph DATE:         Shoulder:         Manual       MLD [facility]   Manual Wrapping       PROM                       Exercise Diary 9/23 9/25 10/2    THEREX       Pt ed              Wall slides wall slides along door frame 2x10      SL ER   1# 2x10    Standing shoulder flexion/scaption       Cane flexion 1# x10  I Y 1# 10\" 5x ea    ABCs   1x (1# NV)    pulleys 2/2  2/2    Ball roll up wall       Supine ER cane       Shoulder isometrics       SL flexion 1# 2x10  1# 2x10    SL abd 1# 30x  1# x10; 2# x10    Supine shoulder flex 1# 30x  1# x10; 2# x10    IR stretch w/ strap       NEURO RE-ED       Supine HA RTB 2x10      Supine BL ER       [facility] reaches       Supine punches 1# 30x  1# x10; 2# x10     ball roll up wall Blue ball 20x       rows/pulldowns BTB 30x  [place] 8#/10# 2x10                                                               Modalities           CP                                 "

## 2024-10-03 ENCOUNTER — OFFICE VISIT (OUTPATIENT)
Dept: SURGICAL ONCOLOGY | Facility: CLINIC | Age: 64
End: 2024-10-03
Payer: MEDICARE

## 2024-10-03 VITALS
TEMPERATURE: 98.2 F | HEIGHT: 68 IN | SYSTOLIC BLOOD PRESSURE: 148 MMHG | DIASTOLIC BLOOD PRESSURE: 90 MMHG | WEIGHT: 134.5 LBS | BODY MASS INDEX: 20.38 KG/M2 | HEART RATE: 100 BPM | OXYGEN SATURATION: 99 %

## 2024-10-03 DIAGNOSIS — Z98.890 HISTORY OF LUMPECTOMY OF LEFT BREAST: ICD-10-CM

## 2024-10-03 DIAGNOSIS — Z85.3 ENCOUNTER FOR FOLLOW-UP SURVEILLANCE OF BREAST CANCER: ICD-10-CM

## 2024-10-03 DIAGNOSIS — Z08 ENCOUNTER FOR FOLLOW-UP SURVEILLANCE OF BREAST CANCER: ICD-10-CM

## 2024-10-03 DIAGNOSIS — C77.3 CARCINOMA OF LEFT BREAST METASTATIC TO AXILLARY LYMPH NODE (HCC): Primary | ICD-10-CM

## 2024-10-03 DIAGNOSIS — Z17.0 MALIGNANT NEOPLASM OF LOWER-INNER QUADRANT OF LEFT BREAST IN FEMALE, ESTROGEN RECEPTOR POSITIVE (HCC): ICD-10-CM

## 2024-10-03 DIAGNOSIS — C50.912 CARCINOMA OF LEFT BREAST METASTATIC TO AXILLARY LYMPH NODE (HCC): Primary | ICD-10-CM

## 2024-10-03 DIAGNOSIS — C50.312 MALIGNANT NEOPLASM OF LOWER-INNER QUADRANT OF LEFT BREAST IN FEMALE, ESTROGEN RECEPTOR POSITIVE (HCC): ICD-10-CM

## 2024-10-03 PROCEDURE — 99214 OFFICE O/P EST MOD 30 MIN: CPT | Performed by: SURGERY

## 2024-10-03 NOTE — PROGRESS NOTES
Surgical Oncology Follow Up  [facility]  [facility] [place]  [address] [address]    Eileen Matias  1960  [id number]      Chief Complaint   Patient presents with   • Follow-up     3 week f/u- Left BC s/p lumpectomy and axillary dissection        Assessment & Plan:   This is 63-year-old female with left breast cancer s/p breast conservation surgery and axillary dissection.  She is here after left axillary ultrasound which was reviewed and no worrisome features.  She has left upper extremity lymphedema as well as right upper extremity lymphedema.  She has been on physical therapy.  Ultrasound findings were reviewed and discussed.  Her next mammogram is in 6 months I will see her after.  She was told to call us with any questions or concerns in the interim she understand and agreed to do so.    Cancer History:     Oncology History   Carcinoma of left breast metastatic to axillary lymph node (HCC)   4/14/2022 Initial Diagnosis    Carcinoma of left breast metastatic to axillary lymph node (HCC)     4/14/2022 Biopsy    Left axillary Lymph node ultrasound guided biopsy  Metastatic carcinoma, compatible with breast origin.   ER >95  WA 95   HER2 1+    On ultrasound lymph node is 1.8 cm. There is cortical thickening without evident fatty hilum.     In review of screening mammogram, demonstrates no suspicious mammographic findings identified in either breast. Bilateral MRI is recommended. Flow cytometry - there is no evidence of a B-cell or T-cell non-Hodgkin lymphoma.       4/20/2022 Genomic Testing    A total of 36 genes were evaluated, including: ANGEL LUIS, BRCA1, BRCA2, CDH1, CHEK2, PALB2, PTEN, STK11, TP53  Negative result. No pathogenic sequence variants or deletions/duplications identified     5/20/2022 Biopsy    MRI guided biopsy  A. Left breast   11 o'clock   Invasive mammary carcinoma of no special type (ductal)  Grade 1  ER 95  WA 95  HER2 0    B.  Right breast   Retroareolar  Benign fibrocystic changes without atypia (discrete 0.3 cm focus of sclerosing and tubular adenosis with usual ductal hyperplasia, columnar cell change and hyperplasia, apocrine metaplasia, cystic dilatation).   - Microcalcifications: Present associated with non-neoplastic breast tissue.   - Negative for malignancy, in-situ carcinoma and atypical hyperplasia.     Malignant pathology appears unifocal. Biopsy proven carcinoma measured 1 cm on MRI. Biopsy proved metastatic disease to left axillary lymph node.      6/9/2022 Genomic Testing    MammaPrint  Low risk  Luminal type A  MammaPrint index: +0.578     6/21/2022 Surgery    Left breast tiffany  directed lumpectomy with axillary dissection  A. Invasive 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  Grade 1  1.3 cm  Lymphovascular invasion is identified  Margins negative    H. Axillary, Level I and Level II axillary contents  Metastatic carcinoma involving 3/17 lymph nodes  1.8 cm   Extranodal extension is identified 1mm  Foci of carcinoma identified in extranodal lymphatic channels    Anatomic stage: Stage IIA  Prognostic stage: Stage IA      6/21/2022 -  Cancer Staged    Staging form: Breast, AJCC 8th Edition  - Pathologic stage from 6/21/2022: Stage IA (pT1c, pN1, cM0, G1, ER+, MD+, HER2-) - Signed by Alfredo Hays MD on 8/1/2022  Stage prefix: Initial diagnosis  Nuclear grade: G1  Multigene prognostic tests performed: None  Mitotic count score: Score 1  Histologic grading system: 3 grade system       8/29/2022 - 10/10/2022 Radiation    Treatments:  Course: C1  Plan ID Energy Fractions Dose per Fraction (cGy) Total Dose Delivered (cGy) Elapsed Days   BH L Breast 6X 25 / 25 200 5,000 35   BH L [id number] 12E 5 / 5 200 1,000 6   BH L SClav 10X/6X 24 / 24 200 4,800 32    Treatment Dates:  8/29/2022 - 10/10/2022.      Malignant neoplasm of lower-inner quadrant of left breast in female, estrogen receptor positive (HCC)    5/20/2022 Initial Diagnosis    Malignant neoplasm of lower-inner quadrant of left breast in female, estrogen receptor positive (HCC)     5/20/2022 Biopsy    MRI guided biopsy  A. Left breast   11 o'clock   Invasive mammary carcinoma of no special type (ductal)  Grade 1  ER 95  IL 95  HER2 0    B. Right breast   Retroareolar  Benign fibrocystic changes without atypia (discrete 0.3 cm focus of sclerosing and tubular adenosis with usual ductal hyperplasia, columnar cell change and hyperplasia, apocrine metaplasia, cystic dilatation).   - Microcalcifications: Present associated with non-neoplastic breast tissue.   - Negative for malignancy, in-situ carcinoma and atypical hyperplasia.     Malignant pathology appears unifocal. Biopsy proven carcinoma measured 1 cm on MRI. Biopsy proved metastatic disease to left axillary lymph node.      6/9/2022 Genomic Testing    MammaPrint  Low risk  Luminal type A  MammaPrint index: +0.578     6/21/2022 Surgery    Left breast tiffany  directed lumpectomy with axillary dissection  A. Invasive 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  Grade 1  1.3 cm  Lymphovascular invasion is identified  Margins negative    H. Axillary, Level I and Level II axillary contents  Metastatic carcinoma involving 3/17 lymph nodes  1.8 cm   Extranodal extension is identified 1mm  Foci of carcinoma identified in extranodal lymphatic channels    Anatomic stage: Stage IIA  Prognostic stage: Stage IA      8/29/2022 - 10/10/2022 Radiation    Treatments:  Course: C1  Plan ID Energy Fractions Dose per Fraction (cGy) Total Dose Delivered (cGy) Elapsed Days   BH L Breast 6X 25 / 25 200 5,000 35   BH L [id number] 12E 5 / 5 200 1,000 6   BH L SClav 10X/6X 24 / 24 200 4,800 32    Treatment Dates:  8/29/2022 - 10/10/2022.            Interval History:   Follow-up with left breast cancer    Review of Systems:   Review of Systems   Constitutional:  Negative for chills and fever.   HENT:  Negative for ear  pain and sore throat.    Eyes:  Negative for pain and visual disturbance.   Respiratory:  Negative for cough and shortness of breath.    Cardiovascular:  Negative for chest pain and palpitations.   Gastrointestinal:  Negative for abdominal pain and vomiting.   Genitourinary:  Negative for dysuria and hematuria.   Musculoskeletal:  Negative for arthralgias and back pain.   Skin:  Negative for color change and rash.   Neurological:  Negative for seizures and syncope.   Hematological:  Negative for adenopathy.   All other systems reviewed and are negative.    Past Medical History     Patient Active Problem List   Diagnosis   • Back pain, chronic   • Chronic insomnia   • Lymphedema of right arm   • Hyperlipidemia, mixed   • Peripheral neuropathy   • Lymphedema   • Desmoid tumor   • Carcinoma of left breast metastatic to axillary lymph node (HCC)   • Malignant neoplasm of lower-inner quadrant of left breast in female, estrogen receptor positive (HCC)   • HTN, goal below 140/90   • Use of letrozole (Femara)   • History of lumpectomy of left breast   • Humerus fracture   • Chronic back pain   • Closed nondisplaced spiral fracture of shaft of right humerus with routine healing, subsequent encounter   • Encounter for follow-up surveillance of breast cancer     Past Medical History:   Diagnosis Date   • Abnormal mammogram 05/15/2013   • Anemia    • Cancer (HCC)     desmoitosis   • Carcinoma of left breast metastatic to axillary lymph node (HCC) 04/19/2022   • Chronic pain disorder     worse right side of body   • Desmoid tumor     Last Assessed: 6/19/2017   • fibroidal cyst 1999?  Hysterectomy done   • History of transfusion     no reactions   • Hyperlipidemia    • Hypertension    • Osteoporosis    • PONV (postoperative nausea and vomiting)      Past Surgical History:   Procedure Laterality Date   • BREAST LUMPECTOMY Left 6/21/2022    Procedure: ANITA  DIRECTED LUMPECTOMY;  Surgeon: Amanda Motley MD;   Location[facility];  Service: Surgical Oncology   • FRACTURE SURGERY     • HYSTERECTOMY     • LYMPH NODE DISSECTION Left 6/21/2022    Procedure: AXILLARY DISSECTION LEVEL I AND LEVEL II LYMPH NODES;  Surgeon: Amanda Motley MD;  [facility];  Service: Surgical Oncology   • MRI BREAST BIOPSY LEFT (ALL INCLUSIVE) Left 5/20/2022   • MRI BREAST BIOPSY RIGHT (ALL INCLUSIVE) Right 5/20/2022   • OTHER SURGICAL HISTORY      Arm Incision: Dermoid tumor, right Arm    • PARTIAL HYSTERECTOMY     • IL COLONOSCOPY FLX DX W/COLLJ SPEC WHEN PFRMD N/A 8/15/2017    Procedure: COLONOSCOPY;  Surgeon: Alec England MD;  Location: [facility];  Service: Gastroenterology   • IL NDSC WRST SURG W/RLS TRANSVRS CARPL LIGM Left 8/10/2021    Procedure: RELEASE LEFT CARPAL TUNNEL ENDOSCOPIC;  Surgeon: Chris Camacho MD;  [facility];  Service: Orthopedics   • IL OPTX DSTL RADL I-ARTIC FX/EPIPHYSL SEP 3 FRAG Left 2/9/2021    Procedure: OPEN REDUCTION W/ INTERNAL FIXATION (ORIF) RADIUS / ULNA (WRIST) left;  Surgeon: Chris Camacho MD;  [facility];  Service: Orthopedics   • SKIN BIOPSY     • SKIN CANCER EXCISION      Melanoma Excision    • TONSILLECTOMY     • US BREAST NEEDLE LOC LEFT Left 6/16/2022   • US BREAST NEEDLE LOC RIGHT EACH ADDITIONAL Right 6/16/2022   • US GUIDED BREAST LYMPH NODE BIOPSY LEFT Left 4/14/2022     Family History   Problem Relation Age of Onset   • Diabetes Mother    • No Known Problems Father    • Cancer Sister    • Lung cancer Sister    • Pancreatic cancer Sister    • No Known Problems Maternal Grandmother    • No Known Problems Maternal Grandfather    • No Known Problems Paternal Grandmother    • No Known Problems Paternal Grandfather    • Breast cancer Neg Hx      Social History     Socioeconomic History   • Marital status: /Civil Union     Spouse name: Not on file   • Number of children: Not on file   • Years of education: Not on file   • Highest education level: Not  on file   Occupational History   • Occupation: unemployed    Tobacco Use   • Smoking status: Never   • Smokeless tobacco: Never   Vaping Use   • Vaping status: Never Used   Substance and Sexual Activity   • Alcohol use: Not Currently     Alcohol/week: 5.0 standard drinks of alcohol     Types: 5 Shots of liquor per week     Comment: Used mostly as a painkiller when needed before bedtime   • Drug use: Yes     Frequency: 28.0 times per week     Types: Marijuana     Comment: THC Medical marijuana   • Sexual activity: Not Currently     Partners: Male     Comment: Hysterectomy years ago   Other Topics Concern   • Not on file   Social History Narrative    Lives with spouse      Social Determinants of Health     Financial Resource Strain: Low Risk  (12/13/2023)    Overall Financial Resource Strain (CARDIA)    • Difficulty of Paying Living Expenses: Not hard at all   Food Insecurity: No Food Insecurity (11/13/2023)    Hunger Vital Sign    • Worried About Running Out of Food in the Last Year: Never true    • Ran Out of Food in the Last Year: Never true   Transportation Needs: No Transportation Needs (12/13/2023)    PRAPARE - Transportation    • Lack of Transportation (Medical): No    • Lack of Transportation (Non-Medical): No   Physical Activity: Insufficiently Active (5/2/2023)    [facility]    Exercise Vital Sign    • Days of Exercise per Week: 2 days    • Minutes of Exercise per Session: 30 min   Stress: No Stress Concern Present (5/2/2023)    [facility]    [employer] - Occupational Stress Questionnaire    • Feeling of Stress : Not at all   Social Connections: Unknown (6/18/2024)    Received from [facility]    Social Connections    • How often do you feel lonely or isolated from those around you? (Adult - for ages 18 years and over): Not on file   Intimate Partner Violence: Not At Risk (5/2/2023)     [facility]    Humiliation, Afraid, Rape, and Kick questionnaire    • Fear of Current or Ex-Partner: No    • Emotionally Abused: No    • Physically Abused: No    • Sexually Abused: No   Housing Stability: Low Risk  (11/13/2023)    Housing Stability Vital Sign    • Unable to Pay for Housing in the Last Year: No    • Number of Times Moved in the Last Year: 1    • Homeless in the Last Year: No       Current Outpatient Medications:   •  cholecalciferol (VITAMIN D3) 1,000 units tablet, Take 3,000 Units by mouth daily, Disp: , Rfl:   •  diphenhydrAMINE (BENADRYL) 50 MG tablet, Take 50 mg by mouth daily at bedtime as needed for itching, Disp: , Rfl:   •  dronabinol (MARINOL) 10 MG capsule, , Disp: , Rfl:   •  ezetimibe (ZETIA) 10 mg tablet, Take 1 tablet (10 mg total) by mouth daily, Disp: 90 tablet, Rfl: 0  •  gabapentin (Neurontin) 100 mg capsule, Take 1 capsule (100 mg total) by mouth 3 (three) times a day, Disp: 90 capsule, Rfl: 1  •  letrozole (FEMARA) 2.5 mg tablet, Take 1 tablet (2.5 mg total) by mouth daily, Disp: 90 tablet, Rfl: 3  •  multivitamin (THERAGRAN) TABS, Take 1 tablet by mouth daily, Disp: , Rfl:   •  Omega-3 Fatty Acids (FISH OIL PO), Take by mouth, Disp: , Rfl:   •  penicillin V potassium (VEETID) 500 mg tablet, Take 1 tablet (500 mg total) by mouth every 12 (twelve) hours, Disp: 180 tablet, Rfl: 1  •  Probiotic Product (PROBIOTIC-10 PO), Take by mouth, Disp: , Rfl:   Allergies   Allergen Reactions   • Chlorpromazine Anaphylaxis     PHENOTHIAZINE=ANAPHYLAXIS   • Codeine Anaphylaxis     Anaphylaxis  abd pain.   • Meperidine Anaphylaxis, Hives and Vomiting   • Phenothiazines Anaphylaxis and Throat Swelling     Category: Allergy;    • Saccharin - Food Allergy Anaphylaxis   • Ampicillin-Sulbactam Sodium Hives   • Aspirin GI Intolerance and Abdominal Pain     Severe GERD   • Gluten Meal - Food Allergy GI Intolerance   • Ibuprofen Hives     H   •  Levofloxacin Hives   • Chocolate - Food Allergy GI Intolerance   • Lactose - Food Allergy GI Intolerance   • Neomycin Other (See Comments), Edema and Hives     Category: Allergy;   swelling   • Citrus - Food Allergy Rash   • Latex Hives and Rash     Category: Allergy;        Physical Exam:     Vitals:    10/03/24 1119   BP: 148/90   Pulse: 100   Temp: 98.2 °F (36.8 °C)   SpO2: 99%     Physical Exam  Constitutional:       Appearance: Normal appearance.   HENT:      Head: Normocephalic and atraumatic.      Nose: Nose normal.      Mouth/Throat:      Mouth: Mucous membranes are moist.   Eyes:      Pupils: Pupils are equal, round, and reactive to light.   Cardiovascular:      Rate and Rhythm: Normal rate.      Pulses: Normal pulses.      Heart sounds: Normal heart sounds.   Pulmonary:      Effort: Pulmonary effort is normal.      Breath sounds: Normal breath sounds.   Chest:          Comments: Right breast no palpable mass masses nipple discharge nipple retraction or skin changes.  Well-healed left breast and left axillary incisions.  Bilateral axilla and supraclavicular examination no palpable adenopathy.  Patient was examined seated as well as supine position.  Abdominal:      General: Bowel sounds are normal.      Palpations: Abdomen is soft.   Musculoskeletal:         General: Normal range of motion.      Cervical back: Normal range of motion and neck supple.   Skin:     General: Skin is warm.   Neurological:      General: No focal deficit present.      Mental Status: She is alert and oriented to person, place, and time.   Psychiatric:         Mood and Affect: Mood normal.         Behavior: Behavior normal.         Thought Content: Thought content normal.         Judgment: Judgment normal.       Results & Discussion:   DIAGNOSIS: Carcinoma of left breast metastatic to axillary lymph node (HCC); Malignant neoplasm of lower-inner quadrant of left breast in female, estrogen receptor positive (HCC)     TECHNIQUE:    Ultrasound of the left breast(s) was performed.     COMPARISONS: Prior breast imaging dated: 04/29/2024, 04/25/2023, 06/21/2022, 06/16/2022, 06/16/2022, 06/16/2022, 06/16/2022, 05/20/2022, 05/20/2022, and 04/27/2022     RELEVANT HISTORY:   Family Breast Cancer History: History of breast cancer in Neg Hx.  Family Medical History: No known relevant family medical history.   Personal History: Hormone history includes estrogen replacement therapy. Surgical history includes lumpectomy and hysterectomy. Medical history includes breast cancer and ovarian cancer.     RISK ASSESSMENT:   [facility] risk assessment reporting was suppressed due to the patient's history and/or demographic factors.     INDICATION: Eileen Matias is a 63 y.o. female presenting for evaluation of clinically palpated left axillary concern.     FINDINGS:    Sonographic evaluation left axilla shows no left axillary mass, distortion or adenopathy.          IMPRESSION:   No sonographic abnormality in the left axilla.  Recommend clinical management.           ASSESSMENT/BI-RADS CATEGORY:  Left: 1 - Negative  Overall: 1 - Negative     RECOMMENDATION:       - Clinical management for the left breast.       - Diagnostic mammogram in 6 months for both breasts.    I did review the ultrasound findings.  We also discussed extensively discussed with regard to her lymphedema and management.  I did discussed in detail nature of breast cancer with axillary dissection with radiation and risk of lymphedema.  she understands and  agrees . All patient questions were answered.       Advance Care Planning/Advance Directives:  I Amanda Motley MD discussed the disease status with Eileen Matais  today 10/03/24  treatment plans and follow-up with the patient.

## 2024-10-07 ENCOUNTER — OFFICE VISIT (OUTPATIENT)
Dept: PHYSICAL THERAPY | Facility: CLINIC | Age: 64
End: 2024-10-07
Payer: MEDICARE

## 2024-10-07 DIAGNOSIS — I89.0 LYMPHEDEMA OF RIGHT ARM: Primary | ICD-10-CM

## 2024-10-07 DIAGNOSIS — I89.0 LYMPHEDEMA OF LEFT ARM: ICD-10-CM

## 2024-10-07 PROCEDURE — 97140 MANUAL THERAPY 1/> REGIONS: CPT

## 2024-10-07 PROCEDURE — 97110 THERAPEUTIC EXERCISES: CPT

## 2024-10-07 NOTE — PROGRESS NOTES
"Daily Note     Today's date: 10/7/2024  Patient name: Eileen Matias  : 1960  MRN: [id number]  Referring provider: Rick Lazo DO  Dx:   Encounter Diagnosis     ICD-10-CM    1. Lymphedema of right arm  I89.0       2. Lymphedema of left arm  I89.0                      Subjective: pt reports Dr. Motley wants garments and pump for the L side seeing that she has full blown lymphedema.       Objective: See treatment diary below      Assessment: Tolerated treatment well. Patient would benefit from continued PT. Measured for custom compression garment daytime for L side and Night time compression for BUE. Pt is in need of custom compression garments due to pt not fitting well into ready made and required a better compression garment to help contain lymphedema. MLD performed only on L one today due to measuring for compression garments.       Plan: Continue per plan of care.      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          Wall slides wall slides along door frame 2x10      SL ER   1# 2x10    Standing shoulder flexion/scaption       Cane flexion 1# x10  I Y 1# 10\" 5x ea    ABCs   1x (1# NV)    pulleys 2/2  2/2    Ball roll up wall       Supine ER cane       Shoulder isometrics       SL flexion 1# 2x10  1# 2x10    SL abd 1# 30x  1# x10; 2# x10    Supine shoulder flex 1# 30x  1# x10; 2# x10    IR stretch w/ strap       NEURO RE-ED       Supine HA RTB 2x10      Supine BL ER       [facility] reaches       Supine punches 1# 30x  1# x10; 2# x10     ball roll " up wall Blue ball 20x       rows/pulldowns BTB 30x  [place] 8#/10# 2x10                                                               Modalities           CP

## 2024-10-08 ENCOUNTER — OFFICE VISIT (OUTPATIENT)
Dept: OCCUPATIONAL THERAPY | Facility: CLINIC | Age: 64
End: 2024-10-08
Payer: MEDICARE

## 2024-10-08 DIAGNOSIS — R20.0 NUMBNESS AND TINGLING OF LEFT UPPER EXTREMITY: Primary | ICD-10-CM

## 2024-10-08 DIAGNOSIS — R20.2 NUMBNESS AND TINGLING OF LEFT UPPER EXTREMITY: Primary | ICD-10-CM

## 2024-10-08 DIAGNOSIS — R29.898 RIGHT HAND WEAKNESS: ICD-10-CM

## 2024-10-08 PROCEDURE — 97110 THERAPEUTIC EXERCISES: CPT | Performed by: OCCUPATIONAL THERAPIST

## 2024-10-08 PROCEDURE — 97530 THERAPEUTIC ACTIVITIES: CPT | Performed by: OCCUPATIONAL THERAPIST

## 2024-10-08 NOTE — PROGRESS NOTES
"        Daily Note     Today's date: 10/8/2024  Patient name: Eileen Matias  : 1960  MRN: [id number]  Referring provider: Rogelio Black MD  Dx:   Encounter Diagnosis     ICD-10-CM    1. Numbness and tingling of left upper extremity  R20.0     R20.2       2. Right hand weakness  R29.898               Start Time: 1745  Stop Time: 1825  Total time in clinic (min): 40 minutes    Subjective:   I am doing more and am getting stronger.  I have lymphedema in the left now.       Objective: See treatment diary below for clinic program.  Splint not available today.       Assessment: Tolerated treatment well. Patient would benefit from continued OT. IP active digit flexion progressing. Improved gross grasp for light IADLs with patient in circumferential wrist extension splint.  Improving ease of right use noted in clinic program, without splint today.  Edema reducing, allowing wear of smaller and lighter lymphedema garment.       Plan: Continue per plan of care.   Progress dexterity and gripping exercises as tolerated.  Continue wear of orthotic for wrist stability prn.     Precautions: Lymphedema; hx desmoid tumors and breast cancer; healing humeral fracture     POC expires Unit limit Auth  expiration date PT/OT + Visit Limit?    NA NA BOMN         Dx R Hum Fx     Dr Wayne MILLER Not met              Date 10/8    9/24  RE 9/26 10/1   Visit 40    37 38 39   Manuals 8    8 8 8             Jt mobs digits, FA MP all G 1-2    MP all G 1-2 MP all G 1-2 MP all G 1-2   Ortho fit/train          Neuro Re-Ed           RNG          MNG          CHIKI                    orthotic                              Ther Ex 23      25'   25'   23'   HEP          PROM 1:1 Digital flexion LLPS    Digital flexion LLPS Digital flexion LLPS Digital flexion LLPS   Digital blocked AROM P/H fists 5x5\"    P/H fists 5x5\" P/H fists 5x5\" P/H fists 5x5\"   UE warm up UBE 2F 2R   R only x20    UBE 2F 2 R   R only x20 UBE 2F 2 R   R only x20 " "UBE 2F 2R R only x20 no  brace   PRE FA           PRE Wrist FA G F bar down  2x10  R F bar up   2x10    G F bar down 20x3\"  G F bar up   X20  R F bar fold   x20 G F bar down 20x3\"  G F bar up   X20  R F bar fold   x20 G F bar down x20  R F bar up   X20 3\" each             Shoulder ROM          Sh PROM          Ther Activity 10      10   10   10   Gripping Y web  to edge   X20  Gray #2  10 foams     Y web  to edge   x20 Y web  to edge   X20  Gray #2  10 foams  Y web  to edge, no splint   Pinching Black Cpin  X20 blocks    All blocks All blocks    Coordination Y F bar  F/B  S/S    X20 RB over TB  Gather all foams.  Y F bar  F/B  S/S             MHP Pre tx with ace flex wrap.     Pre tx with flexion ace wrap Pre tx with flexion ace wrap.  Pre tx with flexion ace wrap                      "

## 2024-10-09 ENCOUNTER — OFFICE VISIT (OUTPATIENT)
Dept: PHYSICAL THERAPY | Facility: CLINIC | Age: 64
End: 2024-10-09
Payer: MEDICARE

## 2024-10-09 DIAGNOSIS — I89.0 LYMPHEDEMA OF LEFT ARM: ICD-10-CM

## 2024-10-09 DIAGNOSIS — I89.0 LYMPHEDEMA OF RIGHT ARM: Primary | ICD-10-CM

## 2024-10-09 DIAGNOSIS — S42.344D CLOSED NONDISPLACED SPIRAL FRACTURE OF SHAFT OF RIGHT HUMERUS WITH ROUTINE HEALING, SUBSEQUENT ENCOUNTER: ICD-10-CM

## 2024-10-09 PROCEDURE — 97140 MANUAL THERAPY 1/> REGIONS: CPT

## 2024-10-09 PROCEDURE — 97110 THERAPEUTIC EXERCISES: CPT

## 2024-10-09 PROCEDURE — 97112 NEUROMUSCULAR REEDUCATION: CPT

## 2024-10-09 NOTE — PROGRESS NOTES
"Daily Note     Today's date: 10/9/2024  Patient name: Eileen Matias  : 1960  MRN: [id number]  Referring provider: Rick Lazo DO  Dx:   Encounter Diagnosis     ICD-10-CM    1. Lymphedema of right arm  I89.0       2. Lymphedema of left arm  I89.0       3. Closed nondisplaced spiral fracture of shaft of right humerus with routine healing, subsequent encounter  S42.828D                      Subjective: pt reports no new major complaints upon arrival.       Objective: See treatment diary below      Assessment: Tolerated treatment well. Patient would benefit from continued PT. Continued w/ current POC as listed below w/ good tolerance.       Plan: Continue per plan of care.      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          Wall slides 2x10      SL ER 1# 2x10  1# 2x10    Standing shoulder flexion/scaption       Cane flexion I Y 1# 10\" 5x ea  I Y 1# 10\" 5x ea    ABCs 1# 1x  1x (1# NV)    pulleys 2/2  2/2    Ball roll up wall       Supine ER cane       Shoulder isometrics       SL flexion 1# 2x10  1# 2x10    SL abd 1# x10  2# x10  1# x10; 2# x10    Supine shoulder flex 1# x10  2# x10  1# x10; 2# x10    IR stretch w/ strap       NEURO RE-ED       Supine HA       Supine BL ER       [facility] reaches       Supine punches 1# x10  2# x10  1# x10; 2# x10     ball roll up wall        rows/pulldowns Bryant 8#/10# 2x10  Bryant 8#/10# 2x10                                                               Modalities           CP                                 "

## 2024-10-10 ENCOUNTER — OFFICE VISIT (OUTPATIENT)
Dept: OCCUPATIONAL THERAPY | Facility: CLINIC | Age: 64
End: 2024-10-10
Payer: MEDICARE

## 2024-10-10 DIAGNOSIS — R20.0 NUMBNESS AND TINGLING OF LEFT UPPER EXTREMITY: Primary | ICD-10-CM

## 2024-10-10 DIAGNOSIS — R20.2 NUMBNESS AND TINGLING OF LEFT UPPER EXTREMITY: Primary | ICD-10-CM

## 2024-10-10 DIAGNOSIS — R29.898 RIGHT HAND WEAKNESS: ICD-10-CM

## 2024-10-10 PROCEDURE — 97530 THERAPEUTIC ACTIVITIES: CPT | Performed by: OCCUPATIONAL THERAPIST

## 2024-10-10 PROCEDURE — 97110 THERAPEUTIC EXERCISES: CPT | Performed by: OCCUPATIONAL THERAPIST

## 2024-10-10 NOTE — PROGRESS NOTES
"        Daily Note     Today's date: 10/10/2024  Patient name: Eileen Matias  : 1960  MRN: [id number]  Referring provider: Rogelio Black MD  Dx:   Encounter Diagnosis     ICD-10-CM    1. Numbness and tingling of left upper extremity  R20.0     R20.2       2. Right hand weakness  R29.898                          Subjective:   I am doing more and am getting stronger.  I have lymphedema in the left now.       Objective: See treatment diary below for clinic program.       Assessment: Tolerated treatment well. Patient would benefit from continued OT. IP active digit flexion progressing. Improved gross grasp for light IADLs with patient in circumferential wrist extension splint.  Improving ease of right use noted in clinic program.   Edema reducing, allowing wear of smaller and lighter lymphedema garment.       Plan: Continue per plan of care.   Progress dexterity and gripping exercises as tolerated.  Continue wear of orthotic for wrist stability prn.     Precautions: Lymphedema; hx desmoid tumors and breast cancer; healing humeral fracture     POC expires Unit limit Auth  expiration date PT/OT + Visit Limit?    NA NA BOMN         Dx R Hum Fx     Dr Wayne MILLER Not met     MC         Date 10/8 10/10   9/24  RE 9/26 10/1   Visit 40 41   37 38 39   Manuals 8 8   8 8 8             Jt mobs digits, FA MP all G 1-2 MP all G 1-2   MP all G 1-2 MP all G 1-2 MP all G 1-2   Ortho fit/train          Neuro Re-Ed           RNG          MNG          CHIKI                    orthotic                              Ther Ex 23 23     25'   25'   23'   HEP          PROM 1:1 Digital flexion LLPS Digital flexion LLPS   Digital flexion LLPS Digital flexion LLPS Digital flexion LLPS   Digital blocked AROM P/H fists 5x5\" P/H fists 5x5\"   P/H fists 5x5\" P/H fists 5x5\" P/H fists 5x5\"   UE warm up UBE 2F 2R   R only x20 UBE 2F 2R  Right only x30   UBE 2F 2 R   R only x20 UBE 2F 2 R   R only x20 UBE 2F 2R R only x20 no  brace " "  PRE FA           PRE Wrist FA G F bar down  2x10  R F bar up   2x10 G F bar down  2x10  R F bar up  2x10     G F bar down 20x3\"  G F bar up   X20  R F bar fold   x20 G F bar down 20x3\"  G F bar up   X20  R F bar fold   x20 G F bar down x20  R F bar up   X20 3\" each             Shoulder ROM          Sh PROM          Ther Activity 10   10     10   10   10   Gripping Y web  to edge   X20  Gray #2  10 foams  Y web  to edge   X20  Gray #2  10 foams    Y web  to edge   x20 Y web  to edge   X20  Gray #2  10 foams  Y web  to edge, no splint   Pinching Black Cpin  X20 blocks Black Cpin  X20 blocks   All blocks All blocks    Coordination Y F bar  F/B  S/S Y F bar  F/B  S/S   X20 RB over TB  Gather all foams.  Y F bar  F/B  S/S             MHP Pre tx with ace flex wrap.  Pre tx with ace flex wrap.    Pre tx with flexion ace wrap Pre tx with flexion ace wrap.  Pre tx with flexion ace wrap                      "

## 2024-10-14 ENCOUNTER — OFFICE VISIT (OUTPATIENT)
Dept: PHYSICAL THERAPY | Facility: CLINIC | Age: 64
End: 2024-10-14
Payer: MEDICARE

## 2024-10-14 DIAGNOSIS — S42.344D CLOSED NONDISPLACED SPIRAL FRACTURE OF SHAFT OF RIGHT HUMERUS WITH ROUTINE HEALING, SUBSEQUENT ENCOUNTER: ICD-10-CM

## 2024-10-14 DIAGNOSIS — I89.0 LYMPHEDEMA OF LEFT ARM: ICD-10-CM

## 2024-10-14 DIAGNOSIS — I89.0 LYMPHEDEMA OF RIGHT ARM: Primary | ICD-10-CM

## 2024-10-14 PROCEDURE — 97110 THERAPEUTIC EXERCISES: CPT

## 2024-10-14 PROCEDURE — 97140 MANUAL THERAPY 1/> REGIONS: CPT

## 2024-10-14 NOTE — PROGRESS NOTES
"Daily Note     Today's date: 10/14/2024  Patient name: Eileen Matias  : 1960  MRN: [id number]  Referring provider: Rick Lazo DO  Dx:   Encounter Diagnosis     ICD-10-CM    1. Lymphedema of right arm  I89.0       2. Lymphedema of left arm  I89.0       3. Closed nondisplaced spiral fracture of shaft of right humerus with routine healing, subsequent encounter  S42.748D                      Subjective: pt reports no new complaints upon arrival.      Objective: See treatment diary below      Assessment: Tolerated treatment well. Patient would benefit from continued PT. Frequent fatigue t/o session but completed all exercises required.       Plan: Continue per plan of care.      FOTO: Goal: Lymph - 57 Shoulder - 49; Eval: Lymph - 5 Shoulder - 4;3/13: Lymph - 42; : Shoulder - 47;4/10 Lymph - 48; : shoulder - 42; : Lymph - 69 Shoulder - 48; : lymph - 85 Shoulder - 56; : Lymph - 98 Shoulder - 56      EPOC:   10/16    Shoulder EPOC: 10/16 10/2 10/7 10/9 10/14   RA Lymph DATE:         Shoulder:         Manual       MLD [facility]   Manual Wrapping       PROM                       Exercise Diary 10/9 10/14     THEREX       Pt ed [facility]            Wall slides 2x10 2x10     SL ER 1# 2x10 1# 2x10     Standing shoulder flexion/scaption  2x10 ea     Cane flexion I Y 1# 10\" 5x ea I Y 1# 10\" 5x ea     ABCs 1# 1x 1# 1x     pulleys 2/2 2/2     Ball roll up wall       Supine ER cane       Shoulder isometrics       SL flexion 1# 2x10 1# 2x10     SL abd 1# x10  2# x10 1# x10  2# x10     Supine shoulder flex 1# x10  2# x10 1# x10  2# x10     IR stretch w/ strap       NEURO RE-ED       Supine HA       Supine BL ER       [facility] reaches       Supine punches 1# x10  2# x10 1# x10  2# x10      ball roll up wall        rows/pulldowns [place] 8#/10# 2x10 Bryant 8#/10# 2x10                                                                Modalities           CP                                 "

## 2024-10-15 ENCOUNTER — OFFICE VISIT (OUTPATIENT)
Dept: OCCUPATIONAL THERAPY | Facility: CLINIC | Age: 64
End: 2024-10-15
Payer: MEDICARE

## 2024-10-15 DIAGNOSIS — R29.898 RIGHT HAND WEAKNESS: ICD-10-CM

## 2024-10-15 DIAGNOSIS — R20.0 NUMBNESS AND TINGLING OF LEFT UPPER EXTREMITY: Primary | ICD-10-CM

## 2024-10-15 DIAGNOSIS — R20.2 NUMBNESS AND TINGLING OF LEFT UPPER EXTREMITY: Primary | ICD-10-CM

## 2024-10-15 PROCEDURE — 97530 THERAPEUTIC ACTIVITIES: CPT | Performed by: OCCUPATIONAL THERAPIST

## 2024-10-15 PROCEDURE — 97110 THERAPEUTIC EXERCISES: CPT | Performed by: OCCUPATIONAL THERAPIST

## 2024-10-15 NOTE — PROGRESS NOTES
"        Daily Note     Today's date: 10/15/2024  Patient name: Eileen Matias  : 1960  MRN: [id number]  Referring provider: Rogelio Black MD  Dx:   Encounter Diagnosis     ICD-10-CM    1. Numbness and tingling of left upper extremity  R20.0     R20.2       2. Right hand weakness  R29.898                          Subjective:   I am doing more and am getting stronger.  I have lymphedema in the left now.       Objective: See treatment diary below for clinic program.       Assessment: Tolerated treatment well. Patient would benefit from continued OT. IP active digit flexion progressing. Improved gross grasp for light IADLs with patient in circumferential wrist extension splint.  Improving ease of right use noted in clinic program.   Edema controlled with wear of smaller and lighter lymphedema garment.       Plan: Continue per plan of care.   Progress dexterity and gripping exercises as tolerated.  Continue wear of orthotic for wrist stability prn.         Precautions: Lymphedema; hx desmoid tumors and breast cancer; healing humeral fracture     POC expires Unit limit Auth  expiration date PT/OT + Visit Limit?    NA NA BOMN         Dx R Hum Fx     Dr Wayne MILLER MET              Date 10/8 10/10 10/15  9/24  RE 9/26 10/1   Visit 40 41   37 38 39   Manuals 8 8 5  8 8 8             Jt mobs digits, FA MP all G 1-2 MP all G 1-2 MP all G 1-2  MP all G 1-2 MP all G 1-2 MP all G 1-2   Ortho fit/train          Neuro Re-Ed           RNG          MNG          CHIKI                    orthotic                              Ther Ex 23 23 23    25'   25'   23'   HEP          PROM 1:1 Digital flexion LLPS Digital flexion LLPS Digital flexion LLPS  Digital flexion LLPS Digital flexion LLPS Digital flexion LLPS   Digital blocked AROM P/H fists 5x5\" P/H fists 5x5\" P/H fists 5x5\"  P/H fists 5x5\" P/H fists 5x5\" P/H fists 5x5\"   UE warm up UBE 2F 2R   R only x20 UBE 2F 2R  Right only x30 UBE 2F 2R  Right only x30  UBE 2F " "2 R   R only x20 UBE 2F 2 R   R only x20 UBE 2F 2R R only x20 no  brace   PRE FA           PRE Wrist FA G F bar down  2x10  R F bar up   2x10 G F bar down  2x10  R F bar up  2x10   G F bar down  2x10  R F bar up  2x10  G F bar down 20x3\"  G F bar up   X20  R F bar fold   x20 G F bar down 20x3\"  G F bar up   X20  R F bar fold   x20 G F bar down x20  R F bar up   X20 3\" each             Shoulder ROM          Sh PROM          Ther Activity 10   10   15    10   10   10   Gripping Y web  to edge   X20  Gray #2  10 foams  Y web  to edge   X20  Gray #2  10 foams  Y web  to edge   X20  Gray #2  all foams   Y web  to edge   x20 Y web  to edge   X20  Gray #2  10 foams  Y web  to edge, no splint   Pinching Black Cpin  X20 blocks Black Cpin  X20 blocks Black Cpin  X20 blocks  All blocks All blocks    Coordination Y F bar  F/B  S/S Y F bar  F/B  S/S Y F bar  F/B  S/S  X20 RB over TB  Gather all foams.  Y F bar  F/B  S/S             MHP Pre tx with ace flex wrap.  Pre tx with ace flex wrap.  Pre tx with ace flex wrap.   Pre tx with flexion ace wrap Pre tx with flexion ace wrap.  Pre tx with flexion ace wrap                      "

## 2024-10-16 ENCOUNTER — EVALUATION (OUTPATIENT)
Dept: PHYSICAL THERAPY | Facility: CLINIC | Age: 64
End: 2024-10-16
Payer: MEDICARE

## 2024-10-16 DIAGNOSIS — I89.0 LYMPHEDEMA OF RIGHT ARM: Primary | ICD-10-CM

## 2024-10-16 DIAGNOSIS — I89.0 LYMPHEDEMA OF LEFT ARM: ICD-10-CM

## 2024-10-16 DIAGNOSIS — S42.344D CLOSED NONDISPLACED SPIRAL FRACTURE OF SHAFT OF RIGHT HUMERUS WITH ROUTINE HEALING, SUBSEQUENT ENCOUNTER: ICD-10-CM

## 2024-10-16 PROCEDURE — 97110 THERAPEUTIC EXERCISES: CPT

## 2024-10-16 PROCEDURE — 97140 MANUAL THERAPY 1/> REGIONS: CPT

## 2024-10-16 NOTE — PROGRESS NOTES
PT Re-Evaluation     Collection of Subjective/Objective data performed by Irene Childs PTA. Assessment, POC, and Goal attainment performed by Jessica Garcia DPT.       Today's date: 10/16/2024  Patient name: Eileen Matias  : 1960   MRN: [id number]  Referring provider: Rick Lazo DO  Dx:   Encounter Diagnosis     ICD-10-CM    1. Lymphedema of right arm  I89.0       2. Lymphedema of left arm  I89.0       3. Closed nondisplaced spiral fracture of shaft of right humerus with routine healing, subsequent encounter  S42.344D                                Assessment  Impairments: abnormal or restricted ROM, impaired physical strength and pain with function  Other impairment: lymphedema    Assessment details: Patient is a 64 y/o female who presents to therapy with bilateral upper extremity lymphedema and s/p R humerus fracture and has completed 73 visits to date. Patient demonstrates subjective/objective improvement since starting PT such as improved girth measurements in bilateral upper extremities. She is getting custom garments and a pump for the left upper extremity to start managing symptoms at home. Decreased shoulder measurements this session. Patient continues to present with deficits that include shoulder mobility, shoulder strength, and activity tolerance with the R UE. Patient will benefit from continued skilled PT intervention to address the aforementioned impairments, achieve goals, maximize function, and improve quality of life. Pt is in agreement with this plan.       Understanding of Dx/Px/POC: good     Prognosis: good    Goals  LYMPH GOALS:  ST.Pain decreased by 25% in 4 weeks.--PROGRESSING  2. Edema decreased by 2-3 cm in 4 weeks. --MET    LT. Decrease pain to 1-2/10 at worst by d/c.-PROGRESSING  2. Increase ROM to WFL for all deficient movements by d/c.-PROGRESSING  3. Strength increased to 5 for all deficient muscle groups by d/c.-PROGRESSING  4. IADL performance  increased to max function by d/c.-PROGRESSING  5. Recreational performance increased to max function by d/c.-PROGRESSING  6. RUE edema decreased to that of LUE by d/c. -PROGRESSING  7. Pt will return to work by d/c.-PROGRESSING    SHOULDER GOALS:   ST weeks  Pt will demonstrate good understanding and compliance with HEP--PROGRESSING  Pt will decrease pain to 5/10 at worst  --PROGRESSING  Pt will demonstrate improved postural awareness and ability to self-correct without reliance on external cues --PROGRESSING     LT weeks  Pt will improve FOTO score to > or = to 52 to indicate improved functional abilities  --PROGRESSING  Pt will decrease pain to 2-3/10 at worst   --PROGRESSING  Pt will increase shoulder strength to 4-/5 for improved tolerance/independence with ADLs --PROGRESSING  Pt will improve  strength to 25#  --PROGRESSING         Plan  Other planned modality interventions: other modalities PRN    Planned therapy interventions: abdominal trunk stabilization, ADL retraining, IADL retraining, flexibility, functional ROM exercises, graded exercise, home exercise program, manual therapy, joint mobilization, neuromuscular re-education, patient education, postural training, strengthening, therapeutic exercise, therapeutic activities, massage and work reintegration  Other planned therapy interventions: other interventions PRN    Frequency: 1-2x/week.  Duration in weeks: 8  Plan of Care beginning date: 10/16/2024  Plan of Care expiration date: 2024  Treatment plan discussed with: patient  Plan details: Cont to tx per POC      Subjective Evaluation    History of Present Illness  Mechanism of injury: RE (10/16/24):  Pt reports not a lot of changes since previous RE. Notes she uses the pump a lot so gauging how her shoulder is doing functionally is hard due to not using her arm as much. Pt is using the pump on BL UE. Pt was measured for custom compression garments for BL UE but still waiting for them to  be made.     RE (9/19/24):  Pt reports improvement in the R UE lymphedema and is able to fit into a smaller sleeve now. However, pt has had a flare up in the L UE last week. Stated she went to Dr. Motley who did an US of the lymph nodes. The US ruled out cancer and she f/u w/ him in two weeks. Pt is unable to use pump on that side right now and unable to wear a compression garment due to difficulty donning/doffing garment with R side.     Pt notes overall slow improvement in the shoulder. Stated she is able to zip up a zip lock bag and open cupboards and the fridge door. Stated to is still limited w/ OH mobility/strength, lifting/carrying, and other ADL's which is affecting her overall function. Pt would benefit from continued PT to address current deficits.     RE (8/21/24):  Pt reports she is in smaller sleeve as of this past weekend on the R side. Pt notes decreased achiness in BL UE. Pt is compliant with compression pump 3x/day and 4x/day on a rainy day. Pt note overall improvement with pain, function, and mobility. Pt notes difficulties include raising over 90* w/o elbow winging out to the side. Pt notes she has not tried lifting anything with the R arm. Pt notes she is starting to get some feeling back in the fingers.     RE (7/25/24):  Pt reports no change with the R lymphedema and stated she has some increased strength in the shoulder. Pt notes some improvement with the L lymphedema and feels less achy. Pt notes no change in the mobility with the shoulder. Pt notes she is able to reach into an overhead cabinet but unable to lift anything like a cup or plate. Pt notes compliance with the compression pump on the R side 3-4x/ day.     RE (6/25/24):  Pt reports no change with the lymphedema and the shoulder over this past month. Pt reports overall pain in the shoulder as improved. Pt notes slight improvement in strength in the shoulder.     RE (5/22/24):  Pt reports swelling in BL UE is maintaining overall. Pt  notes mobility in the shoulder is about the same but strength is improving. Pt is able to lift arm to touch her head now.     RE (5/6/24) adding LUE lymphedema:  Pt is s/p L breast lumpectomy that was performed about two years ago. Pt denies any discomfort or swelling in the LUE. Pt MD would like pt to start lymphedema therapy as a preventative measure. Pt does report some tenderness in the L axilla area due to scar tissue. Pt stated her  has been working on the scar tissue every night which seems to help some.       RE (4/24/24):  Pt has been see w/ c/o RUE lymphedema and s/p R humeral fracture and notes overall improvement. Pt notes 50% improvement in lymphedema since beginning physical therapy. Pt is complaint with using her compression pump 3x/day, 4x/day on bad days, and wearing the compression sleeve during the day/night time. Pt notes majority of swelling remains to be in the elbow region, which is preventing her from downsizing her current daytime sleeve she is wearing. In terms of the shoulder, she notes overall improvements in mobility, strength, and endurance. Gripping is still a challenge, but she is seeing OT for treatment for her had mobility/strength. Continued difficulties include lifting, mobility OH and behind to don/doff bra/washback.     RE (4/10/24):  Pt reports overall improvements with the tightness and swelling in the arm. Pt notes 50% improvement in the swelling since beginning lymphedema therapy. Pt is still in the smaller sized night sleeve that she wears all day and then switches to the larger sleeve at night.  She has been able to transition back into the compression pump, that she uses about 3x/day and 4x/day on a bad day. Pt notes improvement since being back into the pump.       RE (3/13/24):  Pt reports overall improvements with the tightness and swelling in the arm. Pt notes 10% improvement in the swelling since beginning lymphedema therapy. Pt has since transitioned from the  "larger night garment to the smaller night garment to wear during the day. Pt notes she has feeling back in her thumb and finger tips. Pt notes slight improvements in strength. Pt is still not using pump at home due to the continued swelling and tightness in posterior upper arm that causes more pain when using pump. Pt plans on restarting the pump when \"the lump\" in the back of the arm improves.       RE (2/14/24):  Pt has been seen for RUE lymphedema for 8 visits to date and notes 50% improvement so far. Pt reports she is still not able to use compression pump but has been wearing the Tribute garment, which seems to be helping. Pt is not longer needing to wear the clamshell for her fx unless if she is out running errands. Pt notes the swelling has improved since being able to take the brace off more often. Pt notes continued \"hardness\" in elbow and axilla region.     EVAL (1/15/24):  Pt reports to IE w/ hx of RUE lymphedema which started approx 20 years ago approx 5 years following CA dx of desmoitosis.     Pt had done skilled PT tx for this condition in the past which was helpful control the issue. However, pt sustained humeral fx on 11/12/23 as a result of fall on RUE while trying to protect mother in law from a fall out of w/c. She reports that healing has been complicated by severe osteoporosis and lymphedema. Pt still required to wear brace at all times- not yet allowed any ROM in shoulder.     In addition to the above, pt w/ recent hx of L breast CA w/ L lumpectomy and lymph node removal (pt reports 17 lymph nodes) over on 6/21/22.        Patient Goals  Patient goals for therapy: decreased pain and decreased edema    Pain  Pain scale: Shoulder -4; Lymph- 5.  Pain scale at lowest: Shoulder -1-2; Lymph- 3.  Pain scale at highest: Shoulder- 5; Lymph-4.  Location: R shoulder  Alleviating factors: lymphedema massage.  Exacerbated by: increased edema, movement.    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)  Hand dominance: right        Objective     General Comments:      Shoulder Comments   R AROM: flex-121  abd-110  IR-R T8 ER- unable      R PROM: flex- 100 (pain) abd- 100 (pain) IR-85 ER-8      R shoulder strength: flex: 5/5; abd: 5/5; ER: 4+/5; IR: 5/5      Ankle/Foot Comments   Edema in R vs L w/ circumferential girth measurements as below (L/R):   Axilla: 38.5/33.5  20 cm proximal to elbow: 32/27  10 cm proximal to elbow: 29.5/24.5 cm  Elbow Joint: 28 cm/25.5 cm  20 cm proximal to wrist: 28.5 cm/25.5 cm  10 cm proximal to wrist: 21 cm/21 cm  Wrist:  16.5 cm/16 cm  Palm: 18 cm/17 cm  MCP Joints (digits 2-5): 16.5 cm/16 cm  PIP Joints (digits 2-5): 15.5 cm/14.5 cm    FOTO: Goal: Lymph - 57 Shoulder - 49; Eval: Lymph - 5 Shoulder - 4;3/13: Lymph - 42; 4/1: Shoulder - 47;4/10 Lymph - 48; 4/24: shoulder - 42; 5/22: Lymph - 69 Shoulder - 48; 6/25: lymph - 85 Shoulder - 56; 8/21: Lymph - 98 Shoulder - 56; 9/19: Lymph - 82 Shoulder - 42      EPOC:   10/16    Shoulder EPOC: 10/16 9/9 9/16 9/19 9/6   RA Lymph DATE:   10/16      Shoulder:  10/16       Manual       MLD [facility]   Manual Wrapping       PROM                       Exercise Diary 9/19 9/9 9/16   THEREX       Pt ed FOTO; update measurements              Wall slides   Incline 2x10; wall slides along door frame x10 Incline 2x10; wall slides along door frame 2x10   SL ER   1# 2x10 1# 2x10   Standing shoulder flexion/scaption   2x10 1# 2x10 1#                 pulleys   2/2 2/2   Ball roll up wall       Supine ER cane       Shoulder isometrics   5\" 2x10 5\" 2x10   SL flexion   3x10 1# 2x10   SL abd   1# 30x 1# 30x   Supine shoulder flex   1# 30x 1# 30x   IR stretch w/ strap       NEURO RE-ED       Supine HA   RTB 2x10 RTB 2x10   Supine BL ER       [facility] reaches       Supine punches   1# 30x 1# 30x    ball roll up wall        rows/pulldowns   BTB 30x BTB 30x                     "                                          Modalities           CP

## 2024-10-17 ENCOUNTER — OFFICE VISIT (OUTPATIENT)
Dept: OCCUPATIONAL THERAPY | Facility: CLINIC | Age: 64
End: 2024-10-17
Payer: MEDICARE

## 2024-10-17 DIAGNOSIS — R20.0 NUMBNESS AND TINGLING OF LEFT UPPER EXTREMITY: Primary | ICD-10-CM

## 2024-10-17 DIAGNOSIS — R20.2 NUMBNESS AND TINGLING OF LEFT UPPER EXTREMITY: Primary | ICD-10-CM

## 2024-10-17 DIAGNOSIS — R29.898 RIGHT HAND WEAKNESS: ICD-10-CM

## 2024-10-17 PROCEDURE — 97530 THERAPEUTIC ACTIVITIES: CPT | Performed by: OCCUPATIONAL THERAPIST

## 2024-10-17 PROCEDURE — 97110 THERAPEUTIC EXERCISES: CPT | Performed by: OCCUPATIONAL THERAPIST

## 2024-10-17 NOTE — PROGRESS NOTES
"        Daily Note     Today's date: 10/17/2024  Patient name: Eileen Matias  : 1960  MRN: [id number]  Referring provider: Rogelio Black MD  Dx:   Encounter Diagnosis     ICD-10-CM    1. Numbness and tingling of left upper extremity  R20.0     R20.2       2. Right hand weakness  R29.898                          Subjective:   I am doing more and am getting stronger.  I have lymphedema in the left now.       Objective: See treatment diary below for clinic program.       Assessment: Tolerated treatment well. Patient would benefit from continued OT. IP active digit flexion progressing.  Fifth MP resistant to stretching.  Improved gross grasp for light IADLs with patient in circumferential wrist extension splint.  Improving ease of right use noted in clinic program.   Edema controlled with wear of smaller and lighter lymphedema garment.       Plan: Continue per plan of care.   Progress dexterity and gripping exercises as tolerated.  Add reaching and carrying tasks to program.  Continue wear of orthotic for wrist stability prn.         Precautions: Lymphedema; hx desmoid tumors and breast cancer; healing humeral fracture     POC expires Unit limit Auth  expiration date PT/OT + Visit Limit?    NA NA BOMN         Dx R Hum Fx     Dr Wayne MILLER MET          Last RE 24    Date 10/8 10/10 10/15 10/17      Visit 40 41 42 543   5/10       Manuals 8 8 5 8                Jt mobs digits, FA MP all G 1-2 MP all G 1-2 MP all G 1-2 MP all G 1-2      Ortho fit/train          Neuro Re-Ed           RNG          MNG          CHIKI                    orthotic                              Ther Ex 23 23 23 23      HEP          PROM 1:1 Digital flexion LLPS Digital flexion LLPS Digital flexion LLPS Digital flexion LLPS      Digital blocked AROM P/H fists 5x5\" P/H fists 5x5\" P/H fists 5x5\" P/H fists 5x5\"      UE warm up UBE 2F 2R   R only x20 UBE 2F 2R  Right only x30 UBE 2F 2R  Right only x30 UBE 2F 2R  Right only " x30      PRE FA           PRE Wrist FA G F bar down  2x10  R F bar up   2x10 G F bar down  2x10  R F bar up  2x10   G F bar down  2x10  R F bar up  2x10 G F bar down  2x10  R F bar up  2x10      PRE elbow    Elbow E, reach forward  In P/N/Sup  0# x5 each      Shoulder ROM          Sh PROM          Ther Activity 10   10   15   15'      Gripping Y web  to edge   X20  Gray #2  10 foams  Y web  to edge   X20  Gray #2  10 foams  Y web  to edge   X20  Gray #2  all foams  Y web  to edge   X20  Gray #2  all foams       Pinching Black Cpin  X20 blocks Black Cpin  X20 blocks Black Cpin  X20 blocks Black Cpin  X20 blocks      Coordination Y F bar  F/B  S/S Y F bar  F/B  S/S Y F bar  F/B  S/S Y F bar  F/B  S/S      Carrying      NV B/L tray     Reaching      NV Cones onto counter     MHP Pre tx with ace flex wrap.  Pre tx with ace flex wrap.  Pre tx with ace flex wrap.  Pre tx with ace flex wrap.

## 2024-10-21 ENCOUNTER — OFFICE VISIT (OUTPATIENT)
Dept: PHYSICAL THERAPY | Facility: CLINIC | Age: 64
End: 2024-10-21
Payer: MEDICARE

## 2024-10-21 DIAGNOSIS — S42.344D CLOSED NONDISPLACED SPIRAL FRACTURE OF SHAFT OF RIGHT HUMERUS WITH ROUTINE HEALING, SUBSEQUENT ENCOUNTER: ICD-10-CM

## 2024-10-21 DIAGNOSIS — I89.0 LYMPHEDEMA OF RIGHT ARM: Primary | ICD-10-CM

## 2024-10-21 DIAGNOSIS — I89.0 LYMPHEDEMA OF LEFT ARM: ICD-10-CM

## 2024-10-21 PROCEDURE — 97112 NEUROMUSCULAR REEDUCATION: CPT

## 2024-10-21 PROCEDURE — 97140 MANUAL THERAPY 1/> REGIONS: CPT

## 2024-10-21 PROCEDURE — 97110 THERAPEUTIC EXERCISES: CPT

## 2024-10-21 NOTE — PROGRESS NOTES
"Daily Note     Today's date: 10/21/2024  Patient name: Eileen Matias  : 1960  MRN: [id number]  Referring provider: Rick Lazo DO  Dx:   Encounter Diagnosis     ICD-10-CM    1. Lymphedema of right arm  I89.0       2. Lymphedema of left arm  I89.0       3. Closed nondisplaced spiral fracture of shaft of right humerus with routine healing, subsequent encounter  S42.141D                      Subjective: pt reports no new complaints upon arrival.       Objective: See treatment diary below      Assessment: Tolerated treatment well. Patient would benefit from continued PT. Discussed that we put a zipper in on the L side do help with donning/doffing along w/ a zipper in the nighttime garments, pt was okay with this plan. Pt now has a compression sleeve for the pump for the L side as of last week.       Plan: Continue per plan of care.        FOTO: Goal: Lymph - 57 Shoulder - 49; Eval: Lymph - 5 Shoulder - 4;3/13: Lymph - 42; : Shoulder - 47;4/10 Lymph - 48; : shoulder - 42; : Lymph - 69 Shoulder - 48; : lymph - 85 Shoulder - 56; : Lymph - 98 Shoulder - 56      EPOC:   10/16    Shoulder EPOC: 10/16 10/21 10/7 10/9 10/14   RA Lymph DATE:         Shoulder:         Manual       MLD [facility]   Manual Wrapping       PROM                       Exercise Diary 10/9 10/14 10/21    THEREX       Pt ed [facility]           Wall slides 2x10 2x10 2x10    SL ER 1# 2x10 1# 2x10 1# 2x10    Standing shoulder flexion/scaption  2x10 ea     Cane flexion I Y 1# 10\" 5x ea I Y 1# 10\" 5x ea I Y 1# 5\" x10 ea    ABCs 1# 1x 1# 1x 1# 1x    pulleys 2/2 2/2 2/2    Ball roll up wall       Supine ER cane       Shoulder isometrics       SL flexion 1# 2x10 1# 2x10 1# 2x10    SL abd 1# x10  2# x10 1# x10  2# x10 2# x15    Supine shoulder flex 1# x10  2# x10 1# x10  2# x10 2# x15    IR stretch w/ strap       NEURO RE-ED       Supine HA       Supine BL ER       [facility] reaches       Supine punches 1# x10  2# x10 1# x10  2# " Patient calling with left side hip pain states he is unable to get out of bed and unable to walk.     Onset: 3 years ago     Location / description / Associated Symptoms: hip pain on the left side, osteoporosis, pain is worse than usual, pain is keeping him from doing normal activities, states he cannot get out of bed because the pain is so bad     Precipitating Factors: osteoporosis     Pain Scale (1-10), 10 highest: 10/10    What improves / worsens symptoms: resting helps with the pain     Symptom specific medications: tylenol     Recent visits (last 3-4 weeks) for same reason or recent surgery: ER visit on 3/17/23 for UTI started on antibiotic     Advised patient if he cannot bare weight on the leg and pain is severe he should go in to be seen by provider or go into the ER.     Patient states he is not going back to the ER and his provider already knows about this pain. Patient then ended the call.     PLAN:  Go to Emergency Department    Patient/Caller does not plan to follow recommendations.    Reason for Disposition  • Can't stand (bear weight) or walk    Protocols used: HIP PAIN-A-AH       x10 2# x15     ball roll up wall        rows/pulldowns [place] 8#/10# 2x10 [place] 8#/10# 2x10 [place] 8#/10# 2x10     cone reaches   1 min                                                       Modalities           CP

## 2024-10-22 ENCOUNTER — TELEPHONE (OUTPATIENT)
Age: 64
End: 2024-10-22

## 2024-10-22 ENCOUNTER — OFFICE VISIT (OUTPATIENT)
Dept: OCCUPATIONAL THERAPY | Facility: CLINIC | Age: 64
End: 2024-10-22
Payer: MEDICARE

## 2024-10-22 DIAGNOSIS — R29.898 RIGHT HAND WEAKNESS: ICD-10-CM

## 2024-10-22 DIAGNOSIS — R20.0 NUMBNESS AND TINGLING OF LEFT UPPER EXTREMITY: Primary | ICD-10-CM

## 2024-10-22 DIAGNOSIS — R20.2 NUMBNESS AND TINGLING OF LEFT UPPER EXTREMITY: Primary | ICD-10-CM

## 2024-10-22 PROCEDURE — 97530 THERAPEUTIC ACTIVITIES: CPT | Performed by: OCCUPATIONAL THERAPIST

## 2024-10-22 PROCEDURE — 97110 THERAPEUTIC EXERCISES: CPT | Performed by: OCCUPATIONAL THERAPIST

## 2024-10-22 NOTE — PROGRESS NOTES
"        Daily Note     Today's date: 10/22/2024  Patient name: Eileen Matias  : 1960  MRN: [id number]  Referring provider: Rogelio Black MD  Dx:   Encounter Diagnosis     ICD-10-CM    1. Numbness and tingling of left upper extremity  R20.0     R20.2       2. Right hand weakness  R29.898                          Subjective:   I am doing more and am getting stronger. I have been able to carry more- right      Objective: See treatment diary below for clinic program.       Assessment: Tolerated treatment well. Patient would benefit from continued OT. IP active digit flexion progressing.  Fifth MP resistant to stretching.  Improved gross grasp for light IADLs with patient in circumferential wrist extension splint.  Improving ease of right use noted in clinic program.   Edema controlled with wear of smaller and lighter lymphedema garment.       Plan: Continue per plan of care.   Progress dexterity and gripping exercises as tolerated.  Add reaching and carrying tasks to program.  Continue wear of orthotic for wrist stability prn.         Precautions: Lymphedema; hx desmoid tumors and breast cancer; healing humeral fracture     POC expires Unit limit Auth  expiration date PT/OT + Visit Limit?    NA NA BOMN         Dx R Hum Fx     Dr Wayne MILLER MET          Last RE 24    Date 10/8 10/10 10/15 10/17 10/22     Visit 40 41 42 43   5/10  44  6/10      Manuals 8 8 5 8 5               Jt mobs digits, FA MP all G 1-2 MP all G 1-2 MP all G 1-2 MP all G 1-2 MP all G 1-2     Ortho fit/train          Neuro Re-Ed           RNG          MNG          CHIKI                    orthotic                              Ther Ex 23 23 23 23   23     HEP     Progress carry and lifting prn     PROM 1:1 Digital flexion LLPS Digital flexion LLPS Digital flexion LLPS Digital flexion LLPS Digital flexion LLPS     Digital blocked AROM P/H fists 5x5\" P/H fists 5x5\" P/H fists 5x5\" P/H fists 5x5\" P/H fists 5x5\"     UE warm up " UBE 2F 2R   R only x20 UBE 2F 2R  Right only x30 UBE 2F 2R  Right only x30 UBE 2F 2R  Right only x30 R only x35     PRE FA      #2 S/P x20     PRE Wrist FA G F bar down  2x10  R F bar up   2x10 G F bar down  2x10  R F bar up  2x10   G F bar down  2x10  R F bar up  2x10 G F bar down  2x10  R F bar up  2x10 G  x20    G  x10     PRE elbow    Elbow E, reach forward  In P/N/Sup  0# x5 each      Shoulder ROM          Sh PROM          Ther Activity 10   10   15   15'  15     Gripping Y web  to edge   X20  Gray #2  10 foams  Y web  to edge   X20  Gray #2  10 foams  Y web  to edge   X20  Gray #2  all foams  Y web  to edge   X20  Gray #2  all foams  Y web  to edge   X20     Pinching Black Cpin  X20 blocks Black Cpin  X20 blocks Black Cpin  X20 blocks Black Cpin  X20 blocks Black Cpin  X20 blocks     Coordination Y F bar  F/B  S/S Y F bar  F/B  S/S Y F bar  F/B  S/S Y F bar  F/B  S/S      Carrying      B/L tray 2 laps     Reaching      Cones onto counter #1 AW  2x10     MHP Pre tx with ace flex wrap.  Pre tx with ace flex wrap.  Pre tx with ace flex wrap.  Pre tx with ace flex wrap.  Pre tx with ace flex wrap.

## 2024-10-23 ENCOUNTER — OFFICE VISIT (OUTPATIENT)
Dept: PHYSICAL THERAPY | Facility: CLINIC | Age: 64
End: 2024-10-23
Payer: MEDICARE

## 2024-10-23 DIAGNOSIS — I89.0 LYMPHEDEMA OF RIGHT ARM: Primary | ICD-10-CM

## 2024-10-23 DIAGNOSIS — S42.344D CLOSED NONDISPLACED SPIRAL FRACTURE OF SHAFT OF RIGHT HUMERUS WITH ROUTINE HEALING, SUBSEQUENT ENCOUNTER: ICD-10-CM

## 2024-10-23 DIAGNOSIS — I89.0 LYMPHEDEMA OF LEFT ARM: ICD-10-CM

## 2024-10-23 PROCEDURE — 97140 MANUAL THERAPY 1/> REGIONS: CPT

## 2024-10-23 PROCEDURE — 97112 NEUROMUSCULAR REEDUCATION: CPT

## 2024-10-23 PROCEDURE — 97110 THERAPEUTIC EXERCISES: CPT

## 2024-10-23 NOTE — PROGRESS NOTES
"Daily Note     Today's date: 10/23/2024  Patient name: Eileen Matias  : 1960  MRN: [id number]  Referring provider: Rick Lazo DO  Dx:   Encounter Diagnosis     ICD-10-CM    1. Lymphedema of right arm  I89.0       2. Closed nondisplaced spiral fracture of shaft of right humerus with routine healing, subsequent encounter  S42.344D       3. Lymphedema of left arm  I89.0                      Subjective: pt reports she received the new garment for the R arm and it fits well. Stated she is going to be going through a surgery sometime next year to help with lymph node regeneration.       Objective: See treatment diary below      Assessment: Tolerated treatment well. Patient would benefit from continued PT. R UE sleeve fits well. Pt is still waiting for the nighttime garments and the R arm sleeve daytime sleeve. Increased resistance w/ cane flexion, some c/o popping in the wrist.       Plan: Continue per plan of care.      FOTO: Goal: Lymph - 57 Shoulder - 49; Eval: Lymph - 5 Shoulder - 4;3/13: Lymph - 42; : Shoulder - 47;4/10 Lymph - 48; : shoulder - 42; : Lymph - 69 Shoulder - 48; : lymph - 85 Shoulder - 56; : Lymph - 98 Shoulder - 56      EPOC:   10/16    Shoulder EPOC: 10/16 10/21 10/23 10/9 10/14   RA Lymph DATE:         Shoulder:         Manual       MLD [facility]   Manual Wrapping       PROM                       Exercise Diary 10/9 10/14 10/21 10/23   THEREX       Pt ed [facility]          Wall slides 2x10 2x10 2x10 2x10   SL ER 1# 2x10 1# 2x10 1# 2x10 1# 2x10   Standing shoulder flexion/scaption  2x10 ea  1# 2x10 ea   Cane flexion I Y 1# 10\" 5x ea I Y 1# 10\" 5x ea I Y 1# 5\" x10 ea I Y 2# 10\" x5   ABCs 1# 1x 1# 1x 1# 1x 2# 1x   pulleys 2/2 2/2 2/2 2/2   Ball roll up wall       Supine ER cane       Shoulder isometrics       SL flexion 1# 2x10 1# 2x10 1# 2x10 1# 2x10   SL abd 1# x10  2# x10 1# x10  2# x10 2# x15 2# x15   Supine shoulder flex 1# x10  2# x10 1# x10  2# x10 2# " x15 2# x15   IR stretch w/ strap       NEURO RE-ED       Supine HA       Supine BL ER       [facility] reaches       Supine punches 1# x10  2# x10 1# x10  2# x10 2# x15 2# x15    ball roll up wall        rows/pulldowns [place] 8#/10# 2x10 [place] 8#/10# 2x10 [place] 8#/10# 2x10 [place] 8#/10# 2x10    cone reaches   1 min                                                       Modalities           CP

## 2024-10-28 ENCOUNTER — OFFICE VISIT (OUTPATIENT)
Dept: PHYSICAL THERAPY | Facility: CLINIC | Age: 64
End: 2024-10-28
Payer: MEDICARE

## 2024-10-28 DIAGNOSIS — I89.0 LYMPHEDEMA OF LEFT ARM: ICD-10-CM

## 2024-10-28 DIAGNOSIS — S42.344D CLOSED NONDISPLACED SPIRAL FRACTURE OF SHAFT OF RIGHT HUMERUS WITH ROUTINE HEALING, SUBSEQUENT ENCOUNTER: ICD-10-CM

## 2024-10-28 DIAGNOSIS — I89.0 LYMPHEDEMA OF RIGHT ARM: Primary | ICD-10-CM

## 2024-10-28 PROCEDURE — 97140 MANUAL THERAPY 1/> REGIONS: CPT

## 2024-10-28 PROCEDURE — 97110 THERAPEUTIC EXERCISES: CPT

## 2024-10-28 NOTE — PROGRESS NOTES
"Daily Note     Today's date: 10/28/2024  Patient name: Eileen Matias  : 1960  MRN: [id number]  Referring provider: Rick Lazo DO  Dx:   Encounter Diagnosis     ICD-10-CM    1. Lymphedema of right arm  I89.0       2. Closed nondisplaced spiral fracture of shaft of right humerus with routine healing, subsequent encounter  S42.344D       3. Lymphedema of left arm  I89.0                      Subjective: pt reports no major changes.       Objective: See treatment diary below      Assessment: Tolerated treatment well. Patient would benefit from continued PT. Pt continues to want to not measure for custom compression glove for R UE until her hand is better despite telling her that she could benefit from one now. Pt does not want a compression glove for L hand. Pt seems to be plateuing with her progress with the lymphedema and shoulder. Tried to increase the resistance w/ rows/pulldowns but was unable to due to compensatory movements.       Plan: Continue per plan of care.      FOTO: Goal: Lymph - 57 Shoulder - 49; Eval: Lymph - 5 Shoulder - 4;3/13: Lymph - 42; : Shoulder - 47;4/10 Lymph - 48; : shoulder - 42; : Lymph - 69 Shoulder - 48; : lymph - 85 Shoulder - 56; : Lymph - 98 Shoulder - 56      EPOC:   10/16    Shoulder EPOC: 10/16 10/21 10/23 10/28 10/14   RA Lymph DATE:         Shoulder:         Manual       MLD [facility]   Manual Wrapping       PROM                       Exercise Diary 10/28   10/23   THEREX       Pt ed [facility]          Wall slides 2x10   2x10   SL ER 1# 2x10   1# 2x10   Standing shoulder flexion/scaption    1# 2x10 ea   Cane flexion I Y 2# 10\" 5x ea   I Y 2# 10\" x5   ABCs 2# 1x   2# 1x   pulleys 2/2   2/2   Ball roll up wall       Supine ER cane       Shoulder isometrics 5\" x10      SL flexion 1# 2x10   1# 2x10   SL abd 2# 20x   2# x15   Supine shoulder flex 2# 20x   2# x15   IR stretch w/ strap       NEURO RE-ED       Supine HA       Supine BL ER       [facility] " reaches       Supine punches 2# 20x   2# x15    ball roll up wall        rows/pulldowns [place] 8#/10# 2x10   [place] 8#/10# 2x10    cone reaches                                                          Modalities           CP

## 2024-10-29 ENCOUNTER — OFFICE VISIT (OUTPATIENT)
Dept: OCCUPATIONAL THERAPY | Facility: CLINIC | Age: 64
End: 2024-10-29
Payer: MEDICARE

## 2024-10-29 DIAGNOSIS — R20.2 NUMBNESS AND TINGLING OF LEFT UPPER EXTREMITY: Primary | ICD-10-CM

## 2024-10-29 DIAGNOSIS — R29.898 RIGHT HAND WEAKNESS: ICD-10-CM

## 2024-10-29 DIAGNOSIS — R20.0 NUMBNESS AND TINGLING OF LEFT UPPER EXTREMITY: Primary | ICD-10-CM

## 2024-10-29 PROCEDURE — 97530 THERAPEUTIC ACTIVITIES: CPT | Performed by: OCCUPATIONAL THERAPIST

## 2024-10-29 PROCEDURE — 97110 THERAPEUTIC EXERCISES: CPT | Performed by: OCCUPATIONAL THERAPIST

## 2024-10-29 NOTE — PROGRESS NOTES
"        Daily Note     Today's date: 10/29/2024  Patient name: Eileen Matias  : 1960  MRN: [id number]  Referring provider: Rogelio Black MD  Dx:   Encounter Diagnosis     ICD-10-CM    1. Numbness and tingling of left upper extremity  R20.0     R20.2       2. Right hand weakness  R29.898                          Subjective:   I am doing more and am getting stronger. I have been able to carry more- right      Objective: See treatment diary below for clinic program.       Assessment: Tolerated treatment well. Patient would benefit from continued OT. IP active digit flexion progressing.  Fifth MP resistant to stretching.  Improved gross grasp for light IADLs with patient in circumferential wrist extension splint.  Improving ease of right use noted in clinic program.   Edema controlled with wear of smaller and lighter lymphedema garment.       Plan: Continue per plan of care.   Progress dexterity and gripping exercises as tolerated.  Add reaching and carrying tasks to program.  Continue wear of orthotic for wrist stability prn.         Precautions: Lymphedema; hx desmoid tumors and breast cancer; healing humeral fracture     POC expires Unit limit Auth  expiration date PT/OT + Visit Limit?    NA NA BOMN         Dx R Hum Fx     Dr Wayne MILLER MET          Last RE 24    Date 10/8 10/10 10/15 10/17 10/22 10/29    Visit 40 41 42 43   5/10  44  6/10  45  7/10     Manuals 8 8 5 8 5   5              Jt mobs digits, FA MP all G 1-2 MP all G 1-2 MP all G 1-2 MP all G 1-2 MP all G 1-2 MP all G 1-2    Ortho fit/train          Neuro Re-Ed           RNG          MNG          CHIKI                    orthotic                              Ther Ex 23 23 23 23   23   25    HEP     Progress carry and lifting prn     PROM 1:1 Digital flexion LLPS Digital flexion LLPS Digital flexion LLPS Digital flexion LLPS Digital flexion LLPS Digital flexion LLPS    Digital blocked AROM P/H fists 5x5\" P/H fists 5x5\" P/H " "fists 5x5\" P/H fists 5x5\" P/H fists 5x5\" P/H fists 5x5\"    UE warm up UBE 2F 2R   R only x20 UBE 2F 2R  Right only x30 UBE 2F 2R  Right only x30 UBE 2F 2R  Right only x30 UBE 2F 2R L1  R only x35 UBE 2F 2R L1  R only x30    PRE FA      #2 S/P x20 #2 S/P x10  1/2 shaft x10    PRE Wrist FA G F bar down  2x10  R F bar up   2x10 G F bar down  2x10  R F bar up  2x10   G F bar down  2x10  R F bar up  2x10 G F bar down  2x10  R F bar up  2x10 G  x20  down  G  x10  Up G Down x20  G up x10  R up x10  R fold x15    PRE elbow    Elbow E, reach forward  In P/N/Sup  0# x5 each      Shoulder ROM          Sh PROM          Ther Activity 10   10   15   15'  15   10    Gripping Y web  to edge   X20  Gray #2  10 foams  Y web  to edge   X20  Gray #2  10 foams  Y web  to edge   X20  Gray #2  all foams  Y web  to edge   X20  Gray #2  all foams  Y web  to edge   X20 G web  to edge   X20    Pinching Black Cpin  X20 blocks Black Cpin  X20 blocks Black Cpin  X20 blocks Black Cpin  X20 blocks Black Cpin  X20 blocks Black Cpin  X20 blocks    Coordination Y F bar  F/B  S/S Y F bar  F/B  S/S Y F bar  F/B  S/S Y F bar  F/B  S/S      Carrying      B/L tray 2 laps     Reaching      Cones onto counter #1 AW  2x10     MHP Pre tx with ace flex wrap.  Pre tx with ace flex wrap.  Pre tx with ace flex wrap.  Pre tx with ace flex wrap.  Pre tx with ace flex wrap.  Pre tx with ace flexion wrap                       "

## 2024-10-30 ENCOUNTER — HOSPITAL ENCOUNTER (OUTPATIENT)
Age: 64
Discharge: HOME | End: 2024-10-30
Payer: MEDICARE

## 2024-10-30 VITALS — BODY MASS INDEX: 21.83 KG/M2 | WEIGHT: 131 LBS | HEIGHT: 65 IN

## 2024-10-30 DIAGNOSIS — Z79.811 AROMATASE INHIBITOR USE: ICD-10-CM

## 2024-10-30 DIAGNOSIS — M85.88 OSTEOPENIA OF LUMBAR SPINE: ICD-10-CM

## 2024-10-30 PROCEDURE — 77080 DXA BONE DENSITY AXIAL: CPT

## 2024-10-30 NOTE — PROGRESS NOTES
Discharge Patient.    Patient called to cx her last apt which was going to be a discharge. Pt is able to manage lymphedema sx at home w/ pump for BL UE, compression garments for BL UE, pt still does not have updated hand garment due to pt not wanting to measure yet and wants to wait until her hand is doing better.

## 2024-10-31 ENCOUNTER — OFFICE VISIT (OUTPATIENT)
Dept: OCCUPATIONAL THERAPY | Facility: CLINIC | Age: 64
End: 2024-10-31
Payer: MEDICARE

## 2024-10-31 ENCOUNTER — APPOINTMENT (OUTPATIENT)
Dept: PHYSICAL THERAPY | Facility: CLINIC | Age: 64
End: 2024-10-31
Payer: MEDICARE

## 2024-10-31 DIAGNOSIS — R29.898 RIGHT HAND WEAKNESS: ICD-10-CM

## 2024-10-31 DIAGNOSIS — R20.2 NUMBNESS AND TINGLING OF LEFT UPPER EXTREMITY: Primary | ICD-10-CM

## 2024-10-31 DIAGNOSIS — R20.0 NUMBNESS AND TINGLING OF LEFT UPPER EXTREMITY: Primary | ICD-10-CM

## 2024-10-31 PROCEDURE — 97110 THERAPEUTIC EXERCISES: CPT | Performed by: OCCUPATIONAL THERAPIST

## 2024-10-31 PROCEDURE — 97530 THERAPEUTIC ACTIVITIES: CPT | Performed by: OCCUPATIONAL THERAPIST

## 2024-10-31 NOTE — PROGRESS NOTES
Daily Note     Today's date: 10/31/2024  Patient name: Eileen Matias  : 1960  MRN: [id number]  Referring provider: Rogelio Black MD  Dx:   Encounter Diagnosis     ICD-10-CM    1. Numbness and tingling of left upper extremity  R20.0     R20.2       2. Right hand weakness  R29.898                          Subjective:   I am doing more and am getting stronger. I have been able to carry more- right      Objective: See treatment diary below for clinic program.       Assessment: Tolerated treatment well. Patient would benefit from continued OT. IP active digit flexion progressing.  Fifth MP resistant to stretching.  Improved gross grasp for light IADLs with patient in circumferential wrist extension splint.  Improving ease of right use noted in clinic program.   Edema controlled with wear of smaller and lighter lymphedema garment.       Plan: Continue per plan of care.   Progress dexterity and gripping exercises as tolerated.  Add reaching and carrying tasks to program.  Continue wear of orthotic for wrist stability prn.         Precautions: Lymphedema; hx desmoid tumors and breast cancer; healing humeral fracture     POC expires Unit limit Auth  expiration date PT/OT + Visit Limit?    NA NA BOMN         Dx R Hum Fx     Dr Wayne MILLER MET          Last RE 24    Date 10/8 10/10 10/15 10/17 10/22 10/29 10/31   Visit 40 41 42 43   5/10  44  6/10  45  7/10  46  7/10   Manuals 8 8 5 8 5   5 5             Jt mobs digits, FA MP all G 1-2 MP all G 1-2 MP all G 1-2 MP all G 1-2 MP all G 1-2 MP all G 1-2 MP all digits G 1-2   Ortho fit/train          Neuro Re-Ed           RNG          MNG          CHIKI                    orthotic                              Ther Ex 23 23 23 23   23   25   25   HEP     Progress carry and lifting prn     PROM 1:1 Digital flexion LLPS Digital flexion LLPS Digital flexion LLPS Digital flexion LLPS Digital flexion LLPS Digital flexion LLPS Digital flexion LLPS  "  Digital blocked AROM P/H fists 5x5\" P/H fists 5x5\" P/H fists 5x5\" P/H fists 5x5\" P/H fists 5x5\" P/H fists 5x5\" P/H fists 5x5\"   UE warm up UBE 2F 2R   R only x20 UBE 2F 2R  Right only x30 UBE 2F 2R  Right only x30 UBE 2F 2R  Right only x30 UBE 2F 2R L1  R only x35 UBE 2F 2R L1  R only x30 UBE 2F 2R L1  R only x25   PRE FA      #2 S/P x20 #2 S/P x10  1/2 shaft x10 #2 S/P x10  1/2 shaft x10   PRE Wrist FA G F bar down  2x10  R F bar up   2x10 G F bar down  2x10  R F bar up  2x10   G F bar down  2x10  R F bar up  2x10 G F bar down  2x10  R F bar up  2x10 G  x20  down  G  x10  Up G Down x20  G up x10  R up x10  R fold x15 G Down x10  G up x10  R Pull x20  R fold x15   PRE elbow    Elbow E, reach forward  In P/N/Sup  0# x5 each      Shoulder ROM          Sh PROM          Ther Activity 10   10   15   15'  15   10    Gripping Y web  to edge   X20  Gray #2  10 foams  Y web  to edge   X20  Gray #2  10 foams  Y web  to edge   X20  Gray #2  all foams  Y web  to edge   X20  Gray #2  all foams  Y web  to edge   X20 G web  to edge   X20 G web  to edge   X20   Pinching Black Cpin  X20 blocks Black Cpin  X20 blocks Black Cpin  X20 blocks Black Cpin  X20 blocks Black Cpin  X20 blocks Black Cpin  X20 blocks Black Cpin  X20 blocks   Coordination Y F bar  F/B  S/S Y F bar  F/B  S/S Y F bar  F/B  S/S Y F bar  F/B  S/S      Carrying      B/L tray 2 laps     Reaching      Cones onto counter #1 AW  2x10     MHP Pre tx with ace flex wrap.  Pre tx with ace flex wrap.  Pre tx with ace flex wrap.  Pre tx with ace flex wrap.  Pre tx with ace flex wrap.  Pre tx with ace flexion wrap Pre tx with ace flexion wrap                      "

## 2024-11-01 ENCOUNTER — TELEPHONE (OUTPATIENT)
Dept: HEMATOLOGY ONCOLOGY | Facility: CLINIC | Age: 64
End: 2024-11-01

## 2024-11-01 NOTE — TELEPHONE ENCOUNTER
Called and spoke with patient to let her know that her November 4th appointment was canceled since she saw Dr. Motley in October.      [facility] was also canceled for this appointment.

## 2024-11-01 NOTE — TELEPHONE ENCOUNTER
----- Message from Maricel OSBORNE sent at 11/1/2024 11:43 AM EDT -----  Please call this patient and advise this appt can be cancelled bc we just saw her in October. [facility] will need to be cancelled. Her next appt is in may

## 2024-11-04 ENCOUNTER — EVALUATION (OUTPATIENT)
Dept: PHYSICAL THERAPY | Facility: CLINIC | Age: 64
End: 2024-11-04
Payer: MEDICARE

## 2024-11-04 DIAGNOSIS — I89.0 LYMPHEDEMA: Primary | ICD-10-CM

## 2024-11-04 PROCEDURE — 97110 THERAPEUTIC EXERCISES: CPT | Performed by: PHYSICAL THERAPIST

## 2024-11-04 PROCEDURE — 97140 MANUAL THERAPY 1/> REGIONS: CPT | Performed by: PHYSICAL THERAPIST

## 2024-11-04 NOTE — PROGRESS NOTES
PT Evaluation     Today's date: 2024  Patient name: Eileen Matias  : 1960  MRN: [id number]  Referring provider: No ref. provider found  Dx:   Encounter Diagnosis     ICD-10-CM    1. Lymphedema  I89.0           Start Time: 330  Stop Time: 430  Total time in clinic (min): 60 minutes    Assessment  Impairments: abnormal or restricted ROM, impaired physical strength, lacks appropriate home exercise program and pain with function  Symptom irritability: low    Assessment details: Eileen Matias is a 64 y.o. 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  She  presents with pain, decreased strength, decreased ROM, and postural  dysfunction. Due to these impairments, Patient has difficulty performing a/iadls, recreational activities and engaging in social activities. Patient's clinical presentation is consistent with their referring diagnosis. Patient would benefit from compression garments, HEP of MLD as well as skilled physical therapy to address their aforementioned impairments, improve their level of function and to improve their overall quality of life.  has been given a home exercise program and is in agreement with the plan of care.  Thank you for your referral.     Barriers to therapy: R UE limits ability to perform home MLD -  to assist  R UE limitations affects ability to don and doff compression garments   Understanding of Dx/Px/POC: excellent     Prognosis: excellent    Goals  ST Goals - 2-4 weeks  1. Patient will report decreased pain with activity by at least 2 points within 4 weeks  2. Patient and  will perform hep MLD independently w/in 4 weeks  3. Patient to become independent in donning and doffing compression garments in 2-4 weeks      LT Goals - Discharge  1. Patient will improve FOTO score to maximum stated or greater by discharge  2. Patient will return to preferred recreational activity without significant pain increase by  discharge   3.  Patient will return to all work related activities without pain by discharge      Plan  Patient would benefit from: skilled physical therapy  Referral necessary: No    Planned therapy interventions: manual therapy, neuromuscular re-education, home exercise program, therapeutic exercise, therapeutic activities, stretching and strengthening    Frequency: 2x week  Duration in weeks: 8  Plan of Care beginning date: 2024  Plan of Care expiration date: 2024  Treatment plan discussed with: patient and family      Subjective Evaluation    History of Present Illness  Mechanism of injury: Patient presents today s/p L BCA with L lumpectomy with ALND on 22.  She then had her radiation therapy.  She did not need to have chemotherapy.  She began to have swelling in the L UE which was treated by OT in  and then PT in .    CC:  lymphedema and she has pain from the forearm to the elbow.  She reports that the pain in the elbow area has been present for 3-4 months. She uses her compression pump 3 x a day for an hour for B Ues.  Function:  She reports that she had a fx of the L wrist.  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  She is able to do her art.  She is unable to drive.  She reports that the UE gets tired easily.  She is able to lift and carry something the size of a gallon of milk with the L UE.           Recurrent probem    Quality of life: excellent    Patient Goals  Patient goals for therapy: decreased edema, decreased pain, independence with ADLs/IADLs and return to sport/leisure activities    Pain  Current pain ratin  At worst pain ratin    Social Support    Employment status: not working  Hand dominance: left    Treatments  Previous treatment: occupational therapy and physical therapy  Current treatment: physical therapy        Objective     Observations     Additional Observation Details  + lymphedema L UE  - Stemmer's sign  - pitting edema  + Axillary cording    Active Range of  Motion   Left Shoulder   Flexion: 162 degrees   Abduction: 150 degrees   External rotation BTH: WFL  Internal rotation BTB: WFL    Strength/Myotome Testing     Left Shoulder   Normal muscle strength    Right Shoulder   Normal muscle strength             Precautions: L BCA  Access Code: [email or web address]  URL: [email or web address]  Date: 11/04/2024  Prepared by: Meena Jain    Exercises  - Shoulder Flexion Wall Slide with Towel  - 1 x daily - 7 x weekly - 1 sets - 5 reps - 10 hold  - Standing Shoulder Abduction Slides at Wall  - 1 x daily - 7 x weekly - 3 sets - 10 reps  - Single Arm Doorway Pec Stretch at 90 Degrees Abduction  - 1 x daily - 7 x weekly - 1 sets - 5 reps - 10 hold    Manuals 11/4 11/6           Manual MFR 10'            Self MLD                                       Neuro Re-Ed                                                                                                        Ther Ex             Pulley                          Wall slides             Bicep curls cuff weight             Row cuff weight             Tricep cuff weight             Bike                          Ther Activity                                       Gait Training                                       Modalities

## 2024-11-05 ENCOUNTER — APPOINTMENT (OUTPATIENT)
Dept: OCCUPATIONAL THERAPY | Facility: CLINIC | Age: 64
End: 2024-11-05
Payer: MEDICARE

## 2024-11-05 DIAGNOSIS — E78.2 HYPERLIPIDEMIA, MIXED: ICD-10-CM

## 2024-11-06 RX ORDER — EZETIMIBE 10 MG/1
10 TABLET ORAL DAILY
Qty: 90 TABLET | Refills: 0 | Status: SHIPPED | OUTPATIENT
Start: 2024-11-06

## 2024-11-07 ENCOUNTER — APPOINTMENT (OUTPATIENT)
Dept: OCCUPATIONAL THERAPY | Facility: CLINIC | Age: 64
End: 2024-11-07
Payer: MEDICARE

## 2024-11-07 ENCOUNTER — TELEPHONE (OUTPATIENT)
Dept: HEMATOLOGY ONCOLOGY | Facility: CLINIC | Age: 64
End: 2024-11-07

## 2024-11-07 NOTE — TELEPHONE ENCOUNTER
I received notification that patient has not read her [email or web address] message regarding recent DEXA scan results.  I did attempt to call the patient today.  No answer.  Left voicemail letting her know to read [email or web address] message with recommendations for treatment of her osteopenia.

## 2024-11-07 NOTE — TELEPHONE ENCOUNTER
Pt did call back and I relay message to pt. She request a call back to her  Angel at [phone] due to him being the one who takes care of all her things. She also stated when her  comes home he will help with reading the [employer] message .

## 2024-11-08 DIAGNOSIS — Z91.89 AT HIGH RISK FOR FRACTURE: ICD-10-CM

## 2024-11-08 DIAGNOSIS — M85.88 OSTEOPENIA OF LUMBAR SPINE: Primary | ICD-10-CM

## 2024-11-08 DIAGNOSIS — M89.9 DISORDER OF BONE, UNSPECIFIED: ICD-10-CM

## 2024-11-08 RX ORDER — ALENDRONATE SODIUM 70 MG/1
70 TABLET ORAL
Qty: 12 TABLET | Refills: 1 | Status: SHIPPED | OUTPATIENT
Start: 2024-11-08 | End: 2025-05-07

## 2024-11-12 ENCOUNTER — OFFICE VISIT (OUTPATIENT)
Dept: OCCUPATIONAL THERAPY | Facility: CLINIC | Age: 64
End: 2024-11-12
Payer: MEDICARE

## 2024-11-12 DIAGNOSIS — R29.898 RIGHT HAND WEAKNESS: ICD-10-CM

## 2024-11-12 DIAGNOSIS — R20.0 NUMBNESS AND TINGLING OF LEFT UPPER EXTREMITY: Primary | ICD-10-CM

## 2024-11-12 DIAGNOSIS — R20.2 NUMBNESS AND TINGLING OF LEFT UPPER EXTREMITY: Primary | ICD-10-CM

## 2024-11-12 PROCEDURE — 97110 THERAPEUTIC EXERCISES: CPT | Performed by: OCCUPATIONAL THERAPIST

## 2024-11-12 PROCEDURE — 97530 THERAPEUTIC ACTIVITIES: CPT | Performed by: OCCUPATIONAL THERAPIST

## 2024-11-12 NOTE — PROGRESS NOTES
"        Daily Note     Today's date: 2024  Patient name: Eileen Matias  : 1960  MRN: [id number]  Referring provider: Rogelio Black MD  Dx:   Encounter Diagnosis     ICD-10-CM    1. Numbness and tingling of left upper extremity  R20.0     R20.2       2. Right hand weakness  R29.898                          Subjective:   I have no pain in my shoulder after the bike.  Something has let loose.        Objective: See treatment diary below for clinic program.       Assessment: Tolerated treatment well. Patient would benefit from continued OT. IP active digit flexion progressing.  Fifth MP resistant to stretching.  Improved gross grasp for light IADLs with patient in circumferential wrist extension splint.  Improving ease of right use noted in clinic program.   Edema controlled with wear of smaller and lighter lymphedema garment.       Plan: Continue per plan of care.   Progress dexterity and gripping exercises as tolerated.  Continue  reaching and carrying tasks to program.  Continue wear of orthotic for wrist stability prn.         Precautions: Lymphedema; hx desmoid tumors and breast cancer; healing humeral fracture     POC expires Unit limit Auth  expiration date PT/OT + Visit Limit?    NA NA BOMN         Dx R Hum Fx     Dr Wayne MILLER MET          Last RE 24    Date     RE NV  10/17 10/22 10/29 10/31   Visit 47 9/10    43   5/10  44  6/10  45  7/10  46  8/10    Manuals 8'   8 5   5 5             Jt mobs digits, FA MP all digits G 1-2   MP all G 1-2 MP all G 1-2 MP all G 1-2 MP all digits G 1-2   Ortho fit/train          Neuro Re-Ed           RNG          MNG          CHIKI                    orthotic                              Ther Ex 25   23   23   25   25   HEP     Progress carry and lifting prn     PROM 1:1 Digital flexion LLPS   Digital flexion LLPS Digital flexion LLPS Digital flexion LLPS Digital flexion LLPS   Digital blocked AROM P/H 5x5\"   P/H fists 5x5\" P/H " "fists 5x5\" P/H fists 5x5\" P/H fists 5x5\"   UE warm up UBE 2F 2R L1  R only x40   UBE 2F 2R  Right only x30 UBE 2F 2R L1  R only x35 UBE 2F 2R L1  R only x30 UBE 2F 2R L1  R only x25   PRE FA  #2 S/P x10  3/4 shaft x25    #2 S/P x20 #2 S/P x10  1/2 shaft x10 #2 S/P x10  1/2 shaft x10   PRE Wrist FA G Down x10  G up x10  R Pull x20  R fold x1   G F bar down  2x10  R F bar up  2x10 G  x20  down  G  x10  Up G Down x20  G up x10  R up x10  R fold x15 G Down x10  G up x10  R Pull x20  R fold x15   PRE elbow    Elbow E, reach forward  In P/N/Sup  0# x5 each      Shoulder ROM          Sh PROM          Ther Activity 10'     15'  15   10   10   Gripping G web  to edge   X20   Y web  to edge   X20  Gray #2  all foams  Y web  to edge   X20 G web  to edge   X20 G web  to edge   X20   Pinching Black Cpin  X20 blocks   Black Cpin  X20 blocks Black Cpin  X20 blocks Black Cpin  X20 blocks Black Cpin  X20 blocks   Coordination    Y F bar  F/B  S/S      Carrying      B/L tray 2 laps     Reaching      Cones onto counter #1 AW  2x10     MHP Pre tx with ace flexion wrap   Pre tx with ace flex wrap.  Pre tx with ace flex wrap.  Pre tx with ace flexion wrap Pre tx with ace flexion wrap                      "

## 2024-11-14 ENCOUNTER — OFFICE VISIT (OUTPATIENT)
Dept: OCCUPATIONAL THERAPY | Facility: CLINIC | Age: 64
End: 2024-11-14
Payer: MEDICARE

## 2024-11-14 DIAGNOSIS — R29.898 RIGHT HAND WEAKNESS: ICD-10-CM

## 2024-11-14 DIAGNOSIS — R20.0 NUMBNESS AND TINGLING OF LEFT UPPER EXTREMITY: Primary | ICD-10-CM

## 2024-11-14 DIAGNOSIS — R20.2 NUMBNESS AND TINGLING OF LEFT UPPER EXTREMITY: Primary | ICD-10-CM

## 2024-11-14 PROCEDURE — 97110 THERAPEUTIC EXERCISES: CPT | Performed by: OCCUPATIONAL THERAPIST

## 2024-11-14 PROCEDURE — 97168 OT RE-EVAL EST PLAN CARE: CPT | Performed by: OCCUPATIONAL THERAPIST

## 2024-11-14 NOTE — PROGRESS NOTES
OT Re-Evaluation     Today's date: 2024  Patient name: Eileen Matias  : 1960  MRN: [id number]  Referring provider: Rogelio Black MD  Dx:   Encounter Diagnosis     ICD-10-CM    1. Numbness and tingling of left upper extremity  R20.0     R20.2       2. Right hand weakness  R29.898                          Assessment  Impairments: abnormal coordination, abnormal or restricted ROM, activity intolerance, impaired physical strength, pain with function and weight-bearing intolerance  Impairments comments: reduced sensation  Other impairment: Lymphedema RUE; joint constractures right digits, wrist, forearm; radial nerve neuropathy  Functional limitations: Able use to use right hand to assist during ADL's;  Able to pinch digits in  three point prehension for short periods.  Symptom irritability: low    Assessment details: Eileen Matias is a 63 y.o., Ambidextrous HD female referred to hand therapy for  right hand,wrist and forearm contractures.  Onset of injury 23 due to fall causing spiral fracture of the right humerus.  Patient presents 24 with impaired ROM of the right digits, wrist, forearm,  impaired strength, and impaired sensation of the right peripheral nerves, particularly the radial nerve.  Deficits also noted in pain, edema, and functional use of the right UE. Patient joint contractures of the digits, wrist and forearm as well as significant intrinsic and extrinsic mm tightness. Patient is a good candidate for OT services to abolish pain and edema and restore ROM, strength, coordination, and sensation for a return to independence in daily tasks.      Re 7/3/24  Eileen has attended 19 therapy visits over the past 12 weeks.  She has made steady progress in active and passive motion.  Pain has reduced since IE and is not at a moderate and steady level.  She is now reporting sensation along the thumb and index, in an area previously insensate.  She has been wearing a wrist support  consistently allowing her to use her right hand in a gross motor fashion and allowing three point pinch for light tasks, short periods.  She remains in lymphedema sleeves with are restrictive and resist motion during daily tasks.  Therapy may continue to address active and passive motion, improve functional  and prehension to allow increased independence in light home tasks and art work.          RE 9/24/24  Eileen has attended 37 therapy visits over the past 12 weeks.  She has made steady progress in active and passive motion.  Pain has reduced since IE and is not at a moderate and steady level.  She is now reporting sensation along the thumb and index, in an area previously insensate.  She has been wearing a wrist support consistently allowing her to use her right hand with gross flexion and some dexterity tasks.  This motion also allows three point pinch for light tasks, short periods.  With wearing of wrist support,   strength measures in the low functional level.  She continues to remain in lymphedema sleeves with are restrictive motion during daily tasks.  Therapy may continue to address active and passive motion, improve functional  and prehension to allow increased independence in light home tasks and art work.        RE 11/14/24  Eileen has attended 47 therapy and continues to make slow and steady progress in motion and strength.   Pain has reduced to a mild level since last RE.   She wears a wrist support consistently allowing her to use her right hand with improving  flexion and FMD.  With wearing of wrist support,   strength measures in the low functional level.  She continues to remain in lymphedema sleeves with are restrictive motion during daily tasks.  Therapy may continue to address active and passive digital motion, improve functional , pinch  and prehension to allow increased independence in light home tasks and art work.          Understanding of Dx/Px/POC: excellent      Prognosis: good    Goals  STGs (4 weeks)  Patient will be independent in HEP of ROM, splinting to increase motion, edema management MET  Patient will report an average pain level of 4-5/10 MET  Patient will demonstrate 30 degree improvement in PAGE of the digits MET  Patient will demonstrate active wrist extension/flexion to 10/70  Partially MET  Patient will demonstrate active forearm supination to 45 MET  LTGs (12 weeks)  Patient will demonstrate independence in a HEP to maintain ROM, strength, and function at discharge NOT MET  Patient will report an average pain level of 1-2/10 to be independent in daily tasks NOT MET  Patient will demonstrate PAGE of the digits to 220 degrees to be independent in self care tasks NOT MET  Patient will demonstrate AROM of the wrist and forearm WFL to be independent in self care tasks  MET  Patient will demonstrate 5/5 muscle strength in the wrist and forearm to be MI for meal prep  NOT MET  Patient will demonstrate right hand strength within 30% of the left hand to be MI for IADL tasks NOT MET  Patient will demonstrate control  of edema MET      Plan  Patient would benefit from: skilled occupational therapy and custom splinting  Planned modality interventions: thermotherapy: hydrocollator packs and cryotherapy    Planned therapy interventions: activity modification, compression, fine motor coordination training, graded activity, graded exercise, home exercise program, therapeutic exercise, therapeutic activities, stretching, strengthening, patient education, orthotic fitting/training, neuromuscular re-education, manual therapy, IASTM, joint mobilization, kinesiology taping, massage, muscle pump exercises and nerve gliding  Other planned therapy interventions: Static progressive and dynamic splinting to increase digit, wrist, forearm motion    Frequency: 2x week  Duration in weeks: 8  Plan of Care beginning date: 11/14/2024  Plan of Care expiration date: 1/9/2025  Treatment plan  discussed with: patient      Subjective Evaluation    History of Present Illness  Date of onset: 11/12/2023  Mechanism of injury: trauma  Mechanism of injury: 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  Hx of lymphedema and frequent cellulitis infections in the RUE. Patient also had left breast cancer and has LUE lymphedema as well.    She reports that she suffered a displaced spiral fracture of the right humerus on 11/12/23 after a fall while pushing her mother in law who was seated in a wheelchair.  Patient went to ED that date for xrays and long arm cast. She was referred to orthopedics. Had a closed reduction of fracture. Long arm cast for approximately 4 weeks and then a Kruse clam shell brace. Patient received  a course of home health OT for ROM of the digits, wrist and elbow and edema management. She has had ongoing PT for lymphedema management and right shoulder ROM.     Patient referred to Dr. Black for radial nerve dysfunction and joint contractures in the forearm, wrist and hand. An EMG has been ordered. Patient now referred to OT for splinting and ROM to address joint contractures.            Recurrent probem    Quality of life: good    Patient Goals  Patient goals for therapy: decreased edema, decreased pain, increased motion, increased strength and independence with ADLs/IADLs  Patient goal: Able to hold tweezers in right for art work  Pain  Current pain rating: 3  Location: Proximal humerus, wrist, hand right  Quality: dull ache and discomfort  Relieving factors: medications, support and heat  Exacerbated by: Putting on compression sleeve.  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     Additional Observation Details  History of RUE lymphedema;  Currently wearing smallest sleeve which is the same worn prior to current injury.     Neurological Testing     Sensation     Wrist/Hand     Right   Diminished: light touch    Additional Neurological Details  24: Loss of protective sensation radial nerve distribution (4.56g) . Diminished protective sensation median and ulnar nerves (3.61g)    Active Range of Motion     Right Elbow   Flexion: 130 degrees   Extension: 0 degrees   Forearm supination: 60 degrees   Forearm pronation: 90 degrees     Right Wrist   Wrist flexion: 65 degrees   Wrist extension: 0 degrees   Radial deviation: 0 degrees   Ulnar deviation: 25 degrees     Right Thumb   Palmar Abduction    CMC: 45  Radial Abduction    CMC: 5  Kapandji score: 9 degrees    Right Digits   Flexion   Index     MCP: 65    PIP: 90    DIP: 50  Middle     MCP: 55    PIP: 95    DIP: 50  Ring     MCP: 55    PIP: 95    DIP: 50  Little     MCP: 45    PIP: 75    DIP: 40  Index: 205 degrees  Middle: 200 degrees  Rin degrees  Small: 160 degrees    Additional Active Range of Motion Details  In gravity eliminated position.       AROM   Distance to palm  II  1.5 cm,  III  1.5 cm,  IV  1.5 cm,  V  2.5 cm    PROM   Distance to palm  II  0 cm,  III  .5 cm,  IV  .5 cm,  V  2 cm        Passive Range of Motion     Right Elbow   Flexion: 135 degrees   Extension: 90 degrees   Forearm supination: 80 degrees   Forearm pronation: 90 degrees     Right Wrist   Wrist flexion: 70 degrees   Wrist extension: 60 degrees   Radial deviation: 10 degrees   Ulnar deviation: 40 degrees     Strength/Myotome Testing     Right Wrist/Hand   Wrist extension: 2  Wrist flexion: 5    Additional Strength Details  [place]   #2  24.4 lbs. (+4.4 )          Tests     Right Wrist/Hand   Positive extrinsic extensor tightness, extrinsic flexor tightness and  intrinsic muscle tightness.     Swelling     Right Elbow Girth Measurements   Joint line: 26 cm  10 cm below joint line: 26 cm               Daily Note     Today's date: 2024  Patient name: Eileen Matias  : 1960  MRN: [id number]  Referring provider: Rogelio Black MD  Dx:   Encounter Diagnosis     ICD-10-CM    1. Numbness and tingling of left upper extremity  R20.0     R20.2       2. Right hand weakness  R29.898                            Subjective:   I have no pain in my shoulder after the bike.  Something has let loose.        Objective: See treatment diary below for clinic program.       Assessment: Tolerated treatment well. Patient would benefit from continued OT. IP active digit flexion progressing.  Fifth MP resistant to stretching.  Improved gross grasp for light IADLs with patient in circumferential wrist extension splint.  Improving ease of right use noted in clinic program.   Edema controlled with wear of smaller and lighter lymphedema garment.       Plan: Continue per plan of care.   Progress dexterity and gripping exercises as tolerated.  Continue  reaching and carrying tasks to program.  Continue wear of orthotic for wrist stability prn.         Precautions: Lymphedema; hx desmoid tumors and breast cancer; healing humeral fracture     POC expires Unit limit Auth  expiration date PT/OT + Visit Limit?   25 NA NA BOMN         Dx R Hum Fx     Dr Wayne MILLER MET          Last RE 24    Date     RE  10/17 10/22 10/29 10/31   Visit 47 9/10  48 -10/10   43   5/10  44  6/10  45  7/10  46  8/10    Manuals 8' 8'  8 5   5 5             Jt mobs digits, FA MP all digits G 1-2 MP all digits G 1-2  MP all G 1-2 MP all G 1-2 MP all G 1-2 MP all digits G 1-2   Ortho fit/train          Neuro Re-Ed           RNG          MNG          CHIKI                    orthotic                              Ther Ex 25   25  23   23   25   25   HEP     Progress carry and lifting  "prn     PROM 1:1 Digital flexion LLPS Digital flexion LLPS  Digital flexion LLPS Digital flexion LLPS Digital flexion LLPS Digital flexion LLPS   Digital blocked AROM P/H 5x5\" P/H 5x5\"  P/H fists 5x5\" P/H fists 5x5\" P/H fists 5x5\" P/H fists 5x5\"   UE warm up UBE 2F 2R L1  R only x40 UBE 2F 2R L1  R only x40  UBE 2F 2R  Right only x30 UBE 2F 2R L1  R only x35 UBE 2F 2R L1  R only x30 UBE 2F 2R L1  R only x25   PRE FA  #2 S/P x10  3/4 shaft x25 #2 S/P x10  3/4 shaft x25   #2 S/P x20 #2 S/P x10  1/2 shaft x10 #2 S/P x10  1/2 shaft x10   PRE Wrist FA G Down x10  G up x10  R Pull x20  R fold x1 G Down x10  G up x10  R Pull x20  R fold x1  G F bar down  2x10  R F bar up  2x10 G  x20  down  G  x10  Up G Down x20  G up x10  R up x10  R fold x15 G Down x10  G up x10  R Pull x20  R fold x15   PRE elbow    Elbow E, reach forward  In P/N/Sup  0# x5 each      Shoulder ROM          Sh PROM          Ther Activity 10'     15'  15   10   10   Gripping G web  to edge   X20   Y web  to edge   X20  Gray #2  all foams  Y web  to edge   X20 G web  to edge   X20 G web  to edge   X20   Pinching Black Cpin  X20 blocks   Black Cpin  X20 blocks Black Cpin  X20 blocks Black Cpin  X20 blocks Black Cpin  X20 blocks   Coordination    Y F bar  F/B  S/S      Carrying      B/L tray 2 laps     Reaching      Cones onto counter #1 AW  2x10     MHP Pre tx with ace flexion wrap Pre tx with ace flexion wrap  Pre tx with ace flex wrap.  Pre tx with ace flex wrap.  Pre tx with ace flexion wrap Pre tx with ace flexion wrap                      "

## 2024-11-18 ENCOUNTER — OFFICE VISIT (OUTPATIENT)
Dept: PHYSICAL THERAPY | Facility: CLINIC | Age: 64
End: 2024-11-18
Payer: MEDICARE

## 2024-11-18 DIAGNOSIS — I89.0 LYMPHEDEMA: Primary | ICD-10-CM

## 2024-11-18 PROCEDURE — 97140 MANUAL THERAPY 1/> REGIONS: CPT | Performed by: PHYSICAL THERAPIST

## 2024-11-18 PROCEDURE — 97110 THERAPEUTIC EXERCISES: CPT | Performed by: PHYSICAL THERAPIST

## 2024-11-18 NOTE — PROGRESS NOTES
Daily Note     Today's date: 2024  Patient name: Eileen Matias  : 1960  MRN: [id number]  Referring provider: No ref. provider found  Dx:   Encounter Diagnosis     ICD-10-CM    1. Lymphedema  I89.0           Start Time: 430  Stop Time: 525  Total time in clinic (min): 55 minutes    Subjective: Patient reports that she has received her garments and she feels the hand piece is too tight.  Patient reports that she and her  did not try to home MLD massage.      Objective: See treatment diary below.  Fitting of night time B garments and day time L garment.      Assessment: Tolerated treatment well. Patient would benefit from continued PT  Patient with good tolerance to B night garments.  The day sleeve zipper is not long enough for her use.  Unable to progress UE strengthening today due to lack of a compression garment for day time use.        Plan: Continue per plan of care.      Precautions: L АННА  Access Code: [email or web address]  URL: [email or web address]  Date: 2024  Prepared by: Meena Jain    Exercises  - Shoulder Flexion Wall Slide with Towel  - 1 x daily - 7 x weekly - 1 sets - 5 reps - 10 hold  - Standing Shoulder Abduction Slides at Wall  - 1 x daily - 7 x weekly - 3 sets - 10 reps  - Single Arm Doorway Pec Stretch at 90 Degrees Abduction  - 1 x daily - 7 x weekly - 1 sets - 5 reps - 10 hold    Manuals           Manual MFR 10'            Self MLD             Compression fitting   20'          Girth mmts    5'          Neuro Re-Ed                                                                                                        Ther Ex             Pulley   4'                       Wall slides  10 x :10 10 x :10          Bicep curls cuff weight   nv          Row cuff weight   nv          Tricep cuff weight   nv          Bike  6' 6'          TG L 20   10                                                 Gait Training                                       Modalities

## 2024-11-19 ENCOUNTER — TELEPHONE (OUTPATIENT)
Dept: HEMATOLOGY ONCOLOGY | Facility: CLINIC | Age: 64
End: 2024-11-19

## 2024-11-19 ENCOUNTER — OFFICE VISIT (OUTPATIENT)
Dept: OCCUPATIONAL THERAPY | Facility: CLINIC | Age: 64
End: 2024-11-19
Payer: MEDICARE

## 2024-11-19 ENCOUNTER — APPOINTMENT (OUTPATIENT)
Dept: LAB | Facility: HOSPITAL | Age: 64
End: 2024-11-19
Payer: MEDICARE

## 2024-11-19 DIAGNOSIS — R20.0 NUMBNESS AND TINGLING OF LEFT UPPER EXTREMITY: Primary | ICD-10-CM

## 2024-11-19 DIAGNOSIS — R29.898 RIGHT HAND WEAKNESS: ICD-10-CM

## 2024-11-19 DIAGNOSIS — Z08 ENCOUNTER FOR FOLLOW-UP SURVEILLANCE OF BREAST CANCER: ICD-10-CM

## 2024-11-19 DIAGNOSIS — Z85.3 ENCOUNTER FOR FOLLOW-UP SURVEILLANCE OF BREAST CANCER: ICD-10-CM

## 2024-11-19 DIAGNOSIS — R20.2 NUMBNESS AND TINGLING OF LEFT UPPER EXTREMITY: Primary | ICD-10-CM

## 2024-11-19 DIAGNOSIS — Z79.811 AROMATASE INHIBITOR USE: ICD-10-CM

## 2024-11-19 DIAGNOSIS — M89.9 DISORDER OF BONE, UNSPECIFIED: ICD-10-CM

## 2024-11-19 DIAGNOSIS — M85.88 OSTEOPENIA OF LUMBAR SPINE: ICD-10-CM

## 2024-11-19 DIAGNOSIS — Z91.89 AT HIGH RISK FOR FRACTURE: ICD-10-CM

## 2024-11-19 LAB
25(OH)D3 SERPL-MCNC: 36.2 NG/ML (ref 30–100)
ALBUMIN SERPL BCG-MCNC: 4 G/DL (ref 3.5–5)
ALP SERPL-CCNC: 93 U/L (ref 34–104)
ALT SERPL W P-5'-P-CCNC: 14 U/L (ref 7–52)
ANION GAP SERPL CALCULATED.3IONS-SCNC: 7 MMOL/L (ref 4–13)
AST SERPL W P-5'-P-CCNC: 16 U/L (ref 13–39)
BILIRUB SERPL-MCNC: 0.38 MG/DL (ref 0.2–1)
BUN SERPL-MCNC: 8 MG/DL (ref 5–25)
CALCIUM SERPL-MCNC: 9.1 MG/DL (ref 8.4–10.2)
CHLORIDE SERPL-SCNC: 102 MMOL/L (ref 96–108)
CO2 SERPL-SCNC: 25 MMOL/L (ref 21–32)
CREAT SERPL-MCNC: 0.53 MG/DL (ref 0.6–1.3)
GFR SERPL CREATININE-BSD FRML MDRD: 99 ML/MIN/1.73SQ M
GLUCOSE SERPL-MCNC: 131 MG/DL (ref 65–140)
POTASSIUM SERPL-SCNC: 4.1 MMOL/L (ref 3.5–5.3)
PROT SERPL-MCNC: 6.4 G/DL (ref 6.4–8.4)
SODIUM SERPL-SCNC: 134 MMOL/L (ref 135–147)

## 2024-11-19 PROCEDURE — 97140 MANUAL THERAPY 1/> REGIONS: CPT | Performed by: OCCUPATIONAL THERAPIST

## 2024-11-19 PROCEDURE — 36415 COLL VENOUS BLD VENIPUNCTURE: CPT

## 2024-11-19 PROCEDURE — 97110 THERAPEUTIC EXERCISES: CPT | Performed by: OCCUPATIONAL THERAPIST

## 2024-11-19 PROCEDURE — 82306 VITAMIN D 25 HYDROXY: CPT

## 2024-11-19 PROCEDURE — 80053 COMPREHEN METABOLIC PANEL: CPT

## 2024-11-19 NOTE — TELEPHONE ENCOUNTER
Called and spoke with patient. It was clarified that no new medication was sent to pharmacy, we were circling back to a communication we saw wasn't read. She stated she already started taking the Fosomax, Vit D and Calcium. She had not had the labs done so she was headed to have these done today to make sure all was ok. She was appreciative of call and clarification of message.

## 2024-11-19 NOTE — PROGRESS NOTES
"             Daily Note     Today's date: 2024  Patient name: Eileen Matias  : 1960  MRN: [id number]  Referring provider: Rogelio Black MD  Dx:   Encounter Diagnosis     ICD-10-CM    1. Numbness and tingling of left upper extremity  R20.0     R20.2       2. Right hand weakness  R29.898                            Subjective:   I have no pain in my shoulder after the bike.  Something has let loose.        Objective: See treatment diary below for clinic program.       Assessment: Tolerated treatment well. Patient would benefit from continued OT. IP active digit flexion progressing.  Fifth MP resistant to stretching.  Improved gross grasp for light IADLs with patient in circumferential wrist extension splint.  Improving ease of right use noted in clinic program.   Edema controlled with wear of smaller and lighter lymphedema garment.       Plan: Continue per plan of care.   Progress dexterity and gripping exercises as tolerated. Continue wear of orthotic for wrist stability.         Precautions: Lymphedema; hx desmoid tumors and breast cancer; healing humeral fracture     POC expires Unit limit Auth  expiration date PT/OT + Visit Limit?   25 NA NA BOMN         Dx R Hum Fx     Dr Wayne MILLER MET          Last RE 24    Date     RE 11/19   10/29 10/31   Visit 47 9/10  48 -10/10  49  1/10    45  7/10  46  8/10    Manuals 8' 8' 8'     5 5             Jt mobs digits, FA MP all digits G 1-2 MP all digits G 1-2 MP All digits G 1-2   MP all G 1-2 MP all digits G 1-2   Ortho fit/train          Neuro Re-Ed           RNG          MNG          CHIKI                    orthotic                              Ther Ex 25   25   30     25   25   HEP          PROM 1:1 Digital flexion LLPS Digital flexion LLPS Digital flexion LLPS   Digital flexion LLPS Digital flexion LLPS   Digital blocked AROM P/H 5x5\" P/H 5x5\" P/H 5x5\"   P/H fists 5x5\" P/H fists 5x5\"   UE warm up UBE 2F 2R L1  R only " x40 UBE 2F 2R L1  R only x40 UBE 2F 2R L1  R only x40   UBE 2F 2R L1  R only x30 UBE 2F 2R L1  R only x25   PRE FA  #2 S/P x10  3/4 shaft x25 #2 S/P x10  3/4 shaft x25 Hold, no splint   #2 S/P x10  1/2 shaft x10 #2 S/P x10  1/2 shaft x10   PRE Wrist FA G Down x10  G up x10  R Pull x20  R fold x1 G Down x10  G up x10  R Pull x20  R fold x1 G Down x20  G up x20  R Pull x20  R fold x20   G Down x20  G up x10  R up x10  R fold x15 G Down x10  G up x10  R Pull x20  R fold x15   PRE digits   G web  to edge   X20       Shoulder ROM          Sh PROM          Ther Activity 10'         10   10   Gripping G web  to edge   X20     G web  to edge   X20 G web  to edge   X20   Pinching Black Cpin  X20 blocks     Black Cpin  X20 blocks Black Cpin  X20 blocks   Coordination          Carrying           Reaching           MHP Pre tx with ace flexion wrap Pre tx with ace flexion wrap Pre tx with ace flexion wrap   Pre tx with ace flexion wrap Pre tx with ace flexion wrap

## 2024-11-19 NOTE — TELEPHONE ENCOUNTER
Relayed message to Eileen Huddleston. Pt verbalized understanding and agreed to go get labs done     Medication was sent.     As a reminder. Please check with your dentist prior to starting therapy. Additionally, I would like you have have blood work done to check kidney function, calcium level, and vitmain D level prior to starting. The orders are submitted. Non-fasting.     Karo TORRES

## 2024-11-20 ENCOUNTER — OFFICE VISIT (OUTPATIENT)
Dept: OBGYN CLINIC | Facility: HOSPITAL | Age: 64
End: 2024-11-20
Payer: MEDICARE

## 2024-11-20 ENCOUNTER — APPOINTMENT (OUTPATIENT)
Dept: PHYSICAL THERAPY | Facility: CLINIC | Age: 64
End: 2024-11-20
Payer: MEDICARE

## 2024-11-20 ENCOUNTER — RESULTS FOLLOW-UP (OUTPATIENT)
Dept: OTHER | Facility: HOSPITAL | Age: 64
End: 2024-11-20

## 2024-11-20 VITALS — HEIGHT: 65 IN | WEIGHT: 134.8 LBS | BODY MASS INDEX: 22.46 KG/M2

## 2024-11-20 DIAGNOSIS — G56.30 RADIAL NERVE PALSY: Primary | ICD-10-CM

## 2024-11-20 PROCEDURE — 99213 OFFICE O/P EST LOW 20 MIN: CPT | Performed by: ORTHOPAEDIC SURGERY

## 2024-11-20 NOTE — PROGRESS NOTES
ASSESSMENT/PLAN:    Assessment:   Nerve dysfunction of right upper extremity involving the radial and median nerve.   Medial nerve shows interval improvement from last office assessment.   Persistence of radial nerve dysfunction.  Lymphedema of the right upper extremity.    Plan:   - Patient to continue with formal hand therapy to help with improvement of her range of motion and sensation in the right upper extremity.  -Based on conversation with patient today as well as upcoming lymph node surgery in the setting of her lymphedema, will defer on pursuing tendon transplantation for improvement of her symptomology as it was discussed that this will most likely exacerbate/flare her lymphedema symptoms.  Patient and spouse were agreeable with this plan of action.  -Follow-up after continuation of formal hand therapy and reassess symptom progression in 3 months.  Incorporation of TENS unit as well as mirror therapy to help with therapy modalities.      Follow Up:  3  month(s)    To Do Next Visit:   Check progress       _____________________________________________________  CHIEF COMPLAINT:  Chief Complaint   Patient presents with    Right Hand - Follow-up     SUBJECTIVE:  Eileen Matias is a 64 y.o. female who presents for follow up regarding radial nerve palsy following proximal humerus fracture.  (It was noted on past note from previous visit as well as discussion with patient that this is in the presence of a previous humerus fracture).  Since last visit, Eileen Matias has tried therapy and activity modification with only partial relief.  Today there is Numbness, Stiffness/LROM, and numbness to the right index finger, long finger, ring finger, and small finger.    Radiation: None  Associated symptoms: Numbness  Handedness: left    PAST MEDICAL HISTORY:  Past Medical History:   Diagnosis Date    Abnormal mammogram 05/15/2013    Anemia     Cancer (HCC)     desmoitosis    Carcinoma of left breast metastatic to axillary  lymph node (HCC) 04/19/2022    Chronic pain disorder     worse right side of body    Desmoid tumor     Last Assessed: 6/19/2017    fibroidal cyst 1999?  Hysterectomy done    History of transfusion     no reactions    Hyperlipidemia     Hypertension     Osteoporosis     PONV (postoperative nausea and vomiting)        PAST SURGICAL HISTORY:  Past Surgical History:   Procedure Laterality Date    BREAST LUMPECTOMY Left 6/21/2022    Procedure: ANITA  DIRECTED LUMPECTOMY;  Surgeon: Amanda Motley MD;  Location: [facility];  Service: Surgical Oncology    FRACTURE SURGERY      HYSTERECTOMY      LYMPH NODE DISSECTION Left 6/21/2022    Procedure: AXILLARY DISSECTION LEVEL I AND LEVEL II LYMPH NODES;  Surgeon: Amanda Motley MD;  Location: [facility];  Service: Surgical Oncology    MRI BREAST BIOPSY LEFT (ALL INCLUSIVE) Left 5/20/2022    MRI BREAST BIOPSY RIGHT (ALL INCLUSIVE) Right 5/20/2022    OTHER SURGICAL HISTORY      Arm Incision: Dermoid tumor, right Arm     PARTIAL HYSTERECTOMY      MD COLONOSCOPY FLX DX W/COLLJ SPEC WHEN PFRMD N/A 8/15/2017    Procedure: COLONOSCOPY;  Surgeon: Alec England MD;  Location: [facility];  Service: Gastroenterology    MD NDSC WRST SURG W/RLS TRANSVRS CARPL LIGM Left 8/10/2021    Procedure: RELEASE LEFT CARPAL TUNNEL ENDOSCOPIC;  Surgeon: Chris Camacho MD;  Location: [facility];  Service: Orthopedics    MD OPTX DSTL RADL I-ARTIC FX/EPIPHYSL SEP 3 FRAG Left 2/9/2021    Procedure: OPEN REDUCTION W/ INTERNAL FIXATION (ORIF) RADIUS / ULNA (WRIST) left;  Surgeon: Chris Camacho MD;  Location: [facility];  Service: Orthopedics    SKIN BIOPSY      SKIN CANCER EXCISION      Melanoma Excision     TONSILLECTOMY      US BREAST NEEDLE LOC LEFT Left 6/16/2022    US BREAST NEEDLE LOC RIGHT EACH ADDITIONAL Right 6/16/2022    US GUIDED BREAST LYMPH NODE BIOPSY LEFT Left 4/14/2022       FAMILY HISTORY:  Family History   Problem Relation Age of Onset    Diabetes Mother     No  Known Problems Father     Cancer Sister     Lung cancer Sister     Pancreatic cancer Sister     No Known Problems Maternal Grandmother     No Known Problems Maternal Grandfather     No Known Problems Paternal Grandmother     No Known Problems Paternal Grandfather     Breast cancer Neg Hx        SOCIAL HISTORY:  Social History     Tobacco Use    Smoking status: Never    Smokeless tobacco: Never   Vaping Use    Vaping status: Never Used   Substance Use Topics    Alcohol use: Not Currently     Alcohol/week: 5.0 standard drinks of alcohol     Types: 5 Shots of liquor per week     Comment: Used mostly as a painkiller when needed before bedtime    Drug use: Yes     Frequency: 28.0 times per week     Types: Marijuana     Comment: THC Medical marijuana       MEDICATIONS:    Current Outpatient Medications:     alendronate (FOSAMAX) 70 mg tablet, Take 1 tablet (70 mg total) by mouth every 7 days, Disp: 12 tablet, Rfl: 1    cholecalciferol (VITAMIN D3) 1,000 units tablet, Take 3,000 Units by mouth daily, Disp: , Rfl:     diphenhydrAMINE (BENADRYL) 50 MG tablet, Take 50 mg by mouth daily at bedtime as needed for itching, Disp: , Rfl:     dronabinol (MARINOL) 10 MG capsule, , Disp: , Rfl:     ezetimibe (ZETIA) 10 mg tablet, Take 1 tablet (10 mg total) by mouth daily, Disp: 90 tablet, Rfl: 0    letrozole (FEMARA) 2.5 mg tablet, Take 1 tablet (2.5 mg total) by mouth daily, Disp: 90 tablet, Rfl: 3    multivitamin (THERAGRAN) TABS, Take 1 tablet by mouth daily, Disp: , Rfl:     Omega-3 Fatty Acids (FISH OIL PO), Take by mouth, Disp: , Rfl:     penicillin V potassium (VEETID) 500 mg tablet, Take 1 tablet (500 mg total) by mouth every 12 (twelve) hours, Disp: 180 tablet, Rfl: 1    Probiotic Product (PROBIOTIC-10 PO), Take by mouth, Disp: , Rfl:     gabapentin (Neurontin) 100 mg capsule, Take 1 capsule (100 mg total) by mouth 3 (three) times a day, Disp: 90 capsule, Rfl: 1    ALLERGIES:  Allergies   Allergen Reactions    Chlorpromazine  "Anaphylaxis     PHENOTHIAZINE=ANAPHYLAXIS    Codeine Anaphylaxis     Anaphylaxis  abd pain.    Meperidine Anaphylaxis, Hives and Vomiting    Phenothiazines Anaphylaxis and Throat Swelling     Category: Allergy;     Saccharin - Food Allergy Anaphylaxis    Ampicillin-Sulbactam Sodium Hives    Aspirin GI Intolerance and Abdominal Pain     Severe GERD    Gluten Meal - Food Allergy GI Intolerance    Ibuprofen Hives     H    Levofloxacin Hives    Chocolate - Food Allergy GI Intolerance    Lactose - Food Allergy GI Intolerance    Neomycin Other (See Comments), Edema and Hives     Category: Allergy;   swelling    Lajas - Food Allergy Rash    Latex Hives and Rash     Category: Allergy;        REVIEW OF SYSTEMS:  Pertinent items are noted in HPI.  A comprehensive review of systems was negative.    LABS:  HgA1c:   Lab Results   Component Value Date    HGBA1C 5.3 07/13/2021     BMP:   Lab Results   Component Value Date    GLUCOSE 98 05/02/2017    CALCIUM 9.1 11/19/2024    K 4.1 11/19/2024    CO2 25 11/19/2024     11/19/2024    BUN 8 11/19/2024    CREATININE 0.53 (L) 11/19/2024           _____________________________________________________  PHYSICAL EXAMINATION:  Vital signs: Ht 5' 5\" (1.651 m)   Wt 61.1 kg (134 lb 12.8 oz)   BMI 22.43 kg/m²   General: well developed and well nourished, alert, oriented times 3, and appears comfortable  Psychiatric: Normal  HEENT: Trachea Midline, No torticollis  Cardiovascular: No discernable arrhythmia  Pulmonary: No wheezing or stridor  Abdomen: No rebound or guarding  Extremities: No peripheral edema  Skin: No masses, erythema, lacerations, fluctation, ulcerations  Neurovascular: Sensation Intact to the Median, Ulnar, Radial Nerve, Decreased Sensation to  the Radial Nerve, Motor Intact to the Median Nerve, Motor Intact to the Ulnar Nerve, Weakness to the radial nerve patient has appreciable wrist drop as needed for support of the hand, and Pulses Intact    MUSCULOSKELETAL " EXAMINATION:  Right Elbow   Flexion: 130 degrees   Extension: 0 degrees   Forearm supination: 60 degrees   Forearm pronation: 90 degrees   Full passive supination and pronation     Right Wrist   Wrist flexion: 80 degrees /full  Wrist extension: 0 degrees /60  Radial deviation: 0 degrees   Ulnar deviation: 30 degrees      Right Thumb   Palmar Abduction    CMC: 55  Radial Abduction    CMC: 10      Right Digits AROM/PROM  Flexion   Index     MCP: 65/ 70 PIP: 80/90, DIP: 45/50  Middle     MCP: 55/80, PIP: 80/90, DIP: 50/50  Ring     MCP: 55/80, PIP: 80/90, DIP: 50/60  Little     MCP: 40/45, PIP: 70/80, DIP: 40/60     FPL 5/5  FDP 5/5 all fingers  FDS index 5/5, long 4/5, ring 4/5  FCR 5/5  FCU 5/5    _____________________________________________________  STUDIES REVIEWED:  No Studies to review      PROCEDURES PERFORMED:  Procedures  No Procedures performed today

## 2024-11-21 ENCOUNTER — OFFICE VISIT (OUTPATIENT)
Dept: OCCUPATIONAL THERAPY | Facility: CLINIC | Age: 64
End: 2024-11-21
Payer: MEDICARE

## 2024-11-21 DIAGNOSIS — R20.2 NUMBNESS AND TINGLING OF LEFT UPPER EXTREMITY: Primary | ICD-10-CM

## 2024-11-21 DIAGNOSIS — R29.898 RIGHT HAND WEAKNESS: ICD-10-CM

## 2024-11-21 DIAGNOSIS — R20.0 NUMBNESS AND TINGLING OF LEFT UPPER EXTREMITY: Primary | ICD-10-CM

## 2024-11-21 PROCEDURE — 97140 MANUAL THERAPY 1/> REGIONS: CPT | Performed by: OCCUPATIONAL THERAPIST

## 2024-11-21 PROCEDURE — 97110 THERAPEUTIC EXERCISES: CPT | Performed by: OCCUPATIONAL THERAPIST

## 2024-11-21 PROCEDURE — 97112 NEUROMUSCULAR REEDUCATION: CPT | Performed by: OCCUPATIONAL THERAPIST

## 2024-11-21 PROCEDURE — 97014 ELECTRIC STIMULATION THERAPY: CPT | Performed by: OCCUPATIONAL THERAPIST

## 2024-11-21 NOTE — PROGRESS NOTES
Daily Note     Today's date: 2024  Patient name: Eileen Matias  : 1960  MRN: [id number]  Referring provider: Rogelio Black MD  Dx:   Encounter Diagnosis     ICD-10-CM    1. Numbness and tingling of left upper extremity  R20.0     R20.2       2. Right hand weakness  R29.898                            Subjective:    My little finger hurts a lot.  I use TCH often during the day.    Objective: See treatment diary below for clinic program.       Assessment: Tolerated treatment well. Patient would benefit from continued OT. IP active digit flexion progressing.  Fifth MP resistant to stretching.  Improved gross grasp for light IADLs with patient in circumferential wrist extension splint.  Improving ease of right use noted in clinic program.   Edema controlled with wear of smaller and lighter lymphedema garment.       Plan: Continue per plan of care.   Progress dexterity and gripping exercises as tolerated. Continue wear of orthotic for wrist stability.   Trial mirror therapy and TENS today.      Precautions: Lymphedema; hx desmoid tumors and breast cancer; healing humeral fracture     POC expires Unit limit Auth  expiration date PT/OT + Visit Limit?   25 NA NA BOMN         Dx R Hum Fx     Dr Wayne MILLER MET          Last RE 24    Date     RE 11/19 11/21  10/29 10/31   Visit 47 9/10  48 -10/10  49  1/10  50 2/10  45  7/10  46  8/10    Manuals 8' 8' 8' 8'    5 5             Jt mobs digits, FA MP all digits G 1-2 MP all digits G 1-2 MP All digits G 1-2 MP all digits G 1-2  MP all G 1-2 MP all digits G 1-2   Ortho fit/train          Neuro Re-Ed       15'                Mirror therapy    3x15 each  Wrist EF  All finger tips  Fist open                          orthotic                              Ther Ex 25   25   30  15'    25   25   HEP    Pt ed for TENS for V pain      PROM 1:1 Digital flexion LLPS Digital flexion LLPS Digital flexion LLPS Digital flexion  "LLPS  Digital flexion LLPS Digital flexion LLPS   Digital blocked AROM P/H 5x5\" P/H 5x5\" P/H 5x5\" P/H 5x5\"  P/H fists 5x5\" P/H fists 5x5\"   UE warm up UBE 2F 2R L1  R only x40 UBE 2F 2R L1  R only x40 UBE 2F 2R L1  R only x40   UBE 2F 2R L1  R only x30 UBE 2F 2R L1  R only x25   PRE FA  #2 S/P x10  3/4 shaft x25 #2 S/P x10  3/4 shaft x25 Hold, no splint   #2 S/P x10  1/2 shaft x10 #2 S/P x10  1/2 shaft x10   PRE Wrist FA G Down x10  G up x10  R Pull x20  R fold x1 G Down x10  G up x10  R Pull x20  R fold x1 G Down x20  G up x20  R Pull x20  R fold x20 G Down x20  G up x20  G Down x20  G up x10  R up x10  R fold x15 G Down x10  G up x10  R Pull x20  R fold x15   PRE digits   G web  to edge   X20 G web  to edge   x20      Shoulder ROM          Sh PROM          Ther Activity 10'         10   10   Gripping G web  to edge   X20     G web  to edge   X20 G web  to edge   X20   Pinching Black Cpin  X20 blocks     Black Cpin  X20 blocks Black Cpin  X20 blocks   Coordination          Carrying           Reaching           MHP Pre tx with ace flexion wrap Pre tx with ace flexion wrap Pre tx with ace flexion wrap Pre tx with ace flexion wrap  Pre tx with ace flexion wrap Pre tx with ace flexion wrap   TENS    Pre tx 10'               "

## 2024-11-25 ENCOUNTER — TELEPHONE (OUTPATIENT)
Dept: HEMATOLOGY ONCOLOGY | Facility: CLINIC | Age: 64
End: 2024-11-25

## 2024-11-25 ENCOUNTER — TELEPHONE (OUTPATIENT)
Age: 64
End: 2024-11-25

## 2024-11-25 ENCOUNTER — APPOINTMENT (OUTPATIENT)
Dept: LAB | Facility: HOSPITAL | Age: 64
End: 2024-11-25
Payer: MEDICARE

## 2024-11-25 DIAGNOSIS — T50.901A MEDICATION OVERDOSE, ACCIDENTAL OR UNINTENTIONAL, INITIAL ENCOUNTER: ICD-10-CM

## 2024-11-25 DIAGNOSIS — M85.88 OSTEOPENIA OF LUMBAR SPINE: Primary | ICD-10-CM

## 2024-11-25 DIAGNOSIS — M85.88 OSTEOPENIA OF LUMBAR SPINE: ICD-10-CM

## 2024-11-25 LAB
ALBUMIN SERPL BCG-MCNC: 4.1 G/DL (ref 3.5–5)
ALP SERPL-CCNC: 92 U/L (ref 34–104)
ALT SERPL W P-5'-P-CCNC: 20 U/L (ref 7–52)
ANION GAP SERPL CALCULATED.3IONS-SCNC: 5 MMOL/L (ref 4–13)
AST SERPL W P-5'-P-CCNC: 23 U/L (ref 13–39)
BASOPHILS # BLD AUTO: 0.06 THOUSANDS/ΜL (ref 0–0.1)
BASOPHILS NFR BLD AUTO: 1 % (ref 0–1)
BILIRUB SERPL-MCNC: 0.4 MG/DL (ref 0.2–1)
BUN SERPL-MCNC: 8 MG/DL (ref 5–25)
CALCIUM SERPL-MCNC: 9.2 MG/DL (ref 8.4–10.2)
CHLORIDE SERPL-SCNC: 102 MMOL/L (ref 96–108)
CO2 SERPL-SCNC: 27 MMOL/L (ref 21–32)
CREAT SERPL-MCNC: 0.46 MG/DL (ref 0.6–1.3)
EOSINOPHIL # BLD AUTO: 0.21 THOUSAND/ΜL (ref 0–0.61)
EOSINOPHIL NFR BLD AUTO: 4 % (ref 0–6)
ERYTHROCYTE [DISTWIDTH] IN BLOOD BY AUTOMATED COUNT: 12.8 % (ref 11.6–15.1)
GFR SERPL CREATININE-BSD FRML MDRD: 103 ML/MIN/1.73SQ M
GLUCOSE P FAST SERPL-MCNC: 92 MG/DL (ref 65–99)
HCT VFR BLD AUTO: 43.4 % (ref 34.8–46.1)
HGB BLD-MCNC: 13.9 G/DL (ref 11.5–15.4)
IMM GRANULOCYTES # BLD AUTO: 0.01 THOUSAND/UL (ref 0–0.2)
IMM GRANULOCYTES NFR BLD AUTO: 0 % (ref 0–2)
LYMPHOCYTES # BLD AUTO: 1.76 THOUSANDS/ΜL (ref 0.6–4.47)
LYMPHOCYTES NFR BLD AUTO: 29 % (ref 14–44)
MCH RBC QN AUTO: 29.5 PG (ref 26.8–34.3)
MCHC RBC AUTO-ENTMCNC: 32 G/DL (ref 31.4–37.4)
MCV RBC AUTO: 92 FL (ref 82–98)
MONOCYTES # BLD AUTO: 0.7 THOUSAND/ΜL (ref 0.17–1.22)
MONOCYTES NFR BLD AUTO: 12 % (ref 4–12)
NEUTROPHILS # BLD AUTO: 3.25 THOUSANDS/ΜL (ref 1.85–7.62)
NEUTS SEG NFR BLD AUTO: 54 % (ref 43–75)
NRBC BLD AUTO-RTO: 0 /100 WBCS
PLATELET # BLD AUTO: 313 THOUSANDS/UL (ref 149–390)
PMV BLD AUTO: 8.8 FL (ref 8.9–12.7)
POTASSIUM SERPL-SCNC: 4.1 MMOL/L (ref 3.5–5.3)
PROT SERPL-MCNC: 6.6 G/DL (ref 6.4–8.4)
RBC # BLD AUTO: 4.71 MILLION/UL (ref 3.81–5.12)
SODIUM SERPL-SCNC: 134 MMOL/L (ref 135–147)
WBC # BLD AUTO: 5.99 THOUSAND/UL (ref 4.31–10.16)

## 2024-11-25 PROCEDURE — 85025 COMPLETE CBC W/AUTO DIFF WBC: CPT

## 2024-11-25 PROCEDURE — 80053 COMPREHEN METABOLIC PANEL: CPT

## 2024-11-25 PROCEDURE — 36415 COLL VENOUS BLD VENIPUNCTURE: CPT

## 2024-11-25 NOTE — TELEPHONE ENCOUNTER
Call from Eileen, stating that she is having indigestion and a feeling that something is stuck in her throat. This has been happening since she started taking the fosamax. Asked Eileen to relate how she is taking the medication and she stated that she has been taking the fosamax daily at night. She has done this for 5 days, stating that her  gives her her medications. Instructed her that the medication is to be taken once a week, in the morning, with a full glass of water, and that she should be in an upright position. Advisd that she restart the fosamax one week after her last dose which was on Friday, but I would reach out to Karo and have her call if she wanted something different. She understood these instructions and had no other questions at this time.

## 2024-11-25 NOTE — TELEPHONE ENCOUNTER
I called and spoke with patient. She was taking Fosfamax 70mg every morning for 5 days instead of prescribed dose of 70mg weekly. She was having sharp pain in diaphragm and chest tightness x 2 days. She called in to office and wad advised to stop medication. Last dose was on Friday. Symptoms are now improved. She denies jaw pain, weakness, muscle pain/spasms, chest pain, shortness of breath or palpations. She is urinating normal. Has not had bowel movement in past couple of days. She is tingling in toes for > 1 year. No seizures.     I advised the patient to go for STAT labs. We will discuss her results when they are available to me.  Continue to hold Fosfamax. She likely would benefit from referral to gastroenterology to evaluate her esophagus prior to reinitiating therapy.  We will discuss this on a follow-up phone call.  She was advised to go to the emergency room with any chest pain, shortness of breath, palpitations, near syncope/seizures, significant decrease in urinary output, weakness. She is in agreement with plan.     I called [facility] in [place], [place]. No additional recommends at this time. ER evaluation is not indicated at this time. Continue monitoring symptoms.

## 2024-11-26 ENCOUNTER — OFFICE VISIT (OUTPATIENT)
Dept: OCCUPATIONAL THERAPY | Facility: CLINIC | Age: 64
End: 2024-11-26
Payer: MEDICARE

## 2024-11-26 DIAGNOSIS — R29.898 RIGHT HAND WEAKNESS: ICD-10-CM

## 2024-11-26 DIAGNOSIS — R20.0 NUMBNESS AND TINGLING OF LEFT UPPER EXTREMITY: Primary | ICD-10-CM

## 2024-11-26 DIAGNOSIS — R20.2 NUMBNESS AND TINGLING OF LEFT UPPER EXTREMITY: Primary | ICD-10-CM

## 2024-11-26 PROCEDURE — 97014 ELECTRIC STIMULATION THERAPY: CPT | Performed by: OCCUPATIONAL THERAPIST

## 2024-11-26 PROCEDURE — 97140 MANUAL THERAPY 1/> REGIONS: CPT | Performed by: OCCUPATIONAL THERAPIST

## 2024-11-26 PROCEDURE — 97110 THERAPEUTIC EXERCISES: CPT | Performed by: OCCUPATIONAL THERAPIST

## 2024-11-26 NOTE — PROGRESS NOTES
Daily Note     Today's date: 2024  Patient name: Eileen Matias  : 1960  MRN: [id number]  Referring provider: Rogelio Black MD  Dx:   Encounter Diagnosis     ICD-10-CM    1. Numbness and tingling of left upper extremity  R20.0     R20.2       2. Right hand weakness  R29.898                            Subjective:    My little finger hurts a lot.  I use TCH often during the day.    Objective: See treatment diary below for clinic program.     V Passive flexion post heat and stretch- 1 cm to palm      Assessment: Tolerated treatment well. Patient would benefit from continued OT. IP active digit flexion progressing.  Fifth MP resistant to stretching.  Improved gross grasp for light IADLs with patient in circumferential wrist extension splint.  Improving ease of right use noted in clinic program.   Edema controlled with wear of smaller and lighter lymphedema garment.       Plan: Continue per plan of care.   Progress dexterity and gripping exercises as tolerated. Continue wear of orthotic for wrist stability.  Continue TENS today.      Precautions: Lymphedema; hx desmoid tumors and breast cancer; healing humeral fracture     POC expires Unit limit Auth  expiration date PT/OT + Visit Limit?   25 NA NA BOMN         Dx R Hum Fx     Dr Wayne MILLER MET          Last RE 24    Date     RE      Visit 47 9/10  48 -10/10  49  1/10  50 2/10 51   3/10      Manuals 8' 8' 8' 8' 8'               Jt mobs digits, FA MP all digits G 1-2 MP all digits G 1-2 MP All digits G 1-2 MP all digits G 1-2 MP all digits G 1-2     Ortho fit/train          Neuro Re-Ed       15'                Mirror therapy    3x15 each  Wrist EF  All finger tips  Fist open                          orthotic                              Ther Ex 25   25   30  15'   30'     HEP    Pt ed for TENS for V pain      PROM 1:1 Digital flexion LLPS Digital flexion LLPS Digital flexion LLPS Digital  "flexion LLPS Digital flexion LLPS     Digital blocked AROM P/H 5x5\" P/H 5x5\" P/H 5x5\" P/H 5x5\" P/H fists 5x5\"     UE warm up UBE 2F 2R L1  R only x40 UBE 2F 2R L1  R only x40 UBE 2F 2R L1  R only x40  UBE 2F 2R L1  R only x45     PRE FA  #2 S/P x10  3/4 shaft x25 #2 S/P x10  3/4 shaft x25 Hold, no splint  #2 S/P x10  3/4 shaft x25     PRE Wrist FA G Down x10  G up x10  R Pull x20  R fold x1 G Down x10  G up x10  R Pull x20  R fold x1 G Down x20  G up x20  R Pull x20  R fold x20 G Down x20  G up x20 G Down x20  G up x20  R Fold x20     PRE digits   G web  to edge   X20 G web  to edge   x20 G web  to edge   x20     Shoulder ROM          Sh PROM          Ther Activity 10'           Gripping G web  to edge   X20         Pinching Black Cpin  X20 blocks         Coordination          Carrying           Reaching           MHP Pre tx with ace flexion wrap Pre tx with ace flexion wrap Pre tx with ace flexion wrap Pre tx with ace flexion wrap Pre tx with ace flexion wrap     TENS    Pre tx 10' Pre tx 10'              "

## 2024-11-27 ENCOUNTER — RESULTS FOLLOW-UP (OUTPATIENT)
Dept: HEMATOLOGY ONCOLOGY | Facility: CLINIC | Age: 64
End: 2024-11-27

## 2024-11-27 DIAGNOSIS — K20.90 ESOPHAGITIS: Primary | ICD-10-CM

## 2024-11-27 NOTE — TELEPHONE ENCOUNTER
Called patient to review labs results. Calcium and kidney function are stable. We will refer her to GI ASAP to evaluate for esophagitis prior to starting back on bisphosphonate therapy. She is in agreement with plan. Will schedule clinic follow up with us as well.

## 2024-12-02 ENCOUNTER — OFFICE VISIT (OUTPATIENT)
Dept: PHYSICAL THERAPY | Facility: CLINIC | Age: 64
End: 2024-12-02
Payer: MEDICARE

## 2024-12-02 ENCOUNTER — OFFICE VISIT (OUTPATIENT)
Dept: RADIATION ONCOLOGY | Facility: CLINIC | Age: 64
End: 2024-12-02
Attending: RADIOLOGY
Payer: MEDICARE

## 2024-12-02 VITALS
RESPIRATION RATE: 16 BRPM | SYSTOLIC BLOOD PRESSURE: 160 MMHG | HEART RATE: 84 BPM | OXYGEN SATURATION: 98 % | BODY MASS INDEX: 22.3 KG/M2 | TEMPERATURE: 97.4 F | WEIGHT: 134 LBS | DIASTOLIC BLOOD PRESSURE: 86 MMHG

## 2024-12-02 DIAGNOSIS — C50.912 CARCINOMA OF LEFT BREAST METASTATIC TO AXILLARY LYMPH NODE (HCC): Primary | ICD-10-CM

## 2024-12-02 DIAGNOSIS — C77.3 CARCINOMA OF LEFT BREAST METASTATIC TO AXILLARY LYMPH NODE (HCC): Primary | ICD-10-CM

## 2024-12-02 DIAGNOSIS — Z08 ENCOUNTER FOR FOLLOW-UP SURVEILLANCE OF BREAST CANCER: ICD-10-CM

## 2024-12-02 DIAGNOSIS — I89.0 LYMPHEDEMA: Primary | ICD-10-CM

## 2024-12-02 DIAGNOSIS — Z85.3 ENCOUNTER FOR FOLLOW-UP SURVEILLANCE OF BREAST CANCER: ICD-10-CM

## 2024-12-02 DIAGNOSIS — K20.90 ESOPHAGITIS: Primary | ICD-10-CM

## 2024-12-02 PROCEDURE — 99213 OFFICE O/P EST LOW 20 MIN: CPT | Performed by: RADIOLOGY

## 2024-12-02 PROCEDURE — 97110 THERAPEUTIC EXERCISES: CPT | Performed by: PHYSICAL THERAPIST

## 2024-12-02 PROCEDURE — G2211 COMPLEX E/M VISIT ADD ON: HCPCS | Performed by: RADIOLOGY

## 2024-12-02 PROCEDURE — 99211 OFF/OP EST MAY X REQ PHY/QHP: CPT | Performed by: RADIOLOGY

## 2024-12-02 PROCEDURE — 97140 MANUAL THERAPY 1/> REGIONS: CPT | Performed by: PHYSICAL THERAPIST

## 2024-12-02 RX ORDER — PANTOPRAZOLE SODIUM 40 MG/1
40 TABLET, DELAYED RELEASE ORAL DAILY
Qty: 60 TABLET | Refills: 0 | Status: SHIPPED | OUTPATIENT
Start: 2024-12-02 | End: 2025-01-31

## 2024-12-02 NOTE — PROGRESS NOTES
Follow-up - Radiation Oncology   Eileen Matias 1960 64 y.o. 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 Pathology was significant for lymphovascular invasion and 3/17 lymph nodes positive for metastatic carcinoma with less 1-2 mm of JACKIE associated with a 1.8 cm macro metastasis. Tumor cells were ER/MN positive and HER2 negative.  She completed adjuvant radiation to left breast and draining lymph nodes on 10/10/22. She is maintained on letrozole.  She was last seen 12/1/23 and presents today for follow up.         4/29/24 B/L diagnostic mammogram  LEFT  1) POST-SURGICAL FINDING [D]: There are post-surgical findings from a previous lumpectomy with radiation seen in the left breast.   BILATERAL  There are no suspicious masses, grouped microcalcifications or areas of unexplained architectural distortion. The skin and nipple areolar complex are unremarkable.  Biopsy marker clip is noted in the right breast.  Benign-appearing calcifications are noted in the right breast.  RECOMMENDATION:       - Diagnostic mammogram in 1 year for both breasts.     5/23/24 Dr. Rojas, [facility]  RUE lymphedema   she seems  indicated for an operation at this point given that she is managing the edema somewhat successfully with garments, but is interested in a more permanent solution.   Explained difference between lymphovenous bypass distally in the limb versus lymph node transfer which would be performed in the axilla, and options for lymph node donor sites including the supraclavicular, and groin regions, in addition to the omentum.   attempt to obtain op reports from [facility] for distant surgery but this may not be possible. Will discuss the case with Dr. Motley. We will reconvene in 1 month.      8/23/24 Dr. Hillman  Treatment: Continue Letrazole, Rx renewed   - Other Supportive care: Conintue Calcium and Vitamin D, at least 600mg and 3000 IU respectively. Weight bearing exercises as tolerated   - Annual breast exam. Pt deferred breast exam today, planning for breast exam by Dr. Rea   - Imaging: Continue annual mammogram, DEXA q2y (due now)   - Labs: CBC, CMP q6m   - Follow up with Treatment Team Members  Surgeon: Dr. Motley  Rad Onc: Dr. Sethi  Palliative  - RTC 1 year      9/11/24 Dr. Motley  Recently she got her flu shot and COVID shot on the left arm. This was followed she noticed the left upper extremity swelling. She is here for evaluation.   Bilateral breast examination there are no worrisome features. However I feel a soft left axillary lymph node.   recommended her to get her left axillary ultrasound if necessary to follow-up with ultrasound-guided lymph node biopsy. We will also refer her to lymphedema clinic      9/12/24 US left breast axilla  Sonographic evaluation left axilla shows no left axillary mass, distortion or adenopathy.     RECOMMENDATION:       - Clinical management for the left breast.       - Diagnostic mammogram in 6 months for both breasts.     10/3/24 Dr. Motley  She is here after left axillary ultrasound which was reviewed and no worrisome features.   Her next mammogram is in 6 months   She has left  "upper extremity lymphedema as well as right upper extremity lymphedema.      She is following with PT/lymphedema clinic and is undergoing hand therapy for the right hand in preparation for surgical treatment of lymphedema currently planned in 2025.  Her right arm and hand lymphedema is well controlled at this time.  Her left arm has developed some lymphedema, but is mild overall.     She denies new breast related complaints.  She has some residual \"lumps\" that are tender to palpation and are not new.  She denies palpable nodules of the breast bilaterally otherwise.  She denies breast pain or tenderness bilaterally otherwise.  She denies suspicious skin changes or nipple discharge of the breast bilaterally.  She has full range of motion of the upper extremities bilaterally.     Upcomin25 B/L mammogram  25 Dr. 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   ER >95  KS 95   HER2 1+    On ultrasound lymph node is 1.8 cm. There is cortical thickening without evident fatty hilum.     In review of screening mammogram, demonstrates no suspicious mammographic findings identified in either breast. Bilateral MRI is recommended. Flow cytometry - there is no evidence of a B-cell or T-cell non-Hodgkin lymphoma.       2022 Genomic Testing    A total of 36 genes were evaluated, including: ANGEL LUIS, BRCA1, BRCA2, CDH1, CHEK2, PALB2, PTEN, STK11, TP53  Negative result. No pathogenic sequence variants or deletions/duplications identified     2022 Biopsy    MRI guided biopsy  A. Left breast   11 o'clock   Invasive mammary carcinoma of no special type (ductal)  Grade 1  ER 95  KS 95  HER2 " 0    B. Right breast   Retroareolar  Benign fibrocystic changes without atypia (discrete 0.3 cm focus of sclerosing and tubular adenosis with usual ductal hyperplasia, columnar cell change and hyperplasia, apocrine metaplasia, cystic dilatation).   - Microcalcifications: Present associated with non-neoplastic breast tissue.   - Negative for malignancy, in-situ carcinoma and atypical hyperplasia.     Malignant pathology appears unifocal. Biopsy proven carcinoma measured 1 cm on MRI. Biopsy proved metastatic disease to left axillary lymph node.      6/9/2022 Genomic Testing    MammaPrint  Low risk  Luminal type A  MammaPrint index: +0.578     6/21/2022 Surgery    Left breast tiffany  directed lumpectomy with axillary dissection  A. Invasive 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  Grade 1  1.3 cm  Lymphovascular invasion is identified  Margins negative    H. Axillary, Level I and Level II axillary contents  Metastatic carcinoma involving 3/17 lymph nodes  1.8 cm   Extranodal extension is identified 1mm  Foci of carcinoma identified in extranodal lymphatic channels    Anatomic stage: Stage IIA  Prognostic stage: Stage IA      6/21/2022 -  Cancer Staged    Staging form: Breast, AJCC 8th Edition  - Pathologic stage from 6/21/2022: Stage IA (pT1c, pN1, cM0, G1, ER+, TX+, HER2-) - Signed by Alfredo Hays MD on 8/1/2022  Stage prefix: Initial diagnosis  Nuclear grade: G1  Multigene prognostic tests performed: None  Mitotic count score: Score 1  Histologic grading system: 3 grade system       8/29/2022 - 10/10/2022 Radiation    Treatments:  Course: C1  Plan ID Energy Fractions Dose per Fraction (cGy) Total Dose Delivered (cGy) Elapsed Days   BH L Breast 6X 25 / 25 200 5,000 35   BH L [id number] 12E 5 / 5 200 1,000 6   BH L SClav 10X/6X 24 / 24 200 4,800 32    Treatment Dates:  8/29/2022 - 10/10/2022.      Malignant neoplasm of lower-inner quadrant of left breast in female, estrogen receptor positive  (HCC)   5/20/2022 Initial Diagnosis    Malignant neoplasm of lower-inner quadrant of left breast in female, estrogen receptor positive (HCC)     5/20/2022 Biopsy    MRI guided biopsy  A. Left breast   11 o'clock   Invasive mammary carcinoma of no special type (ductal)  Grade 1  ER 95  MA 95  HER2 0    B. Right breast   Retroareolar  Benign fibrocystic changes without atypia (discrete 0.3 cm focus of sclerosing and tubular adenosis with usual ductal hyperplasia, columnar cell change and hyperplasia, apocrine metaplasia, cystic dilatation).   - Microcalcifications: Present associated with non-neoplastic breast tissue.   - Negative for malignancy, in-situ carcinoma and atypical hyperplasia.     Malignant pathology appears unifocal. Biopsy proven carcinoma measured 1 cm on MRI. Biopsy proved metastatic disease to left axillary lymph node.      6/9/2022 Genomic Testing    MammaPrint  Low risk  Luminal type A  MammaPrint index: +0.578     6/21/2022 Surgery    Left breast tiffany  directed lumpectomy with axillary dissection  A. Invasive 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  Grade 1  1.3 cm  Lymphovascular invasion is identified  Margins negative    H. Axillary, Level I and Level II axillary contents  Metastatic carcinoma involving 3/17 lymph nodes  1.8 cm   Extranodal extension is identified 1mm  Foci of carcinoma identified in extranodal lymphatic channels    Anatomic stage: Stage IIA  Prognostic stage: Stage IA      8/29/2022 - 10/10/2022 Radiation    Treatments:  Course: C1  Plan ID Energy Fractions Dose per Fraction (cGy) Total Dose Delivered (cGy) Elapsed Days   BH L Breast 6X 25 / 25 200 5,000 35   BH L [id number] 12E 5 / 5 200 1,000 6   BH L SClav 10X/6X 24 / 24 200 4,800 32    Treatment Dates:  8/29/2022 - 10/10/2022.          Past Medical History:   Diagnosis Date    Abnormal mammogram 05/15/2013    Anemia     Cancer (HCC)     desmoitosis    Carcinoma of left breast metastatic to axillary  lymph node (HCC) 04/19/2022    Chronic pain disorder     worse right side of body    Desmoid tumor     Last Assessed: 6/19/2017    fibroidal cyst 1999?  Hysterectomy done    History of transfusion     no reactions    Hyperlipidemia     Hypertension     Osteoporosis     PONV (postoperative nausea and vomiting)      Past Surgical History:   Procedure Laterality Date    BREAST LUMPECTOMY Left 6/21/2022    Procedure: ANITA  DIRECTED LUMPECTOMY;  Surgeon: Amanda Motley MD;  Location: [facility];  Service: Surgical Oncology    FRACTURE SURGERY      HYSTERECTOMY      LYMPH NODE DISSECTION Left 6/21/2022    Procedure: AXILLARY DISSECTION LEVEL I AND LEVEL II LYMPH NODES;  Surgeon: Amanda Motley MD;  Location: [facility];  Service: Surgical Oncology    MRI BREAST BIOPSY LEFT (ALL INCLUSIVE) Left 5/20/2022    MRI BREAST BIOPSY RIGHT (ALL INCLUSIVE) Right 5/20/2022    OTHER SURGICAL HISTORY      Arm Incision: Dermoid tumor, right Arm     PARTIAL HYSTERECTOMY      AK COLONOSCOPY FLX DX W/COLLJ SPEC WHEN PFRMD N/A 8/15/2017    Procedure: COLONOSCOPY;  Surgeon: Alec England MD;  Location: [facility];  Service: Gastroenterology    AK NDSC WRST SURG W/RLS TRANSVRS CARPL LIGM Left 8/10/2021    Procedure: RELEASE LEFT CARPAL TUNNEL ENDOSCOPIC;  Surgeon: Chris Camacho MD;  Location: [facility];  Service: Orthopedics    AK OPTX DSTL RADL I-ARTIC FX/EPIPHYSL SEP 3 FRAG Left 2/9/2021    Procedure: OPEN REDUCTION W/ INTERNAL FIXATION (ORIF) RADIUS / ULNA (WRIST) left;  Surgeon: Chris Camacho MD;  Location: [facility];  Service: Orthopedics    SKIN BIOPSY      SKIN CANCER EXCISION      Melanoma Excision     TONSILLECTOMY      US BREAST NEEDLE LOC LEFT Left 6/16/2022    US BREAST NEEDLE LOC RIGHT EACH ADDITIONAL Right 6/16/2022    US GUIDED BREAST LYMPH NODE BIOPSY LEFT Left 4/14/2022       Social History   Social History     Substance and Sexual Activity   Alcohol Use Not Currently    Alcohol/week: 5.0  standard drinks of alcohol    Types: 5 Shots of liquor per week    Comment: Used mostly as a painkiller when needed before bedtime     Social History     Substance and Sexual Activity   Drug Use Yes    Frequency: 28.0 times per week    Types: Marijuana    Comment: THC Medical marijuana     Social History     Tobacco Use   Smoking Status Never   Smokeless Tobacco Never         Meds/Allergies     Current Outpatient Medications:     alendronate (FOSAMAX) 70 mg tablet, Take 1 tablet (70 mg total) by mouth every 7 days, Disp: 12 tablet, Rfl: 1    cholecalciferol (VITAMIN D3) 1,000 units tablet, Take 3,000 Units by mouth daily, Disp: , Rfl:     diphenhydrAMINE (BENADRYL) 50 MG tablet, Take 50 mg by mouth daily at bedtime as needed for itching, Disp: , Rfl:     dronabinol (MARINOL) 10 MG capsule, , Disp: , Rfl:     ezetimibe (ZETIA) 10 mg tablet, Take 1 tablet (10 mg total) by mouth daily, Disp: 90 tablet, Rfl: 0    letrozole (FEMARA) 2.5 mg tablet, Take 1 tablet (2.5 mg total) by mouth daily, Disp: 90 tablet, Rfl: 3    multivitamin (THERAGRAN) TABS, Take 1 tablet by mouth daily, Disp: , Rfl:     Omega-3 Fatty Acids (FISH OIL PO), Take by mouth, Disp: , Rfl:     penicillin V potassium (VEETID) 500 mg tablet, Take 1 tablet (500 mg total) by mouth every 12 (twelve) hours, Disp: 180 tablet, Rfl: 1    Probiotic Product (PROBIOTIC-10 PO), Take by mouth, Disp: , Rfl:     gabapentin (Neurontin) 100 mg capsule, Take 1 capsule (100 mg total) by mouth 3 (three) times a day, Disp: 90 capsule, Rfl: 1  Allergies   Allergen Reactions    Chlorpromazine Anaphylaxis     PHENOTHIAZINE=ANAPHYLAXIS    Codeine Anaphylaxis     Anaphylaxis  abd pain.    Meperidine Anaphylaxis, Hives and Vomiting    Phenothiazines Anaphylaxis and Throat Swelling     Category: Allergy;     Saccharin - Food Allergy Anaphylaxis    Ampicillin-Sulbactam Sodium Hives    Aspirin GI Intolerance and Abdominal Pain     Severe GERD    Gluten Meal - Food Allergy GI  Intolerance    Ibuprofen Hives     H    Levofloxacin Hives    Chocolate - Food Allergy GI Intolerance    Lactose - Food Allergy GI Intolerance    Neomycin Other (See Comments), Edema and Hives     Category: Allergy;   swelling    Brielle - Food Allergy Rash    Latex Hives and Rash     Category: Allergy;          Review of Systems   Constitutional:  Positive for fatigue.   Musculoskeletal:         B/L arm lymphedema   Tenderness left breast- doing massage   Skin:  Negative for color change.          OBJECTIVE:   /86   Pulse 84   Temp (!) 97.4 °F (36.3 °C)   Resp 16   Wt 60.8 kg (134 lb)   SpO2 98%   BMI 22.30 kg/m²   Karnofsky: 80 - Normal activity with effort; some signs or symptoms of disease    Physical Exam  Vitals and nursing note reviewed.   Constitutional:       General: She is not in acute distress.     Appearance: She is well-developed.   Cardiovascular:      Rate and Rhythm: Normal rate and regular rhythm.   Pulmonary:      Breath sounds: No wheezing, rhonchi or rales.   Chest:      Comments: Breast examination shows a well-healed lower right breast scar.  The left breast shows a well-healed upper outer scar and second periareolar scar.  There is diffuse mild fibrosis of the left breast.  There was no tenderness, palpable nodule or suspicious skin changes of the breasts bilaterally.  Abdominal:      Palpations: Abdomen is soft.      Tenderness: There is no abdominal tenderness.   Musculoskeletal:      Right lower leg: No edema.      Left lower leg: No edema.      Comments: Full range of motion of the upper extremities.  The right arm and hand are in compression sleeves and gauntlet.  Left upper extremity demonstrates 1+ nonpitting edema down to the level of the wrist.   Lymphadenopathy:      Cervical: No cervical adenopathy.      Upper Body:      Right upper body: No supraclavicular or axillary adenopathy.      Left upper body: No supraclavicular or axillary adenopathy.   Neurological:       "Mental Status: She is alert and oriented to person, place, and time.            Assessment/Plan:  Eileen Matias is a 64 y.o.  woman with past medical history of right breast 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 resulting in chronic right upper extremity lymphedema and stage IIA invasive ductal carcinoma of the left breast status post resection followed by adjuvant radiation completed on 10/10/2022. Tumor cells were ER/OH positive and HER2 negative and she is maintained on letrozole.      She remains clinically [place].  -I did not palpate nodules on her exam today, but she has fibrosis and some lymphedema of the left breast that appears well controlled at this time.  Self-palpated nodules may be related to these issues.  Mammogram bilateral in April and subsequent US of left breast and axilla in September were benign.  Recommended continued surveillance.  -Due for mammogram in April 2025. This is scheduled via Surgical Oncology.  -She is compliant with Dr. Motley follow-up  -Letrozole maintenance.  She tolerates well and is compliant with Medical Oncology follow-up.    Lymphedema  -Lymphedema and PT therapy continue  -She is hoping to undergo surgery at [facility] spring of next year and is working toward this goal with her team.      I instructed her to use emollients as needed.  Follow-up 1 year.  We will see her sooner should the need arise.    Rochelle Sethi MD  12/2/2024,8:48 AM    Portions of the record may have been created with voice recognition software.  Occasional wrong word or \"sound a like\" substitutions may have occurred due to the inherent limitations of voice recognition software.  Read the chart carefully and recognize, using context, where substitutions have occurred.        "

## 2024-12-02 NOTE — PROGRESS NOTES
Eileen Matias 1960 is a 64 y.o. 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 Pathology was significant for lymphovascular invasion and 3/17 lymph nodes positive for metastatic carcinoma with less 1-2 mm of JACKIE associated with a 1.8 cm macro metastasis. Tumor cells were ER/OK positive and HER2 negative.  She completed adjuvant radiation to left breast and draining lymph nodes on 10/10/22. The patient is currently maintained on letrozole.  She was last seen 12/1/23 and presents today for follow up.      She is following with PT/lymphedema clinic.      4/29/24 B/L diagnostic mammogram  LEFT  1) POST-SURGICAL FINDING [D]: There are post-surgical findings from a previous lumpectomy with radiation seen in the left breast.   BILATERAL  There are no suspicious masses, grouped microcalcifications or areas of unexplained architectural distortion. The skin and nipple areolar complex are unremarkable.  Biopsy marker clip is noted in the right breast.  Benign-appearing calcifications are noted in the right breast.  RECOMMENDATION:       - Diagnostic mammogram in 1 year for both breasts.      5/23/24 Dr. Rojas, [facility]  RUE lymphedema   she seems indicated for an operation at this point given that she is managing the edema somewhat successfully with garments, but is interested in a more permanent solution.   Explained difference between lymphovenous bypass distally in the limb versus lymph node transfer which would be performed in the axilla, and options for lymph node donor sites including the supraclavicular, and groin regions, in addition to the omentum.   attempt to obtain op reports from [place] for  distant surgery but this may not be possible. Will discuss the case with Dr. Motley. We will reconvene in 1 month.       24 Dr. Hillman  Treatment: Continue Letrazole, Rx renewed   - Other Supportive care: Conintue Calcium and Vitamin D, at least 600mg and 3000 IU respectively. Weight bearing exercises as tolerated   - Annual breast exam. Pt deferred breast exam today, planning for breast exam by Dr. Rea   - Imaging: Continue annual mammogram, DEXA q2y (due now)   - Labs: CBC, CMP q6m   - Follow up with Treatment Team Members  Surgeon: Dr. Motley  Rad Onc: Dr. Sethi  Palliative  - RTC 1 year       24 Dr. Motley  Recently she got her flu shot and COVID shot on the left arm. This was followed she noticed the left upper extremity swelling. She is here for evaluation.   Bilateral breast examination there are no worrisome features. However I feel a soft left axillary lymph node.   recommended her to get her left axillary ultrasound if necessary to follow-up with ultrasound-guided lymph node biopsy. We will also refer her to lymphedema clinic       24 US left breast axilla  Sonographic evaluation left axilla shows no left axillary mass, distortion or adenopathy.     RECOMMENDATION:       - Clinical management for the left breast.       - Diagnostic mammogram in 6 months for both breasts.      10/3/24 Dr. Motley  She is here after left axillary ultrasound which was reviewed and no worrisome features.   Her next mammogram is in 6 months   She has left upper extremity lymphedema as well as right upper extremity lymphedema.       Upcomin25 B/L mammogram  25 Dr. Motley  8/15/25 Med Onc[facility]afy    Follow up visit     Oncology History   Carcinoma of left breast metastatic to axillary lymph node (HCC)   2022 Initial Diagnosis    Carcinoma of left breast metastatic to axillary lymph node (HCC)     2022 Biopsy    Left axillary Lymph node ultrasound guided biopsy  Metastatic carcinoma,  compatible with breast origin.   ER >95  MN 95   HER2 1+    On ultrasound lymph node is 1.8 cm. There is cortical thickening without evident fatty hilum.     In review of screening mammogram, demonstrates no suspicious mammographic findings identified in either breast. Bilateral MRI is recommended. Flow cytometry - there is no evidence of a B-cell or T-cell non-Hodgkin lymphoma.       4/20/2022 Genomic Testing    A total of 36 genes were evaluated, including: ANGEL LUIS, BRCA1, BRCA2, CDH1, CHEK2, PALB2, PTEN, STK11, TP53  Negative result. No pathogenic sequence variants or deletions/duplications identified     5/20/2022 Biopsy    MRI guided biopsy  A. Left breast   11 o'clock   Invasive mammary carcinoma of no special type (ductal)  Grade 1  ER 95  MN 95  HER2 0    B. Right breast   Retroareolar  Benign fibrocystic changes without atypia (discrete 0.3 cm focus of sclerosing and tubular adenosis with usual ductal hyperplasia, columnar cell change and hyperplasia, apocrine metaplasia, cystic dilatation).   - Microcalcifications: Present associated with non-neoplastic breast tissue.   - Negative for malignancy, in-situ carcinoma and atypical hyperplasia.     Malignant pathology appears unifocal. Biopsy proven carcinoma measured 1 cm on MRI. Biopsy proved metastatic disease to left axillary lymph node.      6/9/2022 Genomic Testing    MammaPrint  Low risk  Luminal type A  MammaPrint index: +0.578     6/21/2022 Surgery    Left breast tiffany  directed lumpectomy with axillary dissection  A. Invasive 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  Grade 1  1.3 cm  Lymphovascular invasion is identified  Margins negative    H. Axillary, Level I and Level II axillary contents  Metastatic carcinoma involving 3/17 lymph nodes  1.8 cm   Extranodal extension is identified 1mm  Foci of carcinoma identified in extranodal lymphatic channels    Anatomic stage: Stage IIA  Prognostic stage: Stage IA      6/21/2022 -  Cancer  Staged    Staging form: Breast, AJCC 8th Edition  - Pathologic stage from 6/21/2022: Stage IA (pT1c, pN1, cM0, G1, ER+, KY+, HER2-) - Signed by Alfredo Hays MD on 8/1/2022  Stage prefix: Initial diagnosis  Nuclear grade: G1  Multigene prognostic tests performed: None  Mitotic count score: Score 1  Histologic grading system: 3 grade system       8/29/2022 - 10/10/2022 Radiation    Treatments:  Course: C1  Plan ID Energy Fractions Dose per Fraction (cGy) Total Dose Delivered (cGy) Elapsed Days   BH L Breast 6X 25 / 25 200 5,000 35   BH L [id number] 12E 5 / 5 200 1,000 6   BH L SClav 10X/6X 24 / 24 200 4,800 32    Treatment Dates:  8/29/2022 - 10/10/2022.      Malignant neoplasm of lower-inner quadrant of left breast in female, estrogen receptor positive (HCC)   5/20/2022 Initial Diagnosis    Malignant neoplasm of lower-inner quadrant of left breast in female, estrogen receptor positive (HCC)     5/20/2022 Biopsy    MRI guided biopsy  A. Left breast   11 o'clock   Invasive mammary carcinoma of no special type (ductal)  Grade 1  ER 95  KY 95  HER2 0    B. Right breast   Retroareolar  Benign fibrocystic changes without atypia (discrete 0.3 cm focus of sclerosing and tubular adenosis with usual ductal hyperplasia, columnar cell change and hyperplasia, apocrine metaplasia, cystic dilatation).   - Microcalcifications: Present associated with non-neoplastic breast tissue.   - Negative for malignancy, in-situ carcinoma and atypical hyperplasia.     Malignant pathology appears unifocal. Biopsy proven carcinoma measured 1 cm on MRI. Biopsy proved metastatic disease to left axillary lymph node.      6/9/2022 Genomic Testing    MammaPrint  Low risk  Luminal type A  MammaPrint index: +0.578     6/21/2022 Surgery    Left breast tiffany  directed lumpectomy with axillary dissection  A. Invasive 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  Grade 1  1.3 cm  Lymphovascular invasion is identified  Margins  negative    H. Axillary, Level I and Level II axillary contents  Metastatic carcinoma involving 3/17 lymph nodes  1.8 cm   Extranodal extension is identified 1mm  Foci of carcinoma identified in extranodal lymphatic channels    Anatomic stage: Stage IIA  Prognostic stage: Stage IA      8/29/2022 - 10/10/2022 Radiation    Treatments:  Course: C1  Plan ID Energy Fractions Dose per Fraction (cGy) Total Dose Delivered (cGy) Elapsed Days   BH L Breast 6X 25 / 25 200 5,000 35   BH L [id number] 12E 5 / 5 200 1,000 6   BH L SClav 10X/6X 24 / 24 200 4,800 32    Treatment Dates:  8/29/2022 - 10/10/2022.          Review of Systems:  Review of Systems   Constitutional:  Positive for fatigue.   Musculoskeletal:         B/L arm lymphedema   Tenderness left breast- doing massage   Skin:  Negative for color change.     Health Maintenance   Topic Date Due    Pneumococcal Vaccine: 65+ Years (1 of 2 - PCV) Never done    Zoster Vaccine (1 of 2) Never done    RSV Vaccine Age 60+ Years (1 - Risk 60-74 years 1-dose series) Never done    Falls: Plan of Care  Never done    COVID-19 Vaccine (7 - 2024-25 season) 11/01/2024    Medicare Annual Wellness Visit (AWV)  12/13/2024    Depression Screening  04/17/2025    Urinary Incontinence Screening  12/13/2024    OT PLAN OF CARE  12/14/2024    Breast Cancer Screening: Mammogram  04/29/2025    Fall Risk  11/06/2025    BMI: Adult  11/20/2025    Colorectal Cancer Screening  11/16/2026    Hepatitis C Screening  Completed    Osteoporosis Screening  Completed    Influenza Vaccine  Completed    RSV Vaccine age 0-20 Months  Aged Out    HIB Vaccine  Aged Out    IPV Vaccine  Aged Out    Hepatitis A Vaccine  Aged Out    Meningococcal ACWY Vaccine  Aged Out    HPV Vaccine  Aged Out    Cervical Cancer Screening  Discontinued     Patient Active Problem List   Diagnosis    Back pain, chronic    Chronic insomnia    Lymphedema of right arm    Hyperlipidemia, mixed    Peripheral neuropathy    Lymphedema    Desmoid  tumor    Carcinoma of left breast metastatic to axillary lymph node (HCC)    Malignant neoplasm of lower-inner quadrant of left breast in female, estrogen receptor positive (HCC)    HTN, goal below 140/90    Use of letrozole (Femara)    History of lumpectomy of left breast    Humerus fracture    Chronic back pain    Closed nondisplaced spiral fracture of shaft of right humerus with routine healing, subsequent encounter    Encounter for follow-up surveillance of breast cancer     Past Medical History:   Diagnosis Date    Abnormal mammogram 05/15/2013    Anemia     Cancer (HCC)     desmoitosis    Carcinoma of left breast metastatic to axillary lymph node (HCC) 04/19/2022    Chronic pain disorder     worse right side of body    Desmoid tumor     Last Assessed: 6/19/2017    fibroidal cyst 1999?  Hysterectomy done    History of transfusion     no reactions    Hyperlipidemia     Hypertension     Osteoporosis     PONV (postoperative nausea and vomiting)      Past Surgical History:   Procedure Laterality Date    BREAST LUMPECTOMY Left 6/21/2022    Procedure: ANITA  DIRECTED LUMPECTOMY;  Surgeon: Amanda Motley MD;  Location: [facility];  Service: Surgical Oncology    FRACTURE SURGERY      HYSTERECTOMY      LYMPH NODE DISSECTION Left 6/21/2022    Procedure: AXILLARY DISSECTION LEVEL I AND LEVEL II LYMPH NODES;  Surgeon: Amanda Motley MD;  Location: [facility];  Service: Surgical Oncology    MRI BREAST BIOPSY LEFT (ALL INCLUSIVE) Left 5/20/2022    MRI BREAST BIOPSY RIGHT (ALL INCLUSIVE) Right 5/20/2022    OTHER SURGICAL HISTORY      Arm Incision: Dermoid tumor, right Arm     PARTIAL HYSTERECTOMY      KY COLONOSCOPY FLX DX W/COLLJ SPEC WHEN PFRMD N/A 8/15/2017    Procedure: COLONOSCOPY;  Surgeon: Alec England MD;  Location: [facility];  Service: Gastroenterology    KY NDSC WRST SURG W/RLS TRANSVRS CARPL LIGM Left 8/10/2021    Procedure: RELEASE LEFT CARPAL TUNNEL ENDOSCOPIC;  Surgeon: Chris  MD Doug;  Location: [facility];  Service: Orthopedics    OR OPTX DSTL RADL I-ARTIC FX/EPIPHYSL SEP 3 FRAG Left 2/9/2021    Procedure: OPEN REDUCTION W/ INTERNAL FIXATION (ORIF) RADIUS / ULNA (WRIST) left;  Surgeon: Chris Camacho MD;  Location[facility];  Service: Orthopedics    SKIN BIOPSY      SKIN CANCER EXCISION      Melanoma Excision     TONSILLECTOMY      US BREAST NEEDLE LOC LEFT Left 6/16/2022    US BREAST NEEDLE LOC RIGHT EACH ADDITIONAL Right 6/16/2022    US GUIDED BREAST LYMPH NODE BIOPSY LEFT Left 4/14/2022     Family History   Problem Relation Age of Onset    Diabetes Mother     No Known Problems Father     Cancer Sister     Lung cancer Sister     Pancreatic cancer Sister     No Known Problems Maternal Grandmother     No Known Problems Maternal Grandfather     No Known Problems Paternal Grandmother     No Known Problems Paternal Grandfather     Breast cancer Neg Hx      Social History     Socioeconomic History    Marital status: /Civil Union     Spouse name: Not on file    Number of children: Not on file    Years of education: Not on file    Highest education level: Not on file   Occupational History    Occupation: unemployed    Tobacco Use    Smoking status: Never    Smokeless tobacco: Never   Vaping Use    Vaping status: Never Used   Substance and Sexual Activity    Alcohol use: Not Currently     Alcohol/week: 5.0 standard drinks of alcohol     Types: 5 Shots of liquor per week     Comment: Used mostly as a painkiller when needed before bedtime    Drug use: Yes     Frequency: 28.0 times per week     Types: Marijuana     Comment: THC Medical marijuana    Sexual activity: Not Currently     Partners: Male     Comment: Hysterectomy years ago   Other Topics Concern    Not on file   Social History Narrative    Lives with spouse      Social Drivers of Health     Financial Resource Strain: Low Risk  (12/13/2023)    Overall Financial Resource Strain (CARDIA)     Difficulty of Paying Living  Expenses: Not hard at all   Food Insecurity: No Food Insecurity (11/13/2023)    Nursing - Inadequate Food Risk Classification     Worried About Running Out of Food in the Last Year: Never true     Ran Out of Food in the Last Year: Never true     Ran Out of Food in the Last Year: Not on file   Transportation Needs: No Transportation Needs (12/15/2023)    OASIS : Transportation     Lack of Transportation (Medical): No     Lack of Transportation (Non-Medical): No     Patient Unable or Declines to Respond: No   Physical Activity: Insufficiently Active (5/2/2023)    Received from [facility]    Exercise Vital Sign     Days of Exercise per Week: 2 days     Minutes of Exercise per Session: 30 min   Stress: No Stress Concern Present (5/2/2023)    [facility]    [place] [place] [employer] - Occupational Stress Questionnaire     Feeling of Stress : Not at all   Social Connections: Unknown (6/18/2024)    Received from [facility]    Social Connections     How often do you feel lonely or isolated from those around you? (Adult - for ages 18 years and over): Not on file   Intimate Partner Violence: Not At Risk (5/2/2023)    [facility]    Humiliation, Afraid, Rape, and Kick questionnaire     Fear of Current or Ex-Partner: No     Emotionally Abused: No     Physically Abused: No     Sexually Abused: No   Housing Stability: Low Risk  (11/13/2023)    Housing Stability Vital Sign     Unable to Pay for Housing in the Last Year: No     Number of Times Moved in the Last Year: 1     Homeless in the Last Year: No       Current Outpatient Medications:     alendronate (FOSAMAX) 70 mg tablet, Take 1 tablet (70 mg total) by mouth every 7 days, Disp: 12 tablet, Rfl: 1    cholecalciferol (VITAMIN D3) 1,000 units tablet, Take 3,000 Units by mouth daily, Disp: , Rfl:     diphenhydrAMINE  (BENADRYL) 50 MG tablet, Take 50 mg by mouth daily at bedtime as needed for itching, Disp: , Rfl:     dronabinol (MARINOL) 10 MG capsule, , Disp: , Rfl:     ezetimibe (ZETIA) 10 mg tablet, Take 1 tablet (10 mg total) by mouth daily, Disp: 90 tablet, Rfl: 0    gabapentin (Neurontin) 100 mg capsule, Take 1 capsule (100 mg total) by mouth 3 (three) times a day, Disp: 90 capsule, Rfl: 1    letrozole (FEMARA) 2.5 mg tablet, Take 1 tablet (2.5 mg total) by mouth daily, Disp: 90 tablet, Rfl: 3    multivitamin (THERAGRAN) TABS, Take 1 tablet by mouth daily, Disp: , Rfl:     Omega-3 Fatty Acids (FISH OIL PO), Take by mouth, Disp: , Rfl:     penicillin V potassium (VEETID) 500 mg tablet, Take 1 tablet (500 mg total) by mouth every 12 (twelve) hours, Disp: 180 tablet, Rfl: 1    Probiotic Product (PROBIOTIC-10 PO), Take by mouth, Disp: , Rfl:   Allergies   Allergen Reactions    Chlorpromazine Anaphylaxis     PHENOTHIAZINE=ANAPHYLAXIS    Codeine Anaphylaxis     Anaphylaxis  abd pain.    Meperidine Anaphylaxis, Hives and Vomiting    Phenothiazines Anaphylaxis and Throat Swelling     Category: Allergy;     Saccharin - Food Allergy Anaphylaxis    Ampicillin-Sulbactam Sodium Hives    Aspirin GI Intolerance and Abdominal Pain     Severe GERD    Gluten Meal - Food Allergy GI Intolerance    Ibuprofen Hives     H    Levofloxacin Hives    Chocolate - Food Allergy GI Intolerance    Lactose - Food Allergy GI Intolerance    Neomycin Other (See Comments), Edema and Hives     Category: Allergy;   swelling    [place] - Food Allergy Rash    Latex Hives and Rash     Category: Allergy;      There were no vitals filed for this visit.

## 2024-12-02 NOTE — PROGRESS NOTES
Daily Note     Today's date: 2024  Patient name: Eileen Matias  : 1960  MRN: [id number]  Referring provider: Amelia Gallegos MD  Dx:   Encounter Diagnosis     ICD-10-CM    1. Lymphedema  I89.0                      Subjective: Patient presents without compression garments.  She reports she still cannot wear her current nighttime garments due to the hand pieces being too tight and long.   She states that she is unable to pull the garment off when she has to go to the bathroom at night.      Objective: See treatment diary below.  Re-measure for fitting of Relax nighttime garment.      Assessment: Tolerated treatment well. Patient would benefit from continued PT.  See attached mmts for Relax garment.        Plan: Continue per plan of care.      Precautions: L BCA  Access Code: [email or web address]  URL: [email or web address]  Date: 2024  Prepared by: Meena Jain    Exercises  - Shoulder Flexion Wall Slide with Towel  - 1 x daily - 7 x weekly - 1 sets - 5 reps - 10 hold  - Standing Shoulder Abduction Slides at Wall  - 1 x daily - 7 x weekly - 3 sets - 10 reps  - Single Arm Doorway Pec Stretch at 90 Degrees Abduction  - 1 x daily - 7 x weekly - 1 sets - 5 reps - 10 hold    Manuals           Manual MFR 10'            Self MLD             Compression fitting  20' 15          Girth mmts    5'          Neuro Re-Ed                                                                                                        Ther Ex             Pulley   4'                       Wall slides  10 x :10 10 x :10          Bicep curls cuff weight   nv          Row cuff weight   nv          Tricep cuff weight   nv          Bike  6' 6'          TG L 20  10 2x10                                                 Gait Training                                       Modalities

## 2024-12-04 ENCOUNTER — OFFICE VISIT (OUTPATIENT)
Dept: PHYSICAL THERAPY | Facility: CLINIC | Age: 64
End: 2024-12-04
Payer: MEDICARE

## 2024-12-04 DIAGNOSIS — I89.0 LYMPHEDEMA: Primary | ICD-10-CM

## 2024-12-04 PROCEDURE — 97110 THERAPEUTIC EXERCISES: CPT | Performed by: PHYSICAL THERAPIST

## 2024-12-04 PROCEDURE — 97140 MANUAL THERAPY 1/> REGIONS: CPT | Performed by: PHYSICAL THERAPIST

## 2024-12-04 NOTE — PROGRESS NOTES
Daily Note     Today's date: 2024  Patient name: Eileen Matias  : 1960  MRN: [id number]  Referring provider: No ref. provider found  Dx:   Encounter Diagnosis     ICD-10-CM    1. Lymphedema  I89.0                      Subjective: Patient reports that she is having her  do her home compression massage 1 x a week approx.  She also states that she is pumping with her compression pump 3 x a day x 1 hour a day.    She also reports that she may have the lymphovenus transfer in approx .        Objective: See treatment diary below      Assessment: Tolerated treatment well. Patient would benefit from continued PT      Plan: Continue per plan of care.      Precautions: L BCA  Access Code: [email or web address]  URL: [email or web address]  Date: 2024  Prepared by: Meena Jain    Exercises  - Shoulder Flexion Wall Slide with Towel  - 1 x daily - 7 x weekly - 1 sets - 5 reps - 10 hold  - Standing Shoulder Abduction Slides at Wall  - 1 x daily - 7 x weekly - 3 sets - 10 reps  - Single Arm Doorway Pec Stretch at 90 Degrees Abduction  - 1 x daily - 7 x weekly - 1 sets - 5 reps - 10 hold    Manuals          Manual MFR 10'            Self MLD             Compression fitting  20' 15 30         Girth mmts    5'          Neuro Re-Ed                                                                                                        Ther Ex             Pulley   4' 5'                      Wall slides  10 x :10 10 x :10 10 x :10         Bicep curls cuff weight   nv          Row cuff weight   nv          Tricep cuff weight   nv          Bike  6' 6' 6'         TG L 20  10 2x10 2 x 10                                                Gait Training                                       Modalities

## 2024-12-05 ENCOUNTER — OFFICE VISIT (OUTPATIENT)
Dept: OCCUPATIONAL THERAPY | Facility: CLINIC | Age: 64
End: 2024-12-05
Payer: MEDICARE

## 2024-12-05 DIAGNOSIS — K20.90 ESOPHAGITIS: ICD-10-CM

## 2024-12-05 DIAGNOSIS — R20.0 NUMBNESS AND TINGLING OF LEFT UPPER EXTREMITY: Primary | ICD-10-CM

## 2024-12-05 DIAGNOSIS — R29.898 RIGHT HAND WEAKNESS: ICD-10-CM

## 2024-12-05 DIAGNOSIS — R20.2 NUMBNESS AND TINGLING OF LEFT UPPER EXTREMITY: Primary | ICD-10-CM

## 2024-12-05 PROCEDURE — 97140 MANUAL THERAPY 1/> REGIONS: CPT | Performed by: OCCUPATIONAL THERAPIST

## 2024-12-05 PROCEDURE — 97014 ELECTRIC STIMULATION THERAPY: CPT | Performed by: OCCUPATIONAL THERAPIST

## 2024-12-05 PROCEDURE — 97110 THERAPEUTIC EXERCISES: CPT | Performed by: OCCUPATIONAL THERAPIST

## 2024-12-05 NOTE — PROGRESS NOTES
Daily Note     Today's date: 2024  Patient name: Eileen Matias  : 1960  MRN: [id number]  Referring provider: Rogelio Black MD  Dx:   Encounter Diagnosis     ICD-10-CM    1. Numbness and tingling of left upper extremity  R20.0     R20.2       2. Esophagitis  K20.90 Ambulatory referral to Gastroenterology      3. Right hand weakness  R29.898                            Subjective:    My shoulder was really sore-  lower scap right    Objective: See treatment diary below for clinic program.     V Passive flexion post heat and stretch- 1 cm to palm      Assessment: Tolerated treatment well. Patient would benefit from continued OT. IP active digit flexion progressing.  Fifth MP resistant to stretching.  Improved gross grasp for light IADLs with patient in circumferential wrist extension splint.  Improving ease of right use noted in clinic program.   Edema controlled with wear of smaller and lighter lymphedema garment.       Plan: Continue per plan of care.   Progress dexterity and gripping exercises as tolerated. Continue wear of orthotic for wrist stability.  Continue TENS today.  NV, add increased gripping post heat and stretch.       Precautions: Lymphedema; hx desmoid tumors and breast cancer; healing humeral fracture     POC expires Unit limit Auth  expiration date PT/OT + Visit Limit?   25 NA NA BOMN         Dx R Hum Fx     Dr Wayne MILLER MET          Last RE 24    Date     RE 24    Visit 47 9/10  48 -10/10  49  1/10  50 2/10 51   3/10  52   4/10    Manuals 8' 8' 8' 8' 8' 8'              Jt mobs digits, FA MP all digits G 1-2 MP all digits G 1-2 MP All digits G 1-2 MP all digits G 1-2 MP all digits G 1-2 MP all digits G 1-2    Ortho fit/train          Neuro Re-Ed       15'                Mirror therapy    3x15 each  Wrist EF  All finger tips  Fist open                          orthotic                              Ther Ex 25    "25   30  15'   30'  30'    HEP    Pt ed for TENS for V pain      PROM 1:1 Digital flexion LLPS Digital flexion LLPS Digital flexion LLPS Digital flexion LLPS Digital flexion LLPS Digital flexion LLPS    Digital blocked AROM P/H 5x5\" P/H 5x5\" P/H 5x5\" P/H 5x5\" P/H fists 5x5\" P/H fists 5x5\"    UE warm up UBE 2F 2R L1  R only x40 UBE 2F 2R L1  R only x40 UBE 2F 2R L1  R only x40  UBE 2F 2R L1  R only x45 UBE 2F 2R L1  R only x50    PRE FA  #2 S/P x10  3/4 shaft x25 #2 S/P x10  3/4 shaft x25 Hold, no splint  #2 S/P x10  3/4 shaft x25 #2 S/P x10  3/4 shaft x25    PRE Wrist FA G Down x10  G up x10  R Pull x20  R fold x1 G Down x10  G up x10  R Pull x20  R fold x1 G Down x20  G up x20  R Pull x20  R fold x20 G Down x20  G up x20 G Down x20  G up x20  R Fold x20 G Down x20  G up x20  R Fold x20    PRE digits   G web  to edge   X20 G web  to edge   x20 G web  to edge   x20 G web  to edge   x20    Shoulder ROM          Sh PROM          Ther Activity 10'           Gripping G web  to edge   X20         Pinching Black Cpin  X20 blocks         Coordination          Carrying           Reaching           MHP Pre tx with ace flexion wrap Pre tx with ace flexion wrap Pre tx with ace flexion wrap Pre tx with ace flexion wrap Pre tx with ace flexion wrap Pre tx with ace flexion wrap    TENS    Pre tx 10' Pre tx 10'  10'             "

## 2024-12-09 ENCOUNTER — OFFICE VISIT (OUTPATIENT)
Dept: PHYSICAL THERAPY | Facility: CLINIC | Age: 64
End: 2024-12-09
Payer: MEDICARE

## 2024-12-09 DIAGNOSIS — I89.0 LYMPHEDEMA: Primary | ICD-10-CM

## 2024-12-09 PROCEDURE — 97140 MANUAL THERAPY 1/> REGIONS: CPT | Performed by: PHYSICAL THERAPIST

## 2024-12-09 PROCEDURE — 97530 THERAPEUTIC ACTIVITIES: CPT | Performed by: PHYSICAL THERAPIST

## 2024-12-09 PROCEDURE — 97110 THERAPEUTIC EXERCISES: CPT | Performed by: PHYSICAL THERAPIST

## 2024-12-09 NOTE — PROGRESS NOTES
Daily Note     Today's date: 2024  Patient name: Eileen Matias  : 1960  MRN: [id number]  Referring provider: No ref. provider found  Dx:   Encounter Diagnosis     ICD-10-CM    1. Lymphedema  I89.0                      Subjective: Patient reports that she has been wearing her L compression sleeve since Thurs.  She has not used her pump since then on the L.        Objective: See treatment diary below      Assessment: Tolerated treatment well. Patient would benefit from continued PT  Patient with good reduction in swelling on the L.   Initiated strenthening and will re-assess at nv.  Client would like to proceed with B relax garments with gauntlet on the R and no hand piece of the L.        Plan: Continue per plan of care.      Precautions: L BCA  Access Code: [email or web address]  URL: [email or web address]  Date: 2024  Prepared by: Meena Jain    Exercises  - Shoulder Flexion Wall Slide with Towel  - 1 x daily - 7 x weekly - 1 sets - 5 reps - 10 hold  - Standing Shoulder Abduction Slides at Wall  - 1 x daily - 7 x weekly - 3 sets - 10 reps  - Single Arm Doorway Pec Stretch at 90 Degrees Abduction  - 1 x daily - 7 x weekly - 1 sets - 5 reps - 10 hold    Manuals         Manual MFR 10'            Self MLD             Compression fitting  20' 15 30 15        Girth mmts    5'          Neuro Re-Ed                                                                                                        Ther Ex             Pulley   4' 5' 5'                     Wall slides  10 x :10 10 x :10 10 x :10 10 x :10        Bicep curls   nv  2# x 10        Row    nv  2# x 10        Tricep    nv  2# 2 x 10        Bike  6' 6' 6' 6'        TG L 20  10 2x10 2 x 10 2 x 10                                               Gait Training                                       Modalities

## 2024-12-10 ENCOUNTER — OFFICE VISIT (OUTPATIENT)
Dept: OCCUPATIONAL THERAPY | Facility: CLINIC | Age: 64
End: 2024-12-10
Payer: MEDICARE

## 2024-12-10 DIAGNOSIS — R20.0 NUMBNESS AND TINGLING OF LEFT UPPER EXTREMITY: Primary | ICD-10-CM

## 2024-12-10 DIAGNOSIS — R20.2 NUMBNESS AND TINGLING OF LEFT UPPER EXTREMITY: Primary | ICD-10-CM

## 2024-12-10 DIAGNOSIS — R29.898 RIGHT HAND WEAKNESS: ICD-10-CM

## 2024-12-10 PROCEDURE — 97140 MANUAL THERAPY 1/> REGIONS: CPT | Performed by: OCCUPATIONAL THERAPIST

## 2024-12-10 PROCEDURE — 97110 THERAPEUTIC EXERCISES: CPT | Performed by: OCCUPATIONAL THERAPIST

## 2024-12-10 NOTE — PROGRESS NOTES
Daily Note     Today's date: 12/10/2024  Patient name: Eileen Matias  : 1960  MRN: [id number]  Referring provider: Rogelio lBack MD  Dx:   Encounter Diagnosis     ICD-10-CM    1. Numbness and tingling of left upper extremity  R20.0     R20.2       2. Right hand weakness  R29.898               Subjective:    My arm is tired.  Doing ok though    Objective: See treatment diary below for clinic program.     V Passive flexion post heat and stretch- 1 cm to palm      Assessment: Tolerated treatment well. Patient would benefit from continued OT. IP active digit flexion progressing.  Fifth MP resistant to stretching.  Improved gross grasp for light IADLs with patient in circumferential wrist extension splint.  Improving ease of right use noted in clinic program.   Edema controlled with wear of smaller and lighter lymphedema garment.       Plan: Continue per plan of care.   Progress dexterity and gripping exercises as tolerated. Continue wear of orthotic for wrist stability.  Continue TENS today.  Continue increased gripping post heat and stretch.       Precautions: Lymphedema; hx desmoid tumors and breast cancer; healing humeral fracture     POC expires Unit limit Auth  expiration date PT/OT + Visit Limit?   25 NA NA BOMN         Dx R Hum Fx     Dr Wayne MILLER MET          Last RE 24    Date    11/21 11/26 12/5/24 12/10   Visit    50 2/10 51   3/10  52   4/10 53     5/10    Manuals    8' 8' 8'              Jt mobs digits, FA    MP all digits G 1-2 MP all digits G 1-2 MP all digits G 1-2 MP all digits G 1-2   Ortho fit/train          Neuro Re-Ed       15'                Mirror therapy    3x15 each  Wrist EF  All finger tips  Fist open                          orthotic                              Ther Ex     15'   30'  30'   30'   HEP    Pt ed for TENS for V pain      PROM 1:1    Digital flexion LLPS Digital flexion LLPS Digital flexion LLPS Digital flexion LLPS   Digital  "blocked AROM    P/H 5x5\" P/H fists 5x5\" P/H fists 5x5\" P/H fists 5x5\"   UE warm up     UBE 2F 2R L1  R only x45 UBE 2F 2R L1  R only x50 UBE 2F 2R L1  R only x15   PRE FA      #2 S/P x10  3/4 shaft x25 #2 S/P x10  3/4 shaft x25 #2 S/P x10  3/4 shaft x25   PRE Wrist FA    G Down x20  G up x20 G Down x20  G up x20  R Fold x20 G Down x20  G up x20  R Fold x20 G Down x20  G up x20  R Fold x20   PRE digits    G web  to edge   x20 G web  to edge   x20 G web  to edge   x20 G web  to edge x20   PRE digits tips       Velcro bd swap tips   Sh PROM          Ther Activity          Gripping          Pinching          Coordination          Carrying           Reaching           MHP    Pre tx with ace flexion wrap Pre tx with ace flexion wrap Pre tx with ace flexion wrap Pre tx with ace flexion wrap   TENS    Pre tx 10' Pre tx 10'  10' 10'            "

## 2024-12-12 ENCOUNTER — OFFICE VISIT (OUTPATIENT)
Dept: OCCUPATIONAL THERAPY | Facility: CLINIC | Age: 64
End: 2024-12-12
Payer: MEDICARE

## 2024-12-12 DIAGNOSIS — R20.2 NUMBNESS AND TINGLING OF LEFT UPPER EXTREMITY: Primary | ICD-10-CM

## 2024-12-12 DIAGNOSIS — R20.0 NUMBNESS AND TINGLING OF LEFT UPPER EXTREMITY: Primary | ICD-10-CM

## 2024-12-12 DIAGNOSIS — R29.898 RIGHT HAND WEAKNESS: ICD-10-CM

## 2024-12-12 PROCEDURE — 97110 THERAPEUTIC EXERCISES: CPT | Performed by: OCCUPATIONAL THERAPIST

## 2024-12-12 PROCEDURE — 97014 ELECTRIC STIMULATION THERAPY: CPT | Performed by: OCCUPATIONAL THERAPIST

## 2024-12-12 PROCEDURE — 97140 MANUAL THERAPY 1/> REGIONS: CPT | Performed by: OCCUPATIONAL THERAPIST

## 2024-12-12 NOTE — PROGRESS NOTES
Daily Note     Today's date: 2024  Patient name: Eileen Matias  : 1960  MRN: [id number]  Referring provider: Rogelio Black MD  Dx:   Encounter Diagnosis     ICD-10-CM    1. Numbness and tingling of left upper extremity  R20.0     R20.2       2. Right hand weakness  R29.898               Subjective:   The little finger is still so stiff.     Objective: See treatment diary below for clinic program.     V Passive flexion post heat and stretch- 1 cm to palm      Assessment: Tolerated treatment well. Patient would benefit from continued OT. IP active digit flexion progress slowing.  Fifth MP resistant to stretching.  Improved gross grasp for light IADLs with patient in circumferential wrist extension splint.  Improving ease of right use noted in clinic program.   Edema controlled with wear of smaller and lighter lymphedema garment.       Plan: Continue per plan of care.   Progress dexterity and gripping exercises as tolerated. Continue wear of orthotic for wrist stability.  Continue TENS today.  Continue increased gripping post heat and stretch.       Precautions: Lymphedema; hx desmoid tumors and breast cancer; healing humeral fracture     POC expires Unit limit Auth  expiration date PT/OT + Visit Limit?   25 NA NA BOMN         Dx R Hum Fx     Dr Wayne MILLER MET          Last RE 24    Date 12/12   11/21 11/26 12/5/24 12/10   Visit 54 6/10   50 2/10 51   3/10  52   4/10 53     5/10    Manuals    8' 8' 8'              Jt mobs digits, FA MP all digits G 1-2   MP all digits G 1-2 MP all digits G 1-2 MP all digits G 1-2 MP all digits G 1-2   Ortho fit/train          Neuro Re-Ed       15'                Mirror therapy    3x15 each  Wrist EF  All finger tips  Fist open                          orthotic                              Ther Ex 30'    15'   30'  30'   30'   HEP    Pt ed for TENS for V pain      PROM 1:1 Digital flexion LLPS   Digital flexion LLPS Digital flexion  "LLPS Digital flexion LLPS Digital flexion LLPS   Digital blocked AROM P/H fists 5x5\"   P/H 5x5\" P/H fists 5x5\" P/H fists 5x5\" P/H fists 5x5\"   UE warm up UBE 2F 2R L1  R only x50    UBE 2F 2R L1  R only x45 UBE 2F 2R L1  R only x50 UBE 2F 2R L1  R only x50   PRE FA  #2 S/P  3/4 shaft x50    #2 S/P x10  3/4 shaft x25 #2 S/P x10  3/4 shaft x25 #2 S/P x10  3/4 shaft x25   PRE Wrist FA G F bar down x20  Up x20     G Down x20  G up x20 G Down x20  G up x20  R Fold x20 G Down x20  G up x20  R Fold x20 G Down x20  G up x20  R Fold x20   PRE digits G web  to edge   x20   G web  to edge   x20 G web  to edge   x20 G web  to edge   x20 G web  to edge x20   PRE digits tips Velcro bd swap tips      Velcro bd swap tips   Sh PROM          Ther Activity          Gripping          Pinching          Coordination          Carrying           Reaching           MHP Pre tx with act flexion wrap   Pre tx with ace flexion wrap Pre tx with ace flexion wrap Pre tx with ace flexion wrap Pre tx with ace flexion wrap   TENS 10'   Pre tx 10' Pre tx 10'  10' 10'            "

## 2024-12-16 ENCOUNTER — OFFICE VISIT (OUTPATIENT)
Dept: PHYSICAL THERAPY | Facility: CLINIC | Age: 64
End: 2024-12-16
Payer: MEDICARE

## 2024-12-16 DIAGNOSIS — I89.0 LYMPHEDEMA: Primary | ICD-10-CM

## 2024-12-16 PROCEDURE — 97110 THERAPEUTIC EXERCISES: CPT | Performed by: PHYSICAL THERAPIST

## 2024-12-16 NOTE — PROGRESS NOTES
Daily Note     Today's date: 2024  Patient name: Eileen Matias  : 1960  MRN: [id number]  Referring provider: No ref. provider found  Dx:   Encounter Diagnosis     ICD-10-CM    1. Lymphedema  I89.0                      Subjective: Patient reports that she had a flair up of redness and burning in the R UE which has resolved.        Objective: See treatment diary below      Assessment: Tolerated treatment well. Patient would benefit from continued PT      Plan: Continue per plan of care.      Precautions: L BCA  Access Code: [email or web address]  URL: [email or web address]  Date: 2024  Prepared by: Meena Jain    Exercises  - Shoulder Flexion Wall Slide with Towel  - 1 x daily - 7 x weekly - 1 sets - 5 reps - 10 hold  - Standing Shoulder Abduction Slides at Wall  - 1 x daily - 7 x weekly - 3 sets - 10 reps  - Single Arm Doorway Pec Stretch at 90 Degrees Abduction  - 1 x daily - 7 x weekly - 1 sets - 5 reps - 10 hold    Manuals        Manual MFR 10'            Self MLD             Compression fitting  20' 15 30 15        Girth mmts    5'   8'       Neuro Re-Ed                                                                                                        Ther Ex             Pulley   4' 5' 5' 5'                    Wall slides  10 x :10 10 x :10 10 x :10 10 x :10 10 x :10       Bicep curls   nv  2# x 10 3# 2 x 10       Row    nv  2# x 10 3# 2 x 10       Tricep    nv  2# 2 x 10 3# 2 x 10       Bike  6' 6' 6' 6' 6'       TG L 20  10 2x10 2 x 10 2 x 10 2 x 10                                              Gait Training                                       Modalities

## 2024-12-17 ENCOUNTER — OFFICE VISIT (OUTPATIENT)
Dept: OCCUPATIONAL THERAPY | Facility: CLINIC | Age: 64
End: 2024-12-17
Payer: MEDICARE

## 2024-12-17 DIAGNOSIS — R20.0 NUMBNESS AND TINGLING OF LEFT UPPER EXTREMITY: Primary | ICD-10-CM

## 2024-12-17 DIAGNOSIS — R29.898 RIGHT HAND WEAKNESS: ICD-10-CM

## 2024-12-17 DIAGNOSIS — R20.2 NUMBNESS AND TINGLING OF LEFT UPPER EXTREMITY: Primary | ICD-10-CM

## 2024-12-17 PROCEDURE — 97140 MANUAL THERAPY 1/> REGIONS: CPT | Performed by: OCCUPATIONAL THERAPIST

## 2024-12-17 PROCEDURE — 97110 THERAPEUTIC EXERCISES: CPT | Performed by: OCCUPATIONAL THERAPIST

## 2024-12-17 PROCEDURE — 97014 ELECTRIC STIMULATION THERAPY: CPT | Performed by: OCCUPATIONAL THERAPIST

## 2024-12-17 NOTE — PROGRESS NOTES
Daily Note     Today's date: 2024  Patient name: Eileen Matias  : 1960  MRN: [id number]  Referring provider: Rogelio Black MD  Dx:   Encounter Diagnosis     ICD-10-CM    1. Numbness and tingling of left upper extremity  R20.0     R20.2       2. Right hand weakness  R29.898               Subjective:   I am stretching everyday.    Objective: See treatment diary below for clinic program.     V Passive flexion post heat and stretch- 1 cm to palm      Assessment: Tolerated treatment well. Patient would benefit from continued OT. IP active digit flexion progress slowing.  Fifth MP resistant to stretching.  Improved gross grasp for light IADLs with patient in circumferential wrist extension splint.  Improving ease of right use noted in clinic program.   Edema controlled with wear of smaller and lighter lymphedema garment.       Plan: Continue per plan of care.   Progress dexterity and gripping exercises as tolerated. Continue wear of orthotic for wrist stability.  Continue TENS today.  Continue increased gripping post heat and stretch.       Precautions: Lymphedema; hx desmoid tumors and breast cancer; healing humeral fracture     POC expires Unit limit Auth  expiration date PT/OT + Visit Limit?   25 NA NA BOMN         Dx R Hum Fx     Dr Wayne MILLER MET          Last RE 24    Date 12/12 12/17  11/21 11/26 12/5/24 12/10   Visit 54 6/10 55 7/10  50 2/10 51   3/10  52   4/10 53     5/10    Manuals    8' 8' 8'              Jt mobs digits, FA MP all digits G 1-2 MP all digits G 1-2  MP all digits G 1-2 MP all digits G 1-2 MP all digits G 1-2 MP all digits G 1-2   Ortho fit/train          Neuro Re-Ed       15'                Mirror therapy    3x15 each  Wrist EF  All finger tips  Fist open                          orthotic                              Ther Ex 30'   30   15'   30'  30'   30'   HEP    Pt ed for TENS for V pain      PROM 1:1 Digital flexion LLPS Digital flexion  "LLPS  Digital flexion LLPS Digital flexion LLPS Digital flexion LLPS Digital flexion LLPS   Digital blocked AROM P/H fists 5x5\" P/H fists 5x5\"  P/H 5x5\" P/H fists 5x5\" P/H fists 5x5\" P/H fists 5x5\"   UE warm up UBE 2F 2R L1  R only x50 UBE 2F 2R L1  R only x70   UBE 2F 2R L1  R only x45 UBE 2F 2R L1  R only x50 UBE 2F 2R L1  R only x50   PRE FA  #2 S/P  3/4 shaft x50 #3 S/P  3/4 shaft x50   #2 S/P x10  3/4 shaft x25 #2 S/P x10  3/4 shaft x25 #2 S/P x10  3/4 shaft x25   PRE Wrist FA G F bar down x20  Up x20   G F bar down x20  Up x20  G Down x20  G up x20 G Down x20  G up x20  R Fold x20 G Down x20  G up x20  R Fold x20 G Down x20  G up x20  R Fold x20   PRE digits G web  to edge   x20 G web  to edge   x20  G web  to edge   x20 G web  to edge   x20 G web  to edge   x20 G web  to edge x20   PRE digits tips Velcro bd swap tips Velcro bd swap tips  Full board     Velcro bd swap tips   Sh PROM          Ther Activity          Gripping          Pinching          Coordination          Carrying           Reaching           MHP Pre tx with act flexion wrap Pre tx with act flexion wrap  Pre tx with ace flexion wrap Pre tx with ace flexion wrap Pre tx with ace flexion wrap Pre tx with ace flexion wrap   TENS 10' 10'  Pre tx 10' Pre tx 10'  10' 10'            "

## 2024-12-18 ENCOUNTER — CONSULT (OUTPATIENT)
Dept: GASTROENTEROLOGY | Facility: CLINIC | Age: 64
End: 2024-12-18
Payer: MEDICARE

## 2024-12-18 VITALS
BODY MASS INDEX: 21.66 KG/M2 | HEIGHT: 65 IN | TEMPERATURE: 97.3 F | WEIGHT: 130 LBS | OXYGEN SATURATION: 99 % | HEART RATE: 94 BPM

## 2024-12-18 DIAGNOSIS — I89.0 LYMPHEDEMA: ICD-10-CM

## 2024-12-18 DIAGNOSIS — R13.10 ODYNOPHAGIA: Primary | ICD-10-CM

## 2024-12-18 DIAGNOSIS — K90.0 CELIAC DISEASE: ICD-10-CM

## 2024-12-18 PROCEDURE — 99203 OFFICE O/P NEW LOW 30 MIN: CPT | Performed by: PHYSICIAN ASSISTANT

## 2024-12-18 NOTE — ASSESSMENT & PLAN NOTE
B/L Upper Extremities s/p LN dissections  Cannot use either UE for blood pressure or needle sticks

## 2024-12-18 NOTE — PATIENT INSTRUCTIONS
Scheduled date of EGD(as of today):12/30/24  Physician performing EGD:Inge  Location of EGD:[place]  Instructions reviewed with patient by:Robbi zuñiga  Clearances: none

## 2024-12-18 NOTE — PROGRESS NOTES
Name: Eileen Matias      : 1960      MRN: [id number]  Encounter Provider: Adina Jones PA-C  Encounter Date: 2024   Encounter department: [facility] [place]  :  Assessment & Plan  Odynophagia  Started late November  She had been prescribed Fosamax and accidentally took this daily rather than weekly as prescribed  She stopped the medication completely and was given Pantoprazole about 2 weeks ago  Symptoms are mostly resolved    Suspect pill esophagitis related to Alendronate  Will plan EGD to ensure no other pathology prior to reinitiation of the medication    Advised to continue PPI at least through the EGD    Celiac disease  Diagnosed many years ago - unclear if this was based on bloodwork, symptoms, biopsy...  If she consumes gluten she has an immediate severe reaction  Will biopsy the small bowel but suspect it will be normal        Lymphedema  B/L Upper Extremities s/p LN dissections  Cannot use either UE for blood pressure or needle sticks           History of Present Illness   HPI  Eileen Matias is a 64 y.o. female with a history of breast cancer, hypertension, peripheral neuropathy, lymphedema, hyperlipidemia, osteoporosis who presents for evaluation of benign aphasia.  The symptoms began in late November.  They started after she started alendronate.  She accidentally took the medication on a daily basis rather than weekly as prescribed.  She reports that she was having extreme pain in swallowing solids.  She felt a tightness in her chest.  She had no nausea, vomiting, hematemesis or melena.  She spoke with the prescribing doctor who told her to stop the medication immediately and prescribed pantoprazole.  She has been taking this for about 2 weeks now with almost complete resolution of the symptoms.  She has no heartburn.  She does report what sounds like regurgitation on a frequent basis.  This has been a lifelong issue for her.  She has never had an  upper endoscopy.    Her last colonoscopy was in 2021 and was a normal exam except for hemorrhoids.    History obtained from: patient    Review of Systems   Constitutional:  Negative for activity change, appetite change, fatigue, fever and unexpected weight change.   HENT:  Positive for trouble swallowing.    Respiratory:  Negative for cough, shortness of breath and wheezing.    Gastrointestinal:  Negative for abdominal distention, abdominal pain, blood in stool, constipation, diarrhea, nausea and vomiting.   Endocrine: Negative for cold intolerance and heat intolerance.   Genitourinary:  Negative for dysuria, flank pain and hematuria.   Neurological:  Positive for numbness. Negative for dizziness and headaches.     Medical History Reviewed by provider this encounter:  Tobacco  Allergies  Meds  Problems  Med Hx  Surg Hx  Fam Hx     .  Past Medical History   Past Medical History:   Diagnosis Date    Abnormal mammogram 05/15/2013    Anemia     Cancer (HCC)     desmoitosis    Carcinoma of left breast metastatic to axillary lymph node (HCC) 04/19/2022    Chronic pain disorder     worse right side of body    Desmoid tumor     Last Assessed: 6/19/2017    fibroidal cyst 1999?  Hysterectomy done    History of transfusion     no reactions    Hyperlipidemia     Hypertension     Osteoporosis     PONV (postoperative nausea and vomiting)      Past Surgical History:   Procedure Laterality Date    BREAST LUMPECTOMY Left 6/21/2022    Procedure: ANITA  DIRECTED LUMPECTOMY;  Surgeon: Amanda Motley MD;  Location: [facility];  Service: Surgical Oncology    FRACTURE SURGERY      HYSTERECTOMY      LYMPH NODE DISSECTION Left 6/21/2022    Procedure: AXILLARY DISSECTION LEVEL I AND LEVEL II LYMPH NODES;  Surgeon: Amanda Motley MD;  Location: [facility];  Service: Surgical Oncology    MRI BREAST BIOPSY LEFT (ALL INCLUSIVE) Left 5/20/2022    MRI BREAST BIOPSY RIGHT (ALL INCLUSIVE) Right 5/20/2022    OTHER  SURGICAL HISTORY      Arm Incision: Dermoid tumor, right Arm     PARTIAL HYSTERECTOMY      AZ COLONOSCOPY FLX DX W/COLLJ SPEC WHEN PFRMD N/A 8/15/2017    Procedure: COLONOSCOPY;  Surgeon: Alec England MD;  Location: [facility];  Service: Gastroenterology    AZ NDSC WRST SURG W/RLS TRANSVRS CARPL LIGM Left 8/10/2021    Procedure: RELEASE LEFT CARPAL TUNNEL ENDOSCOPIC;  Surgeon: Chris Camacho MD;  Location: [facility];  Service: Orthopedics    AZ OPTX DSTL RADL I-ARTIC FX/EPIPHYSL SEP 3 FRAG Left 2/9/2021    Procedure: OPEN REDUCTION W/ INTERNAL FIXATION (ORIF) RADIUS / ULNA (WRIST) left;  Surgeon: Chris Camacho MD;  Location: [facility];  Service: Orthopedics    SKIN BIOPSY      SKIN CANCER EXCISION      Melanoma Excision     TONSILLECTOMY      US BREAST NEEDLE LOC LEFT Left 6/16/2022    US BREAST NEEDLE LOC RIGHT EACH ADDITIONAL Right 6/16/2022    US GUIDED BREAST LYMPH NODE BIOPSY LEFT Left 4/14/2022     Family History   Problem Relation Age of Onset    Diabetes Mother     No Known Problems Father     Cancer Sister     Lung cancer Sister     Pancreatic cancer Sister     No Known Problems Maternal Grandmother     No Known Problems Maternal Grandfather     No Known Problems Paternal Grandmother     No Known Problems Paternal Grandfather     Breast cancer Neg Hx       reports that she has never smoked. She has never used smokeless tobacco. She reports that she does not currently use alcohol after a past usage of about 5.0 standard drinks of alcohol per week. She reports current drug use. Frequency: 28.00 times per week. Drug: Marijuana.  Current Outpatient Medications on File Prior to Visit   Medication Sig Dispense Refill    alendronate (FOSAMAX) 70 mg tablet Take 1 tablet (70 mg total) by mouth every 7 days 12 tablet 1    cholecalciferol (VITAMIN D3) 1,000 units tablet Take 3,000 Units by mouth daily      diphenhydrAMINE (BENADRYL) 50 MG tablet Take 50 mg by mouth daily at bedtime as needed for itching       dronabinol (MARINOL) 10 MG capsule       ezetimibe (ZETIA) 10 mg tablet Take 1 tablet (10 mg total) by mouth daily 90 tablet 0    letrozole (FEMARA) 2.5 mg tablet Take 1 tablet (2.5 mg total) by mouth daily 90 tablet 3    multivitamin (THERAGRAN) TABS Take 1 tablet by mouth daily      Omega-3 Fatty Acids (FISH OIL PO) Take by mouth      pantoprazole (PROTONIX) 40 mg tablet Take 1 tablet (40 mg total) by mouth daily 60 tablet 0    penicillin V potassium (VEETID) 500 mg tablet Take 1 tablet (500 mg total) by mouth every 12 (twelve) hours 180 tablet 1    Probiotic Product (PROBIOTIC-10 PO) Take by mouth      [DISCONTINUED] gabapentin (Neurontin) 100 mg capsule Take 1 capsule (100 mg total) by mouth 3 (three) times a day 90 capsule 1     No current facility-administered medications on file prior to visit.     Allergies   Allergen Reactions    Chlorpromazine Anaphylaxis     PHENOTHIAZINE=ANAPHYLAXIS    Codeine Anaphylaxis     Anaphylaxis  abd pain.    Meperidine Anaphylaxis, Hives and Vomiting    Phenothiazines Anaphylaxis and Throat Swelling     Category: Allergy;     Saccharin - Food Allergy Anaphylaxis    Ampicillin-Sulbactam Sodium Hives    Aspirin GI Intolerance and Abdominal Pain     Severe GERD    Gluten Meal - Food Allergy GI Intolerance    Ibuprofen Hives     H    Levofloxacin Hives    Lactose - Food Allergy GI Intolerance    Neomycin Other (See Comments), Edema and Hives     Category: Allergy;   swelling    [place] - Food Allergy Rash    Latex Hives and Rash     Category: Allergy;       Current Outpatient Medications on File Prior to Visit   Medication Sig Dispense Refill    alendronate (FOSAMAX) 70 mg tablet Take 1 tablet (70 mg total) by mouth every 7 days 12 tablet 1    cholecalciferol (VITAMIN D3) 1,000 units tablet Take 3,000 Units by mouth daily      diphenhydrAMINE (BENADRYL) 50 MG tablet Take 50 mg by mouth daily at bedtime as needed for itching      dronabinol (MARINOL) 10 MG capsule       ezetimibe  "(ZETIA) 10 mg tablet Take 1 tablet (10 mg total) by mouth daily 90 tablet 0    letrozole (FEMARA) 2.5 mg tablet Take 1 tablet (2.5 mg total) by mouth daily 90 tablet 3    multivitamin (THERAGRAN) TABS Take 1 tablet by mouth daily      Omega-3 Fatty Acids (FISH OIL PO) Take by mouth      pantoprazole (PROTONIX) 40 mg tablet Take 1 tablet (40 mg total) by mouth daily 60 tablet 0    penicillin V potassium (VEETID) 500 mg tablet Take 1 tablet (500 mg total) by mouth every 12 (twelve) hours 180 tablet 1    Probiotic Product (PROBIOTIC-10 PO) Take by mouth      [DISCONTINUED] gabapentin (Neurontin) 100 mg capsule Take 1 capsule (100 mg total) by mouth 3 (three) times a day 90 capsule 1     No current facility-administered medications on file prior to visit.      Social History     Tobacco Use    Smoking status: Never    Smokeless tobacco: Never   Vaping Use    Vaping status: Never Used   Substance and Sexual Activity    Alcohol use: Not Currently     Alcohol/week: 5.0 standard drinks of alcohol     Types: 5 Shots of liquor per week     Comment: Used mostly as a painkiller when needed before bedtime    Drug use: Yes     Frequency: 28.0 times per week     Types: Marijuana     Comment: THC Medical marijuana    Sexual activity: Not Currently     Partners: Male     Comment: Hysterectomy years ago        Objective   Pulse 94   Temp (!) 97.3 °F (36.3 °C) (Temporal)   Ht 5' 5\" (1.651 m)   Wt 59 kg (130 lb)   SpO2 99%   BMI 21.63 kg/m²      Physical Exam  Vitals reviewed.   Constitutional:       Appearance: Normal appearance.   HENT:      Head: Normocephalic and atraumatic.   Eyes:      Extraocular Movements: Extraocular movements intact.      Pupils: Pupils are equal, round, and reactive to light.   Cardiovascular:      Rate and Rhythm: Normal rate and regular rhythm.   Abdominal:      General: Bowel sounds are normal. There is no distension.      Palpations: Abdomen is soft. There is no mass.      Tenderness: There is no " abdominal tenderness.   Skin:     General: Skin is warm and dry.      Coloration: Skin is not jaundiced.   Neurological:      General: No focal deficit present.      Mental Status: She is alert and oriented to person, place, and time.

## 2024-12-19 ENCOUNTER — OFFICE VISIT (OUTPATIENT)
Dept: OCCUPATIONAL THERAPY | Facility: CLINIC | Age: 64
End: 2024-12-19
Payer: MEDICARE

## 2024-12-19 DIAGNOSIS — R20.0 NUMBNESS AND TINGLING OF LEFT UPPER EXTREMITY: Primary | ICD-10-CM

## 2024-12-19 DIAGNOSIS — R29.898 RIGHT HAND WEAKNESS: ICD-10-CM

## 2024-12-19 DIAGNOSIS — R20.2 NUMBNESS AND TINGLING OF LEFT UPPER EXTREMITY: Primary | ICD-10-CM

## 2024-12-19 PROCEDURE — 97110 THERAPEUTIC EXERCISES: CPT | Performed by: OCCUPATIONAL THERAPIST

## 2024-12-19 PROCEDURE — 97140 MANUAL THERAPY 1/> REGIONS: CPT | Performed by: OCCUPATIONAL THERAPIST

## 2024-12-19 NOTE — PROGRESS NOTES
Daily Note     Today's date: 2024  Patient name: Eileen Matias  : 1960  MRN: [id number]  Referring provider: Rogelio Black MD  Dx:   Encounter Diagnosis     ICD-10-CM    1. Numbness and tingling of left upper extremity  R20.0     R20.2       2. Right hand weakness  R29.898               Subjective:   I am stretching everyday.    Objective: See treatment diary below for clinic program.     MOTION  V Passive flexion post heat and stretch- 1 cm to palm    STRENGTH  Lincoln #2  R  with splint on Lincoln #2  24.4 lbs. (+4.4 )          Assessment: Tolerated treatment well. Patient would benefit from continued OT. IP active digit flexion progress slowing.  Fifth MP resistant to stretching.  Improved gross grasp for light IADLs with patient in circumferential wrist extension splint.  Improving ease of right use noted in clinic program.   Edema controlled with wear of smaller and lighter lymphedema garment.       Plan: Continue per plan of care.   Progress dexterity and gripping exercises as tolerated. Continue wear of orthotic for wrist stability.  Continue TENS today.  Continue increased gripping post heat and stretch.       Precautions: Lymphedema; hx desmoid tumors and breast cancer; healing humeral fracture     POC expires Unit limit Auth  expiration date PT/OT + Visit Limit?   25 NA NA BOMN         Dx R Hum Fx     Dr Wayne MILLER MET          Last RE 24    Date 12/12 12/17 12/19    12/10   Visit 54 6/10 55 7/10 56  8/10    53     5/10    Manuals                    Jt mobs digits, FA MP all digits G 1-2 MP all digits G 1-2 MP all digits G 1-2    MP all digits G 1-2   Ortho fit/train          Neuro Re-Ed                     Mirror therapy                              orthotic                              Ther Ex 30'   30   30      30'   HEP          PROM 1:1 Digital flexion LLPS Digital flexion LLPS Digital flexion LLPS    Digital flexion LLPS   Digital blocked AROM P/H fists  "5x5\" P/H fists 5x5\" P/H fists 5x5\"    P/H fists 5x5\"   UE warm up UBE 2F 2R L1  R only x50 UBE 2F 2R L1  R only x70 UBE 2F 2R L1  R only x50    UBE 2F 2R L1  R only x50   PRE FA  #2 S/P  3/4 shaft x50 #3 S/P  3/4 shaft x50 #3 S/P  3/4 shaft x50    #2 S/P x10  3/4 shaft x25   PRE Wrist FA G F bar down x20  Up x20   G F bar down x20  Up x20 G F bar down x20  Up x20    G Down x20  G up x20  R Fold x20   PRE digits G web  to edge   x20 G web  to edge   x20 G web  to edge   x20    G web  to edge x20   PRE digits tips Velcro bd swap tips Velcro bd swap tips  Full board Velcro bd swap tips  Full board    Velcro bd swap tips   Sh PROM          Ther Activity          Gripping          Pinching          Coordination          Carrying           Reaching           MHP Pre tx with act flexion wrap Pre tx with act flexion wrap Pre tx with act flexion wrap    Pre tx with ace flexion wrap   TENS 10' 10' 10'    10'            "

## 2024-12-23 ENCOUNTER — OFFICE VISIT (OUTPATIENT)
Dept: PHYSICAL THERAPY | Facility: CLINIC | Age: 64
End: 2024-12-23
Payer: MEDICARE

## 2024-12-23 DIAGNOSIS — I89.0 LYMPHEDEMA: Primary | ICD-10-CM

## 2024-12-23 PROCEDURE — 97140 MANUAL THERAPY 1/> REGIONS: CPT | Performed by: PHYSICAL THERAPIST

## 2024-12-23 PROCEDURE — 97530 THERAPEUTIC ACTIVITIES: CPT | Performed by: PHYSICAL THERAPIST

## 2024-12-23 NOTE — PROGRESS NOTES
Daily Note     Today's date: 2024  Patient name: Eileen Matias  : 1960  MRN: [id number]  Referring provider: No ref. provider found  Dx:   Encounter Diagnosis     ICD-10-CM    1. Lymphedema  I89.0                      Subjective: Patient reports that she received her nighttime garments and both fit well.  She       Objective: See treatment diary below      Assessment: Tolerated treatment well. Patient would benefit from continued PT      Plan: Continue per plan of care.      Precautions: L BCA  Access Code: [email or web address]  URL: [email or web address]  Date: 2024  Prepared by: Meena Jain    Exercises  - Shoulder Flexion Wall Slide with Towel  - 1 x daily - 7 x weekly - 1 sets - 5 reps - 10 hold  - Standing Shoulder Abduction Slides at Wall  - 1 x daily - 7 x weekly - 3 sets - 10 reps  - Single Arm Doorway Pec Stretch at 90 Degrees Abduction  - 1 x daily - 7 x weekly - 1 sets - 5 reps - 10 hold    Manuals       Manual MFR 10'            Self MLD             Compression fitting  20' 15 30 15        Girth mmts    5'   8' 8'      Neuro Re-Ed                                                                                                        Ther Ex             Pulley   4' 5' 5' 5' 5'                   Wall slides  10 x :10 10 x :10 10 x :10 10 x :10 10 x :10 hep      Bicep curls   nv  2# x 10 3# 2 x 10 4# 2 x 10 4#     Row    nv  2# x 10 3# 2 x 10 3# 2 x 10 4#     Tricep    nv  2# 2 x 10 3# 2 x 10 3# 2 x 10 4#     Bike  6' 6' 6' 6' 6' 6'      TG L 20  10 2x10 2 x 10 2 x 10 2 x 10 2x10                                             Gait Training                                       Modalities

## 2024-12-24 ENCOUNTER — OFFICE VISIT (OUTPATIENT)
Dept: OCCUPATIONAL THERAPY | Facility: CLINIC | Age: 64
End: 2024-12-24
Payer: MEDICARE

## 2024-12-24 DIAGNOSIS — R20.0 NUMBNESS AND TINGLING OF LEFT UPPER EXTREMITY: Primary | ICD-10-CM

## 2024-12-24 DIAGNOSIS — R29.898 RIGHT HAND WEAKNESS: ICD-10-CM

## 2024-12-24 DIAGNOSIS — R20.2 NUMBNESS AND TINGLING OF LEFT UPPER EXTREMITY: Primary | ICD-10-CM

## 2024-12-24 PROCEDURE — 97110 THERAPEUTIC EXERCISES: CPT | Performed by: OCCUPATIONAL THERAPIST

## 2024-12-24 PROCEDURE — 97140 MANUAL THERAPY 1/> REGIONS: CPT | Performed by: OCCUPATIONAL THERAPIST

## 2024-12-24 NOTE — PROGRESS NOTES
Daily Note     Today's date: 2024  Patient name: Eileen Matias  : 1960  MRN: [id number]  Referring provider: Rogelio Black MD  Dx:   Encounter Diagnosis     ICD-10-CM    1. Numbness and tingling of left upper extremity  R20.0     R20.2       2. Right hand weakness  R29.898               Subjective:   I am stretching everyday.    Objective: See treatment diary below for clinic program.     MOTION  V Passive flexion post heat and stretch- 1 cm to palm    STRENGTH  Lincoln #2  R  with splint on Lincoln #2  24.4 lbs. (+4.4 )          Assessment: Tolerated treatment well. Patient would benefit from continued OT. IP active digit flexion progress slowing.  Fifth MP resistant to stretching.  Improved gross grasp for light IADLs with patient in circumferential wrist extension splint.  Improving ease of right use noted in clinic program.   Edema controlled with wear of smaller and lighter lymphedema garment.       Plan: Continue per plan of care.   Progress dexterity and gripping exercises as tolerated. Continue wear of orthotic for wrist stability.  Continue TENS today.  Continue increased gripping post heat and stretch.       Precautions: Lymphedema; hx desmoid tumors and breast cancer; healing humeral fracture     POC expires Unit limit Auth  expiration date PT/OT + Visit Limit?   25 NA NA BOMN         Dx R Hum Fx     Dr Wayne MILLER MET          Last RE 24    Date  NV RE  12/10   Visit 54 6/10 55 7/10 56  8/10 57  9/10   53     /10    Manuals                    Jt mobs digits, FA MP all digits G 1-2 MP all digits G 1-2 MP all digits G 1-2 MP digits all G 1-2   MP all digits G 1-2   Ortho fit/train          Neuro Re-Ed                     Mirror therapy                              orthotic                              Ther Ex 30'   30   30   30     30'   HEP          PROM 1:1 Digital flexion LLPS Digital flexion LLPS Digital flexion LLPS Digital flexion  "LLPS   Digital flexion LLPS   Digital blocked AROM P/H fists 5x5\" P/H fists 5x5\" P/H fists 5x5\" P/H fists 5x5\"   P/H fists 5x5\"   UE warm up UBE 2F 2R L1  R only x50 UBE 2F 2R L1  R only x70 UBE 2F 2R L1  R only x50 UBE 2F 2R L1  R only x50   UBE 2F 2R L1  R only x50   PRE FA  #2 S/P  3/4 shaft x50 #3 S/P  3/4 shaft x50 #3 S/P  3/4 shaft x50 #3 S/P  3/4 shaft x50   #2 S/P x10  3/4 shaft x25   PRE Wrist FA G F bar down x20  Up x20   G F bar down x20  Up x20 G F bar down x20  Up x20 G F bar down x20  Up x20   G Down x20  G up x20  R Fold x20   PRE digits G web  to edge   x20 G web  to edge   x20 G web  to edge   x20 G web  to edge   x20   G web  to edge x20   PRE digits tips Velcro bd swap tips Velcro bd swap tips  Full board Velcro bd swap tips  Full board Velcro bd swap tips  Full board   Velcro bd swap tips   Sh PROM          Ther Activity          Gripping          Pinching          Coordination          Carrying           Reaching           MHP Pre tx with act flexion wrap Pre tx with act flexion wrap Pre tx with act flexion wrap Pre tx with flexion wrap   Pre tx with ace flexion wrap   TENS 10' 10' 10' 10'   10'            "

## 2024-12-30 ENCOUNTER — ANESTHESIA (OUTPATIENT)
Dept: GASTROENTEROLOGY | Facility: HOSPITAL | Age: 64
End: 2024-12-30
Payer: MEDICARE

## 2024-12-30 ENCOUNTER — ANESTHESIA EVENT (OUTPATIENT)
Dept: GASTROENTEROLOGY | Facility: HOSPITAL | Age: 64
End: 2024-12-30
Payer: MEDICARE

## 2024-12-30 ENCOUNTER — HOSPITAL ENCOUNTER (OUTPATIENT)
Dept: GASTROENTEROLOGY | Facility: HOSPITAL | Age: 64
Setting detail: OUTPATIENT SURGERY
Discharge: HOME | End: 2024-12-30
Payer: MEDICARE

## 2024-12-30 VITALS
HEART RATE: 65 BPM | SYSTOLIC BLOOD PRESSURE: 149 MMHG | OXYGEN SATURATION: 97 % | WEIGHT: 128.31 LBS | TEMPERATURE: 97.7 F | DIASTOLIC BLOOD PRESSURE: 70 MMHG | BODY MASS INDEX: 19.45 KG/M2 | HEIGHT: 68 IN | RESPIRATION RATE: 16 BRPM

## 2024-12-30 DIAGNOSIS — R13.10 ODYNOPHAGIA: ICD-10-CM

## 2024-12-30 PROCEDURE — 43239 EGD BIOPSY SINGLE/MULTIPLE: CPT | Performed by: INTERNAL MEDICINE

## 2024-12-30 PROCEDURE — 88305 TISSUE EXAM BY PATHOLOGIST: CPT | Performed by: PATHOLOGY

## 2024-12-30 RX ORDER — PROPOFOL 10 MG/ML
INJECTION, EMULSION INTRAVENOUS AS NEEDED
Status: DISCONTINUED | OUTPATIENT
Start: 2024-12-30 | End: 2024-12-30

## 2024-12-30 RX ORDER — SODIUM CHLORIDE, SODIUM LACTATE, POTASSIUM CHLORIDE, CALCIUM CHLORIDE 600; 310; 30; 20 MG/100ML; MG/100ML; MG/100ML; MG/100ML
INJECTION, SOLUTION INTRAVENOUS CONTINUOUS PRN
Status: DISCONTINUED | OUTPATIENT
Start: 2024-12-30 | End: 2024-12-30

## 2024-12-30 RX ORDER — LIDOCAINE HYDROCHLORIDE 20 MG/ML
INJECTION, SOLUTION EPIDURAL; INFILTRATION; INTRACAUDAL; PERINEURAL AS NEEDED
Status: DISCONTINUED | OUTPATIENT
Start: 2024-12-30 | End: 2024-12-30

## 2024-12-30 RX ADMIN — PROPOFOL 150 MG: 10 INJECTION, EMULSION INTRAVENOUS at 11:45

## 2024-12-30 RX ADMIN — LIDOCAINE HYDROCHLORIDE 100 MG: 20 INJECTION, SOLUTION EPIDURAL; INFILTRATION; INTRACAUDAL; PERINEURAL at 11:45

## 2024-12-30 RX ADMIN — PROPOFOL 20 MG: 10 INJECTION, EMULSION INTRAVENOUS at 11:49

## 2024-12-30 RX ADMIN — SODIUM CHLORIDE, SODIUM LACTATE, POTASSIUM CHLORIDE, AND CALCIUM CHLORIDE: .6; .31; .03; .02 INJECTION, SOLUTION INTRAVENOUS at 11:40

## 2024-12-30 RX ADMIN — PROPOFOL 50 MG: 10 INJECTION, EMULSION INTRAVENOUS at 11:47

## 2024-12-30 NOTE — ANESTHESIA PREPROCEDURE EVALUATION
Procedure:  EGD    Relevant Problems   ANESTHESIA (within normal limits)      CARDIO   (+) HTN, goal below 140/90   (+) Hyperlipidemia, mixed   (-) Chest pain   (-) MI (myocardial infarction) (HCC)      ENDO (within normal limits)      GI/HEPATIC (within normal limits)  NPO confirmed, THC gummy at midnight  BMI 19.5      /RENAL (within normal limits)      GYN   (+) Malignant neoplasm of lower-inner quadrant of left breast in female, estrogen receptor positive (HCC)      MUSCULOSKELETAL   (+) Back pain, chronic   (+) Chronic back pain      NEURO/PSYCH   (+) Back pain, chronic   (+) Chronic back pain   (-) CVA (cerebral vascular accident) (HCC)   (-) Seizures (HCC)      PULMONARY (within normal limits)   (-) Asthma   (-) Sleep apnea   (-) URI (upper respiratory infection)      Other   (+) Carcinoma of left breast metastatic to axillary lymph node (HCC)      Chronic lymphedema of right arm    Allergies   Allergen Reactions    Chlorpromazine Anaphylaxis     PHENOTHIAZINE=ANAPHYLAXIS    Codeine Anaphylaxis     Anaphylaxis  abd pain.    Meperidine Anaphylaxis, Hives and Vomiting    Phenothiazines Anaphylaxis and Throat Swelling     Category: Allergy;     Saccharin - Food Allergy Anaphylaxis    Ampicillin-Sulbactam Sodium Hives    Aspirin GI Intolerance and Abdominal Pain     Severe GERD    Gluten Meal - Food Allergy GI Intolerance    Ibuprofen Hives     H    Levofloxacin Hives    Lactose - Food Allergy GI Intolerance    Neomycin Other (See Comments), Edema and Hives     Category: Allergy;   swelling    [place] - Food Allergy Rash    Latex Hives and Rash     Category: Allergy;      Social History     Tobacco Use    Smoking status: Never    Smokeless tobacco: Never   Vaping Use    Vaping status: Never Used   Substance Use Topics    Alcohol use: Not Currently     Alcohol/week: 5.0 standard drinks of alcohol     Types: 5 Shots of liquor per week     Comment: Used mostly as a painkiller when needed before bedtime    Drug use:  Yes     Frequency: 28.0 times per week     Types: Marijuana     Comment: THC Medical marijuana     Current Outpatient Medications   Medication Instructions    alendronate (FOSAMAX) 70 mg, Oral, Every 7 days    cholecalciferol (VITAMIN D3) 3,000 Units, Daily    diphenhydrAMINE (BENADRYL) 50 mg, Daily at bedtime PRN    dronabinol (MARINOL) 10 MG capsule     ezetimibe (ZETIA) 10 mg, Oral, Daily    letrozole (FEMARA) 2.5 mg, Oral, Daily    multivitamin (THERAGRAN) TABS 1 tablet, Daily    Omega-3 Fatty Acids (FISH OIL PO) Take by mouth    pantoprazole (PROTONIX) 40 mg, Oral, Daily    penicillin V potassium (VEETID) 500 mg, Oral, Every 12 hours    Probiotic Product (PROBIOTIC-10 PO) Take by mouth     Lab Results   Component Value Date    WBC 5.99 11/25/2024    HGB 13.9 11/25/2024    HCT 43.4 11/25/2024     11/25/2024    SODIUM 134 (L) 11/25/2024    K 4.1 11/25/2024     11/25/2024    CO2 27 11/25/2024    BUN 8 11/25/2024    CREATININE 0.46 (L) 11/25/2024    GLUC 131 11/19/2024    HGBA1C 5.3 07/13/2021    AST 23 11/25/2024    ALT 20 11/25/2024    ALKPHOS 92 11/25/2024    TBILI 0.40 11/25/2024    ALB 4.1 11/25/2024    PROTIME 13.0 11/12/2023    PTT 27 11/12/2023    INR 0.93 11/12/2023     Vitals:    12/30/24 0951   BP: (!) 189/102   Pulse: 80   Resp: 18   Temp: 98.1 °F (36.7 °C)   SpO2: 97%     EKG 11/12/23  Sinus bradycardia  Otherwise normal ECG  When compared with ECG of 01-JUN-2022 16:55,  No significant change was found  Confirmed by Aries Wade ([id number]) on 11/12/2023 11:22:06 PM    Physical Exam    Airway    Mallampati score: II  TM Distance: >3 FB  Neck ROM: full     Dental   Comment: Denies loose teeth     Cardiovascular  Cardiovascular exam normal    Pulmonary  Pulmonary exam normal     Other Findings  Portions of exam deferred due to low yield and/or unknown COVID statuspost-pubertal.      Anesthesia Plan  ASA Score- 3     Anesthesia Type- IV sedation with anesthesia with ASA Monitors.          Additional Monitors:     Airway Plan:     Comment: O2 mask, natural airway, EtCO2 monitor. Risks discussed including awareness, aspiration, drug reactions and conversion to GA..       Plan Factors-Exercise tolerance (METS): >4 METS.    Chart reviewed.   Existing labs reviewed. Patient summary reviewed.    Patient is not a current smoker.              Induction- intravenous.    Postoperative Plan-         Informed Consent- Anesthetic plan and risks discussed with patient.  I personally reviewed this patient with the CRNA. Discussed and agreed on the Anesthesia Plan with the CRNA..

## 2024-12-30 NOTE — H&P
History and Physical - [facility]  Eileen Matias 64 y.o. female MRN: [id number]      HPI: Eileen Matias is a 64 y.o. year old female who presents for evaluation of odynophagia and a personal history of celiac disease      REVIEW OF SYSTEMS: Per the HPI, and otherwise unremarkable.    Historical Information   Past Medical History:   Diagnosis Date    Abnormal mammogram 05/15/2013    Anemia     Cancer (HCC)     desmoitosis    Carcinoma of left breast metastatic to axillary lymph node (HCC) 04/19/2022    Chronic pain disorder     worse right side of body    Desmoid tumor     Last Assessed: 6/19/2017    fibroidal cyst 1999?  Hysterectomy done    History of transfusion     no reactions    Hyperlipidemia     Hypertension     Osteoporosis     PONV (postoperative nausea and vomiting)      Past Surgical History:   Procedure Laterality Date    BREAST LUMPECTOMY Left 6/21/2022    Procedure: ANITA  DIRECTED LUMPECTOMY;  Surgeon: Amanda Motley MD;  Location: [facility];  Service: Surgical Oncology    FRACTURE SURGERY      HYSTERECTOMY      LYMPH NODE DISSECTION Left 6/21/2022    Procedure: AXILLARY DISSECTION LEVEL I AND LEVEL II LYMPH NODES;  Surgeon: Amanda Motley MD;  Location: [facility];  Service: Surgical Oncology    MRI BREAST BIOPSY LEFT (ALL INCLUSIVE) Left 5/20/2022    MRI BREAST BIOPSY RIGHT (ALL INCLUSIVE) Right 5/20/2022    OTHER SURGICAL HISTORY      Arm Incision: Dermoid tumor, right Arm     PARTIAL HYSTERECTOMY      MT COLONOSCOPY FLX DX W/COLLJ SPEC WHEN PFRMD N/A 8/15/2017    Procedure: COLONOSCOPY;  Surgeon: Alec England MD;  Location: [facility];  Service: Gastroenterology    MT NDSC WRST SURG W/RLS TRANSVRS CARPL LIGM Left 8/10/2021    Procedure: RELEASE LEFT CARPAL TUNNEL ENDOSCOPIC;  Surgeon: Chris Camacho MD;  Location: [facility];  Service: Orthopedics    MT OPTX DSTL RADL I-ARTIC FX/EPIPHYSL SEP 3 FRAG Left 2/9/2021    Procedure: OPEN REDUCTION W/  INTERNAL FIXATION (ORIF) RADIUS / ULNA (WRIST) left;  Surgeon: Chris Camacho MD;  Location: [facility];  Service: Orthopedics    SKIN BIOPSY      SKIN CANCER EXCISION      Melanoma Excision     TONSILLECTOMY      US BREAST NEEDLE LOC LEFT Left 6/16/2022    US BREAST NEEDLE LOC RIGHT EACH ADDITIONAL Right 6/16/2022    US GUIDED BREAST LYMPH NODE BIOPSY LEFT Left 4/14/2022     Social History   Social History     Substance and Sexual Activity   Alcohol Use Not Currently    Alcohol/week: 5.0 standard drinks of alcohol    Types: 5 Shots of liquor per week    Comment: Used mostly as a painkiller when needed before bedtime     Social History     Substance and Sexual Activity   Drug Use Yes    Frequency: 28.0 times per week    Types: Marijuana    Comment: THC Medical marijuana     Social History     Tobacco Use   Smoking Status Never   Smokeless Tobacco Never     Family History   Problem Relation Age of Onset    Diabetes Mother     No Known Problems Father     Cancer Sister     Lung cancer Sister     Pancreatic cancer Sister     No Known Problems Maternal Grandmother     No Known Problems Maternal Grandfather     No Known Problems Paternal Grandmother     No Known Problems Paternal Grandfather     Breast cancer Neg Hx        Meds/Allergies     Not in a hospital admission.    Allergies   Allergen Reactions    Chlorpromazine Anaphylaxis     PHENOTHIAZINE=ANAPHYLAXIS    Codeine Anaphylaxis     Anaphylaxis  abd pain.    Meperidine Anaphylaxis, Hives and Vomiting    Phenothiazines Anaphylaxis and Throat Swelling     Category: Allergy;     Saccharin - Food Allergy Anaphylaxis    Ampicillin-Sulbactam Sodium Hives    Aspirin GI Intolerance and Abdominal Pain     Severe GERD    Gluten Meal - Food Allergy GI Intolerance    Ibuprofen Hives     H    Levofloxacin Hives    Lactose - Food Allergy GI Intolerance    Neomycin Other (See Comments), Edema and Hives     Category: Allergy;   swelling    [place] - Food Allergy Rash    Latex Hives  "and Rash     Category: Allergy;        Objective     Blood pressure (!) 189/102, pulse 80, temperature 98.1 °F (36.7 °C), temperature source Temporal, resp. rate 18, height 5' 8\" (1.727 m), weight 58.2 kg (128 lb 4.9 oz), SpO2 97%.      PHYSICAL EXAM    Gen: NAD  CV: RRR  CHEST: Clear  ABD: soft, NT/ND  EXT: no edema      ASSESSMENT/PLAN:  This is a 64 y.o. year old female here for EGD with possible biopsy, and she is stable and optimized for her procedure.          "

## 2024-12-30 NOTE — ANESTHESIA POSTPROCEDURE EVALUATION
Post-Op Assessment Note    CV Status:  Stable  Pain Score: 0    Pain management: adequate       Mental Status:  Awake and sleepy   Hydration Status:  Euvolemic   PONV Controlled:  Controlled   Airway Patency:  Patent and adequate  Two or more mitigation strategies used for obstructive sleep apnea   Post Op Vitals Reviewed: Yes    No anethesia notable event occurred.    Staff: CRNA           Last Filed PACU Vitals:  Vitals Value Taken Time   Temp 97.7    Pulse 68    /58    Resp 20    SpO2 99        Modified [place]:  Activity: 2 (12/30/2024  9:50 AM)  Respiration: 2 (12/30/2024  9:50 AM)  Circulation: 2 (12/30/2024  9:50 AM)  Consciousness: 2 (12/30/2024  9:50 AM)  Oxygen Saturation: 0 (12/30/2024  9:50 AM)  Modified Keenan Score: 8 (12/30/2024  9:50 AM)

## 2024-12-31 ENCOUNTER — OFFICE VISIT (OUTPATIENT)
Dept: OCCUPATIONAL THERAPY | Facility: CLINIC | Age: 64
End: 2024-12-31
Payer: MEDICARE

## 2024-12-31 DIAGNOSIS — R20.2 NUMBNESS AND TINGLING OF LEFT UPPER EXTREMITY: Primary | ICD-10-CM

## 2024-12-31 DIAGNOSIS — R29.898 RIGHT HAND WEAKNESS: ICD-10-CM

## 2024-12-31 DIAGNOSIS — R20.0 NUMBNESS AND TINGLING OF LEFT UPPER EXTREMITY: Primary | ICD-10-CM

## 2024-12-31 PROCEDURE — 97110 THERAPEUTIC EXERCISES: CPT | Performed by: OCCUPATIONAL THERAPIST

## 2024-12-31 PROCEDURE — 97140 MANUAL THERAPY 1/> REGIONS: CPT | Performed by: OCCUPATIONAL THERAPIST

## 2024-12-31 PROCEDURE — 97014 ELECTRIC STIMULATION THERAPY: CPT | Performed by: OCCUPATIONAL THERAPIST

## 2024-12-31 PROCEDURE — 88305 TISSUE EXAM BY PATHOLOGIST: CPT | Performed by: PATHOLOGY

## 2024-12-31 NOTE — PROGRESS NOTES
Daily Note     Today's date: 2024  Patient name: Eileen Matias  : 1960  MRN: [id number]  Referring provider: Rogelio Black MD  Dx:   Encounter Diagnosis     ICD-10-CM    1. Numbness and tingling of left upper extremity  R20.0     R20.2       2. Right hand weakness  R29.898               Subjective:   I am stretching everyday.  I am can tell that I am getting stronger. I am getting more electricity (dorsal FA, anterior shoulder)      Objective: See treatment diary below for clinic program.     MOTION  V Passive flexion post heat and stretch- 1 cm to palm    STRENGTH  Lincoln #2  R  with splint on Lincoln #2  27.7 lbs. (+4.4 )          Assessment: Tolerated treatment well. Patient would benefit from continued OT. IP active digit flexion progress slowing.  Fifth MP resistant to stretching.  Improved gross grasp for light IADLs with patient in circumferential wrist extension splint.  Improving ease and increased speed of right noted in clinic program.   Edema controlled with wear of smaller and lighter lymphedema garment.       Plan: Continue per plan of care.   Progress dexterity and gripping exercises as tolerated. Continue wear of orthotic for wrist stability.    Focus care on PREs and end range flexion.   RE/F NV      Precautions: Lymphedema; hx desmoid tumors and breast cancer; healing humeral fracture     POC expires Unit limit Auth  expiration date PT/OT + Visit Limit?   25 NA NA BOMN         Dx R Hum Fx     Dr Wayne MILLER MET          Last RE 24    Date  RE/F NV 12/10   Visit 54  55  56 57   58 6/10  53     5/10    Manuals                    Jt mobs digits, FA MP all digits G 1-2 MP all digits G 1-2 MP all digits G 1-2 MP digits all G 1-2 MP digits all G 1-2  MP all digits G 1-2   Ortho fit/train          Neuro Re-Ed                     Mirror therapy                              orthotic                              Ther Ex 30'   30   30   30      "30'   HEP          PROM 1:1 Digital flexion LLPS Digital flexion LLPS Digital flexion LLPS Digital flexion LLPS Digital flexion LLPS  Digital flexion LLPS   Digital blocked AROM P/H fists 5x5\" P/H fists 5x5\" P/H fists 5x5\" P/H fists 5x5\" P/H fists 5x5\"  P/H fists 5x5\"   UE warm up UBE 2F 2R L1  R only x50 UBE 2F 2R L1  R only x70 UBE 2F 2R L1  R only x50 UBE 2F 2R L1  R only x50 UBE 3F 3R L1  R only x50  UBE 2F 2R L1  R only x50   PRE FA  #2 S/P  3/4 shaft x50 #3 S/P  3/4 shaft x50 #3 S/P  3/4 shaft x50 #3 S/P  3/4 shaft x50 #3 S/P  3/4 shaft x50  #2 S/P x10  3/4 shaft x25   PRE Wrist FA G F bar down x20  Up x20   G F bar down x20  Up x20 G F bar down x20  Up x20 G F bar down x20  Up x20 G F bar down x20  Up x20  G Down x20  G up x20  R Fold x20   PRE digits G web  to edge   x20 G web  to edge   x20 G web  to edge   x20 G web  to edge   x20 G web  to edge   x20  G web  to edge x20   PRE digits tips Velcro bd swap tips Velcro bd swap tips  Full board Velcro bd swap tips  Full board Velcro bd swap tips  Full board Velcro bd swap tips  Full board  Velcro bd swap tips   Sh PROM          Ther Activity          Gripping          Pinching          Coordination          Carrying           Reaching           MHP Pre tx with act flexion wrap Pre tx with act flexion wrap Pre tx with act flexion wrap Pre tx with flexion wrap Pre tx with flexion wrap  Pre tx with ace flexion wrap   TENS 10' 10' 10' 10' 10 pre tx  10'            "

## 2025-01-02 ENCOUNTER — APPOINTMENT (OUTPATIENT)
Dept: OCCUPATIONAL THERAPY | Facility: CLINIC | Age: 65
End: 2025-01-02
Payer: MEDICARE

## 2025-01-02 ENCOUNTER — RESULTS FOLLOW-UP (OUTPATIENT)
Dept: GASTROENTEROLOGY | Facility: CLINIC | Age: 65
End: 2025-01-02

## 2025-01-03 ENCOUNTER — TELEPHONE (OUTPATIENT)
Dept: OBGYN CLINIC | Facility: CLINIC | Age: 65
End: 2025-01-03

## 2025-01-06 ENCOUNTER — OFFICE VISIT (OUTPATIENT)
Dept: PHYSICAL THERAPY | Facility: CLINIC | Age: 65
End: 2025-01-06
Payer: MEDICARE

## 2025-01-06 DIAGNOSIS — I89.0 LYMPHEDEMA: Primary | ICD-10-CM

## 2025-01-06 PROCEDURE — 97140 MANUAL THERAPY 1/> REGIONS: CPT | Performed by: PHYSICAL THERAPIST

## 2025-01-06 PROCEDURE — 97110 THERAPEUTIC EXERCISES: CPT | Performed by: PHYSICAL THERAPIST

## 2025-01-06 NOTE — PROGRESS NOTES
Daily Note     Today's date: 2025  Patient name: Eileen Matias  : 1960  MRN: [id number]  Referring provider: No ref. provider found  Dx:   Encounter Diagnosis     ICD-10-CM    1. Lymphedema  I89.0                      Subjective: Patient reports that she has not been using the compression pump on the L.        Objective: See treatment diary below      Assessment: Tolerated treatment well. Patient would benefit from continued PT      Plan: Continue per plan of care.      Precautions: L BCA  Access Code: [email or web address]  URL: [email or web address]  Date: 2024  Prepared by: Meena Jain    Exercises  - Shoulder Flexion Wall Slide with Towel  - 1 x daily - 7 x weekly - 1 sets - 5 reps - 10 hold  - Standing Shoulder Abduction Slides at Wall  - 1 x daily - 7 x weekly - 3 sets - 10 reps  - Single Arm Doorway Pec Stretch at 90 Degrees Abduction  - 1 x daily - 7 x weekly - 1 sets - 5 reps - 10 hold    Manuals      Manual MFR 10'            Self MLD             Compression fitting  20' 15 30 15        Girth mmts    5'   8' 8'      Neuro Re-Ed                                                                                                        Ther Ex             Pulley   4' 5' 5' 5' 5' 5'                  Wall slides  10 x :10 10 x :10 10 x :10 10 x :10 10 x :10 hep      Bicep curls   nv  2# x 10 3# 2 x 10 4# 2 x 10 4# 2 x 10     Row    nv  2# x 10 3# 2 x 10 3# 2 x 10 4# 2x10     Tricep    nv  2# 2 x 10 3# 2 x 10 3# 2 x 10 4# 2x10     Bike  6' 6' 6' 6' 6' 6' 6'     TG L 20  10 2x10 2 x 10 2 x 10 2 x 10 2x10 2x10                                            Gait Training                                       Modalities

## 2025-01-06 NOTE — TELEPHONE ENCOUNTER
Patient was rescheduled to 3/26/25. Spouse stated he needed the first available late afternoon appt.

## 2025-01-07 ENCOUNTER — OFFICE VISIT (OUTPATIENT)
Dept: OCCUPATIONAL THERAPY | Facility: CLINIC | Age: 65
End: 2025-01-07
Payer: MEDICARE

## 2025-01-07 DIAGNOSIS — R20.0 NUMBNESS AND TINGLING OF LEFT UPPER EXTREMITY: Primary | ICD-10-CM

## 2025-01-07 DIAGNOSIS — R20.2 NUMBNESS AND TINGLING OF LEFT UPPER EXTREMITY: Primary | ICD-10-CM

## 2025-01-07 DIAGNOSIS — R29.898 RIGHT HAND WEAKNESS: ICD-10-CM

## 2025-01-07 PROCEDURE — 97110 THERAPEUTIC EXERCISES: CPT | Performed by: OCCUPATIONAL THERAPIST

## 2025-01-07 PROCEDURE — 97140 MANUAL THERAPY 1/> REGIONS: CPT | Performed by: OCCUPATIONAL THERAPIST

## 2025-01-07 PROCEDURE — 97014 ELECTRIC STIMULATION THERAPY: CPT | Performed by: OCCUPATIONAL THERAPIST

## 2025-01-07 NOTE — PROGRESS NOTES
Daily Note     Today's date: 2025  Patient name: Eileen Matias  : 1960  MRN: [id number]  Referring provider: Rogelio Black MD  Dx:   Encounter Diagnosis     ICD-10-CM    1. Numbness and tingling of left upper extremity  R20.0     R20.2       2. Right hand weakness  R29.898               Subjective:   I am stretching everyday.  I am can tell that I am getting stronger. I am getting more electricity (dorsal FA, anterior shoulder)      Objective: See treatment diary below for clinic program.     MOTION  V Passive flexion post heat and stretch- 1 cm to palm    STRENGTH  Lincoln #2  R  with splint on Lincoln #2  27.7 lbs. (+4.4 )          Assessment: Tolerated treatment well. Patient would benefit from continued OT. IP active digit flexion progress slowing.  Fifth MP resistant to stretching.  Improved gross grasp for light IADLs with patient in circumferential wrist extension splint.  Improving ease and increased speed of right noted in clinic program.   Edema controlled with wear of smaller and lighter lymphedema garment.       Plan: Continue per plan of care.   Progress dexterity and gripping exercises as tolerated. Continue wear of orthotic for wrist stability.    Focus care on PREs and end range flexion.   RE/F NV      Precautions: Lymphedema; hx desmoid tumors and breast cancer; healing humeral fracture     POC expires Unit limit Auth  expiration date PT/OT + Visit Limit?   25 NA NA BOMN         Dx R Hum Fx     Dr Wayne MILLER MET          Last RE 24    Date 25 RE/F NV  POC   Visit 54  55  56 57   58 6/10 7/10    Manuals                    Jt mobs digits, FA MP all digits G 1-2 MP all digits G 1-2 MP all digits G 1-2 MP digits all G 1-2 MP digits all G 1-2 MP digits all G 1-2    Ortho fit/train          Neuro Re-Ed                     Mirror therapy                              orthotic                              Ther Ex 30'   30   30   30   30   30   "  HEP          PROM 1:1 Digital flexion LLPS Digital flexion LLPS Digital flexion LLPS Digital flexion LLPS Digital flexion LLPS Digital flexion LLPS    Digital blocked AROM P/H fists 5x5\" P/H fists 5x5\" P/H fists 5x5\" P/H fists 5x5\" P/H fists 5x5\" P/H fists 5x5\"    UE warm up UBE 2F 2R L1  R only x50 UBE 2F 2R L1  R only x70 UBE 2F 2R L1  R only x50 UBE 2F 2R L1  R only x50 UBE 3F 3R L1  R only x50 UBE 3F 3R L1  R only x55    PRE FA  #2 S/P  3/4 shaft x50 #3 S/P  3/4 shaft x50 #3 S/P  3/4 shaft x50 #3 S/P  3/4 shaft x50 #3 S/P  3/4 shaft x50 #2 S/P  Full shaft x50    PRE Wrist FA G F bar down x20  Up x20   G F bar down x20  Up x20 G F bar down x20  Up x20 G F bar down x20  Up x20 G F bar down x20  Up x20 G F bar down x20  Up x20    PRE digits G web  to edge   x20 G web  to edge   x20 G web  to edge   x20 G web  to edge   x20 G web  to edge   x20 G web  to edge   x20    PRE digits tips Velcro bd swap tips Velcro bd swap tips  Full board Velcro bd swap tips  Full board Velcro bd swap tips  Full board Velcro bd swap tips  Full board Velcro bd swap tips  Full board    Sh PROM          Ther Activity          Gripping          Pinching          Coordination          Carrying           Reaching           MHP Pre tx with act flexion wrap Pre tx with act flexion wrap Pre tx with act flexion wrap Pre tx with flexion wrap Pre tx with flexion wrap Pre tx with flexion wrap    TENS 10' 10' 10' 10' 10 pre tx 10 min  pre tx             "

## 2025-01-07 NOTE — TELEPHONE ENCOUNTER
Called pt and LMOM with biopsies result and to call us back if any questions.  Office # provided.

## 2025-01-07 NOTE — TELEPHONE ENCOUNTER
----- Message from Johnny Alcazar DO sent at 1/7/2025  7:18 AM EST -----  Patient has not read [employer] message. Please call and share results.    Eileen,     The biopsies taken from the small intestine were benign.  They were negative for celiac disease and negative for cancer.

## 2025-01-08 ENCOUNTER — APPOINTMENT (OUTPATIENT)
Dept: PHYSICAL THERAPY | Facility: CLINIC | Age: 65
End: 2025-01-08
Payer: MEDICARE

## 2025-01-08 DIAGNOSIS — R73.01 IMPAIRED FASTING GLUCOSE: ICD-10-CM

## 2025-01-08 DIAGNOSIS — Z79.811 USE OF LETROZOLE (FEMARA): ICD-10-CM

## 2025-01-08 DIAGNOSIS — E78.2 HYPERLIPIDEMIA, MIXED: Primary | ICD-10-CM

## 2025-01-09 ENCOUNTER — OFFICE VISIT (OUTPATIENT)
Dept: OCCUPATIONAL THERAPY | Facility: CLINIC | Age: 65
End: 2025-01-09
Payer: MEDICARE

## 2025-01-09 DIAGNOSIS — R29.898 RIGHT HAND WEAKNESS: Primary | ICD-10-CM

## 2025-01-09 DIAGNOSIS — R20.0 NUMBNESS AND TINGLING OF LEFT UPPER EXTREMITY: ICD-10-CM

## 2025-01-09 DIAGNOSIS — R20.2 NUMBNESS AND TINGLING OF LEFT UPPER EXTREMITY: ICD-10-CM

## 2025-01-09 PROCEDURE — 97110 THERAPEUTIC EXERCISES: CPT | Performed by: OCCUPATIONAL THERAPIST

## 2025-01-09 PROCEDURE — 97140 MANUAL THERAPY 1/> REGIONS: CPT | Performed by: OCCUPATIONAL THERAPIST

## 2025-01-09 NOTE — PROGRESS NOTES
Daily Note     Today's date: 2025  Patient name: Eileen Matias  : 1960  MRN: [id number]  Referring provider: Rogelio Black MD  Dx:   Encounter Diagnosis     ICD-10-CM    1. Right hand weakness  R29.898       2. Numbness and tingling of left upper extremity  R20.0     R20.2                 Subjective:   I am stretching everyday.  I am can tell that I am getting stronger. I am getting more electricity (dorsal FA, anterior shoulder)      Objective: See treatment diary below for clinic program.     MOTION  V Passive flexion post heat and stretch- 1 cm to palm    STRENGTH  Lincoln #2  R  with splint on Lincoln #2  27.7 lbs. (+4.4 )          Assessment: Tolerated treatment well. Patient would benefit from continued OT. IP active digit flexion progress slowing.  Fifth MP resistant to stretching.  Improved gross grasp for light IADLs with patient in circumferential wrist extension splint.  Improving ease and increased speed of right noted in clinic program.   Edema controlled with wear of smaller and lighter lymphedema garment.       Plan: Continue per plan of care.   Progress dexterity and gripping exercises as tolerated. Continue wear of orthotic for wrist stability.    Focus care on PREs and end range flexion.   RE NV      Precautions: Lymphedema; hx desmoid tumors and breast cancer; healing humeral fracture     POC expires Unit limit Auth  expiration date PT/OT + Visit Limit?   25 NA NA BOMN         Dx R Hum Fx     Dr Wayne MILLER MET          Last RE 24    Date RE NV   25   Visit    57   58 6/10 7/10 8/10   Manuals                    Jt mobs digits, FA    MP digits all G 1-2 MP digits all G 1-2 MP digits all G 1-2 MP digits all  G 1-2   Ortho fit/train          Neuro Re-Ed                     Mirror therapy                              orthotic                              Ther Ex      30   30   30   30   HEP       Green foam    PROM 1:1    Digital flexion  "LLPS Digital flexion LLPS Digital flexion LLPS Digital flexion LLPS   Digital blocked AROM    P/H fists 5x5\" P/H fists 5x5\" P/H fists 5x5\" P/H fists 5x5\"   UE warm up    UBE 2F 2R L1  R only x50 UBE 3F 3R L1  R only x50 UBE 3F 3R L1  R only x55 UBE 3F 3R L1  R only x55   PRE FA     #3 S/P  3/4 shaft x50 #3 S/P  3/4 shaft x50 #2 S/P  Full shaft x50 #2 S/P  Full shaft x50   PRE Wrist FA    G F bar down x20  Up x20 G F bar down x20  Up x20 G F bar down x20  Up x20 G F bar down x20  Up x20  R F bar Fold  x20   PRE digits    G web  to edge   x20 G web  to edge   x20 G web  to edge   x20 Gray #2    All foams   PRE digits tips    Velcro bd swap tips  Full board Velcro bd swap tips  Full board Velcro bd swap tips  Full board Velcro bd swap tips  Full board   Sh PROM          Ther Activity          Gripping          Pinching          Coordination          Carrying           Reaching           MHP    Pre tx with flexion wrap Pre tx with flexion wrap Pre tx with flexion wrap Pre tx with flexion wrap   TENS    10' 10 pre tx 10 min  pre tx 10 min pre tx            "

## 2025-01-10 ENCOUNTER — OFFICE VISIT (OUTPATIENT)
Dept: INTERNAL MEDICINE CLINIC | Facility: CLINIC | Age: 65
End: 2025-01-10
Payer: MEDICARE

## 2025-01-10 ENCOUNTER — RESULTS FOLLOW-UP (OUTPATIENT)
Dept: INTERNAL MEDICINE CLINIC | Facility: CLINIC | Age: 65
End: 2025-01-10

## 2025-01-10 ENCOUNTER — APPOINTMENT (OUTPATIENT)
Dept: LAB | Facility: HOSPITAL | Age: 65
End: 2025-01-10
Attending: INTERNAL MEDICINE
Payer: MEDICARE

## 2025-01-10 VITALS
SYSTOLIC BLOOD PRESSURE: 142 MMHG | TEMPERATURE: 97.5 F | BODY MASS INDEX: 19.61 KG/M2 | RESPIRATION RATE: 18 BRPM | HEIGHT: 68 IN | OXYGEN SATURATION: 98 % | HEART RATE: 82 BPM | DIASTOLIC BLOOD PRESSURE: 76 MMHG | WEIGHT: 129.4 LBS

## 2025-01-10 DIAGNOSIS — E78.2 HYPERLIPIDEMIA, MIXED: ICD-10-CM

## 2025-01-10 DIAGNOSIS — M79.676: ICD-10-CM

## 2025-01-10 DIAGNOSIS — R20.0 NUMBNESS OF FOOT: ICD-10-CM

## 2025-01-10 DIAGNOSIS — Z00.00 MEDICARE ANNUAL WELLNESS VISIT, INITIAL: Primary | ICD-10-CM

## 2025-01-10 DIAGNOSIS — Z79.811 USE OF LETROZOLE (FEMARA): ICD-10-CM

## 2025-01-10 DIAGNOSIS — G62.9 NEUROPATHY: ICD-10-CM

## 2025-01-10 DIAGNOSIS — C77.3 CARCINOMA OF LEFT BREAST METASTATIC TO AXILLARY LYMPH NODE (HCC): ICD-10-CM

## 2025-01-10 DIAGNOSIS — C50.912 CARCINOMA OF LEFT BREAST METASTATIC TO AXILLARY LYMPH NODE (HCC): ICD-10-CM

## 2025-01-10 DIAGNOSIS — R73.01 IMPAIRED FASTING GLUCOSE: ICD-10-CM

## 2025-01-10 DIAGNOSIS — R63.4 WEIGHT LOSS: ICD-10-CM

## 2025-01-10 DIAGNOSIS — H91.93 BILATERAL HEARING LOSS, UNSPECIFIED HEARING LOSS TYPE: ICD-10-CM

## 2025-01-10 PROBLEM — F51.04 CHRONIC INSOMNIA: Status: RESOLVED | Noted: 2017-06-19 | Resolved: 2025-01-10

## 2025-01-10 PROBLEM — E08.49 DIABETES DUE TO UNDRL CONDITION W OTH DIABETIC NEURO COMP (HCC): Status: ACTIVE | Noted: 2025-01-10

## 2025-01-10 PROBLEM — D48.119 DESMOID TUMOR: Status: RESOLVED | Noted: 2022-01-26 | Resolved: 2025-01-10

## 2025-01-10 LAB
25(OH)D3 SERPL-MCNC: 52 NG/ML (ref 30–100)
ALBUMIN SERPL BCG-MCNC: 4.1 G/DL (ref 3.5–5)
ALP SERPL-CCNC: 81 U/L (ref 34–104)
ALT SERPL W P-5'-P-CCNC: 14 U/L (ref 7–52)
ANION GAP SERPL CALCULATED.3IONS-SCNC: 5 MMOL/L (ref 4–13)
AST SERPL W P-5'-P-CCNC: 17 U/L (ref 13–39)
BASOPHILS # BLD AUTO: 0.07 THOUSANDS/ΜL (ref 0–0.1)
BASOPHILS NFR BLD AUTO: 1 % (ref 0–1)
BILIRUB SERPL-MCNC: 0.65 MG/DL (ref 0.2–1)
BUN SERPL-MCNC: 6 MG/DL (ref 5–25)
CALCIUM SERPL-MCNC: 9.2 MG/DL (ref 8.4–10.2)
CHLORIDE SERPL-SCNC: 103 MMOL/L (ref 96–108)
CHOLEST SERPL-MCNC: 217 MG/DL (ref ?–200)
CO2 SERPL-SCNC: 28 MMOL/L (ref 21–32)
CREAT SERPL-MCNC: 0.45 MG/DL (ref 0.6–1.3)
EOSINOPHIL # BLD AUTO: 0.19 THOUSAND/ΜL (ref 0–0.61)
EOSINOPHIL NFR BLD AUTO: 4 % (ref 0–6)
ERYTHROCYTE [DISTWIDTH] IN BLOOD BY AUTOMATED COUNT: 12.8 % (ref 11.6–15.1)
EST. AVERAGE GLUCOSE BLD GHB EST-MCNC: 117 MG/DL
GFR SERPL CREATININE-BSD FRML MDRD: 104 ML/MIN/1.73SQ M
GLUCOSE P FAST SERPL-MCNC: 82 MG/DL (ref 65–99)
HBA1C MFR BLD: 5.7 %
HCT VFR BLD AUTO: 44.7 % (ref 34.8–46.1)
HDLC SERPL-MCNC: 50 MG/DL
HGB BLD-MCNC: 14.1 G/DL (ref 11.5–15.4)
IMM GRANULOCYTES # BLD AUTO: 0.01 THOUSAND/UL (ref 0–0.2)
IMM GRANULOCYTES NFR BLD AUTO: 0 % (ref 0–2)
LDLC SERPL CALC-MCNC: 146 MG/DL (ref 0–100)
LYMPHOCYTES # BLD AUTO: 1.56 THOUSANDS/ΜL (ref 0.6–4.47)
LYMPHOCYTES NFR BLD AUTO: 31 % (ref 14–44)
MCH RBC QN AUTO: 29.3 PG (ref 26.8–34.3)
MCHC RBC AUTO-ENTMCNC: 31.5 G/DL (ref 31.4–37.4)
MCV RBC AUTO: 93 FL (ref 82–98)
MONOCYTES # BLD AUTO: 0.57 THOUSAND/ΜL (ref 0.17–1.22)
MONOCYTES NFR BLD AUTO: 11 % (ref 4–12)
NEUTROPHILS # BLD AUTO: 2.72 THOUSANDS/ΜL (ref 1.85–7.62)
NEUTS SEG NFR BLD AUTO: 53 % (ref 43–75)
NRBC BLD AUTO-RTO: 0 /100 WBCS
PLATELET # BLD AUTO: 286 THOUSANDS/UL (ref 149–390)
PMV BLD AUTO: 9.4 FL (ref 8.9–12.7)
POTASSIUM SERPL-SCNC: 3.9 MMOL/L (ref 3.5–5.3)
PROT SERPL-MCNC: 6.5 G/DL (ref 6.4–8.4)
RBC # BLD AUTO: 4.81 MILLION/UL (ref 3.81–5.12)
SODIUM SERPL-SCNC: 136 MMOL/L (ref 135–147)
TRIGL SERPL-MCNC: 106 MG/DL (ref ?–150)
TSH SERPL DL<=0.05 MIU/L-ACNC: 0.92 UIU/ML (ref 0.45–4.5)
WBC # BLD AUTO: 5.12 THOUSAND/UL (ref 4.31–10.16)

## 2025-01-10 PROCEDURE — 80053 COMPREHEN METABOLIC PANEL: CPT

## 2025-01-10 PROCEDURE — 84443 ASSAY THYROID STIM HORMONE: CPT

## 2025-01-10 PROCEDURE — 99214 OFFICE O/P EST MOD 30 MIN: CPT | Performed by: INTERNAL MEDICINE

## 2025-01-10 PROCEDURE — 85025 COMPLETE CBC W/AUTO DIFF WBC: CPT

## 2025-01-10 PROCEDURE — 82306 VITAMIN D 25 HYDROXY: CPT

## 2025-01-10 PROCEDURE — 83036 HEMOGLOBIN GLYCOSYLATED A1C: CPT

## 2025-01-10 PROCEDURE — 80061 LIPID PANEL: CPT

## 2025-01-10 PROCEDURE — G0438 PPPS, INITIAL VISIT: HCPCS | Performed by: INTERNAL MEDICINE

## 2025-01-10 NOTE — ASSESSMENT & PLAN NOTE
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 Now on letrozole therapy; recent humerus fx, in a sling, cannot operate 2/2 lymphedema; she is following with ortho, will see them tomorrow; she is on oxycodone and marijuana (drops and PO);

## 2025-01-10 NOTE — PATIENT INSTRUCTIONS
Medicare Preventive Visit Patient Instructions  Thank you for completing your Welcome to Medicare Visit or Medicare Annual Wellness Visit today. Your next wellness visit will be due in one year (1/11/2026).  The screening/preventive services that you may require over the next 5-10 years are detailed below. Some tests may not apply to you based off risk factors and/or age. Screening tests ordered at today's visit but not completed yet may show as past due. Also, please note that scanned in results may not display below.  Preventive Screenings:  Service Recommendations Previous Testing/Comments   Colorectal Cancer Screening  * Colonoscopy    * Fecal Occult Blood Test (FOBT)/Fecal Immunochemical Test (FIT)  * Fecal DNA/Cologuard Test  * Flexible Sigmoidoscopy Age: 45-75 years old   Colonoscopy: every 10 years (may be performed more frequently if at higher risk)  OR  FOBT/FIT: every 1 year  OR  Cologuard: every 3 years  OR  Sigmoidoscopy: every 5 years  Screening may be recommended earlier than age 45 if at higher risk for colorectal cancer. Also, an individualized decision between you and your healthcare provider will decide whether screening between the ages of 76-85 would be appropriate. Colonoscopy: 11/16/2021  FOBT/FIT: Not on file  Cologuard: Not on file  Sigmoidoscopy: Not on file    Screening Current     Breast Cancer Screening Age: 40+ years old  Frequency: every 1-2 years  Not required if history of left and right mastectomy Mammogram: 04/29/2024    History Breast Cancer   Cervical Cancer Screening Between the ages of 21-29, pap smear recommended once every 3 years.   Between the ages of 30-65, can perform pap smear with HPV co-testing every 5 years.   Recommendations may differ for women with a history of total hysterectomy, cervical cancer, or abnormal pap smears in past. Pap Smear: 01/26/2022    Screening Not Indicated   Hepatitis C Screening Once for adults born between 1945 and 1965  More frequently in  patients at high risk for Hepatitis C Hep C Antibody: 03/27/2018    Screening Current   Diabetes Screening 1-2 times per year if you're at risk for diabetes or have pre-diabetes Fasting glucose: 82 mg/dL (1/10/2025)  A1C: 5.7 % (1/10/2025)  Screening Current   Cholesterol Screening Once every 5 years if you don't have a lipid disorder. May order more often based on risk factors. Lipid panel: 01/10/2025    Screening Not Indicated  History Lipid Disorder     Other Preventive Screenings Covered by Medicare:  Abdominal Aortic Aneurysm (AAA) Screening: covered once if your at risk. You're considered to be at risk if you have a family history of AAA.  Lung Cancer Screening: covers low dose CT scan once per year if you meet all of the following conditions: (1) Age 55-77; (2) No signs or symptoms of lung cancer; (3) Current smoker or have quit smoking within the last 15 years; (4) You have a tobacco smoking history of at least 20 pack years (packs per day multiplied by number of years you smoked); (5) You get a written order from a healthcare provider.  Glaucoma Screening: covered annually if you're considered high risk: (1) You have diabetes OR (2) Family history of glaucoma OR (3)  aged 50 and older OR (4)  American aged 65 and older  Osteoporosis Screening: covered every 2 years if you meet one of the following conditions: (1) You're estrogen deficient and at risk for osteoporosis based off medical history and other findings; (2) Have a vertebral abnormality; (3) On glucocorticoid therapy for more than 3 months; (4) Have primary hyperparathyroidism; (5) On osteoporosis medications and need to assess response to drug therapy.   Last bone density test (DXA Scan): 10/30/2024.  HIV Screening: covered annually if you're between the age of 15-65. Also covered annually if you are younger than 15 and older than 65 with risk factors for HIV infection. For pregnant patients, it is covered up to 3 times per  pregnancy.    Immunizations:  Immunization Recommendations   Influenza Vaccine Annual influenza vaccination during flu season is recommended for all persons aged >= 6 months who do not have contraindications   Pneumococcal Vaccine   * Pneumococcal conjugate vaccine = PCV13 (Prevnar 13), PCV15 (Vaxneuvance), PCV20 (Prevnar 20)  * Pneumococcal polysaccharide vaccine = PPSV23 (Pneumovax) Adults 19-65 yo with certain risk factors or if 65+ yo  If never received any pneumonia vaccine: recommend Prevnar 20 (PCV20)  Give PCV20 if previously received 1 dose of PCV13 or PPSV23   Hepatitis B Vaccine 3 dose series if at intermediate or high risk (ex: diabetes, end stage renal disease, liver disease)   Respiratory syncytial virus (RSV) Vaccine - COVERED BY MEDICARE PART D  * RSVPreF3 (Arexvy) CDC recommends that adults 60 years of age and older may receive a single dose of RSV vaccine using shared clinical decision-making (SCDM)   Tetanus (Td) Vaccine - COST NOT COVERED BY MEDICARE PART B Following completion of primary series, a booster dose should be given every 10 years to maintain immunity against tetanus. Td may also be given as tetanus wound prophylaxis.   Tdap Vaccine - COST NOT COVERED BY MEDICARE PART B Recommended at least once for all adults. For pregnant patients, recommended with each pregnancy.   Shingles Vaccine (Shingrix) - COST NOT COVERED BY MEDICARE PART B  2 shot series recommended in those 19 years and older who have or will have weakened immune systems or those 50 years and older     Health Maintenance Due:      Topic Date Due   • Breast Cancer Screening: Mammogram  04/29/2025   • Colorectal Cancer Screening  11/16/2026   • Hepatitis C Screening  Completed     Immunizations Due:      Topic Date Due   • Pneumococcal Vaccine: 65+ Years (1 of 2 - PCV) Never done   • COVID-19 Vaccine (7 - 2024-25 season) 11/01/2024     Advance Directives   What are advance directives?  Advance directives are legal documents  that state your wishes and plans for medical care. These plans are made ahead of time in case you lose your ability to make decisions for yourself. Advance directives can apply to any medical decision, such as the treatments you want, and if you want to donate organs.   What are the types of advance directives?  There are many types of advance directives, and each state has rules about how to use them. You may choose a combination of any of the following:  Living will:  This is a written record of the treatment you want. You can also choose which treatments you do not want, which to limit, and which to stop at a certain time. This includes surgery, medicine, IV fluid, and tube feedings.   Durable power of  for healthcare (DPAHC):  This is a written record that states who you want to make healthcare choices for you when you are unable to make them for yourself. This person, called a proxy, is usually a family member or a friend. You may choose more than 1 proxy.  Do not resuscitate (DNR) order:  A DNR order is used in case your heart stops beating or you stop breathing. It is a request not to have certain forms of treatment, such as CPR. A DNR order may be included in other types of advance directives.  Medical directive:  This covers the care that you want if you are in a coma, near death, or unable to make decisions for yourself. You can list the treatments you want for each condition. Treatment may include pain medicine, surgery, blood transfusions, dialysis, IV or tube feedings, and a ventilator (breathing machine).  Values history:  This document has questions about your views, beliefs, and how you feel and think about life. This information can help others choose the care that you would choose.  Why are advance directives important?  An advance directive helps you control your care. Although spoken wishes may be used, it is better to have your wishes written down. Spoken wishes can be misunderstood, or  not followed. Treatments may be given even if you do not want them. An advance directive may make it easier for your family to make difficult choices about your care.   Fall Prevention    Fall prevention  includes ways to make your home and other areas safer. It also includes ways you can move more carefully to prevent a fall. Health conditions that cause changes in your blood pressure, vision, or muscle strength and coordination may increase your risk for falls. Medicines may also increase your risk for falls if they make you dizzy, weak, or sleepy.   Fall prevention tips:   Stand or sit up slowly.    Use assistive devices as directed.    Wear shoes that fit well and have soles that .    Wear a personal alarm.    Stay active.    Manage your medical conditions.    Home Safety Tips:  Add items to prevent falls in the bathroom.    Keep paths clear.    Install bright lights in your home.    Keep items you use often on shelves within reach.    Paint or place reflective tape on the edges of your stairs.    Urinary Incontinence   Urinary incontinence (UI)  is when you lose control of your bladder. UI develops because your bladder cannot store or empty urine properly. The 3 most common types of UI are stress incontinence, urge incontinence, or both.  Medicines:   May be given to help strengthen your bladder control. Report any 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 of medication to your healthcare provider.  Do pelvic muscle exercises often:  Your pelvic muscles help you stop urinating. Squeeze these muscles tight for 5 seconds, then relax for 5 seconds. Gradually work up to squeezing for 10 seconds. Do 3 sets of 15 repetitions a day, or as directed. This will help strengthen your pelvic muscles and improve bladder control.  Train your bladder:  Go to the bathroom at set times, such as every 2 hours, even if you do not feel the urge to go. You can also try to hold your urine when you feel the urge to go. For example, hold your urine for 5  minutes when you feel the urge to go. As that becomes easier, hold your urine for 10 minutes.   Self-care:   Keep a UI record.  Write down how often you leak urine and how much you leak. Make a note of what you were doing when you leaked urine.  Drink liquids as directed. You may need to limit the amount of liquid you drink to help control your urine leakage. Do not drink any liquid right before you go to bed. Limit or do not have drinks that contain caffeine or alcohol.   Prevent constipation.  Eat a variety of high-fiber foods. Good examples are high-fiber cereals, beans, vegetables, and whole-grain breads. Walking is the best way to trigger your intestines to have a bowel movement.  Exercise regularly and maintain a healthy weight.  Weight loss and exercise will decrease pressure on your bladder and help you control your leakage.   Use a catheter as directed  to help empty your bladder. A catheter is a tiny, plastic tube that is put into your bladder to drain your urine.   Go to behavior therapy as directed.  Behavior therapy may be used to help you learn to control your urge to urinate.     © Copyright [employer] 2018 Information is for End User's use only and may not be sold, redistributed or otherwise used for commercial purposes. All illustrations and images included in CareNotes® are the copyrighted property of [employer] or [employer]

## 2025-01-10 NOTE — PROGRESS NOTES
Name: Eileen Matias      : 1960      MRN: [id number]  Encounter Provider: Amelia Gallegos MD  Encounter Date: 1/10/2025   Encounter department: [facility] INTERNAL MEDICINE [place]    Assessment & Plan  Medicare annual wellness visit, initial  AWV completed.       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 Now on letrozole therapy; recent humerus fx, in a sling, cannot operate 2/2 lymphedema; she is following with ortho, will see them tomorrow; she is on oxycodone and marijuana (drops and PO);        Complaint of pain of toe  Reports numbness and pain to the top of her foot to both feet.  Orders:    Ambulatory Referral to Podiatry; Future    EMG 2 limb lower extremity; Future    CBC and differential; Future    Comprehensive metabolic panel; Future    Numbness of foot    Orders:    EMG 2 limb lower extremity; Future    TSH, 3rd generation with Free T4 reflex; Future    Neuropathy    Orders:    EMG 2 limb lower extremity; Future    Bilateral hearing loss, unspecified hearing loss type    Orders:    Ambulatory Referral to Audiology; Future    Hyperlipidemia, mixed  The 10-year ASCVD risk score ([place] AMARO, et al., 2019) is: 8.1%    Values used to calculate the score:      Age: 64 years      Sex: Female      Is Non- : No      Diabetic: No      Tobacco smoker: No      Systolic Blood Pressure: 142 mmHg      Is BP treated: No      HDL Cholesterol: 50 mg/dL      Total Cholesterol: 217 mg/dL  Continue regimen.  Orders:    Lipid Panel with Direct LDL reflex; Future    Impaired fasting glucose    Orders:    CBC and differential; Future    Comprehensive metabolic panel; Future    Hemoglobin A1C; Future    Weight loss  150 pounds from last year, today is 129 pounds. She has lost 21 pounds. Consider imaging in  the future. She first wants to discuss with Dr. Motley.         Depression Screening and Follow-up Plan: Patient was screened for depression during today's encounter. They screened negative with a PHQ-2 score of 2.    Falls Plan of Care: balance, strength, and gait training instructions were provided. Vitamin D supplementation was recommended. Home safety evaluation by OT recommended.       Preventive health issues were discussed with patient, and age appropriate screening tests were ordered as noted in patient's After Visit Summary. Personalized health advice and appropriate referrals for health education or preventive services given if needed, as noted in patient's After Visit Summary.    History of Present Illness     Patient is here for routine follow up, reviewed chronic medical problems, ordered labs for next visit including CBC CMP TSH A1C LIPID. Reviewed labs for this visit.       Patient Care Team:  Amelia Gallegos MD as PCP - General (Internal Medicine)  Alec England MD as Endoscopist  Amanda Motley MD as Surgeon (Surgical Oncology)  Rochelle Sethi MD (Radiation Oncology)  Alfredo Hays MD as Medical Oncologist (Hematology and Oncology)  Belén Dougherty RD (Nutrition)  Ignacia Yoo PA-C as Physician Assistant (Medical Oncology)    Review of Systems   Constitutional:  Negative for chills and fever.   HENT:  Positive for hearing loss. Negative for ear pain and sore throat.    Eyes:  Negative for pain and visual disturbance.   Respiratory:  Negative for cough and shortness of breath.    Cardiovascular:  Negative for chest pain and palpitations.   Gastrointestinal:  Negative for abdominal pain and vomiting.   Genitourinary:  Negative for dysuria and hematuria.   Musculoskeletal:  Positive for arthralgias and gait problem. Negative for back pain.   Skin:  Negative for color change and rash.   Neurological:  Negative for seizures and syncope.   All other systems reviewed and are  negative.    Medical History Reviewed by provider this encounter:  Tobacco  Allergies  Meds  Problems  Med Hx  Surg Hx  Fam Hx       Annual Wellness Visit Questionnaire   Eileen is here for her Subsequent Wellness visit. Last Medicare Wellness visit information reviewed, patient interviewed and updates made to the record today.      Health Risk Assessment:   Patient rates overall health as fair. Patient feels that their physical health rating is slightly worse. Patient is satisfied with their life. Eyesight was rated as same. Hearing was rated as slightly worse. Patient feels that their emotional and mental health rating is slightly worse. Patients states they are never, rarely angry. Patient states they are sometimes unusually tired/fatigued. Pain experienced in the last 7 days has been a lot. Patient's pain rating has been 4/10. Patient states that she has experienced no weight loss or gain in last 6 months.     Depression Screening:   PHQ-2 Score: 2      Fall Risk Screening:   In the past year, patient has experienced: history of falling in past year    Number of falls: 2 or more  Injured during fall?: Yes    Feels unsteady when standing or walking?: No    Worried about falling?: No      Urinary Incontinence Screening:   Patient has leaked urine accidently in the last six months.     Home Safety:  Patient has trouble with stairs inside or outside of their home. Patient has working smoke alarms and has no working carbon monoxide detector. Home safety hazards include: none.     Nutrition:   Current diet is Regular and Limited junk food. Pescartarian     Medications:   Patient is not currently taking any over-the-counter supplements. Patient is able to manage medications.     Activities of Daily Living (ADLs)/Instrumental Activities of Daily Living (IADLs):   Walk and transfer into and out of bed and chair?: Yes  Dress and groom yourself?: Yes    Bathe or shower yourself?: No    Feed yourself? Yes  Do your  laundry/housekeeping?: No  Manage your money, pay your bills and track your expenses?: Yes  Make your own meals?: Yes    Do your own shopping?: No    Previous Hospitalizations:   Any hospitalizations or ED visits within the last 12 months?: Yes    How many hospitalizations have you had in the last year?: 1-2    Advance Care Planning:   Living will: Yes    Durable POA for healthcare: Yes    Advanced directive: Yes      Cognitive Screening:   Provider or family/friend/caregiver concerned regarding cognition?: No    PREVENTIVE SCREENINGS      Cardiovascular Screening:    General: History Lipid Disorder and Screening Current      Diabetes Screening:     General: Screening Current      Colorectal Cancer Screening:     General: Screening Current      Breast Cancer Screening:     General: History Breast Cancer and Screening Current      Cervical Cancer Screening:    General: Screening Not Indicated      Osteoporosis Screening:    General: Screening Current      Abdominal Aortic Aneurysm (AAA) Screening:        General: Screening Not Indicated      Lung Cancer Screening:     General: Screening Not Indicated      Hepatitis C Screening:    General: Screening Current    Screening, Brief Intervention, and Referral to Treatment (SBIRT)    Screening  Typical number of drinks in a day: 1  Typical number of drinks in a week: 1  Interpretation: Low risk drinking behavior.    Single Item Drug Screening:  How often have you used an illegal drug (including marijuana) or a prescription medication for non-medical reasons in the past year? never    Single Item Drug Screen Score: 0  Interpretation: Negative screen for possible drug use disorder    Brief Intervention  Alcohol & drug use screenings were reviewed. No concerns regarding substance use disorder identified.     Other Counseling Topics:   Car/seat belt/driving safety.     Social Drivers of Health     Financial Resource Strain: Low Risk  (12/13/2023)    Overall Financial Resource  "Strain (CARDIA)     Difficulty of Paying Living Expenses: Not hard at all   Food Insecurity: No Food Insecurity (1/10/2025)    Hunger Vital Sign     Worried About Running Out of Food in the Last Year: Never true     Ran Out of Food in the Last Year: Never true   Transportation Needs: No Transportation Needs (1/10/2025)    PRAPARE - Transportation     Lack of Transportation (Medical): No     Lack of Transportation (Non-Medical): No   Housing Stability: Unknown (1/10/2025)    Housing Stability Vital Sign     Unable to Pay for Housing in the Last Year: No   Utilities: Not At Risk (1/10/2025)    [facility] Utilities     Threatened with loss of utilities: No     No results found.    Objective   /76 (BP Location: Left leg, Patient Position: Prone, Cuff Size: Standard)   Pulse 82   Temp 97.5 °F (36.4 °C) (Tympanic)   Resp 18   Ht 5' 8\" (1.727 m)   Wt 58.7 kg (129 lb 6.4 oz)   SpO2 98%   BMI 19.68 kg/m²     Physical Exam  Vitals and nursing note reviewed.   Constitutional:       General: She is not in acute distress.     Appearance: She is well-developed.   HENT:      Head: Normocephalic and atraumatic.   Eyes:      Conjunctiva/sclera: Conjunctivae normal.   Cardiovascular:      Rate and Rhythm: Normal rate and regular rhythm.      Heart sounds: No murmur heard.  Pulmonary:      Effort: Pulmonary effort is normal. No respiratory distress.      Breath sounds: Normal breath sounds.   Abdominal:      Palpations: Abdomen is soft.      Tenderness: There is no abdominal tenderness.   Musculoskeletal:         General: No swelling.      Cervical back: Neck supple.   Skin:     General: Skin is warm and dry.      Capillary Refill: Capillary refill takes less than 2 seconds.   Neurological:      Mental Status: She is alert.   Psychiatric:         Mood and Affect: Mood normal.       "

## 2025-01-10 NOTE — ASSESSMENT & PLAN NOTE
The 10-year ASCVD risk score ([place] AMARO, et al., 2019) is: 8.1%    Values used to calculate the score:      Age: 64 years      Sex: Female      Is Non- : No      Diabetic: No      Tobacco smoker: No      Systolic Blood Pressure: 142 mmHg      Is BP treated: No      HDL Cholesterol: 50 mg/dL      Total Cholesterol: 217 mg/dL  Continue regimen.  Orders:    Lipid Panel with Direct LDL reflex; Future

## 2025-01-13 ENCOUNTER — APPOINTMENT (OUTPATIENT)
Dept: PHYSICAL THERAPY | Facility: CLINIC | Age: 65
End: 2025-01-13
Payer: MEDICARE

## 2025-01-14 ENCOUNTER — OFFICE VISIT (OUTPATIENT)
Dept: OCCUPATIONAL THERAPY | Facility: CLINIC | Age: 65
End: 2025-01-14
Payer: MEDICARE

## 2025-01-14 DIAGNOSIS — R20.2 NUMBNESS AND TINGLING OF LEFT UPPER EXTREMITY: ICD-10-CM

## 2025-01-14 DIAGNOSIS — R20.0 NUMBNESS AND TINGLING OF LEFT UPPER EXTREMITY: ICD-10-CM

## 2025-01-14 DIAGNOSIS — R29.898 RIGHT HAND WEAKNESS: Primary | ICD-10-CM

## 2025-01-14 PROCEDURE — 97110 THERAPEUTIC EXERCISES: CPT | Performed by: OCCUPATIONAL THERAPIST

## 2025-01-14 PROCEDURE — 97168 OT RE-EVAL EST PLAN CARE: CPT | Performed by: OCCUPATIONAL THERAPIST

## 2025-01-14 NOTE — PROGRESS NOTES
OT Re-Evaluation     Today's date: 2025  Patient name: Eileen Matias  : 1960  MRN: [id number]  Referring provider: Rogelio Black MD  Dx:   Encounter Diagnosis     ICD-10-CM    1. Right hand weakness  R29.898       2. Numbness and tingling of left upper extremity  R20.0     R20.2                        Assessment  Impairments: abnormal coordination, abnormal or restricted ROM, activity intolerance, impaired physical strength, pain with function and weight-bearing intolerance  Other impairment: Lymphedema RUE; joint constractures right digits, wrist, forearm; radial nerve neuropathy  Functional limitations: Able use to use right hand to assist during ADL's;  Able to pinch digits in  three point prehension for short periods.  Symptom irritability: low    Assessment details: Eileen Matias is a 63 y.o., Ambidextrous HD female referred to hand therapy for  right hand,wrist and forearm contractures.  Onset of injury 23 due to fall causing spiral fracture of the right humerus.  Patient presents 22 with impaired ROM of the right digits, wrist, forearm,  impaired strength, and impaired sensation of the right peripheral nerves, particularly the radial nerve.  Deficits also noted in pain, edema, and functional use of the right UE. Patient joint contractures of the digits, wrist and forearm as well as significant intrinsic and extrinsic mm tightness. Patient is a good candidate for OT services to abolish pain and edema and restore ROM, strength, coordination, and sensation for a return to independence in daily tasks.      Re 7/3/24  Eileen has attended 19 therapy visits over the past 12 weeks.  She has made steady progress in active and passive motion.  Pain has reduced since IE and is not at a moderate and steady level.  She is now reporting sensation along the thumb and index, in an area previously insensate.  She has been wearing a wrist support consistently allowing her to use her right hand in a  gross motor fashion and allowing three point pinch for light tasks, short periods.  She remains in lymphedema sleeves with are restrictive and resist motion during daily tasks.  Therapy may continue to address active and passive motion, improve functional  and prehension to allow increased independence in light home tasks and art work.          RE 9/24/24  Eileen has attended 37 therapy visits over the past 12 weeks.  She has made steady progress in active and passive motion.  Pain has reduced since IE and is not at a moderate and steady level.  She is now reporting sensation along the thumb and index, in an area previously insensate.  She has been wearing a wrist support consistently allowing her to use her right hand with gross flexion and some dexterity tasks.  This motion also allows three point pinch for light tasks, short periods.  With wearing of wrist support,   strength measures in the low functional level.  She continues to remain in lymphedema sleeves with are restrictive motion during daily tasks.  Therapy may continue to address active and passive motion, improve functional  and prehension to allow increased independence in light home tasks and art work.      RE 1/14/25  Eileen continues to attend therapy consistently and continues to make slow and steady gains in motion and function.  With heat and stretch, functional motion of digital flexion is achieved.   strength with wear of circumferential splint is at a low functional level.  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   Pain has reduced since and is currently at a mild to moderate  steady level.  She is now reporting sensation along the thumb and index with sensation now intact over the DRSN.   She has been wearing a wrist support consistently allowing her to use her right hand with gross flexion and some dexterity tasks.  This motion also allows three point pinch for light tasks, short periods.  She continues to remain in  lymphedema sleeves with are restrictive during daily tasks.  Therapy may continue to address active and passive motion, improve functional  and prehension to allow increased independence in light home tasks and art work.  Therapy will wean to HEP as progress plateaus.             Understanding of Dx/Px/POC: excellent     Prognosis: good    Goals  STGs (4 weeks)  Patient will be independent in HEP of ROM, splinting to increase motion, edema management MET  Patient will report an average pain level of 4-5/10 MET  Patient will demonstrate 30 degree improvement in PAGE of the digits MET  Patient will demonstrate active wrist extension/flexion to 10/70  Partially MET  Patient will demonstrate active forearm supination to 45 MET  LTGs (12 weeks)  Patient will demonstrate independence in a HEP to maintain ROM, strength, and function at discharge NOT MET  Patient will report an average pain level of 1-2/10 to be independent in daily tasks NOT MET  Patient will demonstrate PAGE of the digits to 220 degrees to be independent in self care tasks NOT MET  Patient will demonstrate AROM of the wrist and forearm WFL to be independent in self care tasks  MET  Patient will demonstrate 5/5 muscle strength in the wrist and forearm to be MI for meal prep  NOT MET  Patient will demonstrate right hand strength within 30% of the left hand to be MI for IADL tasks NOT MET  Patient will demonstrate control  of edema MET      Plan  Patient would benefit from: skilled occupational therapy and custom splinting  Planned modality interventions: thermotherapy: hydrocollator packs and cryotherapy    Planned therapy interventions: activity modification, compression, fine motor coordination training, graded activity, graded exercise, home exercise program, therapeutic exercise, therapeutic activities, stretching, strengthening, patient education, orthotic fitting/training, neuromuscular re-education, manual therapy, IASTM, joint mobilization,  kinesiology taping, massage, muscle pump exercises and nerve gliding  Other planned therapy interventions: Static progressive and dynamic splinting to increase digit, wrist, forearm motion    Frequency: 2x week  Duration in weeks: 12  Plan of Care beginning date: 12/17/2024  Plan of Care expiration date: 3/11/2025  Treatment plan discussed with: patient      Subjective Evaluation    History of Present Illness  Date of onset: 11/12/2023  Mechanism of injury: trauma  Mechanism of injury: 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  Hx of lymphedema and frequent cellulitis infections in the RUE. Patient also had left breast cancer and has LUE lymphedema as well.    She reports that she suffered a displaced spiral fracture of the right humerus on 11/12/23 after a fall while pushing her mother in law who was seated in a wheelchair.  Patient went to ED that date for xrays and long arm cast. She was referred to orthopedics. Had a closed reduction of fracture. Long arm cast for approximately 4 weeks and then a Kruse clam shell brace. Patient received  a course of home health OT for ROM of the digits, wrist and elbow and edema management. She has had ongoing PT for lymphedema management and right shoulder ROM.     Patient referred to Dr. Black for radial nerve dysfunction and joint contractures in the forearm, wrist and hand. An EMG has been ordered. Patient now referred to OT for splinting and ROM to address joint contractures.            Recurrent probem    Quality of life: good    Patient Goals  Patient goals for therapy: decreased edema, decreased pain, increased motion, increased strength and independence with ADLs/IADLs  Patient goal: Be able to use with more strength  Pain  Current pain rating: 3  Location: Proximal humerus, wrist, hand right  Quality: dull ache and discomfort  Relieving factors: medications, support and heat  Exacerbated  by: overusing.  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     Additional Observation Details  History of RUE lymphedema;  Currently wearing smallest sleeve which is the same worn prior to current injury.     Neurological Testing     Sensation     Wrist/Hand     Right   Diminished: light touch    Additional Neurological Details  [place] Daren screening  Right  TH, II, III, IV distal pads = 3.61g  V= 2.83 g  DRSN= 4.31 g    Active Range of Motion     Right Elbow   Normal active range of motion  Flexion: 130 degrees   Extension: 0 degrees   Forearm supination: 90 degrees   Forearm pronation: 90 degrees     Right Wrist   Wrist flexion: 65 degrees   Wrist extension: 0 degrees   Radial deviation: 0 degrees   Ulnar deviation: 25 degrees     Right Thumb   Palmar Abduction    CMC: 45  Radial Abduction    CMC: 5  Kapandji score: 6 degrees    Left Digits    Flexion   Index     MCP: 65  Middle     MCP: 65  Ring     MCP: 65  Little     MCP: 40    Additional Active Range of Motion Details  In gravity eliminated position, able to actively extend right wrist moving from wrist flexion to wrist neutral.   S/P heat and stretch-  AROM digital flexion to DPC    Right   II  1.5 cm,  III 1.5 cm,  IV 2 cm,  V 2 cm        Passive Range of Motion     Right Elbow   Normal passive range of motion  Flexion: 135 degrees   Extension: 0 degrees   Forearm supination: 90 degrees   Forearm pronation: 90 degrees     Right Wrist   Wrist flexion: 70 degrees   Wrist extension: 72 degrees   Radial deviation: 10 degrees   Ulnar deviation: 40 degrees     Additional Passive Range of Motion Details  S/P heat and  stretch-  Right PROM digital flexion to DPC    II  0 cm,  III  0 cm,  IV  0 cm,  V  1 cm        Strength/Myotome Testing     Right Wrist/Hand   Wrist extension: 2  Wrist flexion: 5    Additional Strength Details  With wear of circumferential wrist splint-  STRENGTH  [place] #2  R   27.7 lbs. (+4.4 )    [place]incoln #3  R   21.3 Lbs.                 Tests     Right Wrist/Hand   Positive extrinsic extensor tightness, extrinsic flexor tightness and intrinsic muscle tightness.     Swelling     Right Elbow Girth Measurements   Joint line: 26 cm  10 cm below joint line: 26 cm                                                                                                                                    Daily Note     Today's date: 2025  Patient name: Eileen Maitas  : 1960  MRN: [id number]  Referring provider: Rogelio Black MD  Dx:   Encounter Diagnosis     ICD-10-CM    1. Right hand weakness  R29.898       2. Numbness and tingling of left upper extremity  R20.0     R20.2                 Subjective:   I am stretching everyday.  I am can tell that I am getting stronger. I am getting more electricity (dorsal FA, anterior shoulder)      Objective: See treatment diary below for clinic program.   RE today.      Assessment: Tolerated treatment well. Patient would benefit from continued OT. Digital flexion continues to increase steadily.  Light function achieved in art and self care.     Improving ease and increased speed of right noted in clinic program.   Edema controlled with wear of smaller and lighter lymphedema garment.       Plan: Continue per plan of care.   Progress dexterity and gripping exercises as tolerated. Continue wear of orthotic for wrist stability.  Focus care on PREs, improved dexterity and strength, with continued focus on functional motion.        Precautions: Lymphedema; hx desmoid tumors and breast cancer; healing humeral fracture     POC expires Unit limit Auth  expiration date PT/OT + Visit  "Limit?   3/11 NA NA BOMN         Dx R Hum Fx     Dr Wayne MILLER MET          Last RE 9/24/24    Date   1/14/25  RE   12/24 12/31 1/7/25 1/9/25   Visit   9/10   57   56 6/10 7/10 8/10   Manuals                    Jt mobs digits, FA MP Digits all G 1-2   MP digits all G 1-2 MP digits all G 1-2 MP digits all G 1-2 MP digits all  G 1-2   Ortho fit/train          Neuro Re-Ed                     Mirror therapy                              orthotic                              Ther Ex 25     30   30   30   30   HEP       Green foam    PROM 1:1 Digital flexion  LLPS   Digital flexion LLPS Digital flexion LLPS Digital flexion LLPS Digital flexion LLPS   Digital blocked AROM P/H fists 5x5\"   P/H fists 5x5\" P/H fists 5x5\" P/H fists 5x5\" P/H fists 5x5\"   UE warm up UBE 3F 3R   L1   UBE 2F 2R L1  R only x50 UBE 3F 3R L1  R only x50 UBE 3F 3R L1  R only x55 UBE 3F 3R L1  R only x55   PRE FA     #3 S/P  3/4 shaft x50 #3 S/P  3/4 shaft x50 #2 S/P  Full shaft x50 #2 S/P  Full shaft x50   PRE Wrist FA G F bar down x20  Up x20  R F bar Fold  x20   G F bar down x20  Up x20 G F bar down x20  Up x20 G F bar down x20  Up x20 G F bar down x20  Up x20  R F bar Fold  x20   PRE digits Black power web x20   G web  to edge   x20 G web  to edge   x20 G web  to edge   x20 Gray #2    All foams   PRE digits tips    Velcro bd swap tips  Full board Velcro bd swap tips  Full board Velcro bd swap tips  Full board Velcro bd swap tips  Full board   Sh PROM          Ther Activity          Gripping          Pinching          Coordination          Carrying           Reaching           MHP Pre tx with flexion wrap   Pre tx with flexion wrap Pre tx with flexion wrap Pre tx with flexion wrap Pre tx with flexion wrap   TENS 10' pre tx   10' 10 pre tx 10 min  pre tx 10 min pre tx            "

## 2025-01-15 ENCOUNTER — OFFICE VISIT (OUTPATIENT)
Dept: PHYSICAL THERAPY | Facility: CLINIC | Age: 65
End: 2025-01-15
Payer: MEDICARE

## 2025-01-15 ENCOUNTER — TELEPHONE (OUTPATIENT)
Dept: HEMATOLOGY ONCOLOGY | Facility: CLINIC | Age: 65
End: 2025-01-15

## 2025-01-15 DIAGNOSIS — I89.0 LYMPHEDEMA: Primary | ICD-10-CM

## 2025-01-15 PROCEDURE — 97140 MANUAL THERAPY 1/> REGIONS: CPT | Performed by: PHYSICAL THERAPIST

## 2025-01-15 PROCEDURE — 97110 THERAPEUTIC EXERCISES: CPT | Performed by: PHYSICAL THERAPIST

## 2025-01-15 NOTE — PROGRESS NOTES
"Daily Note     Today's date: 1/15/2025  Patient name: Eileen Matias  : 1960  MRN: [id number]  Referring provider: No ref. provider found  Dx:   Encounter Diagnosis     ICD-10-CM    1. Lymphedema  I89.0                      Subjective: Patient reports that she is \"status quo\" at this time.      Objective: See treatment diary below      Assessment: Tolerated treatment well. Patient would benefit from continued PT      Plan: Continue per plan of care.      Precautions: L BCA  Access Code: [email or web address]  URL: [email or web address]  Date: 2024  Prepared by: Meena Jain    Exercises  - Shoulder Flexion Wall Slide with Towel  - 1 x daily - 7 x weekly - 1 sets - 5 reps - 10 hold  - Standing Shoulder Abduction Slides at Wall  - 1 x daily - 7 x weekly - 3 sets - 10 reps  - Single Arm Doorway Pec Stretch at 90 Degrees Abduction  - 1 x daily - 7 x weekly - 1 sets - 5 reps - 10 hold    Manuals 11/4 11/18 12/2 12/4 12/9 12/16 12/23 1/6 1/15    Manual MFR 10'            Self MLD             Compression fitting  20' 15 30 15        Girth mmts    5'   8' 8'  8'    Neuro Re-Ed                                                                                                        Ther Ex             Pulley   4' 5' 5' 5' 5' 5' 5'                 Wall slides  10 x :10 10 x :10 10 x :10 10 x :10 10 x :10 hep      Bicep curls   nv  2# x 10 3# 2 x 10 4# 2 x 10 4# 2 x 10 4# 2 x 10    Row    nv  2# x 10 3# 2 x 10 3# 2 x 10 4# 2x10 4#2 x 10    Tricep    nv  2# 2 x 10 3# 2 x 10 3# 2 x 10 4# 2x10 4# 2 x 10    Bike  6' 6' 6' 6' 6' 6' 6' 6'    TG L 20  10 2x10 2 x 10 2 x 10 2 x 10 2x10 2x10 2x10                                           Gait Training                                       Modalities                                                          "

## 2025-01-15 NOTE — TELEPHONE ENCOUNTER
Called patient to schedule f/u appt per provider . Patient instructed me to call her  and schedule with him since she is not driving any longer . Appt scheduled with patient  and confirmed

## 2025-01-16 ENCOUNTER — APPOINTMENT (OUTPATIENT)
Dept: OCCUPATIONAL THERAPY | Facility: CLINIC | Age: 65
End: 2025-01-16
Payer: MEDICARE

## 2025-01-20 ENCOUNTER — OFFICE VISIT (OUTPATIENT)
Dept: PHYSICAL THERAPY | Facility: CLINIC | Age: 65
End: 2025-01-20
Payer: MEDICARE

## 2025-01-20 DIAGNOSIS — I89.0 LYMPHEDEMA: Primary | ICD-10-CM

## 2025-01-20 PROCEDURE — 97110 THERAPEUTIC EXERCISES: CPT | Performed by: PHYSICAL THERAPIST

## 2025-01-20 PROCEDURE — 97140 MANUAL THERAPY 1/> REGIONS: CPT | Performed by: PHYSICAL THERAPIST

## 2025-01-20 PROCEDURE — 97530 THERAPEUTIC ACTIVITIES: CPT | Performed by: PHYSICAL THERAPIST

## 2025-01-20 NOTE — PROGRESS NOTES
Daily Note     Today's date: 2025  Patient name: Eileen Matias  : 1960  MRN: [id number]  Referring provider: No ref. provider found  Dx:   Encounter Diagnosis     ICD-10-CM    1. Lymphedema  I89.0                      Subjective: Patient reports no significant change in sx of the L UE.        Objective: See treatment diary below      Assessment: Tolerated treatment well. Patient would benefit from continued PT  Patient's mmts appear to be plateauing.      Plan: Continue per plan of care.   Will re-measure in 2 days and assess decreasing her daily use of the compression garment.     Precautions: L BCA  Access Code: [email or web address]  URL: [email or web address]  Date: 2024  Prepared by: Meena Jain    Exercises  - Shoulder Flexion Wall Slide with Towel  - 1 x daily - 7 x weekly - 1 sets - 5 reps - 10 hold  - Standing Shoulder Abduction Slides at Wall  - 1 x daily - 7 x weekly - 3 sets - 10 reps  - Single Arm Doorway Pec Stretch at 90 Degrees Abduction  - 1 x daily - 7 x weekly - 1 sets - 5 reps - 10 hold    Manuals 11/4 11/18 12/2 12/4 12/9 12/16 12/23 1/6 1/15 1/20   Manual MFR 10'            Self MLD             Compression fitting  20' 15 30 15        Girth mmts    5'   8' 8'  8' 8'   Neuro Re-Ed                                                                                                        Ther Ex             Pulley   4' 5' 5' 5' 5' 5' 5' 5'                Wall slides  10 x :10 10 x :10 10 x :10 10 x :10 10 x :10 hep      Bicep curls   nv  2# x 10 3# 2 x 10 4# 2 x 10 4# 2 x 10 4# 2 x 10 4# 2 x 10   Row    nv  2# x 10 3# 2 x 10 3# 2 x 10 4# 2x10 4#2 x 10 4# 2 x 10   Tricep    nv  2# 2 x 10 3# 2 x 10 3# 2 x 10 4# 2x10 4# 2 x 10 4# 2 x 10   Bike  6' 6' 6' 6' 6' 6' 6' 6' 6'   TG L 20  10 2x10 2 x 10 2 x 10 2 x 10 2x10 2x10 2x10 2 x 10   Pec press           3# 2 x 10   Deltoid lateral raise          4# 2 x 10   S/l ER          3# 2 x 10   Seated overhead press          3# 2 x 10                              Modalities

## 2025-01-21 ENCOUNTER — OFFICE VISIT (OUTPATIENT)
Dept: OCCUPATIONAL THERAPY | Facility: CLINIC | Age: 65
End: 2025-01-21
Payer: MEDICARE

## 2025-01-21 DIAGNOSIS — R29.898 RIGHT HAND WEAKNESS: Primary | ICD-10-CM

## 2025-01-21 DIAGNOSIS — R20.2 NUMBNESS AND TINGLING OF LEFT UPPER EXTREMITY: ICD-10-CM

## 2025-01-21 DIAGNOSIS — R20.0 NUMBNESS AND TINGLING OF LEFT UPPER EXTREMITY: ICD-10-CM

## 2025-01-21 PROCEDURE — 97110 THERAPEUTIC EXERCISES: CPT | Performed by: OCCUPATIONAL THERAPIST

## 2025-01-21 PROCEDURE — 97140 MANUAL THERAPY 1/> REGIONS: CPT | Performed by: OCCUPATIONAL THERAPIST

## 2025-01-21 NOTE — PROGRESS NOTES
"                                                                                 Daily Note     Today's date: 2025  Patient name: Eileen Matias  : 1960  MRN: [id number]  Referring provider: Rogelio Black MD  Dx:   Encounter Diagnosis     ICD-10-CM    1. Right hand weakness  R29.898       2. Numbness and tingling of left upper extremity  R20.0     R20.2                 Subjective:   I am stretching before I come in now and it isn't as painful.     Objective: See treatment diary below for clinic program.       Assessment: Tolerated treatment well. Patient would benefit from continued OT. Digital flexion continues to increase steadily.  Light function achieved in art and self care.     Improving ease and increased speed of right noted in clinic program.   Edema controlled with wear of smaller and lighter lymphedema garment.       Plan: Continue per plan of care.   Progress dexterity and gripping exercises as tolerated. Continue wear of orthotic for wrist stability.  Focus care on PREs, improved dexterity and strength, with continued focus on functional motion.        Precautions: Lymphedema; hx desmoid tumors and breast cancer; healing humeral fracture     POC expires Unit limit Auth  expiration date PT/OT + Visit Limit?   3/11 NA NA BOMN         Dx R Hum Fx     Dr Wayne MILLER MET          Last RE 24    Date   25  RE 25   Visit    4/10    7/10 8/10   Manuals 8 8'                  Jt mobs digits, FA MP Digits all G 1-2 MP Digits all G 1-2    MP digits all G 1-2 MP digits all  G 1-2   Ortho fit/train          Neuro Re-Ed                     Mirror therapy                              orthotic                              Ther Ex 25   30      30   30   HEP       Green foam    PROM 1:1 Digital flexion  LLPS Digital flexion  LLPS    Digital flexion LLPS Digital flexion LLPS   Digital blocked AROM P/H fists 5x5\" P/H fists 5x5\"    P/H fists 5x5\" P/H fists 5x5\"   UE " warm up UBE 3F 3R   L1 UBE 3F 3R   L1  x55 R only    UBE 3F 3R L1  R only x55 UBE 3F 3R L1  R only x55   PRE FA       #2 S/P  Full shaft x50 #2 S/P  Full shaft x50   PRE Wrist FA G F bar down x20  Up x20  R F bar Fold  x20 G F bar down x20  Up x20  R F bar Fold  x20    G F bar down x20  Up x20 G F bar down x20  Up x20  R F bar Fold  x20   PRE digits Black power web x20 Black power web x20    G web  to edge   x20 Gray #2    All foams   PRE digits tips  Pinching B C pins all wood blocks    Velcro bd swap tips  Full board Velcro bd swap tips  Full board   Sh PROM          Ther Activity          Gripping          Pinching          Coordination          Carrying           Reaching           MHP Pre tx with flexion wrap Pre tx with flexion wrap    Pre tx with flexion wrap Pre tx with flexion wrap   TENS 10' pre tx 10' pre tx    10 min  pre tx 10 min pre tx

## 2025-01-22 ENCOUNTER — OFFICE VISIT (OUTPATIENT)
Dept: PHYSICAL THERAPY | Facility: CLINIC | Age: 65
End: 2025-01-22
Payer: MEDICARE

## 2025-01-22 DIAGNOSIS — I89.0 LYMPHEDEMA: Primary | ICD-10-CM

## 2025-01-22 PROCEDURE — 97110 THERAPEUTIC EXERCISES: CPT | Performed by: PHYSICAL THERAPIST

## 2025-01-22 PROCEDURE — 97530 THERAPEUTIC ACTIVITIES: CPT | Performed by: PHYSICAL THERAPIST

## 2025-01-22 PROCEDURE — 97140 MANUAL THERAPY 1/> REGIONS: CPT | Performed by: PHYSICAL THERAPIST

## 2025-01-22 NOTE — PROGRESS NOTES
PT Evaluation     Today's date: 25  Patient name: Eileen Matias  : 1960  MRN: [id number]  Referring provider: No ref. provider found  Dx:   Encounter Diagnosis     ICD-10-CM    1. Lymphedema  I89.0           Start Time: 330  Stop Time: 430  Total time in clinic (min): 60 minutes    Assessment  Impairments: abnormal or restricted ROM, impaired physical strength, lacks appropriate home exercise program and pain with function  Symptom irritability: low    25:  Eileen has been attending physical therapy s/p L lumpectomy with ALND and stage 1 lymphedema.  She has had a reduction in lymphedema with daily use of her compression garments.  We have weaned her from the compression pump without increase.  We will continue to challenge the UE with strengthening exercises to reach full function without increase in swelling.  She is in agreement with the plan of care.  Thank you for your referral.      Assessment details: Eileen Matias is a 64 y.o. 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  She  presents with pain, decreased strength, decreased ROM, and postural  dysfunction. Due to these impairments, Patient has difficulty performing a/iadls, recreational activities and engaging in social activities. Patient's clinical presentation is consistent with their referring diagnosis. Patient would benefit from compression garments, HEP of MLD as well as skilled physical therapy to address their aforementioned impairments, improve their level of function and to improve their overall quality of life.  has been given a home exercise program and is in agreement with the plan of care.  Thank you for your referral.     Barriers to therapy: R UE limits ability to perform home MLD -  to assist  R UE limitations affects ability to don and doff compression garments   Understanding of Dx/Px/POC: excellent     Prognosis: excellent    Goals  ST Goals - 2-4 weeks  1. Patient  will report decreased pain with activity by at least 2 points within 4 weeks - met  2. Patient and  will perform hep MLD independently w/in 4 weeks - met  3. Patient to become independent in donning and doffing compression garments in 2-4 weeks - met      LT Goals - Discharge  1. Patient will improve FOTO score to maximum stated or greater by discharge  2. Patient will return to preferred recreational activity without significant pain increase by discharge   3.  Patient will return to all work related activities without pain by discharge      Plan  Patient would benefit from: skilled physical therapy  Referral necessary: No    Planned therapy interventions: manual therapy, neuromuscular re-education, home exercise program, therapeutic exercise, therapeutic activities, stretching and strengthening    Frequency: 2x week  Duration in weeks: 8  Plan of Care beginning date: 11/6/2024  Plan of Care expiration date: 12/30/2024  Treatment plan discussed with: patient and family      Subjective Evaluation    History of Present Illness  Mechanism of injury: Patient presents today s/p L BCA with L lumpectomy with ALND on 6/21/22.  She then had her radiation therapy.  She did not need to have chemotherapy.  She began to have swelling in the L UE which was treated by OT in 2023 and then PT in 2024.    CC:  lymphedema and she has pain from the forearm to the elbow.  She reports that the pain in the elbow area has been present for 3-4 months. She uses her compression pump 3 x a day for an hour for B Ues.  Function:  She reports that she had a fx of the L wrist.  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  She is able to do her art.  She is unable to drive.  She reports that the UE gets tired easily.  She is able to lift and carry something the size of a gallon of milk with the L UE.     1/22/25:  Patient reports that her L UE is doing well.  She has been wearing her compression garment daily and her night garment nightly.  She is no  longer pumping her L UE.            Recurrent probem    Quality of life: excellent    Patient Goals  Patient goals for therapy: decreased edema, decreased pain, independence with ADLs/IADLs and return to sport/leisure activities    Pain  Current pain ratin  At worst pain ratin    Social Support    Employment status: not working  Hand dominance: left    Treatments  Previous treatment: occupational therapy and physical therapy  Current treatment: physical therapy        Objective     Observations     Additional Observation Details  + lymphedema L UE  - Stemmer's sign  - pitting edema  + Axillary cording    Active Range of Motion                                 25  Left Shoulder   Flexion: 162 degrees   Abduction: 150 degrees                                     165  External rotation BTH: WFL  Internal rotation BTB: WFL    Strength/Myotome Testing     Left Shoulder   Normal muscle strength    Right Shoulder   Normal muscle strength    LYMPHEDEMA:      See volume flow sheet         Precautions: L BCA  Access Code: [email or web address]  URL: [email or web address]  Date: 2024  Prepared by: Meena Jain    Exercises  - Shoulder Flexion Wall Slide with Towel  - 1 x daily - 7 x weekly - 1 sets - 5 reps - 10 hold  - Standing Shoulder Abduction Slides at Wall  - 1 x daily - 7 x weekly - 3 sets - 10 reps  - Single Arm Doorway Pec Stretch at 90 Degrees Abduction  - 1 x daily - 7 x weekly - 1 sets - 5 reps - 10 hold

## 2025-01-22 NOTE — PROGRESS NOTES
Daily Note     Today's date: 2025  Patient name: Eileen Matias  : 1960  MRN: [id number]  Referring provider: No ref. provider found  Dx:   Encounter Diagnosis     ICD-10-CM    1. Lymphedema  I89.0                      Subjective: see rev      Objective: See treatment diary below      Assessment: Tolerated treatment well. Patient would benefit from continued PT      Plan: Continue per plan of care.      Precautions: L BCA  Access Code: [email or web address]  URL: [email or web address]  Date: 2024  Prepared by: Meena Jain    Exercises  - Shoulder Flexion Wall Slide with Towel  - 1 x daily - 7 x weekly - 1 sets - 5 reps - 10 hold  - Standing Shoulder Abduction Slides at Wall  - 1 x daily - 7 x weekly - 3 sets - 10 reps  - Single Arm Doorway Pec Stretch at 90 Degrees Abduction  - 1 x daily - 7 x weekly - 1 sets - 5 reps - 10 hold    Manuals 1/22 11/18 12/2 12/4 12/9 12/16 12/23 1/6 1/15 1/20   Manual MFR 10'            Self MLD             Compression fitting  20' 15 30 15        Girth mmts    5'   8' 8'  8' 8'   REV 15                                                                                                       Ther Ex             Pulley 5'  4' 5' 5' 5' 5' 5' 5' 5'                Wall slides  10 x :10 10 x :10 10 x :10 10 x :10 10 x :10 hep      Bicep curls 4# 2 x 10  nv  2# x 10 3# 2 x 10 4# 2 x 10 4# 2 x 10 4# 2 x 10 4# 2 x 10   Row  4# 2 10  nv  2# x 10 3# 2 x 10 3# 2 x 10 4# 2x10 4#2 x 10 4# 2 x 10   Tricep  4# 2 x10  nv  2# 2 x 10 3# 2 x 10 3# 2 x 10 4# 2x10 4# 2 x 10 4# 2 x 10   Bike 6' 6' 6' 6' 6' 6' 6' 6' 6' 6'   TG L 20 20 10 2x10 2 x 10 2 x 10 2 x 10 2x10 2x10 2x10 2 x 10   Pec press  3# 2x 20         3# 2 x 10   Deltoid lateral raise          4# 2 x 10   S/l ER 3# 2 x 10         3# 2 x 10   Seated overhead press 3# 2 x 10         3# 2 x 10                             Modalities

## 2025-01-23 ENCOUNTER — OFFICE VISIT (OUTPATIENT)
Dept: OCCUPATIONAL THERAPY | Facility: CLINIC | Age: 65
End: 2025-01-23
Payer: MEDICARE

## 2025-01-23 DIAGNOSIS — R20.0 NUMBNESS AND TINGLING OF LEFT UPPER EXTREMITY: ICD-10-CM

## 2025-01-23 DIAGNOSIS — R29.898 RIGHT HAND WEAKNESS: Primary | ICD-10-CM

## 2025-01-23 DIAGNOSIS — R20.2 NUMBNESS AND TINGLING OF LEFT UPPER EXTREMITY: ICD-10-CM

## 2025-01-23 PROCEDURE — 97140 MANUAL THERAPY 1/> REGIONS: CPT | Performed by: OCCUPATIONAL THERAPIST

## 2025-01-23 PROCEDURE — 97110 THERAPEUTIC EXERCISES: CPT | Performed by: OCCUPATIONAL THERAPIST

## 2025-01-23 NOTE — PROGRESS NOTES
Daily Note     Today's date: 2025  Patient name: Eileen Matias  : 1960  MRN: [id number]  Referring provider: Rogelio Black MD  Dx:   Encounter Diagnosis     ICD-10-CM    1. Right hand weakness  R29.898       2. Numbness and tingling of left upper extremity  R20.0     R20.2                 Subjective:   I am stretching before I come in now and it isn't as painful.     Objective: See treatment diary below for clinic program.       Assessment: Tolerated treatment well. Patient would benefit from continued OT. Digital flexion continues to increase steadily.  Light function achieved in art and self care.     Improving ease and increased speed of right noted in clinic program.   Edema controlled with wear of smaller and lighter lymphedema garment.       Plan: Continue per plan of care.   Progress dexterity and gripping exercises as tolerated. Continue wear of orthotic for wrist stability.  Focus care on PREs, improved dexterity and strength, with continued focus on functional motion.  Progress UE PRE as tolerated.        Precautions: Lymphedema; hx desmoid tumors and breast cancer; healing humeral fracture     POC expires Unit limit Auth  expiration date PT/OT + Visit Limit?   3/11 NA NA BOMN         Dx R Hum Fx     Dr Wayne MILLER MET          Last RE 24    Date   25  RE 25   Visit    4/10 5/10   7/10 8/10   Manuals 8 8' 8                 Jt mobs digits, FA MP Digits all G 1-2 MP Digits all G 1-2 MP Digits all G 1-2   MP digits all G 1-2 MP digits all  G 1-2   Ortho fit/train          Neuro Re-Ed                     Mirror therapy                              orthotic                              Ther Ex 25   30   30     30   30   HEP       Green foam    PROM 1:1 Digital flexion  LLPS Digital flexion  LLPS Digital flexion  LLPS   Digital flexion LLPS Digital flexion LLPS   Digital  "blocked AROM P/H fists 5x5\" P/H fists 5x5\" P/H fists 5x5\"   P/H fists 5x5\" P/H fists 5x5\"   UE warm up UBE 3F 3R   L1 UBE 3F 3R   L1  x55 R only UBE 3F 3R   L1  x65 R only   UBE 3F 3R L1  R only x55 UBE 3F 3R L1  R only x55   PRE FA    #2 S/P  1/2 shaft x20   #2 S/P  Full shaft x50 #2 S/P  Full shaft x50   PRE Wrist FA G F bar down x20  Up x20  R F bar Fold  x20 G F bar down x20  Up x20  R F bar Fold  x20 G F bar down x20  Up x20  R F bar Fold  x20   G F bar down x20  Up x20 G F bar down x20  Up x20  R F bar Fold  x20   PRE digits Black power web x20 Black power web x20 Black power web x20   G web  to edge   x20 Gray #2    All foams   PRE digits tips  Pinching B C pins all wood blocks    Velcro bd swap tips  Full board Velcro bd swap tips  Full board   UE PRE   T band  Mid row green x20  Sh E red x20  Isometric ER  Red x10       Ther Activity          Gripping          Pinching          Coordination          Carrying           Reaching           MHP Pre tx with flexion wrap Pre tx with flexion wrap Pre tx with flexion wrap   Pre tx with flexion wrap Pre tx with flexion wrap   TENS 10' pre tx 10' pre tx    10 min  pre tx 10 min pre tx            "

## 2025-01-27 ENCOUNTER — OFFICE VISIT (OUTPATIENT)
Dept: PHYSICAL THERAPY | Facility: CLINIC | Age: 65
End: 2025-01-27
Payer: MEDICARE

## 2025-01-27 DIAGNOSIS — I89.0 LYMPHEDEMA: Primary | ICD-10-CM

## 2025-01-27 PROCEDURE — 97530 THERAPEUTIC ACTIVITIES: CPT | Performed by: PHYSICAL THERAPIST

## 2025-01-27 PROCEDURE — 97110 THERAPEUTIC EXERCISES: CPT | Performed by: PHYSICAL THERAPIST

## 2025-01-27 NOTE — PROGRESS NOTES
Daily Note     Today's date: 2025  Patient name: Eileen Matias  : 1960  MRN: [id number]  Referring provider: No ref. provider found  Dx:   Encounter Diagnosis     ICD-10-CM    1. Lymphedema  I89.0                      Subjective: Patient reports no significant change in sx.       Objective: See treatment diary below      Assessment: Tolerated treatment well. Patient would benefit from continued PT      Plan: Continue per plan of care.      Precautions: L BCA  Access Code: [email or web address]  URL: [email or web address]  Date: 2024  Prepared by: Meena Jain    Exercises  - Shoulder Flexion Wall Slide with Towel  - 1 x daily - 7 x weekly - 1 sets - 5 reps - 10 hold  - Standing Shoulder Abduction Slides at Wall  - 1 x daily - 7 x weekly - 3 sets - 10 reps  - Single Arm Doorway Pec Stretch at 90 Degrees Abduction  - 1 x daily - 7 x weekly - 1 sets - 5 reps - 10 hold    Manuals 1/27 11/18 12/2 12/4 12/9 12/16 12/23 1/6 1/15 1/20   Manual MFR 10'            Self MLD             Compression fitting  20' 15 30 15        Girth mmts    5'   8' 8'  8' 8'   REV 15                                                                                                       Ther Ex             Pulley 5'  4' 5' 5' 5' 5' 5' 5' 5'                Wall slides  10 x :10 10 x :10 10 x :10 10 x :10 10 x :10 hep      Bicep curls 4# 2 x 10  nv  2# x 10 3# 2 x 10 4# 2 x 10 4# 2 x 10 4# 2 x 10 4# 2 x 10   Row  4# 2 10  nv  2# x 10 3# 2 x 10 3# 2 x 10 4# 2x10 4#2 x 10 4# 2 x 10   Tricep  4# 2 x10  nv  2# 2 x 10 3# 2 x 10 3# 2 x 10 4# 2x10 4# 2 x 10 4# 2 x 10   Bike 6' 6' 6' 6' 6' 6' 6' 6' 6' 6'   TG L 20 20 10 2x10 2 x 10 2 x 10 2 x 10 2x10 2x10 2x10 2 x 10   Pec press  3# 2x 20         3# 2 x 10   Deltoid lateral raise 3# 2 x 10         4# 2 x 10   S/l ER 3# 2 x 10         3# 2 x 10   Seated overhead press 3# 2 x 10         3# 2 x 10                             Modalities

## 2025-01-28 ENCOUNTER — OFFICE VISIT (OUTPATIENT)
Dept: OCCUPATIONAL THERAPY | Facility: CLINIC | Age: 65
End: 2025-01-28
Payer: MEDICARE

## 2025-01-28 DIAGNOSIS — R29.898 RIGHT HAND WEAKNESS: Primary | ICD-10-CM

## 2025-01-28 DIAGNOSIS — R20.2 NUMBNESS AND TINGLING OF LEFT UPPER EXTREMITY: ICD-10-CM

## 2025-01-28 DIAGNOSIS — R20.0 NUMBNESS AND TINGLING OF LEFT UPPER EXTREMITY: ICD-10-CM

## 2025-01-28 PROCEDURE — 97110 THERAPEUTIC EXERCISES: CPT | Performed by: OCCUPATIONAL THERAPIST

## 2025-01-28 PROCEDURE — 97140 MANUAL THERAPY 1/> REGIONS: CPT | Performed by: OCCUPATIONAL THERAPIST

## 2025-01-28 NOTE — PROGRESS NOTES
Daily Note     Today's date: 2025  Patient name: Eileen Matias  : 1960  MRN: [id number]  Referring provider: Rogelio Black MD  Dx:   Encounter Diagnosis     ICD-10-CM    1. Right hand weakness  R29.898       2. Numbness and tingling of left upper extremity  R20.0     R20.2                 Subjective:   I am stretching before I come in now and it isn't as painful.  I can bend my wrist enough to pull the straps now. (Increased strength)    Objective: See treatment diary below for clinic program.       Assessment: Tolerated treatment well. Patient would benefit from continued OT. Digital flexion continues to increase steadily.  Light function achieved in art and self care.     Improving ease and increased speed of right noted in clinic program.   Edema controlled with wear of smaller and lighter lymphedema garment.       Plan: Continue per plan of care.   Progress dexterity and gripping exercises as tolerated. Continue wear of orthotic for wrist stability.  Focus care on PREs, improved dexterity and strength, with continued focus on functional motion.  Progress UE PRE as tolerated.        Precautions: Lymphedema; hx desmoid tumors and breast cancer; healing humeral fracture     POC expires Unit limit Auth  expiration date PT/OT + Visit Limit?   3/11 NA NA BOMN         Dx R Hum Fx     Dr Wayne MILLER MET          Last RE 24    Date   25  RE       Visit    4/10 5/10 6/10      Manuals 8 8' 8 8                Jt mobs digits, FA MP Digits all G 1-2 MP Digits all G 1-2 MP Digits all G 1-2 MP digits all G 1-2      Ortho fit/train          Neuro Re-Ed                     Mirror therapy                              orthotic                              Ther Ex 25   30   30   25'      HEP          PROM 1:1 Digital flexion  LLPS Digital flexion  LLPS Digital flexion  LLPS Digital flexion  LLPS     "  Digital blocked AROM P/H fists 5x5\" P/H fists 5x5\" P/H fists 5x5\" P/H fists 5x5\"      UE warm up UBE 3F 3R   L1 UBE 3F 3R   L1  x55 R only UBE 3F 3R   L1  x65 R only UBE 3F 3R   L1  x60 R only  15-17 RPM      PRE FA    #2 S/P  1/2 shaft x20       PRE Wrist FA G F bar down x20  Up x20  R F bar Fold  x20 G F bar down x20  Up x20  R F bar Fold  x20 G F bar down x20  Up x20  R F bar Fold  x20 G F bar down x20  Up x20  R F bar Fold  x20      PRE digits Black power web x20 Black power web x20 Black power web x20 Black power web x20      PRE digits tips  Pinching B C pins all wood blocks        UE PRE   T band  Mid row green x20  Sh E red x20  Isometric ER  Red x10 T band  Mid row green x20  Sh E red x20      Ther Activity      8      Gripping    Gray #2  All foams   Dowel putty press x30      Pinching          Coordination          Carrying           Reaching           MHP Pre tx with flexion wrap Pre tx with flexion wrap Pre tx with flexion wrap Pre tx with flexion wrap      TENS 10' pre tx 10' pre tx                 "

## 2025-01-29 ENCOUNTER — OFFICE VISIT (OUTPATIENT)
Dept: PHYSICAL THERAPY | Facility: CLINIC | Age: 65
End: 2025-01-29
Payer: MEDICARE

## 2025-01-29 DIAGNOSIS — I89.0 LYMPHEDEMA: Primary | ICD-10-CM

## 2025-01-29 PROCEDURE — 97110 THERAPEUTIC EXERCISES: CPT | Performed by: PHYSICAL THERAPIST

## 2025-01-29 PROCEDURE — 97530 THERAPEUTIC ACTIVITIES: CPT | Performed by: PHYSICAL THERAPIST

## 2025-01-29 PROCEDURE — 97140 MANUAL THERAPY 1/> REGIONS: CPT | Performed by: PHYSICAL THERAPIST

## 2025-01-29 NOTE — PROGRESS NOTES
Daily Note     Today's date: 2025  Patient name: Eileen Matias  : 1960  MRN: [id number]  Referring provider: No ref. provider found  Dx:   Encounter Diagnosis     ICD-10-CM    1. Lymphedema  I89.0                      Subjective: Patient with no change in sx.      Objective: See treatment diary below      Assessment: Tolerated treatment well. Patient would benefit from continued PT      Plan: Continue per plan of care.      Precautions: L BCA  Access Code: [email or web address]  URL: [email or web address]  Date: 2024  Prepared by: Meena Jain    Exercises  - Shoulder Flexion Wall Slide with Towel  - 1 x daily - 7 x weekly - 1 sets - 5 reps - 10 hold  - Standing Shoulder Abduction Slides at Wall  - 1 x daily - 7 x weekly - 3 sets - 10 reps  - Single Arm Doorway Pec Stretch at 90 Degrees Abduction  - 1 x daily - 7 x weekly - 1 sets - 5 reps - 10 hold    Manuals 1/27 1/29 12/2 12/4 12/9 12/16 12/23 1/6 1/15 1/20   Manual MFR 10'            Self MLD             Compression fitting   15 30 15        Girth mmts   8' 5'   8' 8'  8' 8'   REV 15                                                                                                       Ther Ex             Pulley 5' 5' 4' 5' 5' 5' 5' 5' 5' 5'                Wall slides  10 x :10 10 x :10 10 x :10 10 x :10 10 x :10 hep      Bicep curls 4# 2 x 10 4# 2 x 10 nv  2# x 10 3# 2 x 10 4# 2 x 10 4# 2 x 10 4# 2 x 10 4# 2 x 10   Row  4# 2 10 4# 2 x 10 nv  2# x 10 3# 2 x 10 3# 2 x 10 4# 2x10 4#2 x 10 4# 2 x 10   Tricep  4# 2 x10 4# 2 x 10 nv  2# 2 x 10 3# 2 x 10 3# 2 x 10 4# 2x10 4# 2 x 10 4# 2 x 10   Bike 6' 6' 6' 6' 6' 6' 6' 6' 6' 6'   TG L 20 20 20 2x10 2 x 10 2 x 10 2 x 10 2x10 2x10 2x10 2 x 10   Pec press  3# 2x 20 3# 2 x 10        3# 2 x 10   Deltoid lateral raise 3# 2 x 10 3# 2 x 10        4# 2 x 10   S/l ER 3# 2 x 10 3# 2 x 10        3# 2 x 10   Seated overhead press 3# 2 x 10 3# 2 x 10        3# 2 x 10                             Modalities

## 2025-01-30 ENCOUNTER — OFFICE VISIT (OUTPATIENT)
Dept: OCCUPATIONAL THERAPY | Facility: CLINIC | Age: 65
End: 2025-01-30
Payer: MEDICARE

## 2025-01-30 ENCOUNTER — TELEPHONE (OUTPATIENT)
Dept: SURGICAL ONCOLOGY | Facility: CLINIC | Age: 65
End: 2025-01-30

## 2025-01-30 DIAGNOSIS — R20.0 NUMBNESS AND TINGLING OF LEFT UPPER EXTREMITY: ICD-10-CM

## 2025-01-30 DIAGNOSIS — C50.912 CARCINOMA OF LEFT BREAST METASTATIC TO AXILLARY LYMPH NODE (HCC): ICD-10-CM

## 2025-01-30 DIAGNOSIS — Z98.890 HISTORY OF LUMPECTOMY OF LEFT BREAST: Primary | ICD-10-CM

## 2025-01-30 DIAGNOSIS — R29.898 RIGHT HAND WEAKNESS: Primary | ICD-10-CM

## 2025-01-30 DIAGNOSIS — Z17.0 MALIGNANT NEOPLASM OF LOWER-INNER QUADRANT OF LEFT BREAST IN FEMALE, ESTROGEN RECEPTOR POSITIVE (HCC): ICD-10-CM

## 2025-01-30 DIAGNOSIS — C50.312 MALIGNANT NEOPLASM OF LOWER-INNER QUADRANT OF LEFT BREAST IN FEMALE, ESTROGEN RECEPTOR POSITIVE (HCC): ICD-10-CM

## 2025-01-30 DIAGNOSIS — C77.3 CARCINOMA OF LEFT BREAST METASTATIC TO AXILLARY LYMPH NODE (HCC): ICD-10-CM

## 2025-01-30 DIAGNOSIS — R20.2 NUMBNESS AND TINGLING OF LEFT UPPER EXTREMITY: ICD-10-CM

## 2025-01-30 PROCEDURE — 97140 MANUAL THERAPY 1/> REGIONS: CPT | Performed by: OCCUPATIONAL THERAPIST

## 2025-01-30 PROCEDURE — 97110 THERAPEUTIC EXERCISES: CPT | Performed by: OCCUPATIONAL THERAPIST

## 2025-01-30 NOTE — PROGRESS NOTES
Daily Note     Today's date: 2025  Patient name: Eileen Matias  : 1960  MRN: [id number]  Referring provider: Rogelio Black MD  Dx:   Encounter Diagnosis     ICD-10-CM    1. Right hand weakness  R29.898       2. Numbness and tingling of left upper extremity  R20.0     R20.2                 Subjective:   I am stretching before I come in now and it isn't as painful.  I can bend my wrist enough to pull the straps now. (Increased strength)    Objective: See treatment diary below for clinic program.       Assessment: Tolerated treatment well. Patient would benefit from continued OT. Digital flexion continues to increase steadily.  Light function achieved in art and self care.     Improving ease and increased speed of right noted in clinic program.   Edema controlled with wear of smaller and lighter lymphedema garment.       Plan: Continue per plan of care.   Progress dexterity and gripping exercises as tolerated. Continue wear of orthotic for wrist stability.  Focus care on PREs, improved dexterity and strength, with continued focus on functional motion.  Progress UE PRE as tolerated.        Precautions: Lymphedema; hx desmoid tumors and breast cancer; healing humeral fracture     POC expires Unit limit Auth  expiration date PT/OT + Visit Limit?   3/11 NA NA BOMN         Dx R Hum Fx     Dr Wayne MILLER MET          Last RE 24    Date   25  RE      Visit    4/10 5/10 6/10 7/10     Manuals 8 8' 8 8 9               Jt mobs digits, FA MP Digits all G 1-2 MP Digits all G 1-2 MP Digits all G 1-2 MP digits all G 1-2 MP digits all G 1-2     Ortho fit/train          Neuro Re-Ed                     Mirror therapy                              orthotic                              Ther Ex 25   30   30   25'   23'     HEP          PROM 1:1 Digital flexion  LLPS Digital flexion  LLPS Digital flexion  LLPS  "Digital flexion  LLPS Digital flexion  LLPS     Digital blocked AROM P/H fists 5x5\" P/H fists 5x5\" P/H fists 5x5\" P/H fists 5x5\" P/H fists 5x5\"     UE warm up UBE 3F 3R   L1 UBE 3F 3R   L1  x55 R only UBE 3F 3R   L1  x65 R only UBE 3F 3R   L1  x60 R only  15-17 RPM UBE 3F 3R   L1  x60 R only  15-17 RPM     PRE FA    #2 S/P  1/2 shaft x20       PRE Wrist FA G F bar down x20  Up x20  R F bar Fold  x20 G F bar down x20  Up x20  R F bar Fold  x20 G F bar down x20  Up x20  R F bar Fold  x20 G F bar down x20  Up x20  R F bar Fold  x20 G F bar down x20  Up x20  R F bar Fold  x20     PRE digits Black power web x20 Black power web x20 Black power web x20 Black power web x20 Black web x20     PRE digits tips  Pinching B C pins all wood blocks        UE PRE   T band  Mid row green x20  Sh E red x20  Isometric ER  Red x10 T band  Mid row green x20  Sh E red x20 T band  Mid row green x20  Sh E red x20     Ther Activity      8 8     Gripping    Gray #2  All foams   Dowel putty press x30 Gray #2 all foams  Dowel putty press x30     Pinching          Coordination          Carrying           Reaching           MHP Pre tx with flexion wrap Pre tx with flexion wrap Pre tx with flexion wrap Pre tx with flexion wrap Pre tx with ace flexion     TENS 10' pre tx 10' pre tx                 "

## 2025-02-01 DIAGNOSIS — K20.90 ESOPHAGITIS: ICD-10-CM

## 2025-02-03 RX ORDER — PANTOPRAZOLE SODIUM 40 MG/1
40 TABLET, DELAYED RELEASE ORAL DAILY
Qty: 60 TABLET | Refills: 0 | Status: SHIPPED | OUTPATIENT
Start: 2025-02-03

## 2025-02-04 ENCOUNTER — OFFICE VISIT (OUTPATIENT)
Dept: OCCUPATIONAL THERAPY | Facility: CLINIC | Age: 65
End: 2025-02-04
Payer: MEDICARE

## 2025-02-04 DIAGNOSIS — R20.0 NUMBNESS AND TINGLING OF LEFT UPPER EXTREMITY: ICD-10-CM

## 2025-02-04 DIAGNOSIS — R29.898 RIGHT HAND WEAKNESS: Primary | ICD-10-CM

## 2025-02-04 DIAGNOSIS — R20.2 NUMBNESS AND TINGLING OF LEFT UPPER EXTREMITY: ICD-10-CM

## 2025-02-04 PROCEDURE — 97140 MANUAL THERAPY 1/> REGIONS: CPT | Performed by: OCCUPATIONAL THERAPIST

## 2025-02-04 PROCEDURE — 97110 THERAPEUTIC EXERCISES: CPT | Performed by: OCCUPATIONAL THERAPIST

## 2025-02-04 NOTE — PROGRESS NOTES
Daily Note     Today's date: 2025  Patient name: Eileen Matias  : 1960  MRN: [id number]  Referring provider: Rogelio Black MD  Dx:   Encounter Diagnosis     ICD-10-CM    1. Right hand weakness  R29.898       2. Numbness and tingling of left upper extremity  R20.0     R20.2                 Subjective:   I am stretching before I come in now and it isn't as painful.  I can bend my wrist enough to pull the straps now. (Increased strength)    Objective: See treatment diary below for clinic program.       Assessment: Tolerated treatment well. Patient would benefit from continued OT. Digital flexion continues to increase steadily.  Light function achieved in art and self care.     Improving ease and increased speed of right noted in clinic program.   Edema controlled with wear of smaller and lighter lymphedema garment.       Plan: Continue per plan of care.   Progress dexterity and gripping exercises as tolerated. Continue wear of orthotic for wrist stability.  Focus care on PREs, improved dexterity and strength, with continued focus on functional motion.  Progress UE PRE as tolerated.        Precautions: Lymphedema; hx desmoid tumors and breast cancer; healing humeral fracture     POC expires Unit limit Auth  expiration date PT/OT + Visit Limit?   3/11 NA NA BOMN         Dx R Hum Fx     Dr Wayne MILLER MET          Last RE 24    Date   25  RE  2/4    Visit    4/10 5/10 6/10 7/10 8/10    Manuals 8 8' 8 8 9               Jt mobs digits, FA MP Digits all G 1-2 MP Digits all G 1-2 MP Digits all G 1-2 MP digits all G 1-2 MP digits all G 1-2 MP digits all G 1-2    Ortho fit/train          Neuro Re-Ed                     Mirror therapy                              orthotic                              Ther Ex 25   30   30   25'   23'   23'    HEP         "  PROM 1:1 Digital flexion  LLPS Digital flexion  LLPS Digital flexion  LLPS Digital flexion  LLPS Digital flexion  LLPS Digital flexion  LLPS    Digital blocked AROM P/H fists 5x5\" P/H fists 5x5\" P/H fists 5x5\" P/H fists 5x5\" P/H fists 5x5\" P/H fists 5x5\"    UE warm up UBE 3F 3R   L1 UBE 3F 3R   L1  x55 R only UBE 3F 3R   L1  x65 R only UBE 3F 3R   L1  x60 R only  15-17 RPM UBE 3F 3R   L1  x60 R only  15-17 RPM UBE 3F 3R   L1  x60 R only  15-17 RPM    PRE FA    #2 S/P  1/2 shaft x20   #2 S/P 1/2 shaft x20    PRE Wrist FA G F bar down x20  Up x20  R F bar Fold  x20 G F bar down x20  Up x20  R F bar Fold  x20 G F bar down x20  Up x20  R F bar Fold  x20 G F bar down x20  Up x20  R F bar Fold  x20 G F bar down x20  Up x20  R F bar Fold  x20 G F bar down x20  Up x20  R F bar Fold  x20    PRE digits Black power web x20 Black power web x20 Black power web x20 Black power web x20 Black web x20     PRE digits tips  Pinching B C pins all wood blocks        UE PRE   T band  Mid row green x20  Sh E red x20  Isometric ER  Red x10 T band  Mid row green x20  Sh E red x20 T band  Mid row green x20  Sh E red x20 T band  Mid row blue x20  Sh E blue x20    Ther Activity      8 8 8    Gripping    Gray #2  All foams   Dowel putty press x30 Gray #2 all foams  Dowel putty press x30 Gray #2 all foams  Dowel putty press x30    Pinching          Coordination          Carrying           Reaching           MHP Pre tx with flexion wrap Pre tx with flexion wrap Pre tx with flexion wrap Pre tx with flexion wrap Pre tx with ace flexion Pre tx with ace flexion    TENS 10' pre tx 10' pre tx                 "

## 2025-02-05 ENCOUNTER — OFFICE VISIT (OUTPATIENT)
Dept: PHYSICAL THERAPY | Facility: CLINIC | Age: 65
End: 2025-02-05
Payer: MEDICARE

## 2025-02-05 DIAGNOSIS — I89.0 LYMPHEDEMA: Primary | ICD-10-CM

## 2025-02-05 PROCEDURE — 97110 THERAPEUTIC EXERCISES: CPT | Performed by: PHYSICAL THERAPIST

## 2025-02-05 PROCEDURE — 97530 THERAPEUTIC ACTIVITIES: CPT | Performed by: PHYSICAL THERAPIST

## 2025-02-05 PROCEDURE — 97140 MANUAL THERAPY 1/> REGIONS: CPT | Performed by: PHYSICAL THERAPIST

## 2025-02-05 NOTE — PROGRESS NOTES
Daily Note     Today's date: 2025  Patient name: Eileen Matias  : 1960  MRN: [id number]  Referring provider: No ref. provider found  Dx:   Encounter Diagnosis     ICD-10-CM    1. Lymphedema  I89.0           Start Time: 435  Stop Time: 520  Total time in clinic (min): 45 minutes    Subjective: Patient reports that she feels her compression garment is fitting more loosely.        Objective: See treatment diary below      Assessment: Tolerated treatment well. Patient would benefit from continued PTPatient with 1000 ml decrease in volume since IE.  MMTs cont to decrease      Plan: Continue per plan of care.      Precautions: L BCA  Access Code: [email or web address]  URL: [email or web address]  Date: 2024  Prepared by: Meena Jain    Exercises  - Shoulder Flexion Wall Slide with Towel  - 1 x daily - 7 x weekly - 1 sets - 5 reps - 10 hold  - Standing Shoulder Abduction Slides at Wall  - 1 x daily - 7 x weekly - 3 sets - 10 reps  - Single Arm Doorway Pec Stretch at 90 Degrees Abduction  - 1 x daily - 7 x weekly - 1 sets - 5 reps - 10 hold    Manuals 1/27 1/29 2/5 12/4 12/9 12/16 12/23 1/6 1/15 1/20   Manual MFR 10'            Self MLD             Compression fitting    30 15        Girth mmts   8' 10   8' 8'  8' 8'   REV 15                                                                                                       Ther Ex             Pulley 5' 5' 4' 5' 5' 5' 5' 5' 5' 5'                Wall slides  10 x :10 10 x :10 10 x :10 10 x :10 10 x :10 hep      Bicep curls 4# 2 x 10 4# 2 x 10 4# x 20  2# x 10 3# 2 x 10 4# 2 x 10 4# 2 x 10 4# 2 x 10 4# 2 x 10   Row  4# 2 10 4# 2 x 10 4# x 20  2# x 10 3# 2 x 10 3# 2 x 10 4# 2x10 4#2 x 10 4# 2 x 10   Tricep  4# 2 x10 4# 2 x 10 4# x 20  2# 2 x 10 3# 2 x 10 3# 2 x 10 4# 2x10 4# 2 x 10 4# 2 x 10   Bike 6' 6' 6' 6' 6' 6' 6' 6' 6' 6'   TG L 20 20 20 2x10 2 x 10 2 x 10 2 x 10 2x10 2x10 2x10 2 x 10   Pec press  3# 2x 20 3# 2 x 10 3# 2 x 10       3# 2 x 10    Deltoid lateral raise 3# 2 x 10 3# 2 x 10 3# 2 x 10       4# 2 x 10   S/l ER 3# 2 x 10 3# 2 x 10 3# 2 x 10       3# 2 x 10   Seated overhead press 3# 2 x 10 3# 2 x 10 3# 2 x 10       3# 2 x 10   Shoulder flexion   3# 2 x 10                       volume   2163

## 2025-02-06 ENCOUNTER — APPOINTMENT (OUTPATIENT)
Dept: OCCUPATIONAL THERAPY | Facility: CLINIC | Age: 65
End: 2025-02-06
Payer: MEDICARE

## 2025-02-11 ENCOUNTER — OFFICE VISIT (OUTPATIENT)
Dept: OCCUPATIONAL THERAPY | Facility: CLINIC | Age: 65
End: 2025-02-11
Payer: MEDICARE

## 2025-02-11 DIAGNOSIS — R20.2 NUMBNESS AND TINGLING OF LEFT UPPER EXTREMITY: ICD-10-CM

## 2025-02-11 DIAGNOSIS — R20.0 NUMBNESS AND TINGLING OF LEFT UPPER EXTREMITY: ICD-10-CM

## 2025-02-11 DIAGNOSIS — R29.898 RIGHT HAND WEAKNESS: Primary | ICD-10-CM

## 2025-02-11 PROCEDURE — 97140 MANUAL THERAPY 1/> REGIONS: CPT

## 2025-02-11 PROCEDURE — 97110 THERAPEUTIC EXERCISES: CPT

## 2025-02-11 NOTE — PROGRESS NOTES
"                                                                                                                                Daily Note     Today's date: 2025  Patient name: Eileen Matias  : 1960  MRN: [id number]  Referring provider: Rogelio Black MD  Dx:   Encounter Diagnosis     ICD-10-CM    1. Right hand weakness  R29.898       2. Numbness and tingling of left upper extremity  R20.0     R20.2                 Subjective:   \"My hand is tired now (post tx).\"      Objective: See treatment diary below for clinic program.       Assessment: Tolerated treatment well. Patient would benefit from continued OT. Digital flexion continues to increase steadily.  Light function achieved in art and self care.     Improving ease and increased speed of right noted in clinic program.   Edema controlled with wear of smaller and lighter lymphedema garment. Pt challenged by strengthening exercises; fatigue reported at end of treatment session.      Plan: Continue per plan of care.   Progress dexterity and gripping exercises as tolerated. Continue wear of orthotic for wrist stability.  Focus care on PREs, improved dexterity and strength, with continued focus on functional motion.  Progress UE PRE as tolerated.        Precautions: Lymphedema; hx desmoid tumors and breast cancer; healing humeral fracture     POC expires Unit limit Auth  expiration date PT/OT + Visit Limit?   3/11 NA NA BOMN         Dx R Hum Fx     Dr Wayne MILLER MET          Last RE 24    Date   25  RE    Visit    4/10 5/10 6/10 7/10 8/10 9/10   Manuals 8 8' 8 8 9               Jt mobs digits, FA MP Digits all G 1-2 MP Digits all G 1-2 MP Digits all G 1-2 MP digits all G 1-2 MP digits all G 1-2 MP digits all G 1-2 MP digits all G 1-2   Ortho fit/train          Neuro Re-Ed                     Mirror therapy                              orthotic                              Ther Ex 25   30   30   25'   23'   " "23' 23'   HEP          PROM 1:1 Digital flexion  LLPS Digital flexion  LLPS Digital flexion  LLPS Digital flexion  LLPS Digital flexion  LLPS Digital flexion  LLPS Digital flexion  LLPS   Digital blocked AROM P/H fists 5x5\" P/H fists 5x5\" P/H fists 5x5\" P/H fists 5x5\" P/H fists 5x5\" P/H fists 5x5\" P/H fists 5x5\"   UE warm up UBE 3F 3R   L1 UBE 3F 3R   L1  x55 R only UBE 3F 3R   L1  x65 R only UBE 3F 3R   L1  x60 R only  15-17 RPM UBE 3F 3R   L1  x60 R only  15-17 RPM UBE 3F 3R   L1  x60 R only  15-17 RPM UBE 3F 3R   L1  x65 R only  15-17 RPM   PRE FA    #2 S/P  1/2 shaft x20   #2 S/P 1/2 shaft x20 #2 S/P 1/2 shaft x20   PRE Wrist FA G F bar down x20  Up x20  R F bar Fold  x20 G F bar down x20  Up x20  R F bar Fold  x20 G F bar down x20  Up x20  R F bar Fold  x20 G F bar down x20  Up x20  R F bar Fold  x20 G F bar down x20  Up x20  R F bar Fold  x20 G F bar down x20  Up x20  R F bar Fold  x20 G F bar down x20  Up x20  R F bar Fold  x20   PRE digits Black power web x20 Black power web x20 Black power web x20 Black power web x20 Black web x20     PRE digits tips  Pinching B C pins all wood blocks        UE PRE   T band  Mid row green x20  Sh E red x20  Isometric ER  Red x10 T band  Mid row green x20  Sh E red x20 T band  Mid row green x20  Sh E red x20 T band  Mid row blue x20  Sh E blue x20 T band  Mid row blue x20  Sh E blue x20   Ther Activity      8 8 8 8   Gripping    Gray #2  All foams   Dowel putty press x30 Gray #2 all foams  Dowel putty press x30 Gray #2 all foams  Dowel putty press x30 Gray #2 all foams  Dowel putty press x30   Pinching          Coordination          Carrying           Reaching           MHP Pre tx with flexion wrap Pre tx with flexion wrap Pre tx with flexion wrap Pre tx with flexion wrap Pre tx with ace flexion Pre tx with ace flexion Pre tx with ace flexion   TENS 10' pre tx 10' pre tx     10' pre tx            "

## 2025-02-12 ENCOUNTER — OFFICE VISIT (OUTPATIENT)
Dept: PHYSICAL THERAPY | Facility: CLINIC | Age: 65
End: 2025-02-12
Payer: MEDICARE

## 2025-02-12 DIAGNOSIS — I89.0 LYMPHEDEMA: Primary | ICD-10-CM

## 2025-02-12 PROCEDURE — 97140 MANUAL THERAPY 1/> REGIONS: CPT | Performed by: PHYSICAL THERAPIST

## 2025-02-12 PROCEDURE — 97530 THERAPEUTIC ACTIVITIES: CPT | Performed by: PHYSICAL THERAPIST

## 2025-02-12 PROCEDURE — 97110 THERAPEUTIC EXERCISES: CPT | Performed by: PHYSICAL THERAPIST

## 2025-02-12 NOTE — PROGRESS NOTES
Daily Note     Today's date: 2025  Patient name: Eileen Matias  : 1960  MRN: [id number]  Referring provider: No ref. provider found  Dx:   Encounter Diagnosis     ICD-10-CM    1. Lymphedema  I89.0                      Subjective: Patient reports that her L UE continues to do well.  She does express concern with status of R glove       Objective: See treatment diary below.  MMTs taken of R hand for new compression garment.      Assessment: Tolerated treatment well. Patient would benefit from continued PT  Patient continues with a good decrease in volume of the L UE with tapering use of compression garment.       Plan: Continue per plan of care.   Patient to continue tapering of use of compression garment.  Will remeasure in one week.       Precautions: L BCA  Access Code: [email or web address]  URL: [email or web address]  Date: 2024  Prepared by: Meena Jain    Exercises  - Shoulder Flexion Wall Slide with Towel  - 1 x daily - 7 x weekly - 1 sets - 5 reps - 10 hold  - Standing Shoulder Abduction Slides at Wall  - 1 x daily - 7 x weekly - 3 sets - 10 reps  - Single Arm Doorway Pec Stretch at 90 Degrees Abduction  - 1 x daily - 7 x weekly - 1 sets - 5 reps - 10 hold    Manuals 1/27 1/29 2/5 2/12 12/9 12/16 12/23 1/6 1/15 1/20   Manual MFR 10'            Self MLD             Compression fitting    15 15        Girth mmts   8' 10 10  8' 8'  8' 8'   REV 15                                                                                                       Ther Ex             Pulley 5' 5' 4' 5' 5' 5' 5' 5' 5' 5'                Wall slides  10 x :10 10 x :10 10 x :10 10 x :10 10 x :10 hep      Bicep curls 4# 2 x 10 4# 2 x 10 4# x 20 4# x 20 2# x 10 3# 2 x 10 4# 2 x 10 4# 2 x 10 4# 2 x 10 4# 2 x 10   Row  4# 2 10 4# 2 x 10 4# x 20 4# x 20 2# x 10 3# 2 x 10 3# 2 x 10 4# 2x10 4#2 x 10 4# 2 x 10   Tricep  4# 2 x10 4# 2 x 10 4# x 20 4# x 20 2# 2 x 10 3# 2 x 10 3# 2 x 10 4# 2x10 4# 2 x 10 4# 2 x 10   Bike 6'  6' 6' 6' 6' 6' 6' 6' 6' 6'   TG L 20 20 20 2x10 2 x 10 2 x 10 2 x 10 2x10 2x10 2x10 2 x 10   Pec press  3# 2x 20 3# 2 x 10 3# 2 x 10 3# 2 x 10      3# 2 x 10   Deltoid lateral raise 3# 2 x 10 3# 2 x 10 3# 2 x 10 3# 2 x 10      4# 2 x 10   S/l ER 3# 2 x 10 3# 2 x 10 3# 2 x 10 3#2 x 10      3# 2 x 10   Seated overhead press 3# 2 x 10 3# 2 x 10 3# 2 x 10 3# 2 x 10      3# 2 x 10   Shoulder flexion   3# 2 x 10 3# 2 x 10                      volume  [phone]

## 2025-02-13 ENCOUNTER — OFFICE VISIT (OUTPATIENT)
Dept: OCCUPATIONAL THERAPY | Facility: CLINIC | Age: 65
End: 2025-02-13
Payer: MEDICARE

## 2025-02-13 DIAGNOSIS — R20.0 NUMBNESS AND TINGLING OF LEFT UPPER EXTREMITY: ICD-10-CM

## 2025-02-13 DIAGNOSIS — R20.2 NUMBNESS AND TINGLING OF LEFT UPPER EXTREMITY: ICD-10-CM

## 2025-02-13 DIAGNOSIS — I89.0 LYMPHEDEMA: Primary | ICD-10-CM

## 2025-02-13 DIAGNOSIS — R29.898 RIGHT HAND WEAKNESS: Primary | ICD-10-CM

## 2025-02-13 PROCEDURE — 97140 MANUAL THERAPY 1/> REGIONS: CPT | Performed by: OCCUPATIONAL THERAPIST

## 2025-02-13 PROCEDURE — 97110 THERAPEUTIC EXERCISES: CPT | Performed by: OCCUPATIONAL THERAPIST

## 2025-02-13 NOTE — PROGRESS NOTES
"                                                                                                                                Daily Note     Today's date: 2025  Patient name: Eileen Matias  : 1960  MRN: [id number]  Referring provider: Rogelio Black MD  Dx:   Encounter Diagnosis     ICD-10-CM    1. Right hand weakness  R29.898       2. Numbness and tingling of left upper extremity  R20.0     R20.2                 Subjective:   \"My hand is tired now (post tx).\"  I am doing more now with my right      Objective: See treatment diary below for clinic program.       Assessment: Tolerated treatment well. Patient would benefit from continued OT. Digital flexion continues to increase steadily.  Light function achieved in art and self care.     Improving ease and increased speed of right noted in clinic program.   Edema controlled with wear of smaller and lighter lymphedema garment. Pt challenged by strengthening exercises; fatigue reported at end of treatment session.      Plan: Continue per plan of care.   Progress dexterity and gripping exercises as tolerated. Continue wear of orthotic for wrist stability.  Focus care on PREs, improved dexterity and strength, with continued focus on functional motion.  Progress UE PRE as tolerated.        Precautions: Lymphedema; hx desmoid tumors and breast cancer; healing humeral fracture     POC expires Unit limit Auth  expiration date PT/OT + Visit Limit?   3/11 NA NA BOMN         Dx R Hum Fx     Dr Wayne MILLER MET          Last RE 24    Date  RE NV      Visit 10/10    7/10 8/10 9/10   Manuals 8    9               Jt mobs digits, FA MP digits all G  1-2    MP digits all G 1-2 MP digits all G 1-2 MP digits all G 1-2   Ortho fit/train          Neuro Re-Ed                     Mirror therapy                              orthotic                              Ther Ex 23      23'   23' 23'   HEP          PROM 1:1 Digital flexion  LLPS    Digital " "flexion  LLPS Digital flexion  LLPS Digital flexion  LLPS   Digital blocked AROM P/H fists  5x5\"    P/H fists 5x5\" P/H fists 5x5\" P/H fists 5x5\"   UE warm up UBE 3F 3R  L1 x70 R only      UBE 3F 3R   L1  x60 R only  15-17 RPM UBE 3F 3R   L1  x60 R only  15-17 RPM UBE 3F 3R   L1  x65 R only  15-17 RPM   PRE FA  #2 1/2 shaft  S/P x20     #2 S/P 1/2 shaft x20 #2 S/P 1/2 shaft x20   PRE Wrist FA G F bar down x20  Up x20  R F bar Fold  x20    G F bar down x20  Up x20  R F bar Fold  x20 G F bar down x20  Up x20  R F bar Fold  x20 G F bar down x20  Up x20  R F bar Fold  x20   PRE digits     Black web x20     PRE digits tips          UE PRE     T band  Mid row green x20  Sh E red x20 T band  Mid row blue x20  Sh E blue x20 T band  Mid row blue x20  Sh E blue x20   Ther Activity   8    8 8 8   Gripping Gray #2 all foams   Dowel putty press x20    Gray #2 all foams  Dowel putty press x30 Gray #2 all foams  Dowel putty press x30 Gray #2 all foams  Dowel putty press x30   Pinching          Coordination          Carrying           Reaching           MHP Pre tx with fist ace flexion    Pre tx with ace flexion Pre tx with ace flexion Pre tx with ace flexion   TENS 10' pre tx      10' pre tx            "

## 2025-02-14 ENCOUNTER — OFFICE VISIT (OUTPATIENT)
Dept: AUDIOLOGY | Age: 65
End: 2025-02-14
Payer: MEDICARE

## 2025-02-14 DIAGNOSIS — H91.93 BILATERAL HEARING LOSS, UNSPECIFIED HEARING LOSS TYPE: ICD-10-CM

## 2025-02-14 DIAGNOSIS — H90.3 SENSORY HEARING LOSS, BILATERAL: Primary | ICD-10-CM

## 2025-02-14 PROCEDURE — 92567 TYMPANOMETRY: CPT | Performed by: AUDIOLOGIST-HEARING AID FITTER

## 2025-02-14 PROCEDURE — 92557 COMPREHENSIVE HEARING TEST: CPT | Performed by: AUDIOLOGIST-HEARING AID FITTER

## 2025-02-14 NOTE — PROGRESS NOTES
"Diagnostic Hearing Evaluation    Name:  Eileen Matias  :  1960  Age:  64 y.o.   MRN:  [id number]  Date of Evaluation: 25     HISTORY:     Reason for visit: Difficulty Understanding    Eileen Matias is being seen today at the request of Dr. Gallegos for an initial  evaluation of hearing. 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 The patient  denies otalgia, otorrhea, dizziness, and fullness. She reported bilateral tinnitus and a history of loud noise exposure.    EVALUATION:    Otoscopic Evaluation:   Right Ear: Unremarkable, canal clear   Left Ear: Unremarkable, canal clear    Tympanometry:   Right Ear: Type A; normal middle ear pressure and static compliance    Left Ear: Type A; normal middle ear pressure and static compliance     Speech Audiometry:  Speech Reception (SRT)    Right Ear: 15 dB HL    Left Ear: 15 dB HL    Word Recognition Scores (WRS):  Right Ear: excellent (100 % correct)     Left Ear: excellent (100 % correct)    Stimuli: [id number]    Pure Tone Audiometry:  Conventional pure tone audiometry from 250 - 8000 Hz  was obtained with good reliability and revealed the following:     Right Ear: Mild sloping to moderate sensorineural hearing loss (SNHL)    Left Ear: Mild sloping to moderate sensorineural hearing loss (SNHL)     *see attached audiogram    RECOMMENDATIONS:  Annual hearing eval and Return to [facility]. for F/U    PATIENT EDUCATION:   The results of today's results and recommendations were reviewed with the patient and her hearing thresholds were explained at length. Questions were addressed and the patient was encouraged to contact our department should concerns arise.      Jose Simmons, CCC-A  Clinical Audiologist  [facility]  [address] [place] [address]  [place] [phone]  "

## 2025-02-17 ENCOUNTER — OFFICE VISIT (OUTPATIENT)
Dept: OCCUPATIONAL THERAPY | Facility: CLINIC | Age: 65
End: 2025-02-17
Payer: MEDICARE

## 2025-02-17 DIAGNOSIS — R29.898 RIGHT HAND WEAKNESS: Primary | ICD-10-CM

## 2025-02-17 DIAGNOSIS — R20.0 NUMBNESS AND TINGLING OF LEFT UPPER EXTREMITY: ICD-10-CM

## 2025-02-17 DIAGNOSIS — R20.2 NUMBNESS AND TINGLING OF LEFT UPPER EXTREMITY: ICD-10-CM

## 2025-02-17 PROCEDURE — 97110 THERAPEUTIC EXERCISES: CPT | Performed by: OCCUPATIONAL THERAPIST

## 2025-02-17 PROCEDURE — 97014 ELECTRIC STIMULATION THERAPY: CPT | Performed by: OCCUPATIONAL THERAPIST

## 2025-02-17 PROCEDURE — 97168 OT RE-EVAL EST PLAN CARE: CPT | Performed by: OCCUPATIONAL THERAPIST

## 2025-02-17 PROCEDURE — 97140 MANUAL THERAPY 1/> REGIONS: CPT | Performed by: OCCUPATIONAL THERAPIST

## 2025-02-17 NOTE — PROGRESS NOTES
OT Re-Evaluation     Today's date: 2025  Patient name: Eileen Matias  : 1960  MRN: [id number]  Referring provider: Rogelio Black MD  Dx:   Encounter Diagnosis     ICD-10-CM    1. Right hand weakness  R29.898       2. Numbness and tingling of left upper extremity  R20.0     R20.2                        Assessment  Impairments: abnormal coordination, abnormal or restricted ROM, activity intolerance, impaired physical strength, pain with function and weight-bearing intolerance  Other impairment: Lymphedema RUE; joint constractures right digits, wrist, forearm; radial nerve neuropathy  Functional limitations: Able use to use right hand to assist during ADL's;  Able to pinch digits in  three point prehension for short periods.  Symptom irritability: low    Assessment details: Eileen Matias is a 63 y.o., Ambidextrous HD female referred to hand therapy for  right hand,wrist and forearm contractures.  Onset of injury 23 due to fall causing spiral fracture of the right humerus.  Patient presents 24 with impaired ROM of the right digits, wrist, forearm,  impaired strength, and impaired sensation of the right peripheral nerves, particularly the radial nerve.  Deficits also noted in pain, edema, and functional use of the right UE. Patient joint contractures of the digits, wrist and forearm as well as significant intrinsic and extrinsic mm tightness. Patient is a good candidate for OT services to abolish pain and edema and restore ROM, strength, coordination, and sensation for a return to independence in daily tasks.      Re 7/3/24  Eileen has attended 19 therapy visits over the past 12 weeks.  She has made steady progress in active and passive motion.  Pain has reduced since IE and is not at a moderate and steady level.  She is now reporting sensation along the thumb and index, in an area previously insensate.  She has been wearing a wrist support consistently allowing her to use her right hand  in a gross motor fashion and allowing three point pinch for light tasks, short periods.  She remains in lymphedema sleeves with are restrictive and resist motion during daily tasks.  Therapy may continue to address active and passive motion, improve functional  and prehension to allow increased independence in light home tasks and art work.          RE 9/24/24  Eileen has attended 37 therapy visits over the past 12 weeks.  She has made steady progress in active and passive motion.  Pain has reduced since IE and is not at a moderate and steady level.  She is now reporting sensation along the thumb and index, in an area previously insensate.  She has been wearing a wrist support consistently allowing her to use her right hand with gross flexion and some dexterity tasks.  This motion also allows three point pinch for light tasks, short periods.  With wearing of wrist support,   strength measures in the low functional level.  She continues to remain in lymphedema sleeves with are restrictive motion during daily tasks.  Therapy may continue to address active and passive motion, improve functional  and prehension to allow increased independence in light home tasks and art work.      RE 1/14/25  Eileen continues to attend therapy consistently and continues to make slow and steady gains in motion and function.  With heat and stretch, functional motion of digital flexion is achieved.   strength with wear of circumferential splint is at a low functional level.  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   Pain has reduced since and is currently at a mild to moderate  steady level.  She is now reporting sensation along the thumb and index with sensation now intact over the DRSN.   She has been wearing a wrist support consistently allowing her to use her right hand with gross flexion and some dexterity tasks.  This motion also allows three point pinch for light tasks, short periods.  She continues to remain in  lymphedema sleeves with are restrictive during daily tasks.  Therapy may continue to address active and passive motion, improve functional  and prehension to allow increased independence in light home tasks and art work.  Therapy will wean to HEP as progress plateaus.     RE 2/17/25  Eileen  continues to make slow and steady gains in motion and function.  With heat and stretch, functional motion of digital flexion is achieved.   strength with wear of circumferential splint is at a low functional level.  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    Pain has reduced since and is currently at a mild to moderate  steady level.  Fifth finger tenderness less today.     She has been wearing a wrist support consistently allowing her to use her right hand for daily ADLs.    This motion also allows three point pinch.    She continues to remain in lymphedema sleeves with are restrictive during daily tasks.  Therapy may continue to address active and passive motion, improve functional  and prehension to allow increased independence in light home tasks and art work.  Therapy will wean to HEP as progress plateaus.               Understanding of Dx/Px/POC: excellent     Prognosis: good    Goals  STGs (4 weeks)  Patient will be independent in HEP of ROM, splinting to increase motion, edema management MET  Patient will report an average pain level of 4-5/10 MET  Patient will demonstrate 30 degree improvement in PAGE of the digits MET  Patient will demonstrate active wrist extension/flexion to 10/70  Partially MET  Patient will demonstrate active forearm supination to 45 MET  LTGs (12 weeks)  Patient will demonstrate independence in a HEP to maintain ROM, strength, and function at discharge NOT MET  Patient will report an average pain level of 1-2/10 to be independent in daily tasks NOT MET  Patient will demonstrate PAGE of the digits to 220 degrees to be independent in self care tasks NOT MET  Patient  will demonstrate AROM of the wrist and forearm WFL to be independent in self care tasks  MET  Patient will demonstrate 5/5 muscle strength in the wrist and forearm to be MI for meal prep  NOT MET  Patient will demonstrate right hand strength within 30% of the left hand to be MI for IADL tasks NOT MET  Patient will demonstrate control  of edema MET      Plan  Patient would benefit from: skilled occupational therapy and custom splinting  Planned modality interventions: thermotherapy: hydrocollator packs and cryotherapy    Planned therapy interventions: activity modification, compression, fine motor coordination training, graded activity, graded exercise, home exercise program, therapeutic exercise, therapeutic activities, stretching, strengthening, patient education, orthotic fitting/training, neuromuscular re-education, manual therapy, IASTM, joint mobilization, kinesiology taping, massage, muscle pump exercises and nerve gliding  Other planned therapy interventions: Static progressive and dynamic splinting to increase digit, wrist, forearm motion    Frequency: 2x week  Duration in weeks: 8  Plan of Care beginning date: 2/17/2025  Plan of Care expiration date: 4/14/2025  Treatment plan discussed with: patient      Subjective Evaluation    History of Present Illness  Date of onset: 11/12/2023  Mechanism of injury: trauma  Mechanism of injury: 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  Hx of lymphedema and frequent cellulitis infections in the RUE. Patient also had left breast cancer and has LUE lymphedema as well.    She reports that she suffered a displaced spiral fracture of the right humerus on 11/12/23 after a fall while pushing her mother in law who was seated in a wheelchair.  Patient went to ED that date for xrays and long arm cast. She was referred to orthopedics. Had a closed reduction of fracture. Long arm cast for approximately 4  weeks and then a Kruse clam shell brace. Patient received  a course of home health OT for ROM of the digits, wrist and elbow and edema management. She has had ongoing PT for lymphedema management and right shoulder ROM.     Patient referred to Dr. Black for radial nerve dysfunction and joint contractures in the forearm, wrist and hand. An EMG has been ordered. Patient now referred to OT for splinting and ROM to address joint contractures.            Recurrent probem    Quality of life: good    Patient Goals  Patient goals for therapy: decreased edema, decreased pain, increased motion, increased strength and independence with ADLs/IADLs  Patient goal: Be able to use with more strength  Pain  Current pain rating: 3  Location: Proximal humerus, wrist, hand right  Quality: dull ache and discomfort  Relieving factors: medications, support and heat  Exacerbated by: overusing.  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     Additional Observation Details  History of RUE lymphedema;  Currently wearing smallest sleeve which is the same worn prior to current injury.     Neurological Testing     Sensation     Wrist/Hand     Right   Diminished: light touch    Additional Neurological Details  [place] Daren screening  Right  TH, II, III, IV distal pads = 3.61g  V= 2.83 g  DRSN= 4.31 g    Active Range of Motion     Right Elbow   Normal active range of motion  Flexion: 130 degrees   Extension: 0 degrees   Forearm supination: 90 degrees   Forearm pronation: 90 degrees     Right Wrist   Wrist flexion: 65 degrees   Wrist extension: 0 degrees   Radial deviation: 0 degrees   Ulnar deviation: 25  degrees     Right Thumb   Palmar Abduction    CMC: 45  Radial Abduction    CMC: 5  Kapandji score: 6 degrees    Right Digits   Flexion   Index     MCP: 70  Middle     MCP: 65  Ring     MCP: 65  Little     MCP: 50    Additional Active Range of Motion Details  In gravity eliminated position, able to actively extend right wrist moving from wrist flexion to wrist neutral.   S/P heat and stretch-  AROM digital flexion to DPC    Right   II  1.0 cm,  III 1.0 cm,  IV 1.5 cm,  V 2 cm        Passive Range of Motion     Right Elbow   Normal passive range of motion  Flexion: 135 degrees   Extension: 0 degrees   Forearm supination: 90 degrees   Forearm pronation: 90 degrees     Right Wrist   Wrist flexion: 70 degrees   Wrist extension: 72 degrees   Radial deviation: 10 degrees   Ulnar deviation: 40 degrees     Additional Passive Range of Motion Details  S/P heat and stretch-  Right PROM digital flexion to DPC    II  0 cm,  III  0 cm,  IV  0 cm,  V  1 cm        Strength/Myotome Testing     Right Wrist/Hand   Wrist extension: 2  Wrist flexion: 5    Additional Strength Details  With wear of circumferential wrist splint-  STRENGTH  [place] #2  R   27.7 lbs.     [place] #3  R   21.3 Lbs.                 Tests     Right Wrist/Hand   Positive extrinsic extensor tightness, extrinsic flexor tightness and intrinsic muscle tightness.     Swelling     Right Elbow Girth Measurements   Joint line: 26 cm  10 cm below joint line: 26 cm                                                                                              Daily Note     Today's date: 2025  Patient name: Eileen Matias  : 1960  MRN: [id number]  Referring provider: Rogelio Black MD  Dx:   Encounter Diagnosis     ICD-10-CM    1. Right hand weakness  R29.898       2. Numbness and tingling of left upper extremity  R20.0     R20.2                 Subjective:   I am stretching everyday.  I am can tell that I am getting stronger.    Objective: See treatment diary below  "for clinic program.   RE today.      Assessment: Tolerated treatment well. Patient would benefit from continued OT. Digital flexion continues to increase steadily.  Light function achieved in art and self care.     Improving ease and increased speed of right noted in clinic program.   Edema controlled with wear of smaller and lighter lymphedema garment.       Plan: Continue per plan of care.   Progress dexterity and gripping exercises as tolerated. Continue wear of orthotic for wrist stability.  Focus care on PREs, improved dexterity and strength.          Precautions: Lymphedema; hx desmoid tumors and breast cancer; healing humeral fracture     POC expires Unit limit Auth  expiration date PT/OT + Visit Limit?   4/14 NA NA BOMN         Dx R Hum Fx     Dr Wayne MILLER MET          Last RE 9/24/24    Date 2/13 2/17 1/30 2/4 2/11   Visit 10/10 1/10   7/10 8/10 9/10   Manuals 8 8   9               Jt mobs digits, FA MP digits all G  1-2 MP digits all G  1-2   MP digits all G 1-2 MP digits all G 1-2 MP digits all G 1-2   Ortho fit/train          Neuro Re-Ed                     Mirror therapy                              orthotic                              Ther Ex 23   20'     23'   23' 23'   HEP          PROM 1:1 Digital flexion  LLPS Digital flexion  LLPS   Digital flexion  LLPS Digital flexion  LLPS Digital flexion  LLPS   Digital blocked AROM P/H fists  5x5\" P/H fists 5x5\"   P/H fists 5x5\" P/H fists 5x5\" P/H fists 5x5\"   UE warm up UBE 3F 3R  L1 x70 R only   UBE 3F 3R  L1 x70 R only   UBE 3F 3R   L1  x60 R only  15-17 RPM UBE 3F 3R   L1  x60 R only  15-17 RPM UBE 3F 3R   L1  x65 R only  15-17 RPM   PRE FA  #2 1/2 shaft  S/P x20 #2 1/2 shaft  S/P x20    #2 S/P 1/2 shaft x20 #2 S/P 1/2 shaft x20   PRE Wrist FA G F bar down x20  Up x20  R F bar Fold  x20 G F bar down x20  Up x20  R F bar Fold  x20   G F bar down x20  Up x20  R F bar Fold  x20 G F bar down x20  Up x20  R F bar Fold  x20 G F bar down x20  Up " x20  R F bar Fold  x20   PRE digits     Black web x20     PRE digits tips          UE PRE NV resume T bands    T band  Mid row green x20  Sh E red x20 T band  Mid row blue x20  Sh E blue x20 T band  Mid row blue x20  Sh E blue x20   Ther Activity   8   8'   8 8 8   Gripping Gray #2 all foams   Dowel putty press x20 Gray #2 all foams   Dowel putty press x20   Gray #2 all foams  Dowel putty press x30 Gray #2 all foams  Dowel putty press x30 Gray #2 all foams  Dowel putty press x30   Pinching          Coordination          Carrying           Reaching           MHP Pre tx with fist ace flexion Pre tx with ace flexion   Pre tx with ace flexion Pre tx with ace flexion Pre tx with ace flexion   TENS 10' pre tx 10' pre tx     10' pre tx

## 2025-02-18 ENCOUNTER — APPOINTMENT (OUTPATIENT)
Dept: OCCUPATIONAL THERAPY | Facility: CLINIC | Age: 65
End: 2025-02-18
Payer: MEDICARE

## 2025-02-19 ENCOUNTER — OFFICE VISIT (OUTPATIENT)
Dept: PHYSICAL THERAPY | Facility: CLINIC | Age: 65
End: 2025-02-19
Payer: MEDICARE

## 2025-02-19 DIAGNOSIS — I89.0 LYMPHEDEMA: Primary | ICD-10-CM

## 2025-02-19 PROCEDURE — 97110 THERAPEUTIC EXERCISES: CPT | Performed by: PHYSICAL THERAPIST

## 2025-02-19 PROCEDURE — 97140 MANUAL THERAPY 1/> REGIONS: CPT | Performed by: PHYSICAL THERAPIST

## 2025-02-19 PROCEDURE — 97530 THERAPEUTIC ACTIVITIES: CPT | Performed by: PHYSICAL THERAPIST

## 2025-02-19 NOTE — PROGRESS NOTES
Daily Note     Today's date: 2025  Patient name: Eileen Matias  : 1960  MRN: [id number]  Referring provider: No ref. provider found  Dx:   Encounter Diagnosis     ICD-10-CM    1. Lymphedema  I89.0                      Subjective: Patient reports that her arm is doing well.  She has not been wearing a compression garment during the day, only at night.       Objective: See treatment diary below      Assessment: Tolerated treatment well. Patient would benefit from continued PT   Patient with a slight increase in volume in the L UE.        Plan: Continue per plan of care.   Will observe UE in one week to see if volume stabiliizes.  If not, will return to wearing garments during the day.     Precautions: L BCA  Access Code: [email or web address]  URL: [email or web address]  Date: 2024  Prepared by: Meena Jain    Exercises  - Shoulder Flexion Wall Slide with Towel  - 1 x daily - 7 x weekly - 1 sets - 5 reps - 10 hold  - Standing Shoulder Abduction Slides at Wall  - 1 x daily - 7 x weekly - 3 sets - 10 reps  - Single Arm Doorway Pec Stretch at 90 Degrees Abduction  - 1 x daily - 7 x weekly - 1 sets - 5 reps - 10 hold    Manuals         Manual MFR 10'            Self MLD             Compression fitting    15 15        Girth mmts   8' 10 10 10        REV 15                                                                                                       Ther Ex             Pulley 5' 5' 4' 5' 5'                     Wall slides  10 x :10 10 x :10 10 x :10 10 x :10        Bicep curls 4# 2 x 10 4# 2 x 10 4# x 20 4# x 20 2# x 10        Row  4# 2 10 4# 2 x 10 4# x 20 4# x 20 2# x 10        Tricep  4# 2 x10 4# 2 x 10 4# x 20 4# x 20 2# 2 x 10        Bike 6' 6' 6' 6' 6'        TG L 20 20 20 2x10 2 x 10 2 x 10        Pec press  3# 2x 20 3# 2 x 10 3# 2 x 10 3# 2 x 10 4# 2 x 10        Deltoid lateral raise 3# 2 x 10 3# 2 x 10 3# 2 x 10 3# 2 x 10         S/l ER 3# 2 x 10 3# 2 x 10 3# 2 x 10  3#2 x 10 4# 2 x 10        Seated overhead press 3# 2 x 10 3# 2 x 10 3# 2 x 10 3# 2 x 10 4# 2 x 10        Shoulder flexion   3# 2 x 10 3# 2 x 10 4# 2 x 10                     volume  [id number] [phone]

## 2025-02-20 ENCOUNTER — APPOINTMENT (OUTPATIENT)
Dept: OCCUPATIONAL THERAPY | Facility: CLINIC | Age: 65
End: 2025-02-20
Payer: MEDICARE

## 2025-02-20 DIAGNOSIS — R20.2 NUMBNESS AND TINGLING OF LEFT UPPER EXTREMITY: ICD-10-CM

## 2025-02-20 DIAGNOSIS — R29.898 RIGHT HAND WEAKNESS: Primary | ICD-10-CM

## 2025-02-20 DIAGNOSIS — R20.0 NUMBNESS AND TINGLING OF LEFT UPPER EXTREMITY: ICD-10-CM

## 2025-02-20 NOTE — PROGRESS NOTES
Daily Note     Today's date: 2025  Patient name: Eileen Matias  : 1960  MRN: [id number]  Referring provider: Rogelio Black MD  Dx:   Encounter Diagnosis     ICD-10-CM    1. Right hand weakness  R29.898       2. Numbness and tingling of left upper extremity  R20.0     R20.2                      Subjective: ***      Objective: See treatment diary below      Assessment: Tolerated treatment {Tolerated treatment :3882175175}. Patient {assessment:6125271964}      Plan: {PLAN:3618543917}     {There is no content from the last Daily Treatment Diary section.}

## 2025-02-25 ENCOUNTER — OFFICE VISIT (OUTPATIENT)
Dept: OCCUPATIONAL THERAPY | Facility: CLINIC | Age: 65
End: 2025-02-25
Payer: MEDICARE

## 2025-02-25 DIAGNOSIS — R20.2 NUMBNESS AND TINGLING OF LEFT UPPER EXTREMITY: ICD-10-CM

## 2025-02-25 DIAGNOSIS — R20.0 NUMBNESS AND TINGLING OF LEFT UPPER EXTREMITY: ICD-10-CM

## 2025-02-25 DIAGNOSIS — R29.898 RIGHT HAND WEAKNESS: Primary | ICD-10-CM

## 2025-02-25 PROCEDURE — 97140 MANUAL THERAPY 1/> REGIONS: CPT | Performed by: OCCUPATIONAL THERAPIST

## 2025-02-25 PROCEDURE — 97014 ELECTRIC STIMULATION THERAPY: CPT | Performed by: OCCUPATIONAL THERAPIST

## 2025-02-25 PROCEDURE — 97110 THERAPEUTIC EXERCISES: CPT | Performed by: OCCUPATIONAL THERAPIST

## 2025-02-25 NOTE — PROGRESS NOTES
Daily Note     Today's date: 2025  Patient name: Eileen Matias  : 1960  MRN: [id number]  Referring provider: Rogelio Black MD  Dx:   Encounter Diagnosis     ICD-10-CM    1. Right hand weakness  R29.898       2. Numbness and tingling of left upper extremity  R20.0     R20.2                 Subjective:   I am stretching everyday.  I am harder on my hands than you are      Objective: See treatment diary below for clinic program.       Assessment: Tolerated treatment well. Patient would benefit from continued OT. Digital flexion continues to increase steadily. Fifth MP flexion remains restricted.   Light function achieved in art and self care.     Improving ease and increased speed of right noted in clinic program.   Edema controlled with wear of smaller and lighter lymphedema garment.       Plan: Continue per plan of care.   Progress dexterity and gripping exercises as tolerated. Continue wear of orthotic for wrist stability.  Focus care on PREs, improved dexterity and strength.          Precautions: Lymphedema; hx desmoid tumors and breast cancer; healing humeral fracture     POC expires Unit limit Auth  expiration date PT/OT + Visit Limit?    NA NA BOMN         Dx R Hum Fx     Dr Wayne MILLER MET          Last RE 24    Date    Visit 10/10 1/10 2/10  7/10 8/10 9/10   Manuals 8 8 8  9               Jt mobs digits, FA MP digits all G  1-2 MP digits all G  1-2 MP digits all G  1-2  MP digits all G 1-2 MP digits all G 1-2 MP digits all G 1-2   Ortho fit/train          Neuro Re-Ed                     Mirror therapy                              orthotic                              Ther Ex 23   20'   20'    23'   23' 23'   HEP          PROM 1:1 Digital flexion  LLPS Digital flexion  LLPS Digital flexion  LLPS  Digital flexion  LLPS Digital flexion  LLPS Digital flexion  LLPS  "  Digital blocked AROM P/H fists  5x5\" P/H fists 5x5\" P/H fists 5x5\"  P/H fists 5x5\" P/H fists 5x5\" P/H fists 5x5\"   UE warm up UBE 3F 3R  L1 x70 R only   UBE 3F 3R  L1 x70 R only UBE 3F 3R  L1 x70 R only  UBE 3F 3R   L1  x60 R only  15-17 RPM UBE 3F 3R   L1  x60 R only  15-17 RPM UBE 3F 3R   L1  x65 R only  15-17 RPM   PRE FA  #2 1/2 shaft  S/P x20 #2 1/2 shaft  S/P x20 #2 1/2 shaft  S/P x20   #2 S/P 1/2 shaft x20 #2 S/P 1/2 shaft x20   PRE Wrist FA G F bar down x20  Up x20  R F bar Fold  x20 G F bar down x20  Up x20  R F bar Fold  x20 G F bar down x20  Up x20  R F bar Fold  x20  G F bar down x20  Up x20  R F bar Fold  x20 G F bar down x20  Up x20  R F bar Fold  x20 G F bar down x20  Up x20  R F bar Fold  x20   PRE digits     Black web x20     PRE digits tips          UE PRE NV resume T bands    T band  Mid row green x20  Sh E red x20 T band  Mid row blue x20  Sh E blue x20 T band  Mid row blue x20  Sh E blue x20   Ther Activity   8   8'   8  8 8 8   Gripping Gray #2 all foams   Dowel putty press x20 Gray #2 all foams   Dowel putty press x20 Gray #2 all foams   [place] #2 all foams  Dowel putty press x30 Gray #2 all foams  Dowel putty press x30 Gray #2 all foams  Dowel putty press x30   Pinching   Velcro bd all off then on        Coordination          Carrying           Reaching           MHP Pre tx with fist ace flexion Pre tx with ace flexion Pre tx with ace flexion   Pre tx with ace flexion Pre tx with ace flexion Pre tx with ace flexion   TENS 10' pre tx 10' pre tx 10 pre tx    10' pre tx            "

## 2025-02-26 ENCOUNTER — TELEPHONE (OUTPATIENT)
Dept: INTERNAL MEDICINE CLINIC | Facility: CLINIC | Age: 65
End: 2025-02-26

## 2025-02-26 ENCOUNTER — OFFICE VISIT (OUTPATIENT)
Dept: PHYSICAL THERAPY | Facility: CLINIC | Age: 65
End: 2025-02-26
Payer: MEDICARE

## 2025-02-26 DIAGNOSIS — I89.0 LYMPHEDEMA: Primary | ICD-10-CM

## 2025-02-26 PROCEDURE — 97110 THERAPEUTIC EXERCISES: CPT | Performed by: PHYSICAL THERAPIST

## 2025-02-26 PROCEDURE — 97530 THERAPEUTIC ACTIVITIES: CPT | Performed by: PHYSICAL THERAPIST

## 2025-02-26 NOTE — TELEPHONE ENCOUNTER
Handicap placard form received from provider   Form completed  LMOM for pt form has been completed and placed up front for

## 2025-02-26 NOTE — PROGRESS NOTES
Daily Note     Today's date: 2025  Patient name: Eileen Matias  : 1960  MRN: [id number]  Referring provider: No ref. provider found  Dx: No diagnosis found.               Subjective: Patient reports that she has not been wearing the day sleeve since LV.  She is wearing her nighttime garment.  She reports that she has no achiness in the UE and does not feel that her sx are worse.      Objective: See treatment diary below      Assessment: Tolerated treatment well. Patient would benefit from continued PT      Plan: Continue per plan of care.      Precautions: L BCA  Access Code: [email or web address]  URL: [email or web address]  Date: 2024  Prepared by: Meena Jain    Exercises  - Shoulder Flexion Wall Slide with Towel  - 1 x daily - 7 x weekly - 1 sets - 5 reps - 10 hold  - Standing Shoulder Abduction Slides at Wall  - 1 x daily - 7 x weekly - 3 sets - 10 reps  - Single Arm Doorway Pec Stretch at 90 Degrees Abduction  - 1 x daily - 7 x weekly - 1 sets - 5 reps - 10 hold    Manuals        Manual MFR 10'            Self MLD             Compression fitting    15 15        Girth mmts   8' 10 10 10 10       REV 15                                                                                                       Ther Ex             Pulley 5' 5' 4' 5' 5' 5'                    Wall slides  10 x :10 10 x :10 10 x :10 10 x :10 10 x :10       Bicep curls 4# 2 x 10 4# 2 x 10 4# x 20 4# x 20 2# x 10 4#        Row  4# 2 10 4# 2 x 10 4# x 20 4# x 20 2# x 10 4#       Tricep  4# 2 x10 4# 2 x 10 4# x 20 4# x 20 2# 2 x 10 4#       Bike 6' 6' 6' 6' 6' 6       TG L 22 20 20 2x10 2 x 10 2 x 10 2x10       Pec press  3# 2x 20 3# 2 x 10 3# 2 x 10 3# 2 x 10 4# 2 x 10 4#  x 20       Deltoid lateral raise 3# 2 x 10 3# 2 x 10 3# 2 x 10 3# 2 x 10         S/l ER 3# 2 x 10 3# 2 x 10 3# 2 x 10 3#2 x 10 4# 2 x 10 4#  x 20       Seated overhead press 3# 2 x 10 3# 2 x 10 3# 2 x 10 3# 2 x 10 4# 2 x 10 4#  2 x 10       Shoulder flexion   3# 2 x 10 3# 2 x 10 4# 2 x 10 4# 2 x 10                    volume  2192 2168 [phone]

## 2025-02-27 ENCOUNTER — OFFICE VISIT (OUTPATIENT)
Dept: OCCUPATIONAL THERAPY | Facility: CLINIC | Age: 65
End: 2025-02-27
Payer: MEDICARE

## 2025-02-27 ENCOUNTER — OFFICE VISIT (OUTPATIENT)
Dept: PODIATRY | Facility: CLINIC | Age: 65
End: 2025-02-27
Payer: MEDICARE

## 2025-02-27 VITALS — OXYGEN SATURATION: 99 % | HEART RATE: 70 BPM | WEIGHT: 130 LBS | HEIGHT: 68 IN | BODY MASS INDEX: 19.7 KG/M2

## 2025-02-27 DIAGNOSIS — M79.676: ICD-10-CM

## 2025-02-27 DIAGNOSIS — R20.2 NUMBNESS AND TINGLING OF LEFT UPPER EXTREMITY: ICD-10-CM

## 2025-02-27 DIAGNOSIS — R20.0 NUMBNESS AND TINGLING OF LEFT UPPER EXTREMITY: ICD-10-CM

## 2025-02-27 DIAGNOSIS — G60.9 IDIOPATHIC PERIPHERAL NEUROPATHY: Primary | ICD-10-CM

## 2025-02-27 DIAGNOSIS — R29.898 RIGHT HAND WEAKNESS: Primary | ICD-10-CM

## 2025-02-27 PROCEDURE — 97110 THERAPEUTIC EXERCISES: CPT | Performed by: OCCUPATIONAL THERAPIST

## 2025-02-27 PROCEDURE — 97530 THERAPEUTIC ACTIVITIES: CPT | Performed by: OCCUPATIONAL THERAPIST

## 2025-02-27 PROCEDURE — 99203 OFFICE O/P NEW LOW 30 MIN: CPT | Performed by: PODIATRIST

## 2025-02-27 PROCEDURE — 97140 MANUAL THERAPY 1/> REGIONS: CPT | Performed by: OCCUPATIONAL THERAPIST

## 2025-02-27 RX ORDER — DULOXETIN HYDROCHLORIDE 20 MG/1
20 CAPSULE, DELAYED RELEASE ORAL DAILY
Qty: 30 CAPSULE | Refills: 2 | Status: SHIPPED | OUTPATIENT
Start: 2025-02-27

## 2025-02-27 NOTE — ASSESSMENT & PLAN NOTE
The patient's clinical examination today significant for diffuse paresthesias and tingling sensations to the forefoot bilaterally, left more so than the right.  There is negative Tinel's sign on the right lower extremity.  There is mild tenderness to percussion of the tarsal tunnel on the left.  Pedal pulses palpable bilaterally.  There are no open lesions.  The interdigital spaces are clear without maceration.  Passive range of motion of the ankle hyper joints is within normal limits.  Muscle power is also within normal limits bilaterally.  There is no dropfoot deformity.    The patient's clinical examination is consistent with idiopathic peripheral neuropathy of the forefoot bilaterally.  I agree with the EMG/NCV study of her bilateral lower extremities which is currently scheduled.  In the interim, we can consider oral therapy to assist with her neuropathic pain and discomfort.  She does note that she previously was on a high dose of gabapentin which ultimately she could no longer tolerate.  She has not currently taking any prescription medications for her neuropathic pain.  We can try low-dose of Cymbalta which could potentially help with her nighttime symptoms.  A prescription for Cymbalta 20 mg was sent to her pharmacy today.  We can titrate up as tolerated.    Recommend follow-up after her EMG/NCV study is done.  We do need to rule out a tarsal tunnel syndrome.  Her nerve study should help with this.

## 2025-02-27 NOTE — PROGRESS NOTES
:  Assessment & Plan  Complaint of pain of toe    Orders:    Ambulatory Referral to Podiatry    DULoxetine (CYMBALTA) 20 mg capsule; Take 1 capsule (20 mg total) by mouth daily    Idiopathic peripheral neuropathy       The patient's clinical examination today significant for diffuse paresthesias and tingling sensations to the forefoot bilaterally, left more so than the right.  There is negative Tinel's sign on the right lower extremity.  There is mild tenderness to percussion of the tarsal tunnel on the left.  Pedal pulses palpable bilaterally.  There are no open lesions.  The interdigital spaces are clear without maceration.  Passive range of motion of the ankle hyper joints is within normal limits.  Muscle power is also within normal limits bilaterally.  There is no dropfoot deformity.    The patient's clinical examination is consistent with idiopathic peripheral neuropathy of the forefoot bilaterally.  I agree with the EMG/NCV study of her bilateral lower extremities which is currently scheduled.  In the interim, we can consider oral therapy to assist with her neuropathic pain and discomfort.  She does note that she previously was on a high dose of gabapentin which ultimately she could no longer tolerate.  She has not currently taking any prescription medications for her neuropathic pain.  We can try low-dose of Cymbalta which could potentially help with her nighttime symptoms.  A prescription for Cymbalta 20 mg was sent to her pharmacy today.  We can titrate up as tolerated.    Recommend follow-up after her EMG/NCV study is done.  We do need to rule out a tarsal tunnel syndrome.  Her nerve study should help with this.      History of Present Illness     Eileen Matias is a 64 y.o. female   The patient presents today for her initial consultation with [facility] with a chief complaint of neuritic pain to both of her feet that has been present for approximately 6 months.  Symptoms do tend to be worse at the  end of the day, and especially prevalent at nighttime.  It does keep her up at times.  She had previously been on a high dose of gabapentin which she could no longer tolerate.  She does have a cancer history which was treated with radiation therapy.  She has not had any chemo therapy.  There is no prior history of any chronic lumbar pain or radiculopathy.      PAST MEDICAL HISTORY:  Past Medical History:   Diagnosis Date    Abnormal mammogram 05/15/2013    Anemia     Cancer (HCC)     desmoitosis    Carcinoma of left breast metastatic to axillary lymph node (HCC) 04/19/2022    Chronic pain disorder     worse right side of body    Desmoid tumor     Last Assessed: 6/19/2017    fibroidal cyst 1999?  Hysterectomy done    History of transfusion     no reactions    Hyperlipidemia     Hypertension     Osteoporosis     PONV (postoperative nausea and vomiting)        PAST SURGICAL HISTORY:  Past Surgical History:   Procedure Laterality Date    BREAST LUMPECTOMY Left 6/21/2022    Procedure: ANITA  DIRECTED LUMPECTOMY;  Surgeon: Amanda Motley MD;  Location: [facility];  Service: Surgical Oncology    FRACTURE SURGERY      HYSTERECTOMY      LYMPH NODE DISSECTION Left 6/21/2022    Procedure: AXILLARY DISSECTION LEVEL I AND LEVEL II LYMPH NODES;  Surgeon: Amanda Motley MD;  [facility];  Service: Surgical Oncology    MRI BREAST BIOPSY LEFT (ALL INCLUSIVE) Left 5/20/2022    MRI BREAST BIOPSY RIGHT (ALL INCLUSIVE) Right 5/20/2022    OTHER SURGICAL HISTORY      Arm Incision: Dermoid tumor, right Arm     PARTIAL HYSTERECTOMY      SD COLONOSCOPY FLX DX W/COLLJ SPEC WHEN PFRMD N/A 8/15/2017    Procedure: COLONOSCOPY;  Surgeon: Alec England MD;  Location: [facility];  Service: Gastroenterology    SD NDSC WRST SURG W/RLS TRANSVRS CARPL LIGM Left 8/10/2021    Procedure: RELEASE LEFT CARPAL TUNNEL ENDOSCOPIC;  Surgeon: Chris Camacho MD;  [facility];  Service: Orthopedics    SD OPTX DSTL  RADL I-ARTIC FX/EPIPHYSL SEP 3+ FRAG Left 2/9/2021    Procedure: OPEN REDUCTION W/ INTERNAL FIXATION (ORIF) RADIUS / ULNA (WRIST) left;  Surgeon: Chris Camacho MD;  Location[facility];  Service: Orthopedics    SKIN BIOPSY      SKIN CANCER EXCISION      Melanoma Excision     TONSILLECTOMY      US BREAST NEEDLE LOC LEFT Left 6/16/2022    US BREAST NEEDLE LOC RIGHT EACH ADDITIONAL Right 6/16/2022    US GUIDED BREAST LYMPH NODE BIOPSY LEFT Left 4/14/2022        ALLERGIES:  Chlorpromazine, Codeine, Meperidine, Phenothiazines, Saccharin - food allergy, Ampicillin-sulbactam sodium, Aspirin, Gluten meal - food allergy, Ibuprofen, Levofloxacin, Lactose - food allergy, Neomycin, Citrus - food allergy, and Latex    MEDICATIONS:  Current Outpatient Medications   Medication Sig Dispense Refill    cholecalciferol (VITAMIN D3) 1,000 units tablet Take 3,000 Units by mouth daily      diphenhydrAMINE (BENADRYL) 50 MG tablet Take 50 mg by mouth daily at bedtime as needed for itching      DULoxetine (CYMBALTA) 20 mg capsule Take 1 capsule (20 mg total) by mouth daily 30 capsule 2    ezetimibe (ZETIA) 10 mg tablet Take 1 tablet (10 mg total) by mouth daily 90 tablet 0    letrozole (FEMARA) 2.5 mg tablet Take 1 tablet (2.5 mg total) by mouth daily 90 tablet 3    multivitamin (THERAGRAN) TABS Take 1 tablet by mouth daily      Omega-3 Fatty Acids (FISH OIL PO) Take by mouth      pantoprazole (PROTONIX) 40 mg tablet Take 1 tablet by mouth once daily 60 tablet 0    penicillin V potassium (VEETID) 500 mg tablet Take 1 tablet (500 mg total) by mouth every 12 (twelve) hours 180 tablet 1    Probiotic Product (PROBIOTIC-10 PO) Take by mouth      alendronate (FOSAMAX) 70 mg tablet Take 1 tablet (70 mg total) by mouth every 7 days (Patient not taking: Reported on 2/27/2025) 12 tablet 1    dronabinol (MARINOL) 10 MG capsule  (Patient not taking: Reported on 2/27/2025)       No current facility-administered medications for this visit.       SOCIAL  HISTORY:  Social History     Socioeconomic History    Marital status: /Civil Union     Spouse name: None    Number of children: None    Years of education: None    Highest education level: None   Occupational History    Occupation: unemployed    Tobacco Use    Smoking status: Never    Smokeless tobacco: Never   Vaping Use    Vaping status: Never Used   Substance and Sexual Activity    Alcohol use: Not Currently     Alcohol/week: 5.0 standard drinks of alcohol     Types: 5 Shots of liquor per week     Comment: Used mostly as a painkiller when needed before bedtime    Drug use: Yes     Frequency: 28.0 times per week     Types: Marijuana     Comment: THC Medical marijuana    Sexual activity: Not Currently     Partners: Male     Comment: Hysterectomy years ago   Other Topics Concern    None   Social History Narrative    Lives with spouse      Social Drivers of Health     Financial Resource Strain: Low Risk  (12/13/2023)    Overall Financial Resource Strain (CARDIA)     Difficulty of Paying Living Expenses: Not hard at all   Food Insecurity: No Food Insecurity (1/10/2025)    Hunger Vital Sign     Worried About Running Out of Food in the Last Year: Never true     Ran Out of Food in the Last Year: Never true   Transportation Needs: No Transportation Needs (1/10/2025)    PRAPARE - Transportation     Lack of Transportation (Medical): No     Lack of Transportation (Non-Medical): No   Physical Activity: Insufficiently Active (5/2/2023)    Received from [facility]    Exercise Vital Sign     Days of Exercise per Week: 2 days     Minutes of Exercise per Session: 30 min   Stress: No Stress Concern Present (5/2/2023)    [facility]    [employer] - Occupational Stress Questionnaire     Feeling of Stress : Not at all   Social Connections: Unknown (6/18/2024)    Received from [facility]     How often  "do you feel lonely or isolated from those around you? (Adult - for ages 18 years and over): Not on file   Intimate Partner Violence: Not At Risk (5/2/2023)    [facility]    Humiliation, Afraid, Rape, and Kick questionnaire     Fear of Current or Ex-Partner: No     Emotionally Abused: No     Physically Abused: No     Sexually Abused: No   Housing Stability: Unknown (1/10/2025)    Housing Stability Vital Sign     Unable to Pay for Housing in the Last Year: No     Number of Times Moved in the Last Year: Not on file     Homeless in the Last Year: Not on file      Review of Systems   Constitutional: Negative.    HENT: Negative.     Eyes: Negative.    Respiratory: Negative.     Cardiovascular: Negative.    Endocrine: Negative.    Musculoskeletal: Negative.    Neurological: Negative.    Hematological: Negative.    Psychiatric/Behavioral: Negative.       Objective   Pulse 70   Ht 5' 8\" (1.727 m)   Wt 59 kg (130 lb)   SpO2 99%   BMI 19.77 kg/m²      Physical Exam  Vitals and nursing note reviewed.   Constitutional:       General: She is not in acute distress.     Appearance: She is well-developed.   HENT:      Head: Normocephalic and atraumatic.   Eyes:      Conjunctiva/sclera: Conjunctivae normal.   Cardiovascular:      Pulses:           Dorsalis pedis pulses are 2+ on the right side and 2+ on the left side.        Posterior tibial pulses are 2+ on the right side and 2+ on the left side.   Pulmonary:      Effort: Pulmonary effort is normal.   Abdominal:      Palpations: Abdomen is soft.   Feet:      Right foot:      Skin integrity: Skin integrity normal.      Left foot:      Skin integrity: Skin integrity normal.      Comments: The patient's clinical examination today significant for diffuse paresthesias and tingling sensations to the forefoot bilaterally, left more so than the right.  There is negative Tinel's sign on the right lower extremity.  There is " mild tenderness to percussion of the tarsal tunnel on the left.  Pedal pulses palpable bilaterally.  There are no open lesions.  The interdigital spaces are clear without maceration.  Passive range of motion of the ankle hyper joints is within normal limits.  Muscle power is also within normal limits bilaterally.  There is no dropfoot deformity.  Skin:     General: Skin is warm and dry.      Capillary Refill: Capillary refill takes less than 2 seconds.   Neurological:      General: No focal deficit present.      Mental Status: She is alert and oriented to person, place, and time.   Psychiatric:         Mood and Affect: Mood normal.

## 2025-02-27 NOTE — PROGRESS NOTES
Daily Note     Today's date: 2025  Patient name: Eileen Matias  : 1960  MRN: [id number]  Referring provider: Rogelio Black MD  Dx:   Encounter Diagnosis     ICD-10-CM    1. Right hand weakness  R29.898       2. Numbness and tingling of left upper extremity  R20.0     R20.2                   Subjective:   I am stretching everyday.  I am harder on my hands than you are      Objective: See treatment diary below for clinic program.       Assessment: Tolerated treatment well. Patient would benefit from continued OT. Digital flexion continues to increase steadily. Fifth MP flexion remains restricted.   Light function achieved in art and self care.     Improving ease and increased speed of right noted in clinic program.   Edema controlled with wear of smaller and lighter lymphedema garment.       Plan: Continue per plan of care.   Progress dexterity and gripping exercises as tolerated. Continue wear of orthotic for wrist stability.  Focus care on PREs, improved dexterity and strength.          Precautions: Lymphedema; hx desmoid tumors and breast cancer; healing humeral fracture     POC expires Unit limit Auth  expiration date PT/OT + Visit Limit?    NA NA BOMN         Dx R Hum Fx     Dr Wayne MILLER MET          Last RE 24    Date    Visit 10/10 1/10 2/10 3/10  8/10 9/10   Manuals 8 8 8 8                Jt mobs digits, FA MP digits all G  1-2 MP digits all G  1-2 MP digits all G  1-2 MP digits all G  1-2  MP digits all G 1-2 MP digits all G 1-2   Ortho fit/train          Neuro Re-Ed                     Mirror therapy                              orthotic                              Ther Ex 23   20'   20'     23' 23'   HEP          PROM 1:1 Digital flexion  LLPS Digital flexion  LLPS Digital flexion  LLPS Digital flexion LLPS  Digital flexion  LLPS Digital flexion  LLPS  "  Digital blocked AROM P/H fists  5x5\" P/H fists 5x5\" P/H fists 5x5\" P/H fists 5x5\"  P/H fists 5x5\" P/H fists 5x5\"   UE warm up UBE 3F 3R  L1 x70 R only   UBE 3F 3R  L1 x70 R only UBE 3F 3R  L1 x70 R only UBE 3F 3R  L1 x80 R only  UBE 3F 3R   L1  x60 R only  15-17 RPM UBE 3F 3R   L1  x65 R only  15-17 RPM   PRE FA  #2 1/2 shaft  S/P x20 #2 1/2 shaft  S/P x20 #2 1/2 shaft  S/P x20 #2 1/2 shaft  S/P x20  #2 S/P 1/2 shaft x20 #2 S/P 1/2 shaft x20   PRE Wrist FA G F bar down x20  Up x20  R F bar Fold  x20 G F bar down x20  Up x20  R F bar Fold  x20 G F bar down x20  Up x20  R F bar Fold  x20 G F bar down x20  Up x20  R F bar Fold  x20  G F bar down x20  Up x20  R F bar Fold  x20 G F bar down x20  Up x20  R F bar Fold  x20   PRE digits          PRE digits tips          UE PRE NV resume T bands     T band  Mid row blue x20  Sh E blue x20 T band  Mid row blue x20  Sh E blue x20   Ther Activity   8   8'   8 8  8 8   Gripping Gray #2 all foams   Dowel putty press x20 Gray #2 all foams   Dowel putty press x20 Gray #2 all foams  Gray #2 all foams   Wilson #2 all foams  Dowel putty press x30 Gray #2 all foams  Dowel putty press x30   Pinching   Velcro bd all off then on  Velcro bd all off then on       Coordination          Carrying           Reaching           MHP Pre tx with fist ace flexion Pre tx with ace flexion Pre tx with ace flexion  Pre tx with ace flexion  Pre tx with ace flexion Pre tx with ace flexion   TENS 10' pre tx 10' pre tx 10 pre tx 10' pre tx   10' pre tx            "

## 2025-03-04 ENCOUNTER — OFFICE VISIT (OUTPATIENT)
Dept: OCCUPATIONAL THERAPY | Facility: CLINIC | Age: 65
End: 2025-03-04
Payer: MEDICARE

## 2025-03-04 DIAGNOSIS — R20.2 NUMBNESS AND TINGLING OF LEFT UPPER EXTREMITY: ICD-10-CM

## 2025-03-04 DIAGNOSIS — R29.898 RIGHT HAND WEAKNESS: Primary | ICD-10-CM

## 2025-03-04 DIAGNOSIS — R20.0 NUMBNESS AND TINGLING OF LEFT UPPER EXTREMITY: ICD-10-CM

## 2025-03-04 PROCEDURE — 97530 THERAPEUTIC ACTIVITIES: CPT | Performed by: OCCUPATIONAL THERAPIST

## 2025-03-04 PROCEDURE — 97110 THERAPEUTIC EXERCISES: CPT | Performed by: OCCUPATIONAL THERAPIST

## 2025-03-04 NOTE — PROGRESS NOTES
Daily Note     Today's date: 3/4/2025  Patient name: Eileen Matias  : 1960  MRN: [id number]  Referring provider: Rogelio Black MD  Dx:   Encounter Diagnosis     ICD-10-CM    1. Right hand weakness  R29.898       2. Numbness and tingling of left upper extremity  R20.0     R20.2                     Subjective:   I am stretching everyday.  I am harder on my hands than you are      Objective: See treatment diary below for clinic program.       Assessment: Tolerated treatment well. Patient would benefit from continued OT. Digital flexion continues to increase steadily. Fifth MP flexion remains restricted.   Light function achieved in art and self care.     Improving ease and increased speed of right noted in clinic program.   Edema controlled with wear of smaller and lighter lymphedema garment.  Increased MP flexion of fifth noted with passive stretching.       Plan: Continue per plan of care.   Progress dexterity and gripping exercises as tolerated. Continue wear of orthotic for wrist stability.  Focus care on PREs, improved dexterity and strength.          Precautions: Lymphedema; hx desmoid tumors and breast cancer; healing humeral fracture     POC expires Unit limit Auth  expiration date PT/OT + Visit Limit?    NA NA BOMN         Dx R Hum Fx     Dr Wayne MILLER MET          Last RE 24    Date 2/13 2/17 2/25 2/27 3/4     Visit 10/10 1/10 2/10 3/10 4/10     Manuals 8 8 8 8 8               Jt mobs digits, FA MP digits all G  1-2 MP digits all G  1-2 MP digits all G  1-2 MP digits all G  1-2 MP digits all G  1-2     Ortho fit/train          Neuro Re-Ed                     Mirror therapy                              orthotic                              Ther Ex 23   20'   20'   20'   20'     HEP          PROM 1:1 Digital flexion  LLPS Digital flexion  LLPS Digital flexion  LLPS Digital flexion LLPS Digital  "flexion LLPS     Digital blocked AROM P/H fists  5x5\" P/H fists 5x5\" P/H fists 5x5\" P/H fists 5x5\" P/H fists 5x5\"     UE warm up UBE 3F 3R  L1 x70 R only   UBE 3F 3R  L1 x70 R only UBE 3F 3R  L1 x70 R only UBE 3F 3R  L1 x80 R only UBE 3F 3R  L1 x100  R only     PRE FA  #2 1/2 shaft  S/P x20 #2 1/2 shaft  S/P x20 #2 1/2 shaft  S/P x20 #2 1/2 shaft  S/P x20 #2 1/2 shaft  S/P x20     PRE Wrist FA G F bar down x20  Up x20  R F bar Fold  x20 G F bar down x20  Up x20  R F bar Fold  x20 G F bar down x20  Up x20  R F bar Fold  x20 G F bar down x20  Up x20  R F bar Fold  x20 G F bar down x20  Up x20  R F bar Fold  x20     PRE digits          PRE digits tips          UE PRE NV resume T bands         Ther Activity   8   8'   8 8     10'     Gripping Gray #2 all foams   Dowel putty press x20 Gray #2 all foams   Dowel putty press x20 Gray #2 all foams  Gray #2 all foams  Gray #2 all foams      Pinching   Velcro bd all off then on  Velcro bd all off then on  Velcro doms  Lift with II/III      Coordination          Carrying           Reaching           MHP Pre tx with fist ace flexion Pre tx with ace flexion Pre tx with ace flexion  Pre tx with ace flexion Pre tx with ace flexion     TENS 10' pre tx 10' pre tx 10 pre tx 10' pre tx 10' pre tx              "

## 2025-03-06 ENCOUNTER — CLINICAL SUPPORT (OUTPATIENT)
Facility: OTHER | Age: 65
End: 2025-03-06

## 2025-03-06 NOTE — PROGRESS NOTES
Mastectomy Bra Fitting Order Details    Eileen Matias  1960  [id number]    Reason For Visit  Post lumpectomy garments    Precautions   L BCA;  Also has difficulty with use of R hand    Subjective  Client reports having a size differential     Surgery Type: Lumpectomy    Lymph node removal yes    Date of surgery     Surgical side left    Objective  She reports that her L breast is significantly smaller than her R.  She feels that her R breast is a DD but left is a C.  Client needs a garment to slip overhead due to functionality of R hand.      Assessment    Band:  15 x 2 = 30  Cup:     18.5 x 2= 37  R ;  18 x 2 = 36 L    Amada NEWELL x 3   JAYE August       Trial of  size 4     Plan  Ship to home    Order Details     Amada Barnett x 3

## 2025-03-10 ENCOUNTER — OFFICE VISIT (OUTPATIENT)
Dept: PHYSICAL THERAPY | Facility: CLINIC | Age: 65
End: 2025-03-10
Payer: MEDICARE

## 2025-03-10 DIAGNOSIS — I89.0 LYMPHEDEMA: Primary | ICD-10-CM

## 2025-03-10 PROCEDURE — 97530 THERAPEUTIC ACTIVITIES: CPT | Performed by: PHYSICAL THERAPIST

## 2025-03-10 PROCEDURE — 97140 MANUAL THERAPY 1/> REGIONS: CPT | Performed by: PHYSICAL THERAPIST

## 2025-03-10 NOTE — PROGRESS NOTES
Daily Note     Today's date: 3/10/2025  Patient name: Eileen Matias  : 1960  MRN: [id number]  Referring provider: Amelia Gallegos MD  Dx:   Encounter Diagnosis     ICD-10-CM    1. Lymphedema  I89.0                      Subjective: Patient reports       Objective: See treatment diary below      Assessment: Tolerated treatment well. Patient would benefit from continued PT  Patient's L UE swelling appears to be stable without using sleeve during day but just nighttime garment.  Patient to KT tape while working on art work daily.       Plan: Continue per plan of care.      Precautions: L BCA  Access Code: [email or web address]  URL: [email or web address]  Date: 2024  Prepared by: Meena Jain    Exercises  - Shoulder Flexion Wall Slide with Towel  - 1 x daily - 7 x weekly - 1 sets - 5 reps - 10 hold  - Standing Shoulder Abduction Slides at Wall  - 1 x daily - 7 x weekly - 3 sets - 10 reps  - Single Arm Doorway Pec Stretch at 90 Degrees Abduction  - 1 x daily - 7 x weekly - 1 sets - 5 reps - 10 hold    Manuals 1/27 1/29 2/5 2/12 2/19 2/26 3/10      Manual MFR 10'            Self MLD             Compression fitting    15 15        Girth mmts   8' 10 10 10 10 15      REV 15                                                                                                       Ther Ex             [place] 5' 5' 4' 5' 5' 5' 5'                   Wall slides  10 x :10 10 x :10 10 x :10 10 x :10 10 x :10       Bicep curls 4# 2 x 10 4# 2 x 10 4# x 20 4# x 20 2# x 10 4#  4# 2 x 10      Row  4# 2 10 4# 2 x 10 4# x 20 4# x 20 2# x 10 4# 4# 2 x 10      Tricep  4# 2 x10 4# 2 x 10 4# x 20 4# x 20 2# 2 x 10 4# 4# x 20      Bike 6' 6' 6' 6' 6' 6 6'      TG L 22 20 20 2x10 2 x 10 2 x 10 2x10 2 x 10      Pec press  3# 2x 20 3# 2 x 10 3# 2 x 10 3# 2 x 10 4# 2 x 10 4#  x 20 4#  2 x 10      Deltoid lateral raise 3# 2 x 10 3# 2 x 10 3# 2 x 10 3# 2 x 10         S/l ER 3# 2 x 10 3# 2 x 10 3# 2 x 10 3#2 x 10 4# 2 x 10 4#  x 20 4# 2  x 10      Seated overhead press 3# 2 x 10 3# 2 x 10 3# 2 x 10 3# 2 x 10 4# 2 x 10 4# 2 x 10 4# 2 x10      Shoulder flexion   3# 2 x 10 3# 2 x 10 4# 2 x 10 4# 2 x 10 4 # 2x10                   volume  [id number]

## 2025-03-11 ENCOUNTER — OFFICE VISIT (OUTPATIENT)
Dept: OCCUPATIONAL THERAPY | Facility: CLINIC | Age: 65
End: 2025-03-11
Payer: MEDICARE

## 2025-03-11 DIAGNOSIS — R29.898 RIGHT HAND WEAKNESS: Primary | ICD-10-CM

## 2025-03-11 DIAGNOSIS — R20.0 NUMBNESS AND TINGLING OF LEFT UPPER EXTREMITY: ICD-10-CM

## 2025-03-11 DIAGNOSIS — R20.2 NUMBNESS AND TINGLING OF LEFT UPPER EXTREMITY: ICD-10-CM

## 2025-03-11 PROCEDURE — 97530 THERAPEUTIC ACTIVITIES: CPT | Performed by: OCCUPATIONAL THERAPIST

## 2025-03-11 PROCEDURE — 97014 ELECTRIC STIMULATION THERAPY: CPT | Performed by: OCCUPATIONAL THERAPIST

## 2025-03-11 PROCEDURE — 97110 THERAPEUTIC EXERCISES: CPT | Performed by: OCCUPATIONAL THERAPIST

## 2025-03-11 PROCEDURE — 97140 MANUAL THERAPY 1/> REGIONS: CPT | Performed by: OCCUPATIONAL THERAPIST

## 2025-03-11 NOTE — PROGRESS NOTES
Daily Note     Today's date: 3/11/2025  Patient name: Eileen Matias  : 1960  MRN: [id number]  Referring provider: Rogelio Black MD  Dx:   Encounter Diagnosis     ICD-10-CM    1. Right hand weakness  R29.898       2. Numbness and tingling of left upper extremity  R20.0     R20.2                     Subjective:   I am stretching everyday.  I am harder on my hands than you are.  The little finger joint is bending (MP)      Objective: See treatment diary below for clinic program.       Assessment: Tolerated treatment well. Patient would benefit from continued OT. Digital flexion continues to increase steadily. Fifth MP flexion remains restricted.   Light function achieved in art and self care.     Improving ease and increased speed of right noted in clinic program.   Edema controlled with wear of smaller and lighter lymphedema garment.  Increased MP flexion of fifth noted with passive stretching.       Plan: Continue per plan of care.   Progress dexterity and gripping exercises as tolerated. Continue wear of orthotic for wrist stability.  Focus care on PREs, improved dexterity and strength.          Precautions: Lymphedema; hx desmoid tumors and breast cancer; healing humeral fracture     POC expires Unit limit Auth  expiration date PT/OT + Visit Limit?    NA NA BOMN         Dx R Hum Fx     Dr Wayne MILLER MET          Last RE 24    Date 2/13 2/17 2/25 2/27 3/4 3/11    Visit 10/10 1/10 2/10 3/10 4/10 5/10    Manuals 8 8 8 8 8 8              Jt mobs digits, FA MP digits all G  1-2 MP digits all G  1-2 MP digits all G  1-2 MP digits all G  1-2 MP digits all G  1-2 MP digits all G  1-2    Ortho fit/train          Neuro Re-Ed                     Mirror therapy                              orthotic                              Ther Ex 23   20'   20'   20'   20'   20    HEP          PROM 1:1 Digital  pt c/o headache. Dr. Beebe called and made aware. Pending orders. "flexion  LLPS Digital flexion  LLPS Digital flexion  LLPS Digital flexion LLPS Digital flexion LLPS Digital flexion LLPS    Digital blocked AROM P/H fists  5x5\" P/H fists 5x5\" P/H fists 5x5\" P/H fists 5x5\" P/H fists 5x5\" P/H fists 5x5\"    UE warm up UBE 3F 3R  L1 x70 R only   UBE 3F 3R  L1 x70 R only UBE 3F 3R  L1 x70 R only UBE 3F 3R  L1 x80 R only UBE 3F 3R  L1 x100  R only UBE 3F 3R  L1 x100  R only    PRE FA  #2 1/2 shaft  S/P x20 #2 1/2 shaft  S/P x20 #2 1/2 shaft  S/P x20 #2 1/2 shaft  S/P x20 #2 1/2 shaft  S/P x20     PRE Wrist FA G F bar down x20  Up x20  R F bar Fold  x20 G F bar down x20  Up x20  R F bar Fold  x20 G F bar down x20  Up x20  R F bar Fold  x20 G F bar down x20  Up x20  R F bar Fold  x20 G F bar down x20  Up x20  R F bar Fold  x20 B F bar down x20  Up x20  R F bar Fold  x20    PRE digits          PRE digits tips          UE PRE NV resume T bands         Ther Activity   8   8'   8 8     10'   10'    Gripping Gray #2 all foams   Dowel putty press x20 Gray #2 all foams   Dowel putty press x20 Gray #2 all foams  Gray #2 all foams  Gray #2 all foams  Gray #2 all foams  Y Dig flex  X10 each    Pinching   Velcro bd all off then on  Velcro bd all off then on  Velcro doms  Lift with II/III  Black C pins all blocks    Coordination          Carrying           Reaching           MHP Pre tx with fist ace flexion Pre tx with ace flexion Pre tx with ace flexion  Pre tx with ace flexion Pre tx with ace flexion Pre tx with ace flexion    TENS 10' pre tx 10' pre tx 10 pre tx 10' pre tx 10' pre tx 10 pre tx             "

## 2025-03-12 LAB
DME PARACHUTE DELIVERY DATE ACTUAL: NORMAL
DME PARACHUTE DELIVERY DATE REQUESTED: NORMAL
DME PARACHUTE ITEM DESCRIPTION: NORMAL
DME PARACHUTE ORDER STATUS: NORMAL
DME PARACHUTE SUPPLIER NAME: NORMAL
DME PARACHUTE SUPPLIER PHONE: NORMAL

## 2025-03-13 ENCOUNTER — APPOINTMENT (OUTPATIENT)
Dept: OCCUPATIONAL THERAPY | Facility: CLINIC | Age: 65
End: 2025-03-13
Payer: MEDICARE

## 2025-03-17 ENCOUNTER — OFFICE VISIT (OUTPATIENT)
Dept: PHYSICAL THERAPY | Facility: CLINIC | Age: 65
End: 2025-03-17
Payer: MEDICARE

## 2025-03-17 DIAGNOSIS — I89.0 LYMPHEDEMA: Primary | ICD-10-CM

## 2025-03-17 PROCEDURE — 97140 MANUAL THERAPY 1/> REGIONS: CPT | Performed by: PHYSICAL THERAPIST

## 2025-03-17 NOTE — PROGRESS NOTES
Daily Note     Today's date: 3/17/2025  Patient name: Eileen Matias  : 1960  MRN: [id number]  Referring provider: No ref. provider found  Dx:   Encounter Diagnosis     ICD-10-CM    1. Lymphedema  I89.0                      Subjective: Patient reports that she was wearing her nighttime garment today but she notes that she has not been wearing a daytime garment otherwise.  She did arrive 20' late.       Objective: See treatment diary below      Assessment: Tolerated treatment well. Patient would benefit from continued PT      Plan: Continue per plan of care.      Precautions: L BCA  Access Code: [email or web address]  URL: [email or web address]  Date: 2024  Prepared by: Meena Jain    Exercises  - Shoulder Flexion Wall Slide with Towel  - 1 x daily - 7 x weekly - 1 sets - 5 reps - 10 hold  - Standing Shoulder Abduction Slides at Wall  - 1 x daily - 7 x weekly - 3 sets - 10 reps  - Single Arm Doorway Pec Stretch at 90 Degrees Abduction  - 1 x daily - 7 x weekly - 1 sets - 5 reps - 10 hold    Manuals 1/27 1/29 2/5 2/12 2/19 2/26 3/10 3/17     Manual MFR 10'            Self MLD             Compression fitting    15 15        Girth mmts   8' 10 10 10 10 15 10     REV 15                                                                                                       Ther Ex             Pulley 5' 5' 4' 5' 5' 5' 5' '5'                  Wall slides  10 x :10 10 x :10 10 x :10 10 x :10 10 x :10       Bicep curls 4# 2 x 10 4# 2 x 10 4# x 20 4# x 20 2# x 10 4#  4# 2 x 10 4# 2 x 10     Row  4# 2 10 4# 2 x 10 4# x 20 4# x 20 2# x 10 4# 4# 2 x 10 4# 2 x 10     Tricep  4# 2 x10 4# 2 x 10 4# x 20 4# x 20 2# 2 x 10 4# 4# x 20 4# 2 x 20     Bike 6' 6' 6' 6' 6' 6 6'      TG L 22 20 20 2x10 2 x 10 2 x 10 2x10 2 x 10 2x10     Pec press  3# 2x 20 3# 2 x 10 3# 2 x 10 3# 2 x 10 4# 2 x 10 4#  x 20 4#  2 x 10 4# 2 x 10     Deltoid lateral raise 3# 2 x 10 3# 2 x 10 3# 2 x 10 3# 2 x 10         S/l ER 3# 2 x 10 3# 2 x 10 3# 2 x  10 3#2 x 10 4# 2 x 10 4#  x 20 4# 2 x 10 4# 2 x 10     Seated overhead press 3# 2 x 10 3# 2 x 10 3# 2 x 10 3# 2 x 10 4# 2 x 10 4# 2 x 10 4# 2 x10 4# 2 x 10     Shoulder flexion   3# 2 x 10 3# 2 x 10 4# 2 x 10 4# 2 x 10 4 # 2x10 4# 2 x 10                  volume  2192 2163 2147 2198   2183

## 2025-03-18 ENCOUNTER — OFFICE VISIT (OUTPATIENT)
Dept: OCCUPATIONAL THERAPY | Facility: CLINIC | Age: 65
End: 2025-03-18
Payer: MEDICARE

## 2025-03-18 DIAGNOSIS — R29.898 RIGHT HAND WEAKNESS: Primary | ICD-10-CM

## 2025-03-18 DIAGNOSIS — R20.2 NUMBNESS AND TINGLING OF LEFT UPPER EXTREMITY: ICD-10-CM

## 2025-03-18 DIAGNOSIS — R20.0 NUMBNESS AND TINGLING OF LEFT UPPER EXTREMITY: ICD-10-CM

## 2025-03-18 PROCEDURE — 97530 THERAPEUTIC ACTIVITIES: CPT | Performed by: OCCUPATIONAL THERAPIST

## 2025-03-18 PROCEDURE — 97140 MANUAL THERAPY 1/> REGIONS: CPT | Performed by: OCCUPATIONAL THERAPIST

## 2025-03-18 PROCEDURE — 97110 THERAPEUTIC EXERCISES: CPT | Performed by: OCCUPATIONAL THERAPIST

## 2025-03-18 NOTE — PROGRESS NOTES
Daily Note     Today's date: 3/18/2025  Patient name: Eileen Matias  : 1960  MRN: [id number]  Referring provider: Rogelio Black MD  Dx:   Encounter Diagnosis     ICD-10-CM    1. Right hand weakness  R29.898       2. Numbness and tingling of left upper extremity  R20.0     R20.2                     Subjective:   I am stretching everyday.  I am harder on my hands than you are.  The little finger joint is bending (MP)      Objective: See treatment diary below for clinic program.       Assessment: Tolerated treatment well. Patient would benefit from continued OT. Digital flexion continues to increase steadily. Fifth MP flexion remains restricted.   Light function achieved in art and self care.     Improving ease and increased speed of right noted in clinic program.   Edema controlled with wear of smaller and lighter lymphedema garment.  Increased MP flexion of fifth noted with passive stretching.       Plan: Continue per plan of care.   Progress dexterity and gripping exercises as tolerated. Continue wear of orthotic for wrist stability.  Focus care on PREs, improved dexterity and strength.          Precautions: Lymphedema; hx desmoid tumors and breast cancer; healing humeral fracture     POC expires Unit limit Auth  expiration date PT/OT + Visit Limit?    NA NA BOMN         Dx R Hum Fx     Dr Wayne MILLER MET          Last RE 24    Date 2/13 2/17 2/25 2/27 3/4 3/11 3/18   Visit 10/10 1/10 2/10 3/10 4/10 5/10 6/10   Manuals 8 8 8 8 8 8 8             Jt mobs digits, FA MP digits all G  1-2 MP digits all G  1-2 MP digits all G  1-2 MP digits all G  1-2 MP digits all G  1-2 MP digits all G  1-2 MP digits all G  1-2   Ortho fit/train          Neuro Re-Ed                     Mirror therapy                              orthotic                              Ther Ex 23   20'   20'   20'   20'   20   20   HEP       "    PROM 1:1 Digital flexion  LLPS Digital flexion  LLPS Digital flexion  LLPS Digital flexion LLPS Digital flexion LLPS Digital flexion LLPS Digital flexion LLPS   Digital blocked AROM P/H fists  5x5\" P/H fists 5x5\" P/H fists 5x5\" P/H fists 5x5\" P/H fists 5x5\" P/H fists 5x5\" P/H fists 5x5\"   UE warm up UBE 3F 3R  L1 x70 R only   UBE 3F 3R  L1 x70 R only UBE 3F 3R  L1 x70 R only UBE 3F 3R  L1 x80 R only UBE 3F 3R  L1 x100  R only UBE 3F 3R  L1 x100  R only UBE 3F 3R  L1 x110  R only   PRE FA  #2 1/2 shaft  S/P x20 #2 1/2 shaft  S/P x20 #2 1/2 shaft  S/P x20 #2 1/2 shaft  S/P x20 #2 1/2 shaft  S/P x20     PRE Wrist FA G F bar down x20  Up x20  R F bar Fold  x20 G F bar down x20  Up x20  R F bar Fold  x20 G F bar down x20  Up x20  R F bar Fold  x20 G F bar down x20  Up x20  R F bar Fold  x20 G F bar down x20  Up x20  R F bar Fold  x20 B F bar down x20  Up x20  R F bar Fold  x20 B F bar down x20  Up x20  R F bar Fold  x20   PRE digits          PRE digits tips          UE PRE NV resume T bands         Ther Activity   8   8'   8 8     10'   10'   10   Gripping Gray #2 all foams   Dowel putty press x20 Gray #2 all foams   Dowel putty press x20 Gray #2 all foams  Gray #2 all foams  Gray #2 all foams  Gray #2 all foams  Y Dig flex  X10 each Gray #2 all foams  Y Dig flex  X10 each   Pinching   Velcro bd all off then on  Velcro bd all off then on  Velcro doms  Lift with II/III  Black C pins all blocks Black C pins all blocks   Coordination          Carrying           Reaching           MHP Pre tx with fist ace flexion Pre tx with ace flexion Pre tx with ace flexion  Pre tx with ace flexion Pre tx with ace flexion Pre tx with ace flexion Pre tx with ace flexion   TENS 10' pre tx 10' pre tx 10 pre tx 10' pre tx 10' pre tx 10 pre tx 10 pre tx            "

## 2025-03-20 ENCOUNTER — OFFICE VISIT (OUTPATIENT)
Dept: OCCUPATIONAL THERAPY | Facility: CLINIC | Age: 65
End: 2025-03-20
Payer: MEDICARE

## 2025-03-20 DIAGNOSIS — R20.0 NUMBNESS AND TINGLING OF LEFT UPPER EXTREMITY: ICD-10-CM

## 2025-03-20 DIAGNOSIS — R29.898 RIGHT HAND WEAKNESS: Primary | ICD-10-CM

## 2025-03-20 DIAGNOSIS — R20.2 NUMBNESS AND TINGLING OF LEFT UPPER EXTREMITY: ICD-10-CM

## 2025-03-20 PROCEDURE — 97014 ELECTRIC STIMULATION THERAPY: CPT | Performed by: OCCUPATIONAL THERAPIST

## 2025-03-20 PROCEDURE — 97140 MANUAL THERAPY 1/> REGIONS: CPT | Performed by: OCCUPATIONAL THERAPIST

## 2025-03-20 PROCEDURE — 97110 THERAPEUTIC EXERCISES: CPT | Performed by: OCCUPATIONAL THERAPIST

## 2025-03-20 PROCEDURE — 97530 THERAPEUTIC ACTIVITIES: CPT | Performed by: OCCUPATIONAL THERAPIST

## 2025-03-20 NOTE — PROGRESS NOTES
Daily Note     Today's date: 3/20/2025  Patient name: Eileen Matias  : 1960  MRN: [id number]  Referring provider: Rogelio Black MD  Dx:   Encounter Diagnosis     ICD-10-CM    1. Right hand weakness  R29.898       2. Numbness and tingling of left upper extremity  R20.0     R20.2                     Subjective:   I am stretching everyday.  I am harder on my hands than you are.  The little finger joint is bending (MP).  I can feel more (hand dorsum)      Objective: See treatment diary below for clinic program.       Assessment: Tolerated treatment well. Patient would benefit from continued OT. Digital flexion continues to increase steadily. Fifth MP flexion remains restricted.   Light function achieved in art and self care.     Improving ease and increased speed of right noted in clinic program.   Edema controlled with wear of smaller and lighter lymphedema garment.  Increased MP flexion of fifth noted with passive stretching.       Plan: Continue per plan of care.   Progress dexterity and gripping exercises as tolerated. Continue wear of orthotic for wrist stability.  Focus care on PREs, improved dexterity and strength.          Precautions: Lymphedema; hx desmoid tumors and breast cancer; healing humeral fracture     POC expires Unit limit Auth  expiration date PT/OT + Visit Limit?    NA NA BOMN         Dx R Hum Fx     Dr Wayne MILLER MET          Last RE 24    Date 3/20    3/4 3/11 3/18   Visit 7/10    4/10 5/10 6/10   Manuals 8    8 8 8             Jt mobs digits, FA MP digits all G 1-2    MP digits all G  1-2 MP digits all G  1-2 MP digits all G  1-2   Ortho fit/train          Neuro Re-Ed                     Mirror therapy                              orthotic                              Ther Ex 20'      20'   20   20   HEP          PROM 1:1 Digital flexion LLPS    Digital flexion LLPS Digital  "flexion LLPS Digital flexion LLPS   Digital blocked AROM P/H fists 5x5\"    P/H fists 5x5\" P/H fists 5x5\" P/H fists 5x5\"   UE warm up UBE 3F 3R  L1  R only     UBE 3F 3R  L1 x100  R only UBE 3F 3R  L1 x100  R only UBE 3F 3R  L1 x110  R only   PRE FA  #2 1/2 shaft x20 S/P    #2 1/2 shaft  S/P x20     PRE Wrist FA B F bar down x20  G  Up x20  R  bar Fold  x20    G F bar down x20  Up x20  R F bar Fold  x20 B F bar down x20  Up x20  R F bar Fold  x20 B F bar down x20  Up x20  R F bar Fold  x20   PRE digits          PRE digits tips          UE PRE          Ther Activity 10'        10'   10'   10   Gripping Gray #2  all foams  Y Dig flex x10 each    Gray #2 all foams  Gray #2 all foams  Y Dig flex  X10 each Gray #2 all foams  Y Dig flex  X10 each   Pinching Velcro board    Velcro doms  Lift with II/III  Black C pins all blocks Black C pins all blocks   Coordination          Carrying           Reaching           MHP Pre tx    Pre tx with ace flexion Pre tx with ace flexion Pre tx with ace flexion   TENS 10' pre tx    10' pre tx 10 pre tx 10 pre tx            "

## 2025-03-21 DIAGNOSIS — T50.901A MEDICATION OVERDOSE, ACCIDENTAL OR UNINTENTIONAL, INITIAL ENCOUNTER: Primary | ICD-10-CM

## 2025-03-21 DIAGNOSIS — M85.88 OSTEOPENIA OF LUMBAR SPINE: ICD-10-CM

## 2025-03-24 ENCOUNTER — TELEPHONE (OUTPATIENT)
Dept: HEMATOLOGY ONCOLOGY | Facility: CLINIC | Age: 65
End: 2025-03-24

## 2025-03-24 NOTE — TELEPHONE ENCOUNTER
Called and spoke with patient regarding labs needed for upcoming ov, pt verbalized understanding and confirmed appt date & time.

## 2025-03-25 ENCOUNTER — RESULTS FOLLOW-UP (OUTPATIENT)
Dept: INTERNAL MEDICINE CLINIC | Facility: CLINIC | Age: 65
End: 2025-03-25

## 2025-03-25 ENCOUNTER — APPOINTMENT (OUTPATIENT)
Dept: LAB | Facility: HOSPITAL | Age: 65
End: 2025-03-25
Payer: MEDICARE

## 2025-03-25 ENCOUNTER — OFFICE VISIT (OUTPATIENT)
Dept: OCCUPATIONAL THERAPY | Facility: CLINIC | Age: 65
End: 2025-03-25
Payer: MEDICARE

## 2025-03-25 DIAGNOSIS — E78.2 HYPERLIPIDEMIA, MIXED: ICD-10-CM

## 2025-03-25 DIAGNOSIS — Z85.3 ENCOUNTER FOR FOLLOW-UP SURVEILLANCE OF BREAST CANCER: ICD-10-CM

## 2025-03-25 DIAGNOSIS — R20.0 NUMBNESS OF FOOT: ICD-10-CM

## 2025-03-25 DIAGNOSIS — R20.2 NUMBNESS AND TINGLING OF LEFT UPPER EXTREMITY: ICD-10-CM

## 2025-03-25 DIAGNOSIS — M79.676: ICD-10-CM

## 2025-03-25 DIAGNOSIS — Z08 ENCOUNTER FOR FOLLOW-UP SURVEILLANCE OF BREAST CANCER: ICD-10-CM

## 2025-03-25 DIAGNOSIS — R20.0 NUMBNESS AND TINGLING OF LEFT UPPER EXTREMITY: ICD-10-CM

## 2025-03-25 DIAGNOSIS — Z79.811 AROMATASE INHIBITOR USE: ICD-10-CM

## 2025-03-25 DIAGNOSIS — M85.88 OSTEOPENIA OF LUMBAR SPINE: ICD-10-CM

## 2025-03-25 DIAGNOSIS — R73.01 IMPAIRED FASTING GLUCOSE: ICD-10-CM

## 2025-03-25 DIAGNOSIS — R29.898 RIGHT HAND WEAKNESS: Primary | ICD-10-CM

## 2025-03-25 LAB
ALBUMIN SERPL BCG-MCNC: 3.9 G/DL (ref 3.5–5)
ALP SERPL-CCNC: 82 U/L (ref 34–104)
ALT SERPL W P-5'-P-CCNC: 19 U/L (ref 7–52)
ANION GAP SERPL CALCULATED.3IONS-SCNC: 6 MMOL/L (ref 4–13)
AST SERPL W P-5'-P-CCNC: 21 U/L (ref 13–39)
BASOPHILS # BLD AUTO: 0.04 THOUSANDS/ÂΜL (ref 0–0.1)
BASOPHILS NFR BLD AUTO: 1 % (ref 0–1)
BILIRUB SERPL-MCNC: 0.72 MG/DL (ref 0.2–1)
BUN SERPL-MCNC: 9 MG/DL (ref 5–25)
CALCIUM SERPL-MCNC: 8.8 MG/DL (ref 8.4–10.2)
CHLORIDE SERPL-SCNC: 102 MMOL/L (ref 96–108)
CHOLEST SERPL-MCNC: 221 MG/DL (ref ?–200)
CO2 SERPL-SCNC: 30 MMOL/L (ref 21–32)
CREAT SERPL-MCNC: 0.55 MG/DL (ref 0.6–1.3)
EOSINOPHIL # BLD AUTO: 0.11 THOUSAND/ÂΜL (ref 0–0.61)
EOSINOPHIL NFR BLD AUTO: 2 % (ref 0–6)
ERYTHROCYTE [DISTWIDTH] IN BLOOD BY AUTOMATED COUNT: 13.1 % (ref 11.6–15.1)
EST. AVERAGE GLUCOSE BLD GHB EST-MCNC: 117 MG/DL
GFR SERPL CREATININE-BSD FRML MDRD: 98 ML/MIN/1.73SQ M
GLUCOSE P FAST SERPL-MCNC: 76 MG/DL (ref 65–99)
HBA1C MFR BLD: 5.7 %
HCT VFR BLD AUTO: 45.2 % (ref 34.8–46.1)
HDLC SERPL-MCNC: 55 MG/DL
HGB BLD-MCNC: 14.5 G/DL (ref 11.5–15.4)
IMM GRANULOCYTES # BLD AUTO: 0.01 THOUSAND/UL (ref 0–0.2)
IMM GRANULOCYTES NFR BLD AUTO: 0 % (ref 0–2)
LDLC SERPL CALC-MCNC: 140 MG/DL (ref 0–100)
LYMPHOCYTES # BLD AUTO: 1.69 THOUSANDS/ÂΜL (ref 0.6–4.47)
LYMPHOCYTES NFR BLD AUTO: 36 % (ref 14–44)
MCH RBC QN AUTO: 29.1 PG (ref 26.8–34.3)
MCHC RBC AUTO-ENTMCNC: 32.1 G/DL (ref 31.4–37.4)
MCV RBC AUTO: 91 FL (ref 82–98)
MONOCYTES # BLD AUTO: 0.46 THOUSAND/ÂΜL (ref 0.17–1.22)
MONOCYTES NFR BLD AUTO: 10 % (ref 4–12)
NEUTROPHILS # BLD AUTO: 2.41 THOUSANDS/ÂΜL (ref 1.85–7.62)
NEUTS SEG NFR BLD AUTO: 51 % (ref 43–75)
NRBC BLD AUTO-RTO: 0 /100 WBCS
PLATELET # BLD AUTO: 284 THOUSANDS/UL (ref 149–390)
PMV BLD AUTO: 9.4 FL (ref 8.9–12.7)
POTASSIUM SERPL-SCNC: 4.1 MMOL/L (ref 3.5–5.3)
PROT SERPL-MCNC: 6.8 G/DL (ref 6.4–8.4)
RBC # BLD AUTO: 4.98 MILLION/UL (ref 3.81–5.12)
SODIUM SERPL-SCNC: 138 MMOL/L (ref 135–147)
TRIGL SERPL-MCNC: 131 MG/DL (ref ?–150)
TSH SERPL DL<=0.05 MIU/L-ACNC: 1.48 UIU/ML (ref 0.45–4.5)
WBC # BLD AUTO: 4.72 THOUSAND/UL (ref 4.31–10.16)

## 2025-03-25 PROCEDURE — 97110 THERAPEUTIC EXERCISES: CPT | Performed by: OCCUPATIONAL THERAPIST

## 2025-03-25 PROCEDURE — 36415 COLL VENOUS BLD VENIPUNCTURE: CPT

## 2025-03-25 PROCEDURE — 97530 THERAPEUTIC ACTIVITIES: CPT | Performed by: OCCUPATIONAL THERAPIST

## 2025-03-25 PROCEDURE — 80061 LIPID PANEL: CPT

## 2025-03-25 PROCEDURE — 83036 HEMOGLOBIN GLYCOSYLATED A1C: CPT

## 2025-03-25 PROCEDURE — 85025 COMPLETE CBC W/AUTO DIFF WBC: CPT

## 2025-03-25 PROCEDURE — 97140 MANUAL THERAPY 1/> REGIONS: CPT | Performed by: OCCUPATIONAL THERAPIST

## 2025-03-25 PROCEDURE — 80053 COMPREHEN METABOLIC PANEL: CPT

## 2025-03-25 PROCEDURE — 84443 ASSAY THYROID STIM HORMONE: CPT

## 2025-03-25 NOTE — PROGRESS NOTES
Daily Note     Today's date: 3/25/2025  Patient name: Eileen Matias  : 1960  MRN: [id number]  Referring provider: Rogelio Black MD  Dx:   Encounter Diagnosis     ICD-10-CM    1. Right hand weakness  R29.898       2. Numbness and tingling of left upper extremity  R20.0     R20.2                     Subjective:   The thumb has more motion and I can get my sleeve off if I do it slowly.    I can feel more (hand dorsum)      Objective: See treatment diary below for clinic program.       Assessment: Tolerated treatment well. Patient would benefit from continued OT. Digital flexion continues to increase steadily. Fifth MP flexion remains restricted.   Light function achieved in art and self care.     Improving ease and increased speed of right noted in clinic program.   Edema controlled with wear of smaller and lighter lymphedema garment.  Increased MP flexion of fifth noted with passive stretching.       Plan: Continue per plan of care.   Progress dexterity and gripping exercises as tolerated. Continue wear of orthotic for wrist stability.  Focus care on PREs, improved dexterity and strength.          Precautions: Lymphedema; hx desmoid tumors and breast cancer; healing humeral fracture     POC expires Unit limit Auth  expiration date PT/OT + Visit Limit?    NA NA BOMN         Dx R Hum Fx     Dr Wayne MILLER MET          Last RE 24    Date 3/20 3/25    3/11 3/18   Visit 7/10 8/10 9/10 10/10 RE  5/10 6/10   Manuals 8 8    8 8             Jt mobs digits, FA MP digits all G 1-2 MP digits all G 1-2    MP digits all G  1-2 MP digits all G  1-2   Ortho fit/train          Neuro Re-Ed                     Mirror therapy                              orthotic                              Ther Ex 20'   20'      20   20   HEP          PROM 1:1 Digital flexion LLPS Digital flexion LLPS    Digital flexion LLPS Digital  "flexion LLPS   Digital blocked AROM P/H fists 5x5\" P/H fists 5x5\"    P/H fists 5x5\" P/H fists 5x5\"   UE warm up UBE 3F 3R  L1  R only 110 UBE 3F 3R  L1  R only 110    UBE 3F 3R  L1 x100  R only UBE 3F 3R  L1 x110  R only   PRE FA  #2 1/2 shaft x20 S/P #2 1/2 shaft x20 S/P        PRE Wrist FA B F bar down x20  G  Up x20  R  bar Fold  x20 B F bar down x20  G  Up x20  R  bar Fold  x20    B F bar down x20  Up x20  R F bar Fold  x20 B F bar down x20  Up x20  R F bar Fold  x20   PRE digits          PRE digits tips          UE PRE          Ther Activity 10'   10'      10'   10   Gripping Gray #2  all foams  Y Dig flex x10 each Gray #2  all foams  Y Dig flex x10 each    Gray #2 all foams  Y Dig flex  X10 each Gray #2 all foams  Y Dig flex  X10 each   Pinching Velcro board Velcro board, full bd    Black C pins all blocks Black C pins all blocks   Coordination          Carrying           Reaching           MHP Pre tx Pre tx with ace flexion    Pre tx with ace flexion Pre tx with ace flexion   TENS 10' pre tx 10' pre tx    10 pre tx 10 pre tx            "

## 2025-03-26 ENCOUNTER — OFFICE VISIT (OUTPATIENT)
Dept: OBGYN CLINIC | Facility: HOSPITAL | Age: 65
End: 2025-03-26
Payer: MEDICARE

## 2025-03-26 ENCOUNTER — OFFICE VISIT (OUTPATIENT)
Dept: PHYSICAL THERAPY | Facility: CLINIC | Age: 65
End: 2025-03-26
Payer: MEDICARE

## 2025-03-26 VITALS — WEIGHT: 130 LBS | HEIGHT: 68 IN | BODY MASS INDEX: 19.7 KG/M2

## 2025-03-26 DIAGNOSIS — G56.30 RADIAL NERVE PALSY: Primary | ICD-10-CM

## 2025-03-26 DIAGNOSIS — I89.0 LYMPHEDEMA: Primary | ICD-10-CM

## 2025-03-26 PROCEDURE — 97110 THERAPEUTIC EXERCISES: CPT | Performed by: PHYSICAL THERAPIST

## 2025-03-26 PROCEDURE — 97140 MANUAL THERAPY 1/> REGIONS: CPT | Performed by: PHYSICAL THERAPIST

## 2025-03-26 PROCEDURE — 97530 THERAPEUTIC ACTIVITIES: CPT | Performed by: PHYSICAL THERAPIST

## 2025-03-26 PROCEDURE — 99213 OFFICE O/P EST LOW 20 MIN: CPT | Performed by: ORTHOPAEDIC SURGERY

## 2025-03-26 NOTE — PROGRESS NOTES
Daily Note     Today's date: 3/26/2025  Patient name: Eileen Matias  : 1960  MRN: [id number]  Referring provider: No ref. provider found  Dx:   Encounter Diagnosis     ICD-10-CM    1. 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  She notes that she does not feel any pain of achiness.        Objective: See treatment diary below.  MMTs of R fingers taken as well as L UE       Assessment: Tolerated treatment well. Patient would benefit from continued PT      Plan: Continue per plan of care.      Precautions: L BCA  Access Code: [email or web address]  URL: [email or web address]  Date: 2024  Prepared by: Meena Jain    Exercises  - Shoulder Flexion Wall Slide with Towel  - 1 x daily - 7 x weekly - 1 sets - 5 reps - 10 hold  - Standing Shoulder Abduction Slides at Wall  - 1 x daily - 7 x weekly - 3 sets - 10 reps  - Single Arm Doorway Pec Stretch at 90 Degrees Abduction  - 1 x daily - 7 x weekly - 1 sets - 5 reps - 10 hold    Manuals 1/27 1/29 2/5 2/12 2/19 2/26 3/10 3/17 3/26    Manual MFR 10'            Self MLD             Compression fitting    15 15        Girth mmts   8' 10 10 10 10 15 10 15    REV 15                                                                                                       Ther Ex             Pulley 5' 5' 4' 5' 5' 5' 5' '5' 5'                 Wall slides  10 x :10 10 x :10 10 x :10 10 x :10 10 x :10       Bicep curls 4# 2 x 10 4# 2 x 10 4# x 20 4# x 20 2# x 10 4#  4# 2 x 10 4# 2 x 10 4# 2 x 10    Row  4# 2 10 4# 2 x 10 4# x 20 4# x 20 2# x 10 4# 4# 2 x 10 4# 2 x 10 4# 2 x 10    Tricep  4# 2 x10 4# 2 x 10 4# x 20 4# x 20 2# 2 x 10 4# 4# x 20 4# 2 x 20 4# 2 x 10    Bike 6' 6' 6' 6' 6' 6 6'  6'    TG L 22 20 20 2x10 2 x 10 2 x 10 2x10 2 x 10 2x10 2x10    Pec press  3# 2x 20 3# 2 x 10 3# 2 x 10 3# 2 x 10 4# 2 x 10 4#  x 20 4#  2 x 10 4# 2 x 10 4# 2 x 10    Deltoid lateral  raise 3# 2 x 10 3# 2 x 10 3# 2 x 10 3# 2 x 10         S/l ER 3# 2 x 10 3# 2 x 10 3# 2 x 10 3#2 x 10 4# 2 x 10 4#  x 20 4# 2 x 10 4# 2 x 10 4# 2 x 10    Seated overhead press 3# 2 x 10 3# 2 x 10 3# 2 x 10 3# 2 x 10 4# 2 x 10 4# 2 x 10 4# 2 x10 4# 2 x 10 4# 2 x 10    Shoulder flexion   3# 2 x 10 3# 2 x 10 4# 2 x 10 4# 2 x 10 4 # 2x10 4# 2 x 10 4# 2 x 10                 volume  2192 2163 2147 2198   2183

## 2025-03-26 NOTE — PROGRESS NOTES
"    ORTHOPAEDIC HAND, WRIST, AND ELBOW OFFICE  VISIT     Name: Eileen Matias      : 1960      MRN: [id number]  Encounter Provider: Rogelio Black MD  Encounter Date: 3/26/2025   Encounter department: [facility] [place]  :  Assessment & Plan  Radial nerve palsy  Discussed with the patient that I see no issue if she wants to proceed with a tendon transfer at the time of lymph node transplant.  Explained the wrist and thumb are ready at this time however the fingers are not I recommend Pronator teres to [facility].  If he would like to discuss I would be happy to speak with him.  Patient has some improvement median nerve and flexion   Return once she has recovered from the transfer  Follow up 2 mos after her surgery with Dr. Badillo            History of Present Illness   HPI  Chief Complaint   Patient presents with   • Right Hand - Follow-up       Eileen Matias is a 64 y.o. female who presents today for follow regarding a right radial nerve palsy following humeral fracture.  She is attending therapy as instructed.  She reports some improvement as compared to the last visit.    She reports the physical who plans on doing a lymph node transplant he would also Justino who would like to do a tendon transfer      REVIEW OF SYSTEMS:  General: no fever, no chills  HEENT:  No loss of hearing or eyesight problems  Eyes:  No red eyes  Respiratory:  No coughing, shortness of breath or wheezing  Cardiovascular:  No chest pain, no palpitations  GI:  Abdomen soft nontender, denies nausea  Endocrine:  No muscle weakness, no frequent urination, no excessive thirst  Urinary:  No dysuria, no incontinence  Musculoskeletal: see HPI and PE  SKIN:  No skin rash, no dry skin  Neurological:  No headaches, no confusion  Psychiatric:  No suicide thoughts, no anxiety, no depression  Review of all other systems is negative    Objective   Ht 5' 8\" (1.727 m)   Wt 59 kg (130 lb)   BMI 19.77 kg/m²      General: " well developed and well nourished, alert, oriented times 3, and appears comfortable  Psychiatric: Normal  HEENT: Trachea Midline, No torticollis  Cardiovascular: No discernable arrhythmia  Pulmonary: No wheezing or stridor  Abdomen: No rebound or guarding  Extremities: No peripheral edema  Skin: No masses, erythema, lacerations, fluctation, ulcerations  Neurovascular: Sensation Intact to the Median, Ulnar, Radial Nerve, Motor Intact to the Median, Ulnar, Radial Nerve, and Pulses Intact    Musculoskeletal exam:  Right wrist  70-75% composite fist    STUDIES REVIEWED:  No Studies to review      PROCEDURES PERFORMED:  Procedures  No Procedures performed today    Scribe Attestation    I,:  Belén Gillette MA am acting as a scribe while in the presence of the attending physician.:       I,:  Rogelio Black MD personally performed the services described in this documentation    as scribed in my presence.:

## 2025-03-27 ENCOUNTER — OFFICE VISIT (OUTPATIENT)
Dept: HEMATOLOGY ONCOLOGY | Facility: CLINIC | Age: 65
End: 2025-03-27
Payer: MEDICARE

## 2025-03-27 ENCOUNTER — OFFICE VISIT (OUTPATIENT)
Dept: OCCUPATIONAL THERAPY | Facility: CLINIC | Age: 65
End: 2025-03-27
Payer: MEDICARE

## 2025-03-27 VITALS
DIASTOLIC BLOOD PRESSURE: 88 MMHG | BODY MASS INDEX: 19.7 KG/M2 | HEART RATE: 84 BPM | TEMPERATURE: 98.3 F | RESPIRATION RATE: 17 BRPM | WEIGHT: 130 LBS | HEIGHT: 68 IN | OXYGEN SATURATION: 97 % | SYSTOLIC BLOOD PRESSURE: 134 MMHG

## 2025-03-27 DIAGNOSIS — R29.898 RIGHT HAND WEAKNESS: Primary | ICD-10-CM

## 2025-03-27 DIAGNOSIS — R20.2 NUMBNESS AND TINGLING OF LEFT UPPER EXTREMITY: ICD-10-CM

## 2025-03-27 DIAGNOSIS — Z79.811 AROMATASE INHIBITOR USE: ICD-10-CM

## 2025-03-27 DIAGNOSIS — Z91.89 AT HIGH RISK FOR FRACTURE: ICD-10-CM

## 2025-03-27 DIAGNOSIS — Z08 ENCOUNTER FOR FOLLOW-UP SURVEILLANCE OF BREAST CANCER: Primary | ICD-10-CM

## 2025-03-27 DIAGNOSIS — M85.88 OSTEOPENIA OF LUMBAR SPINE: ICD-10-CM

## 2025-03-27 DIAGNOSIS — R20.0 NUMBNESS AND TINGLING OF LEFT UPPER EXTREMITY: ICD-10-CM

## 2025-03-27 DIAGNOSIS — Z85.3 ENCOUNTER FOR FOLLOW-UP SURVEILLANCE OF BREAST CANCER: Primary | ICD-10-CM

## 2025-03-27 PROCEDURE — 97110 THERAPEUTIC EXERCISES: CPT | Performed by: OCCUPATIONAL THERAPIST

## 2025-03-27 PROCEDURE — 99214 OFFICE O/P EST MOD 30 MIN: CPT | Performed by: PHYSICIAN ASSISTANT

## 2025-03-27 PROCEDURE — 97140 MANUAL THERAPY 1/> REGIONS: CPT | Performed by: OCCUPATIONAL THERAPIST

## 2025-03-27 PROCEDURE — 97530 THERAPEUTIC ACTIVITIES: CPT | Performed by: OCCUPATIONAL THERAPIST

## 2025-03-27 RX ORDER — ALENDRONATE SODIUM 70 MG/1
70 TABLET ORAL
Qty: 4 TABLET | Refills: 4 | Status: SHIPPED | OUTPATIENT
Start: 2025-03-27 | End: 2025-09-23

## 2025-03-27 NOTE — PATIENT INSTRUCTIONS
Please discuss alginate (Fosamax) with your surgeon, let me know when you begin therapy.  You should take 1 pill every week.  Continue to take vitamin D and calcium.  If you develop any recurrent reflux, heartburn, indigestion etc. you can restart the pantoprazole  Repeat labs in 6 months prior to your office visit.

## 2025-03-27 NOTE — PROGRESS NOTES
Daily Note     Today's date: 3/27/2025  Patient name: Eileen Matias  : 1960  MRN: [id number]  Referring provider: Rogelio Black MD  Dx:   Encounter Diagnosis     ICD-10-CM    1. Right hand weakness  R29.898       2. Numbness and tingling of left upper extremity  R20.0     R20.2                     Subjective:   The thumb has more motion and I can get my sleeve off if I do it slowly.    I can feel more (hand dorsum)      Objective: See treatment diary below for clinic program.       Assessment: Tolerated treatment well. Patient would benefit from continued OT. Digital flexion continues to increase steadily. Fifth MP flexion remains restricted.   Light function achieved in art and self care.     Improving ease and increased speed of right noted in clinic program.   Edema controlled with wear of smaller and lighter lymphedema garment.  Increased MP flexion of fifth noted with passive stretching.       Plan: Continue per plan of care.   Progress dexterity and gripping exercises as tolerated. Continue wear of orthotic for wrist stability.  Focus care on PREs, improved dexterity and strength.          Precautions: Lymphedema; hx desmoid tumors and breast cancer; healing humeral fracture     POC expires Unit limit Auth  expiration date PT/OT + Visit Limit?    NA NA BOMN         Dx R Hum Fx     Dr Wayne MILLER MET          Last RE 24    Date 3/20 3/25 3/27   3/11 3/18   Visit 7/10 8/10 9/10 10/10 RE  5/10 6/10   Manuals 8 8 8   8 8             Jt mobs digits, FA MP digits all G 1-2 MP digits all G 1-2 MP digits all G 1-2   MP digits all G  1-2 MP digits all G  1-2   Ortho fit/train          Neuro Re-Ed                     Mirror therapy                              orthotic                              Ther Ex 20'   20'   20     20   20   HEP          PROM 1:1 Digital flexion LLPS Digital flexion LLPS Digital  "flexion LLPS   Digital flexion LLPS Digital flexion LLPS   Digital blocked AROM P/H fists 5x5\" P/H fists 5x5\" P/H fists  5x5\"   P/H fists 5x5\" P/H fists 5x5\"   UE warm up UBE 3F 3R  L1  R only 110 UBE 3F 3R  L1  R only 110 UBE 3F 3R L1  R only 110   UBE 3F 3R  L1 x100  R only UBE 3F 3R  L1 x110  R only   PRE FA  #2 1/2 shaft x20 S/P #2 3/4 shaft x20 S/P #2 1/2 shaft x20 S/P       PRE Wrist FA B F bar down x20  G  Up x20  R  bar Fold  x20 B F bar down x20  G  Up x20  R  bar Fold  x20 B F bar down x6  G F bar down  X20; up x20  R Fold x20   B F bar down x20  Up x20  R F bar Fold  x20 B F bar down x20  Up x20  R F bar Fold  x20   PRE digits          PRE digits tips          UE PRE          Ther Activity 10'   10'   10     10'   10   Gripping Gray #2  all foams  Y Dig flex x10 each Gray #2  all foams  Y Dig flex x10 each Gray #2  all foams  Y/R Dig flex x10 each   Gray #2 all foams  Y Dig flex  X10 each Gray #2 all foams  Y Dig flex  X10 each   Pinching Velcro board Velcro board, full bd Velcro bd  Full bd   Black C pins all blocks Black C pins all blocks   Coordination          Carrying           Reaching           MHP Pre tx Pre tx with ace flexion Pre tx with ace flexion   Pre tx with ace flexion Pre tx with ace flexion   TENS 10' pre tx 10' pre tx 10' pre tx   10 pre tx 10 pre tx            "

## 2025-03-27 NOTE — PROGRESS NOTES
Name: Eileen Matias      : 1960      MRN: [id number]  Encounter Provider: Karo Hillman PA-C  Encounter Date: 3/27/2025   Encounter department: [facility] [place]  :  Assessment & Plan  Encounter for follow-up surveillance of breast cancer  Aromatase inhibitor use  - Treatment: Continue Letrazole   - Annual breast exam. Pt deferred. Performed by surgical oncology Dr. Rea   - Imaging: Continue annual mammogram  - Labs: CBC, CMP q6m   - Follow up with Treatment Team Members  Surgeon: Dr. Motley  Rad Onc: Dr. Sethi  Palliative    Orders:    CBC and differential; Future    Comprehensive metabolic panel; Future    Osteopenia of lumbar spine  At high risk for fracture  DXA shows persistent osteopenia with statistically significant decrease in left hio 7.2% compared to prior. Patient has history of R humeral fracture.   May resume Fosfamax, 70mg 1 tablet by mouth once a week. She was previously educated on possible SE  Repeat DXA in 1 year after starting treatment then q2yr   Conintue Calcium and Vitamin D, at least 600mg and 3000 IU respectively. Weight bearing exercises as tolerated     Orders:    alendronate (FOSAMAX) 70 mg tablet; Take 1 tablet (70 mg total) by mouth every 7 days    CBC and differential; Future    Comprehensive metabolic panel; Future        Return in about 6 months (around 2025) for Office Visit.    History of Present Illness   Chief Complaint   Patient presents with    Follow-up     63 y.o. c PMHx notable for Desmoid tumors, RUE grade 3 lymphedema, ovarian cancer s/p hysterectomy, and early stage L breast cancer s/p lumpectomy/RT and adjuvant endocrine therapy who presents as follow up.      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   ER >95  TX 95   HER2 1+    On ultrasound lymph node is 1.8 cm. There is cortical thickening without evident fatty hilum.     In review of screening mammogram, demonstrates no suspicious mammographic findings identified in either breast. Bilateral MRI is recommended. Flow cytometry - there is no evidence of a B-cell or T-cell non-Hodgkin lymphoma.       4/20/2022 Genomic Testing    A total of 36 genes were evaluated, including: ANGEL LUIS, BRCA1, BRCA2, CDH1, CHEK2, PALB2, PTEN, STK11, TP53  Negative result. No pathogenic sequence variants or deletions/duplications identified     5/20/2022 Biopsy    MRI guided biopsy  A. Left breast   11 o'clock   Invasive mammary carcinoma of no special type (ductal)  Grade 1  ER 95  TX 95  HER2 0    B. Right breast   Retroareolar  Benign fibrocystic changes without atypia (discrete 0.3 cm focus of sclerosing and tubular adenosis with usual ductal hyperplasia, columnar cell change and hyperplasia, apocrine metaplasia, cystic dilatation).   - Microcalcifications: Present associated with non-neoplastic breast tissue.   - Negative for malignancy, in-situ carcinoma and atypical hyperplasia.     Malignant pathology appears unifocal. Biopsy proven carcinoma measured 1 cm on MRI. Biopsy proved metastatic disease to left axillary lymph node.      6/9/2022 Genomic Testing    MammaPrint  Low risk  Luminal type A  MammaPrint index: +0.578     6/21/2022 Surgery    Left breast tiffany  directed lumpectomy with axillary dissection  A. Invasive 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  Grade 1  1.3 cm  Lymphovascular invasion is identified  Margins negative    H. Axillary, Level I and Level II axillary contents  Metastatic carcinoma involving 3/17 lymph nodes  1.8 cm   Extranodal extension is identified 1mm  Foci of carcinoma identified in extranodal lymphatic channels    Anatomic stage: Stage IIA  Prognostic stage: Stage  IA      6/21/2022 -  Cancer Staged    Staging form: Breast, AJCC 8th Edition  - Pathologic stage from 6/21/2022: Stage IA (pT1c, pN1, cM0, G1, ER+, AL+, HER2-) - Signed by Alfredo Hays MD on 8/1/2022  Stage prefix: Initial diagnosis  Nuclear grade: G1  Multigene prognostic tests performed: None  Mitotic count score: Score 1  Histologic grading system: 3 grade system       8/29/2022 - 10/10/2022 Radiation    Treatments:  Course: C1  Plan ID Energy Fractions Dose per Fraction (cGy) Total Dose Delivered (cGy) Elapsed Days   BH L Breast 6X 25 / 25 200 5,000 35   BH L [id number] 12E 5 / 5 200 1,000 6   BH L SClav 10X/6X 24 / 24 200 4,800 32    Treatment Dates:  8/29/2022 - 10/10/2022.      Malignant neoplasm of lower-inner quadrant of left breast in female, estrogen receptor positive (HCC)   5/20/2022 Initial Diagnosis    Malignant neoplasm of lower-inner quadrant of left breast in female, estrogen receptor positive (HCC)     5/20/2022 Biopsy    MRI guided biopsy  A. Left breast   11 o'clock   Invasive mammary carcinoma of no special type (ductal)  Grade 1  ER 95  AL 95  HER2 0    B. Right breast   Retroareolar  Benign fibrocystic changes without atypia (discrete 0.3 cm focus of sclerosing and tubular adenosis with usual ductal hyperplasia, columnar cell change and hyperplasia, apocrine metaplasia, cystic dilatation).   - Microcalcifications: Present associated with non-neoplastic breast tissue.   - Negative for malignancy, in-situ carcinoma and atypical hyperplasia.     Malignant pathology appears unifocal. Biopsy proven carcinoma measured 1 cm on MRI. Biopsy proved metastatic disease to left axillary lymph node.      6/9/2022 Genomic Testing    MammaPrint  Low risk  Luminal type A  MammaPrint index: +0.578     6/21/2022 Surgery    Left breast tiffany  directed lumpectomy with axillary dissection  A. Invasive 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  Grade 1  1.3 cm  Lymphovascular invasion is  "identified  Margins negative    H. Axillary, Level I and Level II axillary contents  Metastatic carcinoma involving 3/17 lymph nodes  1.8 cm   Extranodal extension is identified 1mm  Foci of carcinoma identified in extranodal lymphatic channels    Anatomic stage: Stage IIA  Prognostic stage: Stage IA      8/29/2022 - 10/10/2022 Radiation    Treatments:  Course: C1  Plan ID Energy Fractions Dose per Fraction (cGy) Total Dose Delivered (cGy) Elapsed Days   BH L Breast 6X 25 / 25 200 5,000 35   BH L [id number] 12E 5 / 5 200 1,000 6   BH L SClav 10X/6X 24 / 24 200 4,800 32    Treatment Dates:  8/29/2022 - 10/10/2022.        4/22/2022 CT CAP w/c: Previously biopsied abnormal left axillary lymph node again identified. No other abnormal soft tissue mass identified.  No acute inflammatory process identified     4/24/2022 NM Bone Scan: Probable degenerative change in the lower thoracic spine     MammaPrint. Low risk. No role for chemotherapy.     10/19/2022 DEXA Scan: Lumbar spine T score -1.9. Left total hip T score -1.3. Left femoral neck T score -1.1. Low bone mass (osteopenia)     4/2023: Mammogram NAD    10/2023: broke her left arm in October 2023. Cannot have surgery due to her stage 4 lymphedema and Desmoid tumor.     04/2024: Mammogram NAD    11/2024: She was taking Fosfamax 70mg every morning for 5 days instead of prescribed dose of 70mg weekly. he was having sharp pain in diaphragm and chest tightness x 2 days. Had EGD whish was normal.     Pertinent Medical History     05/29/25: no new complaints      Review of Systems   All other systems reviewed and are negative.          Objective   /88 (BP Location: Left arm, Patient Position: Sitting, Cuff Size: Adult)   Pulse 84   Temp 98.3 °F (36.8 °C) (Temporal)   Resp 17   Ht 5' 8\" (1.727 m)   Wt 59 kg (130 lb)   SpO2 97%   BMI 19.77 kg/m²     Physical Exam  Vitals reviewed.   HENT:      Head: Normocephalic.     Cardiovascular:      Rate and Rhythm: Normal rate " and regular rhythm.   Pulmonary:      Effort: Pulmonary effort is normal.      Breath sounds: Normal breath sounds.     Musculoskeletal:      Cervical back: Neck supple.     Skin:     Findings: No rash.     Neurological:      Mental Status: She is alert.         Labs: I have reviewed the following labs:  Results for orders placed or performed in visit on 03/25/25   CBC and differential   Result Value Ref Range    WBC 4.72 4.31 - 10.16 Thousand/uL    RBC 4.98 3.81 - 5.12 Million/uL    Hemoglobin 14.5 11.5 - 15.4 g/dL    Hematocrit 45.2 34.8 - 46.1 %    MCV 91 82 - 98 fL    MCH 29.1 26.8 - 34.3 pg    MCHC 32.1 31.4 - 37.4 g/dL    RDW 13.1 11.6 - 15.1 %    MPV 9.4 8.9 - 12.7 fL    Platelets 284 149 - 390 Thousands/uL    nRBC 0 /100 WBCs    Segmented % 51 43 - 75 %    Immature Grans % 0 0 - 2 %    Lymphocytes % 36 14 - 44 %    Monocytes % 10 4 - 12 %    Eosinophils Relative 2 0 - 6 %    Basophils Relative 1 0 - 1 %    Absolute Neutrophils 2.41 1.85 - 7.62 Thousands/µL    Absolute Immature Grans 0.01 0.00 - 0.20 Thousand/uL    Absolute Lymphocytes 1.69 0.60 - 4.47 Thousands/µL    Absolute Monocytes 0.46 0.17 - 1.22 Thousand/µL    Eosinophils Absolute 0.11 0.00 - 0.61 Thousand/µL    Basophils Absolute 0.04 0.00 - 0.10 Thousands/µL   Comprehensive metabolic panel   Result Value Ref Range    Sodium 138 135 - 147 mmol/L    Potassium 4.1 3.5 - 5.3 mmol/L    Chloride 102 96 - 108 mmol/L    CO2 30 21 - 32 mmol/L    ANION GAP 6 4 - 13 mmol/L    BUN 9 5 - 25 mg/dL    Creatinine 0.55 (L) 0.60 - 1.30 mg/dL    Glucose, Fasting 76 65 - 99 mg/dL    Calcium 8.8 8.4 - 10.2 mg/dL    AST 21 13 - 39 U/L    ALT 19 7 - 52 U/L    Alkaline Phosphatase 82 34 - 104 U/L    Total Protein 6.8 6.4 - 8.4 g/dL    Albumin 3.9 3.5 - 5.0 g/dL    Total Bilirubin 0.72 0.20 - 1.00 mg/dL    eGFR 98 ml/min/1.73sq m   TSH, 3rd generation with Free T4 reflex   Result Value Ref Range    TSH 3RD GENERATON 1.482 0.450 - 4.500 uIU/mL   Hemoglobin A1C   Result Value  Ref Range    Hemoglobin A1C 5.7 (H) Normal 4.0-5.6%; PreDiabetic 5.7-6.4%; Diabetic >=6.5%; Glycemic control for adults with diabetes <7.0% %     mg/dl   Lipid Panel with Direct LDL reflex   Result Value Ref Range    Cholesterol 221 (H) See Comment mg/dL    Triglycerides 131 See Comment mg/dL    HDL, Direct 55 >=50 mg/dL    LDL Calculated 140 (H) 0 - 100 mg/dL     *Note: Due to a large number of results and/or encounters for the requested time period, some results have not been displayed. A complete set of results can be found in Results Review.

## 2025-03-28 ENCOUNTER — PROCEDURE VISIT (OUTPATIENT)
Dept: NEUROLOGY | Facility: CLINIC | Age: 65
End: 2025-03-28
Payer: MEDICARE

## 2025-03-28 DIAGNOSIS — M79.676: ICD-10-CM

## 2025-03-28 DIAGNOSIS — R20.0 NUMBNESS OF FOOT: ICD-10-CM

## 2025-03-28 DIAGNOSIS — G62.9 NEUROPATHY: Primary | ICD-10-CM

## 2025-03-28 DIAGNOSIS — G62.9 NEUROPATHY: ICD-10-CM

## 2025-03-28 PROCEDURE — 95886 MUSC TEST DONE W/N TEST COMP: CPT | Performed by: PHYSICAL MEDICINE & REHABILITATION

## 2025-03-28 PROCEDURE — 95910 NRV CNDJ TEST 7-8 STUDIES: CPT | Performed by: PHYSICAL MEDICINE & REHABILITATION

## 2025-04-01 ENCOUNTER — OFFICE VISIT (OUTPATIENT)
Dept: OCCUPATIONAL THERAPY | Facility: CLINIC | Age: 65
End: 2025-04-01
Payer: MEDICARE

## 2025-04-01 DIAGNOSIS — R20.2 NUMBNESS AND TINGLING OF LEFT UPPER EXTREMITY: ICD-10-CM

## 2025-04-01 DIAGNOSIS — R20.0 NUMBNESS AND TINGLING OF LEFT UPPER EXTREMITY: ICD-10-CM

## 2025-04-01 DIAGNOSIS — R29.898 RIGHT HAND WEAKNESS: Primary | ICD-10-CM

## 2025-04-01 PROCEDURE — 97110 THERAPEUTIC EXERCISES: CPT | Performed by: OCCUPATIONAL THERAPIST

## 2025-04-01 PROCEDURE — 97014 ELECTRIC STIMULATION THERAPY: CPT | Performed by: OCCUPATIONAL THERAPIST

## 2025-04-01 PROCEDURE — 97140 MANUAL THERAPY 1/> REGIONS: CPT | Performed by: OCCUPATIONAL THERAPIST

## 2025-04-01 PROCEDURE — 97168 OT RE-EVAL EST PLAN CARE: CPT | Performed by: OCCUPATIONAL THERAPIST

## 2025-04-01 NOTE — PROGRESS NOTES
OT Re-Evaluation     Today's date: 2025  Patient name: Eileen Matias  : 1960  MRN: [id number]  Referring provider: Rogelio Black MD  Dx:   Encounter Diagnosis     ICD-10-CM    1. Right hand weakness  R29.898       2. Numbness and tingling of left upper extremity  R20.0     R20.2                        Assessment  Impairments: abnormal coordination, abnormal or restricted ROM, activity intolerance, impaired physical strength, pain with function and weight-bearing intolerance  Other impairment: Lymphedema RUE; joint constractures right digits, wrist, forearm; radial nerve neuropathy  Symptom irritability: low    Assessment details: Eileen Matias is a 63 y.o., Ambidextrous HD female referred to hand therapy for  right hand,wrist and forearm contractures.  Onset of injury 23 due to fall causing spiral fracture of the right humerus.  Patient presents 24 with impaired ROM of the right digits, wrist, forearm,  impaired strength, and impaired sensation of the right peripheral nerves, particularly the radial nerve.  Deficits also noted in pain, edema, and functional use of the right UE. Patient joint contractures of the digits, wrist and forearm as well as significant intrinsic and extrinsic mm tightness. Patient is a good candidate for OT services to abolish pain and edema and restore ROM, strength, coordination, and sensation for a return to independence in daily tasks.      Re 7/3/24  Eileen has attended 19 therapy visits over the past 12 weeks.  She has made steady progress in active and passive motion.  Pain has reduced since IE and is not at a moderate and steady level.  She is now reporting sensation along the thumb and index, in an area previously insensate.  She has been wearing a wrist support consistently allowing her to use her right hand in a gross motor fashion and allowing  three point pinch for light tasks, short periods.  She remains in lymphedema sleeves with are restrictive and resist motion during daily tasks.  Therapy may continue to address active and passive motion, improve functional  and prehension to allow increased independence in light home tasks and art work.          RE 9/24/24  Eileen has attended 37 therapy visits over the past 12 weeks.  She has made steady progress in active and passive motion.  Pain has reduced since IE and is not at a moderate and steady level.  She is now reporting sensation along the thumb and index, in an area previously insensate.  She has been wearing a wrist support consistently allowing her to use her right hand with gross flexion and some dexterity tasks.  This motion also allows three point pinch for light tasks, short periods.  With wearing of wrist support,   strength measures in the low functional level.  She continues to remain in lymphedema sleeves with are restrictive motion during daily tasks.  Therapy may continue to address active and passive motion, improve functional  and prehension to allow increased independence in light home tasks and art work.      RE 1/14/25  Eileen continues to attend therapy consistently and continues to make slow and steady gains in motion and function.  With heat and stretch, functional motion of digital flexion is achieved.   strength with wear of circumferential splint is at a low functional level.  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   Pain has reduced since and is currently at a mild to moderate  steady level.  She is now reporting sensation along the thumb and index with sensation now intact over the DRSN.   She has been wearing a wrist support consistently allowing her to use her right hand with gross flexion and some dexterity tasks.  This motion also allows three point pinch for light tasks, short periods.  She continues to remain in lymphedema sleeves with are  restrictive during daily tasks.  Therapy may continue to address active and passive motion, improve functional  and prehension to allow increased independence in light home tasks and art work.  Therapy will wean to HEP as progress plateaus.     RE 2/17/25  Eileen  continues to make slow and steady gains in motion and function.  With heat and stretch, functional motion of digital flexion is achieved.   strength with wear of circumferential splint is at a low functional level.  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    Pain has reduced since and is currently at a mild to moderate  steady level.  Fifth finger tenderness less today.     She has been wearing a wrist support consistently allowing her to use her right hand for daily ADLs.    This motion also allows three point pinch.    She continues to remain in lymphedema sleeves with are restrictive during daily tasks.  Therapy may continue to address active and passive motion, improve functional  and prehension to allow increased independence in light home tasks and art work.  Therapy will wean to HEP as progress plateaus.     RE 4/1/25  Therapy continues twice weekly to progress motion and strength.  Eileen  continues to make slow and steady gains in active digital flexion and  strength.    strength with wear of circumferential splint has increased by 10 lbs.    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    Pain at rest is currently mild.  No active wrist extension has returned she continues to wear a wrist support allowing her to use her right hand for daily ADLs.    She continues to remain in lymphedema sleeves with are restrictive during daily tasks.  Therapy may continue to progress active and passive motion, improve functional  and prehension to allow increased independence in light home tasks and art work.  Therapy will wean to HEP as progress plateaus.   Surgeon assessment in mid April  scheduled.                Understanding of Dx/Px/POC: excellent     Prognosis: good    Goals  STGs (4 weeks)  Patient will be independent in HEP of ROM, splinting to increase motion, edema management MET  Patient will report an average pain level of 4-5/10 MET  Patient will demonstrate 30 degree improvement in PAGE of the digits MET  Patient will demonstrate active wrist extension/flexion to 10/70  Partially MET  Patient will demonstrate active forearm supination to 45 MET  LTGs (12 weeks)  Patient will demonstrate independence in a HEP to maintain ROM, strength, and function at discharge NOT MET  Patient will report an average pain level of 1-2/10 to be independent in daily tasks MET  50 %  Patient will demonstrate PAGE of the digits to 220 degrees to be independent in self care tasks  MET 50%  Patient will demonstrate PROM of the wrist and forearm WFL to be independent in self care tasks  MET  Patient will demonstrate right hand strength (in splint) within 20% of the left hand to be MI for IADL tasks NOT MET  Patient will demonstrate control  of edema MET      Plan  Patient would benefit from: skilled occupational therapy and custom splinting  Planned modality interventions: thermotherapy: hydrocollator packs and cryotherapy    Planned therapy interventions: activity modification, compression, fine motor coordination training, graded activity, graded exercise, home exercise program, therapeutic exercise, therapeutic activities, stretching, strengthening, patient education, orthotic fitting/training, neuromuscular re-education, manual therapy, IASTM, joint mobilization, kinesiology taping, massage, muscle pump exercises and nerve gliding  Other planned therapy interventions: Static progressive and dynamic splinting to increase digit, wrist, forearm motion    Frequency: 2x week  Duration in weeks: 8  Plan of Care beginning date: 4/1/2025  Plan of Care expiration date: 5/27/2025  Treatment plan discussed with:  patient      Subjective Evaluation    History of Present Illness  Date of onset: 11/12/2023  Mechanism of injury: trauma  Mechanism of injury: 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  Hx of lymphedema and frequent cellulitis infections in the RUE. Patient also had left breast cancer and has LUE lymphedema as well.    She reports that she suffered a displaced spiral fracture of the right humerus on 11/12/23 after a fall while pushing her mother in law who was seated in a wheelchair.  Patient went to ED that date for xrays and long arm cast. She was referred to orthopedics. Had a closed reduction of fracture. Long arm cast for approximately 4 weeks and then a Kruse clam shell brace. Patient received  a course of home health OT for ROM of the digits, wrist and elbow and edema management. She has had ongoing PT for lymphedema management and right shoulder ROM.     Patient referred to Dr. Black for radial nerve dysfunction and joint contractures in the forearm, wrist and hand. An EMG has been ordered. Patient now referred to OT for splinting and ROM to address joint contractures.            Recurrent probem    Quality of life: good    Patient Goals  Patient goals for therapy: decreased edema, decreased pain, increased motion, increased strength and independence with ADLs/IADLs  Patient goal: Be able to use with more strength  Pain  Current pain rating: 3  Location: Proximal humerus, wrist, hand right  Quality: dull ache and discomfort  Relieving factors: medications, support and heat  Exacerbated by: overusing.  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     Additional Observation Details  History of RUE lymphedema;  Currently wearing smallest sleeve which is the same worn prior to current injury.     Neurological Testing     Sensation     Wrist/Hand     Right   Diminished: light touch    Additional Neurological Details  [place] Daren screening  Right  TH, II, III, IV distal pads = 3.61g  V= 2.83 g  DRSN= 4.31 g    Active Range of Motion     Right Elbow   Normal active range of motion  Flexion: 130 degrees   Extension: 0 degrees   Forearm supination: 90 degrees   Forearm pronation: 90 degrees     Right Wrist   Wrist flexion: 65 degrees   Wrist extension: 0 degrees   Radial deviation: 0 degrees   Ulnar deviation: 25 degrees     Right Thumb   Palmar Abduction    CMC: 45  Radial Abduction    CMC: 5  Kapandji score: 6 degrees    Right Digits   Flexion   Index     MCP: 70  Middle     MCP: 65  Ring     MCP: 65  Little     MCP: 50    Additional Active Range of Motion Details  In gravity eliminated position, able to actively extend right wrist moving from wrist flexion to wrist neutral.   S/P heat and stretch-  AROM digital flexion to DPC    Right   II  1.0 cm,  III 1.0 cm,  IV 1.5 cm,  V 2 cm        Passive Range of Motion     Right Elbow   Normal passive range of motion  Flexion: 135 degrees   Extension: 0 degrees   Forearm supination: 90 degrees   Forearm pronation: 90 degrees     Right Wrist   Wrist flexion: 70 degrees   Wrist extension: 72 degrees   Radial deviation: 10 degrees   Ulnar deviation: 40 degrees     Right Digits   Flexion   Index     MCP: 85  Middle     MCP: 85  Ring     MCP: 65  Little     MCP: 60    Additional Passive Range of Motion Details  S/P heat and stretch-  Right PROM digital flexion to DPC    II  0 cm,  III  0 cm,  IV  0 cm,  V  1 cm        Strength/Myotome Testing     Right Wrist/Hand   Wrist extension: 2  Wrist flexion: 5    Additional Strength Details  With wear of circumferential wrist  splint-  STRENGTH  [place] #2  R   31 lbs. +4 lbs    [place] #3  R   31.5 Lbs.   +10.2 lbs.               Tests     Right Wrist/Hand   Positive extrinsic extensor tightness, extrinsic flexor tightness and intrinsic muscle tightness.     Swelling     Right Elbow Girth Measurements   Joint line: 26 cm  10 cm below joint line: 26 cm                                                                                                             Daily Note     Today's date: 2025  Patient name: Eileen Matias  : 1960  MRN: [id number]  Referring provider: Rogelio Black MD  Dx:   Encounter Diagnosis     ICD-10-CM    1. Right hand weakness  R29.898       2. Numbness and tingling of left upper extremity  R20.0     R20.2                     Subjective:   The thumb has more motion and I can get my sleeve off if I do it slowly.    I can feel more (hand dorsum)      Objective: See treatment diary below for clinic program.       Assessment: Tolerated treatment well. Patient would benefit from continued OT. Digital flexion continues to increase steadily.   Light function achieved in art and self care.     strength has increased by 10 lbs.   Improving ease and increased speed of right noted in clinic program.   Edema controlled with wear of smaller and lighter lymphedema garment.  Increased MP flexion of fifth noted with passive stretching. (10 degrees)      Plan: Continue per plan of care.   Progress dexterity and gripping exercises as tolerated. Continue wear of orthotic for wrist stability.  Focus care on PREs, improved dexterity and strength.          Precautions: Lymphedema; hx desmoid tumors and breast cancer; healing humeral fracture     POC expires Unit limit Auth  expiration date PT/OT + Visit Limit?    NA NA BOMN         Dx R Hum Fx     Dr Wayne MILLER MET          Last RE 24    Date 3/20 3/25 3/27 4/1  RE  3/11 3/18   Visit 7/10 8/10 9/10 10/10  5/10 6/10   Manuals 8 8 8 8  8 8             Jt mobs  "digits, FA MP digits all G 1-2 MP digits all G 1-2 MP digits all G 1-2 MP digits all G 1-2  MP digits all G  1-2 MP digits all G  1-2   Ortho fit/train          Neuro Re-Ed                     Mirror therapy                              orthotic                              Ther Ex 20'   20'   20   20    20   20   HEP          PROM 1:1 Digital flexion LLPS Digital flexion LLPS Digital flexion LLPS Digital flexion   LLPS  Digital flexion LLPS Digital flexion LLPS   Digital blocked AROM P/H fists 5x5\" P/H fists 5x5\" P/H fists  5x5\" P/H fists 5x5\"  P/H fists 5x5\" P/H fists 5x5\"   UE warm up UBE 3F 3R  L1  R only 110 UBE 3F 3R  L1  R only 110 UBE 3F 3R L1  R only 110 UBE 3F 3R L1  Plus R only   130  UBE 3F 3R  L1 x100  R only UBE 3F 3R  L1 x110  R only   PRE FA  #2 1/2 shaft x20 S/P #2 3/4 shaft x20 S/P #2 1/2 shaft x20 S/P #2  3/4 shaft  X20 S/P      PRE Wrist FA B F bar down x20  G  Up x20  R  bar Fold  x20 B F bar down x20  G  Up x20  R  bar Fold  x20 B F bar down x6  G F bar down  X20; up x20  R Fold x20 B F bar up x20  B F bar down   x20    B F bar down x20  Up x20  R F bar Fold  x20 B F bar down x20  Up x20  R F bar Fold  x20   PRE digits          PRE digits tips          UE PRE          Ther Activity 10'   10'   10  Resume NV    10'   10   Gripping Gray #2  all foams  Y Dig flex x10 each Gray #2  all foams  Y Dig flex x10 each Gray #2  all foams  Y/R Dig flex x10 each   Gray #2 all foams  Y Dig flex  X10 each Gray #2 all foams  Y Dig flex  X10 each   Pinching Velcro board Velcro board, full bd Velcro bd  Full bd   Black C pins all blocks Black C pins all blocks   Coordination          Carrying           Reaching           MHP Pre tx Pre tx with ace flexion Pre tx with ace flexion Pre tx with ace flexion  Pre tx with ace flexion Pre tx with ace flexion   TENS 10' pre tx 10' pre tx 10' pre tx 10 pre tx  10 pre tx 10 pre tx            "

## 2025-04-03 ENCOUNTER — OFFICE VISIT (OUTPATIENT)
Dept: OCCUPATIONAL THERAPY | Facility: CLINIC | Age: 65
End: 2025-04-03
Payer: MEDICARE

## 2025-04-03 DIAGNOSIS — R20.2 NUMBNESS AND TINGLING OF LEFT UPPER EXTREMITY: ICD-10-CM

## 2025-04-03 DIAGNOSIS — R29.898 RIGHT HAND WEAKNESS: Primary | ICD-10-CM

## 2025-04-03 DIAGNOSIS — R20.0 NUMBNESS AND TINGLING OF LEFT UPPER EXTREMITY: ICD-10-CM

## 2025-04-03 PROCEDURE — 97014 ELECTRIC STIMULATION THERAPY: CPT

## 2025-04-03 PROCEDURE — 97110 THERAPEUTIC EXERCISES: CPT

## 2025-04-03 PROCEDURE — 97140 MANUAL THERAPY 1/> REGIONS: CPT

## 2025-04-03 NOTE — PROGRESS NOTES
"Daily Note     Today's date: 4/3/2025  Patient name: Eileen Matias  : 1960  MRN: [id number]  Referring provider: Rogelio Black MD  Dx:   Encounter Diagnosis     ICD-10-CM    1. Right hand weakness  R29.898       2. Numbness and tingling of left upper extremity  R20.0     R20.2                        Subjective:   \"The bike tires me out.\"      Objective: See treatment diary below for clinic program.       Assessment: Tolerated treatment well. Patient would benefit from continued OT. Digital flexion continues to increase steadily.   Light function achieved in art and self care.     strength has increased by 10 lbs.   Improving ease and increased speed of right noted in clinic program.   Edema controlled with wear of smaller and lighter lymphedema garment.  Increased MP flexion of fifth noted with passive stretching. (10 degrees). Pt demo improved ability to complete place and hold exercises.      Plan: Continue per plan of care.   Progress dexterity and gripping exercises as tolerated. Continue wear of orthotic for wrist stability.  Focus care on PREs, improved dexterity and strength.          Precautions: Lymphedema; hx desmoid tumors and breast cancer; healing humeral fracture     POC expires Unit limit Auth  expiration date PT/OT + Visit Limit?    NA NA BOMN         Dx R Hum Fx     Dr Wayne MILLER MET          Last RE 24    Date 3/20 3/25 3/27 4/1  RE 4/3 3/11 3/18   Visit 7/10 810 9/10 10/10 11 5/10 6/10   Manuals 8 8 8 8 8 8 8             Jt mobs digits, FA MP digits all G 1-2 MP digits all G 1-2 MP digits all G 1-2 MP digits all G 1-2 MP digits all G 1-2 MP digits all G  1-2 MP digits all G  1-2   Ortho fit/train          Neuro Re-Ed                     Mirror therapy                              orthotic                              Ther Ex 20'   20'   20   20 25   20   20   HEP          PROM 1:1 Digital flexion LLPS Digital flexion LLPS Digital flexion LLPS Digital flexion " "  LLPS Digital flexion   LLPS Digital flexion LLPS Digital flexion LLPS   Digital blocked AROM P/H fists 5x5\" P/H fists 5x5\" P/H fists  5x5\" P/H fists 5x5\" P/H fists 5x5\" P/H fists 5x5\" P/H fists 5x5\"   UE warm up UBE 3F 3R  L1  R only 110 UBE 3F 3R  L1  R only 110 UBE 3F 3R L1  R only 110 UBE 3F 3R L1  Plus R only   130 UBE 3F 3R L1  Plus R only   125 UBE 3F 3R  L1 x100  R only UBE 3F 3R  L1 x110  R only   PRE FA  #2 1/2 shaft x20 S/P #2 3/4 shaft x20 S/P #2 1/2 shaft x20 S/P #2  3/4 shaft  X20 S/P #2  3/4 shaft  X20 S/P     PRE Wrist FA B F bar down x20  G  Up x20  R  bar Fold  x20 B F bar down x20  G  Up x20  R  bar Fold  x20 B F bar down x6  G F bar down  X20; up x20  R Fold x20 B F bar up x20  B F bar down   x20   B F bar up x20  B F bar down   X20  GFB on table 2x10 B F bar down x20  Up x20  R F bar Fold  x20 B F bar down x20  Up x20  R F bar Fold  x20   PRE digits          PRE digits tips          UE PRE          Ther Activity 10'   10'   10  Resume NV 8   10'   10   Gripping Gray #2  all foams  Y Dig flex x10 each Gray #2  all foams  Y Dig flex x10 each Gray #2  all foams  Y/R Dig flex x10 each  Gray #2  all foams Gray #2 all foams  Y Dig flex  X10 each Gray #2 all foams  Y Dig flex  X10 each   Pinching Velcro board Velcro board, full bd Velcro bd  Full bd  Velcro bd  Full bd Black C pins all blocks Black C pins all blocks   Coordination          Carrying           Reaching           MHP Pre tx Pre tx with ace flexion Pre tx with ace flexion Pre tx with ace flexion Pre tx with ace flexion Pre tx with ace flexion Pre tx with ace flexion   TENS 10' pre tx 10' pre tx 10' pre tx 10 pre tx 10 pre tx 10 pre tx 10 pre tx         "

## 2025-04-08 ENCOUNTER — OFFICE VISIT (OUTPATIENT)
Dept: OCCUPATIONAL THERAPY | Facility: CLINIC | Age: 65
End: 2025-04-08
Payer: MEDICARE

## 2025-04-08 DIAGNOSIS — R20.2 NUMBNESS AND TINGLING OF LEFT UPPER EXTREMITY: ICD-10-CM

## 2025-04-08 DIAGNOSIS — R29.898 RIGHT HAND WEAKNESS: Primary | ICD-10-CM

## 2025-04-08 DIAGNOSIS — R20.0 NUMBNESS AND TINGLING OF LEFT UPPER EXTREMITY: ICD-10-CM

## 2025-04-08 PROCEDURE — 97140 MANUAL THERAPY 1/> REGIONS: CPT

## 2025-04-08 PROCEDURE — 97110 THERAPEUTIC EXERCISES: CPT

## 2025-04-08 NOTE — PROGRESS NOTES
"Daily Note     Today's date: 2025  Patient name: Eileen Matias  : 1960  MRN: [id number]  Referring provider: Rogelio Black MD  Dx:   Encounter Diagnosis     ICD-10-CM    1. Right hand weakness  R29.898       2. Numbness and tingling of left upper extremity  R20.0     R20.2                        Subjective:   \"The blue is hard (RE:flexbar).\"      Objective: See treatment diary below for clinic program.       Assessment: Tolerated treatment well. Patient would benefit from continued OT. Pt demo improved endurance for strengthening exercises.      Plan: Continue per plan of care.   Progress dexterity and gripping exercises as tolerated. Continue wear of orthotic for wrist stability.  Focus care on PREs, improved dexterity and strength.          Precautions: Lymphedema; hx desmoid tumors and breast cancer; healing humeral fracture     POC expires Unit limit Auth  expiration date PT/OT + Visit Limit?    NA NA BOMN         Dx R Hum Fx     Dr Wayne MILLER MET          Last RE 24    Date 3/20 3/25 3/27 4/1  RE 4/3 4/8 3/18   Visit 7/10 810 9/10 10/10 11 12 6/10   Manuals 8 8 8 8 8 8 8             Jt mobs digits, FA MP digits all G 1-2 MP digits all G 1-2 MP digits all G 1-2 MP digits all G 1-2 MP digits all G 1-2 MP digits all G 1-2 MP digits all G  1-2   Ortho fit/train          Neuro Re-Ed                     Mirror therapy                              orthotic                              Ther Ex 20'   20'   20   20 25 25   20   HEP          PROM 1:1 Digital flexion LLPS Digital flexion LLPS Digital flexion LLPS Digital flexion   LLPS Digital flexion   LLPS Digital flexion   LLPS Digital flexion LLPS   Digital blocked AROM P/H fists 5x5\" P/H fists 5x5\" P/H fists  5x5\" P/H fists 5x5\" P/H fists 5x5\" P/H fists 5x5\" P/H fists 5x5\"   UE warm up UBE 3F 3R  L1  R only 110 UBE 3F 3R  L1  R only 110 UBE 3F 3R L1  R only 110 UBE 3F 3R L1  Plus R only   130 UBE 3F 3R L1  Plus R only   125 UBE 3F 3R " L1  Plus R only   125 UBE 3F 3R  L1 x110  R only   PRE FA  #2 1/2 shaft x20 S/P #2 3/4 shaft x20 S/P #2 1/2 shaft x20 S/P #2  3/4 shaft  X20 S/P #2  3/4 shaft  X20 S/P #2  3/4 shaft  X20 S/P    PRE Wrist FA B F bar down x20  G  Up x20  R  bar Fold  x20 B F bar down x20  G  Up x20  R  bar Fold  x20 B F bar down x6  G F bar down  X20; up x20  R Fold x20 B F bar up x20  B F bar down   x20   B F bar up x20  B F bar down   X20  GFB on table 2x10 B F bar up x20  B F bar down   X20  GFB on table 2x10 B F bar down x20  Up x20  R F bar Fold  x20   PRE digits          PRE digits tips          UE PRE          Ther Activity 10'   10'   10  Resume NV 8 8   10   Gripping Gray #2  all foams  Y Dig flex x10 each Gray #2  all foams  Y Dig flex x10 each Gray #2  all foams  Y/R Dig flex x10 each  Gray #2  all foams Gray #2  all foams Gray #2 all foams  Y Dig flex  X10 each   Pinching Velcro board Velcro board, full bd Velcro bd  Full bd  Velcro bd  Full bd Velcro bd  Full bd Black C pins all blocks   Coordination          Carrying           Reaching           MHP Pre tx Pre tx with ace flexion Pre tx with ace flexion Pre tx with ace flexion Pre tx with ace flexion Pre tx with ace flexion Pre tx with ace flexion   TENS 10' pre tx 10' pre tx 10' pre tx 10 pre tx 10 pre tx 10 pre tx 10 pre tx

## 2025-04-09 ENCOUNTER — PATIENT MESSAGE (OUTPATIENT)
Dept: INTERNAL MEDICINE CLINIC | Facility: CLINIC | Age: 65
End: 2025-04-09

## 2025-04-10 ENCOUNTER — OFFICE VISIT (OUTPATIENT)
Dept: OCCUPATIONAL THERAPY | Facility: CLINIC | Age: 65
End: 2025-04-10
Payer: MEDICARE

## 2025-04-10 DIAGNOSIS — R20.0 NUMBNESS AND TINGLING OF LEFT UPPER EXTREMITY: ICD-10-CM

## 2025-04-10 DIAGNOSIS — R20.2 NUMBNESS AND TINGLING OF LEFT UPPER EXTREMITY: ICD-10-CM

## 2025-04-10 DIAGNOSIS — R29.898 RIGHT HAND WEAKNESS: Primary | ICD-10-CM

## 2025-04-10 PROCEDURE — 97140 MANUAL THERAPY 1/> REGIONS: CPT

## 2025-04-10 PROCEDURE — 97110 THERAPEUTIC EXERCISES: CPT

## 2025-04-10 NOTE — PROGRESS NOTES
"Daily Note     Today's date: 4/10/2025  Patient name: Eileen Matias  : 1960  MRN: [id number]  Referring provider: Rogelio Black MD  Dx:   Encounter Diagnosis     ICD-10-CM    1. Right hand weakness  R29.898       2. Numbness and tingling of left upper extremity  R20.0     R20.2                      Subjective: I feel electrical shocks in my arms sometimes      Objective: See treatment diary below      Assessment: Tolerated treatment well. Patient exhibited good technique with therapeutic exercises. Patient reports improved sensation in the past 1-2 months. 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      Plan: Continue per plan of care.      Precautions: Lymphedema; hx desmoid tumors and breast cancer; healing humeral fracture     POC expires Unit limit Auth  expiration date PT/OT + Visit Limit?    NA NA BOMN         Dx R Hum Fx     Dr Wayne MILLER MET          Last RE 24    Date 3/20 3/25 3/27 4/1  RE 4/3 4/8 4/10   Visit 7/10 8/10 9/10 10/10 11 12 13   Manuals 8 8 8 8 8 8 10             Jt mobs digits, FA MP digits all G 1-2 MP digits all G 1-2 MP digits all G 1-2 MP digits all G 1-2 MP digits all G 1-2 MP digits all G 1-2 MP digits all G 1-2   Ortho fit/train          Neuro Re-Ed                     Mirror therapy                              orthotic                              Ther Ex 20'   20'   20   20 25 25  30   HEP          PROM 1:1 Digital flexion LLPS Digital flexion LLPS Digital flexion LLPS Digital flexion   LLPS Digital flexion   LLPS Digital flexion   LLPS Digit flex LLPS   Digital blocked AROM P/H fists 5x5\" P/H fists 5x5\" P/H fists  5x5\" P/H fists 5x5\" P/H fists 5x5\" P/H fists 5x5\" P/H fists 5x 5\"   UE warm up UBE 3F 3R  L1  R only 110 UBE 3F 3R  L1  R only 110 UBE 3F 3R L1  R only 110 UBE 3F 3R L1  Plus R only   130 UBE 3F 3R L1  Plus R only   125 UBE 3F 3R L1  Plus R only   125 UBE 2F, 2R L1   Plus R only 130   PRE FA  #2 " 1/2 shaft x20 S/P #2 3/4 shaft x20 S/P #2 1/2 shaft x20 S/P #2  3/4 shaft  X20 S/P #2  3/4 shaft  X20 S/P #2  3/4 shaft  X20 S/P 2# 3/4 from base x 20 s/p   PRE Wrist FA B F bar down x20  G  Up x20  R  bar Fold  x20 B F bar down x20  G  Up x20  R  bar Fold  x20 B F bar down x6  G F bar down  X20; up x20  R Fold x20 B F bar up x20  B F bar down   x20   B F bar up x20  B F bar down   X20  GFB on table 2x10 B F bar up x20  B F bar down   X20  GFB on table 2x10 B FB up x 20  B FB down x 20  G FB on table 2x10   PRE digits          PRE digits tips          UE PRE          Ther Activity 10'   10'   10  Resume NV 8 8   10   Gripping Gray #2  all foams  Y Dig flex x10 each Gray #2  all foams  Y Dig flex x10 each Gray #2  all foams  Y/R Dig flex x10 each  Gray #2  all foams Gray #2  all foams Gray #2 all foams     Pinching Velcro board Velcro board, full bd Velcro bd  Full bd  Velcro bd  Full bd Velcro bd  Full bd Velcro board full   Coordination          Carrying           Reaching           MHP Pre tx Pre tx with ace flexion Pre tx with ace flexion Pre tx with ace flexion Pre tx with ace flexion Pre tx with ace flexion Pre tx with ace flexion   TENS 10' pre tx 10' pre tx 10' pre tx 10 pre tx 10 pre tx 10 pre tx 10 pre tx

## 2025-04-14 ENCOUNTER — OFFICE VISIT (OUTPATIENT)
Dept: PHYSICAL THERAPY | Facility: CLINIC | Age: 65
End: 2025-04-14
Payer: MEDICARE

## 2025-04-14 DIAGNOSIS — I89.0 LYMPHEDEMA: Primary | ICD-10-CM

## 2025-04-14 PROCEDURE — 97140 MANUAL THERAPY 1/> REGIONS: CPT | Performed by: PHYSICAL THERAPIST

## 2025-04-14 NOTE — PROGRESS NOTES
Daily Note     Today's date: 2025  Patient name: Eileen Matias  : 1960  MRN: [id number]  Referring provider: No ref. provider found  Dx: No diagnosis found.               Subjective: Patient reports that her L UE is doing well.  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 Patient would benefit from continued PT      Plan: Continue per plan of care.      Precautions: L АННА  Access Code: [email or web address]  URL: [email or web address]  Date: 2024  Prepared by: Meena Jain    Exercises  - Shoulder Flexion Wall Slide with Towel  - 1 x daily - 7 x weekly - 1 sets - 5 reps - 10 hold  - Standing Shoulder Abduction Slides at Wall  - 1 x daily - 7 x weekly - 3 sets - 10 reps  - Single Arm Doorway Pec Stretch at 90 Degrees Abduction  - 1 x daily - 7 x weekly - 1 sets - 5 reps - 10 hold    Manuals 1/27 1/29 2/5 2/12 2/19 2/26 3/10 3/17 3/26 4/14   Manual MFR 10'            Self MLD             Compression fitting    15 15        Girth mmts   8' 10 10 10 10 15 10 15    REV 15                                                                                                       Ther Ex             [place]ley 5' 5' 4' 5' 5' 5' 5' '5' 5'                 Wall slides  10 x :10 10 x :10 10 x :10 10 x :10 10 x :10       Bicep curls 4# 2 x 10 4# 2 x 10 4# x 20 4# x 20 2# x 10 4#  4# 2 x 10 4# 2 x 10 4# 2 x 10 4# 2 x10   Row  4# 2 10 4# 2 x 10 4# x 20 4# x 20 2# x 10 4# 4# 2 x 10 4# 2 x 10 4# 2 x 10 4#2x10   Tricep  4# 2 x10 4# 2 x 10 4# x 20 4# x 20 2# 2 x 10 4# 4# x 20 4# 2 x 20 4# 2 x 10 4# 2 x10   Bike 6' 6' 6' 6' 6' 6 6'  6' 6'   TG L 22 20 20 2x10 2 x 10 2 x 10 2x10 2 x 10 2x10 2x10 2x10   Pec press  3# 2x 20 3# 2 x 10 3# 2 x 10 3# 2 x 10 4# 2 x 10 4#  x 20 4#  2 x 10 4# 2 x 10 4# 2 x 10 4#2x10   Deltoid lateral raise 3# 2 x 10 3# 2 x 10 3# 2 x 10 3# 2 x 10         S/l ER 3# 2 x 10 3# 2 x 10 3# 2 x 10 3#2 x 10 4# 2  x 10 4#  x 20 4# 2 x 10 4# 2 x 10 4# 2 x 10 4#2 x10   Seated overhead press 3# 2 x 10 3# 2 x 10 3# 2 x 10 3# 2 x 10 4# 2 x 10 4# 2 x 10 4# 2 x10 4# 2 x 10 4# 2 x 10 4#2x10   Shoulder flexion   3# 2 x 10 3# 2 x 10 4# 2 x 10 4# 2 x 10 4 # 2x10 4# 2 x 10 4# 2 x 10 4#2 x 10                volume  2192 2163 2147 2198   2183

## 2025-04-15 ENCOUNTER — OFFICE VISIT (OUTPATIENT)
Dept: OCCUPATIONAL THERAPY | Facility: CLINIC | Age: 65
End: 2025-04-15
Attending: ORTHOPAEDIC SURGERY
Payer: MEDICARE

## 2025-04-15 DIAGNOSIS — R29.898 RIGHT HAND WEAKNESS: Primary | ICD-10-CM

## 2025-04-15 DIAGNOSIS — R20.2 NUMBNESS AND TINGLING OF LEFT UPPER EXTREMITY: ICD-10-CM

## 2025-04-15 DIAGNOSIS — R20.0 NUMBNESS AND TINGLING OF LEFT UPPER EXTREMITY: ICD-10-CM

## 2025-04-15 PROCEDURE — 97110 THERAPEUTIC EXERCISES: CPT | Performed by: OCCUPATIONAL THERAPIST

## 2025-04-15 PROCEDURE — 97140 MANUAL THERAPY 1/> REGIONS: CPT | Performed by: OCCUPATIONAL THERAPIST

## 2025-04-15 NOTE — PROGRESS NOTES
"Daily Note     Today's date: 4/15/2025  Patient name: Eileen Matias  : 1960  MRN: [id number]  Referring provider: Rogelio Black MD  Dx:   Encounter Diagnosis     ICD-10-CM    1. Right hand weakness  R29.898       2. Numbness and tingling of left upper extremity  R20.0     R20.2                      Subjective: I feel electrical shocks in my arms sometimes.        Objective: See treatment diary below      Assessment: Tolerated treatment well. Patient exhibited good technique with therapeutic exercises. Patient reports improved sensation in the past 1-2 months. 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      Plan: Continue per plan of care.      Precautions: Lymphedema; hx desmoid tumors and breast cancer; healing humeral fracture     POC expires Unit limit Auth  expiration date PT/OT + Visit Limit?    NA NA BOMN         Dx R Hum Fx     Dr Wayne MILLER MET          Last RE 24    Date 4/15    4/3 4/8 4/10   Visit 14    11 12 13   Manuals 8    8 8 10             Jt mobs digits, FA MP Digits all G 1-2    MP digits all G 1-2 MP digits all G 1-2 MP digits all G 1-2   Ortho fit/train          Neuro Re-Ed                     Mirror therapy                              orthotic                              Ther Ex 25    25 25  30   HEP          PROM 1:1 Digit E/F LLPS    Digital flexion   LLPS Digital flexion   LLPS Digit flex LLPS   Digital blocked AROM P/H fists 5x5\"    P/H fists 5x5\" P/H fists 5x5\" P/H fists 5x 5\"   UE warm up UBE 3F 3R L1 plus R only 145    UBE 3F 3R L1  Plus R only   125 UBE 3F 3R L1  Plus R only   125 UBE 2F, 2R L1   Plus R only 130   PRE FA  2# at top  X20 S/P    #2  3/4 shaft  X20 S/P #2  3/4 shaft  X20 S/P 2# 3/4 from base x 20 s/p   PRE Wrist FA B F bar up x20  B F bar down   X20  GFB on table 2x10    B F bar up x20  B F bar down   X20  GFB on table 2x10 B F bar up x20  B F bar down   X20  GFB on table 2x10 B FB up x 20  B FB " down x 20  G FB on table 2x10             Ther Activity 8    8 8   10   Gripping Gray #2 all  foams    Gray #2  all foams Gray #2  all foams Gray #2 all foams     Pinching Velcro bd   Full bd    Velcro bd  Full bd Velcro bd  Full bd Velcro board full   Coordination          Carrying           Reaching           MHP Pre tx with ace flexion    Pre tx with ace flexion Pre tx with ace flexion Pre tx with ace flexion   TENS 10 pre tx    10 pre tx 10 pre tx 10 pre tx

## 2025-04-16 ENCOUNTER — OFFICE VISIT (OUTPATIENT)
Dept: PODIATRY | Facility: CLINIC | Age: 65
End: 2025-04-16
Payer: MEDICARE

## 2025-04-16 VITALS — BODY MASS INDEX: 19.7 KG/M2 | HEIGHT: 68 IN | OXYGEN SATURATION: 98 % | HEART RATE: 82 BPM | WEIGHT: 130 LBS

## 2025-04-16 DIAGNOSIS — G60.9 IDIOPATHIC PERIPHERAL NEUROPATHY: Primary | ICD-10-CM

## 2025-04-16 PROCEDURE — 99213 OFFICE O/P EST LOW 20 MIN: CPT | Performed by: PODIATRIST

## 2025-04-16 NOTE — PROGRESS NOTES
:  Assessment & Plan  Idiopathic peripheral neuropathy    Orders:    Ambulatory Referral to Neurology; Future    The patient's clinical examination today significant for diffuse paresthesias and tingling sensations to the forefoot bilaterally, left more so than the right.  Most of her numbness is localized to the dorsal aspect of the left hallux today.  Epicritic sensation is absent to the dorsal aspect of the left great toe.  It is intact in all of the testing areas.  There is no erythema nor edema no calor or ecchymosis noted to the lower extremity.  Pedal pulses palpable bilaterally.  There are no open lesions.      Recent EMG/NCV study was unremarkable.  Was no evidence of radiculopathy or peripheral neuropathy.    The patient could not tolerate Cymbalta secondary to significant nausea.  We can consider use of alpha lipoic acid for management of her neuropathy.  Her primary care physician has ordered B12 levels to be drawn.  I do recommend evaluation with neurology for further workup of her idiopathic peripheral neuropathy.  A referral was placed.    Recommend follow-up with me in 2 to 3 months.  We can consider repeat EMG/NCV study in 6 to 12 months.    History of Present Illness     Eileen Matias is a 64 y.o. female   The patient presents today for follow-up of bilateral forefoot numbness and tingling sensations, left worse than the right.  She noted a severe burning and shooting pain to the dorsal aspect of her left great toe recently.  She is currently very numb on the top of the left toe and has no sensation there.  She did have a recent nerve study which was negative for any acute findings.      PAST MEDICAL HISTORY:  Past Medical History:   Diagnosis Date    Abnormal mammogram 05/15/2013    Anemia     Cancer (HCC)     desmoitosis    Carcinoma of left breast metastatic to axillary lymph node (HCC) 04/19/2022    Chronic pain disorder     worse right side of body    Desmoid tumor     Last Assessed: 6/19/2017     fibroidal cyst 1999?  Hysterectomy done    History of transfusion     no reactions    Hyperlipidemia     Hypertension     Osteoporosis     PONV (postoperative nausea and vomiting)        PAST SURGICAL HISTORY:  Past Surgical History:   Procedure Laterality Date    BREAST LUMPECTOMY Left 6/21/2022    Procedure: ANITA  DIRECTED LUMPECTOMY;  Surgeon: Amanda Motley MD;  Location: [facility];  Service: Surgical Oncology    FRACTURE SURGERY      HYSTERECTOMY      LYMPH NODE DISSECTION Left 6/21/2022    Procedure: AXILLARY DISSECTION LEVEL I AND LEVEL II LYMPH NODES;  Surgeon: Amanda Motley MD;  Location: [facility];  Service: Surgical Oncology    MRI BREAST BIOPSY LEFT (ALL INCLUSIVE) Left 5/20/2022    MRI BREAST BIOPSY RIGHT (ALL INCLUSIVE) Right 5/20/2022    OTHER SURGICAL HISTORY      Arm Incision: Dermoid tumor, right Arm     PARTIAL HYSTERECTOMY      MS COLONOSCOPY FLX DX W/COLLJ SPEC WHEN PFRMD N/A 8/15/2017    Procedure: COLONOSCOPY;  Surgeon: Alec England MD;  Location: [facility];  Service: Gastroenterology    MS NDSC WRST SURG W/RLS TRANSVRS CARPL LIGM Left 8/10/2021    Procedure: RELEASE LEFT CARPAL TUNNEL ENDOSCOPIC;  Surgeon: Chris Camacho MD;  Location: [facility];  Service: Orthopedics    MS OPTX DSTL RADL I-ARTIC FX/EPIPHYSL SEP 3+ FRAG Left 2/9/2021    Procedure: OPEN REDUCTION W/ INTERNAL FIXATION (ORIF) RADIUS / ULNA (WRIST) left;  Surgeon: Chris aCmacho MD;  Location: [facility];  Service: Orthopedics    SKIN BIOPSY      SKIN CANCER EXCISION      Melanoma Excision     TONSILLECTOMY      US BREAST NEEDLE LOC LEFT Left 6/16/2022    US BREAST NEEDLE LOC RIGHT EACH ADDITIONAL Right 6/16/2022    US GUIDED BREAST LYMPH NODE BIOPSY LEFT Left 4/14/2022        ALLERGIES:  Chlorpromazine, Codeine, Meperidine, Phenothiazines, Saccharin - food allergy, Ampicillin-sulbactam sodium, Aspirin, Gluten meal - food allergy, Ibuprofen, Levofloxacin, Lactose - food allergy, Neomycin,  Citrus - food allergy, and Latex    MEDICATIONS:  Current Outpatient Medications   Medication Sig Dispense Refill    alendronate (FOSAMAX) 70 mg tablet Take 1 tablet (70 mg total) by mouth every 7 days 4 tablet 4    cholecalciferol (VITAMIN D3) 1,000 units tablet Take 3,000 Units by mouth daily      diphenhydrAMINE (BENADRYL) 50 MG tablet Take 50 mg by mouth daily at bedtime as needed for itching      ezetimibe (ZETIA) 10 mg tablet Take 1 tablet (10 mg total) by mouth daily 90 tablet 0    letrozole (FEMARA) 2.5 mg tablet Take 1 tablet (2.5 mg total) by mouth daily 90 tablet 3    multivitamin (THERAGRAN) TABS Take 1 tablet by mouth daily      Omega-3 Fatty Acids (FISH OIL PO) Take by mouth      Probiotic Product (PROBIOTIC-10 PO) Take by mouth      dronabinol (MARINOL) 10 MG capsule  (Patient not taking: No sig reported)       No current facility-administered medications for this visit.       SOCIAL HISTORY:  Social History     Socioeconomic History    Marital status: /Civil Union     Spouse name: None    Number of children: None    Years of education: None    Highest education level: None   Occupational History    Occupation: unemployed    Tobacco Use    Smoking status: Never    Smokeless tobacco: Never   Vaping Use    Vaping status: Never Used   Substance and Sexual Activity    Alcohol use: Not Currently     Alcohol/week: 5.0 standard drinks of alcohol     Types: 5 Shots of liquor per week     Comment: Used mostly as a painkiller when needed before bedtime    Drug use: Yes     Frequency: 28.0 times per week     Types: Marijuana     Comment: THC Medical marijuana    Sexual activity: Not Currently     Partners: Male     Comment: Hysterectomy years ago   Other Topics Concern    None   Social History Narrative    Lives with spouse      Social Drivers of Health     Financial Resource Strain: Low Risk  (12/13/2023)    Overall Financial Resource Strain (CARDIA)     Difficulty of Paying Living Expenses: Not hard at  "all   Food Insecurity: No Food Insecurity (1/10/2025)    Hunger Vital Sign     Worried About Running Out of Food in the Last Year: Never true     Ran Out of Food in the Last Year: Never true   Transportation Needs: No Transportation Needs (1/10/2025)    PRAPARE - Transportation     Lack of Transportation (Medical): No     Lack of Transportation (Non-Medical): No   Physical Activity: Insufficiently Active (5/2/2023)    Received from [facility]    Exercise Vital Sign     Days of Exercise per Week: 2 days     Minutes of Exercise per Session: 30 min   Stress: No Stress Concern Present (5/2/2023)    Received from [facility], [facility]    [employer] [place] [employer] - Occupational Stress Questionnaire     Feeling of Stress : Not at all   Social Connections: Unknown (6/18/2024)    Received from [facility]     How often do you feel lonely or isolated from those around you? (Adult - for ages 18 years and over): Not on file   Intimate Partner Violence: Not At Risk (5/2/2023)    Received from [facility], [facility]    Humiliation, Afraid, Rape, and Kick questionnaire     Fear of Current or Ex-Partner: No     Emotionally Abused: No     Physically Abused: No     Sexually Abused: No   Housing Stability: Unknown (1/10/2025)    Housing Stability Vital Sign     Unable to Pay for Housing in the Last Year: No     Number of Times Moved in the Last Year: Not on file     Homeless in the Last Year: Not on file      Review of Systems   Constitutional: Negative.    Respiratory:  Negative for shortness of breath.    Cardiovascular:  Negative for chest pain and leg swelling.   Musculoskeletal: Negative.    Skin: Negative.    Neurological:  Positive for numbness.   Psychiatric/Behavioral: Negative.       Objective   Pulse 82   Ht 5' 8\" (1.727 m)   Wt 59 kg (130 lb)   SpO2 98%   BMI 19.77 kg/m²      Physical " Exam  Vitals and nursing note reviewed.   Constitutional:       General: She is not in acute distress.     Appearance: She is well-developed.   HENT:      Head: Normocephalic and atraumatic.   Cardiovascular:      Pulses:           Dorsalis pedis pulses are 2+ on the right side and 2+ on the left side.        Posterior tibial pulses are 2+ on the right side and 2+ on the left side.   Pulmonary:      Effort: Pulmonary effort is normal.   Musculoskeletal:        Feet:    Feet:      Right foot:      Skin integrity: Skin integrity normal.      Left foot:      Skin integrity: Skin integrity normal.      Comments: The patient's clinical examination today significant for diffuse paresthesias and tingling sensations to the forefoot bilaterally, left more so than the right.  Most of her numbness is localized to the dorsal aspect of the left hallux today.  Epicritic sensation is absent to the dorsal aspect of the left great toe.  It is intact in all of the testing areas.  There is no erythema nor edema no calor or ecchymosis noted to the lower extremity.  Pedal pulses palpable bilaterally.  There are no open lesions.      Skin:     General: Skin is warm and dry.      Capillary Refill: Capillary refill takes less than 2 seconds.   Neurological:      General: No focal deficit present.      Mental Status: She is alert and oriented to person, place, and time.   Psychiatric:         Mood and Affect: Mood normal.

## 2025-04-17 ENCOUNTER — OFFICE VISIT (OUTPATIENT)
Dept: OCCUPATIONAL THERAPY | Facility: CLINIC | Age: 65
End: 2025-04-17
Payer: MEDICARE

## 2025-04-17 DIAGNOSIS — R20.2 NUMBNESS AND TINGLING OF LEFT UPPER EXTREMITY: ICD-10-CM

## 2025-04-17 DIAGNOSIS — R20.0 NUMBNESS AND TINGLING OF LEFT UPPER EXTREMITY: ICD-10-CM

## 2025-04-17 DIAGNOSIS — R29.898 RIGHT HAND WEAKNESS: Primary | ICD-10-CM

## 2025-04-17 PROCEDURE — 97140 MANUAL THERAPY 1/> REGIONS: CPT | Performed by: OCCUPATIONAL THERAPIST

## 2025-04-17 PROCEDURE — 97110 THERAPEUTIC EXERCISES: CPT | Performed by: OCCUPATIONAL THERAPIST

## 2025-04-17 PROCEDURE — 97014 ELECTRIC STIMULATION THERAPY: CPT | Performed by: OCCUPATIONAL THERAPIST

## 2025-04-18 NOTE — PROGRESS NOTES
"Daily Note     Today's date: 2025  Patient name: Eileen Matias  : 1960  MRN: [id number]  Referring provider: Rogelio Black MD  Dx:   Encounter Diagnosis     ICD-10-CM    1. Right hand weakness  R29.898       2. Numbness and tingling of left upper extremity  R20.0     R20.2                      Subjective: I feel electrical shocks in my arms sometimes.        Objective: See treatment diary below      Assessment: Tolerated treatment well. Patient exhibited good technique with therapeutic exercises. Patient reports improved sensation in the past 1-2 months. 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      Plan: Continue per plan of care.      Precautions: Lymphedema; hx desmoid tumors and breast cancer; healing humeral fracture     POC expires Unit limit Auth  expiration date PT/OT + Visit Limit?    NA NA BOMN         Dx R Hum Fx     Dr Wayne MILLER MET          Last RE 24    Date 4/15 4/17   4/3 4/8 4/10   Visit 14 15   11 12 13   Manuals 8 8   8 8 10             Jt mobs digits, FA MP Digits all G 1-2 MP Digits all G 1-2   MP digits all G 1-2 MP digits all G 1-2 MP digits all G 1-2   Ortho fit/train          Neuro Re-Ed                     Mirror therapy                              orthotic                              Ther Ex 25   25   25 25  30   HEP          PROM 1:1 Digit E/F LLPS Digit E/F LLPS   Digital flexion   LLPS Digital flexion   LLPS Digit flex LLPS   Digital blocked AROM P/H fists 5x5\" P/H fists 5x5\"   P/H fists 5x5\" P/H fists 5x5\" P/H fists 5x 5\"   UE warm up UBE 3F 3R L1 plus R only 145 UBE 3F 3R L1 plus R only 145   UBE 3F 3R L1  Plus R only   125 UBE 3F 3R L1  Plus R only   125 UBE 2F, 2R L1   Plus R only 130   PRE FA  2# at top  X20 S/P 2# at top  X20 S/P   #2  3/4 shaft  X20 S/P #2  3/4 shaft  X20 S/P 2# 3/4 from base x 20 s/p   PRE Wrist FA B F bar up x20  B F bar down   X20  GFB on table 2x10 B F bar up x20  B F bar " down   X20  GFB on table 2x10   B F bar up x20  B F bar down   X20  GFB on table 2x10 B F bar up x20  B F bar down   X20  GFB on table 2x10 B FB up x 20  B FB down x 20  G FB on table 2x10             Ther Activity 8 8   8 8   10   Gripping Gray #2 all  foams Wilson #3 all  foams   Wilson #2  all foams Gray #2  all foams Gray #2 all foams     Pinching Velcro bd   Full bd Velcro bd   Full bd   Velcro bd  Full bd Velcro bd  Full bd Velcro board full   Coordination          Carrying           Reaching           MHP Pre tx with ace flexion Pre tx with ace flexion   Pre tx with ace flexion Pre tx with ace flexion Pre tx with ace flexion   TENS 10 pre tx 10 pre tx   10 pre tx 10 pre tx 10 pre tx

## 2025-04-22 ENCOUNTER — OFFICE VISIT (OUTPATIENT)
Dept: OCCUPATIONAL THERAPY | Facility: CLINIC | Age: 65
End: 2025-04-22
Attending: ORTHOPAEDIC SURGERY
Payer: MEDICARE

## 2025-04-22 DIAGNOSIS — R20.0 NUMBNESS AND TINGLING OF LEFT UPPER EXTREMITY: ICD-10-CM

## 2025-04-22 DIAGNOSIS — R20.2 NUMBNESS AND TINGLING OF LEFT UPPER EXTREMITY: ICD-10-CM

## 2025-04-22 DIAGNOSIS — R29.898 RIGHT HAND WEAKNESS: Primary | ICD-10-CM

## 2025-04-22 PROCEDURE — 97110 THERAPEUTIC EXERCISES: CPT | Performed by: OCCUPATIONAL THERAPIST

## 2025-04-22 PROCEDURE — 97140 MANUAL THERAPY 1/> REGIONS: CPT | Performed by: OCCUPATIONAL THERAPIST

## 2025-04-22 PROCEDURE — 97014 ELECTRIC STIMULATION THERAPY: CPT | Performed by: OCCUPATIONAL THERAPIST

## 2025-04-22 NOTE — PROGRESS NOTES
"Daily Note     Today's date: 2025  Patient name: Eileen Matias  : 1960  MRN: [id number]  Referring provider: Rogelio Black MD  Dx:   Encounter Diagnosis     ICD-10-CM    1. Right hand weakness  R29.898       2. Numbness and tingling of left upper extremity  R20.0     R20.2                      Subjective: I feel electrical shocks in my arms sometimes.   I see the surgeon in [facility] tomorrow.        Objective: See treatment diary below      Assessment: Tolerated treatment well. Patient exhibited good technique with therapeutic exercises. Patient reports improved sensation in the past 1-2 months. 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      Plan: Continue per plan of care.  HOLD therapy for surgery consultation.      Precautions: Lymphedema; hx desmoid tumors and breast cancer; healing humeral fracture     POC expires Unit limit Auth  expiration date PT/OT + Visit Limit?    NA NA BOMN         Dx R Hum Fx     Dr Wayne MILLER MET          Last RE 24    Date 4/15 4/17 4/22  4/3 4/8 4/10   Visit 14 15 16  11 12 13   Manuals 8 8 8  8 8 10             Jt mobs digits, FA MP Digits all G 1-2 MP Digits all G 1-2 MP Digits all G 1-2  MP digits all G 1-2 MP digits all G 1-2 MP digits all G 1-2   Ortho fit/train          Neuro Re-Ed                     Mirror therapy                              orthotic                              Ther Ex 25   25   25  25 25  30   HEP          PROM 1:1 Digit E/F LLPS Digit E/F LLPS Digit E/F LLPS  Digital flexion   LLPS Digital flexion   LLPS Digit flex LLPS   Digital blocked AROM P/H fists 5x5\" P/H fists 5x5\" P/H fists 5x5\"  P/H fists 5x5\" P/H fists 5x5\" P/H fists 5x 5\"   UE warm up UBE 3F 3R L1 plus R only 145 UBE 3F 3R L1 plus R only 145 Ube 3F 3R L1  Plus R only 160  UBE 3F 3R L1  Plus R only   125 UBE 3F 3R L1  Plus R only   125 UBE 2F, 2R L1   Plus R only 130   PRE FA  2# at top  X20 S/P " 2# at top  X20 S/P #2 x20 S/P  #2  3/4 shaft  X20 S/P #2  3/4 shaft  X20 S/P 2# 3/4 from base x 20 s/p   PRE Wrist FA B F bar up x20  B F bar down   X20  GFB on table 2x10 B F bar up x20  B F bar down   X20  GFB on table 2x10 B F bar up x20  B F bar down   X20  GFB on table 2x10  B F bar up x20  B F bar down   X20  GFB on table 2x10 B F bar up x20  B F bar down   X20  GFB on table 2x10 B FB up x 20  B FB down x 20  G FB on table 2x10             Ther Activity 8 8   8  8 8   10   Gripping Gray #2 all  foams Wilson #3 all  foams Wilson #3 all  foams  Wilson #2  all foams Gray #2  all foams Gray #2 all foams     Pinching Velcro bd   Full bd Velcro bd   Full bd Velcro bd full bd  Velcro bd  Full bd Velcro bd  Full bd Velcro board full   Coordination          Carrying           Reaching           MHP Pre tx with ace flexion Pre tx with ace flexion Pre tx with ace flexion  Pre tx with ace flexion Pre tx with ace flexion Pre tx with ace flexion   TENS 10 pre tx 10 pre tx 10' pre tx  10 pre tx 10 pre tx 10 pre tx

## 2025-04-24 ENCOUNTER — OFFICE VISIT (OUTPATIENT)
Dept: OCCUPATIONAL THERAPY | Facility: CLINIC | Age: 65
End: 2025-04-24
Payer: MEDICARE

## 2025-04-24 DIAGNOSIS — R20.2 NUMBNESS AND TINGLING OF LEFT UPPER EXTREMITY: ICD-10-CM

## 2025-04-24 DIAGNOSIS — R20.0 NUMBNESS AND TINGLING OF LEFT UPPER EXTREMITY: ICD-10-CM

## 2025-04-24 DIAGNOSIS — R29.898 RIGHT HAND WEAKNESS: Primary | ICD-10-CM

## 2025-04-24 PROCEDURE — 97140 MANUAL THERAPY 1/> REGIONS: CPT | Performed by: OCCUPATIONAL THERAPIST

## 2025-04-24 PROCEDURE — 97014 ELECTRIC STIMULATION THERAPY: CPT | Performed by: OCCUPATIONAL THERAPIST

## 2025-04-24 PROCEDURE — 97110 THERAPEUTIC EXERCISES: CPT | Performed by: OCCUPATIONAL THERAPIST

## 2025-04-24 NOTE — PROGRESS NOTES
"Daily Note     Today's date: 2025  Patient name: Eileen Matias  : 1960  MRN: [id number]  Referring provider: Rogelio Black MD  Dx:   Encounter Diagnosis     ICD-10-CM    1. Right hand weakness  R29.898       2. Numbness and tingling of left upper extremity  R20.0     R20.2                      Subjective: I feel electrical shocks in my arms sometimes.   I am having surgery for the lymph nodes but not the nerve just yet.        Objective: See treatment diary below      Assessment: Tolerated treatment well. Patient exhibited good technique with therapeutic exercises. Patient reports improved sensation in the past 1-2 months. 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      Plan: Continue per plan of care.   Continue therapy with reduction of frequency to once weekly.       Precautions: Lymphedema; hx desmoid tumors and breast cancer; healing humeral fracture     POC expires Unit limit Auth  expiration date PT/OT + Visit Limit?    NA NA BOMN         Dx R Hum Fx     Dr Wayne MILLER MET          Last RE 24    Date 4/15 4/17 4/22 4/24 4/3 4/8 4/10   Visit 14 15 16 17 11 12 13   Manuals 8 8 8 8 8 8 10             Jt mobs digits, FA MP Digits all G 1-2 MP Digits all G 1-2 MP Digits all G 1-2 MP Digits all G 1-2 MP digits all G 1-2 MP digits all G 1-2 MP digits all G 1-2   Ortho fit/train          Neuro Re-Ed                     Mirror therapy                              orthotic                              Ther Ex 25   25   25   25' 25 25  30   HEP          PROM 1:1 Digit E/F LLPS Digit E/F LLPS Digit E/F LLPS Digits E/F LLPS Digital flexion   LLPS Digital flexion   LLPS Digit flex LLPS   Digital blocked AROM P/H fists 5x5\" P/H fists 5x5\" P/H fists 5x5\" P/H fists 5x5\" P/H fists 5x5\" P/H fists 5x5\" P/H fists 5x 5\"   UE warm up UBE 3F 3R L1 plus R only 145 UBE 3F 3R L1 plus R only 145 Ube 3F 3R L1  Plus R only 160 UBE 3F 3R L " 1  Plus R only 160 UBE 3F 3R L1  Plus R only   125 UBE 3F 3R L1  Plus R only   125 UBE 2F, 2R L1   Plus R only 130   PRE FA  2# at top  X20 S/P 2# at top  X20 S/P #2 x20 S/P #2 x20 s/p #2  3/4 shaft  X20 S/P #2  3/4 shaft  X20 S/P 2# 3/4 from base x 20 s/p   PRE Wrist FA B F bar up x20  B F bar down   X20  GFB on table 2x10 B F bar up x20  B F bar down   X20  GFB on table 2x10 B F bar up x20  B F bar down   X20  GFB on table 2x10 B F bar up x20  B F bar down   X20  GFB on table 2x10 B F bar up x20  B F bar down   X20  GFB on table 2x10 B F bar up x20  B F bar down   X20  GFB on table 2x10 B FB up x 20  B FB down x 20  G FB on table 2x10             Ther Activity 8 8   8   8 8 8   10   Gripping Gray #2 all  foams Wilson #3 all  foams Wilson #3 all  foams Wilson #3 all foams Wilson #2  all foams Gray #2  all foams Gray #2 all foams     Pinching Velcro bd   Full bd Velcro bd   Full bd Velcro bd full bd Velcro bd Velcro bd  Full bd Velcro bd  Full bd Velcro board full   Coordination          Carrying           Reaching           MHP Pre tx with ace flexion Pre tx with ace flexion Pre tx with ace flexion Pre tx with ace wrap Pre tx with ace flexion Pre tx with ace flexion Pre tx with ace flexion   TENS 10 pre tx 10 pre tx 10' pre tx 10' pre tx 10 pre tx 10 pre tx 10 pre tx

## 2025-04-28 ENCOUNTER — OFFICE VISIT (OUTPATIENT)
Dept: PHYSICAL THERAPY | Facility: CLINIC | Age: 65
End: 2025-04-28
Payer: MEDICARE

## 2025-04-28 DIAGNOSIS — I89.0 LYMPHEDEMA: Primary | ICD-10-CM

## 2025-04-28 PROCEDURE — 97140 MANUAL THERAPY 1/> REGIONS: CPT | Performed by: PHYSICAL THERAPIST

## 2025-04-28 PROCEDURE — 97530 THERAPEUTIC ACTIVITIES: CPT | Performed by: PHYSICAL THERAPIST

## 2025-04-28 NOTE — PROGRESS NOTES
Daily Note     Today's date: 2025  Patient name: Eileen Matias  : 1960  MRN: [id number]  Referring provider: Amanda Motley Has*  Dx: No diagnosis found.               Subjective: Patient reports that she saw the plastic surgeon who is going to do the R surgery.  She does not have surgery scheduled at this time.  She reports that her L UE is doing well.      Objective: See treatment diary below      Assessment: Tolerated treatment well. Patient would benefit from continued PT      Plan: Continue per plan of care.      Precautions: L BCA  Access Code: [email or web address]  URL: [email or web address]  Date: 2024  Prepared by: Meena Jain    Exercises  - Shoulder Flexion Wall Slide with Towel  - 1 x daily - 7 x weekly - 1 sets - 5 reps - 10 hold  - Standing Shoulder Abduction Slides at Wall  - 1 x daily - 7 x weekly - 3 sets - 10 reps  - Single Arm Doorway Pec Stretch at 90 Degrees Abduction  - 1 x daily - 7 x weekly - 1 sets - 5 reps - 10 hold    Manuals 4/28 1/29 2/5 2/12 2/19 2/26 3/10 3/17 3/26 4/14   Manual MFR 10'            Self MLD             Compression fitting    15 15        Girth mmts  10 8' 10 10 10 10 15 10 15    REV                                                                                                        Ther Ex             [place]ley 5' 5' 4' 5' 5' 5' 5' '5' 5'                 Wall slides  10 x :10 10 x :10 10 x :10 10 x :10 10 x :10       Bicep curls 4# 2 x 10 4# 2 x 10 4# x 20 4# x 20 2# x 10 4#  4# 2 x 10 4# 2 x 10 4# 2 x 10 4# 2 x10   Row  4# 2 10 4# 2 x 10 4# x 20 4# x 20 2# x 10 4# 4# 2 x 10 4# 2 x 10 4# 2 x 10 4#2x10   Tricep  4# 2 x10 4# 2 x 10 4# x 20 4# x 20 2# 2 x 10 4# 4# x 20 4# 2 x 20 4# 2 x 10 4# 2 x10   Bike 6' 6' 6' 6' 6' 6 6'  6' 6'   TG L 22 20 20 2x10 2 x 10 2 x 10 2x10 2 x 10 2x10 2x10 2x10   Pec press  4# 2x 20 3# 2 x 10 3# 2 x 10 3# 2 x 10 4# 2 x 10 4#  x 20 4#  2 x 10 4# 2 x 10 4# 2 x 10 4#2x10   Deltoid lateral raise 4# 2 x 10 3# 2 x 10 3# 2 x  10 3# 2 x 10         S/l ER 4# 2 x 10 3# 2 x 10 3# 2 x 10 3#2 x 10 4# 2 x 10 4#  x 20 4# 2 x 10 4# 2 x 10 4# 2 x 10 4#2 x10   Seated overhead press 4# 2 x 10 3# 2 x 10 3# 2 x 10 3# 2 x 10 4# 2 x 10 4# 2 x 10 4# 2 x10 4# 2 x 10 4# 2 x 10 4#2x10   Shoulder flexion 4# 2 x 10  3# 2 x 10 3# 2 x 10 4# 2 x 10 4# 2 x 10 4 # 2x10 4# 2 x 10 4# 2 x 10 4#2 x 10                volume  2192 2163 2147 2198   [id number]

## 2025-04-29 ENCOUNTER — OFFICE VISIT (OUTPATIENT)
Dept: OCCUPATIONAL THERAPY | Facility: CLINIC | Age: 65
End: 2025-04-29
Attending: ORTHOPAEDIC SURGERY
Payer: MEDICARE

## 2025-04-29 DIAGNOSIS — R29.898 RIGHT HAND WEAKNESS: Primary | ICD-10-CM

## 2025-04-29 DIAGNOSIS — R20.2 NUMBNESS AND TINGLING OF LEFT UPPER EXTREMITY: ICD-10-CM

## 2025-04-29 DIAGNOSIS — R20.0 NUMBNESS AND TINGLING OF LEFT UPPER EXTREMITY: ICD-10-CM

## 2025-04-29 PROCEDURE — 97110 THERAPEUTIC EXERCISES: CPT | Performed by: OCCUPATIONAL THERAPIST

## 2025-04-29 PROCEDURE — 97140 MANUAL THERAPY 1/> REGIONS: CPT | Performed by: OCCUPATIONAL THERAPIST

## 2025-04-29 PROCEDURE — 97014 ELECTRIC STIMULATION THERAPY: CPT | Performed by: OCCUPATIONAL THERAPIST

## 2025-04-29 NOTE — PROGRESS NOTES
"Daily Note     Today's date: 2025  Patient name: Eileen Matias  : 1960  MRN: [id number]  Referring provider: Rogelio Black MD  Dx:   Encounter Diagnosis     ICD-10-CM    1. Right hand weakness  R29.898       2. Numbness and tingling of left upper extremity  R20.0     R20.2                      Subjective: I feel electrical shocks in my arms sometimes.   I am having surgery for the lymph nodes on 25.       Objective: See treatment diary below      Assessment: Tolerated treatment well. Patient exhibited good technique with therapeutic exercises. Patient reports improved sensation in the past 1-2 months. 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      Plan: Continue per plan of care.   Continue therapy with reduction of frequency to once weekly.  Adding elbow PRE and UE T bands NV.      Precautions: Lymphedema; hx desmoid tumors and breast cancer; healing humeral fracture     POC expires Unit limit Auth  expiration date PT/OT + Visit Limit?    NA NA BOMN         Dx R Hum Fx     Dr Wayne MILLER MET          Last RE 24    Date 4/15 4/17 4/22 4/24 4/29  4/10   Visit 14 15 16 17 18  13   Manuals 8 8 8 8 8  10             Jt mobs digits, FA MP Digits all G 1-2 MP Digits all G 1-2 MP Digits all G 1-2 MP Digits all G 1-2 MP digits all G 1-2  MP digits all G 1-2   Ortho fit/train          Neuro Re-Ed                     Mirror therapy                              orthotic                              Ther Ex 25   25   25   25'   25'   30   HEP          PROM 1:1 Digit E/F LLPS Digit E/F LLPS Digit E/F LLPS Digits E/F LLPS Digits E/F LLPS  Digit flex LLPS   Digital blocked AROM P/H fists 5x5\" P/H fists 5x5\" P/H fists 5x5\" P/H fists 5x5\" P/H fists 5x5\"  P/H fists 5x 5\"   UE warm up UBE 3F 3R L1 plus R only 145 UBE 3F 3R L1 plus R only 145 Ube 3F 3R L1  Plus R only 160 UBE 3F 3R L 1  Plus R only 160 UBE 3F 3R L1   Plus R only " 170  UBE 2F, 2R L1   Plus R only 130   PRE FA  2# at top  X20 S/P 2# at top  X20 S/P #2 x20 S/P #2 x20 s/p #2 x20 S/P  2# 3/4 from base x 20 s/p   PRE Wrist FA B F bar up x20  B F bar down   X20  GFB on table 2x10 B F bar up x20  B F bar down   X20  GFB on table 2x10 B F bar up x20  B F bar down   X20  GFB on table 2x10 B F bar up x20  B F bar down   X20  GFB on table 2x10 B F bar up x20  B F bar down   X20  GFB on table 2x10  B FB up x 20  B FB down x 20  G FB on table 2x10   Pre elbow     Bicep curl, lower slow 1x10     Ther Activity 8 8   8   8 8'    10   Gripping Gray #2 all  foams Wilson #3 all  foams Wilson #3 all  foams Wilson #3 all foams Wilson #8 all foams  Gray #2 all foams     Pinching Velcro bd   Full bd Velcro bd   Full bd Velcro bd full bd Velcro bd Velcro bd full bd  Velcro board full   Coordination          Carrying           Reaching           MHP Pre tx with ace flexion Pre tx with ace flexion Pre tx with ace flexion Pre tx with ace wrap Pre tx with ace flexion  Pre tx with ace flexion   TENS 10 pre tx 10 pre tx 10' pre tx 10' pre tx 10' pre tx  10 pre tx

## 2025-05-01 ENCOUNTER — OFFICE VISIT (OUTPATIENT)
Dept: OCCUPATIONAL THERAPY | Facility: CLINIC | Age: 65
End: 2025-05-01
Attending: ORTHOPAEDIC SURGERY
Payer: MEDICARE

## 2025-05-01 ENCOUNTER — HOSPITAL ENCOUNTER (OUTPATIENT)
Dept: MAMMOGRAPHY | Facility: CLINIC | Age: 65
End: 2025-05-01
Payer: MEDICARE

## 2025-05-01 DIAGNOSIS — R29.898 RIGHT HAND WEAKNESS: Primary | ICD-10-CM

## 2025-05-01 DIAGNOSIS — R92.8 MAMMOGRAM ABNORMAL: ICD-10-CM

## 2025-05-01 DIAGNOSIS — R20.2 NUMBNESS AND TINGLING OF LEFT UPPER EXTREMITY: ICD-10-CM

## 2025-05-01 DIAGNOSIS — R20.0 NUMBNESS AND TINGLING OF LEFT UPPER EXTREMITY: ICD-10-CM

## 2025-05-01 PROCEDURE — 97110 THERAPEUTIC EXERCISES: CPT | Performed by: OCCUPATIONAL THERAPIST

## 2025-05-01 PROCEDURE — 97530 THERAPEUTIC ACTIVITIES: CPT | Performed by: OCCUPATIONAL THERAPIST

## 2025-05-01 PROCEDURE — 77066 DX MAMMO INCL CAD BI: CPT

## 2025-05-01 PROCEDURE — G0279 TOMOSYNTHESIS, MAMMO: HCPCS

## 2025-05-01 PROCEDURE — 97014 ELECTRIC STIMULATION THERAPY: CPT | Performed by: OCCUPATIONAL THERAPIST

## 2025-05-01 NOTE — PROGRESS NOTES
"Daily Note     Today's date: 2025  Patient name: Eileen Matias  : 1960  MRN: [id number]  Referring provider: Rogelio Black MD  Dx:   Encounter Diagnosis     ICD-10-CM    1. Right hand weakness  R29.898       2. Numbness and tingling of left upper extremity  R20.0     R20.2                      Subjective: I feel electrical shocks in my arms sometimes.  I had a big shock in my pinky yesterday.   I am having surgery for the lymph nodes on 25.       Objective: See treatment diary below      Assessment: Tolerated treatment well. Patient exhibited good technique with therapeutic exercises. Patient reports improved sensation in the past 1-2 months. 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  Improved motion noted in forearm supination and with elbow E/F control.        Plan: Continue per plan of care.   Continue therapy with reduction of frequency to once weekly.  Adding elbow PRE and UE T bands NV.      Precautions: Lymphedema; hx desmoid tumors and breast cancer; healing humeral fracture     POC expires Unit limit Auth  expiration date PT/OT + Visit Limit?    NA NA BOMN         Dx R Hum Fx     Dr Wayne MILLER MET          Last RE 24    Date 4/15 4/17 4/22 4/24 4/29 5/1    Visit 14 15 16 17 18 19    Manuals 8 8 8 8 8               Jt mobs digits, FA MP Digits all G 1-2 MP Digits all G 1-2 MP Digits all G 1-2 MP Digits all G 1-2 MP digits all G 1-2     Ortho fit/train          Neuro Re-Ed                     Mirror therapy                              orthotic                              Ther Ex 25   25   25   25'   25'   30    HEP          PROM 1:1 Digit E/F LLPS Digit E/F LLPS Digit E/F LLPS Digits E/F LLPS Digits E/F LLPS Digits E/F LLPS    Digital blocked AROM P/H fists 5x5\" P/H fists 5x5\" P/H fists 5x5\" P/H fists 5x5\" P/H fists 5x5\" P/H fists 5x5\"    UE warm up UBE 3F 3R L1 plus R only 145 UBE 3F 3R L1 plus R only " "145 Ube 3F 3R L1  Plus R only 160 UBE 3F 3R L 1  Plus R only 160 UBE 3F 3R L1   Plus R only 170 UBE 3F 3R L1  Plus R only 170    PRE UE       T band NV   PRE FA  2# at top  X20 S/P 2# at top  X20 S/P #2 x20 S/P #2 x20 s/p #2 x20 S/P #2 x20 S/P    PRE Wrist FA B F bar up x20  B F bar down   X20  GFB on table 2x10 B F bar up x20  B F bar down   X20  GFB on table 2x10 B F bar up x20  B F bar down   X20  GFB on table 2x10 B F bar up x20  B F bar down   X20  GFB on table 2x10 B F bar up x20  B F bar down   X20  GFB on table 2x10 B F bar up x20  B F bar down   X20  GFB on table 2x10    Pre elbow     Bicep curl, lower slow 1x10 Bicep curl lower slow  1x10 hold 3\"    Ther Activity 8 8   8   8 8'  8'    Gripping Gray #2 all  foams Wilson #3 all  foams Wilson #3 all  foams Wilson #3 all foams Wilson #3 all foams Gray #3 all foams    Pinching Velcro bd   Full bd Velcro bd   Full bd Velcro bd full bd Velcro bd Velcro bd full bd Velcro bd full bd    Coordination          Carrying           Reaching           MHP Pre tx with ace flexion Pre tx with ace flexion Pre tx with ace flexion Pre tx with ace wrap Pre tx with ace flexion Pre tx with ace flexion     TENS 10 pre tx 10 pre tx 10' pre tx 10' pre tx 10' pre tx 10' per tx         "

## 2025-05-05 ENCOUNTER — OFFICE VISIT (OUTPATIENT)
Dept: SURGICAL ONCOLOGY | Facility: CLINIC | Age: 65
End: 2025-05-05
Payer: MEDICARE

## 2025-05-05 VITALS
BODY MASS INDEX: 19.85 KG/M2 | DIASTOLIC BLOOD PRESSURE: 80 MMHG | TEMPERATURE: 98 F | SYSTOLIC BLOOD PRESSURE: 126 MMHG | HEART RATE: 92 BPM | OXYGEN SATURATION: 98 % | HEIGHT: 68 IN | WEIGHT: 131 LBS

## 2025-05-05 DIAGNOSIS — C77.3 CARCINOMA OF LEFT BREAST METASTATIC TO AXILLARY LYMPH NODE (HCC): ICD-10-CM

## 2025-05-05 DIAGNOSIS — C50.312 MALIGNANT NEOPLASM OF LOWER-INNER QUADRANT OF LEFT BREAST IN FEMALE, ESTROGEN RECEPTOR POSITIVE (HCC): ICD-10-CM

## 2025-05-05 DIAGNOSIS — I89.0 LYMPHEDEMA OF RIGHT ARM: ICD-10-CM

## 2025-05-05 DIAGNOSIS — Z79.811 USE OF LETROZOLE (FEMARA): ICD-10-CM

## 2025-05-05 DIAGNOSIS — Z85.3 ENCOUNTER FOR FOLLOW-UP SURVEILLANCE OF BREAST CANCER: Primary | ICD-10-CM

## 2025-05-05 DIAGNOSIS — C50.912 CARCINOMA OF LEFT BREAST METASTATIC TO AXILLARY LYMPH NODE (HCC): ICD-10-CM

## 2025-05-05 DIAGNOSIS — Z17.0 MALIGNANT NEOPLASM OF LOWER-INNER QUADRANT OF LEFT BREAST IN FEMALE, ESTROGEN RECEPTOR POSITIVE (HCC): ICD-10-CM

## 2025-05-05 DIAGNOSIS — D48.119 DESMOID TUMOR: ICD-10-CM

## 2025-05-05 DIAGNOSIS — Z98.890 HISTORY OF LUMPECTOMY OF LEFT BREAST: ICD-10-CM

## 2025-05-05 DIAGNOSIS — Z08 ENCOUNTER FOR FOLLOW-UP SURVEILLANCE OF BREAST CANCER: Primary | ICD-10-CM

## 2025-05-05 PROCEDURE — 99215 OFFICE O/P EST HI 40 MIN: CPT | Performed by: SURGERY

## 2025-05-05 NOTE — PROGRESS NOTES
Name: Eileen Matias      : 1960      MRN: [id number]  Encounter Provider: Amanda Motley MD  Encounter Date: 2025   Encounter department: [facility] [place]  :  Assessment & Plan  Encounter for follow-up surveillance of breast cancer         Malignant neoplasm of lower-inner quadrant of left breast in female, estrogen receptor positive (HCC)         Carcinoma of left breast metastatic to axillary lymph node (HCC)         Use of letrozole (Femara)         Lymphedema of right arm         History of lumpectomy of left breast         Desmoid tumor         64-year-old female follow-up with her left breast cancer after her mammogram.  She has been referred to plastic surgery for right upper extremity lymphovenous possible bypass.  Did discuss with Bc colin check and if patient has been scheduled for surgery in early .  She has bilateral lymphedema.  Plan for right upper extremity lymphovenous bypass from previous soft tissue tumor resection at [facility] several years back.  With regard to her breast she does not have evidence of local regional recurrence no palpable adenopathy.  Mammogram films were reviewed and discussed with the patient in detail and we will see her in 6 months        History of Present Illness   Eileen Matias is a 64 y.o. year old female who presents for follow-up of with left breast cancer.     Oncology History   Cancer Staging   Carcinoma of left breast metastatic to axillary lymph node (HCC)  Staging form: Breast, AJCC 8th Edition  - Pathologic stage from 2022: Stage IA (pT1c, pN1, cM0, G1, ER+, WA+, HER2-) - Signed by Alfredo Hays MD on 2022  Stage prefix: Initial diagnosis  Nuclear grade: G1  Multigene prognostic tests performed: None  Mitotic count score: Score 1  Histologic grading system: 3 grade system  Oncology History   Carcinoma of left breast metastatic to axillary lymph node (HCC)   2022  Initial Diagnosis    Carcinoma of left breast metastatic to axillary lymph node (HCC)     4/14/2022 Biopsy    Left axillary Lymph node ultrasound guided biopsy  Metastatic carcinoma, compatible with breast origin.   ER >95  OH 95   HER2 1+    On ultrasound lymph node is 1.8 cm. There is cortical thickening without evident fatty hilum.     In review of screening mammogram, demonstrates no suspicious mammographic findings identified in either breast. Bilateral MRI is recommended. Flow cytometry - there is no evidence of a B-cell or T-cell non-Hodgkin lymphoma.       4/20/2022 Genomic Testing    A total of 36 genes were evaluated, including: ANGEL LUIS, BRCA1, BRCA2, CDH1, CHEK2, PALB2, PTEN, STK11, TP53  Negative result. No pathogenic sequence variants or deletions/duplications identified     5/20/2022 Biopsy    MRI guided biopsy  A. Left breast   11 o'clock   Invasive mammary carcinoma of no special type (ductal)  Grade 1  ER 95  OH 95  HER2 0    B. Right breast   Retroareolar  Benign fibrocystic changes without atypia (discrete 0.3 cm focus of sclerosing and tubular adenosis with usual ductal hyperplasia, columnar cell change and hyperplasia, apocrine metaplasia, cystic dilatation).   - Microcalcifications: Present associated with non-neoplastic breast tissue.   - Negative for malignancy, in-situ carcinoma and atypical hyperplasia.     Malignant pathology appears unifocal. Biopsy proven carcinoma measured 1 cm on MRI. Biopsy proved metastatic disease to left axillary lymph node.      6/9/2022 Genomic Testing    MammaPrint  Low risk  Luminal type A  MammaPrint index: +0.578     6/21/2022 Surgery    Left breast tiffany  directed lumpectomy with axillary dissection  A. Invasive 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  Grade 1  1.3 cm  Lymphovascular invasion is identified  Margins negative    H. Axillary, Level I and Level II axillary contents  Metastatic carcinoma involving 3/17 lymph nodes  1.8 cm    Extranodal extension is identified 1mm  Foci of carcinoma identified in extranodal lymphatic channels    Anatomic stage: Stage IIA  Prognostic stage: Stage IA      6/21/2022 -  Cancer Staged    Staging form: Breast, AJCC 8th Edition  - Pathologic stage from 6/21/2022: Stage IA (pT1c, pN1, cM0, G1, ER+, MN+, HER2-) - Signed by Alfredo Hays MD on 8/1/2022  Stage prefix: Initial diagnosis  Nuclear grade: G1  Multigene prognostic tests performed: None  Mitotic count score: Score 1  Histologic grading system: 3 grade system       8/29/2022 - 10/10/2022 Radiation    Treatments:  Course: C1  Plan ID Energy Fractions Dose per Fraction (cGy) Total Dose Delivered (cGy) Elapsed Days   BH L Breast 6X 25 / 25 200 5,000 35   BH L [id number] 12E 5 / 5 200 1,000 6   BH L SClav 10X/6X 24 / 24 200 4,800 32    Treatment Dates:  8/29/2022 - 10/10/2022.      Malignant neoplasm of lower-inner quadrant of left breast in female, estrogen receptor positive (HCC)   5/20/2022 Initial Diagnosis    Malignant neoplasm of lower-inner quadrant of left breast in female, estrogen receptor positive (HCC)     5/20/2022 Biopsy    MRI guided biopsy  A. Left breast   11 o'clock   Invasive mammary carcinoma of no special type (ductal)  Grade 1  ER 95  MN 95  HER2 0    B. Right breast   Retroareolar  Benign fibrocystic changes without atypia (discrete 0.3 cm focus of sclerosing and tubular adenosis with usual ductal hyperplasia, columnar cell change and hyperplasia, apocrine metaplasia, cystic dilatation).   - Microcalcifications: Present associated with non-neoplastic breast tissue.   - Negative for malignancy, in-situ carcinoma and atypical hyperplasia.     Malignant pathology appears unifocal. Biopsy proven carcinoma measured 1 cm on MRI. Biopsy proved metastatic disease to left axillary lymph node.      6/9/2022 Genomic Testing    MammaPrint  Low risk  Luminal type A  MammaPrint index: +0.578     6/21/2022 Surgery    Left breast tiffany   "directed lumpectomy with axillary dissection  A. Invasive 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  Grade 1  1.3 cm  Lymphovascular invasion is identified  Margins negative    H. Axillary, Level I and Level II axillary contents  Metastatic carcinoma involving 3/17 lymph nodes  1.8 cm   Extranodal extension is identified 1mm  Foci of carcinoma identified in extranodal lymphatic channels    Anatomic stage: Stage IIA  Prognostic stage: Stage IA      8/29/2022 - 10/10/2022 Radiation    Treatments:  Course: C1  Plan ID Energy Fractions Dose per Fraction (cGy) Total Dose Delivered (cGy) Elapsed Days   BH L Breast 6X 25 / 25 200 5,000 35   BH L [id number] 12E 5 / 5 200 1,000 6   BH L SClav 10X/6X 24 / 24 200 4,800 32    Treatment Dates:  8/29/2022 - 10/10/2022.         Review of Systems   Constitutional:  Negative for chills and fever.   HENT:  Negative for ear pain and sore throat.    Eyes:  Negative for pain and visual disturbance.   Respiratory:  Negative for cough and shortness of breath.    Cardiovascular:  Negative for chest pain and palpitations.   Gastrointestinal:  Negative for abdominal pain and vomiting.   Genitourinary:  Negative for dysuria and hematuria.   Musculoskeletal:  Negative for arthralgias and back pain.   Skin:  Negative for color change and rash.   Neurological:  Negative for seizures and syncope.   All other systems reviewed and are negative.   A complete review of systems is negative other than that noted above in the HPI.    Medical History Reviewed by provider this encounter:  Tobacco  Allergies  Meds  Problems  Med Hx  Surg Hx  Fam Hx     .       Objective   /80 (BP Location: Left arm, Patient Position: Sitting)   Pulse 92   Temp 98 °F (36.7 °C) (Temporal)   Ht 5' 8\" (1.727 m)   Wt 59.4 kg (131 lb)   SpO2 98%   BMI 19.92 kg/m²     Pain Screening:     ECOG    Physical Exam  Constitutional:       Appearance: Normal appearance.   HENT:      Head: Normocephalic " and atraumatic.      Nose: Nose normal.      Mouth/Throat:      Mouth: Mucous membranes are moist.   Eyes:      Pupils: Pupils are equal, round, and reactive to light.   Cardiovascular:      Rate and Rhythm: Normal rate.      Pulses: Normal pulses.      Heart sounds: Normal heart sounds.   Pulmonary:      Effort: Pulmonary effort is normal.      Breath sounds: Normal breath sounds.   Chest:          Comments: Well-healed left breast and left axillary surgical scars.  Bilateral breast examination no palpable mass, masses nipple discharge nipple retraction or skin changes bilateral axillary and supraclavicular examination no palpable adenopathy.  Patient was examined seated as well as supine position.  Bilateral upper extremity lymphedema more so on the right side than left.  Abdominal:      General: Bowel sounds are normal.      Palpations: Abdomen is soft.   Musculoskeletal:         General: Normal range of motion.      Cervical back: Normal range of motion and neck supple.   Skin:     General: Skin is warm.   Neurological:      General: No focal deficit present.      Mental Status: She is alert and oriented to person, place, and time.   Psychiatric:         Mood and Affect: Mood normal.         Behavior: Behavior normal.         Thought Content: Thought content normal.         Judgment: Judgment normal.          Labs: I have reviewed pertinent labs.   Lab Results   Component Value Date/Time    WBC 4.72 03/25/2025 08:12 AM    RBC 4.98 03/25/2025 08:12 AM    Hemoglobin 14.5 03/25/2025 08:12 AM    Hematocrit 45.2 03/25/2025 08:12 AM    MCV 91 03/25/2025 08:12 AM    MCH 29.1 03/25/2025 08:12 AM    RDW 13.1 03/25/2025 08:12 AM    Platelets 284 03/25/2025 08:12 AM    Segmented % 51 03/25/2025 08:12 AM    Lymphocytes % 36 03/25/2025 08:12 AM    Monocytes % 10 03/25/2025 08:12 AM    Eosinophils Relative 2 03/25/2025 08:12 AM    Basophils Relative 1 03/25/2025 08:12 AM    Immature Grans % 0 03/25/2025 08:12 AM    Absolute  Neutrophils 2.41 03/25/2025 08:12 AM      Lab Results   Component Value Date/Time    Sodium 138 03/25/2025 08:12 AM    Potassium 4.1 03/25/2025 08:12 AM    Chloride 102 03/25/2025 08:12 AM    CO2 30 03/25/2025 08:12 AM    ANION GAP 6 03/25/2025 08:12 AM    BUN 9 03/25/2025 08:12 AM    Creatinine 0.55 (L) 03/25/2025 08:12 AM    Glucose 131 11/19/2024 01:24 PM    Glucose, Fasting 76 03/25/2025 08:12 AM    Calcium 8.8 03/25/2025 08:12 AM    AST 21 03/25/2025 08:12 AM    ALT 19 03/25/2025 08:12 AM    Alkaline Phosphatase 82 03/25/2025 08:12 AM    Total Protein 6.8 03/25/2025 08:12 AM    Albumin 3.9 03/25/2025 08:12 AM    Total Bilirubin 0.72 03/25/2025 08:12 AM    eGFR 98 03/25/2025 08:12 AM      No visits with results within 1 Month(s) from this visit.   Latest known visit with results is:   Appointment on 03/25/2025   Component Date Value Ref Range Status    WBC 03/25/2025 4.72  4.31 - 10.16 Thousand/uL Final    RBC 03/25/2025 4.98  3.81 - 5.12 Million/uL Final    Hemoglobin 03/25/2025 14.5  11.5 - 15.4 g/dL Final    Hematocrit 03/25/2025 45.2  34.8 - 46.1 % Final    MCV 03/25/2025 91  82 - 98 fL Final    MCH 03/25/2025 29.1  26.8 - 34.3 pg Final    MCHC 03/25/2025 32.1  31.4 - 37.4 g/dL Final    RDW 03/25/2025 13.1  11.6 - 15.1 % Final    MPV 03/25/2025 9.4  8.9 - 12.7 fL Final    Platelets 03/25/2025 284  149 - 390 Thousands/uL Final    nRBC 03/25/2025 0  /100 WBCs Final    Segmented % 03/25/2025 51  43 - 75 % Final    Immature Grans % 03/25/2025 0  0 - 2 % Final    Lymphocytes % 03/25/2025 36  14 - 44 % Final    Monocytes % 03/25/2025 10  4 - 12 % Final    Eosinophils Relative 03/25/2025 2  0 - 6 % Final    Basophils Relative 03/25/2025 1  0 - 1 % Final    Absolute Neutrophils 03/25/2025 2.41  1.85 - 7.62 Thousands/µL Final    Absolute Immature Grans 03/25/2025 0.01  0.00 - 0.20 Thousand/uL Final    Absolute Lymphocytes 03/25/2025 1.69  0.60 - 4.47 Thousands/µL Final    Absolute Monocytes 03/25/2025 0.46  0.17 -  1.22 Thousand/µL Final    Eosinophils Absolute 03/25/2025 0.11  0.00 - 0.61 Thousand/µL Final    Basophils Absolute 03/25/2025 0.04  0.00 - 0.10 Thousands/µL Final    Sodium 03/25/2025 138  135 - 147 mmol/L Final    Potassium 03/25/2025 4.1  3.5 - 5.3 mmol/L Final    Chloride 03/25/2025 102  96 - 108 mmol/L Final    CO2 03/25/2025 30  21 - 32 mmol/L Final    ANION GAP 03/25/2025 6  4 - 13 mmol/L Final    BUN 03/25/2025 9  5 - 25 mg/dL Final    Creatinine 03/25/2025 0.55 (L)  0.60 - 1.30 mg/dL Final    Standardized to IDMS reference method    Glucose, Fasting 03/25/2025 76  65 - 99 mg/dL Final    Calcium 03/25/2025 8.8  8.4 - 10.2 mg/dL Final    AST 03/25/2025 21  13 - 39 U/L Final    ALT 03/25/2025 19  7 - 52 U/L Final    Specimen collection should occur prior to Sulfasalazine administration due to the potential for falsely depressed results.     Alkaline Phosphatase 03/25/2025 82  34 - 104 U/L Final    Total Protein 03/25/2025 6.8  6.4 - 8.4 g/dL Final    Albumin 03/25/2025 3.9  3.5 - 5.0 g/dL Final    Total Bilirubin 03/25/2025 0.72  0.20 - 1.00 mg/dL Final    Use of this assay is not recommended for patients undergoing treatment with eltrombopag due to the potential for falsely elevated results.  N-acetyl-p-benzoquinone imine (metabolite of Acetaminophen) will generate erroneously low results in samples for patients that have taken an overdose of Acetaminophen.    eGFR 03/25/2025 98  ml/min/1.73sq m Final    TSH 3RD GENERATON 03/25/2025 1.482  0.450 - 4.500 uIU/mL Final    The recommended reference ranges for TSH during pregnancy are as follows:   First trimester 0.100 to 2.500 uIU/mL   Second trimester  0.200 to 3.000 uIU/mL   Third trimester 0.300 to 3.000 uIU/m    Note: Normal ranges may not apply to patients who are transgender, non-binary, or whose legal sex, sex at birth, and gender identity differ.  Adult TSH (3rd generation) reference range follows the recommended guidelines of the American Thyroid  Association, January, 2020.    Hemoglobin A1C 03/25/2025 5.7 (H)  Normal 4.0-5.6%; PreDiabetic 5.7-6.4%; Diabetic >=6.5%; Glycemic control for adults with diabetes <7.0% % Final    EAG 03/25/2025 117  mg/dl Final    Cholesterol 03/25/2025 221 (H)  See Comment mg/dL Final    Cholesterol:         Pediatric <18 Years        Desirable          <170 mg/dL      Borderline High    170-199 mg/dL      High               >=200 mg/dL        Adult >=18 Years            Desirable         <200 mg/dL      Borderline High   200-239 mg/dL      High              >239 mg/dL      Triglycerides 03/25/2025 131  See Comment mg/dL Final    Triglyceride:     0-9Y            <75mg/dL     10Y-17Y         <90 mg/dL       >=18Y     Normal          <150 mg/dL     Borderline High 150-199 mg/dL     High            200-499 mg/dL        Very High       >499 mg/dL    Specimen collection should occur prior to Metamizole administration due to the potential for falsely depressed results.    HDL, Direct 03/25/2025 55  >=50 mg/dL Final    LDL Calculated 03/25/2025 140 (H)  0 - 100 mg/dL Final    LDL Cholesterol:     Optimal           <100 mg/dl     Near Optimal      100-129 mg/dl     Above Optimal       Borderline High 130-159 mg/dl       High            160-189 mg/dl       Very High       >189 mg/dl         This screening LDL is a calculated result.   It does not have the accuracy of the Direct Measured LDL in the monitoring of patients with hyperlipidemia and/or statin therapy.   Direct Measure LDL (MGL883) must be ordered separately in these patients.     Pathology: I have reviewed pathology reports described above.    Radiology Results Review: I personally reviewed the following image studies in PACS and associated radiology reports: Mammogram. My interpretation of the radiology images/reports is: Consistent with left breast postoperative changes no evidence of abnormal mass, architectural distortions or abnormal calcifications requires any  biopsy..  Concordance: yes    Administrative Statements   I have spent a total time of 40 minutes in caring for this patient on the day of the visit/encounter including Diagnostic results, Prognosis, Risks and benefits of tx options, Instructions for management, Patient and family education, Importance of tx compliance, Risk factor reductions, Impressions, Counseling / Coordination of care, Documenting in the medical record, Reviewing/placing orders in the medical record (including tests, medications, and/or procedures), Obtaining or reviewing history  , and Communicating with other healthcare professionals .

## 2025-05-08 ENCOUNTER — OFFICE VISIT (OUTPATIENT)
Dept: OCCUPATIONAL THERAPY | Facility: CLINIC | Age: 65
End: 2025-05-08
Attending: ORTHOPAEDIC SURGERY
Payer: MEDICARE

## 2025-05-08 DIAGNOSIS — R20.0 NUMBNESS AND TINGLING OF LEFT UPPER EXTREMITY: ICD-10-CM

## 2025-05-08 DIAGNOSIS — R20.2 NUMBNESS AND TINGLING OF LEFT UPPER EXTREMITY: ICD-10-CM

## 2025-05-08 DIAGNOSIS — R29.898 RIGHT HAND WEAKNESS: Primary | ICD-10-CM

## 2025-05-08 PROCEDURE — 97140 MANUAL THERAPY 1/> REGIONS: CPT | Performed by: OCCUPATIONAL THERAPIST

## 2025-05-08 PROCEDURE — 97014 ELECTRIC STIMULATION THERAPY: CPT | Performed by: OCCUPATIONAL THERAPIST

## 2025-05-08 PROCEDURE — 97110 THERAPEUTIC EXERCISES: CPT | Performed by: OCCUPATIONAL THERAPIST

## 2025-05-08 NOTE — PROGRESS NOTES
"Daily Note     Today's date: 2025  Patient name: Eileen Matias  : 1960  MRN: [id number]  Referring provider: Rogelio Black MD  Dx:   Encounter Diagnosis     ICD-10-CM    1. Right hand weakness  R29.898       2. Numbness and tingling of left upper extremity  R20.0     R20.2                      Subjective:   I work so hard on my hand and I can almost touch to pinky to the palm...but not quite.    I am having surgery for the lymph nodes on 25.       Objective: See treatment diary below      Assessment: Tolerated treatment well. Patient exhibited good technique with therapeutic exercises. Patient reports improved sensation in the past 1-2 months. 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  Improved motion noted in forearm supination and with elbow E/F control.        Plan: Continue per plan of care.   Continue therapy with reduction of frequency to once weekly.  Adding elbow PRE and UE T bands.       Precautions: Lymphedema; hx desmoid tumors and breast cancer; healing humeral fracture     POC expires Unit limit Auth  expiration date PT/OT + Visit Limit?    NA NA BOMN         Dx R Hum Fx     Dr Wayne MILLER MET          Last RE 24    Date 4/15 4/17 4/22 4/24 4/29 5/1 5/8   Visit 14 15 16 17 18 19 20   Manuals 8 8 8 8 8  8   STM          Jt mobs digits, FA MP Digits all G 1-2 MP Digits all G 1-2 MP Digits all G 1-2 MP Digits all G 1-2 MP digits all G 1-2  MP all G 1-2   Ortho fit/train          Neuro Re-Ed                     Mirror therapy                              orthotic                              Ther Ex 25   25   25   25'   25'   30   23'   HEP          PROM 1:1 Digit E/F LLPS Digit E/F LLPS Digit E/F LLPS Digits E/F LLPS Digits E/F LLPS Digits E/F LLPS Digits E/F LLPS   Digital blocked AROM P/H fists 5x5\" P/H fists 5x5\" P/H fists 5x5\" P/H fists 5x5\" P/H fists 5x5\" P/H fists 5x5\" P/H fists 5x5\"   UE warm up " "UBE 3F 3R L1 plus R only 145 UBE 3F 3R L1 plus R only 145 Ube 3F 3R L1  Plus R only 160 UBE 3F 3R L 1  Plus R only 160 UBE 3F 3R L1   Plus R only 170 UBE 3F 3R L1  Plus R only 170 UBE 3F 3R L1  Plus R only 170   PRE UE       T band NV   PRE FA  2# at top  X20 S/P 2# at top  X20 S/P #2 x20 S/P #2 x20 s/p #2 x20 S/P #2 x20 S/P    PRE Wrist FA B F bar up x20  B F bar down   X20  GFB on table 2x10 B F bar up x20  B F bar down   X20  GFB on table 2x10 B F bar up x20  B F bar down   X20  GFB on table 2x10 B F bar up x20  B F bar down   X20  GFB on table 2x10 B F bar up x20  B F bar down   X20  GFB on table 2x10 B F bar up x20  B F bar down   X20  GFB on table 2x10 B F bar up x20  B F bar down   X20   Pre elbow     Bicep curl, lower slow 1x10 Bicep curl lower slow  1x10 hold 3\" NV, cont   Ther Activity 8 8   8   8 8'  8'   8'   Gripping Gray #2 all  foams Wilson #3 all  foams Wilson #3 all  foams Wilson #3 all foams Wilson #3 all foams Wilson #3 all foams Wilson #3 all foams  Dowel, marker into putty   Pinching Velcro bd   Full bd Velcro bd   Full bd Velcro bd full bd Velcro bd Velcro bd full bd Velcro bd full bd Velcrobd full bd   Coordination       Marbles in frisbee x10 CWCCW   Carrying           Reaching           MHP Pre tx with ace flexion Pre tx with ace flexion Pre tx with ace flexion Pre tx with ace wrap Pre tx with ace flexion Pre tx with ace flexion  Pre tx with ace flexion   TENS 10 pre tx 10 pre tx 10' pre tx 10' pre tx 10' pre tx 10' per tx 10 pre tx        "

## 2025-05-12 ENCOUNTER — OFFICE VISIT (OUTPATIENT)
Dept: PHYSICAL THERAPY | Facility: CLINIC | Age: 65
End: 2025-05-12
Payer: MEDICARE

## 2025-05-12 DIAGNOSIS — I89.0 LYMPHEDEMA: Primary | ICD-10-CM

## 2025-05-12 PROCEDURE — 97110 THERAPEUTIC EXERCISES: CPT | Performed by: PHYSICAL THERAPIST

## 2025-05-12 PROCEDURE — 97530 THERAPEUTIC ACTIVITIES: CPT | Performed by: PHYSICAL THERAPIST

## 2025-05-12 PROCEDURE — 97140 MANUAL THERAPY 1/> REGIONS: CPT | Performed by: PHYSICAL THERAPIST

## 2025-05-12 NOTE — PROGRESS NOTES
PT Evaluation     Today's date: 25  Patient name: Eileen Matias  : 1960  MRN: [id number]  Referring provider: No ref. provider found  Dx:   Encounter Diagnosis     ICD-10-CM    1. Lymphedema  I89.0           Start Time: 330  Stop Time: 430  Total time in clinic (min): 60 minutes    Assessment  Impairments: abnormal or restricted ROM, impaired physical strength, lacks appropriate home exercise program and pain with function  Symptom irritability: low    25:      25:  Eileen has been attending physical therapy s/p L lumpectomy with ALND and stage 1 lymphedema.  She has had a reduction in lymphedema with daily use of her compression garments.  We have weaned her from the compression pump without increase.  We will continue to challenge the UE with strengthening exercises to reach full function without increase in swelling.  She is in agreement with the plan of care.  Thank you for your referral.      Assessment details: Eileen Matias is a 64 y.o. 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  She  presents with pain, decreased strength, decreased ROM, and postural  dysfunction. Due to these impairments, Patient has difficulty performing a/iadls, recreational activities and engaging in social activities. Patient's clinical presentation is consistent with their referring diagnosis. Patient would benefit from compression garments, HEP of MLD as well as skilled physical therapy to address their aforementioned impairments, improve their level of function and to improve their overall quality of life.  has been given a home exercise program and is in agreement with the plan of care.  Thank you for your referral.     Barriers to therapy: R UE limits ability to perform home MLD -  to assist  R UE limitations affects ability to don and doff compression garments   Understanding of Dx/Px/POC: excellent     Prognosis: excellent    Goals  ST Goals - 2-4  weeks  1. Patient will report decreased pain with activity by at least 2 points within 4 weeks - met  2. Patient and  will perform hep MLD independently w/in 4 weeks - met  3. Patient to become independent in donning and doffing compression garments in 2-4 weeks - met      LT Goals - Discharge  1. Patient will improve FOTO score to maximum stated or greater by discharge  2. Patient will return to preferred recreational activity without significant pain increase by discharge   3.  Patient will return to all work related activities without pain by discharge      Plan  Patient would benefit from: skilled physical therapy  Referral necessary: No    Planned therapy interventions: manual therapy, neuromuscular re-education, home exercise program, therapeutic exercise, therapeutic activities, stretching and strengthening    Frequency: 2x week  Duration in weeks: 8  Plan of Care beginning date: 5/12/25  Plan of Care expiration date: 8/12/25  Treatment plan discussed with: patient and family      Subjective Evaluation    History of Present Illness  Mechanism of injury: Patient presents today s/p L BCA with L lumpectomy with ALND on 6/21/22.  She then had her radiation therapy.  She did not need to have chemotherapy.  She began to have swelling in the L UE which was treated by OT in 2023 and then PT in 2024.    CC:  lymphedema and she has pain from the forearm to the elbow.  She reports that the pain in the elbow area has been present for 3-4 months. She uses her compression pump 3 x a day for an hour for B Ues.  Function:  She reports that she had a fx of the L wrist.  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  She is able to do her art.  She is unable to drive.  She reports that the UE gets tired easily.  She is able to lift and carry something the size of a gallon of milk with the L UE.     1/22/25:  Patient reports that her L UE is doing well.  She has been wearing her compression garment daily and her night garment nightly.   She is no longer pumping her L UE.            Recurrent probem    Quality of life: excellent    Patient Goals  Patient goals for therapy: decreased edema, decreased pain, independence with ADLs/IADLs and return to sport/leisure activities    Pain  Current pain ratin  At worst pain ratin    Social Support    Employment status: not working  Hand dominance: left    Treatments  Previous treatment: occupational therapy and physical therapy  Current treatment: physical therapy        Objective     Observations     Additional Observation Details  + lymphedema L UE  - Stemmer's sign  - pitting edema  + Axillary cording    Active Range of Motion                                 25  Left Shoulder   Flexion: 162 degrees                                                               160  Abduction: 150 degrees                                     165               160  External rotation BTH: WFL  Internal rotation BTB: WFL    Strength/Myotome Testing     Left Shoulder   Normal muscle strength    Right Shoulder   Normal muscle strength    LYMPHEDEMA:      See volume flow sheet         Precautions: L BCA  Access Code: [email or web address]  URL: [email or web address]  Date: 2024  Prepared by: Meena Jain    Exercises  - Shoulder Flexion Wall Slide with Towel  - 1 x daily - 7 x weekly - 1 sets - 5 reps - 10 hold  - Standing Shoulder Abduction Slides at Wall  - 1 x daily - 7 x weekly - 3 sets - 10 reps  - Single Arm Doorway Pec Stretch at 90 Degrees Abduction  - 1 x daily - 7 x weekly - 1 sets - 5 reps - 10 hold

## 2025-05-12 NOTE — PROGRESS NOTES
Daily Note     Today's date: 2025  Patient name: Eileen Matias  : 1960  MRN: [id number]  Referring provider: Amanda Motley*  Dx:   Encounter Diagnosis     ICD-10-CM    1. Lymphedema  I89.0                      Subjective: see rev      Objective: See treatment diary below      Assessment: Tolerated treatment well. Patient would benefit from continued PT      Plan: Continue per plan of care.      Precautions: L BCA  Access Code: [email or web address]  URL: [email or web address]  Date: 2024  Prepared by: Meena Jain    Exercises  - Shoulder Flexion Wall Slide with Towel  - 1 x daily - 7 x weekly - 1 sets - 5 reps - 10 hold  - Standing Shoulder Abduction Slides at Wall  - 1 x daily - 7 x weekly - 3 sets - 10 reps  - Single Arm Doorway Pec Stretch at 90 Degrees Abduction  - 1 x daily - 7 x weekly - 1 sets - 5 reps - 10 hold    Manuals 1/22 11/18 12/2 12/4 12/9 12/16 12/23 1/6 1/15 1/20   Manual MFR 10'            Self MLD             Compression fitting  20' 15 30 15        Girth mmts    5'   8' 8'  8' 8'   REV 15                                                                                                       Ther Ex             Pulley 5'  4' 5' 5' 5' 5' 5' 5' 5'                Wall slides  10 x :10 10 x :10 10 x :10 10 x :10 10 x :10 hep      Bicep curls 4# 2 x 10  nv  2# x 10 3# 2 x 10 4# 2 x 10 4# 2 x 10 4# 2 x 10 4# 2 x 10   Row  4# 2 10  nv  2# x 10 3# 2 x 10 3# 2 x 10 4# 2x10 4#2 x 10 4# 2 x 10   Tricep  4# 2 x10  nv  2# 2 x 10 3# 2 x 10 3# 2 x 10 4# 2x10 4# 2 x 10 4# 2 x 10   Bike 6' 6' 6' 6' 6' 6' 6' 6' 6' 6'   TG L 20 20 10 2x10 2 x 10 2 x 10 2 x 10 2x10 2x10 2x10 2 x 10   Pec press  3# 2x 20         3# 2 x 10   Deltoid lateral raise          4# 2 x 10   S/l ER 3# 2 x 10         3# 2 x 10   Seated overhead press 3# 2 x 10         3# 2 x 10                             Modalities

## 2025-05-12 NOTE — PROGRESS NOTES
Daily Note     Today's date: 2025  Patient name: Eileen Matias  : 1960  MRN: [id number]  Referring provider: Amanda Motley*  Dx:   Encounter Diagnosis     ICD-10-CM    1. Lymphedema  I89.0                      Subjective: see REV      Objective: See treatment diary below      Assessment: Tolerated treatment well. Patient would benefit from continued PT      Plan: Continue per plan of care.      Precautions: L BCA  Access Code: [email or web address]  URL: [email or web address]  Date: 2024  Prepared by: Meena Jain    Exercises  - Shoulder Flexion Wall Slide with Towel  - 1 x daily - 7 x weekly - 1 sets - 5 reps - 10 hold  - Standing Shoulder Abduction Slides at Wall  - 1 x daily - 7 x weekly - 3 sets - 10 reps  - Single Arm Doorway Pec Stretch at 90 Degrees Abduction  - 1 x daily - 7 x weekly - 1 sets - 5 reps - 10 hold    Manuals            Manual MFR 10'            Self MLD             Compression fitting             Girth mmts  10 8'           REV  10                                                                                                      Ther Ex             Pulley 5' 5'                        Wall slides  10 x :10           Bicep curls 4# 2 x 10 4# 2 x 10           Row  4# 2 10 4# 2 x 10           Tricep  4# 2 x10 4# 2 x 10           Bike 6' 6'           TG L 22 20 20           Pec press  4# 2x 20 4# 2 x 10           Deltoid lateral raise 4# 2 x 10 3# 2 x 10           S/l ER 4# 2 x 10 3# 2 x 10           Seated overhead press 4# 2 x 10 4# 2 x 10           Shoulder flexion 4# 2 x 10 4# 2 x 10                        volume

## 2025-05-13 ENCOUNTER — OFFICE VISIT (OUTPATIENT)
Dept: OCCUPATIONAL THERAPY | Facility: CLINIC | Age: 65
End: 2025-05-13
Attending: ORTHOPAEDIC SURGERY
Payer: MEDICARE

## 2025-05-13 DIAGNOSIS — R29.898 RIGHT HAND WEAKNESS: Primary | ICD-10-CM

## 2025-05-13 DIAGNOSIS — R20.2 NUMBNESS AND TINGLING OF LEFT UPPER EXTREMITY: ICD-10-CM

## 2025-05-13 DIAGNOSIS — R20.0 NUMBNESS AND TINGLING OF LEFT UPPER EXTREMITY: ICD-10-CM

## 2025-05-13 PROCEDURE — 97110 THERAPEUTIC EXERCISES: CPT | Performed by: OCCUPATIONAL THERAPIST

## 2025-05-13 PROCEDURE — 97140 MANUAL THERAPY 1/> REGIONS: CPT | Performed by: OCCUPATIONAL THERAPIST

## 2025-05-13 PROCEDURE — 97014 ELECTRIC STIMULATION THERAPY: CPT | Performed by: OCCUPATIONAL THERAPIST

## 2025-05-13 NOTE — PROGRESS NOTES
"Daily Note     Today's date: 2025  Patient name: Eileen Matias  : 1960  MRN: [id number]  Referring provider: Rogelio Black MD  Dx:   Encounter Diagnosis     ICD-10-CM    1. Right hand weakness  R29.898       2. Numbness and tingling of left upper extremity  R20.0     R20.2                      Subjective:   I work so hard on my hand.  My pinky is touching.    I am having surgery for the lymph nodes on 25.       Objective: See treatment diary below      Assessment: Tolerated treatment well. Patient exhibited good technique with therapeutic exercises. Patient reports improved sensation in the past 1-2 months. 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  Improved motion noted in forearm supination and with elbow E/F control.        Plan: Continue per plan of care.   Continue therapy with reduction of frequency to once weekly.  Progress UE  elbow PRE and UE T bands.       Precautions: Lymphedema; hx desmoid tumors and breast cancer; healing humeral fracture     POC expires Unit limit Auth  expiration date PT/OT + Visit Limit?    NA NA BOMN         Dx R Hum Fx     Dr Wayne MILLER MET          Last RE 24    Date    Visit 21    18 19 20   Manuals 8    8  8   STM          Jt mobs digits, FA MP digits all G 1-2    MP digits all G 1-2  MP all G 1-2   Ortho fit/train          Neuro Re-Ed                     Mirror therapy                              orthotic                              Ther Ex 3      25'   30   23'   HEP          PROM 1:1 Digits E/F LLPS    Digits E/F LLPS Digits E/F LLPS Digits E/F LLPS   Digital blocked AROM P/H fists 5x5\"    P/H fists 5x5\" P/H fists 5x5\" P/H fists 5x5\"   UE warm up UBE 3F 3R L1   Plus R only 170    UBE 3F 3R L1   Plus R only 170 UBE 3F 3R L1  Plus R only 170 UBE 3F 3R L1  Plus R only 170   PRE UE       T band NV   PRE FA  #2 S/P x20    #2 x20 S/P #2 x20 S/P    PRE " "Wrist FA UBE 3F 3R L1   Plus R only 170    B F bar up x20  B F bar down   X20  GFB on table 2x10 B F bar up x20  B F bar down   X20  GFB on table 2x10 B F bar up x20  B F bar down   X20   Pre elbow Bicep curl  #1 x20   #2 x17    Bicep curl, lower slow 1x10 Bicep curl lower slow  1x10 hold 3\" NV, cont   Ther Activity 8'    8'  8'   8'   Gripping Gray #3 all foams  Dowel, marker into putty    Gray #3 all foams Wilson #3 all foams Wilson #3 all foams  Dowel, marker into putty   Pinching Full velcrobd    Velcro bd full bd Velcro bd full bd Velcrobd full bd   Coordination       Marbles in frisbee x10 CWCCW   Carrying           Reaching           MHP Pre tx with ace flexion    Pre tx with ace flexion Pre tx with ace flexion  Pre tx with ace flexion   TENS 10- pre tx    10' pre tx 10' per tx 10 pre tx        "

## 2025-05-20 ENCOUNTER — OFFICE VISIT (OUTPATIENT)
Dept: OCCUPATIONAL THERAPY | Facility: CLINIC | Age: 65
End: 2025-05-20
Attending: ORTHOPAEDIC SURGERY
Payer: MEDICARE

## 2025-05-20 DIAGNOSIS — R20.0 NUMBNESS AND TINGLING OF LEFT UPPER EXTREMITY: ICD-10-CM

## 2025-05-20 DIAGNOSIS — R20.2 NUMBNESS AND TINGLING OF LEFT UPPER EXTREMITY: ICD-10-CM

## 2025-05-20 DIAGNOSIS — R29.898 RIGHT HAND WEAKNESS: Primary | ICD-10-CM

## 2025-05-20 PROCEDURE — 97110 THERAPEUTIC EXERCISES: CPT | Performed by: OCCUPATIONAL THERAPIST

## 2025-05-20 PROCEDURE — 97014 ELECTRIC STIMULATION THERAPY: CPT | Performed by: OCCUPATIONAL THERAPIST

## 2025-05-20 PROCEDURE — 97530 THERAPEUTIC ACTIVITIES: CPT | Performed by: OCCUPATIONAL THERAPIST

## 2025-05-20 NOTE — PROGRESS NOTES
"Daily Note     Today's date: 2025  Patient name: Eileen Matias  : 1960  MRN: [id number]  Referring provider: Rogelio Black MD  Dx:   Encounter Diagnosis     ICD-10-CM    1. Right hand weakness  R29.898       2. Numbness and tingling of left upper extremity  R20.0     R20.2                      Subjective:   I work so hard on my hand.  I stretch before I come in.  I am having surgery for the lymph nodes on 25.       Objective: See treatment diary below      Assessment: Tolerated treatment well. Patient exhibited good technique with therapeutic exercises. Patient reports improved sensation in the past 1-2 months. 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  Improved motion noted in forearm supination and with elbow E/F control.        Plan: Continue per plan of care.   Continue therapy with reduction of frequency to once weekly.  Progress UE  elbow PRE and UE T bands.  Sx scheduled on 25.  RE NV.      Precautions: Lymphedema; hx desmoid tumors and breast cancer; healing humeral fracture     POC expires Unit limit Auth  expiration date PT/OT + Visit Limit?    NA NA BOMN         Dx R Hum Fx     Dr Wayne MILLER MET          Last RE 24    Date    Visit 21 22   18 19 20   Manuals 8    8  8   STM          Jt mobs digits, FA MP digits all G 1-2    MP digits all G 1-2  MP all G 1-2   Ortho fit/train          Neuro Re-Ed                     Mirror therapy                              orthotic                              Ther Ex 30   15     25'   30   23'   HEP          PROM 1:1 Digits E/F LLPS Digital Flexion full fist LLPS   Digits E/F LLPS Digits E/F LLPS Digits E/F LLPS   Digital blocked AROM P/H fists 5x5\" P/H fists 3x5\"   P/H fists 5x5\" P/H fists 5x5\" P/H fists 5x5\"   UE warm up UBE 3F 3R L1   Plus R only 170 UBE 3F 3R L1  Plus R only 175     UBE 3F 3R L1   Plus R only 170 UBE 3F 3R L1  Plus " "R only 170 UBE 3F 3R L1  Plus R only 170   PRE FA  #2 S/P x20 #2 S/P   #2 x20 S/P #2 x20 S/P    PRE Wrist FA UBE 3F 3R L1   Plus R only 170    B F bar up x20  B F bar down   X20  GFB on table 2x10 B F bar up x20  B F bar down   X20  GFB on table 2x10 B F bar up x20  B F bar down   X20   Pre elbow Bicep curl  #1 x20   #2 x17 Bicpe curl   #1 x20  #2 x5   Bicep curl, lower slow 1x10 Bicep curl lower slow  1x10 hold 3\" NV, cont   Ther Activity 8'   15'   8'  8'   8'   Gripping Gray #3 all foams  Dowel, marker into putty Gray #3 all foams  Marker into putty x30   Gray #3 all foams Wilson #3 all foams Wilson #3 all foams  Dowel, marker into putty   Pinching Full velcrobd Full bd off on   Velcro bd full bd Velcro bd full bd Velcrobd full bd   Coordination       Marbles in frisbee x10 CWCCW   Carrying           Reaching           MHP Pre tx with ace flexion Flexion with ace pre tx   Pre tx with ace flexion Pre tx with ace flexion  Pre tx with ace flexion   TENS 10- pre tx 10' pre tx   10' pre tx 10' per tx 10 pre tx        "

## 2025-05-27 ENCOUNTER — TELEPHONE (OUTPATIENT)
Dept: NEUROLOGY | Facility: CLINIC | Age: 65
End: 2025-05-27

## 2025-05-27 NOTE — TELEPHONE ENCOUNTER
Spoke to pt to confirm pt's appt w/ Sharon in [facility] on 6/2/25. Advised pt to arrive 15 minutes prior.

## 2025-05-29 ENCOUNTER — APPOINTMENT (OUTPATIENT)
Dept: OCCUPATIONAL THERAPY | Facility: CLINIC | Age: 65
End: 2025-05-29
Attending: ORTHOPAEDIC SURGERY
Payer: MEDICARE

## 2025-05-29 NOTE — ASSESSMENT & PLAN NOTE
- Treatment: Continue Letrazole   - Annual breast exam. Pt deferred. Performed by surgical oncology Dr. Rea   - Imaging: Continue annual mammogram  - Labs: CBC, CMP q6m   - Follow up with Treatment Team Members  Surgeon: Dr. Motley  Rad Onc: Dr. Sethi  Palliative    Orders:    CBC and differential; Future    Comprehensive metabolic panel; Future

## 2025-06-02 ENCOUNTER — OFFICE VISIT (OUTPATIENT)
Dept: PHYSICAL THERAPY | Facility: CLINIC | Age: 65
End: 2025-06-02
Payer: MEDICARE

## 2025-06-02 ENCOUNTER — CONSULT (OUTPATIENT)
Dept: NEUROLOGY | Facility: CLINIC | Age: 65
End: 2025-06-02
Attending: PODIATRIST
Payer: MEDICARE

## 2025-06-02 VITALS
OXYGEN SATURATION: 98 % | TEMPERATURE: 97.9 F | DIASTOLIC BLOOD PRESSURE: 78 MMHG | SYSTOLIC BLOOD PRESSURE: 124 MMHG | HEART RATE: 66 BPM | WEIGHT: 132.7 LBS | HEIGHT: 68 IN | BODY MASS INDEX: 20.11 KG/M2

## 2025-06-02 DIAGNOSIS — G62.9 NEUROPATHY: Primary | ICD-10-CM

## 2025-06-02 DIAGNOSIS — G60.9 HEREDITARY AND IDIOPATHIC NEUROPATHY, UNSPECIFIED: ICD-10-CM

## 2025-06-02 DIAGNOSIS — I89.0 LYMPHEDEMA: Primary | ICD-10-CM

## 2025-06-02 DIAGNOSIS — R29.2 HYPERREFLEXIA: ICD-10-CM

## 2025-06-02 PROCEDURE — 97110 THERAPEUTIC EXERCISES: CPT | Performed by: PHYSICAL THERAPIST

## 2025-06-02 PROCEDURE — 99204 OFFICE O/P NEW MOD 45 MIN: CPT | Performed by: NURSE PRACTITIONER

## 2025-06-02 PROCEDURE — 97530 THERAPEUTIC ACTIVITIES: CPT | Performed by: PHYSICAL THERAPIST

## 2025-06-02 PROCEDURE — 97140 MANUAL THERAPY 1/> REGIONS: CPT | Performed by: PHYSICAL THERAPIST

## 2025-06-02 NOTE — PROGRESS NOTES
Daily Note     Today's date: 2025  Patient name: Eileen Matias  : 1960  MRN: [id number]  Referring provider: Amanda Motley*  Dx:   Encounter Diagnosis     ICD-10-CM    1. Lymphedema  I89.0                      Subjective: Patient reports that she is prepared for her lymph vessel transfer surgery.  Her surgery is on Friday.        Objective: See treatment diary below      Assessment: Tolerated treatment well. Patient would benefit from continued PT      Plan: Continue per plan of care.      Precautions: L BCA  Access Code: [email or web address]  URL: [email or web address]  Date: 2024  Prepared by: Meena Jain    Exercises  - Shoulder Flexion Wall Slide with Towel  - 1 x daily - 7 x weekly - 1 sets - 5 reps - 10 hold  - Standing Shoulder Abduction Slides at Wall  - 1 x daily - 7 x weekly - 3 sets - 10 reps  - Single Arm Doorway Pec Stretch at 90 Degrees Abduction  - 1 x daily - 7 x weekly - 1 sets - 5 reps - 10 hold    Manuals  62          Manual MFR 10'            Self MLD             Compression fitting             Girth mmts  10 8' 10          REV  10                                                                                                      Ther Ex             Pulley 5' 5' 5'                       Wall slides  10 x :10           Bicep curls 4# 2 x 10 4# 2 x 10           Row  4# 2 10 4# 2 x 10           Tricep  4# 2 x10 4# 2 x 10           Bike 6' 6' 6'          TG L 22 20 20 20          Pec press  4# 2x 20 4# 2 x 10 4# 2 x 10          Deltoid lateral raise 4# 2 x 10 3# 2 x 10 4# 2 x 10          S/l ER 4# 2 x 10 3# 2 x 10           Seated overhead press 4# 2 x 10 4# 2 x 10           Shoulder flexion 4# 2 x 10 4# 2 x 10                        volume

## 2025-06-02 NOTE — PROGRESS NOTES
Name: Eileen Matias      : 1960      MRN: [id number]  Encounter Provider: RAMIREZ Joseph  Encounter Date: 2025   Encounter [facility]  :  Assessment & Plan  Neuropathy  Patient with 6-month history of numbness and tingling in the balls of the feet into the toes.  She denies any upper extremity symptoms or muscle weakness.  Some mild imbalance, no falls.  On exam she does have a length-dependent sensory loss in the lower extremities.  She does have brisk reflexes at the knees, absent reflexes at the ankles.    We did discuss various etiologies of neuropathy,diabetes being the most common cause of neuropathy worldwide,  other metabolic diseases such as chronic kidney disease, chronic liver disease, medications, autoimmune causes, drug/alcohol, vitamin deficiencies and monoclonal gammopathies.   Patient is not diabetic, does have borderline A1c the past year.   Plan to order lab work up for reversible causes.    We did discuss options for neuropathic pain.  She did have 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 with duloxetine.  We did discuss the use of gabapentin and TCAs.  At this time she declines medication.  She can try topical lidocaine, she does find a CBD cream that she has at home helpful.    She was encouraged to stay active.  Orders:    MRI thoracic spine with and without contrast; Future    Vitamin B6; Future    Vitamin B1, whole blood; Future    Folate; Future    Vitamin B12; Future    Sedimentation rate, automated; Future    Protein electrophoresis, serum; Future    SYLVESTER Screen w/Reflex Cascade; Future    Sjogren's Antibodies; Future    BUN; Future    Creatinine, serum; Future    Hyperreflexia  Brisk reflexes noted at the knees, absent reflexes at the ankles.  Given these findings along with numbness/tingling at the feet will check MRI thoracic spine.  Orders:    MRI thoracic spine with and without contrast; Future          History of Present Illness   HPI   Patient is a  64-year-old female with past medical history of hypertension, breast cancer status postlumpectomy on letrozole hyperlipidemia, desmoid tumor, lymphedema, who presents for evaluation of numbness and tingling.     She noted numbness and tingling in the toes of both feet starting in December 2024. Over time this started to progress to the balls of feet. Can get electric shock like pain in the feet.   Feels like walking with sock bunched under the foot.   No symptoms in the UE.   Some increasing falls. Some near falls.  No back pain.   No muscle twitching or cramping.   She does feel her knees are weaker.    She was seeing podiatry.     She had 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 with duloxetine-nausea.     She is not diabetic. Borderline prediabetic for the past year.  No family history of neuropathy, mother and sister with diabetics-were overweight. Mother with stroke.   Non smoker, 1-2 shots of rum a week.   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       Prior work up:  Labs: lipid panel , , HDL 55,   A1c 5.7, tsh normal     EMG 3/2025: normal     Review of Systems   Constitutional:  Negative for chills and fever.   HENT:  Negative for ear pain and sore throat.    Eyes:  Negative for pain and visual disturbance.   Respiratory:  Negative for cough and shortness of breath.    Cardiovascular:  Negative for chest pain and palpitations.   Gastrointestinal:  Negative for abdominal pain and vomiting.   Genitourinary:  Negative for dysuria and hematuria.   Musculoskeletal:  Positive for gait problem (feet feel like walking on pillows, has trouble with balance). Negative for arthralgias and back pain.   Skin:  Negative for color change and rash.   Neurological:  Positive for numbness (numbness in toes, pins and needles in toes). Negative for seizures and syncope.   All other systems reviewed and are negative.  I have personally reviewed the MA's review of systems and made changes as necessary.         Objective   There were no vitals taken for this  visit.    Physical Exam  Constitutional:       General: She is awake.   HENT:      Right Ear: Hearing normal.      Left Ear: Hearing normal.     Eyes:      General: Lids are normal.      Extraocular Movements: Extraocular movements intact.      Pupils: Pupils are equal, round, and reactive to light.       Neurological:      Mental Status: She is alert.      Deep Tendon Reflexes:      Reflex Scores:       Bicep reflexes are 2+ on the right side.       Brachioradialis reflexes are 2+ on the left side.       Patellar reflexes are 3+ on the right side and 3+ on the left side.       Achilles reflexes are 0 on the right side and 0 on the left side.    Psychiatric:         Speech: Speech normal.       Neurological Exam  Mental Status  Awake and alert. Oriented to person, place, time and situation. Speech is normal. Language is fluent with no aphasia.    Cranial Nerves  CN III, IV, VI: Extraocular movements intact bilaterally. Normal lids and orbits bilaterally. Pupils equal round and reactive to light bilaterally.  CN V:  Right: Facial sensation is normal.  Left: Facial sensation is normal on the left.  CN VII:  Right: There is no facial weakness.  Left: There is no facial weakness.  CN VIII:  Right: Hearing is normal.  Left: Hearing is normal.  CN IX, X: Palate elevates symmetrically  CN XI:  Right: Trapezius strength is normal.  Left: Trapezius strength is normal.  CN XII: Tongue midline without atrophy or fasciculations.    Motor                                               Right                     Left  Elbow flexion                                                  5  Elbow extension                                             5  Wrist flexion                                                    5  Wrist extension                                               5  Finger flexion                                                  5  Finger abduction                                             5  Hip flexion                               5                          5  Knee flexion                           5                          5  Knee extension                      5                          5  Ankle inversion                      5                          5  Ankle eversion                       5                          5  Plantarflexion                         5                          5  Dorsiflexion                            5                          5  RUE not tested due to arm fracture.    Sensory  Light touch is normal in upper and lower extremities. Pinprick is normal in upper and lower extremities. Temperature abnormality: Normal in bilateral UE, diminished to the ankles in b/l LE  . Vibration abnormality: Absent at the toes, 5 secs at the ankles. Proprioception abnormality: Needed large excursions.     Reflexes                                            Right                      Left  Brachioradialis                                            2+  Biceps                                 2+                          Patellar                                3+                         3+  Achilles                                0                         0  Right Plantar: downgoing  Left Plantar: downgoing    Right pathological reflexes: Harper's absent. Ankle clonus absent.  Left pathological reflexes: Harper's absent. Ankle clonus absent.    Coordination  Right: Finger-to-nose normal.Left: Finger-to-nose normal.    Gait  Casual gait is normal including stance, stride, and arm swing. Able to rise from chair without using arms.

## 2025-06-02 NOTE — PROGRESS NOTES
"Name: Eileen Matias      : 1960      MRN: [id number]  Encounter Provider: RAMIREZ Joseph  Encounter Date: 2025   Encounter department: [facility] [place]  :  Assessment & Plan          History of Present Illness   HPI   Review of Systems   Constitutional:  Negative for chills and fever.   HENT:  Negative for ear pain and sore throat.    Eyes:  Negative for pain and visual disturbance.   Respiratory:  Negative for cough and shortness of breath.    Cardiovascular:  Negative for chest pain and palpitations.   Gastrointestinal:  Negative for abdominal pain and vomiting.   Genitourinary:  Negative for dysuria and hematuria.   Musculoskeletal:  Positive for gait problem (feet feel like walking on pillows, has trouble with balance). Negative for arthralgias and back pain.   Skin:  Negative for color change and rash.   Neurological:  Positive for numbness (numbness in toes, pins and needles in toes). Negative for seizures and syncope.   All other systems reviewed and are negative.   I have personally reviewed the MA's review of systems and made changes as necessary.         Objective   Temp 97.9 °F (36.6 °C) (Temporal)   Ht 5' 8\" (1.727 m)   Wt 60.2 kg (132 lb 11.2 oz)   BMI 20.18 kg/m²     Physical Exam  Neurological Exam          "

## 2025-06-11 DIAGNOSIS — C77.3 CARCINOMA OF LEFT BREAST METASTATIC TO AXILLARY LYMPH NODE (HCC): Primary | ICD-10-CM

## 2025-06-11 DIAGNOSIS — S31.109S OPEN WOUND OF ABDOMINAL WALL, SEQUELA: ICD-10-CM

## 2025-06-11 DIAGNOSIS — C50.912 CARCINOMA OF LEFT BREAST METASTATIC TO AXILLARY LYMPH NODE (HCC): Primary | ICD-10-CM

## 2025-06-13 ENCOUNTER — TELEPHONE (OUTPATIENT)
Age: 65
End: 2025-06-13

## 2025-06-13 NOTE — TELEPHONE ENCOUNTER
Angel,  of pt, called in to schedule f/u appt for pt b/c she recently had surgery.     My apologies, as I think I scheduled incorrectly. After reviewing pt's chart and updating Care Everywhere, I saw her hospital stay from: 06/06/25 - 06/09/25, pt would need TCM appt.      mentioned due to pt having Medicare, pt would need soonest appt in order for it to be covered.     Please advise & call  back to r/s appt. Thank you!    Angel: [phone]

## 2025-06-19 ENCOUNTER — OFFICE VISIT (OUTPATIENT)
Dept: INTERNAL MEDICINE CLINIC | Facility: CLINIC | Age: 65
End: 2025-06-19
Payer: MEDICARE

## 2025-06-19 VITALS
BODY MASS INDEX: 20.18 KG/M2 | TEMPERATURE: 97.9 F | OXYGEN SATURATION: 98 % | HEART RATE: 74 BPM | HEIGHT: 68 IN | SYSTOLIC BLOOD PRESSURE: 126 MMHG | DIASTOLIC BLOOD PRESSURE: 84 MMHG

## 2025-06-19 DIAGNOSIS — I89.0 LYMPHEDEMA OF RIGHT ARM: Primary | ICD-10-CM

## 2025-06-19 DIAGNOSIS — C77.3 CARCINOMA OF LEFT BREAST METASTATIC TO AXILLARY LYMPH NODE (HCC): ICD-10-CM

## 2025-06-19 DIAGNOSIS — C50.912 CARCINOMA OF LEFT BREAST METASTATIC TO AXILLARY LYMPH NODE (HCC): ICD-10-CM

## 2025-06-19 PROCEDURE — 99214 OFFICE O/P EST MOD 30 MIN: CPT | Performed by: INTERNAL MEDICINE

## 2025-06-19 PROCEDURE — G2211 COMPLEX E/M VISIT ADD ON: HCPCS | Performed by: INTERNAL MEDICINE

## 2025-06-19 NOTE — ASSESSMENT & PLAN NOTE
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 Now on letrozole therapy; recent humerus fx, in a sling, cannot operate 2/2 lymphedema; monitor.

## 2025-06-19 NOTE — ASSESSMENT & PLAN NOTE
She is status post omentum flap with lymphovenous bypass, completed on June . Here for follow up. She has follow up scheduled with [facility] next week. She has nursing therapy coming to the house.

## 2025-06-19 NOTE — PROGRESS NOTES
Name: Eileen Matias      : 1960      MRN: [id number]  Encounter Provider: Amelia Gallegos MD  Encounter Date: 2025   Encounter department: [facility] [place]  :  Assessment & Plan  Lymphedema of right arm  She is status post omentum flap with lymphovenous bypass, completed on  . Here for follow up. She has follow up scheduled with [facility] next week. She has nursing therapy coming to the house.       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 Now on letrozole therapy; recent humerus fx, in a sling, cannot operate 2/2 lymphedema; monitor.                 Depression Screening and Follow-up Plan:   Patient with underlying depression and was advised to continue current medications as prescribed.     Falls Plan of Care: referral to physical therapy. Medications that increase falls were reviewed. Vitamin D supplementation was recommended. Home safety evaluation by OT recommended.       History of Present Illness   Here for follow-up of recent procedure.      Review of Systems   Constitutional:  Negative for chills and fever.   HENT:  Negative for ear pain and sore throat.    Eyes:  Negative for pain and visual disturbance.   Respiratory:  Negative for cough and shortness of breath.    Cardiovascular:  Negative for chest pain and palpitations.   Gastrointestinal:  Negative for abdominal pain and vomiting.   Genitourinary:  Negative for dysuria and hematuria.   Musculoskeletal:  Negative for arthralgias and back pain.   Skin:  Negative for color change and rash.   Neurological:  Negative for seizures and syncope.   All other systems reviewed and are negative.      Objective   /84 (BP Location: Left arm, Patient Position: Sitting, Cuff Size: Standard)   Pulse 74   Temp 97.9 °F (36.6 °C) (Temporal)    "Ht 5' 8\" (1.727 m)   SpO2 98%   BMI 20.18 kg/m²      Physical Exam  Vitals and nursing note reviewed.   Constitutional:       General: She is not in acute distress.     Appearance: She is well-developed.   HENT:      Head: Normocephalic and atraumatic.     Cardiovascular:      Rate and Rhythm: Normal rate.      Heart sounds: No murmur heard.  Pulmonary:      Effort: Pulmonary effort is normal. No respiratory distress.   Abdominal:      General: A surgical scar is present.      Tenderness: There is no abdominal tenderness.     Musculoskeletal:         General: No swelling.     Skin:     General: Skin is warm and dry.      Capillary Refill: Capillary refill takes less than 2 seconds.     Neurological:      Mental Status: She is alert.     Psychiatric:         Mood and Affect: Mood normal.         "

## 2025-06-30 ENCOUNTER — HOSPITAL ENCOUNTER (OUTPATIENT)
Dept: MRI IMAGING | Facility: HOSPITAL | Age: 65
Discharge: HOME | End: 2025-06-30
Attending: NURSE PRACTITIONER
Payer: MEDICARE

## 2025-06-30 DIAGNOSIS — G62.9 NEUROPATHY: ICD-10-CM

## 2025-06-30 DIAGNOSIS — R29.2 HYPERREFLEXIA: ICD-10-CM

## 2025-06-30 PROCEDURE — 72157 MRI CHEST SPINE W/O & W/DYE: CPT

## 2025-06-30 PROCEDURE — A9585 GADOBUTROL INJECTION: HCPCS | Performed by: NURSE PRACTITIONER

## 2025-06-30 RX ORDER — GADOBUTROL 604.72 MG/ML
6 INJECTION INTRAVENOUS
Status: COMPLETED | OUTPATIENT
Start: 2025-06-30 | End: 2025-06-30

## 2025-06-30 RX ADMIN — GADOBUTROL 6 ML: 604.72 INJECTION INTRAVENOUS at 22:06

## 2025-07-07 ENCOUNTER — EVALUATION (OUTPATIENT)
Dept: OCCUPATIONAL THERAPY | Facility: CLINIC | Age: 65
End: 2025-07-07
Attending: ORTHOPAEDIC SURGERY
Payer: MEDICARE

## 2025-07-07 DIAGNOSIS — G56.31 RADIAL NERVE PALSY, RIGHT: Primary | ICD-10-CM

## 2025-07-07 DIAGNOSIS — R29.898 RIGHT HAND WEAKNESS: ICD-10-CM

## 2025-07-07 PROCEDURE — 97140 MANUAL THERAPY 1/> REGIONS: CPT | Performed by: OCCUPATIONAL THERAPIST

## 2025-07-07 PROCEDURE — 97110 THERAPEUTIC EXERCISES: CPT | Performed by: OCCUPATIONAL THERAPIST

## 2025-07-07 PROCEDURE — 97168 OT RE-EVAL EST PLAN CARE: CPT | Performed by: OCCUPATIONAL THERAPIST

## 2025-07-07 NOTE — PROGRESS NOTES
OT Re-Evaluation     Today's date: 2025  Patient name: Eileen Matias  : 1960  MRN: [id number]  Referring provider: Rogelio Black MD  Dx:   Encounter Diagnosis     ICD-10-CM    1. Radial nerve palsy, right  G56.31       2. Right hand weakness  R29.898                          Assessment  Impairments: abnormal coordination, abnormal or restricted ROM, activity intolerance, impaired physical strength, pain with function and weight-bearing intolerance  Other impairment: Lymphedema RUE; joint extension constractures right digits.  RN palsyright wrist digits.  Symptom irritability: low    Assessment details: Initial Evaluation  24    Eileen Matias is a 63 y.o., Ambidextrous HD female referred to hand therapy for  right hand,wrist and forearm contractures.  Onset of injury 23 due to fall causing spiral fracture of the right humerus.  Patient presents 24 with impaired ROM of the right digits, wrist, forearm,  impaired strength, and impaired sensation of the right peripheral nerves, particularly the radial nerve.  Deficits also noted in pain, edema, and functional use of the right UE. Patient joint contractures of the digits, wrist and forearm as well as significant intrinsic and extrinsic mm tightness. Patient is a good candidate for OT services to abolish pain and edema and restore ROM, strength, coordination, and sensation for a return to independence in daily tasks.      Re 7/3/24  Eileen has attended 19 therapy visits over the past 12 weeks.  She has made steady progress in active and passive motion.  Pain has reduced since IE and is not at a moderate and steady level.  She is now reporting sensation along the thumb and index, in an area previously insensate.  She has been wearing a wrist support consistently allowing her to use her right hand in a gross motor fashion and allowing  three point pinch for light tasks, short periods.  She remains in lymphedema sleeves with are restrictive and resist motion during daily tasks.  Therapy may continue to address active and passive motion, improve functional  and prehension to allow increased independence in light home tasks and art work.          RE 9/24/24  Eileen has attended 37 therapy visits over the past 12 weeks.  She has made steady progress in active and passive motion.  Pain has reduced since IE and is not at a moderate and steady level.  She is now reporting sensation along the thumb and index, in an area previously insensate.  She has been wearing a wrist support consistently allowing her to use her right hand with gross flexion and some dexterity tasks.  This motion also allows three point pinch for light tasks, short periods.  With wearing of wrist support,   strength measures in the low functional level.  She continues to remain in lymphedema sleeves with are restrictive motion during daily tasks.  Therapy may continue to address active and passive motion, improve functional  and prehension to allow increased independence in light home tasks and art work.      RE 1/14/25  Eileen continues to attend therapy consistently and continues to make slow and steady gains in motion and function.  With heat and stretch, functional motion of digital flexion is achieved.   strength with wear of circumferential splint is at a low functional level.  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   Pain has reduced since and is currently at a mild to moderate  steady level.  She is now reporting sensation along the thumb and index with sensation now intact over the DRSN.   She has been wearing a wrist support consistently allowing her to use her right hand with gross flexion and some dexterity tasks.  This motion also allows three point pinch for light tasks, short periods.  She continues to remain in lymphedema sleeves with are  restrictive during daily tasks.  Therapy may continue to address active and passive motion, improve functional  and prehension to allow increased independence in light home tasks and art work.  Therapy will wean to HEP as progress plateaus.     RE 2/17/25  Eileen  continues to make slow and steady gains in motion and function.  With heat and stretch, functional motion of digital flexion is achieved.   strength with wear of circumferential splint is at a low functional level.  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    Pain has reduced since and is currently at a mild to moderate  steady level.  Fifth finger tenderness less today.     She has been wearing a wrist support consistently allowing her to use her right hand for daily ADLs.    This motion also allows three point pinch.    She continues to remain in lymphedema sleeves with are restrictive during daily tasks.  Therapy may continue to address active and passive motion, improve functional  and prehension to allow increased independence in light home tasks and art work.  Therapy will wean to HEP as progress plateaus.     RE 4/1/25  Therapy continues twice weekly to progress motion and strength.  Eileen  continues to make slow and steady gains in active digital flexion and  strength.    strength with wear of circumferential splint has increased by 10 lbs.    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    Pain at rest is currently mild.  No active wrist extension has returned she continues to wear a wrist support allowing her to use her right hand for daily ADLs.    She continues to remain in lymphedema sleeves with are restrictive during daily tasks.  Therapy may continue to progress active and passive motion, improve functional  and prehension to allow increased independence in light home tasks and art work.  Therapy will wean to HEP as progress plateaus.   Surgeon assessment in mid April  scheduled.        RE 7/7/25  Eileen returns for continuation of care for right radial nerve palsy with subsequent hand stiffness.  On 6/6/25, she underwent lymph node transfers to address her right arm lymphedema.  Since DOS, she has resumed her HEP of passive flexion stretching of the right hand.  Sutures remain in mid FA which will be removed within a week.  Since DOS, she reports excellent pain reduction without increased edema or need for compression therapy.  Therapy may resume to progress active and passive digital flexion and progress function with supportive splinting.   Tendon transfers to address the loss of wrist and digital extension may be performed in the future, if further nerve regeneration does not progress.  Therapy will continue once weekly with emphasis on home stretching.  Therapy will be weaned to HEP as motion gains dictate.               Understanding of Dx/Px/POC: excellent     Prognosis: good    Goals  STGs (4 weeks)  Patient will be independent in HEP of ROM, splinting to increase motion, edema management MET  Patient will report an average pain level of 4-5/10 MET  Patient will demonstrate 30 degree improvement in PAGE of the digits MET  Patient will demonstrate active wrist extension/flexion to 10/70  Partially MET  Patient will demonstrate active forearm supination to 45 MET  LTGs (12 weeks)  Patient will demonstrate independence in a HEP to maintain ROM, strength, and function at discharge NOT MET  Patient will report an average pain level of 1-2/10 to be independent in daily tasks MET  80%  Patient will demonstrate PAGE of the digits to 220 degrees to be independent in self care tasks  MET 50%  Patient will demonstrate PROM of the wrist and forearm WFL to be independent in self care tasks  MET  Patient will demonstrate right hand strength (in splint) within 20% of the left hand to be MI for IADL tasks NOT MET  Patient will demonstrate control  of edema MET      Plan  Patient would  benefit from: skilled occupational therapy and custom splinting  Planned modality interventions: thermotherapy: hydrocollator packs and cryotherapy    Planned therapy interventions: activity modification, compression, fine motor coordination training, graded activity, graded exercise, home exercise program, therapeutic exercise, therapeutic activities, stretching, strengthening, patient education, orthotic fitting/training, neuromuscular re-education, manual therapy, IASTM, joint mobilization, kinesiology taping, massage, muscle pump exercises and nerve gliding  Other planned therapy interventions: Static progressive and dynamic splinting to increase digit, wrist, forearm motion    Frequency: 1x week  Duration in weeks: 8  Plan of Care beginning date: 7/7/2025  Plan of Care expiration date: 9/1/2025  Treatment plan discussed with: patient      Subjective Evaluation    History of Present Illness  Date of onset: 11/12/2023  Mechanism of injury: trauma  Mechanism of injury: 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  Hx of lymphedema and frequent cellulitis infections in the RUE. Patient also had left breast cancer and has LUE lymphedema as well.    She reports that she suffered a displaced spiral fracture of the right humerus on 11/12/23 after a fall while pushing her mother in law who was seated in a wheelchair.  Patient went to ED that date for xrays and long arm cast. She was referred to orthopedics. Had a closed reduction of fracture. Long arm cast for approximately 4 weeks and then a Kruse clam shell brace. Patient received  a course of home health OT for ROM of the digits, wrist and elbow and edema management. She has had ongoing PT for lymphedema management and right shoulder ROM.     Patient referred to Dr. Black for radial nerve dysfunction and joint contractures in the forearm, wrist and hand. An EMG has been ordered. Patient now  referred to OT for splinting and ROM to address joint contractures.            Recurrent probem    Quality of life: good    Patient Goals  Patient goals for therapy: decreased edema, decreased pain, increased motion, increased strength and independence with ADLs/IADLs  Patient goal: Be able to use with more strength  Pain  Current pain ratin  Location: Proximal humerus, wrist, hand right  Quality: dull ache and discomfort  Relieving factors: medications, support and heat (recent surgery for lymph node transfer)  Exacerbated by: overusing.  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     Additional Observation Details  History of RUE lymphedema;  Currently wearing smallest sleeve which is the same worn prior to current injury.     Neurological Testing     Sensation     Wrist/Hand     Right   Diminished: light touch    Additional Neurological Details  [place] D[place] screening  Right  TH, II, III, IV distal pads = 3.61g  V= 2.83 g  DRSN= 4.31 g    Active Range of Motion     Right Elbow   Normal active range of motion  Flexion: 130 degrees   Extension: 0 degrees   Forearm supination: 90 degrees   Forearm pronation: 90 degrees     Right Wrist   Wrist flexion: 65 degrees   Wrist extension: -20 degrees   Radial deviation: 0 degrees   Ulnar deviation: 25 degrees     Right Thumb   Palmar Abduction    CMC: 45  Radial Abduction    CMC: 5  Kapandji score: 6 degrees    Right Digits   Flexion   Index     MCP: 72  Middle     MCP: 75  Ring     MCP: 65  Little     MCP: 55    Additional Active Range of Motion Details  In gravity eliminated position, able to actively extend right wrist moving from  wrist flexion to wrist neutral.   S/P heat and stretch-  AROM digital flexion to DPC    Right   II  1.0 cm,  III 1.0 cm,  IV 1.5 cm,  V 2 cm        Passive Range of Motion     Right Elbow   Normal passive range of motion  Flexion: 135 degrees   Extension: 0 degrees   Forearm supination: 90 degrees   Forearm pronation: 90 degrees     Right Wrist   Wrist flexion: 70 degrees   Wrist extension: 72 degrees   Radial deviation: 10 degrees   Ulnar deviation: 40 degrees     Right Digits   Flexion   Index     MCP: 85  Middle     MCP: 85  Ring     MCP: 65  Little     MCP: 60    Additional Passive Range of Motion Details  S/P heat and stretch-  Right PROM digital flexion to DPC    II  0 cm,  III  0 cm,  IV  0 cm,  V  1 cm        Strength/Myotome Testing     Right Wrist/Hand   Wrist extension: 0  Wrist flexion: 5    Additional Strength Details  With wear of circumferential wrist splint-  STRENGTH  [place] #2  R   31 lbs. +4 lbs    [place] #3  R   31.5 Lbs.   +10.2 lbs.               Tests     Right Wrist/Hand   Positive extrinsic extensor tightness, extrinsic flexor tightness and intrinsic muscle tightness.     Swelling     Right Elbow Girth Measurements   Joint line: 26 cm  10 cm below joint line: 26 cm  Daily Note     Today's date: 2025  Patient name: Eileen Matias  : 1960  MRN: [id number]  Referring provider: Rogelio Black MD  Dx:   Encounter Diagnosis     ICD-10-CM    1. Radial nerve palsy, right  G56.31       2. Right hand weakness  R29.898                        Subjective:   I work so hard on my hand.  I stretch before I come in.  I had surgery for the lymph nodes on 25.       Objective: See treatment diary below      Assessment: Tolerated treatment well. Patient exhibited good technique with therapeutic exercises. 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  Improved motion noted in forearm supination and with elbow E/F control.  " Excellent compliance in HEP for daily stretches.        Plan: Continue per plan of care.   Continue therapy with reduction of frequency to once weekly.  Progress UE PRE to include elbow PRE and T bands.           Precautions: Lymphedema; hx desmoid tumors and breast cancer; healing humeral fracture     POC expires Unit limit Auth  expiration date PT/OT + Visit Limit?   9/1 NA NA BOMN         Dx R Hum Fx     Dr Wayne MILLER MET          Last RE 9/24/24    Date 5/13 5/20 7/7 RE  4/29 5/1 5/8   Visit   1/10       Manuals 8  8  8  8   STM          Jt mobs digits, FA MP digits all G 1-2  MP all digits  G2  MP digits all G 1-2  MP all G 1-2   Ortho fit/train          Neuro Re-Ed                     Mirror therapy                              orthotic                              Ther Ex 30   15   15'    25'   30   23'   HEP   Revise Splint at DPC to allow increased flexion.       PROM 1:1 Digits E/F LLPS Digital Flexion full fist LLPS Digital flexion   Full fist LLPS  Digits E/F LLPS Digits E/F LLPS Digits E/F LLPS   Digital blocked AROM P/H fists 5x5\" P/H fists 3x5\" P/H fists 5x5\"  P/H fists 5x5\" P/H fists 5x5\" P/H fists 5x5\"   UE warm up UBE 3F 3R L1   Plus R only 170 UBE 3F 3R L1  Plus R only 175     UBE 3F 3R L1   Plus R only 170 UBE 3F 3R L1  Plus R only 170 UBE 3F 3R L1  Plus R only 170   PRE FA  #2 S/P x20 #2 S/P   #2 x20 S/P #2 x20 S/P    PRE Wrist FA UBE 3F 3R L1   Plus R only 170    B F bar up x20  B F bar down   X20  GFB on table 2x10 B F bar up x20  B F bar down   X20  GFB on table 2x10 B F bar up x20  B F bar down   X20   Pre elbow Bicep curl  #1 x20   #2 x17 Bicep curl   #1 x20  #2 x5   Bicep curl, lower slow 1x10 Bicep curl lower slow  1x10 hold 3\" NV, cont   Ther Activity 8'   15'   8'  8'   8'   Gripping Gray #3 all foams  Dowel, marker into putty Gray #3 all foams  Marker into putty x30   Gray #3 all foams Wilson #3 all foams Wilson #3 all foams  Dowel, marker into putty   Pinching Full velcrobd Full " bd off on   Velcro bd full bd Velcro bd full bd Velcrobd full bd   Coordination       Marbles in frisbee x10 CWCCW   Carrying           Reaching           MHP Pre tx with ace flexion Flexion with ace pre tx Flexion  with ace pre tx  Pre tx with ace flexion Pre tx with ace flexion  Pre tx with ace flexion   TENS 10- pre tx 10' pre tx DC  10' pre tx 10' per tx 10 pre tx

## 2025-07-14 ENCOUNTER — OFFICE VISIT (OUTPATIENT)
Dept: OCCUPATIONAL THERAPY | Facility: CLINIC | Age: 65
End: 2025-07-14
Attending: ORTHOPAEDIC SURGERY
Payer: MEDICARE

## 2025-07-14 DIAGNOSIS — G56.31 RADIAL NERVE PALSY, RIGHT: Primary | ICD-10-CM

## 2025-07-14 DIAGNOSIS — R29.898 RIGHT HAND WEAKNESS: ICD-10-CM

## 2025-07-14 PROCEDURE — 97110 THERAPEUTIC EXERCISES: CPT | Performed by: OCCUPATIONAL THERAPIST

## 2025-07-14 PROCEDURE — 97763 ORTHC/PROSTC MGMT SBSQ ENC: CPT | Performed by: OCCUPATIONAL THERAPIST

## 2025-07-14 PROCEDURE — 97140 MANUAL THERAPY 1/> REGIONS: CPT | Performed by: OCCUPATIONAL THERAPIST

## 2025-07-14 NOTE — PROGRESS NOTES
Daily Note         Today's date: 2025  Patient name: Eileen Matias  : 1960  MRN: [id number]  Referring provider: Rogelio Black MD  Dx:   Encounter Diagnosis     ICD-10-CM    1. Radial nerve palsy, right  G56.31       2. Right hand weakness  R29.898                          Subjective:   I work so hard on my hand.  I stretch before I come in.  I had surgery for the lymph nodes on 25.       Objective: See treatment diary below      Assessment: Tolerated treatment well. Patient exhibited good technique with therapeutic exercises. 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  Improved motion noted in forearm supination and with elbow E/F control.  Excellent compliance in HEP for daily stretches.        Plan: Continue per plan of care.   Continue therapy with reduction of frequency to once weekly.  Progress UE PRE to include elbow PRE and T bands.           Precautions: Lymphedema; hx desmoid tumors and breast cancer; healing humeral fracture     POC expires Unit limit Auth  expiration date PT/OT + Visit Limit?    NA NA BOMN         Dx R Hum Fx     Dr Wayne MILLER MET          Last RE 24    Date  RE    Visit   1/10 2/10      Manuals 8  8 8 8  8   STM          Jt mobs digits, FA MP digits all G 1-2  MP all digits  G2 MP all digits G 1-2 MP digits all G 1-2  MP all G 1-2   Ortho fit/train          Neuro Re-Ed                     Mirror therapy                              orthotic    15   Splint revision due to edema reduction                          Ther Ex 30   15   15'   15'   25'   30   23'   HEP   Revise Splint at [facility] to allow increased flexion. Review HEP for wrist splint wear and contin PROM digital flexion.       PROM 1:1 Digits E/F LLPS Digital Flexion full fist LLPS Digital flexion   Full  "fist LLPS Digital flexion full fist LLPS Digits E/F LLPS Digits E/F LLPS Digits E/F LLPS   Digital blocked AROM P/H fists 5x5\" P/H fists 3x5\" P/H fists 5x5\" P/H fists 5x5 P/H fists 5x5\" P/H fists 5x5\" P/H fists 5x5\"   UE warm up UBE 3F 3R L1   Plus R only 170 UBE 3F 3R L1  Plus R only 175     UBE 3F 3R L1   Plus R only 170 UBE 3F 3R L1  Plus R only 170 UBE 3F 3R L1  Plus R only 170   PRE FA  #2 S/P x20 #2 S/P   #2 x20 S/P #2 x20 S/P    PRE Wrist FA UBE 3F 3R L1   Plus R only 170    B F bar up x20  B F bar down   X20  GFB on table 2x10 B F bar up x20  B F bar down   X20  GFB on table 2x10 B F bar up x20  B F bar down   X20   Pre elbow Bicep curl  #1 x20   #2 x17 Bicep curl   #1 x20  #2 x5   Bicep curl, lower slow 1x10 Bicep curl lower slow  1x10 hold 3\" NV, cont   Ther Activity 8'   15'   8'  8'   8'   Gripping Gray #3 all foams  Dowel, marker into putty Gray #3 all foams  Marker into putty x30   Gray #3 all foams Wilson #3 all foams Wilson #3 all foams  Dowel, marker into putty   Pinching Full velcrobd Full bd off on   Velcro bd full bd Velcro bd full bd Velcrobd full bd   Coordination       Marbles in frisbee x10 CWCCW   Carrying           Reaching           MHP Pre tx with ace flexion Flexion with ace pre tx Flexion  with ace pre tx Flexion with ace Pre tx with ace flexion Pre tx with ace flexion  Pre tx with ace flexion   TENS 10- pre tx 10' pre tx DC  10' pre tx 10' per tx 10 pre tx        "

## 2025-07-16 ENCOUNTER — OFFICE VISIT (OUTPATIENT)
Dept: PODIATRY | Facility: CLINIC | Age: 65
End: 2025-07-16
Payer: MEDICARE

## 2025-07-16 VITALS — HEIGHT: 68 IN | HEART RATE: 80 BPM | WEIGHT: 128 LBS | OXYGEN SATURATION: 98 % | BODY MASS INDEX: 19.4 KG/M2

## 2025-07-16 DIAGNOSIS — G60.9 IDIOPATHIC PERIPHERAL NEUROPATHY: Primary | ICD-10-CM

## 2025-07-16 PROCEDURE — 99213 OFFICE O/P EST LOW 20 MIN: CPT | Performed by: PODIATRIST

## 2025-07-16 NOTE — PATIENT INSTRUCTIONS
Recommend quality over the counter arch supports:    - Powerstep [place] series insoles; comes 3/4 length or full length  - Spenco Orthotic Arch insoles; comes 3/4 length or full length  - Superfeet insoles; comes full length only   - PCSsole- 3/4 length insoles

## 2025-07-16 NOTE — ASSESSMENT & PLAN NOTE
The patient's clinical examination today is significant for persistent numbness and paresthesias to the forefoot bilaterally.  Vibratory sensation is largely absent to the forefoot bilaterally.There is no erythema nor edema no calor or ecchymosis noted to the lower extremity. Pedal pulses palpable bilaterally.     Recent MRI of the patient's thoracic spine was reviewed.  There are no thoracic spinal cord issues noted.  Can consider MRI of the lumbosacral spine, but will defer to neurology.    Blood work which was part of her neurological workup is still pending.  Eileen states that she will be going to get this blood work done soon.  Follow-up and reconsult neurology as needed.    At this point in time, I am uncertain as to where her peripheral neuropathy may be stemming from.  At the moment it is idiopathic in nature.  There is not much else that I can offer.  I do recommend following up with the blood work and neurology as needed.  I would like to see her about 12 months out to see how she is doing.  We can certainly consider repeat EMG/NCV study at that point in time if her symptoms persist or worsen.

## 2025-07-16 NOTE — PROGRESS NOTES
Name: Eileen Matias      : 1960      MRN: [id number]  Encounter Provider: James Martinez DPM  Encounter Date: 2025   Encounter department: [facility] PODIATRY [facility]  :  Assessment & Plan  Idiopathic peripheral neuropathy       The patient's clinical examination today is significant for persistent numbness and paresthesias to the forefoot bilaterally.  Vibratory sensation is largely absent to the forefoot bilaterally.There is no erythema nor edema no calor or ecchymosis noted to the lower extremity. Pedal pulses palpable bilaterally.     Recent MRI of the patient's thoracic spine was reviewed.  There are no thoracic spinal cord issues noted.  Can consider MRI of the lumbosacral spine, but will defer to neurology.    Blood work which was part of her neurological workup is still pending.  Eileen states that she will be going to get this blood work done soon.  Follow-up and reconsult neurology as needed.    At this point in time, I am uncertain as to where her peripheral neuropathy may be stemming from.  At the moment it is idiopathic in nature.  There is not much else that I can offer.  I do recommend following up with the blood work and neurology as needed.  I would like to see her about 12 months out to see how she is doing.  We can certainly consider repeat EMG/NCV study at that point in time if her symptoms persist or worsen.      History of Present Illness   HPI  Eileen Matias is a 64 y.o. female who presents today for follow-up of idiopathic peripheral neuropathy.  She has consulted with neurology.  An MRI of the thoracic spine was ordered and was negative.  Blood work was also ordered and is still pending.  Her symptomatology does seem to be slightly improved.  She feels that she may be getting some sensation back in certain parts of her forefoot.  Symptoms are still worse on the left.        Review of Systems   Constitutional: Negative.    Respiratory: Negative.     Cardiovascular:  "Negative.    Psychiatric/Behavioral: Negative.       Pertinent Medical History     PAST MEDICAL HISTORY:  Past Medical History[1]    PAST SURGICAL HISTORY:  Past Surgical History[2]     ALLERGIES:  Chlorpromazine, Codeine, Meperidine, Phenothiazines, Saccharin - food allergy, Ampicillin-sulbactam sodium, Aspirin, Gluten meal - food allergy, Ibuprofen, Levofloxacin, Lactose - food allergy, Neomycin, Citrus - food allergy, and Latex    MEDICATIONS:  Current Medications[3]       Objective   Pulse 80   Ht 5' 8\" (1.727 m)   Wt 58.1 kg (128 lb)   SpO2 98%   BMI 19.46 kg/m²      Physical Exam  Vitals and nursing note reviewed.   Constitutional:       General: She is not in acute distress.     Appearance: She is well-developed.   HENT:      Head: Normocephalic and atraumatic.     Cardiovascular:      Pulses:           Dorsalis pedis pulses are 2+ on the right side and 2+ on the left side.        Posterior tibial pulses are 2+ on the right side and 2+ on the left side.   Pulmonary:      Effort: Pulmonary effort is normal.   Feet:      Right foot:      Skin integrity: Skin integrity normal.      Left foot:      Skin integrity: Skin integrity normal.      Comments: The patient's clinical examination today is significant for persistent numbness and paresthesias to the forefoot bilaterally.  Vibratory sensation is largely absent to the forefoot bilaterally.There is no erythema nor edema no calor or ecchymosis noted to the lower extremity. Pedal pulses palpable bilaterally.       Skin:     General: Skin is warm and dry.      Capillary Refill: Capillary refill takes less than 2 seconds.     Neurological:      General: No focal deficit present.      Mental Status: She is alert and oriented to person, place, and time.     Psychiatric:         Mood and Affect: Mood normal.                [1]   Past Medical History:  Diagnosis Date    Abnormal mammogram 05/15/2013    Anemia     ((Pt Qnr Sub: na))     Cancer (HCC)     desmoitosis "    Carcinoma of left breast metastatic to axillary lymph node (HCC) 04/19/2022    ((Pt Qnr Sub: na))     Chronic pain disorder     worse right side of body    Desmoid tumor     Last Assessed: 6/19/2017    Fractures     History of transfusion     ((Pt Qnr Sub: na)) no reactions    Hyperlipidemia     Hypertension     ((Pt Qnr Sub: na))     Osteoporosis     PONV (postoperative nausea and vomiting)    [2]   Past Surgical History:  Procedure Laterality Date    BREAST LUMPECTOMY Left 06/21/2022    ((Pt Qnr Sub: na)) Procedure: ANITA  DIRECTED LUMPECTOMY;  Surgeon: Amanda Motley MD;  Location: [facility];  Service: Surgical Oncology    FRACTURE SURGERY      HAND SURGERY  3/2021    HYSTERECTOMY      LYMPH NODE DISSECTION Left 06/21/2022    Procedure: AXILLARY DISSECTION LEVEL I AND LEVEL II LYMPH NODES;  Surgeon: Amanda Motley MD;  [facility]: [facility];  Service: Surgical Oncology    MRI BREAST BIOPSY LEFT (ALL INCLUSIVE) Left 05/20/2022    MRI BREAST BIOPSY RIGHT (ALL INCLUSIVE) Right 05/20/2022    MYOMECTOMY  many times    OTHER SURGICAL HISTORY      Arm Incision: Dermoid tumor, right Arm     PARTIAL HYSTERECTOMY      MS COLONOSCOPY FLX DX W/COLLJ SPEC WHEN PFRMD N/A 08/15/2017    Procedure: COLONOSCOPY;  Surgeon: Alec England MD;  Location: [facility];  Service: Gastroenterology    MS NDSC WRST SURG W/RLS TRANSVRS CARPL LIGM Left 08/10/2021    Procedure: RELEASE LEFT CARPAL TUNNEL ENDOSCOPIC;  Surgeon: Chris Camacho MD;  Location: [facility];  Service: Orthopedics    MS OPTX DSTL RADL I-ARTIC FX/EPIPHYSL SEP 3+ FRAG Left 02/09/2021    Procedure: OPEN REDUCTION W/ INTERNAL FIXATION (ORIF) RADIUS / ULNA (WRIST) left;  Surgeon: Chris Camacho MD;  Location: [facility];  Service: Orthopedics    SKIN BIOPSY      SKIN CANCER EXCISION      Melanoma Excision     TONSILLECTOMY      US BREAST NEEDLE LOC LEFT Left 06/16/2022    US BREAST NEEDLE LOC RIGHT EACH ADDITIONAL Right 06/16/2022    US  GUIDED BREAST LYMPH NODE BIOPSY LEFT Left 04/14/2022   [3]   Current Outpatient Medications   Medication Sig Dispense Refill    alendronate (FOSAMAX) 70 mg tablet Take 1 tablet (70 mg total) by mouth every 7 days 4 tablet 4    ezetimibe (ZETIA) 10 mg tablet Take 1 tablet (10 mg total) by mouth daily 90 tablet 0    letrozole (FEMARA) 2.5 mg tablet Take 1 tablet (2.5 mg total) by mouth daily 90 tablet 3    Probiotic Product (PROBIOTIC-10 PO) Take by mouth      cholecalciferol (VITAMIN D3) 1,000 units tablet Take 3,000 Units by mouth daily (Patient not taking: Reported on 6/2/2025)      diphenhydrAMINE (BENADRYL) 50 MG tablet Take 50 mg by mouth daily at bedtime as needed for itching (Patient not taking: Reported on 6/2/2025)      dronabinol (MARINOL) 10 MG capsule  (Patient not taking: No sig reported)      multivitamin (THERAGRAN) TABS Take 1 tablet by mouth daily (Patient not taking: Reported on 6/2/2025)      Omega-3 Fatty Acids (FISH OIL PO) Take by mouth (Patient not taking: Reported on 6/2/2025)       No current facility-administered medications for this visit.

## 2025-07-18 RX ORDER — FAMOTIDINE 20 MG/1
TABLET, FILM COATED ORAL
COMMUNITY
Start: 2025-06-09

## 2025-07-21 ENCOUNTER — OFFICE VISIT (OUTPATIENT)
Dept: INTERNAL MEDICINE CLINIC | Facility: CLINIC | Age: 65
End: 2025-07-21
Payer: MEDICARE

## 2025-07-21 VITALS
OXYGEN SATURATION: 98 % | DIASTOLIC BLOOD PRESSURE: 72 MMHG | HEART RATE: 68 BPM | HEIGHT: 68 IN | SYSTOLIC BLOOD PRESSURE: 132 MMHG | BODY MASS INDEX: 19.55 KG/M2 | WEIGHT: 129 LBS

## 2025-07-21 DIAGNOSIS — E78.2 HYPERLIPIDEMIA, MIXED: ICD-10-CM

## 2025-07-21 DIAGNOSIS — G62.9 NEUROPATHY: Primary | ICD-10-CM

## 2025-07-21 DIAGNOSIS — R73.03 PREDIABETES: ICD-10-CM

## 2025-07-21 PROCEDURE — 99214 OFFICE O/P EST MOD 30 MIN: CPT | Performed by: INTERNAL MEDICINE

## 2025-07-21 PROCEDURE — G2211 COMPLEX E/M VISIT ADD ON: HCPCS | Performed by: INTERNAL MEDICINE

## 2025-07-21 NOTE — PROGRESS NOTES
Name: Eileen Matias      : 1960      MRN: [id number]  Encounter Provider: Amelia Gallegos MD  Encounter Date: 2025   Encounter department: [facility] INTERNAL MEDICINE [place]  :  Assessment & Plan  Carcinoma of left breast metastatic to axillary lymph node (HCC)  She has a h/o left breast cancer with axillary lymph node metastasis with lumpectomy radiation and axillary dissection also with right sarcoma and lymphedema. Now on letrozole therapy; recent humerus fx, in a sling, cannot operate 2/2 lymphedema; monitor.              Neuropathy  Working with neurology and podiatry.       Prediabetes  Check A1c.  Orders:    Hemoglobin A1C; Future    Hyperlipidemia, mixed  The 10-year ASCVD risk score ([place] AMARO, et al., 2019) is: 6.9%    Values used to calculate the score:      Age: 64 years      Clincally relevant sex: Female      Is Non- : No      Diabetic: No      Tobacco smoker: No      Systolic Blood Pressure: 132 mmHg      Is BP treated: No      HDL Cholesterol: 55 mg/dL      Total Cholesterol: 221 mg/dL    She stopped Zetia, says she no longer needs it.  Orders:    Lipid Panel with Direct LDL reflex; Future    CT coronary calcium score; Future          Depression Screening and Follow-up Plan: Patient was screened for depression during today's encounter. They screened negative with a PHQ-2 score of 0.      Falls Plan of Care: balance, strength, and gait training instructions were provided. Recommended assistive device to help with gait and balance. Medications that increase falls were reviewed. Vitamin D supplementation was recommended.       History of Present Illness   Routine f/u.      Review of Systems   Constitutional:  Negative for chills and fever.   HENT:  Negative for ear pain and sore throat.    Eyes:  Negative for pain and visual disturbance.   Respiratory:  Negative for cough and shortness of breath.    Cardiovascular:  Negative for chest pain and palpitations.  "  Gastrointestinal:  Negative for abdominal pain and vomiting.   Genitourinary:  Negative for dysuria and hematuria.   Musculoskeletal:  Negative for arthralgias and back pain.   Skin:  Negative for color change and rash.   Neurological:  Negative for seizures and syncope.   All other systems reviewed and are negative.      Objective   /72 (BP Location: Left arm, Patient Position: Sitting, Cuff Size: Standard)   Pulse 68   Ht 5' 8\" (1.727 m)   Wt 58.5 kg (129 lb)   SpO2 98%   BMI 19.61 kg/m²      Physical Exam  Vitals and nursing note reviewed.   Constitutional:       General: She is not in acute distress.     Appearance: She is well-developed.   HENT:      Head: Normocephalic and atraumatic.     Eyes:      Conjunctiva/sclera: Conjunctivae normal.       Cardiovascular:      Rate and Rhythm: Normal rate and regular rhythm.      Heart sounds: No murmur heard.  Pulmonary:      Effort: Pulmonary effort is normal. No respiratory distress.      Breath sounds: Normal breath sounds.   Abdominal:      Palpations: Abdomen is soft.      Tenderness: There is no abdominal tenderness.     Musculoskeletal:         General: No swelling.      Cervical back: Neck supple.     Skin:     General: Skin is warm and dry.      Capillary Refill: Capillary refill takes less than 2 seconds.     Neurological:      Mental Status: She is alert.     Psychiatric:         Mood and Affect: Mood normal.         "

## 2025-07-21 NOTE — ASSESSMENT & PLAN NOTE
The 10-year ASCVD risk score ([place] AMARO, et al., 2019) is: 6.9%    Values used to calculate the score:      Age: 64 years      Clincally relevant sex: Female      Is Non- : No      Diabetic: No      Tobacco smoker: No      Systolic Blood Pressure: 132 mmHg      Is BP treated: No      HDL Cholesterol: 55 mg/dL      Total Cholesterol: 221 mg/dL    She stopped Zetia, says she no longer needs it.  Orders:    Lipid Panel with Direct LDL reflex; Future    CT coronary calcium score; Future

## 2025-07-24 ENCOUNTER — OFFICE VISIT (OUTPATIENT)
Dept: OCCUPATIONAL THERAPY | Facility: CLINIC | Age: 65
End: 2025-07-24
Attending: ORTHOPAEDIC SURGERY
Payer: MEDICARE

## 2025-07-24 DIAGNOSIS — G56.31 RADIAL NERVE PALSY, RIGHT: Primary | ICD-10-CM

## 2025-07-24 DIAGNOSIS — R29.898 RIGHT HAND WEAKNESS: ICD-10-CM

## 2025-07-24 PROCEDURE — 97110 THERAPEUTIC EXERCISES: CPT

## 2025-07-24 PROCEDURE — 97140 MANUAL THERAPY 1/> REGIONS: CPT

## 2025-07-24 NOTE — PROGRESS NOTES
"Daily Note     Today's date: 2025  Patient name: Eileen Matias  : 1960  MRN: [id number]  Referring provider: Rogelio Black MD  Dx:   Encounter Diagnosis     ICD-10-CM    1. Radial nerve palsy, right  G56.31       2. Right hand weakness  R29.898                      Subjective: I can hold a bottle with my right hand and open the top with the left hand. I couldn't do that before      Objective: See treatment diary below      Assessment: Tolerated treatment well. Patient exhibited good technique with therapeutic exercises. Improved function noted. Loose composite fist after stretching.      Plan: Continue per plan of care.      Precautions: Lymphedema; hx desmoid tumors and breast cancer; healing humeral fracture     POC expires Unit limit Auth  expiration date PT/OT + Visit Limit?    NA NA BOMN         Dx R Hum Fx     Dr Wayne MILLER MET          Last RE 24    Date  RE 25   Visit   1/10 2/10 3/10     Manuals 8  8 8 10'  8   STM          Jt mobs digits, FA MP digits all G 1-2  MP all digits  G2 MP all digits G 1-2 MP all digits G 1-2  MP all G 1-2   Ortho fit/train          Neuro Re-Ed                     Mirror therapy                              orthotic    15   Splint revision due to edema reduction                          Ther Ex 30   15   15'   15'  30'   30   23'   HEP   Revise Splint at [facility] to allow increased flexion. Review HEP for wrist splint wear and contin PROM digital flexion.       PROM 1:1 Digits E/F LLPS Digital Flexion full fist LLPS Digital flexion   Full fist LLPS Digital flexion full fist LLPS Digit flexion into full fist LLPS Digits E/F LLPS Digits E/F LLPS   Digital blocked AROM P/H fists 5x5\" P/H fists 3x5\" P/H fists 5x5\" P/H fists 5x5 P/H fists x 10 P/H fists 5x5\" P/H fists 5x5\"   UE warm up UBE 3F 3R L1   Plus R only 170 UBE 3F 3R L1  Plus R only 175      UBE 3F 3R L1  Plus R only 170 UBE 3F 3R L1  Plus R only 170   PRE FA  #2 S/P " "x20 #2 S/P   2# x 20 #2 x20 S/P    PRE Wrist FA UBE 3F 3R L1   Plus R only 170     B F bar up x20  B F bar down   X20  GFB on table 2x10 B F bar up x20  B F bar down   X20   Pre elbow Bicep curl  #1 x20   #2 x17 Bicep curl   #1 x20  #2 x5   Biceps 2@ x 15 Bicep curl lower slow  1x10 hold 3\" NV, cont   Ther Activity 8'   15'     8'   8'   Gripping Gray #3 all foams  Dowel, marker into putty Gray #3 all foams  Marker into putty x30   Grey #3 all foams; dowel into putty x 30 Gray #3 all foams Wilson #3 all foams  Dowel, marker into putty   Pinching Full velcrobd Full bd off on   Velcro board Velcro bd full bd Velcrobd full bd   Coordination       Marbles in frisbee x10 CWCCW   Carrying           Reaching           MHP Pre tx with ace flexion Flexion with ace pre tx Flexion  with ace pre tx Flexion with ace Pre tx with ace flexion Pre tx with ace flexion  Pre tx with ace flexion   TENS 10- pre tx 10' pre tx DC   10' per tx 10 pre tx          "

## 2025-07-28 ENCOUNTER — EVALUATION (OUTPATIENT)
Dept: PHYSICAL THERAPY | Facility: CLINIC | Age: 65
End: 2025-07-28
Payer: MEDICARE

## 2025-07-28 DIAGNOSIS — I89.0 LYMPHEDEMA: Primary | ICD-10-CM

## 2025-07-28 PROCEDURE — 97162 PT EVAL MOD COMPLEX 30 MIN: CPT | Performed by: PHYSICAL THERAPIST

## 2025-07-31 ENCOUNTER — OFFICE VISIT (OUTPATIENT)
Dept: OCCUPATIONAL THERAPY | Facility: CLINIC | Age: 65
End: 2025-07-31
Attending: ORTHOPAEDIC SURGERY
Payer: MEDICARE

## 2025-07-31 DIAGNOSIS — G56.31 RADIAL NERVE PALSY, RIGHT: Primary | ICD-10-CM

## 2025-07-31 DIAGNOSIS — R29.898 RIGHT HAND WEAKNESS: ICD-10-CM

## 2025-07-31 PROCEDURE — 97110 THERAPEUTIC EXERCISES: CPT | Performed by: OCCUPATIONAL THERAPIST

## 2025-07-31 PROCEDURE — 97140 MANUAL THERAPY 1/> REGIONS: CPT | Performed by: OCCUPATIONAL THERAPIST

## 2025-08-05 ENCOUNTER — OFFICE VISIT (OUTPATIENT)
Dept: OCCUPATIONAL THERAPY | Facility: CLINIC | Age: 65
End: 2025-08-05
Attending: ORTHOPAEDIC SURGERY
Payer: MEDICARE

## 2025-08-05 ENCOUNTER — HOSPITAL ENCOUNTER (OUTPATIENT)
Dept: CT IMAGING | Facility: CLINIC | Age: 65
Discharge: HOME | End: 2025-08-05
Attending: INTERNAL MEDICINE
Payer: COMMERCIAL

## 2025-08-05 DIAGNOSIS — R29.898 RIGHT HAND WEAKNESS: ICD-10-CM

## 2025-08-05 DIAGNOSIS — E78.2 HYPERLIPIDEMIA, MIXED: ICD-10-CM

## 2025-08-05 DIAGNOSIS — G56.31 RADIAL NERVE PALSY, RIGHT: Primary | ICD-10-CM

## 2025-08-05 PROCEDURE — 75571 CT HRT W/O DYE W/CA TEST: CPT

## 2025-08-05 PROCEDURE — 97140 MANUAL THERAPY 1/> REGIONS: CPT | Performed by: OCCUPATIONAL THERAPIST

## 2025-08-05 PROCEDURE — 97110 THERAPEUTIC EXERCISES: CPT | Performed by: OCCUPATIONAL THERAPIST

## 2025-08-13 ENCOUNTER — OFFICE VISIT (OUTPATIENT)
Dept: PHYSICAL THERAPY | Facility: CLINIC | Age: 65
End: 2025-08-13
Payer: MEDICARE

## 2025-08-14 ENCOUNTER — OFFICE VISIT (OUTPATIENT)
Dept: OCCUPATIONAL THERAPY | Facility: CLINIC | Age: 65
End: 2025-08-14
Attending: ORTHOPAEDIC SURGERY
Payer: MEDICARE

## 2025-08-21 ENCOUNTER — OFFICE VISIT (OUTPATIENT)
Dept: OCCUPATIONAL THERAPY | Facility: CLINIC | Age: 65
End: 2025-08-21
Attending: ORTHOPAEDIC SURGERY
Payer: MEDICARE

## 2025-08-21 DIAGNOSIS — G56.31 RADIAL NERVE PALSY, RIGHT: Primary | ICD-10-CM

## 2025-08-21 PROCEDURE — 97140 MANUAL THERAPY 1/> REGIONS: CPT | Performed by: OCCUPATIONAL THERAPIST

## 2025-08-21 PROCEDURE — 97110 THERAPEUTIC EXERCISES: CPT | Performed by: OCCUPATIONAL THERAPIST

## (undated) DEVICE — HAND PACK: Brand: CARDINAL HEALTH

## (undated) DEVICE — SUT ETHILON 4-0 PS-2 18 IN 1667H

## (undated) DEVICE — LIGHT HANDLE COVER SLEEVE DISP BLUE STELLAR

## (undated) DEVICE — COBAN 4 IN STERILE

## (undated) DEVICE — SHEATH, GUIDE, SAVI SCOUT®: Brand: SAVI SCOUT®

## (undated) DEVICE — PAD CAST 3 IN COTTON NON STRL

## (undated) DEVICE — CURITY NON-ADHERENT STRIPS: Brand: CURITY

## (undated) DEVICE — DISPOSABLE EQUIPMENT COVER: Brand: SMALL TOWEL DRAPE

## (undated) DEVICE — LIGACLIP MCA MULTIPLE CLIP APPLIERS, 20 MEDIUM CLIPS: Brand: LIGACLIP

## (undated) DEVICE — 3M™ STERI-STRIP™ REINFORCED ADHESIVE SKIN CLOSURES, R1547, 1/2 IN X 4 IN (12 MM X 100 MM), 6 STRIPS/ENVELOPE: Brand: 3M™ STERI-STRIP™

## (undated) DEVICE — MARGIN MARKER SET

## (undated) DEVICE — SPONGE STICK WITH PVP-I: Brand: KENDALL

## (undated) DEVICE — COBAN 2 IN UNSTERILE

## (undated) DEVICE — .054" X 6" GUIDE WIRE
Type: IMPLANTABLE DEVICE | Site: WRIST | Status: NON-FUNCTIONAL
Brand: ACUMED
Removed: 2021-02-09

## (undated) DEVICE — BETHLEHEM UNIVERSAL MINOR GEN: Brand: CARDINAL HEALTH

## (undated) DEVICE — GLOVE SRG BIOGEL ORTHOPEDIC 8

## (undated) DEVICE — GAUZE SPONGES,USP TYPE VII GAUZE, 12 PLY: Brand: CURITY

## (undated) DEVICE — GLOVE INDICATOR PI UNDERGLOVE SZ 6.5 BLUE

## (undated) DEVICE — SMOKE EVACUATION TUBING WITH 8 IN INTEGRAL WAND AND SPONGE GUARD: Brand: BUFFALO FILTER

## (undated) DEVICE — ACE WRAP 3 IN STERILE

## (undated) DEVICE — DRAPE KIT C-ARM W/PLATE PRTC FOOT SWITCH COVER

## (undated) DEVICE — IMPERVIOUS STOCKINETTE: Brand: DEROYAL

## (undated) DEVICE — TUBING SUCTION 5MM X 12 FT

## (undated) DEVICE — GAUZE SPONGES,16 PLY: Brand: CURITY

## (undated) DEVICE — MAYO STAND COVER: Brand: CONVERTORS

## (undated) DEVICE — BANDAGE, ESMARK LF STR 6"X9' (20/CS): Brand: CYPRESS

## (undated) DEVICE — SUT VICRYL 2-0 SH 27 IN UNDYED J417H

## (undated) DEVICE — INTENDED FOR TISSUE SEPARATION, AND OTHER PROCEDURES THAT REQUIRE A SHARP SURGICAL BLADE TO PUNCTURE OR CUT.: Brand: BARD-PARKER ® CARBON RIB-BACK BLADES

## (undated) DEVICE — SUT VICRYL 2-0 REEL 54 IN J286G

## (undated) DEVICE — PLUMEPEN PRO 10FT

## (undated) DEVICE — STANDARD SURGICAL GOWN, L: Brand: CONVERTORS

## (undated) DEVICE — STRETCH BANDAGE: Brand: CURITY

## (undated) DEVICE — INTENDED FOR TISSUE SEPARATION, AND OTHER PROCEDURES THAT REQUIRE A SHARP SURGICAL BLADE TO PUNCTURE OR CUT.: Brand: BARD-PARKER SAFETY BLADES SIZE 15, STERILE

## (undated) DEVICE — ELECTRODE BLADE MOD E-Z CLEAN  2.75IN 7CM -0012AM

## (undated) DEVICE — SUT SILK 2-0 SH 30 IN K833H

## (undated) DEVICE — GLOVE INDICATOR PI UNDERGLOVE SZ 8 BLUE

## (undated) DEVICE — 2.7 X 10MM L LOW PROFILE HEXALOBE SCREW
Type: IMPLANTABLE DEVICE | Site: WRIST | Status: NON-FUNCTIONAL
Brand: ACUMED

## (undated) DEVICE — SPLINT 4 X 15 IN FAST SET PLASTER

## (undated) DEVICE — SPONGE SCRUB 4 PCT CHLORHEXIDINE

## (undated) DEVICE — CHEST/BREAST DRAPE: Brand: CONVERTORS

## (undated) DEVICE — CLIP CARPAL

## (undated) DEVICE — NEEDLE 25G X 1 1/2

## (undated) DEVICE — MEDI-VAC YANK SUCT HNDL W/TPRD BULBOUS TIP: Brand: CARDINAL HEALTH

## (undated) DEVICE — PENCIL ELECTROSURG E-Z CLEAN -0035H

## (undated) DEVICE — SUT VICRYL 3-0 SH 27 IN J416H

## (undated) DEVICE — STERILE BETHLEHEM PLASTIC HAND: Brand: CARDINAL HEALTH

## (undated) DEVICE — ELECTRODE EZ CLEAN BLADE -0012

## (undated) DEVICE — GLOVE SRG BIOGEL 8

## (undated) DEVICE — SUT ETHIBOND 3-0 RB-1 30 IN MX552

## (undated) DEVICE — 2.0MM QUICK RELEASE DRILL: Brand: ACUMED

## (undated) DEVICE — DRAPE SHEET THREE QUARTER

## (undated) DEVICE — PACK UNIVERSAL DRAPES SUB-Q ICD

## (undated) DEVICE — UTILITY MARKER,BLACK WITH LABELS: Brand: DEVON

## (undated) DEVICE — CHLORAPREP HI-LITE 26ML ORANGE

## (undated) DEVICE — ADHESIVE SKIN CLOSR DERMABOND PRINEO

## (undated) DEVICE — SUT MONOCRYL 4-0 PS-2 18 IN Y496G

## (undated) DEVICE — SURGICEL 2 X 3

## (undated) DEVICE — BRUSH EZ SCRUB PCMX W/NAIL CLEANER

## (undated) DEVICE — HEMOSTAT POWDER ADSORB SURGICEL 3GM

## (undated) DEVICE — NON-STERILE REUSABLE TOURNIQUET CUFF SINGLE BLADDER, DUAL PORT AND QUICK CONNECT CONNECTOR: Brand: COLOR CUFF

## (undated) DEVICE — SKIN MARKER DUAL TIP WITH RULER CAP, FLEXIBLE RULER AND LABELS: Brand: DEVON

## (undated) DEVICE — PAD GROUNDING ADULT

## (undated) DEVICE — DRAPE EQUIPMENT RF WAND